# Patient Record
Sex: FEMALE | Race: WHITE | NOT HISPANIC OR LATINO | Employment: OTHER | URBAN - METROPOLITAN AREA
[De-identification: names, ages, dates, MRNs, and addresses within clinical notes are randomized per-mention and may not be internally consistent; named-entity substitution may affect disease eponyms.]

---

## 2017-01-04 ENCOUNTER — PATIENT OUTREACH (OUTPATIENT)
Dept: ADMINISTRATIVE | Facility: CLINIC | Age: 57
End: 2017-01-04
Payer: MEDICARE

## 2017-01-04 ENCOUNTER — OFFICE VISIT (OUTPATIENT)
Dept: FAMILY MEDICINE | Facility: CLINIC | Age: 57
End: 2017-01-04
Payer: COMMERCIAL

## 2017-01-04 VITALS
HEART RATE: 88 BPM | TEMPERATURE: 98 F | SYSTOLIC BLOOD PRESSURE: 140 MMHG | WEIGHT: 224 LBS | DIASTOLIC BLOOD PRESSURE: 85 MMHG | HEIGHT: 62 IN | BODY MASS INDEX: 41.22 KG/M2

## 2017-01-04 DIAGNOSIS — E87.6 HYPOKALEMIA: ICD-10-CM

## 2017-01-04 DIAGNOSIS — B00.89 RECURRENT ORAL HERPES SIMPLEX INFECTION: ICD-10-CM

## 2017-01-04 DIAGNOSIS — M19.049 HAND ARTHRITIS: ICD-10-CM

## 2017-01-04 DIAGNOSIS — L03.213 PERIORBITAL CELLULITIS OF LEFT EYE: Primary | ICD-10-CM

## 2017-01-04 PROCEDURE — 99999 PR PBB SHADOW E&M-EST. PATIENT-LVL IV: CPT | Mod: PBBFAC,,, | Performed by: FAMILY MEDICINE

## 2017-01-04 RX ORDER — POTASSIUM CHLORIDE 750 MG/1
20 CAPSULE, EXTENDED RELEASE ORAL DAILY
Qty: 60 CAPSULE | Refills: 11 | Status: SHIPPED | OUTPATIENT
Start: 2017-01-04 | End: 2018-01-06 | Stop reason: SDUPTHER

## 2017-01-04 RX ORDER — SULINDAC 150 MG/1
TABLET ORAL
Qty: 60 TABLET | Refills: 0 | Status: SHIPPED | OUTPATIENT
Start: 2017-01-04 | End: 2017-08-10

## 2017-01-04 RX ORDER — LEVOFLOXACIN 500 MG/1
TABLET, FILM COATED ORAL
Refills: 0 | COMMUNITY
Start: 2017-01-02 | End: 2017-01-24 | Stop reason: SDUPTHER

## 2017-01-04 RX ORDER — ACETAMINOPHEN 325 MG/1
325 TABLET ORAL EVERY 6 HOURS PRN
COMMUNITY
End: 2017-04-05

## 2017-01-04 RX ORDER — TRAMADOL HYDROCHLORIDE 50 MG/1
50 TABLET ORAL EVERY 6 HOURS PRN
Qty: 30 TABLET | Refills: 0 | Status: SHIPPED | OUTPATIENT
Start: 2017-01-04 | End: 2017-01-14

## 2017-01-04 RX ORDER — VALACYCLOVIR HYDROCHLORIDE 1 G/1
1000 TABLET, FILM COATED ORAL 3 TIMES DAILY
Qty: 21 TABLET | Refills: 0 | Status: SHIPPED | OUTPATIENT
Start: 2017-01-04 | End: 2017-01-24

## 2017-01-04 RX ORDER — HYDROCODONE BITARTRATE AND ACETAMINOPHEN 10; 325 MG/1; MG/1
TABLET ORAL
Refills: 0 | COMMUNITY
Start: 2016-12-28 | End: 2017-01-04

## 2017-01-04 RX ORDER — OXYCODONE AND ACETAMINOPHEN 10; 325 MG/1; MG/1
TABLET ORAL
Refills: 0 | COMMUNITY
Start: 2016-12-29 | End: 2017-01-04

## 2017-01-04 NOTE — PROGRESS NOTES
Patient presents for follow-up after hospitalization with left periorbital and facial cellulitis.  Symptoms have improved.  She is still on antibiotics.  She does complain of some sores to the left side of the throat and mouth.  She apparently went to the ER and was diagnosed with aphthous ulcer and is using Magic mouthwash with some improvement.  She denies any fever chills.  Vision okay.  She was given some Percocet in the hospital, but this makes her itch.  She did have mild decreased potassium in the hospital.  She is on diuretic.      DATES OF SERVICE: 12/31/2016 - 01/02/2017     ADMITTING DIAGNOSES:      1. Left periorbital and left facial cellulitis, status post failed   outpatient antibiotics.  2. Leukocytosis.  3. Mild hypokalemia.  4. Hypothyroidism.  5. Sarcoidosis with impaired immune response.  6. Chronic obstructive pulmonary disease versus asthma.  7. Hyperlipidemia.      DISCHARGE DIAGNOSES:      1. Left periorbital and left facial cellulitis, status post failed   outpatient antibiotics.  2. Leukocytosis.  3. Mild hypokalemia.  4. Hypothyroidism.  5. Sarcoidosis with impaired immune response.  6. Chronic obstructive pulmonary disease versus asthma.  7. Hyperlipidemia.      CONSULTS REQUESTED: None.      PROCEDURES: None.      HOSPITAL COURSE: The patient is a 56-year-old female who presented to the   Emergency Department with complaints of worsening swelling and pain to the   left side of her face. She states that her symptoms began shortly after   having a root canal several days prior to admission. She states that after   the anesthesia wore off, the pain began to worsen. In the following morning,   she called her endodontist and he advised her to go to the Emergency   Department. Last Thursday evening, she presented to the Emergency Department   because of the facial swelling and erythema. She was sent home on Suprax and   pain medications. She reports compliance with her medications, but despite    taking them as prescribed, she continued to have worsening symptoms and   therefore she returned to the Emergency Department for evaluation. On arrival  to the ED, there was erythema and edema just under her left eye and orbital   region extending to her left cheek and under her neck, under her left chin to   the left of her chin. There was no fluctuance or noted abscess; however, it   was very edematous and indurated with no obvious abscess. She did complain of  mild tenderness to the left temporal region; however, there was no obvious   abscess or any drainage. She also denies any changes or loss of vision. In   the Emergency Department, a maxillofacial CT was done, which showed   periorbital cellulitis without evidence of intraorbital extension. Blood   cultures x 2 sets were collected. She had noted leukocytosis; however, she   was started on broad-spectrum antibiotic coverage to include vancomycin and   Levaquin, given her chronic immunosuppression. She was nontoxic appearing and  hemodynamically stable and admitted for further workup and treatment with   continuation of care for her cellulitis. In summary of her hospital course,   she remained on IV antibiotics. Throughout her hospital stay, there was rapid  improvement after 1 day of her cellulitis. She had good response from the   antibiotics. Again, as outlined above, she had a recent dental procedure and   root canal and her symptoms began shortly after this was done. On date of   discharge, the periorbital cellulitis and left facial cellulitis has resolved   completely. She still complains of some mild oral dental pain; however, I   have instructed her that there is not a dentist on staff here and that she   will need to follow up with her endodontist who performed her dental procedure  in 1 to 2 days for close followup. She will be prescribed Levaquin at time   of discharge given her multiple antibiotics allergies. For her leukocytosis   and mild  hypokalemia, this is all resolved. Her white blood cell count is   normal. Her hypokalemia had been repleted and recent potassium is normal.   For her hypothyroidism, she will continue with Synthroid at time of discharge.  For her sarcoidosis and impaired immune response, she is on Hizentra every 2  weeks. She has remained afebrile and will continue with her outpatient   injections as previously scheduled. For her COPD, asthma, this is stable and   controlled. No wheezing was noted throughout her hospital course. She will   continue with her home MDIs. For her hyperlipidemia, she will continue with   her statin and a cardiac diet. She is hemodynamically stable and will be   discharged to home today. Blood cultures are showing no growth to date. She   has remained afebrile throughout her hospital stay.      DISPOSITION: Home or self-care. Follow with Dr. Lewis in 1 week and   endodontist as soon as possible.      MEDICATIONS: Include:      1. Levaquin 500 mg daily for 5 days to complete a week of antibiotic   therapy.  2. Enteric-coated aspirin 81 mg daily.  3. Dulera 1 puff b.i.d.  4. Premarin 0.625 mg nightly.  5. Fish oil 1400 mg daily.  6. Hizentra injection every 14 days.  7. Synthroid or levothyroxine 75 mcg daily.  8. Singulair 10 mg nightly.  9. Percocet 10/325 one every 4 hours as needed for pain.  10. Klor-Con 20 mEq daily.  11. Pravastatin 40 nightly.  12. ProAir HFA 90 mcg 2 puffs inhaled daily.  13. Proventil nebs q. 6 p.r.n.  14. Triamterene 50 mg daily.      ACTIVITY: As tolerated.      DIET: Cardiac, low cholesterol, no added salt.      Discharge time and coordination of care exceed 30 minutes.        Holly Page NP  For a full list of reconciled medications on discharge, please refer to the   Discharge Medication List Summary for Patient Use, and/or the Discharge   Orders.   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    Herpes simplex without mention of complication      oral    Hyperlipidemia     Hypothyroidism     LBP radiating to left leg     Leukocytosis, unspecified     Migraine headache     Obesity, Class III, BMI 40-49.9 (morbid obesity)     Recurrent upper respiratory infection (URI)     S/P colonoscopy November 2010    Urticaria      Past Surgical History   Procedure Laterality Date    Tonsillectomy      Adenoidectomy      Hysterectomy       menorrhagia-Ovaries intact    Hand fracture surgery      Urethral dilatation      Hardware removal       left hand 5th     Colonoscopy  2010    Colonoscopy N/A 10/8/2015     Procedure: COLONOSCOPY;  Surgeon: Panda Bose MD;  Location: [facility];  Service: Endoscopy;  Laterality: N/A;     Social History     Social History    Marital status:      Spouse name: N/A    Number of children: N/A    Years of education: N/A     Occupational History    Not on file.     Social History Main Topics    Smoking status: Never Smoker    Smokeless tobacco: Never Used    Alcohol use 0.0 oz/week     0 Standard drinks or equivalent per week      Comment: very rarely    Drug use: No    Sexual activity: Yes     Partners: Male     Other Topics Concern    Not on file     Social History Narrative    . Disabled teacher.     Family History   Problem Relation Age of Onset    Melanoma Mother     Basal cell carcinoma Mother     Lung disease Mother      pulm htn    Lung cancer Father     Diabetes Father     Hypertension Father     Diabetes Paternal Aunt     Colon cancer Paternal Aunt     Diabetes Paternal Aunt     Ovarian cancer Paternal Grandmother      Cancer Maternal Grandfather     Melanoma Maternal Aunt     Melanoma Maternal Uncle     Breast cancer Paternal Aunt     Lymphoma Paternal Aunt     Ulcerative colitis Son     Psoriasis Son     Psoriasis Son      Review of patient's allergies indicates:   Allergen Reactions    Amoxicillin-pot clavulanate      Other reaction(s): Itching    Ephedrine      Other reaction(s): comatose    Hydrocodone Itching    Iodinated contrast media - iv dye     Iodine and iodide containing products Other (See Comments)    Pantoprazole      Other reaction(s): upset stomach/halitosis    Penicillins      Other reaction(s): does not work on pt symptoms    Percocet [oxycodone-acetaminophen] Itching    Barium iodide Rash     Current Outpatient Prescriptions on File Prior to Visit   Medication Sig Dispense Refill    albuterol (PROAIR HFA) 90 mcg/actuation inhaler Inhale 1 puff into the lungs 2 (two) times daily. 3 Inhaler 3    albuterol (PROVENTIL) 2.5 mg /3 mL (0.083 %) nebulizer solution Take 3 mLs (2.5 mg total) by nebulization every 4 (four) hours as needed for Wheezing or Shortness of Breath. Every 4-6 hours  each 3    albuterol sulfate 2.5 mg/0.5 mL Nebu Inhale 1 ampule into the lungs.      CALCIUM CARB/D3/MAGNESIUM/ZINC (CALCIUM CARB-D3-MAG CMB11-ZINC) [phone] mg-unit-mg-mg Tab Take 2 tablets by mouth every evening.      DULERA 200-5 mcg/actuation inhaler INHALE 2 PUFFS INTO THE LUNGS TWICE DAILY 13 g 0    estrogens, conjugated, (PREMARIN) 0.625 MG tablet Take 1 tablet (0.625 mg total) by mouth once daily. 30 tablet 11    immun glob G,IgG,-pro-IgA 0-50 (HIZENTRA) 2 gram/10 mL (20 %) Soln Inject 18 g into the skin every 14 (fourteen) days. 90 mL 12    levothyroxine (SYNTHROID) 75 MCG tablet Take 1 tablet (75 mcg total) by mouth once daily. 30 tablet 11    metformin (GLUCOPHAGE) 500 MG tablet Take 500 mg by mouth 2 (two) times daily with meals.   2    mometasone-formoterol (DULERA) 200-5  "mcg/actuation inhaler Inhale 2 puffs into the lungs 2 (two) times daily. Twice a day 3 Inhaler 3    montelukast (SINGULAIR) 10 mg tablet Take 1 tablet (10 mg total) by mouth every evening. 90 tablet 3    multivitamin (ONE DAILY MULTIVITAMIN) per tablet Take 1 tablet by mouth once daily.      omega 3-dha-epa-fish oil (FISH OIL) 900-1,400 mg CpDR Take 1 capsule by mouth once daily.      pravastatin (PRAVACHOL) 40 MG tablet Take 1 tablet (40 mg total) by mouth once daily. 30 tablet 11    sumatriptan (IMITREX) 100 MG tablet Take 1 po at onset of migraine. May repeat once in 2 hrs if needed. No more than 2 pills in 24 hours 9 tablet 11    triamterene-hydrochlorothiazide 75-50 mg (MAXZIDE) 75-50 mg per tablet Take 1 tablet by mouth once daily. 30 tablet 11    zolpidem (AMBIEN CR) 6.25 MG CR tablet Take 6.25 mg by mouth.      [DISCONTINUED] potassium chloride (MICRO-K) 10 MEQ CpSR Take 1 capsule (10 mEq total) by mouth once daily. 30 capsule 11    [DISCONTINUED] hydrOXYzine pamoate (VISTARIL) 25 MG Cap TK 1 C PO Q 6 H PRN  0    [DISCONTINUED] predniSONE (DELTASONE) 20 MG tablet Take one daily for 3 days and repeat as needed for asthma 21 tablet 1    [DISCONTINUED] sulindac (CLINORIL) 150 MG tablet TAKE 1 TABLET(150 MG) BY MOUTH TWICE DAILY 60 tablet 0    [DISCONTINUED] tiotropium bromide (SPIRIVA RESPIMAT) 2.5 mcg/actuation Mist Inhale 2 puffs into the lungs 2 (two) times daily. 12 g 3     No current facility-administered medications on file prior to visit.            OBJECTIVE:     Vitals:    01/04/17 0931   BP: (!) 140/85   Pulse: 88   Temp: 98.1 °F (36.7 °C)   Weight: 101.6 kg (223 lb 15.8 oz)   Height: 5' 2" (1.575 m)     Wt Readings from Last 3 Encounters:   01/04/17 101.6 kg (223 lb 15.8 oz)   10/13/16 103 kg (227 lb 1.2 oz)   10/06/16 102.6 kg (226 lb 3.1 oz)     APPEARANCE: Well nourished, well developed, in no acute distress.    HEAD: Normocephalic.  Atraumatic.  No sinus tenderness.  No significant " swelling periorbital area  EYES:   Right eye: Pupil reactive.  Conjunctiva clear.    Left eye: Pupil reactive.  Conjunctiva clear.    Both fundi:  Grossly normal to nondilated exam. EOMI.    EARS: TM's intact. Light reflex normal. No retraction or perforation.    NOSE:  clear.  MOUTH & THROAT:   She has several small superficial ulcerations just to the left side of the hard palate consistent with probable herpes simplex.  States has had this previously.  NECK: Supple. No bruits.  No JVD.  No cervical lymphadenopathy.  No thyromegaly.    CHEST: Breath sounds clear bilaterally.  Normal respiratory effort  CARDIOVASCULAR: Normal rate.  Regular rhythm.  No murmurs.  No rub.  No gallops.  ABDOMEN: Bowel sounds normal.  Soft.  No tenderness.  No organomegaly.  PERIPHERAL VASCULAR: No cyanosis.  No clubbing.  No edema.  NEUROLOGIC: No focal findings.  MENTAL STATUS: Alert.  Oriented x 3.    Yodit was seen today for transitional care.    Diagnoses and all orders for this visit:    Periorbital cellulitis of left eye    Recurrent oral herpes simplex infection    Hypokalemia  -     Basic metabolic panel; Future    Other orders  -     valacyclovir (VALTREX) 1000 MG tablet; Take 1 tablet (1,000 mg total) by mouth 3 (three) times daily.  -     tramadol (ULTRAM) 50 mg tablet; Take 1 tablet (50 mg total) by mouth every 6 (six) hours as needed for Pain.  -     potassium chloride (MICRO-K) 10 MEQ CpSR; Take 2 capsules (20 mEq total) by mouth once daily.      Recheck BMP one month.  Keep follow-up with her dentist.  Follow-up if symptoms not continuing to resolve    Transitional Care Note    Family and/or Caretaker present at visit?  Yes.  Diagnostic tests reviewed/disposition: I have reviewed all completed as well as pending diagnostic tests at the time of discharge.  Disease/illness education: yes  Home health/community services discussion/referrals: Patient does not have home health established from hospital visit.  They do not  need home health.  If needed, we will set up home health for the patient.   Establishment or re-establishment of referral orders for community resources: No other necessary community resources.   Discussion with other health care providers: No discussion with other health care providers necessary.

## 2017-01-04 NOTE — PROGRESS NOTES
Cassy Joshi RN attempted to contact patient. The following occurred:   C3 nurse attempted to contact Yodit Canseco for a TCC post hospital discharge follow up call. The patient is unable to conduct the call @ this time. The patient requested a callback.    The patient has a scheduled HOSFU appointment with Miky Lewis MD on 1/4/17 @ 0920hrs. Message sent to Physician staff.

## 2017-01-04 NOTE — MR AVS SNAPSHOT
[facility]  [address] [facility] [address]  Phone: [phone]  Fax: [phone]                  Yodit BROWNE Kelseamelvi   2017 9:20 AM   Office Visit    Description:  Female : 1960   Provider:  Miky Lewis MD   Department:  [facility]           Reason for Visit     Transitional Care           Diagnoses this Visit        Comments    Periorbital cellulitis of left eye    -  Primary     Recurrent oral herpes simplex infection         Hypokalemia                To Do List           Future Appointments        Provider Department Dept Phone    2017 10:20 AM Erick Gary MD [facility] - Pulmonary [phone]    2017 10:40 AM LABORATORY, [place] [facility] [phone]    2017 10:00 AM Shane Lu OD [facility] - Ophthalmology [phone]      Goals (5 Years of Data)     None       These Medications        Disp Refills Start End    valacyclovir (VALTREX) 1000 MG tablet 21 tablet 0 2017    Take 1 tablet (1,000 mg total) by mouth 3 (three) times daily. - Oral    Pharmacy: [facility] [address] [place] [address] [address] [facility] [place] [address] [address] #: [phone]       tramadol (ULTRAM) 50 mg tablet 30 tablet 0 2017    Take 1 tablet (50 mg total) by mouth every 6 (six) hours as needed for Pain. - Oral    Pharmacy: [facility] [address] [place] [address] [address] [facility] [place] [address] [address] #: [phone]       potassium chloride (MICRO-K) 10 MEQ CpSR 60 capsule 11 2017     Take 2 capsules (20 mEq total) by mouth once daily. - Oral    Pharmacy: [facility] [address] [place] [address] [facility] AT [place] [address] [address] #: [phone]         [facility]     [facility] Care [facility] -  Assistance  Registered nurses in the [facility] provide clinical advisement, health education, appointment booking, and other advisory  services.  Call for this free service at [phone].             Medications           Message regarding Medications     Verify the changes and/or additions to your medication regime listed below are the same as discussed with your clinician today.  If any of these changes or additions are incorrect, please notify your healthcare provider.        START taking these NEW medications        Refills    valacyclovir (VALTREX) 1000 MG tablet 0    Sig: Take 1 tablet (1,000 mg total) by mouth 3 (three) times daily.    Class: Normal    Route: Oral    tramadol (ULTRAM) 50 mg tablet 0    Sig: Take 1 tablet (50 mg total) by mouth every 6 (six) hours as needed for Pain.    Class: Normal    Route: Oral      CHANGE how you are taking these medications     Start Taking Instead of    potassium chloride (MICRO-K) 10 MEQ CpSR potassium chloride (MICRO-K) 10 MEQ CpSR    Dosage:  Take 2 capsules (20 mEq total) by mouth once daily. Dosage:  Take 1 capsule (10 mEq total) by mouth once daily.    Reason for Change:  Reorder       STOP taking these medications     hydrOXYzine pamoate (VISTARIL) 25 MG Cap TK 1 C PO Q 6 H PRN    tiotropium bromide (SPIRIVA RESPIMAT) 2.5 mcg/actuation Mist Inhale 2 puffs into the lungs 2 (two) times daily.    FLUVIRIN [id number] 45 mcg (15 mcg x 3)/0.5 mL Susp ADM 0.5ML IM UTD    hydrocodone-acetaminophen 10-325mg (NORCO)  mg Tab     oxycodone-acetaminophen (PERCOCET)  mg per tablet TK 1 T PO Q 4 H PRN P    predniSONE (DELTASONE) 20 MG tablet Take one daily for 3 days and repeat as needed for asthma           Verify that the below list of medications is an accurate representation of the medications you are currently taking.  If none reported, the list may be blank. If incorrect, please contact your healthcare provider. Carry this list with you in case of emergency.           Current Medications     acetaminophen (TYLENOL) 325 MG tablet Take 325 mg by mouth every 6 (six) hours as needed for Pain.     albuterol (PROAIR HFA) 90 mcg/actuation inhaler Inhale 1 puff into the lungs 2 (two) times daily.    albuterol (PROVENTIL) 2.5 mg /3 mL (0.083 %) nebulizer solution Take 3 mLs (2.5 mg total) by nebulization every 4 (four) hours as needed for Wheezing or Shortness of Breath. Every 4-6 hours PRN    albuterol sulfate 2.5 mg/0.5 mL Nebu Inhale 1 ampule into the lungs.    CALCIUM CARB/D3/MAGNESIUM/ZINC (CALCIUM CARB-D3-MAG CMB11-ZINC) [phone] mg-unit-mg-mg Tab Take 2 tablets by mouth every evening.    DULERA 200-5 mcg/actuation inhaler INHALE 2 PUFFS INTO THE LUNGS TWICE DAILY    estrogens, conjugated, (PREMARIN) 0.625 MG tablet Take 1 tablet (0.625 mg total) by mouth once daily.    immun glob G,IgG,-pro-IgA 0-50 (HIZENTRA) 2 gram/10 mL (20 %) Soln Inject 18 g into the skin every 14 (fourteen) days.    levoFLOXacin (LEVAQUIN) 500 MG tablet TK 1 T PO D    levothyroxine (SYNTHROID) 75 MCG tablet Take 1 tablet (75 mcg total) by mouth once daily.    metformin (GLUCOPHAGE) 500 MG tablet Take 500 mg by mouth 2 (two) times daily with meals.     mometasone-formoterol (DULERA) 200-5 mcg/actuation inhaler Inhale 2 puffs into the lungs 2 (two) times daily. Twice a day    montelukast (SINGULAIR) 10 mg tablet Take 1 tablet (10 mg total) by mouth every evening.    multivitamin (ONE DAILY MULTIVITAMIN) per tablet Take 1 tablet by mouth once daily.    omega 3-dha-epa-fish oil (FISH OIL) 900-1,400 mg CpDR Take 1 capsule by mouth once daily.    potassium chloride (MICRO-K) 10 MEQ CpSR Take 2 capsules (20 mEq total) by mouth once daily.    pravastatin (PRAVACHOL) 40 MG tablet Take 1 tablet (40 mg total) by mouth once daily.    sulindac (CLINORIL) 150 MG tablet TAKE 1 TABLET(150 MG) BY MOUTH TWICE DAILY    sumatriptan (IMITREX) 100 MG tablet Take 1 po at onset of migraine. May repeat once in 2 hrs if needed. No more than 2 pills in 24 hours    triamterene-hydrochlorothiazide 75-50 mg (MAXZIDE) 75-50 mg per tablet Take 1 tablet by  "mouth once daily.    zolpidem (AMBIEN CR) 6.25 MG CR tablet Take 6.25 mg by mouth.    tramadol (ULTRAM) 50 mg tablet Take 1 tablet (50 mg total) by mouth every 6 (six) hours as needed for Pain.    valacyclovir (VALTREX) 1000 MG tablet Take 1 tablet (1,000 mg total) by mouth 3 (three) times daily.           Clinical Reference Information           Vital Signs - Last Recorded  Most recent update: 1/4/2017  9:39 AM by Karly Arthur MA    BP Pulse Temp Ht Wt BMI    (!) 140/85 88 98.1 °F (36.7 °C) 5' 2" (1.575 m) 101.6 kg (223 lb 15.8 oz) 40.97 kg/m2      Blood Pressure          Most Recent Value    BP  (!)  140/85      Allergies as of 1/4/2017     Amoxicillin-pot Clavulanate    Ephedrine    Hydrocodone    Iodinated Contrast Media - Iv Dye    Iodine And Iodide Containing Products    Pantoprazole    Penicillins    Percocet [Oxycodone-acetaminophen]    Barium Iodide      Immunizations Administered on Date of Encounter - 1/4/2017     None      Orders Placed During Today's Visit     Future Labs/Procedures Expected by Expires    Basic metabolic panel  1/4/2017 9/13/2030      "

## 2017-01-16 RX ORDER — FLUCONAZOLE 150 MG/1
150 TABLET ORAL ONCE
Qty: 1 TABLET | Refills: 0 | Status: SHIPPED | OUTPATIENT
Start: 2017-01-16 | End: 2017-01-16

## 2017-01-16 NOTE — TELEPHONE ENCOUNTER
----- Message from Merle Lemus sent at 1/16/2017 10:15 AM CST -----  Contact: pt   Pt has a yeast infection and is requesting dr emmanuel in a prescription,,, [facility] in [facility], please call pt back

## 2017-01-24 ENCOUNTER — OFFICE VISIT (OUTPATIENT)
Dept: PULMONOLOGY | Facility: CLINIC | Age: 57
End: 2017-01-24
Payer: COMMERCIAL

## 2017-01-24 VITALS
DIASTOLIC BLOOD PRESSURE: 87 MMHG | OXYGEN SATURATION: 98 % | HEIGHT: 62 IN | WEIGHT: 219.81 LBS | SYSTOLIC BLOOD PRESSURE: 141 MMHG | BODY MASS INDEX: 40.45 KG/M2 | HEART RATE: 84 BPM

## 2017-01-24 DIAGNOSIS — D84.9 IMMUNE DEFICIENCY DISORDER: Primary | ICD-10-CM

## 2017-01-24 DIAGNOSIS — E66.01 OBESITY, CLASS III, BMI 40-49.9 (MORBID OBESITY): ICD-10-CM

## 2017-01-24 DIAGNOSIS — J45.20 MILD INTERMITTENT ASTHMA WITHOUT COMPLICATION: ICD-10-CM

## 2017-01-24 PROCEDURE — 1159F MED LIST DOCD IN RCRD: CPT | Mod: S$GLB,,, | Performed by: INTERNAL MEDICINE

## 2017-01-24 PROCEDURE — 99999 PR PBB SHADOW E&M-EST. PATIENT-LVL IV: CPT | Mod: PBBFAC,,, | Performed by: INTERNAL MEDICINE

## 2017-01-24 PROCEDURE — 99214 OFFICE O/P EST MOD 30 MIN: CPT | Mod: S$GLB,,, | Performed by: INTERNAL MEDICINE

## 2017-01-24 RX ORDER — ALBUTEROL SULFATE 0.83 MG/ML
2.5 SOLUTION RESPIRATORY (INHALATION) EVERY 4 HOURS PRN
Qty: 120 EACH | Refills: 3 | Status: SHIPPED | OUTPATIENT
Start: 2017-01-24 | End: 2018-11-06 | Stop reason: SDUPTHER

## 2017-01-24 RX ORDER — ALBUTEROL SULFATE 90 UG/1
1 AEROSOL, METERED RESPIRATORY (INHALATION) 2 TIMES DAILY
Qty: 3 INHALER | Refills: 3 | Status: SHIPPED | OUTPATIENT
Start: 2017-01-24 | End: 2017-10-05 | Stop reason: SDUPTHER

## 2017-01-24 RX ORDER — MONTELUKAST SODIUM 10 MG/1
10 TABLET ORAL NIGHTLY
Qty: 90 TABLET | Refills: 3 | Status: SHIPPED | OUTPATIENT
Start: 2017-01-24 | End: 2017-10-05 | Stop reason: SDUPTHER

## 2017-01-24 RX ORDER — PREDNISONE 20 MG/1
TABLET ORAL
Qty: 36 TABLET | Refills: 0 | Status: SHIPPED | OUTPATIENT
Start: 2017-01-24 | End: 2017-08-10

## 2017-01-24 RX ORDER — LEVOFLOXACIN 500 MG/1
TABLET, FILM COATED ORAL
Qty: 10 TABLET | Refills: 1 | Status: SHIPPED | OUTPATIENT
Start: 2017-01-24 | End: 2017-04-05

## 2017-01-24 NOTE — PATIENT INSTRUCTIONS
Would consider nucala if unstable on hirzentra???    Asthma seems well controlled.    Prednisone for flare - one a day for 3 ok?    levaquin for yellow mucous.    Bariatric surg may be reasonable.

## 2017-01-24 NOTE — PROGRESS NOTES
1/24/2017    Yodit Canseco  Office Note    Chief Complaint   Patient presents with    Follow-up     3 month     Asthma       Jan 24, on  hirzentra q o week since sept and asthma more stable, no hosp, no prednisone, no noct asthma/ no rescue therapy- control not this good for years.        Sept 27,  HPI:has had lung problems since birth, no nocturnal arousals, uses rescue 2/d, breathing controlled satisfactory except with irritants/infection - strong abx needed to clear.  Prednisone 2/yr, last hosp 18 months.  No ventilator.        Had exacerbation recent due uri from .  Has had shingles vaccine past.      The chief compliant  problem is stable   PFSH:  Past Medical History   Diagnosis Date    Allergy     Angio-edema     Arthralgia     Asthma without status asthmaticus     Bronchitis     Diabetes mellitus, type 2     Diverticulosis of large intestine without hemorrhage 10/8/2015    Environmental allergies     Gastroparesis     Gastroparesis diabeticorum 10/8/2015    Herpes simplex without mention of complication      oral    Hyperlipidemia     Hypothyroidism     LBP radiating to left leg     Leukocytosis, unspecified     Migraine headache     Obesity, Class III, BMI 40-49.9 (morbid obesity)     Periorbital cellulitis 12/31/2016    Recurrent upper respiratory infection (URI)     S/P colonoscopy November 2010    Urticaria          Past Surgical History   Procedure Laterality Date    Tonsillectomy      Adenoidectomy      Hysterectomy       menorrhagia-Ovaries intact    Hand fracture surgery      Urethral dilatation      Hardware removal       left hand 5th     Colonoscopy  2010    Colonoscopy N/A 10/8/2015     Procedure: COLONOSCOPY;  Surgeon: Panda Bose MD;  Location: [facility];  Service: Endoscopy;  Laterality: N/A;     Social History   Substance Use Topics    Smoking status: Never Smoker    Smokeless tobacco: Never Used    Alcohol use 0.0 oz/week     0 Standard  "drinks or equivalent per week      Comment: very rarely     Family History   Problem Relation Age of Onset    Melanoma Mother     Basal cell carcinoma Mother     Lung disease Mother      pulm htn    Lung cancer Father     Diabetes Father     Hypertension Father     Diabetes Paternal Aunt     Colon cancer Paternal Aunt     Diabetes Paternal Aunt     Ovarian cancer Paternal Grandmother     Cancer Maternal Grandfather     Melanoma Maternal Aunt     Melanoma Maternal Uncle     Breast cancer Paternal Aunt     Lymphoma Paternal Aunt     Ulcerative colitis Son     Psoriasis Son     Psoriasis Son      Review of patient's allergies indicates:   Allergen Reactions    Sudafed [pseudoephedrine hcl] Other (See Comments)     Does not want to wake up .    Amoxicillin-pot clavulanate      Other reaction(s): Itching    Ephedrine      Other reaction(s): comatose    Hydrocodone Itching    Iodinated contrast media - iv dye     Iodine and iodide containing products Other (See Comments)    Pantoprazole      Other reaction(s): upset stomach/halitosis    Penicillins      Other reaction(s): does not work on pt symptoms    Percocet [oxycodone-acetaminophen] Itching    Barium iodide Rash       Performance Status:The patient's activity level is functions out of house.  No irratent exposure. Sedatary.  To start threadmil    Review of Systems:  a review of eleven systems covering constitutional, Eye, HEENT, Psych, Respiratory, Cardiac, GI, , Musculoskeletal, Endocrine, Dermatologic was negative except for pertinent findings as listed ABOVE and below: all good except for sinus into lungs and profound irratent sensitive. on thryoid     Exam:Comprehensive exam done.   Visit Vitals    BP (!) 141/87 (BP Location: Right arm, Patient Position: Sitting, BP Method: Automatic)    Pulse 84    Ht 5' 2" (1.575 m)    Wt 99.7 kg (219 lb 12.8 oz)    SpO2 98%    BMI 40.2 kg/m2     Exam included Vitals as listed, and patient's " appearance and affect and alertness and mood, oral exam for yeast and hygiene and pharynx lesions and Mallapatti (M) score, neck with inspection for jvd and masses and thyroid abnormalities and lymph nodes (supraclavicular and infraclavicular nodes and axillary also examined and noted if abn), chest exam included symmetry and effort and fremitus and percussion and auscultation, cardiac exam included rhythm and gallops and murmur and rubs and jvd and edema, abdominal exam for mass and hepatosplenomegaly and tenderness and hernias and bowel sounds, Musculoskeletal exam with muscle tone and posture and mobility/gait and  strength, and skin for rashes and cyanosis and pallor and turgor, extremity for clubbing.  Findings were normal except for pertinent findings listed below:  M4, good bs and no edema.bmi 40    Radiographs reviewed: view by direct vision March 2015 cxr good,    Labs reviewed from last 6 months on EPIC result review   eso up     Results for SILVIA MARADIAGA (MRN [id number]) as of 1/24/2017 10:33   Ref. Range 6/14/2016 12:00 9/13/2016 10:24 9/30/2016 10:17   Diphtheria Toxoid Ab Latest Ref Range: >0.099 IU/mL >3.000  2.566   Tetanus Ab Latest Ref Range: >0.150 IU/mL 6.373  4.029   S.pneumoniae Type 1 Latest Units: mcg/mL 1.7  2.0   S.pneumoniae Type 3 Latest Units: mcg/mL 0.7  1.0   Strep pneumo Type 4 Latest Units: mcg/mL 0.6  0.5   S.pneumoniae Type 5 Latest Units: mcg/mL 2.0  2.4   S.pneumoniae Type 6B Latest Units: mcg/mL 10.9  7.7   S.pneumoniae Type 7F Latest Units: mcg/mL 2.0  1.8   S.pneumoniae Type 8 Latest Units: mcg/mL 1.6  1.6   S.pneumoniae Type 9N Latest Units: mcg/mL 0.3  0.7   S.pneumoniae Type 9V Abs Latest Units: mcg/mL 0.5  0.8   S.pneumoniae Type 12F Latest Units: mcg/mL <0.3  0.3   Strep pneumo Type 14 Latest Units: mcg/mL 1.9  3.1   S.pneumoniae Type 18C Latest Units: mcg/mL 5.5  4.0   S.pneumoniae Type 19F Latest Units: mcg/mL 0.4  1.5   S.pneumoniae Type 23F Latest Units: mcg/mL  4.1  2.8   Results for YODIT MARADIAGA (MRN [id number]) as of 1/24/2017 10:33   Ref. Range 2/12/2015 09:00 6/6/2016 14:30 6/14/2016 12:00 9/30/2016 10:17   IgG - Serum Latest Ref Range: 650 - 1600 mg/dL 748  700 962   IgM Latest Ref Range: 50 - 300 mg/dL 120  124    IgA Latest Ref Range: 40 - 350 mg/dL 188  170    IgE Latest Ref Range: 0 - 100 IU/mL   <35    IgG 1 Latest Ref Range: 490 - 1140 mg/dL   516 656   IgG 2 Latest Ref Range: 150 - 640 mg/dL   153 249   IgG 3 Latest Ref Range: 11 - 85 mg/dL   11 15   IgG 4 Latest Ref Range: 3 - 201 mg/dL   20 20   Results for YODIT MARADIAGA (MRN [id number]) as of 1/24/2017 10:33   Ref. Range 9/30/2016 10:17   WBC Latest Ref Range: 3.90 - 12.70 K/uL 19.58 (H)   RBC Latest Ref Range: 4.00 - 5.40 M/uL 5.12   Hemoglobin Latest Ref Range: 12.0 - 16.0 g/dL 15.5   Hematocrit Latest Ref Range: 37.0 - 48.5 % 44.8   MCV Latest Ref Range: 82 - 98 fL 88   MCH Latest Ref Range: 27.0 - 31.0 pg 30.3   MCHC Latest Ref Range: 32.0 - 36.0 % 34.6   RDW Latest Ref Range: 11.5 - 14.5 % 14.2   Platelets Latest Ref Range: 150 - 350 K/uL 229   MPV Latest Ref Range: 9.2 - 12.9 fL 11.7   Gran% Latest Ref Range: 38.0 - 73.0 % 59.0   Lymph% Latest Ref Range: 18.0 - 48.0 % 31.0   Lymph # Latest Ref Range: 1.0 - 4.8 K/uL CANCELED   Mono% Latest Ref Range: 4.0 - 15.0 % 4.0   Mono # Latest Ref Range: 0.3 - 1.0 K/uL CANCELED   Eosinophil% Latest Ref Range: 0.0 - 8.0 % 6.0   Eos # Latest Ref Range: 0.0 - 0.5 K/uL CANCELED   Basophil% Latest Ref Range: 0.0 - 1.9 % 0.0   Baso # Latest Ref Range: 0.00 - 0.20 K/uL CANCELED       PFT will be done and results to be reviewed     Plan:  Clinical impression is resonably certain and repeated evaluation prn +/- follow up will be needed as below.    Yodit was seen today for follow-up and asthma.    Diagnoses and all orders for this visit:    Immune deficiency disorder  -     levoFLOXacin (LEVAQUIN) 500 MG tablet; TK 1 T PO D    Mild intermittent asthma without  complication  -     montelukast (SINGULAIR) 10 mg tablet; Take 1 tablet (10 mg total) by mouth every evening.  -     mometasone-formoterol (DULERA) 200-5 mcg/actuation inhaler; Inhale 2 puffs into the lungs 2 (two) times daily. Twice a day  -     albuterol (PROAIR HFA) 90 mcg/actuation inhaler; Inhale 1 puff into the lungs 2 (two) times daily.  -     albuterol (PROVENTIL) 2.5 mg /3 mL (0.083 %) nebulizer solution; Take 3 mLs (2.5 mg total) by nebulization every 4 (four) hours as needed for Wheezing or Shortness of Breath. Every 4-6 hours PRN  -     predniSONE (DELTASONE) 20 MG tablet; 3 for 3 days then 2 for 3 days then one for 3 days and repeat for breathing problems    Obesity, Class III, BMI 40-49.9 (morbid obesity)      Return in about 6 months (around 7/24/2017), or if symptoms worsen or fail to improve.    Discussed with patient above for education the following:       Would consider nucala if unstable on hirzentra???    Asthma seems well controlled.    Prednisone for flare - one a day for 3 ok?    levaquin for yellow mucous.    Bariatric surg may be reasonable.

## 2017-01-24 NOTE — MR AVS SNAPSHOT
[facility]  [address]  [place] [address]  Phone: [phone]                  Yodit Canseco   2017 10:20 AM   Office Visit    Description:  Female : 1960   Provider:  Erick Gary MD   Department:  [facility]           Reason for Visit     Follow-up     Asthma           Diagnoses this Visit        Comments    Immune deficiency disorder    -  Primary     Mild intermittent asthma without complication         Obesity, Class III, BMI 40-49.9 (morbid obesity)                To Do List           Future Appointments        Provider Department Dept Phone    2017 10:40 AM LABORATORY, [place] [facility] [phone]    2017 10:00 AM [facility] [phone]      Goals (5 Years of Data)     None      Follow-Up and Disposition     Return in about 6 months (around 2017), or if symptoms worsen or fail to improve.    Follow-up and Disposition History       These Medications        Disp Refills Start End    montelukast (SINGULAIR) 10 mg tablet 90 tablet 3 2017     Take 1 tablet (10 mg total) by mouth every evening. - Oral    Pharmacy: [facility] [address] [place] [address] [facility] AT [place] [address] & [place] Ph #: [phone]       mometasone-formoterol (DULERA) 200-5 mcg/actuation inhaler 3 Inhaler 3 2017     Inhale 2 puffs into the lungs 2 (two) times daily. Twice a day - Inhalation    Pharmacy: [facility] [address] [place] [address] [facility] [address] AT [place] [address] & [place] Ph #: [phone]       albuterol (PROAIR HFA) 90 mcg/actuation inhaler 3 Inhaler 3 2017     Inhale 1 puff into the lungs 2 (two) times daily. - Inhalation    Pharmacy: [facility] [address] [place] [address], [place] - [address] AT [place] [address] & [place] Ph #: [phone]       albuterol (PROVENTIL) 2.5 mg /3 mL (0.083 %) nebulizer solution 120 each 3 2017     Take 3 mLs (2.5 mg  total) by nebulization every 4 (four) hours as needed for Wheezing or Shortness of Breath. Every 4-6 hours PRN - Nebulization    Pharmacy: [facility] [address] [place] [address] Ph #: [phone]       levoFLOXacin (LEVAQUIN) 500 MG tablet 10 tablet 1 1/24/2017     TK 1 T PO D    Pharmacy: [facility] [address] Ph #: [phone]       predniSONE (DELTASONE) 20 MG tablet 36 tablet 0 1/24/2017     3 for 3 days then 2 for 3 days then one for 3 days and repeat for breathing problems    Pharmacy: [facility] [address] Ph #: [phone]         [facility]     [facility] - 24/7 Assistance  Registered nurses in the [facility] provide clinical advisement, health education, appointment booking, and other advisory services.  Call for this free service at [phone].             Medications           Message regarding Medications     Verify the changes and/or additions to your medication regime listed below are the same as discussed with your clinician today.  If any of these changes or additions are incorrect, please notify your healthcare provider.        START taking these NEW medications        Refills    predniSONE (DELTASONE) 20 MG tablet 0    Sig: 3 for 3 days then 2 for 3 days then one for 3 days and repeat for breathing problems    Class: Normal      STOP taking these medications     albuterol sulfate 2.5 mg/0.5 mL Nebu Inhale 1 ampule into the lungs.    valacyclovir (VALTREX) 1000 MG tablet Take 1 tablet (1,000 mg total) by mouth 3 (three) times daily.           Verify that the below list of medications is an accurate representation of the medications you are currently taking.  If none reported, the list may be blank. If incorrect, please contact your healthcare provider. Carry this list with you in  case of emergency.           Current Medications     albuterol (PROAIR HFA) 90 mcg/actuation inhaler Inhale 1 puff into the lungs 2 (two) times daily.    albuterol (PROVENTIL) 2.5 mg /3 mL (0.083 %) nebulizer solution Take 3 mLs (2.5 mg total) by nebulization every 4 (four) hours as needed for Wheezing or Shortness of Breath. Every 4-6 hours PRN    CALCIUM CARB/D3/MAGNESIUM/ZINC (CALCIUM CARB-D3-MAG CMB11-ZINC) [phone] mg-unit-mg-mg Tab Take 2 tablets by mouth every evening.    estrogens, conjugated, (PREMARIN) 0.625 MG tablet Take 1 tablet (0.625 mg total) by mouth once daily.    immun glob G,IgG,-pro-IgA 0-50 (HIZENTRA) 2 gram/10 mL (20 %) Soln Inject 18 g into the skin every 14 (fourteen) days.    levothyroxine (SYNTHROID) 75 MCG tablet Take 1 tablet (75 mcg total) by mouth once daily.    metformin (GLUCOPHAGE) 500 MG tablet Take 500 mg by mouth 2 (two) times daily with meals.     mometasone-formoterol (DULERA) 200-5 mcg/actuation inhaler Inhale 2 puffs into the lungs 2 (two) times daily. Twice a day    montelukast (SINGULAIR) 10 mg tablet Take 1 tablet (10 mg total) by mouth every evening.    multivitamin (ONE DAILY MULTIVITAMIN) per tablet Take 1 tablet by mouth once daily.    omega 3-dha-epa-fish oil (FISH OIL) 900-1,400 mg CpDR Take 1 capsule by mouth once daily.    potassium chloride (MICRO-K) 10 MEQ CpSR Take 2 capsules (20 mEq total) by mouth once daily.    pravastatin (PRAVACHOL) 40 MG tablet Take 1 tablet (40 mg total) by mouth once daily.    sulindac (CLINORIL) 150 MG tablet TAKE 1 TABLET(150 MG) BY MOUTH TWICE DAILY    sumatriptan (IMITREX) 100 MG tablet Take 1 po at onset of migraine. May repeat once in 2 hrs if needed. No more than 2 pills in 24 hours    triamterene-hydrochlorothiazide 75-50 mg (MAXZIDE) 75-50 mg per tablet Take 1 tablet by mouth once daily.    acetaminophen (TYLENOL) 325 MG tablet Take 325 mg by mouth every 6 (six) hours as needed for Pain.    levoFLOXacin (LEVAQUIN) 500 MG  "tablet TK 1 T PO D    predniSONE (DELTASONE) 20 MG tablet 3 for 3 days then 2 for 3 days then one for 3 days and repeat for breathing problems    zolpidem (AMBIEN CR) 6.25 MG CR tablet Take 6.25 mg by mouth.           Clinical Reference Information           Vital Signs - Last Recorded  Most recent update: 1/24/2017 10:30 AM by Joshua Matthews MA    BP Pulse Ht Wt SpO2 BMI    (!) 141/87 (BP Location: Right arm, Patient Position: Sitting, BP Method: Automatic) 84 5' 2" (1.575 m) 99.7 kg (219 lb 12.8 oz) 98% 40.2 kg/m2      Blood Pressure          Most Recent Value    BP  (!)  141/87      Allergies as of 1/24/2017     Sudafed [Pseudoephedrine Hcl]    Amoxicillin-pot Clavulanate    Ephedrine    Hydrocodone    Iodinated Contrast Media - Iv Dye    Iodine And Iodide Containing Products    Pantoprazole    Penicillins    Percocet [Oxycodone-acetaminophen]    Barium Iodide      Immunizations Administered on Date of Encounter - 1/24/2017     None      Orders Placed During Today's Visit      Normal Orders This Visit    Ambulatory consult to General Surgery       Instructions    Would consider nucala if unstable on hirzentra???    Asthma seems well controlled.    Prednisone for flare - one a day for 3 ok?    levaquin for yellow mucous.    Bariatric surg may be reasonable.           "

## 2017-01-27 ENCOUNTER — OFFICE VISIT (OUTPATIENT)
Dept: BARIATRICS | Facility: CLINIC | Age: 57
End: 2017-01-27
Payer: COMMERCIAL

## 2017-01-27 VITALS
BODY MASS INDEX: 39.6 KG/M2 | TEMPERATURE: 98 F | HEIGHT: 62 IN | RESPIRATION RATE: 16 BRPM | DIASTOLIC BLOOD PRESSURE: 80 MMHG | SYSTOLIC BLOOD PRESSURE: 164 MMHG | HEART RATE: 88 BPM | WEIGHT: 215.19 LBS

## 2017-01-27 DIAGNOSIS — E66.01 MORBID OBESITY DUE TO EXCESS CALORIES: Primary | ICD-10-CM

## 2017-01-27 DIAGNOSIS — E78.5 HYPERLIPIDEMIA, UNSPECIFIED HYPERLIPIDEMIA TYPE: ICD-10-CM

## 2017-01-27 DIAGNOSIS — E11.9 TYPE 2 DIABETES MELLITUS WITHOUT COMPLICATION, WITHOUT LONG-TERM CURRENT USE OF INSULIN: ICD-10-CM

## 2017-01-27 DIAGNOSIS — E66.01 OBESITY, CLASS III, BMI 40-49.9 (MORBID OBESITY): ICD-10-CM

## 2017-01-27 PROCEDURE — 3044F HG A1C LEVEL LT 7.0%: CPT | Mod: S$GLB,,, | Performed by: SURGERY

## 2017-01-27 PROCEDURE — 99999 PR PBB SHADOW E&M-EST. PATIENT-LVL IV: CPT | Mod: PBBFAC,,, | Performed by: SURGERY

## 2017-01-27 PROCEDURE — 1159F MED LIST DOCD IN RCRD: CPT | Mod: S$GLB,,, | Performed by: SURGERY

## 2017-01-27 PROCEDURE — 99215 OFFICE O/P EST HI 40 MIN: CPT | Mod: S$GLB,,, | Performed by: SURGERY

## 2017-01-27 PROCEDURE — 3060F POS MICROALBUMINURIA REV: CPT | Mod: S$GLB,,, | Performed by: SURGERY

## 2017-01-27 NOTE — PROGRESS NOTES
Initial Consult    Chief Complaint   Patient presents with    Obesity       History of Present Illness:  Patient is a 56 y.o. female who is referred for evaluation of surgical treatment of morbid obesity. Her Body mass index is 40 kg/(m^2). She has known comorbidities of diabetes mellitus, dyslipidemia, osteoarthritis, peripheral edema and urinary incontinence. She has not attended the bariatric seminar and is most interested in gastric sleeve surgery.      Past attempts at weight loss include: Unsupervised: atkins, calorie counting, gym membership, home gym equipment, low carbohydrates, [facility] diet, sugar busters;  Supervised:  psychological counseling, weight watchers, dietitian;  Diet pills: dextrim, herbal rememdies;  Exercise attempts: walking or running, treadmill, swimming; psychological therapy    Weight history:   At current weight:  10 years  Obese for 10 years.  More than 35 pounds overweight for 30 years.  More than 100 pounds overweight for 10 years.  Started dieting at 11 years old.  Maximum weight reached: 250 pounds  Most weight lost was 55 pounds through portion control, low carb, more exercise for 5 years.  She describes Her eating habits as volume eater, sweet eater, snacker/grazer, emotional eater, binge eater    ZBIGNIEW screening: sleeps well through the night    Reflux screening: endoscopy 2015: Dr. Bose, see plan    Review of patient's allergies indicates:   Allergen Reactions    Sudafed [pseudoephedrine hcl] Other (See Comments)     Does not want to wake up .    Amoxicillin-pot clavulanate      Other reaction(s): Itching    Ephedrine      Other reaction(s): comatose    Hydrocodone Itching    Iodinated contrast media - iv dye     Iodine and iodide containing products Other (See Comments)    Pantoprazole      Other reaction(s): upset stomach/halitosis    Penicillins      Other reaction(s): does not work on pt symptoms    Percocet [oxycodone-acetaminophen] Itching    Barium iodide  Rash       Current Outpatient Prescriptions   Medication Sig Dispense Refill    acetaminophen (TYLENOL) 325 MG tablet Take 325 mg by mouth every 6 (six) hours as needed for Pain.      albuterol (PROAIR HFA) 90 mcg/actuation inhaler Inhale 1 puff into the lungs 2 (two) times daily. 3 Inhaler 3    albuterol (PROVENTIL) 2.5 mg /3 mL (0.083 %) nebulizer solution Take 3 mLs (2.5 mg total) by nebulization every 4 (four) hours as needed for Wheezing or Shortness of Breath. Every 4-6 hours  each 3    CALCIUM CARB/D3/MAGNESIUM/ZINC (CALCIUM CARB-D3-MAG CMB11-ZINC) [phone] mg-unit-mg-mg Tab Take 2 tablets by mouth every evening.      estrogens, conjugated, (PREMARIN) 0.625 MG tablet Take 1 tablet (0.625 mg total) by mouth once daily. 30 tablet 11    immun glob G,IgG,-pro-IgA 0-50 (HIZENTRA) 2 gram/10 mL (20 %) Soln Inject 18 g into the skin every 14 (fourteen) days. 90 mL 12    levoFLOXacin (LEVAQUIN) 500 MG tablet TK 1 T PO D 10 tablet 1    levothyroxine (SYNTHROID) 75 MCG tablet Take 1 tablet (75 mcg total) by mouth once daily. 30 tablet 11    metformin (GLUCOPHAGE) 500 MG tablet Take 500 mg by mouth 2 (two) times daily with meals.   2    mometasone-formoterol (DULERA) 200-5 mcg/actuation inhaler Inhale 2 puffs into the lungs 2 (two) times daily. Twice a day 3 Inhaler 3    montelukast (SINGULAIR) 10 mg tablet Take 1 tablet (10 mg total) by mouth every evening. 90 tablet 3    multivitamin (ONE DAILY MULTIVITAMIN) per tablet Take 1 tablet by mouth once daily.      omega 3-dha-epa-fish oil (FISH OIL) 900-1,400 mg CpDR Take 1 capsule by mouth once daily.      potassium chloride (MICRO-K) 10 MEQ CpSR Take 2 capsules (20 mEq total) by mouth once daily. 60 capsule 11    pravastatin (PRAVACHOL) 40 MG tablet Take 1 tablet (40 mg total) by mouth once daily. 30 tablet 11    predniSONE (DELTASONE) 20 MG tablet 3 for 3 days then 2 for 3 days then one for 3 days and repeat for breathing problems 36 tablet 0     sulindac (CLINORIL) 150 MG tablet TAKE 1 TABLET(150 MG) BY MOUTH TWICE DAILY 60 tablet 0    sumatriptan (IMITREX) 100 MG tablet Take 1 po at onset of migraine. May repeat once in 2 hrs if needed. No more than 2 pills in 24 hours 9 tablet 11    triamterene-hydrochlorothiazide 75-50 mg (MAXZIDE) 75-50 mg per tablet Take 1 tablet by mouth once daily. 30 tablet 11    zolpidem (AMBIEN CR) 6.25 MG CR tablet Take 6.25 mg by mouth.       No current facility-administered medications for this visit.        Past Medical History   Diagnosis Date    Allergy     Angio-edema     Arthralgia     Asthma without status asthmaticus     Bronchitis     Diabetes mellitus, type 2     Diverticulosis of large intestine without hemorrhage 10/8/2015    Environmental allergies     Gastroparesis     Gastroparesis diabeticorum 10/8/2015    Herpes simplex without mention of complication      oral    Hyperlipidemia     Hypothyroidism     LBP radiating to left leg     Leukocytosis, unspecified     Migraine headache     Obesity, Class III, BMI 40-49.9 (morbid obesity)     Periorbital cellulitis 12/31/2016    Recurrent upper respiratory infection (URI)     S/P colonoscopy November 2010    Urticaria      Past Surgical History   Procedure Laterality Date    Tonsillectomy      Adenoidectomy      Hysterectomy       menorrhagia-Ovaries intact    Hand fracture surgery      Urethral dilatation      Hardware removal       left hand 5th     Colonoscopy  2010    Colonoscopy N/A 10/8/2015     Procedure: COLONOSCOPY;  Surgeon: Panda Bose MD;  [facility];  Service: Endoscopy;  Laterality: N/A;     Family History   Problem Relation Age of Onset    Melanoma Mother     Basal cell carcinoma Mother     Lung disease Mother      pulm htn    Lung cancer Father     Diabetes Father     Hypertension Father     Diabetes Paternal Aunt     Colon cancer Paternal Aunt     Diabetes Paternal Aunt     Ovarian cancer  Paternal Grandmother     Cancer Maternal Grandfather     Melanoma Maternal Aunt     Melanoma Maternal Uncle     Breast cancer Paternal Aunt     Lymphoma Paternal Aunt     Ulcerative colitis Son     Psoriasis Son     Psoriasis Son      Social History   Substance Use Topics    Smoking status: Never Smoker    Smokeless tobacco: Never Used    Alcohol use 0.0 oz/week     0 Standard drinks or equivalent per week      Comment: very rarely        Chart review:    Jan 24, 2017: Dr. Gary pulmonologist: Asthma controlled, antibiotics for IDD, steroids for breathing problems  Jan 4, 2017:  Dr. Lewis: Treating periorbital cellulitis, oral herpes infection, hypokalemia    Lab review:    Labs reviewed from last year    Radiology review:    US 2015: fatty liver  Dexa 2016: normal    Review of Systems:  Review of Systems   Constitutional: Negative for activity change, appetite change, fever and unexpected weight change.   HENT: Negative for congestion, sinus pressure, sneezing, sore throat, tinnitus and voice change.    Eyes: Negative for redness and visual disturbance.   Respiratory: Negative for apnea, cough, choking, chest tightness, shortness of breath, wheezing and stridor.    Cardiovascular: Positive for leg swelling. Negative for chest pain and palpitations.   Gastrointestinal: Negative for abdominal distention, abdominal pain, anal bleeding, blood in stool, constipation, diarrhea, nausea, rectal pain and vomiting.   Endocrine: Negative for cold intolerance and heat intolerance.   Genitourinary: Negative for difficulty urinating and dysuria.   Musculoskeletal: Positive for arthralgias and back pain. Negative for gait problem, joint swelling, myalgias, neck pain and neck stiffness.   Skin: Negative for rash and wound.   Allergic/Immunologic: Positive for food allergies. Negative for environmental allergies.   Neurological: Positive for headaches. Negative for dizziness, facial asymmetry and light-headedness.  "  Hematological: Negative for adenopathy. Does not bruise/bleed easily.   Psychiatric/Behavioral: Negative for agitation and confusion.       Physical:     Vital Signs (Most Recent)  Temp: 98.3 °F (36.8 °C) (01/27/17 1321)  Pulse: 88 (01/27/17 1321)  Resp: 16 (01/27/17 1321)  BP: (!) 164/80 (01/27/17 1321)  5' 1.5" (1.562 m)  97.6 kg (215 lb 3.2 oz)     Body comp:  Fat Percent:  49.1 %  Fat Mass:  105.6 lb  FFM:  109.6 lb  TBW: 79 lb  TBW %:  36.7 %  BMR: 1561 kcal          Physical Exam:  Physical Exam   Constitutional: She is oriented to person, place, and time. She appears well-developed and well-nourished. No distress.   HENT:   Head: Normocephalic and atraumatic.   Mouth/Throat: No oropharyngeal exudate.   Eyes: Conjunctivae and EOM are normal. Pupils are equal, round, and reactive to light. No scleral icterus.   Neck: Normal range of motion. Neck supple. No JVD present. No tracheal deviation present. No thyromegaly present.   Cardiovascular: Normal rate, regular rhythm and normal heart sounds.  Exam reveals no gallop and no friction rub.    No murmur heard.  Pulmonary/Chest: Effort normal and breath sounds normal. No stridor. No respiratory distress. She has no wheezes. She has no rales. She exhibits no tenderness.   Abdominal: Soft. Bowel sounds are normal. She exhibits no distension and no mass. There is no tenderness. There is no rebound and no guarding.   Musculoskeletal: Normal range of motion. She exhibits no edema or tenderness.   Lymphadenopathy:     She has no cervical adenopathy.   Neurological: She is alert and oriented to person, place, and time. No cranial nerve deficit.   Skin: Skin is warm and dry. No rash noted. She is not diaphoretic. No erythema.   Psychiatric: She has a normal mood and affect. Her behavior is normal.   Nursing note and vitals reviewed.      ASSESSMENT/PLAN:        1. Morbid obesity due to excess calories  BMP    CBC w/ Auto Differential    Folate Serum    H. Pylori Antibody, " IGG    Hg A1c    Hepatic Panel    Iron & TIBC    Lipid Profile    Magnesium    Phosphorus    T3    T4    TSH    Free T4    Vitamin B12    Vitamin B1    Vitamin D 25 Hydroxy    Ambulatory consult to Psychology    Ambulatory consult to Cardiology    FL Upper GI Water Soluble Without KUB   2. Obesity, Class III, BMI 40-49.9 (morbid obesity)     3. Hyperlipidemia, unspecified hyperlipidemia type     4. Type 2 diabetes mellitus without complication, without long-term current use of insulin  Ambulatory consult to Nutrition Services       Plan:  Yodit Canseco has morbid obesity as their Body mass index is 40 kg/(m^2). She would benefit from weight loss surgery and has chosen gastric sleeve surgery as the preferred procedure. She understands that this is a tool and lifestyle change will be necessary to keep weight off. I went over possible complications of all surgeries with the patient and she is agreeable to surgery.    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 tissue  dietary consult  psych consult   Seminar  Labs  UGI  Off steroids for 6 weeks    I will obtain the following clearances prior to surgery: Pulmonology- Dr. Gary, Allergist/immunologist: Dr. Cedillo; Cardiology, PCP    Diet plan: high protein low carb- mainly meats and vegetables  Exercise plan: Cardiovascular exercise, get HR over 100 for 20 minutes 3 times per week.  Start multivitamin

## 2017-01-27 NOTE — MR AVS SNAPSHOT
[facility]  [address]  [address]  [place] [address]  Phone: [phone]  Fax: [phone]                  Yodit Canseco   2017 1:30 PM   Office Visit    Description:  Female : 1960   Provider:  Chai Reese MD   Department:  [facility] [facility]           Reason for Visit     Obesity           Diagnoses this Visit        Comments    Morbid obesity due to excess calories    -  Primary     Obesity, Class III, BMI 40-49.9 (morbid obesity)         Hyperlipidemia, unspecified hyperlipidemia type         Type 2 diabetes mellitus without complication, without long-term current use of insulin                To Do List           Future Appointments        Provider Department Dept Phone    2017 9:00 AM [facility] RAD RAD; [facility] XRFL[facility] [phone]    2017 11:30 AM [facility] [phone]    2017 10:40 AM LABORATORY, [place] [facility] [phone]    2017 9:30 AM LABORATORY, [place] [facility] [phone]    3/2/2017 1:00 PM [facility] [facility] [phone]      Goals (5 Years of Data)     None      Follow-Up and Disposition     Return in about 1 month (around 2017).    Follow-up and Disposition History      [facility]     [facility] -  Assistance  Registered nurses in the [facility] provide clinical advisement, health education, appointment booking, and other advisory services.  Call for this free service at [phone].             Medications           Message regarding Medications     Verify the changes and/or additions to your medication regime listed below are the same as discussed with your clinician today.  If any of these changes or additions are incorrect, please notify your healthcare provider.             Verify that the below list of medications is an  accurate representation of the medications you are currently taking.  If none reported, the list may be blank. If incorrect, please contact your healthcare provider. Carry this list with you in case of emergency.           Current Medications     acetaminophen (TYLENOL) 325 MG tablet Take 325 mg by mouth every 6 (six) hours as needed for Pain.    albuterol (PROAIR HFA) 90 mcg/actuation inhaler Inhale 1 puff into the lungs 2 (two) times daily.    albuterol (PROVENTIL) 2.5 mg /3 mL (0.083 %) nebulizer solution Take 3 mLs (2.5 mg total) by nebulization every 4 (four) hours as needed for Wheezing or Shortness of Breath. Every 4-6 hours PRN    CALCIUM CARB/D3/MAGNESIUM/ZINC (CALCIUM CARB-D3-MAG CMB11-ZINC) [phone] mg-unit-mg-mg Tab Take 2 tablets by mouth every evening.    estrogens, conjugated, (PREMARIN) 0.625 MG tablet Take 1 tablet (0.625 mg total) by mouth once daily.    immun glob G,IgG,-pro-IgA 0-50 (HIZENTRA) 2 gram/10 mL (20 %) Soln Inject 18 g into the skin every 14 (fourteen) days.    levoFLOXacin (LEVAQUIN) 500 MG tablet TK 1 T PO D    levothyroxine (SYNTHROID) 75 MCG tablet Take 1 tablet (75 mcg total) by mouth once daily.    metformin (GLUCOPHAGE) 500 MG tablet Take 500 mg by mouth 2 (two) times daily with meals.     mometasone-formoterol (DULERA) 200-5 mcg/actuation inhaler Inhale 2 puffs into the lungs 2 (two) times daily. Twice a day    montelukast (SINGULAIR) 10 mg tablet Take 1 tablet (10 mg total) by mouth every evening.    multivitamin (ONE DAILY MULTIVITAMIN) per tablet Take 1 tablet by mouth once daily.    omega 3-dha-epa-fish oil (FISH OIL) 900-1,400 mg CpDR Take 1 capsule by mouth once daily.    potassium chloride (MICRO-K) 10 MEQ CpSR Take 2 capsules (20 mEq total) by mouth once daily.    pravastatin (PRAVACHOL) 40 MG tablet Take 1 tablet (40 mg total) by mouth once daily.    predniSONE (DELTASONE) 20 MG tablet 3 for 3 days then 2 for 3 days then one for 3 days and repeat for breathing  "problems    sulindac (CLINORIL) 150 MG tablet TAKE 1 TABLET(150 MG) BY MOUTH TWICE DAILY    sumatriptan (IMITREX) 100 MG tablet Take 1 po at onset of migraine. May repeat once in 2 hrs if needed. No more than 2 pills in 24 hours    triamterene-hydrochlorothiazide 75-50 mg (MAXZIDE) 75-50 mg per tablet Take 1 tablet by mouth once daily.    zolpidem (AMBIEN CR) 6.25 MG CR tablet Take 6.25 mg by mouth.           Clinical Reference Information           Vital Signs - Last Recorded  Most recent update: 1/27/2017  1:24 PM by Danika Bro LPN    BP Pulse Temp Resp Ht Wt    (!) 164/80 88 98.3 °F (36.8 °C) (Oral) 16 5' 1.5" (1.562 m) 97.6 kg (215 lb 3.2 oz)    BMI                40 kg/m2          Blood Pressure          Most Recent Value    BP  (!)  164/80      Allergies as of 1/27/2017     Sudafed [Pseudoephedrine Hcl]    Amoxicillin-pot Clavulanate    Ephedrine    Hydrocodone    Iodinated Contrast Media - Iv Dye    Iodine And Iodide Containing Products    Pantoprazole    Penicillins    Percocet [Oxycodone-acetaminophen]    Barium Iodide      Immunizations Administered on Date of Encounter - 1/27/2017     None      Orders Placed During Today's Visit      Normal Orders This Visit    Ambulatory consult to Cardiology     Ambulatory consult to Nutrition Services     Ambulatory consult to Psychology     Future Labs/Procedures Expected by Expires    BMP  1/27/2017 3/28/2018    CBC w/ Auto Differential  1/27/2017 3/28/2018    FL Upper GI Water Soluble Without KUB  1/27/2017 1/27/2018    Folate Serum  1/27/2017 3/28/2018    Free T4  1/27/2017 3/28/2018    H. Pylori Antibody, IGG  1/27/2017 3/28/2018    Hepatic Panel  1/27/2017 3/28/2018    Hg A1c  1/27/2017 3/28/2018    Iron & TIBC  1/27/2017 3/28/2018    Lipid Profile  1/27/2017 3/28/2018    Magnesium  1/27/2017 3/28/2018    Phosphorus  1/27/2017 3/28/2018    T3  1/27/2017 3/28/2018    T4  1/27/2017 3/28/2018    TSH  1/27/2017 3/28/2018    Vitamin B12  1/27/2017 3/28/2018 "    Vitamin B1  1/27/2017 3/28/2018    Vitamin D 25 Hydroxy  1/27/2017 3/28/2018

## 2017-01-31 PROCEDURE — 99496 TRANSJ CARE MGMT HIGH F2F 7D: CPT | Mod: S$GLB,,, | Performed by: FAMILY MEDICINE

## 2017-03-28 ENCOUNTER — TELEPHONE (OUTPATIENT)
Dept: PULMONOLOGY | Facility: CLINIC | Age: 57
End: 2017-03-28

## 2017-03-28 RX ORDER — FLUCONAZOLE 200 MG/1
200 TABLET ORAL DAILY
COMMUNITY
End: 2017-04-05 | Stop reason: SDUPTHER

## 2017-03-31 ENCOUNTER — TELEPHONE (OUTPATIENT)
Dept: BARIATRICS | Facility: CLINIC | Age: 57
End: 2017-03-31

## 2017-04-05 ENCOUNTER — LAB VISIT (OUTPATIENT)
Dept: LAB | Facility: HOSPITAL | Age: 57
End: 2017-04-05
Attending: FAMILY MEDICINE
Payer: COMMERCIAL

## 2017-04-05 ENCOUNTER — OFFICE VISIT (OUTPATIENT)
Dept: FAMILY MEDICINE | Facility: CLINIC | Age: 57
End: 2017-04-05
Payer: COMMERCIAL

## 2017-04-05 VITALS
SYSTOLIC BLOOD PRESSURE: 120 MMHG | TEMPERATURE: 98 F | HEIGHT: 63 IN | BODY MASS INDEX: 37.19 KG/M2 | HEART RATE: 61 BPM | WEIGHT: 209.88 LBS | DIASTOLIC BLOOD PRESSURE: 78 MMHG

## 2017-04-05 DIAGNOSIS — E78.5 HYPERLIPIDEMIA, UNSPECIFIED HYPERLIPIDEMIA TYPE: ICD-10-CM

## 2017-04-05 DIAGNOSIS — R73.03 PREDIABETES: ICD-10-CM

## 2017-04-05 DIAGNOSIS — J45.20 MILD INTERMITTENT ASTHMA WITHOUT COMPLICATION: ICD-10-CM

## 2017-04-05 DIAGNOSIS — E03.9 HYPOTHYROIDISM, UNSPECIFIED TYPE: ICD-10-CM

## 2017-04-05 DIAGNOSIS — G43.909 MIGRAINE WITHOUT STATUS MIGRAINOSUS, NOT INTRACTABLE, UNSPECIFIED MIGRAINE TYPE: ICD-10-CM

## 2017-04-05 DIAGNOSIS — Z00.00 ROUTINE HISTORY AND PHYSICAL EXAMINATION OF ADULT: Primary | ICD-10-CM

## 2017-04-05 DIAGNOSIS — R20.0 NUMBNESS OF TOES: ICD-10-CM

## 2017-04-05 DIAGNOSIS — D84.9 IMMUNE DEFICIENCY DISORDER: ICD-10-CM

## 2017-04-05 LAB
CHOLEST/HDLC SERPL: 3.7 {RATIO}
HDL/CHOLESTEROL RATIO: 27 %
HDLC SERPL-MCNC: 200 MG/DL
HDLC SERPL-MCNC: 54 MG/DL
LDLC SERPL CALC-MCNC: 112.6 MG/DL
NONHDLC SERPL-MCNC: 146 MG/DL
TRIGL SERPL-MCNC: 167 MG/DL
TSH SERPL DL<=0.005 MIU/L-ACNC: 1.43 UIU/ML

## 2017-04-05 PROCEDURE — 36415 COLL VENOUS BLD VENIPUNCTURE: CPT | Mod: PO

## 2017-04-05 PROCEDURE — 99999 PR PBB SHADOW E&M-EST. PATIENT-LVL III: CPT | Mod: PBBFAC,,, | Performed by: FAMILY MEDICINE

## 2017-04-05 PROCEDURE — 83036 HEMOGLOBIN GLYCOSYLATED A1C: CPT

## 2017-04-05 PROCEDURE — 80061 LIPID PANEL: CPT

## 2017-04-05 PROCEDURE — 99396 PREV VISIT EST AGE 40-64: CPT | Mod: S$GLB,,, | Performed by: FAMILY MEDICINE

## 2017-04-05 PROCEDURE — 84443 ASSAY THYROID STIM HORMONE: CPT

## 2017-04-05 RX ORDER — LEVOTHYROXINE SODIUM 75 UG/1
75 TABLET ORAL DAILY
Qty: 30 TABLET | Refills: 11 | Status: SHIPPED | OUTPATIENT
Start: 2017-04-05 | End: 2018-04-30 | Stop reason: SDUPTHER

## 2017-04-05 RX ORDER — FLUCONAZOLE 100 MG/1
100 TABLET ORAL ONCE
Qty: 2 TABLET | Refills: 0 | Status: SHIPPED | OUTPATIENT
Start: 2017-04-05 | End: 2017-04-05

## 2017-04-05 RX ORDER — TRIAMTERENE AND HYDROCHLOROTHIAZIDE 75; 50 MG/1; MG/1
1 TABLET ORAL DAILY
Qty: 30 TABLET | Refills: 11 | Status: SHIPPED | OUTPATIENT
Start: 2017-04-05 | End: 2018-04-30 | Stop reason: SDUPTHER

## 2017-04-05 RX ORDER — METFORMIN HYDROCHLORIDE 500 MG/1
500 TABLET ORAL 2 TIMES DAILY WITH MEALS
Qty: 60 TABLET | Refills: 11 | Status: ON HOLD | OUTPATIENT
Start: 2017-04-05 | End: 2018-04-17 | Stop reason: HOSPADM

## 2017-04-05 RX ORDER — ASPIRIN 81 MG/1
81 TABLET ORAL DAILY
COMMUNITY
End: 2018-04-19

## 2017-04-05 RX ORDER — SUMATRIPTAN SUCCINATE 100 MG/1
TABLET ORAL
Qty: 9 TABLET | Refills: 2 | Status: SHIPPED | OUTPATIENT
Start: 2017-04-05 | End: 2018-05-22

## 2017-04-05 RX ORDER — PRAVASTATIN SODIUM 40 MG/1
40 TABLET ORAL DAILY
Qty: 30 TABLET | Refills: 11 | Status: SHIPPED | OUTPATIENT
Start: 2017-04-05 | End: 2017-07-06 | Stop reason: ALTCHOICE

## 2017-04-05 NOTE — MR AVS SNAPSHOT
[facility]  [address] [facility] [address]  Phone: [phone]  Fax: [phone]                  Yodit Canseco   2017 1:00 PM   Office Visit    Description:  Female : 1960   Provider:  Miky Lewis MD   Department:  [facility]           Reason for Visit     Annual Exam           Diagnoses this Visit        Comments    Routine history and physical examination of adult    -  Primary     Migraine without status migrainosus, not intractable, unspecified migraine type         Immune deficiency disorder         Hyperlipidemia, unspecified hyperlipidemia type         Hypothyroidism, unspecified type         Prediabetes         Numbness of toes                To Do List           Future Appointments        Provider Department Dept Phone    2017 2:20 PM LABORATORY, [place] [facility] [phone]    2017 2:20 PM Dio Cook DPM [facility] - Podiatry [phone]    2017 10:00 AM Shane Lu OD [facility] - Ophthalmology [phone]    2017 2:00 PM Boy Last MD [facility] - Cardiology [phone]      Goals (5 Years of Data)     None       These Medications        Disp Refills Start End    sumatriptan (IMITREX) 100 MG tablet 9 tablet 2 2017     Take 1 po at onset of migraine. May repeat once in 2 hrs if needed. No more than 2 pills in 24 hours    Pharmacy: [facility] [address] [place] [address] [place] - [address] [facility] AT [address] Ph #: [phone]       levothyroxine (SYNTHROID) 75 MCG tablet 30 tablet 11 2017     Take 1 tablet (75 mcg total) by mouth once daily. - Oral    Pharmacy: [facility] [id number] [place] [address] [place] - [address] [facility] AT [place] [address] & [place] Ph #: [phone]       pravastatin (PRAVACHOL) 40 MG tablet 30 tablet 11 2017     Take 1 tablet (40 mg total) by mouth once daily. - Oral    Pharmacy: [facility] [id number] [place] [address] [place] - [address]  [facility] AT [place] [address] & [place] Ph #: [phone]       triamterene-hydrochlorothiazide 75-50 mg (MAXZIDE) 75-50 mg per tablet 30 tablet 11 4/5/2017     Take 1 tablet by mouth once daily. - Oral    Pharmacy: [facility] [address] [place] [address] [address] AT [place] [address] & [place] Ph #: [phone]       metformin (GLUCOPHAGE) 500 MG tablet 60 tablet 11 4/5/2017     Take 1 tablet (500 mg total) by mouth 2 (two) times daily with meals. - Oral    Pharmacy: [facility] [address] AT [place] [address] & [place] Ph #: [phone]       fluconazole (DIFLUCAN) 100 MG tablet 2 tablet 0 4/5/2017 4/5/2017    Take 1 tablet (100 mg total) by mouth once. May repeat in 1 week if needed - Oral    Pharmacy: [facility] [place] [address] AT [place] [address] & [place] Ph #: [phone]         [facility] On Call     [facility] On Call Nurse Care Line - 24/7 Assistance  Unless otherwise directed by your provider, please contact [facility], our nurse care line that is available for 24/7 assistance.     Registered nurses in the [facility] provide: appointment scheduling, clinical advisement, health education, and other advisory services.  Call: [phone] (toll free)               Medications           Message regarding Medications     Verify the changes and/or additions to your medication regime listed below are the same as discussed with your clinician today.  If any of these changes or additions are incorrect, please notify your healthcare provider.        CHANGE how you are taking these medications     Start Taking Instead of    metformin (GLUCOPHAGE) 500 MG tablet metformin (GLUCOPHAGE) 500 MG tablet    Dosage:  Take 1 tablet (500 mg total) by mouth 2 (two) times daily with meals. Dosage:  Take 500 mg by mouth 2 (two) times daily with meals.     Reason for Change:  Reorder     fluconazole (DIFLUCAN) 100 MG tablet fluconazole (DIFLUCAN) 200 MG  Tab    Dosage:  Take 1 tablet (100 mg total) by mouth once. May repeat in 1 week if needed Dosage:  Take 200 mg by mouth once daily.    Reason for Change:  Reorder       STOP taking these medications     zolpidem (AMBIEN CR) 6.25 MG CR tablet Take 6.25 mg by mouth.    levoFLOXacin (LEVAQUIN) 500 MG tablet TK 1 T PO D    acetaminophen (TYLENOL) 325 MG tablet Take 325 mg by mouth every 6 (six) hours as needed for Pain.           Verify that the below list of medications is an accurate representation of the medications you are currently taking.  If none reported, the list may be blank. If incorrect, please contact your healthcare provider. Carry this list with you in case of emergency.           Current Medications     albuterol (PROAIR HFA) 90 mcg/actuation inhaler Inhale 1 puff into the lungs 2 (two) times daily.    albuterol (PROVENTIL) 2.5 mg /3 mL (0.083 %) nebulizer solution Take 3 mLs (2.5 mg total) by nebulization every 4 (four) hours as needed for Wheezing or Shortness of Breath. Every 4-6 hours PRN    aspirin (ECOTRIN) 81 MG EC tablet Take 81 mg by mouth once daily.    CALCIUM CARB/D3/MAGNESIUM/ZINC (CALCIUM CARB-D3-MAG CMB11-ZINC) [phone] mg-unit-mg-mg Tab Take 2 tablets by mouth every evening.    estrogens, conjugated, (PREMARIN) 0.625 MG tablet Take 1 tablet (0.625 mg total) by mouth once daily.    fluconazole (DIFLUCAN) 100 MG tablet Take 1 tablet (100 mg total) by mouth once. May repeat in 1 week if needed    levothyroxine (SYNTHROID) 75 MCG tablet Take 1 tablet (75 mcg total) by mouth once daily.    metformin (GLUCOPHAGE) 500 MG tablet Take 1 tablet (500 mg total) by mouth 2 (two) times daily with meals.    mometasone-formoterol (DULERA) 200-5 mcg/actuation inhaler Inhale 2 puffs into the lungs 2 (two) times daily. Twice a day    montelukast (SINGULAIR) 10 mg tablet Take 1 tablet (10 mg total) by mouth every evening.    multivitamin (ONE DAILY MULTIVITAMIN) per tablet Take 1 tablet by mouth once  "daily.    potassium chloride (MICRO-K) 10 MEQ CpSR Take 2 capsules (20 mEq total) by mouth once daily.    pravastatin (PRAVACHOL) 40 MG tablet Take 1 tablet (40 mg total) by mouth once daily.    predniSONE (DELTASONE) 20 MG tablet 3 for 3 days then 2 for 3 days then one for 3 days and repeat for breathing problems    sulindac (CLINORIL) 150 MG tablet TAKE 1 TABLET(150 MG) BY MOUTH TWICE DAILY    sumatriptan (IMITREX) 100 MG tablet Take 1 po at onset of migraine. May repeat once in 2 hrs if needed. No more than 2 pills in 24 hours    triamterene-hydrochlorothiazide 75-50 mg (MAXZIDE) 75-50 mg per tablet Take 1 tablet by mouth once daily.    immun glob G,IgG,-pro-IgA 0-50 (HIZENTRA) 2 gram/10 mL (20 %) Soln Inject 18 g into the skin every 14 (fourteen) days.    omega 3-dha-epa-fish oil (FISH OIL) 900-1,400 mg CpDR Take 1 capsule by mouth once daily.           Clinical Reference Information           Your Vitals Were     BP Pulse Temp Height Weight BMI    120/78 61 98.2 °F (36.8 °C) 5' 2.5" (1.588 m) 95.2 kg (209 lb 14.1 oz) 37.78 kg/m2      Blood Pressure          Most Recent Value    BP  120/78      Allergies as of 4/5/2017     Sudafed [Pseudoephedrine Hcl]    Amoxicillin-pot Clavulanate    Ephedrine    Hydrocodone    Iodinated Contrast Media - Iv Dye    Iodine And Iodide Containing Products    Pantoprazole    Penicillins    Percocet [Oxycodone-acetaminophen]    Barium Iodide      Immunizations Administered on Date of Encounter - 4/5/2017     None      Orders Placed During Today's Visit      Normal Orders This Visit    Ambulatory referral to Podiatry     Future Labs/Procedures Expected by Expires    Hemoglobin A1c  4/5/2017 (Approximate) 4/5/2018    Lipid panel  4/5/2017 (Approximate) 4/5/2018    TSH  4/5/2017 4/6/2018      Language Assistance Services     ATTENTION: Language assistance services are available, free of charge. Please call [phone].      ATENCIÓN: Si magui sahu a knight disposición " servicios gratuitos de asistencia lingüística. Louis ansari [phone].     ELLIOTT Ý: N?u b?n nói Ti?ng Vi?t, có các d?ch v? h? tr? ngôn ng? mi?n phí dành cho b?n. G?i s? [phone].         [facility] complies with applicable Federal civil rights laws and does not discriminate on the basis of race, color, national origin, age, disability, or sex.

## 2017-04-06 LAB
ESTIMATED AVG GLUCOSE: 97 MG/DL
HBA1C MFR BLD HPLC: 5 %

## 2017-04-06 NOTE — PROGRESS NOTES
Patient presents for physical exam.  Rare migraine needs refill Imitrex.  She sees allergist and pulmonary regarding immune deficiency and asthma.  Hyperlipidemia needs laboratory.  Hypothyroidism clinically euthyroid needs laboratory.  She is on metformin, but has not met criteria previously for diagnosis diabetes.  More likely prediabetes.  She does get some numbness at her right great toe which has been present for years unchanging.  Denies current back pain though she's had back pain in the past.  She wanted refill on medication for yeast infection which she has used occasionally    Past Medical History:  Past Medical History:   Diagnosis Date    Allergy     Angio-edema     Arthralgia     Asthma without status asthmaticus     Bronchitis     Diverticulosis of large intestine without hemorrhage 10/8/2015    Environmental allergies     Gastroparesis     Herpes simplex without mention of complication     oral    Hyperlipidemia     Hypothyroidism     LBP radiating to left leg     Leukocytosis, unspecified     Migraine headache     Obesity, Class III, BMI 40-49.9 (morbid obesity)     Periorbital cellulitis 12/31/2016    Prediabetes     Recurrent upper respiratory infection (URI)     S/P colonoscopy November 2010    Urticaria      Past Surgical History:   Procedure Laterality Date    ADENOIDECTOMY      COLONOSCOPY  2010    COLONOSCOPY N/A 10/8/2015    Procedure: COLONOSCOPY;  Surgeon: Panda Bose MD;  Location: [facility];  Service: Endoscopy;  Laterality: N/A;    Hand fracture surgery      HARDWARE REMOVAL      left hand 5th MC    HYSTERECTOMY      menorrhagia-Ovaries intact    TONSILLECTOMY      Urethral dilatation       Social History     Social History    Marital status:      Spouse name: N/A    Number of children: N/A    Years of education: N/A     Occupational History    Not on file.     Social History Main Topics    Smoking status: Never Smoker    Smokeless tobacco:  Never Used    Alcohol use 0.0 oz/week     0 Standard drinks or equivalent per week      Comment: very rarely    Drug use: No    Sexual activity: Yes     Partners: Male     Other Topics Concern    Not on file     Social History Narrative    . Disabled teacher.     Family History   Problem Relation Age of Onset    Melanoma Mother     Basal cell carcinoma Mother     Lung disease Mother      pulm htn    Lung cancer Father     Diabetes Father     Hypertension Father     Diabetes Paternal Aunt     Colon cancer Paternal Aunt     Diabetes Paternal Aunt     Ovarian cancer Paternal Grandmother     Cancer Maternal Grandfather     Melanoma Maternal Aunt     Melanoma Maternal Uncle     Breast cancer Paternal Aunt     Lymphoma Paternal Aunt     Ulcerative colitis Son     Psoriasis Son     Psoriasis Son      Review of patient's allergies indicates:   Allergen Reactions    Sudafed [pseudoephedrine hcl] Other (See Comments)     Does not want to wake up .    Amoxicillin-pot clavulanate      Other reaction(s): Itching    Ephedrine      Other reaction(s): comatose    Hydrocodone Itching    Iodinated contrast media - iv dye     Iodine and iodide containing products Other (See Comments)    Pantoprazole      Other reaction(s): upset stomach/halitosis    Penicillins      Other reaction(s): does not work on pt symptoms    Percocet [oxycodone-acetaminophen] Itching    Barium iodide Rash     Current Outpatient Prescriptions on File Prior to Visit   Medication Sig Dispense Refill    albuterol (PROAIR HFA) 90 mcg/actuation inhaler Inhale 1 puff into the lungs 2 (two) times daily. 3 Inhaler 3    albuterol (PROVENTIL) 2.5 mg /3 mL (0.083 %) nebulizer solution Take 3 mLs (2.5 mg total) by nebulization every 4 (four) hours as needed for Wheezing or Shortness of Breath. Every 4-6 hours  each 3    CALCIUM CARB/D3/MAGNESIUM/ZINC (CALCIUM CARB-D3-MAG CMB11-ZINC) [phone] mg-unit-mg-mg Tab Take 2  tablets by mouth every evening.      estrogens, conjugated, (PREMARIN) 0.625 MG tablet Take 1 tablet (0.625 mg total) by mouth once daily. 30 tablet 11    mometasone-formoterol (DULERA) 200-5 mcg/actuation inhaler Inhale 2 puffs into the lungs 2 (two) times daily. Twice a day 3 Inhaler 3    montelukast (SINGULAIR) 10 mg tablet Take 1 tablet (10 mg total) by mouth every evening. 90 tablet 3    multivitamin (ONE DAILY MULTIVITAMIN) per tablet Take 1 tablet by mouth once daily.      potassium chloride (MICRO-K) 10 MEQ CpSR Take 2 capsules (20 mEq total) by mouth once daily. 60 capsule 11    predniSONE (DELTASONE) 20 MG tablet 3 for 3 days then 2 for 3 days then one for 3 days and repeat for breathing problems 36 tablet 0    sulindac (CLINORIL) 150 MG tablet TAKE 1 TABLET(150 MG) BY MOUTH TWICE DAILY 60 tablet 0    [DISCONTINUED] fluconazole (DIFLUCAN) 200 MG Tab Take 200 mg by mouth once daily.      [DISCONTINUED] levothyroxine (SYNTHROID) 75 MCG tablet Take 1 tablet (75 mcg total) by mouth once daily. 30 tablet 11    [DISCONTINUED] metformin (GLUCOPHAGE) 500 MG tablet Take 500 mg by mouth 2 (two) times daily with meals.   2    [DISCONTINUED] pravastatin (PRAVACHOL) 40 MG tablet Take 1 tablet (40 mg total) by mouth once daily. 30 tablet 11    [DISCONTINUED] sumatriptan (IMITREX) 100 MG tablet Take 1 po at onset of migraine. May repeat once in 2 hrs if needed. No more than 2 pills in 24 hours 9 tablet 11    [DISCONTINUED] triamterene-hydrochlorothiazide 75-50 mg (MAXZIDE) 75-50 mg per tablet Take 1 tablet by mouth once daily. 30 tablet 11    omega 3-dha-epa-fish oil (FISH OIL) 900-1,400 mg CpDR Take 1 capsule by mouth once daily.      [DISCONTINUED] acetaminophen (TYLENOL) 325 MG tablet Take 325 mg by mouth every 6 (six) hours as needed for Pain.      [DISCONTINUED] immun glob G,IgG,-pro-IgA 0-50 (HIZENTRA) 2 gram/10 mL (20 %) Soln Inject 18 g into the skin every 14 (fourteen) days. 90 mL 12     "[DISCONTINUED] levoFLOXacin (LEVAQUIN) 500 MG tablet TK 1 T PO D 10 tablet 1    [DISCONTINUED] zolpidem (AMBIEN CR) 6.25 MG CR tablet Take 6.25 mg by mouth.       No current facility-administered medications on file prior to visit.            ROS:  GENERAL: No fever, chills,  or significant weight changes.  HEENT: No headache or hearing complaints.  No dysphagia  Eyes: No vision complaints  CHEST: Denies COWAN, cyanosis, wheezing, cough and sputum production.  CARDIOVASCULAR: Denies chest pain, PND, orthopnea or reduced exercise tolerance.  ABDOMEN: Appetite fine. Denies diarrhea, abdominal pain, hematemesis or blood in stool.  URINARY: No flank pain, dysuria or hematuria.  MUSCULOSKELETAL: No warmth swelling or tenderness of the joints  NEUROLOGIC: No focal weakness numbness or paresthesia  PSYCHIATRIC: Denies depression        OBJECTIVE:     Vitals:    04/05/17 1300   BP: 120/78   Pulse: 61   Temp: 98.2 °F (36.8 °C)   Weight: 95.2 kg (209 lb 14.1 oz)   Height: 5' 2.5" (1.588 m)     Wt Readings from Last 3 Encounters:   04/05/17 95.2 kg (209 lb 14.1 oz)   01/27/17 97.6 kg (215 lb 3.2 oz)   01/24/17 99.7 kg (219 lb 12.8 oz)     APPEARANCE: Well nourished, well developed, in no acute distress.    HEAD: Normocephalic.  Atraumatic.  No sinus tenderness.  EYES:   Right eye: Pupil reactive.  Conjunctiva clear.    Left eye: Pupil reactive.  Conjunctiva clear.    Both fundi:  Grossly normal to nondilated exam. EOMI.    EARS: TM's intact. Light reflex normal. No retraction or perforation.    NOSE:  clear.  MOUTH & THROAT:  No pharyngeal erythema or exudate. No lesions.  NECK: Supple. No bruits.  No JVD.  No cervical lymphadenopathy.  No thyromegaly.    CHEST: Breath sounds clear bilaterally.  Normal respiratory effort  CARDIOVASCULAR: Normal rate.  Regular rhythm.  No murmurs.  No rub.  No gallops.  ABDOMEN: Bowel sounds normal.  Soft.  No tenderness.  No organomegaly.  PERIPHERAL VASCULAR: No cyanosis.  No clubbing.  No " edema.  NEUROLOGIC: No focal findings.  MENTAL STATUS: Alert.  Oriented x 3.          Yodit was seen today for annual exam.    Diagnoses and all orders for this visit:    Routine history and physical examination of adult    Migraine without status migrainosus, not intractable, unspecified migraine type    Immune deficiency disorder    Hyperlipidemia, unspecified hyperlipidemia type  -     Lipid panel; Future    Hypothyroidism, unspecified type  -     TSH; Future    Prediabetes  -     Hemoglobin A1c; Future    Numbness of toes  -     Ambulatory referral to Podiatry    Mild intermittent asthma without complication    Other orders  -     sumatriptan (IMITREX) 100 MG tablet; Take 1 po at onset of migraine. May repeat once in 2 hrs if needed. No more than 2 pills in 24 hours  -     levothyroxine (SYNTHROID) 75 MCG tablet; Take 1 tablet (75 mcg total) by mouth once daily.  -     pravastatin (PRAVACHOL) 40 MG tablet; Take 1 tablet (40 mg total) by mouth once daily.  -     triamterene-hydrochlorothiazide 75-50 mg (MAXZIDE) 75-50 mg per tablet; Take 1 tablet by mouth once daily.  -     metformin (GLUCOPHAGE) 500 MG tablet; Take 1 tablet (500 mg total) by mouth 2 (two) times daily with meals.  -     fluconazole (DIFLUCAN) 100 MG tablet; Take 1 tablet (100 mg total) by mouth once. May repeat in 1 week if needed    Anticipatory guidance: Don't smoke.  Healthy diet and regular exercise recommended.

## 2017-04-26 RX ORDER — TRIAMTERENE AND HYDROCHLOROTHIAZIDE 75; 50 MG/1; MG/1
TABLET ORAL
Qty: 30 TABLET | Refills: 0 | OUTPATIENT
Start: 2017-04-26

## 2017-04-26 RX ORDER — LEVOTHYROXINE SODIUM 75 UG/1
TABLET ORAL
Qty: 30 TABLET | Refills: 0 | OUTPATIENT
Start: 2017-04-26

## 2017-05-01 RX ORDER — PRAVASTATIN SODIUM 40 MG/1
TABLET ORAL
Qty: 30 TABLET | Refills: 0 | OUTPATIENT
Start: 2017-05-01

## 2017-05-04 ENCOUNTER — TELEPHONE (OUTPATIENT)
Dept: OPHTHALMOLOGY | Facility: CLINIC | Age: 57
End: 2017-05-04

## 2017-05-04 ENCOUNTER — OFFICE VISIT (OUTPATIENT)
Dept: OPHTHALMOLOGY | Facility: CLINIC | Age: 57
End: 2017-05-04
Payer: COMMERCIAL

## 2017-05-04 DIAGNOSIS — H01.9 DERMATITIS OF EYELIDS OF BOTH EYES: Primary | ICD-10-CM

## 2017-05-04 DIAGNOSIS — R73.03 PREDIABETES: ICD-10-CM

## 2017-05-04 DIAGNOSIS — H52.7 REFRACTIVE ERROR: ICD-10-CM

## 2017-05-04 DIAGNOSIS — Z01.00 NO RETINOPATHY ON EXAM: ICD-10-CM

## 2017-05-04 PROCEDURE — 99999 PR PBB SHADOW E&M-EST. PATIENT-LVL I: CPT | Mod: PBBFAC,,, | Performed by: OPTOMETRIST

## 2017-05-04 PROCEDURE — 92014 COMPRE OPH EXAM EST PT 1/>: CPT | Mod: S$GLB,,, | Performed by: OPTOMETRIST

## 2017-05-04 PROCEDURE — 92015 DETERMINE REFRACTIVE STATE: CPT | Mod: S$GLB,,, | Performed by: OPTOMETRIST

## 2017-05-04 RX ORDER — DESONIDE 0.5 MG/G
CREAM TOPICAL 2 TIMES DAILY
Qty: 1 TUBE | Refills: 3 | Status: SHIPPED | OUTPATIENT
Start: 2017-05-04 | End: 2018-04-12 | Stop reason: ALTCHOICE

## 2017-05-04 NOTE — PROGRESS NOTES
HPI     NIDDM exam. Not sure if prescription need changing or not. Itchy eyes all   the times.  Last eye exam 05/03/2016 [facility]. Hx of contact dermatitis. New   patient to [facility].  A1c 5.0 last month       Last edited by Shane Lu, OD on 5/4/2017 10:39 AM.         Assessment /Plan     For exam results, see Encounter Report.    Dermatitis of eyelids of both eyes  -     desonide (DESOWEN) 0.05 % cream; Apply topically 2 (two) times daily. Apply to lids.  Dispense: 1 Tube; Refill: 3    Prediabetes    No retinopathy on exam    Refractive error      No Background Diabetic Retinopathy    Dispense Final Rx for glasses.  RTC 1 year

## 2017-05-04 NOTE — TELEPHONE ENCOUNTER
----- Message from Anupama Singh sent at 5/4/2017 11:46 AM CDT -----  Call jayesh shetty at  [phone]//// concerning  Her rx regarding  add power for glasses rx #2//pt is waiting now///juliocesar garnica

## 2017-05-04 NOTE — TELEPHONE ENCOUNTER
----- Message from Giovanna Herring sent at 5/4/2017  1:09 PM CDT -----  Contact: stephon diaz   Calling to verify eyeglass rx pt was seen today pls  Call [phone] fax [phone] thx pt is waiting

## 2017-05-23 ENCOUNTER — OFFICE VISIT (OUTPATIENT)
Dept: CARDIOLOGY | Facility: CLINIC | Age: 57
End: 2017-05-23
Payer: COMMERCIAL

## 2017-05-23 VITALS
HEART RATE: 85 BPM | SYSTOLIC BLOOD PRESSURE: 136 MMHG | OXYGEN SATURATION: 98 % | DIASTOLIC BLOOD PRESSURE: 76 MMHG | WEIGHT: 217.63 LBS | BODY MASS INDEX: 40.05 KG/M2 | HEIGHT: 62 IN

## 2017-05-23 DIAGNOSIS — G43.009 NONINTRACTABLE MIGRAINE, UNSPECIFIED MIGRAINE TYPE: ICD-10-CM

## 2017-05-23 DIAGNOSIS — I10 HTN (HYPERTENSION): ICD-10-CM

## 2017-05-23 DIAGNOSIS — E66.01 OBESITY, CLASS III, BMI 40-49.9 (MORBID OBESITY): ICD-10-CM

## 2017-05-23 DIAGNOSIS — E78.5 HYPERLIPIDEMIA, UNSPECIFIED HYPERLIPIDEMIA TYPE: Primary | ICD-10-CM

## 2017-05-23 PROCEDURE — 1160F RVW MEDS BY RX/DR IN RCRD: CPT | Mod: S$GLB,,, | Performed by: INTERNAL MEDICINE

## 2017-05-23 PROCEDURE — 99999 PR PBB SHADOW E&M-EST. PATIENT-LVL IV: CPT | Mod: PBBFAC,,, | Performed by: INTERNAL MEDICINE

## 2017-05-23 PROCEDURE — 99205 OFFICE O/P NEW HI 60 MIN: CPT | Mod: S$GLB,,, | Performed by: INTERNAL MEDICINE

## 2017-05-23 PROCEDURE — 93000 ELECTROCARDIOGRAM COMPLETE: CPT | Mod: S$GLB,,, | Performed by: INTERNAL MEDICINE

## 2017-05-23 NOTE — PATIENT INSTRUCTIONS
Assessment/Plan:  Yodit Canseco is a 56 y.o. female with a past medical history of HLD, obesity, family history of CAD, reactive airway disease/asthma, who presents for a preoperative risk stratification prior to bariatric surgery.      1. Preoperative risk stratification- Pt is sedentary and has unknown functional status.  Check echo and nuclear stress test to evaluate further.      2. HLD- Pt has 10 year ASCVD risk of 3.7%.  Lifetime risk of 39%.  Continue pravastatin 40 mg daily.      Follow up in 1 month

## 2017-05-23 NOTE — LETTER
May 23, 2017      Chai Reese MD  [address] [place] [address]           [facility] - Cardiology  [address]  [place] [address]  Phone: [phone]          Patient: Yodit Canseco   MR Number: [id number]   YOB: 1960   Date of Visit: 5/23/2017       Dear Dr. Chai Reese:    Thank you for referring Yodit Canseco to me for evaluation. Attached you will find relevant portions of my assessment and plan of care.    If you have questions, please do not hesitate to call me. I look forward to following Yodit Canseco along with you.    Sincerely,    Boy Last MD    Enclosure  CC:  No Recipients    If you would like to receive this communication electronically, please contact [email or web address] or [phone] to request more information on [employer] Link access.    For providers and/or their staff who would like to refer a patient to [facility], please contact us through our one-stop-shop provider referral line, [facility], at [phone].    If you feel you have received this communication in error or would no longer like to receive these types of communications, please e-mail [email or web address]

## 2017-05-23 NOTE — PROGRESS NOTES
[facility]    CC:   Chief Complaint   Patient presents with    Consult     Surgical Clearance       Patient ID: Yodit Canseco is a 56 y.o. female with a past medical history of HLD, obesity, family history of CAD, reactive airway disease/asthma, who presents for a preoperative risk stratification prior to bariatric surgery.  She was kindly referred by Dr. Reese, who is considering possible gastric sleeve surgery.  Pertinent history/events are as follows:     HPI:  Mrs. Canseco states she's doing well.  She has no chest pain, palpitations, PND, TIA symptoms, or syncope.  She has baseline SOB due to reactive airway disease, which is chronic and unchanged.  She has occasional LE edema, for which she takes triamterene.  Reports family history of MI in her father in his 50's.  Mother has pulmonary HTN.  She has never smoked.  Drinks alcohol twice a year.  She is mainly sedentary and does not exercise regularly.  EKG today shows normal sinus rhythm with no acute ST/T wave changes.  Blood pressure today is 136/76.  She formerly worked as an , and is now on disability due to her reactive airway disease.      Past Medical History:   Diagnosis Date    Allergy     Angio-edema     Arthralgia     Asthma without status asthmaticus     Bronchitis     Diverticulosis of large intestine without hemorrhage 10/8/2015    Environmental allergies     Gastroparesis     Hand arthritis     Herpes simplex without mention of complication     oral    Hyperlipidemia     Hypothyroidism     LBP radiating to left leg     Leukocytosis, unspecified     Migraine headache     Obesity, Class III, BMI 40-49.9 (morbid obesity)     Periorbital cellulitis 12/31/2016    Prediabetes     Recurrent upper respiratory infection (URI)     S/P colonoscopy November 2010    Urticaria      Past Surgical History:   Procedure Laterality Date    ADENOIDECTOMY      COLONOSCOPY  2010    COLONOSCOPY N/A  10/8/2015    Procedure: COLONOSCOPY;  Surgeon: Panda Bose MD;  Location: [facility];  Service: Endoscopy;  Laterality: N/A;    Hand fracture surgery      HARDWARE REMOVAL      left hand 5th MC    HYSTERECTOMY      menorrhagia-Ovaries intact    TONSILLECTOMY      Urethral dilatation       Social History     Social History    Marital status:      Spouse name: N/A    Number of children: N/A    Years of education: N/A     Occupational History    Not on file.     Social History Main Topics    Smoking status: Never Smoker    Smokeless tobacco: Never Used    Alcohol use 0.0 oz/week      Comment: very rarely    Drug use: No    Sexual activity: Yes     Partners: Male     Other Topics Concern    Not on file     Social History Narrative    . Disabled teacher.     Family History   Problem Relation Age of Onset    Melanoma Mother     Basal cell carcinoma Mother     Lung disease Mother      pulm htn    Cataracts Mother     Hypertension Mother     Lung cancer Father     Diabetes Father     Hypertension Father     Cancer Father     Diabetes Paternal Aunt     Colon cancer Paternal Aunt     Diabetes Paternal Aunt     Ovarian cancer Paternal Grandmother     Cancer Maternal Grandfather     Melanoma Maternal Aunt     Melanoma Maternal Uncle     Breast cancer Paternal Aunt     Lymphoma Paternal Aunt     Ulcerative colitis Son     Psoriasis Son     Psoriasis Son        Review of patient's allergies indicates:   Allergen Reactions    Sudafed [pseudoephedrine hcl] Other (See Comments)     Does not want to wake up .    Amoxicillin-pot clavulanate      Other reaction(s): Itching    Ephedrine      Other reaction(s): comatose    Hydrocodone Itching    Iodinated contrast media - oral and iv dye     Iodine and iodide containing products Other (See Comments)    Pantoprazole      Other reaction(s): upset stomach/halitosis    Penicillins      Other reaction(s): does not work on pt symptoms     Percocet [oxycodone-acetaminophen] Itching    Barium iodide Rash       Medication List with Changes/Refills   Current Medications    ALBUTEROL (PROAIR HFA) 90 MCG/ACTUATION INHALER    Inhale 1 puff into the lungs 2 (two) times daily.    ALBUTEROL (PROVENTIL) 2.5 MG /3 ML (0.083 %) NEBULIZER SOLUTION    Take 3 mLs (2.5 mg total) by nebulization every 4 (four) hours as needed for Wheezing or Shortness of Breath. Every 4-6 hours PRN    ASPIRIN (ECOTRIN) 81 MG EC TABLET    Take 81 mg by mouth once daily.    CALCIUM CARB/D3/MAGNESIUM/ZINC (CALCIUM CARB-D3-MAG CMB11-ZINC) [id number] MG-UNIT-MG-MG TAB    Take 2 tablets by mouth every evening.    DESONIDE (DESOWEN) 0.05 % CREAM    Apply topically 2 (two) times daily. Apply to lids.    ESTROGENS, CONJUGATED, (PREMARIN) 0.625 MG TABLET    Take 1 tablet (0.625 mg total) by mouth once daily.    IMMUN GLOB G,IGG,-PRO-IGA 0-50 (HIZENTRA) 2 GRAM/10 ML (20 %) SOLN    Inject 18 g into the skin every 14 (fourteen) days.    LEVOTHYROXINE (SYNTHROID) 75 MCG TABLET    Take 1 tablet (75 mcg total) by mouth once daily.    METFORMIN (GLUCOPHAGE) 500 MG TABLET    Take 1 tablet (500 mg total) by mouth 2 (two) times daily with meals.    MOMETASONE-FORMOTEROL (DULERA) 200-5 MCG/ACTUATION INHALER    Inhale 2 puffs into the lungs 2 (two) times daily. Twice a day    MONTELUKAST (SINGULAIR) 10 MG TABLET    Take 1 tablet (10 mg total) by mouth every evening.    MULTIVITAMIN (ONE DAILY MULTIVITAMIN) PER TABLET    Take 1 tablet by mouth once daily.    OMEGA 3-DHA-EPA-FISH OIL (FISH OIL) 900-1,400 MG CPDR    Take 1 capsule by mouth once daily.    POTASSIUM CHLORIDE (MICRO-K) 10 MEQ CPSR    Take 2 capsules (20 mEq total) by mouth once daily.    PRAVASTATIN (PRAVACHOL) 40 MG TABLET    Take 1 tablet (40 mg total) by mouth once daily.    PREDNISONE (DELTASONE) 20 MG TABLET    3 for 3 days then 2 for 3 days then one for 3 days and repeat for breathing problems    SULINDAC (CLINORIL) 150 MG TABLET    " TAKE 1 TABLET(150 MG) BY MOUTH TWICE DAILY    SUMATRIPTAN (IMITREX) 100 MG TABLET    Take 1 po at onset of migraine. May repeat once in 2 hrs if needed. No more than 2 pills in 24 hours    TRIAMTERENE-HYDROCHLOROTHIAZIDE 75-50 MG (MAXZIDE) 75-50 MG PER TABLET    Take 1 tablet by mouth once daily.       Review of Systems  Constitution: Denies chills, fever, and sweats.  HENT: Denies headaches or blurry vision.  Cardiovascular: Denies chest pain or irregular heart beat.  Respiratory: Denies cough or shortness of breath.  Gastrointestinal: Denies abdominal pain, nausea, or vomiting.  Musculoskeletal: Denies muscle cramps.  Neurological: Denies dizziness or focal weakness.  Psychiatric/Behavioral: Normal mental status.  Hematologic/Lymphatic: Denies bleeding problem or easy bruising/bleeding.  Skin: Denies rash or suspicious lesions    Physical Examination  /76 (BP Location: Left arm)   Pulse 85   Ht 5' 2" (1.575 m)   Wt 98.7 kg (217 lb 9.5 oz)   SpO2 98%   BMI 39.80 kg/m²     Constitutional: No acute distress, conversant  HEENT: Sclera anicteric, Pupils equal, round and reactive to light, extraocular motions intact, Oropharynx clear  Neck: No JVD, no carotid bruits  Cardiovascular: regular rate and rhythm, no murmur, rubs or gallops, normal S1/S2  Pulmonary: Clear to auscultation bilaterally  Abdominal: Abdomen soft, nontender, nondistended, positive bowel sounds  Extremities: No lower extremity edema,   Pulses:  Carotid pulses are 2+ on the right side, and 2+ on the left side.  Radial pulses are 2+ on the right side, and 2+ on the left side.   Femoral pulses are 2+ on the right side, and 2+ on the left side.  Popliteal pulses are 2+ on the right side, and 2+ on the left side.   Dorsalis pedis pulses are 2+ on the right side, and 2+ on the left side.   Posterior tibial pulses are 2+ on the right side, and 2+ on the left side.    Skin: No ecchymosis, erythema, or ulcers  Psych: Alert and oriented x 3, " appropriate affect  Neuro: CNII-XII intact, no focal deficits    Labs:  Most Recent Data  CBC:   Lab Results   Component Value Date    WBC 19.58 (H) 09/30/2016    HGB 15.5 09/30/2016    HCT 44.8 09/30/2016     09/30/2016    MCV 88 09/30/2016    RDW 14.2 09/30/2016     BMP:   Lab Results   Component Value Date     09/30/2016     09/30/2016    K 3.7 09/30/2016    K 3.7 09/30/2016    CL 98 09/30/2016    CL 98 09/30/2016    CO2 29 09/30/2016    CO2 29 09/30/2016    BUN 21 (H) 09/30/2016    BUN 21 (H) 09/30/2016    CREATININE 1.1 09/30/2016    CREATININE 1.1 09/30/2016    GLU 87 09/30/2016    GLU 87 09/30/2016    CALCIUM 9.9 09/30/2016    CALCIUM 9.9 09/30/2016     LFTS;   Lab Results   Component Value Date    PROT 6.8 09/30/2016    ALBUMIN 3.6 09/30/2016    BILITOT 0.6 09/30/2016    AST 34 09/30/2016    ALKPHOS 79 09/30/2016    ALT 32 09/30/2016     COAGS:   Lab Results   Component Value Date    INR 1.0 01/10/2005     FLP:   Lab Results   Component Value Date    CHOL 200 (H) 04/05/2017    HDL 54 04/05/2017    LDLCALC 112.6 04/05/2017    TRIG 167 (H) 04/05/2017    CHOLHDL 27.0 04/05/2017       Assessment/Plan:  Yodit Canseco is a 56 y.o. female with a past medical history of HLD, obesity, family history of CAD, reactive airway disease/asthma, who presents for a preoperative risk stratification prior to bariatric surgery.      1. Preoperative risk stratification- Pt is sedentary and has unknown functional status.  Check echo and nuclear stress test to evaluate further.      2. HLD- Pt has 10 year ASCVD risk of 3.7%.  Lifetime risk of 39%.  Continue pravastatin 40 mg daily.      Follow up in 1 month    Total duration of face to face visit time 45 minutes.  Total time spent counseling greater than fifty percent of total visit time.  Counseling included discussion regarding imaging findings, diagnosis, possibilities, treatment options, risks and benefits.  The patient had many questions regarding the  options and long-term effects.    Boy Last MD, PhD  Interventional Cardiology

## 2017-05-25 DIAGNOSIS — I10 HTN (HYPERTENSION): ICD-10-CM

## 2017-05-25 DIAGNOSIS — G43.009 NONINTRACTABLE MIGRAINE, UNSPECIFIED MIGRAINE TYPE: Primary | ICD-10-CM

## 2017-06-26 ENCOUNTER — HOSPITAL ENCOUNTER (OUTPATIENT)
Dept: CARDIOLOGY | Facility: HOSPITAL | Age: 57
Discharge: HOME | End: 2017-06-26
Attending: INTERNAL MEDICINE
Payer: COMMERCIAL

## 2017-06-26 ENCOUNTER — HOSPITAL ENCOUNTER (OUTPATIENT)
Dept: RADIOLOGY | Facility: HOSPITAL | Age: 57
Discharge: HOME | End: 2017-06-26
Attending: INTERNAL MEDICINE
Payer: COMMERCIAL

## 2017-06-26 DIAGNOSIS — I51.7 CARDIOMEGALY: ICD-10-CM

## 2017-06-26 DIAGNOSIS — E78.5 HYPERLIPIDEMIA, UNSPECIFIED HYPERLIPIDEMIA TYPE: ICD-10-CM

## 2017-06-26 DIAGNOSIS — E66.01 OBESITY, CLASS III, BMI 40-49.9 (MORBID OBESITY): ICD-10-CM

## 2017-06-26 LAB
DIASTOLIC DYSFUNCTION: NO
DIASTOLIC DYSFUNCTION: NO
ESTIMATED PA SYSTOLIC PRESSURE: 20.64
MITRAL VALVE REGURGITATION: NORMAL
RETIRED EF AND QEF - SEE NOTES: 69 (ref 55–65)
TRICUSPID VALVE REGURGITATION: NORMAL

## 2017-06-26 PROCEDURE — 78452 HT MUSCLE IMAGE SPECT MULT: CPT | Mod: TC

## 2017-06-26 PROCEDURE — 93306 TTE W/DOPPLER COMPLETE: CPT

## 2017-06-26 PROCEDURE — 93017 CV STRESS TEST TRACING ONLY: CPT

## 2017-06-26 PROCEDURE — 93018 CV STRESS TEST I&R ONLY: CPT | Mod: ,,, | Performed by: INTERNAL MEDICINE

## 2017-06-26 PROCEDURE — 93306 TTE W/DOPPLER COMPLETE: CPT | Mod: 26,,, | Performed by: INTERNAL MEDICINE

## 2017-06-26 PROCEDURE — 78452 HT MUSCLE IMAGE SPECT MULT: CPT | Mod: 26,,, | Performed by: INTERNAL MEDICINE

## 2017-06-26 PROCEDURE — 93016 CV STRESS TEST SUPVJ ONLY: CPT | Mod: ,,, | Performed by: INTERNAL MEDICINE

## 2017-06-29 ENCOUNTER — DOCUMENTATION ONLY (OUTPATIENT)
Dept: CARDIOLOGY | Facility: CLINIC | Age: 57
End: 2017-06-29

## 2017-06-29 NOTE — PROGRESS NOTES
Called Mrs. Canseco to discuss results of stress test and echo.  I have been unable to reach her by phone for the past 4 days.  Will try again tomorrow.

## 2017-07-06 ENCOUNTER — OFFICE VISIT (OUTPATIENT)
Dept: CARDIOLOGY | Facility: CLINIC | Age: 57
End: 2017-07-06
Payer: COMMERCIAL

## 2017-07-06 VITALS
OXYGEN SATURATION: 98 % | HEART RATE: 90 BPM | BODY MASS INDEX: 40.4 KG/M2 | DIASTOLIC BLOOD PRESSURE: 88 MMHG | HEIGHT: 62 IN | WEIGHT: 219.56 LBS | SYSTOLIC BLOOD PRESSURE: 137 MMHG | RESPIRATION RATE: 16 BRPM

## 2017-07-06 DIAGNOSIS — Z01.810 PREOP CARDIOVASCULAR EXAM: ICD-10-CM

## 2017-07-06 DIAGNOSIS — Z82.49 FAMILY HISTORY OF ASCVD (ARTERIOSCLEROTIC CARDIOVASCULAR DISEASE): ICD-10-CM

## 2017-07-06 DIAGNOSIS — E78.2 MIXED HYPERLIPIDEMIA: Primary | ICD-10-CM

## 2017-07-06 DIAGNOSIS — E66.01 OBESITY, CLASS III, BMI 40-49.9 (MORBID OBESITY): ICD-10-CM

## 2017-07-06 PROCEDURE — 99215 OFFICE O/P EST HI 40 MIN: CPT | Mod: S$GLB,,, | Performed by: INTERNAL MEDICINE

## 2017-07-06 PROCEDURE — 99999 PR PBB SHADOW E&M-EST. PATIENT-LVL IV: CPT | Mod: PBBFAC,,, | Performed by: INTERNAL MEDICINE

## 2017-07-06 RX ORDER — ATORVASTATIN CALCIUM 40 MG/1
40 TABLET, FILM COATED ORAL DAILY
Qty: 90 TABLET | Refills: 3 | Status: ON HOLD | OUTPATIENT
Start: 2017-07-06 | End: 2018-04-17 | Stop reason: HOSPADM

## 2017-07-06 NOTE — PATIENT INSTRUCTIONS
Assessment/Plan:  Yodit Canseco is a 56 y.o. female with a past medical history of HLD, obesity, family history of CAD, reactive airway disease/asthma, who presents for preoperative risk stratification prior to bariatric surgery.      1. Preoperative risk stratification prior to bariatric surgery- Pt has no chest pain, heart failure symptoms or arrhythmia.  Echo from 6/26/2017 shows no wall motion abnormalities; hyperdynamic left ventricular systolic function (EF 65-70%); normal left ventricular diastolic function; right ventricular enlargement with hyperdynamic systolic function.  Nuclear stress test from 6/26/2017 shows no evidence of myocardial ischemia or infarct.  She is at low risk for a perioperative major adverse cardiac event during this moderate risk procedure.   Revised cardiac risk index of 0.4%.  No further testing indicated.        2. HLD- Pt has 10 year ASCVD risk of 3.7%.  Lifetime risk of 39%.  Change pravastatin to atorvastatin 40 mg daily.        3. Morbid Obesity- Pt is morbidly obese.  Given her significantly elevated lifetime ASCVD risk of 39%, she will greatly benefit from bariatric surgery.  Will continue to monitor.      Follow up in 6 months with lipid panel prior

## 2017-07-06 NOTE — PROGRESS NOTES
[facility]    CC:   Chief Complaint   Patient presents with    Follow-up       Patient ID: Yodit Canseco is a 56 y.o. female with a past medical history of HLD, obesity, family history of CAD, reactive airway disease/asthma, who presents for a preoperative risk stratification prior to bariatric surgery.  She was kindly referred by Dr. Reese, who is considering possible gastric sleeve surgery.  Pertinent history/events are as follows:     -At our initial clinic visit on 5/23/2017, Mrs. Canseco stated she's doing well.  She has no chest pain, palpitations, PND, TIA symptoms, or syncope.  She has baseline SOB due to reactive airway disease, which is chronic and unchanged.  She has occasional LE edema, for which she takes triamterene.  Reports family history of MI in her father in his 50's.  Mother has pulmonary HTN.  She has never smoked.  Drinks alcohol twice a year.  She is mainly sedentary and does not exercise regularly.  EKG today shows normal sinus rhythm with no acute ST/T wave changes.  Blood pressure today is 136/76.  She formerly worked as an , and is now on disability due to her reactive airway disease.   Regarding preoperative risk stratification, pt is sedentary with unclear functional status.  Check nuclear stress test and echo to evaluate further.      HPI:  Today, Mrs. Canseco states she's been doing well since our initial clinic visit.  She has no chest pain, significant LE edema, palpitations, PND, TIA symptoms or syncope.  Echo from 6/26/2017 shows no wall motion abnormalities; hyperdynamic left ventricular systolic function (EF 65-70%); normal left ventricular diastolic function; right ventricular enlargement with hyperdynamic systolic function.  Nuclear stress test from 6/26/2017 shows no evidence of myocardial ischemia or infarct.      Past Medical History:   Diagnosis Date    Allergy     Angio-edema     Arthralgia     Asthma without status asthmaticus      Bronchitis     Diverticulosis of large intestine without hemorrhage 10/8/2015    Environmental allergies     Gastroparesis     Hand arthritis     Herpes simplex without mention of complication     oral    Hyperlipidemia     Hypothyroidism     LBP radiating to left leg     Leukocytosis, unspecified     Migraine headache     Obesity, Class III, BMI 40-49.9 (morbid obesity)     Periorbital cellulitis 12/31/2016    Prediabetes     Recurrent upper respiratory infection (URI)     S/P colonoscopy November 2010    Urticaria      Past Surgical History:   Procedure Laterality Date    ADENOIDECTOMY      COLONOSCOPY  2010    COLONOSCOPY N/A 10/8/2015    Procedure: COLONOSCOPY;  Surgeon: Panda Bose MD;  Location: [facility];  Service: Endoscopy;  Laterality: N/A;    Hand fracture surgery      HARDWARE REMOVAL      left hand 5th MC    HYSTERECTOMY      menorrhagia-Ovaries intact    TONSILLECTOMY      Urethral dilatation       Social History     Social History    Marital status:      Spouse name: N/A    Number of children: N/A    Years of education: N/A     Occupational History    Not on file.     Social History Main Topics    Smoking status: Never Smoker    Smokeless tobacco: Never Used    Alcohol use 0.0 oz/week      Comment: very rarely    Drug use: No    Sexual activity: Yes     Partners: Male     Other Topics Concern    Not on file     Social History Narrative    . Disabled teacher.     Family History   Problem Relation Age of Onset    Melanoma Mother     Basal cell carcinoma Mother     Lung disease Mother      pulm htn    Cataracts Mother     Hypertension Mother     Lung cancer Father     Diabetes Father     Hypertension Father     Cancer Father     Diabetes Paternal Aunt     Colon cancer Paternal Aunt     Diabetes Paternal Aunt     Ovarian cancer Paternal Grandmother     Cancer Maternal Grandfather     Melanoma Maternal Aunt     Melanoma Maternal Uncle      Breast cancer Paternal Aunt     Lymphoma Paternal Aunt     Ulcerative colitis Son     Psoriasis Son     Psoriasis Son        Review of patient's allergies indicates:   Allergen Reactions    Sudafed [pseudoephedrine hcl] Other (See Comments)     Does not want to wake up .    Amoxicillin-pot clavulanate      Other reaction(s): Itching    Ephedrine      Other reaction(s): comatose    Hydrocodone Itching    Iodinated contrast media - oral and iv dye     Iodine and iodide containing products Other (See Comments)    Pantoprazole      Other reaction(s): upset stomach/halitosis    Penicillins      Other reaction(s): does not work on pt symptoms    Percocet [oxycodone-acetaminophen] Itching    Barium iodide Rash       Medication List with Changes/Refills   Current Medications    ALBUTEROL (PROAIR HFA) 90 MCG/ACTUATION INHALER    Inhale 1 puff into the lungs 2 (two) times daily.    ALBUTEROL (PROVENTIL) 2.5 MG /3 ML (0.083 %) NEBULIZER SOLUTION    Take 3 mLs (2.5 mg total) by nebulization every 4 (four) hours as needed for Wheezing or Shortness of Breath. Every 4-6 hours PRN    ASPIRIN (ECOTRIN) 81 MG EC TABLET    Take 81 mg by mouth once daily.    CALCIUM CARB/D3/MAGNESIUM/ZINC (CALCIUM CARB-D3-MAG CMB11-ZINC) [id number] MG-UNIT-MG-MG TAB    Take 2 tablets by mouth every evening.    DESONIDE (DESOWEN) 0.05 % CREAM    Apply topically 2 (two) times daily. Apply to lids.    ESTROGENS, CONJUGATED, (PREMARIN) 0.625 MG TABLET    Take 1 tablet (0.625 mg total) by mouth once daily.    IMMUN GLOB G,IGG,-PRO-IGA 0-50 (HIZENTRA) 2 GRAM/10 ML (20 %) SOLN    Inject 18 g into the skin every 14 (fourteen) days.    LEVOTHYROXINE (SYNTHROID) 75 MCG TABLET    Take 1 tablet (75 mcg total) by mouth once daily.    METFORMIN (GLUCOPHAGE) 500 MG TABLET    Take 1 tablet (500 mg total) by mouth 2 (two) times daily with meals.    MOMETASONE-FORMOTEROL (DULERA) 200-5 MCG/ACTUATION INHALER    Inhale 2 puffs into the lungs 2 (two)  "times daily. Twice a day    MONTELUKAST (SINGULAIR) 10 MG TABLET    Take 1 tablet (10 mg total) by mouth every evening.    MULTIVITAMIN (ONE DAILY MULTIVITAMIN) PER TABLET    Take 1 tablet by mouth once daily.    OMEGA 3-DHA-EPA-FISH OIL (FISH OIL) 900-1,400 MG CPDR    Take 1 capsule by mouth once daily.    POTASSIUM CHLORIDE (MICRO-K) 10 MEQ CPSR    Take 2 capsules (20 mEq total) by mouth once daily.    PRAVASTATIN (PRAVACHOL) 40 MG TABLET    Take 1 tablet (40 mg total) by mouth once daily.    PREDNISONE (DELTASONE) 20 MG TABLET    3 for 3 days then 2 for 3 days then one for 3 days and repeat for breathing problems    SULINDAC (CLINORIL) 150 MG TABLET    TAKE 1 TABLET(150 MG) BY MOUTH TWICE DAILY    SUMATRIPTAN (IMITREX) 100 MG TABLET    Take 1 po at onset of migraine. May repeat once in 2 hrs if needed. No more than 2 pills in 24 hours    TRIAMTERENE-HYDROCHLOROTHIAZIDE 75-50 MG (MAXZIDE) 75-50 MG PER TABLET    Take 1 tablet by mouth once daily.       Review of Systems  Constitution: Denies chills, fever, and sweats.  HENT: Denies headaches or blurry vision.  Cardiovascular: Denies chest pain or irregular heart beat.  Respiratory: Denies cough or shortness of breath.  Gastrointestinal: Denies abdominal pain, nausea, or vomiting.  Musculoskeletal: Denies muscle cramps.  Neurological: Denies dizziness or focal weakness.  Psychiatric/Behavioral: Normal mental status.  Hematologic/Lymphatic: Denies bleeding problem or easy bruising/bleeding.  Skin: Denies rash or suspicious lesions    Physical Examination  /88 (BP Location: Right arm, Patient Position: Sitting)   Pulse 90   Resp 16   Ht 5' 2" (1.575 m)   Wt 99.6 kg (219 lb 9.3 oz)   SpO2 98%   BMI 40.16 kg/m²     Constitutional: No acute distress, conversant  HEENT: Sclera anicteric, Pupils equal, round and reactive to light, extraocular motions intact, Oropharynx clear  Neck: No JVD, no carotid bruits  Cardiovascular: regular rate and rhythm, no murmur, " rubs or gallops, normal S1/S2  Pulmonary: Clear to auscultation bilaterally  Abdominal: Abdomen soft, nontender, nondistended, positive bowel sounds  Extremities: No lower extremity edema,   Pulses:  Carotid pulses are 2+ on the right side, and 2+ on the left side.  Radial pulses are 2+ on the right side, and 2+ on the left side.   Femoral pulses are 2+ on the right side, and 2+ on the left side.  Popliteal pulses are 2+ on the right side, and 2+ on the left side.   Dorsalis pedis pulses are 2+ on the right side, and 2+ on the left side.   Posterior tibial pulses are 2+ on the right side, and 2+ on the left side.    Skin: No ecchymosis, erythema, or ulcers  Psych: Alert and oriented x 3, appropriate affect  Neuro: CNII-XII intact, no focal deficits    Labs:  Most Recent Data  CBC:   Lab Results   Component Value Date    WBC 19.58 (H) 09/30/2016    HGB 15.5 09/30/2016    HCT 44.8 09/30/2016     09/30/2016    MCV 88 09/30/2016    RDW 14.2 09/30/2016     BMP:   Lab Results   Component Value Date     09/30/2016     09/30/2016    K 3.7 09/30/2016    K 3.7 09/30/2016    CL 98 09/30/2016    CL 98 09/30/2016    CO2 29 09/30/2016    CO2 29 09/30/2016    BUN 21 (H) 09/30/2016    BUN 21 (H) 09/30/2016    CREATININE 1.1 09/30/2016    CREATININE 1.1 09/30/2016    GLU 87 09/30/2016    GLU 87 09/30/2016    CALCIUM 9.9 09/30/2016    CALCIUM 9.9 09/30/2016     LFTS;   Lab Results   Component Value Date    PROT 6.8 09/30/2016    ALBUMIN 3.6 09/30/2016    BILITOT 0.6 09/30/2016    AST 34 09/30/2016    ALKPHOS 79 09/30/2016    ALT 32 09/30/2016     COAGS:   Lab Results   Component Value Date    INR 1.0 01/10/2005     FLP:   Lab Results   Component Value Date    CHOL 200 (H) 04/05/2017    HDL 54 04/05/2017    LDLCALC 112.6 04/05/2017    TRIG 167 (H) 04/05/2017    CHOLHDL 27.0 04/05/2017       Echo 6/26/2017:  CONCLUSIONS     1 - Concentric hypertrophy.     2 - No wall motion abnormalities.     3 - Hyperdynamic left  ventricular systolic function (EF 65-70%).     4 - Normal left ventricular diastolic function.     5 - Right ventricular enlargement with hyperdynamic systolic function.     6 - The estimated PA systolic pressure is 21 mmHg.     Nuclear Stress Test 6/26/2017:  Nuclear Quantitative Functional Analysis:   LVEF: 68 % (normal is 55 - 69)  LVED Volume: 48 ml (normal is 60 - 98)  LVES Volume: 16 ml (normal is 20 - 42)    Impression: NORMAL MYOCARDIAL PERFUSION  1. The perfusion scan is free of evidence for myocardial ischemia or injury.   2. There is a mild to moderate intensity fixed defect in the anterobasilar wall of the left ventricle, secondary to breast attenuation.   3. Resting wall motion is physiologic.   4. Resting LV function is normal.  (normal is 55 - 69)  5. The ventricular volumes are normal at rest and stress.   6. The extracardiac distribution of radioactivity is normal.     Assessment/Plan:  Yodit Canseco is a 56 y.o. female with a past medical history of HLD, obesity, family history of CAD, reactive airway disease/asthma, who presents for preoperative risk stratification prior to bariatric surgery.      1. Preoperative risk stratification prior to bariatric surgery- Pt has no chest pain, heart failure symptoms or arrhythmia.  Echo from 6/26/2017 shows no wall motion abnormalities; hyperdynamic left ventricular systolic function (EF 65-70%); normal left ventricular diastolic function; right ventricular enlargement with hyperdynamic systolic function.  Nuclear stress test from 6/26/2017 shows no evidence of myocardial ischemia or infarct.  She is at low risk for a perioperative major adverse cardiac event during this moderate risk procedure.   Revised cardiac risk index of 0.4%.  No further testing indicated.        2. HLD- Pt has 10 year ASCVD risk of 3.7%.  Lifetime risk of 39%.  Change pravastatin to atorvastatin 40 mg daily.        3. Morbid Obesity- Pt is morbidly obese.  Given her significantly  elevated lifetime ASCVD risk of 39%, she will greatly benefit from bariatric surgery.  Will continue to monitor.      Follow up in 6 months with lipid panel prior    Total duration of face to face visit time 40 minutes.  Total time spent counseling greater than fifty percent of total visit time.  Counseling included discussion regarding imaging findings, diagnosis, possibilities, treatment options, risks and benefits.  The patient had many questions regarding the options and long-term effects.    Boy Last MD, PhD  Interventional Cardiology

## 2017-07-10 ENCOUNTER — OFFICE VISIT (OUTPATIENT)
Dept: PODIATRY | Facility: CLINIC | Age: 57
End: 2017-07-10
Payer: COMMERCIAL

## 2017-07-10 VITALS
BODY MASS INDEX: 40.4 KG/M2 | SYSTOLIC BLOOD PRESSURE: 128 MMHG | HEART RATE: 76 BPM | DIASTOLIC BLOOD PRESSURE: 76 MMHG | HEIGHT: 62 IN | WEIGHT: 219.56 LBS

## 2017-07-10 DIAGNOSIS — G57.90 NEURITIS OF FOOT, UNSPECIFIED LATERALITY: Primary | ICD-10-CM

## 2017-07-10 PROCEDURE — 99203 OFFICE O/P NEW LOW 30 MIN: CPT | Mod: S$GLB,,, | Performed by: PODIATRIST

## 2017-07-10 PROCEDURE — 99999 PR PBB SHADOW E&M-EST. PATIENT-LVL III: CPT | Mod: PBBFAC,,, | Performed by: PODIATRIST

## 2017-07-10 NOTE — PROGRESS NOTES
Subjective:     Patient ID: Yodit Canseco is a 56 y.o. female.    Chief Complaint: Numbness (bilateral toe numbness(prediabetic) PCP Dr. Lewis 4/5/2017)    Yodit is a 56 y.o. female who presents to the podiatry clinic  with complaint of  bilateral big toe numbness and sometimes pain. Onset of the symptoms was several years ago. Precipitating event: admits lower back injury several years ago. . Current symptoms include: numbness, tingling, and some pain from time to time.. Aggravating factors: inactivity and standing. Symptoms have been intermittent. Patient has had no prior foot problems. Evaluation to date: none. Treatment to date: none. Patients rates pain 0/10 on pain scale only a numbness feeling.       Patient Active Problem List   Diagnosis    History of colon polyps    Diverticulosis of large intestine without hemorrhage    H/O hand surgery    Left hand pain    Degenerative tear of triangular fibrocartilage complex (TFCC) of left wrist    Left wrist pain    Extensor tenosynovitis of wrist    Hyperlipidemia    Obesity, Class III, BMI 40-49.9 (morbid obesity)    Hypothyroidism    Refractive error    Hand arthritis    Mild intermittent asthma without complication    Immune deficiency disorder    Migraine headache    Prediabetes    Gastroparesis    Preop cardiovascular exam    Family history of ASCVD (arteriosclerotic cardiovascular disease)       Medication List with Changes/Refills   Current Medications    ALBUTEROL (PROAIR HFA) 90 MCG/ACTUATION INHALER    Inhale 1 puff into the lungs 2 (two) times daily.    ALBUTEROL (PROVENTIL) 2.5 MG /3 ML (0.083 %) NEBULIZER SOLUTION    Take 3 mLs (2.5 mg total) by nebulization every 4 (four) hours as needed for Wheezing or Shortness of Breath. Every 4-6 hours PRN    ASPIRIN (ECOTRIN) 81 MG EC TABLET    Take 81 mg by mouth once daily.    ATORVASTATIN (LIPITOR) 40 MG TABLET    Take 1 tablet (40 mg total) by mouth once daily.    CALCIUM  CARB/D3/MAGNESIUM/ZINC (CALCIUM CARB-D3-MAG CMB11-ZINC) [phone] MG-UNIT-MG-MG TAB    Take 2 tablets by mouth every evening.    DESONIDE (DESOWEN) 0.05 % CREAM    Apply topically 2 (two) times daily. Apply to lids.    ESTROGENS, CONJUGATED, (PREMARIN) 0.625 MG TABLET    Take 1 tablet (0.625 mg total) by mouth once daily.    IMMUN GLOB G,IGG,-PRO-IGA 0-50 (HIZENTRA) 2 GRAM/10 ML (20 %) SOLN    Inject 18 g into the skin every 14 (fourteen) days.    LEVOTHYROXINE (SYNTHROID) 75 MCG TABLET    Take 1 tablet (75 mcg total) by mouth once daily.    METFORMIN (GLUCOPHAGE) 500 MG TABLET    Take 1 tablet (500 mg total) by mouth 2 (two) times daily with meals.    MOMETASONE-FORMOTEROL (DULERA) 200-5 MCG/ACTUATION INHALER    Inhale 2 puffs into the lungs 2 (two) times daily. Twice a day    MONTELUKAST (SINGULAIR) 10 MG TABLET    Take 1 tablet (10 mg total) by mouth every evening.    MULTIVITAMIN (ONE DAILY MULTIVITAMIN) PER TABLET    Take 1 tablet by mouth once daily.    OMEGA 3-DHA-EPA-FISH OIL (FISH OIL) 900-1,400 MG CPDR    Take 1 capsule by mouth once daily.    POTASSIUM CHLORIDE (MICRO-K) 10 MEQ CPSR    Take 2 capsules (20 mEq total) by mouth once daily.    PREDNISONE (DELTASONE) 20 MG TABLET    3 for 3 days then 2 for 3 days then one for 3 days and repeat for breathing problems    SULINDAC (CLINORIL) 150 MG TABLET    TAKE 1 TABLET(150 MG) BY MOUTH TWICE DAILY    SUMATRIPTAN (IMITREX) 100 MG TABLET    Take 1 po at onset of migraine. May repeat once in 2 hrs if needed. No more than 2 pills in 24 hours    TRIAMTERENE-HYDROCHLOROTHIAZIDE 75-50 MG (MAXZIDE) 75-50 MG PER TABLET    Take 1 tablet by mouth once daily.       Review of patient's allergies indicates:   Allergen Reactions    Sudafed [pseudoephedrine hcl] Other (See Comments)     Does not want to wake up .    Amoxicillin-pot clavulanate      Other reaction(s): Itching    Ephedrine      Other reaction(s): comatose    Hydrocodone Itching    Iodinated contrast-  "oral and iv dye     Iodine and iodide containing products Other (See Comments)    Pantoprazole      Other reaction(s): upset stomach/halitosis    Penicillins      Other reaction(s): does not work on pt symptoms    Percocet [oxycodone-acetaminophen] Itching    Barium iodide Rash       Past Surgical History:   Procedure Laterality Date    ADENOIDECTOMY      COLONOSCOPY  2010    COLONOSCOPY N/A 10/8/2015    Procedure: COLONOSCOPY;  Surgeon: Panda Bose MD;  Location: [facility];  Service: Endoscopy;  Laterality: N/A;    Hand fracture surgery      HARDWARE REMOVAL      left hand 5th MC    HYSTERECTOMY      menorrhagia-Ovaries intact    TONSILLECTOMY      Urethral dilatation         Family History   Problem Relation Age of Onset    Melanoma Mother     Basal cell carcinoma Mother     Lung disease Mother      pulm htn    Cataracts Mother     Hypertension Mother     Lung cancer Father     Diabetes Father     Hypertension Father     Cancer Father     Diabetes Paternal Aunt     Colon cancer Paternal Aunt     Diabetes Paternal Aunt     Ovarian cancer Paternal Grandmother     Cancer Maternal Grandfather     Melanoma Maternal Aunt     Melanoma Maternal Uncle     Breast cancer Paternal Aunt     Lymphoma Paternal Aunt     Ulcerative colitis Son     Psoriasis Son     Psoriasis Son        Social History     Social History    Marital status:      Spouse name: N/A    Number of children: N/A    Years of education: N/A     Occupational History    Not on file.     Social History Main Topics    Smoking status: Never Smoker    Smokeless tobacco: Never Used    Alcohol use 0.0 oz/week      Comment: very rarely    Drug use: No    Sexual activity: Yes     Partners: Male     Other Topics Concern    Not on file     Social History Narrative    . Disabled teacher.       Vitals:    07/10/17 1533   BP: 128/76   Pulse: 76   Weight: 99.6 kg (219 lb 9.3 oz)   Height: 5' 2" (1.575 m) "       Hemoglobin A1C   Date Value Ref Range Status   04/05/2017 5.0 4.5 - 6.2 % Final     Comment:     According to ADA guidelines, hemoglobin A1C <7.0% represents  optimal control in non-pregnant diabetic patients.  Different  metrics may apply to specific populations.   Standards of Medical Care in Diabetes - 2016.  For the purpose of screening for the presence of diabetes:  <5.7%     Consistent with the absence of diabetes  5.7-6.4%  Consistent with increasing risk for diabetes   (prediabetes)  >or=6.5%  Consistent with diabetes  Currently no consensus exists for use of hemoglobin A1C  for diagnosis of diabetes for children.     09/30/2016 5.1 4.5 - 6.2 % Final     Comment:     According to ADA guidelines, hemoglobin A1C <7.0% represents  optimal control in non-pregnant diabetic patients.  Different  metrics may apply to specific populations.   Standards of Medical Care in Diabetes - 2016.  For the purpose of screening for the presence of diabetes:  <5.7%     Consistent with the absence of diabetes  5.7-6.4%  Consistent with increasing risk for diabetes   (prediabetes)  >or=6.5%  Consistent with diabetes  Currently no consensus exists for use of hemoglobin A1C  for diagnosis of diabetes for children.     04/05/2016 5.0 4.5 - 6.2 % Final       Review of Systems   Constitutional: Negative for chills and fever.   Respiratory: Negative for shortness of breath.    Cardiovascular: Negative for chest pain, palpitations, orthopnea, claudication and leg swelling.   Gastrointestinal: Negative for diarrhea, nausea and vomiting.   Musculoskeletal: Negative for joint pain.   Skin: Negative for rash.   Neurological: Positive for tingling and sensory change. Negative for dizziness, focal weakness and weakness.   Psychiatric/Behavioral: Negative.              Objective:      PHYSICAL EXAM: Apperance: Alert and orient in no distress,well developed, and with good attention to grooming and body habits  Patient presents ambulating  in flats  Lower Extremity Exam:  VASCULAR: Dorsalis pedis pulses 2/4 bilateral and Posterior Tibial pulses 2/4 bilateral. Capillary fill time <3 seconds bilateral. Mild edema observed bilateral. Varicosities absent bilateral. Skin temperature of the lower extremities is warm to warm, proximal to distal. Hair growth WNL bilateral.  DERMATOLOGICAL: No skin rashes, subcutaneous nodules, lesions, or ulcers observed bilateral.   NEUROLOGICAL: Light touch, sharp-dull, proprioception all present and equal bilaterally.  Vibratory sensation intact at bilateral hallux. Protective sensation intact at all 10 sites as tested with a [place]ennifer 5.07 monofilament.   MUSCULOSKELETAL: Muscle strength is 5/5 for foot inverters, everters, plantarflexors, and dorsiflexors. Muscle tone is normal. (--) pain on palpation only (+) numbness of bilateral hallux.        Assessment:       Encounter Diagnosis   Name Primary?    Neuritis of foot, unspecified laterality Yes         Plan:   Neuritis of foot, unspecified laterality  -     EMG - 2 Extremities; Future      I counseled the patient on her conditions, regarding findings of my examination, my impressions, and usual treatment plan.   Discussed with patient treatments for neuropathy consisting of topical or oral medication.  Ordered bilateral lower extremity EMG due to patients history of lower back injury.   Patient to return after EMG completed.             Dio Cook DPM  [facility]

## 2017-07-10 NOTE — LETTER
July 13, 2017      Miky Lewis MD  [address] [facility] [address]           [facility]  [address] [place] [address]  Phone: [phone]  Fax: [phone]          Patient: Yodit Canseco   MR Number: [id number]   YOB: 1960   Date of Visit: 7/10/2017       Dear Dr. Miky Lewis:    Thank you for referring Yodit Canseco to me for evaluation. Attached you will find relevant portions of my assessment and plan of care.    If you have questions, please do not hesitate to call me. I look forward to following Yodit Canseco along with you.    Sincerely,    Cammy Baldwin  CC:  No Recipients    If you would like to receive this communication electronically, please contact [email or web address] or [phone] to request more information on [employer] Link access.    For providers and/or their staff who would like to refer a patient to [facility], please contact us through our one-stop-shop provider referral line, [facility], at [phone].    If you feel you have received this communication in error or would no longer like to receive these types of communications, please e-mail [email or web address]

## 2017-07-18 ENCOUNTER — OFFICE VISIT (OUTPATIENT)
Dept: ALLERGY | Facility: CLINIC | Age: 57
End: 2017-07-18
Payer: COMMERCIAL

## 2017-07-18 VITALS
WEIGHT: 220 LBS | HEIGHT: 62 IN | SYSTOLIC BLOOD PRESSURE: 132 MMHG | OXYGEN SATURATION: 98 % | HEART RATE: 80 BPM | DIASTOLIC BLOOD PRESSURE: 72 MMHG | BODY MASS INDEX: 40.48 KG/M2

## 2017-07-18 DIAGNOSIS — J45.20 MILD INTERMITTENT ASTHMA WITHOUT COMPLICATION: ICD-10-CM

## 2017-07-18 DIAGNOSIS — D80.6 ANTI-POLYSACCHARIDE ANTIBODY DEFICIENCY: Primary | ICD-10-CM

## 2017-07-18 DIAGNOSIS — J31.0 OTHER CHRONIC RHINITIS: ICD-10-CM

## 2017-07-18 DIAGNOSIS — J32.9 RECURRENT SINUS INFECTIONS: ICD-10-CM

## 2017-07-18 PROCEDURE — 99999 PR PBB SHADOW E&M-EST. PATIENT-LVL III: CPT | Mod: PBBFAC,,, | Performed by: ALLERGY & IMMUNOLOGY

## 2017-07-18 PROCEDURE — 99214 OFFICE O/P EST MOD 30 MIN: CPT | Mod: S$GLB,,, | Performed by: ALLERGY & IMMUNOLOGY

## 2017-07-18 NOTE — PROGRESS NOTES
Subjective:       Patient ID: Yodit Canseco is a 56 y.o. female.    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  She was last seen 10/13/16.  She had labs drawn - CBC was normal, lymphocyte profile showed low NK cells. IgA normal at 170, IgM normal at 124, IgE ,35, IgG normal at 700 but has steadily been decreasing, IgG subclasses normal and humoral panel protected to H flu, diphtheria, tetanus and 8/14 strep serotypes but this is after pneumovax and Prevnar so inadequate protection.  She still had constant PND and congestion and gets frequent infections with sinus, ear, bronchitis and even pneumonia. She was on antibiotics every 2 months. She cannot use nose sprays all cause nose bleed even Astelin. All antihistamines cause sedation per pt. She is on Singulair daily. She started Hizentra 10 g weekly and then in October changed to 20 g every 2 weeks. She has done well with infusions. She has fatigue for day after and occ has diarrhea and cramping. She hash ad no bronchitis, sinusitis or pneumonia in almost a year. No steroids in a year. She had antibiotics for couple UTI and in January she had facial cellulitis from root canal and was in hospital so IV antibiotics then.     Recent labs IgG 962, IgG subclasses normal and CBC and CMP normal    Prior History taken 1/13/14: new patient evaluation of rash and chronic PND.  She has been seen about 4 years ago and had labs drawn with normal immunoglobulins, humoral panel only protected to 4/14 strep titers despite prior pneumovax and immunocaps negative to dust mites, [place], [place], cocoa, garlic, milk, wheat, rye, pecan, and peanut.  She was advised to get pneumovax and follow up but she did not.  She continues with chronic PND and congestion, no runny nose or sneeze and she gets inus infections 3-4  times per year requiring antibiotics.  She does clear with anbx but never gets rid of PND.  Her main question now is she gets a random rash on hands, feet and nose.  It seems to appear out of no where, is very itchy and red spots then goes away.  Lasts weeks to months.  She has seen derm and not sure of cause and she thinks may be food allergy although cannot identify a food.    She also has asthma which is well controlled on Dulera and Singulair.  Only uses albuterol with colds.      Rash  Associated symptoms include congestion and coughing. Pertinent negatives include no diarrhea, fatigue, fever, rhinorrhea, shortness of breath, sore throat or vomiting.       Environmental History: Pets in the home: none.  see history section for home environment  Review of Systems   Constitutional: Negative for fever, chills, appetite change and fatigue.   HENT: Positive for congestion and postnasal drip. Negative for ear pain, nosebleeds, sore throat, facial swelling, rhinorrhea, sneezing, trouble swallowing, voice change, sinus pressure and ear discharge.    Eyes: Negative for discharge, redness, itching and visual disturbance.   Respiratory: Positive for cough and wheezing. Negative for choking, chest tightness and shortness of breath.    Cardiovascular: Negative for chest pain, palpitations and leg swelling.   Gastrointestinal: Negative for nausea, vomiting, abdominal pain, diarrhea, constipation and abdominal distention.   Genitourinary: Negative for difficulty urinating.   Musculoskeletal: Negative for myalgias, joint swelling, arthralgias and gait problem.   Skin: Positive for rash. Negative for color change.   Neurological: Negative for dizziness, syncope, weakness, light-headedness and headaches.   Hematological: Negative for adenopathy. Does not bruise/bleed easily.   Psychiatric/Behavioral: Negative for behavioral problems, confusion, sleep disturbance and agitation. The patient is not nervous/anxious.          Objective:    Physical Exam   Nursing note and vitals reviewed.  Constitutional: She is oriented to person, place, and time. She appears well-developed and well-nourished. No distress.   HENT:   Head: Normocephalic and atraumatic.   Right Ear: Hearing, tympanic membrane, external ear and ear canal normal.   Left Ear: Hearing, tympanic membrane, external ear and ear canal normal.   Nose: No mucosal edema, rhinorrhea, sinus tenderness or septal deviation. No epistaxis. Right sinus exhibits no maxillary sinus tenderness and no frontal sinus tenderness. Left sinus exhibits no maxillary sinus tenderness and no frontal sinus tenderness.   Mouth/Throat: Uvula is midline, oropharynx is clear and moist and mucous membranes are normal. No uvula swelling.   Eyes: Conjunctivae normal are normal. Right eye exhibits no discharge. Left eye exhibits no discharge.   Neck: Normal range of motion. No thyromegaly present.   Cardiovascular: Normal rate, regular rhythm and normal heart sounds.    No murmur heard.  Pulmonary/Chest: Effort normal and breath sounds normal. No respiratory distress. She has no wheezes.   Abdominal: Soft. She exhibits no distension. There is no tenderness.   Musculoskeletal: Normal range of motion. She exhibits no edema and no tenderness.   Lymphadenopathy:     She has no cervical adenopathy.   Neurological: She is alert and oriented to person, place, and time.   Skin: Skin is warm and dry. No rash noted. No erythema.   Psychiatric: She has a normal mood and affect. Her behavior is normal. Judgment and thought content normal.       Laboratory:   none performed   Assessment:       1. Anti-polysaccharide antibody deficiency    2. Recurrent sinus infections    3. Other chronic rhinitis    4. Mild intermittent asthma without complication         Plan:       1. continue Hizentra 18 g every 2 weeks. Recheck labs in October. Pt stated Dr ebltre wants her to consider gastric sleeve to improve lung function, cardiac as  well as prevent other co morbidities. Advised her that with weight loss IgG dose could decrease so would be less medication jimmie as well   2. Continue Singulair daily

## 2017-07-20 ENCOUNTER — OFFICE VISIT (OUTPATIENT)
Dept: PHYSICAL MEDICINE AND REHAB | Facility: CLINIC | Age: 57
End: 2017-07-20
Payer: COMMERCIAL

## 2017-07-20 VITALS
DIASTOLIC BLOOD PRESSURE: 85 MMHG | WEIGHT: 220 LBS | SYSTOLIC BLOOD PRESSURE: 139 MMHG | RESPIRATION RATE: 12 BRPM | HEART RATE: 95 BPM | HEIGHT: 62 IN | BODY MASS INDEX: 40.48 KG/M2

## 2017-07-20 DIAGNOSIS — M54.16 LUMBAR RADICULOPATHY, CHRONIC: ICD-10-CM

## 2017-07-20 PROCEDURE — 99203 OFFICE O/P NEW LOW 30 MIN: CPT | Mod: 25,S$GLB,, | Performed by: PHYSICAL MEDICINE & REHABILITATION

## 2017-07-20 PROCEDURE — 95885 MUSC TST DONE W/NERV TST LIM: CPT | Mod: S$GLB,,, | Performed by: PHYSICAL MEDICINE & REHABILITATION

## 2017-07-20 PROCEDURE — 99999 PR PBB SHADOW E&M-EST. PATIENT-LVL III: CPT | Mod: PBBFAC,,, | Performed by: PHYSICAL MEDICINE & REHABILITATION

## 2017-07-20 PROCEDURE — 95909 NRV CNDJ TST 5-6 STUDIES: CPT | Mod: S$GLB,,, | Performed by: PHYSICAL MEDICINE & REHABILITATION

## 2017-07-20 NOTE — PROGRESS NOTES
[facility] [address] [place] [address]  Phone: [phone]          Full Name: andrew cheek YOB: 1960  Patient ID: [id number]      Visit Date: 7/20/2017 13:31  Age: 56 Years 9 Months Old  Examining Physician: Vanessa Carbajal M.D.  Referring Physician:   Reason for Referral: le numbness    Chief Complaint   Patient presents with    Numbness     toe/feet numbness     HPI: This is a 56 year old female who complains of chronic bilateral big toe numbness/tingling that can progress to bilateral foot numbness with prolonged.  Her left great toes is constantly numb.  She admits to low back pain as well-chronically after picking up heavy equipment about 4-5 years ago.  Resting, sitting, or change of position provide some relief.     Upon exam:  WDWN female NAD alert and oriented  No focal atrophy noted to lower ext muscles  Skin intact, no breakdown or dysvascular changes, nails intact  tinels bilaterally at ankles  4+/5 strength bilateral upper ext muscles, 4/5 EHL bilaterally  2+ DPpulses bilaterally  1+patellar bilaterally  Clonus negative bilaterally  Hip knee ankle ROM wnl  Sensation: intact to light touch bilateral lower exts except dec at big toe and plantar surface of feet    ROS  General- denies lethargy, weight change, fever, chills  Head/neck- denies swallowing difficulties  ENT- denies hearing changes  Cardiovascular-denies chest pain  Pulmonary- denies shortness of breath  GI- denies constipation or bowel incontinence  - denies bladder incontinence  Skin- denies wounds or rashes  Musculoskeletal- denies weakness, denies pain  Neurologic- denies numbness and tingling  Psychiatric- denies depressive or psychotic features, denies anxiety  Lymphatic-denies swelling  Endocrine- denies hypoglycemic symptoms/DM history    Entire procedure explained to patient prior to proceeding.  Verbal consent obtained      SNC      Nerve / Sites Rec. Site Onset Lat Peak Lat  Amp Segments Distance Velocity     ms ms µV  mm m/s   L Sural - Ankle (Calf)      Calf Ankle 3.1 3.9 5.8 Calf - Ankle 140 45   R Sural - Ankle (Calf)      Calf Ankle 2.9 3.7 5.3 Calf - Ankle 140 49       MNC      Nerve / Sites Muscle Latency Amplitude Duration Rel Amp Segments Distance Lat Diff Velocity     ms mV ms %  mm ms m/s   L Peroneal - EDB      Ankle EDB 6.2 0.5 5.9 100 Ankle - EDB 80        Fib head EDB 12.7 0.3 8.6 75.2 Fib head - Ankle 300 6.5 46      Pop fossa EDB 14.9 0.4 6.2 107 Pop fossa - Fib head 80 2.2 36         Pop fossa - Ankle  8.7    R Peroneal - EDB      Ankle EDB 4.5 1.5 7.8 100 Ankle - EDB 80        Fib head EDB 9.8 1.1 8.2 75.1 Fib head - Ankle 300 5.3 57      Pop fossa EDB 11.5 1.1 7.6 95.6 Pop fossa - Fib head 90 1.7 54         Pop fossa - Ankle  6.9    L Tibial - AH      Ankle AH 3.1 5.1 5.5 100 Ankle - AH 80        Pop fossa AH 12.2 2.7 5.8 53.1 Pop fossa - Ankle 370 9.1 41   R Tibial - AH      Ankle AH 3.6 8.1 4.5 100 Ankle - AH 80        Pop fossa AH 11.8 4.6 6.0 57.2 Pop fossa - Ankle 340 8.2 41       EMG            EMG Summary Table     Spontaneous MUAP Recruitment   Muscle IA Fib PSW Fasc Other Amp Dur Polys Pattern Effort   L. Tibialis anterior N None None None . Incr Sl Incr 1+ Reduced Max   L. Gastrocnemius (Medial head) N None None None . N n N Reduced Max   L. Peroneus longus N None None None . N n 1+ Reduced Max   L. Biceps femoris (short head) N None None None . N n 1+ Reduced Max   L. Rectus femoris N None None None . N n N N Max                            INTERPRETATION  -Bilateral sural sensory nerve  conduction study showed normal peak latency and amplitude  -Bilateral peroneal motor nerve conduction study showed prolonged latency on the left, dec amplitude on the left, and dec conduction velocity on the left  -Bilateral tibial motor nerve conduction study showed normal latency, amplitude, and conduction velocity      IMPRESSION  1. ABNORMAL study  2. There is  electrodiagnostic evidence of a Chronic radiculopathy of the L5 and S1 nerve roots    PLAN  1. Follow up with referring provider: Dr. Dio Cook  2. Handouts on lumbar radic provided. Consider PT to and gabapentin for symptom relief.   3. This study is good for one year. If symptoms worsen or do not improve, please re-consult.    Vanessa Carbajal M.D.  Physical Medicine and Rehab

## 2017-07-20 NOTE — LETTER
July 20, 2017      Dio Cook DPM  [address] [place] [address]           [facility]  [address] [place] [address]  Phone: [phone]  Fax: [phone]          Patient: Yodit Canseco   MR Number: [id number]   YOB: 1960   Date of Visit: 7/20/2017       Dear Dr. Dio Cook:    Thank you for referring Yodit Canseco to me for evaluation. Attached you will find relevant portions of my assessment and plan of care.    If you have questions, please do not hesitate to call me. I look forward to following Yodit Canseco along with you.    Sincerely,    Vanessa Carbajal MD    Enclosure  CC:  No Recipients    If you would like to receive this communication electronically, please contact [email or web address] or [phone] to request more information on [employer] Link access.    For providers and/or their staff who would like to refer a patient to [facility], please contact us through our one-stop-shop provider referral line, [facility], at [phone].    If you feel you have received this communication in error or would no longer like to receive these types of communications, please e-mail [email or web address]

## 2017-07-20 NOTE — PATIENT INSTRUCTIONS
Exercises to Strengthen Your Lower Back  Strong lower back and abdominal muscles work together to support your spine. The exercises below will help strengthen the lower back. It is important that you begin exercising slowly and increase levels gradually.  Always begin any exercise program with stretching. If you feel pain while doing any of these exercises, stop and talk to your doctor about a more specific exercise program that better suits your condition.   Low back stretch  The point of stretching is to make you more flexible and increase your range of motion. Stretch only as much as you are able. Stretch slowly. Do not push your stretch to the limit. If at any point you feel pain while stretching, this is your (temporary) limit.  · Lie on your back with your knees bent and both feet on the ground.  · Slowly raise your left knee to your chest as you flatten your lower back against the floor. Hold for 5 seconds.  · Relax and repeat the exercise with your right knee.  · Do 10 of these exercises for each leg.  · Repeat hugging both knees to your chest at the same time.  Building lower back strength  Start your exercise routine with 10 to 30 minutes a day, 1 to 3 times a day.  Initial exercises  Lying on your back:  1. Ankle pumps: Move your foot up and down, towards your head, and then away. Repeat 10 times with each foot.  2. Heel slides: Slowly bend your knee, drawing the heel of your foot towards you. Then slide your heel/foot from you, straightening your knee. Do not lift your foot off the floor (this is not a leg lift).  3. Abdominal contraction: Bend your knees and put your hands on your stomach. Tighten your stomach muscles. Hold for 5 seconds, then relax. Repeat 10 times.  4. Straight leg raise: Bend one leg at the knee and keep the other leg straight. Tighten your stomach muscles. Slowly lift your straight leg 6 to 12 inches off the floor and hold for up to 5 seconds. Repeat 10 times on each  side.  Standin. Wall squats: Stand with your back against the wall. Move your feet about 12 inches away from the wall. Tighten your stomach muscles, and slowly bend your knees until they are at about a 45 degree angle. Do not go down too far. Hold about 5 seconds. Then slowly return to your starting position. Repeat 10 times.  2. Heel raises: Stand facing the wall. Slowly raise the heels of your feet up and down, while keeping your toes on the floor. If you have trouble balancing, you can touch the wall with your hands. Repeat 10 times.  More advanced exercises  When you feel comfortable enough, try these exercises.  1. Kneeling lumbar extension: Begin on your hands and knees. At the same time, raise and straighten your right arm and left leg until they are parallel to the ground. Hold for 2 seconds and come back slowly to a starting position. Repeat with left arm and right leg, alternating 10 times.  2. Prone lumbar extension: Lie face down, arms extended overhead, palms on the floor. At the same time, raise your right arm and left leg as high as comfortably possible. Hold for 10 seconds and slowly return to start. Repeat with left arm and right leg, alternating 10 times. Gradually build up to 20 times. (Advanced: Repeat this exercise raising both arms and both legs a few inches off the floor at the same time. Hold for 5 seconds and release.)  3. Pelvic tilt: Lie on the floor on your back with your knees bent at 90 degrees. Your feet should be flat on the floor. Inhale, exhale, then slowly contract your abdominal muscles bringing your navel toward your spine. Let your pelvis rock back until your lower back is flat on the floor. Hold for 10 seconds while breathing smoothly.  4. Abdominal crunch: Perform a pelvic tilt (above) flattening your lower back against the floor. Holding the tension in your abdominal muscles, take another breath and raise your shoulder blades off the ground (this is not a full sit-up).  Keep your head in line with your body (dont bend your neck forward). Hold for 2 seconds, then slowly lower.  Date Last Reviewed: 6/1/2016  © 8282-3787 [employer]. [address] [place] [address]. All rights reserved. This information is not intended as a substitute for professional medical care. Always follow your healthcare professional's instructions.        Possible Causes of Low Back or Leg Pain    The symptoms in your back or leg may be due to pressure on a nerve. This pressure may be caused by a damaged disk or by abnormal bone growth. Either way, you may feel pain, burning, tingling, or numbness. If you have pressure on a nerve that connects to the sciatic nerve, pain may shoot down your leg.    Pressure from the disk  Constant wear and tear can weaken a disk over time and cause back pain. The disk can then be damaged by a sudden movement or injury. If its soft center begins to bulge, the disk may press on a nerve. Or the outside of the disk may tear, and the soft center may squeeze through and pinch a nerve.    Pressure from bone  As a disk wears out, the vertebrae right above and below the disk begin to touch. This can put pressure on a nerve. Often, abnormal bone (called bone spurs) grows where the vertebrae rub against each other. This can cause the foramen or the spinal canal to narrow (called stenosis) and press against a nerve.  Date Last Reviewed: 10/4/2015  © 7560-8141 [employer]. [address] [place] [address]. All rights reserved. This information is not intended as a substitute for professional medical care. Always follow your healthcare professional's instructions.        Causes of Lumbar (Low Back) Pain  Low back pain can be caused by problems with any part of the lumbar spine. A disk can herniate (push out) and press on a nerve. Vertebrae can rub against each other or slip out of place. This can irritate facet joints and nerves. It can also  lead to stenosis, a narrowing of the spinal canal or foramen.  Pressure from a disk  Constant wear and tear on a disk can cause it to weaken and push outward. Part of the disk may then press on nearby nerves. There are two common types of herniated disks:  Contained means the soft nucleus is protruding outward.   Extruded means the firm annulus has torn, letting the soft center squeeze through.     Pressure from bone  An unstable spine   With age, a disk may thin and wear out. Vertebrae above and below the disk may begin to touch. This can put pressure on nerves. It can also cause bone spurs (growths) to form where the bones rub together.    Stenosis results when bone spurs narrow the foramen or spinal canal. This also puts pressure on nerves. Slipping vertebrae can irritate nerves and joints. They can also worsen stenosis.    In some cases, vertebrae become unstable and slip forward. This is called spondylolisthesis.     Date Last Reviewed: 10/12/2015  © 0106-2738 [employer]. [address] [place] [address], [place] [address]. All rights reserved. This information is not intended as a substitute for professional medical care. Always follow your healthcare professional's instructions.        Relieving Back Pain  Back pain is a common problem. You can strain back muscles by lifting too much weight or just by moving the wrong way. Back strain can be uncomfortable, even painful. And it can take weeks or months to improve. To help yourself feel better and prevent future back strains, try these tips.  Important Note: Do not give aspirin to children or teens without first discussing it with your healthcare provider.      ? Ice    Ice reduces muscle pain and swelling. It helps most during the first 24 to 48 hours after an injury.  · Wrap an ice pack or a bag of frozen peas in a thin towel. (Never place ice directly on your skin.)  · Place the ice where your back hurts the most.  · Dont ice for more than 20 minutes  at a time.  · You can use ice several times a day.  ? Medicines  Over-the-counter pain relievers can include acetaminophen and anti-inflammatory medicines, which includes aspirin or ibuprofen. They can help ease discomfort. Some also reduce swelling.  · Tell your healthcare provider about any medicines you are already taking.  · Take medicines only as directed.  ? Heat  After the first 48 hours, heat can relax sore muscles and improve blood flow.  · Try a warm bath or shower. Or use a heating pad set on low. To prevent a burn, keep a cloth between you and the heating pad.  · Dont use a heating pad for more than 15 minutes at a time. Never sleep on a heating pad.  Date Last Reviewed: 9/1/2015 © 2000-2016 [employer]. [address] [place] [address], [place] [address]. All rights reserved. This information is not intended as a substitute for professional medical care. Always follow your healthcare professional's instructions.        Caring for Your Back Throughout the Day  Take care of your back throughout the day. You will likely have fewer back problems if you do. Try to warm up before you move. Shift positions often. Also do your best to form healthy habits.    Warm up for the day  Do a few slow, catlike stretches before starting your day. This simple warmup can soften your disks, stretch your back muscles, and help prevent injuries.  Shift positions often  At work and at home, change positions often. This helps keep your body from getting stiff. Stand up or lean back while you sit. If you can, get up and move every 1/2 hour.  Form healthy habits  Here are some suggestions:   · Keep a healthy weight. When you weigh too much, your back is under excess strain. But losing just a few extra pounds can help a lot.  · Try not to overeat. Learn about serving sizes. The size of a serving depends on the food and the food group. Many foods list serving sizes on the labels.  · Handle minor aches with cold and heat.  Apply cold the first 24 to 48 hours. Use heat after that. Always place a thin cloth between your skin and the source of cold or heat.  · Take medicines as directed. This helps keep pain under control. Always read labels, and call your healthcare provider or pharmacist if you have any questions.  Walk each day  A daily walk keeps your back and thigh muscles stretched and strong. This gives your back better support. Be sure to walk with your spines three curves aligned, by keeping your head, hips, and toes connected by a vertical line.   Date Last Reviewed: 10/18/2015  © 3911-6405 [employer]. [address] [place] [address]. All rights reserved. This information is not intended as a substitute for professional medical care. Always follow your healthcare professional's instructions.        Relieving Tension in Your Back  Being relaxed helps keep your mind healthy and your back ready to move. Take short breaks often. Walk around. Stretch. Switch tasks. Also give the following a try.  Make time to relax. Start by setting aside 5 minutes daily.   Deep breathing    Deep breathing is a simple way to reduce stress. You can do it almost any time you need to relax.  · Inhale slowly through your nose. Let your lungs and stomach expand.  · Hold your breath for 2 to 3 seconds.  · Exhale slowly through your mouth until your lungs feel empty. Repeat 3 to 4 times.  Relieve tension  Muscle tension can create tender spots called trigger points. The tips below may help relieve muscle tension.  · Press the trigger point if you can reach it. If not, lie on a soft tennis ball, or ask a friend to press the spot. Use steady pressure for 10 to 15 seconds. Breathe deeply. Repeat a few times.  · Massage trigger points with ice for 2 to 5 minutes. Press lightly at first. Slowly increase firmness.  Date Last Reviewed: 10/18/2015  © 5527-5222 [employer]. [address] [place] [address]. All  rights reserved. This information is not intended as a substitute for professional medical care. Always follow your healthcare professional's instructions.        Back Safety: Basics of Good Posture  Good posture helps protect you from injury. It also increases your comfort. Aim for good posture throughout the day.    Check your posture  The human body works best when it is properly aligned. To improve your standing posture, follow these steps:  · Take a moment to close your eyes and feel your body. Then breathe deeply and relax your shoulders, hips, and knees.  · Now, from the very top of your head, lift up just a bit. Think of a line linking your ears, shoulders, hips, and ankles. Adjust your body to follow the line. You may need to relax your hips and tuck your buttocks under a bit.  · Next, take a look at yourself in a mirror. Is one ear, shoulder, or hip higher than the other? They should be level.  Check how you sit  When you sit properly, pressure on your back is reduced. Try these steps:  · Sit so that the curve of your lower back fits easily against the chair. Keep your gaze level.  · Support your feet. They should be flat on the floor or on a footrest. Your knees should be level with your hips.  · Adjust the chair height as needed. Sit so your forearms are level with the work surface.  Proper posture helps  When your back is aligned, its more likely to stay safe throughout the day.  · Standing in place. Rest one foot on a stool or low box to ease pressure on your lower back. Switch feet often. If you can, adjust the height of your work surface so your neck and shoulders arent under strain.  · Driving. Sit close enough to the steering wheel to keep your knees slightly bent. For comfort, your knees should be level with your hips or just a bit lower. Sit as straight as you can. The curve of your lower back should be fully supported.  · Walking. Stand tall and walk with your head up. Let your arms swing  while you walk. This helps relax muscles. Wear shoes that fit and support your feet. If you will be standing or walking for a long time, dont wear high heels.  · Sitting and sleeping. Choose your furniture with care. Make sure its not causing or increasing your back pain. Chairs should allow for comfortable, correct sitting posture. Use pillows for added support if needed. Your bed should support your backs natural curves without being too hard or too soft.  Date Last Reviewed: 10/18/2015  © 2771-0438 [employer]. [address] [place] [address]. All rights reserved. This information is not intended as a substitute for professional medical care. Always follow your healthcare professional's instructions.        Understanding Lumbar Radiculopathy    Lumbar radiculopathy is irritation or inflammation of a nerve root in the low back. It causes symptoms that spread out from the back down one or both legs. To understand this condition, it helps to understand the parts of the spine:  · Vertebrae. These are bones that stack to form the spine. The lumbar spine contains the 5 bottom vertebrae.  · Disks. These are soft pads of tissue between the vertebrae. They act as shock absorbers for the spine.  · Spinal canal. This is a tunnel formed within the stacked vertebrae. In the lumbar spine, nerves run through this canal.  · Nerves. These branch off and leave the spinal canal, traveling out to parts of the body. As they leave the spinal canal, nerves pass through openings between the vertebrae. The nerve root is the part of the nerve that is closest to the spinal canal.  · Sciatic nerve. This is a large nerve formed from several nerve roots in the low back. This nerve extends down the back of the leg to the foot.  With lumbar radiculopathy, nerve roots in the low back become irritated. This leads to pain and symptoms. The sciatic nerve is commonly involved, so the condition is often called  sciatica.  What causes lumbar radiculopathy?  Aging, injury, poor posture, extra body weight, and other issues can lead to problems in the low back. These problems may then irritate nerve roots. They include:  · Damage to a disk in the lumbar spine. The damaged disk may then press on nearby nerve roots.  · Degeneration from wear and tear, and aging. This can lead to narrowing (stenosis) of the openings between the vertebrae. The narrowed openings press on nerve roots as they leave the spinal canal.  · Unstable spine. This is when a vertebra slips forward. It can then press on a nerve root.  Other, less common things can put pressure on nerves in the low back. These include diabetes, infection, or a tumor.  Symptoms of lumbar radiculopathy  These include:  · Pain in the low back  · Pain, numbness, tingling, or weakness that travels into the buttocks, hip, groin, or leg  · Muscle spasms  Treatment for lumbar radiculopathy  In most cases, your healthcare provider will first try treatments that help relieve symptoms. These may include:  · Prescription and over-the-counter pain medicines. These help relieve pain, swelling, and irritation.  · Limits on positions and activities that increase pain. But lying in bed or avoiding all movement is only recommended for a short period of time.  · Physical therapy, including exercises and stretches. This helps decrease pain and increase movement and function.  · Steroid shots into the lower back. This may help relieve symptoms for a time.  · Weight-loss program. If you are overweight, losing extra pounds may help relieve symptoms.  In some cases, you may need surgery to fix the underlying problem. This depends on the cause, the symptoms, and how long the pain has lasted.  Possible complications  Over time, an irritated and inflamed nerve may become damaged. This may lead to long-lasting (permanent) numbness or weakness in your legs and feet. If symptoms change suddenly or get  worse, be sure to let your healthcare provider know.  When to call your healthcare provider  Call your healthcare provider right away if you have any of these:  · New pain or pain that gets worse  · New or increasing weakness, tingling, or numbness in your leg or foot  · Problems controlling your bladder or bowel   Date Last Reviewed: 3/10/2016  © 9341-6245 [employer]. [address] [place] [address], [place] [address]. All rights reserved. This information is not intended as a substitute for professional medical care. Always follow your healthcare professional's instructions.

## 2017-08-01 ENCOUNTER — OFFICE VISIT (OUTPATIENT)
Dept: PODIATRY | Facility: CLINIC | Age: 57
End: 2017-08-01
Payer: COMMERCIAL

## 2017-08-01 VITALS
HEIGHT: 62 IN | SYSTOLIC BLOOD PRESSURE: 123 MMHG | DIASTOLIC BLOOD PRESSURE: 75 MMHG | BODY MASS INDEX: 40.48 KG/M2 | WEIGHT: 220 LBS | HEART RATE: 88 BPM

## 2017-08-01 DIAGNOSIS — G57.90 NEURITIS OF FOOT, UNSPECIFIED LATERALITY: Primary | ICD-10-CM

## 2017-08-01 PROCEDURE — 3078F DIAST BP <80 MM HG: CPT | Mod: S$GLB,,, | Performed by: PODIATRIST

## 2017-08-01 PROCEDURE — 3008F BODY MASS INDEX DOCD: CPT | Mod: S$GLB,,, | Performed by: PODIATRIST

## 2017-08-01 PROCEDURE — 3074F SYST BP LT 130 MM HG: CPT | Mod: S$GLB,,, | Performed by: PODIATRIST

## 2017-08-01 PROCEDURE — 99213 OFFICE O/P EST LOW 20 MIN: CPT | Mod: S$GLB,,, | Performed by: PODIATRIST

## 2017-08-01 PROCEDURE — 99999 PR PBB SHADOW E&M-EST. PATIENT-LVL III: CPT | Mod: PBBFAC,,, | Performed by: PODIATRIST

## 2017-08-01 NOTE — PROGRESS NOTES
Subjective:     Patient ID: Yodit Canseco is a 56 y.o. female.    Chief Complaint: Foot Problem (neuritis of the foot, patient denied current pain with her feet)    Yodit is a 56 y.o. female who presents to the podiatry clinic  with complaint of  bilateral big toe numbness and sometimes pain. Onset of the symptoms was several years ago. Precipitating event: admits lower back injury several years ago. . Current symptoms include: numbness, tingling, and some pain from time to time.. Aggravating factors: inactivity and standing. Symptoms have been intermittent. Patient has had no prior foot problems. Evaluation to date: none. Treatment to date: none. Patients rates pain 0/10 on pain scale only a numbness feeling.       Patient Active Problem List   Diagnosis    History of colon polyps    Diverticulosis of large intestine without hemorrhage    H/O hand surgery    Left hand pain    Degenerative tear of triangular fibrocartilage complex (TFCC) of left wrist    Left wrist pain    Extensor tenosynovitis of wrist    Hyperlipidemia    Obesity, Class III, BMI 40-49.9 (morbid obesity)    Hypothyroidism    Refractive error    Hand arthritis    Mild intermittent asthma without complication    Immune deficiency disorder    Migraine headache    Prediabetes    Gastroparesis    Preop cardiovascular exam    Family history of ASCVD (arteriosclerotic cardiovascular disease)       Medication List with Changes/Refills   New Medications    HYDROCORTISONE (ANUSOL-HC) 2.5 % RECTAL CREAM    Place rectally 2 (two) times daily.    OMEPRAZOLE (PRILOSEC) 40 MG CAPSULE    Take 1 capsule (40 mg total) by mouth once daily.   Current Medications    ALBUTEROL (PROAIR HFA) 90 MCG/ACTUATION INHALER    Inhale 1 puff into the lungs 2 (two) times daily.    ALBUTEROL (PROVENTIL) 2.5 MG /3 ML (0.083 %) NEBULIZER SOLUTION    Take 3 mLs (2.5 mg total) by nebulization every 4 (four) hours as needed for Wheezing or Shortness of Breath.  Every 4-6 hours PRN    ASPIRIN (ECOTRIN) 81 MG EC TABLET    Take 81 mg by mouth once daily.    ATORVASTATIN (LIPITOR) 40 MG TABLET    Take 1 tablet (40 mg total) by mouth once daily.    CALCIUM CARB/D3/MAGNESIUM/ZINC (CALCIUM CARB-D3-MAG CMB11-ZINC) [id number] MG-UNIT-MG-MG TAB    Take 2 tablets by mouth every evening.    DESONIDE (DESOWEN) 0.05 % CREAM    Apply topically 2 (two) times daily. Apply to lids.    ESTROGENS, CONJUGATED, (PREMARIN) 0.625 MG TABLET    Take 1 tablet (0.625 mg total) by mouth once daily.    IMMUN GLOB G,IGG,-PRO-IGA 0-50 (HIZENTRA) 2 GRAM/10 ML (20 %) SOLN    Inject 18 g into the skin every 14 (fourteen) days.    LACTOBAC NO.41/BIFIDOBACT NO.7 (PROBIOTIC-10 ORAL)    Take by mouth once daily.    LEVOTHYROXINE (SYNTHROID) 75 MCG TABLET    Take 1 tablet (75 mcg total) by mouth once daily.    METFORMIN (GLUCOPHAGE) 500 MG TABLET    Take 1 tablet (500 mg total) by mouth 2 (two) times daily with meals.    MOMETASONE-FORMOTEROL (DULERA) 200-5 MCG/ACTUATION INHALER    Inhale 2 puffs into the lungs 2 (two) times daily. Twice a day    MONTELUKAST (SINGULAIR) 10 MG TABLET    Take 1 tablet (10 mg total) by mouth every evening.    MULTIVITAMIN (ONE DAILY MULTIVITAMIN) PER TABLET    Take 1 tablet by mouth once daily.    OMEGA 3-DHA-EPA-FISH OIL (FISH OIL) 900-1,400 MG CPDR    Take 1 capsule by mouth once daily.    POTASSIUM CHLORIDE (MICRO-K) 10 MEQ CPSR    Take 2 capsules (20 mEq total) by mouth once daily.    SUMATRIPTAN (IMITREX) 100 MG TABLET    Take 1 po at onset of migraine. May repeat once in 2 hrs if needed. No more than 2 pills in 24 hours    TRIAMTERENE-HYDROCHLOROTHIAZIDE 75-50 MG (MAXZIDE) 75-50 MG PER TABLET    Take 1 tablet by mouth once daily.   Discontinued Medications    PREDNISONE (DELTASONE) 20 MG TABLET    3 for 3 days then 2 for 3 days then one for 3 days and repeat for breathing problems    SULINDAC (CLINORIL) 150 MG TABLET    TAKE 1 TABLET(150 MG) BY MOUTH TWICE DAILY        Review of patient's allergies indicates:   Allergen Reactions    Sudafed [pseudoephedrine hcl] Other (See Comments)     Does not want to wake up .    Amoxicillin-pot clavulanate      Other reaction(s): Itching    Ephedrine      Other reaction(s): comatose    Hydrocodone Itching    Iodinated contrast- oral and iv dye     Iodine and iodide containing products Other (See Comments)    Pantoprazole      Other reaction(s): upset stomach/halitosis    Penicillins      Other reaction(s): does not work on pt symptoms    Percocet [oxycodone-acetaminophen] Itching    Barium iodide Rash       Past Surgical History:   Procedure Laterality Date    ADENOIDECTOMY      COLONOSCOPY  2010    COLONOSCOPY N/A 10/8/2015    Procedure: COLONOSCOPY;  Surgeon: Panda Bose MD;  Location[facility];  Service: Endoscopy;  Laterality: N/A;    Hand fracture surgery      HARDWARE REMOVAL      left hand 5th MC    HYSTERECTOMY      menorrhagia-Ovaries intact    TONSILLECTOMY      Urethral dilatation         Family History   Problem Relation Age of Onset    Melanoma Mother     Basal cell carcinoma Mother     Lung disease Mother      pulm htn    Cataracts Mother     Hypertension Mother     Lung cancer Father     Diabetes Father     Hypertension Father     Cancer Father     Diabetes Paternal Aunt     Colon cancer Paternal Aunt     Diabetes Paternal Aunt     Ovarian cancer Paternal Grandmother     Cancer Maternal Grandfather     Melanoma Maternal Aunt     Melanoma Maternal Uncle     Breast cancer Paternal Aunt     Lymphoma Paternal Aunt     Ulcerative colitis Son     Psoriasis Son     Psoriasis Son        Social History     Social History    Marital status:      Spouse name: N/A    Number of children: N/A    Years of education: N/A     Occupational History    Not on file.     Social History Main Topics    Smoking status: Never Smoker    Smokeless tobacco: Never Used    Alcohol use 0.0 oz/week  "     Comment: very rarely    Drug use: No    Sexual activity: Yes     Partners: Male     Other Topics Concern    Not on file     Social History Narrative    . Disabled teacher.       Vitals:    08/01/17 1142   BP: 123/75   Pulse: 88   Weight: 99.8 kg (220 lb)   Height: 5' 2" (1.575 m)   PainSc: 0-No pain       Hemoglobin A1C   Date Value Ref Range Status   04/05/2017 5.0 4.5 - 6.2 % Final     Comment:     According to ADA guidelines, hemoglobin A1C <7.0% represents  optimal control in non-pregnant diabetic patients.  Different  metrics may apply to specific populations.   Standards of Medical Care in Diabetes - 2016.  For the purpose of screening for the presence of diabetes:  <5.7%     Consistent with the absence of diabetes  5.7-6.4%  Consistent with increasing risk for diabetes   (prediabetes)  >or=6.5%  Consistent with diabetes  Currently no consensus exists for use of hemoglobin A1C  for diagnosis of diabetes for children.     09/30/2016 5.1 4.5 - 6.2 % Final     Comment:     According to ADA guidelines, hemoglobin A1C <7.0% represents  optimal control in non-pregnant diabetic patients.  Different  metrics may apply to specific populations.   Standards of Medical Care in Diabetes - 2016.  For the purpose of screening for the presence of diabetes:  <5.7%     Consistent with the absence of diabetes  5.7-6.4%  Consistent with increasing risk for diabetes   (prediabetes)  >or=6.5%  Consistent with diabetes  Currently no consensus exists for use of hemoglobin A1C  for diagnosis of diabetes for children.     04/05/2016 5.0 4.5 - 6.2 % Final       Review of Systems   Constitutional: Negative for chills and fever.   Respiratory: Negative for shortness of breath.    Cardiovascular: Negative for chest pain, palpitations, orthopnea, claudication and leg swelling.   Gastrointestinal: Negative for diarrhea, nausea and vomiting.   Musculoskeletal: Negative for joint pain.   Skin: Negative for rash.   Neurological: " Positive for tingling and sensory change. Negative for dizziness, focal weakness and weakness.   Psychiatric/Behavioral: Negative.          Objective:      PHYSICAL EXAM: Apperance: Alert and orient in no distress,well developed, and with good attention to grooming and body habits  Patient presents ambulating in flats  Lower Extremity Exam:  VASCULAR: Dorsalis pedis pulses 2/4 bilateral and Posterior Tibial pulses 2/4 bilateral. Capillary fill time <3 seconds bilateral. Mild edema observed bilateral. Varicosities absent bilateral. Skin temperature of the lower extremities is warm to warm, proximal to distal. Hair growth WNL bilateral.  DERMATOLOGICAL: No skin rashes, subcutaneous nodules, lesions, or ulcers observed bilateral.   NEUROLOGICAL: Light touch, sharp-dull, proprioception all present and equal bilaterally.  Vibratory sensation intact at bilateral hallux. Protective sensation intact at all 10 sites as tested with a [employer]-Jennifer 5.07 monofilament.   MUSCULOSKELETAL: Muscle strength is 5/5 for foot inverters, everters, plantarflexors, and dorsiflexors. Muscle tone is normal. (--) pain on palpation only (+) numbness of bilateral hallux.    TEST RESULTS: EMG results reveal bilateral sural sensory nerve  conduction study showed normal peak latency and amplitude, bilateral peroneal motor nerve conduction study showed prolonged latency on the left, dec amplitude on the left, and dec conduction velocity on the left, bilateral tibial motor nerve conduction study showed normal latency, amplitude, and conduction velocity        Assessment:       Encounter Diagnosis   Name Primary?    Neuritis of foot, unspecified laterality Yes         Plan:   Neuritis of foot, unspecified laterality      I counseled the patient on her conditions, regarding findings of my examination, my impressions, and usual treatment plan.   Discussed with patient treatments for neuropathy consisting of topical or oral medication.  Reviewed  EMG results in exam room with patient.   Prescribed topical compound (green lable) consisting of flurbiprofen, cyclobenzaprine, gabapentin, lidocaine, and prilocaine. Patient instructed to use the cream up to 5 times daily. Discussed with patient that there may be out of pocket cost of cream pending insurance, and that they may refuse medication if cost is an issue. Patient states they understand.   Patient to return as needed.             Dio Cook DPM  [facility]

## 2017-08-07 ENCOUNTER — TELEPHONE (OUTPATIENT)
Dept: FAMILY MEDICINE | Facility: CLINIC | Age: 57
End: 2017-08-07

## 2017-08-07 NOTE — TELEPHONE ENCOUNTER
----- Message from Katharine Fry sent at 8/7/2017 10:33 AM CDT -----  Contact: pt  Pt has made an appt for 8/10 but would like to be seen before that date.  Pt can be reached at [phone]

## 2017-08-10 ENCOUNTER — OFFICE VISIT (OUTPATIENT)
Dept: FAMILY MEDICINE | Facility: CLINIC | Age: 57
End: 2017-08-10
Payer: COMMERCIAL

## 2017-08-10 VITALS
WEIGHT: 216.06 LBS | HEIGHT: 63 IN | BODY MASS INDEX: 38.28 KG/M2 | DIASTOLIC BLOOD PRESSURE: 74 MMHG | HEART RATE: 70 BPM | TEMPERATURE: 98 F | SYSTOLIC BLOOD PRESSURE: 116 MMHG

## 2017-08-10 DIAGNOSIS — K31.84 GASTROPARESIS: ICD-10-CM

## 2017-08-10 DIAGNOSIS — R19.8 ALTERNATING CONSTIPATION AND DIARRHEA: ICD-10-CM

## 2017-08-10 DIAGNOSIS — K64.4 EXTERNAL HEMORRHOID, BLEEDING: ICD-10-CM

## 2017-08-10 DIAGNOSIS — R10.11 ABDOMINAL PAIN, RUQ: Primary | ICD-10-CM

## 2017-08-10 LAB
BACTERIA #/AREA URNS HPF: ABNORMAL /HPF
BILIRUB UR QL STRIP: NEGATIVE
CLARITY UR: CLEAR
COLOR UR: YELLOW
GLUCOSE UR QL STRIP: NEGATIVE
HGB UR QL STRIP: ABNORMAL
KETONES UR QL STRIP: NEGATIVE
LEUKOCYTE ESTERASE UR QL STRIP: NEGATIVE
MICROSCOPIC COMMENT: ABNORMAL
NITRITE UR QL STRIP: NEGATIVE
PH UR STRIP: 6 [PH] (ref 5–8)
PROT UR QL STRIP: NEGATIVE
RBC #/AREA URNS HPF: 6 /HPF (ref 0–4)
SP GR UR STRIP: 1.01 (ref 1–1.03)
SQUAMOUS #/AREA URNS HPF: 15 /HPF
URN SPEC COLLECT METH UR: ABNORMAL
WBC #/AREA URNS HPF: 4 /HPF (ref 0–5)

## 2017-08-10 PROCEDURE — 99999 PR PBB SHADOW E&M-EST. PATIENT-LVL IV: CPT | Mod: PBBFAC,,, | Performed by: FAMILY MEDICINE

## 2017-08-10 PROCEDURE — 3078F DIAST BP <80 MM HG: CPT | Mod: S$GLB,,, | Performed by: FAMILY MEDICINE

## 2017-08-10 PROCEDURE — 81000 URINALYSIS NONAUTO W/SCOPE: CPT | Mod: PO

## 2017-08-10 PROCEDURE — 99214 OFFICE O/P EST MOD 30 MIN: CPT | Mod: S$GLB,,, | Performed by: FAMILY MEDICINE

## 2017-08-10 PROCEDURE — 3008F BODY MASS INDEX DOCD: CPT | Mod: S$GLB,,, | Performed by: FAMILY MEDICINE

## 2017-08-10 PROCEDURE — 3074F SYST BP LT 130 MM HG: CPT | Mod: S$GLB,,, | Performed by: FAMILY MEDICINE

## 2017-08-10 PROCEDURE — 81002 URINALYSIS NONAUTO W/O SCOPE: CPT | Mod: PO

## 2017-08-10 RX ORDER — HYDROCORTISONE 25 MG/G
CREAM TOPICAL 2 TIMES DAILY
Qty: 30 G | Refills: 1 | Status: SHIPPED | OUTPATIENT
Start: 2017-08-10 | End: 2017-08-20

## 2017-08-10 RX ORDER — OMEPRAZOLE 40 MG/1
40 CAPSULE, DELAYED RELEASE ORAL DAILY
Qty: 30 CAPSULE | Refills: 1 | Status: SHIPPED | OUTPATIENT
Start: 2017-08-10 | End: 2018-01-25

## 2017-08-10 NOTE — PROGRESS NOTES
Patient presents with a complaint of some mild discomfort right upper quadrant occasionally radiating towards the right back.  Occasional nausea.  No change with eating.  Symptoms relatively constant during the past week, though waxes and wanes.  No vomiting.  She's had some intermittent diarrhea and constipation, has used both laxative and over-the-counter loperamide.  She feels like she's had GI symptoms since she had treatment for cellulitis in January.  No fever or chills.  No significant weight change.  She was felt to have gastroparesis on previous GI evaluation.  She had ultrasound of the abdomen in September 2015 negative except fatty liver.  In October 2015 she had colonoscopy and EGD with findings of diverticulosis, hemorrhoids and mild gastritis.  She has noted occasional small amount of blood with wiping after bowel movement in the past couple days.  She relates this to the hemorrhoids.    Past Medical History:  Past Medical History:   Diagnosis Date    Allergy     Angio-edema     Arthralgia     Asthma without status asthmaticus     Bronchitis     Diverticulosis of large intestine without hemorrhage 10/8/2015    Environmental allergies     Gastroparesis     Hand arthritis     Herpes simplex without mention of complication     oral    Hyperlipidemia     Hypothyroidism     LBP radiating to left leg     Leukocytosis, unspecified     Migraine headache     Obesity, Class III, BMI 40-49.9 (morbid obesity)     Periorbital cellulitis 12/31/2016    Prediabetes     Recurrent upper respiratory infection (URI)     S/P colonoscopy November 2010    Urticaria      Past Surgical History:   Procedure Laterality Date    ADENOIDECTOMY      COLONOSCOPY  2010    COLONOSCOPY N/A 10/8/2015    Procedure: COLONOSCOPY;  Surgeon: Panda Bose MD;  Location: [facility];  Service: Endoscopy;  Laterality: N/A;    Hand fracture surgery      HARDWARE REMOVAL      left hand 5th MC    HYSTERECTOMY       menorrhagia-Ovaries intact    TONSILLECTOMY      Urethral dilatation       Social History     Social History    Marital status:      Spouse name: N/A    Number of children: N/A    Years of education: N/A     Occupational History    Not on file.     Social History Main Topics    Smoking status: Never Smoker    Smokeless tobacco: Never Used    Alcohol use 0.0 oz/week      Comment: very rarely    Drug use: No    Sexual activity: Yes     Partners: Male     Other Topics Concern    Not on file     Social History Narrative    . Disabled teacher.     Family History   Problem Relation Age of Onset    Melanoma Mother     Basal cell carcinoma Mother     Lung disease Mother      pulm htn    Cataracts Mother     Hypertension Mother     Lung cancer Father     Diabetes Father     Hypertension Father     Cancer Father     Diabetes Paternal Aunt     Colon cancer Paternal Aunt     Diabetes Paternal Aunt     Ovarian cancer Paternal Grandmother     Cancer Maternal Grandfather     Melanoma Maternal Aunt     Melanoma Maternal Uncle     Breast cancer Paternal Aunt     Lymphoma Paternal Aunt     Ulcerative colitis Son     Psoriasis Son     Psoriasis Son      Review of patient's allergies indicates:   Allergen Reactions    Sudafed [pseudoephedrine hcl] Other (See Comments)     Does not want to wake up .    Amoxicillin-pot clavulanate      Other reaction(s): Itching    Ephedrine      Other reaction(s): comatose    Hydrocodone Itching    Iodinated contrast- oral and iv dye     Iodine and iodide containing products Other (See Comments)    Pantoprazole      Other reaction(s): upset stomach/halitosis    Penicillins      Other reaction(s): does not work on pt symptoms    Percocet [oxycodone-acetaminophen] Itching    Barium iodide Rash     Current Outpatient Prescriptions on File Prior to Visit   Medication Sig Dispense Refill    albuterol (PROAIR HFA) 90 mcg/actuation inhaler Inhale 1 puff  into the lungs 2 (two) times daily. 3 Inhaler 3    albuterol (PROVENTIL) 2.5 mg /3 mL (0.083 %) nebulizer solution Take 3 mLs (2.5 mg total) by nebulization every 4 (four) hours as needed for Wheezing or Shortness of Breath. Every 4-6 hours  each 3    aspirin (ECOTRIN) 81 MG EC tablet Take 81 mg by mouth once daily.      atorvastatin (LIPITOR) 40 MG tablet Take 1 tablet (40 mg total) by mouth once daily. 90 tablet 3    CALCIUM CARB/D3/MAGNESIUM/ZINC (CALCIUM CARB-D3-MAG CMB11-ZINC) 293-261-330-5 mg-unit-mg-mg Tab Take 2 tablets by mouth every evening.      desonide (DESOWEN) 0.05 % cream Apply topically 2 (two) times daily. Apply to lids. 1 Tube 3    estrogens, conjugated, (PREMARIN) 0.625 MG tablet Take 1 tablet (0.625 mg total) by mouth once daily. 30 tablet 11    immun glob G,IgG,-pro-IgA 0-50 (HIZENTRA) 2 gram/10 mL (20 %) Soln Inject 18 g into the skin every 14 (fourteen) days. 90 mL 12    levothyroxine (SYNTHROID) 75 MCG tablet Take 1 tablet (75 mcg total) by mouth once daily. 30 tablet 11    metformin (GLUCOPHAGE) 500 MG tablet Take 1 tablet (500 mg total) by mouth 2 (two) times daily with meals. 60 tablet 11    mometasone-formoterol (DULERA) 200-5 mcg/actuation inhaler Inhale 2 puffs into the lungs 2 (two) times daily. Twice a day 3 Inhaler 3    montelukast (SINGULAIR) 10 mg tablet Take 1 tablet (10 mg total) by mouth every evening. 90 tablet 3    multivitamin (ONE DAILY MULTIVITAMIN) per tablet Take 1 tablet by mouth once daily.      omega 3-dha-epa-fish oil (FISH OIL) 900-1,400 mg CpDR Take 1 capsule by mouth once daily.      potassium chloride (MICRO-K) 10 MEQ CpSR Take 2 capsules (20 mEq total) by mouth once daily. 60 capsule 11    sumatriptan (IMITREX) 100 MG tablet Take 1 po at onset of migraine. May repeat once in 2 hrs if needed. No more than 2 pills in 24 hours 9 tablet 2    triamterene-hydrochlorothiazide 75-50 mg (MAXZIDE) 75-50 mg per tablet Take 1 tablet by mouth once  "daily. 30 tablet 11    [DISCONTINUED] sulindac (CLINORIL) 150 MG tablet TAKE 1 TABLET(150 MG) BY MOUTH TWICE DAILY 60 tablet 0    [DISCONTINUED] predniSONE (DELTASONE) 20 MG tablet 3 for 3 days then 2 for 3 days then one for 3 days and repeat for breathing problems 36 tablet 0     No current facility-administered medications on file prior to visit.            ROS:  GENERAL: No fever, chills,  or significant weight changes.   CARDIOVASCULAR: Denies chest pain, PND, orthopnea or reduced exercise tolerance.  ABDOMEN: Appetite fine.   URINARY: No flank pain, dysuria or hematuria.      OBJECTIVE:     Vitals:    08/10/17 0858   BP: 116/74   Pulse: 70   Temp: 98 °F (36.7 °C)   TempSrc: Oral   Weight: 98 kg (216 lb 0.8 oz)   Height: 5' 2.5" (1.588 m)     Wt Readings from Last 3 Encounters:   08/10/17 98 kg (216 lb 0.8 oz)   08/01/17 99.8 kg (220 lb)   07/20/17 99.8 kg (220 lb)     APPEARANCE: Well nourished, well developed, in no acute distress.    HEAD: Normocephalic.  Atraumatic.  No sinus tenderness.  EYES:   Right eye: Pupil reactive.  Conjunctiva clear.    Left eye: Pupil reactive.  Conjunctiva clear.    Both fundi:  Grossly normal to nondilated exam. EOMI.    EARS: TM's intact. Light reflex normal. No retraction or perforation.    NOSE:  clear.  MOUTH & THROAT:  No pharyngeal erythema or exudate. No lesions.  NECK: Supple. No bruits.  No JVD.  No cervical lymphadenopathy.  No thyromegaly.    CHEST: Breath sounds clear bilaterally.  Normal respiratory effort  CARDIOVASCULAR: Normal rate.  Regular rhythm.  No murmurs.  No rub.  No gallops.  ABDOMEN: Bowel sounds normal.  Soft.  Minimal discomfort deep palpation right upper quadrant.  No rebound or guarding..  No organomegaly.  PERIPHERAL VASCULAR: No cyanosis.  No clubbing.  No edema.  NEUROLOGIC: No focal findings.  MENTAL STATUS: Alert.  Oriented x 3.   rectal exam shows mildly inflamed external hemorrhoids without current bleeding      Yodit was seen today for " constipation and abdominal pain.    Diagnoses and all orders for this visit:    Abdominal pain, RUQ  -     CLOSTRIDIUM DIFFICILE; Future  -     US Abdomen Complete; Future  -     Urinalysis  -     Urinalysis Microscopic    Gastroparesis    External hemorrhoid, bleeding    Alternating constipation and diarrhea  -     CLOSTRIDIUM DIFFICILE; Future    Other orders  -     hydrocortisone (ANUSOL-HC) 2.5 % rectal cream; Place rectally 2 (two) times daily.  -     omeprazole (PRILOSEC) 40 MG capsule; Take 1 capsule (40 mg total) by mouth once daily.     consider GI evaluation if symptoms not resolving.  Advised would need to see general surgeon if continued hemorrhoidal bleeding.  Otherwise follow-up symptoms not resolving with above.

## 2017-08-14 ENCOUNTER — TELEPHONE (OUTPATIENT)
Dept: FAMILY MEDICINE | Facility: CLINIC | Age: 57
End: 2017-08-14

## 2017-08-14 DIAGNOSIS — R82.90 URINE ABNORMALITY: Primary | ICD-10-CM

## 2017-08-14 NOTE — TELEPHONE ENCOUNTER
MD NISHI Bailey. Staff             Urine sample looks like a contaminated specimen with a lot of skin cells.  Please have her recheck urinalysis in 2 weeks with clean catch. My nurse will contact you to arrange.   Thanks,   Dr. Lewis     Results released on [facility]

## 2017-08-16 ENCOUNTER — TELEPHONE (OUTPATIENT)
Dept: RADIOLOGY | Facility: HOSPITAL | Age: 57
End: 2017-08-16

## 2017-08-17 ENCOUNTER — HOSPITAL ENCOUNTER (OUTPATIENT)
Dept: RADIOLOGY | Facility: HOSPITAL | Age: 57
Discharge: HOME | End: 2017-08-17
Attending: FAMILY MEDICINE
Payer: COMMERCIAL

## 2017-08-17 DIAGNOSIS — R10.11 ABDOMINAL PAIN, RUQ: ICD-10-CM

## 2017-08-17 PROCEDURE — 76700 US EXAM ABDOM COMPLETE: CPT | Mod: TC,PO

## 2017-08-17 PROCEDURE — 76700 US EXAM ABDOM COMPLETE: CPT | Mod: 26,,, | Performed by: RADIOLOGY

## 2017-08-22 ENCOUNTER — TELEPHONE (OUTPATIENT)
Dept: ALLERGY | Facility: CLINIC | Age: 57
End: 2017-08-22

## 2017-08-22 NOTE — TELEPHONE ENCOUNTER
Spoke to Jm at [facility], she states pt is going home from hospital today (she was there for diverticulitis). Pt scheduled to infuse hizentra tomorrow. Told Jm to tell pt continue as scheduled.         ----- Message from Ami Porras sent at 8/22/2017 10:44 AM CDT -----  Contact: [facility]  [place] - Jm  States that the patient is due for her infusion for tomorrow but she is in hospital at [place].  The pharmacy delivers medication to the patient and the patient wanted her to call because she gives the infusions to herself.  The patient is asking if she should give herself the infusion tomorrow or not.   Please call Jm, with [facility], at [phone].  Thank you

## 2017-08-25 ENCOUNTER — TELEPHONE (OUTPATIENT)
Dept: ALLERGY | Facility: CLINIC | Age: 57
End: 2017-08-25

## 2017-08-25 DIAGNOSIS — Z79.890 POSTMENOPAUSAL HRT (HORMONE REPLACEMENT THERAPY): ICD-10-CM

## 2017-08-25 RX ORDER — ESTROGENS, CONJUGATED 0.62 MG/1
TABLET, FILM COATED ORAL
Qty: 30 TABLET | Refills: 0 | Status: SHIPPED | OUTPATIENT
Start: 2017-08-25 | End: 2017-09-26

## 2017-08-28 ENCOUNTER — PATIENT OUTREACH (OUTPATIENT)
Dept: ADMINISTRATIVE | Facility: CLINIC | Age: 57
End: 2017-08-28

## 2017-08-28 RX ORDER — CIPROFLOXACIN 500 MG/1
500 TABLET ORAL
COMMUNITY
End: 2018-01-25

## 2017-08-28 RX ORDER — HYDROCORTISONE ACETATE PRAMOXINE HCL 1; 1 G/100G; G/100G
CREAM TOPICAL 3 TIMES DAILY
COMMUNITY
End: 2018-01-29

## 2017-08-28 RX ORDER — METRONIDAZOLE 500 MG/1
500 TABLET ORAL EVERY 8 HOURS
COMMUNITY
End: 2017-10-05

## 2017-08-28 NOTE — PATIENT INSTRUCTIONS
Discharge Instructions for Diverticulitis  You have been diagnosed with diverticulitis. This is a condition in which small pouches form in your colon (large intestine) and become inflamed or infected. Follow the guidelines below for home care.  As you recover  Tips for recovery include:  · Eat a low-fiber diet. Your healthcare provider may advise a liquid diet. This gives your bowel a chance to rest so that it can recover.  · Foods to include: flake cereal, mashed potatoes, pancakes, waffles, pasta, white bread, rice, applesauce, bananas, eggs, fish, poultry, tofu, and well-cooked vegetables  · Take your medicines as directed. Do not stop taking the medicines, even if you feel better.  · Monitor your temperature and report any rising temperature to your healthcare provider.  · Take antibiotics exactly as directed. Do not miss any and keep taking them even if you feel better.   · Drink 6 to 8 glasses of water every day, unless directed otherwise.  · Use a heating pad or hot water bottle to reduce abdominal cramping or pain.  Preventing diverticulitis in the future  Tips for prevention include:  · Eat a high-fiber diet. Fiber adds bulk to the stool so that it passes through the large intestine more easily.  · Keep drinking 6 to 8 glasses of water every day, unless directed otherwise.  · Begin an exercise program. Ask your healthcare provider how to get started. You can benefit from simple activities such as walking or gardening.  · Treat diarrhea with a bland diet. Start with liquids only, then slowly add fiber over time.  · Watch for changes in your bowel movements (constipation to diarrhea).  · Avoid constipation with fiber and add a stool softener if needed.   · Get plenty of rest and sleep.  Follow-up care  Make a follow-up appointment as directed by our staff.  When to call your healthcare provider  Call your healthcare provider immediately if you have any of the following:  · Fever of 100.4°F (38.0°C) or  higher, or as directed by your healthcare provider  · Chills  · Severe cramps in the belly, most commonly the lower left side  · Tenderness in the belly, most commonly the lower left side  · Nausea and vomiting  · Bleeding from your rectum   Date Last Reviewed: 7/1/2016  © 0999-5674 [employer]. [address] [place] [address], [place] [address]. All rights reserved. This information is not intended as a substitute for professional medical care. Always follow your healthcare professional's instructions.

## 2017-08-30 ENCOUNTER — OFFICE VISIT (OUTPATIENT)
Dept: FAMILY MEDICINE | Facility: CLINIC | Age: 57
End: 2017-08-30
Payer: COMMERCIAL

## 2017-08-30 VITALS
HEIGHT: 62 IN | TEMPERATURE: 98 F | WEIGHT: 210.31 LBS | BODY MASS INDEX: 38.7 KG/M2 | SYSTOLIC BLOOD PRESSURE: 123 MMHG | HEART RATE: 112 BPM | DIASTOLIC BLOOD PRESSURE: 74 MMHG

## 2017-08-30 DIAGNOSIS — K57.30 DIVERTICULOSIS OF LARGE INTESTINE WITHOUT HEMORRHAGE: ICD-10-CM

## 2017-08-30 DIAGNOSIS — K81.1 CHRONIC CHOLECYSTITIS WITHOUT CALCULUS: Primary | ICD-10-CM

## 2017-08-30 PROBLEM — Z01.810 PREOP CARDIOVASCULAR EXAM: Status: RESOLVED | Noted: 2017-07-06 | Resolved: 2017-08-30

## 2017-08-30 PROCEDURE — 99496 TRANSJ CARE MGMT HIGH F2F 7D: CPT | Mod: S$GLB,,, | Performed by: FAMILY MEDICINE

## 2017-08-30 PROCEDURE — 99999 PR PBB SHADOW E&M-EST. PATIENT-LVL IV: CPT | Mod: PBBFAC,,, | Performed by: FAMILY MEDICINE

## 2017-08-30 NOTE — PROGRESS NOTES
Patient presents follow-up hospitalization for abdominal pain right upper quadrant as below.  Initially felt that she might have diverticulitis however more likely acalculous cholecystitis.  She's pending follow-up with general surgery regarding possible cholecystectomy.  Symptoms have improved with low-fat diet.  Completed antibiotics.  She denies any chest pains or shortness of breath.  No current abdominal pain, though she states occasionally feels minimal discomfort right upper quadrant.  No fever chills.  She did have a colonoscopy 2 years ago as below    Impression:           - Non-thrombosed external hemorrhoids found on                         digital rectal exam.                        - The examined portion of the ileum was normal.                        - Moderate diverticulosis in the sigmoid colon and                         in the descending colon. There was no evidence of                         diverticular bleeding.                        - The examination was otherwise normal on direct                         and retroflexion views.                        - No specimens collected.  Recommendation:       - The patient will be observed post-procedure,                         until all discharge criteria are met.                        - Discharge patient to home (ambulatory).                        - Patient has a contact number available for                         emergencies. The signs and symptoms of potential                         delayed complications were discussed with the                         patient. Return to normal activities tomorrow.                         Written discharge instructions were provided to the                         patient.                        - Resume previous diet today.                        - Continue present medications.                        - Repeat colonoscopy in 10 years for screening                         purposes.                        - Return  to referring physician as previously                         scheduled.  Panda Bose MD  10/8/2015 12:34:52 PM  This report has been verified and signed electronically.  Number of Addenda: 0  Note Initiated On: 10/8/2015 11:32 AM  CC Letter to:       Miky Lewis MD (CC)  Scope Withdrawal Time: 0 hours 6 minutes 57 seconds     Greg Al NP - 08/25/2017 12:32 PM CDT  Formatting of this note may be different from the original.  [facility] DISCHARGE SUMMARY    Patient ID:  Yodit Canseco  [id number]  56 y.o.  1960    Admit Date:   8/19/2017 9:36 PM    Discharge Date:   Discharge Today: 8/25/2017    Admitting Physician:   Naveen Collazo MD     Discharge Physician:   GREG AL NP    Consults:  Consultants   Provider Service Role Specialty   Feliciano, Holly, NP -- Nurse Practitioner Nurse Practitioner   Gamaliel Alvarado MD Gastroenterology Consulting Physician Gastroenterology   Greg Al NP -- Nurse Practitioner Nurse Practitioner   Mohse Yeager MD Surgery Consulting Physician General Surgery       Reason for Admission/Admission Diagnoses:   Present on Admission:   Diverticulitis of colon   COPD (chronic obstructive pulmonary disease)   Hypothyroidism   Leukocytosis   Hyperlipidemia   Gastroparesis   Obesity, Class III, BMI 40-49.9 (morbid obesity)   Prediabetes    Discharge Diagnoses:   Active Hospital Problems   Diagnosis Date Noted    Diverticulitis of colon 08/20/2017    Gastroparesis 08/20/2017    Obesity, Class III, BMI 40-49.9 (morbid obesity) 08/20/2017    Prediabetes 08/20/2017    Leukocytosis 04/10/2015   Chronic    Hyperlipidemia 04/10/2015   Chronic    COPD (chronic obstructive pulmonary disease)   Chronic    Hypothyroidism   Chronic     Resolved Hospital Problems   Diagnosis Date Noted Date Resolved     History of Present Illness:   Yodit Canseco is a 56 y.o. female that has a past medical history of Asthma; Bronchitis, chronic; COPD (chronic  obstructive pulmonary disease); High cholesterol; Immune disorder; Sarcoidosis; and Thyroid disease. History obtained from the patient, her , and EMR.     Ms. Canseco presents to [facility] ED via EMS with c/o R sided abdominal pain with associated nausea and vomiting which has worsened over the last 4 hours. She states she it began after becoming overheated in a store that did not have air conditioning. Patient was at home resting when she began having the pain located to the right upper quadrant and the right flank area that is nonradiating. No reports of recent fever. She had a normal bowel movement within the past 24 hours. Surgical history includes hysterectomy. Patient states this pain occurred approximately 2 weeks ago at which time she had an abdominal ultrasound revealing a hypoechoic nodule or enlarged lymph node at the anterior margin of the pancreas measuring 2.7 x 1.7 x 2.0 cm, hepatomegaly, small hiatal hernia.     She feels that she has been having GI issues since her admission in January for facial cellulitis and has been treating her intermittent diarrhea/constipation with laxatives/immodium. She also states she can never give blood because she has an Iron issues and would like that evaluated.      In the emergency department WBC 26, C-diff negative. There is findings of possible mild acute diverticulitis on CT scan. No CT findings to account for right upper quadrant abdominal pain. We will begin Cipro and Flagyl in the emergency room and admit for abdominal pain.    Hospital Course and Treatment:   Admission Information   Date & Time Provider Department Dept. Phone   8/19/2017 Naveen Collazo MD [facility] [phone]       On the day of discharge the patient is afebrile, NAD, Hemodynamically stable. Family at the bedside. She reports that she is satisfied with the workup, treatment, and plan for this hospital admission and for anticipated outpatient  follow-up. Instructed patient to complete the course of oral antibiotics and to ensure she follows up with her PCP and GI and once cleared to Make appointment with Dr. Yeager.     Sepsis secondary to Diverticulitis: Leukocytosis (WBC 25.9), tachycardia, and Temp of 99.1 on admit. Blood Cultures and stool cultures were obtained with NG.  - Leukocytosis improved WBC 11.2K today. Afebrile on discharge.  - (8/20/17) CT abdomen with possible mild acute diverticulitis of the distal descending/proximal sigmoid colon.  - Will change IV Cipro and Flagyl to PO Cipro and Flagyl.   - There was question if the patient was allergic to Cipro or if the arm swelling was related to an Infiltrated IV. The IV site was changed and IV Cipro was continued with no issues. There are no reports of any previous allergy to Cipro the swelling was likely related to the infiltrated IV.   - GI following: Recommends outpatient colonoscopy in 4-6 weeks and outpatient follow up in 2 weeks. Discussed with GI on the Day of discharge will discharge patient on oral antibiotics and she is to follow up with GI outpatient.  - HIDA scan with [facility]: Markedly diminished gallbladder function, possibly secondary to chronic cholecystitis.  - Discussed case with General Surgery who saw the patient today and GS recommends to discharge patient on oral antibiotics and once the patient has followed up with GI outpatient and is cleared she is to call Dr. Yeager and schedule and appointment for an outpatient Lap Elenita.    Hypothyroidism-  - continue synthroid on discharge as prescribed per Primary.   COPD (chronic obstructive pulmonary disease)-   - Currently on Room air with adequate o2 sat >90%. NAD, VSS/ Continue with home medications on discharge as prescribed by primary.  Hyperlipidemia-   - Continue Statin as prescribed by primary.     I discussed with the patient and the family disease process and treatment.     I have personally seen and examined the patient,  Yoditkatlyn Canseco, in a face to face encounter on the date of discharge.     She is cleared for discharge with instructions to follow up as directed.   Total time in the care and discharge planning of this patient was greater than 30 minutes.    Past Medical History:  Past Medical History:   Diagnosis Date    Allergy     Angio-edema     Arthralgia     Asthma without status asthmaticus     Bronchitis     Diverticulosis of large intestine without hemorrhage 10/8/2015    Environmental allergies     Gastroparesis     Hand arthritis     Herpes simplex without mention of complication     oral    Hyperlipidemia     Hypothyroidism     LBP radiating to left leg     Leukocytosis, unspecified     Migraine headache     Obesity, Class III, BMI 40-49.9 (morbid obesity)     Periorbital cellulitis 12/31/2016    Prediabetes     Recurrent upper respiratory infection (URI)     S/P colonoscopy November 2010    Urticaria      Past Surgical History:   Procedure Laterality Date    ADENOIDECTOMY      COLONOSCOPY  2010    COLONOSCOPY N/A 10/8/2015    Procedure: COLONOSCOPY;  Surgeon: Panda Bose MD;  Location: [facility];  Service: Endoscopy;  Laterality: N/A;    Hand fracture surgery      HARDWARE REMOVAL      left hand 5th MC    HYSTERECTOMY      menorrhagia-Ovaries intact    TONSILLECTOMY      Urethral dilatation       Social History     Social History    Marital status:      Spouse name: N/A    Number of children: N/A    Years of education: N/A     Occupational History    Not on file.     Social History Main Topics    Smoking status: Never Smoker    Smokeless tobacco: Never Used    Alcohol use 0.0 oz/week      Comment: very rarely    Drug use: No    Sexual activity: Yes     Partners: Male     Other Topics Concern    Not on file     Social History Narrative    . Disabled teacher.     Family History   Problem Relation Age of Onset    Melanoma Mother     Basal cell carcinoma  Mother     Lung disease Mother      pulm htn    Cataracts Mother     Hypertension Mother     Lung cancer Father     Diabetes Father     Hypertension Father     Cancer Father     Diabetes Paternal Aunt     Colon cancer Paternal Aunt     Diabetes Paternal Aunt     Ovarian cancer Paternal Grandmother     Cancer Maternal Grandfather     Melanoma Maternal Aunt     Melanoma Maternal Uncle     Breast cancer Paternal Aunt     Lymphoma Paternal Aunt     Ulcerative colitis Son     Psoriasis Son     Psoriasis Son      Review of patient's allergies indicates:   Allergen Reactions    Sudafed [pseudoephedrine hcl] Other (See Comments)     Does not want to wake up .    Amoxicillin-pot clavulanate      Other reaction(s): Itching    Ephedrine      Other reaction(s): comatose    Hydrocodone Itching    Iodinated contrast- oral and iv dye     Iodine and iodide containing products Other (See Comments)    Pantoprazole      Other reaction(s): upset stomach/halitosis    Penicillins      Other reaction(s): does not work on pt symptoms    Percocet [oxycodone-acetaminophen] Itching    Barium iodide Rash     Current Outpatient Prescriptions on File Prior to Visit   Medication Sig Dispense Refill    albuterol (PROAIR HFA) 90 mcg/actuation inhaler Inhale 1 puff into the lungs 2 (two) times daily. 3 Inhaler 3    albuterol (PROVENTIL) 2.5 mg /3 mL (0.083 %) nebulizer solution Take 3 mLs (2.5 mg total) by nebulization every 4 (four) hours as needed for Wheezing or Shortness of Breath. Every 4-6 hours  each 3    aspirin (ECOTRIN) 81 MG EC tablet Take 81 mg by mouth once daily.      atorvastatin (LIPITOR) 40 MG tablet Take 1 tablet (40 mg total) by mouth once daily. 90 tablet 3    CALCIUM CARB/D3/MAGNESIUM/ZINC (CALCIUM CARB-D3-MAG CMB11-ZINC) [phone] mg-unit-mg-mg Tab Take 2 tablets by mouth every evening.      ciprofloxacin HCl (CIPRO) 500 MG tablet Take 500 mg by mouth every 12 (twelve) hours.       desonide (DESOWEN) 0.05 % cream Apply topically 2 (two) times daily. Apply to lids. 1 Tube 3    immun glob G,IgG,-pro-IgA 0-50 (HIZENTRA) 2 gram/10 mL (20 %) Soln Inject 18 g into the skin every 14 (fourteen) days. 90 mL 12    LACTOBAC NO.41/BIFIDOBACT NO.7 (PROBIOTIC-10 ORAL) Take by mouth once daily.      levothyroxine (SYNTHROID) 75 MCG tablet Take 1 tablet (75 mcg total) by mouth once daily. 30 tablet 11    metformin (GLUCOPHAGE) 500 MG tablet Take 1 tablet (500 mg total) by mouth 2 (two) times daily with meals. 60 tablet 11    metronidazole (FLAGYL) 500 MG tablet Take 500 mg by mouth every 8 (eight) hours.      mometasone-formoterol (DULERA) 200-5 mcg/actuation inhaler Inhale 2 puffs into the lungs 2 (two) times daily. Twice a day 3 Inhaler 3    montelukast (SINGULAIR) 10 mg tablet Take 1 tablet (10 mg total) by mouth every evening. 90 tablet 3    multivitamin (ONE DAILY MULTIVITAMIN) per tablet Take 1 tablet by mouth once daily.      omega 3-dha-epa-fish oil (FISH OIL) 900-1,400 mg CpDR Take 1 capsule by mouth once daily.      omeprazole (PRILOSEC) 40 MG capsule Take 1 capsule (40 mg total) by mouth once daily. 30 capsule 1    potassium chloride (MICRO-K) 10 MEQ CpSR Take 2 capsules (20 mEq total) by mouth once daily. 60 capsule 11    pramoxine-hydrocortisone (PROCTOCREAM-HC) 1-1 % rectal cream Place rectally 3 (three) times daily.      PREMARIN 0.625 mg tablet TAKE 1 TABLET BY MOUTH DAILY 30 tablet 0    sumatriptan (IMITREX) 100 MG tablet Take 1 po at onset of migraine. May repeat once in 2 hrs if needed. No more than 2 pills in 24 hours 9 tablet 2    triamterene-hydrochlorothiazide 75-50 mg (MAXZIDE) 75-50 mg per tablet Take 1 tablet by mouth once daily. 30 tablet 11     No current facility-administered medications on file prior to visit.            ROS:  GENERAL: No fever, chills,  or significant weight changes.   CARDIOVASCULAR: Denies chest pain, PND, orthopnea or reduced exercise  "tolerance.  ABDOMEN: Appetite fine. Denies diarrhea,  hematemesis or blood in stool.  URINARY: No flank pain, dysuria or hematuria.      OBJECTIVE:     Vitals:    08/30/17 1034   BP: 123/74   Pulse: (!) 112   Temp: 98.4 °F (36.9 °C)   Weight: 95.4 kg (210 lb 5.1 oz)   Height: 5' 2" (1.575 m)     Wt Readings from Last 3 Encounters:   08/30/17 95.4 kg (210 lb 5.1 oz)   08/10/17 98 kg (216 lb 0.8 oz)   08/01/17 99.8 kg (220 lb)     APPEARANCE: Well nourished, well developed, in no acute distress.    HEAD: Normocephalic.  Atraumatic.  No sinus tenderness.  EYES:   Right eye: Pupil reactive.  Conjunctiva clear.    Left eye: Pupil reactive.  Conjunctiva clear.    Both fundi:  Grossly normal to nondilated exam. EOMI.    EARS: TM's intact. Light reflex normal. No retraction or perforation.    NOSE:  clear.  MOUTH & THROAT:  No pharyngeal erythema or exudate. No lesions.  NECK: Supple. No bruits.  No JVD.  No cervical lymphadenopathy.  No thyromegaly.    CHEST: Breath sounds clear bilaterally.  Normal respiratory effort  CARDIOVASCULAR: Normal rate.  Regular rhythm.  No murmurs.  No rub.  No gallops.  ABDOMEN: Bowel sounds normal.  Soft.  No tenderness.  No organomegaly.  PERIPHERAL VASCULAR: No cyanosis.  No clubbing.  No edema.  NEUROLOGIC: No focal findings.  MENTAL STATUS: Alert.  Oriented x 3.    Yodit was seen today for transitional care.    Diagnoses and all orders for this visit:    Chronic cholecystitis without calculus    Diverticulosis of large intestine without hemorrhage      Agree with plans for cholecystectomy.  We'll send a note to Dr. Yeager.  She can follow up with GI as well though I'm not sure that she needs another colonoscopy at this point    Transitional Care Note    Family and/or Caretaker present at visit?  No.  Diagnostic tests reviewed/disposition: No diagnosic tests pending after this hospitalization.  Disease/illness education: yes  Home health/community services discussion/referrals: Patient " does not have home health established from hospital visit.  They do not need home health.  If needed, we will set up home health for the patient.   Establishment or re-establishment of referral orders for community resources: No other necessary community resources.   Discussion with other health care providers: Note to Dr. Yeager.

## 2017-09-02 ENCOUNTER — NURSE TRIAGE (OUTPATIENT)
Dept: ADMINISTRATIVE | Facility: CLINIC | Age: 57
End: 2017-09-02

## 2017-09-02 RX ORDER — FLUCONAZOLE 150 MG/1
150 TABLET ORAL DAILY
Qty: 2 TABLET | Refills: 0 | Status: SHIPPED | OUTPATIENT
Start: 2017-09-02 | End: 2017-09-03

## 2017-09-03 NOTE — TELEPHONE ENCOUNTER
"    Reason for Disposition   [1] Request for URGENT new prescription or refill of "essential" medication (i.e., likelihood of harm to patient if not taken) AND [2] triager unable to fill per unit policy    Answer Assessment - Initial Assessment Questions  1. SYMPTOMS: "Do you have any symptoms?"      Yeast infections. Took cipro for diverticulitis and now she is having the itching and discharge typical of a yeast infection. She is requesting diflucan.   2. SEVERITY: If symptoms are present, ask "Are they mild, moderate or severe?"      Moderate to severe    Protocols used: ST MEDICATION QUESTION CALL-A-AH    Patient recently finished course of Cipro. She states she is having symptoms of yeast infection and is requesting Diflucan to be called in to the [facility] pharmacy, [phone]. Paged Dr. Lewis. Dr. Lewis ordered Diflucan 150 mg PO x 1 dose today and 1 dose in 7 days, #2, 0 refills. Left message on Jigsaw  Patient notified and asked that the medication be called in to 24 hour [facility] on [address] [facility] in [place], request fulfilled. Called in the Rx to Se the pharmacist.   "

## 2017-09-13 RX ORDER — MONTELUKAST SODIUM 10 MG/1
TABLET ORAL
Qty: 30 TABLET | Refills: 0 | OUTPATIENT
Start: 2017-09-13

## 2017-09-15 ENCOUNTER — OFFICE VISIT (OUTPATIENT)
Dept: FAMILY MEDICINE | Facility: CLINIC | Age: 57
End: 2017-09-15
Payer: COMMERCIAL

## 2017-09-15 VITALS
WEIGHT: 212.19 LBS | SYSTOLIC BLOOD PRESSURE: 124 MMHG | BODY MASS INDEX: 39.05 KG/M2 | HEART RATE: 100 BPM | HEIGHT: 62 IN | DIASTOLIC BLOOD PRESSURE: 80 MMHG | TEMPERATURE: 98 F

## 2017-09-15 DIAGNOSIS — J40 BRONCHITIS: Primary | ICD-10-CM

## 2017-09-15 DIAGNOSIS — K59.00 CONSTIPATION, UNSPECIFIED CONSTIPATION TYPE: ICD-10-CM

## 2017-09-15 DIAGNOSIS — B37.9 YEAST INFECTION: ICD-10-CM

## 2017-09-15 PROCEDURE — 99999 PR PBB SHADOW E&M-EST. PATIENT-LVL V: CPT | Mod: PBBFAC,,, | Performed by: NURSE PRACTITIONER

## 2017-09-15 PROCEDURE — 3079F DIAST BP 80-89 MM HG: CPT | Mod: S$GLB,,, | Performed by: NURSE PRACTITIONER

## 2017-09-15 PROCEDURE — 3074F SYST BP LT 130 MM HG: CPT | Mod: S$GLB,,, | Performed by: NURSE PRACTITIONER

## 2017-09-15 PROCEDURE — 99213 OFFICE O/P EST LOW 20 MIN: CPT | Mod: S$GLB,,, | Performed by: NURSE PRACTITIONER

## 2017-09-15 PROCEDURE — 3008F BODY MASS INDEX DOCD: CPT | Mod: S$GLB,,, | Performed by: NURSE PRACTITIONER

## 2017-09-15 RX ORDER — FLUCONAZOLE 150 MG/1
150 TABLET ORAL DAILY
Qty: 1 TABLET | Refills: 0 | OUTPATIENT
Start: 2017-09-15 | End: 2017-09-16

## 2017-09-15 RX ORDER — FLUCONAZOLE 150 MG/1
150 TABLET ORAL DAILY
Qty: 1 TABLET | Refills: 0 | Status: SHIPPED | OUTPATIENT
Start: 2017-09-15 | End: 2017-09-16

## 2017-09-15 NOTE — PROGRESS NOTES
Subjective:       Patient ID: Yodit Canseco is a 56 y.o. female.    Chief Complaint: Sore Throat; Nasal Congestion; Chest Congestion; and Cough    Cough   This is a new problem. The current episode started 1 to 4 weeks ago. The problem has been unchanged. The problem occurs every few minutes. The cough is productive of sputum. Associated symptoms include rhinorrhea, a sore throat and wheezing (States at night). Pertinent negatives include no chest pain, chills, ear congestion, ear pain, fever, headaches, heartburn, hemoptysis, myalgias, nasal congestion, postnasal drip, rash, shortness of breath, sweats or weight loss. Nothing aggravates the symptoms. She has tried a beta-agonist inhaler (States suprax only abx that works; has had in the past without reaction per pt) for the symptoms. The treatment provided mild relief. Her past medical history is significant for bronchitis. There is no history of asthma, bronchiectasis, COPD, emphysema, environmental allergies or pneumonia.   Constipation   This is a new problem. The current episode started in the past 7 days (Since gallbladder removal on Tuesday; taking pain medication). The problem is unchanged. Stool frequency: No BM in 3 d. The patient is not on a high fiber diet. She does not exercise regularly. There has been adequate water intake. Pertinent negatives include no abdominal pain, anorexia, back pain, bloating, diarrhea, difficulty urinating, fecal incontinence, fever, flatus, hematochezia, hemorrhoids, melena, nausea, rectal pain, vomiting or weight loss. She has tried stool softeners for the symptoms. The treatment provided mild relief. Her past medical history is significant for abdominal surgery. There is no history of endocrine disease, inflammatory bowel disease, irritable bowel syndrome, metabolic disease, neurologic disease, neuromuscular disease, psychiatric history or radiation treatment.   Pt requests diflucan; states yeast infections with  abx.  Past Medical History:   Diagnosis Date    Allergy     Angio-edema     Arthralgia     Asthma without status asthmaticus     Bronchitis     Diverticulosis of large intestine without hemorrhage 10/8/2015    Environmental allergies     Gastroparesis     Hand arthritis     Herpes simplex without mention of complication     oral    Hyperlipidemia     Hypothyroidism     LBP radiating to left leg     Leukocytosis, unspecified     Migraine headache     Obesity, Class III, BMI 40-49.9 (morbid obesity)     Periorbital cellulitis 12/31/2016    Prediabetes     Recurrent upper respiratory infection (URI)     S/P colonoscopy November 2010    Urticaria      Social History     Social History    Marital status:      Spouse name: N/A    Number of children: N/A    Years of education: N/A     Occupational History         Social History Main Topics    Smoking status: Never Smoker    Smokeless tobacco: Never Used    Alcohol use 0.0 oz/week      Comment: very rarely    Drug use: No    Sexual activity: Yes     Partners: Male     Social History Narrative    . Disabled teacher.     Past Surgical History:   Procedure Laterality Date    ADENOIDECTOMY      CHOLECYSTECTOMY      COLONOSCOPY  2010    COLONOSCOPY N/A 10/8/2015    Procedure: COLONOSCOPY;  Surgeon: Panda Bose MD;  Location: [facility];  Service: Endoscopy;  Laterality: N/A;    Hand fracture surgery      HARDWARE REMOVAL      left hand 5th MC    HYSTERECTOMY      menorrhagia-Ovaries intact    TONSILLECTOMY      Urethral dilatation         Review of Systems   Constitutional: Negative.  Negative for chills, fever and weight loss.   HENT: Positive for rhinorrhea and sore throat. Negative for ear pain and postnasal drip.    Eyes: Negative.    Respiratory: Positive for cough and wheezing (States at night). Negative for hemoptysis and shortness of breath.    Cardiovascular: Negative.  Negative for chest pain.   Gastrointestinal:  Positive for constipation. Negative for abdominal pain, anorexia, bloating, diarrhea, flatus, heartburn, hematochezia, hemorrhoids, melena, nausea, rectal pain and vomiting.   Endocrine: Negative.    Genitourinary: Negative.  Negative for difficulty urinating.   Musculoskeletal: Negative.  Negative for back pain and myalgias.   Skin: Negative.  Negative for rash.   Allergic/Immunologic: Negative.  Negative for environmental allergies.   Neurological: Negative.  Negative for headaches.   Psychiatric/Behavioral: Negative.        Objective:      Physical Exam   Constitutional: She is oriented to person, place, and time. She appears well-developed and well-nourished.   HENT:   Head: Normocephalic.   Right Ear: External ear normal.   Left Ear: External ear normal.   Nose: Nose normal.   Mouth/Throat: Oropharynx is clear and moist.   Eyes: Conjunctivae are normal. Pupils are equal, round, and reactive to light.   Neck: Normal range of motion. Neck supple.   Cardiovascular: Normal rate, regular rhythm and normal heart sounds.    Pulmonary/Chest: Effort normal and breath sounds normal.   Abdominal: Soft. Bowel sounds are normal.   Musculoskeletal: Normal range of motion.   Neurological: She is alert and oriented to person, place, and time.   Skin: Skin is warm and dry. Capillary refill takes 2 to 3 seconds.   Psychiatric: She has a normal mood and affect. Her behavior is normal. Judgment and thought content normal.   Nursing note and vitals reviewed.      Assessment:       1. Bronchitis    2. Constipation, unspecified constipation type    3. Yeast infection        Plan:           Yodit was seen today for sore throat, nasal congestion, chest congestion and cough.    Diagnoses and all orders for this visit:    Bronchitis  -     X-Ray Chest PA And Lateral; Future  -     cefixime (SUPRAX) 400 mg Tab; Take 1 tablet (400 mg total) by mouth once daily.  CT chest if CXR unremarkable  Report to ER immediately if symptoms  worsen  Inhalers as prescribed    Constipation, unspecified constipation type  -     X-Ray Abdomen Flat And Erect; Future  Report to ER immediately if symptoms worsen  Miralax & stool softener together or stool prep x 1 dose only as directed  Report to ER immediately if symptoms worsen or no BM by tomorrow    Yeast infection  -     fluconazole (DIFLUCAN) 150 MG Tab; Take 1 tablet (150 mg total) by mouth once daily.

## 2017-09-19 ENCOUNTER — TELEPHONE (OUTPATIENT)
Dept: FAMILY MEDICINE | Facility: CLINIC | Age: 57
End: 2017-09-19

## 2017-09-26 ENCOUNTER — HOSPITAL ENCOUNTER (OUTPATIENT)
Dept: RADIOLOGY | Facility: HOSPITAL | Age: 57
Discharge: HOME | End: 2017-09-26
Attending: OBSTETRICS & GYNECOLOGY
Payer: COMMERCIAL

## 2017-09-26 ENCOUNTER — OFFICE VISIT (OUTPATIENT)
Dept: OBSTETRICS AND GYNECOLOGY | Facility: CLINIC | Age: 57
End: 2017-09-26
Payer: COMMERCIAL

## 2017-09-26 VITALS
DIASTOLIC BLOOD PRESSURE: 82 MMHG | WEIGHT: 211 LBS | BODY MASS INDEX: 37.39 KG/M2 | SYSTOLIC BLOOD PRESSURE: 128 MMHG | WEIGHT: 210 LBS | BODY MASS INDEX: 37.21 KG/M2 | HEIGHT: 63 IN | HEIGHT: 63 IN

## 2017-09-26 DIAGNOSIS — Z12.31 VISIT FOR SCREENING MAMMOGRAM: ICD-10-CM

## 2017-09-26 DIAGNOSIS — Z01.419 ROUTINE GYNECOLOGICAL EXAMINATION: Primary | ICD-10-CM

## 2017-09-26 DIAGNOSIS — Z79.890 POSTMENOPAUSAL HRT (HORMONE REPLACEMENT THERAPY): ICD-10-CM

## 2017-09-26 PROCEDURE — 77063 BREAST TOMOSYNTHESIS BI: CPT | Mod: 26,,, | Performed by: RADIOLOGY

## 2017-09-26 PROCEDURE — 77067 SCR MAMMO BI INCL CAD: CPT | Mod: 26,,, | Performed by: RADIOLOGY

## 2017-09-26 PROCEDURE — 77067 SCR MAMMO BI INCL CAD: CPT | Mod: TC

## 2017-09-26 PROCEDURE — 99396 PREV VISIT EST AGE 40-64: CPT | Mod: S$GLB,,, | Performed by: OBSTETRICS & GYNECOLOGY

## 2017-09-26 PROCEDURE — 99999 PR PBB SHADOW E&M-EST. PATIENT-LVL III: CPT | Mod: PBBFAC,,, | Performed by: OBSTETRICS & GYNECOLOGY

## 2017-09-26 RX ORDER — ESTRADIOL 10 UG/1
1 INSERT VAGINAL
Qty: 8 TABLET | Refills: 11 | Status: SHIPPED | OUTPATIENT
Start: 2017-09-28 | End: 2018-01-25

## 2017-09-26 RX ORDER — FLUCONAZOLE 200 MG/1
100 TABLET ORAL DAILY
COMMUNITY
End: 2017-10-05 | Stop reason: SDUPTHER

## 2017-09-26 RX ORDER — ESTRADIOL 1 MG/1
1 TABLET ORAL DAILY
Qty: 30 TABLET | Refills: 11 | Status: SHIPPED | OUTPATIENT
Start: 2017-09-26 | End: 2018-01-25

## 2017-09-26 NOTE — PROGRESS NOTES
Chief Complaint   Patient presents with    Well Woman    Medication Management     HRT change med dure to cost     History of Present Illness: Yodit Canseco is a 57 y.o. female that presents today 9/26/2017 for well gyn visit.    Past Medical History:   Diagnosis Date    Allergy     Angio-edema     Arthralgia     Asthma without status asthmaticus     Bronchitis     Diverticulosis of large intestine without hemorrhage 10/8/2015    Environmental allergies     Gastroparesis     Hand arthritis     Herpes simplex without mention of complication     oral    Hyperlipidemia     Hypothyroidism     LBP radiating to left leg     Leukocytosis, unspecified     Migraine headache     Obesity, Class III, BMI 40-49.9 (morbid obesity)     Periorbital cellulitis 12/31/2016    Prediabetes     Recurrent upper respiratory infection (URI)     S/P colonoscopy November 2010    Urticaria        Past Surgical History:   Procedure Laterality Date    ADENOIDECTOMY      CHOLECYSTECTOMY      COLONOSCOPY  2010    COLONOSCOPY N/A 10/8/2015    Procedure: COLONOSCOPY;  Surgeon: Panda Bose MD;  Location: [facility];  Service: Endoscopy;  Laterality: N/A;    Hand fracture surgery      HARDWARE REMOVAL      left hand 5th MC    HYSTERECTOMY      menorrhagia-Ovaries intact    TONSILLECTOMY      Urethral dilatation         Current Outpatient Prescriptions   Medication Sig Dispense Refill    albuterol (PROAIR HFA) 90 mcg/actuation inhaler Inhale 1 puff into the lungs 2 (two) times daily. 3 Inhaler 3    albuterol (PROVENTIL) 2.5 mg /3 mL (0.083 %) nebulizer solution Take 3 mLs (2.5 mg total) by nebulization every 4 (four) hours as needed for Wheezing or Shortness of Breath. Every 4-6 hours  each 3    aspirin (ECOTRIN) 81 MG EC tablet Take 81 mg by mouth once daily.      atorvastatin (LIPITOR) 40 MG tablet Take 1 tablet (40 mg total) by mouth once daily. 90 tablet 3    CALCIUM CARB/D3/MAGNESIUM/ZINC (CALCIUM  CARB-D3-MAG CMB11-ZINC) [phone] mg-unit-mg-mg Tab Take 2 tablets by mouth every evening.      desonide (DESOWEN) 0.05 % cream Apply topically 2 (two) times daily. Apply to lids. 1 Tube 3    fluconazole (DIFLUCAN) 200 MG Tab Take 100 mg by mouth once daily.      immun glob G,IgG,-pro-IgA 0-50 (HIZENTRA) 2 gram/10 mL (20 %) Soln Inject 18 g into the skin every 14 (fourteen) days. 90 mL 12    LACTOBAC NO.41/BIFIDOBACT NO.7 (PROBIOTIC-10 ORAL) Take by mouth once daily.      levothyroxine (SYNTHROID) 75 MCG tablet Take 1 tablet (75 mcg total) by mouth once daily. 30 tablet 11    metformin (GLUCOPHAGE) 500 MG tablet Take 1 tablet (500 mg total) by mouth 2 (two) times daily with meals. 60 tablet 11    mometasone-formoterol (DULERA) 200-5 mcg/actuation inhaler Inhale 2 puffs into the lungs 2 (two) times daily. Twice a day 3 Inhaler 3    montelukast (SINGULAIR) 10 mg tablet Take 1 tablet (10 mg total) by mouth every evening. 90 tablet 3    multivitamin (ONE DAILY MULTIVITAMIN) per tablet Take 1 tablet by mouth once daily.      omega 3-dha-epa-fish oil (FISH OIL) 900-1,400 mg CpDR Take 1 capsule by mouth once daily.      potassium chloride (MICRO-K) 10 MEQ CpSR Take 2 capsules (20 mEq total) by mouth once daily. 60 capsule 11    sumatriptan (IMITREX) 100 MG tablet Take 1 po at onset of migraine. May repeat once in 2 hrs if needed. No more than 2 pills in 24 hours 9 tablet 2    triamterene-hydrochlorothiazide 75-50 mg (MAXZIDE) 75-50 mg per tablet Take 1 tablet by mouth once daily. 30 tablet 11    ciprofloxacin HCl (CIPRO) 500 MG tablet Take 500 mg by mouth every 12 (twelve) hours.      estradiol (ESTRACE) 1 MG tablet Take 1 tablet (1 mg total) by mouth once daily. 30 tablet 11    [START ON 9/28/2017] estradiol (VAGIFEM) 10 mcg Tab Place 1 tablet (10 mcg total) vaginally twice a week. 8 tablet 11    metronidazole (FLAGYL) 500 MG tablet Take 500 mg by mouth every 8 (eight) hours.      omeprazole  "(PRILOSEC) 40 MG capsule Take 1 capsule (40 mg total) by mouth once daily. 30 capsule 1    pramoxine-hydrocortisone (PROCTOCREAM-HC) 1-1 % rectal cream Place rectally 3 (three) times daily.       No current facility-administered medications for this visit.        Review of patient's allergies indicates:   Allergen Reactions    Bromelains Hives     " causes mouth to bleed"    Sudafed [pseudoephedrine hcl] Other (See Comments)     Does not want to wake up .    Amoxicillin-pot clavulanate      Other reaction(s): Itching    Ephedrine      Other reaction(s): comatose    Hydrocodone Itching    Iodinated contrast- oral and iv dye     Iodine and iodide containing products Other (See Comments)    Melon     Pantoprazole      Other reaction(s): upset stomach/halitosis    Penicillins      Other reaction(s): does not work on pt symptoms    Percocet [oxycodone-acetaminophen] Itching    Barium iodide Rash       Family History   Problem Relation Age of Onset    Melanoma Mother     Basal cell carcinoma Mother     Lung disease Mother      pulm htn    Cataracts Mother     Hypertension Mother     Lung cancer Father     Diabetes Father     Hypertension Father     Cancer Father     Diabetes Paternal Aunt     Colon cancer Paternal Aunt     Diabetes Paternal Aunt     Ovarian cancer Paternal Grandmother     Cancer Maternal Grandfather     Melanoma Maternal Aunt     Melanoma Maternal Uncle     Breast cancer Paternal Aunt     Lymphoma Paternal Aunt     Ulcerative colitis Son     Psoriasis Son     Psoriasis Son     Breast cancer Cousin        Social History     Social History    Marital status:      Spouse name: N/A    Number of children: N/A    Years of education: N/A     Social History Main Topics    Smoking status: Never Smoker    Smokeless tobacco: Never Used    Alcohol use 0.0 oz/week      Comment: very rarely    Drug use: No    Sexual activity: Yes     Partners: Male     Other Topics " "Concern    None     Social History Narrative    . Disabled teacher.       OB History    Para Term  AB Living   5 2 2   3     SAB TAB Ectopic Multiple Live Births   3              # Outcome Date GA Lbr Pradip/2nd Weight Sex Delivery Anes PTL Lv   5 SAB            4 SAB            3 SAB            2 Term            1 Term                   Review of Symptoms:  GENERAL: Denies weight gain or weight loss. Feeling well overall.   SKIN: Denies rash or lesions.   HEAD: Denies head injury or headache.   NODES: Denies enlarged lymph nodes.   CHEST: Denies chest pain or shortness of breath.   CARDIOVASCULAR: Denies palpitations or left sided chest pain.   ABDOMEN: No abdominal pain, constipation, diarrhea, nausea, vomiting or rectal bleeding.   URINARY: No frequency, dysuria, hematuria, or burning on urination.  HEMATOLOGIC: No easy bruisability or excessive bleeding.   MUSCULOSKELETAL: Denies joint pain or swelling.     /82   Ht 5' 2.5" (1.588 m)   Wt 95.7 kg (210 lb 15.7 oz)   Physical Exam:  APPEARANCE: Well nourished, well developed, in no acute distress.  SKIN: Normal skin turgor, no lesions.  NECK: Neck symmetric without masses   RESPIRATORY: Normal respiratory effort with no retractions or use of accessory muscles  CARDIOVASCULAR: Peripheral vascular system with no swelling no varicosities and palpation of pulses normal  LYMPHATIC: No enlargements of the lymph nodes noted in the neck, axillae, or groin  ABDOMEN: Soft. No tenderness or masses. No hepatosplenomegaly. No hernias.  BREASTS: Symmetrical, no skin changes or visible lesions. No palpable masses, nipple discharge or adenopathy bilaterally.    EXTREMITIES: No clubbing cyanosis or edema.    ASSESSMENT/PLAN:  Routine gynecological examination    Visit for screening mammogram  -     Cancel: Mammo Digital Screening Bilat With CAD; Future; Expected date: 2017    Postmenopausal HRT (hormone replacement therapy)  -     estradiol " (ESTRACE) 1 MG tablet; Take 1 tablet (1 mg total) by mouth once daily.  Dispense: 30 tablet; Refill: 11  -     estradiol (VAGIFEM) 10 mcg Tab; Place 1 tablet (10 mcg total) vaginally twice a week.  Dispense: 8 tablet; Refill: 11        Pelvic next year  Plans colon 11/7/17  mammo today  No pap needed    Patient was counseled today on Pap guidelines, recommendation for yearly exams, mammograms every other year after the age of 40 and annually after the age of 50, Colonoscopy after the age of 50, Dexa Bone Scan and calcium and vitamin D supplementation in menopause and to see her PCP for other health maintenance.   FOLLOW-UP:prn

## 2017-10-05 ENCOUNTER — OFFICE VISIT (OUTPATIENT)
Dept: PULMONOLOGY | Facility: CLINIC | Age: 57
End: 2017-10-05
Payer: COMMERCIAL

## 2017-10-05 VITALS
WEIGHT: 215.38 LBS | DIASTOLIC BLOOD PRESSURE: 72 MMHG | OXYGEN SATURATION: 98 % | BODY MASS INDEX: 38.16 KG/M2 | HEART RATE: 94 BPM | SYSTOLIC BLOOD PRESSURE: 166 MMHG | HEIGHT: 63 IN

## 2017-10-05 DIAGNOSIS — B37.9 YEAST INFECTION: Primary | ICD-10-CM

## 2017-10-05 DIAGNOSIS — J45.20 MILD INTERMITTENT ASTHMA WITHOUT COMPLICATION: ICD-10-CM

## 2017-10-05 PROCEDURE — 99213 OFFICE O/P EST LOW 20 MIN: CPT | Mod: S$GLB,,, | Performed by: INTERNAL MEDICINE

## 2017-10-05 PROCEDURE — 99999 PR PBB SHADOW E&M-EST. PATIENT-LVL IV: CPT | Mod: PBBFAC,,, | Performed by: INTERNAL MEDICINE

## 2017-10-05 RX ORDER — CEFIXIME 400 MG/1
1 CAPSULE ORAL DAILY
Qty: 10 CAPSULE | Refills: 2 | Status: SHIPPED | OUTPATIENT
Start: 2017-10-05 | End: 2018-04-19

## 2017-10-05 RX ORDER — CEFIXIME 400 MG/1
CAPSULE ORAL
Refills: 5 | COMMUNITY
Start: 2017-09-25 | End: 2017-10-05 | Stop reason: SDUPTHER

## 2017-10-05 RX ORDER — ALBUTEROL SULFATE 90 UG/1
1 AEROSOL, METERED RESPIRATORY (INHALATION) EVERY 4 HOURS PRN
Qty: 3 INHALER | Refills: 3 | Status: SHIPPED | OUTPATIENT
Start: 2017-10-05 | End: 2018-11-06 | Stop reason: SDUPTHER

## 2017-10-05 RX ORDER — LEVOFLOXACIN 500 MG/1
500 TABLET, FILM COATED ORAL DAILY
Qty: 7 TABLET | Refills: 1 | Status: SHIPPED | OUTPATIENT
Start: 2017-10-05 | End: 2018-01-25

## 2017-10-05 RX ORDER — FLUCONAZOLE 200 MG/1
200 TABLET ORAL DAILY
Qty: 7 TABLET | Refills: 1 | Status: SHIPPED | OUTPATIENT
Start: 2017-10-05 | End: 2018-04-19

## 2017-10-05 RX ORDER — MONTELUKAST SODIUM 10 MG/1
10 TABLET ORAL NIGHTLY
Qty: 90 TABLET | Refills: 3 | Status: SHIPPED | OUTPATIENT
Start: 2017-10-05 | End: 2018-05-22

## 2017-10-05 NOTE — PROGRESS NOTES
10/5/2017    Yodit Canseco  Office Note    Chief Complaint   Patient presents with    Follow-up     6 month    Asthma   oct 5, 2017 - had marce in April, skipped couple doses hirzentra, had mild bronchitis once, otherwise doing very well.  No prednisone.  No side effects and covered. Ppt flu sense for 2 days        Jan 24, on  hirzentra q o week since sept and asthma more stable, no hosp, no prednisone, no noct asthma/ no rescue therapy- control not this good for years.        Sept 27,  HPI:has had lung problems since birth, no nocturnal arousals, uses rescue 2/d, breathing controlled satisfactory except with irritants/infection - strong abx needed to clear.  Prednisone 2/yr, last hosp 18 months.  No ventilator.        Had exacerbation recent due uri from .  Has had shingles vaccine past.      The chief compliant  problem is stable   PFSH:  Past Medical History:   Diagnosis Date    Allergy     Angio-edema     Arthralgia     Asthma without status asthmaticus     Bronchitis     Diverticulosis of large intestine without hemorrhage 10/8/2015    Environmental allergies     Gastroparesis     Hand arthritis     Herpes simplex without mention of complication     oral    Hyperlipidemia     Hypothyroidism     LBP radiating to left leg     Leukocytosis, unspecified     Migraine headache     Obesity, Class III, BMI 40-49.9 (morbid obesity)     Periorbital cellulitis 12/31/2016    Prediabetes     Recurrent upper respiratory infection (URI)     S/P colonoscopy November 2010    Urticaria          Past Surgical History:   Procedure Laterality Date    ADENOIDECTOMY      CHOLECYSTECTOMY      COLONOSCOPY  2010    COLONOSCOPY N/A 10/8/2015    Procedure: COLONOSCOPY;  Surgeon: Panda Boes MD;  Location: [facility];  Service: Endoscopy;  Laterality: N/A;    Hand fracture surgery      HARDWARE REMOVAL      left hand 5th MC    HYSTERECTOMY      menorrhagia-Ovaries intact    TONSILLECTOMY       Urethral dilatation       Social History   Substance Use Topics    Smoking status: Never Smoker    Smokeless tobacco: Never Used    Alcohol use 0.0 oz/week      Comment: very rarely     Family History   Problem Relation Age of Onset    Melanoma Mother     Basal cell carcinoma Mother     Lung disease Mother      pulm htn    Cataracts Mother     Hypertension Mother     Lung cancer Father     Diabetes Father     Hypertension Father     Cancer Father     Diabetes Paternal Aunt     Colon cancer Paternal Aunt     Diabetes Paternal Aunt     Ovarian cancer Paternal Grandmother     Cancer Maternal Grandfather     Melanoma Maternal Aunt     Melanoma Maternal Uncle     Breast cancer Paternal Aunt     Lymphoma Paternal Aunt     Ulcerative colitis Son     Psoriasis Son     Psoriasis Son     Breast cancer Cousin      Review of patient's allergies indicates:   Allergen Reactions    Sudafed [pseudoephedrine hcl] Other (See Comments)     Does not want to wake up .    Amoxicillin-pot clavulanate      Other reaction(s): Itching    Ephedrine      Other reaction(s): comatose    Hydrocodone Itching    Iodinated contrast media - iv dye     Iodine and iodide containing products Other (See Comments)    Pantoprazole      Other reaction(s): upset stomach/halitosis    Penicillins      Other reaction(s): does not work on pt symptoms    Percocet [oxycodone-acetaminophen] Itching    Barium iodide Rash       Performance Status:The patient's activity level is functions out of house.  No irratent exposure. Sedatary.  To start threadmil    Review of Systems:  a review of eleven systems covering constitutional, Eye, HEENT, Psych, Respiratory, Cardiac, GI, , Musculoskeletal, Endocrine, Dermatologic was negative except for pertinent findings as listed ABOVE and below: all good except for sinus into lungs and profound irratent sensitive. on thryoid     Exam:Comprehensive exam done.   BP (!) 166/72 (BP Location: Right  "arm, Patient Position: Sitting)   Pulse 94   Ht 5' 2.5" (1.588 m)   Wt 97.7 kg (215 lb 6.2 oz)   SpO2 98%   BMI 38.77 kg/m²   Exam included Vitals as listed, and patient's appearance and affect and alertness and mood, oral exam for yeast and hygiene and pharynx lesions and Mallapatti (M) score, neck with inspection for jvd and masses and thyroid abnormalities and lymph nodes (supraclavicular and infraclavicular nodes and axillary also examined and noted if abn), chest exam included symmetry and effort and fremitus and percussion and auscultation, cardiac exam included rhythm and gallops and murmur and rubs and jvd and edema, abdominal exam for mass and hepatosplenomegaly and tenderness and hernias and bowel sounds, Musculoskeletal exam with muscle tone and posture and mobility/gait and  strength, and skin for rashes and cyanosis and pallor and turgor, extremity for clubbing.  Findings were normal except for pertinent findings listed below:  M4, good bs and no edema.bmi 38.77    Radiographs reviewed: view by direct vision March 2015 cxr good,    Labs reviewed from last 6 months on EPIC result review   eso up     Results for SILVIA MARADIAGA (MRN [id number]) as of 1/24/2017 10:33   Ref. Range 6/14/2016 12:00 9/13/2016 10:24 9/30/2016 10:17   Diphtheria Toxoid Ab Latest Ref Range: >0.099 IU/mL >3.000  2.566   Tetanus Ab Latest Ref Range: >0.150 IU/mL 6.373  4.029   S.pneumoniae Type 1 Latest Units: mcg/mL 1.7  2.0   S.pneumoniae Type 3 Latest Units: mcg/mL 0.7  1.0   Strep pneumo Type 4 Latest Units: mcg/mL 0.6  0.5   S.pneumoniae Type 5 Latest Units: mcg/mL 2.0  2.4   S.pneumoniae Type 6B Latest Units: mcg/mL 10.9  7.7   S.pneumoniae Type 7F Latest Units: mcg/mL 2.0  1.8   S.pneumoniae Type 8 Latest Units: mcg/mL 1.6  1.6   S.pneumoniae Type 9N Latest Units: mcg/mL 0.3  0.7   S.pneumoniae Type 9V Abs Latest Units: mcg/mL 0.5  0.8   S.pneumoniae Type 12F Latest Units: mcg/mL <0.3  0.3   Strep pneumo Type 14 " Latest Units: mcg/mL 1.9  3.1   S.pneumoniae Type 18C Latest Units: mcg/mL 5.5  4.0   S.pneumoniae Type 19F Latest Units: mcg/mL 0.4  1.5   S.pneumoniae Type 23F Latest Units: mcg/mL 4.1  2.8   Results for YODIT MARADIAGA (MRN [id number]) as of 1/24/2017 10:33   Ref. Range 2/12/2015 09:00 6/6/2016 14:30 6/14/2016 12:00 9/30/2016 10:17   IgG - Serum Latest Ref Range: 650 - 1600 mg/dL 748  700 962   IgM Latest Ref Range: 50 - 300 mg/dL 120  124    IgA Latest Ref Range: 40 - 350 mg/dL 188  170    IgE Latest Ref Range: 0 - 100 IU/mL   <35    IgG 1 Latest Ref Range: 490 - 1140 mg/dL   516 656   IgG 2 Latest Ref Range: 150 - 640 mg/dL   153 249   IgG 3 Latest Ref Range: 11 - 85 mg/dL   11 15   IgG 4 Latest Ref Range: 3 - 201 mg/dL   20 20   Results for YODIT MARADIAGA (MRN [id number]) as of 1/24/2017 10:33   Ref. Range 9/30/2016 10:17   WBC Latest Ref Range: 3.90 - 12.70 K/uL 19.58 (H)   RBC Latest Ref Range: 4.00 - 5.40 M/uL 5.12   Hemoglobin Latest Ref Range: 12.0 - 16.0 g/dL 15.5   Hematocrit Latest Ref Range: 37.0 - 48.5 % 44.8   MCV Latest Ref Range: 82 - 98 fL 88   MCH Latest Ref Range: 27.0 - 31.0 pg 30.3   MCHC Latest Ref Range: 32.0 - 36.0 % 34.6   RDW Latest Ref Range: 11.5 - 14.5 % 14.2   Platelets Latest Ref Range: 150 - 350 K/uL 229   MPV Latest Ref Range: 9.2 - 12.9 fL 11.7   Gran% Latest Ref Range: 38.0 - 73.0 % 59.0   Lymph% Latest Ref Range: 18.0 - 48.0 % 31.0   Lymph # Latest Ref Range: 1.0 - 4.8 K/uL CANCELED   Mono% Latest Ref Range: 4.0 - 15.0 % 4.0   Mono # Latest Ref Range: 0.3 - 1.0 K/uL CANCELED   Eosinophil% Latest Ref Range: 0.0 - 8.0 % 6.0   Eos # Latest Ref Range: 0.0 - 0.5 K/uL CANCELED   Basophil% Latest Ref Range: 0.0 - 1.9 % 0.0   Baso # Latest Ref Range: 0.00 - 0.20 K/uL CANCELED       PFT will be done and results to be reviewed     Plan:  Clinical impression is resonably certain and repeated evaluation prn +/- follow up will be needed as below.    Yodit was seen today for follow-up and  asthma.    Diagnoses and all orders for this visit:    Yeast infection  -     fluconazole (DIFLUCAN) 200 MG Tab; Take 1 tablet (200 mg total) by mouth once daily.    Mild intermittent asthma without complication  -     albuterol (PROAIR HFA) 90 mcg/actuation inhaler; Inhale 1 puff into the lungs every 4 (four) hours as needed for Wheezing or Shortness of Breath.  -     mometasone-formoterol (DULERA) 200-5 mcg/actuation inhaler; Inhale 2 puffs into the lungs 2 (two) times daily. Twice a day  -     montelukast (SINGULAIR) 10 mg tablet; Take 1 tablet (10 mg total) by mouth every evening.  -     SUPRAX 400 mg Cap; Take 1 capsule by mouth once daily.  -     levoFLOXacin (LEVAQUIN) 500 MG tablet; Take 1 tablet (500 mg total) by mouth once daily.        Return in about 1 year (around 10/5/2018), or if symptoms worsen or fail to improve.    Discussed with patient above for education the following:      Diflucan if yeast.    suprax / levaquin if infection breaks through.    If no problems for a yr or so, might taper inhaler?

## 2017-10-05 NOTE — PATIENT INSTRUCTIONS
Diflucan if yeast.    suprax / levaquin if infection breaks through.    If no problems for a yr or so, might taper inhaler?

## 2018-01-08 RX ORDER — POTASSIUM CHLORIDE 750 MG/1
CAPSULE, EXTENDED RELEASE ORAL
Qty: 60 CAPSULE | Refills: 3 | Status: SHIPPED | OUTPATIENT
Start: 2018-01-08 | End: 2018-05-22

## 2018-01-25 ENCOUNTER — OFFICE VISIT (OUTPATIENT)
Dept: FAMILY MEDICINE | Facility: CLINIC | Age: 58
End: 2018-01-25
Payer: COMMERCIAL

## 2018-01-25 VITALS
WEIGHT: 224.88 LBS | HEART RATE: 113 BPM | OXYGEN SATURATION: 96 % | HEIGHT: 62 IN | TEMPERATURE: 99 F | SYSTOLIC BLOOD PRESSURE: 137 MMHG | DIASTOLIC BLOOD PRESSURE: 80 MMHG | BODY MASS INDEX: 41.38 KG/M2

## 2018-01-25 DIAGNOSIS — R05.9 COUGH: ICD-10-CM

## 2018-01-25 DIAGNOSIS — J32.9 SINUSITIS, UNSPECIFIED CHRONICITY, UNSPECIFIED LOCATION: Primary | ICD-10-CM

## 2018-01-25 LAB
FLUAV AG SPEC QL IA: NEGATIVE
FLUBV AG SPEC QL IA: NEGATIVE
SPECIMEN SOURCE: NORMAL

## 2018-01-25 PROCEDURE — 87400 INFLUENZA A/B EACH AG IA: CPT | Mod: 59,PO

## 2018-01-25 PROCEDURE — 99999 PR PBB SHADOW E&M-EST. PATIENT-LVL V: CPT | Mod: PBBFAC,,, | Performed by: NURSE PRACTITIONER

## 2018-01-25 PROCEDURE — 99213 OFFICE O/P EST LOW 20 MIN: CPT | Mod: S$GLB,,, | Performed by: NURSE PRACTITIONER

## 2018-01-25 RX ORDER — CLINDAMYCIN HYDROCHLORIDE 300 MG/1
300 CAPSULE ORAL EVERY 8 HOURS
Qty: 21 CAPSULE | Refills: 0 | Status: SHIPPED | OUTPATIENT
Start: 2018-01-25 | End: 2018-02-01

## 2018-01-25 RX ORDER — TRIAMCINOLONE ACETONIDE 0.25 MG/G
CREAM TOPICAL 2 TIMES DAILY
Qty: 15 G | Refills: 0 | Status: SHIPPED | OUTPATIENT
Start: 2018-01-25 | End: 2018-05-22

## 2018-01-25 RX ORDER — BENZONATATE 200 MG/1
200 CAPSULE ORAL 3 TIMES DAILY PRN
Qty: 30 CAPSULE | Refills: 0 | Status: SHIPPED | OUTPATIENT
Start: 2018-01-25 | End: 2018-02-04

## 2018-01-25 NOTE — PATIENT INSTRUCTIONS
Report to ER immediately if symptoms worsen  Hydrate well   Rest  Zyrtec or allegra OTC as directed

## 2018-01-25 NOTE — PROGRESS NOTES
Subjective:       Patient ID: Yodit Canseco is a 57 y.o. female.    Chief Complaint: Cough; Nasal Congestion; Fever; Ear Fullness; and Sore Throat    Sinus Problem   This is a new problem. The current episode started in the past 7 days. The problem is unchanged. The maximum temperature recorded prior to her arrival was 100.4 - 100.9 F. The fever has been present for less than 1 day. Associated symptoms include congestion, coughing, ear pain, headaches, sinus pressure and a sore throat. Pertinent negatives include no chills, diaphoresis, hoarse voice, neck pain, shortness of breath, sneezing or swollen glands. Past treatments include nothing. The treatment provided no relief.     Past Medical History:   Diagnosis Date    Allergy     Angio-edema     Arthralgia     Asthma without status asthmaticus     Bronchitis     Diverticulosis of large intestine without hemorrhage 10/8/2015    Environmental allergies     Gastroparesis     Hand arthritis     Herpes simplex without mention of complication     oral    Hyperlipidemia     Hypothyroidism     LBP radiating to left leg     Leukocytosis, unspecified     Migraine headache     Obesity, Class III, BMI 40-49.9 (morbid obesity)     Periorbital cellulitis 12/31/2016    Prediabetes     Recurrent upper respiratory infection (URI)     S/P colonoscopy November 2010    Urticaria      Social History     Social History    Marital status:      Spouse name: N/A    Number of children: N/A    Years of education: N/A     Occupational History    Not on file.     Social History Main Topics    Smoking status: Never Smoker    Smokeless tobacco: Never Used    Alcohol use 0.0 oz/week      Comment: very rarely    Drug use: No    Sexual activity: Yes     Partners: Male     Social History Narrative    . Disabled teacher.     Past Surgical History:   Procedure Laterality Date    ADENOIDECTOMY      CHOLECYSTECTOMY      COLONOSCOPY  2010     COLONOSCOPY N/A 10/8/2015    Procedure: COLONOSCOPY;  Surgeon: Panda Bose MD;  Location: [facility];  Service: Endoscopy;  Laterality: N/A;    Hand fracture surgery      HARDWARE REMOVAL      left hand 5th MC    HYSTERECTOMY      menorrhagia-Ovaries intact    TONSILLECTOMY      Urethral dilatation         Review of Systems   Constitutional: Negative.  Negative for chills and diaphoresis.   HENT: Positive for congestion, ear pain, postnasal drip, rhinorrhea, sinus pain, sinus pressure and sore throat. Negative for hoarse voice and sneezing.    Eyes: Negative.    Respiratory: Positive for cough. Negative for shortness of breath.    Cardiovascular: Negative.    Gastrointestinal: Negative.    Endocrine: Negative.    Genitourinary: Negative.    Musculoskeletal: Negative.  Negative for neck pain.   Skin: Negative.    Allergic/Immunologic: Negative.    Neurological: Positive for headaches.   Psychiatric/Behavioral: Negative.        Objective:      Physical Exam   Constitutional: She is oriented to person, place, and time. She appears well-developed and well-nourished.   HENT:   Head: Normocephalic.   Right Ear: Hearing, tympanic membrane, external ear and ear canal normal.   Left Ear: Hearing, tympanic membrane, external ear and ear canal normal.   Nose: Mucosal edema and rhinorrhea present. Right sinus exhibits maxillary sinus tenderness. Right sinus exhibits no frontal sinus tenderness. Left sinus exhibits maxillary sinus tenderness. Left sinus exhibits no frontal sinus tenderness.   Mouth/Throat: Uvula is midline, oropharynx is clear and moist and mucous membranes are normal.   Eyes: Conjunctivae are normal. Pupils are equal, round, and reactive to light.   Neck: Normal range of motion. Neck supple.   Cardiovascular: Normal rate, regular rhythm and normal heart sounds.    Pulmonary/Chest: Effort normal and breath sounds normal.   Abdominal: Soft. Bowel sounds are normal.   Musculoskeletal: Normal range of  motion.   Neurological: She is alert and oriented to person, place, and time.   Skin: Skin is warm and dry. Capillary refill takes 2 to 3 seconds.   Psychiatric: She has a normal mood and affect. Her behavior is normal. Judgment and thought content normal.   Nursing note and vitals reviewed.      Assessment:       1. Sinusitis, unspecified chronicity, unspecified location    2. Cough        Plan:           Yodit was seen today for cough, nasal congestion, fever, ear fullness and sore throat.    Diagnoses and all orders for this visit:    Sinusitis, unspecified chronicity, unspecified location  Cough  -     Influenza antigen Nasal Swab  -     benzonatate (TESSALON) 200 MG capsule; Take 1 capsule (200 mg total) by mouth 3 (three) times daily as needed.  -     clindamycin (CLEOCIN) 300 MG capsule; Take 1 capsule (300 mg total) by mouth every 8 (eight) hours.  Report to ER immediately if symptoms worsen  Hydrate well   Rest  Zyrtec or allegra OTC as directed

## 2018-01-29 ENCOUNTER — OFFICE VISIT (OUTPATIENT)
Dept: PULMONOLOGY | Facility: CLINIC | Age: 58
End: 2018-01-29
Payer: COMMERCIAL

## 2018-01-29 VITALS
HEART RATE: 80 BPM | BODY MASS INDEX: 41.3 KG/M2 | DIASTOLIC BLOOD PRESSURE: 85 MMHG | SYSTOLIC BLOOD PRESSURE: 162 MMHG | WEIGHT: 224.44 LBS | HEIGHT: 62 IN | OXYGEN SATURATION: 94 %

## 2018-01-29 DIAGNOSIS — J82.83 EOSINOPHILIC ASTHMA: ICD-10-CM

## 2018-01-29 DIAGNOSIS — J45.21 MILD INTERMITTENT ASTHMA WITH ACUTE EXACERBATION: ICD-10-CM

## 2018-01-29 DIAGNOSIS — J11.1 FLU: Primary | ICD-10-CM

## 2018-01-29 PROCEDURE — 99999 PR PBB SHADOW E&M-EST. PATIENT-LVL V: CPT | Mod: PBBFAC,,, | Performed by: INTERNAL MEDICINE

## 2018-01-29 PROCEDURE — 99214 OFFICE O/P EST MOD 30 MIN: CPT | Mod: S$GLB,,, | Performed by: INTERNAL MEDICINE

## 2018-01-29 RX ORDER — LEVOFLOXACIN 500 MG/1
500 TABLET, FILM COATED ORAL DAILY
Qty: 10 TABLET | Refills: 3 | Status: SHIPPED | OUTPATIENT
Start: 2018-01-29 | End: 2018-02-27

## 2018-01-29 RX ORDER — PREDNISONE 20 MG/1
20 TABLET ORAL DAILY
Qty: 12 TABLET | Refills: 1 | Status: SHIPPED | OUTPATIENT
Start: 2018-01-29 | End: 2018-02-19

## 2018-01-29 RX ORDER — CODEINE PHOSPHATE AND GUAIFENESIN 10; 100 MG/5ML; MG/5ML
10 SOLUTION ORAL EVERY 6 HOURS PRN
Qty: 473 ML | Refills: 5 | Status: SHIPPED | OUTPATIENT
Start: 2018-01-29 | End: 2018-02-08

## 2018-01-29 RX ORDER — LEVOFLOXACIN 500 MG/1
500 TABLET, FILM COATED ORAL
COMMUNITY
End: 2018-01-29 | Stop reason: SDUPTHER

## 2018-01-29 RX ORDER — PREDNISONE 20 MG/1
20 TABLET ORAL DAILY
COMMUNITY
End: 2018-01-29 | Stop reason: SDUPTHER

## 2018-01-29 RX ORDER — OSELTAMIVIR PHOSPHATE 75 MG/1
75 CAPSULE ORAL 2 TIMES DAILY
Qty: 10 CAPSULE | Refills: 0 | Status: SHIPPED | OUTPATIENT
Start: 2018-01-29 | End: 2018-02-03

## 2018-01-29 NOTE — PATIENT INSTRUCTIONS
Use tamiflu if flu - fever, headache,cough, muscle ache, sore throat- call    Asthma has exacerbated with flu illness.    Pre diabetes.  Asthma.-  Bariatric?    Chest xray if fever returns for pneumonia.    Use dulera regular    Use cough med codeine as needed.    Start ig rx.    Special eosinophilic asthma therapy might keep lungs stable.

## 2018-01-29 NOTE — PROGRESS NOTES
1/29/2018    Yodit Canseco  Office Note    Chief Complaint   Patient presents with    Chest Congestion    Sputum Production     sometimes green    Shortness of Breath    Asthma    Sore Throat     Jan 29. 2018- 6 days ago had been exposed to sick , had nasal congestion , cough, ear stuffy, muscle aches /joint aches / fever.  Temp to 100.1.  Seen by np and flu screen negative.  Had asthma 3-4 flares last year. eos up past,     oct 5, 2017 - had marce in April, skipped couple doses hirzentra, had mild bronchitis once, otherwise doing very well.  No prednisone.  No side effects and covered. Ppt flu sense for 2 days        Jan 24, on  hirzentra q o week since sept and asthma more stable, no hosp, no prednisone, no noct asthma/ no rescue therapy- control not this good for years.        Sept 27,  HPI:has had lung problems since birth, no nocturnal arousals, uses rescue 2/d, breathing controlled satisfactory except with irritants/infection - strong abx needed to clear.  Prednisone 2/yr, last hosp 18 months.  No ventilator.        Had exacerbation recent due uri from .  Has had shingles vaccine past.      The chief compliant  problem is stable   PFSH:  Past Medical History:   Diagnosis Date    Allergy     Angio-edema     Arthralgia     Asthma without status asthmaticus     Bronchitis     Diverticulosis of large intestine without hemorrhage 10/8/2015    Environmental allergies     Gastroparesis     Hand arthritis     Herpes simplex without mention of complication     oral    Hyperlipidemia     Hypothyroidism     LBP radiating to left leg     Leukocytosis, unspecified     Migraine headache     Obesity, Class III, BMI 40-49.9 (morbid obesity)     Periorbital cellulitis 12/31/2016    Prediabetes     Recurrent upper respiratory infection (URI)     S/P colonoscopy November 2010    Urticaria          Past Surgical History:   Procedure Laterality Date    ADENOIDECTOMY       CHOLECYSTECTOMY      COLONOSCOPY  2010    COLONOSCOPY N/A 10/8/2015    Procedure: COLONOSCOPY;  Surgeon: Panda Bose MD;  Location: [facility];  Service: Endoscopy;  Laterality: N/A;    Hand fracture surgery      HARDWARE REMOVAL      left hand 5th MC    HYSTERECTOMY      menorrhagia-Ovaries intact    TONSILLECTOMY      Urethral dilatation       Social History   Substance Use Topics    Smoking status: Never Smoker    Smokeless tobacco: Never Used    Alcohol use 0.0 oz/week      Comment: very rarely     Family History   Problem Relation Age of Onset    Melanoma Mother     Basal cell carcinoma Mother     Lung disease Mother      pulm htn    Cataracts Mother     Hypertension Mother     Lung cancer Father     Diabetes Father     Hypertension Father     Cancer Father     Diabetes Paternal Aunt     Colon cancer Paternal Aunt     Diabetes Paternal Aunt     Ovarian cancer Paternal Grandmother     Cancer Maternal Grandfather     Melanoma Maternal Aunt     Melanoma Maternal Uncle     Breast cancer Paternal Aunt     Lymphoma Paternal Aunt     Ulcerative colitis Son     Psoriasis Son     Psoriasis Son     Breast cancer Cousin      Review of patient's allergies indicates:   Allergen Reactions    Sudafed [pseudoephedrine hcl] Other (See Comments)     Does not want to wake up .    Amoxicillin-pot clavulanate      Other reaction(s): Itching    Ephedrine      Other reaction(s): comatose    Hydrocodone Itching    Iodinated contrast media - iv dye     Iodine and iodide containing products Other (See Comments)    Pantoprazole      Other reaction(s): upset stomach/halitosis    Penicillins      Other reaction(s): does not work on pt symptoms    Percocet [oxycodone-acetaminophen] Itching    Barium iodide Rash       Performance Status:The patient's activity level is functions out of house.  No irratent exposure. Sedatary.  To start threadmil    Review of Systems:  a review of eleven systems  "covering constitutional, Eye, HEENT, Psych, Respiratory, Cardiac, GI, , Musculoskeletal, Endocrine, Dermatologic was negative except for pertinent findings as listed ABOVE and below: all good except for sinus into lungs and profound irratent sensitive. on thryoid     Exam:Comprehensive exam done.   BP (!) 162/85 (BP Location: Left arm, Patient Position: Sitting)   Pulse 80   Ht 5' 2" (1.575 m)   Wt 101.8 kg (224 lb 6.9 oz)   SpO2 (!) 94%   BMI 41.05 kg/m²   Exam included Vitals as listed, and patient's appearance and affect and alertness and mood, oral exam for yeast and hygiene and pharynx lesions and Mallapatti (M) score, neck with inspection for jvd and masses and thyroid abnormalities and lymph nodes (supraclavicular and infraclavicular nodes and axillary also examined and noted if abn), chest exam included symmetry and effort and fremitus and percussion and auscultation, cardiac exam included rhythm and gallops and murmur and rubs and jvd and edema, abdominal exam for mass and hepatosplenomegaly and tenderness and hernias and bowel sounds, Musculoskeletal exam with muscle tone and posture and mobility/gait and  strength, and skin for rashes and cyanosis and pallor and turgor, extremity for clubbing.  Findings were normal except for pertinent findings listed below:  M4, faint wheezes scattered and no edema.bmi  41    Radiographs reviewed: view by direct vision March 2015 cxr good,    Labs reviewed from last 6 months on EPIC result review   eso up     Results for SILVIA MARADIAGA (MRN [id number]) as of 1/24/2017 10:33   Ref. Range 6/14/2016 12:00 9/13/2016 10:24 9/30/2016 10:17   Diphtheria Toxoid Ab Latest Ref Range: >0.099 IU/mL >3.000  2.566   Tetanus Ab Latest Ref Range: >0.150 IU/mL 6.373  4.029   S.pneumoniae Type 1 Latest Units: mcg/mL 1.7  2.0   S.pneumoniae Type 3 Latest Units: mcg/mL 0.7  1.0   Strep pneumo Type 4 Latest Units: mcg/mL 0.6  0.5   S.pneumoniae Type 5 Latest Units: mcg/mL 2.0  " 2.4   S.pneumoniae Type 6B Latest Units: mcg/mL 10.9  7.7   S.pneumoniae Type 7F Latest Units: mcg/mL 2.0  1.8   S.pneumoniae Type 8 Latest Units: mcg/mL 1.6  1.6   S.pneumoniae Type 9N Latest Units: mcg/mL 0.3  0.7   S.pneumoniae Type 9V Abs Latest Units: mcg/mL 0.5  0.8   S.pneumoniae Type 12F Latest Units: mcg/mL <0.3  0.3   Strep pneumo Type 14 Latest Units: mcg/mL 1.9  3.1   S.pneumoniae Type 18C Latest Units: mcg/mL 5.5  4.0   S.pneumoniae Type 19F Latest Units: mcg/mL 0.4  1.5   S.pneumoniae Type 23F Latest Units: mcg/mL 4.1  2.8   Results for SILVIA MARADIAGA (MRN [id number]) as of 1/24/2017 10:33   Ref. Range 2/12/2015 09:00 6/6/2016 14:30 6/14/2016 12:00 9/30/2016 10:17   IgG - Serum Latest Ref Range: 650 - 1600 mg/dL 748  700 962   IgM Latest Ref Range: 50 - 300 mg/dL 120  124    IgA Latest Ref Range: 40 - 350 mg/dL 188  170    IgE Latest Ref Range: 0 - 100 IU/mL   <35    IgG 1 Latest Ref Range: 490 - 1140 mg/dL   516 656   IgG 2 Latest Ref Range: 150 - 640 mg/dL   153 249   IgG 3 Latest Ref Range: 11 - 85 mg/dL   11 15   IgG 4 Latest Ref Range: 3 - 201 mg/dL   20 20   Results for SILVIA MARADIAGA (MRN [id number]) as of 1/24/2017 10:33   Ref. Range 9/30/2016 10:17   WBC Latest Ref Range: 3.90 - 12.70 K/uL 19.58 (H)   RBC Latest Ref Range: 4.00 - 5.40 M/uL 5.12   Hemoglobin Latest Ref Range: 12.0 - 16.0 g/dL 15.5   Hematocrit Latest Ref Range: 37.0 - 48.5 % 44.8   MCV Latest Ref Range: 82 - 98 fL 88   MCH Latest Ref Range: 27.0 - 31.0 pg 30.3   MCHC Latest Ref Range: 32.0 - 36.0 % 34.6   RDW Latest Ref Range: 11.5 - 14.5 % 14.2   Platelets Latest Ref Range: 150 - 350 K/uL 229   MPV Latest Ref Range: 9.2 - 12.9 fL 11.7   Gran% Latest Ref Range: 38.0 - 73.0 % 59.0   Lymph% Latest Ref Range: 18.0 - 48.0 % 31.0   Lymph # Latest Ref Range: 1.0 - 4.8 K/uL CANCELED   Mono% Latest Ref Range: 4.0 - 15.0 % 4.0   Mono # Latest Ref Range: 0.3 - 1.0 K/uL CANCELED   Eosinophil% Latest Ref Range: 0.0 - 8.0 % 6.0   Eos #  Latest Ref Range: 0.0 - 0.5 K/uL CANCELED   Basophil% Latest Ref Range: 0.0 - 1.9 % 0.0   Baso # Latest Ref Range: 0.00 - 0.20 K/uL CANCELED       PFT will be done and results to be reviewed     Plan:  Clinical impression is resonably certain and repeated evaluation prn +/- follow up will be needed as below.    Yodit was seen today for chest congestion, sputum production, shortness of breath, asthma and sore throat.    Diagnoses and all orders for this visit:    Flu  -     oseltamivir (TAMIFLU) 75 MG capsule; Take 1 capsule (75 mg total) by mouth 2 (two) times daily.  -     X-Ray Chest PA And Lateral; Future  -     levoFLOXacin (LEVAQUIN) 500 MG tablet; Take 1 tablet (500 mg total) by mouth once daily.  -     predniSONE (DELTASONE) 20 MG tablet; Take 1 tablet (20 mg total) by mouth once daily. Take one daily for  3 days and repeat for asthma.  -     mometasone-formoterol (DULERA) 200-5 mcg/actuation inhaler; Inhale 2 puffs into the lungs 2 (two) times daily. Controller    Eosinophilic asthma  -     oseltamivir (TAMIFLU) 75 MG capsule; Take 1 capsule (75 mg total) by mouth 2 (two) times daily.  -     X-Ray Chest PA And Lateral; Future  -     levoFLOXacin (LEVAQUIN) 500 MG tablet; Take 1 tablet (500 mg total) by mouth once daily.  -     predniSONE (DELTASONE) 20 MG tablet; Take 1 tablet (20 mg total) by mouth once daily. Take one daily for  3 days and repeat for asthma.  -     mometasone-formoterol (DULERA) 200-5 mcg/actuation inhaler; Inhale 2 puffs into the lungs 2 (two) times daily. Controller  -     guaifenesin-codeine 100-10 mg/5 ml (TUSSI-ORGANIDIN NR)  mg/5 mL syrup; Take 10 mLs by mouth every 6 (six) hours as needed for Cough.    Mild intermittent asthma with acute exacerbation  -     oseltamivir (TAMIFLU) 75 MG capsule; Take 1 capsule (75 mg total) by mouth 2 (two) times daily.  -     X-Ray Chest PA And Lateral; Future  -     levoFLOXacin (LEVAQUIN) 500 MG tablet; Take 1 tablet (500 mg total) by mouth  once daily.  -     predniSONE (DELTASONE) 20 MG tablet; Take 1 tablet (20 mg total) by mouth once daily. Take one daily for  3 days and repeat for asthma.  -     mometasone-formoterol (DULERA) 200-5 mcg/actuation inhaler; Inhale 2 puffs into the lungs 2 (two) times daily. Controller  -     guaifenesin-codeine 100-10 mg/5 ml (TUSSI-ORGANIDIN NR)  mg/5 mL syrup; Take 10 mLs by mouth every 6 (six) hours as needed for Cough.        Follow-up in about 3 months (around 4/29/2018).    Discussed with patient above for education the following:      Use tamiflu if flu - fever, headache,cough, muscle ache, sore throat- call    Asthma has exacerbated with flu illness.    Pre diabetes.  Asthma.-  Bariatric?    Chest xray if fever returns for pneumonia.    Use dulera regular    Use cough med codeine as needed.    Start ig rx.    Special eosinophilic asthma therapy might keep lungs stable.

## 2018-02-19 ENCOUNTER — LAB VISIT (OUTPATIENT)
Dept: LAB | Facility: HOSPITAL | Age: 58
End: 2018-02-19
Attending: ALLERGY & IMMUNOLOGY
Payer: COMMERCIAL

## 2018-02-19 ENCOUNTER — OFFICE VISIT (OUTPATIENT)
Dept: FAMILY MEDICINE | Facility: CLINIC | Age: 58
End: 2018-02-19
Payer: COMMERCIAL

## 2018-02-19 VITALS
SYSTOLIC BLOOD PRESSURE: 118 MMHG | HEART RATE: 68 BPM | WEIGHT: 222.25 LBS | DIASTOLIC BLOOD PRESSURE: 68 MMHG | HEIGHT: 62 IN | BODY MASS INDEX: 40.9 KG/M2 | TEMPERATURE: 98 F

## 2018-02-19 DIAGNOSIS — R05.9 COUGH: Primary | ICD-10-CM

## 2018-02-19 DIAGNOSIS — D80.6 ANTI-POLYSACCHARIDE ANTIBODY DEFICIENCY: ICD-10-CM

## 2018-02-19 DIAGNOSIS — J32.9 RECURRENT SINUS INFECTIONS: ICD-10-CM

## 2018-02-19 DIAGNOSIS — D84.9 IMMUNE DEFICIENCY DISORDER: ICD-10-CM

## 2018-02-19 DIAGNOSIS — J82.83 EOSINOPHILIC ASTHMA: ICD-10-CM

## 2018-02-19 DIAGNOSIS — E66.01 MORBID OBESITY DUE TO EXCESS CALORIES: ICD-10-CM

## 2018-02-19 DIAGNOSIS — J31.0 OTHER CHRONIC RHINITIS: ICD-10-CM

## 2018-02-19 DIAGNOSIS — J45.21 MILD INTERMITTENT ASTHMA WITH ACUTE EXACERBATION: ICD-10-CM

## 2018-02-19 DIAGNOSIS — J45.20 MILD INTERMITTENT ASTHMA WITHOUT COMPLICATION: ICD-10-CM

## 2018-02-19 DIAGNOSIS — E66.01 OBESITY, CLASS III, BMI 40-49.9 (MORBID OBESITY): ICD-10-CM

## 2018-02-19 LAB
BASOPHILS # BLD AUTO: 0.05 K/UL
BASOPHILS NFR BLD: 0.4 %
DIFFERENTIAL METHOD: NORMAL
EOSINOPHIL # BLD AUTO: 0.5 K/UL
EOSINOPHIL NFR BLD: 4.8 %
ERYTHROCYTE [DISTWIDTH] IN BLOOD BY AUTOMATED COUNT: 13.5 %
HCT VFR BLD AUTO: 44.5 %
HGB BLD-MCNC: 15.3 G/DL
IMM GRANULOCYTES # BLD AUTO: 0.04 K/UL
IMM GRANULOCYTES NFR BLD AUTO: 0.4 %
LYMPHOCYTES # BLD AUTO: 3.3 K/UL
LYMPHOCYTES NFR BLD: 29.3 %
MCH RBC QN AUTO: 29.3 PG
MCHC RBC AUTO-ENTMCNC: 34.4 G/DL
MCV RBC AUTO: 85 FL
MONOCYTES # BLD AUTO: 0.8 K/UL
MONOCYTES NFR BLD: 6.8 %
NEUTROPHILS # BLD AUTO: 6.6 K/UL
NEUTROPHILS NFR BLD: 58.3 %
NRBC BLD-RTO: 0 /100 WBC
PLATELET # BLD AUTO: 276 K/UL
PMV BLD AUTO: 12 FL
RBC # BLD AUTO: 5.22 M/UL
WBC # BLD AUTO: 11.34 K/UL

## 2018-02-19 PROCEDURE — 84480 ASSAY TRIIODOTHYRONINE (T3): CPT

## 2018-02-19 PROCEDURE — 80053 COMPREHEN METABOLIC PANEL: CPT

## 2018-02-19 PROCEDURE — 82784 ASSAY IGA/IGD/IGG/IGM EACH: CPT

## 2018-02-19 PROCEDURE — 85025 COMPLETE CBC W/AUTO DIFF WBC: CPT

## 2018-02-19 PROCEDURE — 84436 ASSAY OF TOTAL THYROXINE: CPT

## 2018-02-19 PROCEDURE — 36415 COLL VENOUS BLD VENIPUNCTURE: CPT | Mod: PO

## 2018-02-19 PROCEDURE — 82787 IGG 1 2 3 OR 4 EACH: CPT

## 2018-02-19 PROCEDURE — 99999 PR PBB SHADOW E&M-EST. PATIENT-LVL II: CPT | Mod: PBBFAC,,, | Performed by: FAMILY MEDICINE

## 2018-02-19 PROCEDURE — 84443 ASSAY THYROID STIM HORMONE: CPT

## 2018-02-19 PROCEDURE — 82784 ASSAY IGA/IGD/IGG/IGM EACH: CPT | Mod: 59

## 2018-02-19 PROCEDURE — 99213 OFFICE O/P EST LOW 20 MIN: CPT | Mod: S$GLB,,, | Performed by: FAMILY MEDICINE

## 2018-02-19 PROCEDURE — 84439 ASSAY OF FREE THYROXINE: CPT

## 2018-02-19 PROCEDURE — 3008F BODY MASS INDEX DOCD: CPT | Mod: S$GLB,,, | Performed by: FAMILY MEDICINE

## 2018-02-19 RX ORDER — PREDNISONE 20 MG/1
20 TABLET ORAL DAILY
Refills: 1 | COMMUNITY
Start: 2018-02-19 | End: 2018-04-19

## 2018-02-20 ENCOUNTER — TELEPHONE (OUTPATIENT)
Dept: PULMONOLOGY | Facility: CLINIC | Age: 58
End: 2018-02-20

## 2018-02-20 LAB
ALBUMIN SERPL BCP-MCNC: 3.8 G/DL
ALP SERPL-CCNC: 93 U/L
ALT SERPL W/O P-5'-P-CCNC: 49 U/L
ANION GAP SERPL CALC-SCNC: 13 MMOL/L
AST SERPL-CCNC: 46 U/L
BILIRUB SERPL-MCNC: 0.6 MG/DL
BUN SERPL-MCNC: 16 MG/DL
CALCIUM SERPL-MCNC: 9.7 MG/DL
CHLORIDE SERPL-SCNC: 103 MMOL/L
CO2 SERPL-SCNC: 23 MMOL/L
CREAT SERPL-MCNC: 1.1 MG/DL
EST. GFR  (AFRICAN AMERICAN): >60 ML/MIN/1.73 M^2
EST. GFR  (NON AFRICAN AMERICAN): 55.5 ML/MIN/1.73 M^2
GLUCOSE SERPL-MCNC: 91 MG/DL
IGA SERPL-MCNC: 208 MG/DL
IGG SERPL-MCNC: 1292 MG/DL
IGM SERPL-MCNC: 132 MG/DL
POTASSIUM SERPL-SCNC: 4.1 MMOL/L
PROT SERPL-MCNC: 7.8 G/DL
SODIUM SERPL-SCNC: 139 MMOL/L
T3 SERPL-MCNC: 98 NG/DL
T4 FREE SERPL-MCNC: 1.07 NG/DL
T4 SERPL-MCNC: 8.4 UG/DL
TSH SERPL DL<=0.005 MIU/L-ACNC: 1.98 UIU/ML

## 2018-02-20 NOTE — PROGRESS NOTES
Patient had upper respiratory symptoms starting about 2 weeks ago.  She was having some initial fever or cough with exposure to flu.  Apparently he had a few doses of clindamycin, then treated for flu with Tamiflu.  Still had symptoms and ended up taking a course of Levaquin as well as a few days of prednisone.  She does have a history of asthma in immunodeficiency disorder.  She denies any fever chills.  Occasional wheeze but nothing currently.  Still has some cough and hoarseness mild sore throat.  No fever.      Past Medical History:  Past Medical History:   Diagnosis Date    Allergy     Angio-edema     Arthralgia     Asthma without status asthmaticus     Bronchitis     Diverticulosis of large intestine without hemorrhage 10/8/2015    Environmental allergies     Gastroparesis     Hand arthritis     Herpes simplex without mention of complication     oral    Hyperlipidemia     Hypothyroidism     LBP radiating to left leg     Leukocytosis, unspecified     Migraine headache     Obesity, Class III, BMI 40-49.9 (morbid obesity)     Periorbital cellulitis 12/31/2016    Prediabetes     Recurrent upper respiratory infection (URI)     S/P colonoscopy November 2010    Urticaria      Past Surgical History:   Procedure Laterality Date    ADENOIDECTOMY      CHOLECYSTECTOMY      COLONOSCOPY  2010    COLONOSCOPY N/A 10/8/2015    Procedure: COLONOSCOPY;  Surgeon: Panda Bose MD;  Location: [facility];  Service: Endoscopy;  Laterality: N/A;    Hand fracture surgery      HARDWARE REMOVAL      left hand 5th MC    HYSTERECTOMY      menorrhagia-Ovaries intact    TONSILLECTOMY      Urethral dilatation       Social History     Social History    Marital status:      Spouse name: N/A    Number of children: N/A    Years of education: N/A     Occupational History    Not on file.     Social History Main Topics    Smoking status: Never Smoker    Smokeless tobacco: Never Used    Alcohol use 0.0  "oz/week      Comment: very rarely    Drug use: No    Sexual activity: Yes     Partners: Male     Other Topics Concern    Not on file     Social History Narrative    . Disabled teacher.     Family History   Problem Relation Age of Onset    Melanoma Mother     Basal cell carcinoma Mother     Lung disease Mother      pulm htn    Cataracts Mother     Hypertension Mother     Lung cancer Father     Diabetes Father     Hypertension Father     Cancer Father     Diabetes Paternal Aunt     Colon cancer Paternal Aunt     Diabetes Paternal Aunt     Ovarian cancer Paternal Grandmother     Cancer Maternal Grandfather     Melanoma Maternal Aunt     Melanoma Maternal Uncle     Breast cancer Paternal Aunt     Lymphoma Paternal Aunt     Ulcerative colitis Son     Psoriasis Son     Psoriasis Son     Breast cancer Cousin      Review of patient's allergies indicates:   Allergen Reactions    Bromelains Hives     " causes mouth to bleed"    Sudafed [pseudoephedrine hcl] Other (See Comments)     Does not want to wake up .    Amoxicillin-pot clavulanate      Other reaction(s): Itching    Ephedrine      Other reaction(s): comatose    Hydrocodone Itching    Iodinated contrast- oral and iv dye     Iodine and iodide containing products Other (See Comments)    Melon     Pantoprazole      Other reaction(s): upset stomach/halitosis    Penicillins      Other reaction(s): does not work on pt symptoms    Percocet [oxycodone-acetaminophen] Itching    Barium iodide Rash     Current Outpatient Prescriptions on File Prior to Visit   Medication Sig Dispense Refill    albuterol (PROAIR HFA) 90 mcg/actuation inhaler Inhale 1 puff into the lungs every 4 (four) hours as needed for Wheezing or Shortness of Breath. 3 Inhaler 3    albuterol (PROVENTIL) 2.5 mg /3 mL (0.083 %) nebulizer solution Take 3 mLs (2.5 mg total) by nebulization every 4 (four) hours as needed for Wheezing or Shortness of Breath. Every 4-6 hours "  each 3    aspirin (ECOTRIN) 81 MG EC tablet Take 81 mg by mouth once daily.      atorvastatin (LIPITOR) 40 MG tablet Take 1 tablet (40 mg total) by mouth once daily. 90 tablet 3    CALCIUM CARB/D3/MAGNESIUM/ZINC (CALCIUM CARB-D3-MAG CMB11-ZINC) 945-142-321-5 mg-unit-mg-mg Tab Take 2 tablets by mouth every evening.      desonide (DESOWEN) 0.05 % cream Apply topically 2 (two) times daily. Apply to lids. 1 Tube 3    fluconazole (DIFLUCAN) 200 MG Tab Take 1 tablet (200 mg total) by mouth once daily. 7 tablet 1    immun glob G,IgG,-pro-IgA 0-50 (HIZENTRA) 2 gram/10 mL (20 %) Soln Inject 18 g into the skin every 14 (fourteen) days. 90 mL 12    LACTOBAC NO.41/BIFIDOBACT NO.7 (PROBIOTIC-10 ORAL) Take by mouth once daily.      levothyroxine (SYNTHROID) 75 MCG tablet Take 1 tablet (75 mcg total) by mouth once daily. 30 tablet 11    metformin (GLUCOPHAGE) 500 MG tablet Take 1 tablet (500 mg total) by mouth 2 (two) times daily with meals. 60 tablet 11    mometasone-formoterol (DULERA) 200-5 mcg/actuation inhaler Inhale 2 puffs into the lungs 2 (two) times daily. Controller 1 Inhaler 11    montelukast (SINGULAIR) 10 mg tablet Take 1 tablet (10 mg total) by mouth every evening. 90 tablet 3    multivitamin (ONE DAILY MULTIVITAMIN) per tablet Take 1 tablet by mouth once daily.      omega 3-dha-epa-fish oil (FISH OIL) 900-1,400 mg CpDR Take 1 capsule by mouth once daily.      potassium chloride (MICRO-K) 10 MEQ CpSR TAKE 2 CAPSULES BY MOUTH EVERY DAY 60 capsule 3    sumatriptan (IMITREX) 100 MG tablet Take 1 po at onset of migraine. May repeat once in 2 hrs if needed. No more than 2 pills in 24 hours 9 tablet 2    triamterene-hydrochlorothiazide 75-50 mg (MAXZIDE) 75-50 mg per tablet Take 1 tablet by mouth once daily. 30 tablet 11    levoFLOXacin (LEVAQUIN) 500 MG tablet Take 1 tablet (500 mg total) by mouth once daily. 10 tablet 3    SUPRAX 400 mg Cap Take 1 capsule by mouth once daily. 10 capsule 2     "triamcinolone acetonide 0.025% (KENALOG) 0.025 % cream Apply topically 2 (two) times daily. 15 g 0    [DISCONTINUED] FLUVIRIN 3518-6020 45 mcg (15 mcg x 3)/0.5 mL Susp ADM 0.5ML IM UTD  0    [DISCONTINUED] predniSONE (DELTASONE) 20 MG tablet Take 1 tablet (20 mg total) by mouth once daily. Take one daily for  3 days and repeat for asthma. 12 tablet 1     No current facility-administered medications on file prior to visit.            ROS:  GENERAL: No fever, chills,  or significant weight changes.   CARDIOVASCULAR: Denies chest pain, PND, orthopnea.  ABDOMEN: Appetite fine. Denies diarrhea, abdominal pain, hematemesis or blood in stool.  URINARY: No flank pain, dysuria or hematuria.      OBJECTIVE:     Vitals:    02/19/18 1454   BP: 118/68   Pulse: 68   Temp: 98.3 °F (36.8 °C)   Weight: 100.8 kg (222 lb 3.6 oz)   Height: 5' 2" (1.575 m)     Wt Readings from Last 3 Encounters:   02/19/18 100.8 kg (222 lb 3.6 oz)   01/29/18 101.8 kg (224 lb 6.9 oz)   01/25/18 102 kg (224 lb 13.9 oz)     APPEARANCE: Well nourished, well developed, in no acute distress.  Mildly hoarse  HEAD: Normocephalic.  Atraumatic.  No sinus tenderness.  EYES:   Right eye: Pupil reactive.  Conjunctiva clear.    Left eye: Pupil reactive.  Conjunctiva clear.    Both fundi:  Grossly normal to nondilated exam. EOMI.    EARS: TM's intact. Light reflex normal. No retraction or perforation.    NOSE:  clear.  MOUTH & THROAT:  No pharyngeal erythema or exudate. No lesions.  NECK: Supple. No bruits.  No JVD.  No cervical lymphadenopathy.  No thyromegaly.    CHEST: Breath sounds clear bilaterally.  Normal respiratory effort  CARDIOVASCULAR: Normal rate.  Regular rhythm.  No murmurs.  No rub.  No gallops.  ..  No edema.  NEUROLOGIC: No focal findings.  MENTAL STATUS: Alert.  Oriented x 3.    Diagnoses and all orders for this visit:    Cough    Immune deficiency disorder    Obesity, Class III, BMI 40-49.9 (morbid obesity)    Eosinophilic asthma    Mild " intermittent asthma with acute exacerbation      Seems to be improving.  Likely still recovering from flu which may have exacerbated her asthma.  We'll have her take another 5 days of prednisone 20 mg a day.  Continue other medications as currently.  Follow-up with her pulmonary doctor if symptoms not resolving.

## 2018-02-20 NOTE — TELEPHONE ENCOUNTER
Per Dr. Gary patient given appointment for 2/22/2018 at 8:00 am to fill out disability forms. For SS.    ----- Message from Becki Peters sent at 2/20/2018  8:48 AM CST -----  Contact: Patient  Yodit, patient [phone], calling to emilia in her paperwork for Social Security. Must have in the mail by 3/9/18. Please advise. Next available 4/4/18, thanks

## 2018-02-21 LAB
IGG1 SER-MCNC: 777 MG/DL
IGG2 SER-MCNC: 330 MG/DL
IGG3 SER-MCNC: 37 MG/DL
IGG4 SER-MCNC: 20 MG/DL

## 2018-02-22 ENCOUNTER — OFFICE VISIT (OUTPATIENT)
Dept: PULMONOLOGY | Facility: CLINIC | Age: 58
End: 2018-02-22
Payer: COMMERCIAL

## 2018-02-22 VITALS
SYSTOLIC BLOOD PRESSURE: 151 MMHG | HEART RATE: 70 BPM | DIASTOLIC BLOOD PRESSURE: 81 MMHG | WEIGHT: 221.81 LBS | HEIGHT: 62 IN | BODY MASS INDEX: 40.82 KG/M2 | OXYGEN SATURATION: 98 %

## 2018-02-22 DIAGNOSIS — J45.50 SEVERE PERSISTENT ASTHMA WITHOUT COMPLICATION: Primary | ICD-10-CM

## 2018-02-22 PROCEDURE — 99999 PR PBB SHADOW E&M-EST. PATIENT-LVL V: CPT | Mod: PBBFAC,,, | Performed by: INTERNAL MEDICINE

## 2018-02-22 PROCEDURE — 99214 OFFICE O/P EST MOD 30 MIN: CPT | Mod: S$GLB,,, | Performed by: INTERNAL MEDICINE

## 2018-02-22 PROCEDURE — 3008F BODY MASS INDEX DOCD: CPT | Mod: S$GLB,,, | Performed by: INTERNAL MEDICINE

## 2018-02-22 RX ORDER — ESTRADIOL 1 MG/1
1 TABLET ORAL DAILY
COMMUNITY
End: 2018-05-22

## 2018-02-22 RX ORDER — ACETAMINOPHEN AND CODEINE PHOSPHATE 300; 30 MG/1; MG/1
1 TABLET ORAL
Qty: 90 TABLET | Refills: 0 | Status: SHIPPED | OUTPATIENT
Start: 2018-02-22 | End: 2018-03-24

## 2018-02-22 NOTE — PROGRESS NOTES
2/22/2018    Yodit Canseco  Office Note    Chief Complaint   Patient presents with    Cough    Sputum Production     whitish thick slight yellow   feb 22,2108   Had to do prednisone again.  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 I have advised not to work given severe asthma,  hypersensitivity to fragrances, and immune deficiency.   Pt seems better since Hirzentra, but still unstable severe asthma      Jan 29. 2018- 6 days ago had been exposed to sick , had nasal congestion , cough, ear stuffy, muscle aches /joint aches / fever.  Temp to 100.1.  Seen by np and flu screen negative.  Had asthma 3-4 flares last year. eos up past,     oct 5, 2017 - had marce in April, skipped couple doses hirzentra, had mild bronchitis once, otherwise doing very well.  No prednisone.  No side effects and covered. Ppt flu sense for 2 days        Jan 24, on  hirzentra q o week since sept and asthma more stable, no hosp, no prednisone, no noct asthma/ no rescue therapy- control not this good for years.        Sept 27,  HPI:has had lung problems since birth, no nocturnal arousals, uses rescue 2/d, breathing controlled satisfactory except with irritants/infection - strong abx needed to clear.  Prednisone 2/yr, last hosp 18 months.  No ventilator.        Had exacerbation recent due uri from .  Has had shingles vaccine past.      The chief compliant  problem is stable   PFSH:  Past Medical History:   Diagnosis Date    Allergy     Angio-edema     Arthralgia     Asthma without status asthmaticus     Bronchitis     Diverticulosis of large intestine without hemorrhage 10/8/2015    Environmental allergies     Gastroparesis     Hand arthritis     Herpes simplex without mention of complication     oral    Hyperlipidemia     Hypothyroidism     LBP radiating to left leg     Leukocytosis, unspecified      Migraine headache     Obesity, Class III, BMI 40-49.9 (morbid obesity)     Periorbital cellulitis 12/31/2016    Prediabetes     Recurrent upper respiratory infection (URI)     S/P colonoscopy November 2010    Urticaria          Past Surgical History:   Procedure Laterality Date    ADENOIDECTOMY      CHOLECYSTECTOMY      COLONOSCOPY  2010    COLONOSCOPY N/A 10/8/2015    Procedure: COLONOSCOPY;  Surgeon: Panda Bose MD;  Location: [facility];  Service: Endoscopy;  Laterality: N/A;    Hand fracture surgery      HARDWARE REMOVAL      left hand 5th MC    HYSTERECTOMY      menorrhagia-Ovaries intact    TONSILLECTOMY      Urethral dilatation       Social History   Substance Use Topics    Smoking status: Never Smoker    Smokeless tobacco: Never Used    Alcohol use 0.0 oz/week      Comment: very rarely     Family History   Problem Relation Age of Onset    Melanoma Mother     Basal cell carcinoma Mother     Lung disease Mother      pulm htn    Cataracts Mother     Hypertension Mother     Lung cancer Father     Diabetes Father     Hypertension Father     Cancer Father     Diabetes Paternal Aunt     Colon cancer Paternal Aunt     Diabetes Paternal Aunt     Ovarian cancer Paternal Grandmother     Cancer Maternal Grandfather     Melanoma Maternal Aunt     Melanoma Maternal Uncle     Breast cancer Paternal Aunt     Lymphoma Paternal Aunt     Ulcerative colitis Son     Psoriasis Son     Psoriasis Son     Breast cancer Cousin      Review of patient's allergies indicates:   Allergen Reactions    Sudafed [pseudoephedrine hcl] Other (See Comments)     Does not want to wake up .    Amoxicillin-pot clavulanate      Other reaction(s): Itching    Ephedrine      Other reaction(s): comatose    Hydrocodone Itching    Iodinated contrast media - iv dye     Iodine and iodide containing products Other (See Comments)    Pantoprazole      Other reaction(s): upset stomach/halitosis    Penicillins      " Other reaction(s): does not work on pt symptoms    Percocet [oxycodone-acetaminophen] Itching    Barium iodide Rash       Performance Status:The patient's activity level is functions out of house.  No irratent exposure. Sedatary.  To start threadmil    Review of Systems:  a review of eleven systems covering constitutional, Eye, HEENT, Psych, Respiratory, Cardiac, GI, , Musculoskeletal, Endocrine, Dermatologic was negative except for pertinent findings as listed ABOVE and below: all good except for sinus into lungs and profound irratent sensitive. on thryoid     Exam:Comprehensive exam done.   BP (!) 151/81 (BP Location: Left arm, Patient Position: Sitting)   Pulse 70   Ht 5' 2" (1.575 m)   Wt 100.6 kg (221 lb 12.5 oz)   SpO2 98%   BMI 40.56 kg/m²   Exam included Vitals as listed, and patient's appearance and affect and alertness and mood, oral exam for yeast and hygiene and pharynx lesions and Mallapatti (M) score, neck with inspection for jvd and masses and thyroid abnormalities and lymph nodes (supraclavicular and infraclavicular nodes and axillary also examined and noted if abn), chest exam included symmetry and effort and fremitus and percussion and auscultation, cardiac exam included rhythm and gallops and murmur and rubs and jvd and edema, abdominal exam for mass and hepatosplenomegaly and tenderness and hernias and bowel sounds, Musculoskeletal exam with muscle tone and posture and mobility/gait and  strength, and skin for rashes and cyanosis and pallor and turgor, extremity for clubbing.  Findings were normal except for pertinent findings listed below:  M4, faint wheezes scattered and no edema.bmi  41    Radiographs reviewed: view by direct vision March 2015 cxr good,    Labs reviewed from last 6 months on EPIC result review   eso up     Results for SILVIA MARADIAGA (MRN [id number]) as of 1/24/2017 10:33   Ref. Range 6/14/2016 12:00 9/13/2016 10:24 9/30/2016 10:17   Diphtheria Toxoid Ab Latest " Ref Range: >0.099 IU/mL >3.000  2.566   Tetanus Ab Latest Ref Range: >0.150 IU/mL 6.373  4.029   S.pneumoniae Type 1 Latest Units: mcg/mL 1.7  2.0   S.pneumoniae Type 3 Latest Units: mcg/mL 0.7  1.0   Strep pneumo Type 4 Latest Units: mcg/mL 0.6  0.5   S.pneumoniae Type 5 Latest Units: mcg/mL 2.0  2.4   S.pneumoniae Type 6B Latest Units: mcg/mL 10.9  7.7   S.pneumoniae Type 7F Latest Units: mcg/mL 2.0  1.8   S.pneumoniae Type 8 Latest Units: mcg/mL 1.6  1.6   S.pneumoniae Type 9N Latest Units: mcg/mL 0.3  0.7   S.pneumoniae Type 9V Abs Latest Units: mcg/mL 0.5  0.8   S.pneumoniae Type 12F Latest Units: mcg/mL <0.3  0.3   Strep pneumo Type 14 Latest Units: mcg/mL 1.9  3.1   S.pneumoniae Type 18C Latest Units: mcg/mL 5.5  4.0   S.pneumoniae Type 19F Latest Units: mcg/mL 0.4  1.5   S.pneumoniae Type 23F Latest Units: mcg/mL 4.1  2.8   Results for SILVIA MARADIAGA (MRN [id number]) as of 1/24/2017 10:33   Ref. Range 2/12/2015 09:00 6/6/2016 14:30 6/14/2016 12:00 9/30/2016 10:17   IgG - Serum Latest Ref Range: 650 - 1600 mg/dL 748  700 962   IgM Latest Ref Range: 50 - 300 mg/dL 120  124    IgA Latest Ref Range: 40 - 350 mg/dL 188  170    IgE Latest Ref Range: 0 - 100 IU/mL   <35    IgG 1 Latest Ref Range: 490 - 1140 mg/dL   516 656   IgG 2 Latest Ref Range: 150 - 640 mg/dL   153 249   IgG 3 Latest Ref Range: 11 - 85 mg/dL   11 15   IgG 4 Latest Ref Range: 3 - 201 mg/dL   20 20   Results for SILVIA MARADIAGA (MRN [id number]) as of 1/24/2017 10:33   Ref. Range 9/30/2016 10:17   WBC Latest Ref Range: 3.90 - 12.70 K/uL 19.58 (H)   RBC Latest Ref Range: 4.00 - 5.40 M/uL 5.12   Hemoglobin Latest Ref Range: 12.0 - 16.0 g/dL 15.5   Hematocrit Latest Ref Range: 37.0 - 48.5 % 44.8   MCV Latest Ref Range: 82 - 98 fL 88   MCH Latest Ref Range: 27.0 - 31.0 pg 30.3   MCHC Latest Ref Range: 32.0 - 36.0 % 34.6   RDW Latest Ref Range: 11.5 - 14.5 % 14.2   Platelets Latest Ref Range: 150 - 350 K/uL 229   MPV Latest Ref Range: 9.2 - 12.9 fL  11.7   Gran% Latest Ref Range: 38.0 - 73.0 % 59.0   Lymph% Latest Ref Range: 18.0 - 48.0 % 31.0   Lymph # Latest Ref Range: 1.0 - 4.8 K/uL CANCELED   Mono% Latest Ref Range: 4.0 - 15.0 % 4.0   Mono # Latest Ref Range: 0.3 - 1.0 K/uL CANCELED   Eosinophil% Latest Ref Range: 0.0 - 8.0 % 6.0   Eos # Latest Ref Range: 0.0 - 0.5 K/uL CANCELED   Basophil% Latest Ref Range: 0.0 - 1.9 % 0.0   Baso # Latest Ref Range: 0.00 - 0.20 K/uL CANCELED       PFT will be done and results to be reviewed     Plan:  Clinical impression is resonably certain and repeated evaluation prn +/- follow up will be needed as below.    Yodit was seen today for cough and sputum production.    Diagnoses and all orders for this visit:    Severe persistent asthma without complication  -     acetaminophen-codeine 300-30mg (TYLENOL #3) 300-30 mg Tab; Take 1 tablet by mouth 4 (four) times a week.        Follow-up if symptoms worsen or fail to improve.    Discussed with patient above for education the following:      Weight loss mechanism SG -- SG should not be considered as only a restrictive option for the treatment of severe obesity. It is now a single-stage primary operation, rather than just a bridge procedure to a more technically challenging gastric bypass or biliopancreatic diversion in high-risk patients [24,31]. Moreover, the hormonal changes it causes indicate that its success is not only due to restricted food intake. Ghrelin levels decrease and GLP-1 and PYY levels increase, promoting less hunger [32], while insulin resistance improves and aids glycemic control [33].         Consider sleeve resection?    Use codiene for cough as violent.    Suggest therapy for eosinophilic asthma, asthma is severe based on prednisone use.    You should not return to work with your severe asthma- you have hypersensitivity to irritants and have immune deficiency.  Your asthma is same, immune weakness is better with current therapy.

## 2018-02-22 NOTE — PATIENT INSTRUCTIONS
Weight loss mechanism SG -- SG should not be considered as only a restrictive option for the treatment of severe obesity. It is now a single-stage primary operation, rather than just a bridge procedure to a more technically challenging gastric bypass or biliopancreatic diversion in high-risk patients [24,31]. Moreover, the hormonal changes it causes indicate that its success is not only due to restricted food intake. Ghrelin levels decrease and GLP-1 and PYY levels increase, promoting less hunger [32], while insulin resistance improves and aids glycemic control [33].         Consider sleeve resection?    Use codiene for cough as violent.    Suggest therapy for eosinophilic asthma, asthma is severe based on prednisone use.    You should not return to work with your severe asthma- you have hypersensitivity to irritants and have immune deficiency.  Your asthma is same, immune weakness is better with current therapy.

## 2018-02-27 ENCOUNTER — HOSPITAL ENCOUNTER (OUTPATIENT)
Dept: RADIOLOGY | Facility: HOSPITAL | Age: 58
Discharge: HOME | End: 2018-02-27
Attending: NURSE PRACTITIONER
Payer: COMMERCIAL

## 2018-02-27 ENCOUNTER — OFFICE VISIT (OUTPATIENT)
Dept: ALLERGY | Facility: CLINIC | Age: 58
End: 2018-02-27
Payer: COMMERCIAL

## 2018-02-27 ENCOUNTER — OFFICE VISIT (OUTPATIENT)
Dept: FAMILY MEDICINE | Facility: CLINIC | Age: 58
End: 2018-02-27
Payer: COMMERCIAL

## 2018-02-27 VITALS — HEART RATE: 100 BPM | OXYGEN SATURATION: 97 % | HEIGHT: 63 IN | WEIGHT: 220.88 LBS | BODY MASS INDEX: 39.14 KG/M2

## 2018-02-27 VITALS
WEIGHT: 220 LBS | OXYGEN SATURATION: 97 % | DIASTOLIC BLOOD PRESSURE: 80 MMHG | HEIGHT: 62 IN | SYSTOLIC BLOOD PRESSURE: 127 MMHG | BODY MASS INDEX: 40.48 KG/M2 | TEMPERATURE: 98 F | HEART RATE: 66 BPM

## 2018-02-27 DIAGNOSIS — R06.02 SOB (SHORTNESS OF BREATH): ICD-10-CM

## 2018-02-27 DIAGNOSIS — R07.1 PAINFUL BREATHING: Primary | ICD-10-CM

## 2018-02-27 DIAGNOSIS — J31.0 OTHER CHRONIC RHINITIS: ICD-10-CM

## 2018-02-27 DIAGNOSIS — R07.1 PAINFUL BREATHING: ICD-10-CM

## 2018-02-27 DIAGNOSIS — J45.50 SEVERE PERSISTENT ASTHMA WITHOUT COMPLICATION: ICD-10-CM

## 2018-02-27 DIAGNOSIS — J32.9 RECURRENT SINUS INFECTIONS: ICD-10-CM

## 2018-02-27 DIAGNOSIS — D80.6 ANTI-POLYSACCHARIDE ANTIBODY DEFICIENCY: Primary | ICD-10-CM

## 2018-02-27 PROCEDURE — 3008F BODY MASS INDEX DOCD: CPT | Mod: S$GLB,,, | Performed by: ALLERGY & IMMUNOLOGY

## 2018-02-27 PROCEDURE — 3074F SYST BP LT 130 MM HG: CPT | Mod: S$GLB,,, | Performed by: NURSE PRACTITIONER

## 2018-02-27 PROCEDURE — 99213 OFFICE O/P EST LOW 20 MIN: CPT | Mod: S$GLB,,, | Performed by: NURSE PRACTITIONER

## 2018-02-27 PROCEDURE — 3079F DIAST BP 80-89 MM HG: CPT | Mod: S$GLB,,, | Performed by: NURSE PRACTITIONER

## 2018-02-27 PROCEDURE — 71046 X-RAY EXAM CHEST 2 VIEWS: CPT | Mod: 26,,, | Performed by: RADIOLOGY

## 2018-02-27 PROCEDURE — 99999 PR PBB SHADOW E&M-EST. PATIENT-LVL III: CPT | Mod: PBBFAC,,, | Performed by: ALLERGY & IMMUNOLOGY

## 2018-02-27 PROCEDURE — 99999 PR PBB SHADOW E&M-EST. PATIENT-LVL V: CPT | Mod: PBBFAC,,, | Performed by: NURSE PRACTITIONER

## 2018-02-27 PROCEDURE — 99214 OFFICE O/P EST MOD 30 MIN: CPT | Mod: S$GLB,,, | Performed by: ALLERGY & IMMUNOLOGY

## 2018-02-27 PROCEDURE — 71046 X-RAY EXAM CHEST 2 VIEWS: CPT | Mod: TC,PO

## 2018-02-27 RX ORDER — TRAMADOL HYDROCHLORIDE 50 MG/1
50 TABLET ORAL EVERY 8 HOURS PRN
Qty: 15 TABLET | Refills: 0 | Status: SHIPPED | OUTPATIENT
Start: 2018-02-27 | End: 2018-03-04

## 2018-02-27 NOTE — PROGRESS NOTES
Subjective:       Patient ID: Yodit Canseco is a 57 y.o. female.    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  She was last seen 7/18/17.  She had labs drawn - CBC was normal, lymphocyte profile showed low NK cells. IgA normal at 170, IgM normal at 124, IgE ,35, IgG normal at 700 but had steadily been decreasing, IgG subclasses normal and humoral panel protected to H flu, diphtheria, tetanus and 8/14 strep serotypes but this is after pneumovax and Prevnar so inadequate protection.  She still had constant PND and congestion and got frequent infections with sinus, ear, bronchitis and even pneumonia. She was on antibiotics every 2 months. She cannot use nose sprays all cause nose bleed even Astelin. All antihistamines cause sedation per pt. She is on Singulair daily. She started Hizentra 10 g weekly and then in October 2016 changed to 20 g every 2 weeks. She has done well with infusions. She has itching after and takes benadryl after with resolution. Never tried taking before. She did have bronchitis in September 2017 and flu and sinuitis in Jan 2018.  She has had no pneumonia since on Hizentra. She has had asthma issues and on steroids so Dr Gary plans for Nucala. she is also working up for weight loss surgery.      Recent labs 2/2018: IgG 1292, IgG subclasses normal and CBC and CMP normal    Prior History taken 1/13/14: new patient evaluation of rash and chronic PND.  She has been seen about 4 years ago and had labs drawn with normal immunoglobulins, humoral panel only protected to 4/14 strep titers despite prior pneumovax and immunocaps negative to dust mites, [place], [place], cocoa, garlic, milk, wheat, rye, pecan, and peanut.  She was advised to get pneumovax and follow up but she did not.  She continues with chronic PND and congestion, no  runny nose or sneeze and she gets inus infections 3-4 times per year requiring antibiotics.  She does clear with anbx but never gets rid of PND.  Her main question now is she gets a random rash on hands, feet and nose.  It seems to appear out of no where, is very itchy and red spots then goes away.  Lasts weeks to months.  She has seen derm and not sure of cause and she thinks may be food allergy although cannot identify a food.    She also has asthma which is well controlled on Dulera and Singulair.  Only uses albuterol with colds.      Rash  Associated symptoms include congestion and coughing. Pertinent negatives include no diarrhea, fatigue, fever, rhinorrhea, shortness of breath, sore throat or vomiting.       Environmental History: Pets in the home: none.  see history section for home environment  Review of Systems   Constitutional: Negative for fever, chills, appetite change and fatigue.   HENT: Positive for congestion and postnasal drip. Negative for ear pain, nosebleeds, sore throat, facial swelling, rhinorrhea, sneezing, trouble swallowing, voice change, sinus pressure and ear discharge.    Eyes: Negative for discharge, redness, itching and visual disturbance.   Respiratory: Positive for cough and wheezing. Negative for choking, chest tightness and shortness of breath.    Cardiovascular: Negative for chest pain, palpitations and leg swelling.   Gastrointestinal: Negative for nausea, vomiting, abdominal pain, diarrhea, constipation and abdominal distention.   Genitourinary: Negative for difficulty urinating.   Musculoskeletal: Negative for myalgias, joint swelling, arthralgias and gait problem.   Skin: Positive for rash. Negative for color change.   Neurological: Negative for dizziness, syncope, weakness, light-headedness and headaches.   Hematological: Negative for adenopathy. Does not bruise/bleed easily.   Psychiatric/Behavioral: Negative for behavioral problems, confusion, sleep disturbance and  agitation. The patient is not nervous/anxious.         Objective:    Physical Exam   Nursing note and vitals reviewed.  Constitutional: She is oriented to person, place, and time. She appears well-developed and well-nourished. No distress.   HENT:   Head: Normocephalic and atraumatic.   Right Ear: Hearing, tympanic membrane, external ear and ear canal normal.   Left Ear: Hearing, tympanic membrane, external ear and ear canal normal.   Nose: No mucosal edema, rhinorrhea, sinus tenderness or septal deviation. No epistaxis. Right sinus exhibits no maxillary sinus tenderness and no frontal sinus tenderness. Left sinus exhibits no maxillary sinus tenderness and no frontal sinus tenderness.   Mouth/Throat: Uvula is midline, oropharynx is clear and moist and mucous membranes are normal. No uvula swelling.   Eyes: Conjunctivae normal are normal. Right eye exhibits no discharge. Left eye exhibits no discharge.   Neck: Normal range of motion. No thyromegaly present.   Cardiovascular: Normal rate, regular rhythm and normal heart sounds.    No murmur heard.  Pulmonary/Chest: Effort normal and breath sounds normal. No respiratory distress. She has no wheezes.   Abdominal: Soft. She exhibits no distension. There is no tenderness.   Musculoskeletal: Normal range of motion. She exhibits no edema and no tenderness.   Lymphadenopathy:     She has no cervical adenopathy.   Neurological: She is alert and oriented to person, place, and time.   Skin: Skin is warm and dry. No rash noted. No erythema.   Psychiatric: She has a normal mood and affect. Her behavior is normal. Judgment and thought content normal.       Laboratory:   none performed   Assessment:       1. Anti-polysaccharide antibody deficiency    2. Recurrent sinus infections    3. Other chronic rhinitis    4. Mild intermittent asthma without complication         Plan:       1. continue Hizentra 20 g every 2 weeks. Recheck labs in Feb 2019. Pt stated Dr beltre wants her to  consider gastric sleeve to improve lung function, cardiac as well as prevent other co morbidities. Advised her that with weight loss IgG dose could decrease so would be less medication jimmie as well   2. Continue Singulair daily

## 2018-02-27 NOTE — PROGRESS NOTES
Subjective:       Patient ID: Yodit Canseco is a 57 y.o. female.    Chief Complaint: Pleurisy (possible)    Shortness of Breath   This is a new problem. The current episode started in the past 7 days. The problem occurs daily. The problem has been unchanged. Associated symptoms include chest pain (with deep breaths). Pertinent negatives include no abdominal pain, claudication, coryza, ear pain, fever, headaches, hemoptysis, leg pain, leg swelling, neck pain, orthopnea, PND, rash, rhinorrhea, sore throat, sputum production, swollen glands, syncope, vomiting or wheezing. Nothing aggravates the symptoms. The patient has no known risk factors for DVT/PE. She has tried beta agonist inhalers and steroid inhalers (Pt states completed steroid 2 d ago and completed levaquin today. Has levaquin refills and states has steroids at home per pulmonologist. States informed pulmonologist of symptoms 2 d ago and told due to coughing. No coughing witnessed at visit today) for the symptoms. The treatment provided no relief. Her past medical history is significant for chronic lung disease and COPD. There is no history of allergies, aspirin allergies, asthma, bronchiolitis, CAD, DVT, a heart failure, PE, pneumonia or a recent surgery. (Pt states feels like pleurisy; has had multiple times in the past. )     Past Medical History:   Diagnosis Date    Allergy     Angio-edema     Arthralgia     Asthma without status asthmaticus     Bronchitis     Diverticulosis of large intestine without hemorrhage 10/8/2015    Environmental allergies     Gastroparesis     Hand arthritis     Herpes simplex without mention of complication     oral    Hyperlipidemia     Hypothyroidism     LBP radiating to left leg     Leukocytosis, unspecified     Migraine headache     Obesity, Class III, BMI 40-49.9 (morbid obesity)     Periorbital cellulitis 12/31/2016    Prediabetes     Recurrent upper respiratory infection (URI)     S/P  colonoscopy November 2010    Urticaria      Social History     Social History    Marital status:      Spouse name: N/A    Number of children: N/A    Years of education: N/A     Occupational History    Not on file.     Social History Main Topics    Smoking status: Never Smoker    Smokeless tobacco: Never Used    Alcohol use 0.0 oz/week      Comment: very rarely    Drug use: No    Sexual activity: Yes     Partners: Male     Social History Narrative    . Disabled teacher.     Past Surgical History:   Procedure Laterality Date    ADENOIDECTOMY      CHOLECYSTECTOMY      COLONOSCOPY  2010    COLONOSCOPY N/A 10/8/2015    Procedure: COLONOSCOPY;  Surgeon: Panda Bose MD;  Location: [facility];  Service: Endoscopy;  Laterality: N/A;    Hand fracture surgery      HARDWARE REMOVAL      left hand 5th MC    HYSTERECTOMY      menorrhagia-Ovaries intact    TONSILLECTOMY      Urethral dilatation         Review of Systems   Constitutional: Negative.  Negative for fever.   HENT: Negative.  Negative for ear pain, rhinorrhea and sore throat.    Eyes: Negative.    Respiratory: Positive for chest tightness (with deep breathing) and shortness of breath. Negative for hemoptysis, sputum production and wheezing.    Cardiovascular: Positive for chest pain (with deep breaths). Negative for orthopnea, claudication, leg swelling, syncope and PND.   Gastrointestinal: Negative.  Negative for abdominal pain and vomiting.   Endocrine: Negative.    Genitourinary: Negative.    Musculoskeletal: Negative.  Negative for neck pain.   Skin: Negative.  Negative for rash.   Allergic/Immunologic: Negative.    Neurological: Negative.  Negative for headaches.   Psychiatric/Behavioral: Negative.        Objective:      Physical Exam   Constitutional: She is oriented to person, place, and time. She appears well-developed and well-nourished.   HENT:   Head: Normocephalic.   Right Ear: External ear normal.   Left Ear: External ear  normal.   Mouth/Throat: Oropharynx is clear and moist.   Eyes: Conjunctivae are normal. Pupils are equal, round, and reactive to light.   Neck: Normal range of motion. Neck supple.   Cardiovascular: Normal rate, regular rhythm and normal heart sounds.    Pulmonary/Chest: Effort normal and breath sounds normal.   Abdominal: Soft. Bowel sounds are normal.   Musculoskeletal: Normal range of motion.   Neurological: She is alert and oriented to person, place, and time.   Skin: Skin is warm and dry. Capillary refill takes 2 to 3 seconds.   Psychiatric: She has a normal mood and affect. Her behavior is normal. Judgment and thought content normal.   Nursing note and vitals reviewed.      Assessment:       1. Painful breathing    2. SOB (shortness of breath)        Plan:           Yodit was seen today for pleurisy.    Diagnoses and all orders for this visit:    Painful breathing  SOB (shortness of breath)  -     X-Ray Chest PA And Lateral; Future  -     CBC auto differential; Future  -     D dimer, quantitative; Future  Ultram request sent to PCP to approve  Report to ER immediately if symptoms worsen

## 2018-02-27 NOTE — TELEPHONE ENCOUNTER
Pain to ribs with deep breathing; given tylenol 3 by pulmonologist, but states would like Ultram.

## 2018-03-02 ENCOUNTER — OFFICE VISIT (OUTPATIENT)
Dept: BARIATRICS | Facility: CLINIC | Age: 58
End: 2018-03-02
Payer: COMMERCIAL

## 2018-03-02 VITALS
BODY MASS INDEX: 39.86 KG/M2 | TEMPERATURE: 98 F | HEART RATE: 71 BPM | DIASTOLIC BLOOD PRESSURE: 80 MMHG | WEIGHT: 216.63 LBS | RESPIRATION RATE: 16 BRPM | HEIGHT: 62 IN | SYSTOLIC BLOOD PRESSURE: 138 MMHG

## 2018-03-02 DIAGNOSIS — E66.01 OBESITY, CLASS III, BMI 40-49.9 (MORBID OBESITY): ICD-10-CM

## 2018-03-02 DIAGNOSIS — E66.01 MORBID OBESITY: Primary | ICD-10-CM

## 2018-03-02 DIAGNOSIS — E78.2 MIXED HYPERLIPIDEMIA: ICD-10-CM

## 2018-03-02 PROCEDURE — 99999 PR PBB SHADOW E&M-EST. PATIENT-LVL III: CPT | Mod: PBBFAC,,, | Performed by: SURGERY

## 2018-03-02 PROCEDURE — 99213 OFFICE O/P EST LOW 20 MIN: CPT | Mod: S$GLB,,, | Performed by: SURGERY

## 2018-03-02 PROCEDURE — 3075F SYST BP GE 130 - 139MM HG: CPT | Mod: S$GLB,,, | Performed by: SURGERY

## 2018-03-02 PROCEDURE — 3079F DIAST BP 80-89 MM HG: CPT | Mod: S$GLB,,, | Performed by: SURGERY

## 2018-03-05 ENCOUNTER — TELEPHONE (OUTPATIENT)
Dept: PULMONOLOGY | Facility: CLINIC | Age: 58
End: 2018-03-05

## 2018-03-05 NOTE — TELEPHONE ENCOUNTER
Left a message for the patient to call us back.    ----- Message from Clau Wang sent at 3/2/2018  3:54 PM CST -----  Contact: patient   Patient calling to speak to the Nurse about a prescription. Please advise. Call to pod. No answer.   Call back    Thanks!

## 2018-03-08 DIAGNOSIS — J82.83 EOSINOPHILIC ASTHMA: ICD-10-CM

## 2018-03-08 DIAGNOSIS — J45.50 SEVERE PERSISTENT ASTHMA WITHOUT COMPLICATION: Primary | ICD-10-CM

## 2018-03-09 ENCOUNTER — TELEPHONE (OUTPATIENT)
Dept: PHARMACY | Facility: CLINIC | Age: 58
End: 2018-03-09

## 2018-03-15 ENCOUNTER — OFFICE VISIT (OUTPATIENT)
Dept: CARDIOLOGY | Facility: CLINIC | Age: 58
End: 2018-03-15
Payer: COMMERCIAL

## 2018-03-15 VITALS
BODY MASS INDEX: 40.7 KG/M2 | HEART RATE: 99 BPM | DIASTOLIC BLOOD PRESSURE: 78 MMHG | HEIGHT: 62 IN | WEIGHT: 221.19 LBS | SYSTOLIC BLOOD PRESSURE: 138 MMHG

## 2018-03-15 DIAGNOSIS — Z82.49 FAMILY HISTORY OF ASCVD (ARTERIOSCLEROTIC CARDIOVASCULAR DISEASE): ICD-10-CM

## 2018-03-15 DIAGNOSIS — E66.01 OBESITY, CLASS III, BMI 40-49.9 (MORBID OBESITY): ICD-10-CM

## 2018-03-15 DIAGNOSIS — E78.2 MIXED HYPERLIPIDEMIA: Primary | ICD-10-CM

## 2018-03-15 DIAGNOSIS — Z01.810 PREOP CARDIOVASCULAR EXAM: ICD-10-CM

## 2018-03-15 PROCEDURE — 3075F SYST BP GE 130 - 139MM HG: CPT | Mod: CPTII,S$GLB,, | Performed by: INTERNAL MEDICINE

## 2018-03-15 PROCEDURE — 99999 PR PBB SHADOW E&M-EST. PATIENT-LVL IV: CPT | Mod: PBBFAC,,, | Performed by: INTERNAL MEDICINE

## 2018-03-15 PROCEDURE — 3078F DIAST BP <80 MM HG: CPT | Mod: CPTII,S$GLB,, | Performed by: INTERNAL MEDICINE

## 2018-03-15 PROCEDURE — 99214 OFFICE O/P EST MOD 30 MIN: CPT | Mod: S$GLB,,, | Performed by: INTERNAL MEDICINE

## 2018-03-15 NOTE — PROGRESS NOTES
Subjective:    Patient ID:  Yodit Canseco is a 57 y.o. female who presents for evaluation of new pt (new pt)      HPI 58 yo WF with obesity , HLD and asthma planning bariatric surgery. Had cardiac work up last year that was normal. Denies chest pain, SOB, but gets some lower extremity edema. Denies palpitations, weak spells, and syncope    CONCLUSIONS     1 - Concentric hypertrophy.     2 - No wall motion abnormalities.     3 - Hyperdynamic left ventricular systolic function (EF 65-70%).     4 - Normal left ventricular diastolic function.     5 - Right ventricular enlargement with hyperdynamic systolic function.     6 - The estimated PA systolic pressure is 21 mmHg.             This document has been electronically    SIGNED BY: Mando Nava MD On: 06/26/2017 12:16    Impression: NORMAL MYOCARDIAL PERFUSION  1. The perfusion scan is free of evidence for myocardial ischemia or injury.   2. There is a mild to moderate intensity fixed defect in the anterobasilar wall of the left ventricle, secondary to breast attenuation.   3. Resting wall motion is physiologic.   4. Resting LV function is normal.  (normal is 55 - 69)  5. The ventricular volumes are normal at rest and stress.   6. The extracardiac distribution of radioactivity is normal.           This document has been electronically    SIGNED BY: Mando Nava MD On: 06/26/2017 18:41    Review of Systems   Constitution: Negative for decreased appetite, fever, weakness, malaise/fatigue, weight gain and weight loss.   HENT: Negative for hearing loss and nosebleeds.    Eyes: Negative for visual disturbance.   Cardiovascular: Negative for chest pain, claudication, cyanosis, dyspnea on exertion, irregular heartbeat, leg swelling, near-syncope, orthopnea, palpitations, paroxysmal nocturnal dyspnea and syncope.   Respiratory: Positive for wheezing. Negative for cough, hemoptysis, shortness of breath, sleep disturbances due to breathing and snoring.    Endocrine: Negative  "for cold intolerance, heat intolerance, polydipsia and polyuria.   Hematologic/Lymphatic: Negative for adenopathy and bleeding problem. Does not bruise/bleed easily.   Skin: Negative for color change, itching, poor wound healing, rash and suspicious lesions.   Musculoskeletal: Negative for arthritis, back pain, falls, joint pain, joint swelling, muscle cramps, muscle weakness and myalgias.   Gastrointestinal: Negative for bloating, abdominal pain, change in bowel habit, constipation, flatus, heartburn, hematemesis, hematochezia, hemorrhoids, jaundice, melena, nausea and vomiting.   Genitourinary: Negative for bladder incontinence, decreased libido, frequency, hematuria, hesitancy and urgency.   Neurological: Negative for brief paralysis, difficulty with concentration, excessive daytime sleepiness, dizziness, focal weakness, headaches, light-headedness, loss of balance, numbness and vertigo.   Psychiatric/Behavioral: Negative for altered mental status, depression and memory loss. The patient does not have insomnia and is not nervous/anxious.    Allergic/Immunologic: Negative for environmental allergies, hives and persistent infections.        Objective:    Physical Exam   Constitutional: She is oriented to person, place, and time. She appears well-developed and well-nourished.   /78   Pulse 99   Ht 5' 1.5" (1.562 m)   Wt 100.3 kg (221 lb 3.2 oz)   BMI 41.12 kg/m²      HENT:   Head: Normocephalic and atraumatic.   Right Ear: External ear normal.   Left Ear: External ear normal.   Nose: Nose normal.   Mouth/Throat: Oropharynx is clear and moist.   Eyes: Conjunctivae, EOM and lids are normal. Pupils are equal, round, and reactive to light. Right eye exhibits no discharge. Left eye exhibits no discharge. Right conjunctiva has no hemorrhage. No scleral icterus.   Neck: Normal range of motion. Neck supple. No JVD present. No tracheal deviation present. No thyromegaly present.   Cardiovascular: Normal rate, " regular rhythm, normal heart sounds and intact distal pulses.  Exam reveals no gallop and no friction rub.    No murmur heard.  Pulmonary/Chest: Effort normal and breath sounds normal. No respiratory distress. She has no wheezes. She has no rales. She exhibits no tenderness. Breasts are symmetrical.   Abdominal: Soft. Bowel sounds are normal. She exhibits no distension and no mass. There is no hepatosplenomegaly or hepatomegaly. There is no tenderness. There is no rebound and no guarding.   Musculoskeletal: Normal range of motion. She exhibits no edema or tenderness.   Lymphadenopathy:     She has no cervical adenopathy.   Neurological: She is alert and oriented to person, place, and time. She displays normal reflexes. No cranial nerve deficit. Coordination normal.   Skin: Skin is warm and dry. No rash noted. No erythema. No pallor.   Psychiatric: She has a normal mood and affect. Her behavior is normal. Judgment and thought content normal.   Nursing note and vitals reviewed.        Assessment:       1. Mixed hyperlipidemia    2. Obesity, Class III, BMI 40-49.9 (morbid obesity)    3. Family history of ASCVD (arteriosclerotic cardiovascular disease)    4. Preop cardiovascular exam         Plan:     No know cardiac disease    There are no absolute contraindications to surgery from cardiac standpoint and would use routine precautions. No further cardiac testing required prior to surgery.   The current medical regimen is effective;  continue present plan and medications.    Patient advised to modify risk factors such as weight, exercise, diet,  tobacco and alcohol exposure   She sees primary on regular basis    No orders of the defined types were placed in this encounter.    Follow-up if symptoms worsen or fail to improve.

## 2018-03-19 ENCOUNTER — CLINICAL SUPPORT (OUTPATIENT)
Dept: BARIATRICS | Facility: CLINIC | Age: 58
End: 2018-03-19
Payer: COMMERCIAL

## 2018-03-19 ENCOUNTER — OFFICE VISIT (OUTPATIENT)
Dept: PULMONOLOGY | Facility: CLINIC | Age: 58
End: 2018-03-19
Payer: COMMERCIAL

## 2018-03-19 VITALS — HEIGHT: 62 IN | WEIGHT: 221.56 LBS | BODY MASS INDEX: 40.77 KG/M2

## 2018-03-19 VITALS
HEART RATE: 99 BPM | HEIGHT: 62 IN | DIASTOLIC BLOOD PRESSURE: 83 MMHG | WEIGHT: 220.88 LBS | BODY MASS INDEX: 40.65 KG/M2 | SYSTOLIC BLOOD PRESSURE: 137 MMHG | OXYGEN SATURATION: 96 %

## 2018-03-19 DIAGNOSIS — E03.9 HYPOTHYROIDISM, UNSPECIFIED TYPE: ICD-10-CM

## 2018-03-19 DIAGNOSIS — Z71.3 DIETARY COUNSELING: ICD-10-CM

## 2018-03-19 DIAGNOSIS — E66.01 MORBID OBESITY: ICD-10-CM

## 2018-03-19 DIAGNOSIS — J45.50 SEVERE PERSISTENT ASTHMA WITHOUT COMPLICATION: ICD-10-CM

## 2018-03-19 DIAGNOSIS — K31.84 GASTROPARESIS: ICD-10-CM

## 2018-03-19 DIAGNOSIS — J82.83 EOSINOPHILIC ASTHMA: ICD-10-CM

## 2018-03-19 DIAGNOSIS — R73.03 PREDIABETES: ICD-10-CM

## 2018-03-19 DIAGNOSIS — D84.9 IMMUNE DEFICIENCY DISORDER: Primary | ICD-10-CM

## 2018-03-19 DIAGNOSIS — E78.2 MIXED HYPERLIPIDEMIA: ICD-10-CM

## 2018-03-19 DIAGNOSIS — K57.30 DIVERTICULOSIS OF LARGE INTESTINE WITHOUT HEMORRHAGE: Chronic | ICD-10-CM

## 2018-03-19 PROCEDURE — 99214 OFFICE O/P EST MOD 30 MIN: CPT | Mod: S$GLB,,, | Performed by: INTERNAL MEDICINE

## 2018-03-19 PROCEDURE — 3079F DIAST BP 80-89 MM HG: CPT | Mod: CPTII,S$GLB,, | Performed by: INTERNAL MEDICINE

## 2018-03-19 PROCEDURE — 99999 PR PBB SHADOW E&M-EST. PATIENT-LVL III: CPT | Mod: PBBFAC,,, | Performed by: DIETITIAN, REGISTERED

## 2018-03-19 PROCEDURE — 99999 PR PBB SHADOW E&M-EST. PATIENT-LVL IV: CPT | Mod: PBBFAC,,, | Performed by: INTERNAL MEDICINE

## 2018-03-19 PROCEDURE — 97802 MEDICAL NUTRITION INDIV IN: CPT | Mod: S$GLB,,, | Performed by: DIETITIAN, REGISTERED

## 2018-03-19 PROCEDURE — 3075F SYST BP GE 130 - 139MM HG: CPT | Mod: CPTII,S$GLB,, | Performed by: INTERNAL MEDICINE

## 2018-03-19 NOTE — LETTER
[facility]  [address]  [place] [address]  Phone: [phone]  Fax: [phone]   3/19/2018      Dr Reese:    Yodit Canseco,  1960      Pt has immune deficiency with replacement ongoing, with no significant sinus/lung infections in recent past.  Pt has eosinophilic asthma that has been suppressed on treatment with no exacerbations of recent.  She has had prior pft's that were adequate and should have updated spirometry soon.  Pt has element of airway hyper reactivity with heighten response to irritants.       Yodit is in stable and optimal shape for Bariatric surgery and cleared from pulmonary perspective.        Thank You,         Erick Gary MD  Pulmonary Disease

## 2018-03-19 NOTE — TELEPHONE ENCOUNTER
[facility] received prescription for Fasenra.    Upon calling the patients insurance company, we have been told that this medication is not covered under the patients pharmacy benefits. [facility] is unable to bill medical claims for medications.     · This medication may be available under the patient's medical benefit with a prior authorization. The medication and the administration of the medication will both have to be billed under the medical benefit.   · Please contact [facility] with any questions at [phone]    Thank you,  Melody

## 2018-03-19 NOTE — PROGRESS NOTES
3/19/2018    Yodit Canseco  Office Note    Chief Complaint   Patient presents with    surgical clearance    Asthma     March 19, 2018-since recovered from pleuritic pain - doing well.  No prednisone use recent.  Pt has had no wheezes.    feb 22,2108   Had to do prednisone again.  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 I have advised not to work given severe asthma,  hypersensitivity to fragrances, and immune deficiency.   Pt seems better since Hirzentra, but still unstable severe asthma      Jan 29. 2018- 6 days ago had been exposed to sick , had nasal congestion , cough, ear stuffy, muscle aches /joint aches / fever.  Temp to 100.1.  Seen by np and flu screen negative.  Had asthma 3-4 flares last year. eos up past,     oct 5, 2017 - had marce in April, skipped couple doses hirzentra, had mild bronchitis once, otherwise doing very well.  No prednisone.  No side effects and covered. Ppt flu sense for 2 days        Jan 24, on  hirzentra q o week since sept and asthma more stable, no hosp, no prednisone, no noct asthma/ no rescue therapy- control not this good for years.        Sept 27,  HPI:has had lung problems since birth, no nocturnal arousals, uses rescue 2/d, breathing controlled satisfactory except with irritants/infection - strong abx needed to clear.  Prednisone 2/yr, last hosp 18 months.  No ventilator.        Had exacerbation recent due uri from .  Has had shingles vaccine past.      The chief compliant  problem is stable   PFSH:  Past Medical History:   Diagnosis Date    Allergy     Angio-edema     Arthralgia     Asthma without status asthmaticus     Bronchitis     Diverticulosis of large intestine without hemorrhage 10/8/2015    Environmental allergies     Eosinophilic asthma 3/8/2018    Gastroparesis     Hand arthritis     Herpes simplex without mention of  complication     oral    Hyperlipidemia     Hypothyroidism     LBP radiating to left leg     Leukocytosis, unspecified     Migraine headache     Obesity, Class III, BMI 40-49.9 (morbid obesity)     Periorbital cellulitis 12/31/2016    Prediabetes     Recurrent upper respiratory infection (URI)     S/P colonoscopy November 2010    Urticaria          Past Surgical History:   Procedure Laterality Date    ADENOIDECTOMY      CHOLECYSTECTOMY      CHOLECYSTECTOMY      COLONOSCOPY  2010    COLONOSCOPY N/A 10/8/2015    Procedure: COLONOSCOPY;  Surgeon: Panda Bose MD;  Location[facility];  Service: Endoscopy;  Laterality: N/A;    Hand fracture surgery      HARDWARE REMOVAL      left hand 5th MC    HYSTERECTOMY      menorrhagia-Ovaries intact    TONSILLECTOMY      Urethral dilatation       Social History   Substance Use Topics    Smoking status: Never Smoker    Smokeless tobacco: Never Used    Alcohol use 0.0 oz/week      Comment: very rarely     Family History   Problem Relation Age of Onset    Melanoma Mother     Basal cell carcinoma Mother     Lung disease Mother      pulm htn    Cataracts Mother     Hypertension Mother     Lung cancer Father     Diabetes Father     Hypertension Father     Cancer Father     Diabetes Paternal Aunt     Colon cancer Paternal Aunt     Diabetes Paternal Aunt     Ovarian cancer Paternal Grandmother     Cancer Maternal Grandfather     Melanoma Maternal Aunt     Melanoma Maternal Uncle     Breast cancer Paternal Aunt     Lymphoma Paternal Aunt     Ulcerative colitis Son     Psoriasis Son     Psoriasis Son     Breast cancer Cousin     Allergic rhinitis Neg Hx     Allergies Neg Hx     Angioedema Neg Hx     Asthma Neg Hx     Atopy Neg Hx     Eczema Neg Hx     Immunodeficiency Neg Hx     Rhinitis Neg Hx     Urticaria Neg Hx      Review of patient's allergies indicates:   Allergen Reactions    Sudafed [pseudoephedrine hcl] Other (See Comments)  "    Does not want to wake up .    Amoxicillin-pot clavulanate      Other reaction(s): Itching    Ephedrine      Other reaction(s): comatose    Hydrocodone Itching    Iodinated contrast media - iv dye     Iodine and iodide containing products Other (See Comments)    Pantoprazole      Other reaction(s): upset stomach/halitosis    Penicillins      Other reaction(s): does not work on pt symptoms    Percocet [oxycodone-acetaminophen] Itching    Barium iodide Rash       Performance Status:The patient's activity level is functions out of house.  No irratent exposure. Sedatary.  To start threadmil    Review of Systems:  a review of eleven systems covering constitutional, Eye, HEENT, Psych, Respiratory, Cardiac, GI, , Musculoskeletal, Endocrine, Dermatologic was negative except for pertinent findings as listed ABOVE and below: all good except for sinus into lungs and profound irratent sensitive. on thryoid     Exam:Comprehensive exam done.   /83 (BP Location: Left arm, Patient Position: Sitting)   Pulse 99   Ht 5' 1.5" (1.562 m)   Wt 100.2 kg (220 lb 14.4 oz)   SpO2 96%   BMI 41.06 kg/m²   Exam included Vitals as listed, and patient's appearance and affect and alertness and mood, oral exam for yeast and hygiene and pharynx lesions and Mallapatti (M) score, neck with inspection for jvd and masses and thyroid abnormalities and lymph nodes (supraclavicular and infraclavicular nodes and axillary also examined and noted if abn), chest exam included symmetry and effort and fremitus and percussion and auscultation, cardiac exam included rhythm and gallops and murmur and rubs and jvd and edema, abdominal exam for mass and hepatosplenomegaly and tenderness and hernias and bowel sounds, Musculoskeletal exam with muscle tone and posture and mobility/gait and  strength, and skin for rashes and cyanosis and pallor and turgor, extremity for clubbing.  Findings were normal except for pertinent findings listed " below:  M4, faint wheezes scattered and no edema.bmi  41    Radiographs reviewed: view by direct vision March 2015 cxr good,    Labs reviewed from last 6 months on EPIC result review   eso up     Results for SILVIA MARADIAGA (MRN [id number]) as of 1/24/2017 10:33   Ref. Range 6/14/2016 12:00 9/13/2016 10:24 9/30/2016 10:17   Diphtheria Toxoid Ab Latest Ref Range: >0.099 IU/mL >3.000  2.566   Tetanus Ab Latest Ref Range: >0.150 IU/mL 6.373  4.029   S.pneumoniae Type 1 Latest Units: mcg/mL 1.7  2.0   S.pneumoniae Type 3 Latest Units: mcg/mL 0.7  1.0   Strep pneumo Type 4 Latest Units: mcg/mL 0.6  0.5   S.pneumoniae Type 5 Latest Units: mcg/mL 2.0  2.4   S.pneumoniae Type 6B Latest Units: mcg/mL 10.9  7.7   S.pneumoniae Type 7F Latest Units: mcg/mL 2.0  1.8   S.pneumoniae Type 8 Latest Units: mcg/mL 1.6  1.6   S.pneumoniae Type 9N Latest Units: mcg/mL 0.3  0.7   S.pneumoniae Type 9V Abs Latest Units: mcg/mL 0.5  0.8   S.pneumoniae Type 12F Latest Units: mcg/mL <0.3  0.3   Strep pneumo Type 14 Latest Units: mcg/mL 1.9  3.1   S.pneumoniae Type 18C Latest Units: mcg/mL 5.5  4.0   S.pneumoniae Type 19F Latest Units: mcg/mL 0.4  1.5   S.pneumoniae Type 23F Latest Units: mcg/mL 4.1  2.8   Results for SILVIA MARADIAGA (MRN [id number]) as of 1/24/2017 10:33   Ref. Range 2/12/2015 09:00 6/6/2016 14:30 6/14/2016 12:00 9/30/2016 10:17   IgG - Serum Latest Ref Range: 650 - 1600 mg/dL 748  700 962   IgM Latest Ref Range: 50 - 300 mg/dL 120  124    IgA Latest Ref Range: 40 - 350 mg/dL 188  170    IgE Latest Ref Range: 0 - 100 IU/mL   <35    IgG 1 Latest Ref Range: 490 - 1140 mg/dL   516 656   IgG 2 Latest Ref Range: 150 - 640 mg/dL   153 249   IgG 3 Latest Ref Range: 11 - 85 mg/dL   11 15   IgG 4 Latest Ref Range: 3 - 201 mg/dL   20 20   Results for SILVIA MARADIAGA (MRN [id number]) as of 1/24/2017 10:33   Ref. Range 9/30/2016 10:17   WBC Latest Ref Range: 3.90 - 12.70 K/uL 19.58 (H)   RBC Latest Ref Range: 4.00 - 5.40 M/uL 5.12    Hemoglobin Latest Ref Range: 12.0 - 16.0 g/dL 15.5   Hematocrit Latest Ref Range: 37.0 - 48.5 % 44.8   MCV Latest Ref Range: 82 - 98 fL 88   MCH Latest Ref Range: 27.0 - 31.0 pg 30.3   MCHC Latest Ref Range: 32.0 - 36.0 % 34.6   RDW Latest Ref Range: 11.5 - 14.5 % 14.2   Platelets Latest Ref Range: 150 - 350 K/uL 229   MPV Latest Ref Range: 9.2 - 12.9 fL 11.7   Gran% Latest Ref Range: 38.0 - 73.0 % 59.0   Lymph% Latest Ref Range: 18.0 - 48.0 % 31.0   Lymph # Latest Ref Range: 1.0 - 4.8 K/uL CANCELED   Mono% Latest Ref Range: 4.0 - 15.0 % 4.0   Mono # Latest Ref Range: 0.3 - 1.0 K/uL CANCELED   Eosinophil% Latest Ref Range: 0.0 - 8.0 % 6.0   Eos # Latest Ref Range: 0.0 - 0.5 K/uL CANCELED   Basophil% Latest Ref Range: 0.0 - 1.9 % 0.0   Baso # Latest Ref Range: 0.00 - 0.20 K/uL CANCELED       PFT will be done and results to be reviewed     Plan:  Clinical impression is resonably certain and repeated evaluation prn +/- follow up will be needed as below.    Yodit was seen today for surgical clearance and asthma.    Diagnoses and all orders for this visit:    Immune deficiency disorder    Severe persistent asthma without complication  -     Spirometry with/without bronchodilator; Future    Eosinophilic asthma  -     Spirometry with/without bronchodilator; Future    Morbid obesity        Follow-up in about 3 months (around 6/19/2018), or if symptoms worsen or fail to improve.    Discussed with patient above for education the following:        Eosinophilic asthma in backgound.  Need bariatric surgery.      Continue immune shots.    Continue - dulera and singulair.    Use prednisone if needed for exacerbations.  abx prn also.    Should have spirometry.

## 2018-03-19 NOTE — PATIENT INSTRUCTIONS
Eosinophilic asthma in backgound.  Need bariatric surgery.      Continue immune shots.    Continue - dulera and singulair.    Use prednisone if needed for exacerbations.  abx prn also.    Should have spirometry.

## 2018-03-20 DIAGNOSIS — J82.83 EOSINOPHILIC ASTHMA: Primary | ICD-10-CM

## 2018-03-21 ENCOUNTER — OFFICE VISIT (OUTPATIENT)
Dept: FAMILY MEDICINE | Facility: CLINIC | Age: 58
End: 2018-03-21
Payer: COMMERCIAL

## 2018-03-21 ENCOUNTER — TELEPHONE (OUTPATIENT)
Dept: BARIATRICS | Facility: CLINIC | Age: 58
End: 2018-03-21

## 2018-03-21 VITALS
BODY MASS INDEX: 38.98 KG/M2 | SYSTOLIC BLOOD PRESSURE: 131 MMHG | HEART RATE: 88 BPM | DIASTOLIC BLOOD PRESSURE: 77 MMHG | HEIGHT: 63 IN | TEMPERATURE: 98 F | WEIGHT: 220 LBS

## 2018-03-21 DIAGNOSIS — Z01.818 PRE-OPERATIVE EXAMINATION: ICD-10-CM

## 2018-03-21 DIAGNOSIS — H60.501 ACUTE OTITIS EXTERNA OF RIGHT EAR, UNSPECIFIED TYPE: Primary | ICD-10-CM

## 2018-03-21 DIAGNOSIS — E66.01 MORBID OBESITY: ICD-10-CM

## 2018-03-21 DIAGNOSIS — D84.9 IMMUNE DEFICIENCY DISORDER: ICD-10-CM

## 2018-03-21 DIAGNOSIS — R73.03 PREDIABETES: ICD-10-CM

## 2018-03-21 DIAGNOSIS — E03.9 HYPOTHYROIDISM, UNSPECIFIED TYPE: ICD-10-CM

## 2018-03-21 DIAGNOSIS — K31.84 GASTROPARESIS: ICD-10-CM

## 2018-03-21 DIAGNOSIS — J45.50 SEVERE PERSISTENT ASTHMA WITHOUT COMPLICATION: ICD-10-CM

## 2018-03-21 PROBLEM — Z01.810 PREOP CARDIOVASCULAR EXAM: Status: RESOLVED | Noted: 2018-03-15 | Resolved: 2018-03-21

## 2018-03-21 PROCEDURE — 3075F SYST BP GE 130 - 139MM HG: CPT | Mod: CPTII,S$GLB,, | Performed by: FAMILY MEDICINE

## 2018-03-21 PROCEDURE — 3078F DIAST BP <80 MM HG: CPT | Mod: CPTII,S$GLB,, | Performed by: FAMILY MEDICINE

## 2018-03-21 PROCEDURE — 99999 PR PBB SHADOW E&M-EST. PATIENT-LVL IV: CPT | Mod: PBBFAC,,, | Performed by: FAMILY MEDICINE

## 2018-03-21 PROCEDURE — 99214 OFFICE O/P EST MOD 30 MIN: CPT | Mod: S$GLB,,, | Performed by: FAMILY MEDICINE

## 2018-03-21 RX ORDER — NEOMYCIN SULFATE, POLYMYXIN B SULFATE AND HYDROCORTISONE 10; 3.5; 1 MG/ML; MG/ML; [USP'U]/ML
4 SUSPENSION/ DROPS AURICULAR (OTIC) 4 TIMES DAILY
Qty: 10 ML | Refills: 0 | Status: SHIPPED | OUTPATIENT
Start: 2018-03-21 | End: 2018-03-31

## 2018-03-21 NOTE — PROGRESS NOTES
Presents with a complaint of discomfort at the right ear with palpation during the past couple days.  No fever chills.  Denies fever or chills.  She gets some chronic postnasal drainage which has not changed.  She also wanted to get clearance regarding bariatric surgery.  She denies any chest pain or shortness of breath.  She has severe asthma, but has been controlled and followed by pulmonary.  They were the ones who recommended she look at bariatric surgery.  Morbid obesity for a long time.  She has already gotten clearance from cardiology.    Past Medical History:  Past Medical History:   Diagnosis Date    Allergy     Angio-edema     Arthralgia     Asthma without status asthmaticus     Bronchitis     Diverticulosis of large intestine without hemorrhage 10/8/2015    Environmental allergies     Eosinophilic asthma 3/8/2018    Gastroparesis     Hand arthritis     Herpes simplex without mention of complication     oral    Hyperlipidemia     Hypothyroidism     LBP radiating to left leg     Leukocytosis, unspecified     Migraine headache     Obesity, Class III, BMI 40-49.9 (morbid obesity)     Periorbital cellulitis 12/31/2016    Prediabetes     Recurrent upper respiratory infection (URI)     S/P colonoscopy November 2010    Urticaria      Past Surgical History:   Procedure Laterality Date    ADENOIDECTOMY      CHOLECYSTECTOMY      CHOLECYSTECTOMY      COLONOSCOPY  2010    COLONOSCOPY N/A 10/8/2015    Procedure: COLONOSCOPY;  Surgeon: Panda Bose MD;  Location: [facility];  Service: Endoscopy;  Laterality: N/A;    Hand fracture surgery      HARDWARE REMOVAL      left hand 5th MC    HYSTERECTOMY      menorrhagia-Ovaries intact    TONSILLECTOMY      Urethral dilatation       Social History     Social History    Marital status:      Spouse name: N/A    Number of children: N/A    Years of education: N/A     Occupational History    Not on file.     Social History Main Topics     "Smoking status: Never Smoker    Smokeless tobacco: Never Used    Alcohol use 0.0 oz/week      Comment: very rarely    Drug use: No    Sexual activity: Yes     Partners: Male     Other Topics Concern    Not on file     Social History Narrative    . Disabled teacher.     Family History   Problem Relation Age of Onset    Melanoma Mother     Basal cell carcinoma Mother     Lung disease Mother      pulm htn    Cataracts Mother     Hypertension Mother     Lung cancer Father     Diabetes Father     Hypertension Father     Cancer Father     Diabetes Paternal Aunt     Colon cancer Paternal Aunt     Diabetes Paternal Aunt     Ovarian cancer Paternal Grandmother     Cancer Maternal Grandfather     Melanoma Maternal Aunt     Melanoma Maternal Uncle     Breast cancer Paternal Aunt     Lymphoma Paternal Aunt     Ulcerative colitis Son     Psoriasis Son     Psoriasis Son     Breast cancer Cousin     Allergic rhinitis Neg Hx     Allergies Neg Hx     Angioedema Neg Hx     Asthma Neg Hx     Atopy Neg Hx     Eczema Neg Hx     Immunodeficiency Neg Hx     Rhinitis Neg Hx     Urticaria Neg Hx      Review of patient's allergies indicates:   Allergen Reactions    Bromelains Hives     " causes mouth to bleed"    Sudafed [pseudoephedrine hcl] Other (See Comments)     Does not want to wake up .    Amoxicillin-pot clavulanate      Other reaction(s): Itching    Ephedrine      Other reaction(s): comatose    Hydrocodone Itching    Iodinated contrast- oral and iv dye     Iodine and iodide containing products Other (See Comments)    Melon     Pantoprazole      Other reaction(s): upset stomach/halitosis    Penicillins      Other reaction(s): does not work on pt symptoms    Percocet [oxycodone-acetaminophen] Itching    Barium iodide Rash     Current Outpatient Prescriptions on File Prior to Visit   Medication Sig Dispense Refill    albuterol (PROAIR HFA) 90 mcg/actuation inhaler Inhale 1 puff " into the lungs every 4 (four) hours as needed for Wheezing or Shortness of Breath. 3 Inhaler 3    aspirin (ECOTRIN) 81 MG EC tablet Take 81 mg by mouth once daily.      atorvastatin (LIPITOR) 40 MG tablet Take 1 tablet (40 mg total) by mouth once daily. 90 tablet 3    CALCIUM CARB/D3/MAGNESIUM/ZINC (CALCIUM CARB-D3-MAG CMB11-ZINC) [id number] mg-unit-mg-mg Tab Take 2 tablets by mouth every evening.      desonide (DESOWEN) 0.05 % cream Apply topically 2 (two) times daily. Apply to lids. 1 Tube 3    estradiol (ESTRACE) 1 MG tablet Take 1 mg by mouth once daily.      immun glob G,IgG,-pro-IgA 0-50 (HIZENTRA) 2 gram/10 mL (20 %) Soln Inject 18 g into the skin every 14 (fourteen) days. 90 mL 12    levothyroxine (SYNTHROID) 75 MCG tablet Take 1 tablet (75 mcg total) by mouth once daily. 30 tablet 11    metformin (GLUCOPHAGE) 500 MG tablet Take 1 tablet (500 mg total) by mouth 2 (two) times daily with meals. 60 tablet 11    mometasone-formoterol (DULERA) 200-5 mcg/actuation inhaler Inhale 2 puffs into the lungs 2 (two) times daily. Controller 1 Inhaler 11    montelukast (SINGULAIR) 10 mg tablet Take 1 tablet (10 mg total) by mouth every evening. 90 tablet 3    multivitamin (ONE DAILY MULTIVITAMIN) per tablet Take 1 tablet by mouth once daily.      omega 3-dha-epa-fish oil (FISH OIL) 900-1,400 mg CpDR Take 1 capsule by mouth once daily.      potassium chloride (MICRO-K) 10 MEQ CpSR TAKE 2 CAPSULES BY MOUTH EVERY DAY 60 capsule 3    triamterene-hydrochlorothiazide 75-50 mg (MAXZIDE) 75-50 mg per tablet Take 1 tablet by mouth once daily. 30 tablet 11    acetaminophen-codeine 300-30mg (TYLENOL #3) 300-30 mg Tab Take 1 tablet by mouth 4 (four) times a week. 90 tablet 0    albuterol (PROVENTIL) 2.5 mg /3 mL (0.083 %) nebulizer solution Take 3 mLs (2.5 mg total) by nebulization every 4 (four) hours as needed for Wheezing or Shortness of Breath. Every 4-6 hours  each 3    benralizumab (FASENRA) 30 mg/mL  "Syrg Inject 30 mg into the skin every 28 days. Every 4 weeks for 3 doses followed by every 8 weeks thereafter 1 Syringe 11    fluconazole (DIFLUCAN) 200 MG Tab Take 1 tablet (200 mg total) by mouth once daily. 7 tablet 1    LACTOBAC NO.41/BIFIDOBACT NO.7 (PROBIOTIC-10 ORAL) Take by mouth once daily.      predniSONE (DELTASONE) 20 MG tablet Take 1 tablet (20 mg total) by mouth once daily. X 5 days  1    sumatriptan (IMITREX) 100 MG tablet Take 1 po at onset of migraine. May repeat once in 2 hrs if needed. No more than 2 pills in 24 hours 9 tablet 2    SUPRAX 400 mg Cap Take 1 capsule by mouth once daily. 10 capsule 2    triamcinolone acetonide 0.025% (KENALOG) 0.025 % cream Apply topically 2 (two) times daily. 15 g 0     No current facility-administered medications on file prior to visit.            ROS:  GENERAL: No fever, chills,  or significant weight changes.   CARDIOVASCULAR: Denies chest pain, PND, orthopnea or reduced exercise tolerance.  ABDOMEN: Appetite fine. Denies diarrhea, abdominal pain, hematemesis or blood in stool.  URINARY: No flank pain, dysuria or hematuria.      OBJECTIVE:     Vitals:    03/21/18 1519   BP: 131/77   Pulse: 88   Temp: 98.1 °F (36.7 °C)   Weight: 99.8 kg (220 lb 0.3 oz)   Height: 5' 2.5" (1.588 m)     Wt Readings from Last 3 Encounters:   03/21/18 99.8 kg (220 lb 0.3 oz)   03/19/18 100.5 kg (221 lb 9 oz)   03/19/18 100.2 kg (220 lb 14.4 oz)     APPEARANCE: Well nourished, well developed, in no acute distress.    HEAD: Normocephalic.  Atraumatic.  No sinus tenderness.  EYES:   Right eye: Pupil reactive.  Conjunctiva clear.    Left eye: Pupil reactive.  Conjunctiva clear.    Both fundi:  Grossly normal to nondilated exam. EOMI.    EARS: TM's intact. Light reflex normal. No retraction or perforation.  She does have some mild edema and tenderness at the right external auditory canal  NOSE:  clear.  MOUTH & THROAT:  No pharyngeal erythema or exudate. No lesions.  NECK: Supple. No " bruits.  No JVD.  No cervical lymphadenopathy.  No thyromegaly.    CHEST: Breath sounds clear bilaterally.  Normal respiratory effort  CARDIOVASCULAR: Normal rate.  Regular rhythm.  No murmurs.  No rub.  No gallops.  ABDOMEN: Bowel sounds normal.  Soft.  No tenderness.  No organomegaly.  PERIPHERAL VASCULAR: No cyanosis.  No clubbing.  No edema.  NEUROLOGIC: No focal findings.  MENTAL STATUS: Alert.  Oriented x 3.    Yodit was seen today for otalgia.    Diagnoses and all orders for this visit:    Acute otitis externa of right ear, unspecified type    Pre-operative examination    Severe persistent asthma without complication    Immune deficiency disorder    Morbid obesity    Hypothyroidism, unspecified type    Prediabetes    Gastroparesis    Other orders  -     neomycin-polymyxin-hydrocortisone (CORTISPORIN) 3.5-10,000-1 mg/mL-unit/mL-% otic suspension; Place 4 drops into the right ear 4 (four) times daily.      Follow-up symptoms not improving with above.  So long as she has clearance from her cardiologist and pulmonary doctor (which she appears to have) she is clearance as far as I'm concerned regarding bariatric surgery.

## 2018-04-03 ENCOUNTER — OFFICE VISIT (OUTPATIENT)
Dept: BARIATRICS | Facility: CLINIC | Age: 58
End: 2018-04-03
Payer: COMMERCIAL

## 2018-04-03 VITALS
WEIGHT: 217 LBS | TEMPERATURE: 98 F | HEART RATE: 83 BPM | DIASTOLIC BLOOD PRESSURE: 98 MMHG | SYSTOLIC BLOOD PRESSURE: 164 MMHG | BODY MASS INDEX: 39.93 KG/M2 | RESPIRATION RATE: 16 BRPM | HEIGHT: 62 IN

## 2018-04-03 DIAGNOSIS — E66.01 MORBID OBESITY: Primary | ICD-10-CM

## 2018-04-03 DIAGNOSIS — E66.01 MORBID OBESITY WITH BMI OF 40.0-44.9, ADULT: ICD-10-CM

## 2018-04-03 DIAGNOSIS — M25.50 ARTHRALGIA, UNSPECIFIED JOINT: ICD-10-CM

## 2018-04-03 DIAGNOSIS — E78.2 MIXED HYPERLIPIDEMIA: ICD-10-CM

## 2018-04-03 PROCEDURE — 99999 PR PBB SHADOW E&M-EST. PATIENT-LVL IV: CPT | Mod: PBBFAC,,, | Performed by: SURGERY

## 2018-04-03 PROCEDURE — 3080F DIAST BP >= 90 MM HG: CPT | Mod: CPTII,S$GLB,, | Performed by: SURGERY

## 2018-04-03 PROCEDURE — 99214 OFFICE O/P EST MOD 30 MIN: CPT | Mod: S$GLB,,, | Performed by: SURGERY

## 2018-04-03 PROCEDURE — 3077F SYST BP >= 140 MM HG: CPT | Mod: CPTII,S$GLB,, | Performed by: SURGERY

## 2018-04-03 RX ORDER — FAMOTIDINE 10 MG/ML
20 INJECTION INTRAVENOUS
Status: CANCELLED | OUTPATIENT
Start: 2018-04-03

## 2018-04-03 RX ORDER — LEVOFLOXACIN 500 MG/1
500 TABLET, FILM COATED ORAL
Status: ON HOLD | COMMUNITY
End: 2018-04-17 | Stop reason: HOSPADM

## 2018-04-03 RX ORDER — DEXTROMETHORPHAN HYDROBROMIDE, GUAIFENESIN 5; 100 MG/5ML; MG/5ML
650 LIQUID ORAL EVERY 8 HOURS
COMMUNITY
End: 2018-05-22

## 2018-04-03 RX ORDER — HEPARIN SODIUM 5000 [USP'U]/ML
5000 INJECTION, SOLUTION INTRAVENOUS; SUBCUTANEOUS
Status: CANCELLED | OUTPATIENT
Start: 2018-04-03

## 2018-04-03 NOTE — H&P
"Follow up    SUBJECTIVE:     Chief Complaint   Patient presents with    Obesity       History of Present Illness:    Patient is a 57 y.o. female who is referred for evaluation of surgical treatment of morbid obesity. Her Body mass index is 40.34 kg/m².  She has known comorbidities of diabetes mellitus, dyslipidemia, osteoarthritis, peripheral edema and urinary incontinence. She has not attended the bariatric seminar and is most interested in gastric sleeve surgery.     Review of patient's allergies indicates:   Allergen Reactions    Bromelains Hives     " causes mouth to bleed"    Sudafed [pseudoephedrine hcl] Other (See Comments)     Does not want to wake up .    Amoxicillin-pot clavulanate      Other reaction(s): Itching    Ephedrine      Other reaction(s): comatose    Hydrocodone Itching    Iodinated contrast- oral and iv dye     Iodine and iodide containing products Other (See Comments)    Melon     Pantoprazole      Other reaction(s): upset stomach/halitosis    Penicillins      Other reaction(s): does not work on pt symptoms    Percocet [oxycodone-acetaminophen] Itching    Barium iodide Rash       Current Outpatient Prescriptions   Medication Sig Dispense Refill    albuterol (PROAIR HFA) 90 mcg/actuation inhaler Inhale 1 puff into the lungs every 4 (four) hours as needed for Wheezing or Shortness of Breath. 3 Inhaler 3    aspirin (ECOTRIN) 81 MG EC tablet Take 81 mg by mouth once daily.      atorvastatin (LIPITOR) 40 MG tablet Take 1 tablet (40 mg total) by mouth once daily. 90 tablet 3    CALCIUM CARB/D3/MAGNESIUM/ZINC (CALCIUM CARB-D3-MAG CMB11-ZINC) [phone] mg-unit-mg-mg Tab Take 2 tablets by mouth every evening.      estradiol (ESTRACE) 1 MG tablet Take 1 mg by mouth once daily.      fluconazole (DIFLUCAN) 200 MG Tab Take 1 tablet (200 mg total) by mouth once daily. 7 tablet 1    immun glob G,IgG,-pro-IgA 0-50 (HIZENTRA) 2 gram/10 mL (20 %) Soln Inject 18 g into the skin every 14 " (fourteen) days. 90 mL 12    levothyroxine (SYNTHROID) 75 MCG tablet Take 1 tablet (75 mcg total) by mouth once daily. 30 tablet 11    metformin (GLUCOPHAGE) 500 MG tablet Take 1 tablet (500 mg total) by mouth 2 (two) times daily with meals. 60 tablet 11    mometasone-formoterol (DULERA) 200-5 mcg/actuation inhaler Inhale 2 puffs into the lungs 2 (two) times daily. Controller 1 Inhaler 11    montelukast (SINGULAIR) 10 mg tablet Take 1 tablet (10 mg total) by mouth every evening. 90 tablet 3    multivitamin (ONE DAILY MULTIVITAMIN) per tablet Take 1 tablet by mouth once daily.      omega 3-dha-epa-fish oil (FISH OIL) 900-1,400 mg CpDR Take 1 capsule by mouth once daily.      potassium chloride (MICRO-K) 10 MEQ CpSR TAKE 2 CAPSULES BY MOUTH EVERY DAY 60 capsule 3    predniSONE (DELTASONE) 20 MG tablet Take 1 tablet (20 mg total) by mouth once daily. X 5 days  1    sumatriptan (IMITREX) 100 MG tablet Take 1 po at onset of migraine. May repeat once in 2 hrs if needed. No more than 2 pills in 24 hours 9 tablet 2    triamcinolone acetonide 0.025% (KENALOG) 0.025 % cream Apply topically 2 (two) times daily. 15 g 0    triamterene-hydrochlorothiazide 75-50 mg (MAXZIDE) 75-50 mg per tablet Take 1 tablet by mouth once daily. 30 tablet 11    acetaminophen (TYLENOL) 650 MG TbSR Take 650 mg by mouth every 8 (eight) hours.      albuterol (PROVENTIL) 2.5 mg /3 mL (0.083 %) nebulizer solution Take 3 mLs (2.5 mg total) by nebulization every 4 (four) hours as needed for Wheezing or Shortness of Breath. Every 4-6 hours  each 3    benralizumab (FASENRA) 30 mg/mL Syrg Inject 30 mg into the skin every 28 days. Every 4 weeks for 3 doses followed by every 8 weeks thereafter 1 Syringe 11    desonide (DESOWEN) 0.05 % cream Apply topically 2 (two) times daily. Apply to lids. 1 Tube 3    levoFLOXacin (LEVAQUIN) 500 MG tablet Take 500 mg by mouth every 24 hours.      SUPRAX 400 mg Cap Take 1 capsule by mouth once daily.  10 capsule 2     No current facility-administered medications for this visit.        Past Medical History:   Diagnosis Date    Allergy     Angio-edema     Arthralgia     Asthma without status asthmaticus     Bronchitis     Diverticulosis of large intestine without hemorrhage 10/8/2015    Environmental allergies     Eosinophilic asthma 3/8/2018    Gastroparesis     Hand arthritis     Herpes simplex without mention of complication     oral    Hyperlipidemia     Hypothyroidism     LBP radiating to left leg     Leukocytosis, unspecified     Migraine headache     Obesity, Class III, BMI 40-49.9 (morbid obesity)     Periorbital cellulitis 12/31/2016    Prediabetes     Recurrent upper respiratory infection (URI)     S/P colonoscopy November 2010    Urticaria      Past Surgical History:   Procedure Laterality Date    ADENOIDECTOMY      CHOLECYSTECTOMY      CHOLECYSTECTOMY      COLONOSCOPY  2010    COLONOSCOPY N/A 10/8/2015    Procedure: COLONOSCOPY;  Surgeon: Panda Bose MD;  Location: [facility];  Service: Endoscopy;  Laterality: N/A;    Hand fracture surgery      HARDWARE REMOVAL      left hand 5th MC    HYSTERECTOMY      menorrhagia-Ovaries intact    TONSILLECTOMY      Urethral dilatation       Family History   Problem Relation Age of Onset    Melanoma Mother     Basal cell carcinoma Mother     Lung disease Mother      pulm htn    Cataracts Mother     Hypertension Mother     Lung cancer Father     Diabetes Father     Hypertension Father     Cancer Father     Diabetes Paternal Aunt     Colon cancer Paternal Aunt     Diabetes Paternal Aunt     Ovarian cancer Paternal Grandmother     Cancer Maternal Grandfather     Melanoma Maternal Aunt     Melanoma Maternal Uncle     Breast cancer Paternal Aunt     Lymphoma Paternal Aunt     Ulcerative colitis Son     Psoriasis Son     Psoriasis Son     Breast cancer Cousin     Allergic rhinitis Neg Hx     Allergies Neg Hx      "Angioedema Neg Hx     Asthma Neg Hx     Atopy Neg Hx     Eczema Neg Hx     Immunodeficiency Neg Hx     Rhinitis Neg Hx     Urticaria Neg Hx      Social History   Substance Use Topics    Smoking status: Never Smoker    Smokeless tobacco: Never Used    Alcohol use 0.0 oz/week      Comment: very rarely        Review of Systems:  Review of Systems   Constitutional: Negative for activity change, appetite change, fever and unexpected weight change.   HENT: Negative for congestion, sinus pressure, sneezing, sore throat, tinnitus and voice change.    Eyes: Negative for redness and visual disturbance.   Respiratory: Negative for apnea, cough, choking, chest tightness, shortness of breath, wheezing and stridor.    Cardiovascular: Positive for leg swelling. Negative for chest pain and palpitations.   Gastrointestinal: Negative for abdominal distention, abdominal pain, anal bleeding, blood in stool, constipation, diarrhea, nausea, rectal pain and vomiting.   Endocrine: Negative for cold intolerance and heat intolerance.   Genitourinary: Negative for difficulty urinating and dysuria.   Musculoskeletal: Positive for arthralgias and back pain. Negative for gait problem, joint swelling, myalgias, neck pain and neck stiffness.   Skin: Negative for rash and wound.   Allergic/Immunologic: Positive for food allergies. Negative for environmental allergies.   Neurological: Positive for headaches. Negative for dizziness, facial asymmetry and light-headedness.   Hematological: Negative for adenopathy. Does not bruise/bleed easily.   Psychiatric/Behavioral: Negative for agitation and confusion.       OBJECTIVE:     Vital Signs (Most Recent)  Temp: 97.8 °F (36.6 °C) (04/03/18 1127)  Pulse: 83 (04/03/18 1127)  Resp: 16 (04/03/18 1127)  BP: (!) 164/98 (04/03/18 1127)  5' 1.5" (1.562 m)  98.4 kg (217 lb)     Body comp:  Fat Percent:  48.7 %  Fat Mass:  103.6 lb  FFM:  109 lb  TBW: 78.4 lb  TBW %:  36.9 %  BMR: 1549 kcal        Physical " Exam:  Physical Exam   Constitutional: She is oriented to person, place, and time. She appears well-developed and well-nourished. No distress.   HENT:   Head: Normocephalic and atraumatic.   Mouth/Throat: No oropharyngeal exudate.   Eyes: Conjunctivae and EOM are normal. Pupils are equal, round, and reactive to light. No scleral icterus.   Neck: Normal range of motion. Neck supple. No JVD present. No tracheal deviation present. No thyromegaly present.   Cardiovascular: Normal rate, regular rhythm and normal heart sounds.  Exam reveals no gallop and no friction rub.    No murmur heard.  Pulmonary/Chest: Effort normal and breath sounds normal. No stridor. No respiratory distress. She has no wheezes. She has no rales. She exhibits no tenderness.   Abdominal: Soft. Bowel sounds are normal. She exhibits no distension and no mass. There is no tenderness. There is no rebound and no guarding.   Musculoskeletal: Normal range of motion. She exhibits no edema or tenderness.   Lymphadenopathy:     She has no cervical adenopathy.   Neurological: She is alert and oriented to person, place, and time. No cranial nerve deficit.   Skin: Skin is warm and dry. No rash noted. She is not diaphoretic. No erythema.   Psychiatric: She has a normal mood and affect. Her behavior is normal.   Nursing note and vitals reviewed.      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 tissue  dietary consult- done  psych consult - Verbally clear- will obtain results- [facility] Del [facility]o  Seminar  Labs-done  UGI- cannot do because of allergy   Off steroids for 6 weeks- stopped almost 6 weeks ago       I will obtain the following clearances prior to surgery:  Pulmonology- Dr. Gary- angel[place]  Cardiology- Dr. Barrientos- [place]  PCP- Dr. Lewis- angel[place]    1. Morbid obesity     2. Morbid obesity with BMI of 40.0-44.9, adult     3. Mixed hyperlipidemia     4. Arthralgia, unspecified joint          Plan:  Yodit Canseco has morbid obesity as their Body mass index is 40.34 kg/m². She would benefit from weight loss surgery and has chosen gastric sleeve surgery as the preferred procedure. She understands that this is a tool and lifestyle change will be necessary to keep weight off. I went over possible complications of the chosen surgery with the patient and she is agreeable to surgery.    She has been instructed to start the pre operative diet.    She surgical date is April 16      The patient has been taught the post operative diet and understands that she will need to stay on this diet to prevent complication.  They are aware of the post operative follow up and return to see me after surgery to treat/prevent/identify any complications.  she has been advised to attend support groups post operatively.

## 2018-04-03 NOTE — PROGRESS NOTES
"Follow up    SUBJECTIVE:     Chief Complaint   Patient presents with    Obesity       History of Present Illness:    Patient is a 57 y.o. female who is referred for evaluation of surgical treatment of morbid obesity. Her Body mass index is 40.34 kg/m².  She has known comorbidities of diabetes mellitus, dyslipidemia, osteoarthritis, peripheral edema and urinary incontinence. She has not attended the bariatric seminar and is most interested in gastric sleeve surgery.     Review of patient's allergies indicates:   Allergen Reactions    Bromelains Hives     " causes mouth to bleed"    Sudafed [pseudoephedrine hcl] Other (See Comments)     Does not want to wake up .    Amoxicillin-pot clavulanate      Other reaction(s): Itching    Ephedrine      Other reaction(s): comatose    Hydrocodone Itching    Iodinated contrast- oral and iv dye     Iodine and iodide containing products Other (See Comments)    Melon     Pantoprazole      Other reaction(s): upset stomach/halitosis    Penicillins      Other reaction(s): does not work on pt symptoms    Percocet [oxycodone-acetaminophen] Itching    Barium iodide Rash       Current Outpatient Prescriptions   Medication Sig Dispense Refill    albuterol (PROAIR HFA) 90 mcg/actuation inhaler Inhale 1 puff into the lungs every 4 (four) hours as needed for Wheezing or Shortness of Breath. 3 Inhaler 3    aspirin (ECOTRIN) 81 MG EC tablet Take 81 mg by mouth once daily.      atorvastatin (LIPITOR) 40 MG tablet Take 1 tablet (40 mg total) by mouth once daily. 90 tablet 3    CALCIUM CARB/D3/MAGNESIUM/ZINC (CALCIUM CARB-D3-MAG CMB11-ZINC) [phone] mg-unit-mg-mg Tab Take 2 tablets by mouth every evening.      estradiol (ESTRACE) 1 MG tablet Take 1 mg by mouth once daily.      fluconazole (DIFLUCAN) 200 MG Tab Take 1 tablet (200 mg total) by mouth once daily. 7 tablet 1    immun glob G,IgG,-pro-IgA 0-50 (HIZENTRA) 2 gram/10 mL (20 %) Soln Inject 18 g into the skin every 14 " (fourteen) days. 90 mL 12    levothyroxine (SYNTHROID) 75 MCG tablet Take 1 tablet (75 mcg total) by mouth once daily. 30 tablet 11    metformin (GLUCOPHAGE) 500 MG tablet Take 1 tablet (500 mg total) by mouth 2 (two) times daily with meals. 60 tablet 11    mometasone-formoterol (DULERA) 200-5 mcg/actuation inhaler Inhale 2 puffs into the lungs 2 (two) times daily. Controller 1 Inhaler 11    montelukast (SINGULAIR) 10 mg tablet Take 1 tablet (10 mg total) by mouth every evening. 90 tablet 3    multivitamin (ONE DAILY MULTIVITAMIN) per tablet Take 1 tablet by mouth once daily.      omega 3-dha-epa-fish oil (FISH OIL) 900-1,400 mg CpDR Take 1 capsule by mouth once daily.      potassium chloride (MICRO-K) 10 MEQ CpSR TAKE 2 CAPSULES BY MOUTH EVERY DAY 60 capsule 3    predniSONE (DELTASONE) 20 MG tablet Take 1 tablet (20 mg total) by mouth once daily. X 5 days  1    sumatriptan (IMITREX) 100 MG tablet Take 1 po at onset of migraine. May repeat once in 2 hrs if needed. No more than 2 pills in 24 hours 9 tablet 2    triamcinolone acetonide 0.025% (KENALOG) 0.025 % cream Apply topically 2 (two) times daily. 15 g 0    triamterene-hydrochlorothiazide 75-50 mg (MAXZIDE) 75-50 mg per tablet Take 1 tablet by mouth once daily. 30 tablet 11    acetaminophen (TYLENOL) 650 MG TbSR Take 650 mg by mouth every 8 (eight) hours.      albuterol (PROVENTIL) 2.5 mg /3 mL (0.083 %) nebulizer solution Take 3 mLs (2.5 mg total) by nebulization every 4 (four) hours as needed for Wheezing or Shortness of Breath. Every 4-6 hours  each 3    benralizumab (FASENRA) 30 mg/mL Syrg Inject 30 mg into the skin every 28 days. Every 4 weeks for 3 doses followed by every 8 weeks thereafter 1 Syringe 11    desonide (DESOWEN) 0.05 % cream Apply topically 2 (two) times daily. Apply to lids. 1 Tube 3    levoFLOXacin (LEVAQUIN) 500 MG tablet Take 500 mg by mouth every 24 hours.      SUPRAX 400 mg Cap Take 1 capsule by mouth once daily.  10 capsule 2     No current facility-administered medications for this visit.        Past Medical History:   Diagnosis Date    Allergy     Angio-edema     Arthralgia     Asthma without status asthmaticus     Bronchitis     Diverticulosis of large intestine without hemorrhage 10/8/2015    Environmental allergies     Eosinophilic asthma 3/8/2018    Gastroparesis     Hand arthritis     Herpes simplex without mention of complication     oral    Hyperlipidemia     Hypothyroidism     LBP radiating to left leg     Leukocytosis, unspecified     Migraine headache     Obesity, Class III, BMI 40-49.9 (morbid obesity)     Periorbital cellulitis 12/31/2016    Prediabetes     Recurrent upper respiratory infection (URI)     S/P colonoscopy November 2010    Urticaria      Past Surgical History:   Procedure Laterality Date    ADENOIDECTOMY      CHOLECYSTECTOMY      CHOLECYSTECTOMY      COLONOSCOPY  2010    COLONOSCOPY N/A 10/8/2015    Procedure: COLONOSCOPY;  Surgeon: Panda Bose MD;  Location: [facility];  Service: Endoscopy;  Laterality: N/A;    Hand fracture surgery      HARDWARE REMOVAL      left hand 5th MC    HYSTERECTOMY      menorrhagia-Ovaries intact    TONSILLECTOMY      Urethral dilatation       Family History   Problem Relation Age of Onset    Melanoma Mother     Basal cell carcinoma Mother     Lung disease Mother      pulm htn    Cataracts Mother     Hypertension Mother     Lung cancer Father     Diabetes Father     Hypertension Father     Cancer Father     Diabetes Paternal Aunt     Colon cancer Paternal Aunt     Diabetes Paternal Aunt     Ovarian cancer Paternal Grandmother     Cancer Maternal Grandfather     Melanoma Maternal Aunt     Melanoma Maternal Uncle     Breast cancer Paternal Aunt     Lymphoma Paternal Aunt     Ulcerative colitis Son     Psoriasis Son     Psoriasis Son     Breast cancer Cousin     Allergic rhinitis Neg Hx     Allergies Neg Hx      "Angioedema Neg Hx     Asthma Neg Hx     Atopy Neg Hx     Eczema Neg Hx     Immunodeficiency Neg Hx     Rhinitis Neg Hx     Urticaria Neg Hx      Social History   Substance Use Topics    Smoking status: Never Smoker    Smokeless tobacco: Never Used    Alcohol use 0.0 oz/week      Comment: very rarely        Review of Systems:  Review of Systems   Constitutional: Negative for activity change, appetite change, fever and unexpected weight change.   HENT: Negative for congestion, sinus pressure, sneezing, sore throat, tinnitus and voice change.    Eyes: Negative for redness and visual disturbance.   Respiratory: Negative for apnea, cough, choking, chest tightness, shortness of breath, wheezing and stridor.    Cardiovascular: Positive for leg swelling. Negative for chest pain and palpitations.   Gastrointestinal: Negative for abdominal distention, abdominal pain, anal bleeding, blood in stool, constipation, diarrhea, nausea, rectal pain and vomiting.   Endocrine: Negative for cold intolerance and heat intolerance.   Genitourinary: Negative for difficulty urinating and dysuria.   Musculoskeletal: Positive for arthralgias and back pain. Negative for gait problem, joint swelling, myalgias, neck pain and neck stiffness.   Skin: Negative for rash and wound.   Allergic/Immunologic: Positive for food allergies. Negative for environmental allergies.   Neurological: Positive for headaches. Negative for dizziness, facial asymmetry and light-headedness.   Hematological: Negative for adenopathy. Does not bruise/bleed easily.   Psychiatric/Behavioral: Negative for agitation and confusion.       OBJECTIVE:     Vital Signs (Most Recent)  Temp: 97.8 °F (36.6 °C) (04/03/18 1127)  Pulse: 83 (04/03/18 1127)  Resp: 16 (04/03/18 1127)  BP: (!) 164/98 (04/03/18 1127)  5' 1.5" (1.562 m)  98.4 kg (217 lb)     Body comp:  Fat Percent:  48.7 %  Fat Mass:  103.6 lb  FFM:  109 lb  TBW: 78.4 lb  TBW %:  36.9 %  BMR: 1549 kcal        Physical " Exam:  Physical Exam   Constitutional: She is oriented to person, place, and time. She appears well-developed and well-nourished. No distress.   HENT:   Head: Normocephalic and atraumatic.   Mouth/Throat: No oropharyngeal exudate.   Eyes: Conjunctivae and EOM are normal. Pupils are equal, round, and reactive to light. No scleral icterus.   Neck: Normal range of motion. Neck supple. No JVD present. No tracheal deviation present. No thyromegaly present.   Cardiovascular: Normal rate, regular rhythm and normal heart sounds.  Exam reveals no gallop and no friction rub.    No murmur heard.  Pulmonary/Chest: Effort normal and breath sounds normal. No stridor. No respiratory distress. She has no wheezes. She has no rales. She exhibits no tenderness.   Abdominal: Soft. Bowel sounds are normal. She exhibits no distension and no mass. There is no tenderness. There is no rebound and no guarding.   Musculoskeletal: Normal range of motion. She exhibits no edema or tenderness.   Lymphadenopathy:     She has no cervical adenopathy.   Neurological: She is alert and oriented to person, place, and time. No cranial nerve deficit.   Skin: Skin is warm and dry. No rash noted. She is not diaphoretic. No erythema.   Psychiatric: She has a normal mood and affect. Her behavior is normal.   Nursing note and vitals reviewed.      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 tissue  dietary consult- done  psych consult - Verbally clear- will obtain results- [facility] Del [facility]o  Seminar  Labs-done  UGI- cannot do because of allergy   Off steroids for 6 weeks- stopped almost 6 weeks ago       I will obtain the following clearances prior to surgery:  Pulmonology- Dr. Gary- angelo  Cardiology- Dr. Barrientos- Angel[place]  PCP- Dr. Lewis- angelo      1. Morbid obesity  Case Request Operating Room: GASTRECTOMY-SLEEVE-LAPAROSCOPIC   2. Morbid obesity with BMI of 40.0-44.9, adult  Case Request  Operating Room: GASTRECTOMY-SLEEVE-LAPAROSCOPIC   3. Mixed hyperlipidemia  Case Request Operating Room: GASTRECTOMY-SLEEVE-LAPAROSCOPIC   4. Arthralgia, unspecified joint  Case Request Operating Room: GASTRECTOMY-SLEEVE-LAPAROSCOPIC       Plan:  Yodit Canseco has morbid obesity as their Body mass index is 40.34 kg/m². She would benefit from weight loss surgery and has chosen gastric sleeve surgery as the preferred procedure. She understands that this is a tool and lifestyle change will be necessary to keep weight off. I went over possible complications of the chosen surgery with the patient and she is agreeable to surgery.    She has been instructed to start the pre operative diet.    She surgical date is April 16      Consult Dr. Cedillo while in hospital for infusion    The patient has been taught the post operative diet and understands that she will need to stay on this diet to prevent complication.  They are aware of the post operative follow up and return to see me after surgery to treat/prevent/identify any complications.  she has been advised to attend support groups post operatively.

## 2018-04-12 ENCOUNTER — TELEPHONE (OUTPATIENT)
Dept: BARIATRICS | Facility: CLINIC | Age: 58
End: 2018-04-12

## 2018-04-12 ENCOUNTER — DOCUMENTATION ONLY (OUTPATIENT)
Dept: BARIATRICS | Facility: CLINIC | Age: 58
End: 2018-04-12

## 2018-04-12 ENCOUNTER — HOSPITAL ENCOUNTER (OUTPATIENT)
Dept: PREADMISSION TESTING | Facility: HOSPITAL | Age: 58
Discharge: HOME | End: 2018-04-12
Attending: SURGERY
Payer: COMMERCIAL

## 2018-04-12 VITALS — WEIGHT: 207.63 LBS | HEIGHT: 62 IN | BODY MASS INDEX: 38.21 KG/M2

## 2018-04-12 DIAGNOSIS — E66.01 MORBID OBESITY: Primary | ICD-10-CM

## 2018-04-12 LAB
ALBUMIN SERPL BCP-MCNC: 4.4 G/DL
ALP SERPL-CCNC: 86 U/L
ALT SERPL W/O P-5'-P-CCNC: 43 U/L
ANION GAP SERPL CALC-SCNC: 12 MMOL/L
AST SERPL-CCNC: 40 U/L
BASOPHILS # BLD AUTO: 0.1 K/UL
BASOPHILS NFR BLD: 0.7 %
BILIRUB SERPL-MCNC: 0.7 MG/DL
BUN SERPL-MCNC: 21 MG/DL
CALCIUM SERPL-MCNC: 10.7 MG/DL
CHLORIDE SERPL-SCNC: 101 MMOL/L
CO2 SERPL-SCNC: 25 MMOL/L
CREAT SERPL-MCNC: 1 MG/DL
DIFFERENTIAL METHOD: ABNORMAL
EOSINOPHIL # BLD AUTO: 0.4 K/UL
EOSINOPHIL NFR BLD: 2.4 %
ERYTHROCYTE [DISTWIDTH] IN BLOOD BY AUTOMATED COUNT: 13.9 %
EST. GFR  (AFRICAN AMERICAN): >60 ML/MIN/1.73 M^2
EST. GFR  (NON AFRICAN AMERICAN): >60 ML/MIN/1.73 M^2
GLUCOSE SERPL-MCNC: 88 MG/DL
HCT VFR BLD AUTO: 47.6 %
HGB BLD-MCNC: 16.2 G/DL
LYMPHOCYTES # BLD AUTO: 3.3 K/UL
LYMPHOCYTES NFR BLD: 22.4 %
MAGNESIUM SERPL-MCNC: 2.2 MG/DL
MCH RBC QN AUTO: 29.1 PG
MCHC RBC AUTO-ENTMCNC: 34.1 G/DL
MCV RBC AUTO: 85 FL
MONOCYTES # BLD AUTO: 0.8 K/UL
MONOCYTES NFR BLD: 5.8 %
NEUTROPHILS # BLD AUTO: 10 K/UL
NEUTROPHILS NFR BLD: 68.7 %
PHOSPHATE SERPL-MCNC: 2.4 MG/DL
PLATELET # BLD AUTO: 276 K/UL
PMV BLD AUTO: 9.7 FL
POTASSIUM SERPL-SCNC: 4.2 MMOL/L
PROT SERPL-MCNC: 8.4 G/DL
RBC # BLD AUTO: 5.57 M/UL
SODIUM SERPL-SCNC: 138 MMOL/L
WBC # BLD AUTO: 14.6 K/UL

## 2018-04-12 PROCEDURE — 80053 COMPREHEN METABOLIC PANEL: CPT

## 2018-04-12 PROCEDURE — 85025 COMPLETE CBC W/AUTO DIFF WBC: CPT

## 2018-04-12 PROCEDURE — 36415 COLL VENOUS BLD VENIPUNCTURE: CPT

## 2018-04-12 PROCEDURE — 84100 ASSAY OF PHOSPHORUS: CPT

## 2018-04-12 PROCEDURE — 99900103 DSU ONLY-NO CHARGE-INITIAL HR (STAT)

## 2018-04-12 PROCEDURE — 83735 ASSAY OF MAGNESIUM: CPT

## 2018-04-12 NOTE — NURSING
Pt weigh in before surgery    tanita scale: 207.6lb wt           38.6bmi           48.8% fat           101.4lb fat mass           106.2lb ffm           76.4lb tbw    Pt successfully return demonstrated use of incentive spirometer.

## 2018-04-12 NOTE — TELEPHONE ENCOUNTER
I returned patients phone call regarding sugar questions.  Patient is asking if she can use coconut sugar.  Coconut sugar has 4 grams of carbs/ sugars per serving.  Advised patient to limit to 2 servings with meals/ snacks as > 10 grams of carbs/ sugars not recommended post bariatri surgery. Pt reports understanding.

## 2018-04-13 ENCOUNTER — ANESTHESIA EVENT (OUTPATIENT)
Dept: SURGERY | Facility: HOSPITAL | Age: 58
DRG: 620 | End: 2018-04-13
Payer: COMMERCIAL

## 2018-04-16 ENCOUNTER — ANESTHESIA (OUTPATIENT)
Dept: SURGERY | Facility: HOSPITAL | Age: 58
DRG: 620 | End: 2018-04-16
Payer: COMMERCIAL

## 2018-04-16 ENCOUNTER — SURGERY (OUTPATIENT)
Age: 58
End: 2018-04-16

## 2018-04-16 ENCOUNTER — HOSPITAL ENCOUNTER (INPATIENT)
Facility: HOSPITAL | Age: 58
LOS: 1 days | Discharge: HOME | DRG: 620 | End: 2018-04-17
Attending: SURGERY | Admitting: SURGERY
Payer: COMMERCIAL

## 2018-04-16 DIAGNOSIS — E03.9 HYPOTHYROIDISM, UNSPECIFIED TYPE: ICD-10-CM

## 2018-04-16 DIAGNOSIS — Z98.84 S/P LAPAROSCOPIC SLEEVE GASTRECTOMY: ICD-10-CM

## 2018-04-16 DIAGNOSIS — D84.9 IMMUNE DEFICIENCY DISORDER: ICD-10-CM

## 2018-04-16 DIAGNOSIS — E66.01 MORBID OBESITY: ICD-10-CM

## 2018-04-16 DIAGNOSIS — E78.2 MIXED HYPERLIPIDEMIA: Primary | ICD-10-CM

## 2018-04-16 LAB
BASOPHILS # BLD AUTO: 0 K/UL
BASOPHILS NFR BLD: 0.3 %
DIFFERENTIAL METHOD: ABNORMAL
EOSINOPHIL # BLD AUTO: 0.4 K/UL
EOSINOPHIL NFR BLD: 3 %
ERYTHROCYTE [DISTWIDTH] IN BLOOD BY AUTOMATED COUNT: 13.9 %
HCT VFR BLD AUTO: 44.1 %
HGB BLD-MCNC: 15 G/DL
LYMPHOCYTES # BLD AUTO: 2.8 K/UL
LYMPHOCYTES NFR BLD: 22 %
MCH RBC QN AUTO: 29.1 PG
MCHC RBC AUTO-ENTMCNC: 34 G/DL
MCV RBC AUTO: 86 FL
MONOCYTES # BLD AUTO: 0.9 K/UL
MONOCYTES NFR BLD: 7 %
NEUTROPHILS # BLD AUTO: 8.5 K/UL
NEUTROPHILS NFR BLD: 67.7 %
PLATELET # BLD AUTO: 248 K/UL
PMV BLD AUTO: 10.5 FL
POCT GLUCOSE: 117 MG/DL (ref 70–110)
POCT GLUCOSE: 162 MG/DL (ref 70–110)
RBC # BLD AUTO: 5.15 M/UL
WBC # BLD AUTO: 12.5 K/UL

## 2018-04-16 PROCEDURE — 27201423 OPTIME MED/SURG SUP & DEVICES STERILE SUPPLY: Performed by: SURGERY

## 2018-04-16 PROCEDURE — 25000003 PHARM REV CODE 250: Performed by: SURGERY

## 2018-04-16 PROCEDURE — 94799 UNLISTED PULMONARY SVC/PX: CPT

## 2018-04-16 PROCEDURE — 88307 TISSUE EXAM BY PATHOLOGIST: CPT | Mod: 26,,,

## 2018-04-16 PROCEDURE — 36000710: Performed by: SURGERY

## 2018-04-16 PROCEDURE — D9220A PRA ANESTHESIA: Mod: ANES,,, | Performed by: ANESTHESIOLOGY

## 2018-04-16 PROCEDURE — 0DB64Z3 EXCISION OF STOMACH, PERCUTANEOUS ENDOSCOPIC APPROACH, VERTICAL: ICD-10-PCS | Performed by: SURGERY

## 2018-04-16 PROCEDURE — S0028 INJECTION, FAMOTIDINE, 20 MG: HCPCS | Performed by: SURGERY

## 2018-04-16 PROCEDURE — 25000003 PHARM REV CODE 250: Performed by: NURSE ANESTHETIST, CERTIFIED REGISTERED

## 2018-04-16 PROCEDURE — 99900103 DSU ONLY-NO CHARGE-INITIAL HR (STAT): Performed by: SURGERY

## 2018-04-16 PROCEDURE — 63600175 PHARM REV CODE 636 W HCPCS: Performed by: NURSE ANESTHETIST, CERTIFIED REGISTERED

## 2018-04-16 PROCEDURE — 63600175 PHARM REV CODE 636 W HCPCS: Performed by: ANESTHESIOLOGY

## 2018-04-16 PROCEDURE — 25000003 PHARM REV CODE 250: Performed by: ANESTHESIOLOGY

## 2018-04-16 PROCEDURE — 71000033 HC RECOVERY, INTIAL HOUR: Performed by: SURGERY

## 2018-04-16 PROCEDURE — 88307 TISSUE EXAM BY PATHOLOGIST: CPT

## 2018-04-16 PROCEDURE — C9290 INJ, BUPIVACAINE LIPOSOME: HCPCS | Performed by: SURGERY

## 2018-04-16 PROCEDURE — 36000711: Performed by: SURGERY

## 2018-04-16 PROCEDURE — 43775 LAP SLEEVE GASTRECTOMY: CPT | Mod: ,,, | Performed by: SURGERY

## 2018-04-16 PROCEDURE — S0077 INJECTION, CLINDAMYCIN PHOSP: HCPCS | Performed by: SURGERY

## 2018-04-16 PROCEDURE — 43775 LAP SLEEVE GASTRECTOMY: CPT | Mod: 80,,, | Performed by: SURGERY

## 2018-04-16 PROCEDURE — 99222 1ST HOSP IP/OBS MODERATE 55: CPT | Mod: ,,, | Performed by: INTERNAL MEDICINE

## 2018-04-16 PROCEDURE — 99900104 DSU ONLY-NO CHARGE-EA ADD'L HR (STAT): Performed by: SURGERY

## 2018-04-16 PROCEDURE — 36415 COLL VENOUS BLD VENIPUNCTURE: CPT

## 2018-04-16 PROCEDURE — 63600175 PHARM REV CODE 636 W HCPCS: Performed by: SURGERY

## 2018-04-16 PROCEDURE — 37000008 HC ANESTHESIA 1ST 15 MINUTES: Performed by: SURGERY

## 2018-04-16 PROCEDURE — 71000039 HC RECOVERY, EACH ADD'L HOUR: Performed by: SURGERY

## 2018-04-16 PROCEDURE — 37000009 HC ANESTHESIA EA ADD 15 MINS: Performed by: SURGERY

## 2018-04-16 PROCEDURE — 20600001 HC STEP DOWN PRIVATE ROOM

## 2018-04-16 PROCEDURE — D9220A PRA ANESTHESIA: Mod: CRNA,,, | Performed by: NURSE ANESTHETIST, CERTIFIED REGISTERED

## 2018-04-16 PROCEDURE — 85025 COMPLETE CBC W/AUTO DIFF WBC: CPT

## 2018-04-16 RX ORDER — HYDROMORPHONE HYDROCHLORIDE 2 MG/ML
1 INJECTION, SOLUTION INTRAMUSCULAR; INTRAVENOUS; SUBCUTANEOUS EVERY 6 HOURS PRN
Status: DISCONTINUED | OUTPATIENT
Start: 2018-04-16 | End: 2018-04-17 | Stop reason: HOSPADM

## 2018-04-16 RX ORDER — ONDANSETRON 2 MG/ML
8 INJECTION INTRAMUSCULAR; INTRAVENOUS EVERY 6 HOURS PRN
Status: DISCONTINUED | OUTPATIENT
Start: 2018-04-16 | End: 2018-04-17 | Stop reason: HOSPADM

## 2018-04-16 RX ORDER — OXYCODONE HYDROCHLORIDE 10 MG/1
10 TABLET ORAL EVERY 4 HOURS PRN
Status: DISCONTINUED | OUTPATIENT
Start: 2018-04-16 | End: 2018-04-17 | Stop reason: HOSPADM

## 2018-04-16 RX ORDER — SODIUM CHLORIDE, SODIUM LACTATE, POTASSIUM CHLORIDE, CALCIUM CHLORIDE 600; 310; 30; 20 MG/100ML; MG/100ML; MG/100ML; MG/100ML
INJECTION, SOLUTION INTRAVENOUS CONTINUOUS
Status: DISCONTINUED | OUTPATIENT
Start: 2018-04-16 | End: 2018-04-16

## 2018-04-16 RX ORDER — HEPARIN SODIUM 5000 [USP'U]/ML
5000 INJECTION, SOLUTION INTRAVENOUS; SUBCUTANEOUS
Status: COMPLETED | OUTPATIENT
Start: 2018-04-16 | End: 2018-04-16

## 2018-04-16 RX ORDER — FAMOTIDINE 10 MG/ML
20 INJECTION INTRAVENOUS EVERY 12 HOURS
Status: DISCONTINUED | OUTPATIENT
Start: 2018-04-16 | End: 2018-04-17 | Stop reason: HOSPADM

## 2018-04-16 RX ORDER — DIAZEPAM 10 MG/2ML
2 INJECTION INTRAMUSCULAR EVERY 4 HOURS PRN
Status: DISCONTINUED | OUTPATIENT
Start: 2018-04-16 | End: 2018-04-16

## 2018-04-16 RX ORDER — DIPHENHYDRAMINE HYDROCHLORIDE 50 MG/ML
25 INJECTION INTRAMUSCULAR; INTRAVENOUS EVERY 6 HOURS PRN
Status: DISCONTINUED | OUTPATIENT
Start: 2018-04-16 | End: 2018-04-16 | Stop reason: HOSPADM

## 2018-04-16 RX ORDER — HYDROMORPHONE HYDROCHLORIDE 2 MG/ML
0.2 INJECTION, SOLUTION INTRAMUSCULAR; INTRAVENOUS; SUBCUTANEOUS EVERY 5 MIN PRN
Status: DISCONTINUED | OUTPATIENT
Start: 2018-04-16 | End: 2018-04-16 | Stop reason: HOSPADM

## 2018-04-16 RX ORDER — LIDOCAINE HCL/PF 100 MG/5ML
SYRINGE (ML) INTRAVENOUS
Status: DISCONTINUED | OUTPATIENT
Start: 2018-04-16 | End: 2018-04-16

## 2018-04-16 RX ORDER — MIDAZOLAM HYDROCHLORIDE 1 MG/ML
INJECTION, SOLUTION INTRAMUSCULAR; INTRAVENOUS
Status: DISCONTINUED | OUTPATIENT
Start: 2018-04-16 | End: 2018-04-16

## 2018-04-16 RX ORDER — FENTANYL CITRATE 50 UG/ML
25 INJECTION, SOLUTION INTRAMUSCULAR; INTRAVENOUS EVERY 5 MIN PRN
Status: DISCONTINUED | OUTPATIENT
Start: 2018-04-16 | End: 2018-04-16 | Stop reason: HOSPADM

## 2018-04-16 RX ORDER — SODIUM CHLORIDE 0.9 % (FLUSH) 0.9 %
3 SYRINGE (ML) INJECTION
Status: DISCONTINUED | OUTPATIENT
Start: 2018-04-16 | End: 2018-04-16 | Stop reason: HOSPADM

## 2018-04-16 RX ORDER — ACETAMINOPHEN 10 MG/ML
INJECTION, SOLUTION INTRAVENOUS
Status: DISCONTINUED | OUTPATIENT
Start: 2018-04-16 | End: 2018-04-16

## 2018-04-16 RX ORDER — INSULIN ASPART 100 [IU]/ML
1-10 INJECTION, SOLUTION INTRAVENOUS; SUBCUTANEOUS EVERY 6 HOURS PRN
Status: DISCONTINUED | OUTPATIENT
Start: 2018-04-16 | End: 2018-04-17 | Stop reason: HOSPADM

## 2018-04-16 RX ORDER — FENTANYL CITRATE 50 UG/ML
INJECTION, SOLUTION INTRAMUSCULAR; INTRAVENOUS
Status: DISCONTINUED | OUTPATIENT
Start: 2018-04-16 | End: 2018-04-16

## 2018-04-16 RX ORDER — LORAZEPAM 2 MG/ML
0.25 INJECTION INTRAMUSCULAR ONCE AS NEEDED
Status: DISCONTINUED | OUTPATIENT
Start: 2018-04-16 | End: 2018-04-16 | Stop reason: HOSPADM

## 2018-04-16 RX ORDER — ROCURONIUM BROMIDE 10 MG/ML
INJECTION, SOLUTION INTRAVENOUS
Status: DISCONTINUED | OUTPATIENT
Start: 2018-04-16 | End: 2018-04-16

## 2018-04-16 RX ORDER — ENOXAPARIN SODIUM 100 MG/ML
40 INJECTION SUBCUTANEOUS
Status: DISCONTINUED | OUTPATIENT
Start: 2018-04-17 | End: 2018-04-17 | Stop reason: HOSPADM

## 2018-04-16 RX ORDER — ONDANSETRON HYDROCHLORIDE 2 MG/ML
INJECTION, SOLUTION INTRAMUSCULAR; INTRAVENOUS
Status: DISCONTINUED | OUTPATIENT
Start: 2018-04-16 | End: 2018-04-16

## 2018-04-16 RX ORDER — OXYCODONE HYDROCHLORIDE 5 MG/1
5 TABLET ORAL ONCE AS NEEDED
Status: CANCELLED | OUTPATIENT
Start: 2018-04-16 | End: 2018-04-16

## 2018-04-16 RX ORDER — FAMOTIDINE 10 MG/ML
20 INJECTION INTRAVENOUS
Status: COMPLETED | OUTPATIENT
Start: 2018-04-16 | End: 2018-04-16

## 2018-04-16 RX ORDER — SODIUM CHLORIDE, SODIUM LACTATE, POTASSIUM CHLORIDE, CALCIUM CHLORIDE 600; 310; 30; 20 MG/100ML; MG/100ML; MG/100ML; MG/100ML
INJECTION, SOLUTION INTRAVENOUS CONTINUOUS
Status: DISCONTINUED | OUTPATIENT
Start: 2018-04-16 | End: 2018-04-17 | Stop reason: HOSPADM

## 2018-04-16 RX ORDER — LIDOCAINE HYDROCHLORIDE 10 MG/ML
1 INJECTION, SOLUTION EPIDURAL; INFILTRATION; INTRACAUDAL; PERINEURAL ONCE
Status: COMPLETED | OUTPATIENT
Start: 2018-04-16 | End: 2018-04-16

## 2018-04-16 RX ORDER — DEXAMETHASONE SODIUM PHOSPHATE 4 MG/ML
INJECTION, SOLUTION INTRA-ARTICULAR; INTRALESIONAL; INTRAMUSCULAR; INTRAVENOUS; SOFT TISSUE
Status: DISCONTINUED | OUTPATIENT
Start: 2018-04-16 | End: 2018-04-16

## 2018-04-16 RX ORDER — CLINDAMYCIN PHOSPHATE 900 MG/50ML
900 INJECTION, SOLUTION INTRAVENOUS
Status: COMPLETED | OUTPATIENT
Start: 2018-04-16 | End: 2018-04-17

## 2018-04-16 RX ORDER — DIAZEPAM 2 MG/1
2 TABLET ORAL EVERY 6 HOURS PRN
Status: DISCONTINUED | OUTPATIENT
Start: 2018-04-16 | End: 2018-04-17 | Stop reason: HOSPADM

## 2018-04-16 RX ORDER — SUCCINYLCHOLINE CHLORIDE 20 MG/ML
INJECTION INTRAMUSCULAR; INTRAVENOUS
Status: DISCONTINUED | OUTPATIENT
Start: 2018-04-16 | End: 2018-04-16

## 2018-04-16 RX ORDER — PHENYLEPHRINE HYDROCHLORIDE 10 MG/ML
INJECTION INTRAVENOUS
Status: DISCONTINUED | OUTPATIENT
Start: 2018-04-16 | End: 2018-04-16

## 2018-04-16 RX ORDER — NEOSTIGMINE METHYLSULFATE 1 MG/ML
INJECTION, SOLUTION INTRAVENOUS
Status: DISCONTINUED | OUTPATIENT
Start: 2018-04-16 | End: 2018-04-16

## 2018-04-16 RX ORDER — GLUCAGON 1 MG
1 KIT INJECTION
Status: DISCONTINUED | OUTPATIENT
Start: 2018-04-16 | End: 2018-04-17 | Stop reason: HOSPADM

## 2018-04-16 RX ORDER — CLINDAMYCIN PHOSPHATE 900 MG/50ML
900 INJECTION, SOLUTION INTRAVENOUS
Status: COMPLETED | OUTPATIENT
Start: 2018-04-16 | End: 2018-04-16

## 2018-04-16 RX ORDER — PROPOFOL 10 MG/ML
VIAL (ML) INTRAVENOUS
Status: DISCONTINUED | OUTPATIENT
Start: 2018-04-16 | End: 2018-04-16

## 2018-04-16 RX ORDER — DIPHENHYDRAMINE HYDROCHLORIDE 50 MG/ML
12.5 INJECTION INTRAMUSCULAR; INTRAVENOUS EVERY 4 HOURS PRN
Status: DISCONTINUED | OUTPATIENT
Start: 2018-04-16 | End: 2018-04-17 | Stop reason: HOSPADM

## 2018-04-16 RX ORDER — GLYCOPYRROLATE 0.2 MG/ML
INJECTION INTRAMUSCULAR; INTRAVENOUS
Status: DISCONTINUED | OUTPATIENT
Start: 2018-04-16 | End: 2018-04-16

## 2018-04-16 RX ORDER — ACETAMINOPHEN 10 MG/ML
1000 INJECTION, SOLUTION INTRAVENOUS EVERY 6 HOURS
Status: COMPLETED | OUTPATIENT
Start: 2018-04-16 | End: 2018-04-17

## 2018-04-16 RX ADMIN — SODIUM CHLORIDE, SODIUM LACTATE, POTASSIUM CHLORIDE, AND CALCIUM CHLORIDE: 600; 310; 30; 20 INJECTION, SOLUTION INTRAVENOUS at 01:04

## 2018-04-16 RX ADMIN — MIDAZOLAM 2 MG: 1 INJECTION INTRAMUSCULAR; INTRAVENOUS at 10:04

## 2018-04-16 RX ADMIN — ACETAMINOPHEN 1000 MG: 10 INJECTION, SOLUTION INTRAVENOUS at 11:04

## 2018-04-16 RX ADMIN — NEOSTIGMINE METHYLSULFATE 3 MG: 1 INJECTION INTRAVENOUS at 11:04

## 2018-04-16 RX ADMIN — BUPIVACAINE 20 ML: 13.3 INJECTION, SUSPENSION, LIPOSOMAL INFILTRATION at 11:04

## 2018-04-16 RX ADMIN — HYDROMORPHONE HYDROCHLORIDE 0.2 MG: 2 INJECTION INTRAMUSCULAR; INTRAVENOUS; SUBCUTANEOUS at 01:04

## 2018-04-16 RX ADMIN — HYDROMORPHONE HYDROCHLORIDE 0.2 MG: 2 INJECTION INTRAMUSCULAR; INTRAVENOUS; SUBCUTANEOUS at 12:04

## 2018-04-16 RX ADMIN — FAMOTIDINE 20 MG: 10 INJECTION, SOLUTION INTRAVENOUS at 10:04

## 2018-04-16 RX ADMIN — ROCURONIUM BROMIDE 30 MG: 10 INJECTION, SOLUTION INTRAVENOUS at 11:04

## 2018-04-16 RX ADMIN — HEPARIN SODIUM 5000 UNITS: 5000 INJECTION, SOLUTION INTRAVENOUS; SUBCUTANEOUS at 09:04

## 2018-04-16 RX ADMIN — HYDROMORPHONE HYDROCHLORIDE 1 MG: 2 INJECTION INTRAMUSCULAR; INTRAVENOUS; SUBCUTANEOUS at 09:04

## 2018-04-16 RX ADMIN — DIAZEPAM 2 MG: 2 TABLET ORAL at 06:04

## 2018-04-16 RX ADMIN — SODIUM CHLORIDE, SODIUM GLUCONATE, SODIUM ACETATE, POTASSIUM CHLORIDE, MAGNESIUM CHLORIDE, SODIUM PHOSPHATE, DIBASIC, AND POTASSIUM PHOSPHATE: .53; .5; .37; .037; .03; .012; .00082 INJECTION, SOLUTION INTRAVENOUS at 09:04

## 2018-04-16 RX ADMIN — ACETAMINOPHEN 1000 MG: 10 INJECTION, SOLUTION INTRAVENOUS at 04:04

## 2018-04-16 RX ADMIN — SUGAMMADEX 250 MG: 100 INJECTION, SOLUTION INTRAVENOUS at 12:04

## 2018-04-16 RX ADMIN — SODIUM CHLORIDE, SODIUM GLUCONATE, SODIUM ACETATE, POTASSIUM CHLORIDE, MAGNESIUM CHLORIDE, SODIUM PHOSPHATE, DIBASIC, AND POTASSIUM PHOSPHATE: .53; .5; .37; .037; .03; .012; .00082 INJECTION, SOLUTION INTRAVENOUS at 11:04

## 2018-04-16 RX ADMIN — LIDOCAINE HYDROCHLORIDE 50 MG: 20 INJECTION, SOLUTION INTRAVENOUS at 10:04

## 2018-04-16 RX ADMIN — CLINDAMYCIN IN 5 PERCENT DEXTROSE 900 MG: 18 INJECTION, SOLUTION INTRAVENOUS at 06:04

## 2018-04-16 RX ADMIN — GLYCOPYRROLATE 0.4 MG: 0.2 INJECTION, SOLUTION INTRAMUSCULAR; INTRAVENOUS at 11:04

## 2018-04-16 RX ADMIN — PROPOFOL 150 MG: 10 INJECTION, EMULSION INTRAVENOUS at 10:04

## 2018-04-16 RX ADMIN — SUCCINYLCHOLINE CHLORIDE 120 MG: 20 INJECTION, SOLUTION INTRAMUSCULAR; INTRAVENOUS at 10:04

## 2018-04-16 RX ADMIN — PROMETHAZINE HYDROCHLORIDE 6.25 MG: 25 INJECTION INTRAMUSCULAR; INTRAVENOUS at 12:04

## 2018-04-16 RX ADMIN — ROCURONIUM BROMIDE 10 MG: 10 INJECTION, SOLUTION INTRAVENOUS at 10:04

## 2018-04-16 RX ADMIN — LIDOCAINE HYDROCHLORIDE 10 MG: 10 INJECTION, SOLUTION EPIDURAL; INFILTRATION; INTRACAUDAL; PERINEURAL at 09:04

## 2018-04-16 RX ADMIN — INSULIN ASPART 2 UNITS: 100 INJECTION, SOLUTION INTRAVENOUS; SUBCUTANEOUS at 06:04

## 2018-04-16 RX ADMIN — PHENYLEPHRINE HYDROCHLORIDE 200 MCG: 10 INJECTION INTRAVENOUS at 11:04

## 2018-04-16 RX ADMIN — FAMOTIDINE 20 MG: 10 INJECTION INTRAVENOUS at 09:04

## 2018-04-16 RX ADMIN — HYDROMORPHONE HYDROCHLORIDE 1 MG: 2 INJECTION INTRAMUSCULAR; INTRAVENOUS; SUBCUTANEOUS at 03:04

## 2018-04-16 RX ADMIN — FENTANYL CITRATE 100 MCG: 50 INJECTION, SOLUTION INTRAMUSCULAR; INTRAVENOUS at 10:04

## 2018-04-16 RX ADMIN — OXYCODONE HYDROCHLORIDE 10 MG: 10 TABLET ORAL at 11:04

## 2018-04-16 RX ADMIN — PHENYLEPHRINE HYDROCHLORIDE 100 MCG: 10 INJECTION INTRAVENOUS at 11:04

## 2018-04-16 RX ADMIN — CLINDAMYCIN PHOSPHATE 900 MG: 18 INJECTION, SOLUTION INTRAVENOUS at 11:04

## 2018-04-16 RX ADMIN — ONDANSETRON HYDROCHLORIDE 8 MG: 2 INJECTION INTRAMUSCULAR; INTRAVENOUS at 11:04

## 2018-04-16 RX ADMIN — ONDANSETRON HYDROCHLORIDE 8 MG: 2 INJECTION INTRAMUSCULAR; INTRAVENOUS at 02:04

## 2018-04-16 RX ADMIN — ONDANSETRON 4 MG: 2 INJECTION, SOLUTION INTRAMUSCULAR; INTRAVENOUS at 10:04

## 2018-04-16 RX ADMIN — DEXAMETHASONE SODIUM PHOSPHATE 8 MG: 4 INJECTION, SOLUTION INTRAMUSCULAR; INTRAVENOUS at 11:04

## 2018-04-16 NOTE — ANESTHESIA PREPROCEDURE EVALUATION
04/16/2018  Yodit Canseco is a 57 y.o., female.    Anesthesia Evaluation      I have reviewed the Medications.     Review of Systems  Anesthesia Hx:  No problems with previous Anesthesia   Social:  Non-Smoker, No Alcohol Use    Cardiovascular:   hyperlipidemia    Pulmonary:   Asthma    Renal/:  Renal/ Normal     Hepatic/GI:  Hepatic/GI Normal gastroparesis   Neurological:  Neurology Normal    Endocrine:   Diabetes, type 2 Hypothyroidism        Physical Exam  General:  Morbid Obesity    Airway/Jaw/Neck:  Airway Findings: Mouth Opening: Normal General Airway Assessment: Adult  Mallampati: III  Jaw/Neck Findings:  Neck ROM: Extension Decreased, Mild       Chest/Lungs:  Chest/Lungs Findings: Clear to auscultation, Normal Respiratory Rate     Heart/Vascular:  Heart Findings: Rate: Normal  Rhythm: Regular Rhythm  Sounds: Normal  Heart murmur: negative Vascular Findings: Normal (No carotid bruits.)       Mental Status:  Mental Status Findings:  Cooperative, Alert and Oriented         Anesthesia Plan  Type of Anesthesia, risks & benefits discussed:  Anesthesia Type:  general  Patient's Preference:   Intra-op Monitoring Plan:   Intra-op Monitoring Plan Comments:   Post Op Pain Control Plan:   Post Op Pain Control Plan Comments:   Induction:   IV  Beta Blocker:  Patient is not currently on a Beta-Blocker (No further documentation required).       Informed Consent: Patient understands risks and agrees with Anesthesia plan.  Questions answered. Anesthesia consent signed with patient.  ASA Score: 3     Day of Surgery Review of History & Physical:            Ready For Surgery From Anesthesia Perspective.

## 2018-04-16 NOTE — TRANSFER OF CARE
"Anesthesia Transfer of Care Note    Patient: Yodit Canseco    Procedure(s) Performed: Procedure(s) (LRB):  GASTRECTOMY-SLEEVE-LAPAROSCOPIC WITH EGD (N/A)    Patient location: PACU    Anesthesia Type: general    Transport from OR: Transported from OR on 2-3 L/min O2 by NC with adequate spontaneous ventilation    Post pain: adequate analgesia    Post assessment: no apparent anesthetic complications and tolerated procedure well    Post vital signs: stable    Level of consciousness: awake, alert and oriented    Nausea/Vomiting: no nausea/vomiting    Complications: none    Transfer of care protocol was followed      Last vitals:   Visit Vitals  /68   Pulse 68   Temp 36.5 °C (97.7 °F) (Temporal)   Resp (!) 9   Ht 5' 1.5" (1.562 m)   Wt 93.9 kg (207 lb)   SpO2 100%   Breastfeeding? No   BMI 38.48 kg/m²     "

## 2018-04-16 NOTE — H&P
PCP: Miky Lewis MD    History & Physical    Chief Complaint: s/p laparoscopic gastric sleeve surgery    History of Present Illness:  Patient is a 57 y.o. female admitted to Hospitalist Service from Operation Room s/p laparoscopic gastric sleeve surgery performed by Dr. Reese. Patient reportedly has past medical history significant for morbid obesity, history of gastroparesis, DM-2, hyperlipidemia and hypothyroidism. Post-operatively, patient denied chest pain, shortness of breath, vomiting, headache, vision changes, focal neuro-deficits, cough or fever. Abdominal pain and nausea are stable.    Past Medical History:   Diagnosis Date    Allergy     Angio-edema     Arthralgia     Asthma without status asthmaticus     Bronchitis     Diverticulosis of large intestine without hemorrhage 10/8/2015    Environmental allergies     Eosinophilic asthma 3/8/2018    Gastroparesis     Hand arthritis     Herpes simplex without mention of complication     oral    Hyperlipidemia     Hypothyroidism     LBP radiating to left leg     Leukocytosis, unspecified     Migraine headache     Obesity, Class III, BMI 40-49.9 (morbid obesity)     Periorbital cellulitis 12/31/2016    Prediabetes     Recurrent upper respiratory infection (URI)     S/P colonoscopy November 2010    Urticaria      Past Surgical History:   Procedure Laterality Date    ADENOIDECTOMY      CHOLECYSTECTOMY      CHOLECYSTECTOMY      COLONOSCOPY  2010    COLONOSCOPY N/A 10/8/2015    Procedure: COLONOSCOPY;  Surgeon: Panda Bose MD;  Location: [facility];  Service: Endoscopy;  Laterality: N/A;    Hand fracture surgery      HARDWARE REMOVAL      left hand 5th MC    hymenectomy      HYSTERECTOMY      menorrhagia-Ovaries intact    TONSILLECTOMY      Urethral dilatation       Family History   Problem Relation Age of Onset    Melanoma Mother     Basal cell carcinoma Mother     Lung disease Mother      pulm htn    Cataracts Mother      "Hypertension Mother     Lung cancer Father     Diabetes Father     Hypertension Father     Cancer Father     Diabetes Paternal Aunt     Colon cancer Paternal Aunt     Diabetes Paternal Aunt     Ovarian cancer Paternal Grandmother     Cancer Maternal Grandfather     Melanoma Maternal Aunt     Melanoma Maternal Uncle     Breast cancer Paternal Aunt     Lymphoma Paternal Aunt     Ulcerative colitis Son     Psoriasis Son     Psoriasis Son     Breast cancer Cousin     Allergic rhinitis Neg Hx     Allergies Neg Hx     Angioedema Neg Hx     Asthma Neg Hx     Atopy Neg Hx     Eczema Neg Hx     Immunodeficiency Neg Hx     Rhinitis Neg Hx     Urticaria Neg Hx      Social History   Substance Use Topics    Smoking status: Never Smoker    Smokeless tobacco: Never Used    Alcohol use 0.0 oz/week      Comment: very rarely      Review of patient's allergies indicates:   Allergen Reactions    Bromelains Hives     " causes mouth to bleed"    Sudafed [pseudoephedrine hcl] Other (See Comments)     Does not want to wake up .    Amoxicillin-pot clavulanate Itching     Other reaction(s): Itching    Hydrocodone Itching    Iodinated contrast- oral and iv dye      childhood    Iodine and iodide containing products Other (See Comments)    Melon Hives    Pantoprazole Nausea Only     Other reaction(s): upset stomach/halitosis    Percocet [oxycodone-acetaminophen] Itching    Barium iodide Rash    Ephedrine Other (See Comments)     Other reaction(s): comatose    Penicillins      Other reaction(s): does not work on pt symptoms     PTA Medications   Medication Sig    acetaminophen (TYLENOL) 650 MG TbSR Take 650 mg by mouth every 8 (eight) hours.    albuterol (PROAIR HFA) 90 mcg/actuation inhaler Inhale 1 puff into the lungs every 4 (four) hours as needed for Wheezing or Shortness of Breath.    albuterol (PROVENTIL) 2.5 mg /3 mL (0.083 %) nebulizer solution Take 3 mLs (2.5 mg total) by nebulization every 4 " (four) hours as needed for Wheezing or Shortness of Breath. Every 4-6 hours PRN    aspirin (ECOTRIN) 81 MG EC tablet Take 81 mg by mouth once daily.    atorvastatin (LIPITOR) 40 MG tablet Take 1 tablet (40 mg total) by mouth once daily.    CALCIUM CARB/D3/MAGNESIUM/ZINC (CALCIUM CARB-D3-MAG CMB11-ZINC) 948-743-614-5 mg-unit-mg-mg Tab Take 2 tablets by mouth every evening.    estradiol (ESTRACE) 1 MG tablet Take 1 mg by mouth once daily.    fluconazole (DIFLUCAN) 200 MG Tab Take 1 tablet (200 mg total) by mouth once daily.    immun glob G,IgG,-pro-IgA 0-50 (HIZENTRA) 2 gram/10 mL (20 %) Soln Inject 18 g into the skin every 14 (fourteen) days.    levoFLOXacin (LEVAQUIN) 500 MG tablet Take 500 mg by mouth every 24 hours.    levothyroxine (SYNTHROID) 75 MCG tablet Take 1 tablet (75 mcg total) by mouth once daily.    metformin (GLUCOPHAGE) 500 MG tablet Take 1 tablet (500 mg total) by mouth 2 (two) times daily with meals.    mometasone-formoterol (DULERA) 200-5 mcg/actuation inhaler Inhale 2 puffs into the lungs 2 (two) times daily. Controller    montelukast (SINGULAIR) 10 mg tablet Take 1 tablet (10 mg total) by mouth every evening.    multivitamin (ONE DAILY MULTIVITAMIN) per tablet Take 1 tablet by mouth once daily.    omega 3-dha-epa-fish oil (FISH OIL) 900-1,400 mg CpDR Take 1 capsule by mouth once daily.    potassium chloride (MICRO-K) 10 MEQ CpSR TAKE 2 CAPSULES BY MOUTH EVERY DAY    triamterene-hydrochlorothiazide 75-50 mg (MAXZIDE) 75-50 mg per tablet Take 1 tablet by mouth once daily.    benralizumab (FASENRA) 30 mg/mL Syrg Inject 30 mg into the skin every 28 days. Every 4 weeks for 3 doses followed by every 8 weeks thereafter    predniSONE (DELTASONE) 20 MG tablet Take 1 tablet (20 mg total) by mouth once daily. X 5 days    sumatriptan (IMITREX) 100 MG tablet Take 1 po at onset of migraine. May repeat once in 2 hrs if needed. No more than 2 pills in 24 hours    SUPRAX 400 mg Cap Take 1  capsule by mouth once daily.    triamcinolone acetonide 0.025% (KENALOG) 0.025 % cream Apply topically 2 (two) times daily.     Review of Systems:  Constitutional: no fever or chills  Eyes: no visual changes  Ears, nose, mouth, throat, and face: no nasal congestion or sore throat  Respiratory: no cough or shorness of breath  Cardiovascular: no chest pain or palpitations  Gastrointestinal: see HPI.  Genitourinary: no hematuria or dysuria  Integument/breast: no rash or pruritis  Hematologic/lymphatic: no easy bruising or lymphadenopathy  Musculoskeletal: no arthralgias or myalgias  Neurological: no seizures or tremors.  Behavioral/Psych: no auditory or visual hallucinations  Endocrine: no heat or cold intolerance     OBJECTIVE:     Vital Signs (Most Recent)  Temp: 97.2 °F (36.2 °C) (04/16/18 1348)  Pulse: 61 (04/16/18 1348)  Resp: 12 (04/16/18 1348)  BP: 126/62 (04/16/18 1348)  SpO2: (!) 92 % (04/16/18 1348)    Physical Exam:  General appearance: well developed, appears stated age  Head: normocephalic, atraumatic  Eyes:  conjunctivae/corneas clear. PERRL.  Nose: Nares normal. Septum midline.  Throat: lips, mucosa, and tongue normal; teeth and gums normal, no throat erythema.  Neck: supple, symmetrical, trachea midline, no JVD and thyroid not enlarged, symmetric, no tenderness/mass/nodules  Lungs:  clear to auscultation bilaterally and normal respiratory effort  Chest wall: no tenderness  Heart: regular rate and rhythm, S1, S2 normal, no murmur, click, rub or gallop  Abdomen: soft, non-tender non-distented; bowel sounds normal; no masses,  no organomegaly. Abdominal dressings C/D/I.  Extremities: no cyanosis, clubbing or edema.   Pulses: 2+ and symmetric  Skin: Skin color, texture, turgor normal. No rashes or lesions.  Lymph nodes: Cervical, supraclavicular, and axillary nodes normal.  Neurologic: Normal strength and tone. No focal numbness or weakness. CNII-XII intact.      Laboratory:   CBC:   Recent Labs  Lab  04/16/18  0851   WBC 12.50   RBC 5.15   HGB 15.0   HCT 44.1      MCV 86   MCH 29.1   MCHC 34.0     CMP:   Recent Labs  Lab 04/12/18  1137   GLU 88   CALCIUM 10.7*   ALBUMIN 4.4   PROT 8.4      K 4.2   CO2 25      BUN 21*   CREATININE 1.0   ALKPHOS 86   ALT 43   AST 40   BILITOT 0.7       Hemoglobin A1C   Date Value Ref Range Status   04/05/2017 5.0 4.5 - 6.2 % Final     Comment:     According to ADA guidelines, hemoglobin A1C <7.0% represents  optimal control in non-pregnant diabetic patients.  Different  metrics may apply to specific populations.   Standards of Medical Care in Diabetes - 2016.  For the purpose of screening for the presence of diabetes:  <5.7%     Consistent with the absence of diabetes  5.7-6.4%  Consistent with increasing risk for diabetes   (prediabetes)  >or=6.5%  Consistent with diabetes  Currently no consensus exists for use of hemoglobin A1C  for diagnosis of diabetes for children.     09/30/2016 5.1 4.5 - 6.2 % Final     Comment:     According to ADA guidelines, hemoglobin A1C <7.0% represents  optimal control in non-pregnant diabetic patients.  Different  metrics may apply to specific populations.   Standards of Medical Care in Diabetes - 2016.  For the purpose of screening for the presence of diabetes:  <5.7%     Consistent with the absence of diabetes  5.7-6.4%  Consistent with increasing risk for diabetes   (prediabetes)  >or=6.5%  Consistent with diabetes  Currently no consensus exists for use of hemoglobin A1C  for diagnosis of diabetes for children.     04/05/2016 5.0 4.5 - 6.2 % Final       Diagnostic Results: None    Assessment/Plan:     Active Hospital Problems    Diagnosis  POA    *S/P laparoscopic sleeve gastrectomy [Z98.84]  Tele-monitoring.  Continue to follow Dr. Reese's recommendations.  Needs aggressive incentive spirometry.  Follow hemoglobin and hematocrit closely.  Pain control with IV narcotics and antiemetics as needed.  Observe fall precautions.     Not Applicable    Immune deficiency disorder [D84.9]  Under care by Immunologist and asthma is managed by Dr. Gary.    Yes    Morbid obesity [E66.01]  Body mass index is 38.48 kg/m². Morbid obesity complicates all aspects of disease management from diagnostic modalities to treatment. Weight loss encouraged and health benefits explained to patient.    Yes    Hyperlipidemia [E78.5]  Hold Statins.    Yes    Hypothyroidism [E03.9]  Chronic problem. Will continue chronic medications and monitor for any changes, adjusting as needed.    DM-2  Check blood glucose level q AC/HS.  Use Novolog Insulin Sliding Scale as needed.   Hold oral hypoglycemic. Continue liquid bariatric diabetic diet.    Yes        VTE Risk Mitigation         Ordered     enoxaparin injection 40 mg  Every 12 hours (non-standard times)     Route:  Subcutaneous        04/16/18 1210     IP VTE HIGH RISK PATIENT  Once      04/16/18 1210     Place STEPHEN hose  Until discontinued      04/16/18 1210     Place sequential compression device  Until discontinued      04/16/18 1210        Cristian Ashraf MD  Department of Hospital Medicine   [facility]

## 2018-04-16 NOTE — BRIEF OP NOTE
[facility]  Brief Operative Note    SUMMARY     Surgery Date: 4/16/2018     Surgeon(s) and Role:     * Chai Reese MD - Primary     * Freddy Mcleod MD - Assisting        Pre-op Diagnosis:  Mixed hyperlipidemia [E78.2]  Morbid obesity [E66.01]  Morbid obesity with BMI of 40.0-44.9, adult [E66.01, Z68.41]  Arthralgia, unspecified joint [M25.50]    Post-op Diagnosis:  Post-Op Diagnosis Codes:     * Mixed hyperlipidemia [E78.2]     * Morbid obesity [E66.01]     * Morbid obesity with BMI of 40.0-44.9, adult [E66.01, Z68.41]     * Arthralgia, unspecified joint [M25.50]    Procedure(s) (LRB):  GASTRECTOMY-SLEEVE-LAPAROSCOPIC WITH EGD (N/A)    Anesthesia: General    Description of Procedure: sleeve gastrectomy    Description of the findings of the procedure: normal anatomy    Estimated Blood Loss: 10 mL         Specimens:   Specimen (12h ago through future)    Start     Ordered    04/16/18 1146  Specimen to Pathology - Surgery  Once     Comments:  Pre-op Diagnosis: Mixed hyperlipidemia [E78.2]Morbid obesity [E66.01]Morbid obesity with BMI of 40.0-44.9, adult [E66.01, Z68.41]Arthralgia, unspecified joint [M25.50]Post-op Diagnosis: SAMEProcedure(s):GASTRECTOMY-SLEEVE-LAPAROSCOPIC WITH EGD Number of specimens: 1Name of specimens: 1. STOMACH SLEEVE      04/16/18 1146

## 2018-04-16 NOTE — NURSING
Pt received to room 222. Family at bedside. Pt responds to voice. Dressings to abd CDI. Pain rating 5/10. Will continue to monitor.

## 2018-04-16 NOTE — OP NOTE
Laparoscopic sleeve gastrectomy Procedure Note    Date of procedure:   04/16/2018    Indications: Morbid obesity unresponsive to medical treatment.    Pre-operative Diagnosis:   1. Morbid obesity      2. Morbid obesity with BMI of 40.0-44.9, adult      3. Mixed hyperlipidemia      4. Arthralgia, unspecified joint             Post-operative Diagnosis: Same    Surgeon: Chai Reese MD    Assistants: Freddy Mcleod MD, General Surgery    No qualified resident was available to assist in this case    Anesthesia: General endotracheal anesthesia    ASA Class: 3    Procedure Details   The patient was seen in the Holding Room. The risks, benefits, complications, treatment options, and expected outcomes were discussed with the patient. The possibilities of reaction to medication, pulmonary aspiration, perforation of viscus, bleeding, recurrent infection, the need for additional procedures, failure to diagnose a condition, and creating a complication requiring transfusion or operation were discussed with the patient. The patient concurred with the proposed plan, giving informed consent. The site of surgery properly noted/marked. The patient was taken to Operating Room 6 identified as Yodit Canseco and the procedure verified as Sleeve Gastrectomy. A Time Out was held and the above information confirmed.    Full general anesthesia was induced with orotracheal intubation. The patient was prepped and draped in a supine position. Appropriate antibiotics were given intravenously.    An EGD did not show any retained food in the stomach.    The 5 mm applied medical optiview was used to enter into the abdominal cavity under direct vision in the right upper quadrant. The abdomen was insufflated.    There were no untoward findings on diagnostic laparoscopy. Trocars were placed under direct vision in the following fashion: a 5 mm trocar in the lateral right (optiview location) and left upper quadrant; a 12 mm trocar in the left and  right upper quadrant; and a 12 mm trocar in the eloise umbilical area. The Tj liver retractor was then placed through a high epigastric incision site and positioned to hold the liver.    The procedure was started by identifying an area approx. 5 cm proximal to the pylorus. The Harmonic was then used to take down the short gastrics up to the left faisal which was identified and cleared.    The sleeve was started by using a purple load without reinforcement to reduce the size of the antrum. The sleeve was then shaped using purple loads without eloise strips up the the left faisal using the endoscope as sizing device.  The distal sleeve was stapled slightly further away from the scope than the proximal sleeve to avoid encroachment of the incisura. Bleeding was controlled with clips.     A provacative leak test, looking for air bubbles while the sleeve is insufflated and clamped, was preformed and did not reveal any leaks.  The EGD done during the leak test revealed an appropriately sized sleeve without any narrowings or kinking.     Hemostasis was achieved.    The specimen was removed out of the left upper quadrant port and the site was closed with a zero vicryl.     Exparel was injected at each port site.    The wounds were heavily irrigated. The skin was closed with 4-0 suture. Sterile dressings were applied.    Instrument, sponge, and needle counts were correct prior to wound closure and at the conclusion of the case.     Findings:  Normal anatomy    Estimated Blood Loss: 10.0 cc    Drains: none    Total IV Fluids: 1000 ml    Specimens: gastrectomy    Implants: none    Complications:  None; patient tolerated the procedure well.    Disposition: PACU - hemodynamically stable.    Condition: stable    Attending Attestation: I was present and scrubbed for the entire procedure.

## 2018-04-16 NOTE — H&P (VIEW-ONLY)
"Follow up    SUBJECTIVE:     Chief Complaint   Patient presents with    Obesity       History of Present Illness:    Patient is a 57 y.o. female who is referred for evaluation of surgical treatment of morbid obesity. Her Body mass index is 40.34 kg/m².  She has known comorbidities of diabetes mellitus, dyslipidemia, osteoarthritis, peripheral edema and urinary incontinence. She has not attended the bariatric seminar and is most interested in gastric sleeve surgery.     Review of patient's allergies indicates:   Allergen Reactions    Bromelains Hives     " causes mouth to bleed"    Sudafed [pseudoephedrine hcl] Other (See Comments)     Does not want to wake up .    Amoxicillin-pot clavulanate      Other reaction(s): Itching    Ephedrine      Other reaction(s): comatose    Hydrocodone Itching    Iodinated contrast- oral and iv dye     Iodine and iodide containing products Other (See Comments)    Melon     Pantoprazole      Other reaction(s): upset stomach/halitosis    Penicillins      Other reaction(s): does not work on pt symptoms    Percocet [oxycodone-acetaminophen] Itching    Barium iodide Rash       Current Outpatient Prescriptions   Medication Sig Dispense Refill    albuterol (PROAIR HFA) 90 mcg/actuation inhaler Inhale 1 puff into the lungs every 4 (four) hours as needed for Wheezing or Shortness of Breath. 3 Inhaler 3    aspirin (ECOTRIN) 81 MG EC tablet Take 81 mg by mouth once daily.      atorvastatin (LIPITOR) 40 MG tablet Take 1 tablet (40 mg total) by mouth once daily. 90 tablet 3    CALCIUM CARB/D3/MAGNESIUM/ZINC (CALCIUM CARB-D3-MAG CMB11-ZINC) 045-979-628-5 mg-unit-mg-mg Tab Take 2 tablets by mouth every evening.      estradiol (ESTRACE) 1 MG tablet Take 1 mg by mouth once daily.      fluconazole (DIFLUCAN) 200 MG Tab Take 1 tablet (200 mg total) by mouth once daily. 7 tablet 1    immun glob G,IgG,-pro-IgA 0-50 (HIZENTRA) 2 gram/10 mL (20 %) Soln Inject 18 g into the skin every 14 " (fourteen) days. 90 mL 12    levothyroxine (SYNTHROID) 75 MCG tablet Take 1 tablet (75 mcg total) by mouth once daily. 30 tablet 11    metformin (GLUCOPHAGE) 500 MG tablet Take 1 tablet (500 mg total) by mouth 2 (two) times daily with meals. 60 tablet 11    mometasone-formoterol (DULERA) 200-5 mcg/actuation inhaler Inhale 2 puffs into the lungs 2 (two) times daily. Controller 1 Inhaler 11    montelukast (SINGULAIR) 10 mg tablet Take 1 tablet (10 mg total) by mouth every evening. 90 tablet 3    multivitamin (ONE DAILY MULTIVITAMIN) per tablet Take 1 tablet by mouth once daily.      omega 3-dha-epa-fish oil (FISH OIL) 900-1,400 mg CpDR Take 1 capsule by mouth once daily.      potassium chloride (MICRO-K) 10 MEQ CpSR TAKE 2 CAPSULES BY MOUTH EVERY DAY 60 capsule 3    predniSONE (DELTASONE) 20 MG tablet Take 1 tablet (20 mg total) by mouth once daily. X 5 days  1    sumatriptan (IMITREX) 100 MG tablet Take 1 po at onset of migraine. May repeat once in 2 hrs if needed. No more than 2 pills in 24 hours 9 tablet 2    triamcinolone acetonide 0.025% (KENALOG) 0.025 % cream Apply topically 2 (two) times daily. 15 g 0    triamterene-hydrochlorothiazide 75-50 mg (MAXZIDE) 75-50 mg per tablet Take 1 tablet by mouth once daily. 30 tablet 11    acetaminophen (TYLENOL) 650 MG TbSR Take 650 mg by mouth every 8 (eight) hours.      albuterol (PROVENTIL) 2.5 mg /3 mL (0.083 %) nebulizer solution Take 3 mLs (2.5 mg total) by nebulization every 4 (four) hours as needed for Wheezing or Shortness of Breath. Every 4-6 hours  each 3    benralizumab (FASENRA) 30 mg/mL Syrg Inject 30 mg into the skin every 28 days. Every 4 weeks for 3 doses followed by every 8 weeks thereafter 1 Syringe 11    desonide (DESOWEN) 0.05 % cream Apply topically 2 (two) times daily. Apply to lids. 1 Tube 3    levoFLOXacin (LEVAQUIN) 500 MG tablet Take 500 mg by mouth every 24 hours.      SUPRAX 400 mg Cap Take 1 capsule by mouth once daily.  10 capsule 2     No current facility-administered medications for this visit.        Past Medical History:   Diagnosis Date    Allergy     Angio-edema     Arthralgia     Asthma without status asthmaticus     Bronchitis     Diverticulosis of large intestine without hemorrhage 10/8/2015    Environmental allergies     Eosinophilic asthma 3/8/2018    Gastroparesis     Hand arthritis     Herpes simplex without mention of complication     oral    Hyperlipidemia     Hypothyroidism     LBP radiating to left leg     Leukocytosis, unspecified     Migraine headache     Obesity, Class III, BMI 40-49.9 (morbid obesity)     Periorbital cellulitis 12/31/2016    Prediabetes     Recurrent upper respiratory infection (URI)     S/P colonoscopy November 2010    Urticaria      Past Surgical History:   Procedure Laterality Date    ADENOIDECTOMY      CHOLECYSTECTOMY      CHOLECYSTECTOMY      COLONOSCOPY  2010    COLONOSCOPY N/A 10/8/2015    Procedure: COLONOSCOPY;  Surgeon: Panda Bose MD;  Location[facility];  Service: Endoscopy;  Laterality: N/A;    Hand fracture surgery      HARDWARE REMOVAL      left hand 5th MC    HYSTERECTOMY      menorrhagia-Ovaries intact    TONSILLECTOMY      Urethral dilatation       Family History   Problem Relation Age of Onset    Melanoma Mother     Basal cell carcinoma Mother     Lung disease Mother      pulm htn    Cataracts Mother     Hypertension Mother     Lung cancer Father     Diabetes Father     Hypertension Father     Cancer Father     Diabetes Paternal Aunt     Colon cancer Paternal Aunt     Diabetes Paternal Aunt     Ovarian cancer Paternal Grandmother     Cancer Maternal Grandfather     Melanoma Maternal Aunt     Melanoma Maternal Uncle     Breast cancer Paternal Aunt     Lymphoma Paternal Aunt     Ulcerative colitis Son     Psoriasis Son     Psoriasis Son     Breast cancer Cousin     Allergic rhinitis Neg Hx     Allergies Neg Hx      "Angioedema Neg Hx     Asthma Neg Hx     Atopy Neg Hx     Eczema Neg Hx     Immunodeficiency Neg Hx     Rhinitis Neg Hx     Urticaria Neg Hx      Social History   Substance Use Topics    Smoking status: Never Smoker    Smokeless tobacco: Never Used    Alcohol use 0.0 oz/week      Comment: very rarely        Review of Systems:  Review of Systems   Constitutional: Negative for activity change, appetite change, fever and unexpected weight change.   HENT: Negative for congestion, sinus pressure, sneezing, sore throat, tinnitus and voice change.    Eyes: Negative for redness and visual disturbance.   Respiratory: Negative for apnea, cough, choking, chest tightness, shortness of breath, wheezing and stridor.    Cardiovascular: Positive for leg swelling. Negative for chest pain and palpitations.   Gastrointestinal: Negative for abdominal distention, abdominal pain, anal bleeding, blood in stool, constipation, diarrhea, nausea, rectal pain and vomiting.   Endocrine: Negative for cold intolerance and heat intolerance.   Genitourinary: Negative for difficulty urinating and dysuria.   Musculoskeletal: Positive for arthralgias and back pain. Negative for gait problem, joint swelling, myalgias, neck pain and neck stiffness.   Skin: Negative for rash and wound.   Allergic/Immunologic: Positive for food allergies. Negative for environmental allergies.   Neurological: Positive for headaches. Negative for dizziness, facial asymmetry and light-headedness.   Hematological: Negative for adenopathy. Does not bruise/bleed easily.   Psychiatric/Behavioral: Negative for agitation and confusion.       OBJECTIVE:     Vital Signs (Most Recent)  Temp: 97.8 °F (36.6 °C) (04/03/18 1127)  Pulse: 83 (04/03/18 1127)  Resp: 16 (04/03/18 1127)  BP: (!) 164/98 (04/03/18 1127)  5' 1.5" (1.562 m)  98.4 kg (217 lb)     Body comp:  Fat Percent:  48.7 %  Fat Mass:  103.6 lb  FFM:  109 lb  TBW: 78.4 lb  TBW %:  36.9 %  BMR: 1549 kcal        Physical " Exam:  Physical Exam   Constitutional: She is oriented to person, place, and time. She appears well-developed and well-nourished. No distress.   HENT:   Head: Normocephalic and atraumatic.   Mouth/Throat: No oropharyngeal exudate.   Eyes: Conjunctivae and EOM are normal. Pupils are equal, round, and reactive to light. No scleral icterus.   Neck: Normal range of motion. Neck supple. No JVD present. No tracheal deviation present. No thyromegaly present.   Cardiovascular: Normal rate, regular rhythm and normal heart sounds.  Exam reveals no gallop and no friction rub.    No murmur heard.  Pulmonary/Chest: Effort normal and breath sounds normal. No stridor. No respiratory distress. She has no wheezes. She has no rales. She exhibits no tenderness.   Abdominal: Soft. Bowel sounds are normal. She exhibits no distension and no mass. There is no tenderness. There is no rebound and no guarding.   Musculoskeletal: Normal range of motion. She exhibits no edema or tenderness.   Lymphadenopathy:     She has no cervical adenopathy.   Neurological: She is alert and oriented to person, place, and time. No cranial nerve deficit.   Skin: Skin is warm and dry. No rash noted. She is not diaphoretic. No erythema.   Psychiatric: She has a normal mood and affect. Her behavior is normal.   Nursing note and vitals reviewed.      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 tissue  dietary consult- done  psych consult - Verbally clear- will obtain results- [facility] Del [facility]  Seminar  Labs-done  UGI- cannot do because of allergy   Off steroids for 6 weeks- stopped almost 6 weeks ago       I will obtain the following clearances prior to surgery:  Pulmonology- Dr. Gary- angelo  Cardiology- Dr. Barrientos- Angelo  PCP- Dr. Lewis- angelo    1. Morbid obesity     2. Morbid obesity with BMI of 40.0-44.9, adult     3. Mixed hyperlipidemia     4. Arthralgia, unspecified joint          Plan:  Yodit Canseco has morbid obesity as their Body mass index is 40.34 kg/m². She would benefit from weight loss surgery and has chosen gastric sleeve surgery as the preferred procedure. She understands that this is a tool and lifestyle change will be necessary to keep weight off. I went over possible complications of the chosen surgery with the patient and she is agreeable to surgery.    She has been instructed to start the pre operative diet.    She surgical date is April 16      The patient has been taught the post operative diet and understands that she will need to stay on this diet to prevent complication.  They are aware of the post operative follow up and return to see me after surgery to treat/prevent/identify any complications.  she has been advised to attend support groups post operatively.

## 2018-04-16 NOTE — INTERVAL H&P NOTE
The patient has been examined and the H&P has been reviewed:    I concur with the findings and no changes have occurred since H&P was written.    Anesthesia/Surgery risks, benefits and alternative options discussed and understood by patient/family.          Active Hospital Problems    Diagnosis  POA    Morbid obesity [E66.01]  Yes      Resolved Hospital Problems    Diagnosis Date Resolved POA   No resolved problems to display.

## 2018-04-16 NOTE — PROGRESS NOTES
04/16/18 1519   Incentive Spirometer   $ Incentive Spirometer Charges done with encouragement;preop instruction   Predicted Level (mL) (Incentive Spirometer) 1950   Administration (Incentive Spirometer) preop instruction;done with encouragement   Number of Repetitions (Incentive Spirometer) 10   Level (mL) (Incentive Spirometer) 1000   Patient Tolerance good   Instructed Pt and family to use Q4.

## 2018-04-16 NOTE — PLAN OF CARE
Patient is stable at this time.  VSS. Anesthesiologist at patient's bedside and is ok for patient to transfer to the floor.  Dressings clean, dry and intact.  Pain is a 4/10.  No complaints of nausea or vomiting.  Patient tolerating po intake well.

## 2018-04-17 VITALS
TEMPERATURE: 100 F | SYSTOLIC BLOOD PRESSURE: 163 MMHG | DIASTOLIC BLOOD PRESSURE: 74 MMHG | HEART RATE: 54 BPM | OXYGEN SATURATION: 96 % | RESPIRATION RATE: 18 BRPM | HEIGHT: 62 IN | BODY MASS INDEX: 38.09 KG/M2 | WEIGHT: 207 LBS

## 2018-04-17 LAB
ANION GAP SERPL CALC-SCNC: 10 MMOL/L
BASOPHILS # BLD AUTO: 0.1 K/UL
BASOPHILS NFR BLD: 0.6 %
BUN SERPL-MCNC: 11 MG/DL
CALCIUM SERPL-MCNC: 8.5 MG/DL
CHLORIDE SERPL-SCNC: 102 MMOL/L
CO2 SERPL-SCNC: 26 MMOL/L
CREAT SERPL-MCNC: 0.9 MG/DL
DIFFERENTIAL METHOD: ABNORMAL
EOSINOPHIL # BLD AUTO: 0 K/UL
EOSINOPHIL NFR BLD: 0.1 %
ERYTHROCYTE [DISTWIDTH] IN BLOOD BY AUTOMATED COUNT: 13.9 %
EST. GFR  (AFRICAN AMERICAN): >60 ML/MIN/1.73 M^2
EST. GFR  (NON AFRICAN AMERICAN): >60 ML/MIN/1.73 M^2
GLUCOSE SERPL-MCNC: 114 MG/DL
HCT VFR BLD AUTO: 41.3 %
HGB BLD-MCNC: 14.1 G/DL
LYMPHOCYTES # BLD AUTO: 1.4 K/UL
LYMPHOCYTES NFR BLD: 10.6 %
MAGNESIUM SERPL-MCNC: 1.9 MG/DL
MCH RBC QN AUTO: 29.2 PG
MCHC RBC AUTO-ENTMCNC: 34.3 G/DL
MCV RBC AUTO: 85 FL
MONOCYTES # BLD AUTO: 0.9 K/UL
MONOCYTES NFR BLD: 7 %
NEUTROPHILS # BLD AUTO: 11.1 K/UL
NEUTROPHILS NFR BLD: 81.7 %
PHOSPHATE SERPL-MCNC: 3.1 MG/DL
PLATELET # BLD AUTO: 253 K/UL
PLATELET BLD QL SMEAR: ABNORMAL
PMV BLD AUTO: 11.2 FL
POTASSIUM SERPL-SCNC: 3.4 MMOL/L
RBC # BLD AUTO: 4.85 M/UL
SODIUM SERPL-SCNC: 138 MMOL/L
WBC # BLD AUTO: 13.6 K/UL

## 2018-04-17 PROCEDURE — 83735 ASSAY OF MAGNESIUM: CPT

## 2018-04-17 PROCEDURE — 63600175 PHARM REV CODE 636 W HCPCS: Performed by: SURGERY

## 2018-04-17 PROCEDURE — 85025 COMPLETE CBC W/AUTO DIFF WBC: CPT

## 2018-04-17 PROCEDURE — 84100 ASSAY OF PHOSPHORUS: CPT

## 2018-04-17 PROCEDURE — 25000003 PHARM REV CODE 250: Performed by: INTERNAL MEDICINE

## 2018-04-17 PROCEDURE — 97161 PT EVAL LOW COMPLEX 20 MIN: CPT

## 2018-04-17 PROCEDURE — 25000003 PHARM REV CODE 250: Performed by: SURGERY

## 2018-04-17 PROCEDURE — 99238 HOSP IP/OBS DSCHRG MGMT 30/<: CPT | Mod: ,,, | Performed by: INTERNAL MEDICINE

## 2018-04-17 PROCEDURE — S0077 INJECTION, CLINDAMYCIN PHOSP: HCPCS | Performed by: SURGERY

## 2018-04-17 PROCEDURE — 94799 UNLISTED PULMONARY SVC/PX: CPT

## 2018-04-17 PROCEDURE — 97802 MEDICAL NUTRITION INDIV IN: CPT

## 2018-04-17 PROCEDURE — 36415 COLL VENOUS BLD VENIPUNCTURE: CPT

## 2018-04-17 PROCEDURE — S0028 INJECTION, FAMOTIDINE, 20 MG: HCPCS | Performed by: SURGERY

## 2018-04-17 PROCEDURE — 80048 BASIC METABOLIC PNL TOTAL CA: CPT

## 2018-04-17 RX ORDER — OMEPRAZOLE 20 MG/1
20 CAPSULE, DELAYED RELEASE ORAL DAILY
Qty: 30 CAPSULE | Refills: 3 | Status: SHIPPED | OUTPATIENT
Start: 2018-04-17 | End: 2018-05-01

## 2018-04-17 RX ORDER — POTASSIUM CHLORIDE 20 MEQ/15ML
40 SOLUTION ORAL ONCE
Status: COMPLETED | OUTPATIENT
Start: 2018-04-17 | End: 2018-04-17

## 2018-04-17 RX ORDER — DIAZEPAM 2 MG/1
2 TABLET ORAL EVERY 6 HOURS PRN
Qty: 20 TABLET | Refills: 0 | Status: SHIPPED | OUTPATIENT
Start: 2018-04-17 | End: 2018-04-24 | Stop reason: SDUPTHER

## 2018-04-17 RX ORDER — ONDANSETRON 4 MG/1
4 TABLET, ORALLY DISINTEGRATING ORAL EVERY 6 HOURS PRN
Qty: 30 TABLET | Refills: 0 | Status: SHIPPED | OUTPATIENT
Start: 2018-04-17 | End: 2018-05-22

## 2018-04-17 RX ORDER — HYDROCODONE BITARTRATE AND ACETAMINOPHEN 7.5; 325 MG/1; MG/1
2 TABLET ORAL EVERY 6 HOURS PRN
Qty: 40 TABLET | Refills: 0 | Status: SHIPPED | OUTPATIENT
Start: 2018-04-17 | End: 2018-04-24

## 2018-04-17 RX ADMIN — ONDANSETRON HYDROCHLORIDE 8 MG: 2 INJECTION INTRAMUSCULAR; INTRAVENOUS at 09:04

## 2018-04-17 RX ADMIN — LEVOTHYROXINE SODIUM 75 MCG: 25 TABLET ORAL at 06:04

## 2018-04-17 RX ADMIN — POTASSIUM CHLORIDE 40 MEQ: 20 SOLUTION ORAL at 11:04

## 2018-04-17 RX ADMIN — CLINDAMYCIN IN 5 PERCENT DEXTROSE 900 MG: 18 INJECTION, SOLUTION INTRAVENOUS at 02:04

## 2018-04-17 RX ADMIN — FAMOTIDINE 20 MG: 10 INJECTION INTRAVENOUS at 09:04

## 2018-04-17 RX ADMIN — OXYCODONE HYDROCHLORIDE 10 MG: 10 TABLET ORAL at 02:04

## 2018-04-17 RX ADMIN — ENOXAPARIN SODIUM 40 MG: 100 INJECTION SUBCUTANEOUS at 02:04

## 2018-04-17 RX ADMIN — DIPHENHYDRAMINE HYDROCHLORIDE 12.5 MG: 50 INJECTION INTRAMUSCULAR; INTRAVENOUS at 09:04

## 2018-04-17 RX ADMIN — ACETAMINOPHEN 1000 MG: 10 INJECTION, SOLUTION INTRAVENOUS at 04:04

## 2018-04-17 RX ADMIN — ACETAMINOPHEN 1000 MG: 10 INJECTION, SOLUTION INTRAVENOUS at 11:04

## 2018-04-17 RX ADMIN — OXYCODONE HYDROCHLORIDE 10 MG: 10 TABLET ORAL at 09:04

## 2018-04-17 RX ADMIN — DIPHENHYDRAMINE HYDROCHLORIDE 12.5 MG: 50 INJECTION INTRAMUSCULAR; INTRAVENOUS at 12:04

## 2018-04-17 RX ADMIN — PROMETHAZINE HYDROCHLORIDE 12.5 MG: 25 INJECTION INTRAMUSCULAR; INTRAVENOUS at 11:04

## 2018-04-17 RX ADMIN — ENOXAPARIN SODIUM 40 MG: 100 INJECTION SUBCUTANEOUS at 12:04

## 2018-04-17 NOTE — PT/OT/SLP EVAL
"Physical Therapy Evaluation and Discharge Note    Patient Name:  Yodit Canseco   MRN:  [id number]    Recommendations:     Discharge Recommendations:  home   Discharge Equipment Recommendations: none   Barriers to discharge: None    Assessment:     Yodit Canseco is a 57 y.o. female admitted with a medical diagnosis of S/P laparoscopic sleeve gastrectomy. .  At this time, patient is functioning at their prior level of function and does not require further acute PT services.     Recent Surgery: Procedure(s) (LRB):  GASTRECTOMY-SLEEVE-LAPAROSCOPIC WITH EGD (N/A) 1 Day Post-Op    Plan:     During this hospitalization, patient does not require further acute PT services.  Please re-consult if situation changes.     Plan of Care Reviewed with: patient, spouse    Subjective     Communicated with RN prior to session.  Patient found supine in bed upon PT entry to room, agreeable to evaluation.      Chief Complaint: Nausea  Patient comments/goals: "I got nauseous last time I got up."  Pain/Comfort:  · Pain Rating 1: 3/10  · Location - Orientation 1: generalized  · Location 1: abdomen  · Pain Addressed 1: Reposition, Distraction  · Pain Rating Post-Intervention 1: 3/10    Patients cultural, spiritual, Hindu conflicts given the current situation: None    Living Environment:  Pt lives with her  in a [facility] with 0 TALIS[place]  Prior to admission, patients level of function was independent.  Patient has the following equipment: none. Upon discharge, patient will have assistance from her .    Objective:     Patient found with: peripheral IV     General Precautions: Standard, fall   Orthopedic Precautions:N/A   Braces: N/A     Exams:  · Postural Exam:  Patient presented with the following abnormalities:    · -       Rounded shoulders  · RUE ROM: WNL  · RUE Strength: WNL  · LUE ROM: WNL  · LUE Strength: WNL  · RLE ROM: WNL  · RLE Strength: WNL  · LLE ROM: WNL  · LLE Strength: WNL    Functional Mobility:  · Bed " Mobility:  · Supine to Sit: independence  · Transfers  · Sit to Stand:  independence with no AD  · Gait:  · Pt ambulated 300 feet with stand by assistance using IV pole for support    AM-PAC 6 CLICK MOBILITY  Total Score:22     Patient left seated EOB with all lines intact and call button in reach.    GOALS:    Physical Therapy Goals        Problem: Physical Therapy Goal    Goal Priority Disciplines Outcome Goal Variances Interventions   Physical Therapy Goal     PT/OT, PT Ongoing (interventions implemented as appropriate)                     History:     Past Medical History:   Diagnosis Date    Allergy     Angio-edema     Arthralgia     Asthma without status asthmaticus     Bronchitis     Diverticulosis of large intestine without hemorrhage 10/8/2015    Environmental allergies     Eosinophilic asthma 3/8/2018    Gastroparesis     Hand arthritis     Herpes simplex without mention of complication     oral    Hyperlipidemia     Hypothyroidism     LBP radiating to left leg     Leukocytosis, unspecified     Migraine headache     Obesity, Class III, BMI 40-49.9 (morbid obesity)     Periorbital cellulitis 12/31/2016    Prediabetes     Recurrent upper respiratory infection (URI)     S/P colonoscopy November 2010    Urticaria        Past Surgical History:   Procedure Laterality Date    ADENOIDECTOMY      CHOLECYSTECTOMY      CHOLECYSTECTOMY      COLONOSCOPY  2010    COLONOSCOPY N/A 10/8/2015    Procedure: COLONOSCOPY;  Surgeon: Panda Bose MD;  Location[facility];  Service: Endoscopy;  Laterality: N/A;    Hand fracture surgery      HARDWARE REMOVAL      left hand 5th MC    hymenectomy      HYSTERECTOMY      menorrhagia-Ovaries intact    TONSILLECTOMY      Urethral dilatation         Clinical Decision Making:     History  Co-morbidities and personal factors that may impact the plan of care Examination  Body Structures and Functions, activity limitations and participation restrictions  that may impact the plan of care Clinical Presentation   Decision Making/ Complexity Score   Co-morbidities:   [] Time since onset of injury / illness / exacerbation  [] Status of current condition  []Patient's cognitive status and safety concerns    [] Multiple Medical Problems (see med hx)  Personal Factors:   [] Patient's age  [] Prior Level of function   [] Patient's home situation (environment and family support)  [] Patient's level of motivation  [] Expected progression of patient      HISTORY:(criteria)    [] 20287 - no personal factors/history    [] 65925 - has 1-2 personal factor/comorbidity     [] 52679 - has >3 personal factor/comorbidity     Body Regions:  [] Objective examination findings  [] Head     []  Neck  [] Trunk   [] Upper Extremity  [] Lower Extremity    Body Systems:  [] For communication ability, affect, cognition, language, and learning style: the assessment of the ability to make needs known, consciousness, orientation (person, place, and time), expected emotional /behavioral responses, and learning preferences (eg, learning barriers, education  needs)  [] For the neuromuscular system: a general assessment of gross coordinated movement (eg, balance, gait, locomotion, transfers, and transitions) and motor function  (motor control and motor learning)  [] For the musculoskeletal system: the assessment of gross symmetry, gross range of motion, gross strength, height, and weight  [] For the integumentary system: the assessment of pliability(texture), presence of scar formation, skin color, and skin integrity  [] For cardiovascular/pulmonary system: the assessment of heart rate, respiratory rate, blood pressure, and edema     Activity limitations:    [] Patient's cognitive status and saf ety concerns          [] Status of current condition      [] Weight bearing restriction  [] Cardiopulmunary Restriction    Participation Restrictions:   [] Goals and goal agreement with the patient     [] Rehab  potential (prognosis) and probable outcome      Examination of Body System: (criteria)    [] 04418 - addressing 1-2 elements    [] 69727 - addressing a total of 3 or more elements     [] 92985 -  Addressing a total of 4 or more elements         Clinical Presentation: (criteria)  Choose one     On examination of body system using standardized tests and measures patient presents with (CHOOSE ONE) elements from any of the following: body structures and functions, activity limitations, and/or participation restrictions.  Leading to a clinical presentation that is considered (CHOOSE ONE)                              Clinical Decision Making  (Eval Complexity):  Choose One     Time Tracking:     PT Received On: 04/17/18  PT Start Time: 1010     PT Stop Time: 1028  PT Total Time (min): 18 min     Billable Minutes: Evaluation 18      Jessica LeJeune, PT, DPT

## 2018-04-17 NOTE — NURSING
Pt to be discharged home with  in stable condition. Pt complains of mild abd pain controlled with PRN pain medications. Denies nausea. Discharge instructions and medications reviewed with pt, understanding verbalized. All questions answered. IV DCed, catheter intact. Tele monitor returned.

## 2018-04-17 NOTE — DISCHARGE SUMMARY
Discharge Summary  Hospital Medicine    Admit Date: 4/16/2018    Date and Time: 4/17/201812:43 PM    Discharge Attending Physician: Cristian Ashraf MD    Primary Care Physician: Miky Lewis MD    Diagnoses:  Active Hospital Problems    Diagnosis  POA    *S/P laparoscopic sleeve gastrectomy [Z98.84]  Not Applicable    Immune deficiency disorder [D84.9]  Yes    Morbid obesity [E66.01]  Yes    Hyperlipidemia [E78.5]  Yes    Hypothyroidism [E03.9]  Yes      Resolved Hospital Problems    Diagnosis Date Resolved POA   No resolved problems to display.     Discharged Condition: Good    Hospital Course:   Patient is a 57 y.o. female admitted to Hospitalist Service from Operation Room s/p laparoscopic gastric sleeve surgery performed by Dr. Reese. Patient reportedly has past medical history significant for morbid obesity, history of gastroparesis, DM-2, hyperlipidemia and hypothyroidism. Post-operatively, patient denied chest pain, shortness of breath, vomiting, headache, vision changes, focal neuro-deficits, cough or fever. Abdominal pain and nausea are stable. Patient was admitted to Hospitalist medicine service. Patient was followed by Dr. Reese. Patient was admitted to hospital medicine. Patient was closely monitored post-operatively. Blood sugar and blood pressure closely followed. Patient was given supportive care. Patient worked with Physical Therapist. Nausea and pain controlled. Patient tolerated liquid diet. Symptoms resolved. Patient was discharged home in stable condition with following discharge plan of care.     Consults: Dr. Reese    Significant Diagnostic Studies:     Microbiology Results (last 7 days)     ** No results found for the last 168 hours. **        Special Treatments/Procedures: as above  Disposition: Home or Self Care    Medications:  Reconciled Home Medications: Current Discharge Medication List      START taking these medications    Details   diazePAM (VALIUM) 2 MG tablet Take 1  tablet (2 mg total) by mouth every 6 (six) hours as needed for Anxiety (muscle spasm).  Qty: 20 tablet, Refills: 0      hydrocodone-acetaminophen 7.5-325mg (NORCO) 7.5-325 mg per tablet Take 2 tablets by mouth every 6 (six) hours as needed for Pain.  Qty: 40 tablet, Refills: 0      omeprazole (PRILOSEC) 20 MG capsule Take 1 capsule (20 mg total) by mouth once daily.  Qty: 30 capsule, Refills: 3      ondansetron (ZOFRAN-ODT) 4 MG TbDL Take 1 tablet (4 mg total) by mouth every 6 (six) hours as needed.  Qty: 30 tablet, Refills: 0         CONTINUE these medications which have NOT CHANGED    Details   acetaminophen (TYLENOL) 650 MG TbSR Take 650 mg by mouth every 8 (eight) hours.      albuterol (PROAIR HFA) 90 mcg/actuation inhaler Inhale 1 puff into the lungs every 4 (four) hours as needed for Wheezing or Shortness of Breath.  Qty: 3 Inhaler, Refills: 3    Associated Diagnoses: Mild intermittent asthma without complication      albuterol (PROVENTIL) 2.5 mg /3 mL (0.083 %) nebulizer solution Take 3 mLs (2.5 mg total) by nebulization every 4 (four) hours as needed for Wheezing or Shortness of Breath. Every 4-6 hours PRN  Qty: 120 each, Refills: 3    Associated Diagnoses: Mild intermittent asthma without complication      aspirin (ECOTRIN) 81 MG EC tablet Take 81 mg by mouth once daily.      CALCIUM CARB/D3/MAGNESIUM/ZINC (CALCIUM CARB-D3-MAG CMB11-ZINC) [phone] mg-unit-mg-mg Tab Take 2 tablets by mouth every evening.      estradiol (ESTRACE) 1 MG tablet Take 1 mg by mouth once daily.      fluconazole (DIFLUCAN) 200 MG Tab Take 1 tablet (200 mg total) by mouth once daily.  Qty: 7 tablet, Refills: 1    Associated Diagnoses: Yeast infection      immun glob G,IgG,-pro-IgA 0-50 (HIZENTRA) 2 gram/10 mL (20 %) Soln Inject 18 g into the skin every 14 (fourteen) days.  Qty: 90 mL, Refills: 12      levothyroxine (SYNTHROID) 75 MCG tablet Take 1 tablet (75 mcg total) by mouth once daily.  Qty: 30 tablet, Refills: 11       mometasone-formoterol (DULERA) 200-5 mcg/actuation inhaler Inhale 2 puffs into the lungs 2 (two) times daily. Controller  Qty: 1 Inhaler, Refills: 11    Associated Diagnoses: Flu; Eosinophilic asthma; Mild intermittent asthma with acute exacerbation      montelukast (SINGULAIR) 10 mg tablet Take 1 tablet (10 mg total) by mouth every evening.  Qty: 90 tablet, Refills: 3    Associated Diagnoses: Mild intermittent asthma without complication      multivitamin (ONE DAILY MULTIVITAMIN) per tablet Take 1 tablet by mouth once daily.      omega 3-dha-epa-fish oil (FISH OIL) 900-1,400 mg CpDR Take 1 capsule by mouth once daily.      potassium chloride (MICRO-K) 10 MEQ CpSR TAKE 2 CAPSULES BY MOUTH EVERY DAY  Qty: 60 capsule, Refills: 3      triamterene-hydrochlorothiazide 75-50 mg (MAXZIDE) 75-50 mg per tablet Take 1 tablet by mouth once daily.  Qty: 30 tablet, Refills: 11      benralizumab (FASENRA) 30 mg/mL Syrg Inject 30 mg into the skin every 28 days. Every 4 weeks for 3 doses followed by every 8 weeks thereafter  Qty: 1 Syringe, Refills: 11    Associated Diagnoses: Severe persistent asthma without complication; Eosinophilic asthma      predniSONE (DELTASONE) 20 MG tablet Take 1 tablet (20 mg total) by mouth once daily. X 5 days  Refills: 1    Associated Diagnoses: Eosinophilic asthma; Mild intermittent asthma with acute exacerbation      sumatriptan (IMITREX) 100 MG tablet Take 1 po at onset of migraine. May repeat once in 2 hrs if needed. No more than 2 pills in 24 hours  Qty: 9 tablet, Refills: 2      SUPRAX 400 mg Cap Take 1 capsule by mouth once daily.  Qty: 10 capsule, Refills: 2    Associated Diagnoses: Mild intermittent asthma without complication      triamcinolone acetonide 0.025% (KENALOG) 0.025 % cream Apply topically 2 (two) times daily.  Qty: 15 g, Refills: 0         STOP taking these medications       atorvastatin (LIPITOR) 40 MG tablet Comments:   Reason for Stopping:         levoFLOXacin (LEVAQUIN) 500  MG tablet Comments:   Reason for Stopping:         metformin (GLUCOPHAGE) 500 MG tablet Comments:   Reason for Stopping:               Discharge Procedure Orders  Diet Adult Regular   Scheduling Instructions: Liquid Bariatric diabetic diet     Activity as tolerated   Order Comments: Observe fall precautions.     Call MD for:   Order Comments: For worsening symptoms, chest pain, shortness of breath, increased abdominal pain, high grade fever, stroke or stroke like symptoms, immediately go to the nearest Emergency Room or call 911 as soon as possible.       Follow-up Information     Chai Reese MD. Call in 2 weeks.    Specialties:  General Surgery, Bariatrics, Surgery  Why:  As needed, If symptoms worsen, For wound re-check  Contact information:  [address] [facility]   [facility] [id number] [phone]             Miky Lewis MD In 1 week.    Specialty:  Family Medicine  Contact information:  [address] [facility] [address] [phone]

## 2018-04-17 NOTE — PLAN OF CARE
04/16/18 2030   Patient Assessment/Suction   Level of Consciousness (AVPU) alert   Respiratory Effort Normal;Unlabored   Expansion/Accessory Muscles/Retractions no use of accessory muscles   PRE-TX-O2-ETCO2   O2 Device (Oxygen Therapy) room air   SpO2 95 %   Pulse 72   Incentive Spirometer   $ Incentive Spirometer Charges done with encouragement   Administration (Incentive Spirometer) done with encouragement   Number of Repetitions (Incentive Spirometer) 10   Level (mL) (Incentive Spirometer) 1250   Patient Tolerance good   Pt tolerates RA well. PT has good effort with IS.

## 2018-04-17 NOTE — PROGRESS NOTES
Progress Note  Gen Surg    Admit Date: 4/16/2018  Attending: Mary Ann  S/P: Procedure(s) (LRB):  GASTRECTOMY-SLEEVE-LAPAROSCOPIC WITH EGD (N/A)    Post-operative Day: 1 Day Post-Op    Hospital Day: 2    SUBJECTIVE:     Doing well     OBJECTIVE:     Vital Signs (Most Recent)  Temp: 99.5 °F (37.5 °C) (04/17/18 1115)  Pulse: (!) 54 (04/17/18 1115)  Resp: 18 (04/17/18 1115)  BP: (!) 163/74 (04/17/18 1115)  SpO2: 96 % (04/17/18 1115)    Vital Signs Range (Last 24H):  Temp:  [97.1 °F (36.2 °C)-99.5 °F (37.5 °C)]   Pulse:  [48-86]   Resp:  [9-41]   BP: (103-163)/(55-85)   SpO2:  [92 %-100 %]     I & O (Last 24H):  Intake/Output Summary (Last 24 hours) at 04/17/18 1115  Last data filed at 04/17/18 0300   Gross per 24 hour   Intake            538.5 ml   Output               15 ml   Net            523.5 ml       Scheduled medications:   acetaminophen  1,000 mg Intravenous Q6H    enoxparin  40 mg Subcutaneous Q12H    famotidine (PF)  20 mg Intravenous Q12H    levothyroxine  75 mcg Oral Before breakfast       Physical Exam:  General: no distress  Lungs:  clear to auscultation bilaterally and normal respiratory effort  Heart: regular rate and rhythm, S1, S2 normal, no murmur, rub or gallop  Abdomen: soft, non-tender non-distented; bowel sounds normal; no masses,  no organomegaly    Wound/Incision:  clean, dry, intact    Laboratory:  Labs within the past 24 hours have been reviewed.    ASSESSMENT/PLAN:     Ok to discharge    Chai Reese MD

## 2018-04-17 NOTE — PLAN OF CARE
Problem: Patient Care Overview  Goal: Plan of Care Review  Outcome: Ongoing (interventions implemented as appropriate)  Pt on room air with IS, pt achieves 1250 on IS, pt ready for self admin

## 2018-04-17 NOTE — CONSULTS
Food & Nutrition  Education    Diet Education: Bariatric nutrition s/p gastric sleeve  Time Spent: 15 min  Learners:patient      Nutrition Education provided with handouts: yes    Comments: Pt noted she understands progression of diet. Reviewed need for hydration, when to start vitamins and protein shakes.        All questions and concerns answered. Dietitian's contact information provided. yes      Follow-Up:yes

## 2018-04-17 NOTE — PLAN OF CARE
Problem: Patient Care Overview  Goal: Plan of Care Review  Outcome: Ongoing (interventions implemented as appropriate)   04/17/18 0555   Coping/Psychosocial   Plan Of Care Reviewed With patient;spouse;family     Goal: Individualization & Mutuality  Outcome: Ongoing (interventions implemented as appropriate)   04/16/18 0907 04/17/18 0555   Individualization   Patient Specific Preferences --  Use hand  outside of the room and no perfumes   Patient Specific Goals --  Pain and nausea control   Patient Specific Interventions --  Up OOB to walk   Mutuality/Individual Preferences   What Anxieties, Fears, Concerns, or Questions Do You Have About Your Care? nervous --    What Information Would Help Us Give You More Personalized Care? allergic to fragrance (perfumes, air fresheners, ) --        Problem: Surgery Nonspecified (Adult)  Intervention: Monitor/Manage Pain   04/17/18 0555   Safety Interventions   Medication Review/Management medications reviewed;high risk medications identified   Pain/Comfort/Sleep Interventions   Pain Management Interventions around-the-clock dosing utilized;care clustered;medication;pain management plan reviewed with patient/caregiver;prescribed exercises encouraged;quiet environment facilitated;relaxation promoted     Intervention: Prevent/Manage Post-surgical Infection   04/17/18 0555   Prevent/Manage Colorectal Surgical Infection   Fever Reduction/Comfort Measures lightweight bedding;lightweight clothing;medication administered   Safety Interventions   Infection Prevention barrier precautions utilized;environmental surveillance;equipment surfaces disinfected;gylcemic control management;hydration promoted;rest/sleep promoted;single patient room provided     Intervention: Monitor/Manage Postoperative Bleeding   04/17/18 0555   Safety Interventions   Bleeding Management dressing monitored     Intervention: Prevent/Manage Postoperative Nausea and Vomiting   04/17/18 0555    Monitor/Manage Hypovolemia   Nausea/Vomiting Interventions nausea triggers minimized;medication given;sips clear liquids given;stimuli minimized   Positioning   Head of Bed (HOB) HOB at 20-30 degrees     Intervention: Prevent/Manage Impaired Postoperative Elimination   04/17/18 0555   Manage Acute Burn Pain   Bowel Intervention adequate fluid intake promoted;ambulation promoted;privacy promoted   Genitourinary () Interventions   Urinary Elimination Promotion absorbent pad/diaper use encouraged;frequent voiding encouraged     Intervention: Prevent/Manage DVT/VTE Risk   04/17/18 0555   OTHER   VTE Required Core Measure Sequential compression device initiated/maintained;Pharmacological prophylaxis initiated/maintained   Minimize Embolism Risk   VTE Prevention/Management ambulation promoted;bleeding risk assessed;remove, assess skin and reapply sequential compression device;fluids promoted;intravenous hydration     Intervention: Optimize Psychosocial Response to the Surgical Experience   04/17/18 0555   Psychosocial Support   Trust Relationship/Rapport care explained;choices provided;emotional support provided;empathic listening provided;questions answered;questions encouraged;reassurance provided;thoughts/feelings acknowledged       Goal: Signs and Symptoms of Listed Potential Problems Will be Absent, Minimized or Managed (Surgery Nonspecified)  Signs and symptoms of listed potential problems will be absent, minimized or managed by discharge/transition of care (reference Surgery Nonspecified (Adult) [facility]).   Outcome: Ongoing (interventions implemented as appropriate)   04/17/18 0555   Surgery Nonspecified   Problems Assessed (Surgery) all   Problems Present (Surgery) pain;postoperative nausea and vomiting

## 2018-04-17 NOTE — PLAN OF CARE
Problem: Physical Therapy Goal  Goal: Physical Therapy Goal  Outcome: Ongoing (interventions implemented as appropriate)  PT evaluation complete. No further skilled PT needs.

## 2018-04-17 NOTE — PLAN OF CARE
Pt is discharging home with no needs. I added the pts follow up appt with Dr. Reese to the pts AVS- 5/1/18 10:00 am. Demetria Yun LMSW     04/17/18 1250   Discharge Assessment   Assessment Type Discharge Planning Assessment   Confirmed/corrected address and phone number on facesheet? Yes   Assessment information obtained from? Medical Record

## 2018-04-18 ENCOUNTER — TELEPHONE (OUTPATIENT)
Dept: PULMONOLOGY | Facility: CLINIC | Age: 58
End: 2018-04-18

## 2018-04-18 NOTE — PLAN OF CARE
04/18/18 0800   Final Note   Assessment Type Final Discharge Note   Discharge Disposition Home

## 2018-04-18 NOTE — TELEPHONE ENCOUNTER
Returned call. No answer, lvm    ----- Message from Kristin Villar sent at 4/18/2018 10:53 AM CDT -----  Contact: Cameron with [facility] Blaze with [facility] is calling medication, Fasenra. She has questions. Please call Cameron at [phone]. Thanks!

## 2018-04-19 ENCOUNTER — OFFICE VISIT (OUTPATIENT)
Dept: FAMILY MEDICINE | Facility: CLINIC | Age: 58
End: 2018-04-19
Payer: COMMERCIAL

## 2018-04-19 VITALS
HEART RATE: 89 BPM | SYSTOLIC BLOOD PRESSURE: 122 MMHG | DIASTOLIC BLOOD PRESSURE: 71 MMHG | HEIGHT: 61 IN | WEIGHT: 212.94 LBS | BODY MASS INDEX: 40.2 KG/M2

## 2018-04-19 DIAGNOSIS — E03.9 HYPOTHYROIDISM, UNSPECIFIED TYPE: ICD-10-CM

## 2018-04-19 DIAGNOSIS — E78.2 MIXED HYPERLIPIDEMIA: ICD-10-CM

## 2018-04-19 DIAGNOSIS — Z98.84 S/P LAPAROSCOPIC SLEEVE GASTRECTOMY: Primary | ICD-10-CM

## 2018-04-19 DIAGNOSIS — J45.50 SEVERE PERSISTENT ASTHMA WITHOUT COMPLICATION: ICD-10-CM

## 2018-04-19 DIAGNOSIS — D84.9 IMMUNE DEFICIENCY DISORDER: ICD-10-CM

## 2018-04-19 DIAGNOSIS — R73.03 PREDIABETES: ICD-10-CM

## 2018-04-19 DIAGNOSIS — E66.01 MORBID OBESITY: ICD-10-CM

## 2018-04-19 PROCEDURE — 99214 OFFICE O/P EST MOD 30 MIN: CPT | Mod: S$GLB,,, | Performed by: FAMILY MEDICINE

## 2018-04-19 PROCEDURE — 99999 PR PBB SHADOW E&M-EST. PATIENT-LVL IV: CPT | Mod: PBBFAC,,, | Performed by: FAMILY MEDICINE

## 2018-04-19 PROCEDURE — 3078F DIAST BP <80 MM HG: CPT | Mod: CPTII,S$GLB,, | Performed by: FAMILY MEDICINE

## 2018-04-19 PROCEDURE — 3074F SYST BP LT 130 MM HG: CPT | Mod: CPTII,S$GLB,, | Performed by: FAMILY MEDICINE

## 2018-04-19 NOTE — PROGRESS NOTES
Follow-up hospitalization as below for gastric sleeve.  Doing well.  No fever chills.  No chest pain or shortness of breath.  Hypertension controlled.  Metformin discontinued    Discharge Summary  Hospital Medicine     Admit Date: 4/16/2018     Date and Time: 4/17/201812:43 PM     Discharge Attending Physician: Cristian Ashraf MD     Primary Care Physician: Miky Lewis MD     Diagnoses:        Active Hospital Problems     Diagnosis   POA    *S/P laparoscopic sleeve gastrectomy [Z98.84]   Not Applicable    Immune deficiency disorder [D84.9]   Yes    Morbid obesity [E66.01]   Yes    Hyperlipidemia [E78.5]   Yes    Hypothyroidism [E03.9]   Yes       Resolved Hospital Problems     Diagnosis Date Resolved POA   No resolved problems to display.      Discharged Condition: Good     Hospital Course:   Patient is a 57 y.o. female admitted to Hospitalist Service from Operation Room s/p laparoscopic gastric sleeve surgery performed by Dr. Reese. Patient reportedly has past medical history significant for morbid obesity, history of gastroparesis, DM-2, hyperlipidemia and hypothyroidism. Post-operatively, patient denied chest pain, shortness of breath, vomiting, headache, vision changes, focal neuro-deficits, cough or fever. Abdominal pain and nausea are stable. Patient was admitted to Hospitalist medicine service. Patient was followed by Dr. Reese. Patient was admitted to hospital medicine. Patient was closely monitored post-operatively. Blood sugar and blood pressure closely followed. Patient was given supportive care. Patient worked with Physical Therapist. Nausea and pain controlled. Patient tolerated liquid diet. Symptoms resolved. Patient was discharged home in stable condition with following discharge plan of care.       Past Medical History:  Past Medical History:   Diagnosis Date    Allergy     Angio-edema     Arthralgia     Asthma without status asthmaticus     Bronchitis     Diverticulosis of large  intestine without hemorrhage 10/8/2015    Environmental allergies     Eosinophilic asthma 3/8/2018    Gastroparesis     Hand arthritis     Herpes simplex without mention of complication     oral    Hyperlipidemia     Hypothyroidism     LBP radiating to left leg     Leukocytosis, unspecified     Migraine headache     Obesity, Class III, BMI 40-49.9 (morbid obesity)     Periorbital cellulitis 12/31/2016    Prediabetes     Recurrent upper respiratory infection (URI)     S/P colonoscopy November 2010    Urticaria      Past Surgical History:   Procedure Laterality Date    ADENOIDECTOMY      CHOLECYSTECTOMY      CHOLECYSTECTOMY      COLONOSCOPY  2010    COLONOSCOPY N/A 10/8/2015    Procedure: COLONOSCOPY;  Surgeon: Panda Bose MD;  Location: [facility] ENDO;  Service: Endoscopy;  Laterality: N/A;    Hand fracture surgery      HARDWARE REMOVAL      left hand 5th MC    hymenectomy      HYSTERECTOMY      menorrhagia-Ovaries intact    SLEEVE GASTROPLASTY  04/16/2018    TONSILLECTOMY      Urethral dilatation       Social History     Social History    Marital status:      Spouse name: N/A    Number of children: N/A    Years of education: N/A     Occupational History    Not on file.     Social History Main Topics    Smoking status: Never Smoker    Smokeless tobacco: Never Used    Alcohol use 0.0 oz/week      Comment: very rarely    Drug use: No    Sexual activity: Yes     Partners: Male     Other Topics Concern    Not on file     Social History Narrative    . Disabled teacher.     Family History   Problem Relation Age of Onset    Melanoma Mother     Basal cell carcinoma Mother     Lung disease Mother      pulm htn    Cataracts Mother     Hypertension Mother     Lung cancer Father     Diabetes Father     Hypertension Father     Cancer Father     Diabetes Paternal Aunt     Colon cancer Paternal Aunt     Diabetes Paternal Aunt     Ovarian cancer Paternal Grandmother      "Cancer Maternal Grandfather     Melanoma Maternal Aunt     Melanoma Maternal Uncle     Breast cancer Paternal Aunt     Lymphoma Paternal Aunt     Ulcerative colitis Son     Psoriasis Son     Psoriasis Son     Breast cancer Cousin     Allergic rhinitis Neg Hx     Allergies Neg Hx     Angioedema Neg Hx     Asthma Neg Hx     Atopy Neg Hx     Eczema Neg Hx     Immunodeficiency Neg Hx     Rhinitis Neg Hx     Urticaria Neg Hx      Review of patient's allergies indicates:   Allergen Reactions    Bromelains Hives     " causes mouth to bleed"    Sudafed [pseudoephedrine hcl] Other (See Comments)     Does not want to wake up .    Amoxicillin-pot clavulanate Itching     Other reaction(s): Itching    Hydrocodone Itching    Iodinated contrast- oral and iv dye      childhood    Iodine and iodide containing products Other (See Comments)    Melon Hives    Pantoprazole Nausea Only     Other reaction(s): upset stomach/halitosis    Percocet [oxycodone-acetaminophen] Itching    Barium iodide Rash    Ephedrine Other (See Comments)     Other reaction(s): comatose    Penicillins      Other reaction(s): does not work on pt symptoms     Current Outpatient Prescriptions on File Prior to Visit   Medication Sig Dispense Refill    acetaminophen (TYLENOL) 650 MG TbSR Take 650 mg by mouth every 8 (eight) hours.      albuterol (PROAIR HFA) 90 mcg/actuation inhaler Inhale 1 puff into the lungs every 4 (four) hours as needed for Wheezing or Shortness of Breath. 3 Inhaler 3    albuterol (PROVENTIL) 2.5 mg /3 mL (0.083 %) nebulizer solution Take 3 mLs (2.5 mg total) by nebulization every 4 (four) hours as needed for Wheezing or Shortness of Breath. Every 4-6 hours  each 3    CALCIUM CARB/D3/MAGNESIUM/ZINC (CALCIUM CARB-D3-MAG CMB11-ZINC) [phone] mg-unit-mg-mg Tab Take 2 tablets by mouth every evening.      diazePAM (VALIUM) 2 MG tablet Take 1 tablet (2 mg total) by mouth every 6 (six) hours as needed for " Anxiety (muscle spasm). 20 tablet 0    estradiol (ESTRACE) 1 MG tablet Take 1 mg by mouth once daily.      hydrocodone-acetaminophen 7.5-325mg (NORCO) 7.5-325 mg per tablet Take 2 tablets by mouth every 6 (six) hours as needed for Pain. 40 tablet 0    immun glob G,IgG,-pro-IgA 0-50 (HIZENTRA) 2 gram/10 mL (20 %) Soln Inject 18 g into the skin every 14 (fourteen) days. 90 mL 12    levothyroxine (SYNTHROID) 75 MCG tablet Take 1 tablet (75 mcg total) by mouth once daily. 30 tablet 11    mometasone-formoterol (DULERA) 200-5 mcg/actuation inhaler Inhale 2 puffs into the lungs 2 (two) times daily. Controller 1 Inhaler 11    montelukast (SINGULAIR) 10 mg tablet Take 1 tablet (10 mg total) by mouth every evening. 90 tablet 3    multivitamin (ONE DAILY MULTIVITAMIN) per tablet Take 1 tablet by mouth once daily.      omeprazole (PRILOSEC) 20 MG capsule Take 1 capsule (20 mg total) by mouth once daily. 30 capsule 3    ondansetron (ZOFRAN-ODT) 4 MG TbDL Take 1 tablet (4 mg total) by mouth every 6 (six) hours as needed. 30 tablet 0    potassium chloride (MICRO-K) 10 MEQ CpSR TAKE 2 CAPSULES BY MOUTH EVERY DAY 60 capsule 3    triamterene-hydrochlorothiazide 75-50 mg (MAXZIDE) 75-50 mg per tablet Take 1 tablet by mouth once daily. 30 tablet 11    benralizumab (FASENRA) 30 mg/mL Syrg Inject 30 mg into the skin every 28 days. Every 4 weeks for 3 doses followed by every 8 weeks thereafter 1 Syringe 11    sumatriptan (IMITREX) 100 MG tablet Take 1 po at onset of migraine. May repeat once in 2 hrs if needed. No more than 2 pills in 24 hours 9 tablet 2    triamcinolone acetonide 0.025% (KENALOG) 0.025 % cream Apply topically 2 (two) times daily. 15 g 0    [DISCONTINUED] aspirin (ECOTRIN) 81 MG EC tablet Take 81 mg by mouth once daily.      [DISCONTINUED] fluconazole (DIFLUCAN) 200 MG Tab Take 1 tablet (200 mg total) by mouth once daily. 7 tablet 1    [DISCONTINUED] omega 3-dha-epa-fish oil (FISH OIL) 900-1,400 mg CpDR  "Take 1 capsule by mouth once daily.      [DISCONTINUED] predniSONE (DELTASONE) 20 MG tablet Take 1 tablet (20 mg total) by mouth once daily. X 5 days  1    [DISCONTINUED] SUPRAX 400 mg Cap Take 1 capsule by mouth once daily. 10 capsule 2     No current facility-administered medications on file prior to visit.            ROS:  GENERAL: No fever, chills,  or significant weight changes.   CARDIOVASCULAR: Denies chest pain, PND, orthopnea or reduced exercise tolerance.  ABDOMEN: . Denies diarrhea, , hematemesis or blood in stool.  URINARY: No flank pain, dysuria or hematuria.      OBJECTIVE:     Vitals:    04/19/18 1001   BP: 122/71   Pulse: 89   Weight: 96.6 kg (212 lb 15.4 oz)   Height: 5' 1" (1.549 m)     Wt Readings from Last 3 Encounters:   04/19/18 96.6 kg (212 lb 15.4 oz)   04/16/18 93.9 kg (207 lb)   04/12/18 94.2 kg (207 lb 9.6 oz)     APPEARANCE: Well nourished, well developed, in no acute distress.    HEAD: Normocephalic.  Atraumatic.  No sinus tenderness.  EYES:   Right eye: Pupil reactive.  Conjunctiva clear.    Left eye: Pupil reactive.  Conjunctiva clear.     EOMI.    NOSE:  clear.  MOUTH & THROAT:  No pharyngeal erythema or exudate. No lesions.  NECK: Supple. No bruits.  No JVD.  No cervical lymphadenopathy.  No thyromegaly.    CHEST: Breath sounds clear bilaterally.  Normal respiratory effort  CARDIOVASCULAR: Normal rate.  Regular rhythm.  No murmurs.  No rub.  No gallops.  ABDOMEN: Bowel sounds normal.  Soft.  Minimal no rebound or guarding tenderness.  No organomegaly.  PERIPHERAL VASCULAR: No cyanosis.  No clubbing.  No edema.  NEUROLOGIC: No focal findings.  MENTAL STATUS: Alert.  Oriented x 3.    Yodit was seen today for follow-up.    Diagnoses and all orders for this visit:    S/P laparoscopic sleeve gastrectomy    Morbid obesity    Hypothyroidism, unspecified type    Prediabetes    Severe persistent asthma without complication    Mixed hyperlipidemia    Immune deficiency disorder      She " appears to have tolerated surgery well.  Keep follow-up with her surgeon.  Continue current medications.  Follow-up 1 month.  May be able to decrease or discontinue Dyazide

## 2018-04-24 RX ORDER — TRAMADOL HYDROCHLORIDE 50 MG/1
50 TABLET ORAL EVERY 6 HOURS PRN
Qty: 40 TABLET | Refills: 0 | Status: SHIPPED | OUTPATIENT
Start: 2018-04-24 | End: 2018-05-04

## 2018-04-24 RX ORDER — DIAZEPAM 2 MG/1
2 TABLET ORAL EVERY 6 HOURS PRN
Qty: 20 TABLET | Refills: 0 | Status: SHIPPED | OUTPATIENT
Start: 2018-04-24 | End: 2018-05-22

## 2018-04-30 RX ORDER — LEVOTHYROXINE SODIUM 75 UG/1
TABLET ORAL
Qty: 30 TABLET | Refills: 11 | Status: SHIPPED | OUTPATIENT
Start: 2018-04-30 | End: 2019-05-19 | Stop reason: SDUPTHER

## 2018-04-30 RX ORDER — TRIAMTERENE AND HYDROCHLOROTHIAZIDE 75; 50 MG/1; MG/1
TABLET ORAL
Qty: 30 TABLET | Refills: 11 | Status: SHIPPED | OUTPATIENT
Start: 2018-04-30 | End: 2018-05-22

## 2018-05-01 ENCOUNTER — OFFICE VISIT (OUTPATIENT)
Dept: BARIATRICS | Facility: CLINIC | Age: 58
End: 2018-05-01
Payer: COMMERCIAL

## 2018-05-01 VITALS
DIASTOLIC BLOOD PRESSURE: 77 MMHG | TEMPERATURE: 98 F | RESPIRATION RATE: 16 BRPM | HEART RATE: 143 BPM | HEIGHT: 62 IN | WEIGHT: 190.81 LBS | SYSTOLIC BLOOD PRESSURE: 117 MMHG | BODY MASS INDEX: 35.11 KG/M2

## 2018-05-01 DIAGNOSIS — Z98.84 S/P LAPAROSCOPIC SLEEVE GASTRECTOMY: Primary | ICD-10-CM

## 2018-05-01 PROCEDURE — 99024 POSTOP FOLLOW-UP VISIT: CPT | Mod: S$GLB,,, | Performed by: SURGERY

## 2018-05-01 PROCEDURE — 99999 PR PBB SHADOW E&M-EST. PATIENT-LVL IV: CPT | Mod: PBBFAC,,, | Performed by: SURGERY

## 2018-05-06 ENCOUNTER — HOSPITAL ENCOUNTER (INPATIENT)
Facility: HOSPITAL | Age: 58
LOS: 8 days | Discharge: HOME HEALTH | DRG: 919 | End: 2018-05-15
Attending: EMERGENCY MEDICINE | Admitting: INTERNAL MEDICINE
Payer: COMMERCIAL

## 2018-05-06 DIAGNOSIS — R10.9 ABDOMINAL PAIN, UNSPECIFIED ABDOMINAL LOCATION: ICD-10-CM

## 2018-05-06 DIAGNOSIS — J45.50 SEVERE PERSISTENT ASTHMA WITHOUT COMPLICATION: ICD-10-CM

## 2018-05-06 DIAGNOSIS — E03.9 HYPOTHYROIDISM, UNSPECIFIED TYPE: ICD-10-CM

## 2018-05-06 DIAGNOSIS — Z98.84 S/P BARIATRIC SURGERY: ICD-10-CM

## 2018-05-06 DIAGNOSIS — K91.89 GASTRIC LEAK: ICD-10-CM

## 2018-05-06 DIAGNOSIS — Z98.84 S/P LAPAROSCOPIC SLEEVE GASTRECTOMY: ICD-10-CM

## 2018-05-06 DIAGNOSIS — K65.1 INTRAPERITONEAL ABSCESS: ICD-10-CM

## 2018-05-06 DIAGNOSIS — A41.9 SEPSIS: ICD-10-CM

## 2018-05-06 DIAGNOSIS — A41.9 SEPSIS, DUE TO UNSPECIFIED ORGANISM: Primary | ICD-10-CM

## 2018-05-06 PROCEDURE — 63600175 PHARM REV CODE 636 W HCPCS: Performed by: EMERGENCY MEDICINE

## 2018-05-06 PROCEDURE — 96361 HYDRATE IV INFUSION ADD-ON: CPT

## 2018-05-06 PROCEDURE — 96365 THER/PROPH/DIAG IV INF INIT: CPT

## 2018-05-06 PROCEDURE — 25000003 PHARM REV CODE 250: Performed by: EMERGENCY MEDICINE

## 2018-05-06 PROCEDURE — 99285 EMERGENCY DEPT VISIT HI MDM: CPT | Mod: 25

## 2018-05-06 PROCEDURE — 96375 TX/PRO/DX INJ NEW DRUG ADDON: CPT

## 2018-05-06 RX ORDER — DIPHENHYDRAMINE HYDROCHLORIDE 50 MG/ML
12.5 INJECTION INTRAMUSCULAR; INTRAVENOUS
Status: COMPLETED | OUTPATIENT
Start: 2018-05-06 | End: 2018-05-06

## 2018-05-06 RX ORDER — ONDANSETRON 4 MG/1
4 TABLET, ORALLY DISINTEGRATING ORAL
Status: COMPLETED | OUTPATIENT
Start: 2018-05-06 | End: 2018-05-06

## 2018-05-06 RX ADMIN — SODIUM CHLORIDE 500 ML: 0.9 INJECTION, SOLUTION INTRAVENOUS at 11:05

## 2018-05-06 RX ADMIN — ONDANSETRON 4 MG: 4 TABLET, ORALLY DISINTEGRATING ORAL at 11:05

## 2018-05-06 RX ADMIN — DIPHENHYDRAMINE HYDROCHLORIDE 12.5 MG: 50 INJECTION, SOLUTION INTRAMUSCULAR; INTRAVENOUS at 11:05

## 2018-05-07 ENCOUNTER — ANESTHESIA (OUTPATIENT)
Dept: SURGERY | Facility: HOSPITAL | Age: 58
DRG: 919 | End: 2018-05-07
Payer: COMMERCIAL

## 2018-05-07 ENCOUNTER — ANESTHESIA EVENT (OUTPATIENT)
Dept: SURGERY | Facility: HOSPITAL | Age: 58
DRG: 919 | End: 2018-05-07
Payer: COMMERCIAL

## 2018-05-07 ENCOUNTER — SURGERY (OUTPATIENT)
Age: 58
End: 2018-05-07

## 2018-05-07 PROBLEM — A41.9 SEVERE SEPSIS: Status: ACTIVE | Noted: 2018-05-07

## 2018-05-07 PROBLEM — K65.1 INTRAPERITONEAL ABSCESS: Status: ACTIVE | Noted: 2018-05-07

## 2018-05-07 PROBLEM — R65.20 SEVERE SEPSIS: Status: ACTIVE | Noted: 2018-05-07

## 2018-05-07 LAB
ABO + RH BLD: NORMAL
ALBUMIN SERPL BCP-MCNC: 2.3 G/DL
ALBUMIN SERPL BCP-MCNC: 2.9 G/DL
ALP SERPL-CCNC: 125 U/L
ALP SERPL-CCNC: 150 U/L
ALT SERPL W/O P-5'-P-CCNC: 10 U/L
ALT SERPL W/O P-5'-P-CCNC: 8 U/L
ANION GAP SERPL CALC-SCNC: 11 MMOL/L
ANION GAP SERPL CALC-SCNC: 15 MMOL/L
AST SERPL-CCNC: 15 U/L
AST SERPL-CCNC: 26 U/L
BACTERIA #/AREA URNS HPF: NORMAL /HPF
BASOPHILS NFR BLD: 0 %
BASOPHILS NFR BLD: 0 %
BILIRUB SERPL-MCNC: 0.4 MG/DL
BILIRUB SERPL-MCNC: 0.5 MG/DL
BILIRUB UR QL STRIP: NEGATIVE
BLD GP AB SCN CELLS X3 SERPL QL: NORMAL
BUN SERPL-MCNC: 8 MG/DL
BUN SERPL-MCNC: 9 MG/DL
CALCIUM SERPL-MCNC: 9 MG/DL
CALCIUM SERPL-MCNC: 9.2 MG/DL
CHLORIDE SERPL-SCNC: 96 MMOL/L
CHLORIDE SERPL-SCNC: 99 MMOL/L
CLARITY UR: CLEAR
CO2 SERPL-SCNC: 23 MMOL/L
CO2 SERPL-SCNC: 27 MMOL/L
COLOR UR: YELLOW
CREAT SERPL-MCNC: 0.8 MG/DL
CREAT SERPL-MCNC: 0.8 MG/DL
DIFFERENTIAL METHOD: ABNORMAL
DIFFERENTIAL METHOD: ABNORMAL
EOSINOPHIL NFR BLD: 0 %
EOSINOPHIL NFR BLD: 1 %
ERYTHROCYTE [DISTWIDTH] IN BLOOD BY AUTOMATED COUNT: 13.4 %
ERYTHROCYTE [DISTWIDTH] IN BLOOD BY AUTOMATED COUNT: 13.8 %
EST. GFR  (AFRICAN AMERICAN): >60 ML/MIN/1.73 M^2
EST. GFR  (AFRICAN AMERICAN): >60 ML/MIN/1.73 M^2
EST. GFR  (NON AFRICAN AMERICAN): >60 ML/MIN/1.73 M^2
EST. GFR  (NON AFRICAN AMERICAN): >60 ML/MIN/1.73 M^2
GLUCOSE SERPL-MCNC: 100 MG/DL
GLUCOSE SERPL-MCNC: 112 MG/DL
GLUCOSE UR QL STRIP: NEGATIVE
HCT VFR BLD AUTO: 38.6 %
HCT VFR BLD AUTO: 41.9 %
HGB BLD-MCNC: 12.9 G/DL
HGB BLD-MCNC: 14 G/DL
HGB UR QL STRIP: ABNORMAL
INR PPP: 1.3
KETONES UR QL STRIP: ABNORMAL
LACTATE SERPL-SCNC: 1.1 MMOL/L
LEUKOCYTE ESTERASE UR QL STRIP: NEGATIVE
LIPASE SERPL-CCNC: 16 U/L
LYMPHOCYTES NFR BLD: 6 %
LYMPHOCYTES NFR BLD: 8 %
MAGNESIUM SERPL-MCNC: 1.6 MG/DL
MCH RBC QN AUTO: 28.1 PG
MCH RBC QN AUTO: 28.5 PG
MCHC RBC AUTO-ENTMCNC: 33.3 G/DL
MCHC RBC AUTO-ENTMCNC: 33.5 G/DL
MCV RBC AUTO: 84 FL
MCV RBC AUTO: 86 FL
MICROSCOPIC COMMENT: NORMAL
MONOCYTES NFR BLD: 2 %
MONOCYTES NFR BLD: 9 %
NEUTROPHILS NFR BLD: 80 %
NEUTROPHILS NFR BLD: 88 %
NEUTS BAND NFR BLD MANUAL: 3 %
NEUTS BAND NFR BLD MANUAL: 3 %
NITRITE UR QL STRIP: NEGATIVE
PH UR STRIP: 7 [PH] (ref 5–8)
PHOSPHATE SERPL-MCNC: 2.9 MG/DL
PLATELET # BLD AUTO: 265 K/UL
PLATELET # BLD AUTO: 323 K/UL
PLATELET BLD QL SMEAR: ABNORMAL
PMV BLD AUTO: 10.1 FL
PMV BLD AUTO: 10.4 FL
POTASSIUM SERPL-SCNC: 2.9 MMOL/L
POTASSIUM SERPL-SCNC: 3.9 MMOL/L
PROT SERPL-MCNC: 6.9 G/DL
PROT SERPL-MCNC: 7.3 G/DL
PROT UR QL STRIP: ABNORMAL
PROTHROMBIN TIME: 13.3 SEC
RBC # BLD AUTO: 4.5 M/UL
RBC # BLD AUTO: 5 M/UL
RBC #/AREA URNS HPF: 3 /HPF (ref 0–4)
SODIUM SERPL-SCNC: 134 MMOL/L
SODIUM SERPL-SCNC: 137 MMOL/L
SP GR UR STRIP: <=1.005 (ref 1–1.03)
SQUAMOUS #/AREA URNS HPF: 0 /HPF
URN SPEC COLLECT METH UR: ABNORMAL
UROBILINOGEN UR STRIP-ACNC: NEGATIVE EU/DL
WBC # BLD AUTO: 23.6 K/UL
WBC # BLD AUTO: 24.6 K/UL
WBC #/AREA URNS HPF: 5 /HPF (ref 0–5)

## 2018-05-07 PROCEDURE — 25000003 PHARM REV CODE 250: Performed by: ANESTHESIOLOGY

## 2018-05-07 PROCEDURE — 99900103 DSU ONLY-NO CHARGE-INITIAL HR (STAT): Performed by: SURGERY

## 2018-05-07 PROCEDURE — 94761 N-INVAS EAR/PLS OXIMETRY MLT: CPT

## 2018-05-07 PROCEDURE — 36415 COLL VENOUS BLD VENIPUNCTURE: CPT

## 2018-05-07 PROCEDURE — S0028 INJECTION, FAMOTIDINE, 20 MG: HCPCS | Performed by: SURGERY

## 2018-05-07 PROCEDURE — D9220A PRA ANESTHESIA: Mod: CRNA,,, | Performed by: NURSE ANESTHETIST, CERTIFIED REGISTERED

## 2018-05-07 PROCEDURE — 84100 ASSAY OF PHOSPHORUS: CPT

## 2018-05-07 PROCEDURE — 63600175 PHARM REV CODE 636 W HCPCS: Performed by: EMERGENCY MEDICINE

## 2018-05-07 PROCEDURE — 25000003 PHARM REV CODE 250: Performed by: EMERGENCY MEDICINE

## 2018-05-07 PROCEDURE — 80053 COMPREHEN METABOLIC PANEL: CPT | Mod: 91

## 2018-05-07 PROCEDURE — 85610 PROTHROMBIN TIME: CPT

## 2018-05-07 PROCEDURE — 71000033 HC RECOVERY, INTIAL HOUR: Performed by: SURGERY

## 2018-05-07 PROCEDURE — D9220A PRA ANESTHESIA: Mod: ANES,,, | Performed by: ANESTHESIOLOGY

## 2018-05-07 PROCEDURE — 43266 EGD ENDOSCOPIC STENT PLACE: CPT | Mod: ,,, | Performed by: SURGERY

## 2018-05-07 PROCEDURE — 83690 ASSAY OF LIPASE: CPT

## 2018-05-07 PROCEDURE — 99900104 DSU ONLY-NO CHARGE-EA ADD'L HR (STAT): Performed by: SURGERY

## 2018-05-07 PROCEDURE — 63600175 PHARM REV CODE 636 W HCPCS: Performed by: SURGERY

## 2018-05-07 PROCEDURE — 25000003 PHARM REV CODE 250: Performed by: NURSE PRACTITIONER

## 2018-05-07 PROCEDURE — 0D778DZ DILATION OF STOMACH, PYLORUS WITH INTRALUMINAL DEVICE, VIA NATURAL OR ARTIFICIAL OPENING ENDOSCOPIC: ICD-10-PCS | Performed by: SURGERY

## 2018-05-07 PROCEDURE — 37000008 HC ANESTHESIA 1ST 15 MINUTES: Performed by: SURGERY

## 2018-05-07 PROCEDURE — 80053 COMPREHEN METABOLIC PANEL: CPT

## 2018-05-07 PROCEDURE — C1713 ANCHOR/SCREW BN/BN,TIS/BN: HCPCS | Performed by: SURGERY

## 2018-05-07 PROCEDURE — C1769 GUIDE WIRE: HCPCS | Performed by: SURGERY

## 2018-05-07 PROCEDURE — 99900035 HC TECH TIME PER 15 MIN (STAT)

## 2018-05-07 PROCEDURE — 94640 AIRWAY INHALATION TREATMENT: CPT

## 2018-05-07 PROCEDURE — 86901 BLOOD TYPING SEROLOGIC RH(D): CPT

## 2018-05-07 PROCEDURE — 85027 COMPLETE CBC AUTOMATED: CPT | Mod: 91

## 2018-05-07 PROCEDURE — 63600175 PHARM REV CODE 636 W HCPCS: Performed by: ANESTHESIOLOGY

## 2018-05-07 PROCEDURE — 25000003 PHARM REV CODE 250: Performed by: NURSE ANESTHETIST, CERTIFIED REGISTERED

## 2018-05-07 PROCEDURE — 83735 ASSAY OF MAGNESIUM: CPT

## 2018-05-07 PROCEDURE — S0030 INJECTION, METRONIDAZOLE: HCPCS | Performed by: NURSE PRACTITIONER

## 2018-05-07 PROCEDURE — 87086 URINE CULTURE/COLONY COUNT: CPT

## 2018-05-07 PROCEDURE — 25000242 PHARM REV CODE 250 ALT 637 W/ HCPCS: Performed by: NURSE PRACTITIONER

## 2018-05-07 PROCEDURE — 63600175 PHARM REV CODE 636 W HCPCS: Performed by: INTERNAL MEDICINE

## 2018-05-07 PROCEDURE — 87040 BLOOD CULTURE FOR BACTERIA: CPT | Mod: 59

## 2018-05-07 PROCEDURE — 36000706: Performed by: SURGERY

## 2018-05-07 PROCEDURE — 63600175 PHARM REV CODE 636 W HCPCS: Performed by: NURSE ANESTHETIST, CERTIFIED REGISTERED

## 2018-05-07 PROCEDURE — 81000 URINALYSIS NONAUTO W/SCOPE: CPT

## 2018-05-07 PROCEDURE — 99223 1ST HOSP IP/OBS HIGH 75: CPT | Mod: 25,,, | Performed by: SURGERY

## 2018-05-07 PROCEDURE — 71000039 HC RECOVERY, EACH ADD'L HOUR: Performed by: SURGERY

## 2018-05-07 PROCEDURE — 63600175 PHARM REV CODE 636 W HCPCS: Performed by: NURSE PRACTITIONER

## 2018-05-07 PROCEDURE — 36000707: Performed by: SURGERY

## 2018-05-07 PROCEDURE — 63600175 PHARM REV CODE 636 W HCPCS

## 2018-05-07 PROCEDURE — 37000009 HC ANESTHESIA EA ADD 15 MINS: Performed by: SURGERY

## 2018-05-07 PROCEDURE — 83605 ASSAY OF LACTIC ACID: CPT

## 2018-05-07 PROCEDURE — 12000002 HC ACUTE/MED SURGE SEMI-PRIVATE ROOM

## 2018-05-07 PROCEDURE — 25000003 PHARM REV CODE 250: Performed by: SURGERY

## 2018-05-07 PROCEDURE — 99223 1ST HOSP IP/OBS HIGH 75: CPT | Mod: ,,, | Performed by: INTERNAL MEDICINE

## 2018-05-07 PROCEDURE — 85007 BL SMEAR W/DIFF WBC COUNT: CPT

## 2018-05-07 RX ORDER — SODIUM CHLORIDE 0.9 % (FLUSH) 0.9 %
3 SYRINGE (ML) INJECTION
Status: DISCONTINUED | OUTPATIENT
Start: 2018-05-07 | End: 2018-05-07 | Stop reason: HOSPADM

## 2018-05-07 RX ORDER — MEPERIDINE HYDROCHLORIDE 25 MG/ML
12.5 INJECTION INTRAMUSCULAR; INTRAVENOUS; SUBCUTANEOUS ONCE AS NEEDED
Status: DISCONTINUED | OUTPATIENT
Start: 2018-05-07 | End: 2018-05-07 | Stop reason: HOSPADM

## 2018-05-07 RX ORDER — MIDAZOLAM HYDROCHLORIDE 1 MG/ML
INJECTION, SOLUTION INTRAMUSCULAR; INTRAVENOUS
Status: DISCONTINUED | OUTPATIENT
Start: 2018-05-07 | End: 2018-05-07

## 2018-05-07 RX ORDER — MEROPENEM AND SODIUM CHLORIDE 1 G/50ML
1 INJECTION, SOLUTION INTRAVENOUS
Status: DISCONTINUED | OUTPATIENT
Start: 2018-05-07 | End: 2018-05-15 | Stop reason: HOSPADM

## 2018-05-07 RX ORDER — DIAZEPAM 2 MG/1
2 TABLET ORAL EVERY 6 HOURS PRN
Status: DISCONTINUED | OUTPATIENT
Start: 2018-05-07 | End: 2018-05-08

## 2018-05-07 RX ORDER — IBUPROFEN 200 MG
16 TABLET ORAL
Status: DISCONTINUED | OUTPATIENT
Start: 2018-05-07 | End: 2018-05-15 | Stop reason: HOSPADM

## 2018-05-07 RX ORDER — PANTOPRAZOLE SODIUM 40 MG/10ML
40 INJECTION, POWDER, LYOPHILIZED, FOR SOLUTION INTRAVENOUS DAILY
Status: DISCONTINUED | OUTPATIENT
Start: 2018-05-08 | End: 2018-05-15 | Stop reason: HOSPADM

## 2018-05-07 RX ORDER — FLUTICASONE FUROATE AND VILANTEROL 200; 25 UG/1; UG/1
1 POWDER RESPIRATORY (INHALATION) DAILY
Status: DISCONTINUED | OUTPATIENT
Start: 2018-05-07 | End: 2018-05-15 | Stop reason: HOSPADM

## 2018-05-07 RX ORDER — SUCCINYLCHOLINE CHLORIDE 20 MG/ML
INJECTION INTRAMUSCULAR; INTRAVENOUS
Status: DISCONTINUED | OUTPATIENT
Start: 2018-05-07 | End: 2018-05-07

## 2018-05-07 RX ORDER — ONDANSETRON 2 MG/ML
4 INJECTION INTRAMUSCULAR; INTRAVENOUS DAILY PRN
Status: COMPLETED | OUTPATIENT
Start: 2018-05-07 | End: 2018-05-07

## 2018-05-07 RX ORDER — ACETAMINOPHEN 10 MG/ML
INJECTION, SOLUTION INTRAVENOUS
Status: COMPLETED
Start: 2018-05-07 | End: 2018-05-07

## 2018-05-07 RX ORDER — ENOXAPARIN SODIUM 100 MG/ML
40 INJECTION SUBCUTANEOUS EVERY 24 HOURS
Status: DISCONTINUED | OUTPATIENT
Start: 2018-05-08 | End: 2018-05-07

## 2018-05-07 RX ORDER — ESTRADIOL 1 MG/1
1 TABLET ORAL DAILY
Status: DISCONTINUED | OUTPATIENT
Start: 2018-05-07 | End: 2018-05-07

## 2018-05-07 RX ORDER — ACETAMINOPHEN 325 MG/1
650 TABLET ORAL EVERY 6 HOURS PRN
Status: DISCONTINUED | OUTPATIENT
Start: 2018-05-07 | End: 2018-05-07

## 2018-05-07 RX ORDER — FAMOTIDINE 10 MG/ML
20 INJECTION INTRAVENOUS 2 TIMES DAILY
Status: DISCONTINUED | OUTPATIENT
Start: 2018-05-07 | End: 2018-05-15 | Stop reason: HOSPADM

## 2018-05-07 RX ORDER — MORPHINE SULFATE 4 MG/ML
4 INJECTION, SOLUTION INTRAMUSCULAR; INTRAVENOUS EVERY 4 HOURS PRN
Status: DISCONTINUED | OUTPATIENT
Start: 2018-05-08 | End: 2018-05-09

## 2018-05-07 RX ORDER — ACETAMINOPHEN 10 MG/ML
1000 INJECTION, SOLUTION INTRAVENOUS EVERY 8 HOURS
Status: COMPLETED | OUTPATIENT
Start: 2018-05-07 | End: 2018-05-07

## 2018-05-07 RX ORDER — IBUPROFEN 200 MG
24 TABLET ORAL
Status: DISCONTINUED | OUTPATIENT
Start: 2018-05-07 | End: 2018-05-15 | Stop reason: HOSPADM

## 2018-05-07 RX ORDER — DIPHENHYDRAMINE HYDROCHLORIDE 50 MG/ML
25 INJECTION INTRAMUSCULAR; INTRAVENOUS EVERY 6 HOURS PRN
Status: DISCONTINUED | OUTPATIENT
Start: 2018-05-07 | End: 2018-05-07 | Stop reason: HOSPADM

## 2018-05-07 RX ORDER — ENOXAPARIN SODIUM 100 MG/ML
40 INJECTION SUBCUTANEOUS EVERY 24 HOURS
Status: DISCONTINUED | OUTPATIENT
Start: 2018-05-08 | End: 2018-05-15 | Stop reason: HOSPADM

## 2018-05-07 RX ORDER — POTASSIUM CHLORIDE 20 MEQ/15ML
40 SOLUTION ORAL ONCE
Status: DISCONTINUED | OUTPATIENT
Start: 2018-05-07 | End: 2018-05-07

## 2018-05-07 RX ORDER — RAMELTEON 8 MG/1
8 TABLET ORAL NIGHTLY PRN
Status: DISCONTINUED | OUTPATIENT
Start: 2018-05-07 | End: 2018-05-15 | Stop reason: HOSPADM

## 2018-05-07 RX ORDER — PROCHLORPERAZINE EDISYLATE 5 MG/ML
5 INJECTION INTRAMUSCULAR; INTRAVENOUS
Status: COMPLETED | OUTPATIENT
Start: 2018-05-07 | End: 2018-05-07

## 2018-05-07 RX ORDER — MONTELUKAST SODIUM 10 MG/1
10 TABLET ORAL NIGHTLY
Status: DISCONTINUED | OUTPATIENT
Start: 2018-05-07 | End: 2018-05-15 | Stop reason: HOSPADM

## 2018-05-07 RX ORDER — FENTANYL CITRATE 50 UG/ML
25 INJECTION, SOLUTION INTRAMUSCULAR; INTRAVENOUS EVERY 5 MIN PRN
Status: DISCONTINUED | OUTPATIENT
Start: 2018-05-07 | End: 2018-05-07 | Stop reason: HOSPADM

## 2018-05-07 RX ORDER — ONDANSETRON 2 MG/ML
8 INJECTION INTRAMUSCULAR; INTRAVENOUS EVERY 8 HOURS PRN
Status: DISCONTINUED | OUTPATIENT
Start: 2018-05-07 | End: 2018-05-15 | Stop reason: HOSPADM

## 2018-05-07 RX ORDER — AMOXICILLIN 250 MG
1 CAPSULE ORAL 2 TIMES DAILY
Status: DISCONTINUED | OUTPATIENT
Start: 2018-05-07 | End: 2018-05-15 | Stop reason: HOSPADM

## 2018-05-07 RX ORDER — METRONIDAZOLE 500 MG/100ML
500 INJECTION, SOLUTION INTRAVENOUS
Status: DISCONTINUED | OUTPATIENT
Start: 2018-05-07 | End: 2018-05-15 | Stop reason: HOSPADM

## 2018-05-07 RX ORDER — SODIUM CHLORIDE 9 MG/ML
INJECTION, SOLUTION INTRAVENOUS CONTINUOUS
Status: DISCONTINUED | OUTPATIENT
Start: 2018-05-07 | End: 2018-05-15 | Stop reason: HOSPADM

## 2018-05-07 RX ORDER — GLUCAGON 1 MG
1 KIT INJECTION
Status: DISCONTINUED | OUTPATIENT
Start: 2018-05-07 | End: 2018-05-07

## 2018-05-07 RX ORDER — METRONIDAZOLE 500 MG/100ML
500 INJECTION, SOLUTION INTRAVENOUS
Status: DISCONTINUED | OUTPATIENT
Start: 2018-05-07 | End: 2018-05-07

## 2018-05-07 RX ORDER — POTASSIUM CHLORIDE 7.45 MG/ML
10 INJECTION INTRAVENOUS
Status: DISPENSED | OUTPATIENT
Start: 2018-05-07 | End: 2018-05-07

## 2018-05-07 RX ORDER — FENTANYL CITRATE 50 UG/ML
INJECTION, SOLUTION INTRAMUSCULAR; INTRAVENOUS
Status: DISCONTINUED | OUTPATIENT
Start: 2018-05-07 | End: 2018-05-07

## 2018-05-07 RX ORDER — HYDROMORPHONE HYDROCHLORIDE 2 MG/ML
0.2 INJECTION, SOLUTION INTRAMUSCULAR; INTRAVENOUS; SUBCUTANEOUS EVERY 5 MIN PRN
Status: DISCONTINUED | OUTPATIENT
Start: 2018-05-07 | End: 2018-05-07 | Stop reason: HOSPADM

## 2018-05-07 RX ORDER — TRIAMTERENE/HYDROCHLOROTHIAZID 37.5-25 MG
2 TABLET ORAL DAILY
Status: DISCONTINUED | OUTPATIENT
Start: 2018-05-07 | End: 2018-05-15 | Stop reason: HOSPADM

## 2018-05-07 RX ORDER — SODIUM CHLORIDE 0.9 % (FLUSH) 0.9 %
5 SYRINGE (ML) INJECTION
Status: DISCONTINUED | OUTPATIENT
Start: 2018-05-07 | End: 2018-05-15 | Stop reason: HOSPADM

## 2018-05-07 RX ORDER — INSULIN ASPART 100 [IU]/ML
0-5 INJECTION, SOLUTION INTRAVENOUS; SUBCUTANEOUS EVERY 6 HOURS PRN
Status: DISCONTINUED | OUTPATIENT
Start: 2018-05-07 | End: 2018-05-07

## 2018-05-07 RX ORDER — SODIUM CHLORIDE 0.9 % (FLUSH) 0.9 %
3 SYRINGE (ML) INJECTION EVERY 8 HOURS
Status: DISCONTINUED | OUTPATIENT
Start: 2018-05-07 | End: 2018-05-07 | Stop reason: HOSPADM

## 2018-05-07 RX ORDER — IPRATROPIUM BROMIDE AND ALBUTEROL SULFATE 2.5; .5 MG/3ML; MG/3ML
3 SOLUTION RESPIRATORY (INHALATION) EVERY 4 HOURS PRN
Status: DISCONTINUED | OUTPATIENT
Start: 2018-05-07 | End: 2018-05-15 | Stop reason: HOSPADM

## 2018-05-07 RX ORDER — GLUCAGON 1 MG
1 KIT INJECTION
Status: DISCONTINUED | OUTPATIENT
Start: 2018-05-07 | End: 2018-05-15 | Stop reason: HOSPADM

## 2018-05-07 RX ORDER — ROCURONIUM BROMIDE 10 MG/ML
INJECTION, SOLUTION INTRAVENOUS
Status: DISCONTINUED | OUTPATIENT
Start: 2018-05-07 | End: 2018-05-07

## 2018-05-07 RX ORDER — FAMOTIDINE 20 MG/1
20 TABLET, FILM COATED ORAL 2 TIMES DAILY
Status: DISCONTINUED | OUTPATIENT
Start: 2018-05-07 | End: 2018-05-07

## 2018-05-07 RX ORDER — DEXAMETHASONE SODIUM PHOSPHATE 4 MG/ML
INJECTION, SOLUTION INTRA-ARTICULAR; INTRALESIONAL; INTRAMUSCULAR; INTRAVENOUS; SOFT TISSUE
Status: DISCONTINUED | OUTPATIENT
Start: 2018-05-07 | End: 2018-05-07

## 2018-05-07 RX ORDER — LIDOCAINE HCL/PF 100 MG/5ML
SYRINGE (ML) INTRAVENOUS
Status: DISCONTINUED | OUTPATIENT
Start: 2018-05-07 | End: 2018-05-07

## 2018-05-07 RX ADMIN — POTASSIUM CHLORIDE 10 MEQ: 2 INJECTION, SOLUTION, CONCENTRATE INTRAVENOUS at 11:05

## 2018-05-07 RX ADMIN — SODIUM CHLORIDE, SODIUM GLUCONATE, SODIUM ACETATE, POTASSIUM CHLORIDE, MAGNESIUM CHLORIDE, SODIUM PHOSPHATE, DIBASIC, AND POTASSIUM PHOSPHATE: .53; .5; .37; .037; .03; .012; .00082 INJECTION, SOLUTION INTRAVENOUS at 08:05

## 2018-05-07 RX ADMIN — DEXAMETHASONE SODIUM PHOSPHATE 4 MG: 4 INJECTION, SOLUTION INTRAMUSCULAR; INTRAVENOUS at 06:05

## 2018-05-07 RX ADMIN — ONDANSETRON 4 MG: 2 INJECTION INTRAMUSCULAR; INTRAVENOUS at 08:05

## 2018-05-07 RX ADMIN — FENTANYL CITRATE 100 MCG: 50 INJECTION, SOLUTION INTRAMUSCULAR; INTRAVENOUS at 06:05

## 2018-05-07 RX ADMIN — ACETAMINOPHEN 1000 MG: 10 INJECTION, SOLUTION INTRAVENOUS at 02:05

## 2018-05-07 RX ADMIN — MEROPENEM AND SODIUM CHLORIDE 1 G: 1 INJECTION, SOLUTION INTRAVENOUS at 02:05

## 2018-05-07 RX ADMIN — PROCHLORPERAZINE EDISYLATE 5 MG: 5 INJECTION INTRAMUSCULAR; INTRAVENOUS at 09:05

## 2018-05-07 RX ADMIN — POTASSIUM CHLORIDE 10 MEQ: 2 INJECTION, SOLUTION, CONCENTRATE INTRAVENOUS at 09:05

## 2018-05-07 RX ADMIN — SODIUM CHLORIDE: 0.9 INJECTION, SOLUTION INTRAVENOUS at 05:05

## 2018-05-07 RX ADMIN — ACETAMINOPHEN 1000 MG: 10 INJECTION, SOLUTION INTRAVENOUS at 08:05

## 2018-05-07 RX ADMIN — MIDAZOLAM 2 MG: 1 INJECTION INTRAMUSCULAR; INTRAVENOUS at 05:05

## 2018-05-07 RX ADMIN — SODIUM CHLORIDE, SODIUM GLUCONATE, SODIUM ACETATE, POTASSIUM CHLORIDE, MAGNESIUM CHLORIDE, SODIUM PHOSPHATE, DIBASIC, AND POTASSIUM PHOSPHATE: .53; .5; .37; .037; .03; .012; .00082 INJECTION, SOLUTION INTRAVENOUS at 04:05

## 2018-05-07 RX ADMIN — FLUTICASONE FUROATE AND VILANTEROL TRIFENATATE 1 PUFF: 200; 25 POWDER RESPIRATORY (INHALATION) at 07:05

## 2018-05-07 RX ADMIN — MONTELUKAST SODIUM 10 MG: 10 TABLET, FILM COATED ORAL at 10:05

## 2018-05-07 RX ADMIN — PROMETHAZINE HYDROCHLORIDE 6.25 MG: 25 INJECTION INTRAMUSCULAR; INTRAVENOUS at 08:05

## 2018-05-07 RX ADMIN — ROCURONIUM BROMIDE 5 MG: 10 INJECTION, SOLUTION INTRAVENOUS at 06:05

## 2018-05-07 RX ADMIN — STANDARDIZED SENNA CONCENTRATE AND DOCUSATE SODIUM 1 TABLET: 8.6; 5 TABLET, FILM COATED ORAL at 10:05

## 2018-05-07 RX ADMIN — METRONIDAZOLE 500 MG: 500 INJECTION, SOLUTION INTRAVENOUS at 04:05

## 2018-05-07 RX ADMIN — LIDOCAINE HYDROCHLORIDE 100 MG: 20 INJECTION, SOLUTION INTRAVENOUS at 06:05

## 2018-05-07 RX ADMIN — SUCCINYLCHOLINE CHLORIDE 120 MG: 20 INJECTION, SOLUTION INTRAMUSCULAR; INTRAVENOUS at 06:05

## 2018-05-07 RX ADMIN — MEROPENEM AND SODIUM CHLORIDE 1 G: 1 INJECTION, SOLUTION INTRAVENOUS at 10:05

## 2018-05-07 RX ADMIN — METRONIDAZOLE 500 MG: 500 INJECTION, SOLUTION INTRAVENOUS at 11:05

## 2018-05-07 RX ADMIN — FAMOTIDINE 20 MG: 10 INJECTION INTRAVENOUS at 10:05

## 2018-05-07 RX ADMIN — POTASSIUM CHLORIDE 10 MEQ: 2 INJECTION, SOLUTION, CONCENTRATE INTRAVENOUS at 03:05

## 2018-05-07 RX ADMIN — MEROPENEM AND SODIUM CHLORIDE 1 G: 1 INJECTION, SOLUTION INTRAVENOUS at 06:05

## 2018-05-07 RX ADMIN — VANCOMYCIN HYDROCHLORIDE 2000 MG: 1 INJECTION, POWDER, LYOPHILIZED, FOR SOLUTION INTRAVENOUS at 02:05

## 2018-05-07 RX ADMIN — ONDANSETRON HYDROCHLORIDE 4 MG: 2 INJECTION INTRAMUSCULAR; INTRAVENOUS at 06:05

## 2018-05-07 NOTE — SUBJECTIVE & OBJECTIVE
No current facility-administered medications on file prior to encounter.      Current Outpatient Prescriptions on File Prior to Encounter   Medication Sig    acetaminophen (TYLENOL) 650 MG TbSR Take 650 mg by mouth every 8 (eight) hours.    albuterol (PROAIR HFA) 90 mcg/actuation inhaler Inhale 1 puff into the lungs every 4 (four) hours as needed for Wheezing or Shortness of Breath.    CALCIUM CARB/D3/MAGNESIUM/ZINC (CALCIUM CARB-D3-MAG CMB11-ZINC) [phone] mg-unit-mg-mg Tab Take 2 tablets by mouth every evening.    diazePAM (VALIUM) 2 MG tablet Take 1 tablet (2 mg total) by mouth every 6 (six) hours as needed for Anxiety (muscle spasm).    estradiol (ESTRACE) 1 MG tablet Take 1 mg by mouth once daily.    immun glob G,IgG,-pro-IgA 0-50 (HIZENTRA) 2 gram/10 mL (20 %) Soln Inject 18 g into the skin every 14 (fourteen) days.    levothyroxine (SYNTHROID) 75 MCG tablet TAKE 1 TABLET(75 MCG) BY MOUTH EVERY DAY    mometasone-formoterol (DULERA) 200-5 mcg/actuation inhaler Inhale 2 puffs into the lungs 2 (two) times daily. Controller    montelukast (SINGULAIR) 10 mg tablet Take 1 tablet (10 mg total) by mouth every evening.    multivitamin (ONE DAILY MULTIVITAMIN) per tablet Take 1 tablet by mouth once daily.    ondansetron (ZOFRAN-ODT) 4 MG TbDL Take 1 tablet (4 mg total) by mouth every 6 (six) hours as needed.    potassium chloride (MICRO-K) 10 MEQ CpSR TAKE 2 CAPSULES BY MOUTH EVERY DAY    sumatriptan (IMITREX) 100 MG tablet Take 1 po at onset of migraine. May repeat once in 2 hrs if needed. No more than 2 pills in 24 hours    triamterene-hydrochlorothiazide 75-50 mg (MAXZIDE) 75-50 mg per tablet TAKE 1 TABLET BY MOUTH EVERY DAY    albuterol (PROVENTIL) 2.5 mg /3 mL (0.083 %) nebulizer solution Take 3 mLs (2.5 mg total) by nebulization every 4 (four) hours as needed for Wheezing or Shortness of Breath. Every 4-6 hours PRN    triamcinolone acetonide 0.025% (KENALOG) 0.025 % cream Apply topically 2  "(two) times daily.       Review of patient's allergies indicates:   Allergen Reactions    Bromelains Hives     " causes mouth to bleed"    Sudafed [pseudoephedrine hcl] Other (See Comments)     Does not want to wake up .    Amoxicillin-pot clavulanate Itching     Other reaction(s): Itching    Hydrocodone Itching    Iodinated contrast- oral and iv dye      childhood    Iodine and iodide containing products Other (See Comments)    Melon Hives    Pantoprazole Nausea Only     Other reaction(s): upset stomach/halitosis    Percocet [oxycodone-acetaminophen] Itching    Barium iodide Rash    Ephedrine Other (See Comments)     Other reaction(s): comatose    Penicillins      Other reaction(s): does not work on pt symptoms       Past Medical History:   Diagnosis Date    Allergy     Angio-edema     Arthralgia     Asthma without status asthmaticus     Bronchitis     Diverticulosis of large intestine without hemorrhage 10/8/2015    Environmental allergies     Eosinophilic asthma 3/8/2018    Gastroparesis     Hand arthritis     Herpes simplex without mention of complication     oral    Hyperlipidemia     Hypothyroidism     LBP radiating to left leg     Leukocytosis, unspecified     Migraine headache     Obesity, Class III, BMI 40-49.9 (morbid obesity)     Periorbital cellulitis 12/31/2016    Prediabetes     Recurrent upper respiratory infection (URI)     S/P colonoscopy November 2010    Urticaria      Past Surgical History:   Procedure Laterality Date    ADENOIDECTOMY      CHOLECYSTECTOMY      CHOLECYSTECTOMY      COLONOSCOPY  2010    COLONOSCOPY N/A 10/8/2015    Procedure: COLONOSCOPY;  Surgeon: Panda Bose MD;  Location: [facility];  Service: Endoscopy;  Laterality: N/A;    Hand fracture surgery      HARDWARE REMOVAL      left hand 5th MC    hymenectomy      HYSTERECTOMY      menorrhagia-Ovaries intact    SLEEVE GASTROPLASTY  04/16/2018    TONSILLECTOMY      Urethral dilatation   "     Family History     Problem Relation (Age of Onset)    Basal cell carcinoma Mother    Breast cancer Paternal Aunt, Cousin    Cancer Father, Maternal Grandfather    Cataracts Mother    Colon cancer Paternal Aunt    Diabetes Father, Paternal Aunt, Paternal Aunt    Hypertension Mother, Father    Lung cancer Father    Lung disease Mother    Lymphoma Paternal Aunt    Melanoma Mother, Maternal Aunt, Maternal Uncle    Ovarian cancer Paternal Grandmother    Psoriasis Son, Son    Ulcerative colitis Son        Social History Main Topics    Smoking status: Never Smoker    Smokeless tobacco: Never Used    Alcohol use 0.0 oz/week      Comment: very rarely    Drug use: No    Sexual activity: Yes     Partners: Male     Review of Systems   Constitutional: Positive for chills, fatigue and fever. Negative for appetite change.   HENT: Negative for congestion, hearing loss, postnasal drip, sore throat and trouble swallowing.    Eyes: Negative for photophobia and visual disturbance.   Respiratory: Negative for cough, shortness of breath and wheezing.    Cardiovascular: Negative for chest pain, palpitations and leg swelling.   Gastrointestinal: Positive for nausea. Negative for abdominal pain, diarrhea and vomiting.   Endocrine: Negative for polydipsia, polyphagia and polyuria.   Genitourinary: Negative for dysuria, frequency and urgency.   Musculoskeletal: Negative for gait problem, neck pain and neck stiffness.   Skin: Negative for rash and wound.   Neurological: Negative for dizziness, weakness, numbness and headaches.   Hematological: Does not bruise/bleed easily.   Psychiatric/Behavioral: Negative for confusion. The patient is not nervous/anxious.      Objective:     Vital Signs (Most Recent):  Temp: (!) 100.5 °F (38.1 °C) (05/07/18 0300)  Pulse: 94 (05/07/18 0300)  Resp: 18 (05/07/18 0300)  BP: 116/62 (05/07/18 0300)  SpO2: (!) 93 % (05/07/18 0300) Vital Signs (24h Range):  Temp:  [100 °F (37.8 °C)-103 °F (39.4 °C)] 100.5  °F (38.1 °C)  Pulse:  [] 94  Resp:  [16-18] 18  SpO2:  [93 %-96 %] 93 %  BP: (116-138)/(62-75) 116/62     Weight: 88.4 kg (194 lb 14.2 oz)  Body mass index is 35.65 kg/m².    Physical Exam   Constitutional: She is oriented to person, place, and time. She appears well-developed and well-nourished. No distress.   HENT:   Head: Normocephalic and atraumatic.   Mouth/Throat: Oropharynx is clear and moist.   Eyes: Conjunctivae and EOM are normal. Pupils are equal, round, and reactive to light. No scleral icterus.   Neck: Normal range of motion. Neck supple. No JVD present. No tracheal deviation present. No thyromegaly present.   Cardiovascular: Normal rate, regular rhythm, normal heart sounds and intact distal pulses.  Exam reveals no gallop and no friction rub.    No murmur heard.  Pulses:       Dorsalis pedis pulses are 2+ on the right side, and 2+ on the left side.   Pulmonary/Chest: Effort normal and breath sounds normal. No accessory muscle usage. No respiratory distress. She has no wheezes. She has no rhonchi. She has no rales.   Abdominal: Soft. Bowel sounds are normal. She exhibits no distension and no mass. There is tenderness (Left upper and lower quadrant). There is no rebound and no guarding.   Protuberant.   Musculoskeletal: Normal range of motion. She exhibits no edema, tenderness or deformity.   Neurological: She is alert and oriented to person, place, and time. She has normal strength. She exhibits normal muscle tone. Coordination normal.   Skin: Skin is warm, dry and intact. Capillary refill takes less than 2 seconds. No rash noted. She is not diaphoretic. No erythema.   Psychiatric: She has a normal mood and affect. Her speech is normal and behavior is normal. Judgment and thought content normal.   Vitals reviewed.      Significant Labs:  CBC:   Recent Labs  Lab 05/07/18  0504   WBC 24.60*   RBC 4.50   HGB 12.9   HCT 38.6      MCV 86   MCH 28.5   MCHC 33.3     BMP:   Recent Labs  Lab  05/07/18  0504         K 2.9*   CL 99   CO2 27   BUN 8   CREATININE 0.8   CALCIUM 9.2   MG 1.6       Significant Diagnostics:  CT: I have reviewed all pertinent results/findings within the past 24 hours and my personal findings are:  inflammation around sleeve

## 2018-05-07 NOTE — ED NOTES
Presents to the ER with c/o fever that started 3 days ago. Patient reports having a gastric sleeve done 3 weeks ago. Patient reports a max temp today of 101.9 and reports a subjective fever over the past few days. Associated complaints are nausea, constipation and hiccups. Patient states that she is tolerating soft foods. Patient has had a recent sick contact. Mucous membranes are pink and moist. Skin is warm, dry and intact. Lungs are clear bilaterally, respirations are regular and unlabored. Denies cough, congestion, rhinorrhea or SOB. BS active x4, no tenderness with palpation, abd is soft and not distended. Denies any appetite or activity change. S1S2, capillary refill is < 2 seconds. Denies dysuria, difficulty urinating, frequency, numbness, tingling or weakness. CANDACE RAMSAY

## 2018-05-07 NOTE — PLAN OF CARE
Pt to room 216A at 0300. Able to ambulate without assist. Alert and oriented x4. Temp 100.5 currently.  at bedside. Call light in reach. Will monitor.

## 2018-05-07 NOTE — HPI
Yodit Canseco is a 58 yo female with PMHx significant for morbid obesity, history of gastroparesis, DM-2 (resolved), hyperlipidemia and hypothyroidism.  She is admitted to the service of hospital medicine with severe sepsis.  She presented to the ED with a 5 day history of subjective fever with tmax 103F.  She states the fever is associated with chills, malaise and nausea.  The fevers are responsive to tylenol and seem to be worse at night. Patient is 3 weeks post gastric sleeve surgery. She reports that she is tolerating her PO intake and recently had relatives over who have been sick with symptoms of fever, chills, and diarrhea. Patient denies diarrhea, vomiting, abdominal pain, chest pain, shortness of breath, cough, urinary symptoms, or neck stiffness.

## 2018-05-07 NOTE — CONSULTS
Yodit Canseco [id number] is a 57 y.o. female who has been consulted for vancomycin dosing.    The patient has the following labs:     Date Creatinine (mg/dl)    BUN WBC Count   5/7/2018 Estimated Creatinine Clearance: 79.1 mL/min (based on SCr of 0.8 mg/dL). Lab Results   Component Value Date    BUN 9 05/07/2018     Lab Results   Component Value Date    WBC 23.60 (H) 05/07/2018        Current weight is 88.4 kg (194 lb 14.2 oz)      The patient received  2000 mg on 5/7 at 0230 in ED    The patient will be started on vancomycin at a dose of 1250 mg every 12 hours (15mg/kg/dose) beginning 5/7 at 2100.  The vancomycin trough has been ordered for 5/8 at 0830.      Patient will be followed by pharmacy for changes in renal function, toxicity, and efficacy.   Thank you for allowing us to participate in this patient's care.     Cassy Sepulveda

## 2018-05-07 NOTE — H&P
[facility] Medicine  History & Physical    Patient Name: Yodit Canseco  MRN: [id number]  Admission Date: 5/6/2018  Attending Physician: Cristian Ashraf MD   Primary Care Provider: Miky Lewis MD         Patient information was obtained from patient, past medical records and ER records.     Subjective:     Principal Problem:Sepsis    Chief Complaint:   Chief Complaint   Patient presents with    Fever     101.9.  S/P 3 weeks gastric sleeve and has Lt neck pain         HPI: Yodit Canseco is a 58 yo female with PMHx significant for morbid obesity, history of gastroparesis, DM-2 (resolved), hyperlipidemia and hypothyroidism.  She is admitted to the service of hospital medicine with severe sepsis.  She presented to the ED with a 5 day history of subjective fever with tmax 103F.  She states the fever is associated with chills, malaise and nausea.  The fevers are responsive to tylenol and seem to be worse at night. Patient is 3 weeks post gastric sleeve surgery. She reports that she is tolerating her PO intake and recently had relatives over who have been sick with symptoms of fever, chills, and diarrhea. Patient denies diarrhea, vomiting, abdominal pain, chest pain, shortness of breath, cough, urinary symptoms, or neck stiffness.     Past Medical History:   Diagnosis Date    Allergy     Angio-edema     Arthralgia     Asthma without status asthmaticus     Bronchitis     Diverticulosis of large intestine without hemorrhage 10/8/2015    Environmental allergies     Eosinophilic asthma 3/8/2018    Gastroparesis     Hand arthritis     Herpes simplex without mention of complication     oral    Hyperlipidemia     Hypothyroidism     LBP radiating to left leg     Leukocytosis, unspecified     Migraine headache     Obesity, Class III, BMI 40-49.9 (morbid obesity)     Periorbital cellulitis 12/31/2016    Prediabetes     Recurrent upper respiratory infection (URI)     S/P  "colonoscopy November 2010    Urticaria        Past Surgical History:   Procedure Laterality Date    ADENOIDECTOMY      CHOLECYSTECTOMY      CHOLECYSTECTOMY      COLONOSCOPY  2010    COLONOSCOPY N/A 10/8/2015    Procedure: COLONOSCOPY;  Surgeon: Panda Bose MD;  Location: [facility];  Service: Endoscopy;  Laterality: N/A;    Hand fracture surgery      HARDWARE REMOVAL      left hand 5th [facility]    hymenectomy      HYSTERECTOMY      menorrhagia-Ovaries intact    SLEEVE GASTROPLASTY  04/16/2018    TONSILLECTOMY      Urethral dilatation         Review of patient's allergies indicates:   Allergen Reactions    Bromelains Hives     " causes mouth to bleed"    Sudafed [pseudoephedrine hcl] Other (See Comments)     Does not want to wake up .    Amoxicillin-pot clavulanate Itching     Other reaction(s): Itching    Hydrocodone Itching    Iodinated contrast- oral and iv dye      childhood    Iodine and iodide containing products Other (See Comments)    Melon Hives    Pantoprazole Nausea Only     Other reaction(s): upset stomach/halitosis    Percocet [oxycodone-acetaminophen] Itching    Barium iodide Rash    Ephedrine Other (See Comments)     Other reaction(s): comatose    Penicillins      Other reaction(s): does not work on pt symptoms       No current facility-administered medications on file prior to encounter.      Current Outpatient Prescriptions on File Prior to Encounter   Medication Sig    acetaminophen (TYLENOL) 650 MG TbSR Take 650 mg by mouth every 8 (eight) hours.    albuterol (PROAIR HFA) 90 mcg/actuation inhaler Inhale 1 puff into the lungs every 4 (four) hours as needed for Wheezing or Shortness of Breath.    CALCIUM CARB/D3/MAGNESIUM/ZINC (CALCIUM CARB-D3-MAG CMB11-ZINC) 252-175-430-5 mg-unit-mg-mg Tab Take 2 tablets by mouth every evening.    diazePAM (VALIUM) 2 MG tablet Take 1 tablet (2 mg total) by mouth every 6 (six) hours as needed for Anxiety (muscle spasm).    estradiol " (ESTRACE) 1 MG tablet Take 1 mg by mouth once daily.    immun glob G,IgG,-pro-IgA 0-50 (HIZENTRA) 2 gram/10 mL (20 %) Soln Inject 18 g into the skin every 14 (fourteen) days.    levothyroxine (SYNTHROID) 75 MCG tablet TAKE 1 TABLET(75 MCG) BY MOUTH EVERY DAY    mometasone-formoterol (DULERA) 200-5 mcg/actuation inhaler Inhale 2 puffs into the lungs 2 (two) times daily. Controller    montelukast (SINGULAIR) 10 mg tablet Take 1 tablet (10 mg total) by mouth every evening.    multivitamin (ONE DAILY MULTIVITAMIN) per tablet Take 1 tablet by mouth once daily.    ondansetron (ZOFRAN-ODT) 4 MG TbDL Take 1 tablet (4 mg total) by mouth every 6 (six) hours as needed.    potassium chloride (MICRO-K) 10 MEQ CpSR TAKE 2 CAPSULES BY MOUTH EVERY DAY    sumatriptan (IMITREX) 100 MG tablet Take 1 po at onset of migraine. May repeat once in 2 hrs if needed. No more than 2 pills in 24 hours    triamterene-hydrochlorothiazide 75-50 mg (MAXZIDE) 75-50 mg per tablet TAKE 1 TABLET BY MOUTH EVERY DAY    albuterol (PROVENTIL) 2.5 mg /3 mL (0.083 %) nebulizer solution Take 3 mLs (2.5 mg total) by nebulization every 4 (four) hours as needed for Wheezing or Shortness of Breath. Every 4-6 hours PRN    triamcinolone acetonide 0.025% (KENALOG) 0.025 % cream Apply topically 2 (two) times daily.     Family History     Problem Relation (Age of Onset)    Basal cell carcinoma Mother    Breast cancer Paternal Aunt, Cousin    Cancer Father, Maternal Grandfather    Cataracts Mother    Colon cancer Paternal Aunt    Diabetes Father, Paternal Aunt, Paternal Aunt    Hypertension Mother, Father    Lung cancer Father    Lung disease Mother    Lymphoma Paternal Aunt    Melanoma Mother, Maternal Aunt, Maternal Uncle    Ovarian cancer Paternal Grandmother    Psoriasis Son, Son    Ulcerative colitis Son        Social History Main Topics    Smoking status: Never Smoker    Smokeless tobacco: Never Used    Alcohol use 0.0 oz/week      Comment: very  rarely    Drug use: No    Sexual activity: Yes     Partners: Male     Review of Systems   Constitutional: Positive for chills, fatigue and fever. Negative for appetite change.   HENT: Negative for congestion, hearing loss, postnasal drip, sore throat and trouble swallowing.    Eyes: Negative for photophobia and visual disturbance.   Respiratory: Negative for cough, shortness of breath and wheezing.    Cardiovascular: Negative for chest pain, palpitations and leg swelling.   Gastrointestinal: Positive for nausea. Negative for abdominal pain, diarrhea and vomiting.   Endocrine: Negative for polydipsia, polyphagia and polyuria.   Genitourinary: Negative for dysuria, frequency and urgency.   Musculoskeletal: Negative for gait problem, neck pain and neck stiffness.   Skin: Negative for rash and wound.   Neurological: Negative for dizziness, weakness, numbness and headaches.   Hematological: Does not bruise/bleed easily.   Psychiatric/Behavioral: Negative for confusion. The patient is not nervous/anxious.      Objective:     Vital Signs (Most Recent):  Temp: (!) 100.5 °F (38.1 °C) (05/07/18 0300)  Pulse: 94 (05/07/18 0300)  Resp: 18 (05/07/18 0300)  BP: 116/62 (05/07/18 0300)  SpO2: (!) 93 % (05/07/18 0300) Vital Signs (24h Range):  Temp:  [100 °F (37.8 °C)-103 °F (39.4 °C)] 100.5 °F (38.1 °C)  Pulse:  [] 94  Resp:  [16-18] 18  SpO2:  [93 %-96 %] 93 %  BP: (116-138)/(62-75) 116/62     Weight: 88.4 kg (194 lb 14.2 oz)  Body mass index is 35.65 kg/m².    Physical Exam   Constitutional: She is oriented to person, place, and time. She appears well-developed and well-nourished. No distress.   HENT:   Head: Normocephalic and atraumatic.   Mouth/Throat: Oropharynx is clear and moist.   Eyes: Conjunctivae and EOM are normal. Pupils are equal, round, and reactive to light. No scleral icterus.   Neck: Normal range of motion. Neck supple. No JVD present. No tracheal deviation present. No thyromegaly present.    Cardiovascular: Normal rate, regular rhythm, normal heart sounds and intact distal pulses.  Exam reveals no gallop and no friction rub.    No murmur heard.  Pulses:       Dorsalis pedis pulses are 2+ on the right side, and 2+ on the left side.   Pulmonary/Chest: Effort normal and breath sounds normal. No accessory muscle usage. No respiratory distress. She has no wheezes. She has no rhonchi. She has no rales.   Abdominal: Soft. Bowel sounds are normal. She exhibits no distension and no mass. There is tenderness (Left upper and lower quadrant). There is no rebound and no guarding.   Protuberant.   Musculoskeletal: Normal range of motion. She exhibits no edema, tenderness or deformity.   Neurological: She is alert and oriented to person, place, and time. She has normal strength. She exhibits normal muscle tone. Coordination normal.   Skin: Skin is warm, dry and intact. Capillary refill takes less than 2 seconds. No rash noted. She is not diaphoretic. No erythema.   Psychiatric: She has a normal mood and affect. Her speech is normal and behavior is normal. Judgment and thought content normal.   Vitals reviewed.        CRANIAL NERVES     CN III, IV, VI   Pupils are equal, round, and reactive to light.  Extraocular motions are normal.        Significant Labs:   CBC:   Recent Labs  Lab 05/07/18  0017   WBC 23.60*   HGB 14.0   HCT 41.9        CMP:   Recent Labs  Lab 05/07/18  0017   *   K 3.9   CL 96   CO2 23   *   BUN 9   CREATININE 0.8   CALCIUM 9.0   PROT 7.3   ALBUMIN 2.9*   BILITOT 0.5   ALKPHOS 150*   AST 26   ALT 10   ANIONGAP 15   EGFRNONAA >60     Lactic Acid:   Recent Labs  Lab 05/07/18  0216   LACTATE 1.1     Lipase:   Recent Labs  Lab 05/07/18  0017   LIPASE 16     TSH:   Recent Labs  Lab 02/19/18  1558   TSH 1.984     Urine Studies:   Recent Labs  Lab 05/07/18  0114   COLORU Yellow   APPEARANCEUA Clear   PHUR 7.0   SPECGRAV <=1.005*   PROTEINUA Trace*   GLUCUA Negative   KETONESU Trace*    BILIRUBINUA Negative   OCCULTUA 1+*   NITRITE Negative   UROBILINOGEN Negative   LEUKOCYTESUR Negative   RBCUA 3   WBCUA 5   BACTERIA Occasional   SQUAMEPITHEL 0       Significant Imaging:   CT abdomen: IMPRESSION:  1. Significant inflammatory changes are present adjacent to the proximal gastrectomy but there is no  evidence of oral contrast leakage or free air. This could be related to recent surgery but the degree of  inflammation is out of portion to what would be expected. No definite abscess on this unenhanced  exam. No significant hemorrhage.  2. Normal appendix    Assessment/Plan:     * Sepsis  Intraperitoneal abscess       Questionable source - CT abdomen results not definitive - intraperitoneal space with inflammation.  Questionable abscess.  Recent gastric sleeve surgery.  Meets SIRS criteria   Antibiotics given-   Antibiotics     Start     Stop Route Frequency Ordered    05/07/18 0430  metronidazole IVPB 500 mg      -- IV Every 8 hours (non-standard times) 05/07/18 0428    05/07/18 0430  meropenem-0.9% sodium chloride 1 g/50 mL IVPB      -- IV Every 8 hours (non-standard times) 05/07/18 0428          Latest lactate reviewed, they are-    Recent Labs  Lab 05/07/18  0216   LACTATE 1.1     Follow blood cultures and urine cultures  Continue IV Merrem, Vanc and Metronidazole and deescalate when appropriate.              S/P laparoscopic sleeve gastrectomy    Fever three weeks post op with CT scan notable for inflammation vs. abscess.  Dr. Reese consulted by Dr. Barrera in ED - will see patient in am.  Keep NPO  Initiate broad spectrum antibiotics  Follow General Surgery recommendations.          Severe persistent asthma without complication    Chronic problem. Controlled.  Continue home medications and monitor closely for exacerbations, adjusting medications as necessary.            Hypothyroidism    Chronic problem. TSH reviewed - euthyroid.  Continue home levothyroxine.            VTE Risk Mitigation          Ordered     IP VTE HIGH RISK PATIENT  Once      05/07/18 0428     Place STEPHEN hose  Until discontinued      05/07/18 0428     Place sequential compression device  Until discontinued      05/07/18 0428     Reason for No Pharmacological VTE Prophylaxis  Once      05/07/18 0428             Windy Hill NP  Department of Hospital Medicine   [facility]

## 2018-05-07 NOTE — ANESTHESIA PREPROCEDURE EVALUATION
05/07/2018  Yodit Canseco is a 57 y.o., female.    Anesthesia Evaluation    I have reviewed the Patient Summary Reports.    I have reviewed the Nursing Notes.   I have reviewed the Medications.     Review of Systems  Anesthesia Hx:  No problems with previous Anesthesia    Social:  Non-Smoker    Cardiovascular:   hyperlipidemia    Pulmonary:   Asthma mild Recent URI    Renal/:  Renal/ Normal     Musculoskeletal:   Arthritis     Neurological:   Headaches    Endocrine:   Hypothyroidism        Physical Exam  General:  Well nourished, Morbid Obesity    Airway/Jaw/Neck:  Airway Findings: Mouth Opening: Normal Tongue: Normal  General Airway Assessment: Adult  Oropharynx Findings:  Mallampati: II  Jaw/Neck Findings:  Neck ROM: Normal ROM     Eyes/Ears/Nose:  Eyes/Ears/Nose Findings:    Dental:  Dental Findings:   Chest/Lungs:  Chest/Lungs Findings: Normal Respiratory Rate     Heart/Vascular:  Heart Findings: Rate: Normal  Rhythm: Regular Rhythm        Mental Status:  Mental Status Findings:  Cooperative, Alert and Oriented         Anesthesia Plan  Type of Anesthesia, risks & benefits discussed:  Anesthesia Type:  general  Patient's Preference:   Intra-op Monitoring Plan:   Intra-op Monitoring Plan Comments:   Post Op Pain Control Plan: multimodal analgesia  Post Op Pain Control Plan Comments:   Induction:   IV  Beta Blocker:  Patient is not currently on a Beta-Blocker (No further documentation required).       Informed Consent: Patient understands risks and agrees with Anesthesia plan.  Questions answered. Anesthesia consent signed with patient.  ASA Score: 3     Day of Surgery Review of History & Physical:  There are no significant changes.   H&P completed by Anesthesiologist.   Anesthesia Plan Notes: Recently febrile, diarrhea and poor nutrition.  No vomiting.        Ready For Surgery From Anesthesia  Perspective.

## 2018-05-07 NOTE — ASSESSMENT & PLAN NOTE
Fever three weeks post op with CT scan notable for inflammation.  Dr. Reese consulted by Dr. Barrera in ED - will see patient in am.  Keep NPO  Initiate broad spectrum antibiotics  Follow General Surgery recommendations.

## 2018-05-07 NOTE — PLAN OF CARE
I completed the assessment with the pts spouse at bedside, Miky Lewis [phone] due to the pt resting. The pt lives at home with her spouse. She does not have HH and has a home cane, walker and nebulizer. She denies any outpt services but does do bi-weekly self infusions at home. Pharmacy of choice is [facility] in [place] [facility]. The pt was recently admitted on 4/16/18 for a gastric sleeve and returned on 5/6/18 due to fever. Verified PCP as Dr. Lewis and insurance as [employer]. The pts spouse stated that she also has Medicare. I updated SIMEON Oliver CM who notified admissions. I educated the pt on the blue discharge folder and wrote my name and number on the pts white board. Demetria Yun LMSW     05/07/18 1046   Discharge Assessment   Assessment Type Discharge Planning Assessment   Confirmed/corrected address and phone number on facesheet? Yes   Assessment information obtained from? Patient;Caregiver;Medical Record   Communicated expected length of stay with patient/caregiver no   Prior to hospitilization cognitive status: Alert/Oriented   Prior to hospitalization functional status: Independent   Current cognitive status: Alert/Oriented   Current Functional Status: Independent   Lives With spouse   Able to Return to Prior Arrangements yes   Is patient able to care for self after discharge? Yes   Who are your caregiver(s) and their phone number(s)? Jonas Ajith [phone]    Readmission Within The Last 30 Days other (see comments)  (Pt was admitted 4/16/18 for gastric sleeve surgery )   Patient currently being followed by outpatient case management? No   Patient currently receives any other outside agency services? No   Equipment Currently Used at Home cane, straight;walker, standard;nebulizer   Do you have any problems affording any of your prescribed medications? No   Is the patient taking medications as prescribed? yes   Does the patient have transportation home? Yes   Transportation Available  family or friend will provide;car   Does the patient receive services at the [facility]? No   Discharge Plan A Home   Discharge Plan B Home with family   Patient/Family In Agreement With Plan yes

## 2018-05-07 NOTE — CONSULTS
[facility]  General Surgery  Consult Note    Patient Name: Yodit Canseco  MRN: [id number]  Code Status: Full Code  Admission Date: 5/6/2018  Hospital Length of Stay: 0 days  Attending Physician: Cristian Ashraf MD  Primary Care Provider: Miky Lewis MD    Patient information was obtained from patient and ER records.     Inpatient consult to General Surgery  Consult performed by: VIJAY BAKER  Consult ordered by: OSCAR VELÁZQUEZ  Reason for consult: sleeve leak  Assessment/Recommendations: Npo antibiotics for now        Subjective:     Principal Problem: Sepsis    History of Present Illness: 56 y/o female who underwent sleeve gastrectomy on 4/16/18, presents now with fevers at home 101.9.  Complains of some abdominal spasm and chills.  Has been able to tolerate diet.  Denies n/v, has had some diarrhea, no constipation    No current facility-administered medications on file prior to encounter.      Current Outpatient Prescriptions on File Prior to Encounter   Medication Sig    acetaminophen (TYLENOL) 650 MG TbSR Take 650 mg by mouth every 8 (eight) hours.    albuterol (PROAIR HFA) 90 mcg/actuation inhaler Inhale 1 puff into the lungs every 4 (four) hours as needed for Wheezing or Shortness of Breath.    CALCIUM CARB/D3/MAGNESIUM/ZINC (CALCIUM CARB-D3-MAG CMB11-ZINC) [phone] mg-unit-mg-mg Tab Take 2 tablets by mouth every evening.    diazePAM (VALIUM) 2 MG tablet Take 1 tablet (2 mg total) by mouth every 6 (six) hours as needed for Anxiety (muscle spasm).    estradiol (ESTRACE) 1 MG tablet Take 1 mg by mouth once daily.    immun glob G,IgG,-pro-IgA 0-50 (HIZENTRA) 2 gram/10 mL (20 %) Soln Inject 18 g into the skin every 14 (fourteen) days.    levothyroxine (SYNTHROID) 75 MCG tablet TAKE 1 TABLET(75 MCG) BY MOUTH EVERY DAY    mometasone-formoterol (DULERA) 200-5 mcg/actuation inhaler Inhale 2 puffs into the lungs 2 (two) times daily. Controller    montelukast (SINGULAIR) 10  "mg tablet Take 1 tablet (10 mg total) by mouth every evening.    multivitamin (ONE DAILY MULTIVITAMIN) per tablet Take 1 tablet by mouth once daily.    ondansetron (ZOFRAN-ODT) 4 MG TbDL Take 1 tablet (4 mg total) by mouth every 6 (six) hours as needed.    potassium chloride (MICRO-K) 10 MEQ CpSR TAKE 2 CAPSULES BY MOUTH EVERY DAY    sumatriptan (IMITREX) 100 MG tablet Take 1 po at onset of migraine. May repeat once in 2 hrs if needed. No more than 2 pills in 24 hours    triamterene-hydrochlorothiazide 75-50 mg (MAXZIDE) 75-50 mg per tablet TAKE 1 TABLET BY MOUTH EVERY DAY    albuterol (PROVENTIL) 2.5 mg /3 mL (0.083 %) nebulizer solution Take 3 mLs (2.5 mg total) by nebulization every 4 (four) hours as needed for Wheezing or Shortness of Breath. Every 4-6 hours PRN    triamcinolone acetonide 0.025% (KENALOG) 0.025 % cream Apply topically 2 (two) times daily.       Review of patient's allergies indicates:   Allergen Reactions    Bromelains Hives     " causes mouth to bleed"    Sudafed [pseudoephedrine hcl] Other (See Comments)     Does not want to wake up .    Amoxicillin-pot clavulanate Itching     Other reaction(s): Itching    Hydrocodone Itching    Iodinated contrast- oral and iv dye      childhood    Iodine and iodide containing products Other (See Comments)    Melon Hives    Pantoprazole Nausea Only     Other reaction(s): upset stomach/halitosis    Percocet [oxycodone-acetaminophen] Itching    Barium iodide Rash    Ephedrine Other (See Comments)     Other reaction(s): comatose    Penicillins      Other reaction(s): does not work on pt symptoms       Past Medical History:   Diagnosis Date    Allergy     Angio-edema     Arthralgia     Asthma without status asthmaticus     Bronchitis     Diverticulosis of large intestine without hemorrhage 10/8/2015    Environmental allergies     Eosinophilic asthma 3/8/2018    Gastroparesis     Hand arthritis     Herpes simplex without mention of " complication     oral    Hyperlipidemia     Hypothyroidism     LBP radiating to left leg     Leukocytosis, unspecified     Migraine headache     Obesity, Class III, BMI 40-49.9 (morbid obesity)     Periorbital cellulitis 12/31/2016    Prediabetes     Recurrent upper respiratory infection (URI)     S/P colonoscopy November 2010    Urticaria      Past Surgical History:   Procedure Laterality Date    ADENOIDECTOMY      CHOLECYSTECTOMY      CHOLECYSTECTOMY      COLONOSCOPY  2010    COLONOSCOPY N/A 10/8/2015    Procedure: COLONOSCOPY;  Surgeon: Panda Bose MD;  Location: [facility];  Service: Endoscopy;  Laterality: N/A;    Hand fracture surgery      HARDWARE REMOVAL      left hand 5th MC    hymenectomy      HYSTERECTOMY      menorrhagia-Ovaries intact    SLEEVE GASTROPLASTY  04/16/2018    TONSILLECTOMY      Urethral dilatation       Family History     Problem Relation (Age of Onset)    Basal cell carcinoma Mother    Breast cancer Paternal Aunt, Cousin    Cancer Father, Maternal Grandfather    Cataracts Mother    Colon cancer Paternal Aunt    Diabetes Father, Paternal Aunt, Paternal Aunt    Hypertension Mother, Father    Lung cancer Father    Lung disease Mother    Lymphoma Paternal Aunt    Melanoma Mother, Maternal Aunt, Maternal Uncle    Ovarian cancer Paternal Grandmother    Psoriasis Son, Son    Ulcerative colitis Son        Social History Main Topics    Smoking status: Never Smoker    Smokeless tobacco: Never Used    Alcohol use 0.0 oz/week      Comment: very rarely    Drug use: No    Sexual activity: Yes     Partners: Male     Review of Systems   Constitutional: Positive for chills, fatigue and fever. Negative for appetite change.   HENT: Negative for congestion, hearing loss, postnasal drip, sore throat and trouble swallowing.    Eyes: Negative for photophobia and visual disturbance.   Respiratory: Negative for cough, shortness of breath and wheezing.    Cardiovascular: Negative for  chest pain, palpitations and leg swelling.   Gastrointestinal: Positive for nausea. Negative for abdominal pain, diarrhea and vomiting.   Endocrine: Negative for polydipsia, polyphagia and polyuria.   Genitourinary: Negative for dysuria, frequency and urgency.   Musculoskeletal: Negative for gait problem, neck pain and neck stiffness.   Skin: Negative for rash and wound.   Neurological: Negative for dizziness, weakness, numbness and headaches.   Hematological: Does not bruise/bleed easily.   Psychiatric/Behavioral: Negative for confusion. The patient is not nervous/anxious.      Objective:     Vital Signs (Most Recent):  Temp: (!) 100.5 °F (38.1 °C) (05/07/18 0300)  Pulse: 94 (05/07/18 0300)  Resp: 18 (05/07/18 0300)  BP: 116/62 (05/07/18 0300)  SpO2: (!) 93 % (05/07/18 0300) Vital Signs (24h Range):  Temp:  [100 °F (37.8 °C)-103 °F (39.4 °C)] 100.5 °F (38.1 °C)  Pulse:  [] 94  Resp:  [16-18] 18  SpO2:  [93 %-96 %] 93 %  BP: (116-138)/(62-75) 116/62     Weight: 88.4 kg (194 lb 14.2 oz)  Body mass index is 35.65 kg/m².    Physical Exam   Constitutional: She is oriented to person, place, and time. She appears well-developed and well-nourished. No distress.   HENT:   Head: Normocephalic and atraumatic.   Mouth/Throat: Oropharynx is clear and moist.   Eyes: Conjunctivae and EOM are normal. Pupils are equal, round, and reactive to light. No scleral icterus.   Neck: Normal range of motion. Neck supple. No JVD present. No tracheal deviation present. No thyromegaly present.   Cardiovascular: Normal rate, regular rhythm, normal heart sounds and intact distal pulses.  Exam reveals no gallop and no friction rub.    No murmur heard.  Pulses:       Dorsalis pedis pulses are 2+ on the right side, and 2+ on the left side.   Pulmonary/Chest: Effort normal and breath sounds normal. No accessory muscle usage. No respiratory distress. She has no wheezes. She has no rhonchi. She has no rales.   Abdominal: Soft. Bowel sounds are  normal. She exhibits no distension and no mass. There is tenderness (Left upper and lower quadrant). There is no rebound and no guarding.   Protuberant.   Musculoskeletal: Normal range of motion. She exhibits no edema, tenderness or deformity.   Neurological: She is alert and oriented to person, place, and time. She has normal strength. She exhibits normal muscle tone. Coordination normal.   Skin: Skin is warm, dry and intact. Capillary refill takes less than 2 seconds. No rash noted. She is not diaphoretic. No erythema.   Psychiatric: She has a normal mood and affect. Her speech is normal and behavior is normal. Judgment and thought content normal.   Vitals reviewed.      Significant Labs:  CBC:   Recent Labs  Lab 05/07/18  0504   WBC 24.60*   RBC 4.50   HGB 12.9   HCT 38.6      MCV 86   MCH 28.5   MCHC 33.3     BMP:   Recent Labs  Lab 05/07/18  0504         K 2.9*   CL 99   CO2 27   BUN 8   CREATININE 0.8   CALCIUM 9.2   MG 1.6       Significant Diagnostics:  CT: I have reviewed all pertinent results/findings within the past 24 hours and my personal findings are:  inflammation around sleeve    Assessment/Plan:     Intraperitoneal abscess    Suspect sleeve leak  Npo  Antibiotics  Stent, nj tube today          VTE Risk Mitigation         Ordered     IP VTE HIGH RISK PATIENT  Once      05/07/18 0428     Place STEPHEN hose  Until discontinued      05/07/18 0428     Place sequential compression device  Until discontinued      05/07/18 0428     Reason for No Pharmacological VTE Prophylaxis  Once      05/07/18 0428          Thank you for your consult. I will follow-up with patient. Please contact us if you have any additional questions.    Chai Reese MD  General Surgery  [facility]

## 2018-05-07 NOTE — HPI
56 y/o female who underwent sleeve gastrectomy on 4/16/18, presents now with fevers at home 101.9.  Complains of some abdominal spasm and chills.  Has been able to tolerate diet.  Denies n/v, has had some diarrhea, no constipation

## 2018-05-07 NOTE — ASSESSMENT & PLAN NOTE
Chronic problem. Controlled.  Continue home medications and monitor closely for exacerbations, adjusting medications as necessary.

## 2018-05-07 NOTE — ASSESSMENT & PLAN NOTE
Questionable source - CT abdomen results not definitive - intraperitoneal space with inflammation.  Questionable abscess.  Recent gastric sleeve surgery.  Meets SIRS criteria   Antibiotics given-   Antibiotics     Start     Stop Route Frequency Ordered    05/07/18 0430  metronidazole IVPB 500 mg      -- IV Every 8 hours (non-standard times) 05/07/18 0428    05/07/18 0430  meropenem-0.9% sodium chloride 1 g/50 mL IVPB      -- IV Every 8 hours (non-standard times) 05/07/18 0428          Latest lactate reviewed, they are-    Recent Labs  Lab 05/07/18  0216   LACTATE 1.1     Follow blood cultures and urine cultures  Continue IV Merrem, Vanc and Metronidazole and deescalate when appropriate.

## 2018-05-07 NOTE — SUBJECTIVE & OBJECTIVE
"Past Medical History:   Diagnosis Date    Allergy     Angio-edema     Arthralgia     Asthma without status asthmaticus     Bronchitis     Diverticulosis of large intestine without hemorrhage 10/8/2015    Environmental allergies     Eosinophilic asthma 3/8/2018    Gastroparesis     Hand arthritis     Herpes simplex without mention of complication     oral    Hyperlipidemia     Hypothyroidism     LBP radiating to left leg     Leukocytosis, unspecified     Migraine headache     Obesity, Class III, BMI 40-49.9 (morbid obesity)     Periorbital cellulitis 12/31/2016    Prediabetes     Recurrent upper respiratory infection (URI)     S/P colonoscopy November 2010    Urticaria        Past Surgical History:   Procedure Laterality Date    ADENOIDECTOMY      CHOLECYSTECTOMY      CHOLECYSTECTOMY      COLONOSCOPY  2010    COLONOSCOPY N/A 10/8/2015    Procedure: COLONOSCOPY;  Surgeon: Panda Bose MD;  Location: [facility];  Service: Endoscopy;  Laterality: N/A;    Hand fracture surgery      HARDWARE REMOVAL      left hand 5th MC    hymenectomy      HYSTERECTOMY      menorrhagia-Ovaries intact    SLEEVE GASTROPLASTY  04/16/2018    TONSILLECTOMY      Urethral dilatation         Review of patient's allergies indicates:   Allergen Reactions    Bromelains Hives     " causes mouth to bleed"    Sudafed [pseudoephedrine hcl] Other (See Comments)     Does not want to wake up .    Amoxicillin-pot clavulanate Itching     Other reaction(s): Itching    Hydrocodone Itching    Iodinated contrast- oral and iv dye      childhood    Iodine and iodide containing products Other (See Comments)    Melon Hives    Pantoprazole Nausea Only     Other reaction(s): upset stomach/halitosis    Percocet [oxycodone-acetaminophen] Itching    Barium iodide Rash    Ephedrine Other (See Comments)     Other reaction(s): comatose    Penicillins      Other reaction(s): does not work on pt symptoms       No current " facility-administered medications on file prior to encounter.      Current Outpatient Prescriptions on File Prior to Encounter   Medication Sig    acetaminophen (TYLENOL) 650 MG TbSR Take 650 mg by mouth every 8 (eight) hours.    albuterol (PROAIR HFA) 90 mcg/actuation inhaler Inhale 1 puff into the lungs every 4 (four) hours as needed for Wheezing or Shortness of Breath.    CALCIUM CARB/D3/MAGNESIUM/ZINC (CALCIUM CARB-D3-MAG CMB11-ZINC) [phone] mg-unit-mg-mg Tab Take 2 tablets by mouth every evening.    diazePAM (VALIUM) 2 MG tablet Take 1 tablet (2 mg total) by mouth every 6 (six) hours as needed for Anxiety (muscle spasm).    estradiol (ESTRACE) 1 MG tablet Take 1 mg by mouth once daily.    immun glob G,IgG,-pro-IgA 0-50 (HIZENTRA) 2 gram/10 mL (20 %) Soln Inject 18 g into the skin every 14 (fourteen) days.    levothyroxine (SYNTHROID) 75 MCG tablet TAKE 1 TABLET(75 MCG) BY MOUTH EVERY DAY    mometasone-formoterol (DULERA) 200-5 mcg/actuation inhaler Inhale 2 puffs into the lungs 2 (two) times daily. Controller    montelukast (SINGULAIR) 10 mg tablet Take 1 tablet (10 mg total) by mouth every evening.    multivitamin (ONE DAILY MULTIVITAMIN) per tablet Take 1 tablet by mouth once daily.    ondansetron (ZOFRAN-ODT) 4 MG TbDL Take 1 tablet (4 mg total) by mouth every 6 (six) hours as needed.    potassium chloride (MICRO-K) 10 MEQ CpSR TAKE 2 CAPSULES BY MOUTH EVERY DAY    sumatriptan (IMITREX) 100 MG tablet Take 1 po at onset of migraine. May repeat once in 2 hrs if needed. No more than 2 pills in 24 hours    triamterene-hydrochlorothiazide 75-50 mg (MAXZIDE) 75-50 mg per tablet TAKE 1 TABLET BY MOUTH EVERY DAY    albuterol (PROVENTIL) 2.5 mg /3 mL (0.083 %) nebulizer solution Take 3 mLs (2.5 mg total) by nebulization every 4 (four) hours as needed for Wheezing or Shortness of Breath. Every 4-6 hours PRN    triamcinolone acetonide 0.025% (KENALOG) 0.025 % cream Apply topically 2 (two) times  daily.     Family History     Problem Relation (Age of Onset)    Basal cell carcinoma Mother    Breast cancer Paternal Aunt, Cousin    Cancer Father, Maternal Grandfather    Cataracts Mother    Colon cancer Paternal Aunt    Diabetes Father, Paternal Aunt, Paternal Aunt    Hypertension Mother, Father    Lung cancer Father    Lung disease Mother    Lymphoma Paternal Aunt    Melanoma Mother, Maternal Aunt, Maternal Uncle    Ovarian cancer Paternal Grandmother    Psoriasis Son, Son    Ulcerative colitis Son        Social History Main Topics    Smoking status: Never Smoker    Smokeless tobacco: Never Used    Alcohol use 0.0 oz/week      Comment: very rarely    Drug use: No    Sexual activity: Yes     Partners: Male     Review of Systems   Constitutional: Positive for chills, fatigue and fever. Negative for appetite change.   HENT: Negative for congestion, hearing loss, postnasal drip, sore throat and trouble swallowing.    Eyes: Negative for photophobia and visual disturbance.   Respiratory: Negative for cough, shortness of breath and wheezing.    Cardiovascular: Negative for chest pain, palpitations and leg swelling.   Gastrointestinal: Positive for nausea. Negative for abdominal pain, diarrhea and vomiting.   Endocrine: Negative for polydipsia, polyphagia and polyuria.   Genitourinary: Negative for dysuria, frequency and urgency.   Musculoskeletal: Negative for gait problem, neck pain and neck stiffness.   Skin: Negative for rash and wound.   Neurological: Negative for dizziness, weakness, numbness and headaches.   Hematological: Does not bruise/bleed easily.   Psychiatric/Behavioral: Negative for confusion. The patient is not nervous/anxious.      Objective:     Vital Signs (Most Recent):  Temp: (!) 100.5 °F (38.1 °C) (05/07/18 0300)  Pulse: 94 (05/07/18 0300)  Resp: 18 (05/07/18 0300)  BP: 116/62 (05/07/18 0300)  SpO2: (!) 93 % (05/07/18 0300) Vital Signs (24h Range):  Temp:  [100 °F (37.8 °C)-103 °F (39.4 °C)]  100.5 °F (38.1 °C)  Pulse:  [] 94  Resp:  [16-18] 18  SpO2:  [93 %-96 %] 93 %  BP: (116-138)/(62-75) 116/62     Weight: 88.4 kg (194 lb 14.2 oz)  Body mass index is 35.65 kg/m².    Physical Exam   Constitutional: She is oriented to person, place, and time. She appears well-developed and well-nourished. No distress.   HENT:   Head: Normocephalic and atraumatic.   Mouth/Throat: Oropharynx is clear and moist.   Eyes: Conjunctivae and EOM are normal. Pupils are equal, round, and reactive to light. No scleral icterus.   Neck: Normal range of motion. Neck supple. No JVD present. No tracheal deviation present. No thyromegaly present.   Cardiovascular: Normal rate, regular rhythm, normal heart sounds and intact distal pulses.  Exam reveals no gallop and no friction rub.    No murmur heard.  Pulses:       Dorsalis pedis pulses are 2+ on the right side, and 2+ on the left side.   Pulmonary/Chest: Effort normal and breath sounds normal. No accessory muscle usage. No respiratory distress. She has no wheezes. She has no rhonchi. She has no rales.   Abdominal: Soft. Bowel sounds are normal. She exhibits no distension and no mass. There is tenderness (Left upper and lower quadrant). There is no rebound and no guarding.   Protuberant.   Musculoskeletal: Normal range of motion. She exhibits no edema, tenderness or deformity.   Neurological: She is alert and oriented to person, place, and time. She has normal strength. She exhibits normal muscle tone. Coordination normal.   Skin: Skin is warm, dry and intact. Capillary refill takes less than 2 seconds. No rash noted. She is not diaphoretic. No erythema.   Psychiatric: She has a normal mood and affect. Her speech is normal and behavior is normal. Judgment and thought content normal.   Vitals reviewed.        CRANIAL NERVES     CN III, IV, VI   Pupils are equal, round, and reactive to light.  Extraocular motions are normal.        Significant Labs:   CBC:   Recent Labs  Lab  05/07/18  0017   WBC 23.60*   HGB 14.0   HCT 41.9        CMP:   Recent Labs  Lab 05/07/18  0017   *   K 3.9   CL 96   CO2 23   *   BUN 9   CREATININE 0.8   CALCIUM 9.0   PROT 7.3   ALBUMIN 2.9*   BILITOT 0.5   ALKPHOS 150*   AST 26   ALT 10   ANIONGAP 15   EGFRNONAA >60     Lactic Acid:   Recent Labs  Lab 05/07/18  0216   LACTATE 1.1     Lipase:   Recent Labs  Lab 05/07/18  0017   LIPASE 16     TSH:   Recent Labs  Lab 02/19/18  1558   TSH 1.984     Urine Studies:   Recent Labs  Lab 05/07/18  0114   COLORU Yellow   APPEARANCEUA Clear   PHUR 7.0   SPECGRAV <=1.005*   PROTEINUA Trace*   GLUCUA Negative   KETONESU Trace*   BILIRUBINUA Negative   OCCULTUA 1+*   NITRITE Negative   UROBILINOGEN Negative   LEUKOCYTESUR Negative   RBCUA 3   WBCUA 5   BACTERIA Occasional   SQUAMEPITHEL 0       Significant Imaging:   CT abdomen: IMPRESSION:  1. Significant inflammatory changes are present adjacent to the proximal gastrectomy but there is no  evidence of oral contrast leakage or free air. This could be related to recent surgery but the degree of  inflammation is out of portion to what would be expected. No definite abscess on this unenhanced  exam. No significant hemorrhage.  2. Normal appendix

## 2018-05-07 NOTE — PLAN OF CARE
The pt was admitted 4/16/18 for gastric sleeve surgery and returned on 5/6/18 . At last admit the pt discharged home with no needs. Demetria Yun STEF     05/07/18 1040   Readmission Questionnaire   At the time of your discharge, did someone talk to you about what your health problems were? Yes   At the time of discharge, did someone talk to you about what to watch out for regarding worsening of your health problem? Yes   At the time of discharge, did someone talk to you about what to do if you experienced worsening of your health problem? Yes   At the time of discharge, did someone talk to you about which medication to take when you left the hospital and which ones to stop taking? Yes   At the time of discharge, did someone talk to you about when and where to follow up with a doctor after you left the hospital? Yes   How often do you need to have someone help you when you read instructions, pamphlets, or other written material from your doctor or pharmacy? Rarely   Do you have problems taking your medications as prescribed? No   Do you have any problems affording any of  your prescribed medications? No   Do you have problems obtaining/receiving your medications? No   Does the patient have transportation to healthcare appointments? Yes   Lives With spouse   Living Arrangements house   Does the patient have family/friends to help with healtcare needs after discharge? no   Who are your caregiver(s) and their phone number(s)? Jonas Canseco [phone]   Does your caregiver provide all the help you need? No   Are you currently feeling confused? No   Are you currently having problems thinking? No   Are you currently having memory problems? No   In the last 7 days, my sleep quality was: good

## 2018-05-07 NOTE — PROGRESS NOTES
ivf converted to isolyte.  Potassium 10 meq in 100 cc normal saline continues at 100 cc/hr via pump.

## 2018-05-07 NOTE — ED PROVIDER NOTES
"Encounter Date: 5/6/2018    SCRIBE #1 NOTE: IRossi, darrin scribing for, and in the presence of, .       History     Chief Complaint   Patient presents with    Fever     101.9.  S/P 3 weeks gastric sleeve and has Lt neck pain        05/06/2018 11:15 PM     Chief complaint: fever      Yodit Canseco is a 57 y.o. female who presents to the ED with fever onset this morning. Patient reports having chills and TMAX 101.9F today, however has had subjective fever over the past 3 days. She complains of nausea and notes frequent hiccupping. She denies significant relief with Tylenol. Patient is 3 weeks post gastric bypass. She reports that she is tolerating her PO intake and recently had relatives over who have been sick with symptoms of fever, chills, and diarrhea. Patient denies diarrhea, vomiting, abdominal pain, chest pain, shortness of breath, cough, urinary symptoms, or neck stiffness. She complains of some gas and constipation recently but is on a liquid diet and has a hx of gastroparesis.         The history is provided by the patient. No  was used.     Review of patient's allergies indicates:   Allergen Reactions    Bromelains Hives     " causes mouth to bleed"    Sudafed [pseudoephedrine hcl] Other (See Comments)     Does not want to wake up .    Amoxicillin-pot clavulanate Itching     Other reaction(s): Itching    Hydrocodone Itching    Iodinated contrast- oral and iv dye      childhood    Iodine and iodide containing products Other (See Comments)    Melon Hives    Pantoprazole Nausea Only     Other reaction(s): upset stomach/halitosis    Percocet [oxycodone-acetaminophen] Itching    Barium iodide Rash    Ephedrine Other (See Comments)     Other reaction(s): comatose    Penicillins      Other reaction(s): does not work on pt symptoms     Past Medical History:   Diagnosis Date    Allergy     Angio-edema     Arthralgia     Asthma without status asthmaticus  "    Bronchitis     Diverticulosis of large intestine without hemorrhage 10/8/2015    Environmental allergies     Eosinophilic asthma 3/8/2018    Gastroparesis     Hand arthritis     Herpes simplex without mention of complication     oral    Hyperlipidemia     Hypothyroidism     LBP radiating to left leg     Leukocytosis, unspecified     Migraine headache     Obesity, Class III, BMI 40-49.9 (morbid obesity)     Periorbital cellulitis 12/31/2016    Prediabetes     Recurrent upper respiratory infection (URI)     S/P colonoscopy November 2010    Urticaria      Past Surgical History:   Procedure Laterality Date    ADENOIDECTOMY      CHOLECYSTECTOMY      CHOLECYSTECTOMY      COLONOSCOPY  2010    COLONOSCOPY N/A 10/8/2015    Procedure: COLONOSCOPY;  Surgeon: Panda Bose MD;  Location: [facility] ENDO;  Service: Endoscopy;  Laterality: N/A;    Hand fracture surgery      HARDWARE REMOVAL      left hand 5th [facility]    hymenectomy      HYSTERECTOMY      menorrhagia-Ovaries intact    SLEEVE GASTROPLASTY  04/16/2018    TONSILLECTOMY      Urethral dilatation       Family History   Problem Relation Age of Onset    Melanoma Mother     Basal cell carcinoma Mother     Lung disease Mother         pulm htn    Cataracts Mother     Hypertension Mother     Lung cancer Father     Diabetes Father     Hypertension Father     Cancer Father     Diabetes Paternal Aunt     Colon cancer Paternal Aunt     Diabetes Paternal Aunt     Ovarian cancer Paternal Grandmother     Cancer Maternal Grandfather     Melanoma Maternal Aunt     Melanoma Maternal Uncle     Breast cancer Paternal Aunt     Lymphoma Paternal Aunt     Ulcerative colitis Son     Psoriasis Son     Psoriasis Son     Breast cancer Cousin     Allergic rhinitis Neg Hx     Allergies Neg Hx     Angioedema Neg Hx     Asthma Neg Hx     Atopy Neg Hx     Eczema Neg Hx     Immunodeficiency Neg Hx     Rhinitis Neg Hx     Urticaria Neg Hx       Social History   Substance Use Topics    Smoking status: Never Smoker    Smokeless tobacco: Never Used    Alcohol use 0.0 oz/week      Comment: very rarely     Review of Systems   Constitutional: Positive for chills and fever. Negative for appetite change.   HENT: Negative for congestion.    Eyes: Negative for visual disturbance.   Respiratory: Negative for cough and wheezing.    Cardiovascular: Negative for chest pain.   Gastrointestinal: Positive for constipation and nausea. Negative for abdominal pain, diarrhea and vomiting.   Genitourinary: Negative for difficulty urinating, dysuria and hematuria.   Musculoskeletal: Negative for joint swelling.   Skin: Negative for rash.   Neurological: Negative for syncope.   Hematological: Does not bruise/bleed easily.   Psychiatric/Behavioral: Negative for confusion.       Physical Exam     Initial Vitals [05/06/18 2304]   BP Pulse Resp Temp SpO2   133/71 (!) 121 16 100 °F (37.8 °C) 96 %      MAP       91.67         Physical Exam    Nursing note and vitals reviewed.  Constitutional: She appears well-nourished. She is not diaphoretic. No distress.   Obese   HENT:   Head: Normocephalic and atraumatic.   Eyes: Conjunctivae and EOM are normal. Pupils are equal, round, and reactive to light.   Neck: Normal range of motion. Neck supple. No thyroid mass present.   Cardiovascular: Normal rate, regular rhythm and normal heart sounds. Exam reveals no gallop and no friction rub.    No murmur heard.  Pulmonary/Chest: Breath sounds normal. No respiratory distress. She has no wheezes. She has no rhonchi. She has no rales.   Abdominal: Soft. Normal appearance and bowel sounds are normal. She exhibits no distension and no mass. There is tenderness. There is no rebound and no guarding.   Mild left upper quadrant abdominal pain without abdominal bloating or rigidity   Musculoskeletal: Normal range of motion.   Neurological: She is alert and oriented to person, place, and time. She has  normal strength. No cranial nerve deficit or sensory deficit.   Skin: Skin is warm and dry. No rash noted. No erythema. No pallor.   Psychiatric: She has a normal mood and affect. Her speech is normal and behavior is normal. Judgment and thought content normal. Cognition and memory are normal.         ED Course   Procedures  Labs Reviewed - No data to display          Medical Decision Making:   History:   Old Medical Records: I decided to obtain old medical records.  Clinical Tests:   Lab Tests: Ordered and Reviewed  Radiological Study: Ordered and Reviewed  ED Management:  This patient was interviewed and examined emergently.  She was sent in by her bariatric surgeon, Dr. Reese, who requested the patient receive lab analyses and a CT scan of the abdomen and pelvis with p.o. contrast.  Labs significant for evidence of leukocytosis and the patient did have progressing fever in the emergency department for which he was provided IV acetaminophen.  Lactate not elevated and the patient's blood pressures are stable.  CT scan does indicate some inflammation adjacent to the proximal gastric sleeve and these findings were relayed to the surgeon.  He asked that the patient be admitted, made NPO, and started on broad-spectrum antibiotics.  The patient has a penicillin allergy and was initiated on IV vancomycin and Flagyl.  She was provided bolus hydration and was transferred to [facility] in guarded condition.            Scribe Attestation:   Scribe #1: I performed the above scribed service and the documentation accurately describes the services I performed. I attest to the accuracy of the note.    Attending Attestation:         Attending Critical Care:   Critical Care Times:   Direct Patient Care (initial evaluation, reassessments, and time considering the case)................................................................15 minutes.   Additional History from reviewing old medical records or taking additional history  from the family, EMS, PCP, etc.......................5 minutes.   Ordering, Reviewing, and Interpreting Diagnostic Studies...............................................................................................................5 minutes.   Documentation..................................................................................................................................................................................5 minutes.   Consultation with other Physicians. .................................................................................................................................................5 minutes.   Consultation with the patient's family directly relating to the patient's condition, care, and DNR status (when patient unable)......5 minutes.   Other..................................................................................................................................................................................................5 minutes.   ==============================================================  · Total Critical Care Time - exclusive of procedural time: 45 minutes.  ==============================================================  Critical care was necessary to treat or prevent imminent or life-threatening deterioration of the following conditions: sepsis.   The following critical care procedures were done by me (see procedure notes): pulse oximetry.   Critical care was time spent personally by me on the following activities: obtaining history from patient or relative, examination of patient, ordering lab, x-rays, and/or EKG, review of old charts, ordering and performing treatments and interventions, development of treatment plan with patient or relative, evaluation of patient's response to treatment, discussions with primary provider and re-evaluation of patient's conition.   Critical Care Condition: potentially life-threatening       IDr. Ramos,  personally performed the services described in this documentation. All medical record entries made by the scribe were at my direction and in my presence.  I have reviewed the chart and agree that the record reflects my personal performance and is accurate and complete. Ramos Barrera MD.  01:06 AM 05/07/2018           Clinical Impression:   The primary encounter diagnosis was Sepsis, due to unspecified organism. Diagnoses of Abdominal pain, unspecified abdominal location and S/P bariatric surgery were also pertinent to this visit.      I, Dr. Ramos Barrera, personally performed the services described in this documentation. All medical record entries made by the scribe were at my direction and in my presence.  I have reviewed the chart and agree that the record reflects my personal performance and is accurate and complete. Ramos Barrera MD.  5:26 AM 05/07/2018                     Ramos Barrera MD  05/07/18 0526

## 2018-05-07 NOTE — ED NOTES
"Patient states that when she give a clean cath urine specimen, "It comes back dirty because I have a high urethra." I explained to patient the process of cleaning, she verbalized understanding and said that it does not matter, it still comes back dirty.   "

## 2018-05-08 ENCOUNTER — PATIENT OUTREACH (OUTPATIENT)
Dept: ADMINISTRATIVE | Facility: HOSPITAL | Age: 58
End: 2018-05-08

## 2018-05-08 PROBLEM — K91.89 GASTRIC LEAK: Status: ACTIVE | Noted: 2018-05-08

## 2018-05-08 LAB
ALBUMIN SERPL BCP-MCNC: 2.5 G/DL
ALP SERPL-CCNC: 123 U/L
ALT SERPL W/O P-5'-P-CCNC: 10 U/L
ANION GAP SERPL CALC-SCNC: 14 MMOL/L
AST SERPL-CCNC: 16 U/L
BASOPHILS NFR BLD: 0 %
BILIRUB SERPL-MCNC: 0.4 MG/DL
BUN SERPL-MCNC: 11 MG/DL
CALCIUM SERPL-MCNC: 9.1 MG/DL
CHLORIDE SERPL-SCNC: 105 MMOL/L
CO2 SERPL-SCNC: 23 MMOL/L
CREAT SERPL-MCNC: 0.8 MG/DL
DIFFERENTIAL METHOD: ABNORMAL
EOSINOPHIL NFR BLD: 0 %
ERYTHROCYTE [DISTWIDTH] IN BLOOD BY AUTOMATED COUNT: 14.3 %
EST. GFR  (AFRICAN AMERICAN): >60 ML/MIN/1.73 M^2
EST. GFR  (NON AFRICAN AMERICAN): >60 ML/MIN/1.73 M^2
GLUCOSE SERPL-MCNC: 124 MG/DL
HCT VFR BLD AUTO: 38.3 %
HGB BLD-MCNC: 12.9 G/DL
LYMPHOCYTES NFR BLD: 6 %
MAGNESIUM SERPL-MCNC: 1.8 MG/DL
MCH RBC QN AUTO: 28.4 PG
MCHC RBC AUTO-ENTMCNC: 33.5 G/DL
MCV RBC AUTO: 85 FL
MONOCYTES NFR BLD: 8 %
NEUTROPHILS NFR BLD: 84 %
NEUTS BAND NFR BLD MANUAL: 2 %
PHOSPHATE SERPL-MCNC: 2.8 MG/DL
PLATELET # BLD AUTO: 400 K/UL
PLATELET BLD QL SMEAR: ABNORMAL
PMV BLD AUTO: 10 FL
POTASSIUM SERPL-SCNC: 3.5 MMOL/L
PROT SERPL-MCNC: 7.4 G/DL
RBC # BLD AUTO: 4.53 M/UL
SODIUM SERPL-SCNC: 142 MMOL/L
VANCOMYCIN TROUGH SERPL-MCNC: 9.2 UG/ML
WBC # BLD AUTO: 27.2 K/UL

## 2018-05-08 PROCEDURE — 94761 N-INVAS EAR/PLS OXIMETRY MLT: CPT

## 2018-05-08 PROCEDURE — C9113 INJ PANTOPRAZOLE SODIUM, VIA: HCPCS | Performed by: SURGERY

## 2018-05-08 PROCEDURE — 99232 SBSQ HOSP IP/OBS MODERATE 35: CPT | Mod: ,,, | Performed by: INTERNAL MEDICINE

## 2018-05-08 PROCEDURE — S0030 INJECTION, METRONIDAZOLE: HCPCS | Performed by: NURSE PRACTITIONER

## 2018-05-08 PROCEDURE — 99900035 HC TECH TIME PER 15 MIN (STAT)

## 2018-05-08 PROCEDURE — 25000003 PHARM REV CODE 250: Performed by: INTERNAL MEDICINE

## 2018-05-08 PROCEDURE — 63600175 PHARM REV CODE 636 W HCPCS: Performed by: INTERNAL MEDICINE

## 2018-05-08 PROCEDURE — S0028 INJECTION, FAMOTIDINE, 20 MG: HCPCS | Performed by: SURGERY

## 2018-05-08 PROCEDURE — 80053 COMPREHEN METABOLIC PANEL: CPT

## 2018-05-08 PROCEDURE — 25000003 PHARM REV CODE 250: Performed by: SURGERY

## 2018-05-08 PROCEDURE — 25000003 PHARM REV CODE 250: Performed by: NURSE PRACTITIONER

## 2018-05-08 PROCEDURE — 63600175 PHARM REV CODE 636 W HCPCS: Performed by: SURGERY

## 2018-05-08 PROCEDURE — 85007 BL SMEAR W/DIFF WBC COUNT: CPT

## 2018-05-08 PROCEDURE — 83735 ASSAY OF MAGNESIUM: CPT

## 2018-05-08 PROCEDURE — 36415 COLL VENOUS BLD VENIPUNCTURE: CPT

## 2018-05-08 PROCEDURE — 63600175 PHARM REV CODE 636 W HCPCS: Performed by: NURSE PRACTITIONER

## 2018-05-08 PROCEDURE — 97802 MEDICAL NUTRITION INDIV IN: CPT

## 2018-05-08 PROCEDURE — 84100 ASSAY OF PHOSPHORUS: CPT

## 2018-05-08 PROCEDURE — 12000002 HC ACUTE/MED SURGE SEMI-PRIVATE ROOM

## 2018-05-08 PROCEDURE — 85027 COMPLETE CBC AUTOMATED: CPT

## 2018-05-08 PROCEDURE — 80202 ASSAY OF VANCOMYCIN: CPT

## 2018-05-08 RX ORDER — DIAZEPAM 10 MG/2ML
2 INJECTION INTRAMUSCULAR EVERY 4 HOURS PRN
Status: DISCONTINUED | OUTPATIENT
Start: 2018-05-08 | End: 2018-05-08 | Stop reason: RX

## 2018-05-08 RX ORDER — PROCHLORPERAZINE EDISYLATE 5 MG/ML
10 INJECTION INTRAMUSCULAR; INTRAVENOUS EVERY 6 HOURS PRN
Status: DISCONTINUED | OUTPATIENT
Start: 2018-05-08 | End: 2018-05-15 | Stop reason: HOSPADM

## 2018-05-08 RX ADMIN — MORPHINE SULFATE 4 MG: 4 INJECTION INTRAVENOUS at 05:05

## 2018-05-08 RX ADMIN — ONDANSETRON HYDROCHLORIDE 8 MG: 2 INJECTION INTRAMUSCULAR; INTRAVENOUS at 05:05

## 2018-05-08 RX ADMIN — LEVOTHYROXINE SODIUM 75 MCG: 25 TABLET ORAL at 06:05

## 2018-05-08 RX ADMIN — METRONIDAZOLE 500 MG: 500 INJECTION, SOLUTION INTRAVENOUS at 11:05

## 2018-05-08 RX ADMIN — ONDANSETRON HYDROCHLORIDE 8 MG: 2 INJECTION INTRAMUSCULAR; INTRAVENOUS at 04:05

## 2018-05-08 RX ADMIN — MEROPENEM AND SODIUM CHLORIDE 1 G: 1 INJECTION, SOLUTION INTRAVENOUS at 04:05

## 2018-05-08 RX ADMIN — MORPHINE SULFATE 4 MG: 4 INJECTION INTRAVENOUS at 09:05

## 2018-05-08 RX ADMIN — FAMOTIDINE 20 MG: 10 INJECTION INTRAVENOUS at 09:05

## 2018-05-08 RX ADMIN — METRONIDAZOLE 500 MG: 500 INJECTION, SOLUTION INTRAVENOUS at 05:05

## 2018-05-08 RX ADMIN — MORPHINE SULFATE 4 MG: 4 INJECTION INTRAVENOUS at 12:05

## 2018-05-08 RX ADMIN — METRONIDAZOLE 500 MG: 500 INJECTION, SOLUTION INTRAVENOUS at 02:05

## 2018-05-08 RX ADMIN — PANTOPRAZOLE SODIUM 40 MG: 40 INJECTION, POWDER, FOR SOLUTION INTRAVENOUS at 09:05

## 2018-05-08 RX ADMIN — PROMETHAZINE HYDROCHLORIDE 12.5 MG: 25 INJECTION INTRAMUSCULAR; INTRAVENOUS at 10:05

## 2018-05-08 RX ADMIN — PROMETHAZINE HYDROCHLORIDE 6.25 MG: 25 INJECTION INTRAMUSCULAR; INTRAVENOUS at 09:05

## 2018-05-08 RX ADMIN — SODIUM CHLORIDE 1250 MG: 9 INJECTION, SOLUTION INTRAVENOUS at 12:05

## 2018-05-08 RX ADMIN — ENOXAPARIN SODIUM 40 MG: 100 INJECTION SUBCUTANEOUS at 11:05

## 2018-05-08 RX ADMIN — MEROPENEM AND SODIUM CHLORIDE 1 G: 1 INJECTION, SOLUTION INTRAVENOUS at 10:05

## 2018-05-08 RX ADMIN — MORPHINE SULFATE 4 MG: 4 INJECTION INTRAVENOUS at 04:05

## 2018-05-08 NOTE — SUBJECTIVE & OBJECTIVE
"Interval History: nausea, regurgitation, vomiting, reflux    Medications:  Continuous Infusions:   sodium chloride 0.9% Stopped (05/07/18 1635)     Scheduled Meds:   enoxaparin  40 mg Subcutaneous Daily    famotidine (PF)  20 mg Intravenous BID    fluticasone-vilanterol  1 puff Inhalation Daily    levothyroxine  75 mcg Oral Before breakfast    meropenem (MERREM) IVPB  1 g Intravenous Q8H    metronidazole  500 mg Intravenous Q8H    montelukast  10 mg Oral QHS    pantoprazole  40 mg Intravenous Daily    senna-docusate 8.6-50 mg  1 tablet Oral BID    triamterene-hydrochlorothiazide 37.5-25 mg  2 tablet Oral Daily    vancomycin (VANCOCIN) IVPB  1,250 mg Intravenous Q12H     PRN Meds:albuterol-ipratropium 2.5mg-0.5mg/3mL, dextrose 50%, diazePAM, glucagon (human recombinant), glucose, glucose, morphine, ondansetron, prochlorperazine, promethazine (PHENERGAN) IVPB, ramelteon, sodium chloride 0.9%     Review of patient's allergies indicates:   Allergen Reactions    Bromelains Hives     " causes mouth to bleed"    Sudafed [pseudoephedrine hcl] Other (See Comments)     Does not want to wake up .    Amoxicillin-pot clavulanate Itching     Other reaction(s): Itching    Hydrocodone Itching    Iodinated contrast- oral and iv dye      childhood    Iodine and iodide containing products Other (See Comments)    Melon Hives    Pantoprazole Nausea Only     Other reaction(s): upset stomach/halitosis    Percocet [oxycodone-acetaminophen] Itching    Barium iodide Rash    Ephedrine Other (See Comments)     Other reaction(s): comatose    Penicillins      Other reaction(s): does not work on pt symptoms     Objective:     Vital Signs (Most Recent):  Temp: 98.9 °F (37.2 °C) (05/08/18 1133)  Pulse: 92 (05/08/18 1133)  Resp: 18 (05/08/18 1133)  BP: (!) 183/83 (05/08/18 1133)  SpO2: 95 % (05/08/18 1133) Vital Signs (24h Range):  Temp:  [98.3 °F (36.8 °C)-99.9 °F (37.7 °C)] 98.9 °F (37.2 °C)  Pulse:  [86-98] 92  Resp:  " [12-20] 18  SpO2:  [92 %-97 %] 95 %  BP: (122-213)/() 183/83     Weight: 88.4 kg (194 lb 14.2 oz)  Body mass index is 35.65 kg/m².    Intake/Output - Last 3 Shifts       05/06 0700 - 05/07 0659 05/07 0700 - 05/08 0659 05/08 0700 - 05/09 0659    P.O.  0     I.V. (mL/kg)  1029.2 (11.6)     IV Piggyback 700      Total Intake(mL/kg) 700 (7.9) 1029.2 (11.6)     Urine (mL/kg/hr) 200      Total Output 200        Net +500 +1029.2             Urine Occurrence 1 x 1 x     Stool Occurrence  1 x           Physical Exam   Constitutional: She appears well-developed and well-nourished. No distress.   HENT:   keyo tube in place   Cardiovascular: Normal rate.    Pulmonary/Chest: Effort normal.   Abdominal: Soft. She exhibits no distension. There is no tenderness.   Skin: She is not diaphoretic.       Significant Labs:  CBC:   Recent Labs  Lab 05/08/18  0504   WBC 27.20*   RBC 4.53   HGB 12.9   HCT 38.3   *   MCV 85   MCH 28.4   MCHC 33.5     BMP:   Recent Labs  Lab 05/08/18  0504   *      K 3.5      CO2 23   BUN 11   CREATININE 0.8   CALCIUM 9.1   MG 1.8       Significant Diagnostics:  CT: I have reviewed all pertinent results/findings within the past 24 hours and my personal findings are:  inflammation around sleeve

## 2018-05-08 NOTE — PLAN OF CARE
Problem: Nutrition, Enteral (Adult)  Intervention: Monitor/Manage Nutrition Support  Recommendations    Recommendations    1) Nutren 2.0:  Provide 1-250  mls carton 3  x daily. 6 am,  12 noon,  6 pm .  Use ProMod protein supplement, 1 oz, tid. Flush with 275 mls water (about 1 3/4 cup)  4 x daily  for hydration.  Provides 1510 calories, 93 gms protein, 162 gms CHO, 519 mls free water.   2) If pt is unable to tolerate enteral or PO feeds, consider PPN as a bridge. Clinimix E @ 60 mls/hr for the first 24 hrs, then 125 mls/hr with 20% lipids (250 mls). At goal provides 1340 calories, 83 gms protein, 3000 mls fluid, GIR 1.18.      Goal: Pt will receive nutrition within 48 hrs.   Nutrition Goal Status: new  Communication of RD Recs:  (POC, reviewed with RN)    2) If pt is unable to tolerate enteral or PO feeds, consider PPN as a bridge. Clinimix E @ 60 mls/hr for the first 24 hrs, then 125 mls/hr with 20% lipids (250 mls). At goal provides 1340 calories, 83 gms protein, 3000 mls fluid, GIR 1.18.      Goal: Pt will receive nutrition within 48 hrs.   Nutrition Goal Status: new  Communication of RD Recs:  (POC, reviewed with RN)

## 2018-05-08 NOTE — PLAN OF CARE
Pt awake and oriented, vs stable, pt vomited and had nausea throughout recovery and Dr. Reese notified of this x 2, MD stated that pt is going to have N/V and all we can do is try and control it with medications, IV antiemetics given, pt NPO, NJ tube to R nare intact, bilateral SCDs on, IV fluids infusing, pt had loose BMs x 2. Pt transported from PACU to room 214. Tele monitor [id number] placed back on pt. Pt's  at the bedside. Call light within reach. Report given to SIMEON Nogueira.

## 2018-05-08 NOTE — OP NOTE
EGD Procedure Note    Date of procedure:   05/07/2018    Indications: suspected sleeve leak    Pre-operative Diagnosis: suspected early sleeve leak    Post-operative Diagnosis: Same    Surgeon: Chai Reese MD    Assistants: none    Anesthesia: General endotracheal anesthesia    ASA Class: 2    Procedure Details   The patient was seen in the Holding Room. The risks, benefits, complications, treatment options, and expected outcomes were discussed with the patient. The possibilities of reaction to medication, pulmonary aspiration, perforation of viscus, bleeding, recurrent infection, the need for additional procedures, failure to diagnose a condition, and creating a complication requiring transfusion or operation were discussed with the patient. The patient concurred with the proposed plan, giving informed consent. The site of surgery properly noted/marked. The patient was taken to Operating Room 7 identified as Yodit Canseco and the procedure verified as egd, stent, nj tube. A Time Out was held and the above information confirmed.    Full general anesthesia was induced with orotracheal intubation. The patient was prepped and draped in a supine position. Appropriate antibiotics were given intravenously. Arms were out.    Endoscopy was performed showing a normal esophagus and duodenum.  Stomach had inflamed appearing mucosa s/p sleeve.  Proximal staple line appeared swollen and erythematous.  May have underlying leak but not obvious.  NO obvious leaks seen.  A stent was placed over a guide wire [employer] scientific 23 mm by 155 mm under fluoroscopy.  A keyo feeding tube was placed distal to this.      The stent was then clipped with one hemo clip on the esophageal mucosa.    Instrument, sponge, and needle counts were correct prior to wound closure and at the conclusion of the case.     Findings:  Inflamed gastric sleeve, with suspicious area in proximal staple line    Estimated Blood Loss: 10.0 cc    Drains: keyo  tube    Total IV Fluids: 1000 ml    Specimens: none    Implants: stent, keyo tube, hemoclip    Complications:  None; patient tolerated the procedure well.    Disposition: PACU - hemodynamically stable.    Condition: stable    Attending Attestation: I was present and scrubbed for the entire procedure.

## 2018-05-08 NOTE — TRANSFER OF CARE
"Anesthesia Transfer of Care Note    Patient: Yodit Canseco    Procedure(s) Performed: Procedure(s) (LRB):  ESOPHAGOGASTRODUODENOSCOPY (EGD)- stent, keyo tube, please have hemoclips (N/A)    Patient location: PACU    Anesthesia Type: general    Transport from OR: Transported from OR on 2-3 L/min O2 by NC with adequate spontaneous ventilation    Post pain: adequate analgesia    Post assessment: no apparent anesthetic complications and tolerated procedure well    Post vital signs: stable    Level of consciousness: alert, oriented and awake    Nausea/Vomiting: no nausea/vomiting    Complications: none    Transfer of care protocol was followed      Last vitals:   Visit Vitals  /66 (BP Location: Right arm, Patient Position: Lying)   Pulse 93   Temp 36.8 °C (98.3 °F) (Oral)   Resp 20   Ht 5' 2" (1.575 m)   Wt 88.4 kg (194 lb 14.2 oz)   SpO2 97%   Breastfeeding? No   BMI 35.65 kg/m²     "

## 2018-05-08 NOTE — PROGRESS NOTES
Iv infiltrated. Attempted x 2 to restart without success. Call placed to ER for assistance to restart iv without success. Will pass on in report.

## 2018-05-08 NOTE — BRIEF OP NOTE
[facility]  Brief Operative Note    SUMMARY     Surgery Date: 5/7/2018     Surgeon(s) and Role:     * Chai Reese MD - Primary    Assisting Surgeon: None    Pre-op Diagnosis:  Sepsis, due to unspecified organism [A41.9]    Post-op Diagnosis:  Post-Op Diagnosis Codes:     * Sepsis, due to unspecified organism [A41.9]    Procedure(s) (LRB):  ESOPHAGOGASTRODUODENOSCOPY (EGD)- stent, keyo tube, please have hemoclips (N/A)    Anesthesia: General    Description of Procedure: egd, stent, nj placement    Description of the findings of the procedure: inflammation along proximal staple line and entire sleeve    Estimated Blood Loss: 10.0 cc         Specimens:   Specimen (12h ago through future)    None

## 2018-05-08 NOTE — PLAN OF CARE
Problem: Patient Care Overview  Goal: Plan of Care Review  Outcome: Ongoing (interventions implemented as appropriate)  POC reviewed with pt, understanding verbalized. AAOX4. IV access obtained this AM. Up to bedside commode. Urinated X 3. NPO. NG tube intact and maintained, infusing 25cc/hr of water. NS at 75/hr. Pain meds and nausea meds given PRN, see MAR. Tele monitoring maintained. Safety Maintained. Will continue to monitor.

## 2018-05-08 NOTE — PROGRESS NOTES
"[facility] Surgery  Progress Note    Subjective:     History of Present Illness:  58 y/o female who underwent sleeve gastrectomy on 4/16/18, presents now with fevers at home 101.9.  Complains of some abdominal spasm and chills.  Has been able to tolerate diet.  Denies n/v, has had some diarrhea, no constipation    Post-Op Info:  Procedure(s) (LRB):  ESOPHAGOGASTRODUODENOSCOPY (EGD)- stent, keyo tube, please have hemoclips (N/A)   1 Day Post-Op     Interval History: nausea, regurgitation, vomiting, reflux    Medications:  Continuous Infusions:   sodium chloride 0.9% Stopped (05/07/18 1635)     Scheduled Meds:   enoxaparin  40 mg Subcutaneous Daily    famotidine (PF)  20 mg Intravenous BID    fluticasone-vilanterol  1 puff Inhalation Daily    levothyroxine  75 mcg Oral Before breakfast    meropenem (MERREM) IVPB  1 g Intravenous Q8H    metronidazole  500 mg Intravenous Q8H    montelukast  10 mg Oral QHS    pantoprazole  40 mg Intravenous Daily    senna-docusate 8.6-50 mg  1 tablet Oral BID    triamterene-hydrochlorothiazide 37.5-25 mg  2 tablet Oral Daily    vancomycin (VANCOCIN) IVPB  1,250 mg Intravenous Q12H     PRN Meds:albuterol-ipratropium 2.5mg-0.5mg/3mL, dextrose 50%, diazePAM, glucagon (human recombinant), glucose, glucose, morphine, ondansetron, prochlorperazine, promethazine (PHENERGAN) IVPB, ramelteon, sodium chloride 0.9%     Review of patient's allergies indicates:   Allergen Reactions    Bromelains Hives     " causes mouth to bleed"    Sudafed [pseudoephedrine hcl] Other (See Comments)     Does not want to wake up .    Amoxicillin-pot clavulanate Itching     Other reaction(s): Itching    Hydrocodone Itching    Iodinated contrast- oral and iv dye      childhood    Iodine and iodide containing products Other (See Comments)    Melon Hives    Pantoprazole Nausea Only     Other reaction(s): upset stomach/halitosis    Percocet [oxycodone-acetaminophen] Itching "    Barium iodide Rash    Ephedrine Other (See Comments)     Other reaction(s): comatose    Penicillins      Other reaction(s): does not work on pt symptoms     Objective:     Vital Signs (Most Recent):  Temp: 98.9 °F (37.2 °C) (05/08/18 1133)  Pulse: 92 (05/08/18 1133)  Resp: 18 (05/08/18 1133)  BP: (!) 183/83 (05/08/18 1133)  SpO2: 95 % (05/08/18 1133) Vital Signs (24h Range):  Temp:  [98.3 °F (36.8 °C)-99.9 °F (37.7 °C)] 98.9 °F (37.2 °C)  Pulse:  [86-98] 92  Resp:  [12-20] 18  SpO2:  [92 %-97 %] 95 %  BP: (122-213)/() 183/83     Weight: 88.4 kg (194 lb 14.2 oz)  Body mass index is 35.65 kg/m².    Intake/Output - Last 3 Shifts       05/06 0700 - 05/07 0659 05/07 0700 - 05/08 0659 05/08 0700 - 05/09 0659    P.O.  0     I.V. (mL/kg)  1029.2 (11.6)     IV Piggyback 700      Total Intake(mL/kg) 700 (7.9) 1029.2 (11.6)     Urine (mL/kg/hr) 200      Total Output 200        Net +500 +1029.2             Urine Occurrence 1 x 1 x     Stool Occurrence  1 x           Physical Exam   Constitutional: She appears well-developed and well-nourished. No distress.   HENT:   keyo tube in place   Cardiovascular: Normal rate.    Pulmonary/Chest: Effort normal.   Abdominal: Soft. She exhibits no distension. There is no tenderness.   Skin: She is not diaphoretic.       Significant Labs:  CBC:   Recent Labs  Lab 05/08/18  0504   WBC 27.20*   RBC 4.53   HGB 12.9   HCT 38.3   *   MCV 85   MCH 28.4   MCHC 33.5     BMP:   Recent Labs  Lab 05/08/18  0504   *      K 3.5      CO2 23   BUN 11   CREATININE 0.8   CALCIUM 9.1   MG 1.8       Significant Diagnostics:  CT: I have reviewed all pertinent results/findings within the past 24 hours and my personal findings are:  inflammation around sleeve    Assessment/Plan:     Gastric leak    Sp stent and nj tube  - dietitian to discuss tube feeds with patient as she has multiple allergies  -nausea control: zofran, phenergan, compazine- expect it will improve with  time          Intraperitoneal abscess    Suspect sleeve leak  Npo  Antibiotics  Stent, nj tube today            Chai Reese MD  General Surgery  [facility]

## 2018-05-08 NOTE — ASSESSMENT & PLAN NOTE
Sp stent and nj tube  - dietitian to discuss tube feeds with patient as she has multiple allergies  -nausea control: zofran, phenergan, compazine- expect it will improve with time

## 2018-05-08 NOTE — ASSESSMENT & PLAN NOTE
Contributing Nutrition Diagnosis  Inadequate energy intake    Related to (etiology):   Altered GI function    Signs and Symptoms (as evidenced by):   NPO with no alternate nutrition    Interventions/Recommendations (treatment strategy):  Initiate nutrition support     Nutrition Diagnosis Status:   New

## 2018-05-08 NOTE — PROGRESS NOTES
Progress Note  Hospital Medicine  Patient Name:Yodit Canseco  MRN:  [id number]  Patient Class: IP- Inpatient  Admit Date: 5/6/2018  Length of Stay: 1 days  Expected Discharge Date:   Attending Physician: Cristian Ashraf MD  Primary Care Provider:  Miky Lewis MD    SUBJECTIVE:     Principal Problem: Sepsis  Initial history of present illness: Yodit Canseco is a 58 yo female with PMHx significant for morbid obesity, history of gastroparesis, DM-2 (resolved), hyperlipidemia and hypothyroidism.  She is admitted to the service of hospital medicine with severe sepsis.  She presented to the ED with a 5 day history of subjective fever with tmax 103F.  She states the fever is associated with chills, malaise and nausea.  The fevers are responsive to tylenol and seem to be worse at night. Patient is 3 weeks post gastric sleeve surgery. She reports that she is tolerating her PO intake and recently had relatives over who have been sick with symptoms of fever, chills, and diarrhea. Patient denies diarrhea, vomiting, abdominal pain, chest pain, shortness of breath, cough, urinary symptoms, or neck stiffness.     PMH/PSH/SH/FH/Meds: reviewed.    Symptoms/Review of Systems: Tmax 99.9F. WBC is elevated. Asking for iV Valium for abdominal pain and intestinal spasms. No shortness of breath, cough, chest pain or headache, fever or abdominal pain. Patient underwent NJ tube placement and gastric stent placement yesterday by Dr. Reese.   Diet:  NPO   Activity level: Normal.    Pain:  As above     OBJECTIVE:   Vital Signs (Most Recent):      Temp: 99.9 °F (37.7 °C) (05/08/18 0345)  Pulse: 91 (05/08/18 0800)  Resp: 16 (05/08/18 0800)  BP: (!) 144/67 (05/08/18 0345)  SpO2: (!) 92 % (05/08/18 0800)       Vital Signs Range (Last 24H):  Temp:  [98.3 °F (36.8 °C)-99.9 °F (37.7 °C)]   Pulse:  [84-98]   Resp:  [12-20]   BP: (108-213)/()   SpO2:  [92 %-97 %]     Weight: 88.4 kg (194 lb 14.2 oz)  Body mass index is 35.65  kg/m².    Intake/Output Summary (Last 24 hours) at 05/08/18 1050  Last data filed at 05/07/18 2134   Gross per 24 hour   Intake          1029.17 ml   Output                0 ml   Net          1029.17 ml     Physical Examination:  Constitutional: She is oriented to person, place, and time. She appears well-developed and well-nourished. No distress.   HENT:   Head: Normocephalic and atraumatic.   Mouth/Throat: Oropharynx is clear and moist.   Eyes: Conjunctivae and EOM are normal. Pupils are equal, round, and reactive to light. No scleral icterus.   Neck: Normal range of motion. Neck supple. No JVD present. No tracheal deviation present. No thyromegaly present.   Cardiovascular: Normal rate, regular rhythm, normal heart sounds and intact distal pulses.  Exam reveals no gallop and no friction rub.    No murmur heard.  Pulses:       Dorsalis pedis pulses are 2+ on the right side, and 2+ on the left side.   Pulmonary/Chest: Effort normal and breath sounds normal. No accessory muscle usage. No respiratory distress. She has no wheezes. She has no rhonchi. She has no rales.   Abdominal: Soft. Bowel sounds are normal. She exhibits no distension and no mass. There is tenderness (Left upper and lower quadrant). There is no rebound and no guarding.   Protuberant.   Musculoskeletal: Normal range of motion. She exhibits no edema, tenderness or deformity.   Neurological: She is alert and oriented to person, place, and time. She has normal strength. She exhibits normal muscle tone. Coordination normal.   Skin: Skin is warm, dry and intact. Capillary refill takes less than 2 seconds. No rash noted. She is not diaphoretic. No erythema.   Psychiatric: She has a normal mood and affect. Her speech is normal and behavior is normal. Judgment and thought content normal.   Vitals reviewed.  CRANIAL NERVES      CN III, IV, VI   Pupils are equal, round, and reactive to light.  Extraocular motions are normal.      CBC:    Recent Labs  Lab  05/07/18  0017 05/07/18  0504 05/08/18  0504   WBC 23.60* 24.60* 27.20*   RBC 5.00 4.50 4.53   HGB 14.0 12.9 12.9   HCT 41.9 38.6 38.3    323 400*   MCV 84 86 85   MCH 28.1 28.5 28.4   MCHC 33.5 33.3 33.5   BMP    Recent Labs  Lab 05/07/18  0017 05/07/18  0504 05/08/18  0504   * 100 124*   * 137 142   K 3.9 2.9* 3.5   CL 96 99 105   CO2 23 27 23   BUN 9 8 11   CREATININE 0.8 0.8 0.8   CALCIUM 9.0 9.2 9.1   MG  --  1.6 1.8      Diagnostic Results:  Microbiology Results (last 7 days)     Procedure Component Value Units Date/Time    Urine Culture [[id number]] Collected:  05/07/18 0114    Order Status:  Sent Specimen:  Urine from Urine, Clean Catch Updated:  05/07/18 2054    Blood Culture #1 [[id number]] Collected:  05/07/18 0216    Order Status:  Completed Specimen:  Blood Updated:  05/07/18 1715     Blood Culture, Routine No Growth to date    Blood Culture #2 [[id number]] Collected:  05/07/18 0216    Order Status:  Completed Specimen:  Blood Updated:  05/07/18 1715     Blood Culture, Routine No Growth to date         CT abdomen:  Abnormal stranding in the fat adjacent to the sleeve gastrectomy which is nonspecific.  No extraluminal gas or leakage of oral contrast identified.  Differential considerations include inflammation and blood products.  No drainable fluid collection to suggest abscess.  Small left pleural effusion and left basilar atelectasis.  Hysterectomy and cholecystectomy.  Colonic diverticulosis.    KUB: Interval placement of an esophagogastric stent centered at the level of the GE junction.  Weighted enteric tube tip is at the level of the gastric antrum. Left basilar pulmonary consolidation with air bronchograms for which differential considerations include pneumonia and aspiration.    EGD:  Endoscopy was performed showing a normal esophagus and duodenum.  Stomach had inflamed appearing mucosa s/p sleeve.  Proximal staple line appeared swollen and erythematous.  May have underlying  leak but not obvious.  NO obvious leaks seen.  A stent was placed over a guide wire [employer] 23 mm by 155 mm under fluoroscopy.  A keyo feeding tube was placed distal to this.      Assessment/Plan:     * Sepsis  Intraperitoneal abscess with possible leak from anastomosis        Questionable source - CT abdomen results not definitive - intraperitoneal space with inflammation.  Questionable abscess.  Recent gastric sleeve surgery.  Meets SIRS criteria   Antibiotics given-              Antibiotics      Start     Stop Route Frequency Ordered     05/07/18 0430   metronidazole IVPB 500 mg      -- IV Every 8 hours (non-standard times) 05/07/18 0428 05/07/18 0430   meropenem-0.9% sodium chloride 1 g/50 mL IVPB      -- IV Every 8 hours (non-standard times) 05/07/18 0428             Latest lactate reviewed, they are-     Recent Labs  Lab 05/07/18 0216   LACTATE 1.1      Follow blood cultures and urine cultures  Continue IV Merrem, Vanc and Metronidazole and deescalate when appropriate.          S/P laparoscopic sleeve gastrectomy  Gastric stent placement     Keep NPO  Initiate broad spectrum antibiotics  Follow General Surgery recommendations. Use IV valium for gastric spasms prn.       Severe persistent asthma without complication     Chronic problem. Controlled.  Continue home medications and monitor closely for exacerbations, adjusting medications as necessary.       Hypothyroidism     Chronic problem. TSH reviewed - euthyroid.  Continue home levothyroxine.     Discussed with family members.    VTE Risk Mitigation         Ordered     enoxaparin injection 40 mg  Daily      05/07/18 2205     IP VTE HIGH RISK PATIENT  Once      05/07/18 0428     Place STEPHEN hose  Until discontinued      05/07/18 0428     Place sequential compression device  Until discontinued      05/07/18 0428     Reason for No Pharmacological VTE Prophylaxis  Once      05/07/18 0428        Cristian Ashraf MD  Department of Hospital Medicine    [facility]

## 2018-05-08 NOTE — CONSULTS
[facility]  Adult Nutrition  Consult Note    SUMMARY     Recommendations    1) Nutren 2.0:  Provide 1-250  mls carton 3  x daily. 6 am,  12 noon,  6 pm .  Use ProMod protein supplement, 1 oz, tid. Flush with 275 mls water (about 1 3/4 cup)  4 x daily  for hydration.  Provides 1510 calories, 93 gms protein, 162 gms CHO, 519 mls free water.   2) If pt is unable to tolerate enteral or PO feeds, consider PPN as a bridge. Clinimix E @ 60 mls/hr for the first 24 hrs, then 125 mls/hr with 20% lipids (250 mls). At goal provides 1340 calories, 83 gms protein, 3000 mls fluid, GIR 1.18.      Goal: Pt will receive nutrition within 48 hrs.   Nutrition Goal Status: new  Communication of RD Recs:  (POC, reviewed with RN)    Reason for Assessment    Reason for Assessment: consult (for intermittent TF rec for pt with multiple food allergies.)  1. Sepsis, due to unspecified organism    2. Abdominal pain, unspecified abdominal location    3. S/P bariatric surgery    4. Gastric leak      Past Medical History:   Diagnosis Date    Allergy     Angio-edema     Arthralgia     Asthma without status asthmaticus     Bronchitis     Diverticulosis of large intestine without hemorrhage 10/8/2015    Environmental allergies     Eosinophilic asthma 3/8/2018    Gastroparesis     Hand arthritis     Herpes simplex without mention of complication     oral    Hyperlipidemia     Hypothyroidism     LBP radiating to left leg     Leukocytosis, unspecified     Migraine headache     Obesity, Class III, BMI 40-49.9 (morbid obesity)     Periorbital cellulitis 12/31/2016    Prediabetes     Reactive airway disease     Reactive airway disease     Recurrent upper respiratory infection (URI)     S/P colonoscopy November 2010    Urticaria        Interdisciplinary Rounds: attended  General Information Comments: Admitted with fever. Pt is s/p gastric sleeve x 3 weeks.  Intentional wt loss noted.     Nutrition Risk  "Screen    Nutrition Risk Screen: no indicators present    Nutrition/Diet History    Do you have any cultural, spiritual, Hindu conflicts, given your current situation?: none  Food Allergies: egg, milk (pea, artificial sweeteners)    Anthropometrics    Temp: 98.9 °F (37.2 °C)  Height Method: Stated  Height: 5' 2"  Height (inches): 62 in  Weight Method: Stated  Weight: 88.4 kg (194 lb 14.2 oz)  Weight (lb): 194.89 lb  Ideal Body Weight (IBW), Female: 110 lb  % Ideal Body Weight, Female (lb): 177.17 lb  BMI (Calculated): 35.7  Usual Body Weight (UBW), k.6 kg  % Usual Body Weight: 88.06  % Weight Change From Usual Weight: -12.13 %       Lab/Procedures/Meds    Pertinent Labs Reviewed: reviewed  Lab Results   Component Value Date    ALBUMIN 2.5 (L) 2018     Lab Results   Component Value Date    CRP 7.6 2016     Lab Results   Component Value Date    WBC 27.20 (H) 2018     Pertinent Medications Reviewed: reviewed  Scheduled Meds:   enoxaparin  40 mg Subcutaneous Daily    famotidine (PF)  20 mg Intravenous BID    fluticasone-vilanterol  1 puff Inhalation Daily    levothyroxine  75 mcg Oral Before breakfast    meropenem (MERREM) IVPB  1 g Intravenous Q8H    metronidazole  500 mg Intravenous Q8H    montelukast  10 mg Oral QHS    pantoprazole  40 mg Intravenous Daily    senna-docusate 8.6-50 mg  1 tablet Oral BID    triamterene-hydrochlorothiazide 37.5-25 mg  2 tablet Oral Daily    vancomycin (VANCOCIN) IVPB  1,250 mg Intravenous Q12H     Continuous Infusions:   sodium chloride 0.9% 75 mL/hr at 18 1304       Physical Findings/Assessment    Overall Physical Appearance: obese, weak  Oral/Mouth Cavity: WDL  Skin:  (Steve score 20)    Estimated/Assessed Needs    Weight Used For Calorie Calculations: 88.4 kg (194 lb 14.2 oz)     Energy Need Method: [place] Jeor: 1417 x 1.25=1771     Weight Used For Protein Calculations: 88.4 kg (194 lb 14.2 oz)    gms/day     Fluid Need Method: " RDA Method (or per MD)     CHO Requirement: max 221 gms CHO      Nutrition Prescription Ordered    Current Diet Order: NPO    Evaluation of Received Nutrient/Fluid Intake    IV Fluid (mL): 75 (mls/hr)  Energy Calories Required: not meeting needs  Protein Required: not meeting needs  Fluid Required: meeting needs  % Intake of Estimated Energy Needs: 0 - 25 %  % Meal Intake: NPO    Nutrition Risk    Level of Risk/Frequency of Follow-up:  (2 x wkly)     Assessment and Plan    Gastric leak    Contributing Nutrition Diagnosis  Inadequate energy intake    Related to (etiology):   Altered GI function    Signs and Symptoms (as evidenced by):   NPO with no alternate nutrition    Interventions/Recommendations (treatment strategy):  Initiate nutrition support     Nutrition Diagnosis Status:   New               Monitor and Evaluation    Food and Nutrient Intake: energy intake  Food and Nutrient Adminstration: diet order  Anthropometric Measurements: weight  Biochemical Data, Medical Tests and Procedures: inflammatory profile  Nutrition-Focused Physical Findings: overall appearance, skin     Discharge Plan    Enteral feeds as above

## 2018-05-08 NOTE — PROGRESS NOTES
Received to room 214a. Pt lethargic but arouses to voice. NG tube in place to right nare.  IV fluids started. Pt complain of nausea. Medicine already given.  at bedside. Call light in reach. Telemetry monitor on. Will continue to monitor.

## 2018-05-08 NOTE — CONSULTS
Date: 5/8/2018   Yodit Canseco [id number] is a 57 y.o. female who has been consulted for vancomycin dosing.    The patient has the following labs:     Creatinine (mg/dl)    WBC Count   Serum creatinine: 0.8 mg/dL 05/08/18 0504  Estimated creatinine clearance: 79.1 mL/min Lab Results   Component Value Date    WBC 27.20 (H) 05/08/2018        Current weight is 88.4 kg (194 lb 14.2 oz)      Pt is receiving vancomycin 1250 mg every 12 hours.  Vancomycin trough from 5/8/2018  at 1135  was 9.2 mg/dL.  Target trough range is 15-20 mg/dL.   Trough was drawn early to ensure clearance.   The vancomycin trough has been ordered for 5/9/18 at 1130.     Patient will be followed by pharmacy for changes in renal function, toxicity, and efficacy.    Thank you for allowing us to participate in this patient's care.     Ajay Preciado, Pharmacist

## 2018-05-08 NOTE — PLAN OF CARE
Problem: Patient Care Overview  Goal: Plan of Care Review  Outcome: Ongoing (interventions implemented as appropriate)  Asleep most of the night. Nausea and small amt of vomiting noted. Pt states she gets nauseated if her head id up more than 30 deg.  JOANN Wheatley NP notified of pt complaint of epigastric pain. New order for morphine 4 mg ivp q4h prn noted and given. Some relief noted.  NG tube in place. Water infusing at 25 cc/hr per pump. Pt tolerating well. Plan of care reviewed with . Understanding voiced.  Concerned about how long she would be nauseated. Notified that patient may experience nausea for a little while until her stomach  Rest. Bowel movement noted x 2. Small amt soft stool noted. Pt urinated x 1. Unable to measure due to stool. Bed in low position and locked.  Side rails up x 2. Call bell in reach. telemetry

## 2018-05-08 NOTE — ANESTHESIA POSTPROCEDURE EVALUATION
"Anesthesia Post Evaluation    Patient: Yodit Canseco    Procedure(s) Performed: Procedure(s) (LRB):  ESOPHAGOGASTRODUODENOSCOPY (EGD)- stent, keyo tube, please have hemoclips (N/A)    Final Anesthesia Type: general  Patient location during evaluation: PACU  Patient participation: Yes- Able to Participate  Level of consciousness: awake and alert and oriented  Post-procedure vital signs: reviewed and stable  Pain management: adequate  Airway patency: patent  PONV status at discharge: No PONV  Anesthetic complications: no      Cardiovascular status: blood pressure returned to baseline and stable  Respiratory status: unassisted and spontaneous ventilation  Hydration status: euvolemic  Follow-up not needed.        Visit Vitals  BP (!) 179/87   Pulse 92   Temp 37 °C (98.6 °F) (Skin)   Resp 12   Ht 5' 2" (1.575 m)   Wt 88.4 kg (194 lb 14.2 oz)   SpO2 95%   Breastfeeding? No   BMI 35.65 kg/m²       Pain/Ana Maria Score: Pain Assessment Performed: Yes (5/7/2018  4:12 PM)  Presence of Pain: complains of pain/discomfort (5/7/2018  4:12 PM)  Pain Rating Prior to Med Admin: 5 (5/7/2018  4:12 PM)  Pain Rating Post Med Admin: 0 (5/7/2018  4:00 PM)      "

## 2018-05-09 ENCOUNTER — SURGERY (OUTPATIENT)
Age: 58
End: 2018-05-09

## 2018-05-09 ENCOUNTER — ANESTHESIA EVENT (OUTPATIENT)
Dept: SURGERY | Facility: HOSPITAL | Age: 58
DRG: 919 | End: 2018-05-09
Payer: COMMERCIAL

## 2018-05-09 ENCOUNTER — ANESTHESIA (OUTPATIENT)
Dept: SURGERY | Facility: HOSPITAL | Age: 58
DRG: 919 | End: 2018-05-09
Payer: COMMERCIAL

## 2018-05-09 LAB
ALBUMIN SERPL BCP-MCNC: 2.3 G/DL
ALP SERPL-CCNC: 117 U/L
ALT SERPL W/O P-5'-P-CCNC: 11 U/L
ANION GAP SERPL CALC-SCNC: 11 MMOL/L
AST SERPL-CCNC: 18 U/L
BACTERIA UR CULT: NO GROWTH
BASOPHILS NFR BLD: 1 %
BILIRUB SERPL-MCNC: 0.3 MG/DL
BUN SERPL-MCNC: 12 MG/DL
CALCIUM SERPL-MCNC: 9.1 MG/DL
CHLORIDE SERPL-SCNC: 110 MMOL/L
CO2 SERPL-SCNC: 23 MMOL/L
CREAT SERPL-MCNC: 0.7 MG/DL
DIFFERENTIAL METHOD: ABNORMAL
EOSINOPHIL NFR BLD: 1 %
ERYTHROCYTE [DISTWIDTH] IN BLOOD BY AUTOMATED COUNT: 14.7 %
EST. GFR  (AFRICAN AMERICAN): >60 ML/MIN/1.73 M^2
EST. GFR  (NON AFRICAN AMERICAN): >60 ML/MIN/1.73 M^2
GIANT PLATELETS BLD QL SMEAR: PRESENT
GLUCOSE SERPL-MCNC: 119 MG/DL
HCT VFR BLD AUTO: 38 %
HGB BLD-MCNC: 12.6 G/DL
LYMPHOCYTES NFR BLD: 11 %
MAGNESIUM SERPL-MCNC: 2 MG/DL
MCH RBC QN AUTO: 28.5 PG
MCHC RBC AUTO-ENTMCNC: 33.2 G/DL
MCV RBC AUTO: 86 FL
MONOCYTES NFR BLD: 5 %
NEUTROPHILS NFR BLD: 80 %
NEUTS BAND NFR BLD MANUAL: 2 %
PHOSPHATE SERPL-MCNC: 2.1 MG/DL
PLATELET # BLD AUTO: 335 K/UL
PLATELET BLD QL SMEAR: ABNORMAL
PMV BLD AUTO: 9.9 FL
POTASSIUM SERPL-SCNC: 3.9 MMOL/L
PROT SERPL-MCNC: 7 G/DL
RBC # BLD AUTO: 4.43 M/UL
SODIUM SERPL-SCNC: 144 MMOL/L
VANCOMYCIN TROUGH SERPL-MCNC: 8.9 UG/ML
WBC # BLD AUTO: 22.3 K/UL

## 2018-05-09 PROCEDURE — 94761 N-INVAS EAR/PLS OXIMETRY MLT: CPT

## 2018-05-09 PROCEDURE — 25000003 PHARM REV CODE 250: Performed by: NURSE PRACTITIONER

## 2018-05-09 PROCEDURE — 63600175 PHARM REV CODE 636 W HCPCS: Performed by: NURSE PRACTITIONER

## 2018-05-09 PROCEDURE — 63600175 PHARM REV CODE 636 W HCPCS: Performed by: INTERNAL MEDICINE

## 2018-05-09 PROCEDURE — S0028 INJECTION, FAMOTIDINE, 20 MG: HCPCS | Performed by: SURGERY

## 2018-05-09 PROCEDURE — 63600175 PHARM REV CODE 636 W HCPCS: Performed by: SURGERY

## 2018-05-09 PROCEDURE — 36415 COLL VENOUS BLD VENIPUNCTURE: CPT

## 2018-05-09 PROCEDURE — 99900035 HC TECH TIME PER 15 MIN (STAT)

## 2018-05-09 PROCEDURE — 83735 ASSAY OF MAGNESIUM: CPT

## 2018-05-09 PROCEDURE — 43241 EGD TUBE/CATH INSERTION: CPT | Mod: ,,, | Performed by: SURGERY

## 2018-05-09 PROCEDURE — 85027 COMPLETE CBC AUTOMATED: CPT

## 2018-05-09 PROCEDURE — S0030 INJECTION, METRONIDAZOLE: HCPCS | Performed by: NURSE PRACTITIONER

## 2018-05-09 PROCEDURE — 71000033 HC RECOVERY, INTIAL HOUR: Performed by: SURGERY

## 2018-05-09 PROCEDURE — 63600175 PHARM REV CODE 636 W HCPCS: Performed by: NURSE ANESTHETIST, CERTIFIED REGISTERED

## 2018-05-09 PROCEDURE — D9220A PRA ANESTHESIA: Mod: ANES,,, | Performed by: ANESTHESIOLOGY

## 2018-05-09 PROCEDURE — 25000003 PHARM REV CODE 250: Performed by: INTERNAL MEDICINE

## 2018-05-09 PROCEDURE — 25000003 PHARM REV CODE 250: Performed by: NURSE ANESTHETIST, CERTIFIED REGISTERED

## 2018-05-09 PROCEDURE — 80202 ASSAY OF VANCOMYCIN: CPT

## 2018-05-09 PROCEDURE — 36000706: Performed by: SURGERY

## 2018-05-09 PROCEDURE — 0DHA3UZ INSERTION OF FEEDING DEVICE INTO JEJUNUM, PERCUTANEOUS APPROACH: ICD-10-PCS | Performed by: SURGERY

## 2018-05-09 PROCEDURE — 12000002 HC ACUTE/MED SURGE SEMI-PRIVATE ROOM

## 2018-05-09 PROCEDURE — 0DP68DZ REMOVAL OF INTRALUMINAL DEVICE FROM STOMACH, VIA NATURAL OR ARTIFICIAL OPENING ENDOSCOPIC: ICD-10-PCS | Performed by: SURGERY

## 2018-05-09 PROCEDURE — 99232 SBSQ HOSP IP/OBS MODERATE 35: CPT | Mod: ,,, | Performed by: INTERNAL MEDICINE

## 2018-05-09 PROCEDURE — 84100 ASSAY OF PHOSPHORUS: CPT

## 2018-05-09 PROCEDURE — 80053 COMPREHEN METABOLIC PANEL: CPT

## 2018-05-09 PROCEDURE — D9220A PRA ANESTHESIA: Mod: CRNA,,, | Performed by: NURSE ANESTHETIST, CERTIFIED REGISTERED

## 2018-05-09 PROCEDURE — 76937 US GUIDE VASCULAR ACCESS: CPT

## 2018-05-09 PROCEDURE — 37000008 HC ANESTHESIA 1ST 15 MINUTES: Performed by: SURGERY

## 2018-05-09 PROCEDURE — C9113 INJ PANTOPRAZOLE SODIUM, VIA: HCPCS | Performed by: SURGERY

## 2018-05-09 PROCEDURE — 37000009 HC ANESTHESIA EA ADD 15 MINS: Performed by: SURGERY

## 2018-05-09 PROCEDURE — 85007 BL SMEAR W/DIFF WBC COUNT: CPT

## 2018-05-09 PROCEDURE — 36000707: Performed by: SURGERY

## 2018-05-09 PROCEDURE — 25000003 PHARM REV CODE 250: Performed by: SURGERY

## 2018-05-09 PROCEDURE — 97803 MED NUTRITION INDIV SUBSEQ: CPT

## 2018-05-09 RX ORDER — MIDAZOLAM HYDROCHLORIDE 1 MG/ML
INJECTION, SOLUTION INTRAMUSCULAR; INTRAVENOUS
Status: DISCONTINUED | OUTPATIENT
Start: 2018-05-09 | End: 2018-05-09

## 2018-05-09 RX ORDER — MORPHINE SULFATE 4 MG/ML
2 INJECTION, SOLUTION INTRAMUSCULAR; INTRAVENOUS EVERY 4 HOURS PRN
Status: DISCONTINUED | OUTPATIENT
Start: 2018-05-09 | End: 2018-05-09

## 2018-05-09 RX ORDER — SODIUM CHLORIDE 0.9 % (FLUSH) 0.9 %
3 SYRINGE (ML) INJECTION
Status: DISCONTINUED | OUTPATIENT
Start: 2018-05-09 | End: 2018-05-15 | Stop reason: HOSPADM

## 2018-05-09 RX ORDER — FENTANYL CITRATE 50 UG/ML
INJECTION, SOLUTION INTRAMUSCULAR; INTRAVENOUS
Status: DISCONTINUED | OUTPATIENT
Start: 2018-05-09 | End: 2018-05-09

## 2018-05-09 RX ORDER — SUCCINYLCHOLINE CHLORIDE 20 MG/ML
INJECTION INTRAMUSCULAR; INTRAVENOUS
Status: DISCONTINUED | OUTPATIENT
Start: 2018-05-09 | End: 2018-05-09

## 2018-05-09 RX ORDER — ONDANSETRON HYDROCHLORIDE 2 MG/ML
INJECTION, SOLUTION INTRAMUSCULAR; INTRAVENOUS
Status: DISCONTINUED | OUTPATIENT
Start: 2018-05-09 | End: 2018-05-09

## 2018-05-09 RX ORDER — DIAZEPAM 2 MG/1
2 TABLET ORAL EVERY 6 HOURS PRN
Status: DISCONTINUED | OUTPATIENT
Start: 2018-05-09 | End: 2018-05-15 | Stop reason: HOSPADM

## 2018-05-09 RX ORDER — FENTANYL CITRATE 50 UG/ML
25 INJECTION, SOLUTION INTRAMUSCULAR; INTRAVENOUS EVERY 5 MIN PRN
Status: DISCONTINUED | OUTPATIENT
Start: 2018-05-09 | End: 2018-05-11 | Stop reason: HOSPADM

## 2018-05-09 RX ORDER — DEXAMETHASONE SODIUM PHOSPHATE 4 MG/ML
INJECTION, SOLUTION INTRA-ARTICULAR; INTRALESIONAL; INTRAMUSCULAR; INTRAVENOUS; SOFT TISSUE
Status: DISCONTINUED | OUTPATIENT
Start: 2018-05-09 | End: 2018-05-09

## 2018-05-09 RX ORDER — HYDROCODONE BITARTRATE AND ACETAMINOPHEN 7.5; 325 MG/15ML; MG/15ML
15 SOLUTION ORAL EVERY 4 HOURS PRN
Status: DISCONTINUED | OUTPATIENT
Start: 2018-05-09 | End: 2018-05-12

## 2018-05-09 RX ORDER — SUCRALFATE 1 G/10ML
1 SUSPENSION ORAL EVERY 6 HOURS
Status: DISCONTINUED | OUTPATIENT
Start: 2018-05-09 | End: 2018-05-12

## 2018-05-09 RX ORDER — LIDOCAINE HCL/PF 100 MG/5ML
SYRINGE (ML) INTRAVENOUS
Status: DISCONTINUED | OUTPATIENT
Start: 2018-05-09 | End: 2018-05-09

## 2018-05-09 RX ORDER — ACETAMINOPHEN 10 MG/ML
1000 INJECTION, SOLUTION INTRAVENOUS ONCE
Status: COMPLETED | OUTPATIENT
Start: 2018-05-10 | End: 2018-05-10

## 2018-05-09 RX ORDER — ROCURONIUM BROMIDE 10 MG/ML
INJECTION, SOLUTION INTRAVENOUS
Status: DISCONTINUED | OUTPATIENT
Start: 2018-05-09 | End: 2018-05-09

## 2018-05-09 RX ORDER — PROPOFOL 10 MG/ML
VIAL (ML) INTRAVENOUS
Status: DISCONTINUED | OUTPATIENT
Start: 2018-05-09 | End: 2018-05-09

## 2018-05-09 RX ADMIN — ONDANSETRON HYDROCHLORIDE 8 MG: 2 INJECTION INTRAMUSCULAR; INTRAVENOUS at 04:05

## 2018-05-09 RX ADMIN — SODIUM CHLORIDE 1250 MG: 9 INJECTION, SOLUTION INTRAVENOUS at 12:05

## 2018-05-09 RX ADMIN — FENTANYL CITRATE 50 MCG: 50 INJECTION, SOLUTION INTRAMUSCULAR; INTRAVENOUS at 09:05

## 2018-05-09 RX ADMIN — FAMOTIDINE 20 MG: 10 INJECTION INTRAVENOUS at 09:05

## 2018-05-09 RX ADMIN — FENTANYL CITRATE 25 MCG: 50 INJECTION, SOLUTION INTRAMUSCULAR; INTRAVENOUS at 10:05

## 2018-05-09 RX ADMIN — METRONIDAZOLE 500 MG: 500 INJECTION, SOLUTION INTRAVENOUS at 10:05

## 2018-05-09 RX ADMIN — ACETAMINOPHEN 1000 MG: 10 INJECTION, SOLUTION INTRAVENOUS at 11:05

## 2018-05-09 RX ADMIN — ROCURONIUM BROMIDE 5 MG: 10 INJECTION, SOLUTION INTRAVENOUS at 09:05

## 2018-05-09 RX ADMIN — MORPHINE SULFATE 4 MG: 4 INJECTION INTRAVENOUS at 04:05

## 2018-05-09 RX ADMIN — LIDOCAINE HYDROCHLORIDE 50 MG: 20 INJECTION, SOLUTION INTRAVENOUS at 09:05

## 2018-05-09 RX ADMIN — MIDAZOLAM 2 MG: 1 INJECTION INTRAMUSCULAR; INTRAVENOUS at 09:05

## 2018-05-09 RX ADMIN — PROPOFOL 200 MG: 10 INJECTION, EMULSION INTRAVENOUS at 09:05

## 2018-05-09 RX ADMIN — SODIUM CHLORIDE 1750 MG: 9 INJECTION, SOLUTION INTRAVENOUS at 03:05

## 2018-05-09 RX ADMIN — PANTOPRAZOLE SODIUM 40 MG: 40 INJECTION, POWDER, FOR SOLUTION INTRAVENOUS at 08:05

## 2018-05-09 RX ADMIN — METRONIDAZOLE 500 MG: 500 INJECTION, SOLUTION INTRAVENOUS at 02:05

## 2018-05-09 RX ADMIN — MONTELUKAST SODIUM 10 MG: 10 TABLET, FILM COATED ORAL at 11:05

## 2018-05-09 RX ADMIN — ENOXAPARIN SODIUM 40 MG: 100 INJECTION SUBCUTANEOUS at 05:05

## 2018-05-09 RX ADMIN — MORPHINE SULFATE 4 MG: 4 INJECTION INTRAVENOUS at 09:05

## 2018-05-09 RX ADMIN — PROMETHAZINE HYDROCHLORIDE 12.5 MG: 25 INJECTION INTRAMUSCULAR; INTRAVENOUS at 08:05

## 2018-05-09 RX ADMIN — METRONIDAZOLE 500 MG: 500 INJECTION, SOLUTION INTRAVENOUS at 09:05

## 2018-05-09 RX ADMIN — SUCRALFATE 1 G: 1 SUSPENSION ORAL at 11:05

## 2018-05-09 RX ADMIN — SODIUM CHLORIDE, SODIUM GLUCONATE, SODIUM ACETATE, POTASSIUM CHLORIDE, MAGNESIUM CHLORIDE, SODIUM PHOSPHATE, DIBASIC, AND POTASSIUM PHOSPHATE: .53; .5; .37; .037; .03; .012; .00082 INJECTION, SOLUTION INTRAVENOUS at 09:05

## 2018-05-09 RX ADMIN — SUCCINYLCHOLINE CHLORIDE 160 MG: 20 INJECTION, SOLUTION INTRAMUSCULAR; INTRAVENOUS at 09:05

## 2018-05-09 RX ADMIN — MEROPENEM AND SODIUM CHLORIDE 1 G: 1 INJECTION, SOLUTION INTRAVENOUS at 09:05

## 2018-05-09 RX ADMIN — MEROPENEM AND SODIUM CHLORIDE 1 G: 1 INJECTION, SOLUTION INTRAVENOUS at 03:05

## 2018-05-09 RX ADMIN — ONDANSETRON 4 MG: 2 INJECTION, SOLUTION INTRAMUSCULAR; INTRAVENOUS at 09:05

## 2018-05-09 RX ADMIN — METHOCARBAMOL 500 MG: 100 INJECTION INTRAMUSCULAR; INTRAVENOUS at 01:05

## 2018-05-09 RX ADMIN — ONDANSETRON HYDROCHLORIDE 8 MG: 2 INJECTION INTRAMUSCULAR; INTRAVENOUS at 02:05

## 2018-05-09 RX ADMIN — POTASSIUM PHOSPHATE, MONOBASIC AND POTASSIUM PHOSPHATE, DIBASIC 20 MMOL: 224; 236 INJECTION, SOLUTION INTRAVENOUS at 11:05

## 2018-05-09 RX ADMIN — DEXAMETHASONE SODIUM PHOSPHATE 4 MG: 4 INJECTION, SOLUTION INTRAMUSCULAR; INTRAVENOUS at 09:05

## 2018-05-09 RX ADMIN — METHOCARBAMOL 500 MG: 100 INJECTION INTRAMUSCULAR; INTRAVENOUS at 02:05

## 2018-05-09 RX ADMIN — FAMOTIDINE 20 MG: 10 INJECTION INTRAVENOUS at 11:05

## 2018-05-09 NOTE — ASSESSMENT & PLAN NOTE
Not doing well with stent  Will try to reduce narcotic usage and use robaxin which seems to work better for her pain according to her   Will try to switch to oral pain meds  Start tube feeds  Plan to continue stent and feeds for 2 weeks

## 2018-05-09 NOTE — PROGRESS NOTES
Progress Note  Hospital Medicine  Patient Name:Yodit Canseco  MRN:  [id number]  Patient Class: IP- Inpatient  Admit Date: 5/6/2018  Length of Stay: 2 days  Expected Discharge Date:   Attending Physician: Cristian Ashraf MD  Primary Care Provider:  Miky Lewis MD    SUBJECTIVE:     Principal Problem: Sepsis  Initial history of present illness: Yodit Canseco is a 58 yo female with PMHx significant for morbid obesity, history of gastroparesis, DM-2 (resolved), hyperlipidemia and hypothyroidism.  She is admitted to the service of hospital medicine with severe sepsis.  She presented to the ED with a 5 day history of subjective fever with tmax 103F.  She states the fever is associated with chills, malaise and nausea.  The fevers are responsive to tylenol and seem to be worse at night. Patient is 3 weeks post gastric sleeve surgery. She reports that she is tolerating her PO intake and recently had relatives over who have been sick with symptoms of fever, chills, and diarrhea. Patient denies diarrhea, vomiting, abdominal pain, chest pain, shortness of breath, cough, urinary symptoms, or neck stiffness.     PMH/PSH/SH/FH/Meds: reviewed.    Symptoms/Review of Systems: Tmax 99.5 F. WBC is elevated. Patient has not gotten out of bed.  at bedside.  No shortness of breath, cough, chest pain or headache, fever or abdominal pain. Patient underwent NJ tube placement and gastric stent placement yesterday by Dr. Reese.   Diet: Tube feeding  Activity level: Normal.    Pain:  As above     OBJECTIVE:   Vital Signs (Most Recent):      Temp: 98.2 °F (36.8 °C) (05/09/18 0809)  Pulse: 81 (05/09/18 0809)  Resp: 18 (05/09/18 0809)  BP: (!) 188/89 (05/09/18 0809)  SpO2: (!) 93 % (05/09/18 0809)       Vital Signs Range (Last 24H):  Temp:  [97.6 °F (36.4 °C)-99.5 °F (37.5 °C)]   Pulse:  [81-99]   Resp:  [16-18]   BP: (141-188)/(72-89)   SpO2:  [93 %-95 %]     Weight: 88.4 kg (194 lb 14.2 oz)  Body mass index is 35.65  kg/m².    Intake/Output Summary (Last 24 hours) at 05/09/18 0901  Last data filed at 05/09/18 0600   Gross per 24 hour   Intake             2320 ml   Output                0 ml   Net             2320 ml     Physical Examination:  Constitutional: She is oriented to person, place, and time. She appears well-developed and well-nourished. No distress.   HENT:   Head: Normocephalic and atraumatic.   Mouth/Throat: Oropharynx is clear and moist.   Eyes: Conjunctivae and EOM are normal. Pupils are equal, round, and reactive to light. No scleral icterus.   Neck: Normal range of motion. Neck supple. No JVD present. No tracheal deviation present. No thyromegaly present.   Cardiovascular: Normal rate, regular rhythm, normal heart sounds and intact distal pulses.  Exam reveals no gallop and no friction rub.    No murmur heard.  Pulses:       Dorsalis pedis pulses are 2+ on the right side, and 2+ on the left side.   Pulmonary/Chest: Effort normal and breath sounds normal. No accessory muscle usage. No respiratory distress. She has no wheezes. She has no rhonchi. She has no rales.   Abdominal: Soft. Bowel sounds are normal. She exhibits no distension and no mass. There is tenderness (Left upper and lower quadrant). There is no rebound and no guarding.   Musculoskeletal: Normal range of motion. She exhibits no edema, tenderness or deformity.   Neurological: She is alert and oriented to person, place, and time. She has normal strength. She exhibits normal muscle tone. Coordination normal.   Skin: Skin is warm, dry and intact. Capillary refill takes less than 2 seconds. No rash noted. She is not diaphoretic. No erythema.   Psychiatric: She has a normal mood and affect. Her speech is normal and behavior is normal. Judgment and thought content normal.   Vitals reviewed.  CRANIAL NERVES      CN III, IV, VI   Pupils are equal, round, and reactive to light.  Extraocular motions are normal.      CBC:    Recent Labs  Lab 05/07/18  0504  05/08/18  0504 05/09/18  0532   WBC 24.60* 27.20* 22.30*   RBC 4.50 4.53 4.43   HGB 12.9 12.9 12.6   HCT 38.6 38.3 38.0    400* 335   MCV 86 85 86   MCH 28.5 28.4 28.5   MCHC 33.3 33.5 33.2   BMP    Recent Labs  Lab 05/07/18  0504 05/08/18  0504 05/09/18  0532    124* 119*    142 144   K 2.9* 3.5 3.9   CL 99 105 110   CO2 27 23 23   BUN 8 11 12   CREATININE 0.8 0.8 0.7   CALCIUM 9.2 9.1 9.1   MG 1.6 1.8 2.0      Diagnostic Results:  Microbiology Results (last 7 days)     Procedure Component Value Units Date/Time    Urine Culture [[id number]] Collected:  05/07/18 0114    Order Status:  Completed Specimen:  Urine from Urine, Clean Catch Updated:  05/09/18 0052     Urine Culture, Routine No growth    Blood Culture #1 [[id number]] Collected:  05/07/18 0216    Order Status:  Completed Specimen:  Blood Updated:  05/08/18 1212     Blood Culture, Routine No Growth to date     Blood Culture, Routine No Growth to date    Blood Culture #2 [[id number]] Collected:  05/07/18 0216    Order Status:  Completed Specimen:  Blood Updated:  05/08/18 1212     Blood Culture, Routine No Growth to date     Blood Culture, Routine No Growth to date         CT abdomen:  Abnormal stranding in the fat adjacent to the sleeve gastrectomy which is nonspecific.  No extraluminal gas or leakage of oral contrast identified.  Differential considerations include inflammation and blood products.  No drainable fluid collection to suggest abscess.  Small left pleural effusion and left basilar atelectasis.  Hysterectomy and cholecystectomy.  Colonic diverticulosis.    KUB: Interval placement of an esophagogastric stent centered at the level of the GE junction.  Weighted enteric tube tip is at the level of the gastric antrum. Left basilar pulmonary consolidation with air bronchograms for which differential considerations include pneumonia and aspiration.    EGD:  Endoscopy was performed showing a normal esophagus and duodenum.  Stomach had  inflamed appearing mucosa s/p sleeve.  Proximal staple line appeared swollen and erythematous.  May have underlying leak but not obvious.  NO obvious leaks seen.  A stent was placed over a guide wire [employer] 23 mm by 155 mm under fluoroscopy.  A keyo feeding tube was placed distal to this.      Assessment/Plan:     * Sepsis  Intraperitoneal abscess with possible leak from anastomosis        Questionable source - CT abdomen results not definitive - intraperitoneal space with inflammation.  Questionable abscess.  Recent gastric sleeve surgery.  Meets SIRS criteria   Antibiotics given-              Antibiotics      Start     Stop Route Frequency Ordered     05/07/18 0430   metronidazole IVPB 500 mg      -- IV Every 8 hours (non-standard times) 05/07/18 0428 05/07/18 0430   meropenem-0.9% sodium chloride 1 g/50 mL IVPB      -- IV Every 8 hours (non-standard times) 05/07/18 0428       Latest lactate reviewed, they are-     Recent Labs  Lab 05/07/18 0216   LACTATE 1.1      Follow blood cultures and urine cultures  Continue IV Merrem, Vanc and Metronidazole and deescalate when appropriate.          S/P laparoscopic sleeve gastrectomy  Gastric stent placement     Keep NPO. Start tube feeding.  Initiate broad spectrum antibiotics  Follow General Surgery recommendations. Use IV valium for gastric spasms prn.      Hypophosphatemia  Replete potassium phosphate. Follow level.     Severe persistent asthma without complication     Chronic problem. Controlled.  Continue home medications and monitor closely for exacerbations, adjusting medications as necessary.       Hypothyroidism     Chronic problem. TSH reviewed - euthyroid.  Continue home levothyroxine.     Discussed with patient's . Encouraged patient to get out of bed. Start PT.     VTE Risk Mitigation         Ordered     enoxaparin injection 40 mg  Daily      05/07/18 2205     IP VTE HIGH RISK PATIENT  Once      05/07/18 0428     Place STEPHEN hose  Until  discontinued      05/07/18 0428     Place sequential compression device  Until discontinued      05/07/18 0428     Reason for No Pharmacological VTE Prophylaxis  Once      05/07/18 0428        Cristian Ashraf MD  Department of Hospital Medicine   [facility]

## 2018-05-09 NOTE — ASSESSMENT & PLAN NOTE
Contributing Nutrition Diagnosis  Inadequate energy intake    Related to (etiology):   Altered GI function    Signs and Symptoms (as evidenced by):   NPO with no alternate nutrition    Interventions/Recommendations (treatment strategy):  continue nutrition support progressing per pt tolerance to goal rate (5/14/18 at goal rate, no residuals)    Nutrition Diagnosis Status:   Improving

## 2018-05-09 NOTE — PLAN OF CARE
Problem: Nutrition, Enteral (Adult)  Intervention: Monitor/Manage Nutrition Support  Recommendations    1) Enteral:    Nutren 2.0 @ 10 mls/hr to advance by 10 mls/hr Q 6 hrs to goal of 50 mls/hr.(from 5 pm to 8 am daily (15 hrs)   Flush with 150 mls Q 4 hrs.    Add ProMod, protein supplement, 1 oz, tid.     At goal including ProMod provides 1510 calories, 93 gms protein, 162 gms CHO, 519 mls free water.     2) If pt is unable to tolerate enteral or PO feeds, consider PPN as a bridge. Clinimix E @ 60 mls/hr for the first 24 hrs, then 125 mls/hr with 20% lipids (250 mls). At goal provides 1340 calories, 83 gms protein, 3000 mls fluid, GIR 1.18.      Goal: Pt will receive nutrition within 48 hrs.   Nutrition Goal Status: new  Communication of VANESA Recs:  (POC, reviewed with RN)    Discharge Plan:  Nutren 2.0:  Provide 1-250  mls carton 3  x daily. 6 am,  12 noon,  6 pm .  Use ProMod protein supplement, 1 oz, tid. Flush with 275 mls water (about 1 3/4 cup)  4 x daily  for hydration.  Provides 1510 calories, 93 gms protein, 162 gms CHO, 519 mls free water.

## 2018-05-09 NOTE — SUBJECTIVE & OBJECTIVE
"Interval History: sleepy, still in bed    Medications:  Continuous Infusions:   sodium chloride 0.9% 75 mL/hr at 05/08/18 1304     Scheduled Meds:   enoxaparin  40 mg Subcutaneous Daily    famotidine (PF)  20 mg Intravenous BID    fluticasone-vilanterol  1 puff Inhalation Daily    levothyroxine  75 mcg Oral Before breakfast    meropenem (MERREM) IVPB  1 g Intravenous Q8H    metronidazole  500 mg Intravenous Q8H    montelukast  10 mg Oral QHS    pantoprazole  40 mg Intravenous Daily    potassium phosphate IVPB  20 mmol Intravenous Once    senna-docusate 8.6-50 mg  1 tablet Oral BID    triamterene-hydrochlorothiazide 37.5-25 mg  2 tablet Oral Daily    vancomycin (VANCOCIN) IVPB  1,250 mg Intravenous Q12H     PRN Meds:albuterol-ipratropium 2.5mg-0.5mg/3mL, dextrose 50%, diazePAM, glucagon (human recombinant), glucose, glucose, hydrocodone-apap 7.5-325 MG/15 ML, methocarbamol (ROBAXIN) IVPB, morphine, ondansetron, prochlorperazine, promethazine (PHENERGAN) IVPB, ramelteon, sodium chloride 0.9%     Review of patient's allergies indicates:   Allergen Reactions    Bromelains Hives     " causes mouth to bleed"    Sudafed [pseudoephedrine hcl] Other (See Comments)     Does not want to wake up .    Amoxicillin-pot clavulanate Itching     Other reaction(s): Itching    Hydrocodone Itching    Iodinated contrast- oral and iv dye      childhood    Iodine and iodide containing products Other (See Comments)    Melon Hives    Pantoprazole Nausea Only     Other reaction(s): upset stomach/halitosis    Percocet [oxycodone-acetaminophen] Itching    Barium iodide Rash    Ephedrine Other (See Comments)     Other reaction(s): comatose    Penicillins      Other reaction(s): does not work on pt symptoms     Objective:     Vital Signs (Most Recent):  Temp: 98.2 °F (36.8 °C) (05/09/18 0809)  Pulse: 81 (05/09/18 0809)  Resp: 18 (05/09/18 0809)  BP: (!) 188/89 (05/09/18 0809)  SpO2: (!) 93 % (05/09/18 0809) Vital Signs " (24h Range):  Temp:  [97.6 °F (36.4 °C)-99.5 °F (37.5 °C)] 98.2 °F (36.8 °C)  Pulse:  [81-99] 81  Resp:  [16-18] 18  SpO2:  [93 %-95 %] 93 %  BP: (141-188)/(72-89) 188/89     Weight: 88.4 kg (194 lb 14.2 oz)  Body mass index is 35.65 kg/m².    Intake/Output - Last 3 Shifts       05/07 0700 - 05/08 0659 05/08 0700 - 05/09 0659 05/09 0700 - 05/10 0659    P.O. 0 0     I.V. (mL/kg) 1029.2 (11.6) 2715.8 (30.7)     IV Piggyback 200 1050     Total Intake(mL/kg) 1229.2 (13.9) 3765.8 (42.6)     Net +1229.2 +3765.8             Urine Occurrence 1 x 4 x     Stool Occurrence 1 x 1 x           Physical Exam   Neck: No JVD present.   Cardiovascular: Normal rate and regular rhythm.    Pulmonary/Chest: Effort normal. No respiratory distress.   Abdominal: Soft. She exhibits no distension. There is no tenderness.       Significant Labs:  CBC:   Recent Labs  Lab 05/09/18  0532   WBC 22.30*   RBC 4.43   HGB 12.6   HCT 38.0      MCV 86   MCH 28.5   MCHC 33.2     BMP:   Recent Labs  Lab 05/09/18  0532   *      K 3.9      CO2 23   BUN 12   CREATININE 0.7   CALCIUM 9.1   MG 2.0       Significant Diagnostics:  none

## 2018-05-09 NOTE — NURSING
. Patient is c/o having muscle spasm and is requesting medication. Patient states that it has to be iv because she cant handle anything given through NGT. Eduardo Sinclair Np notified vis secure chat. New orders given and implemented.

## 2018-05-09 NOTE — CONSULTS
Date: 5/9/2018   Yodit Canseco [id number] is a 57 y.o. female who has been consulted for vancomycin dosing.    The patient has the following labs:     Creatinine (mg/dl)    WBC Count   Serum creatinine: 0.7 mg/dL 05/09/18 0532  Estimated creatinine clearance: 90.4 mL/min Lab Results   Component Value Date    WBC 22.30 (H) 05/09/2018        Current weight is 88.4 kg (194 lb 14.2 oz)      Pt is receiving vancomycin 1250 mg every 12 hours.  Vancomycin trough from 5/9/2018 at 1355 was 8.9 mg/dL.  Target goal is 15-20 mg/dL.   Trough was drawn 2 hours late and anticipate it is  Subtherapeutic. Pharmacy will increase current dose.   The patient will be changed to a vancomycin dose of 1750 mg every 12 hours. The vancomycin trough has been ordered for 5/11/18 at 0330.    Patient will be followed by pharmacy for changes in renal function, toxicity, and efficacy.    Thank you for allowing us to participate in this patient's care.    Ajay Preciado, Pharmacist

## 2018-05-09 NOTE — PLAN OF CARE
Problem: Patient Care Overview  Goal: Plan of Care Review  Outcome: Ongoing (interventions implemented as appropriate)  Patient alert and oriented resting in bed. NAD. Denies SOB. C/o pain and nausea, prn medications given.  VSS. BSC.  Plan of care reviewed with patient. Verbalizes understanding.Call light in reach. Pt free from fall or injury. Will monitor.

## 2018-05-09 NOTE — PROGRESS NOTES
"[facility] Surgery  Progress Note    Subjective:     History of Present Illness:  58 y/o female who underwent sleeve gastrectomy on 4/16/18, presents now with fevers at home 101.9.  Complains of some abdominal spasm and chills.  Has been able to tolerate diet.  Denies n/v, has had some diarrhea, no constipation    Post-Op Info:  Procedure(s) (LRB):  ESOPHAGOGASTRODUODENOSCOPY (EGD)- stent, keyo tube, please have hemoclips (N/A)   2 Days Post-Op     Interval History: sleepy, still in bed    Medications:  Continuous Infusions:   sodium chloride 0.9% 75 mL/hr at 05/08/18 1304     Scheduled Meds:   enoxaparin  40 mg Subcutaneous Daily    famotidine (PF)  20 mg Intravenous BID    fluticasone-vilanterol  1 puff Inhalation Daily    levothyroxine  75 mcg Oral Before breakfast    meropenem (MERREM) IVPB  1 g Intravenous Q8H    metronidazole  500 mg Intravenous Q8H    montelukast  10 mg Oral QHS    pantoprazole  40 mg Intravenous Daily    potassium phosphate IVPB  20 mmol Intravenous Once    senna-docusate 8.6-50 mg  1 tablet Oral BID    triamterene-hydrochlorothiazide 37.5-25 mg  2 tablet Oral Daily    vancomycin (VANCOCIN) IVPB  1,250 mg Intravenous Q12H     PRN Meds:albuterol-ipratropium 2.5mg-0.5mg/3mL, dextrose 50%, diazePAM, glucagon (human recombinant), glucose, glucose, hydrocodone-apap 7.5-325 MG/15 ML, methocarbamol (ROBAXIN) IVPB, morphine, ondansetron, prochlorperazine, promethazine (PHENERGAN) IVPB, ramelteon, sodium chloride 0.9%     Review of patient's allergies indicates:   Allergen Reactions    Bromelains Hives     " causes mouth to bleed"    Sudafed [pseudoephedrine hcl] Other (See Comments)     Does not want to wake up .    Amoxicillin-pot clavulanate Itching     Other reaction(s): Itching    Hydrocodone Itching    Iodinated contrast- oral and iv dye      childhood    Iodine and iodide containing products Other (See Comments)    Melon Hives    Pantoprazole " Nausea Only     Other reaction(s): upset stomach/halitosis    Percocet [oxycodone-acetaminophen] Itching    Barium iodide Rash    Ephedrine Other (See Comments)     Other reaction(s): comatose    Penicillins      Other reaction(s): does not work on pt symptoms     Objective:     Vital Signs (Most Recent):  Temp: 98.2 °F (36.8 °C) (05/09/18 0809)  Pulse: 81 (05/09/18 0809)  Resp: 18 (05/09/18 0809)  BP: (!) 188/89 (05/09/18 0809)  SpO2: (!) 93 % (05/09/18 0809) Vital Signs (24h Range):  Temp:  [97.6 °F (36.4 °C)-99.5 °F (37.5 °C)] 98.2 °F (36.8 °C)  Pulse:  [81-99] 81  Resp:  [16-18] 18  SpO2:  [93 %-95 %] 93 %  BP: (141-188)/(72-89) 188/89     Weight: 88.4 kg (194 lb 14.2 oz)  Body mass index is 35.65 kg/m².    Intake/Output - Last 3 Shifts       05/07 0700 - 05/08 0659 05/08 0700 - 05/09 0659 05/09 0700 - 05/10 0659    P.O. 0 0     I.V. (mL/kg) 1029.2 (11.6) 2715.8 (30.7)     IV Piggyback 200 1050     Total Intake(mL/kg) 1229.2 (13.9) 3765.8 (42.6)     Net +1229.2 +3765.8             Urine Occurrence 1 x 4 x     Stool Occurrence 1 x 1 x           Physical Exam   Neck: No JVD present.   Cardiovascular: Normal rate and regular rhythm.    Pulmonary/Chest: Effort normal. No respiratory distress.   Abdominal: Soft. She exhibits no distension. There is no tenderness.       Significant Labs:  CBC:   Recent Labs  Lab 05/09/18  0532   WBC 22.30*   RBC 4.43   HGB 12.6   HCT 38.0      MCV 86   MCH 28.5   MCHC 33.2     BMP:   Recent Labs  Lab 05/09/18  0532   *      K 3.9      CO2 23   BUN 12   CREATININE 0.7   CALCIUM 9.1   MG 2.0       Significant Diagnostics:  none    Assessment/Plan:     Gastric leak    Not doing well with stent  Will try to reduce narcotic usage and use robaxin which seems to work better for her pain according to her   Will try to switch to oral pain meds  Start tube feeds  Plan to continue stent and feeds for 2 weeks          Intraperitoneal abscess    Suspect sleeve  leak  Npo  Antibiotics  Stent, nj tube today            Chai Reese MD  General Surgery  [facility]

## 2018-05-09 NOTE — PLAN OF CARE
Problem: Patient Care Overview  Goal: Plan of Care Review  02 saturation 92% on room air.  PRN tx not required at this time.  Breo not given at this time due to nausea

## 2018-05-09 NOTE — CONSULTS
[facility]  Adult Nutrition  Consult Note    SUMMARY     Recommendations    1) Enteral:    Nutren 2.0 @ 10 mls/hr to advance by 10 mls/hr Q 6 hrs to goal of 50 mls/hr.(from 5 pm to 8 am daily (15 hrs)   Flush with 150 mls Q 4 hrs.    Add ProMod, protein supplement, 1 oz, tid.     At goal including ProMod provides 1510 calories, 93 gms protein, 162 gms CHO, 519 mls free water.    2) If pt is unable to tolerate enteral or PO feeds, consider PPN as a bridge. Clinimix E @ 60 mls/hr for the first 24 hrs, then 125 mls/hr with 20% lipids (250 mls). At goal provides 1340 calories, 83 gms protein, 3000 mls fluid, GIR 1.18.      Goal: Pt will receive nutrition within 48 hrs.   Nutrition Goal Status: new  Communication of RD Recs:  (POC, reviewed with RN)    Discharge Plan:  Nutren 2.0:  Provide 1-250  mls carton 3  x daily. 6 am,  12 noon,  6 pm .  Use ProMod protein supplement, 1 oz, tid. Flush with 275 mls water (about 1 3/4 cup)  4 x daily  for hydration.  Provides 1510 calories, 93 gms protein, 162 gms CHO, 519 mls free water.    Reason for Assessment    Reason for Assessment: RD follow up   1. Sepsis, due to unspecified organism    2. Abdominal pain, unspecified abdominal location    3. S/P bariatric surgery    4. Gastric leak    5. Sepsis      Past Medical History:   Diagnosis Date    Allergy     Angio-edema     Arthralgia     Asthma without status asthmaticus     Bronchitis     Diverticulosis of large intestine without hemorrhage 10/8/2015    Environmental allergies     Eosinophilic asthma 3/8/2018    Gastroparesis     Hand arthritis     Herpes simplex without mention of complication     oral    Hyperlipidemia     Hypothyroidism     LBP radiating to left leg     Leukocytosis, unspecified     Migraine headache     Obesity, Class III, BMI 40-49.9 (morbid obesity)     Periorbital cellulitis 12/31/2016    Prediabetes     Reactive airway disease     Reactive airway disease      "Recurrent upper respiratory infection (URI)     S/P colonoscopy 2010    Urticaria        Interdisciplinary Rounds: attended  General Information Comments: Admitted with fever. Pt is s/p gastric sleeve x 3 weeks.  Intentional wt loss noted.   18 Spoke with Dr. Reese re continuous feeds. Per MD direction, continuous feeds during admit and bolus feeds at discharge.  Temporary enteral feeds until gut heals. .  Yesterday, pt reported intolerance to whey and lactose, but did not know about casein. Today pt and her spouse report concerns about using Nutren 2.0 2/2 milk intolerance.  Assured pt that Nutren 2.0 does not have whey or lactose, but does contain casein.  Spouse states he does not want to try the Nutren 2.0, and reports pt can tolerate almond milk.  Dr. Reese spoke with family and they agreed to try Nutren 2.0.  The order for Nutren 2.0 has been activated at this time.  Monitoring for tolerance.    Found TF that is milk, egg, pea and artificial sweetener free.  Special ordered Vivonex RTF and it is being shipped overnight for pt use if needed.       utrition Risk Screen    Nutrition Risk Screen: no indicators present    Nutrition/Diet History    Do you have any cultural, spiritual, Mu-ism conflicts, given your current situation?: none  Food Allergies: egg, milk (pea, artificial sweeteners)    Anthropometrics    Temp: 98.2 °F (36.8 °C)  Height Method: Stated  Height: 5' 2"  Height (inches): 62 in  Weight Method: Stated  Weight: 88.4 kg (194 lb 14.2 oz)  Weight (lb): 194.89 lb  Ideal Body Weight (IBW), Female: 110 lb  % Ideal Body Weight, Female (lb): 177.17 lb  BMI (Calculated): 35.7  Usual Body Weight (UBW), k.6 kg  % Usual Body Weight: 88.06  % Weight Change From Usual Weight: -12.13 %       Lab/Procedures/Meds    Pertinent Labs Reviewed: reviewed  Lab Results   Component Value Date    ALBUMIN 2.3 (L) 2018     Lab Results   Component Value Date    CRP 7.6 2016     Lab " Results   Component Value Date    WBC 22.30 (H) 05/09/2018     Pertinent Medications Reviewed: reviewed  Scheduled Meds:   enoxaparin  40 mg Subcutaneous Daily    famotidine (PF)  20 mg Intravenous BID    fluticasone-vilanterol  1 puff Inhalation Daily    levothyroxine  75 mcg Oral Before breakfast    meropenem (MERREM) IVPB  1 g Intravenous Q8H    metronidazole  500 mg Intravenous Q8H    montelukast  10 mg Oral QHS    pantoprazole  40 mg Intravenous Daily    potassium phosphate IVPB  20 mmol Intravenous Once    senna-docusate 8.6-50 mg  1 tablet Oral BID    triamterene-hydrochlorothiazide 37.5-25 mg  2 tablet Oral Daily    vancomycin (VANCOCIN) IVPB  1,250 mg Intravenous Q12H     Continuous Infusions:   sodium chloride 0.9% 75 mL/hr at 05/08/18 1304       Physical Findings/Assessment    Overall Physical Appearance: obese, weak  Oral/Mouth Cavity: WDL  Skin:  (Steve score 20)    Estimated/Assessed Needs    Weight Used For Calorie Calculations: 88.4 kg (194 lb 14.2 oz)     Energy Need Method: White-St Jeor: 1417 x 1.25=1771     Weight Used For Protein Calculations: 88.4 kg (194 lb 14.2 oz)    gms/day     Fluid Need Method: RDA Method (or per MD)     CHO Requirement: max 221 gms CHO      Nutrition Prescription Ordered    Current Diet Order: NPO    Evaluation of Received Nutrient/Fluid Intake    IV Fluid (mL): 75 (mls/hr)  Energy Calories Required: not meeting needs  Protein Required: not meeting needs  Fluid Required: meeting needs  % Intake of Estimated Energy Needs: 0 - 25 %  % Meal Intake: NPO    Nutrition Risk    Level of Risk/Frequency of Follow-up:  (2 x wkly)     Assessment and Plan    Gastric leak    Contributing Nutrition Diagnosis  Inadequate energy intake    Related to (etiology):   Altered GI function    Signs and Symptoms (as evidenced by):   NPO with no alternate nutrition    Interventions/Recommendations (treatment strategy):  Initiate nutrition support     Nutrition Diagnosis  Status:   Continues                 Monitor and Evaluation    Food and Nutrient Intake: energy intake  Food and Nutrient Adminstration: diet order  Anthropometric Measurements: weight  Biochemical Data, Medical Tests and Procedures: inflammatory profile  Nutrition-Focused Physical Findings: overall appearance, skin     Discharge Plan    Enteral feeds as above    RD Follow-up?: Yes

## 2018-05-09 NOTE — PLAN OF CARE
Problem: Patient Care Overview  Goal: Plan of Care Review  Outcome: Ongoing (interventions implemented as appropriate)  Fall and safety precautions maintained.  Patient alert and oriented, resting in bed.  Denies SOB. VSS, Pt in NAD C/O muscle spasms in the abdomen.  SR on tele.  Plan of care reviewed with patient. Verbalizes understanding.  Call light in reach, bed in low position and wheels locked. Will continue to monitor.  Administered ABX, Robaxin PRN as ordered.  Pt refused PO meds administered through tube due to NV.  Tube feeding started at 1607 @ 10 ml/hr, after 2 ml administered pt had NV.  Pt adamently refused any further feedings.

## 2018-05-10 LAB
ALBUMIN SERPL BCP-MCNC: 2.2 G/DL
ALP SERPL-CCNC: 100 U/L
ALT SERPL W/O P-5'-P-CCNC: 16 U/L
ANION GAP SERPL CALC-SCNC: 6 MMOL/L
AST SERPL-CCNC: 26 U/L
BASOPHILS # BLD AUTO: 0.1 K/UL
BASOPHILS NFR BLD: 0.4 %
BILIRUB SERPL-MCNC: 0.3 MG/DL
BUN SERPL-MCNC: 13 MG/DL
CALCIUM SERPL-MCNC: 8.9 MG/DL
CHLORIDE SERPL-SCNC: 110 MMOL/L
CO2 SERPL-SCNC: 29 MMOL/L
CREAT SERPL-MCNC: 0.7 MG/DL
DIFFERENTIAL METHOD: ABNORMAL
EOSINOPHIL # BLD AUTO: 0 K/UL
EOSINOPHIL NFR BLD: 0 %
ERYTHROCYTE [DISTWIDTH] IN BLOOD BY AUTOMATED COUNT: 14.9 %
EST. GFR  (AFRICAN AMERICAN): >60 ML/MIN/1.73 M^2
EST. GFR  (NON AFRICAN AMERICAN): >60 ML/MIN/1.73 M^2
GLUCOSE SERPL-MCNC: 141 MG/DL
HCT VFR BLD AUTO: 36.4 %
HGB BLD-MCNC: 11.9 G/DL
LYMPHOCYTES # BLD AUTO: 1.4 K/UL
LYMPHOCYTES NFR BLD: 8.9 %
MAGNESIUM SERPL-MCNC: 2 MG/DL
MCH RBC QN AUTO: 28.1 PG
MCHC RBC AUTO-ENTMCNC: 32.7 G/DL
MCV RBC AUTO: 86 FL
MONOCYTES # BLD AUTO: 0.6 K/UL
MONOCYTES NFR BLD: 4 %
NEUTROPHILS # BLD AUTO: 13.6 K/UL
NEUTROPHILS NFR BLD: 86.7 %
PHOSPHATE SERPL-MCNC: 2.9 MG/DL
PLATELET # BLD AUTO: 351 K/UL
PMV BLD AUTO: 9.4 FL
POTASSIUM SERPL-SCNC: 3.8 MMOL/L
PROT SERPL-MCNC: 6.5 G/DL
RBC # BLD AUTO: 4.24 M/UL
SODIUM SERPL-SCNC: 145 MMOL/L
WBC # BLD AUTO: 15.7 K/UL

## 2018-05-10 PROCEDURE — 87070 CULTURE OTHR SPECIMN AEROBIC: CPT

## 2018-05-10 PROCEDURE — 36569 INSJ PICC 5 YR+ W/O IMAGING: CPT

## 2018-05-10 PROCEDURE — 87205 SMEAR GRAM STAIN: CPT

## 2018-05-10 PROCEDURE — C9113 INJ PANTOPRAZOLE SODIUM, VIA: HCPCS | Performed by: SURGERY

## 2018-05-10 PROCEDURE — 25000003 PHARM REV CODE 250: Performed by: NURSE PRACTITIONER

## 2018-05-10 PROCEDURE — 25000003 PHARM REV CODE 250: Performed by: SURGERY

## 2018-05-10 PROCEDURE — 63600175 PHARM REV CODE 636 W HCPCS: Performed by: NURSE PRACTITIONER

## 2018-05-10 PROCEDURE — 63600175 PHARM REV CODE 636 W HCPCS: Performed by: RADIOLOGY

## 2018-05-10 PROCEDURE — 02HV33Z INSERTION OF INFUSION DEVICE INTO SUPERIOR VENA CAVA, PERCUTANEOUS APPROACH: ICD-10-PCS | Performed by: SURGERY

## 2018-05-10 PROCEDURE — 25000003 PHARM REV CODE 250

## 2018-05-10 PROCEDURE — 36415 COLL VENOUS BLD VENIPUNCTURE: CPT

## 2018-05-10 PROCEDURE — 27000221 HC OXYGEN, UP TO 24 HOURS

## 2018-05-10 PROCEDURE — 85025 COMPLETE CBC W/AUTO DIFF WBC: CPT

## 2018-05-10 PROCEDURE — 94761 N-INVAS EAR/PLS OXIMETRY MLT: CPT

## 2018-05-10 PROCEDURE — 83735 ASSAY OF MAGNESIUM: CPT

## 2018-05-10 PROCEDURE — 76937 US GUIDE VASCULAR ACCESS: CPT

## 2018-05-10 PROCEDURE — 87106 FUNGI IDENTIFICATION YEAST: CPT

## 2018-05-10 PROCEDURE — 63600175 PHARM REV CODE 636 W HCPCS: Performed by: NURSE ANESTHETIST, CERTIFIED REGISTERED

## 2018-05-10 PROCEDURE — 63600175 PHARM REV CODE 636 W HCPCS

## 2018-05-10 PROCEDURE — S0028 INJECTION, FAMOTIDINE, 20 MG: HCPCS | Performed by: SURGERY

## 2018-05-10 PROCEDURE — 97116 GAIT TRAINING THERAPY: CPT

## 2018-05-10 PROCEDURE — 87077 CULTURE AEROBIC IDENTIFY: CPT

## 2018-05-10 PROCEDURE — 97161 PT EVAL LOW COMPLEX 20 MIN: CPT

## 2018-05-10 PROCEDURE — 99900035 HC TECH TIME PER 15 MIN (STAT)

## 2018-05-10 PROCEDURE — 63600175 PHARM REV CODE 636 W HCPCS: Performed by: SURGERY

## 2018-05-10 PROCEDURE — 87075 CULTR BACTERIA EXCEPT BLOOD: CPT

## 2018-05-10 PROCEDURE — A4216 STERILE WATER/SALINE, 10 ML: HCPCS | Performed by: SURGERY

## 2018-05-10 PROCEDURE — S0030 INJECTION, METRONIDAZOLE: HCPCS | Performed by: NURSE PRACTITIONER

## 2018-05-10 PROCEDURE — 25000003 PHARM REV CODE 250: Performed by: INTERNAL MEDICINE

## 2018-05-10 PROCEDURE — 84100 ASSAY OF PHOSPHORUS: CPT

## 2018-05-10 PROCEDURE — 0D963ZZ DRAINAGE OF STOMACH, PERCUTANEOUS APPROACH: ICD-10-PCS | Performed by: RADIOLOGY

## 2018-05-10 PROCEDURE — 97803 MED NUTRITION INDIV SUBSEQ: CPT

## 2018-05-10 PROCEDURE — 63600175 PHARM REV CODE 636 W HCPCS: Performed by: INTERNAL MEDICINE

## 2018-05-10 PROCEDURE — 12000002 HC ACUTE/MED SURGE SEMI-PRIVATE ROOM

## 2018-05-10 PROCEDURE — C1751 CATH, INF, PER/CENT/MIDLINE: HCPCS

## 2018-05-10 PROCEDURE — 99232 SBSQ HOSP IP/OBS MODERATE 35: CPT | Mod: ,,, | Performed by: INTERNAL MEDICINE

## 2018-05-10 PROCEDURE — 80053 COMPREHEN METABOLIC PANEL: CPT

## 2018-05-10 RX ORDER — ACETAMINOPHEN 10 MG/ML
1000 INJECTION, SOLUTION INTRAVENOUS EVERY 8 HOURS
Status: COMPLETED | OUTPATIENT
Start: 2018-05-10 | End: 2018-05-10

## 2018-05-10 RX ORDER — DIPHENHYDRAMINE HCL 25 MG
25 CAPSULE ORAL EVERY 6 HOURS PRN
Status: DISCONTINUED | OUTPATIENT
Start: 2018-05-10 | End: 2018-05-12

## 2018-05-10 RX ORDER — SODIUM CHLORIDE 0.9 % (FLUSH) 0.9 %
10 SYRINGE (ML) INJECTION
Status: DISCONTINUED | OUTPATIENT
Start: 2018-05-10 | End: 2018-05-15 | Stop reason: HOSPADM

## 2018-05-10 RX ORDER — FENTANYL CITRATE 50 UG/ML
25 INJECTION, SOLUTION INTRAMUSCULAR; INTRAVENOUS
Status: DISCONTINUED | OUTPATIENT
Start: 2018-05-10 | End: 2018-05-11 | Stop reason: HOSPADM

## 2018-05-10 RX ORDER — MIDAZOLAM HYDROCHLORIDE 1 MG/ML
1 INJECTION INTRAMUSCULAR; INTRAVENOUS
Status: DISCONTINUED | OUTPATIENT
Start: 2018-05-10 | End: 2018-05-15 | Stop reason: HOSPADM

## 2018-05-10 RX ORDER — KETOROLAC TROMETHAMINE 30 MG/ML
15 INJECTION, SOLUTION INTRAMUSCULAR; INTRAVENOUS ONCE
Status: COMPLETED | OUTPATIENT
Start: 2018-05-10 | End: 2018-05-10

## 2018-05-10 RX ORDER — LIDOCAINE HYDROCHLORIDE AND EPINEPHRINE 10; 10 MG/ML; UG/ML
INJECTION, SOLUTION INFILTRATION; PERINEURAL
Status: COMPLETED
Start: 2018-05-10 | End: 2018-05-10

## 2018-05-10 RX ORDER — HYDROMORPHONE HYDROCHLORIDE 2 MG/ML
0.5 INJECTION, SOLUTION INTRAMUSCULAR; INTRAVENOUS; SUBCUTANEOUS EVERY 4 HOURS PRN
Status: DISCONTINUED | OUTPATIENT
Start: 2018-05-10 | End: 2018-05-11 | Stop reason: RX

## 2018-05-10 RX ORDER — SODIUM CHLORIDE 0.9 % (FLUSH) 0.9 %
10 SYRINGE (ML) INJECTION EVERY 6 HOURS
Status: DISCONTINUED | OUTPATIENT
Start: 2018-05-10 | End: 2018-05-15 | Stop reason: HOSPADM

## 2018-05-10 RX ORDER — FENTANYL CITRATE 50 UG/ML
INJECTION, SOLUTION INTRAMUSCULAR; INTRAVENOUS
Status: COMPLETED
Start: 2018-05-10 | End: 2018-05-10

## 2018-05-10 RX ORDER — MIDAZOLAM HYDROCHLORIDE 1 MG/ML
INJECTION, SOLUTION INTRAMUSCULAR; INTRAVENOUS
Status: COMPLETED
Start: 2018-05-10 | End: 2018-05-10

## 2018-05-10 RX ADMIN — PANTOPRAZOLE SODIUM 40 MG: 40 INJECTION, POWDER, FOR SOLUTION INTRAVENOUS at 10:05

## 2018-05-10 RX ADMIN — FENTANYL CITRATE 25 MCG: 50 INJECTION INTRAMUSCULAR; INTRAVENOUS at 02:05

## 2018-05-10 RX ADMIN — SODIUM CHLORIDE 1750 MG: 9 INJECTION, SOLUTION INTRAVENOUS at 06:05

## 2018-05-10 RX ADMIN — ACETAMINOPHEN 1000 MG: 10 INJECTION, SOLUTION INTRAVENOUS at 06:05

## 2018-05-10 RX ADMIN — SUCRALFATE 1 G: 1 SUSPENSION ORAL at 06:05

## 2018-05-10 RX ADMIN — METRONIDAZOLE 500 MG: 500 INJECTION, SOLUTION INTRAVENOUS at 06:05

## 2018-05-10 RX ADMIN — LEVOTHYROXINE SODIUM 75 MCG: 25 TABLET ORAL at 06:05

## 2018-05-10 RX ADMIN — MEROPENEM AND SODIUM CHLORIDE 1 G: 1 INJECTION, SOLUTION INTRAVENOUS at 06:05

## 2018-05-10 RX ADMIN — SUCRALFATE 1 G: 1 SUSPENSION ORAL at 05:05

## 2018-05-10 RX ADMIN — FAMOTIDINE 20 MG: 10 INJECTION INTRAVENOUS at 08:05

## 2018-05-10 RX ADMIN — MIDAZOLAM HYDROCHLORIDE 1 MG: 1 INJECTION INTRAMUSCULAR; INTRAVENOUS at 02:05

## 2018-05-10 RX ADMIN — METRONIDAZOLE 500 MG: 500 INJECTION, SOLUTION INTRAVENOUS at 01:05

## 2018-05-10 RX ADMIN — SUCRALFATE 1 G: 1 SUSPENSION ORAL at 11:05

## 2018-05-10 RX ADMIN — LIDOCAINE HYDROCHLORIDE AND EPINEPHRINE: 10; 10 INJECTION, SOLUTION INFILTRATION; PERINEURAL at 02:05

## 2018-05-10 RX ADMIN — HYDROMORPHONE HYDROCHLORIDE 0.5 MG: 2 INJECTION INTRAMUSCULAR; INTRAVENOUS; SUBCUTANEOUS at 09:05

## 2018-05-10 RX ADMIN — DIPHENHYDRAMINE HYDROCHLORIDE 25 MG: 25 CAPSULE ORAL at 11:05

## 2018-05-10 RX ADMIN — ACETAMINOPHEN 1000 MG: 10 INJECTION, SOLUTION INTRAVENOUS at 01:05

## 2018-05-10 RX ADMIN — MONTELUKAST SODIUM 10 MG: 10 TABLET, FILM COATED ORAL at 08:05

## 2018-05-10 RX ADMIN — METRONIDAZOLE 500 MG: 500 INJECTION, SOLUTION INTRAVENOUS at 08:05

## 2018-05-10 RX ADMIN — SODIUM CHLORIDE 1750 MG: 9 INJECTION, SOLUTION INTRAVENOUS at 03:05

## 2018-05-10 RX ADMIN — FAMOTIDINE 20 MG: 10 INJECTION INTRAVENOUS at 10:05

## 2018-05-10 RX ADMIN — FENTANYL CITRATE 25 MCG: 50 INJECTION, SOLUTION INTRAMUSCULAR; INTRAVENOUS at 02:05

## 2018-05-10 RX ADMIN — MEROPENEM AND SODIUM CHLORIDE 1 G: 1 INJECTION, SOLUTION INTRAVENOUS at 05:05

## 2018-05-10 RX ADMIN — SODIUM CHLORIDE, PRESERVATIVE FREE 10 ML: 5 INJECTION INTRAVENOUS at 05:05

## 2018-05-10 RX ADMIN — ENOXAPARIN SODIUM 40 MG: 100 INJECTION SUBCUTANEOUS at 05:05

## 2018-05-10 RX ADMIN — TRIAMTERENE AND HYDROCHLOROTHIAZIDE 2 TABLET: 37.5; 25 TABLET ORAL at 10:05

## 2018-05-10 RX ADMIN — SODIUM CHLORIDE: 0.9 INJECTION, SOLUTION INTRAVENOUS at 05:05

## 2018-05-10 RX ADMIN — LOPERAMIDE HYDROCHLORIDE 2 MG: 1 SOLUTION ORAL at 08:05

## 2018-05-10 RX ADMIN — HYDROMORPHONE HYDROCHLORIDE 0.5 MG: 2 INJECTION INTRAMUSCULAR; INTRAVENOUS; SUBCUTANEOUS at 05:05

## 2018-05-10 RX ADMIN — MIDAZOLAM 1 MG: 1 INJECTION INTRAMUSCULAR; INTRAVENOUS at 02:05

## 2018-05-10 RX ADMIN — KETOROLAC TROMETHAMINE 15 MG: 30 INJECTION, SOLUTION INTRAMUSCULAR at 06:05

## 2018-05-10 NOTE — ANESTHESIA PREPROCEDURE EVALUATION
05/09/2018  Yodit Canseco is a 57 y.o., female.    Pre-op Assessment    I have reviewed the Patient Summary Reports.     I have reviewed the Nursing Notes.   I have reviewed the Medications.     Review of Systems  Anesthesia Hx:  No problems with previous Anesthesia  Denies Family Hx of Anesthesia complications.   Denies Personal Hx of Anesthesia complications.   Social:  Non-Smoker    Cardiovascular:   hyperlipidemia ECG has been reviewed.    Pulmonary:   Asthma mild Recent URI    Renal/:  Renal/ Normal     Hepatic/GI:   Sleeve gastrectomy    Musculoskeletal:   Arthritis     Neurological:   Headaches    Endocrine:   Hypothyroidism        Physical Exam  General:  Well nourished, Morbid Obesity, Malnutrition    Airway/Jaw/Neck:  Airway Findings: Mouth Opening: Normal Tongue: Normal  General Airway Assessment: Adult  Oropharynx Findings:  Mallampati: II  Jaw/Neck Findings:  Neck ROM: Normal ROM     Eyes/Ears/Nose:  Eyes/Ears/Nose Findings:    Dental:  Dental Findings:   Chest/Lungs:  Chest/Lungs Findings: Normal Respiratory Rate     Heart/Vascular:  Heart Findings: Rate: Normal  Rhythm: Regular Rhythm        Mental Status:  Mental Status Findings:  Cooperative, Alert and Oriented         Anesthesia Plan  Type of Anesthesia, risks & benefits discussed:  Anesthesia Type:  general  Patient's Preference:   Intra-op Monitoring Plan: standard ASA monitors  Intra-op Monitoring Plan Comments:   Post Op Pain Control Plan: multimodal analgesia  Post Op Pain Control Plan Comments:   Induction:   IV  Beta Blocker:  Patient is not currently on a Beta-Blocker (No further documentation required).       Informed Consent: Patient understands risks and agrees with Anesthesia plan.  Questions answered. Anesthesia consent signed with patient.  ASA Score: 3     Day of Surgery Review of History & Physical:    H&P update  referred to the surgeon.     Anesthesia Plan Notes: Recently febrile, diarrhea and poor nutrition.  No vomiting.        Ready For Surgery From Anesthesia Perspective.

## 2018-05-10 NOTE — PLAN OF CARE
Problem: Physical Therapy Goal  Goal: Physical Therapy Goal  Goals to be met by: 2018     Patient will increase functional independence with mobility by performin. Sit to stand transfer with Supervision  2. Gait  x 250x2 feet with Contact Guard Assistance using .   3. Lower extremity exercise program x20 reps per handout, with assistance as needed    Outcome: Ongoing (interventions implemented as appropriate)  PT eval and treat completed. Gait to hallways 250ft with NG tube suspended by nurse Lakesha, back to chair with son present

## 2018-05-10 NOTE — PROGRESS NOTES
"[facility] Surgery  Progress Note    Subjective:     History of Present Illness:  56 y/o female who underwent sleeve gastrectomy on 4/16/18, presents now with fevers at home 101.9.  Complains of some abdominal spasm and chills.  Has been able to tolerate diet.  Denies n/v, has had some diarrhea, no constipation    Post-Op Info:  Procedure(s):  REMOVAL-STENT-ESOPHAGEAL, UPPER ENDOSCOPIC STENT REMOVAL AND INSERTION NASOJEJUNAL TUBE   1 Day Post-Op     Interval History: stent out, feels much better    Medications:  Continuous Infusions:   sodium chloride 0.9% 75 mL/hr at 05/08/18 1304     Scheduled Meds:   acetaminophen  1,000 mg Intravenous Q8H    enoxaparin  40 mg Subcutaneous Daily    famotidine (PF)  20 mg Intravenous BID    fluticasone-vilanterol  1 puff Inhalation Daily    levothyroxine  75 mcg Oral Before breakfast    meropenem (MERREM) IVPB  1 g Intravenous Q8H    metronidazole  500 mg Intravenous Q8H    montelukast  10 mg Oral QHS    pantoprazole  40 mg Intravenous Daily    senna-docusate 8.6-50 mg  1 tablet Oral BID    sucralfate  1 g Oral Q6H    triamterene-hydrochlorothiazide 37.5-25 mg  2 tablet Oral Daily    vancomycin (VANCOCIN) IVPB  1,750 mg Intravenous Q12H     PRN Meds:albuterol-ipratropium 2.5mg-0.5mg/3mL, dextrose 50%, diazePAM, fentaNYL, glucagon (human recombinant), glucose, glucose, hydrocodone-apap 7.5-325 MG/15 ML, methocarbamol (ROBAXIN) IVPB, ondansetron, prochlorperazine, promethazine (PHENERGAN) IVPB, promethazine (PHENERGAN) IVPB, ramelteon, sodium chloride 0.9%, sodium chloride 0.9%     Review of patient's allergies indicates:   Allergen Reactions    Bromelains Hives     " causes mouth to bleed"    Sudafed [pseudoephedrine hcl] Other (See Comments)     Does not want to wake up .    Amoxicillin-pot clavulanate Itching     Other reaction(s): Itching    Hydrocodone Itching    Iodinated contrast- oral and iv dye      childhood    Iodine and " iodide containing products Other (See Comments)    Melon Hives    Pantoprazole Nausea Only     Other reaction(s): upset stomach/halitosis    Percocet [oxycodone-acetaminophen] Itching    Barium iodide Rash    Ephedrine Other (See Comments)     Other reaction(s): comatose    Penicillins      Other reaction(s): does not work on pt symptoms     Objective:     Vital Signs (Most Recent):  Temp: 96.4 °F (35.8 °C) (05/10/18 0728)  Pulse: (!) 58 (05/10/18 0810)  Resp: 16 (05/10/18 0810)  BP: (!) 142/80 (05/10/18 0728)  SpO2: 100 % (05/10/18 0810) Vital Signs (24h Range):  Temp:  [96.2 °F (35.7 °C)-98.8 °F (37.1 °C)] 96.4 °F (35.8 °C)  Pulse:  [56-90] 58  Resp:  [10-19] 16  SpO2:  [92 %-100 %] 100 %  BP: (135-183)/() 142/80     Weight: 88.4 kg (194 lb 14.2 oz)  Body mass index is 35.65 kg/m².    Intake/Output - Last 3 Shifts       05/08 0700 - 05/09 0659 05/09 0700 - 05/10 0659 05/10 0700 - 05/11 0659    P.O. 0 60     I.V. (mL/kg) 2715.8 (30.7) 1400 (15.8)     IV Piggyback 1050 1400     Total Intake(mL/kg) 3765.8 (42.6) 2860 (32.4)     Urine (mL/kg/hr)  850 (0.4)     Drains   0 (0)    Blood  1 (0)     Total Output   851 0    Net +3765.8 +2009 0           Urine Occurrence 4 x 2 x     Stool Occurrence 1 x 2 x           Physical Exam   Constitutional: She is oriented to person, place, and time. She appears well-developed and well-nourished.   HENT:   Head: Normocephalic and atraumatic.   Eyes: EOM are normal.   Neck: Normal range of motion. No JVD present.   Cardiovascular: Normal rate and regular rhythm.    Pulmonary/Chest: Effort normal. No respiratory distress.   Abdominal: Soft. She exhibits no distension and no mass. There is no tenderness. There is no guarding.   Musculoskeletal: Normal range of motion. She exhibits no edema or deformity.   Neurological: She is alert and oriented to person, place, and time.   Skin: Skin is warm and dry.       Significant Labs:  CBC:   Recent Labs  Lab 05/10/18  0456   WBC  15.70*   RBC 4.24   HGB 11.9*   HCT 36.4*   *   MCV 86   MCH 28.1   MCHC 32.7     BMP:   Recent Labs  Lab 05/10/18  0456   *      K 3.8      CO2 29   BUN 13   CREATININE 0.7   CALCIUM 8.9   MG 2.0       Significant Diagnostics:  CT: I have reviewed all pertinent results/findings within the past 24 hours and my personal findings are:  sleeve leak, abscess    Assessment/Plan:     Gastric leak    Stent out        Intraperitoneal abscess    drain        S/P laparoscopic sleeve gastrectomy    Leak, plan for drain  Npo  Feeds via nasojejunal tube  antibiotics              Chai Reese MD  General Surgery  [facility]

## 2018-05-10 NOTE — PROGRESS NOTES
Progress Note  Hospital Medicine  Patient Name:Yodit Canseco  MRN:  [id number]  Patient Class: IP- Inpatient  Admit Date: 5/6/2018  Length of Stay: 3 days  Expected Discharge Date:   Attending Physician: Cristian Ashraf MD  Primary Care Provider:  Miky Lewis MD    SUBJECTIVE:     Principal Problem: Sepsis  Initial history of present illness: Yodit Canseco is a 56 yo female with PMHx significant for morbid obesity, history of gastroparesis, DM-2 (resolved), hyperlipidemia and hypothyroidism.  She is admitted to the service of hospital medicine with severe sepsis.  She presented to the ED with a 5 day history of subjective fever with tmax 103F.  She states the fever is associated with chills, malaise and nausea.  The fevers are responsive to tylenol and seem to be worse at night. Patient is 3 weeks post gastric sleeve surgery. She reports that she is tolerating her PO intake and recently had relatives over who have been sick with symptoms of fever, chills, and diarrhea. Patient denies diarrhea, vomiting, abdominal pain, chest pain, shortness of breath, cough, urinary symptoms, or neck stiffness.     PMH/PSH/SH/FH/Meds: reviewed.    Symptoms/Review of Systems: Esophageal stent removed last night by Dr. Reese as patient was not tolerating it. NJ tube re-inserted.  No shortness of breath, cough, chest pain or headache, fever or abdominal pain.   Diet: NPO  Activity level: Normal.    Pain:  As above     OBJECTIVE:   Vital Signs (Most Recent):      Temp: 96.4 °F (35.8 °C) (05/10/18 0728)  Pulse: (!) 58 (05/10/18 0810)  Resp: 16 (05/10/18 0810)  BP: (!) 142/80 (05/10/18 0728)  SpO2: 100 % (05/10/18 0810)       Vital Signs Range (Last 24H):  Temp:  [96.2 °F (35.7 °C)-99 °F (37.2 °C)]   Pulse:  [56-90]   Resp:  [10-19]   BP: (135-183)/()   SpO2:  [92 %-100 %]     Weight: 88.4 kg (194 lb 14.2 oz)  Body mass index is 35.65 kg/m².    Intake/Output Summary (Last 24 hours) at 05/10/18 1828  Last data filed at  05/10/18 0500   Gross per 24 hour   Intake             2860 ml   Output              851 ml   Net             2009 ml     Physical Examination:  Constitutional: She is oriented to person, place, and time. She appears well-developed and well-nourished. No distress.   HENT:   Head: Normocephalic and atraumatic.   Mouth/Throat: Oropharynx is clear and moist.   Eyes: Conjunctivae and EOM are normal. Pupils are equal, round, and reactive to light. No scleral icterus.   Neck: Normal range of motion. Neck supple. No JVD present. No tracheal deviation present. No thyromegaly present.   Cardiovascular: Normal rate, regular rhythm, normal heart sounds and intact distal pulses.  Exam reveals no gallop and no friction rub.    No murmur heard.  Pulses:       Dorsalis pedis pulses are 2+ on the right side, and 2+ on the left side.   Pulmonary/Chest: Effort normal and breath sounds normal. No accessory muscle usage. No respiratory distress. She has no wheezes. She has no rhonchi. She has no rales.   Abdominal: Soft. Bowel sounds are normal. She exhibits no distension and no mass. There is tenderness (Left upper and lower quadrant). There is no rebound and no guarding.   Musculoskeletal: Normal range of motion. She exhibits no edema, tenderness or deformity.   Neurological: She is alert and oriented to person, place, and time. She has normal strength. She exhibits normal muscle tone. Coordination normal.   Skin: Skin is warm, dry and intact. Capillary refill takes less than 2 seconds. No rash noted. She is not diaphoretic. No erythema.   Psychiatric: She has a normal mood and affect. Her speech is normal and behavior is normal. Judgment and thought content normal.   Vitals reviewed.  CRANIAL NERVES      CN III, IV, VI   Pupils are equal, round, and reactive to light.  Extraocular motions are normal.      CBC:    Recent Labs  Lab 05/08/18  0504 05/09/18  0532 05/10/18  0456   WBC 27.20* 22.30* 15.70*   RBC 4.53 4.43 4.24   HGB 12.9  12.6 11.9*   HCT 38.3 38.0 36.4*   * 335 351*   MCV 85 86 86   MCH 28.4 28.5 28.1   MCHC 33.5 33.2 32.7   BMP    Recent Labs  Lab 05/08/18  0504 05/09/18  0532 05/10/18  0456   * 119* 141*    144 145   K 3.5 3.9 3.8    110 110   CO2 23 23 29   BUN 11 12 13   CREATININE 0.8 0.7 0.7   CALCIUM 9.1 9.1 8.9   MG 1.8 2.0 2.0      Diagnostic Results:  Microbiology Results (last 7 days)     Procedure Component Value Units Date/Time    Blood Culture #1 [[id number]] Collected:  05/07/18 0216    Order Status:  Completed Specimen:  Blood Updated:  05/09/18 1212     Blood Culture, Routine No Growth to date     Blood Culture, Routine No Growth to date     Blood Culture, Routine No Growth to date    Blood Culture #2 [[id number]] Collected:  05/07/18 0216    Order Status:  Completed Specimen:  Blood Updated:  05/09/18 1212     Blood Culture, Routine No Growth to date     Blood Culture, Routine No Growth to date     Blood Culture, Routine No Growth to date    Urine Culture [[id number]] Collected:  05/07/18 0114    Order Status:  Completed Specimen:  Urine from Urine, Clean Catch Updated:  05/09/18 0052     Urine Culture, Routine No growth         CT abdomen:  Abnormal stranding in the fat adjacent to the sleeve gastrectomy which is nonspecific.  No extraluminal gas or leakage of oral contrast identified.  Differential considerations include inflammation and blood products.  No drainable fluid collection to suggest abscess.  Small left pleural effusion and left basilar atelectasis.  Hysterectomy and cholecystectomy.  Colonic diverticulosis.    KUB: Interval placement of an esophagogastric stent centered at the level of the GE junction.  Weighted enteric tube tip is at the level of the gastric antrum. Left basilar pulmonary consolidation with air bronchograms for which differential considerations include pneumonia and aspiration.    EGD:  Endoscopy was performed showing a normal esophagus and duodenum.  Stomach  had inflamed appearing mucosa s/p sleeve.  Proximal staple line appeared swollen and erythematous.  May have underlying leak but not obvious.  NO obvious leaks seen.  A stent was placed over a guide wire [employer] 23 mm by 155 mm under fluoroscopy.  A keyo feeding tube was placed distal to this.      Assessment/Plan:     * Sepsis with Intraperitoneal abscess with possible leak from anastomosis  S/p Esophageal stent placement and then removal          Discussed with Dr. Reese. Follow repeat CT abdomen today, may require IR drainage. WBC is better.  Antibiotics given-              Antibiotics      Start     Stop Route Frequency Ordered     05/07/18 0430   metronidazole IVPB 500 mg      -- IV Every 8 hours (non-standard times) 05/07/18 0428 05/07/18 0430   meropenem-0.9% sodium chloride 1 g/50 mL IVPB      -- IV Every 8 hours (non-standard times) 05/07/18 0428       Follow blood cultures and urine cultures  Continue IV Merrem, Vanc and Metronidazole and deescalate when appropriate.          S/P laparoscopic sleeve gastrectomy  Gastric stent placement     Keep NPO. Tube feeding is held.  Continue broad spectrum antibiotics  Follow General Surgery recommendations. Use IV valium for gastric spasms prn.      Hypophosphatemia  Replete potassium phosphate. Follow level.     Severe persistent asthma without complication     Chronic problem. Controlled.  Continue home medications and monitor closely for exacerbations, adjusting medications as necessary.       Hypothyroidism     Chronic problem. TSH reviewed - euthyroid.  Continue home levothyroxine.     Discussed with patient's . Encouraged patient to get out of bed. Continue PT.     VTE Risk Mitigation         Ordered     enoxaparin injection 40 mg  Daily      05/07/18 2205     IP VTE HIGH RISK PATIENT  Once      05/07/18 0428     Place STEPHEN hose  Until discontinued      05/07/18 0428     Place sequential compression device  Until discontinued       05/07/18 0428     Reason for No Pharmacological VTE Prophylaxis  Once      05/07/18 0428        Cristian Ashraf MD  Department of Hospital Medicine   [facility]

## 2018-05-10 NOTE — PT/OT/SLP EVAL
Physical Therapy Evaluation    Patient Name:  Yodit Canseco   MRN:  [id number]    Recommendations:     Discharge Recommendations:  home   Discharge Equipment Recommendations: none   Barriers to discharge: None    Assessment:     Yodit Canseco is a 57 y.o. female admitted with a medical diagnosis of Sepsis.  She presents with the following impairments/functional limitations:  weakness, impaired endurance, impaired self care skills . Pt NPO with NG tube for nutrition. Pt ambulated well to hallways. Pt to benefit from continued therapies to restore independency and prevent deconditioning    Rehab Prognosis:  fair; patient would benefit from acute skilled PT services to address these deficits and reach maximum level of function.      Recent Surgery: Procedure(s):  REMOVAL-STENT-ESOPHAGEAL, UPPER ENDOSCOPIC STENT REMOVAL AND INSERTION NASOJEJUNAL TUBE 1 Day Post-Op    Plan:     During this hospitalization, patient to be seen 6 x/week to address the above listed problems via gait training, therapeutic activities, therapeutic exercises  · Plan of Care Expires:  05/25/18   Plan of Care Reviewed with: patient, son    Subjective     Communicated with nurse Crowder prior to session.  Patient found seated at the bedside chair upon PT entry to room, agreeable to evaluation.    Pt wanting to use bathroom prior to gait  Pt stated is able to ambulate around room. Pt denies nausea  nurse Crowder at bedside for meds thru NG  Pt with allergies and unable to tolerate scents and perfumes    Chief Complaint: none  Patient comments/goals: get stronger  Pain/Comfort:  · Pain Rating 1: 0/10    Patients cultural, spiritual, [employer] conflicts given the current situation:      Living Environment:  Home with spouse  Prior to admission, patients level of function was independent- uses walker for long distance ambulation.  Patient has the following equipment: walker, rolling.  DME owned (not currently used): .  Upon discharge, patient  will have assistance from family.    Objective:     Patient found with: NG tube, peripheral IV, SCD, telemetry     General Precautions: Standard,     Orthopedic Precautions:N/A   Braces: N/A     Exams:  · RLE ROM: WFL  · RLE Strength: WFL  · LLE ROM: WFL  · LLE Strength: WFL    Functional Mobility:  · Bed Mobility:     · Scooting: supervision  · Transfers:     · Sit to Stand:  supervision with no AD  · Toilet Transfer: supervision with  grab bars  using  Stand Pivot  · Gait: 250ft with NG clamped    AM-PAC 6 CLICK MOBILITY  Total Score:        Therapeutic Activities and Exercises:   bathroom use with supervision and set up for hygiene care  Gait to hallways with functional speed  Back to chair  Pt educated on ankle pumping exercises    Patient left up in chair with all lines intact, call button in reach, nurse Lakesha for NG hook up and IV notified and son present.    GOALS:    Physical Therapy Goals        Problem: Physical Therapy Goal    Goal Priority Disciplines Outcome Goal Variances Interventions   Physical Therapy Goal     PT/OT, PT Ongoing (interventions implemented as appropriate)     Description:  Goals to be met by: 2018     Patient will increase functional independence with mobility by performin. Sit to stand transfer with Supervision  2. Gait  x 250x2 feet with Contact Guard Assistance using .   3. Lower extremity exercise program x20 reps per handout, with assistance as needed                      History:     Past Medical History:   Diagnosis Date    Allergy     Angio-edema     Arthralgia     Asthma without status asthmaticus     Bronchitis     Diverticulosis of large intestine without hemorrhage 10/8/2015    Environmental allergies     Eosinophilic asthma 3/8/2018    Gastroparesis     Hand arthritis     Herpes simplex without mention of complication     oral    Hyperlipidemia     Hypothyroidism     LBP radiating to left leg     Leukocytosis, unspecified     Migraine  headache     Obesity, Class III, BMI 40-49.9 (morbid obesity)     Periorbital cellulitis 12/31/2016    Prediabetes     Reactive airway disease     Reactive airway disease     Recurrent upper respiratory infection (URI)     S/P colonoscopy November 2010    Urticaria        Past Surgical History:   Procedure Laterality Date    ADENOIDECTOMY      CHOLECYSTECTOMY      CHOLECYSTECTOMY      COLONOSCOPY  2010    COLONOSCOPY N/A 10/8/2015    Procedure: COLONOSCOPY;  Surgeon: Panda Bose MD;  Location: [facility];  Service: Endoscopy;  Laterality: N/A;    Hand fracture surgery      HARDWARE REMOVAL      left hand 5th MC    hymenectomy      HYSTERECTOMY      menorrhagia-Ovaries intact    SLEEVE GASTROPLASTY  04/16/2018    TONSILLECTOMY      Urethral dilatation         Clinical Decision Making:     History  Co-morbidities and personal factors that may impact the plan of care Examination  Body Structures and Functions, activity limitations and participation restrictions that may impact the plan of care Clinical Presentation   Decision Making/ Complexity Score   Co-morbidities:   [] Time since onset of injury / illness / exacerbation  [] Status of current condition  []Patient's cognitive status and safety concerns    [] Multiple Medical Problems (see med hx)  Personal Factors:   [] Patient's age  [] Prior Level of function   [] Patient's home situation (environment and family support)  [] Patient's level of motivation  [] Expected progression of patient      HISTORY:(criteria)    [] 80850 - no personal factors/history    [] 25036 - has 1-2 personal factor/comorbidity     [] 48778 - has >3 personal factor/comorbidity     Body Regions:  [] Objective examination findings  [] Head     []  Neck  [] Trunk   [] Upper Extremity  [] Lower Extremity    Body Systems:  [] For communication ability, affect, cognition, language, and learning style: the assessment of the ability to make needs known, consciousness,  orientation (person, place, and time), expected emotional /behavioral responses, and learning preferences (eg, learning barriers, education  needs)  [] For the neuromuscular system: a general assessment of gross coordinated movement (eg, balance, gait, locomotion, transfers, and transitions) and motor function  (motor control and motor learning)  [] For the musculoskeletal system: the assessment of gross symmetry, gross range of motion, gross strength, height, and weight  [] For the integumentary system: the assessment of pliability(texture), presence of scar formation, skin color, and skin integrity  [] For cardiovascular/pulmonary system: the assessment of heart rate, respiratory rate, blood pressure, and edema     Activity limitations:    [] Patient's cognitive status and saf ety concerns          [] Status of current condition      [] Weight bearing restriction  [] Cardiopulmunary Restriction    Participation Restrictions:   [] Goals and goal agreement with the patient     [] Rehab potential (prognosis) and probable outcome      Examination of Body System: (criteria)    [] 85365 - addressing 1-2 elements    [] 52865 - addressing a total of 3 or more elements     [] 25964 -  Addressing a total of 4 or more elements         Clinical Presentation: (criteria)  Choose one     On examination of body system using standardized tests and measures patient presents with (CHOOSE ONE) elements from any of the following: body structures and functions, activity limitations, and/or participation restrictions.  Leading to a clinical presentation that is considered (CHOOSE ONE)                              Clinical Decision Making  (Eval Complexity):  Choose One     Time Tracking:     PT Received On: 05/10/18  PT Start Time: 1042     PT Stop Time: 1115  PT Total Time (min): 33 min     Billable Minutes: Evaluation 15 and Gait Training 18      Christelle Meyers, PT  05/10/2018

## 2018-05-10 NOTE — NURSING
Notified of CT Abscess drainage order. Pt currently is receiving tube feeding- stopped feed and checked residual. Zero residual present. Plan to proceed at 2pm.

## 2018-05-10 NOTE — TRANSFER OF CARE
"Anesthesia Transfer of Care Note    Patient: Yodit Canseco    Procedure(s) Performed: Procedure(s):  REMOVAL-STENT-ESOPHAGEAL, UPPER ENDOSCOPIC STENT REMOVAL AND INSERTION NASOGASTRIC TUBE    Patient location: PACU    Anesthesia Type: general    Transport from OR: Transported from OR on 2-3 L/min O2 by NC with adequate spontaneous ventilation    Post pain: adequate analgesia    Post assessment: no apparent anesthetic complications and tolerated procedure well    Post vital signs: stable    Level of consciousness: awake and alert    Nausea/Vomiting: no nausea/vomiting    Complications: none    Transfer of care protocol was followed      Last vitals:   Visit Vitals  BP (!) 168/119 (BP Location: Right arm, Patient Position: Lying)   Pulse 88   Temp 36.8 °C (98.2 °F) (Oral)   Resp 18   Ht 5' 2" (1.575 m)   Wt 88.4 kg (194 lb 14.2 oz)   SpO2 (!) 93%   Breastfeeding? No   BMI 35.65 kg/m²     "

## 2018-05-10 NOTE — OP NOTE
Radiology Post-Procedure Note    Pre Op Diagnosis: gastric leak  Post Op Diagnosis: perigastric abscess    Procedure: CT guided percutaneous LUQ abscess drain     Procedure performed by: Priyank    Written Informed Consent Obtained: Yes  Specimen Removed: YES   Estimated Blood Loss: Minimal    Findings:   12 fr pigtail drain placed into perigastric abscess with drainage of ~40 ml of purulent fluid. Collection nearly resolved.  No immediate post-procedure complication.     Patient tolerated procedure well.    @SIG@

## 2018-05-10 NOTE — PLAN OF CARE
Problem: Patient Care Overview  Goal: Plan of Care Review  Outcome: Ongoing (interventions implemented as appropriate)  Prn tx not required at this time.  Patient refused Breo mdi at this time stated she had sore throat.  Hr 58 and 02 [id number]% on nc at 3lpm.  Decreased to 1lpm.

## 2018-05-10 NOTE — PLAN OF CARE
Problem: Nutrition, Enteral (Adult)  Intervention: Monitor/Manage Nutrition Support  Recommendations    1) Enteral:    Nutren 2.0 @ 10 mls/hr to advance by 10 mls/hr Q 6 hrs to goal of 50 mls/hr.(from 5 pm to 8 am daily (15 hrs)   Flush with 150 mls Q 4 hrs.    Add ProMod, protein supplement, 1 oz, tid.     At goal including ProMod provides 1510 calories, 93 gms protein, 162 gms CHO, 519 mls free water.    2) If pt is unable to tolerate enteral or PO feeds, consider PPN as a bridge. Clinimix E @ 60 mls/hr for the first 24 hrs, then 125 mls/hr with 20% lipids (250 mls). At goal provides 1340 calories, 83 gms protein, 3000 mls fluid, GIR 1.18.      Goal: 1) Pt will receive nutrition within 48 hrs. 2) Pt will receive at least 80% EEN within 48 hrs.   Nutrition Goal Status: 1) met 2) new  Communication of VANESA Recs:  (POC)

## 2018-05-10 NOTE — PLAN OF CARE
Pt arrived via bed from PCU, room 214 for an abdominal abscess drainage. Pt AAOx4.- procedure explained and consent obtained by Dr Yost. Time out performed. Pt received Fentanyl 75 mcg and Versed 3 mg IV. Vital signs monitored and remained stable for duration of procedure. 12 Mauritian drain placed by MD- approx 30 mls of purulent creamy serosanguinous material drained. Specimen submitted to lab per orders. Drain left in place and attached to accordian drainage bag. Securement device applied to pts left upper abdomen. Educated patient on drain care.  Report called to Lakesha on PCU.  Patient then had PICC line placed. Pt transported back to room via bed in stable condition.

## 2018-05-10 NOTE — PROGRESS NOTES
[facility]  Adult Nutrition  Consult Note    SUMMARY     Recommendations    1) Enteral:    Nutren 2.0 @ 10 mls/hr to advance by 10 mls/hr Q 6 hrs to goal of 50 mls/hr.(from 5 pm to 8 am daily (15 hrs)   Flush with 150 mls Q 4 hrs.    Add ProMod, protein supplement, 1 oz, tid.     At goal including ProMod provides 1510 calories, 93 gms protein, 162 gms CHO, 519 mls free water.    2) If pt is unable to tolerate enteral or PO feeds, consider PPN as a bridge. Clinimix E @ 60 mls/hr for the first 24 hrs, then 125 mls/hr with 20% lipids (250 mls). At goal provides 1340 calories, 83 gms protein, 3000 mls fluid, GIR 1.18.      Goal: 1) Pt will receive nutrition within 48 hrs. 2) Pt will receive at least 80% EEN within 48 hrs.   Nutrition Goal Status: 1) met 2) new  Communication of RD Recs:  (POC)    Discharge Plan:  Nutren 2.0:  Provide 1-250  mls carton 3  x daily. 6 am,  12 noon,  6 pm .  Use ProMod protein supplement, 1 oz, tid. Flush with 275 mls water (about 1 3/4 cup)  4 x daily  for hydration.  Provides 1510 calories, 93 gms protein, 162 gms CHO, 519 mls free water.    Reason for Assessment    Reason for Assessment: RD follow up   1. Sepsis, due to unspecified organism    2. Abdominal pain, unspecified abdominal location    3. S/P bariatric surgery    4. Gastric leak    5. Sepsis    6. S/P laparoscopic sleeve gastrectomy      Past Medical History:   Diagnosis Date    Allergy     Angio-edema     Arthralgia     Asthma without status asthmaticus     Bronchitis     Diverticulosis of large intestine without hemorrhage 10/8/2015    Environmental allergies     Eosinophilic asthma 3/8/2018    Gastroparesis     Hand arthritis     Herpes simplex without mention of complication     oral    Hyperlipidemia     Hypothyroidism     LBP radiating to left leg     Leukocytosis, unspecified     Migraine headache     Obesity, Class III, BMI 40-49.9 (morbid obesity)     Periorbital cellulitis  "2016    Prediabetes     Reactive airway disease     Reactive airway disease     Recurrent upper respiratory infection (URI)     S/P colonoscopy 2010    Urticaria        Interdisciplinary Rounds: attended  General Information Comments: Admitted with fever. Pt is s/p gastric sleeve x 3 weeks.  Intentional wt loss noted.   18 Spoke with Dr. Reese re continuous feeds. Per MD direction, continuous feeds during admit and bolus feeds at discharge.  Temporary enteral feeds until gut heals. .  Yesterday, pt reported intolerance to whey and lactose, but did not know about casein. Today pt and her spouse report concerns about using Nutren 2.0 2/2 milk intolerance.  Assured pt that Nutren 2.0 does not have whey or lactose, but does contain casein.  Spouse states he does not want to try the Nutren 2.0, and reports pt can tolerate almond milk.  Dr. Reese spoke with family and they agreed to try Nutren 2.0.  The order for Nutren 2.0 has been activated at this time.  Monitoring for tolerance.    Found TF that is milk, egg, pea and artificial sweetener free.  Special ordered Vivonex RTF and it is being shipped overnight for pt use if needed.     5/10/18: Vivonex RTF is in house if needed. Pt alert, sitting up and talking. TF, Nutren 2.0 infusing @ 10 mls/hr. tolerating    Nutrition Risk Screen    Nutrition Risk Screen: no indicators present    Nutrition/Diet History    Do you have any cultural, spiritual, Orthodoxy conflicts, given your current situation?: none  Food Allergies: egg, milk (pea, artificial sweeteners)    Anthropometrics    Temp: 96.4 °F (35.8 °C)  Height Method: Stated  Height: 5' 2"  Height (inches): 62 in  Weight Method: Stated  Weight: 88.4 kg (194 lb 14.2 oz)  Weight (lb): 194.89 lb  Ideal Body Weight (IBW), Female: 110 lb  % Ideal Body Weight, Female (lb): 177.17 lb  BMI (Calculated): 35.7  Usual Body Weight (UBW), k.6 kg  % Usual Body Weight: 88.06  % Weight Change From Usual " Weight: -12.13 %       Lab/Procedures/Meds    Pertinent Labs Reviewed: reviewed  Lab Results   Component Value Date    ALBUMIN 2.2 (L) 05/10/2018     Lab Results   Component Value Date    CRP 7.6 06/06/2016     Lab Results   Component Value Date    WBC 15.70 (H) 05/10/2018     Pertinent Medications Reviewed: reviewed  Scheduled Meds:   acetaminophen  1,000 mg Intravenous Q8H    enoxaparin  40 mg Subcutaneous Daily    famotidine (PF)  20 mg Intravenous BID    fluticasone-vilanterol  1 puff Inhalation Daily    levothyroxine  75 mcg Oral Before breakfast    meropenem (MERREM) IVPB  1 g Intravenous Q8H    metronidazole  500 mg Intravenous Q8H    montelukast  10 mg Oral QHS    pantoprazole  40 mg Intravenous Daily    senna-docusate 8.6-50 mg  1 tablet Oral BID    sucralfate  1 g Oral Q6H    triamterene-hydrochlorothiazide 37.5-25 mg  2 tablet Oral Daily    vancomycin (VANCOCIN) IVPB  1,750 mg Intravenous Q12H     Continuous Infusions:   sodium chloride 0.9% 75 mL/hr at 05/08/18 1304       Physical Findings/Assessment    Overall Physical Appearance: obese, weak  Oral/Mouth Cavity: WDL  Skin:  (Steve score 20)    Estimated/Assessed Needs    Weight Used For Calorie Calculations: 88.4 kg (194 lb 14.2 oz)     Energy Need Method: [facility]: 1417 x 1.25=1771     Weight Used For Protein Calculations: 88.4 kg (194 lb 14.2 oz)    gms/day     Fluid Need Method: RDA Method (or per MD)     CHO Requirement: max 221 gms CHO      Nutrition Prescription Ordered    Current Diet Order: NPO    Evaluation of Received Nutrient/Fluid Intake    Energy Calories Required: not meeting needs  Protein Required: not meeting needs  Fluid Required:per MD rec    Intake/Output Summary (Last 24 hours) at 05/10/18 1059  Last data filed at 05/10/18 0500   Gross per 24 hour   Intake             2860 ml   Output              851 ml   Net             2009 ml     % Intake of Estimated Energy Needs: 25-50 %  % Meal Intake:  NPO    Nutrition Risk    Level of Risk/Frequency of Follow-up:  (2 x wkly)     Assessment and Plan    Gastric leak    Contributing Nutrition Diagnosis  Inadequate energy intake    Related to (etiology):   Altered GI function    Signs and Symptoms (as evidenced by):   NPO with no alternate nutrition    Interventions/Recommendations (treatment strategy):  continue nutrition support progressing per pt tolerance to goal rate     Nutrition Diagnosis Status:   improving                 Monitor and Evaluation    Food and Nutrient Intake: energy intake  Food and Nutrient Adminstration: diet order  Anthropometric Measurements: weight  Biochemical Data, Medical Tests and Procedures: inflammatory profile  Nutrition-Focused Physical Findings: overall appearance, skin     Discharge Plan    Enteral feeds as above    RD Follow-up?: Yes

## 2018-05-10 NOTE — SUBJECTIVE & OBJECTIVE
"Interval History: stent out, feels much better    Medications:  Continuous Infusions:   sodium chloride 0.9% 75 mL/hr at 05/08/18 1304     Scheduled Meds:   acetaminophen  1,000 mg Intravenous Q8H    enoxaparin  40 mg Subcutaneous Daily    famotidine (PF)  20 mg Intravenous BID    fluticasone-vilanterol  1 puff Inhalation Daily    levothyroxine  75 mcg Oral Before breakfast    meropenem (MERREM) IVPB  1 g Intravenous Q8H    metronidazole  500 mg Intravenous Q8H    montelukast  10 mg Oral QHS    pantoprazole  40 mg Intravenous Daily    senna-docusate 8.6-50 mg  1 tablet Oral BID    sucralfate  1 g Oral Q6H    triamterene-hydrochlorothiazide 37.5-25 mg  2 tablet Oral Daily    vancomycin (VANCOCIN) IVPB  1,750 mg Intravenous Q12H     PRN Meds:albuterol-ipratropium 2.5mg-0.5mg/3mL, dextrose 50%, diazePAM, fentaNYL, glucagon (human recombinant), glucose, glucose, hydrocodone-apap 7.5-325 MG/15 ML, methocarbamol (ROBAXIN) IVPB, ondansetron, prochlorperazine, promethazine (PHENERGAN) IVPB, promethazine (PHENERGAN) IVPB, ramelteon, sodium chloride 0.9%, sodium chloride 0.9%     Review of patient's allergies indicates:   Allergen Reactions    Bromelains Hives     " causes mouth to bleed"    Sudafed [pseudoephedrine hcl] Other (See Comments)     Does not want to wake up .    Amoxicillin-pot clavulanate Itching     Other reaction(s): Itching    Hydrocodone Itching    Iodinated contrast- oral and iv dye      childhood    Iodine and iodide containing products Other (See Comments)    Melon Hives    Pantoprazole Nausea Only     Other reaction(s): upset stomach/halitosis    Percocet [oxycodone-acetaminophen] Itching    Barium iodide Rash    Ephedrine Other (See Comments)     Other reaction(s): comatose    Penicillins      Other reaction(s): does not work on pt symptoms     Objective:     Vital Signs (Most Recent):  Temp: 96.4 °F (35.8 °C) (05/10/18 0728)  Pulse: (!) 58 (05/10/18 0810)  Resp: 16 (05/10/18 " 0810)  BP: (!) 142/80 (05/10/18 0728)  SpO2: 100 % (05/10/18 0810) Vital Signs (24h Range):  Temp:  [96.2 °F (35.7 °C)-98.8 °F (37.1 °C)] 96.4 °F (35.8 °C)  Pulse:  [56-90] 58  Resp:  [10-19] 16  SpO2:  [92 %-100 %] 100 %  BP: (135-183)/() 142/80     Weight: 88.4 kg (194 lb 14.2 oz)  Body mass index is 35.65 kg/m².    Intake/Output - Last 3 Shifts       05/08 0700 - 05/09 0659 05/09 0700 - 05/10 0659 05/10 0700 - 05/11 0659    P.O. 0 60     I.V. (mL/kg) 2715.8 (30.7) 1400 (15.8)     IV Piggyback 1050 1400     Total Intake(mL/kg) 3765.8 (42.6) 2860 (32.4)     Urine (mL/kg/hr)  850 (0.4)     Drains   0 (0)    Blood  1 (0)     Total Output   851 0    Net +3765.8 +2009 0           Urine Occurrence 4 x 2 x     Stool Occurrence 1 x 2 x           Physical Exam   Constitutional: She is oriented to person, place, and time. She appears well-developed and well-nourished.   HENT:   Head: Normocephalic and atraumatic.   Eyes: EOM are normal.   Neck: Normal range of motion. No JVD present.   Cardiovascular: Normal rate and regular rhythm.    Pulmonary/Chest: Effort normal. No respiratory distress.   Abdominal: Soft. She exhibits no distension and no mass. There is no tenderness. There is no guarding.   Musculoskeletal: Normal range of motion. She exhibits no edema or deformity.   Neurological: She is alert and oriented to person, place, and time.   Skin: Skin is warm and dry.       Significant Labs:  CBC:   Recent Labs  Lab 05/10/18  0456   WBC 15.70*   RBC 4.24   HGB 11.9*   HCT 36.4*   *   MCV 86   MCH 28.1   MCHC 32.7     BMP:   Recent Labs  Lab 05/10/18  0456   *      K 3.8      CO2 29   BUN 13   CREATININE 0.7   CALCIUM 8.9   MG 2.0       Significant Diagnostics:  CT: I have reviewed all pertinent results/findings within the past 24 hours and my personal findings are:  sleeve leak, abscess

## 2018-05-10 NOTE — NURSING
Pt being transported via hospital bed to CT guided abscess drainage and PICC line placement x 2 assist. Pt has flagyl IV antibiotic and IV tylenol running via pu,p at this time and pump going with Pt. Pt AAOx4, Pt in stable condition, NAD noted, Son present and waiting in Pt room.

## 2018-05-10 NOTE — PLAN OF CARE
"Problem: Patient Care Overview  Goal: Plan of Care Review  Outcome: Ongoing (interventions implemented as appropriate)  POC reviewed with pt and spouse, understanding verbalized. Pt states "feeling much better". Pain controlled with single dose of IV tylenol. Denies nausea. Tube feeding 10 cc/hr. Tolerating well. SR on tele. Safety maintained. Will continue to monitor.       "

## 2018-05-10 NOTE — H&P (VIEW-ONLY)
[facility]  General Surgery  Consult Note    Patient Name: Yodit Canseco  MRN: [id number]  Code Status: Full Code  Admission Date: 5/6/2018  Hospital Length of Stay: 0 days  Attending Physician: Cristian Ashraf MD  Primary Care Provider: Miky Lewis MD    Patient information was obtained from patient and ER records.     Inpatient consult to General Surgery  Consult performed by: VIJAY BAKER  Consult ordered by: OSCAR VELÁZQUEZ  Reason for consult: sleeve leak  Assessment/Recommendations: Npo antibiotics for now        Subjective:     Principal Problem: Sepsis    History of Present Illness: 56 y/o female who underwent sleeve gastrectomy on 4/16/18, presents now with fevers at home 101.9.  Complains of some abdominal spasm and chills.  Has been able to tolerate diet.  Denies n/v, has had some diarrhea, no constipation    No current facility-administered medications on file prior to encounter.      Current Outpatient Prescriptions on File Prior to Encounter   Medication Sig    acetaminophen (TYLENOL) 650 MG TbSR Take 650 mg by mouth every 8 (eight) hours.    albuterol (PROAIR HFA) 90 mcg/actuation inhaler Inhale 1 puff into the lungs every 4 (four) hours as needed for Wheezing or Shortness of Breath.    CALCIUM CARB/D3/MAGNESIUM/ZINC (CALCIUM CARB-D3-MAG CMB11-ZINC) [phone] mg-unit-mg-mg Tab Take 2 tablets by mouth every evening.    diazePAM (VALIUM) 2 MG tablet Take 1 tablet (2 mg total) by mouth every 6 (six) hours as needed for Anxiety (muscle spasm).    estradiol (ESTRACE) 1 MG tablet Take 1 mg by mouth once daily.    immun glob G,IgG,-pro-IgA 0-50 (HIZENTRA) 2 gram/10 mL (20 %) Soln Inject 18 g into the skin every 14 (fourteen) days.    levothyroxine (SYNTHROID) 75 MCG tablet TAKE 1 TABLET(75 MCG) BY MOUTH EVERY DAY    mometasone-formoterol (DULERA) 200-5 mcg/actuation inhaler Inhale 2 puffs into the lungs 2 (two) times daily. Controller    montelukast (SINGULAIR) 10  "mg tablet Take 1 tablet (10 mg total) by mouth every evening.    multivitamin (ONE DAILY MULTIVITAMIN) per tablet Take 1 tablet by mouth once daily.    ondansetron (ZOFRAN-ODT) 4 MG TbDL Take 1 tablet (4 mg total) by mouth every 6 (six) hours as needed.    potassium chloride (MICRO-K) 10 MEQ CpSR TAKE 2 CAPSULES BY MOUTH EVERY DAY    sumatriptan (IMITREX) 100 MG tablet Take 1 po at onset of migraine. May repeat once in 2 hrs if needed. No more than 2 pills in 24 hours    triamterene-hydrochlorothiazide 75-50 mg (MAXZIDE) 75-50 mg per tablet TAKE 1 TABLET BY MOUTH EVERY DAY    albuterol (PROVENTIL) 2.5 mg /3 mL (0.083 %) nebulizer solution Take 3 mLs (2.5 mg total) by nebulization every 4 (four) hours as needed for Wheezing or Shortness of Breath. Every 4-6 hours PRN    triamcinolone acetonide 0.025% (KENALOG) 0.025 % cream Apply topically 2 (two) times daily.       Review of patient's allergies indicates:   Allergen Reactions    Bromelains Hives     " causes mouth to bleed"    Sudafed [pseudoephedrine hcl] Other (See Comments)     Does not want to wake up .    Amoxicillin-pot clavulanate Itching     Other reaction(s): Itching    Hydrocodone Itching    Iodinated contrast- oral and iv dye      childhood    Iodine and iodide containing products Other (See Comments)    Melon Hives    Pantoprazole Nausea Only     Other reaction(s): upset stomach/halitosis    Percocet [oxycodone-acetaminophen] Itching    Barium iodide Rash    Ephedrine Other (See Comments)     Other reaction(s): comatose    Penicillins      Other reaction(s): does not work on pt symptoms       Past Medical History:   Diagnosis Date    Allergy     Angio-edema     Arthralgia     Asthma without status asthmaticus     Bronchitis     Diverticulosis of large intestine without hemorrhage 10/8/2015    Environmental allergies     Eosinophilic asthma 3/8/2018    Gastroparesis     Hand arthritis     Herpes simplex without mention of " complication     oral    Hyperlipidemia     Hypothyroidism     LBP radiating to left leg     Leukocytosis, unspecified     Migraine headache     Obesity, Class III, BMI 40-49.9 (morbid obesity)     Periorbital cellulitis 12/31/2016    Prediabetes     Recurrent upper respiratory infection (URI)     S/P colonoscopy November 2010    Urticaria      Past Surgical History:   Procedure Laterality Date    ADENOIDECTOMY      CHOLECYSTECTOMY      CHOLECYSTECTOMY      COLONOSCOPY  2010    COLONOSCOPY N/A 10/8/2015    Procedure: COLONOSCOPY;  Surgeon: Panda Bose MD;  Location: [facility];  Service: Endoscopy;  Laterality: N/A;    Hand fracture surgery      HARDWARE REMOVAL      left hand 5th MC    hymenectomy      HYSTERECTOMY      menorrhagia-Ovaries intact    SLEEVE GASTROPLASTY  04/16/2018    TONSILLECTOMY      Urethral dilatation       Family History     Problem Relation (Age of Onset)    Basal cell carcinoma Mother    Breast cancer Paternal Aunt, Cousin    Cancer Father, Maternal Grandfather    Cataracts Mother    Colon cancer Paternal Aunt    Diabetes Father, Paternal Aunt, Paternal Aunt    Hypertension Mother, Father    Lung cancer Father    Lung disease Mother    Lymphoma Paternal Aunt    Melanoma Mother, Maternal Aunt, Maternal Uncle    Ovarian cancer Paternal Grandmother    Psoriasis Son, Son    Ulcerative colitis Son        Social History Main Topics    Smoking status: Never Smoker    Smokeless tobacco: Never Used    Alcohol use 0.0 oz/week      Comment: very rarely    Drug use: No    Sexual activity: Yes     Partners: Male     Review of Systems   Constitutional: Positive for chills, fatigue and fever. Negative for appetite change.   HENT: Negative for congestion, hearing loss, postnasal drip, sore throat and trouble swallowing.    Eyes: Negative for photophobia and visual disturbance.   Respiratory: Negative for cough, shortness of breath and wheezing.    Cardiovascular: Negative for  chest pain, palpitations and leg swelling.   Gastrointestinal: Positive for nausea. Negative for abdominal pain, diarrhea and vomiting.   Endocrine: Negative for polydipsia, polyphagia and polyuria.   Genitourinary: Negative for dysuria, frequency and urgency.   Musculoskeletal: Negative for gait problem, neck pain and neck stiffness.   Skin: Negative for rash and wound.   Neurological: Negative for dizziness, weakness, numbness and headaches.   Hematological: Does not bruise/bleed easily.   Psychiatric/Behavioral: Negative for confusion. The patient is not nervous/anxious.      Objective:     Vital Signs (Most Recent):  Temp: (!) 100.5 °F (38.1 °C) (05/07/18 0300)  Pulse: 94 (05/07/18 0300)  Resp: 18 (05/07/18 0300)  BP: 116/62 (05/07/18 0300)  SpO2: (!) 93 % (05/07/18 0300) Vital Signs (24h Range):  Temp:  [100 °F (37.8 °C)-103 °F (39.4 °C)] 100.5 °F (38.1 °C)  Pulse:  [] 94  Resp:  [16-18] 18  SpO2:  [93 %-96 %] 93 %  BP: (116-138)/(62-75) 116/62     Weight: 88.4 kg (194 lb 14.2 oz)  Body mass index is 35.65 kg/m².    Physical Exam   Constitutional: She is oriented to person, place, and time. She appears well-developed and well-nourished. No distress.   HENT:   Head: Normocephalic and atraumatic.   Mouth/Throat: Oropharynx is clear and moist.   Eyes: Conjunctivae and EOM are normal. Pupils are equal, round, and reactive to light. No scleral icterus.   Neck: Normal range of motion. Neck supple. No JVD present. No tracheal deviation present. No thyromegaly present.   Cardiovascular: Normal rate, regular rhythm, normal heart sounds and intact distal pulses.  Exam reveals no gallop and no friction rub.    No murmur heard.  Pulses:       Dorsalis pedis pulses are 2+ on the right side, and 2+ on the left side.   Pulmonary/Chest: Effort normal and breath sounds normal. No accessory muscle usage. No respiratory distress. She has no wheezes. She has no rhonchi. She has no rales.   Abdominal: Soft. Bowel sounds are  normal. She exhibits no distension and no mass. There is tenderness (Left upper and lower quadrant). There is no rebound and no guarding.   Protuberant.   Musculoskeletal: Normal range of motion. She exhibits no edema, tenderness or deformity.   Neurological: She is alert and oriented to person, place, and time. She has normal strength. She exhibits normal muscle tone. Coordination normal.   Skin: Skin is warm, dry and intact. Capillary refill takes less than 2 seconds. No rash noted. She is not diaphoretic. No erythema.   Psychiatric: She has a normal mood and affect. Her speech is normal and behavior is normal. Judgment and thought content normal.   Vitals reviewed.      Significant Labs:  CBC:   Recent Labs  Lab 05/07/18  0504   WBC 24.60*   RBC 4.50   HGB 12.9   HCT 38.6      MCV 86   MCH 28.5   MCHC 33.3     BMP:   Recent Labs  Lab 05/07/18  0504         K 2.9*   CL 99   CO2 27   BUN 8   CREATININE 0.8   CALCIUM 9.2   MG 1.6       Significant Diagnostics:  CT: I have reviewed all pertinent results/findings within the past 24 hours and my personal findings are:  inflammation around sleeve    Assessment/Plan:     Intraperitoneal abscess    Suspect sleeve leak  Npo  Antibiotics  Stent, nj tube today          VTE Risk Mitigation         Ordered     IP VTE HIGH RISK PATIENT  Once      05/07/18 0428     Place STEPHEN hose  Until discontinued      05/07/18 0428     Place sequential compression device  Until discontinued      05/07/18 0428     Reason for No Pharmacological VTE Prophylaxis  Once      05/07/18 0428          Thank you for your consult. I will follow-up with patient. Please contact us if you have any additional questions.    Chai Reese MD  General Surgery  [facility]

## 2018-05-10 NOTE — PLAN OF CARE
05/09/18 2026   PRE-TX-O2-ETCO2   O2 Device (Oxygen Therapy) room air   SpO2 (!) 93 %   Pulse Oximetry Type Intermittent   Pulse 88   Resp 18   Aerosol Therapy   $ Aerosol Therapy Charges PRN treatment not required   Respiratory Treatment Status PRN treatment not required

## 2018-05-10 NOTE — ANESTHESIA POSTPROCEDURE EVALUATION
"Anesthesia Post Evaluation    Patient: Yodit Canseco    Procedure(s) Performed: Procedure(s):  REMOVAL-STENT-ESOPHAGEAL, UPPER ENDOSCOPIC STENT REMOVAL AND INSERTION NASOJEJUNAL TUBE    Final Anesthesia Type: general  Patient location during evaluation: PACU  Patient participation: Yes- Able to Participate  Level of consciousness: awake and alert  Post-procedure vital signs: reviewed and stable  Pain management: adequate  Airway patency: patent  PONV status at discharge: No PONV  Anesthetic complications: no      Cardiovascular status: blood pressure returned to baseline  Respiratory status: unassisted  Hydration status: euvolemic  Follow-up not needed.        Visit Vitals  BP (!) 148/69   Pulse 90   Temp 37.1 °C (98.8 °F) (Skin)   Resp 10   Ht 5' 2" (1.575 m)   Wt 88.4 kg (194 lb 14.2 oz)   SpO2 (!) 94%   Breastfeeding? No   BMI 35.65 kg/m²       Pain/Ana Maria Score: Pain Assessment Performed: Yes (5/9/2018 10:15 PM)  Presence of Pain: non-verbal indicators absent (light sedation, vss, no indicators of pain noted ) (5/9/2018 10:15 PM)  Pain Rating Prior to Med Admin: 10 (5/9/2018  9:01 AM)  Pain Rating Post Med Admin: 5 (5/9/2018  6:00 AM)      "

## 2018-05-10 NOTE — INTERVAL H&P NOTE
The patient has been examined and the H&P has been reviewed:    I concur with the findings and no changes have occurred since H&P was written.    Anesthesia/Surgery risks, benefits and alternative options discussed and understood by patient/family.    Planned procedure:     CT guided percutaneous drain placement into perigastric collection.     ASA: 3  Mallampati: Class 2    Lungs: CTAB  Heart: RRR    No personal or family history of sedation complications.     Plan: Moderate sedation with IV fentanyl and versed.        Active Hospital Problems    Diagnosis  POA    *Sepsis [A41.9]  Yes    Gastric leak [K91.89]  Yes    Intraperitoneal abscess [K65.1]  Yes    S/P laparoscopic sleeve gastrectomy [Z98.84]  Not Applicable    Severe persistent asthma without complication [J45.50]  Yes    Hypothyroidism [E03.9]  Yes      Resolved Hospital Problems    Diagnosis Date Resolved POA   No resolved problems to display.

## 2018-05-10 NOTE — BRIEF OP NOTE
[facility]  Brief Operative Note    SUMMARY     Surgery Date: 5/9/2018     Surgeon(s) and Role:     * Chai Reese MD - Primary    Assisting Surgeon: None    Pre-op Diagnosis:  S/P laparoscopic sleeve gastrectomy [Z98.84]    Post-op Diagnosis:  Post-Op Diagnosis Codes:     * S/P laparoscopic sleeve gastrectomy [Z98.84]    Procedure(s):  REMOVAL-STENT-ESOPHAGEAL, UPPER ENDOSCOPIC STENT REMOVAL AND INSERTION NASOGASTRIC TUBE    Anesthesia: General    Description of Procedure: stent removal    Description of the findings of the procedure: sleeve inflamed but healing    Estimated Blood Loss: 1.0 cc         Specimens:   Specimen (12h ago through future)    None

## 2018-05-10 NOTE — PROCEDURES
"Yodit Canseco is a 57 y.o. female patient.    Temp: 96.4 °F (35.8 °C) (05/10/18 0728)  Pulse: (!) 54 (05/10/18 1520)  Resp: 17 (05/10/18 1520)  BP: (!) 154/70 (05/10/18 1520)  SpO2: 98 % (05/10/18 1520)  Weight: 88.4 kg (194 lb 14.2 oz) (05/08/18 1533)  Height: 5' 2" (157.5 cm) (05/08/18 1537)    PICC  Date/Time: 5/10/2018 4:11 PM  Performed by: NAOMI VERDE  Consent Done: Yes  Time out: Immediately prior to procedure a time out was called to verify the correct patient, procedure, equipment, support staff and site/side marked as required  Indications: med administration and vascular access  Anesthesia: local infiltration  Local anesthetic: lidocaine 1% without epinephrine  Anesthetic Total (mL): 5  Preparation: skin prepped with ChloraPrep  Skin prep agent dried: skin prep agent completely dried prior to procedure  Sterile barriers: all five maximum sterile barriers used - cap, mask, sterile gown, sterile gloves, and large sterile sheet  Hand hygiene: hand hygiene performed prior to central venous catheter insertion  Location details: right basilic  Catheter type: double lumen  Catheter size: 5 Fr  Catheter Length: 33cm    Ultrasound guidance: yes  Vessel Caliber: large and patent, compressibility normal  Needle advanced into vessel with real time Ultrasound guidance.  Guidewire confirmed in vessel.  Image recorded and saved.  Sterile sheath used.  Number of attempts: 1  Post-procedure: blood return through all ports, chlorhexidine patch and sterile dressing applied  Estimated blood loss (mL): 1    Assessment: placement verified by x-ray, tip termination and successful placement  Tip Termination Explanation: SVC  Complications: none          Naomi Verde  5/10/2018  "

## 2018-05-10 NOTE — PLAN OF CARE
Pt awake and oriented, vs stable, denies pain, denies nausea, pt stated that she feels a lot better, IV fluids infused, pt is NPO, new NJ tube to R nare, bilateral SCDs on. Ok per Dr. Fraga to transfer the pt back to the floor. Pt transported from PACU via bed back to room 214. Tele monitor [id number] placed back on pt. Pt's  at the bedside. Call light within reach. Report given to SIMEON Dumas.

## 2018-05-11 LAB
ALBUMIN SERPL BCP-MCNC: 2.2 G/DL
ALP SERPL-CCNC: 99 U/L
ALT SERPL W/O P-5'-P-CCNC: 24 U/L
ANION GAP SERPL CALC-SCNC: 7 MMOL/L
AST SERPL-CCNC: 35 U/L
BASOPHILS # BLD AUTO: 0.1 K/UL
BASOPHILS NFR BLD: 0.7 %
BILIRUB SERPL-MCNC: 0.3 MG/DL
BUN SERPL-MCNC: 15 MG/DL
CALCIUM SERPL-MCNC: 8.6 MG/DL
CHLORIDE SERPL-SCNC: 109 MMOL/L
CO2 SERPL-SCNC: 27 MMOL/L
CREAT SERPL-MCNC: 0.7 MG/DL
DIFFERENTIAL METHOD: ABNORMAL
EOSINOPHIL # BLD AUTO: 0.5 K/UL
EOSINOPHIL NFR BLD: 3.5 %
ERYTHROCYTE [DISTWIDTH] IN BLOOD BY AUTOMATED COUNT: 14.9 %
EST. GFR  (AFRICAN AMERICAN): >60 ML/MIN/1.73 M^2
EST. GFR  (NON AFRICAN AMERICAN): >60 ML/MIN/1.73 M^2
GLUCOSE SERPL-MCNC: 104 MG/DL
GRAM STN SPEC: NORMAL
GRAM STN SPEC: NORMAL
HCT VFR BLD AUTO: 34.9 %
HGB BLD-MCNC: 11.4 G/DL
LYMPHOCYTES # BLD AUTO: 3.2 K/UL
LYMPHOCYTES NFR BLD: 23.8 %
MAGNESIUM SERPL-MCNC: 1.9 MG/DL
MCH RBC QN AUTO: 28.3 PG
MCHC RBC AUTO-ENTMCNC: 32.7 G/DL
MCV RBC AUTO: 86 FL
MONOCYTES # BLD AUTO: 0.8 K/UL
MONOCYTES NFR BLD: 6.3 %
NEUTROPHILS # BLD AUTO: 8.8 K/UL
NEUTROPHILS NFR BLD: 65.7 %
PHOSPHATE SERPL-MCNC: 2.7 MG/DL
PLATELET # BLD AUTO: 308 K/UL
PMV BLD AUTO: 9.2 FL
POTASSIUM SERPL-SCNC: 3.4 MMOL/L
PROT SERPL-MCNC: 5.9 G/DL
RBC # BLD AUTO: 4.05 M/UL
SODIUM SERPL-SCNC: 143 MMOL/L
VANCOMYCIN TROUGH SERPL-MCNC: 16.1 UG/ML
WBC # BLD AUTO: 13.5 K/UL

## 2018-05-11 PROCEDURE — 25000003 PHARM REV CODE 250: Performed by: NURSE PRACTITIONER

## 2018-05-11 PROCEDURE — 25000003 PHARM REV CODE 250: Performed by: INTERNAL MEDICINE

## 2018-05-11 PROCEDURE — 63600175 PHARM REV CODE 636 W HCPCS: Performed by: INTERNAL MEDICINE

## 2018-05-11 PROCEDURE — S0028 INJECTION, FAMOTIDINE, 20 MG: HCPCS | Performed by: SURGERY

## 2018-05-11 PROCEDURE — 63600175 PHARM REV CODE 636 W HCPCS: Performed by: NURSE PRACTITIONER

## 2018-05-11 PROCEDURE — C9113 INJ PANTOPRAZOLE SODIUM, VIA: HCPCS | Performed by: SURGERY

## 2018-05-11 PROCEDURE — 97530 THERAPEUTIC ACTIVITIES: CPT

## 2018-05-11 PROCEDURE — 99900035 HC TECH TIME PER 15 MIN (STAT)

## 2018-05-11 PROCEDURE — 83735 ASSAY OF MAGNESIUM: CPT

## 2018-05-11 PROCEDURE — S0030 INJECTION, METRONIDAZOLE: HCPCS | Performed by: NURSE PRACTITIONER

## 2018-05-11 PROCEDURE — 85025 COMPLETE CBC W/AUTO DIFF WBC: CPT

## 2018-05-11 PROCEDURE — 99232 SBSQ HOSP IP/OBS MODERATE 35: CPT | Mod: ,,, | Performed by: INTERNAL MEDICINE

## 2018-05-11 PROCEDURE — 36415 COLL VENOUS BLD VENIPUNCTURE: CPT

## 2018-05-11 PROCEDURE — 84100 ASSAY OF PHOSPHORUS: CPT

## 2018-05-11 PROCEDURE — 25000003 PHARM REV CODE 250: Performed by: SURGERY

## 2018-05-11 PROCEDURE — 63600175 PHARM REV CODE 636 W HCPCS: Performed by: SURGERY

## 2018-05-11 PROCEDURE — A4216 STERILE WATER/SALINE, 10 ML: HCPCS | Performed by: SURGERY

## 2018-05-11 PROCEDURE — 94640 AIRWAY INHALATION TREATMENT: CPT

## 2018-05-11 PROCEDURE — 97116 GAIT TRAINING THERAPY: CPT

## 2018-05-11 PROCEDURE — A4216 STERILE WATER/SALINE, 10 ML: HCPCS | Performed by: ANESTHESIOLOGY

## 2018-05-11 PROCEDURE — 94761 N-INVAS EAR/PLS OXIMETRY MLT: CPT

## 2018-05-11 PROCEDURE — 12000002 HC ACUTE/MED SURGE SEMI-PRIVATE ROOM

## 2018-05-11 PROCEDURE — 25000003 PHARM REV CODE 250: Performed by: ANESTHESIOLOGY

## 2018-05-11 PROCEDURE — 80053 COMPREHEN METABOLIC PANEL: CPT

## 2018-05-11 PROCEDURE — 80202 ASSAY OF VANCOMYCIN: CPT

## 2018-05-11 RX ORDER — HYDROMORPHONE HYDROCHLORIDE 1 MG/ML
0.5 INJECTION, SOLUTION INTRAMUSCULAR; INTRAVENOUS; SUBCUTANEOUS EVERY 4 HOURS PRN
Status: DISCONTINUED | OUTPATIENT
Start: 2018-05-11 | End: 2018-05-12

## 2018-05-11 RX ADMIN — FAMOTIDINE 20 MG: 10 INJECTION INTRAVENOUS at 09:05

## 2018-05-11 RX ADMIN — FLUTICASONE FUROATE AND VILANTEROL TRIFENATATE 1 PUFF: 200; 25 POWDER RESPIRATORY (INHALATION) at 08:05

## 2018-05-11 RX ADMIN — SODIUM CHLORIDE, PRESERVATIVE FREE 10 ML: 5 INJECTION INTRAVENOUS at 12:05

## 2018-05-11 RX ADMIN — SODIUM CHLORIDE, PRESERVATIVE FREE 3 ML: 5 INJECTION INTRAVENOUS at 12:05

## 2018-05-11 RX ADMIN — ONDANSETRON HYDROCHLORIDE 8 MG: 2 INJECTION INTRAMUSCULAR; INTRAVENOUS at 08:05

## 2018-05-11 RX ADMIN — HYDROMORPHONE HYDROCHLORIDE 0.5 MG: 2 INJECTION INTRAMUSCULAR; INTRAVENOUS; SUBCUTANEOUS at 05:05

## 2018-05-11 RX ADMIN — SODIUM CHLORIDE, PRESERVATIVE FREE 10 ML: 5 INJECTION INTRAVENOUS at 05:05

## 2018-05-11 RX ADMIN — MEROPENEM AND SODIUM CHLORIDE 1 G: 1 INJECTION, SOLUTION INTRAVENOUS at 10:05

## 2018-05-11 RX ADMIN — SUCRALFATE 1 G: 1 SUSPENSION ORAL at 12:05

## 2018-05-11 RX ADMIN — DIPHENHYDRAMINE HYDROCHLORIDE 25 MG: 25 CAPSULE ORAL at 06:05

## 2018-05-11 RX ADMIN — SUCRALFATE 1 G: 1 SUSPENSION ORAL at 05:05

## 2018-05-11 RX ADMIN — METRONIDAZOLE 500 MG: 500 INJECTION, SOLUTION INTRAVENOUS at 01:05

## 2018-05-11 RX ADMIN — SODIUM CHLORIDE 1750 MG: 9 INJECTION, SOLUTION INTRAVENOUS at 06:05

## 2018-05-11 RX ADMIN — METRONIDAZOLE 500 MG: 500 INJECTION, SOLUTION INTRAVENOUS at 05:05

## 2018-05-11 RX ADMIN — HYDROMORPHONE HYDROCHLORIDE 0.5 MG: 2 INJECTION INTRAMUSCULAR; INTRAVENOUS; SUBCUTANEOUS at 12:05

## 2018-05-11 RX ADMIN — SODIUM CHLORIDE, PRESERVATIVE FREE 10 ML: 5 INJECTION INTRAVENOUS at 06:05

## 2018-05-11 RX ADMIN — MEROPENEM AND SODIUM CHLORIDE 1 G: 1 INJECTION, SOLUTION INTRAVENOUS at 12:05

## 2018-05-11 RX ADMIN — SODIUM CHLORIDE 1750 MG: 9 INJECTION, SOLUTION INTRAVENOUS at 08:05

## 2018-05-11 RX ADMIN — MEROPENEM AND SODIUM CHLORIDE 1 G: 1 INJECTION, SOLUTION INTRAVENOUS at 05:05

## 2018-05-11 RX ADMIN — PANTOPRAZOLE SODIUM 40 MG: 40 INJECTION, POWDER, FOR SOLUTION INTRAVENOUS at 09:05

## 2018-05-11 RX ADMIN — Medication 0.5 MG: at 08:05

## 2018-05-11 RX ADMIN — ENOXAPARIN SODIUM 40 MG: 100 INJECTION SUBCUTANEOUS at 05:05

## 2018-05-11 RX ADMIN — TRIAMTERENE AND HYDROCHLOROTHIAZIDE 2 TABLET: 37.5; 25 TABLET ORAL at 10:05

## 2018-05-11 RX ADMIN — FAMOTIDINE 20 MG: 10 INJECTION INTRAVENOUS at 08:05

## 2018-05-11 RX ADMIN — METRONIDAZOLE 500 MG: 500 INJECTION, SOLUTION INTRAVENOUS at 08:05

## 2018-05-11 RX ADMIN — LEVOTHYROXINE SODIUM 75 MCG: 25 TABLET ORAL at 05:05

## 2018-05-11 RX ADMIN — LOPERAMIDE HYDROCHLORIDE 2 MG: 1 SOLUTION ORAL at 01:05

## 2018-05-11 NOTE — PLAN OF CARE
Problem: Physical Therapy Goal  Goal: Physical Therapy Goal  Goals to be met by: 2018     Patient will increase functional independence with mobility by performin. Sit to stand transfer with Supervision  2. Gait  x 250x2 feet with Contact Guard Assistance using .   3. Lower extremity exercise program x20 reps per handout, with assistance as needed     Outcome: Ongoing (interventions implemented as appropriate)  Bed mobility, transfers, gait with CGA, no assistive device.

## 2018-05-11 NOTE — ASSESSMENT & PLAN NOTE
Tube feeds as per Gisselle kohli dietitian- planning to slow rate so she may tolerate better  Have a special tube feed in case nutren too problematic    Antibiotics- will need course for home- has picc line- potentially could have iv at home- Gram stain with [facility] cultures pending    Drain: flush daily with 10-20 cc normal saline    Continue admission for now until able to tolerate tube feeds

## 2018-05-11 NOTE — PROGRESS NOTES
[facility]  Adult Nutrition  Consult Note    SUMMARY     Recommendations    1) Enteral Continuous:   Hold TF x 6 hrs, then restart Nutren 2.0 @ 10 mls/hr to advance by 10 mls/hr Q 6 hrs to goal of 30 mls/hr.Flush with 150 mls Q 4 hrs.    Add ProMod, protein supplement, 1 oz, tid.   At goal including ProMod provides 1510 calories, 93 gms protein, 162 gms CHO, 519 mls free water.  VS  2) Enteral Bolus: Hold TF x 6 hrs. Start Nutren 2.0 @ 125 mls (1/2 carton) 6 for the first day. Progress to 250 mls (1 carton) 3 x daily per pt tolerance. Use ProMod protein supplement, 1 oz, tid. Flush with 275 mls water (about 1 3/4 cup) 4 x daily for hydration. Provides 1510 calories, 93 gms protein, 162 gms CHO, 519 mls free water.     Goal: 1) Pt will receive nutrition within 48 hrs. 2) Pt will receive at least 80% EEN within 48 hrs.   Nutrition Goal Status: 1) met 2) progressing toward goal   Communication of RD Recs:  (POC)    Discharge Plan:  Enteral feeds as above    Comment: If pt cannot tolerate Nutren 2.0, recommend Vivonex RTF 5 cartons daily. Flush with 175 mls water ( about 1 3/4 cup) 4 x daily.  Add ProMod, 1 oz, 4 x daily. At goal including ProMod, provides 1010 calories, 90 gms protein, 176 gms CHO, 848 mls free water.      Reason for Assessment    Reason for Assessment: RD follow up   1. Sepsis, due to unspecified organism    2. Abdominal pain, unspecified abdominal location    3. S/P bariatric surgery    4. Gastric leak    5. Sepsis    6. S/P laparoscopic sleeve gastrectomy    7. Intraperitoneal abscess      Past Medical History:   Diagnosis Date    Allergy     Angio-edema     Arthralgia     Asthma without status asthmaticus     Bronchitis     Diverticulosis of large intestine without hemorrhage 10/8/2015    Environmental allergies     Eosinophilic asthma 3/8/2018    Gastroparesis     Hand arthritis     Herpes simplex without mention of complication     oral    Hyperlipidemia      Hypothyroidism     LBP radiating to left leg     Leukocytosis, unspecified     Migraine headache     Obesity, Class III, BMI 40-49.9 (morbid obesity)     Periorbital cellulitis 12/31/2016    Prediabetes     Reactive airway disease     Reactive airway disease     Recurrent upper respiratory infection (URI)     S/P colonoscopy November 2010    Urticaria        Interdisciplinary Rounds: attended  General Information Comments: Admitted with fever. Pt is s/p gastric sleeve x 3 weeks.  Intentional wt loss noted.   5/9/18 Spoke with Dr. Reese re continuous feeds. Per MD direction, continuous feeds during admit and bolus feeds at discharge.  Temporary enteral feeds until gut heals. .  Yesterday, pt reported intolerance to whey and lactose, but did not know about casein. Today pt and her spouse report concerns about using Nutren 2.0 2/2 milk intolerance.  Assured pt that Nutren 2.0 does not have whey or lactose, but does contain casein.  Spouse states he does not want to try the Nutren 2.0, and reports pt can tolerate almond milk.  Dr. Reese spoke with family and they agreed to try Nutren 2.0.  The order for Nutren 2.0 has been activated at this time.  Monitoring for tolerance.    Found TF that is milk, egg, pea and artificial sweetener free.  Special ordered Vivonex RTF and it is being shipped overnight for pt use if needed.     5/10/18: Vivonex RTF is in house if needed. Pt alert, sitting up and talking. TF, Nutren 2.0 infusing @ 10 mls/hr. Tolerating  5/11/18: Pt not tolerating TF @ 40 mls/hr. TF has been progressing over 24 hrs/day.  Abdomen is bloated and pt having diarrhea. Conferred with nursing and MD.        Nutrition Risk Screen    Nutrition Risk Screen: no indicators present    Nutrition/Diet History    Do you have any cultural, spiritual, Jew conflicts, given your current situation?: none  Food Allergies: egg, milk (pea, artificial sweeteners)    Anthropometrics    Temp: 97.6 °F (36.4  "°C)  Height Method: Stated  Height: 5' 2"  Height (inches): 62 in  Weight Method: Stated  Weight: 88.4 kg (194 lb 14.2 oz)  Weight (lb): 194.89 lb  Ideal Body Weight (IBW), Female: 110 lb  % Ideal Body Weight, Female (lb): 177.17 lb  BMI (Calculated): 35.7  Usual Body Weight (UBW), k.6 kg  % Usual Body Weight: 88.06  % Weight Change From Usual Weight: -12.13 %       Lab/Procedures/Meds    Pertinent Labs Reviewed: reviewed  Lab Results   Component Value Date    ALBUMIN 2.2 (L) 2018     Lab Results   Component Value Date    CRP 7.6 2016     Lab Results   Component Value Date    WBC 13.50 (H) 2018     Pertinent Medications Reviewed: reviewed  Scheduled Meds:   enoxaparin  40 mg Subcutaneous Daily    famotidine (PF)  20 mg Intravenous BID    fluticasone-vilanterol  1 puff Inhalation Daily    levothyroxine  75 mcg Oral Before breakfast    loperamide  2 mg Oral Daily    meropenem (MERREM) IVPB  1 g Intravenous Q8H    metronidazole  500 mg Intravenous Q8H    montelukast  10 mg Oral QHS    pantoprazole  40 mg Intravenous Daily    senna-docusate 8.6-50 mg  1 tablet Oral BID    sodium chloride 0.9%  10 mL Intravenous Q6H    sucralfate  1 g Oral Q6H    triamterene-hydrochlorothiazide 37.5-25 mg  2 tablet Oral Daily    vancomycin (VANCOCIN) IVPB  1,750 mg Intravenous Q12H     Continuous Infusions:   sodium chloride 0.9% 75 mL/hr at 05/10/18 1721       Physical Findings/Assessment    Overall Physical Appearance: obese, weak  Oral/Mouth Cavity: WDL  Skin:  (Steve score 20)    Estimated/Assessed Needs    Weight Used For Calorie Calculations: 88.4 kg (194 lb 14.2 oz)     Energy Need Method: [place]- Jeor: 1417 x 1.25=1771     Weight Used For Protein Calculations: 88.4 kg (194 lb 14.2 oz)    gms/day     Fluid Need Method: RDA Method (or per MD)     CHO Requirement: max 221 gms CHO      Nutrition Prescription Ordered    Current Diet Order: NPO    Evaluation of Received Nutrient/Fluid " Intake    Energy Calories Required: not meeting needs  Protein Required: not meeting needs  Fluid Required:per MD rec    Intake/Output Summary (Last 24 hours) at 05/11/18 1527  Last data filed at 05/11/18 1300   Gross per 24 hour   Intake             2685 ml   Output               75 ml   Net             2610 ml     % Intake of Estimated Energy Needs: 25-50 %  % Meal Intake: NPO    Nutrition Risk    Level of Risk/Frequency of Follow-up:  (2 x wkly)     Assessment and Plan    Gastric leak    Contributing Nutrition Diagnosis  Inadequate energy intake    Related to (etiology):   Altered GI function    Signs and Symptoms (as evidenced by):   NPO with no alternate nutrition    Interventions/Recommendations (treatment strategy):  continue nutrition support progressing per pt tolerance to goal rate     Nutrition Diagnosis Status:   continues                 Monitor and Evaluation    Food and Nutrient Intake: energy intake  Food and Nutrient Adminstration: diet order  Anthropometric Measurements: weight  Biochemical Data, Medical Tests and Procedures: inflammatory profile  Nutrition-Focused Physical Findings: overall appearance, skin     Discharge Plan    Enteral feeds as above    RD Follow-up?: Yes

## 2018-05-11 NOTE — PROGRESS NOTES
"[facility]  Progress Note    Subjective:     History of Present Illness:  58 y/o female who underwent sleeve gastrectomy on 4/16/18, presents now with fevers at home 101.9.  Complains of some abdominal spasm and chills.  Has been able to tolerate diet.  Denies n/v, has had some diarrhea, no constipation    Post-Op Info:  Procedure(s):  REMOVAL-STENT-ESOPHAGEAL, UPPER ENDOSCOPIC STENT REMOVAL AND INSERTION NASOJEJUNAL TUBE   2 Days Post-Op     Interval History: feeling better, some bloating and diarrhea with tube feeds    Medications:  Continuous Infusions:   sodium chloride 0.9% 75 mL/hr at 05/10/18 1721     Scheduled Meds:   enoxaparin  40 mg Subcutaneous Daily    famotidine (PF)  20 mg Intravenous BID    fluticasone-vilanterol  1 puff Inhalation Daily    levothyroxine  75 mcg Oral Before breakfast    loperamide  2 mg Oral Daily    meropenem (MERREM) IVPB  1 g Intravenous Q8H    metronidazole  500 mg Intravenous Q8H    montelukast  10 mg Oral QHS    [START ON 5/12/2018] NON FORMULARY MEDICATION 18 g  18 g Subcutaneous Q14 Days    pantoprazole  40 mg Intravenous Daily    senna-docusate 8.6-50 mg  1 tablet Oral BID    sodium chloride 0.9%  10 mL Intravenous Q6H    sucralfate  1 g Oral Q6H    triamterene-hydrochlorothiazide 37.5-25 mg  2 tablet Oral Daily    vancomycin (VANCOCIN) IVPB  1,750 mg Intravenous Q12H     PRN Meds:albuterol-ipratropium 2.5mg-0.5mg/3mL, dextrose 50%, diazePAM, diphenhydrAMINE, glucagon (human recombinant), glucose, glucose, hydrocodone-apap 7.5-325 MG/15 ML, HYDROmorphone, methocarbamol (ROBAXIN) IVPB, midazolam, ondansetron, prochlorperazine, promethazine (PHENERGAN) IVPB, promethazine (PHENERGAN) IVPB, ramelteon, Flushing PICC Protocol **AND** sodium chloride 0.9% **AND** sodium chloride 0.9%, sodium chloride 0.9%, sodium chloride 0.9%     Review of patient's allergies indicates:   Allergen Reactions    Bromelains Hives     " causes " "mouth to bleed"    Sudafed [pseudoephedrine hcl] Other (See Comments)     Does not want to wake up .    Amoxicillin-pot clavulanate Itching     Other reaction(s): Itching    Hydrocodone Itching    Iodinated contrast- oral and iv dye      childhood    Iodine and iodide containing products Other (See Comments)    Melon Hives    Pantoprazole Nausea Only     Other reaction(s): upset stomach/halitosis    Percocet [oxycodone-acetaminophen] Itching    Barium iodide Rash    Ephedrine Other (See Comments)     Other reaction(s): comatose    Penicillins      Other reaction(s): does not work on pt symptoms     Objective:     Vital Signs (Most Recent):  Temp: 97.6 °F (36.4 °C) (05/11/18 1512)  Pulse: 71 (05/11/18 1512)  Resp: 18 (05/11/18 1512)  BP: (!) 146/70 (05/11/18 1512)  SpO2: (!) 94 % (05/11/18 1512) Vital Signs (24h Range):  Temp:  [96.1 °F (35.6 °C)-98.5 °F (36.9 °C)] 97.6 °F (36.4 °C)  Pulse:  [54-85] 71  Resp:  [14-18] 18  SpO2:  [93 %-97 %] 94 %  BP: (114-156)/(62-77) 146/70     Weight: 88.4 kg (194 lb 14.2 oz)  Body mass index is 35.65 kg/m².    Intake/Output - Last 3 Shifts       05/09 0700 - 05/10 0659 05/10 0700 - 05/11 0659 05/11 0700 - 05/12 0659    P.O. 60      I.V. (mL/kg) 1400 (15.8) 1575 (17.8)     NG/GT   260    IV Piggyback 1400 1050     Total Intake(mL/kg) 2860 (32.4) 2625 (29.7) 260 (2.9)    Urine (mL/kg/hr) 850 (0.4)      Drains  0 (0)     Other  75 (0)     Blood 1 (0)      Total Output 851 75      Net +2009 +2550 +260           Urine Occurrence 2 x 5 x     Stool Occurrence 2 x 1 x           Physical Exam   Abdominal: Soft. She exhibits no distension and no mass. There is no tenderness. There is no rebound and no guarding.   Drain ss, flushed by nursing       Significant Labs:  CBC:   Recent Labs  Lab 05/11/18  0556   WBC 13.50*   RBC 4.05   HGB 11.4*   HCT 34.9*      MCV 86   MCH 28.3   MCHC 32.7     BMP:   Recent Labs  Lab 05/11/18  0556         K 3.4*      CO2 27 "   BUN 15   CREATININE 0.7   CALCIUM 8.6*   MG 1.9       Significant Diagnostics:  I have reviewed all pertinent imaging results/findings within the past 24 hours.    Assessment/Plan:     Gastric leak    IV acetaminophen and IV dilaudid            Intraperitoneal abscess    From Leak  Drain in place        S/P laparoscopic sleeve gastrectomy    Tube feeds as per Gisselle kohli dietitian- planning to slow rate so she may tolerate better  Have a special tube feed in case nutren too problematic    Antibiotics- will need course for home- has picc line- potentially could have iv at home- Gram stain with [facility] cultures pending    Drain: flush daily with 10-20 cc normal saline    Continue admission for now until able to tolerate tube feeds              Chai Reese MD  General Surgery  [facility]

## 2018-05-11 NOTE — CONSULTS
Yodit Canseco [id number] is a 57 y.o. female who has been consulted for vancomycin dosing.    Vancomycin trough has been changed to 5/12 at 1800.      Patient will be followed by pharmacy for changes in renal function, toxicity, and efficacy.    Thank you for allowing us to participate in this patient's care.     Sherron Segal, PharmD

## 2018-05-11 NOTE — PLAN OF CARE
05/11/18 1646   Discharge Assessment   Assessment Type Discharge Planning Reassessment   BRIJESH spoke with patient and spouse earlier and patient reports that she does self administer her Hizentra at home but may need some assistance from nurse to do it here since her abdomen is so tender and she has to place 6 sq needles. Order placed for patient to use her medication from home. Patient has pump, medication and administration set in room. See order for details. BRIJESH also spoke with Dr. Reese, he wants patient to have home health to flush drain with 15cc NS daily. Plan is to dc patient home once tolerating tube feedings with HH  and IV abx- Meropenum 1 gm IV q 12 unless c/s requires another medication. Patient still NPO.

## 2018-05-11 NOTE — PLAN OF CARE
Per Dr. Reese, the pt will need a plan for home feeds and IV antibiotics at home . I updated Dr. Ashraf and requested that he consult the dietician for tube feed recommendations. At discharge the pt will need home health orders as well as home tube feedings and IV ABX. Demetria Yun, [facility] [place]      05/11/18 2390   Discharge Reassessment   Assessment Type Discharge Planning Reassessment

## 2018-05-11 NOTE — PLAN OF CARE
Problem: Nutrition, Enteral (Adult)  Intervention: Monitor/Manage Nutrition Support  Recommendations    1) Enteral Continuous:   Hold TF x 6 hrs, then restart Nutren 2.0 @ 10 mls/hr to advance by 10 mls/hr Q 6 hrs to goal of 30 mls/hr.Flush with 150 mls Q 4 hrs.    Add ProMod, protein supplement, 1 oz, tid.   At goal including ProMod provides 1510 calories, 93 gms protein, 162 gms CHO, 519 mls free water.  VS  2) Enteral Bolus: Hold TF x 6 hrs. Start Nutren 2.0 @ 125 mls (1/2 carton) 6 for the first day. Progress to 250 mls (1 carton) 3 x daily per pt tolerance. Use ProMod protein supplement, 1 oz, tid. Flush with 275 mls water (about 1 3/4 cup) 4 x daily for hydration. Provides 1510 calories, 93 gms protein, 162 gms CHO, 519 mls free water.     Goal: 1) Pt will receive nutrition within 48 hrs. 2) Pt will receive at least 80% EEN within 48 hrs.   Nutrition Goal Status: 1) met 2) progressing toward goal   Communication of RD Recs:  (POC)    Discharge Plan:  Enteral feeds as above

## 2018-05-11 NOTE — PLAN OF CARE
Problem: Patient Care Overview  Goal: Plan of Care Review  Outcome: Ongoing (interventions implemented as appropriate)  Pt on room air with a daily Breo and Q4 PRN duoneb treatments, no treatment need at this time.

## 2018-05-11 NOTE — PROGRESS NOTES
Progress Note  Hospital Medicine  Patient Name:Yodit Canseco  MRN:  [id number]  Patient Class: IP- Inpatient  Admit Date: 5/6/2018  Length of Stay: 4 days  Expected Discharge Date:   Attending Physician: Cristian Ashraf MD  Primary Care Provider:  Miky Lewis MD    SUBJECTIVE:     Principal Problem: Sepsis  Initial history of present illness: Yodit Canseco is a 58 yo female with PMHx significant for morbid obesity, history of gastroparesis, DM-2 (resolved), hyperlipidemia and hypothyroidism.  She is admitted to the service of hospital medicine with severe sepsis.  She presented to the ED with a 5 day history of subjective fever with tmax 103F.  She states the fever is associated with chills, malaise and nausea.  The fevers are responsive to tylenol and seem to be worse at night. Patient is 3 weeks post gastric sleeve surgery. She reports that she is tolerating her PO intake and recently had relatives over who have been sick with symptoms of fever, chills, and diarrhea. Patient denies diarrhea, vomiting, abdominal pain, chest pain, shortness of breath, cough, urinary symptoms, or neck stiffness.     PMH/PSH/SH/FH/Meds: reviewed.    Symptoms/Review of Systems: Esophageal stent removed last night by Dr. Reese as patient was not tolerating it. NJ tube re-inserted.  No shortness of breath, cough, chest pain or headache, fever or abdominal pain.   Diet: NPO  Activity level: Normal.    Pain:  As above     OBJECTIVE:   Vital Signs (Most Recent):      Temp: 98.5 °F (36.9 °C) (05/11/18 1216)  Pulse: 83 (05/11/18 1216)  Resp: 18 (05/11/18 1216)  BP: (!) 154/77 (05/11/18 1216)  SpO2: 97 % (05/11/18 1216)       Vital Signs Range (Last 24H):  Temp:  [96.1 °F (35.6 °C)-98.5 °F (36.9 °C)]   Pulse:  [50-85]   Resp:  [11-20]   BP: (114-162)/(56-77)   SpO2:  [93 %-100 %]     Weight: 88.4 kg (194 lb 14.2 oz)  Body mass index is 35.65 kg/m².    Intake/Output Summary (Last 24 hours) at 05/11/18 1315  Last data filed at 05/11/18  1235   Gross per 24 hour   Intake             2925 ml   Output               75 ml   Net             2850 ml     Physical Examination:  Constitutional: She is oriented to person, place, and time. She appears well-developed and well-nourished. No distress.   HENT:   Head: Normocephalic and atraumatic.   Mouth/Throat: Oropharynx is clear and moist.   Eyes: Conjunctivae and EOM are normal. Pupils are equal, round, and reactive to light. No scleral icterus.   Neck: Normal range of motion. Neck supple. No JVD present. No tracheal deviation present. No thyromegaly present.   Cardiovascular: Normal rate, regular rhythm, normal heart sounds and intact distal pulses.  Exam reveals no gallop and no friction rub.    No murmur heard.  Pulses:       Dorsalis pedis pulses are 2+ on the right side, and 2+ on the left side.   Pulmonary/Chest: Effort normal and breath sounds normal. No accessory muscle usage. No respiratory distress. She has no wheezes. She has no rhonchi. She has no rales.   Abdominal: Soft. Bowel sounds are normal. She exhibits no distension and no mass. There is tenderness (Left upper and lower quadrant). There is no rebound and no guarding.   Musculoskeletal: Normal range of motion. She exhibits no edema, tenderness or deformity.   Neurological: She is alert and oriented to person, place, and time. She has normal strength. She exhibits normal muscle tone. Coordination normal.   Skin: Skin is warm, dry and intact. Capillary refill takes less than 2 seconds. No rash noted. She is not diaphoretic. No erythema.   Psychiatric: She has a normal mood and affect. Her speech is normal and behavior is normal. Judgment and thought content normal.   Vitals reviewed.  CRANIAL NERVES      CN III, IV, VI   Pupils are equal, round, and reactive to light.  Extraocular motions are normal.      CBC:    Recent Labs  Lab 05/09/18  0532 05/10/18  0456 05/11/18  0556   WBC 22.30* 15.70* 13.50*   RBC 4.43 4.24 4.05   HGB 12.6 11.9*  11.4*   HCT 38.0 36.4* 34.9*    351* 308   MCV 86 86 86   MCH 28.5 28.1 28.3   MCHC 33.2 32.7 32.7   BMP    Recent Labs  Lab 05/09/18  0532 05/10/18  0456 05/11/18  0556   * 141* 104    145 143   K 3.9 3.8 3.4*    110 109   CO2 23 29 27   BUN 12 13 15   CREATININE 0.7 0.7 0.7   CALCIUM 9.1 8.9 8.6*   MG 2.0 2.0 1.9      Diagnostic Results:  Microbiology Results (last 7 days)     Procedure Component Value Units Date/Time    Blood Culture #1 [[id number]] Collected:  05/07/18 0216    Order Status:  Completed Specimen:  Blood Updated:  05/11/18 1212     Blood Culture, Routine No Growth to date     Blood Culture, Routine No Growth to date     Blood Culture, Routine No Growth to date     Blood Culture, Routine No Growth to date     Blood Culture, Routine No Growth to date    Blood Culture #2 [[id number]] Collected:  05/07/18 0216    Order Status:  Completed Specimen:  Blood Updated:  05/11/18 1212     Blood Culture, Routine No Growth to date     Blood Culture, Routine No Growth to date     Blood Culture, Routine No Growth to date     Blood Culture, Routine No Growth to date     Blood Culture, Routine No Growth to date    Gram stain [[id number]] Collected:  05/10/18 1512    Order Status:  Completed Specimen:  Abscess from Abdomen Updated:  05/11/18 0642     Gram Stain Result Many WBC's      Many Gram positive cocci    Aerobic culture [[id number]] Collected:  05/10/18 1512    Order Status:  Sent Specimen:  Abscess from Abdomen Updated:  05/10/18 2142    Culture, Anaerobic [[id number]] Collected:  05/10/18 1512    Order Status:  Sent Specimen:  Abscess from Abdomen Updated:  05/10/18 2142    Urine Culture [[id number]] Collected:  05/07/18 0114    Order Status:  Completed Specimen:  Urine from Urine, Clean Catch Updated:  05/09/18 0052     Urine Culture, Routine No growth         CT abdomen:  Abnormal stranding in the fat adjacent to the sleeve gastrectomy which is nonspecific.  No extraluminal gas or  leakage of oral contrast identified.  Differential considerations include inflammation and blood products.  No drainable fluid collection to suggest abscess.  Small left pleural effusion and left basilar atelectasis.  Hysterectomy and cholecystectomy.  Colonic diverticulosis.    KUB: Interval placement of an esophagogastric stent centered at the level of the GE junction.  Weighted enteric tube tip is at the level of the gastric antrum. Left basilar pulmonary consolidation with air bronchograms for which differential considerations include pneumonia and aspiration.    EGD:  Endoscopy was performed showing a normal esophagus and duodenum.  Stomach had inflamed appearing mucosa s/p sleeve.  Proximal staple line appeared swollen and erythematous.  May have underlying leak but not obvious.  NO obvious leaks seen.  A stent was placed over a guide wire [employer] 23 mm by 155 mm under fluoroscopy.  A keyo feeding tube was placed distal to this.      CT guided abdominal abscess drainage:  CT-guided placement of percutaneous 12 [place]ian pigtail catheter within the patient's left perigastric fluid collection.  Removal of approximately 40 mL of purulent fluid with a sample sent for additional analysis.  The catheter is attached to vacuum bag suction.  Near complete resolution of the collection is noted.  No immediate complication.    Assessment/Plan:     * Sepsis with Intraperitoneal abscess with possible leak from anastomosis s/p IR drainage  S/p Esophageal stent placement and then removal          Patient has PICC line and would need TPN.   Antibiotics given-              Antibiotics      Start     Stop Route Frequency Ordered     05/07/18 0430   metronidazole IVPB 500 mg      -- IV Every 8 hours (non-standard times) 05/07/18 0428     05/07/18 0430   meropenem-0.9% sodium chloride 1 g/50 mL IVPB      -- IV Every 8 hours (non-standard times) 05/07/18 0428       Follow blood cultures and urine cultures  Continue IV  Merrem, Vanc and Metronidazole and deescalate when appropriate.          S/P laparoscopic sleeve gastrectomy  Gastric stent placement     Keep NPO. Tube feeding is held.  Continue broad spectrum antibiotics  Follow General Surgery recommendations. Use IV valium for gastric spasms prn.      Hypophosphatemia  Replete potassium phosphate. Follow level.     Severe persistent asthma without complication     Chronic problem. Controlled.  Continue home medications and monitor closely for exacerbations, adjusting medications as necessary.       Hypothyroidism     Chronic problem. TSH reviewed - euthyroid.  Continue home levothyroxine.     Discussed with patient's . Encouraged patient to get out of bed. Continue PT.     VTE Risk Mitigation         Ordered     enoxaparin injection 40 mg  Daily      05/07/18 2205     IP VTE HIGH RISK PATIENT  Once      05/07/18 0428     Place STEPHEN hose  Until discontinued      05/07/18 0428     Place sequential compression device  Until discontinued      05/07/18 0428     Reason for No Pharmacological VTE Prophylaxis  Once      05/07/18 0428        Cristian Ashraf MD  Department of Hospital Medicine   [facility]

## 2018-05-11 NOTE — SUBJECTIVE & OBJECTIVE
"Interval History: feeling better, some bloating and diarrhea with tube feeds    Medications:  Continuous Infusions:   sodium chloride 0.9% 75 mL/hr at 05/10/18 1721     Scheduled Meds:   enoxaparin  40 mg Subcutaneous Daily    famotidine (PF)  20 mg Intravenous BID    fluticasone-vilanterol  1 puff Inhalation Daily    levothyroxine  75 mcg Oral Before breakfast    loperamide  2 mg Oral Daily    meropenem (MERREM) IVPB  1 g Intravenous Q8H    metronidazole  500 mg Intravenous Q8H    montelukast  10 mg Oral QHS    [START ON 5/12/2018] NON FORMULARY MEDICATION 18 g  18 g Subcutaneous Q14 Days    pantoprazole  40 mg Intravenous Daily    senna-docusate 8.6-50 mg  1 tablet Oral BID    sodium chloride 0.9%  10 mL Intravenous Q6H    sucralfate  1 g Oral Q6H    triamterene-hydrochlorothiazide 37.5-25 mg  2 tablet Oral Daily    vancomycin (VANCOCIN) IVPB  1,750 mg Intravenous Q12H     PRN Meds:albuterol-ipratropium 2.5mg-0.5mg/3mL, dextrose 50%, diazePAM, diphenhydrAMINE, glucagon (human recombinant), glucose, glucose, hydrocodone-apap 7.5-325 MG/15 ML, HYDROmorphone, methocarbamol (ROBAXIN) IVPB, midazolam, ondansetron, prochlorperazine, promethazine (PHENERGAN) IVPB, promethazine (PHENERGAN) IVPB, ramelteon, Flushing PICC Protocol **AND** sodium chloride 0.9% **AND** sodium chloride 0.9%, sodium chloride 0.9%, sodium chloride 0.9%     Review of patient's allergies indicates:   Allergen Reactions    Bromelains Hives     " causes mouth to bleed"    Sudafed [pseudoephedrine hcl] Other (See Comments)     Does not want to wake up .    Amoxicillin-pot clavulanate Itching     Other reaction(s): Itching    Hydrocodone Itching    Iodinated contrast- oral and iv dye      childhood    Iodine and iodide containing products Other (See Comments)    Melon Hives    Pantoprazole Nausea Only     Other reaction(s): upset stomach/halitosis    Percocet [oxycodone-acetaminophen] Itching    Barium iodide Rash    " Ephedrine Other (See Comments)     Other reaction(s): comatose    Penicillins      Other reaction(s): does not work on pt symptoms     Objective:     Vital Signs (Most Recent):  Temp: 97.6 °F (36.4 °C) (05/11/18 1512)  Pulse: 71 (05/11/18 1512)  Resp: 18 (05/11/18 1512)  BP: (!) 146/70 (05/11/18 1512)  SpO2: (!) 94 % (05/11/18 1512) Vital Signs (24h Range):  Temp:  [96.1 °F (35.6 °C)-98.5 °F (36.9 °C)] 97.6 °F (36.4 °C)  Pulse:  [54-85] 71  Resp:  [14-18] 18  SpO2:  [93 %-97 %] 94 %  BP: (114-156)/(62-77) 146/70     Weight: 88.4 kg (194 lb 14.2 oz)  Body mass index is 35.65 kg/m².    Intake/Output - Last 3 Shifts       05/09 0700 - 05/10 0659 05/10 0700 - 05/11 0659 05/11 0700 - 05/12 0659    P.O. 60      I.V. (mL/kg) 1400 (15.8) 1575 (17.8)     NG/GT   260    IV Piggyback 1400 1050     Total Intake(mL/kg) 2860 (32.4) 2625 (29.7) 260 (2.9)    Urine (mL/kg/hr) 850 (0.4)      Drains  0 (0)     Other  75 (0)     Blood 1 (0)      Total Output 851 75      Net +2009 +2550 +260           Urine Occurrence 2 x 5 x     Stool Occurrence 2 x 1 x           Physical Exam   Abdominal: Soft. She exhibits no distension and no mass. There is no tenderness. There is no rebound and no guarding.   Drain ss, flushed by nursing       Significant Labs:  CBC:   Recent Labs  Lab 05/11/18  0556   WBC 13.50*   RBC 4.05   HGB 11.4*   HCT 34.9*      MCV 86   MCH 28.3   MCHC 32.7     BMP:   Recent Labs  Lab 05/11/18  0556         K 3.4*      CO2 27   BUN 15   CREATININE 0.7   CALCIUM 8.6*   MG 1.9       Significant Diagnostics:  I have reviewed all pertinent imaging results/findings within the past 24 hours.

## 2018-05-11 NOTE — PROGRESS NOTES
Pt c/o bloating and abdominal discomfort.  Tube feeding temporarily stopped and to resume within the hour pending pain medication administration.      MD rounded on pt. Advised to resume tube feeding at 0000 5/12/18 at rate of 10mL./hr with the hope of increasing to 30mL/hr. Telemetry D/C'd. Monitoring pt for continued excessive outputs of diarrhea.

## 2018-05-11 NOTE — PT/OT/SLP PROGRESS
Physical Therapy Treatment    Patient Name:  Yodit Canseco   MRN:  [id number]    Recommendations:     Discharge Recommendations:  home   Discharge Equipment Recommendations: none   Barriers to discharge: None    Assessment:     Yodit Canseco is a 57 y.o. female admitted with a medical diagnosis of Sepsis.  She presents with the following impairments/functional limitations:  weakness, impaired endurance, impaired self care skills .    Rehab Prognosis: fair; patient would benefit from acute skilled PT services to address these deficits and reach maximum level of function.      Recent Surgery: Procedure(s):  REMOVAL-STENT-ESOPHAGEAL, UPPER ENDOSCOPIC STENT REMOVAL AND INSERTION NASOJEJUNAL TUBE 2 Days Post-Op    Plan:     During this hospitalization, patient to be seen 6 x/week to address the above listed problems via gait training, therapeutic activities, therapeutic exercises  · Plan of Care Expires:  05/25/18   Plan of Care Reviewed with: patient    Subjective     Communicated with nurse Oquendo prior to session.  Patient found supine in bed upon PT entry to room, agreeable to treatment.      Chief Complaint: none stated  Patient comments/goals: to return home feeling better.  Pain/Comfort:  · Pain Rating 1: 0/10    Patients cultural, spiritual, Methodist conflicts given the current situation:      Objective:     Patient found with: NG tube, oxygen, peripheral IV, SCD, telemetry     General Precautions: Standard,     Orthopedic Precautions:N/A   Braces: N/A     Functional Mobility:  · Bed Mobility:     · Rolling Left:  contact guard assistance  · Rolling Right: contact guard assistance  · Supine to Sit: minimum assistance  · Sit to Supine: minimum assistance  · Transfers:     · Sit to Stand:  contact guard assistance with no AD  · Gait: 300' x 2 with CGA pushing IV pole.      AM-PAC 6 CLICK MOBILITY          Therapeutic Activities and Exercises:   Patient very specific. Requests to exit bed left side only.  Does not like socks, can't tolerate fragrances due to asthma, assist patient on R side while walking. Returned to bed following with RT present.    Patient left supine with all lines intact, call button in reach, nurse Azra notified and RT present..    GOALS:    Physical Therapy Goals        Problem: Physical Therapy Goal    Goal Priority Disciplines Outcome Goal Variances Interventions   Physical Therapy Goal     PT/OT, PT Ongoing (interventions implemented as appropriate)     Description:  Goals to be met by: 2018     Patient will increase functional independence with mobility by performin. Sit to stand transfer with Supervision  2. Gait  x 250x2 feet with Contact Guard Assistance using .   3. Lower extremity exercise program x20 reps per handout, with assistance as needed                      Time Tracking:     PT Received On: 18  PT Start Time: 0815     PT Stop Time: 0835  PT Total Time (min): 20 min     Billable Minutes: Gait Training 12min and Therapeutic Activity 8min    Treatment Type: Treatment  PT/PTA: PTA     PTA Visit Number: 1     Elvi Pierce PTA  2018

## 2018-05-11 NOTE — NURSING
Pt back on floor from procedures at this time. Pt stable condition, NAD distress. Pt c/o of pain and reports that Dr. Reese said he would order her some dilaudid. This nurse informed Pt that would be looking out for the order and if did not see it then would call MD. Pt reports that she is ok for now and  is present at bedside. Hooked Pt back to feeding pump @ 20 mL/hr, and PICC in R upper Arm now present and able to use, NS running at 75 mL/hr. Hung IV antibiotic that did not get to be administered due to Pt going to procedure, sent message to pharmacy to reschedule all future doses. SCD's hooked back up and turned on at this time also.

## 2018-05-11 NOTE — CONSULTS
Yodit Canseco [id number] is a 57 y.o. female who has been consulted for vancomycin dosing.    The patient has the following labs:     Date Creatinine (mg/dl)    BUN WBC Count   5/11/2018 Estimated Creatinine Clearance: 90.4 mL/min (based on SCr of 0.7 mg/dL). Lab Results   Component Value Date    BUN 15 05/11/2018     Lab Results   Component Value Date    WBC 13.50 (H) 05/11/2018        Current weight is 88.4 kg (194 lb 14.2 oz)    Pt is receiving vancomycin 1750 mg every 12 hours.  Vancomycin trough from 5/11/18 at 0556 was 16.1 mg/dL.  Target trough range is 15 -20 mg/dL.   Trough was drawn on time and anticipate it is therapeutic. Pharmacy will continue with current dose. Vancomycin trough has been ordered prior to 3rd dose due 5/12/18 at 0600 to assess clearance.      Patient will be followed by pharmacy for changes in renal function, toxicity, and efficacy.  Thank you for allowing us to participate in this patient's care.     Mandeep Echols    05/11/2018

## 2018-05-12 LAB
ALBUMIN SERPL BCP-MCNC: 2.1 G/DL
ALP SERPL-CCNC: 79 U/L
ALT SERPL W/O P-5'-P-CCNC: 22 U/L
ANION GAP SERPL CALC-SCNC: 8 MMOL/L
AST SERPL-CCNC: 26 U/L
BACTERIA BLD CULT: NORMAL
BACTERIA BLD CULT: NORMAL
BASOPHILS # BLD AUTO: 0.1 K/UL
BASOPHILS NFR BLD: 1 %
BILIRUB SERPL-MCNC: 0.5 MG/DL
BUN SERPL-MCNC: 9 MG/DL
CALCIUM SERPL-MCNC: 8.1 MG/DL
CHLORIDE SERPL-SCNC: 105 MMOL/L
CO2 SERPL-SCNC: 28 MMOL/L
CREAT SERPL-MCNC: 0.6 MG/DL
DIFFERENTIAL METHOD: ABNORMAL
EOSINOPHIL # BLD AUTO: 0.7 K/UL
EOSINOPHIL NFR BLD: 4.7 %
ERYTHROCYTE [DISTWIDTH] IN BLOOD BY AUTOMATED COUNT: 14.7 %
EST. GFR  (AFRICAN AMERICAN): >60 ML/MIN/1.73 M^2
EST. GFR  (NON AFRICAN AMERICAN): >60 ML/MIN/1.73 M^2
GLUCOSE SERPL-MCNC: 78 MG/DL
HCT VFR BLD AUTO: 36 %
HGB BLD-MCNC: 11.8 G/DL
LYMPHOCYTES # BLD AUTO: 3.2 K/UL
LYMPHOCYTES NFR BLD: 22.1 %
MAGNESIUM SERPL-MCNC: 1.4 MG/DL
MCH RBC QN AUTO: 28.4 PG
MCHC RBC AUTO-ENTMCNC: 32.9 G/DL
MCV RBC AUTO: 86 FL
MONOCYTES # BLD AUTO: 0.7 K/UL
MONOCYTES NFR BLD: 4.7 %
NEUTROPHILS # BLD AUTO: 9.9 K/UL
NEUTROPHILS NFR BLD: 67.5 %
PHOSPHATE SERPL-MCNC: 3 MG/DL
PLATELET # BLD AUTO: 298 K/UL
PMV BLD AUTO: 9.3 FL
POTASSIUM SERPL-SCNC: 3.1 MMOL/L
PROT SERPL-MCNC: 5.4 G/DL
RBC # BLD AUTO: 4.17 M/UL
SODIUM SERPL-SCNC: 141 MMOL/L
VANCOMYCIN TROUGH SERPL-MCNC: 22.9 UG/ML
WBC # BLD AUTO: 14.7 K/UL

## 2018-05-12 PROCEDURE — 25000003 PHARM REV CODE 250: Performed by: NURSE PRACTITIONER

## 2018-05-12 PROCEDURE — 80053 COMPREHEN METABOLIC PANEL: CPT

## 2018-05-12 PROCEDURE — 99900035 HC TECH TIME PER 15 MIN (STAT)

## 2018-05-12 PROCEDURE — 25000003 PHARM REV CODE 250: Performed by: SURGERY

## 2018-05-12 PROCEDURE — S0030 INJECTION, METRONIDAZOLE: HCPCS | Performed by: NURSE PRACTITIONER

## 2018-05-12 PROCEDURE — 94761 N-INVAS EAR/PLS OXIMETRY MLT: CPT

## 2018-05-12 PROCEDURE — 83735 ASSAY OF MAGNESIUM: CPT

## 2018-05-12 PROCEDURE — 63600175 PHARM REV CODE 636 W HCPCS: Performed by: SURGERY

## 2018-05-12 PROCEDURE — 25000003 PHARM REV CODE 250: Performed by: INTERNAL MEDICINE

## 2018-05-12 PROCEDURE — C9113 INJ PANTOPRAZOLE SODIUM, VIA: HCPCS | Performed by: SURGERY

## 2018-05-12 PROCEDURE — 84100 ASSAY OF PHOSPHORUS: CPT

## 2018-05-12 PROCEDURE — 63600175 PHARM REV CODE 636 W HCPCS: Performed by: NURSE PRACTITIONER

## 2018-05-12 PROCEDURE — S0028 INJECTION, FAMOTIDINE, 20 MG: HCPCS | Performed by: SURGERY

## 2018-05-12 PROCEDURE — 85025 COMPLETE CBC W/AUTO DIFF WBC: CPT

## 2018-05-12 PROCEDURE — 36415 COLL VENOUS BLD VENIPUNCTURE: CPT

## 2018-05-12 PROCEDURE — A4216 STERILE WATER/SALINE, 10 ML: HCPCS | Performed by: SURGERY

## 2018-05-12 PROCEDURE — 12000002 HC ACUTE/MED SURGE SEMI-PRIVATE ROOM

## 2018-05-12 PROCEDURE — 80202 ASSAY OF VANCOMYCIN: CPT

## 2018-05-12 PROCEDURE — 63600175 PHARM REV CODE 636 W HCPCS: Performed by: INTERNAL MEDICINE

## 2018-05-12 RX ORDER — POTASSIUM CHLORIDE 20 MEQ/15ML
60 SOLUTION ORAL ONCE
Status: COMPLETED | OUTPATIENT
Start: 2018-05-12 | End: 2018-05-12

## 2018-05-12 RX ORDER — HYDROMORPHONE HYDROCHLORIDE 2 MG/ML
1 INJECTION, SOLUTION INTRAMUSCULAR; INTRAVENOUS; SUBCUTANEOUS EVERY 6 HOURS PRN
Status: DISCONTINUED | OUTPATIENT
Start: 2018-05-13 | End: 2018-05-13

## 2018-05-12 RX ORDER — ACETAMINOPHEN 10 MG/ML
1000 INJECTION, SOLUTION INTRAVENOUS EVERY 6 HOURS
Status: COMPLETED | OUTPATIENT
Start: 2018-05-12 | End: 2018-05-13

## 2018-05-12 RX ORDER — DIPHENHYDRAMINE HCL 12.5MG/5ML
12.5 ELIXIR ORAL EVERY 6 HOURS PRN
Status: DISCONTINUED | OUTPATIENT
Start: 2018-05-12 | End: 2018-05-15 | Stop reason: HOSPADM

## 2018-05-12 RX ADMIN — METHOCARBAMOL 500 MG: 100 INJECTION INTRAMUSCULAR; INTRAVENOUS at 11:05

## 2018-05-12 RX ADMIN — METRONIDAZOLE 500 MG: 500 INJECTION, SOLUTION INTRAVENOUS at 10:05

## 2018-05-12 RX ADMIN — ACETAMINOPHEN 1000 MG: 10 INJECTION, SOLUTION INTRAVENOUS at 10:05

## 2018-05-12 RX ADMIN — SUCRALFATE 1 G: 1 SUSPENSION ORAL at 05:05

## 2018-05-12 RX ADMIN — SODIUM CHLORIDE, PRESERVATIVE FREE 10 ML: 5 INJECTION INTRAVENOUS at 11:05

## 2018-05-12 RX ADMIN — SODIUM CHLORIDE, PRESERVATIVE FREE 10 ML: 5 INJECTION INTRAVENOUS at 05:05

## 2018-05-12 RX ADMIN — SODIUM CHLORIDE: 0.9 INJECTION, SOLUTION INTRAVENOUS at 04:05

## 2018-05-12 RX ADMIN — HYDROCODONE BITARTRATE AND ACETAMINOPHEN 15 ML: 7.5; 325 SOLUTION ORAL at 06:05

## 2018-05-12 RX ADMIN — METRONIDAZOLE 500 MG: 500 INJECTION, SOLUTION INTRAVENOUS at 02:05

## 2018-05-12 RX ADMIN — Medication 0.5 MG: at 04:05

## 2018-05-12 RX ADMIN — LOPERAMIDE HYDROCHLORIDE 2 MG: 1 SOLUTION ORAL at 08:05

## 2018-05-12 RX ADMIN — FAMOTIDINE 20 MG: 10 INJECTION INTRAVENOUS at 10:05

## 2018-05-12 RX ADMIN — PANTOPRAZOLE SODIUM 40 MG: 40 INJECTION, POWDER, FOR SOLUTION INTRAVENOUS at 08:05

## 2018-05-12 RX ADMIN — Medication 0.5 MG: at 12:05

## 2018-05-12 RX ADMIN — SUCRALFATE 1 G: 1 SUSPENSION ORAL at 12:05

## 2018-05-12 RX ADMIN — SODIUM CHLORIDE 1750 MG: 9 INJECTION, SOLUTION INTRAVENOUS at 09:05

## 2018-05-12 RX ADMIN — MEROPENEM AND SODIUM CHLORIDE 1 G: 1 INJECTION, SOLUTION INTRAVENOUS at 12:05

## 2018-05-12 RX ADMIN — METRONIDAZOLE 500 MG: 500 INJECTION, SOLUTION INTRAVENOUS at 05:05

## 2018-05-12 RX ADMIN — LEVOTHYROXINE SODIUM 75 MCG: 25 TABLET ORAL at 05:05

## 2018-05-12 RX ADMIN — POTASSIUM CHLORIDE 60 MEQ: 20 SOLUTION ORAL at 08:05

## 2018-05-12 RX ADMIN — DIPHENHYDRAMINE HYDROCHLORIDE 25 MG: 25 CAPSULE ORAL at 06:05

## 2018-05-12 RX ADMIN — ACETAMINOPHEN 1000 MG: 10 INJECTION, SOLUTION INTRAVENOUS at 04:05

## 2018-05-12 RX ADMIN — TRIAMTERENE AND HYDROCHLOROTHIAZIDE 2 TABLET: 37.5; 25 TABLET ORAL at 08:05

## 2018-05-12 RX ADMIN — ENOXAPARIN SODIUM 40 MG: 100 INJECTION SUBCUTANEOUS at 04:05

## 2018-05-12 RX ADMIN — DIAZEPAM 2 MG: 2 TABLET ORAL at 09:05

## 2018-05-12 RX ADMIN — DIPHENHYDRAMINE HYDROCHLORIDE 25 MG: 25 CAPSULE ORAL at 08:05

## 2018-05-12 RX ADMIN — MONTELUKAST SODIUM 10 MG: 10 TABLET, FILM COATED ORAL at 09:05

## 2018-05-12 RX ADMIN — ONDANSETRON HYDROCHLORIDE 8 MG: 2 INJECTION INTRAMUSCULAR; INTRAVENOUS at 10:05

## 2018-05-12 RX ADMIN — FAMOTIDINE 20 MG: 10 INJECTION INTRAVENOUS at 08:05

## 2018-05-12 RX ADMIN — Medication 0.5 MG: at 10:05

## 2018-05-12 RX ADMIN — MEROPENEM AND SODIUM CHLORIDE 1 G: 1 INJECTION, SOLUTION INTRAVENOUS at 05:05

## 2018-05-12 RX ADMIN — MEROPENEM AND SODIUM CHLORIDE 1 G: 1 INJECTION, SOLUTION INTRAVENOUS at 08:05

## 2018-05-12 RX ADMIN — SODIUM CHLORIDE, PRESERVATIVE FREE 10 ML: 5 INJECTION INTRAVENOUS at 12:05

## 2018-05-12 NOTE — PLAN OF CARE
Problem: Patient Care Overview  Goal: Plan of Care Review  Outcome: Ongoing (interventions implemented as appropriate)  Pt on room air with daily Breo and Q4 PRN duoneb, no treatment needed at this time, and pt refused Breo this am,

## 2018-05-12 NOTE — PROGRESS NOTES
Pt resting comfortably at present time. N otes some difficulty with pain control this AM.  Requesting Ofirmev.    Pt refused tube feeds overnight not allowing them to start until 6 am.  SInce pt has refused progression of TF    TF's currently at 10 cc's which pt seems to be tolerating.  Denies n/v.  Having loose BM.      Drain being flushed daily by nursing.  Output 25 cc's yesterday.  Bloody output today.     Wt Readings from Last 3 Encounters:   05/12/18 91.1 kg (200 lb 13.4 oz)   05/01/18 86.5 kg (190 lb 12.8 oz)   04/19/18 96.6 kg (212 lb 15.4 oz)     Temp Readings from Last 3 Encounters:   05/12/18 98.7 °F (37.1 °C) (Axillary)   05/01/18 97.9 °F (36.6 °C) (Oral)   04/17/18 99.5 °F (37.5 °C) (Oral)     BP Readings from Last 3 Encounters:   05/12/18 135/74   05/01/18 117/77   04/19/18 122/71     Pulse Readings from Last 3 Encounters:   05/12/18 79   05/01/18 (!) 143   04/19/18 89     AAOx3  CTA B  Sinus  Soft/nd/ min ttp BS present    Lab Results   Component Value Date    WBC 14.70 (H) 05/12/2018    HGB 11.8 (L) 05/12/2018    HCT 36.0 (L) 05/12/2018    MCV 86 05/12/2018     05/12/2018     BMP  Lab Results   Component Value Date     05/12/2018    K 3.1 (L) 05/12/2018     05/12/2018    CO2 28 05/12/2018    BUN 9 05/12/2018    CREATININE 0.6 05/12/2018    CALCIUM 8.1 (L) 05/12/2018    ANIONGAP 8 05/12/2018    ESTGFRAFRICA >60 05/12/2018    EGFRNONAA >60 05/12/2018     A/P: s/p gastric sleeve with abscess possible leak    Cont treatment as directed by Dr Mary Ann Villatoro d/w pt encouraging progression of TFs  Replace electrolyted  Cont ib abx

## 2018-05-12 NOTE — PROGRESS NOTES
[facility]  Adult Nutrition  Consult Note    SUMMARY     Recommendations  1) Recommend Vivonex RTF 4 cartons daily (250 mls). Flush with 175 mls water Q6 hrs daily.  Add x2 packets beneprotein mixed with 2 oz water TID. Provides 1150 calories, 86 gms protein, 176 gms CHO, 848 mls free water.    Goal: 1) Pt will receive nutrition within 48 hrs. 2) Pt will receive at least 80% EEN within 48 hrs.   Nutrition Goal Status: 1) met 2) progressing toward goal   Communication of RD Recs:  (POC)    Discharge Plan:  Enteral feeds as above      Reason for Assessment    Reason for Assessment: RD follow up   1. Sepsis, due to unspecified organism    2. Abdominal pain, unspecified abdominal location    3. S/P bariatric surgery    4. Gastric leak    5. Sepsis    6. S/P laparoscopic sleeve gastrectomy    7. Intraperitoneal abscess      Past Medical History:   Diagnosis Date    Allergy     Angio-edema     Arthralgia     Asthma without status asthmaticus     Bronchitis     Diverticulosis of large intestine without hemorrhage 10/8/2015    Environmental allergies     Eosinophilic asthma 3/8/2018    Gastroparesis     Hand arthritis     Herpes simplex without mention of complication     oral    Hyperlipidemia     Hypothyroidism     LBP radiating to left leg     Leukocytosis, unspecified     Migraine headache     Obesity, Class III, BMI 40-49.9 (morbid obesity)     Periorbital cellulitis 12/31/2016    Prediabetes     Reactive airway disease     Reactive airway disease     Recurrent upper respiratory infection (URI)     S/P colonoscopy November 2010    Urticaria        Interdisciplinary Rounds: attended  General Information Comments: Admitted with fever. Pt is s/p gastric sleeve x 3 weeks.  Intentional wt loss noted.   5/9/18 Spoke with Dr. Reese re continuous feeds. Per MD direction, continuous feeds during admit and bolus feeds at discharge.  Temporary enteral feeds until gut heals. .   "Yesterday, pt reported intolerance to whey and lactose, but did not know about casein. Today pt and her spouse report concerns about using Nutren 2.0 2/2 milk intolerance.  Assured pt that Nutren 2.0 does not have whey or lactose, but does contain casein.  Spouse states he does not want to try the Nutren 2.0, and reports pt can tolerate almond milk.  Dr. Reese spoke with family and they agreed to try Nutren 2.0.  The order for Nutren 2.0 has been activated at this time.  Monitoring for tolerance.    Found TF that is milk, egg, pea and artificial sweetener free.  Special ordered Vivonex RTF and it is being shipped overnight for pt use if needed.     5/10/18: Vivonex RTF is in house if needed. Pt alert, sitting up and talking. TF, Nutren 2.0 infusing @ 10 mls/hr. Tolerating  18: Pt not tolerating TF @ 40 mls/hr. TF has been progressing over 24 hrs/day.  Abdomen is bloated and pt having diarrhea. Conferred with nursing and MD.        Nutrition Risk Screen    Nutrition Risk Screen: no indicators present    Nutrition/Diet History    Do you have any cultural, spiritual, Yazidism conflicts, given your current situation?: none  Food Allergies: egg, milk (pea, artificial sweeteners)    Anthropometrics    Temp: 97.7 °F (36.5 °C)  Height Method: Stated  Height: 5' 2"  Height (inches): 62 in  Weight Method: Standard Scale  Weight: 91.1 kg (200 lb 13.4 oz)  Weight (lb): 200.84 lb  Ideal Body Weight (IBW), Female: 110 lb  % Ideal Body Weight, Female (lb): 177.17 lb  BMI (Calculated): 35.7  Usual Body Weight (UBW), k.6 kg  % Usual Body Weight: 88.06  % Weight Change From Usual Weight: -12.13 %       Lab/Procedures/Meds    Pertinent Labs Reviewed: reviewed  Lab Results   Component Value Date    ALBUMIN 2.1 (L) 2018     Lab Results   Component Value Date    CRP 7.6 2016     Lab Results   Component Value Date    WBC 14.70 (H) 2018     Pertinent Medications Reviewed: reviewed  Scheduled Meds:   " enoxaparin  40 mg Subcutaneous Daily    famotidine (PF)  20 mg Intravenous BID    fluticasone-vilanterol  1 puff Inhalation Daily    IMMUNE GLOBULIN (HIZANTRA) 18 g ( home med)  18 g Subcutaneous Q14 Days    levothyroxine  75 mcg Oral Before breakfast    loperamide  2 mg Oral Daily    meropenem (MERREM) IVPB  1 g Intravenous Q8H    metronidazole  500 mg Intravenous Q8H    montelukast  10 mg Oral QHS    pantoprazole  40 mg Intravenous Daily    senna-docusate 8.6-50 mg  1 tablet Oral BID    sodium chloride 0.9%  10 mL Intravenous Q6H    sucralfate  1 g Oral Q6H    triamterene-hydrochlorothiazide 37.5-25 mg  2 tablet Oral Daily    vancomycin (VANCOCIN) IVPB  1,750 mg Intravenous Q12H     Continuous Infusions:   sodium chloride 0.9% 75 mL/hr at 05/10/18 1721       Physical Findings/Assessment    Overall Physical Appearance: obese, weak  Oral/Mouth Cavity: WDL  Skin:  (Steve score 20)    Estimated/Assessed Needs    Weight Used For Calorie Calculations: 88.4 kg (194 lb 14.2 oz)     Energy Need Method: [place]oder[place]-St Jeor: 1417 x 1.25=1771     Weight Used For Protein Calculations: 88.4 kg (194 lb 14.2 oz)    gms/day     Fluid Need Method: RDA Method (or per MD)     CHO Requirement: max 221 gms CHO      Nutrition Prescription Ordered    Current Diet Order: NPO    Evaluation of Received Nutrient/Fluid Intake    Energy Calories Required: not meeting needs  Protein Required: not meeting needs  Fluid Required:per MD rec    Intake/Output Summary (Last 24 hours) at 05/12/18 1020  Last data filed at 05/12/18 0000   Gross per 24 hour   Intake          3126.25 ml   Output               25 ml   Net          3101.25 ml     % Intake of Estimated Energy Needs: 25-50 %  % Meal Intake: NPO    Nutrition Risk    Level of Risk/Frequency of Follow-up:  (2 x wkly)     Assessment and Plan    Gastric leak    Contributing Nutrition Diagnosis  Inadequate energy intake    Related to (etiology):   Altered GI function    Signs and  Symptoms (as evidenced by):   NPO with no alternate nutrition    Interventions/Recommendations (treatment strategy):  continue nutrition support progressing per pt tolerance to goal rate     Nutrition Diagnosis Status:   continues                 Monitor and Evaluation    Food and Nutrient Intake: energy intake  Food and Nutrient Adminstration: diet order  Anthropometric Measurements: weight  Biochemical Data, Medical Tests and Procedures: inflammatory profile  Nutrition-Focused Physical Findings: overall appearance, skin     Discharge Plan    Enteral feeds as above    RD Follow-up?: Yes

## 2018-05-13 LAB
ALBUMIN SERPL BCP-MCNC: 2.2 G/DL
ALP SERPL-CCNC: 102 U/L
ALT SERPL W/O P-5'-P-CCNC: 29 U/L
ANION GAP SERPL CALC-SCNC: 8 MMOL/L
AST SERPL-CCNC: 44 U/L
BACTERIA SPEC AEROBE CULT: NORMAL
BACTERIA SPEC AEROBE CULT: NORMAL
BASOPHILS # BLD AUTO: ABNORMAL K/UL
BASOPHILS NFR BLD: 0 %
BILIRUB SERPL-MCNC: 0.5 MG/DL
BUN SERPL-MCNC: 9 MG/DL
CALCIUM SERPL-MCNC: 8.6 MG/DL
CHLORIDE SERPL-SCNC: 105 MMOL/L
CO2 SERPL-SCNC: 27 MMOL/L
CREAT SERPL-MCNC: 0.7 MG/DL
DIFFERENTIAL METHOD: ABNORMAL
EOSINOPHIL # BLD AUTO: ABNORMAL K/UL
EOSINOPHIL NFR BLD: 4 %
ERYTHROCYTE [DISTWIDTH] IN BLOOD BY AUTOMATED COUNT: 14.4 %
EST. GFR  (AFRICAN AMERICAN): >60 ML/MIN/1.73 M^2
EST. GFR  (NON AFRICAN AMERICAN): >60 ML/MIN/1.73 M^2
GLUCOSE SERPL-MCNC: 96 MG/DL
HCT VFR BLD AUTO: 37.5 %
HGB BLD-MCNC: 12.4 G/DL
LYMPHOCYTES # BLD AUTO: ABNORMAL K/UL
LYMPHOCYTES NFR BLD: 18 %
MAGNESIUM SERPL-MCNC: 1.6 MG/DL
MCH RBC QN AUTO: 28.1 PG
MCHC RBC AUTO-ENTMCNC: 33 G/DL
MCV RBC AUTO: 85 FL
METAMYELOCYTES NFR BLD MANUAL: 3 %
MONOCYTES # BLD AUTO: ABNORMAL K/UL
MONOCYTES NFR BLD: 7 %
NEUTROPHILS NFR BLD: 63 %
NEUTS BAND NFR BLD MANUAL: 5 %
PHOSPHATE SERPL-MCNC: 3.3 MG/DL
PLATELET # BLD AUTO: 291 K/UL
PLATELET BLD QL SMEAR: ABNORMAL
PMV BLD AUTO: 9.6 FL
POLYCHROMASIA BLD QL SMEAR: ABNORMAL
POTASSIUM SERPL-SCNC: 3.4 MMOL/L
PROT SERPL-MCNC: 5.7 G/DL
RBC # BLD AUTO: 4.39 M/UL
SODIUM SERPL-SCNC: 140 MMOL/L
WBC # BLD AUTO: 14.8 K/UL

## 2018-05-13 PROCEDURE — 63600175 PHARM REV CODE 636 W HCPCS: Performed by: NURSE PRACTITIONER

## 2018-05-13 PROCEDURE — 63600175 PHARM REV CODE 636 W HCPCS: Performed by: INTERNAL MEDICINE

## 2018-05-13 PROCEDURE — 25000003 PHARM REV CODE 250: Performed by: SURGERY

## 2018-05-13 PROCEDURE — 80053 COMPREHEN METABOLIC PANEL: CPT

## 2018-05-13 PROCEDURE — A4216 STERILE WATER/SALINE, 10 ML: HCPCS | Performed by: SURGERY

## 2018-05-13 PROCEDURE — 36415 COLL VENOUS BLD VENIPUNCTURE: CPT

## 2018-05-13 PROCEDURE — 83735 ASSAY OF MAGNESIUM: CPT

## 2018-05-13 PROCEDURE — 25000003 PHARM REV CODE 250: Performed by: NURSE PRACTITIONER

## 2018-05-13 PROCEDURE — 94640 AIRWAY INHALATION TREATMENT: CPT

## 2018-05-13 PROCEDURE — 25000003 PHARM REV CODE 250: Performed by: INTERNAL MEDICINE

## 2018-05-13 PROCEDURE — 99900035 HC TECH TIME PER 15 MIN (STAT)

## 2018-05-13 PROCEDURE — 63600175 PHARM REV CODE 636 W HCPCS: Performed by: SURGERY

## 2018-05-13 PROCEDURE — S0030 INJECTION, METRONIDAZOLE: HCPCS | Performed by: NURSE PRACTITIONER

## 2018-05-13 PROCEDURE — 84100 ASSAY OF PHOSPHORUS: CPT

## 2018-05-13 PROCEDURE — 12000002 HC ACUTE/MED SURGE SEMI-PRIVATE ROOM

## 2018-05-13 PROCEDURE — 85027 COMPLETE CBC AUTOMATED: CPT

## 2018-05-13 PROCEDURE — 97116 GAIT TRAINING THERAPY: CPT

## 2018-05-13 PROCEDURE — S0028 INJECTION, FAMOTIDINE, 20 MG: HCPCS | Performed by: SURGERY

## 2018-05-13 PROCEDURE — 94761 N-INVAS EAR/PLS OXIMETRY MLT: CPT

## 2018-05-13 PROCEDURE — C9113 INJ PANTOPRAZOLE SODIUM, VIA: HCPCS | Performed by: SURGERY

## 2018-05-13 PROCEDURE — 85007 BL SMEAR W/DIFF WBC COUNT: CPT

## 2018-05-13 RX ORDER — ACETAMINOPHEN 10 MG/ML
1000 INJECTION, SOLUTION INTRAVENOUS EVERY 6 HOURS
Status: COMPLETED | OUTPATIENT
Start: 2018-05-14 | End: 2018-05-14

## 2018-05-13 RX ORDER — HYDROMORPHONE HYDROCHLORIDE 1 MG/ML
1 INJECTION, SOLUTION INTRAMUSCULAR; INTRAVENOUS; SUBCUTANEOUS EVERY 6 HOURS PRN
Status: DISCONTINUED | OUTPATIENT
Start: 2018-05-13 | End: 2018-05-14 | Stop reason: ALTCHOICE

## 2018-05-13 RX ORDER — RAMELTEON 8 MG/1
8 TABLET ORAL NIGHTLY PRN
Status: DISCONTINUED | OUTPATIENT
Start: 2018-05-13 | End: 2018-05-13

## 2018-05-13 RX ADMIN — FAMOTIDINE 20 MG: 10 INJECTION INTRAVENOUS at 08:05

## 2018-05-13 RX ADMIN — ACETAMINOPHEN 1000 MG: 10 INJECTION, SOLUTION INTRAVENOUS at 09:05

## 2018-05-13 RX ADMIN — MEROPENEM AND SODIUM CHLORIDE 1 G: 1 INJECTION, SOLUTION INTRAVENOUS at 09:05

## 2018-05-13 RX ADMIN — SODIUM CHLORIDE, PRESERVATIVE FREE 10 ML: 5 INJECTION INTRAVENOUS at 12:05

## 2018-05-13 RX ADMIN — ACETAMINOPHEN 1000 MG: 10 INJECTION, SOLUTION INTRAVENOUS at 03:05

## 2018-05-13 RX ADMIN — SODIUM CHLORIDE, PRESERVATIVE FREE 10 ML: 5 INJECTION INTRAVENOUS at 05:05

## 2018-05-13 RX ADMIN — DIAZEPAM 2 MG: 2 TABLET ORAL at 04:05

## 2018-05-13 RX ADMIN — ACETAMINOPHEN 1000 MG: 10 INJECTION, SOLUTION INTRAVENOUS at 11:05

## 2018-05-13 RX ADMIN — SODIUM CHLORIDE 1500 MG: 9 INJECTION, SOLUTION INTRAVENOUS at 03:05

## 2018-05-13 RX ADMIN — Medication 1 MG: at 05:05

## 2018-05-13 RX ADMIN — ENOXAPARIN SODIUM 40 MG: 100 INJECTION SUBCUTANEOUS at 05:05

## 2018-05-13 RX ADMIN — METRONIDAZOLE 500 MG: 500 INJECTION, SOLUTION INTRAVENOUS at 08:05

## 2018-05-13 RX ADMIN — MONTELUKAST SODIUM 10 MG: 10 TABLET, FILM COATED ORAL at 08:05

## 2018-05-13 RX ADMIN — FLUCONAZOLE 150 MG: 50 TABLET ORAL at 08:05

## 2018-05-13 RX ADMIN — PANTOPRAZOLE SODIUM 40 MG: 40 INJECTION, POWDER, FOR SOLUTION INTRAVENOUS at 09:05

## 2018-05-13 RX ADMIN — MEROPENEM AND SODIUM CHLORIDE 1 G: 1 INJECTION, SOLUTION INTRAVENOUS at 05:05

## 2018-05-13 RX ADMIN — FAMOTIDINE 20 MG: 10 INJECTION INTRAVENOUS at 09:05

## 2018-05-13 RX ADMIN — MEROPENEM AND SODIUM CHLORIDE 1 G: 1 INJECTION, SOLUTION INTRAVENOUS at 01:05

## 2018-05-13 RX ADMIN — Medication 1 MG: at 11:05

## 2018-05-13 RX ADMIN — METRONIDAZOLE 500 MG: 500 INJECTION, SOLUTION INTRAVENOUS at 12:05

## 2018-05-13 RX ADMIN — LOPERAMIDE HYDROCHLORIDE 2 MG: 1 SOLUTION ORAL at 09:05

## 2018-05-13 RX ADMIN — SODIUM CHLORIDE 1500 MG: 9 INJECTION, SOLUTION INTRAVENOUS at 02:05

## 2018-05-13 RX ADMIN — METRONIDAZOLE 500 MG: 500 INJECTION, SOLUTION INTRAVENOUS at 05:05

## 2018-05-13 RX ADMIN — LEVOTHYROXINE SODIUM 75 MCG: 25 TABLET ORAL at 05:05

## 2018-05-13 RX ADMIN — DIAZEPAM 2 MG: 2 TABLET ORAL at 08:05

## 2018-05-13 RX ADMIN — FLUTICASONE FUROATE AND VILANTEROL TRIFENATATE 1 PUFF: 200; 25 POWDER RESPIRATORY (INHALATION) at 07:05

## 2018-05-13 NOTE — PLAN OF CARE
Pt resting quietly. Able to make needs known. Complaint of pain x2 this shift, medicated appropriately. Spouse at bedside. Tube feeding increased from 10cc/hr at 20cc/hr at midnight and 30cc/hr at 0600. Tolerating well. Call light in reach. Will monitor.

## 2018-05-13 NOTE — PLAN OF CARE
Problem: Physical Therapy Goal  Goal: Physical Therapy Goal  Goals to be met by: 2018     Patient will increase functional independence with mobility by performin. Sit to stand transfer with Supervision  2. Gait  x 250x2 feet with Contact Guard Assistance using .   3. Lower extremity exercise program x20 reps per handout, with assistance as needed     Outcome: Ongoing (interventions implemented as appropriate)  Pt ambulated 600' while holding IV pole.  No LOB.

## 2018-05-13 NOTE — PROGRESS NOTES
Progress Note  Hospital Medicine  Patient Name:Yodit Canseco  MRN:  [id number]  Patient Class: IP- Inpatient  Admit Date: 5/6/2018  Length of Stay: 6 days  Expected Discharge Date:   Attending Physician: Cristian Ashraf MD  Primary Care Provider:  Miky Lewis MD    SUBJECTIVE:     Principal Problem: Sepsis  Initial history of present illness: Yodit Canseco is a 58 yo female with PMHx significant for morbid obesity, history of gastroparesis, DM-2 (resolved), hyperlipidemia and hypothyroidism.  She is admitted to the service of hospital medicine with severe sepsis.  She presented to the ED with a 5 day history of subjective fever with tmax 103F.  She states the fever is associated with chills, malaise and nausea. Patient is 3 weeks post gastric sleeve surgery. She reports that she is tolerating her PO intake and recently had relatives over who have been sick with symptoms of fever, chills, and diarrhea. Patient denies diarrhea, vomiting, abdominal pain, chest pain, shortness of breath, cough, urinary symptoms, or neck stiffness.     PMH/PSH/SH/FH/Meds: reviewed.    Symptoms/Review of Systems: No acute overnight events. Patient ambulating the halls with PT today.  Diet: Tube feeds  Activity level: Normal.    Pain:  As above     OBJECTIVE:   Vital Signs (Most Recent):      Temp: 98.2 °F (36.8 °C) (05/13/18 1202)  Pulse: 81 (05/13/18 1202)  Resp: 16 (05/13/18 1202)  BP: 122/64 (05/13/18 1202)  SpO2: 96 % (05/13/18 1202)       Vital Signs Range (Last 24H):  Temp:  [97.3 °F (36.3 °C)-98.2 °F (36.8 °C)]   Pulse:  [75-88]   Resp:  [16-18]   BP: (122-144)/(64-84)   SpO2:  [93 %-96 %]     Weight: 91.1 kg (200 lb 13.4 oz)  Body mass index is 36.73 kg/m².    Intake/Output Summary (Last 24 hours) at 05/13/18 1332  Last data filed at 05/13/18 0553   Gross per 24 hour   Intake             2265 ml   Output               30 ml   Net             2235 ml     Physical Examination:  Constitutional: She is oriented to person,  place, and time. She appears well-developed and well-nourished. No distress.   HENT:   Head: Normocephalic and atraumatic.   Mouth/Throat: Oropharynx is clear and moist.   Eyes: Conjunctivae and EOM are normal. Pupils are equal, round, and reactive to light. No scleral icterus.   Neck: Normal range of motion. Neck supple.   Cardiovascular: Normal rate, regular rhythm, normal heart sounds and intact distal pulses. No murmur heard.  Pulses:       Dorsalis pedis pulses are 2+ on the right side, and 2+ on the left side.   Pulmonary/Chest: Effort normal and breath sounds normal. No accessory muscle usage. No respiratory distress. She has no wheezes. She has no rhonchi. She has no rales.   Abdominal: Soft. Bowel sounds are normal. She exhibits no distension and no mass. There is tenderness (Left upper and lower quadrant). Abdominal drain in place.   Musculoskeletal: Normal range of motion. She exhibits no edema, tenderness or deformity. PICC in right arm.  Neurological: She is alert and oriented to person, place, and time. She has normal strength. Coordination normal.   Skin: Skin is warm, dry and intact. Capillary refill takes less than 2 seconds. No rash noted. She is not diaphoretic. No erythema.   Psychiatric: She has a normal mood and affect. Her speech is normal and behavior is normal.   Vitals reviewed.    CBC:    Recent Labs  Lab 05/11/18  0556 05/12/18 0459 05/13/18 0459   WBC 13.50* 14.70* 14.80*   RBC 4.05 4.17 4.39   HGB 11.4* 11.8* 12.4   HCT 34.9* 36.0* 37.5    298 291   MCV 86 86 85   MCH 28.3 28.4 28.1   MCHC 32.7 32.9 33.0   BMP    Recent Labs  Lab 05/11/18  0556 05/12/18 0458 05/13/18 0459    78 96    141 140   K 3.4* 3.1* 3.4*    105 105   CO2 27 28 27   BUN 15 9 9   CREATININE 0.7 0.6 0.7   CALCIUM 8.6* 8.1* 8.6*   MG 1.9 1.4* 1.6      Diagnostic Results:  Microbiology Results (last 7 days)     Procedure Component Value Units Date/Time    Aerobic culture [[id number]]  Collected:  05/10/18 1512    Order Status:  Completed Specimen:  Abscess from Abdomen Updated:  05/13/18 1312     Aerobic Bacterial Culture --     CANDIDA ALBICANS  Few       Aerobic Bacterial Culture --     STREPTOCOCCUS INTERMEDIUS  Few      Blood Culture #2 [[id number]] Collected:  05/07/18 0216    Order Status:  Completed Specimen:  Blood Updated:  05/12/18 1212     Blood Culture, Routine No growth after 5 days.    Blood Culture #1 [[id number]] Collected:  05/07/18 0216    Order Status:  Completed Specimen:  Blood Updated:  05/12/18 1212     Blood Culture, Routine No growth after 5 days.    Gram stain [[id number]] Collected:  05/10/18 1512    Order Status:  Completed Specimen:  Abscess from Abdomen Updated:  05/11/18 0642     Gram Stain Result Many WBC's      Many Gram positive cocci    Culture, Anaerobic [[id number]] Collected:  05/10/18 1512    Order Status:  Sent Specimen:  Abscess from Abdomen Updated:  05/10/18 2142    Urine Culture [[id number]] Collected:  05/07/18 0114    Order Status:  Completed Specimen:  Urine from Urine, Clean Catch Updated:  05/09/18 0052     Urine Culture, Routine No growth         CT abdomen:  Abnormal stranding in the fat adjacent to the sleeve gastrectomy which is nonspecific.  No extraluminal gas or leakage of oral contrast identified.  Differential considerations include inflammation and blood products.  No drainable fluid collection to suggest abscess.  Small left pleural effusion and left basilar atelectasis.  Hysterectomy and cholecystectomy.  Colonic diverticulosis.    KUB: Interval placement of an esophagogastric stent centered at the level of the GE junction.  Weighted enteric tube tip is at the level of the gastric antrum. Left basilar pulmonary consolidation with air bronchograms for which differential considerations include pneumonia and aspiration.    EGD:  Endoscopy was performed showing a normal esophagus and duodenum.  Stomach had inflamed appearing mucosa s/p sleeve.   Proximal staple line appeared swollen and erythematous.  May have underlying leak but not obvious.  NO obvious leaks seen.  A stent was placed over a guide wire [email or web address] 23 mm by 155 mm under fluoroscopy.  A keyo feeding tube was placed distal to this.      CT guided abdominal abscess drainage:  CT-guided placement of percutaneous 12 [place] pigtail catheter within the patient's left perigastric fluid collection.  Removal of approximately 40 mL of purulent fluid with a sample sent for additional analysis.  The catheter is attached to vacuum bag suction.  Near complete resolution of the collection is noted.  No immediate complication.    Assessment/Plan:     * Sepsis with Intraperitoneal abscess with possible leak from anastomosis s/p IR drainage  S/p Esophageal stent placement and then removal          Patient has been started on tube feeds via NG.  Antibiotics given-              Antibiotics      Start     Stop Route Frequency Ordered     05/07/18 0430   metronidazole IVPB 500 mg      -- IV Every 8 hours (non-standard times) 05/07/18 0428     05/07/18 0430   meropenem-0.9% sodium chloride 1 g/50 mL IVPB      -- IV Every 8 hours (non-standard times) 05/07/18 0428       Follow blood cultures and urine cultures, no updates from yesterday.  Continue IV Merrem, Vanc and Metronidazole and deescalate when appropriate.          S/P laparoscopic sleeve gastrectomy  Gastric stent placement     Tube feeding started. Currently @ 25cc/hr.      Hypophosphatemia  Replete potassium phosphate. Follow level.     Severe persistent asthma without complication     Chronic problem. Controlled.  Continue home medications and monitor closely for exacerbations, adjusting medications as necessary.       Hypothyroidism     Chronic problem. TSH reviewed - euthyroid.  Continue home levothyroxine.     Discussed with patient's . Continue PT.     VTE Risk Mitigation         Ordered     enoxaparin injection 40 mg  Daily       05/07/18 2205     IP VTE HIGH RISK PATIENT  Once      05/07/18 0428     Place STEPHEN hose  Until discontinued      05/07/18 0428     Place sequential compression device  Until discontinued      05/07/18 0428     Reason for No Pharmacological VTE Prophylaxis  Once      05/07/18 0428        Ramiro Felder MD  Department of Hospital Medicine   [facility]

## 2018-05-13 NOTE — PT/OT/SLP PROGRESS
"Physical Therapy Treatment    Patient Name:  Yodit Canseco   MRN:  [id number]    Recommendations:     Discharge Recommendations:      Discharge Equipment Recommendations:     Barriers to discharge: None    Assessment:     Yodit Canseco is a 57 y.o. female admitted with a medical diagnosis of Sepsis.  She presents with the following impairments/functional limitations:  weakness, impaired endurance, impaired self care skills, impaired functional mobilty .  Good tolerance for activity.  No LOB with ambulation.    Rehab Prognosis:  ; patient would benefit from acute skilled PT services to address these deficits and reach maximum level of function.      Recent Surgery: Procedure(s):  REMOVAL-STENT-ESOPHAGEAL, UPPER ENDOSCOPIC STENT REMOVAL AND INSERTION NASOJEJUNAL TUBE 4 Days Post-Op    Plan:     During this hospitalization, patient to be seen 6 x/week to address the above listed problems via gait training, therapeutic activities, therapeutic exercises  · Plan of Care Expires:  05/25/18   Plan of Care Reviewed with: patient    Subjective     Communicated with nurse Neri prior to session.  Patient found supine upon PT entry to room, agreeable to treatment.      Chief Complaint:   Patient comments/goals: "I hope to go home Monday."  Pain/Comfort:  · Pain Rating 1: 0/10    Patients cultural, spiritual, Roman Catholic conflicts given the current situation:      Objective:     Patient found with: PICC line, NG tube, telemetry     General Precautions: Standard,     Orthopedic Precautions:N/A   Braces:       Functional Mobility:  · Bed Mobility:     · Rolling Right: supervision  · Transfers:     · Sit to Stand:  stand by assistance with no AD  · Gait: 600' holding IV pole      AM-PAC 6 CLICK MOBILITY          Therapeutic Activities and Exercises:       Patient left about to brush teeth with  assisting with all lines intact, call button in reach and spouse present..    GOALS:    Physical Therapy Goals        " Problem: Physical Therapy Goal    Goal Priority Disciplines Outcome Goal Variances Interventions   Physical Therapy Goal     PT/OT, PT Ongoing (interventions implemented as appropriate)     Description:  Goals to be met by: 2018     Patient will increase functional independence with mobility by performin. Sit to stand transfer with Supervision  2. Gait  x 250x2 feet with Contact Guard Assistance using .   3. Lower extremity exercise program x20 reps per handout, with assistance as needed                      Time Tracking:     PT Received On: 18  PT Start Time: 1025     PT Stop Time: 1035  PT Total Time (min): 10 min     Billable Minutes: Gait Training 10    Treatment Type: Treatment  PT/PTA: PTA     PTA Visit Number: 2     Damaris No, PTA  2018

## 2018-05-13 NOTE — PROGRESS NOTES
Progress Note  Hospital Medicine  Patient Name:Yodit Canseco  MRN:  [id number]  Patient Class: IP- Inpatient  Admit Date: 5/6/2018  Length of Stay: 5 days  Expected Discharge Date:   Attending Physician: Cristian Ashraf MD  Primary Care Provider:  Miky Lewis MD    SUBJECTIVE:     Principal Problem: Sepsis  Initial history of present illness: Yodit Canseco is a 56 yo female with PMHx significant for morbid obesity, history of gastroparesis, DM-2 (resolved), hyperlipidemia and hypothyroidism.  She is admitted to the service of hospital medicine with severe sepsis.  She presented to the ED with a 5 day history of subjective fever with tmax 103F.  She states the fever is associated with chills, malaise and nausea. Patient is 3 weeks post gastric sleeve surgery. She reports that she is tolerating her PO intake and recently had relatives over who have been sick with symptoms of fever, chills, and diarrhea. Patient denies diarrhea, vomiting, abdominal pain, chest pain, shortness of breath, cough, urinary symptoms, or neck stiffness.     PMH/PSH/SH/FH/Meds: reviewed.    Symptoms/Review of Systems: Patient was given Diphenhydramine for pruritis related to Hydrocodone. Reports feeling sedated during the day as a result.  Diet: Tube feeds  Activity level: Normal.    Pain:  As above     OBJECTIVE:   Vital Signs (Most Recent):      Temp: 97.9 °F (36.6 °C) (05/12/18 2157)  Pulse: 80 (05/12/18 2157)  Resp: 18 (05/12/18 2157)  BP: 136/75 (05/12/18 2157)  SpO2: 96 % (05/12/18 2157)       Vital Signs Range (Last 24H):  Temp:  [97.7 °F (36.5 °C)-98.7 °F (37.1 °C)]   Pulse:  [76-82]   Resp:  [17-20]   BP: (131-156)/(66-84)   SpO2:  [92 %-96 %]     Weight: 91.1 kg (200 lb 13.4 oz)  Body mass index is 36.73 kg/m².    Intake/Output Summary (Last 24 hours) at 05/12/18 2310  Last data filed at 05/12/18 1845   Gross per 24 hour   Intake             2565 ml   Output               30 ml   Net             2535 ml     Physical  Examination:  Constitutional: She is oriented to person, place, and time. She appears well-developed and well-nourished. No distress.   HENT:   Head: Normocephalic and atraumatic.   Mouth/Throat: Oropharynx is clear and moist.   Eyes: Conjunctivae and EOM are normal. Pupils are equal, round, and reactive to light. No scleral icterus.   Neck: Normal range of motion. Neck supple.   Cardiovascular: Normal rate, regular rhythm, normal heart sounds and intact distal pulses. No murmur heard.  Pulses:       Dorsalis pedis pulses are 2+ on the right side, and 2+ on the left side.   Pulmonary/Chest: Effort normal and breath sounds normal. No accessory muscle usage. No respiratory distress. She has no wheezes. She has no rhonchi. She has no rales.   Abdominal: Soft. Bowel sounds are normal. She exhibits no distension and no mass. There is tenderness (Left upper and lower quadrant). There is no rebound and no guarding.   Musculoskeletal: Normal range of motion. She exhibits no edema, tenderness or deformity.   Neurological: She is alert and oriented to person, place, and time. She has normal strength. Coordination normal.   Skin: Skin is warm, dry and intact. Capillary refill takes less than 2 seconds. No rash noted. She is not diaphoretic. No erythema.   Psychiatric: She has a normal mood and affect. Her speech is normal and behavior is normal. Judgment and thought content normal.   Vitals reviewed.    CBC:    Recent Labs  Lab 05/10/18  0456 05/11/18  0556 05/12/18  0459   WBC 15.70* 13.50* 14.70*   RBC 4.24 4.05 4.17   HGB 11.9* 11.4* 11.8*   HCT 36.4* 34.9* 36.0*   * 308 298   MCV 86 86 86   MCH 28.1 28.3 28.4   MCHC 32.7 32.7 32.9   BMP    Recent Labs  Lab 05/10/18  0456 05/11/18  0556 05/12/18  0458   * 104 78    143 141   K 3.8 3.4* 3.1*    109 105   CO2 29 27 28   BUN 13 15 9   CREATININE 0.7 0.7 0.6   CALCIUM 8.9 8.6* 8.1*   MG 2.0 1.9 1.4*      Diagnostic Results:  Microbiology Results (last  7 days)     Procedure Component Value Units Date/Time    Blood Culture #2 [[id number]] Collected:  05/07/18 0216    Order Status:  Completed Specimen:  Blood Updated:  05/12/18 1212     Blood Culture, Routine No growth after 5 days.    Blood Culture #1 [[id number]] Collected:  05/07/18 0216    Order Status:  Completed Specimen:  Blood Updated:  05/12/18 1212     Blood Culture, Routine No growth after 5 days.    Aerobic culture [[id number]] Collected:  05/10/18 1512    Order Status:  Completed Specimen:  Abscess from Abdomen Updated:  05/12/18 0835     Aerobic Bacterial Culture Insufficient incubation, culture in progress    Gram stain [[id number]] Collected:  05/10/18 1512    Order Status:  Completed Specimen:  Abscess from Abdomen Updated:  05/11/18 0642     Gram Stain Result Many WBC's      Many Gram positive cocci    Culture, Anaerobic [[id number]] Collected:  05/10/18 1512    Order Status:  Sent Specimen:  Abscess from Abdomen Updated:  05/10/18 2142    Urine Culture [[id number]] Collected:  05/07/18 0114    Order Status:  Completed Specimen:  Urine from Urine, Clean Catch Updated:  05/09/18 0052     Urine Culture, Routine No growth         CT abdomen:  Abnormal stranding in the fat adjacent to the sleeve gastrectomy which is nonspecific.  No extraluminal gas or leakage of oral contrast identified.  Differential considerations include inflammation and blood products.  No drainable fluid collection to suggest abscess.  Small left pleural effusion and left basilar atelectasis.  Hysterectomy and cholecystectomy.  Colonic diverticulosis.    KUB: Interval placement of an esophagogastric stent centered at the level of the GE junction.  Weighted enteric tube tip is at the level of the gastric antrum. Left basilar pulmonary consolidation with air bronchograms for which differential considerations include pneumonia and aspiration.    EGD:  Endoscopy was performed showing a normal esophagus and duodenum.  Stomach had inflamed  appearing mucosa s/p sleeve.  Proximal staple line appeared swollen and erythematous.  May have underlying leak but not obvious.  NO obvious leaks seen.  A stent was placed over a guide wire [employer] 23 mm by 155 mm under fluoroscopy.  A keyo feeding tube was placed distal to this.      CT guided abdominal abscess drainage:  CT-guided placement of percutaneous 12 [place]ian pigtail catheter within the patient's left perigastric fluid collection.  Removal of approximately 40 mL of purulent fluid with a sample sent for additional analysis.  The catheter is attached to vacuum bag suction.  Near complete resolution of the collection is noted.  No immediate complication.    Assessment/Plan:     * Sepsis with Intraperitoneal abscess with possible leak from anastomosis s/p IR drainage  S/p Esophageal stent placement and then removal          Patient has been started on tube feeds via NG.  Antibiotics given-              Antibiotics      Start     Stop Route Frequency Ordered     05/07/18 0430   metronidazole IVPB 500 mg      -- IV Every 8 hours (non-standard times) 05/07/18 0428     05/07/18 0430   meropenem-0.9% sodium chloride 1 g/50 mL IVPB      -- IV Every 8 hours (non-standard times) 05/07/18 0428       Follow blood cultures and urine cultures  Continue IV Merrem, Vanc and Metronidazole and deescalate when appropriate.          S/P laparoscopic sleeve gastrectomy  Gastric stent placement     Tube feeding started.      Hypophosphatemia  Replete potassium phosphate. Follow level.     Severe persistent asthma without complication     Chronic problem. Controlled.  Continue home medications and monitor closely for exacerbations, adjusting medications as necessary.       Hypothyroidism     Chronic problem. TSH reviewed - euthyroid.  Continue home levothyroxine.     Discussed with patient's . Encouraged patient to get out of bed. Continue PT.     VTE Risk Mitigation         Ordered     enoxaparin injection 40 mg   Daily      05/07/18 2205     IP VTE HIGH RISK PATIENT  Once      05/07/18 0428     Place STEPHEN hose  Until discontinued      05/07/18 0428     Place sequential compression device  Until discontinued      05/07/18 0428     Reason for No Pharmacological VTE Prophylaxis  Once      05/07/18 0428        Ramiro Felder MD  Department of Hospital Medicine   [facility]

## 2018-05-13 NOTE — CONSULTS
Yodit Canseco [id number] is a 57 y.o. female who has been consulted for vancomycin dosing.    The patient has the following labs:     Date Creatinine (mg/dl)    BUN WBC Count   5/13/2018 Estimated Creatinine Clearance: 107.3 mL/min (based on SCr of 0.6 mg/dL). Lab Results   Component Value Date    BUN 9 05/12/2018     Lab Results   Component Value Date    WBC 14.70 (H) 05/12/2018        Current weight is 91.1 kg (200 lb 13.4 oz)    Pt is receiving vancomycin 1750 mg every 12 hours.  Vancomycin trough from 5/12 at 1852 was 22.9 mg/dL.  Target trough range is 15-20 mg/dL.   Trough was drawn ~ 1.5 hours early but anticipate it is supratherapeutic. Pharmacy will decrease current dose.   The patient will be changed to a vancomycin dose of 1500 mg every 12 hours. A vancomycin trough has been ordered for 5/14 at 1430.      Patient will be followed by pharmacy for changes in renal function, toxicity, and efficacy.  Thank you for allowing us to participate in this patient's care.     Pati Barba

## 2018-05-13 NOTE — PLAN OF CARE
05/13/18 0747   Patient Assessment/Suction   Level of Consciousness (AVPU) alert   Respiratory Effort Normal;Unlabored   Expansion/Accessory Muscles/Retractions no use of accessory muscles;no retractions;expansion symmetric   All Lung Fields Breath Sounds clear   Rhythm/Pattern, Respiratory depth regular;pattern regular;unlabored   Cough Frequency infrequent   Cough Type none   PRE-TX-O2-ETCO2   O2 Device (Oxygen Therapy) room air   SpO2 (!) 94 %   Pulse Oximetry Type Intermittent   $ Pulse Oximetry - Multiple Charge Pulse Oximetry - Multiple   Pulse 88   Resp 18   Aerosol Therapy   $ Aerosol Therapy Charges PRN treatment not required   Respiratory Treatment Status PRN treatment not required   Inhaler   $ Inhaler Charges MDI (Metered Dose Inahler) Treatment;Mouth rinsed post treatment   Respiratory Treatment Status given;mouth rinsed post treatment   SVN/Inhaler Treatment Route mouthpiece   Patient Tolerance good   Post-Treatment   Post-treatment Heart Rate (beats/min) 91   Post-treatment Resp Rate (breaths/min) 18   All Fields Breath Sounds unchanged       Aerosol treatments q4 PRN with Duoneb. Breo q day.

## 2018-05-14 ENCOUNTER — TELEPHONE (OUTPATIENT)
Dept: BARIATRICS | Facility: CLINIC | Age: 58
End: 2018-05-14

## 2018-05-14 LAB
ALBUMIN SERPL BCP-MCNC: 2.2 G/DL
ALP SERPL-CCNC: 99 U/L
ALT SERPL W/O P-5'-P-CCNC: 28 U/L
ANION GAP SERPL CALC-SCNC: 7 MMOL/L
ANISOCYTOSIS BLD QL SMEAR: SLIGHT
AST SERPL-CCNC: 27 U/L
BASOPHILS # BLD AUTO: ABNORMAL K/UL
BASOPHILS NFR BLD: 0 %
BILIRUB SERPL-MCNC: 0.3 MG/DL
BUN SERPL-MCNC: 9 MG/DL
CALCIUM SERPL-MCNC: 8.3 MG/DL
CHLORIDE SERPL-SCNC: 104 MMOL/L
CO2 SERPL-SCNC: 29 MMOL/L
CREAT SERPL-MCNC: 0.7 MG/DL
DIFFERENTIAL METHOD: ABNORMAL
EOSINOPHIL # BLD AUTO: ABNORMAL K/UL
EOSINOPHIL NFR BLD: 2 %
ERYTHROCYTE [DISTWIDTH] IN BLOOD BY AUTOMATED COUNT: 14.7 %
EST. GFR  (AFRICAN AMERICAN): >60 ML/MIN/1.73 M^2
EST. GFR  (NON AFRICAN AMERICAN): >60 ML/MIN/1.73 M^2
GLUCOSE SERPL-MCNC: 104 MG/DL
HCT VFR BLD AUTO: 35.5 %
HGB BLD-MCNC: 11.8 G/DL
LYMPHOCYTES # BLD AUTO: ABNORMAL K/UL
LYMPHOCYTES NFR BLD: 15 %
MAGNESIUM SERPL-MCNC: 1.5 MG/DL
MCH RBC QN AUTO: 28.5 PG
MCHC RBC AUTO-ENTMCNC: 33.2 G/DL
MCV RBC AUTO: 86 FL
METAMYELOCYTES NFR BLD MANUAL: 2 %
MONOCYTES # BLD AUTO: ABNORMAL K/UL
MONOCYTES NFR BLD: 9 %
NEUTROPHILS NFR BLD: 68 %
NEUTS BAND NFR BLD MANUAL: 4 %
PHOSPHATE SERPL-MCNC: 2.8 MG/DL
PLATELET # BLD AUTO: 247 K/UL
PLATELET BLD QL SMEAR: ABNORMAL
PMV BLD AUTO: 9.5 FL
POLYCHROMASIA BLD QL SMEAR: ABNORMAL
POTASSIUM SERPL-SCNC: 3 MMOL/L
PROT SERPL-MCNC: 5.5 G/DL
RBC # BLD AUTO: 4.14 M/UL
SODIUM SERPL-SCNC: 140 MMOL/L
VANCOMYCIN TROUGH SERPL-MCNC: 15.6 UG/ML
WBC # BLD AUTO: 16.4 K/UL

## 2018-05-14 PROCEDURE — C1751 CATH, INF, PER/CENT/MIDLINE: HCPCS

## 2018-05-14 PROCEDURE — 94761 N-INVAS EAR/PLS OXIMETRY MLT: CPT

## 2018-05-14 PROCEDURE — 63600175 PHARM REV CODE 636 W HCPCS: Performed by: SURGERY

## 2018-05-14 PROCEDURE — 36415 COLL VENOUS BLD VENIPUNCTURE: CPT

## 2018-05-14 PROCEDURE — 12000002 HC ACUTE/MED SURGE SEMI-PRIVATE ROOM

## 2018-05-14 PROCEDURE — 85027 COMPLETE CBC AUTOMATED: CPT

## 2018-05-14 PROCEDURE — 99232 SBSQ HOSP IP/OBS MODERATE 35: CPT | Mod: ,,, | Performed by: INTERNAL MEDICINE

## 2018-05-14 PROCEDURE — 97803 MED NUTRITION INDIV SUBSEQ: CPT

## 2018-05-14 PROCEDURE — 25000003 PHARM REV CODE 250: Performed by: INTERNAL MEDICINE

## 2018-05-14 PROCEDURE — 36569 INSJ PICC 5 YR+ W/O IMAGING: CPT

## 2018-05-14 PROCEDURE — A4216 STERILE WATER/SALINE, 10 ML: HCPCS | Performed by: SURGERY

## 2018-05-14 PROCEDURE — S0030 INJECTION, METRONIDAZOLE: HCPCS | Performed by: NURSE PRACTITIONER

## 2018-05-14 PROCEDURE — 97110 THERAPEUTIC EXERCISES: CPT

## 2018-05-14 PROCEDURE — 80202 ASSAY OF VANCOMYCIN: CPT

## 2018-05-14 PROCEDURE — 83735 ASSAY OF MAGNESIUM: CPT

## 2018-05-14 PROCEDURE — S0028 INJECTION, FAMOTIDINE, 20 MG: HCPCS | Performed by: SURGERY

## 2018-05-14 PROCEDURE — 25000003 PHARM REV CODE 250: Performed by: NURSE PRACTITIONER

## 2018-05-14 PROCEDURE — 97116 GAIT TRAINING THERAPY: CPT

## 2018-05-14 PROCEDURE — 85007 BL SMEAR W/DIFF WBC COUNT: CPT

## 2018-05-14 PROCEDURE — 63600175 PHARM REV CODE 636 W HCPCS: Performed by: INTERNAL MEDICINE

## 2018-05-14 PROCEDURE — 80053 COMPREHEN METABOLIC PANEL: CPT

## 2018-05-14 PROCEDURE — 76937 US GUIDE VASCULAR ACCESS: CPT

## 2018-05-14 PROCEDURE — C9113 INJ PANTOPRAZOLE SODIUM, VIA: HCPCS | Performed by: SURGERY

## 2018-05-14 PROCEDURE — 63600175 PHARM REV CODE 636 W HCPCS: Performed by: NURSE PRACTITIONER

## 2018-05-14 PROCEDURE — 25000003 PHARM REV CODE 250: Performed by: SURGERY

## 2018-05-14 PROCEDURE — 99900035 HC TECH TIME PER 15 MIN (STAT)

## 2018-05-14 PROCEDURE — 84100 ASSAY OF PHOSPHORUS: CPT

## 2018-05-14 PROCEDURE — 94640 AIRWAY INHALATION TREATMENT: CPT

## 2018-05-14 RX ORDER — TRAMADOL HYDROCHLORIDE 50 MG/1
100 TABLET ORAL EVERY 6 HOURS PRN
Status: DISCONTINUED | OUTPATIENT
Start: 2018-05-14 | End: 2018-05-15 | Stop reason: HOSPADM

## 2018-05-14 RX ORDER — POTASSIUM CHLORIDE 20 MEQ/15ML
60 SOLUTION ORAL ONCE
Status: COMPLETED | OUTPATIENT
Start: 2018-05-14 | End: 2018-05-14

## 2018-05-14 RX ORDER — MAGNESIUM SULFATE HEPTAHYDRATE 40 MG/ML
2 INJECTION, SOLUTION INTRAVENOUS ONCE
Status: COMPLETED | OUTPATIENT
Start: 2018-05-14 | End: 2018-05-14

## 2018-05-14 RX ORDER — MORPHINE SULFATE 4 MG/ML
4 INJECTION, SOLUTION INTRAMUSCULAR; INTRAVENOUS EVERY 4 HOURS PRN
Status: DISCONTINUED | OUTPATIENT
Start: 2018-05-14 | End: 2018-05-14

## 2018-05-14 RX ORDER — ACETAMINOPHEN 10 MG/ML
1000 INJECTION, SOLUTION INTRAVENOUS EVERY 6 HOURS
Status: COMPLETED | OUTPATIENT
Start: 2018-05-15 | End: 2018-05-15

## 2018-05-14 RX ORDER — FLUCONAZOLE 2 MG/ML
200 INJECTION, SOLUTION INTRAVENOUS
Status: DISCONTINUED | OUTPATIENT
Start: 2018-05-15 | End: 2018-05-15 | Stop reason: HOSPADM

## 2018-05-14 RX ORDER — POTASSIUM CHLORIDE 7.45 MG/ML
10 INJECTION INTRAVENOUS
Status: DISPENSED | OUTPATIENT
Start: 2018-05-14 | End: 2018-05-14

## 2018-05-14 RX ADMIN — MONTELUKAST SODIUM 10 MG: 10 TABLET, FILM COATED ORAL at 09:05

## 2018-05-14 RX ADMIN — ENOXAPARIN SODIUM 40 MG: 100 INJECTION SUBCUTANEOUS at 05:05

## 2018-05-14 RX ADMIN — TRIAMTERENE AND HYDROCHLOROTHIAZIDE 2 TABLET: 37.5; 25 TABLET ORAL at 09:05

## 2018-05-14 RX ADMIN — POTASSIUM CHLORIDE 10 MEQ: 7.46 INJECTION, SOLUTION INTRAVENOUS at 11:05

## 2018-05-14 RX ADMIN — SODIUM CHLORIDE: 0.9 INJECTION, SOLUTION INTRAVENOUS at 03:05

## 2018-05-14 RX ADMIN — SODIUM CHLORIDE 1500 MG: 9 INJECTION, SOLUTION INTRAVENOUS at 03:05

## 2018-05-14 RX ADMIN — LOPERAMIDE HYDROCHLORIDE 2 MG: 1 SOLUTION ORAL at 02:05

## 2018-05-14 RX ADMIN — METRONIDAZOLE 500 MG: 500 INJECTION, SOLUTION INTRAVENOUS at 01:05

## 2018-05-14 RX ADMIN — METRONIDAZOLE 500 MG: 500 INJECTION, SOLUTION INTRAVENOUS at 05:05

## 2018-05-14 RX ADMIN — SODIUM CHLORIDE, PRESERVATIVE FREE 10 ML: 5 INJECTION INTRAVENOUS at 12:05

## 2018-05-14 RX ADMIN — Medication 1 MG: at 06:05

## 2018-05-14 RX ADMIN — FLUCONAZOLE 150 MG: 50 TABLET ORAL at 09:05

## 2018-05-14 RX ADMIN — FAMOTIDINE 20 MG: 10 INJECTION INTRAVENOUS at 09:05

## 2018-05-14 RX ADMIN — POTASSIUM CHLORIDE 60 MEQ: 20 SOLUTION ORAL at 06:05

## 2018-05-14 RX ADMIN — ACETAMINOPHEN 1000 MG: 10 INJECTION, SOLUTION INTRAVENOUS at 12:05

## 2018-05-14 RX ADMIN — SODIUM CHLORIDE, PRESERVATIVE FREE 10 ML: 5 INJECTION INTRAVENOUS at 06:05

## 2018-05-14 RX ADMIN — POTASSIUM CHLORIDE 10 MEQ: 7.46 INJECTION, SOLUTION INTRAVENOUS at 02:05

## 2018-05-14 RX ADMIN — TRAMADOL HYDROCHLORIDE 100 MG: 50 TABLET, FILM COATED ORAL at 05:05

## 2018-05-14 RX ADMIN — FLUTICASONE FUROATE AND VILANTEROL TRIFENATATE 1 PUFF: 200; 25 POWDER RESPIRATORY (INHALATION) at 08:05

## 2018-05-14 RX ADMIN — LEVOTHYROXINE SODIUM 75 MCG: 25 TABLET ORAL at 06:05

## 2018-05-14 RX ADMIN — SODIUM CHLORIDE, PRESERVATIVE FREE 10 ML: 5 INJECTION INTRAVENOUS at 11:05

## 2018-05-14 RX ADMIN — ACETAMINOPHEN 1000 MG: 10 INJECTION, SOLUTION INTRAVENOUS at 06:05

## 2018-05-14 RX ADMIN — MEROPENEM AND SODIUM CHLORIDE 1 G: 1 INJECTION, SOLUTION INTRAVENOUS at 01:05

## 2018-05-14 RX ADMIN — PANTOPRAZOLE SODIUM 40 MG: 40 INJECTION, POWDER, FOR SOLUTION INTRAVENOUS at 09:05

## 2018-05-14 RX ADMIN — MAGNESIUM SULFATE HEPTAHYDRATE 2 G: 40 INJECTION, SOLUTION INTRAVENOUS at 10:05

## 2018-05-14 RX ADMIN — SODIUM CHLORIDE 1500 MG: 9 INJECTION, SOLUTION INTRAVENOUS at 02:05

## 2018-05-14 RX ADMIN — POTASSIUM CHLORIDE 10 MEQ: 7.46 INJECTION, SOLUTION INTRAVENOUS at 12:05

## 2018-05-14 RX ADMIN — MEROPENEM AND SODIUM CHLORIDE 1 G: 1 INJECTION, SOLUTION INTRAVENOUS at 03:05

## 2018-05-14 RX ADMIN — TRAMADOL HYDROCHLORIDE 100 MG: 50 TABLET, FILM COATED ORAL at 11:05

## 2018-05-14 RX ADMIN — POTASSIUM CHLORIDE 10 MEQ: 7.46 INJECTION, SOLUTION INTRAVENOUS at 01:05

## 2018-05-14 RX ADMIN — METRONIDAZOLE 500 MG: 500 INJECTION, SOLUTION INTRAVENOUS at 09:05

## 2018-05-14 RX ADMIN — MEROPENEM AND SODIUM CHLORIDE 1 G: 1 INJECTION, SOLUTION INTRAVENOUS at 08:05

## 2018-05-14 RX ADMIN — SODIUM CHLORIDE, PRESERVATIVE FREE 10 ML: 5 INJECTION INTRAVENOUS at 01:05

## 2018-05-14 NOTE — PLAN OF CARE
Problem: Patient Care Overview  Goal: Plan of Care Review  Outcome: Ongoing (interventions implemented as appropriate)  Pt alert and oriented. Vitals stable. Family at bedside. Ambulates the unit. Free from falls. continuous tube feed running 30 ml/hr. Bed locked in low position. Call light in reach. Will continue to monitor.

## 2018-05-14 NOTE — NURSING
Pt states that she is having symptoms of a yeast infection and needs the doctor to give her something. MD Alberts notified.

## 2018-05-14 NOTE — PLAN OF CARE
05/14/18 0802   Patient Assessment/Suction   Level of Consciousness (AVPU) alert   Respiratory Effort Normal;Unlabored   Expansion/Accessory Muscles/Retractions no use of accessory muscles;no retractions;expansion symmetric   All Lung Fields Breath Sounds clear   Rhythm/Pattern, Respiratory depth regular;pattern regular;unlabored   Cough Frequency infrequent   Cough Type none   PRE-TX-O2-ETCO2   O2 Device (Oxygen Therapy) room air   SpO2 99 %   Pulse Oximetry Type Intermittent   $ Pulse Oximetry - Multiple Charge Pulse Oximetry - Multiple   Pulse 73   Resp 16   Aerosol Therapy   $ Aerosol Therapy Charges PRN treatment not required   Respiratory Treatment Status PRN treatment not required   Inhaler   $ Inhaler Charges MDI (Metered Dose Inahler) Treatment;Mouth rinsed post treatment   Respiratory Treatment Status given;mouth rinsed post treatment   SVN/Inhaler Treatment Route mouthpiece   Patient Tolerance good   Post-Treatment   Post-treatment Heart Rate (beats/min) 75   Post-treatment Resp Rate (breaths/min) 16   All Fields Breath Sounds unchanged       Breo q day. Aerosol treatments PRN.

## 2018-05-14 NOTE — PLAN OF CARE
Problem: Nutrition, Enteral (Adult)  Intervention: Monitor/Manage Nutrition Support  Patient lying in bed,  at bedside, patient has as right nare ng tube, patient receiving Nutren 2.0 at 30 ml/hr, patient tolerating tube feed, patient denies pain/ discomfort/ complaints

## 2018-05-14 NOTE — NURSING
"Dr Ashraf notified patient complaining of " yeast infection, patient requested diflucan, new order given  "

## 2018-05-14 NOTE — PLAN OF CARE
Spoke with Gricelda in micro regarding the pt's final micro results; she states they do not do sensitivities on those organisms; Dr Ashraf notified.  Patient will need home IV abx, home TF and home health at the time of discharge; CM awaiting final recs before arrangements can be set up....SIMEON Nicholas CM      05/14/18 7648   Discharge Reassessment   Assessment Type Discharge Planning Reassessment

## 2018-05-14 NOTE — PROGRESS NOTES
Progress Note  Hospital Medicine  Patient Name:Yodit Canseco  MRN:  [id number]  Patient Class: IP- Inpatient  Admit Date: 5/6/2018  Length of Stay: 7 days  Expected Discharge Date:   Attending Physician: Cristian Ashraf MD  Primary Care Provider:  Miky Lewis MD    SUBJECTIVE:     Principal Problem: Sepsis  Initial history of present illness: Yodit Canseco is a 56 yo female with PMHx significant for morbid obesity, history of gastroparesis, DM-2 (resolved), hyperlipidemia and hypothyroidism.  She is admitted to the service of hospital medicine with severe sepsis.  She presented to the ED with a 5 day history of subjective fever with tmax 103F.  She states the fever is associated with chills, malaise and nausea.  The fevers are responsive to tylenol and seem to be worse at night. Patient is 3 weeks post gastric sleeve surgery. She reports that she is tolerating her PO intake and recently had relatives over who have been sick with symptoms of fever, chills, and diarrhea. Patient denies diarrhea, vomiting, abdominal pain, chest pain, shortness of breath, cough, urinary symptoms, or neck stiffness.     PMH/PSH/SH/FH/Meds: reviewed.    Symptoms/Review of Systems: No new complaints. Left abdominal drain is draining purulent discharge.  No shortness of breath, cough, chest pain or headache, fever or abdominal pain.   Diet: NPO. NJ feeding  Activity level: Normal.    Pain:  As above     OBJECTIVE:   Vital Signs (Most Recent):      Temp: 98.1 °F (36.7 °C) (05/14/18 0723)  Pulse: 73 (05/14/18 0802)  Resp: 16 (05/14/18 0802)  BP: (!) 151/74 (05/14/18 0723)  SpO2: 99 % (05/14/18 0802)       Vital Signs Range (Last 24H):  Temp:  [96.1 °F (35.6 °C)-99.6 °F (37.6 °C)]   Pulse:  [73-89]   Resp:  [16-18]   BP: (122-159)/(64-87)   SpO2:  [93 %-99 %]     Weight: 89.4 kg (197 lb 1.5 oz)  Body mass index is 36.05 kg/m².    Intake/Output Summary (Last 24 hours) at 05/14/18 0988  Last data filed at 05/14/18 0677   Gross per 24  hour   Intake          4168.75 ml   Output                0 ml   Net          4168.75 ml     Physical Examination:  Constitutional: She is oriented to person, place, and time. She appears well-developed and well-nourished. No distress.   HENT:   Head: Normocephalic and atraumatic.   Mouth/Throat: Oropharynx is clear and moist.   Eyes: Conjunctivae and EOM are normal. Pupils are equal, round, and reactive to light. No scleral icterus.   Neck: Normal range of motion. Neck supple. No JVD present. No tracheal deviation present. No thyromegaly present.   Cardiovascular: Normal rate, regular rhythm, normal heart sounds and intact distal pulses.  Exam reveals no gallop and no friction rub.    No murmur heard.  Pulses:       Dorsalis pedis pulses are 2+ on the right side, and 2+ on the left side.   Pulmonary/Chest: Effort normal and breath sounds normal. No accessory muscle usage. No respiratory distress. She has no wheezes. She has no rhonchi. She has no rales.   Abdominal: Soft. Bowel sounds are normal. She exhibits no distension and no mass. There is tenderness (Left upper and lower quadrant). There is no rebound and no guarding.   Musculoskeletal: Normal range of motion. She exhibits no edema, tenderness or deformity.   Neurological: She is alert and oriented to person, place, and time. She has normal strength. She exhibits normal muscle tone. Coordination normal.   Skin: Skin is warm, dry and intact. Capillary refill takes less than 2 seconds. No rash noted. She is not diaphoretic. No erythema.   Psychiatric: She has a normal mood and affect. Her speech is normal and behavior is normal. Judgment and thought content normal.   Vitals reviewed.  CRANIAL NERVES      CN III, IV, VI   Pupils are equal, round, and reactive to light.  Extraocular motions are normal.      CBC:    Recent Labs  Lab 05/12/18  0459 05/13/18  0459 05/14/18  0509   WBC 14.70* 14.80* 16.40*   RBC 4.17 4.39 4.14   HGB 11.8* 12.4 11.8*   HCT 36.0* 37.5  35.5*    291 247   MCV 86 85 86   MCH 28.4 28.1 28.5   MCHC 32.9 33.0 33.2   BMP    Recent Labs  Lab 05/12/18  0458 05/13/18  0459 05/14/18  0509   GLU 78 96 104    140 140   K 3.1* 3.4* 3.0*    105 104   CO2 28 27 29   BUN 9 9 9   CREATININE 0.6 0.7 0.7   CALCIUM 8.1* 8.6* 8.3*   MG 1.4* 1.6 1.5*      Diagnostic Results:  Microbiology Results (last 7 days)     Procedure Component Value Units Date/Time    Aerobic culture [[id number]] Collected:  05/10/18 1512    Order Status:  Completed Specimen:  Abscess from Abdomen Updated:  05/13/18 1312     Aerobic Bacterial Culture --     CANDIDA ALBICANS  Few       Aerobic Bacterial Culture --     STREPTOCOCCUS INTERMEDIUS  Few      Blood Culture #2 [[id number]] Collected:  05/07/18 0216    Order Status:  Completed Specimen:  Blood Updated:  05/12/18 1212     Blood Culture, Routine No growth after 5 days.    Blood Culture #1 [[id number]] Collected:  05/07/18 0216    Order Status:  Completed Specimen:  Blood Updated:  05/12/18 1212     Blood Culture, Routine No growth after 5 days.    Gram stain [[id number]] Collected:  05/10/18 1512    Order Status:  Completed Specimen:  Abscess from Abdomen Updated:  05/11/18 0642     Gram Stain Result Many WBC's      Many Gram positive cocci    Culture, Anaerobic [[id number]] Collected:  05/10/18 1512    Order Status:  Sent Specimen:  Abscess from Abdomen Updated:  05/10/18 2142    Urine Culture [[id number]] Collected:  05/07/18 0114    Order Status:  Completed Specimen:  Urine from Urine, Clean Catch Updated:  05/09/18 0052     Urine Culture, Routine No growth         CT abdomen:  Abnormal stranding in the fat adjacent to the sleeve gastrectomy which is nonspecific.  No extraluminal gas or leakage of oral contrast identified.  Differential considerations include inflammation and blood products.  No drainable fluid collection to suggest abscess.  Small left pleural effusion and left basilar atelectasis.  Hysterectomy and  cholecystectomy.  Colonic diverticulosis.    KUB: Interval placement of an esophagogastric stent centered at the level of the GE junction.  Weighted enteric tube tip is at the level of the gastric antrum. Left basilar pulmonary consolidation with air bronchograms for which differential considerations include pneumonia and aspiration.    EGD:  Endoscopy was performed showing a normal esophagus and duodenum.  Stomach had inflamed appearing mucosa s/p sleeve.  Proximal staple line appeared swollen and erythematous.  May have underlying leak but not obvious.  NO obvious leaks seen.  A stent was placed over a guide wire [employer] 23 mm by 155 mm under fluoroscopy.  A keyo feeding tube was placed distal to this.      CT guided abdominal abscess drainage:  CT-guided placement of percutaneous 12 Yoruba pigtail catheter within the patient's left perigastric fluid collection.  Removal of approximately 40 mL of purulent fluid with a sample sent for additional analysis.  The catheter is attached to vacuum bag suction.  Near complete resolution of the collection is noted.  No immediate complication.    Assessment/Plan:     * Sepsis with Intraperitoneal abscess with possible leak from anastomosis s/p IR drainage  S/p Esophageal stent placement and then removal          Antibiotics given-              Antibiotics      Start     Stop Route Frequency Ordered     05/07/18 0430   metronidazole IVPB 500 mg      -- IV Every 8 hours (non-standard times) 05/07/18 0428     05/07/18 0430   meropenem-0.9% sodium chloride 1 g/50 mL IVPB      -- IV Every 8 hours (non-standard times) 05/07/18 0428       Follow blood cultures and urine cultures  Continue IV Merrem, Vanc and Metronidazole and deescalate when appropriate.  Add IV Diflucan. Abdominal fluid growing candida sp.          S/P laparoscopic sleeve gastrectomy  Gastric stent placement     Keep NPO.  Continue broad spectrum antibiotics  Follow General Surgery recommendations.  Discussed with Dr. Reese. Use IV valium for gastric spasms prn.      Hypophosphatemia  Replete potassium phosphate. Follow level.    Hypomagnesemia  Replete Mag. Follow level.    Hypokalemia  Replete KCl. Follow BMP.     Severe persistent asthma without complication     Chronic problem. Controlled.  Continue home medications and monitor closely for exacerbations, adjusting medications as necessary.       Hypothyroidism     Chronic problem. TSH reviewed - euthyroid.  Continue home levothyroxine.     Discussed with patient's . Encouraged patient to get out of bed. Continue PT.     VTE Risk Mitigation         Ordered     enoxaparin injection 40 mg  Daily      05/07/18 2205     IP VTE HIGH RISK PATIENT  Once      05/07/18 0428     Place STEPHEN hose  Until discontinued      05/07/18 0428     Place sequential compression device  Until discontinued      05/07/18 0428     Reason for No Pharmacological VTE Prophylaxis  Once      05/07/18 0428        Cristian Ashraf MD  Department of Hospital Medicine   [facility]

## 2018-05-14 NOTE — PROGRESS NOTES
"[facility]  Progress Note    Subjective:     History of Present Illness:  56 y/o female who underwent sleeve gastrectomy on 4/16/18, presents now with fevers at home 101.9.  Complains of some abdominal spasm and chills.  Has been able to tolerate diet.  Denies n/v, has had some diarrhea, no constipation    Post-Op Info:  Procedure(s):  REMOVAL-STENT-ESOPHAGEAL, UPPER ENDOSCOPIC STENT REMOVAL AND INSERTION NASOJEJUNAL TUBE   5 Days Post-Op     Interval History: tolerating feeds, some pain control problems    Medications:  Continuous Infusions:   sodium chloride 0.9% 75 mL/hr at 05/14/18 0357     Scheduled Meds:   acetaminophen  1,000 mg Intravenous Q6H    enoxaparin  40 mg Subcutaneous Daily    famotidine (PF)  20 mg Intravenous BID    [START ON 5/15/2018] fluconazole (DIFLUCAN) IVPB  200 mg Intravenous Q24H    fluticasone-vilanterol  1 puff Inhalation Daily    levothyroxine  75 mcg Oral Before breakfast    loperamide  2 mg Oral Daily    meropenem (MERREM) IVPB  1 g Intravenous Q8H    metronidazole  500 mg Intravenous Q8H    montelukast  10 mg Oral QHS    pantoprazole  40 mg Intravenous Daily    potassium chloride in water  10 mEq Intravenous Q1H    senna-docusate 8.6-50 mg  1 tablet Oral BID    sodium chloride 0.9%  10 mL Intravenous Q6H    triamterene-hydrochlorothiazide 37.5-25 mg  2 tablet Oral Daily    vancomycin (VANCOCIN) IVPB  1,500 mg Intravenous Q12H     PRN Meds:albuterol-ipratropium 2.5mg-0.5mg/3mL, dextrose 50%, diazePAM, diphenhydrAMINE, glucagon (human recombinant), glucose, glucose, methocarbamol (ROBAXIN) IVPB, midazolam, ondansetron, prochlorperazine, promethazine (PHENERGAN) IVPB, promethazine (PHENERGAN) IVPB, ramelteon, Flushing PICC Protocol **AND** sodium chloride 0.9% **AND** sodium chloride 0.9%, sodium chloride 0.9%, sodium chloride 0.9%, traMADol     Review of patient's allergies indicates:   Allergen Reactions    Bromelains Hives     " " "causes mouth to bleed"    Sudafed [pseudoephedrine hcl] Other (See Comments)     Does not want to wake up .    Amoxicillin-pot clavulanate Itching     Other reaction(s): Itching    Hydrocodone Itching    Iodinated contrast- oral and iv dye      childhood    Iodine and iodide containing products Other (See Comments)    Melon Hives    Pantoprazole Nausea Only     Other reaction(s): upset stomach/halitosis    Percocet [oxycodone-acetaminophen] Itching    Barium iodide Rash    Ephedrine Other (See Comments)     Other reaction(s): comatose    Penicillins      Other reaction(s): does not work on pt symptoms     Objective:     Vital Signs (Most Recent):  Temp: 97.6 °F (36.4 °C) (05/14/18 1112)  Pulse: 81 (05/14/18 1112)  Resp: 18 (05/14/18 1112)  BP: (!) 140/66 (05/14/18 1112)  SpO2: 96 % (05/14/18 1112) Vital Signs (24h Range):  Temp:  [96.1 °F (35.6 °C)-99.6 °F (37.6 °C)] 97.6 °F (36.4 °C)  Pulse:  [73-89] 81  Resp:  [16-18] 18  SpO2:  [93 %-99 %] 96 %  BP: (126-159)/(66-87) 140/66     Weight: 89.4 kg (197 lb 1.5 oz)  Body mass index is 36.05 kg/m².    Intake/Output - Last 3 Shifts       05/12 0700 - 05/13 0659 05/13 0700 - 05/14 0659 05/14 0700 - 05/15 0659    P.O.  0     I.V. (mL/kg) 900 (9.9) 2708.8 (30.3)     Other 15      NG/ 360     IV Piggyback 1800 1200     Total Intake(mL/kg) 3165 (34.7) 4268.8 (47.7)     Other 30      Total Output 30        Net +3135 +4268.8             Urine Occurrence 3 x 5 x     Stool Occurrence 1 x 1 x           Physical Exam   Constitutional: She appears well-developed.   Abdominal: Soft. She exhibits no distension. There is no tenderness. There is no guarding.   Drain with dark ss fluid       Significant Labs:  CBC:   Recent Labs  Lab 05/14/18  0509   WBC 16.40*   RBC 4.14   HGB 11.8*   HCT 35.5*      MCV 86   MCH 28.5   MCHC 33.2     BMP:   Recent Labs  Lab 05/14/18  0509         K 3.0*      CO2 29   BUN 9   CREATININE 0.7   CALCIUM 8.3*   MG " 1.5*       Significant Diagnostics:  I have reviewed and interpreted all pertinent imaging results/findings within the past 24 hours.    Assessment/Plan:     Gastric leak    IV acetaminophen and IV dilaudid            Intraperitoneal abscess    From Leak  Drain in place        S/P laparoscopic sleeve gastrectomy    Tube feeds - plan discharge tomorrow    Antibiotics-as per medical team- diflucan    Drain: flush daily with 10-20 cc normal saline    Plan discharge tomorrow              Chai Reese MD  General Surgery  [facility]

## 2018-05-14 NOTE — PLAN OF CARE
Problem: Nutrition, Enteral (Adult)  Intervention: Monitor/Manage Nutrition Support  Recommendations    1) Enteral Continuous:   Continue Nutren 2.0 @ goal rate 30 mls/hr over 24 hrs. Flush with 150 mls Q 4 hrs.  Add ProMod, protein supplement, 1 oz, tid.   At goal including ProMod provides 1510 calories, 93 gms protein, 162 gms CHO, 519 mls free water.  VS  2) Enteral Bolus: Nutren 2.0,  250 mls (1 carton) 3 x daily per pt tolerance. Use ProMod protein supplement, 1 oz, tid. Flush with 275 mls water (about 1 3/4 cup) 4 x daily for hydration. Provides 1510 calories, 93 gms protein, 162 gms CHO, 519 mls free water.     Goal: 1) Pt will receive nutrition within 48 hrs. 2) Pt will receive at least 80% EEN within 48 hrs.   Nutrition Goal Status: 1) met 2) met/ongoing  Communication of RD Recs:  (POC)    Discharge Plan:  Enteral feeds as above

## 2018-05-14 NOTE — PROGRESS NOTES
[facility]  Adult Nutrition  Consult Note    SUMMARY     Recommendations    1) Enteral Continuous:   Continue Nutren 2.0 @ goal rate 30 mls/hr over 24 hrs. Flush with 150 mls Q 4 hrs.  Add ProMod, protein supplement, 1 oz, tid.   At goal including ProMod provides 1510 calories, 93 gms protein, 162 gms CHO, 519 mls free water.  VS  2) Enteral Bolus: Nutren 2.0,  250 mls (1 carton) 3 x daily per pt tolerance. Use ProMod protein supplement, 1 oz, tid. Flush with 275 mls water (about 1 3/4 cup) 4 x daily for hydration. Provides 1510 calories, 93 gms protein, 162 gms CHO, 519 mls free water.     Goal: 1) Pt will receive nutrition within 48 hrs. 2) Pt will receive at least 80% EEN within 48 hrs.   Nutrition Goal Status: 1) met 2) met/ongoing  Communication of RD Recs:  (POC)    Discharge Plan:  Enteral feeds as above    Comment: If pt cannot tolerate Nutren 2.0, recommend Vivonex RTF 4 cartons daily. Flush with 175 mls water ( about 1 3/4 cup) 4 x daily.  Add ProMod, 1 oz, 4 x daily. At goal including ProMod, provides 1010 calories, 90 gms protein, 176 gms CHO, 848 mls free water.      Reason for Assessment    Reason for Assessment: RD follow up   1. Sepsis, due to unspecified organism    2. Abdominal pain, unspecified abdominal location    3. S/P bariatric surgery    4. Gastric leak    5. Sepsis    6. S/P laparoscopic sleeve gastrectomy    7. Intraperitoneal abscess      Past Medical History:   Diagnosis Date    Allergy     Angio-edema     Arthralgia     Asthma without status asthmaticus     Bronchitis     Diverticulosis of large intestine without hemorrhage 10/8/2015    Environmental allergies     Eosinophilic asthma 3/8/2018    Gastroparesis     Hand arthritis     Herpes simplex without mention of complication     oral    Hyperlipidemia     Hypothyroidism     LBP radiating to left leg     Leukocytosis, unspecified     Migraine headache     Obesity, Class III, BMI 40-49.9 (morbid  obesity)     Periorbital cellulitis 12/31/2016    Prediabetes     Reactive airway disease     Reactive airway disease     Recurrent upper respiratory infection (URI)     S/P colonoscopy November 2010    Urticaria        Interdisciplinary Rounds: attended  General Information Comments: Admitted with fever. Pt is s/p gastric sleeve x 3 weeks.  Intentional wt loss noted.   5/9/18 Spoke with Dr. Reese re continuous feeds. Per MD direction, continuous feeds during admit and bolus feeds at discharge.  Temporary enteral feeds until gut heals. .  Yesterday, pt reported intolerance to whey and lactose, but did not know about casein. Today pt and her spouse report concerns about using Nutren 2.0 2/2 milk intolerance.  Assured pt that Nutren 2.0 does not have whey or lactose, but does contain casein.  Spouse states he does not want to try the Nutren 2.0, and reports pt can tolerate almond milk.  Dr. Reese spoke with family and they agreed to try Nutren 2.0.  The order for Nutren 2.0 has been activated at this time.  Monitoring for tolerance.    Found TF that is milk, egg, pea and artificial sweetener free.  Special ordered Vivonex RTF and it is being shipped overnight for pt use if needed.     5/10/18: Vivonex RTF is in house if needed. Pt alert, sitting up and talking. TF, Nutren 2.0 infusing @ 10 mls/hr. Tolerating  5/11/18: Pt not tolerating TF @ 40 mls/hr. TF has been progressing over 24 hrs/day.  Abdomen is bloated and pt having diarrhea. Conferred with nursing and MD.    5/14/18 Pt was sitting up in chair and aler. Pt is tolerating TF @ goal 30 mls/hr over 24 hrs. Continues to have loose stools.        Nutrition Risk Screen    Nutrition Risk Screen: no indicators present    Nutrition/Diet History    Do you have any cultural, spiritual, Church conflicts, given your current situation?: none  Food Allergies: egg, milk (pea, artificial sweeteners)    Anthropometrics    Temp: 97.6 °F (36.4 °C)  Height Method:  "Stated  Height: 5' 2"  Height (inches): 62 in  Weight Method: Standard Scale  Weight: 89.4 kg (197 lb 1.5 oz)  Weight (lb): 197.09 lb  Ideal Body Weight (IBW), Female: 110 lb  % Ideal Body Weight, Female (lb): 177.17 lb  BMI (Calculated): 35.7  Usual Body Weight (UBW), k.6 kg  % Usual Body Weight: 88.06  % Weight Change From Usual Weight: -12.13 %       Lab/Procedures/Meds    Pertinent Labs Reviewed: reviewed  Lab Results   Component Value Date    ALBUMIN 2.2 (L) 2018     Lab Results   Component Value Date    CRP 7.6 2016     Lab Results   Component Value Date    WBC 16.40 (H) 2018     Pertinent Medications Reviewed: reviewed  Scheduled Meds:   acetaminophen  1,000 mg Intravenous Q6H    enoxaparin  40 mg Subcutaneous Daily    famotidine (PF)  20 mg Intravenous BID    [START ON 5/15/2018] fluconazole (DIFLUCAN) IVPB  200 mg Intravenous Q24H    fluticasone-vilanterol  1 puff Inhalation Daily    levothyroxine  75 mcg Oral Before breakfast    loperamide  2 mg Oral Daily    meropenem (MERREM) IVPB  1 g Intravenous Q8H    metronidazole  500 mg Intravenous Q8H    montelukast  10 mg Oral QHS    pantoprazole  40 mg Intravenous Daily    potassium chloride in water  10 mEq Intravenous Q1H    senna-docusate 8.6-50 mg  1 tablet Oral BID    sodium chloride 0.9%  10 mL Intravenous Q6H    triamterene-hydrochlorothiazide 37.5-25 mg  2 tablet Oral Daily    vancomycin (VANCOCIN) IVPB  1,500 mg Intravenous Q12H     Continuous Infusions:   sodium chloride 0.9% 75 mL/hr at 18 0357       Physical Findings/Assessment    Overall Physical Appearance: obese, weak  Oral/Mouth Cavity: WDL  Skin:  (Steve score 20)    Estimated/Assessed Needs    Weight Used For Calorie Calculations: 88.4 kg (194 lb 14.2 oz)     Energy Need Method: [place]-St Jeor: 1417 x 1.25=1771     Weight Used For Protein Calculations: 88.4 kg (194 lb 14.2 oz)    gms/day     Fluid Need Method: RDA Method (or per MD)     CHO " Requirement: max 221 gms CHO      Nutrition Prescription Ordered    Current Diet Order: NPO    Evaluation of Received Nutrient/Fluid Intake    Energy Calories Required: not meeting needs  Protein Required: not meeting needs  Fluid Required:per MD rec    Intake/Output Summary (Last 24 hours) at 05/14/18 1411  Last data filed at 05/14/18 0652   Gross per 24 hour   Intake          4068.75 ml   Output                0 ml   Net          4068.75 ml     % Intake of Estimated Energy Needs: 25-50 %  % Meal Intake: NPO    Nutrition Risk    Level of Risk/Frequency of Follow-up:  (2 x wkly)     Assessment and Plan    Gastric leak    Contributing Nutrition Diagnosis  Inadequate energy intake    Related to (etiology):   Altered GI function    Signs and Symptoms (as evidenced by):   NPO with no alternate nutrition    Interventions/Recommendations (treatment strategy):  continue nutrition support progressing per pt tolerance to goal rate (5/14/18 at goal rate)    Nutrition Diagnosis Status:   Improving                Monitor and Evaluation    Food and Nutrient Intake: energy intake  Food and Nutrient Adminstration: diet order  Anthropometric Measurements: weight  Biochemical Data, Medical Tests and Procedures: inflammatory profile  Nutrition-Focused Physical Findings: overall appearance, skin     Discharge Plan    Enteral feeds as above    RD Follow-up?: Yes

## 2018-05-14 NOTE — ASSESSMENT & PLAN NOTE
Tube feeds - plan discharge tomorrow    Antibiotics-as per medical team- diflucan    Drain: flush daily with 10-20 cc normal saline    Plan discharge tomorrow

## 2018-05-14 NOTE — PLAN OF CARE
05/13/18 1906   Patient Assessment/Suction   Level of Consciousness (AVPU) alert   Respiratory Effort Normal;Unlabored   Expansion/Accessory Muscles/Retractions no use of accessory muscles   PRE-TX-O2-ETCO2   O2 Device (Oxygen Therapy) room air   SpO2 (Pt is ambulating in hallway with her . )   Aerosol Therapy   $ Aerosol Therapy Charges PRN treatment not required   Respiratory Treatment Status PRN treatment not required   Pt tolerates RA well. Pt is ambulating in garza with her .

## 2018-05-14 NOTE — TELEPHONE ENCOUNTER
----- Message from [email or web address] sent at 5/14/2018  8:58 AM CDT -----  Contact:   Jonas,  [phone] calling to speak with Danika, states that he dropped off [facility] paperwork for his job but has not heard anything back. Please advise. Thanks.

## 2018-05-14 NOTE — PROGRESS NOTES
Pt seen and examined.  Denies n/v.    Tolerating tube feeds advancement.  At the time of my visit she was at 25 cc's/hr and scheduled to be advanced to 30 in a couple of hours  Cont to have loose BM    Wt Readings from Last 3 Encounters:   05/12/18 91.1 kg (200 lb 13.4 oz)   05/01/18 86.5 kg (190 lb 12.8 oz)   04/19/18 96.6 kg (212 lb 15.4 oz)     Temp Readings from Last 3 Encounters:   05/13/18 96.1 °F (35.6 °C) (Oral)   05/01/18 97.9 °F (36.6 °C) (Oral)   04/17/18 99.5 °F (37.5 °C) (Oral)     BP Readings from Last 3 Encounters:   05/13/18 (!) 141/82   05/01/18 117/77   04/19/18 122/71     Pulse Readings from Last 3 Encounters:   05/13/18 89   05/01/18 (!) 143   04/19/18 89     AAox3  Sinus  Soft/nd/n  YANIRA in place being flushed by nuring.  Output clearer today      A?p: s/p gastric sleeve with suspected leak  Cont tf's and advance as tolerated  Pain control.  Iv abx

## 2018-05-14 NOTE — SUBJECTIVE & OBJECTIVE
"Interval History: tolerating feeds, some pain control problems    Medications:  Continuous Infusions:   sodium chloride 0.9% 75 mL/hr at 05/14/18 0357     Scheduled Meds:   acetaminophen  1,000 mg Intravenous Q6H    enoxaparin  40 mg Subcutaneous Daily    famotidine (PF)  20 mg Intravenous BID    [START ON 5/15/2018] fluconazole (DIFLUCAN) IVPB  200 mg Intravenous Q24H    fluticasone-vilanterol  1 puff Inhalation Daily    levothyroxine  75 mcg Oral Before breakfast    loperamide  2 mg Oral Daily    meropenem (MERREM) IVPB  1 g Intravenous Q8H    metronidazole  500 mg Intravenous Q8H    montelukast  10 mg Oral QHS    pantoprazole  40 mg Intravenous Daily    potassium chloride in water  10 mEq Intravenous Q1H    senna-docusate 8.6-50 mg  1 tablet Oral BID    sodium chloride 0.9%  10 mL Intravenous Q6H    triamterene-hydrochlorothiazide 37.5-25 mg  2 tablet Oral Daily    vancomycin (VANCOCIN) IVPB  1,500 mg Intravenous Q12H     PRN Meds:albuterol-ipratropium 2.5mg-0.5mg/3mL, dextrose 50%, diazePAM, diphenhydrAMINE, glucagon (human recombinant), glucose, glucose, methocarbamol (ROBAXIN) IVPB, midazolam, ondansetron, prochlorperazine, promethazine (PHENERGAN) IVPB, promethazine (PHENERGAN) IVPB, ramelteon, Flushing PICC Protocol **AND** sodium chloride 0.9% **AND** sodium chloride 0.9%, sodium chloride 0.9%, sodium chloride 0.9%, traMADol     Review of patient's allergies indicates:   Allergen Reactions    Bromelains Hives     " causes mouth to bleed"    Sudafed [pseudoephedrine hcl] Other (See Comments)     Does not want to wake up .    Amoxicillin-pot clavulanate Itching     Other reaction(s): Itching    Hydrocodone Itching    Iodinated contrast- oral and iv dye      childhood    Iodine and iodide containing products Other (See Comments)    Melon Hives    Pantoprazole Nausea Only     Other reaction(s): upset stomach/halitosis    Percocet [oxycodone-acetaminophen] Itching    Barium iodide Rash "    Ephedrine Other (See Comments)     Other reaction(s): comatose    Penicillins      Other reaction(s): does not work on pt symptoms     Objective:     Vital Signs (Most Recent):  Temp: 97.6 °F (36.4 °C) (05/14/18 1112)  Pulse: 81 (05/14/18 1112)  Resp: 18 (05/14/18 1112)  BP: (!) 140/66 (05/14/18 1112)  SpO2: 96 % (05/14/18 1112) Vital Signs (24h Range):  Temp:  [96.1 °F (35.6 °C)-99.6 °F (37.6 °C)] 97.6 °F (36.4 °C)  Pulse:  [73-89] 81  Resp:  [16-18] 18  SpO2:  [93 %-99 %] 96 %  BP: (126-159)/(66-87) 140/66     Weight: 89.4 kg (197 lb 1.5 oz)  Body mass index is 36.05 kg/m².    Intake/Output - Last 3 Shifts       05/12 0700 - 05/13 0659 05/13 0700 - 05/14 0659 05/14 0700 - 05/15 0659    P.O.  0     I.V. (mL/kg) 900 (9.9) 2708.8 (30.3)     Other 15      NG/ 360     IV Piggyback 1800 1200     Total Intake(mL/kg) 3165 (34.7) 4268.8 (47.7)     Other 30      Total Output 30        Net +3135 +4268.8             Urine Occurrence 3 x 5 x     Stool Occurrence 1 x 1 x           Physical Exam   Constitutional: She appears well-developed.   Abdominal: Soft. She exhibits no distension. There is no tenderness. There is no guarding.   Drain with dark ss fluid       Significant Labs:  CBC:   Recent Labs  Lab 05/14/18  0509   WBC 16.40*   RBC 4.14   HGB 11.8*   HCT 35.5*      MCV 86   MCH 28.5   MCHC 33.2     BMP:   Recent Labs  Lab 05/14/18  0509         K 3.0*      CO2 29   BUN 9   CREATININE 0.7   CALCIUM 8.3*   MG 1.5*       Significant Diagnostics:  I have reviewed and interpreted all pertinent imaging results/findings within the past 24 hours.

## 2018-05-14 NOTE — TELEPHONE ENCOUNTER
Returned call to , advised i will need discharge date on fmla, he didn't know rtw date, i advised to call back with that date,  acknowledged.

## 2018-05-14 NOTE — PLAN OF CARE
Problem: Patient Care Overview  Goal: Plan of Care Review  Outcome: Ongoing (interventions implemented as appropriate)  POC reviewed with pt and spouse, verbalized understanding. Pt sleeping throughout the night. Tolerated evening medications well, NAD noted. PRN pain medications given as ordered. Bed in low position, brakes locked. Call light in reach. Will continue to monitor.

## 2018-05-14 NOTE — CONSULTS
Date: 5/14/2018   Yodit Canseco [id number] is a 57 y.o. female who has been consulted for vancomycin dosing.    The patient has the following labs:     Creatinine (mg/dl)    WBC Count   Serum creatinine: 0.7 mg/dL 05/14/18 0509  Estimated creatinine clearance: 91 mL/min Lab Results   Component Value Date    WBC 16.40 (H) 05/14/2018        Current weight is 89.4 kg (197 lb 1.5 oz)      Pt is receiving vancomycin 1500 mg every 12 hours.  Vancomycin trough from 5/14/2018  at 1424  was 15.6 mg/dL.  Target trough range is 15-20 mg/dL.   Trough was drawn on time and anticipate it is  Therapeutic. Pharmacy will continue current dose.   The vancomycin trough has been ordered for 5/15 at 1430.     Patient will be followed by pharmacy for changes in renal function, toxicity, and efficacy.    Thank you for allowing us to participate in this patient's care.     Sherron Segal, LianaD

## 2018-05-15 VITALS
SYSTOLIC BLOOD PRESSURE: 125 MMHG | OXYGEN SATURATION: 97 % | HEIGHT: 62 IN | DIASTOLIC BLOOD PRESSURE: 62 MMHG | TEMPERATURE: 96 F | WEIGHT: 197.06 LBS | RESPIRATION RATE: 17 BRPM | HEART RATE: 69 BPM | BODY MASS INDEX: 36.26 KG/M2

## 2018-05-15 LAB
ALBUMIN SERPL BCP-MCNC: 2.4 G/DL
ALP SERPL-CCNC: 98 U/L
ALT SERPL W/O P-5'-P-CCNC: 30 U/L
ANION GAP SERPL CALC-SCNC: 8 MMOL/L
AST SERPL-CCNC: 33 U/L
BACTERIA SPEC ANAEROBE CULT: NORMAL
BASOPHILS # BLD AUTO: 0.1 K/UL
BASOPHILS NFR BLD: 0.6 %
BILIRUB SERPL-MCNC: 0.3 MG/DL
BUN SERPL-MCNC: 9 MG/DL
CALCIUM SERPL-MCNC: 8.4 MG/DL
CHLORIDE SERPL-SCNC: 106 MMOL/L
CO2 SERPL-SCNC: 25 MMOL/L
CREAT SERPL-MCNC: 0.7 MG/DL
DIFFERENTIAL METHOD: ABNORMAL
EOSINOPHIL # BLD AUTO: 1 K/UL
EOSINOPHIL NFR BLD: 5.5 %
ERYTHROCYTE [DISTWIDTH] IN BLOOD BY AUTOMATED COUNT: 14.7 %
EST. GFR  (AFRICAN AMERICAN): >60 ML/MIN/1.73 M^2
EST. GFR  (NON AFRICAN AMERICAN): >60 ML/MIN/1.73 M^2
GLUCOSE SERPL-MCNC: 88 MG/DL
HCT VFR BLD AUTO: 37.2 %
HGB BLD-MCNC: 12.5 G/DL
LYMPHOCYTES # BLD AUTO: 3.6 K/UL
LYMPHOCYTES NFR BLD: 20.5 %
MAGNESIUM SERPL-MCNC: 1.8 MG/DL
MCH RBC QN AUTO: 28.2 PG
MCHC RBC AUTO-ENTMCNC: 33.6 G/DL
MCV RBC AUTO: 84 FL
MONOCYTES # BLD AUTO: 1 K/UL
MONOCYTES NFR BLD: 6 %
NEUTROPHILS # BLD AUTO: 11.7 K/UL
NEUTROPHILS NFR BLD: 67.4 %
PHOSPHATE SERPL-MCNC: 2.5 MG/DL
PLATELET # BLD AUTO: 189 K/UL
PMV BLD AUTO: 9 FL
POTASSIUM SERPL-SCNC: 3.7 MMOL/L
PROT SERPL-MCNC: 5.8 G/DL
RBC # BLD AUTO: 4.43 M/UL
SODIUM SERPL-SCNC: 139 MMOL/L
WBC # BLD AUTO: 17.4 K/UL

## 2018-05-15 PROCEDURE — 25000003 PHARM REV CODE 250: Performed by: SURGERY

## 2018-05-15 PROCEDURE — 36415 COLL VENOUS BLD VENIPUNCTURE: CPT

## 2018-05-15 PROCEDURE — 94640 AIRWAY INHALATION TREATMENT: CPT

## 2018-05-15 PROCEDURE — 85025 COMPLETE CBC W/AUTO DIFF WBC: CPT

## 2018-05-15 PROCEDURE — 99239 HOSP IP/OBS DSCHRG MGMT >30: CPT | Mod: ,,, | Performed by: INTERNAL MEDICINE

## 2018-05-15 PROCEDURE — 25000003 PHARM REV CODE 250: Performed by: NURSE PRACTITIONER

## 2018-05-15 PROCEDURE — 63600175 PHARM REV CODE 636 W HCPCS: Performed by: NURSE PRACTITIONER

## 2018-05-15 PROCEDURE — 63600175 PHARM REV CODE 636 W HCPCS: Performed by: INTERNAL MEDICINE

## 2018-05-15 PROCEDURE — S0028 INJECTION, FAMOTIDINE, 20 MG: HCPCS | Performed by: SURGERY

## 2018-05-15 PROCEDURE — 94761 N-INVAS EAR/PLS OXIMETRY MLT: CPT

## 2018-05-15 PROCEDURE — 97803 MED NUTRITION INDIV SUBSEQ: CPT

## 2018-05-15 PROCEDURE — C9113 INJ PANTOPRAZOLE SODIUM, VIA: HCPCS | Performed by: SURGERY

## 2018-05-15 PROCEDURE — A4216 STERILE WATER/SALINE, 10 ML: HCPCS | Performed by: SURGERY

## 2018-05-15 PROCEDURE — 25000003 PHARM REV CODE 250: Performed by: INTERNAL MEDICINE

## 2018-05-15 PROCEDURE — 80053 COMPREHEN METABOLIC PANEL: CPT

## 2018-05-15 PROCEDURE — S0030 INJECTION, METRONIDAZOLE: HCPCS | Performed by: NURSE PRACTITIONER

## 2018-05-15 PROCEDURE — 63600175 PHARM REV CODE 636 W HCPCS: Performed by: HOSPITALIST

## 2018-05-15 PROCEDURE — 83735 ASSAY OF MAGNESIUM: CPT

## 2018-05-15 PROCEDURE — 63600175 PHARM REV CODE 636 W HCPCS: Performed by: SURGERY

## 2018-05-15 PROCEDURE — 99900035 HC TECH TIME PER 15 MIN (STAT)

## 2018-05-15 PROCEDURE — 94799 UNLISTED PULMONARY SVC/PX: CPT

## 2018-05-15 PROCEDURE — 84100 ASSAY OF PHOSPHORUS: CPT

## 2018-05-15 RX ORDER — MEROPENEM AND SODIUM CHLORIDE 1 G/50ML
1 INJECTION, SOLUTION INTRAVENOUS EVERY 8 HOURS
Qty: 2100 ML | Refills: 0
Start: 2018-05-15 | End: 2018-05-29

## 2018-05-15 RX ORDER — TRAMADOL HYDROCHLORIDE 50 MG/1
50 TABLET ORAL EVERY 6 HOURS PRN
Qty: 30 TABLET | Refills: 0 | Status: SHIPPED | OUTPATIENT
Start: 2018-05-15 | End: 2018-05-22

## 2018-05-15 RX ORDER — FLUCONAZOLE 40 MG/ML
200 POWDER, FOR SUSPENSION ORAL DAILY
Refills: 0
Start: 2018-05-15 | End: 2018-05-29 | Stop reason: SDUPTHER

## 2018-05-15 RX ADMIN — FLUTICASONE FUROATE AND VILANTEROL TRIFENATATE 1 PUFF: 200; 25 POWDER RESPIRATORY (INHALATION) at 08:05

## 2018-05-15 RX ADMIN — SODIUM CHLORIDE 1500 MG: 9 INJECTION, SOLUTION INTRAVENOUS at 02:05

## 2018-05-15 RX ADMIN — TRIAMTERENE AND HYDROCHLOROTHIAZIDE 2 TABLET: 37.5; 25 TABLET ORAL at 09:05

## 2018-05-15 RX ADMIN — LOPERAMIDE HYDROCHLORIDE 2 MG: 1 SOLUTION ORAL at 09:05

## 2018-05-15 RX ADMIN — METRONIDAZOLE 500 MG: 500 INJECTION, SOLUTION INTRAVENOUS at 01:05

## 2018-05-15 RX ADMIN — METRONIDAZOLE 500 MG: 500 INJECTION, SOLUTION INTRAVENOUS at 04:05

## 2018-05-15 RX ADMIN — FAMOTIDINE 20 MG: 10 INJECTION INTRAVENOUS at 09:05

## 2018-05-15 RX ADMIN — TRAMADOL HYDROCHLORIDE 100 MG: 50 TABLET, FILM COATED ORAL at 01:05

## 2018-05-15 RX ADMIN — PANTOPRAZOLE SODIUM 40 MG: 40 INJECTION, POWDER, FOR SOLUTION INTRAVENOUS at 09:05

## 2018-05-15 RX ADMIN — ACETAMINOPHEN 1000 MG: 10 INJECTION, SOLUTION INTRAVENOUS at 05:05

## 2018-05-15 RX ADMIN — ACETAMINOPHEN 1000 MG: 10 INJECTION, SOLUTION INTRAVENOUS at 12:05

## 2018-05-15 RX ADMIN — LEVOTHYROXINE SODIUM 75 MCG: 25 TABLET ORAL at 05:05

## 2018-05-15 RX ADMIN — SODIUM CHLORIDE 1500 MG: 9 INJECTION, SOLUTION INTRAVENOUS at 03:05

## 2018-05-15 RX ADMIN — MEROPENEM AND SODIUM CHLORIDE 1 G: 1 INJECTION, SOLUTION INTRAVENOUS at 03:05

## 2018-05-15 RX ADMIN — TRAMADOL HYDROCHLORIDE 100 MG: 50 TABLET, FILM COATED ORAL at 05:05

## 2018-05-15 RX ADMIN — ACETAMINOPHEN 1000 MG: 10 INJECTION, SOLUTION INTRAVENOUS at 11:05

## 2018-05-15 RX ADMIN — MEROPENEM AND SODIUM CHLORIDE 1 G: 1 INJECTION, SOLUTION INTRAVENOUS at 12:05

## 2018-05-15 RX ADMIN — SODIUM CHLORIDE, PRESERVATIVE FREE 10 ML: 5 INJECTION INTRAVENOUS at 05:05

## 2018-05-15 RX ADMIN — SODIUM PHOSPHATE, MONOBASIC, MONOHYDRATE 30 MMOL: 276; 142 INJECTION, SOLUTION INTRAVENOUS at 11:05

## 2018-05-15 NOTE — PROGRESS NOTES
"[facility]  Progress Note    Subjective:     History of Present Illness:  56 y/o female who underwent sleeve gastrectomy on 4/16/18, presents now with fevers at home 101.9.  Complains of some abdominal spasm and chills.  Has been able to tolerate diet.  Denies n/v, has had some diarrhea, no constipation    Post-Op Info:  Procedure(s):  REMOVAL-STENT-ESOPHAGEAL, UPPER ENDOSCOPIC STENT REMOVAL AND INSERTION NASOJEJUNAL TUBE   6 Days Post-Op     Interval History: Doing well, tired, didn't sleep well last nigh    Medications:  Continuous Infusions:   sodium chloride 0.9% 75 mL/hr at 05/14/18 0357     Scheduled Meds:   acetaminophen  1,000 mg Intravenous Q6H    enoxaparin  40 mg Subcutaneous Daily    famotidine (PF)  20 mg Intravenous BID    fluconazole (DIFLUCAN) IVPB  200 mg Intravenous Q24H    fluticasone-vilanterol  1 puff Inhalation Daily    levothyroxine  75 mcg Oral Before breakfast    loperamide  2 mg Oral Daily    meropenem (MERREM) IVPB  1 g Intravenous Q8H    metronidazole  500 mg Intravenous Q8H    montelukast  10 mg Oral QHS    pantoprazole  40 mg Intravenous Daily    senna-docusate 8.6-50 mg  1 tablet Oral BID    sodium chloride 0.9%  10 mL Intravenous Q6H    sodium phosphate IVPB  30 mmol Intravenous Once    triamterene-hydrochlorothiazide 37.5-25 mg  2 tablet Oral Daily    vancomycin (VANCOCIN) IVPB  1,500 mg Intravenous Q12H     PRN Meds:albuterol-ipratropium 2.5mg-0.5mg/3mL, dextrose 50%, diazePAM, diphenhydrAMINE, glucagon (human recombinant), glucose, glucose, methocarbamol (ROBAXIN) IVPB, midazolam, ondansetron, prochlorperazine, promethazine (PHENERGAN) IVPB, promethazine (PHENERGAN) IVPB, ramelteon, Flushing PICC Protocol **AND** sodium chloride 0.9% **AND** sodium chloride 0.9%, sodium chloride 0.9%, sodium chloride 0.9%, traMADol     Review of patient's allergies indicates:   Allergen Reactions    Bromelains Hives     " causes mouth to bleed" "    Sudafed [pseudoephedrine hcl] Other (See Comments)     Does not want to wake up .    Amoxicillin-pot clavulanate Itching     Other reaction(s): Itching    Hydrocodone Itching    Iodinated contrast- oral and iv dye      childhood    Iodine and iodide containing products Other (See Comments)    Melon Hives    Pantoprazole Nausea Only     Other reaction(s): upset stomach/halitosis    Percocet [oxycodone-acetaminophen] Itching    Barium iodide Rash    Ephedrine Other (See Comments)     Other reaction(s): comatose    Penicillins      Other reaction(s): does not work on pt symptoms     Objective:     Vital Signs (Most Recent):  Temp: 96.1 °F (35.6 °C) (05/15/18 0757)  Pulse: 69 (05/15/18 0817)  Resp: 17 (05/15/18 0817)  BP: 125/62 (05/15/18 0757)  SpO2: 97 % (05/15/18 0817) Vital Signs (24h Range):  Temp:  [96.1 °F (35.6 °C)-98.4 °F (36.9 °C)] 96.1 °F (35.6 °C)  Pulse:  [69-77] 69  Resp:  [16-18] 17  SpO2:  [94 %-98 %] 97 %  BP: (125-157)/(62-76) 125/62     Weight: 89.4 kg (197 lb 1.5 oz)  Body mass index is 36.05 kg/m².    Intake/Output - Last 3 Shifts       05/13 0700 - 05/14 0659 05/14 0700 - 05/15 0659 05/15 0700 - 05/16 0659    P.O. 0      I.V. (mL/kg) 2708.8 (30.3) 856.3 (9.6)     NG/      IV Piggyback 1200 1150     Total Intake(mL/kg) 4268.8 (47.7) 2006.3 (22.4)     Other  60     Total Output   60      Net +4268.8 +1946.3             Urine Occurrence 5 x 2 x     Stool Occurrence 1 x 0 x           Physical Exam   Abdominal: Soft. She exhibits no distension and no mass. There is no tenderness. There is no guarding.   Drain ss   Vitals reviewed.      Significant Labs:  CBC:   Recent Labs  Lab 05/15/18  0503   WBC 17.40*   RBC 4.43   HGB 12.5   HCT 37.2      MCV 84   MCH 28.2   MCHC 33.6     BMP:   Recent Labs  Lab 05/15/18  0525   GLU 88      K 3.7      CO2 25   BUN 9   CREATININE 0.7   CALCIUM 8.4*   MG 1.8       Significant Diagnostics:  I have reviewed all pertinent imaging  results/findings within the past 24 hours. CT with atelectasis, abscess nearly resolved with drain in place    Assessment/Plan:     Gastric leak    Acetaminophen and tramadol for pain control at home            Intraperitoneal abscess    From Leak  Drain in place- resolving- continue drain        S/P laparoscopic sleeve gastrectomy    Tube feeds - plan discharge today with tube feeds    Antibiotics-as per medical team for home us    Drain: flush daily with 10-20 cc normal saline    Incentive spirometer    Plan discharge tomorrow              Chai Reese MD  General Surgery  [facility]

## 2018-05-15 NOTE — SUBJECTIVE & OBJECTIVE
"Interval History: Doing well, tired, didn't sleep well last nigh    Medications:  Continuous Infusions:   sodium chloride 0.9% 75 mL/hr at 05/14/18 0357     Scheduled Meds:   acetaminophen  1,000 mg Intravenous Q6H    enoxaparin  40 mg Subcutaneous Daily    famotidine (PF)  20 mg Intravenous BID    fluconazole (DIFLUCAN) IVPB  200 mg Intravenous Q24H    fluticasone-vilanterol  1 puff Inhalation Daily    levothyroxine  75 mcg Oral Before breakfast    loperamide  2 mg Oral Daily    meropenem (MERREM) IVPB  1 g Intravenous Q8H    metronidazole  500 mg Intravenous Q8H    montelukast  10 mg Oral QHS    pantoprazole  40 mg Intravenous Daily    senna-docusate 8.6-50 mg  1 tablet Oral BID    sodium chloride 0.9%  10 mL Intravenous Q6H    sodium phosphate IVPB  30 mmol Intravenous Once    triamterene-hydrochlorothiazide 37.5-25 mg  2 tablet Oral Daily    vancomycin (VANCOCIN) IVPB  1,500 mg Intravenous Q12H     PRN Meds:albuterol-ipratropium 2.5mg-0.5mg/3mL, dextrose 50%, diazePAM, diphenhydrAMINE, glucagon (human recombinant), glucose, glucose, methocarbamol (ROBAXIN) IVPB, midazolam, ondansetron, prochlorperazine, promethazine (PHENERGAN) IVPB, promethazine (PHENERGAN) IVPB, ramelteon, Flushing PICC Protocol **AND** sodium chloride 0.9% **AND** sodium chloride 0.9%, sodium chloride 0.9%, sodium chloride 0.9%, traMADol     Review of patient's allergies indicates:   Allergen Reactions    Bromelains Hives     " causes mouth to bleed"    Sudafed [pseudoephedrine hcl] Other (See Comments)     Does not want to wake up .    Amoxicillin-pot clavulanate Itching     Other reaction(s): Itching    Hydrocodone Itching    Iodinated contrast- oral and iv dye      childhood    Iodine and iodide containing products Other (See Comments)    Melon Hives    Pantoprazole Nausea Only     Other reaction(s): upset stomach/halitosis    Percocet [oxycodone-acetaminophen] Itching    Barium iodide Rash    Ephedrine Other " (See Comments)     Other reaction(s): comatose    Penicillins      Other reaction(s): does not work on pt symptoms     Objective:     Vital Signs (Most Recent):  Temp: 96.1 °F (35.6 °C) (05/15/18 0757)  Pulse: 69 (05/15/18 0817)  Resp: 17 (05/15/18 0817)  BP: 125/62 (05/15/18 0757)  SpO2: 97 % (05/15/18 0817) Vital Signs (24h Range):  Temp:  [96.1 °F (35.6 °C)-98.4 °F (36.9 °C)] 96.1 °F (35.6 °C)  Pulse:  [69-77] 69  Resp:  [16-18] 17  SpO2:  [94 %-98 %] 97 %  BP: (125-157)/(62-76) 125/62     Weight: 89.4 kg (197 lb 1.5 oz)  Body mass index is 36.05 kg/m².    Intake/Output - Last 3 Shifts       05/13 0700 - 05/14 0659 05/14 0700 - 05/15 0659 05/15 0700 - 05/16 0659    P.O. 0      I.V. (mL/kg) 2708.8 (30.3) 856.3 (9.6)     NG/      IV Piggyback 1200 1150     Total Intake(mL/kg) 4268.8 (47.7) 2006.3 (22.4)     Other  60     Total Output   60      Net +4268.8 +1946.3             Urine Occurrence 5 x 2 x     Stool Occurrence 1 x 0 x           Physical Exam   Abdominal: Soft. She exhibits no distension and no mass. There is no tenderness. There is no guarding.   Drain ss   Vitals reviewed.      Significant Labs:  CBC:   Recent Labs  Lab 05/15/18  0503   WBC 17.40*   RBC 4.43   HGB 12.5   HCT 37.2      MCV 84   MCH 28.2   MCHC 33.6     BMP:   Recent Labs  Lab 05/15/18  0525   GLU 88      K 3.7      CO2 25   BUN 9   CREATININE 0.7   CALCIUM 8.4*   MG 1.8       Significant Diagnostics:  I have reviewed all pertinent imaging results/findings within the past 24 hours. CT with atelectasis, abscess nearly resolved with drain in place

## 2018-05-15 NOTE — PT/OT/SLP PROGRESS
Physical Therapy      Patient Name:  Yodit Canseco   MRN:  [id number]    Patient not seen today secondary to Patient unwilling to participate (2 attempts made. Patient reports having walked earlier and too tired. Still tired in p.m. Nurse Lidya present.). Will follow-up 5/16/18.    Elvi Pierce PTA

## 2018-05-15 NOTE — DISCHARGE SUMMARY
Discharge Summary  Hospital Medicine    Admit Date: 5/6/2018    Date and Time: 5/15/73235:57 PM    Discharge Attending Physician: Cristian Ashraf MD    Primary Care Physician: Miky Lewis MD    Diagnoses:  Active Hospital Problems    Diagnosis  POA    *Sepsis with Intraperitoneal abscess with possible leak from anastomosis s/p IR drainage  S/p Esophageal stent placement and then removal  Yes    Gastric leak [K91.89]  Yes    Intraperitoneal abscess [K65.1]  Yes    S/P bariatric surgery [Z98.84]  Hypophosphatemia  Hypomagnesemia  Hypokalemia  Not Applicable    Severe persistent asthma without complication [J45.50]  Yes    Hypothyroidism [E03.9]  Yes        Discharged Condition: Good    Hospital Course:   Yodit Canseco is a 56 yo female with PMHx significant for morbid obesity, history of gastroparesis, DM-2 (resolved), hyperlipidemia and hypothyroidism.  She is admitted to the service of hospital medicine with severe sepsis.  She presented to the ED with a 5 day history of subjective fever with tmax 103F.  She states the fever is associated with chills, malaise and nausea.  The fevers were responsive to tylenol and seem to be worse at night. Patient is 3 weeks post gastric sleeve surgery. She reports that she is tolerating her PO intake and recently had relatives over who have been sick with symptoms of fever, chills, and diarrhea. Patient denies diarrhea, vomiting, abdominal pain, chest pain, shortness of breath, cough, urinary symptoms, or neck stiffness. Patient was admitted to Hospitalist medicine service. Patient was evaluated by Dr. Reese. Patient treated with IV antibiotics. CT abdomen confirmed anastomotic leak requiring intra-abdominal drain placement as per IR. A naso-jejunal feeding placed. Patient is tolerating feeding via NJ tube. Home IV antibiotic is arranged. Microbiology results are being followed. Patient to continue close follow up with Dr. Reese. Home health is arranged. Patient  was discharged home in stable condition with following discharge plan of care. Total time with the patient was 30 minutes and greater than 50% was spent in counseling and coordination of care. The assessment and plan have been discussed at length. Physicians' notes reviewed. Labs and procedure reviewed.     Consults: Dr. Reese    Significant Diagnostic Studies:   CT abdomen:  Abnormal stranding in the fat adjacent to the sleeve gastrectomy which is nonspecific.  No extraluminal gas or leakage of oral contrast identified.  Differential considerations include inflammation and blood products.  No drainable fluid collection to suggest abscess.  Small left pleural effusion and left basilar atelectasis.  Hysterectomy and cholecystectomy.  Colonic diverticulosis.     KUB: Interval placement of an esophagogastric stent centered at the level of the GE junction.  Weighted enteric tube tip is at the level of the gastric antrum. Left basilar pulmonary consolidation with air bronchograms for which differential considerations include pneumonia and aspiration.     EGD:  Endoscopy was performed showing a normal esophagus and duodenum.  Stomach had inflamed appearing mucosa s/p sleeve.  Proximal staple line appeared swollen and erythematous.  May have underlying leak but not obvious.  NO obvious leaks seen.  A stent was placed over a guide wire HID [employer] 23 mm by 155 mm under fluoroscopy.  A keyo feeding tube was placed distal to this.       CT guided abdominal abscess drainage:  CT-guided placement of percutaneous 12 Yi pigtail catheter within the patient's left perigastric fluid collection.  Removal of approximately 40 mL of purulent fluid with a sample sent for additional analysis.  The catheter is attached to vacuum bag suction.  Near complete resolution of the collection is noted.  No immediate complication.    Microbiology Results (last 7 days)     Procedure Component Value Units Date/Time    Culture, Anaerobic  [[id number]] Collected:  05/10/18 1512    Order Status:  Completed Specimen:  Abscess from Abdomen Updated:  05/15/18 0913     Anaerobic Culture No anaerobes isolated    Aerobic culture [[id number]] Collected:  05/10/18 1512    Order Status:  Completed Specimen:  Abscess from Abdomen Updated:  05/13/18 1312     Aerobic Bacterial Culture --     CANDIDA ALBICANS  Few       Aerobic Bacterial Culture --     STREPTOCOCCUS INTERMEDIUS  Few      Blood Culture #2 [[id number]] Collected:  05/07/18 0216    Order Status:  Completed Specimen:  Blood Updated:  05/12/18 1212     Blood Culture, Routine No growth after 5 days.    Blood Culture #1 [[id number]] Collected:  05/07/18 0216    Order Status:  Completed Specimen:  Blood Updated:  05/12/18 1212     Blood Culture, Routine No growth after 5 days.    Gram stain [[id number]] Collected:  05/10/18 1512    Order Status:  Completed Specimen:  Abscess from Abdomen Updated:  05/11/18 0642     Gram Stain Result Many WBC's      Many Gram positive cocci    Urine Culture [[id number]] Collected:  05/07/18 0114    Order Status:  Completed Specimen:  Urine from Urine, Clean Catch Updated:  05/09/18 0052     Urine Culture, Routine No growth        Special Treatments/Procedures: as above  Disposition: Home or Self Care    Medications:  Reconciled Home Medications: Current Discharge Medication List      START taking these medications    Details   fluconazole 40 mg/ml (DIFLUCAN) 40 mg/mL suspension Take 5 mLs (200 mg total) by mouth once daily.  Refills: 0      meropenem-0.9% sodium chloride 1 gram/50 mL PgBk Inject 50 mLs (1 g total) into the vein every 8 (eight) hours.  Qty: 2100 mL, Refills: 0      sodium chloride 0.9% SolP 250 mL with vancomycin 1,000 mg SolR 1,500 mg Inject 1,500 mg into the vein every 12 (twelve) hours.      traMADol (ULTRAM) 50 mg tablet Take 1 tablet (50 mg total) by mouth every 6 (six) hours as needed for Pain.  Qty: 30 tablet, Refills: 0         CONTINUE these medications  which have NOT CHANGED    Details   acetaminophen (TYLENOL) 650 MG TbSR Take 650 mg by mouth every 8 (eight) hours.      albuterol (PROAIR HFA) 90 mcg/actuation inhaler Inhale 1 puff into the lungs every 4 (four) hours as needed for Wheezing or Shortness of Breath.  Qty: 3 Inhaler, Refills: 3    Associated Diagnoses: Mild intermittent asthma without complication      CALCIUM CARB/D3/MAGNESIUM/ZINC (CALCIUM CARB-D3-MAG CMB11-ZINC) [phone] mg-unit-mg-mg Tab Take 2 tablets by mouth every evening.      diazePAM (VALIUM) 2 MG tablet Take 1 tablet (2 mg total) by mouth every 6 (six) hours as needed for Anxiety (muscle spasm).  Qty: 20 tablet, Refills: 0      estradiol (ESTRACE) 1 MG tablet Take 1 mg by mouth once daily.      immun glob G,IgG,-pro-IgA 0-50 (HIZENTRA) 2 gram/10 mL (20 %) Soln Inject 18 g into the skin every 14 (fourteen) days.  Qty: 90 mL, Refills: 12      levothyroxine (SYNTHROID) 75 MCG tablet TAKE 1 TABLET(75 MCG) BY MOUTH EVERY DAY  Qty: 30 tablet, Refills: 11      mometasone-formoterol (DULERA) 200-5 mcg/actuation inhaler Inhale 2 puffs into the lungs 2 (two) times daily. Controller  Qty: 1 Inhaler, Refills: 11    Associated Diagnoses: Flu; Eosinophilic asthma; Mild intermittent asthma with acute exacerbation      montelukast (SINGULAIR) 10 mg tablet Take 1 tablet (10 mg total) by mouth every evening.  Qty: 90 tablet, Refills: 3    Associated Diagnoses: Mild intermittent asthma without complication      multivitamin (ONE DAILY MULTIVITAMIN) per tablet Take 1 tablet by mouth once daily.      ondansetron (ZOFRAN-ODT) 4 MG TbDL Take 1 tablet (4 mg total) by mouth every 6 (six) hours as needed.  Qty: 30 tablet, Refills: 0      potassium chloride (MICRO-K) 10 MEQ CpSR TAKE 2 CAPSULES BY MOUTH EVERY DAY  Qty: 60 capsule, Refills: 3      sumatriptan (IMITREX) 100 MG tablet Take 1 po at onset of migraine. May repeat once in 2 hrs if needed. No more than 2 pills in 24 hours  Qty: 9 tablet, Refills: 2     "  triamterene-hydrochlorothiazide 75-50 mg (MAXZIDE) 75-50 mg per tablet TAKE 1 TABLET BY MOUTH EVERY DAY  Qty: 30 tablet, Refills: 11      albuterol (PROVENTIL) 2.5 mg /3 mL (0.083 %) nebulizer solution Take 3 mLs (2.5 mg total) by nebulization every 4 (four) hours as needed for Wheezing or Shortness of Breath. Every 4-6 hours PRN  Qty: 120 each, Refills: 3    Associated Diagnoses: Mild intermittent asthma without complication      triamcinolone acetonide 0.025% (KENALOG) 0.025 % cream Apply topically 2 (two) times daily.  Qty: 15 g, Refills: 0             Discharge Procedure Orders  ENTERAL NUTRITION FOR HOME USE   Order Specific Question Answer Comments   Height: 5' 2" (1.575 m)    Weight: 89.4 kg (197 lb 1.5 oz)    Does the patient have permanent non-function or disease of the structures that normally permit food to reach or be absorbed from the small bowel? Yes    Does the patient require tube feedings to provide sufficient nutrients to maintain weight and strength commensurate with the patient's overall health status? Yes    Select Formula: Other (please comment) Nutren 2.0   Method of administration: Pump    Rate/frequency of administration and/or number of calories per 24 hr period: 30cc/hr with 150cc H2O q4 along with ProMed 1oz tid    List of separately billed items: Supply kits    List of separately billed items: Pump    List of separately billed items: Feeding tube    Length of need (1-99 months)? 3      Ambulatory referral to Home Health   Referral Priority: Routine Referral Type: Home Health   Referral Reason: Specialty Services Required    Requested Specialty: Home Health Services    Number of Visits Requested: 1      Activity as tolerated   Order Comments: Observe fall precautions.     Call MD for:   Order Comments: For worsening symptoms, chest pain, shortness of breath, increased abdominal pain, high grade fever, stroke or stroke like symptoms, immediately go to the nearest Emergency Room or call " 911 as soon as possible.     Tube Feedings/Formulas   Scheduling Instructions: With free water 150 q 4 hrs   Order Specific Question Answer Comments   Select Adult Formula: Nutren 2.0    Route: Other (see comments) Nasojejunostomy   Formula Rate (mL/hr): 30        Follow-up Information     Chai Reese MD On 5/23/2018.    Specialties:  General Surgery, Bariatrics, Surgery  Why:  @2:45pm   Contact information:  [address] [facility] [address]  [facility] [address] [phone]             Miky Lewis MD In 1 week.    Specialty:  Family Medicine  Contact information:  [address] [facility] [address] [phone]             Please follow up.    Contact information:  Continue aggressive incentive spirometry every hour as directed. increase ambulation.

## 2018-05-15 NOTE — PLAN OF CARE
05/14/18 2026   Patient Assessment/Suction   Level of Consciousness (AVPU) alert   Respiratory Effort Normal;Unlabored   Expansion/Accessory Muscles/Retractions no use of accessory muscles   PRE-TX-O2-ETCO2   O2 Device (Oxygen Therapy) room air   SpO2 96 %   Pulse 76   Aerosol Therapy   $ Aerosol Therapy Charges PRN treatment not required   Respiratory Treatment Status PRN treatment not required   Pt tolerates RA well. No PRN treatment required at this time.

## 2018-05-15 NOTE — PLAN OF CARE
Problem: Patient Care Overview  Goal: Plan of Care Review  Outcome: Ongoing (interventions implemented as appropriate)  Pt on room air with Q4 PRN duoneb treatments, and daily Breo, no PRN treatment needed at this time.

## 2018-05-15 NOTE — PLAN OF CARE
Problem: Nutrition, Enteral (Adult)  Intervention: Monitor/Manage Nutrition Support  Recommendations    1) Nutren 2.0:  125 mls (1/2 carton) 6 times daily.  6 am, 9 am, 12 pm, 3 pm, 6 pm, 9 pm.  ProMod: 1 oz, 3 times daily.  6 am, 12 pm, 6 pm.    Flush with 175 mls (3/4 cup) of water after each feeding.   Nutren 2.0,  Progress to 250 mls (1 carton) 3 x daily per tolerance. 6 am, 12 pm, 6 pm.   ProMod protein supplement, 1 oz, 6 am, 12 pm, 6 pm.   Flush with 275 mls water (about 1 3/4 cup) 4 x daily for hydration.      At goal provides 1510 calories, 93 gms protein, 162 gms CHO, 519 mls free water.    2) Enteral Bolus: Nutren 2.0,  250 mls (1 carton) 3 x daily per pt tolerance. Use ProMod protein supplement, 1 oz, tid. Flush with 275 mls water (about 1 3/4 cup) 4 x daily for hydration. Provides 1510 calories, 93 gms protein, 162 gms CHO, 519 mls free water.     3) Provided nutrition education to pt on enteral feeds above. Handout and contact information provided. All questions answered.     4) Enteral Continuous:   Continue Nutren 2.0 @ goal rate 30 mls/hr over 24 hrs. Flush with 150 mls Q 4 hrs.  Add ProMod, protein supplement, 1 oz, tid.   At goal including ProMod provides 1510 calories, 93 gms protein, 162 gms CHO, 519 mls free water.        Goal: 1) Pt will receive nutrition within 48 hrs. 2) Pt will receive at least 80% EEN within 48 hrs.   Nutrition Goal Status: 1) met 2) met/ongoing  Communication of RD Recs:  (POC, reviewed with RN)

## 2018-05-15 NOTE — PROGRESS NOTES
[facility]  Adult Nutrition  Consult Note    SUMMARY     Recommendations    1) Nutren 2.0:  125 mls (1/2 carton) 6 times daily.  6 am, 9 am, 12 pm, 3 pm, 6 pm, 9 pm.  ProMod: 1 oz, 3 times daily.  6 am, 12 pm, 6 pm.    Flush with 175 mls (3/4 cup) of water after each feeding.   Nutren 2.0,  Progress to 250 mls (1 carton) 3 x daily per tolerance. 6 am, 12 pm, 6 pm.   ProMod protein supplement, 1 oz, 6 am, 12 pm, 6 pm.   Flush with 275 mls water (about 1 3/4 cup) 4 x daily for hydration.      At goal provides 1510 calories, 93 gms protein, 162 gms CHO, 519 mls free water.    2) Enteral Bolus: Nutren 2.0,  250 mls (1 carton) 3 x daily per pt tolerance. Use ProMod protein supplement, 1 oz, tid. Flush with 275 mls water (about 1 3/4 cup) 4 x daily for hydration. Provides 1510 calories, 93 gms protein, 162 gms CHO, 519 mls free water.     3) Provided nutrition education to pt on enteral feeds above. Handout and contact information provided. All questions answered.     4) Enteral Continuous:   Continue Nutren 2.0 @ goal rate 30 mls/hr over 24 hrs. Flush with 150 mls Q 4 hrs.  Add ProMod, protein supplement, 1 oz, tid.   At goal including ProMod provides 1510 calories, 93 gms protein, 162 gms CHO, 519 mls free water.        Goal: 1) Pt will receive nutrition within 48 hrs. 2) Pt will receive at least 80% EEN within 48 hrs.   Nutrition Goal Status: 1) met 2) met/ongoing  Communication of RD Recs:  (POC, reviewed with RN)    Discharge Plan:  Enteral feeds as above    Comment: If pt cannot tolerate Nutren 2.0, recommend Vivonex RTF 4 cartons daily. Flush with 175 mls water ( about 1 3/4 cup) 4 x daily.  Add ProMod, 1 oz, 4 x daily. At goal including ProMod, provides 1010 calories, 90 gms protein, 176 gms CHO, 848 mls free water.      Reason for Assessment    Reason for Assessment: RD follow up   1. Sepsis, due to unspecified organism    2. Abdominal pain, unspecified abdominal location    3. S/P bariatric  surgery    4. Gastric leak    5. Sepsis    6. S/P laparoscopic sleeve gastrectomy    7. Intraperitoneal abscess    8. Hypothyroidism, unspecified type    9. Severe persistent asthma without complication      Past Medical History:   Diagnosis Date    Allergy     Angio-edema     Arthralgia     Asthma without status asthmaticus     Bronchitis     Diverticulosis of large intestine without hemorrhage 10/8/2015    Environmental allergies     Eosinophilic asthma 3/8/2018    Gastroparesis     Hand arthritis     Herpes simplex without mention of complication     oral    Hyperlipidemia     Hypothyroidism     LBP radiating to left leg     Leukocytosis, unspecified     Migraine headache     Obesity, Class III, BMI 40-49.9 (morbid obesity)     Periorbital cellulitis 12/31/2016    Prediabetes     Reactive airway disease     Reactive airway disease     Recurrent upper respiratory infection (URI)     S/P colonoscopy November 2010    Urticaria        Interdisciplinary Rounds: attended  General Information Comments: Admitted with fever. Pt is s/p gastric sleeve x 3 weeks.  Intentional wt loss noted.   5/9/18 Spoke with Dr. Reese re continuous feeds. Per MD direction, continuous feeds during admit and bolus feeds at discharge.  Temporary enteral feeds until gut heals. .  Yesterday, pt reported intolerance to whey and lactose, but did not know about casein. Today pt and her spouse report concerns about using Nutren 2.0 2/2 milk intolerance.  Assured pt that Nutren 2.0 does not have whey or lactose, but does contain casein.  Spouse states he does not want to try the Nutren 2.0, and reports pt can tolerate almond milk.  Dr. Reese spoke with family and they agreed to try Nutren 2.0.  The order for Nutren 2.0 has been activated at this time.  Monitoring for tolerance.    Found TF that is milk, egg, pea and artificial sweetener free.  Special ordered Vivonex RTF and it is being shipped overnight for pt use if  "needed.     5/10/18: Vivonex RTF is in house if needed. Pt alert, sitting up and talking. TF, Nutren 2.0 infusing @ 10 mls/hr. Tolerating  18: Pt not tolerating TF @ 40 mls/hr. TF has been progressing over 24 hrs/day.  Abdomen is bloated and pt having diarrhea. Conferred with nursing and MD.    18 Pt was sitting up in chair and aler. Pt is tolerating TF @ goal 30 mls/hr over 24 hrs. Continues to have loose stools.    5/15/18 Tolerating TF at goal rate. No residuals. Being discharged today. Education on enteral feeds provided.       Nutrition Risk Screen    Nutrition Risk Screen: no indicators present    Nutrition/Diet History    Do you have any cultural, spiritual, Episcopal conflicts, given your current situation?: none  Food Allergies: egg, milk (pea, artificial sweeteners)    Anthropometrics    Temp: 96.1 °F (35.6 °C)  Height Method: Stated  Height: 5' 2"  Height (inches): 62 in  Weight Method: Standard Scale  Weight: 89.4 kg (197 lb 1.5 oz)  Weight (lb): 197.09 lb  Ideal Body Weight (IBW), Female: 110 lb  % Ideal Body Weight, Female (lb): 177.17 lb  BMI (Calculated): 35.7  Usual Body Weight (UBW), k.6 kg  % Usual Body Weight: 88.06  % Weight Change From Usual Weight: -12.13 %       Lab/Procedures/Meds    Pertinent Labs Reviewed: reviewed  Lab Results   Component Value Date    ALBUMIN 2.4 (L) 05/15/2018     Lab Results   Component Value Date    CRP 7.6 2016     Lab Results   Component Value Date    WBC 17.40 (H) 05/15/2018     Pertinent Medications Reviewed: reviewed  Scheduled Meds:   acetaminophen  1,000 mg Intravenous Q6H    enoxaparin  40 mg Subcutaneous Daily    famotidine (PF)  20 mg Intravenous BID    fluconazole (DIFLUCAN) IVPB  200 mg Intravenous Q24H    fluticasone-vilanterol  1 puff Inhalation Daily    levothyroxine  75 mcg Oral Before breakfast    loperamide  2 mg Oral Daily    meropenem (MERREM) IVPB  1 g Intravenous Q8H    metronidazole  500 mg Intravenous Q8H    " montelukast  10 mg Oral QHS    pantoprazole  40 mg Intravenous Daily    senna-docusate 8.6-50 mg  1 tablet Oral BID    sodium chloride 0.9%  10 mL Intravenous Q6H    triamterene-hydrochlorothiazide 37.5-25 mg  2 tablet Oral Daily    vancomycin (VANCOCIN) IVPB  1,500 mg Intravenous Q12H     Continuous Infusions:   sodium chloride 0.9% 75 mL/hr at 05/14/18 0357       Physical Findings/Assessment    Overall Physical Appearance: obese, weak  Oral/Mouth Cavity: WDL  Skin:  (Steve score 20)    Estimated/Assessed Needs    Weight Used For Calorie Calculations: 88.4 kg (194 lb 14.2 oz)     Energy Need Method: Roberts-St Jeor: 1417 x 1.25=1771     Weight Used For Protein Calculations: 88.4 kg (194 lb 14.2 oz)    gms/day     Fluid Need Method: RDA Method (or per MD)     CHO Requirement: max 221 gms CHO      Nutrition Prescription Ordered    Current Diet Order: NPO    Evaluation of Received Nutrient/Fluid Intake    Energy Calories Required: not meeting needs  Protein Required: not meeting needs  Fluid Required:per MD rec    Intake/Output Summary (Last 24 hours) at 05/15/18 1640  Last data filed at 05/15/18 0600   Gross per 24 hour   Intake          1356.25 ml   Output               60 ml   Net          1296.25 ml     % Intake of Estimated Energy Needs: 25-50 %  % Meal Intake: NPO    Nutrition Risk    Level of Risk/Frequency of Follow-up:  (2 x wkly)     Assessment and Plan    Gastric leak    Contributing Nutrition Diagnosis  Inadequate energy intake    Related to (etiology):   Altered GI function    Signs and Symptoms (as evidenced by):   NPO with no alternate nutrition    Interventions/Recommendations (treatment strategy):  continue nutrition support progressing per pt tolerance to goal rate (5/14/18 at goal rate, no residuals)    Nutrition Diagnosis Status:   Improving                Monitor and Evaluation    Food and Nutrient Intake: energy intake  Food and Nutrient Adminstration: diet order  Anthropometric  Measurements: weight  Biochemical Data, Medical Tests and Procedures: inflammatory profile  Nutrition-Focused Physical Findings: overall appearance, skin     Discharge Plan    Enteral feeds as above    RD Follow-up?: Yes

## 2018-05-15 NOTE — ASSESSMENT & PLAN NOTE
Tube feeds - plan discharge today with tube feeds    Antibiotics-as per medical team for home us    Drain: flush daily with 10-20 cc normal saline    Incentive spirometer    Plan discharge tomorrow

## 2018-05-15 NOTE — PROGRESS NOTES
SSC sent patient information to [facility] through [facility] for authorization and acceptance.  SSC faxed patient information (facesheet, orders, h&p, op notes, Picc information, labs, radiology results) to [facility] [phone].  Fax confirmation received.ELIAS Cash    1:10pm SSC spoke to [facility] with [facility] [phone] regarding authorization.  Per Corrine, they received faxed and will return call once benefits and authorization received from patient's insurance.ELIAS Cash    The referral for the patient in [facility]PC[facility], room 214, bed 214 A admitted at 5/6/2018 11:06 PM has been updated by [email or web address].  Update: Accepted.     3:45pm SSC spoke to Corrine regarding authorization for home IV and Enteral feeding.  Per Corrine, they have authorization from [facility] for home IV antibiotics that are covered.  However, they are waiting on medicare for enteral feeds.  Corrine went further to say that they do not have enteral feedings in stock and will have to order that will not come in till tomorrow.  Corrine ask if [facility] could supply patient with feeds through tomorrow.  SSC informed JHONY Oliver.ELIAS Cash    3:55pm Per JOHNY Oliver [facility] can give patient enteral feeds for tomorrow.  SSC spoke Alley regarding feeds.  Corrine verbalized understanding and stated they will supply patient with pumps for enteral feedings.  Per Corrine she will contact patient regarding delivery and authorizations.ELIAS Cash

## 2018-05-15 NOTE — PHYSICIAN QUERY
PT Name: Yodit Canseco  MR #: [id number]    Physician Query Form - Cause and Effect Relationship Clarification      CDS/: Abdirahman Beltrán RN               Contact information:Your response to a query is needed on this chart.    This form is a permanent document in the medical record.     Query Date: May 15, 2018    By submitting this query, we are merely seeking further clarification of documentation. Please utilize your independent clinical judgment when addressing the question(s) below.    The Medical record contains the following:  Supporting Clinical Findings   Location in record     * Sepsis with Intraperitoneal abscess with possible leak from anastomosis s/p IR drainage                                                                                                                                                                                          5/14 [facility]        Culture-- abscess abdomen:  Many Gram positive cocci ; many WBC                                                                                                                                                                                     5/10  Micro         Provider, please clarify if there is any correlation between:    sepsis     And      gram positive cocci.           Are the conditions:     [  ] Due to or associated with each other     [  ] Unrelated to each other     [  ] Other (Please Specify): _________________________     [x  ] Clinically Undetermined

## 2018-05-15 NOTE — PLAN OF CARE
Problem: Patient Care Overview  Goal: Plan of Care Review  Outcome: Ongoing (interventions implemented as appropriate)  PRN pain medication given.  Fall precautions maintained.  Bed in low position, call light within reach. No complaints at this time.

## 2018-05-16 ENCOUNTER — PATIENT OUTREACH (OUTPATIENT)
Dept: ADMINISTRATIVE | Facility: CLINIC | Age: 58
End: 2018-05-16

## 2018-05-16 ENCOUNTER — TELEPHONE (OUTPATIENT)
Dept: BARIATRICS | Facility: CLINIC | Age: 58
End: 2018-05-16

## 2018-05-16 NOTE — TELEPHONE ENCOUNTER
----- Message from Christin Bernstein sent at 5/16/2018  1:30 PM CDT -----  Type: Needs Medical Advice    Who Called:  Jonas Canseco - spouse  Symptoms (please be specific):  n/a  How long has patient had these symptoms:  n/a  Pharmacy name and phone #:   [facility] [id number] [facility] [address] [facility] [address]  [address] [address]  Phone: [phone] Fax: [phone]  Best Call Back Number: [phone]  Additional Information: Mr. Canseco states patient was discharged form [facility] yesterday and hospitalist prescribed   luconazole 40 mg/ml (DIFLUCAN) 40 mg/mL suspension 5/15/2018 5/29/2018   Sig - Route: Take 5 mLs (200 mg total) by mouth once daily     He states pharmacy did not receive this prescription, contact to advise if Dr. Reese can fill.    Thank you

## 2018-05-16 NOTE — PLAN OF CARE
05/16/18 0720   Final Note   Assessment Type Final Discharge Note   Discharge Disposition Home-Health

## 2018-05-16 NOTE — TELEPHONE ENCOUNTER
----- Message from Hailey Chaparro sent at 5/16/2018  4:05 PM CDT -----  Type:  Pharmacy Calling to Clarify an RX    Name of Caller:  Melody  Pharmacy Name:  [facility] in [place]  Prescription Name:  fluconazole 40 mg/ml (DIFLUCAN) 40 mg/mL suspension  What do they need to clarify?:  It did not say how many days to dispense  Best Call Back Number:  [phone]  Additional Information:  Please call if needed.

## 2018-05-17 NOTE — PHYSICIAN QUERY
PT Name: Yodit Canseco  MR #: [id number]     Physician Query Form - Documentation Clarification      CDS/: Abdirahman Beltrán RN               Contact information:   [email or web address]      This form is a permanent document in the medical record.     Query Date: May 17, 2018    By submitting this query, we are merely seeking further clarification of documentation. Please utilize your independent clinical judgment when addressing the question(s) below.    The Medical record reflects the following:    Supporting Clinical Findings Location in Medical Record   Sepsis with Intraperitoneal abscess with possible leak from anastomosis s/p IR drainage ;  S/p Esophageal stent placement and then removal; Gastric leak ;  Intraperitoneal abscess; S/P bariatric surgery      CT-guided placement of percutaneous 12 Bulgarian pigtail catheter within the patient's left perigastric fluid collection.  Removal of approximately 40 mL of purulent fluid with a sample sent for additional analysis.  The catheter is attached to vacuum bag suction.  Near complete resolution of the collection is noted     5/15  DC summary          5/15  DC summary                                                                                Doctor, Please specify diagnosis or diagnoses associated with above clinical findings.    Provider Use Only      Did a complication of the gastric sleeve cause sepsis?  [ x ] yes     [  ] no    [  ] Clinically undetermined

## 2018-05-18 ENCOUNTER — TELEPHONE (OUTPATIENT)
Dept: BARIATRICS | Facility: CLINIC | Age: 58
End: 2018-05-18

## 2018-05-18 ENCOUNTER — HOSPITAL ENCOUNTER (OUTPATIENT)
Facility: HOSPITAL | Age: 58
Discharge: HOME HEALTH | End: 2018-05-19
Attending: SURGERY | Admitting: SURGERY
Payer: COMMERCIAL

## 2018-05-18 ENCOUNTER — DOCUMENTATION ONLY (OUTPATIENT)
Dept: SURGERY | Facility: HOSPITAL | Age: 58
End: 2018-05-18

## 2018-05-18 DIAGNOSIS — K91.89 GASTRIC LEAK: ICD-10-CM

## 2018-05-18 DIAGNOSIS — K65.1 INTRAPERITONEAL ABSCESS: ICD-10-CM

## 2018-05-18 LAB
ALBUMIN SERPL BCP-MCNC: 3.3 G/DL
ALP SERPL-CCNC: 126 U/L
ALT SERPL W/O P-5'-P-CCNC: 47 U/L
ANION GAP SERPL CALC-SCNC: 11 MMOL/L
AST SERPL-CCNC: 46 U/L
BASOPHILS # BLD AUTO: 0 K/UL
BASOPHILS NFR BLD: 0.2 %
BILIRUB SERPL-MCNC: 0.5 MG/DL
BUN SERPL-MCNC: 15 MG/DL
CALCIUM SERPL-MCNC: 10 MG/DL
CHLORIDE SERPL-SCNC: 103 MMOL/L
CO2 SERPL-SCNC: 25 MMOL/L
CREAT SERPL-MCNC: 0.7 MG/DL
DIFFERENTIAL METHOD: ABNORMAL
EOSINOPHIL # BLD AUTO: 0.6 K/UL
EOSINOPHIL NFR BLD: 3.8 %
ERYTHROCYTE [DISTWIDTH] IN BLOOD BY AUTOMATED COUNT: 14.8 %
EST. GFR  (AFRICAN AMERICAN): >60 ML/MIN/1.73 M^2
EST. GFR  (NON AFRICAN AMERICAN): >60 ML/MIN/1.73 M^2
GLUCOSE SERPL-MCNC: 99 MG/DL
HCT VFR BLD AUTO: 42.5 %
HGB BLD-MCNC: 14 G/DL
LYMPHOCYTES # BLD AUTO: 3.4 K/UL
LYMPHOCYTES NFR BLD: 21.2 %
MCH RBC QN AUTO: 27.8 PG
MCHC RBC AUTO-ENTMCNC: 32.9 G/DL
MCV RBC AUTO: 85 FL
MONOCYTES # BLD AUTO: 0.9 K/UL
MONOCYTES NFR BLD: 5.8 %
NEUTROPHILS # BLD AUTO: 11.2 K/UL
NEUTROPHILS NFR BLD: 69 %
PLATELET # BLD AUTO: 304 K/UL
PLATELET BLD QL SMEAR: ABNORMAL
PMV BLD AUTO: 10.4 FL
POTASSIUM SERPL-SCNC: 3.9 MMOL/L
PROT SERPL-MCNC: 7.9 G/DL
RBC # BLD AUTO: 5.02 M/UL
SODIUM SERPL-SCNC: 139 MMOL/L
WBC # BLD AUTO: 16.3 K/UL

## 2018-05-18 PROCEDURE — 36415 COLL VENOUS BLD VENIPUNCTURE: CPT

## 2018-05-18 PROCEDURE — 80053 COMPREHEN METABOLIC PANEL: CPT

## 2018-05-18 PROCEDURE — 63600175 PHARM REV CODE 636 W HCPCS: Performed by: SURGERY

## 2018-05-18 PROCEDURE — G0378 HOSPITAL OBSERVATION PER HR: HCPCS

## 2018-05-18 PROCEDURE — S0028 INJECTION, FAMOTIDINE, 20 MG: HCPCS | Performed by: SURGERY

## 2018-05-18 PROCEDURE — S0030 INJECTION, METRONIDAZOLE: HCPCS | Performed by: SURGERY

## 2018-05-18 PROCEDURE — 96375 TX/PRO/DX INJ NEW DRUG ADDON: CPT

## 2018-05-18 PROCEDURE — 96361 HYDRATE IV INFUSION ADD-ON: CPT

## 2018-05-18 PROCEDURE — 25000003 PHARM REV CODE 250: Performed by: SURGERY

## 2018-05-18 PROCEDURE — 99900035 HC TECH TIME PER 15 MIN (STAT)

## 2018-05-18 PROCEDURE — 96374 THER/PROPH/DIAG INJ IV PUSH: CPT

## 2018-05-18 PROCEDURE — 85025 COMPLETE CBC W/AUTO DIFF WBC: CPT

## 2018-05-18 PROCEDURE — G0379 DIRECT REFER HOSPITAL OBSERV: HCPCS

## 2018-05-18 RX ORDER — SODIUM CHLORIDE 0.9 % (FLUSH) 0.9 %
3 SYRINGE (ML) INJECTION
Status: DISCONTINUED | OUTPATIENT
Start: 2018-05-18 | End: 2018-05-19 | Stop reason: HOSPADM

## 2018-05-18 RX ORDER — SODIUM CHLORIDE 9 MG/ML
INJECTION, SOLUTION INTRAVENOUS CONTINUOUS
Status: DISCONTINUED | OUTPATIENT
Start: 2018-05-18 | End: 2018-05-19 | Stop reason: HOSPADM

## 2018-05-18 RX ORDER — FLUTICASONE FUROATE AND VILANTEROL 200; 25 UG/1; UG/1
1 POWDER RESPIRATORY (INHALATION) DAILY
Status: CANCELLED | OUTPATIENT
Start: 2018-05-19

## 2018-05-18 RX ORDER — IPRATROPIUM BROMIDE AND ALBUTEROL SULFATE 2.5; .5 MG/3ML; MG/3ML
3 SOLUTION RESPIRATORY (INHALATION) EVERY 4 HOURS PRN
Status: DISCONTINUED | OUTPATIENT
Start: 2018-05-18 | End: 2018-05-18

## 2018-05-18 RX ORDER — MONTELUKAST SODIUM 10 MG/1
10 TABLET ORAL NIGHTLY
Status: DISCONTINUED | OUTPATIENT
Start: 2018-05-18 | End: 2018-05-19 | Stop reason: HOSPADM

## 2018-05-18 RX ORDER — PANTOPRAZOLE SODIUM 40 MG/10ML
40 INJECTION, POWDER, LYOPHILIZED, FOR SOLUTION INTRAVENOUS DAILY
Status: DISCONTINUED | OUTPATIENT
Start: 2018-05-19 | End: 2018-05-19 | Stop reason: HOSPADM

## 2018-05-18 RX ORDER — FLUCONAZOLE 2 MG/ML
200 INJECTION, SOLUTION INTRAVENOUS
Status: DISCONTINUED | OUTPATIENT
Start: 2018-05-18 | End: 2018-05-19 | Stop reason: HOSPADM

## 2018-05-18 RX ORDER — MEROPENEM AND SODIUM CHLORIDE 1 G/50ML
1 INJECTION, SOLUTION INTRAVENOUS
Status: DISCONTINUED | OUTPATIENT
Start: 2018-05-19 | End: 2018-05-19 | Stop reason: HOSPADM

## 2018-05-18 RX ORDER — PROCHLORPERAZINE EDISYLATE 5 MG/ML
10 INJECTION INTRAMUSCULAR; INTRAVENOUS EVERY 6 HOURS PRN
Status: DISCONTINUED | OUTPATIENT
Start: 2018-05-18 | End: 2018-05-19 | Stop reason: HOSPADM

## 2018-05-18 RX ORDER — METRONIDAZOLE 500 MG/100ML
500 INJECTION, SOLUTION INTRAVENOUS
Status: DISCONTINUED | OUTPATIENT
Start: 2018-05-18 | End: 2018-05-18

## 2018-05-18 RX ORDER — TRAMADOL HYDROCHLORIDE 50 MG/1
100 TABLET ORAL EVERY 6 HOURS PRN
Status: DISCONTINUED | OUTPATIENT
Start: 2018-05-18 | End: 2018-05-19 | Stop reason: HOSPADM

## 2018-05-18 RX ORDER — DIPHENHYDRAMINE HCL 12.5MG/5ML
12.5 ELIXIR ORAL EVERY 6 HOURS PRN
Status: DISCONTINUED | OUTPATIENT
Start: 2018-05-18 | End: 2018-05-19 | Stop reason: HOSPADM

## 2018-05-18 RX ORDER — IPRATROPIUM BROMIDE AND ALBUTEROL SULFATE 2.5; .5 MG/3ML; MG/3ML
3 SOLUTION RESPIRATORY (INHALATION) EVERY 4 HOURS PRN
Status: DISCONTINUED | OUTPATIENT
Start: 2018-05-18 | End: 2018-05-19 | Stop reason: HOSPADM

## 2018-05-18 RX ORDER — FAMOTIDINE 10 MG/ML
20 INJECTION INTRAVENOUS 2 TIMES DAILY
Status: DISCONTINUED | OUTPATIENT
Start: 2018-05-18 | End: 2018-05-19 | Stop reason: HOSPADM

## 2018-05-18 RX ORDER — DEXTROSE MONOHYDRATE AND SODIUM CHLORIDE 5; .9 G/100ML; G/100ML
INJECTION, SOLUTION INTRAVENOUS CONTINUOUS
Status: DISCONTINUED | OUTPATIENT
Start: 2018-05-18 | End: 2018-05-19 | Stop reason: HOSPADM

## 2018-05-18 RX ORDER — ACETAMINOPHEN 10 MG/ML
1000 INJECTION, SOLUTION INTRAVENOUS EVERY 8 HOURS
Status: COMPLETED | OUTPATIENT
Start: 2018-05-18 | End: 2018-05-19

## 2018-05-18 RX ORDER — FLUTICASONE FUROATE AND VILANTEROL 200; 25 UG/1; UG/1
1 POWDER RESPIRATORY (INHALATION) DAILY
Status: DISCONTINUED | OUTPATIENT
Start: 2018-05-19 | End: 2018-05-18

## 2018-05-18 RX ORDER — MEROPENEM AND SODIUM CHLORIDE 1 G/50ML
1 INJECTION, SOLUTION INTRAVENOUS
Status: DISCONTINUED | OUTPATIENT
Start: 2018-05-18 | End: 2018-05-18

## 2018-05-18 RX ADMIN — DEXTROSE AND SODIUM CHLORIDE: 5; .9 INJECTION, SOLUTION INTRAVENOUS at 08:05

## 2018-05-18 RX ADMIN — FLUCONAZOLE 200 MG: 2 INJECTION INTRAVENOUS at 11:05

## 2018-05-18 RX ADMIN — FAMOTIDINE 20 MG: 10 INJECTION INTRAVENOUS at 09:05

## 2018-05-18 RX ADMIN — ACETAMINOPHEN 1000 MG: 10 INJECTION, SOLUTION INTRAVENOUS at 09:05

## 2018-05-18 RX ADMIN — SODIUM CHLORIDE: 0.9 INJECTION, SOLUTION INTRAVENOUS at 07:05

## 2018-05-18 RX ADMIN — METRONIDAZOLE 500 MG: 500 INJECTION, SOLUTION INTRAVENOUS at 11:05

## 2018-05-18 NOTE — PROGRESS NOTES
NJ tube was clogged today.  She came to clinic and I attempted to unclog with wire and was unable.  She has had very little output from the drain in the abscess but has been feeling bad with tube in her nose.  I will remove the nj tube in clinic and try liquids as I think her leak is sealed.  I have asked her to try food coloring in her liquids to make sure none comes out of the drain.  Should it leak then TPN will be an option.    My nurse will call to cancel her tube feeds.    We have given her samples of protein to try at home.  She will continue the promod as she likes this for now.

## 2018-05-19 ENCOUNTER — ANESTHESIA (OUTPATIENT)
Dept: ENDOSCOPY | Facility: HOSPITAL | Age: 58
End: 2018-05-19
Payer: COMMERCIAL

## 2018-05-19 ENCOUNTER — ANESTHESIA EVENT (OUTPATIENT)
Dept: ENDOSCOPY | Facility: HOSPITAL | Age: 58
End: 2018-05-19
Payer: COMMERCIAL

## 2018-05-19 VITALS
OXYGEN SATURATION: 94 % | BODY MASS INDEX: 32.5 KG/M2 | TEMPERATURE: 97 F | HEART RATE: 67 BPM | SYSTOLIC BLOOD PRESSURE: 145 MMHG | HEIGHT: 63 IN | WEIGHT: 183.44 LBS | DIASTOLIC BLOOD PRESSURE: 76 MMHG | RESPIRATION RATE: 18 BRPM

## 2018-05-19 LAB
ANION GAP SERPL CALC-SCNC: 8 MMOL/L
BASOPHILS # BLD AUTO: 0 K/UL
BASOPHILS NFR BLD: 0.3 %
BUN SERPL-MCNC: 12 MG/DL
CALCIUM SERPL-MCNC: 9.5 MG/DL
CHLORIDE SERPL-SCNC: 105 MMOL/L
CO2 SERPL-SCNC: 27 MMOL/L
CREAT SERPL-MCNC: 0.7 MG/DL
DIFFERENTIAL METHOD: ABNORMAL
EOSINOPHIL # BLD AUTO: 0.7 K/UL
EOSINOPHIL NFR BLD: 5.5 %
ERYTHROCYTE [DISTWIDTH] IN BLOOD BY AUTOMATED COUNT: 14.4 %
EST. GFR  (AFRICAN AMERICAN): >60 ML/MIN/1.73 M^2
EST. GFR  (NON AFRICAN AMERICAN): >60 ML/MIN/1.73 M^2
GLUCOSE SERPL-MCNC: 94 MG/DL
HCT VFR BLD AUTO: 38 %
HGB BLD-MCNC: 12.6 G/DL
LYMPHOCYTES # BLD AUTO: 2.8 K/UL
LYMPHOCYTES NFR BLD: 22.2 %
MCH RBC QN AUTO: 28.3 PG
MCHC RBC AUTO-ENTMCNC: 33.3 G/DL
MCV RBC AUTO: 85 FL
MONOCYTES # BLD AUTO: 1 K/UL
MONOCYTES NFR BLD: 7.7 %
NEUTROPHILS # BLD AUTO: 8 K/UL
NEUTROPHILS NFR BLD: 64.3 %
PLATELET # BLD AUTO: 239 K/UL
PMV BLD AUTO: 10.3 FL
POTASSIUM SERPL-SCNC: 3.5 MMOL/L
RBC # BLD AUTO: 4.48 M/UL
SODIUM SERPL-SCNC: 140 MMOL/L
WBC # BLD AUTO: 12.4 K/UL

## 2018-05-19 PROCEDURE — 63600175 PHARM REV CODE 636 W HCPCS: Performed by: SURGERY

## 2018-05-19 PROCEDURE — 63600175 PHARM REV CODE 636 W HCPCS: Performed by: NURSE ANESTHETIST, CERTIFIED REGISTERED

## 2018-05-19 PROCEDURE — D9220A PRA ANESTHESIA: Mod: CRNA,,, | Performed by: NURSE ANESTHETIST, CERTIFIED REGISTERED

## 2018-05-19 PROCEDURE — 96376 TX/PRO/DX INJ SAME DRUG ADON: CPT

## 2018-05-19 PROCEDURE — 96361 HYDRATE IV INFUSION ADD-ON: CPT

## 2018-05-19 PROCEDURE — 80048 BASIC METABOLIC PNL TOTAL CA: CPT

## 2018-05-19 PROCEDURE — 36415 COLL VENOUS BLD VENIPUNCTURE: CPT

## 2018-05-19 PROCEDURE — 96374 THER/PROPH/DIAG INJ IV PUSH: CPT

## 2018-05-19 PROCEDURE — C9113 INJ PANTOPRAZOLE SODIUM, VIA: HCPCS | Performed by: SURGERY

## 2018-05-19 PROCEDURE — D9220A PRA ANESTHESIA: Mod: ANES,,, | Performed by: ANESTHESIOLOGY

## 2018-05-19 PROCEDURE — 43752 NASAL/OROGASTRIC W/TUBE PLMT: CPT | Performed by: SURGERY

## 2018-05-19 PROCEDURE — 25000003 PHARM REV CODE 250: Performed by: SURGERY

## 2018-05-19 PROCEDURE — 43235 EGD DIAGNOSTIC BRUSH WASH: CPT | Performed by: SURGERY

## 2018-05-19 PROCEDURE — 43241 EGD TUBE/CATH INSERTION: CPT | Performed by: SURGERY

## 2018-05-19 PROCEDURE — S0030 INJECTION, METRONIDAZOLE: HCPCS | Performed by: SURGERY

## 2018-05-19 PROCEDURE — 96365 THER/PROPH/DIAG IV INF INIT: CPT

## 2018-05-19 PROCEDURE — G0378 HOSPITAL OBSERVATION PER HR: HCPCS

## 2018-05-19 PROCEDURE — 96375 TX/PRO/DX INJ NEW DRUG ADDON: CPT | Mod: 59

## 2018-05-19 PROCEDURE — 85025 COMPLETE CBC W/AUTO DIFF WBC: CPT

## 2018-05-19 PROCEDURE — S0028 INJECTION, FAMOTIDINE, 20 MG: HCPCS | Performed by: SURGERY

## 2018-05-19 PROCEDURE — 37000008 HC ANESTHESIA 1ST 15 MINUTES: Performed by: SURGERY

## 2018-05-19 PROCEDURE — 43241 EGD TUBE/CATH INSERTION: CPT | Mod: ,,, | Performed by: SURGERY

## 2018-05-19 RX ORDER — METRONIDAZOLE 500 MG/100ML
500 INJECTION, SOLUTION INTRAVENOUS
Status: DISCONTINUED | OUTPATIENT
Start: 2018-05-19 | End: 2018-05-19 | Stop reason: HOSPADM

## 2018-05-19 RX ORDER — LIDOCAINE HYDROCHLORIDE 20 MG/ML
INJECTION, SOLUTION EPIDURAL; INFILTRATION; INTRACAUDAL; PERINEURAL
Status: DISCONTINUED
Start: 2018-05-19 | End: 2018-05-19 | Stop reason: HOSPADM

## 2018-05-19 RX ORDER — PROPOFOL 10 MG/ML
INJECTION, EMULSION INTRAVENOUS
Status: DISCONTINUED
Start: 2018-05-19 | End: 2018-05-19 | Stop reason: HOSPADM

## 2018-05-19 RX ORDER — PROPOFOL 10 MG/ML
VIAL (ML) INTRAVENOUS
Status: DISCONTINUED | OUTPATIENT
Start: 2018-05-19 | End: 2018-05-19

## 2018-05-19 RX ORDER — LIDOCAINE HCL/PF 100 MG/5ML
SYRINGE (ML) INTRAVENOUS
Status: DISCONTINUED | OUTPATIENT
Start: 2018-05-19 | End: 2018-05-19

## 2018-05-19 RX ORDER — SODIUM CHLORIDE 9 MG/ML
INJECTION, SOLUTION INTRAVENOUS ONCE
Status: COMPLETED | OUTPATIENT
Start: 2018-05-19 | End: 2018-05-19

## 2018-05-19 RX ORDER — PROPOFOL 10 MG/ML
INJECTION, EMULSION INTRAVENOUS
Status: COMPLETED
Start: 2018-05-19 | End: 2018-05-19

## 2018-05-19 RX ADMIN — PROMETHAZINE HYDROCHLORIDE 12.5 MG: 25 INJECTION INTRAMUSCULAR; INTRAVENOUS at 02:05

## 2018-05-19 RX ADMIN — METRONIDAZOLE 500 MG: 500 INJECTION, SOLUTION INTRAVENOUS at 08:05

## 2018-05-19 RX ADMIN — ACETAMINOPHEN 1000 MG: 10 INJECTION, SOLUTION INTRAVENOUS at 05:05

## 2018-05-19 RX ADMIN — MEROPENEM AND SODIUM CHLORIDE 1 G: 1 INJECTION, SOLUTION INTRAVENOUS at 12:05

## 2018-05-19 RX ADMIN — PROPOFOL 20 MG: 10 INJECTION, EMULSION INTRAVENOUS at 07:05

## 2018-05-19 RX ADMIN — LOPERAMIDE HYDROCHLORIDE 2 MG: 1 SOLUTION ORAL at 03:05

## 2018-05-19 RX ADMIN — LIDOCAINE HYDROCHLORIDE 100 MG: 20 INJECTION, SOLUTION INTRAVENOUS at 07:05

## 2018-05-19 RX ADMIN — PROPOFOL 50 MG: 10 INJECTION, EMULSION INTRAVENOUS at 07:05

## 2018-05-19 RX ADMIN — PROMETHAZINE HYDROCHLORIDE 12.5 MG: 25 INJECTION INTRAMUSCULAR; INTRAVENOUS at 08:05

## 2018-05-19 RX ADMIN — SODIUM CHLORIDE: 0.9 INJECTION, SOLUTION INTRAVENOUS at 06:05

## 2018-05-19 RX ADMIN — PROPOFOL 100 MG: 10 INJECTION, EMULSION INTRAVENOUS at 07:05

## 2018-05-19 RX ADMIN — ACETAMINOPHEN 1000 MG: 10 INJECTION, SOLUTION INTRAVENOUS at 02:05

## 2018-05-19 RX ADMIN — MEROPENEM AND SODIUM CHLORIDE 1 G: 1 INJECTION, SOLUTION INTRAVENOUS at 09:05

## 2018-05-19 RX ADMIN — FAMOTIDINE 20 MG: 10 INJECTION INTRAVENOUS at 09:05

## 2018-05-19 RX ADMIN — PANTOPRAZOLE SODIUM 40 MG: 40 INJECTION, POWDER, FOR SOLUTION INTRAVENOUS at 09:05

## 2018-05-19 NOTE — ASSESSMENT & PLAN NOTE
NPO  NJ tube for feeding today, nutrition is improving albumin is 3.3  Antibiotics  Continue drain

## 2018-05-19 NOTE — PROGRESS NOTES
05/18/18 2016   Patient Assessment/Suction   Level of Consciousness (AVPU) alert   Respiratory Effort Normal;Unlabored   All Lung Fields Breath Sounds clear   PRE-TX-O2-ETCO2   O2 Device (Oxygen Therapy) room air   SpO2 97 %   Pulse Oximetry Type Intermittent   Aerosol Therapy   $ Aerosol Therapy Charges PRN treatment not required   Respiratory Treatment Status PRN treatment not required

## 2018-05-19 NOTE — NURSING
Pt medication and discharge instructions given and reviewed, understanding verbalized.  PICC left in place for home health.  VSS, Pt in NAD, denies pain and discomfort at this time.  Discharged home with family.  Left unit via wheelchair.

## 2018-05-19 NOTE — OR NURSING
Kangaroo 12 FR NJ successfully placed with scope visualization. Tube marked at edge of Nare and taped in place then flushed with 60 mls of water.

## 2018-05-19 NOTE — PLAN OF CARE
SW met w/ pt and spouse for d/c assessment.  Pt reports current w/ [facility], requests to resume w/ same provider.  Pt's spouse signed Pt Choice/Disclosure form.   Pt lives w/ spouse and has her mother and aunt staying with her temporarily to assist pt during recovery.

## 2018-05-19 NOTE — HPI
58 y/o cf with leak after sleeve gastrectomy.  Had NJ tube in place but clogged.  Must stay NPO, here to exchange tube.

## 2018-05-19 NOTE — ANESTHESIA POSTPROCEDURE EVALUATION
"Anesthesia Post Evaluation    Patient: Yodit Canseco    Procedure(s) Performed: Procedure(s) (LRB):  ESOPHAGOGASTRODUODENOSCOPY (EGD) (N/A)    Final Anesthesia Type: general  Patient location during evaluation: PACU  Patient participation: Yes- Able to Participate  Level of consciousness: awake and alert  Post-procedure vital signs: reviewed and stable  Pain management: adequate  Airway patency: patent  PONV status at discharge: No PONV  Anesthetic complications: no      Cardiovascular status: hemodynamically stable and blood pressure returned to baseline  Respiratory status: unassisted, spontaneous ventilation and room air  Hydration status: euvolemic  Follow-up not needed.        Visit Vitals  BP (!) 140/76   Pulse 62   Temp 36 °C (96.8 °F)   Resp 14   Ht 5' 2.5" (1.588 m)   Wt 83.2 kg (183 lb 6.8 oz)   SpO2 98%   Breastfeeding? No   BMI 33.01 kg/m²       Pain/Ana Maria Score: Pain Assessment Performed: Yes (5/19/2018  6:00 AM)  Presence of Pain: non-verbal indicators absent (5/19/2018  4:00 AM)  Pain Rating Prior to Med Admin: 5 (5/19/2018  5:26 AM)  Pain Rating Post Med Admin: 0 (5/19/2018  6:00 AM)      "

## 2018-05-19 NOTE — H&P
[facility] Surgery  History & Physical    Patient Name: Yodit Canseco  MRN: [id number]  Admission Date: 5/18/2018  Attending Physician: Chai Reese MD   Primary Care Provider: Miky Lewis MD    Patient information was obtained from patient.     Subjective:     Chief Complaint/Reason for Admission: Gastric leak    History of Present Illness: 56 y/o cf with leak after sleeve gastrectomy.  Had NJ tube in place but clogged.  Must stay NPO, here to exchange tube.    No current facility-administered medications on file prior to encounter.      Current Outpatient Prescriptions on File Prior to Encounter   Medication Sig    albuterol (PROAIR HFA) 90 mcg/actuation inhaler Inhale 1 puff into the lungs every 4 (four) hours as needed for Wheezing or Shortness of Breath.    albuterol (PROVENTIL) 2.5 mg /3 mL (0.083 %) nebulizer solution Take 3 mLs (2.5 mg total) by nebulization every 4 (four) hours as needed for Wheezing or Shortness of Breath. Every 4-6 hours PRN    CALCIUM CARB/D3/MAGNESIUM/ZINC (CALCIUM CARB-D3-MAG CMB11-ZINC) [id number] mg-unit-mg-mg Tab Take 2 tablets by mouth every evening.    diazePAM (VALIUM) 2 MG tablet Take 1 tablet (2 mg total) by mouth every 6 (six) hours as needed for Anxiety (muscle spasm).    estradiol (ESTRACE) 1 MG tablet Take 1 mg by mouth once daily.    immun glob G,IgG,-pro-IgA 0-50 (HIZENTRA) 2 gram/10 mL (20 %) Soln Inject 18 g into the skin every 14 (fourteen) days.    meropenem-0.9% sodium chloride 1 gram/50 mL PgBk Inject 50 mLs (1 g total) into the vein every 8 (eight) hours.    mometasone-formoterol (DULERA) 200-5 mcg/actuation inhaler Inhale 2 puffs into the lungs 2 (two) times daily. Controller    montelukast (SINGULAIR) 10 mg tablet Take 1 tablet (10 mg total) by mouth every evening.    multivitamin (ONE DAILY MULTIVITAMIN) per tablet Take 1 tablet by mouth once daily.    ondansetron (ZOFRAN-ODT) 4 MG TbDL Take 1 tablet (4 mg  "total) by mouth every 6 (six) hours as needed.    potassium chloride (MICRO-K) 10 MEQ CpSR TAKE 2 CAPSULES BY MOUTH EVERY DAY    sodium chloride 0.9% SolP 250 mL with vancomycin 1,000 mg SolR 1,500 mg Inject 1,500 mg into the vein every 12 (twelve) hours.    sumatriptan (IMITREX) 100 MG tablet Take 1 po at onset of migraine. May repeat once in 2 hrs if needed. No more than 2 pills in 24 hours    traMADol (ULTRAM) 50 mg tablet Take 1 tablet (50 mg total) by mouth every 6 (six) hours as needed for Pain.    triamterene-hydrochlorothiazide 75-50 mg (MAXZIDE) 75-50 mg per tablet TAKE 1 TABLET BY MOUTH EVERY DAY    acetaminophen (TYLENOL) 650 MG TbSR Take 650 mg by mouth every 8 (eight) hours.    fluconazole 40 mg/ml (DIFLUCAN) 40 mg/mL suspension Take 5 mLs (200 mg total) by mouth once daily.    levothyroxine (SYNTHROID) 75 MCG tablet TAKE 1 TABLET(75 MCG) BY MOUTH EVERY DAY    triamcinolone acetonide 0.025% (KENALOG) 0.025 % cream Apply topically 2 (two) times daily.       Review of patient's allergies indicates:   Allergen Reactions    Bromelains Hives     " causes mouth to bleed"    Sudafed [pseudoephedrine hcl] Other (See Comments)     Does not want to wake up .    Amoxicillin-pot clavulanate Itching     Other reaction(s): Itching    Hydrocodone Itching    Iodinated contrast- oral and iv dye      childhood    Iodine and iodide containing products Other (See Comments)    Melon Hives    Pantoprazole Nausea Only     Other reaction(s): upset stomach/halitosis    Percocet [oxycodone-acetaminophen] Itching    Barium iodide Rash    Ephedrine Other (See Comments)     Other reaction(s): comatose    Penicillins      Other reaction(s): does not work on pt symptoms       Past Medical History:   Diagnosis Date    Allergy     Angio-edema     Arthralgia     Asthma without status asthmaticus     Bronchitis     Diverticulosis of large intestine without hemorrhage 10/8/2015    Environmental allergies     " Eosinophilic asthma 3/8/2018    Gastroparesis     Hand arthritis     Herpes simplex without mention of complication     oral    Hyperlipidemia     Hypothyroidism     LBP radiating to left leg     Leukocytosis, unspecified     Migraine headache     Obesity, Class III, BMI 40-49.9 (morbid obesity)     Periorbital cellulitis 12/31/2016    Prediabetes     Reactive airway disease     Reactive airway disease     Recurrent upper respiratory infection (URI)     S/P colonoscopy November 2010    Urticaria      Past Surgical History:   Procedure Laterality Date    ADENOIDECTOMY      CHOLECYSTECTOMY      CHOLECYSTECTOMY      COLONOSCOPY  2010    COLONOSCOPY N/A 10/8/2015    Procedure: COLONOSCOPY;  Surgeon: Panda Bose MD;  Location: [facility];  Service: Endoscopy;  Laterality: N/A;    Hand fracture surgery      HARDWARE REMOVAL      left hand 5th MC    hymenectomy      HYSTERECTOMY      menorrhagia-Ovaries intact    SLEEVE GASTROPLASTY  04/16/2018    TONSILLECTOMY      Urethral dilatation       Family History     Problem Relation (Age of Onset)    Basal cell carcinoma Mother    Breast cancer Paternal Aunt, Cousin    Cancer Father, Maternal Grandfather    Cataracts Mother    Colon cancer Paternal Aunt    Diabetes Father, Paternal Aunt, Paternal Aunt    Hypertension Mother, Father    Lung cancer Father    Lung disease Mother    Lymphoma Paternal Aunt    Melanoma Mother, Maternal Aunt, Maternal Uncle    Ovarian cancer Paternal Grandmother    Psoriasis Son, Son    Ulcerative colitis Son        Social History Main Topics    Smoking status: Never Smoker    Smokeless tobacco: Never Used    Alcohol use 0.0 oz/week      Comment: very rarely    Drug use: No    Sexual activity: Yes     Partners: Male     Review of Systems   Gastrointestinal: Positive for abdominal pain and nausea.   All other systems reviewed and are negative.    Objective:     Vital Signs (Most Recent):  Temp: 97.8 °F (36.6 °C)  (05/19/18 0458)  Pulse: 78 (05/19/18 0458)  Resp: 18 (05/19/18 0458)  BP: (!) 151/72 (05/19/18 0458)  SpO2: 97 % (05/19/18 0458) Vital Signs (24h Range):  Temp:  [97.7 °F (36.5 °C)-97.8 °F (36.6 °C)] 97.8 °F (36.6 °C)  Pulse:  [77-78] 78  Resp:  [16-18] 18  SpO2:  [94 %-97 %] 97 %  BP: (136-151)/(68-72) 151/72     Weight: 83.2 kg (183 lb 6.8 oz)  Body mass index is 33.01 kg/m².    Physical Exam   Constitutional: She is oriented to person, place, and time.   Eyes: EOM are normal.   Neck: Normal range of motion.   Cardiovascular: Normal rate.    Pulmonary/Chest: Effort normal.   Abdominal: Soft. She exhibits no distension. There is no tenderness.   Musculoskeletal: Normal range of motion. She exhibits no edema.   Neurological: She is alert and oriented to person, place, and time.   Skin: Capillary refill takes less than 2 seconds. No erythema.   Vitals reviewed.      Significant Labs:  CBC:   Recent Labs  Lab 05/19/18  0523   WBC 12.40   RBC 4.48   HGB 12.6   HCT 38.0      MCV 85   MCH 28.3   MCHC 33.3     BMP:   Recent Labs  Lab 05/15/18  0525  05/19/18  0523   GLU 88  < > 94     < > 140   K 3.7  < > 3.5     < > 105   CO2 25  < > 27   BUN 9  < > 12   CREATININE 0.7  < > 0.7   CALCIUM 8.4*  < > 9.5   MG 1.8  --   --    < > = values in this interval not displayed.    Significant Diagnostics:  I have reviewed and interpreted all pertinent imaging results/findings within the past 24 hours.    Assessment/Plan:     Gastric leak    NPO  NJ tube for feeding today, nutrition is improving albumin is 3.3  Antibiotics  Continue drain          VTE Risk Mitigation         Ordered     IP VTE LOW RISK PATIENT  Once      05/18/18 1919          Chai Reese MD  General Surgery  [facility]

## 2018-05-19 NOTE — [employer] PATIENT INSTRUCTIONS
Discharge Summary/Instructions after an Endoscopic Procedure  Patient Name: Yodit Canseco  Patient MRN: [id number]  Patient YOB: 1960  Saturday, May 19, 2018  Chai Reese MD  RESTRICTIONS:  During your procedure today, you received medications for sedation.  These   medications may affect your judgment, balance and coordination.  Therefore,   for 24 hours, you have the following restrictions:   - DO NOT drive a car, operate machinery, make legal/financial decisions,   sign important papers or drink alcohol.    ACTIVITY:  The following day: return to full activity including work, except no heavy   lifting, straining or running for 3 days if polyps were removed.  DIET:  Eat and drink normally unless instructed otherwise.     TREATMENT FOR COMMON SIDE EFFECTS:  - Mild abdominal pain, nausea, belching, bloating or excessive gas:  rest,   eat lightly and use a heating pad.  - Sore Throat: treat with throat lozenges and/or gargle with warm salt   water.  - Because air was used during the procedure, expelling large amounts of air   from your rectum or belching is normal.  - If a bowel prep was taken, you may not have a bowel movement for 1-3 days.    This is normal.  SYMPTOMS TO WATCH FOR AND REPORT TO YOUR PHYSICIAN:  1. Abdominal pain or bloating, other than gas cramps.  2. Chest pain.  3. Back pain.  4. Signs of infection such as: chills or fever occurring within 24 hours   after the procedure.  5. Rectal bleeding, which would show as bright red, maroon, or black stools.   (A tablespoon of blood from the rectum is not serious, especially if   hemorrhoids are present.)  6. Vomiting.  7. Weakness or dizziness.  GO DIRECTLY TO THE NEAREST EMERGENCY ROOM IF YOU HAVE ANY OF THE FOLLOWING:      Difficulty breathing              Chills and/or fever over 101 F   Persistent vomiting and/or vomiting blood   Severe abdominal pain   Severe chest pain   Black, tarry stools   Bleeding- more than one  tablespoon   Any other symptom or condition that you feel may need urgent attention  Your doctor recommends these additional instructions:  If any biopsies were taken, your doctors clinic will contact you in 1 to 2   weeks with any results.  - Discharge patient to home (ambulatory).   - NPO for 1 month.   - Continue present medications.   - Return to my office in 1 week.  For questions, problems or results please call your physician - Chai Reese MD at Work:  [phone].  [facility], EMERGENCY ROOM PHONE NUMBER: [phone]  IF A COMPLICATION OR EMERGENCY SITUATION ARISES AND YOU ARE UNABLE TO REACH   YOUR PHYSICIAN - GO DIRECTLY TO THE EMERGENCY ROOM.  Chai Reese MD  5/19/2018 7:15:31 AM  This report has been verified and signed electronically.  [employer]

## 2018-05-19 NOTE — SUBJECTIVE & OBJECTIVE
No current facility-administered medications on file prior to encounter.      Current Outpatient Prescriptions on File Prior to Encounter   Medication Sig    albuterol (PROAIR HFA) 90 mcg/actuation inhaler Inhale 1 puff into the lungs every 4 (four) hours as needed for Wheezing or Shortness of Breath.    albuterol (PROVENTIL) 2.5 mg /3 mL (0.083 %) nebulizer solution Take 3 mLs (2.5 mg total) by nebulization every 4 (four) hours as needed for Wheezing or Shortness of Breath. Every 4-6 hours PRN    CALCIUM CARB/D3/MAGNESIUM/ZINC (CALCIUM CARB-D3-MAG CMB11-ZINC) [phone] mg-unit-mg-mg Tab Take 2 tablets by mouth every evening.    diazePAM (VALIUM) 2 MG tablet Take 1 tablet (2 mg total) by mouth every 6 (six) hours as needed for Anxiety (muscle spasm).    estradiol (ESTRACE) 1 MG tablet Take 1 mg by mouth once daily.    immun glob G,IgG,-pro-IgA 0-50 (HIZENTRA) 2 gram/10 mL (20 %) Soln Inject 18 g into the skin every 14 (fourteen) days.    meropenem-0.9% sodium chloride 1 gram/50 mL PgBk Inject 50 mLs (1 g total) into the vein every 8 (eight) hours.    mometasone-formoterol (DULERA) 200-5 mcg/actuation inhaler Inhale 2 puffs into the lungs 2 (two) times daily. Controller    montelukast (SINGULAIR) 10 mg tablet Take 1 tablet (10 mg total) by mouth every evening.    multivitamin (ONE DAILY MULTIVITAMIN) per tablet Take 1 tablet by mouth once daily.    ondansetron (ZOFRAN-ODT) 4 MG TbDL Take 1 tablet (4 mg total) by mouth every 6 (six) hours as needed.    potassium chloride (MICRO-K) 10 MEQ CpSR TAKE 2 CAPSULES BY MOUTH EVERY DAY    sodium chloride 0.9% SolP 250 mL with vancomycin 1,000 mg SolR 1,500 mg Inject 1,500 mg into the vein every 12 (twelve) hours.    sumatriptan (IMITREX) 100 MG tablet Take 1 po at onset of migraine. May repeat once in 2 hrs if needed. No more than 2 pills in 24 hours    traMADol (ULTRAM) 50 mg tablet Take 1 tablet (50 mg total) by mouth every 6 (six) hours as needed for  "Pain.    triamterene-hydrochlorothiazide 75-50 mg (MAXZIDE) 75-50 mg per tablet TAKE 1 TABLET BY MOUTH EVERY DAY    acetaminophen (TYLENOL) 650 MG TbSR Take 650 mg by mouth every 8 (eight) hours.    fluconazole 40 mg/ml (DIFLUCAN) 40 mg/mL suspension Take 5 mLs (200 mg total) by mouth once daily.    levothyroxine (SYNTHROID) 75 MCG tablet TAKE 1 TABLET(75 MCG) BY MOUTH EVERY DAY    triamcinolone acetonide 0.025% (KENALOG) 0.025 % cream Apply topically 2 (two) times daily.       Review of patient's allergies indicates:   Allergen Reactions    Bromelains Hives     " causes mouth to bleed"    Sudafed [pseudoephedrine hcl] Other (See Comments)     Does not want to wake up .    Amoxicillin-pot clavulanate Itching     Other reaction(s): Itching    Hydrocodone Itching    Iodinated contrast- oral and iv dye      childhood    Iodine and iodide containing products Other (See Comments)    Melon Hives    Pantoprazole Nausea Only     Other reaction(s): upset stomach/halitosis    Percocet [oxycodone-acetaminophen] Itching    Barium iodide Rash    Ephedrine Other (See Comments)     Other reaction(s): comatose    Penicillins      Other reaction(s): does not work on pt symptoms       Past Medical History:   Diagnosis Date    Allergy     Angio-edema     Arthralgia     Asthma without status asthmaticus     Bronchitis     Diverticulosis of large intestine without hemorrhage 10/8/2015    Environmental allergies     Eosinophilic asthma 3/8/2018    Gastroparesis     Hand arthritis     Herpes simplex without mention of complication     oral    Hyperlipidemia     Hypothyroidism     LBP radiating to left leg     Leukocytosis, unspecified     Migraine headache     Obesity, Class III, BMI 40-49.9 (morbid obesity)     Periorbital cellulitis 12/31/2016    Prediabetes     Reactive airway disease     Reactive airway disease     Recurrent upper respiratory infection (URI)     S/P colonoscopy November 2010    " Urticaria      Past Surgical History:   Procedure Laterality Date    ADENOIDECTOMY      CHOLECYSTECTOMY      CHOLECYSTECTOMY      COLONOSCOPY  2010    COLONOSCOPY N/A 10/8/2015    Procedure: COLONOSCOPY;  Surgeon: Panda Bose MD;  Location: [facility];  Service: Endoscopy;  Laterality: N/A;    Hand fracture surgery      HARDWARE REMOVAL      left hand 5th MC    hymenectomy      HYSTERECTOMY      menorrhagia-Ovaries intact    SLEEVE GASTROPLASTY  04/16/2018    TONSILLECTOMY      Urethral dilatation       Family History     Problem Relation (Age of Onset)    Basal cell carcinoma Mother    Breast cancer Paternal Aunt, Cousin    Cancer Father, Maternal Grandfather    Cataracts Mother    Colon cancer Paternal Aunt    Diabetes Father, Paternal Aunt, Paternal Aunt    Hypertension Mother, Father    Lung cancer Father    Lung disease Mother    Lymphoma Paternal Aunt    Melanoma Mother, Maternal Aunt, Maternal Uncle    Ovarian cancer Paternal Grandmother    Psoriasis Son, Son    Ulcerative colitis Son        Social History Main Topics    Smoking status: Never Smoker    Smokeless tobacco: Never Used    Alcohol use 0.0 oz/week      Comment: very rarely    Drug use: No    Sexual activity: Yes     Partners: Male     Review of Systems   Gastrointestinal: Positive for abdominal pain and nausea.   All other systems reviewed and are negative.    Objective:     Vital Signs (Most Recent):  Temp: 97.8 °F (36.6 °C) (05/19/18 0458)  Pulse: 78 (05/19/18 0458)  Resp: 18 (05/19/18 0458)  BP: (!) 151/72 (05/19/18 0458)  SpO2: 97 % (05/19/18 0458) Vital Signs (24h Range):  Temp:  [97.7 °F (36.5 °C)-97.8 °F (36.6 °C)] 97.8 °F (36.6 °C)  Pulse:  [77-78] 78  Resp:  [16-18] 18  SpO2:  [94 %-97 %] 97 %  BP: (136-151)/(68-72) 151/72     Weight: 83.2 kg (183 lb 6.8 oz)  Body mass index is 33.01 kg/m².    Physical Exam   Constitutional: She is oriented to person, place, and time.   Eyes: EOM are normal.   Neck: Normal range of  motion.   Cardiovascular: Normal rate.    Pulmonary/Chest: Effort normal.   Abdominal: Soft. She exhibits no distension. There is no tenderness.   Musculoskeletal: Normal range of motion. She exhibits no edema.   Neurological: She is alert and oriented to person, place, and time.   Skin: Capillary refill takes less than 2 seconds. No erythema.   Vitals reviewed.      Significant Labs:  CBC:   Recent Labs  Lab 05/19/18  0523   WBC 12.40   RBC 4.48   HGB 12.6   HCT 38.0      MCV 85   MCH 28.3   MCHC 33.3     BMP:   Recent Labs  Lab 05/15/18  0525  05/19/18  0523   GLU 88  < > 94     < > 140   K 3.7  < > 3.5     < > 105   CO2 25  < > 27   BUN 9  < > 12   CREATININE 0.7  < > 0.7   CALCIUM 8.4*  < > 9.5   MG 1.8  --   --    < > = values in this interval not displayed.    Significant Diagnostics:  I have reviewed and interpreted all pertinent imaging results/findings within the past 24 hours.

## 2018-05-19 NOTE — ANESTHESIA PREPROCEDURE EVALUATION
05/19/2018  Yodit Canseco is a 57 y.o., female.    Anesthesia Evaluation      I have reviewed the Medications.     Review of Systems  Anesthesia Hx:  No problems with previous Anesthesia   Social:  Non-Smoker, No Alcohol Use    Cardiovascular:   hyperlipidemia    Pulmonary:   Asthma    Renal/:  Renal/ Normal     Hepatic/GI:  Hepatic/GI Normal Sleeve gastrectomy, gastroparesis   Neurological:   Headaches    Endocrine:   Hypothyroidism        Physical Exam  General:  Morbid Obesity    Airway/Jaw/Neck:  Airway Findings: Mouth Opening: Normal Tongue: Normal  General Airway Assessment: Adult, Average  Mallampati: III  Jaw/Neck Findings:  Neck ROM: Normal ROM       Chest/Lungs:  Chest/Lungs Findings: Clear to auscultation, Normal Respiratory Rate     Heart/Vascular:  Heart Findings: Rate: Normal  Rhythm: Regular Rhythm  Sounds: Normal  Heart murmur: negative       Mental Status:  Mental Status Findings:  Cooperative, Alert and Oriented         Anesthesia Plan  Type of Anesthesia, risks & benefits discussed:  Anesthesia Type:  general  Patient's Preference:   Intra-op Monitoring Plan:   Intra-op Monitoring Plan Comments:   Post Op Pain Control Plan:   Post Op Pain Control Plan Comments:   Induction:   IV  Beta Blocker:  Patient is not currently on a Beta-Blocker (No further documentation required).       Informed Consent: Patient understands risks and agrees with Anesthesia plan.  Questions answered. Anesthesia consent signed with patient.  ASA Score: 3     Day of Surgery Review of History & Physical:        Anesthesia Plan Notes: Propofol general.        Ready For Surgery From Anesthesia Perspective.

## 2018-05-19 NOTE — TRANSFER OF CARE
"Anesthesia Transfer of Care Note    Patient: Yodit Canseco    Procedure(s) Performed: Procedure(s) (LRB):  ESOPHAGOGASTRODUODENOSCOPY (EGD) (N/A)    Patient location: PACU    Anesthesia Type: general    Transport from OR: Transported from OR on room air with adequate spontaneous ventilation    Post pain: adequate analgesia    Post assessment: no apparent anesthetic complications and tolerated procedure well    Post vital signs: stable    Level of consciousness: awake    Nausea/Vomiting: no nausea/vomiting    Complications: none    Transfer of care protocol was followed      Last vitals:   Visit Vitals  /77   Pulse 67   Temp 36 °C (96.8 °F)   Resp (!) 1   Ht 5' 2.5" (1.588 m)   Wt 83.2 kg (183 lb 6.8 oz)   SpO2 96%   Breastfeeding? No   BMI 33.01 kg/m²     "

## 2018-05-21 ENCOUNTER — PATIENT OUTREACH (OUTPATIENT)
Dept: ADMINISTRATIVE | Facility: HOSPITAL | Age: 58
End: 2018-05-21

## 2018-05-22 ENCOUNTER — OFFICE VISIT (OUTPATIENT)
Dept: FAMILY MEDICINE | Facility: CLINIC | Age: 58
End: 2018-05-22
Payer: COMMERCIAL

## 2018-05-22 VITALS
SYSTOLIC BLOOD PRESSURE: 118 MMHG | BODY MASS INDEX: 34.04 KG/M2 | HEIGHT: 62 IN | DIASTOLIC BLOOD PRESSURE: 80 MMHG | TEMPERATURE: 98 F | HEART RATE: 80 BPM | WEIGHT: 185 LBS

## 2018-05-22 DIAGNOSIS — Z98.84 S/P BARIATRIC SURGERY: ICD-10-CM

## 2018-05-22 DIAGNOSIS — K91.89 GASTRIC LEAK: ICD-10-CM

## 2018-05-22 DIAGNOSIS — E03.9 HYPOTHYROIDISM, UNSPECIFIED TYPE: ICD-10-CM

## 2018-05-22 DIAGNOSIS — D84.9 IMMUNE DEFICIENCY DISORDER: ICD-10-CM

## 2018-05-22 DIAGNOSIS — K65.1 INTRAPERITONEAL ABSCESS: Primary | ICD-10-CM

## 2018-05-22 PROBLEM — A41.9 SEPSIS: Status: RESOLVED | Noted: 2018-05-07 | Resolved: 2018-05-22

## 2018-05-22 PROCEDURE — 99495 TRANSJ CARE MGMT MOD F2F 14D: CPT | Mod: S$GLB,,, | Performed by: FAMILY MEDICINE

## 2018-05-22 PROCEDURE — 99999 PR PBB SHADOW E&M-EST. PATIENT-LVL III: CPT | Mod: PBBFAC,,, | Performed by: FAMILY MEDICINE

## 2018-05-22 RX ORDER — ACETAMINOPHEN 160 MG/5ML
LIQUID ORAL
COMMUNITY
End: 2018-07-09

## 2018-05-22 NOTE — PROGRESS NOTES
Patient presents follow-up possible sedation as per below discharge summary.  She is feeling better.  She still has NG tube in place.  Minimal abdominal discomfort.  She is still getting some drainage. She has been off of all oral medications including her thyroid medication and diuretic.  Blood pressure is well controlled.  She is not having any breathing issues or chest pain.      Discharge Summary  Hospital Medicine     Admit Date: 5/6/2018     Date and Time: 5/15/03220:57 PM     Discharge Attending Physician: Cristian Ashraf MD     Primary Care Physician: Miky Lewis MD     Diagnoses:        Active Hospital Problems     Diagnosis   POA    *Sepsis with Intraperitoneal abscess with possible leak from anastomosis s/p IR drainage  S/p Esophageal stent placement and then removal   Yes    Gastric leak [K91.89]   Yes    Intraperitoneal abscess [K65.1]   Yes    S/P bariatric surgery [Z98.84]  Hypophosphatemia  Hypomagnesemia  Hypokalemia   Not Applicable    Severe persistent asthma without complication [J45.50]   Yes    Hypothyroidism [E03.9]   Yes          Discharged Condition: Good     Hospital Course:   Yodit Canseco is a 56 yo female with PMHx significant for morbid obesity, history of gastroparesis, DM-2 (resolved), hyperlipidemia and hypothyroidism.  She is admitted to the service of hospital medicine with severe sepsis.  She presented to the ED with a 5 day history of subjective fever with tmax 103F.  She states the fever is associated with chills, malaise and nausea.  The fevers were responsive to tylenol and seem to be worse at night. Patient is 3 weeks post gastric sleeve surgery. She reports that she is tolerating her PO intake and recently had relatives over who have been sick with symptoms of fever, chills, and diarrhea. Patient denies diarrhea, vomiting, abdominal pain, chest pain, shortness of breath, cough, urinary symptoms, or neck stiffness. Patient was admitted to Hospitalist medicine  service. Patient was evaluated by Dr. Reese. Patient treated with IV antibiotics. CT abdomen confirmed anastomotic leak requiring intra-abdominal drain placement as per IR. A naso-jejunal feeding placed. Patient is tolerating feeding via NJ tube. Home IV antibiotic is arranged. Microbiology results are being followed. Patient to continue close follow up with Dr. Reese. Home health is arranged. Patient was discharged home in stable condition with following discharge plan of care. Total time with the patient was 30 minutes and greater than 50% was spent in counseling and coordination of care. The assessment and plan have been discussed at length. Physicians' notes reviewed. Labs and procedure reviewed.      Consults: Dr. Reese     Significant Diagnostic Studies:   CT abdomen:  Abnormal stranding in the fat adjacent to the sleeve gastrectomy which is nonspecific.  No extraluminal gas or leakage of oral contrast identified.  Differential considerations include inflammation and blood products.  No drainable fluid collection to suggest abscess.  Small left pleural effusion and left basilar atelectasis.  Hysterectomy and cholecystectomy.  Colonic diverticulosis.     KUB: Interval placement of an esophagogastric stent centered at the level of the GE junction.  Weighted enteric tube tip is at the level of the gastric antrum. Left basilar pulmonary consolidation with air bronchograms for which differential considerations include pneumonia and aspiration.     EGD:  Endoscopy was performed showing a normal esophagus and duodenum.  Stomach had inflamed appearing mucosa s/p sleeve.  Proximal staple line appeared swollen and erythematous.  May have underlying leak but not obvious.  NO obvious leaks seen.  A stent was placed over a guide wire XtremeData 23 mm by 155 mm under fluoroscopy.  A keyo feeding tube was placed distal to this.       CT guided abdominal abscess drainage:  CT-guided placement of percutaneous 12  Vietnamese pigtail catheter within the patient's left perigastric fluid collection.  Removal of approximately 40 mL of purulent fluid with a sample sent for additional analysis.  The catheter is attached to vacuum bag suction.  Near complete resolution of the collection is noted.  No immediate complication.    Past Medical History:  Past Medical History:   Diagnosis Date    Allergy     Angio-edema     Arthralgia     Asthma without status asthmaticus     Bronchitis     Diverticulosis of large intestine without hemorrhage 10/8/2015    Environmental allergies     Eosinophilic asthma 3/8/2018    Gastroparesis     Hand arthritis     Herpes simplex without mention of complication     oral    Hyperlipidemia     Hypothyroidism     Intraperitoneal abscess 5/7/2018    LBP radiating to left leg     Leukocytosis, unspecified     Migraine headache     Obesity, Class III, BMI 40-49.9 (morbid obesity)     Periorbital cellulitis 12/31/2016    Prediabetes     Reactive airway disease     Reactive airway disease     Recurrent upper respiratory infection (URI)     S/P colonoscopy November 2010    Sepsis 5/7/2018    Urticaria      Past Surgical History:   Procedure Laterality Date    ADENOIDECTOMY      CHOLECYSTECTOMY      CHOLECYSTECTOMY      COLONOSCOPY  2010    COLONOSCOPY N/A 10/8/2015    Procedure: COLONOSCOPY;  Surgeon: Panda Bose MD;  Location: [facility];  Service: Endoscopy;  Laterality: N/A;    Hand fracture surgery      HARDWARE REMOVAL      left hand 5th MC    hymenectomy      HYSTERECTOMY      menorrhagia-Ovaries intact    SLEEVE GASTROPLASTY  04/16/2018    TONSILLECTOMY      Urethral dilatation       Social History     Social History    Marital status:      Spouse name: N/A    Number of children: N/A    Years of education: N/A     Occupational History    Not on file.     Social History Main Topics    Smoking status: Never Smoker    Smokeless tobacco: Never Used    Alcohol  "use 0.0 oz/week      Comment: very rarely    Drug use: No    Sexual activity: Yes     Partners: Male     Other Topics Concern    Not on file     Social History Narrative    . Disabled teacher.     Family History   Problem Relation Age of Onset    Melanoma Mother     Basal cell carcinoma Mother     Lung disease Mother         pulm htn    Cataracts Mother     Hypertension Mother     Lung cancer Father     Diabetes Father     Hypertension Father     Cancer Father     Diabetes Paternal Aunt     Colon cancer Paternal Aunt     Diabetes Paternal Aunt     Ovarian cancer Paternal Grandmother     Cancer Maternal Grandfather     Melanoma Maternal Aunt     Melanoma Maternal Uncle     Breast cancer Paternal Aunt     Lymphoma Paternal Aunt     Ulcerative colitis Son     Psoriasis Son     Psoriasis Son     Breast cancer Cousin     Allergic rhinitis Neg Hx     Allergies Neg Hx     Angioedema Neg Hx     Asthma Neg Hx     Atopy Neg Hx     Eczema Neg Hx     Immunodeficiency Neg Hx     Rhinitis Neg Hx     Urticaria Neg Hx      Review of patient's allergies indicates:   Allergen Reactions    Bromelains Hives     " causes mouth to bleed"    Sudafed [pseudoephedrine hcl] Other (See Comments)     Does not want to wake up .    Amoxicillin-pot clavulanate Itching     Other reaction(s): Itching    Hydrocodone Itching    Iodinated contrast- oral and iv dye      childhood    Iodine and iodide containing products Other (See Comments)    Melon Hives    Pantoprazole Nausea Only     Other reaction(s): upset stomach/halitosis    Percocet [oxycodone-acetaminophen] Itching    Barium iodide Rash    Ephedrine Other (See Comments)     Other reaction(s): comatose    Penicillins      Other reaction(s): does not work on pt symptoms     Current Outpatient Prescriptions on File Prior to Visit   Medication Sig Dispense Refill    albuterol (PROAIR HFA) 90 mcg/actuation inhaler Inhale 1 puff into the lungs " every 4 (four) hours as needed for Wheezing or Shortness of Breath. 3 Inhaler 3    albuterol (PROVENTIL) 2.5 mg /3 mL (0.083 %) nebulizer solution Take 3 mLs (2.5 mg total) by nebulization every 4 (four) hours as needed for Wheezing or Shortness of Breath. Every 4-6 hours  each 3    fluconazole 40 mg/ml (DIFLUCAN) 40 mg/mL suspension Take 5 mLs (200 mg total) by mouth once daily.  0    immun glob G,IgG,-pro-IgA 0-50 (HIZENTRA) 2 gram/10 mL (20 %) Soln Inject 18 g into the skin every 14 (fourteen) days. 90 mL 12    meropenem-0.9% sodium chloride 1 gram/50 mL PgBk Inject 50 mLs (1 g total) into the vein every 8 (eight) hours. 2100 mL 0    sodium chloride 0.9% SolP 250 mL with vancomycin 1,000 mg SolR 1,500 mg Inject 1,500 mg into the vein every 12 (twelve) hours.      levothyroxine (SYNTHROID) 75 MCG tablet TAKE 1 TABLET(75 MCG) BY MOUTH EVERY DAY 30 tablet 11    mometasone-formoterol (DULERA) 200-5 mcg/actuation inhaler Inhale 2 puffs into the lungs 2 (two) times daily. Controller 1 Inhaler 11    [DISCONTINUED] acetaminophen (TYLENOL) 650 MG TbSR Take 650 mg by mouth every 8 (eight) hours.      [DISCONTINUED] CALCIUM CARB/D3/MAGNESIUM/ZINC (CALCIUM CARB-D3-MAG CMB11-ZINC) [phone] mg-unit-mg-mg Tab Take 2 tablets by mouth every evening.      [DISCONTINUED] diazePAM (VALIUM) 2 MG tablet Take 1 tablet (2 mg total) by mouth every 6 (six) hours as needed for Anxiety (muscle spasm). 20 tablet 0    [DISCONTINUED] estradiol (ESTRACE) 1 MG tablet Take 1 mg by mouth once daily.      [DISCONTINUED] montelukast (SINGULAIR) 10 mg tablet Take 1 tablet (10 mg total) by mouth every evening. 90 tablet 3    [DISCONTINUED] multivitamin (ONE DAILY MULTIVITAMIN) per tablet Take 1 tablet by mouth once daily.      [DISCONTINUED] ondansetron (ZOFRAN-ODT) 4 MG TbDL Take 1 tablet (4 mg total) by mouth every 6 (six) hours as needed. 30 tablet 0    [DISCONTINUED] potassium chloride (MICRO-K) 10 MEQ CpSR TAKE 2  "CAPSULES BY MOUTH EVERY DAY 60 capsule 3    [DISCONTINUED] sumatriptan (IMITREX) 100 MG tablet Take 1 po at onset of migraine. May repeat once in 2 hrs if needed. No more than 2 pills in 24 hours 9 tablet 2    [DISCONTINUED] traMADol (ULTRAM) 50 mg tablet Take 1 tablet (50 mg total) by mouth every 6 (six) hours as needed for Pain. 30 tablet 0    [DISCONTINUED] triamcinolone acetonide 0.025% (KENALOG) 0.025 % cream Apply topically 2 (two) times daily. 15 g 0    [DISCONTINUED] triamterene-hydrochlorothiazide 75-50 mg (MAXZIDE) 75-50 mg per tablet TAKE 1 TABLET BY MOUTH EVERY DAY 30 tablet 11     No current facility-administered medications on file prior to visit.            ROS:  GENERAL: No fever, chills.  Positive weight loss   CARDIOVASCULAR: Denies chest pain, PND, orthopnea.  ABDOMEN: . Denies  hematemesis or blood in stool.  URINARY: No flank pain, dysuria or hematuria.      OBJECTIVE:     Vitals:    05/22/18 0958   BP: 118/80   Pulse: 80   Temp: 98.1 °F (36.7 °C)   Weight: 83.9 kg (185 lb)   Height: 5' 2" (1.575 m)     Wt Readings from Last 3 Encounters:   05/22/18 83.9 kg (185 lb)   05/18/18 83.2 kg (183 lb 6.8 oz)   05/14/18 89.4 kg (197 lb 1.5 oz)     APPEARANCE: Well nourished, well developed, in no acute distress.    HEAD: Normocephalic.  Atraumatic.  No sinus tenderness.  EYES:   Right eye: Pupil reactive.  Conjunctiva clear.    Left eye: Pupil reactive.  Conjunctiva clear.    Both fundi:  Grossly normal to nondilated exam. EOMI.    EARS: TM's intact. Light reflex normal. No retraction or perforation.    NOSE:  clear.  MOUTH & THROAT:  No pharyngeal erythema or exudate. No lesions.  NECK: Supple. No bruits.  No JVD.  No cervical lymphadenopathy.  No thyromegaly.    CHEST: Breath sounds clear bilaterally.  Normal respiratory effort  CARDIOVASCULAR: Normal rate.  Regular rhythm.  No murmurs.  No rub.  No gallops.  ABDOMEN: Bowel sounds normal.  Soft.  Minimal tenderness.  No organomegaly. Drain in " place  PERIPHERAL VASCULAR: No cyanosis.  No clubbing.  No edema.  NEUROLOGIC: No focal findings.  MENTAL STATUS: Alert.  Oriented x 3.    Yodit was seen today for follow-up.    Diagnoses and all orders for this visit:    Intraperitoneal abscess    S/P bariatric surgery    Hypothyroidism, unspecified type    Immune deficiency disorder    Gastric leak      She will resume her thyroid medication with crushing and flushing through her NJ tube.  Keep follow-up with her surgeon.  Recheck TSH with lipid panel in 6 weeks and follow up as needed    Transitional Care Note    Family and/or Caretaker present at visit?  Yes.  Diagnostic tests reviewed/disposition: No diagnosic tests pending after this hospitalization.  Disease/illness education:  Yes  Home health/community services discussion/referrals: Patient has home health established at [facility].   Establishment or re-establishment of referral orders for community resources: No other necessary community resources.   Discussion with other health care providers: No discussion with other health care providers necessary.

## 2018-05-24 NOTE — DISCHARGE SUMMARY
[facility]  Discharge Note  Short Stay    Admit Date: 5/18/2018    Discharge Date and Time: 5/19/2018  4:07 PM     Attending Physician: Mary Ann     Discharge Provider: Chai Reese    Diagnoses:  Active Hospital Problems    Diagnosis  POA    *Intraperitoneal abscess [K65.1]  Yes    Gastric leak [K91.89]  Yes    S/P bariatric surgery [Z98.84]  Not Applicable    Morbid obesity [E66.01]  Yes    Hypothyroidism [E03.9]  Yes    Abdominal pain [R10.9]  Yes      Resolved Hospital Problems    Diagnosis Date Resolved POA    Sepsis [A41.9] 05/22/2018 Yes       Discharged Condition: good    Hospital Course: Patient admitted to replace NJ tube after it clogged and was removed.  She has a gastric leak and is required to be NPO while it heals.  Her only nutrition comes from her NJ tube.    Her home medications including antibiotics were restarted while in the hospital.  She did well with the Endoscopy NJ tube placement and went home after.    Final Diagnoses: Same as principal problem.    Disposition: Home or Self Care    Follow up/Patient Instructions:    Medications:  Reconciled Home Medications:      Medication List      CONTINUE taking these medications    * albuterol 2.5 mg /3 mL (0.083 %) nebulizer solution  Commonly known as:  PROVENTIL  Take 3 mLs (2.5 mg total) by nebulization every 4 (four) hours as needed for Wheezing or Shortness of Breath. Every 4-6 hours PRN     * albuterol 90 mcg/actuation inhaler  Commonly known as:  PROAIR HFA  Inhale 1 puff into the lungs every 4 (four) hours as needed for Wheezing or Shortness of Breath.     fluconazole 40 mg/ml 40 mg/mL suspension  Commonly known as:  DIFLUCAN  Take 5 mLs (200 mg total) by mouth once daily.     HIZENTRA 2 gram/10 mL (20 %) Soln  Generic drug:  immun glob G(IgG)-pro-IgA 0-50  Inject 18 g into the skin every 14 (fourteen) days.     levothyroxine 75 MCG tablet  Commonly known as:  SYNTHROID  TAKE 1 TABLET(75 MCG) BY MOUTH EVERY DAY      meropenem-0.9% sodium chloride 1 gram/50 mL Pgbk  Inject 50 mLs (1 g total) into the vein every 8 (eight) hours.     mometasone-formoterol 200-5 mcg/actuation inhaler  Commonly known as:  DULERA  Inhale 2 puffs into the lungs 2 (two) times daily. Controller        * This list has 2 medication(s) that are the same as other medications prescribed for you. Read the directions carefully, and ask your doctor or other care provider to review them with you.            STOP taking these medications    acetaminophen 650 MG Tbsr  Commonly known as:  TYLENOL     calcium carb-D3-mag cmb11-zinc [phone] mg-unit-mg-mg Tab     diazePAM 2 MG tablet  Commonly known as:  VALIUM     estradiol 1 MG tablet  Commonly known as:  ESTRACE     montelukast 10 mg tablet  Commonly known as:  SINGULAIR     ondansetron 4 MG Tbdl  Commonly known as:  ZOFRAN-ODT     ONE DAILY MULTIVITAMIN per tablet  Generic drug:  multivitamin     potassium chloride 10 MEQ Cpsr  Commonly known as:  MICRO-K     sumatriptan 100 MG tablet  Commonly known as:  IMITREX     traMADol 50 mg tablet  Commonly known as:  ULTRAM     triamcinolone acetonide 0.025% 0.025 % cream  Commonly known as:  KENALOG     triamterene-hydrochlorothiazide 75-50 mg 75-50 mg per tablet  Commonly known as:  MAXZIDE        ASK your doctor about these medications    sodium chloride 0.9% SolP 250 mL with vancomycin 1,000 mg SolR 1,500 mg  Inject 1,500 mg into the vein every 12 (twelve) hours.  Ask about: Should I take this medication?            Discharge Procedure Orders  Diet NPO     Activity as tolerated     Notify your health care provider if you experience any of the following:  temperature >100.4       Follow-up Information     Chai Reese MD. Call in 1 week.    Specialties:  General Surgery, Bariatrics, Surgery  Contact information:  [address] [facility]  [address]  [place] [id number] [phone]             [facility].    Specialty:  Home Health Services  Contact  information:  [address]  [address]  [place] [address]  [phone]                   Discharge Procedure Orders (must include Diet, Follow-up, Activity):    Discharge Procedure Orders (must include Diet, Follow-up, Activity)  Diet NPO     Activity as tolerated     Notify your health care provider if you experience any of the following:  temperature >100.4

## 2018-05-29 ENCOUNTER — OFFICE VISIT (OUTPATIENT)
Dept: BARIATRICS | Facility: CLINIC | Age: 58
End: 2018-05-29
Payer: COMMERCIAL

## 2018-05-29 VITALS
HEART RATE: 101 BPM | HEIGHT: 62 IN | DIASTOLIC BLOOD PRESSURE: 70 MMHG | BODY MASS INDEX: 32.97 KG/M2 | TEMPERATURE: 98 F | WEIGHT: 179.19 LBS | RESPIRATION RATE: 16 BRPM | SYSTOLIC BLOOD PRESSURE: 113 MMHG

## 2018-05-29 DIAGNOSIS — K65.1 INTRAPERITONEAL ABSCESS: ICD-10-CM

## 2018-05-29 DIAGNOSIS — Z98.84 S/P BARIATRIC SURGERY: ICD-10-CM

## 2018-05-29 DIAGNOSIS — E66.01 MORBID OBESITY: Primary | ICD-10-CM

## 2018-05-29 PROCEDURE — 99024 POSTOP FOLLOW-UP VISIT: CPT | Mod: S$GLB,,, | Performed by: SURGERY

## 2018-05-29 PROCEDURE — 99999 PR PBB SHADOW E&M-EST. PATIENT-LVL III: CPT | Mod: PBBFAC,,, | Performed by: SURGERY

## 2018-05-29 RX ORDER — FLUCONAZOLE 40 MG/ML
200 POWDER, FOR SUSPENSION ORAL DAILY
Qty: 70 ML | Refills: 0 | Status: SHIPPED | OUTPATIENT
Start: 2018-05-29 | End: 2018-06-13

## 2018-05-29 NOTE — PROGRESS NOTES
Post Op Note    Surgery: gastric sleeve surgery  Date: 4/16/18  Initial weight: 215    Leak identified 5/6/18- drain placed 5/10    Last weight: 190  Current weight: 179      Constipation: loose stools  Reflux: none  Vomiting: none    Diet:    Doing promod- 5 times per day  Vevinex- 2 cartons per day    Exercise:  none    MVI: none    Vitals:    05/29/18 0927   BP: 113/70   Pulse: 101   Resp: 16   Temp: 98.2 °F (36.8 °C)       Body comp:  Fat Percent:  43.8 %  Fat Mass:  78.4 lb  FFM:  100.8 lb  TBW: 71.2 lb  TBW %:  39.7 %  BMR: 1409 kcal    PE:  NAD  RRR  Soft/nt/nd  Incisions: well healed  NJ tube in place, drain in place    Labs: none    A/P: s/p sleeve with leak     Leak test performed in clinic with purple dye and water.  After 30 minutes no dye seen in drain.    We discussed removing NJ tube and drain or leaving one or both for another week.  We went over the risks and benefits of all scenarios.  She was anxious to have them out.  We removed NJ and drain without problem.      Picc line was also removed.    She should be on liquids for 2 weeks, continue diflucan for 2 weeks, rtc in 2 weeks.    She knows to monitor for fevers and will call me if it happens.      1. Morbid obesity     2. Obesity, Class II, BMI 35-39.9, with comorbidity     3. S/P bariatric surgery     4. Intraperitoneal abscess  CBC auto differential    Comprehensive metabolic panel       -All above: Evaluated, monitored, and treated with diet and exercise program.        : I met with the patient for 15 minutes and counseled her for over 50% of that time

## 2018-06-13 ENCOUNTER — OFFICE VISIT (OUTPATIENT)
Dept: BARIATRICS | Facility: CLINIC | Age: 58
End: 2018-06-13
Payer: COMMERCIAL

## 2018-06-13 VITALS
WEIGHT: 175 LBS | TEMPERATURE: 98 F | RESPIRATION RATE: 16 BRPM | HEIGHT: 62 IN | SYSTOLIC BLOOD PRESSURE: 127 MMHG | HEART RATE: 70 BPM | BODY MASS INDEX: 32.2 KG/M2 | DIASTOLIC BLOOD PRESSURE: 78 MMHG

## 2018-06-13 DIAGNOSIS — Z98.84 S/P BARIATRIC SURGERY: ICD-10-CM

## 2018-06-13 DIAGNOSIS — E66.01 MORBID OBESITY: Primary | ICD-10-CM

## 2018-06-13 PROCEDURE — 99999 PR PBB SHADOW E&M-EST. PATIENT-LVL III: CPT | Mod: PBBFAC,,, | Performed by: SURGERY

## 2018-06-13 PROCEDURE — 99024 POSTOP FOLLOW-UP VISIT: CPT | Mod: S$GLB,,, | Performed by: SURGERY

## 2018-06-13 NOTE — LETTER
June 13, 2018      [facility]  [address] [facility], [address]  [place] [address]  Phone: [phone]  Fax: [phone]       Patient: Yodit Canseco and her  Jonas Ruelas  YOB: 1960  Date of Visit: 06/13/2018    To Whom It May Concern:    hTomas Canseco  was at [facility], hospitalized, and recovering with the assistance of her  Jonas Canseco from May 7, 2018 through June 18, 2018.  His assistance was required in order for her to recover from surgery.  Please excuse him from these missed work days. He may return to work June 18, 2018.  If you have any questions or concerns, or if I can be of further assistance, please do not hesitate to contact me.    Sincerely,        Chai Reese MD

## 2018-06-13 NOTE — PROGRESS NOTES
Post Op Note    Surgery: gastric sleeve surgery  Date: 4/16/18  Initial weight: 215  Last weight: 179  Current weight: 175  Leak identified 5/6/18- drain placed 5/10    Constipation: diarrhea  Reflux: none  Vomiting: none    Diet:    Pro mod grape juice  Beef broth    Exercise:   none    Vitals:    06/13/18 1050   BP: 127/78   Pulse: 70   Resp: 16   Temp: 97.7 °F (36.5 °C)       Body comp:  Fat Percent:  42.2 %  Fat Mass:  73.8 lb  FFM:  101.2 lb  TBW: 71.2 lb  TBW %:  40.7 %  BMR: 1406 kcal    PE:  NAD  RRR  Soft/nt/nd  Incisions: well healed    Labs: none    A/P: s/p sleeve with leak     Counseling for patient:    Diet: soft diet for 2 weeks, we went over this  Exercise: start walking and doing some strength exercises  Vitamins: 2 mvi daily, calcium, b vitamin    Co morbidities:     1. Morbid obesity     2. S/P bariatric surgery         -All above: Evaluated, monitored, and treated with diet and exercise program.        : I met with the patient for 15 minutes and counseled her for over 50% of that time

## 2018-06-14 ENCOUNTER — PATIENT MESSAGE (OUTPATIENT)
Dept: ALLERGY | Facility: CLINIC | Age: 58
End: 2018-06-14

## 2018-07-01 RX ORDER — SULFAMETHOXAZOLE AND TRIMETHOPRIM 800; 160 MG/1; MG/1
1 TABLET ORAL 2 TIMES DAILY
Qty: 14 TABLET | Refills: 0 | Status: SHIPPED | OUTPATIENT
Start: 2018-07-01 | End: 2018-07-08

## 2018-07-02 ENCOUNTER — LAB VISIT (OUTPATIENT)
Dept: LAB | Facility: HOSPITAL | Age: 58
End: 2018-07-02
Attending: FAMILY MEDICINE
Payer: COMMERCIAL

## 2018-07-02 DIAGNOSIS — E03.9 HYPOTHYROIDISM: ICD-10-CM

## 2018-07-02 DIAGNOSIS — E13.9 SECONDARY DIABETES MELLITUS: ICD-10-CM

## 2018-07-02 DIAGNOSIS — E78.5 HYPERLIPIDEMIA: ICD-10-CM

## 2018-07-02 LAB
ANION GAP SERPL CALC-SCNC: 13 MMOL/L
BUN SERPL-MCNC: 11 MG/DL
CALCIUM SERPL-MCNC: 9.6 MG/DL
CHLORIDE SERPL-SCNC: 107 MMOL/L
CHOLEST SERPL-MCNC: 257 MG/DL
CHOLEST/HDLC SERPL: 5.5 {RATIO}
CO2 SERPL-SCNC: 23 MMOL/L
CREAT SERPL-MCNC: 1 MG/DL
EST. GFR  (AFRICAN AMERICAN): >60 ML/MIN/1.73 M^2
EST. GFR  (NON AFRICAN AMERICAN): >60 ML/MIN/1.73 M^2
ESTIMATED AVG GLUCOSE: 94 MG/DL
GLUCOSE SERPL-MCNC: 87 MG/DL
HBA1C MFR BLD HPLC: 4.9 %
HDLC SERPL-MCNC: 47 MG/DL
HDLC SERPL: 18.3 %
LDLC SERPL CALC-MCNC: 169.8 MG/DL
NONHDLC SERPL-MCNC: 210 MG/DL
POTASSIUM SERPL-SCNC: 3.8 MMOL/L
SODIUM SERPL-SCNC: 143 MMOL/L
TRIGL SERPL-MCNC: 201 MG/DL
TSH SERPL DL<=0.005 MIU/L-ACNC: 0.84 UIU/ML

## 2018-07-02 PROCEDURE — 80048 BASIC METABOLIC PNL TOTAL CA: CPT

## 2018-07-02 PROCEDURE — 80061 LIPID PANEL: CPT

## 2018-07-02 PROCEDURE — 83036 HEMOGLOBIN GLYCOSYLATED A1C: CPT

## 2018-07-02 PROCEDURE — 84443 ASSAY THYROID STIM HORMONE: CPT

## 2018-07-02 PROCEDURE — 36415 COLL VENOUS BLD VENIPUNCTURE: CPT | Mod: PO

## 2018-07-09 ENCOUNTER — OFFICE VISIT (OUTPATIENT)
Dept: PULMONOLOGY | Facility: CLINIC | Age: 58
End: 2018-07-09
Payer: COMMERCIAL

## 2018-07-09 VITALS
HEIGHT: 62 IN | SYSTOLIC BLOOD PRESSURE: 114 MMHG | WEIGHT: 176.13 LBS | OXYGEN SATURATION: 99 % | BODY MASS INDEX: 32.41 KG/M2 | DIASTOLIC BLOOD PRESSURE: 69 MMHG | HEART RATE: 66 BPM

## 2018-07-09 DIAGNOSIS — E78.2 MIXED HYPERLIPIDEMIA: ICD-10-CM

## 2018-07-09 DIAGNOSIS — J45.50 SEVERE PERSISTENT ASTHMA WITHOUT COMPLICATION: Primary | ICD-10-CM

## 2018-07-09 DIAGNOSIS — J82.83 EOSINOPHILIC ASTHMA: ICD-10-CM

## 2018-07-09 DIAGNOSIS — D84.9 IMMUNE DEFICIENCY DISORDER: ICD-10-CM

## 2018-07-09 PROCEDURE — 3008F BODY MASS INDEX DOCD: CPT | Mod: CPTII,S$GLB,, | Performed by: INTERNAL MEDICINE

## 2018-07-09 PROCEDURE — 3074F SYST BP LT 130 MM HG: CPT | Mod: CPTII,S$GLB,, | Performed by: INTERNAL MEDICINE

## 2018-07-09 PROCEDURE — 99999 PR PBB SHADOW E&M-EST. PATIENT-LVL III: CPT | Mod: PBBFAC,,, | Performed by: INTERNAL MEDICINE

## 2018-07-09 PROCEDURE — 99214 OFFICE O/P EST MOD 30 MIN: CPT | Mod: S$GLB,,, | Performed by: INTERNAL MEDICINE

## 2018-07-09 PROCEDURE — 3078F DIAST BP <80 MM HG: CPT | Mod: CPTII,S$GLB,, | Performed by: INTERNAL MEDICINE

## 2018-07-09 RX ORDER — ATORVASTATIN CALCIUM 20 MG/1
20 TABLET, FILM COATED ORAL DAILY
Qty: 90 TABLET | Refills: 0 | Status: SHIPPED | OUTPATIENT
Start: 2018-07-09 | End: 2018-10-15 | Stop reason: SDUPTHER

## 2018-07-09 NOTE — PROGRESS NOTES
7/9/2018    Yodit Canseco  Office Note    Chief Complaint   Patient presents with    Follow-up   July 9, 2018 - had gastric sleeve with leak complication- lost 45 lbs already.  Has appetite but fills quickly.  Still on hirzentra.  Asthma ok avoiding fragrances/ordors.  No prednisone.   No nocturnal arousals, uses rescue weekly avoiding any irritants.  Use prednisone 4-5 last year and twice this year.         March 19, 2018-since recovered from pleuritic pain - doing well.  No prednisone use recent.  Pt has had no wheezes.  feb 22,2108   Had to do prednisone again.  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 I have advised not to work given severe asthma,  hypersensitivity to fragrances, and immune deficiency.   Pt seems better since Hirzentra, but still unstable severe asthma  Jan 29. 2018- 6 days ago had been exposed to sick , had nasal congestion , cough, ear stuffy, muscle aches /joint aches / fever.  Temp to 100.1.  Seen by np and flu screen negative.  Had asthma 3-4 flares last year. eos up past,   oct 5, 2017 - had marce in April, skipped couple doses hirzentra, had mild bronchitis once, otherwise doing very well.  No prednisone.  No side effects and covered. Ppt flu sense for 2 days  Jan 24, on  hirzentra q o week since sept and asthma more stable, no hosp, no prednisone, no noct asthma/ no rescue therapy- control not this good for years.  Sept 27,  HPI:has had lung problems since birth, no nocturnal arousals, uses rescue 2/d, breathing controlled satisfactory except with irritants/infection - strong abx needed to clear.  Prednisone 2/yr, last hosp 18 months.  No ventilator.    Had exacerbation recent due uri from .  Has had shingles vaccine past.      The chief compliant  problem is stable   PFSH:  Past Medical History:   Diagnosis Date    Allergy     Angio-edema     Arthralgia      Asthma without status asthmaticus     Bronchitis     Diverticulosis of large intestine without hemorrhage 10/8/2015    Environmental allergies     Eosinophilic asthma 3/8/2018    Gastroparesis     Hand arthritis     Herpes simplex without mention of complication     oral    Hyperlipidemia     Hypothyroidism     Intraperitoneal abscess 5/7/2018    LBP radiating to left leg     Leukocytosis, unspecified     Migraine headache     Obesity, Class III, BMI 40-49.9 (morbid obesity)     Periorbital cellulitis 12/31/2016    Prediabetes     Reactive airway disease     Reactive airway disease     Recurrent upper respiratory infection (URI)     S/P colonoscopy November 2010    Sepsis 5/7/2018    Urticaria          Past Surgical History:   Procedure Laterality Date    ADENOIDECTOMY      CHOLECYSTECTOMY      CHOLECYSTECTOMY      COLONOSCOPY  2010    COLONOSCOPY N/A 10/8/2015    Procedure: COLONOSCOPY;  Surgeon: Panda Bose MD;  Location: [facility];  Service: Endoscopy;  Laterality: N/A;    Hand fracture surgery      HARDWARE REMOVAL      left hand 5th MC    hymenectomy      HYSTERECTOMY      menorrhagia-Ovaries intact    SLEEVE GASTROPLASTY  04/16/2018    TONSILLECTOMY      Urethral dilatation       Social History   Substance Use Topics    Smoking status: Never Smoker    Smokeless tobacco: Never Used    Alcohol use 0.0 oz/week      Comment: very rarely     Family History   Problem Relation Age of Onset    Melanoma Mother     Basal cell carcinoma Mother     Lung disease Mother         pulm htn    Cataracts Mother     Hypertension Mother     Lung cancer Father     Diabetes Father     Hypertension Father     Cancer Father     Diabetes Paternal Aunt     Colon cancer Paternal Aunt     Diabetes Paternal Aunt     Ovarian cancer Paternal Grandmother     Cancer Maternal Grandfather     Melanoma Maternal Aunt     Melanoma Maternal Uncle     Breast cancer Paternal Aunt      "Lymphoma Paternal Aunt     Ulcerative colitis Son     Psoriasis Son     Psoriasis Son     Breast cancer Cousin     Allergic rhinitis Neg Hx     Allergies Neg Hx     Angioedema Neg Hx     Asthma Neg Hx     Atopy Neg Hx     Eczema Neg Hx     Immunodeficiency Neg Hx     Rhinitis Neg Hx     Urticaria Neg Hx      Review of patient's allergies indicates:   Allergen Reactions    Sudafed [pseudoephedrine hcl] Other (See Comments)     Does not want to wake up .    Amoxicillin-pot clavulanate      Other reaction(s): Itching    Ephedrine      Other reaction(s): comatose    Hydrocodone Itching    Iodinated contrast media - iv dye     Iodine and iodide containing products Other (See Comments)    Pantoprazole      Other reaction(s): upset stomach/halitosis    Penicillins      Other reaction(s): does not work on pt symptoms    Percocet [oxycodone-acetaminophen] Itching    Barium iodide Rash       Performance Status:The patient's activity level is functions out of house.  No irratent exposure. Sedatary.  To start threadmil    Review of Systems:  a review of eleven systems covering constitutional, Eye, HEENT, Psych, Respiratory, Cardiac, GI, , Musculoskeletal, Endocrine, Dermatologic was negative except for pertinent findings as listed ABOVE and below: all good except for sinus into lungs and profound irratent sensitive. on thryoid     Exam:Comprehensive exam done.   /69 (BP Location: Left arm, Patient Position: Sitting)   Pulse 66   Ht 5' 1.5" (1.562 m)   Wt 79.9 kg (176 lb 2.4 oz)   SpO2 99%   BMI 32.74 kg/m²   Exam included Vitals as listed, and patient's appearance and affect and alertness and mood, oral exam for yeast and hygiene and pharynx lesions and Mallapatti (M) score, neck with inspection for jvd and masses and thyroid abnormalities and lymph nodes (supraclavicular and infraclavicular nodes and axillary also examined and noted if abn), chest exam included symmetry and effort and " fremitus and percussion and auscultation, cardiac exam included rhythm and gallops and murmur and rubs and jvd and edema, abdominal exam for mass and hepatosplenomegaly and tenderness and hernias and bowel sounds, Musculoskeletal exam with muscle tone and posture and mobility/gait and  strength, and skin for rashes and cyanosis and pallor and turgor, extremity for clubbing.  Findings were normal except for pertinent findings listed below:  M4, faint wheezes scattered and no edema.bmi  41    Radiographs reviewed: view by direct vision March 2015 cxr good,    Labs reviewed from last 6 months on EPIC result review   eso up     Results for SILVIA MARADIAGA (MRN [id number]) as of 1/24/2017 10:33   Ref. Range 6/14/2016 12:00 9/13/2016 10:24 9/30/2016 10:17   Diphtheria Toxoid Ab Latest Ref Range: >0.099 IU/mL >3.000  2.566   Tetanus Ab Latest Ref Range: >0.150 IU/mL 6.373  4.029   S.pneumoniae Type 1 Latest Units: mcg/mL 1.7  2.0   S.pneumoniae Type 3 Latest Units: mcg/mL 0.7  1.0   Strep pneumo Type 4 Latest Units: mcg/mL 0.6  0.5   S.pneumoniae Type 5 Latest Units: mcg/mL 2.0  2.4   S.pneumoniae Type 6B Latest Units: mcg/mL 10.9  7.7   S.pneumoniae Type 7F Latest Units: mcg/mL 2.0  1.8   S.pneumoniae Type 8 Latest Units: mcg/mL 1.6  1.6   S.pneumoniae Type 9N Latest Units: mcg/mL 0.3  0.7   S.pneumoniae Type 9V Abs Latest Units: mcg/mL 0.5  0.8   S.pneumoniae Type 12F Latest Units: mcg/mL <0.3  0.3   Strep pneumo Type 14 Latest Units: mcg/mL 1.9  3.1   S.pneumoniae Type 18C Latest Units: mcg/mL 5.5  4.0   S.pneumoniae Type 19F Latest Units: mcg/mL 0.4  1.5   S.pneumoniae Type 23F Latest Units: mcg/mL 4.1  2.8   Results for SILVIA MARADIAGA (MRN [id number]) as of 1/24/2017 10:33   Ref. Range 2/12/2015 09:00 6/6/2016 14:30 6/14/2016 12:00 9/30/2016 10:17   IgG - Serum Latest Ref Range: 650 - 1600 mg/dL 748  700 962   IgM Latest Ref Range: 50 - 300 mg/dL 120  124    IgA Latest Ref Range: 40 - 350 mg/dL 188  170    IgE  Latest Ref Range: 0 - 100 IU/mL   <35    IgG 1 Latest Ref Range: 490 - 1140 mg/dL   516 656   IgG 2 Latest Ref Range: 150 - 640 mg/dL   153 249   IgG 3 Latest Ref Range: 11 - 85 mg/dL   11 15   IgG 4 Latest Ref Range: 3 - 201 mg/dL   20 20   Results for YOIDT MARADIAGA (MRN [id number]) as of 1/24/2017 10:33   Ref. Range 9/30/2016 10:17   WBC Latest Ref Range: 3.90 - 12.70 K/uL 19.58 (H)   RBC Latest Ref Range: 4.00 - 5.40 M/uL 5.12   Hemoglobin Latest Ref Range: 12.0 - 16.0 g/dL 15.5   Hematocrit Latest Ref Range: 37.0 - 48.5 % 44.8   MCV Latest Ref Range: 82 - 98 fL 88   MCH Latest Ref Range: 27.0 - 31.0 pg 30.3   MCHC Latest Ref Range: 32.0 - 36.0 % 34.6   RDW Latest Ref Range: 11.5 - 14.5 % 14.2   Platelets Latest Ref Range: 150 - 350 K/uL 229   MPV Latest Ref Range: 9.2 - 12.9 fL 11.7   Gran% Latest Ref Range: 38.0 - 73.0 % 59.0   Lymph% Latest Ref Range: 18.0 - 48.0 % 31.0   Lymph # Latest Ref Range: 1.0 - 4.8 K/uL CANCELED   Mono% Latest Ref Range: 4.0 - 15.0 % 4.0   Mono # Latest Ref Range: 0.3 - 1.0 K/uL CANCELED   Eosinophil% Latest Ref Range: 0.0 - 8.0 % 6.0   Eos # Latest Ref Range: 0.0 - 0.5 K/uL CANCELED   Basophil% Latest Ref Range: 0.0 - 1.9 % 0.0   Baso # Latest Ref Range: 0.00 - 0.20 K/uL CANCELED       PFT will be done and results to be reviewed     Plan:  Clinical impression is resonably certain and repeated evaluation prn +/- follow up will be needed as below.    Yodit was seen today for follow-up.    Diagnoses and all orders for this visit:    Severe persistent asthma without complication  -     Discontinue: benralizumab (FASENRA) 30 mg/mL Syrg; Inject 30 mg into the skin every 28 days. Every 4 weeks for 3 doses followed by every 8 weeks thereafter  -     benralizumab (FASENRA) 30 mg/mL Syrg; Inject 30 mg into the skin every 28 days. Every 4 weeks for 3 doses followed by every 8 weeks thereafter    Eosinophilic asthma  -     Discontinue: benralizumab (FASENRA) 30 mg/mL Syrg; Inject 30 mg into  the skin every 28 days. Every 4 weeks for 3 doses followed by every 8 weeks thereafter  -     benralizumab (FASENRA) 30 mg/mL Syrg; Inject 30 mg into the skin every 28 days. Every 4 weeks for 3 doses followed by every 8 weeks thereafter    Immune deficiency disorder    Mixed hyperlipidemia  -     atorvastatin (LIPITOR) 20 MG tablet; Take 1 tablet (20 mg total) by mouth once daily.  -     LIPID PANEL; Future        Follow-up in about 4 months (around 11/9/2018).    Discussed with patient above for education the following:      May need lipitor - if ok Dr Reese start 1/2 dose lipitor - need f/u Dr Lweis getting cholesterol follow up in 2-3 months??    Still severe asthma- fasenra expected to stabilize    May need prednisone again.

## 2018-07-09 NOTE — PATIENT INSTRUCTIONS
May need lipitor - if ok Dr Reese start 1/2 dose lipitor - need f/u Dr Lewis getting cholesterol follow up in 2-3 months??    Still severe asthma- fasenra expected to stabilize    May need prednisone again.

## 2018-07-11 RX ORDER — ATORVASTATIN CALCIUM 40 MG/1
TABLET, FILM COATED ORAL
Qty: 90 TABLET | Refills: 0 | Status: SHIPPED | OUTPATIENT
Start: 2018-07-11 | End: 2018-07-16 | Stop reason: DRUGHIGH

## 2018-07-16 ENCOUNTER — TELEPHONE (OUTPATIENT)
Dept: FAMILY MEDICINE | Facility: CLINIC | Age: 58
End: 2018-07-16

## 2018-07-16 NOTE — TELEPHONE ENCOUNTER
----- Message from Miky Lewis MD sent at 7/16/2018  9:40 AM CDT -----  Lab tests okay except for elevated cholesterol.  See if she is still taking the Lipitor and update medication list with correct dose or remove if no longer taking.  She had weight loss surgery and may be able to get off the medication at some point.  Recommend repeat lipid panel in 6 months. My nurse will contact you to arrange.  Thanks,  Dr. Lewis  .

## 2018-07-17 ENCOUNTER — OFFICE VISIT (OUTPATIENT)
Dept: BARIATRICS | Facility: CLINIC | Age: 58
End: 2018-07-17
Payer: COMMERCIAL

## 2018-07-17 VITALS
HEART RATE: 111 BPM | DIASTOLIC BLOOD PRESSURE: 85 MMHG | RESPIRATION RATE: 16 BRPM | TEMPERATURE: 98 F | WEIGHT: 169.63 LBS | BODY MASS INDEX: 31.21 KG/M2 | SYSTOLIC BLOOD PRESSURE: 121 MMHG | HEIGHT: 62 IN

## 2018-07-17 DIAGNOSIS — Z98.84 S/P BARIATRIC SURGERY: ICD-10-CM

## 2018-07-17 DIAGNOSIS — K91.89 GASTRIC LEAK: Primary | ICD-10-CM

## 2018-07-17 DIAGNOSIS — E66.01 MORBID OBESITY: ICD-10-CM

## 2018-07-17 PROCEDURE — 3074F SYST BP LT 130 MM HG: CPT | Mod: CPTII,S$GLB,, | Performed by: SURGERY

## 2018-07-17 PROCEDURE — 99213 OFFICE O/P EST LOW 20 MIN: CPT | Mod: S$GLB,,, | Performed by: SURGERY

## 2018-07-17 PROCEDURE — 3008F BODY MASS INDEX DOCD: CPT | Mod: CPTII,S$GLB,, | Performed by: SURGERY

## 2018-07-17 PROCEDURE — 3079F DIAST BP 80-89 MM HG: CPT | Mod: CPTII,S$GLB,, | Performed by: SURGERY

## 2018-07-17 PROCEDURE — 99999 PR PBB SHADOW E&M-EST. PATIENT-LVL IV: CPT | Mod: PBBFAC,,, | Performed by: SURGERY

## 2018-07-17 NOTE — PROGRESS NOTES
Post Op Note    Surgery: gastric sleeve surgery  Date: 4/16/18  Initial weight: 215  Last weight: 175  Current weight: 169  Leak identified 5/6/18- drain placed 5/10    Right sided pain with eating       Diet:  Breakfast:  Eggs or sutton  Lunch: cheese or potted meat and celery  Dinner: zucchini, shrimp     Exercise:    Having some leg pains with exercising  Walking some 2 days per week  Doing arms and abs exercises    MVI: 2 mvi, b12, calcium    Vitals:    07/17/18 1145   BP: 121/85   Pulse: (!) 111   Resp: 16   Temp: 98.2 °F (36.8 °C)       Body comp:  Fat Percent:  41.9 %  Fat Mass:  71 lb  FFM:  98.6 lb  TBW: 69.2 lb  TBW %:  40.8 %  BMR: 1371 kcal    PE:  NAD  RRR  Soft/nt/nd  Incisions: well healed    Labs: reviewed - high cholesterol    A/P: s/p sleeve with leak- no healed- tachycardic today     Repeat CT scan for leak healing and right sided pain    Counseling for patient:    Diet: as tolerated now, continue low carb dieting  Exercise: as much as possible, increase intensity  Vitamins: continue regimen    Co morbidities:     1. Gastric leak  CT Abdomen Pelvis  Without Contrast    CT to check healing   2. Morbid obesity      improving   3. S/P bariatric surgery         -All above: Evaluated, monitored, and treated with diet and exercise program.        : I met with the patient for 15 minutes and counseled her for over 50% of that time

## 2018-07-20 ENCOUNTER — HOSPITAL ENCOUNTER (OUTPATIENT)
Dept: RADIOLOGY | Facility: HOSPITAL | Age: 58
Discharge: HOME | End: 2018-07-20
Attending: SURGERY
Payer: COMMERCIAL

## 2018-07-20 DIAGNOSIS — K91.89 GASTRIC LEAK: ICD-10-CM

## 2018-07-20 PROCEDURE — 74176 CT ABD & PELVIS W/O CONTRAST: CPT | Mod: TC

## 2018-07-20 PROCEDURE — 25500020 PHARM REV CODE 255: Performed by: SURGERY

## 2018-07-20 PROCEDURE — 74176 CT ABD & PELVIS W/O CONTRAST: CPT | Mod: 26,,, | Performed by: RADIOLOGY

## 2018-07-20 RX ADMIN — IOHEXOL 8 ML: 350 INJECTION, SOLUTION INTRAVENOUS at 12:07

## 2018-07-28 RX ORDER — SULFAMETHOXAZOLE AND TRIMETHOPRIM 800; 160 MG/1; MG/1
1 TABLET ORAL 2 TIMES DAILY
Qty: 14 TABLET | Refills: 0 | Status: SHIPPED | OUTPATIENT
Start: 2018-07-28 | End: 2018-08-04

## 2018-07-28 RX ORDER — SUMATRIPTAN SUCCINATE 100 MG/1
100 TABLET ORAL DAILY
Qty: 3 TABLET | Refills: 0 | Status: SHIPPED | OUTPATIENT
Start: 2018-07-28 | End: 2019-03-06 | Stop reason: SDUPTHER

## 2018-08-14 NOTE — PLAN OF CARE
Problem: Physical Therapy Goal  Goal: Physical Therapy Goal  Goals to be met by: 2018     Patient will increase functional independence with mobility by performin. Sit to stand transfer with Supervision  2. Gait  x 250x2 feet with Contact Guard Assistance using .   3. Lower extremity exercise program x20 reps per handout, with assistance as needed     Outcome: Ongoing (interventions implemented as appropriate)  Patient participated in gait training, therapeutic exercise and activities to improve functional mobility, improve ADL's and promote safe ambulation to decrease fall risk.          yes

## 2018-08-27 ENCOUNTER — OFFICE VISIT (OUTPATIENT)
Dept: FAMILY MEDICINE | Facility: CLINIC | Age: 58
End: 2018-08-27
Payer: COMMERCIAL

## 2018-08-27 ENCOUNTER — TELEPHONE (OUTPATIENT)
Dept: FAMILY MEDICINE | Facility: CLINIC | Age: 58
End: 2018-08-27

## 2018-08-27 VITALS
TEMPERATURE: 98 F | SYSTOLIC BLOOD PRESSURE: 120 MMHG | DIASTOLIC BLOOD PRESSURE: 71 MMHG | WEIGHT: 173 LBS | HEIGHT: 62 IN | BODY MASS INDEX: 31.83 KG/M2

## 2018-08-27 DIAGNOSIS — M54.42 ACUTE LEFT-SIDED LOW BACK PAIN WITH LEFT-SIDED SCIATICA: Primary | ICD-10-CM

## 2018-08-27 PROCEDURE — 99213 OFFICE O/P EST LOW 20 MIN: CPT | Mod: S$GLB,,, | Performed by: FAMILY MEDICINE

## 2018-08-27 PROCEDURE — 3078F DIAST BP <80 MM HG: CPT | Mod: CPTII,S$GLB,, | Performed by: FAMILY MEDICINE

## 2018-08-27 PROCEDURE — 3008F BODY MASS INDEX DOCD: CPT | Mod: CPTII,S$GLB,, | Performed by: FAMILY MEDICINE

## 2018-08-27 PROCEDURE — 99999 PR PBB SHADOW E&M-EST. PATIENT-LVL III: CPT | Mod: PBBFAC,,, | Performed by: FAMILY MEDICINE

## 2018-08-27 PROCEDURE — 3074F SYST BP LT 130 MM HG: CPT | Mod: CPTII,S$GLB,, | Performed by: FAMILY MEDICINE

## 2018-08-27 RX ORDER — CYCLOBENZAPRINE HCL 10 MG
10 TABLET ORAL 3 TIMES DAILY PRN
Qty: 30 TABLET | Refills: 0 | Status: SHIPPED | OUTPATIENT
Start: 2018-08-27 | End: 2018-09-06

## 2018-08-27 RX ORDER — TRAMADOL HYDROCHLORIDE 50 MG/1
50 TABLET ORAL EVERY 8 HOURS PRN
Qty: 21 TABLET | Refills: 0 | Status: SHIPPED | OUTPATIENT
Start: 2018-08-27 | End: 2018-09-06

## 2018-08-27 RX ORDER — ACETAMINOPHEN 325 MG/1
650 TABLET ORAL EVERY 6 HOURS PRN
Refills: 0 | COMMUNITY
Start: 2018-08-27 | End: 2018-11-15

## 2018-08-27 NOTE — TELEPHONE ENCOUNTER
----- Message from Tina Carrion sent at 8/27/2018  7:48 AM CDT -----  Contact: pt   Call pt regarding getting fitted in today to see the doctor for back pain.   .[phone] (home)

## 2018-08-27 NOTE — PROGRESS NOTES
"Patient complains of lower back pain. Located on the Left lower back_. Symptoms started 1 week_ . No injury. no bowel or bladder complaints. Symptoms do _ radiate L lateral lower leg. Current rx Tylenol.    Past Medical History:  Past Medical History:   Diagnosis Date    Allergy     Angio-edema     Arthralgia     Asthma without status asthmaticus     Bronchitis     Diverticulosis of large intestine without hemorrhage 10/8/2015    Environmental allergies     Eosinophilic asthma 3/8/2018    Gastroparesis     Hand arthritis     Herpes simplex without mention of complication     oral    Hyperlipidemia     Hypothyroidism     Intraperitoneal abscess 5/7/2018    LBP radiating to left leg     Leukocytosis, unspecified     Migraine headache     Obesity, Class III, BMI 40-49.9 (morbid obesity)     Periorbital cellulitis 12/31/2016    Prediabetes     Reactive airway disease     Reactive airway disease     Recurrent upper respiratory infection (URI)     S/P colonoscopy November 2010    Sepsis 5/7/2018    Urticaria      Past Surgical History:   Procedure Laterality Date    ADENOIDECTOMY      CHOLECYSTECTOMY      CHOLECYSTECTOMY      COLONOSCOPY  2010    Hand fracture surgery      HARDWARE REMOVAL      left hand 5th MC    hymenectomy      HYSTERECTOMY      menorrhagia-Ovaries intact    SLEEVE GASTROPLASTY  04/16/2018    TONSILLECTOMY      Urethral dilatation       Review of patient's allergies indicates:   Allergen Reactions    Bromelains Hives     " causes mouth to bleed"    Sudafed [pseudoephedrine hcl] Other (See Comments)     Does not want to wake up .    Amoxicillin-pot clavulanate Itching     Other reaction(s): Itching    Hydrocodone Itching    Iodinated contrast- oral and iv dye      childhood    Iodine and iodide containing products Other (See Comments)    Melon Hives    Pantoprazole Nausea Only     Other reaction(s): upset stomach/halitosis    Percocet " [oxycodone-acetaminophen] Itching    Barium iodide Rash    Ephedrine Other (See Comments)     Other reaction(s): comatose    Penicillins      Other reaction(s): does not work on pt symptoms     Current Outpatient Medications on File Prior to Visit   Medication Sig Dispense Refill    albuterol (PROAIR HFA) 90 mcg/actuation inhaler Inhale 1 puff into the lungs every 4 (four) hours as needed for Wheezing or Shortness of Breath. 3 Inhaler 3    albuterol (PROVENTIL) 2.5 mg /3 mL (0.083 %) nebulizer solution Take 3 mLs (2.5 mg total) by nebulization every 4 (four) hours as needed for Wheezing or Shortness of Breath. Every 4-6 hours  each 3    atorvastatin (LIPITOR) 20 MG tablet Take 1 tablet (20 mg total) by mouth once daily. 90 tablet 0    benralizumab (FASENRA) 30 mg/mL Syrg Inject 30 mg into the skin every 28 days. Every 4 weeks for 3 doses followed by every 8 weeks thereafter 1 mL 11    immun glob G,IgG,-pro-IgA 0-50 (HIZENTRA) 2 gram/10 mL (20 %) Soln Inject 18 g into the skin every 14 (fourteen) days. 90 mL 12    levothyroxine (SYNTHROID) 75 MCG tablet TAKE 1 TABLET(75 MCG) BY MOUTH EVERY DAY 30 tablet 11    mometasone-formoterol (DULERA) 200-5 mcg/actuation inhaler Inhale 2 puffs into the lungs 2 (two) times daily. Controller 1 Inhaler 11    sumatriptan (IMITREX) 100 MG tablet Take 1 tablet (100 mg total) by mouth once daily. 3 tablet 0     No current facility-administered medications on file prior to visit.      Social History     Socioeconomic History    Marital status:      Spouse name: Not on file    Number of children: Not on file    Years of education: Not on file    Highest education level: Not on file   Social Needs    Financial resource strain: Not on file    Food insecurity - worry: Not on file    Food insecurity - inability: Not on file    Transportation needs - medical: Not on file    Transportation needs - non-medical: Not on file   Occupational History    Not on file  "  Tobacco Use    Smoking status: Never Smoker    Smokeless tobacco: Never Used   Substance and Sexual Activity    Alcohol use: Yes     Alcohol/week: 0.0 oz     Comment: very rarely    Drug use: No    Sexual activity: Yes     Partners: Male   Other Topics Concern    Are you pregnant or think you may be? Not Asked    Breast-feeding Not Asked   Social History Narrative    . Disabled teacher.     Family History   Problem Relation Age of Onset    Melanoma Mother     Basal cell carcinoma Mother     Lung disease Mother         pulm htn    Cataracts Mother     Hypertension Mother     Lung cancer Father     Diabetes Father     Hypertension Father     Cancer Father     Diabetes Paternal Aunt     Colon cancer Paternal Aunt     Diabetes Paternal Aunt     Ovarian cancer Paternal Grandmother     Cancer Maternal Grandfather     Melanoma Maternal Aunt     Melanoma Maternal Uncle     Breast cancer Paternal Aunt     Lymphoma Paternal Aunt     Ulcerative colitis Son     Psoriasis Son     Psoriasis Son     Breast cancer Cousin     Allergic rhinitis Neg Hx     Allergies Neg Hx     Angioedema Neg Hx     Asthma Neg Hx     Atopy Neg Hx     Eczema Neg Hx     Immunodeficiency Neg Hx     Rhinitis Neg Hx     Urticaria Neg Hx        Vitals:    08/27/18 1155   BP: 120/71   Temp: 98 °F (36.7 °C)   TempSrc: Oral   Weight: 78.5 kg (173 lb)   Height: 5' 2" (1.575 m)           Physical Exam: See vital signs note   general: No acute distress   Chest clear to auscultation. Normal respiratory effort   Heart: Regular rate and rhythm .   Abdomen: Positive bowel sounds soft nontender no hepato- splenomegaly.   Back: Decreased range of motion. Tender to palpation over the left lower back _. No spinous tenderness. Negative straight leg raise. Normal gait. Deep tendon reflexes intact. Able to stand on heels and toes     Diagnoses and all orders for this visit:    Acute left-sided low back pain with left-sided " sciatica  -     Ambulatory Referral to Physical/Occupational Therapy    Other orders  -     traMADol (ULTRAM) 50 mg tablet; Take 1 tablet (50 mg total) by mouth every 8 (eight) hours as needed for Pain.  -     cyclobenzaprine (FLEXERIL) 10 MG tablet; Take 1 tablet (10 mg total) by mouth 3 (three) times daily as needed for Muscle spasms.  -     acetaminophen (TYLENOL) 325 MG tablet; Take 2 tablets (650 mg total) by mouth every 6 (six) hours as needed for Pain.        Follow-up if symptoms worsen or not improving with above.

## 2018-08-28 ENCOUNTER — TELEPHONE (OUTPATIENT)
Dept: FAMILY MEDICINE | Facility: CLINIC | Age: 58
End: 2018-08-28

## 2018-08-28 NOTE — TELEPHONE ENCOUNTER
Patient informed referral still pending, message sent to referral department, order faxed but patient instructed to make sure it is authorized before starting.

## 2018-08-28 NOTE — TELEPHONE ENCOUNTER
----- Message from Nurys Mckeon sent at 8/28/2018  1:15 PM CDT -----  Patient needs call back rg physical therapy order( has a appt tomorrow)..[phone]

## 2018-09-19 ENCOUNTER — OFFICE VISIT (OUTPATIENT)
Dept: FAMILY MEDICINE | Facility: CLINIC | Age: 58
End: 2018-09-19
Payer: COMMERCIAL

## 2018-09-19 VITALS
HEART RATE: 81 BPM | HEIGHT: 62 IN | DIASTOLIC BLOOD PRESSURE: 72 MMHG | SYSTOLIC BLOOD PRESSURE: 105 MMHG | WEIGHT: 172 LBS | TEMPERATURE: 98 F | BODY MASS INDEX: 31.65 KG/M2

## 2018-09-19 DIAGNOSIS — J06.9 UPPER RESPIRATORY TRACT INFECTION, UNSPECIFIED TYPE: Primary | ICD-10-CM

## 2018-09-19 PROCEDURE — 99213 OFFICE O/P EST LOW 20 MIN: CPT | Mod: S$GLB,,, | Performed by: FAMILY MEDICINE

## 2018-09-19 PROCEDURE — 3008F BODY MASS INDEX DOCD: CPT | Mod: CPTII,S$GLB,, | Performed by: FAMILY MEDICINE

## 2018-09-19 PROCEDURE — 99999 PR PBB SHADOW E&M-EST. PATIENT-LVL III: CPT | Mod: PBBFAC,,, | Performed by: FAMILY MEDICINE

## 2018-09-19 PROCEDURE — 3074F SYST BP LT 130 MM HG: CPT | Mod: CPTII,S$GLB,, | Performed by: FAMILY MEDICINE

## 2018-09-19 PROCEDURE — 3078F DIAST BP <80 MM HG: CPT | Mod: CPTII,S$GLB,, | Performed by: FAMILY MEDICINE

## 2018-09-19 RX ORDER — MULTIVIT,IRON,MINERALS/LUTEIN
2 TABLET ORAL
COMMUNITY
End: 2018-10-17

## 2018-09-19 RX ORDER — ESTRADIOL 1 MG/1
1 TABLET ORAL DAILY
Refills: 11 | COMMUNITY
Start: 2018-09-10 | End: 2018-09-28 | Stop reason: SDUPTHER

## 2018-09-19 RX ORDER — OXYMETAZOLINE HCL 0.05 %
2 SPRAY, NON-AEROSOL (ML) NASAL 2 TIMES DAILY
Qty: 15 ML | Refills: 0 | COMMUNITY
Start: 2018-09-19 | End: 2018-09-22

## 2018-09-19 RX ORDER — MONTELUKAST SODIUM 10 MG/1
TABLET ORAL
Refills: 3 | COMMUNITY
Start: 2018-08-13 | End: 2018-11-08 | Stop reason: SDUPTHER

## 2018-09-19 NOTE — PROGRESS NOTES
Patient complains of sore throat, congestion, postnasal drainage, slight cough, no fever no wheezing.  Symptoms started over the past few days.  Current treatment over-the-counter medication.    Past Medical History:  Past Medical History:   Diagnosis Date    Allergy     Angio-edema     Arthralgia     Asthma without status asthmaticus     Bronchitis     Diverticulosis of large intestine without hemorrhage 10/8/2015    Environmental allergies     Eosinophilic asthma 3/8/2018    Gastroparesis     Hand arthritis     Herpes simplex without mention of complication     oral    Hyperlipidemia     Hypothyroidism     Intraperitoneal abscess 5/7/2018    LBP radiating to left leg     Leukocytosis, unspecified     Migraine headache     Obesity, Class III, BMI 40-49.9 (morbid obesity)     Periorbital cellulitis 12/31/2016    Prediabetes     Reactive airway disease     Reactive airway disease     Recurrent upper respiratory infection (URI)     S/P colonoscopy November 2010    Sepsis 5/7/2018    Urticaria      Past Surgical History:   Procedure Laterality Date    ADENOIDECTOMY      CHOLECYSTECTOMY      CHOLECYSTECTOMY      COLONOSCOPY  2010    COLONOSCOPY N/A 10/8/2015    Procedure: COLONOSCOPY;  Surgeon: Panda Bsoe MD;  Location: [facility];  Service: Endoscopy;  Laterality: N/A;    COLONOSCOPY N/A 10/8/2015    [facility] by [facility] ENDO    ESOPHAGOGASTRODUODENOSCOPY (EGD) N/A 5/19/2018    [facility] by [facility] at [facility] ENDO    ESOPHAGOGASTRODUODENOSCOPY (EGD) N/A 10/8/2015    [facility] at [facility] ENDO    ESOPHAGOGASTRODUODENOSCOPY (EGD)- stent, keyo tube, please have hemoclips N/A 5/7/2018    [facility] at [facility] OR    GASTRECTOMY-SLEEVE-LAPAROSCOPIC WITH EGD N/A 4/16/2018    [facility] at [facility] OR    Hand fracture surgery      HARDWARE REMOVAL      left hand 5th     hymenectomy       "HYSTERECTOMY      menorrhagia-Ovaries intact    REMOVAL-STENT-ESOPHAGEAL, UPPER ENDOSCOPIC STENT REMOVAL AND INSERTION NASOJEJUNAL TUBE  5/9/2018    [facility]    SLEEVE GASTROPLASTY  04/16/2018    TONSILLECTOMY      Urethral dilatation       Review of patient's allergies indicates:   Allergen Reactions    Bromelains Hives     " causes mouth to bleed"    Sudafed [pseudoephedrine hcl] Other (See Comments)     Does not want to wake up .    Amoxicillin-pot clavulanate Itching     Other reaction(s): Itching    Hydrocodone Itching    Iodinated contrast- oral and iv dye      childhood    Iodine and iodide containing products Other (See Comments)    Melon Hives    Pantoprazole Nausea Only     Other reaction(s): upset stomach/halitosis    Percocet [oxycodone-acetaminophen] Itching    Barium iodide Rash    Ephedrine Other (See Comments)     Other reaction(s): comatose    Penicillins      Other reaction(s): does not work on pt symptoms     Current Outpatient Medications on File Prior to Visit   Medication Sig Dispense Refill    acetaminophen (TYLENOL) 325 MG tablet Take 2 tablets (650 mg total) by mouth every 6 (six) hours as needed for Pain.  0    albuterol (PROAIR HFA) 90 mcg/actuation inhaler Inhale 1 puff into the lungs every 4 (four) hours as needed for Wheezing or Shortness of Breath. 3 Inhaler 3    albuterol (PROVENTIL) 2.5 mg /3 mL (0.083 %) nebulizer solution Take 3 mLs (2.5 mg total) by nebulization every 4 (four) hours as needed for Wheezing or Shortness of Breath. Every 4-6 hours  each 3    atorvastatin (LIPITOR) 20 MG tablet Take 1 tablet (20 mg total) by mouth once daily. 90 tablet 0    calcium carb/D3/magnesium/zinc (CALCIUM CARB-D3-MAG CMB11-ZINC) [id number] mg-unit-mg-mg Tab Take 2 tablets by mouth.      estradiol (ESTRACE) 1 MG tablet Take 1 mg by mouth once daily.   11    immun glob G,IgG,-pro-IgA 0-50 (HIZENTRA) 2 gram/10 mL (20 %) Soln " Inject 18 g into the skin every 14 (fourteen) days. 90 mL 12    levothyroxine (SYNTHROID) 75 MCG tablet TAKE 1 TABLET(75 MCG) BY MOUTH EVERY DAY 30 tablet 11    mometasone-formoterol (DULERA) 200-5 mcg/actuation inhaler Inhale 2 puffs into the lungs 2 (two) times daily. Controller 1 Inhaler 11    sumatriptan (IMITREX) 100 MG tablet Take 1 tablet (100 mg total) by mouth once daily. 3 tablet 0    montelukast (SINGULAIR) 10 mg tablet   3    [DISCONTINUED] benralizumab (FASENRA) 30 mg/mL Syrg Inject 30 mg into the skin every 28 days. Every 4 weeks for 3 doses followed by every 8 weeks thereafter 1 mL 11     No current facility-administered medications on file prior to visit.      Social History     Socioeconomic History    Marital status:      Spouse name: Not on file    Number of children: Not on file    Years of education: Not on file    Highest education level: Not on file   Social Needs    Financial resource strain: Not on file    Food insecurity - worry: Not on file    Food insecurity - inability: Not on file    Transportation needs - medical: Not on file    Transportation needs - non-medical: Not on file   Occupational History    Not on file   Tobacco Use    Smoking status: Never Smoker    Smokeless tobacco: Never Used   Substance and Sexual Activity    Alcohol use: Yes     Alcohol/week: 0.0 oz     Comment: very rarely    Drug use: No    Sexual activity: Yes     Partners: Male   Other Topics Concern    Are you pregnant or think you may be? Not Asked    Breast-feeding Not Asked   Social History Narrative    . Disabled teacher.     Family History   Problem Relation Age of Onset    Melanoma Mother     Basal cell carcinoma Mother     Lung disease Mother         pulm htn    Cataracts Mother     Hypertension Mother     Lung cancer Father     Diabetes Father     Hypertension Father     Cancer Father     Diabetes Paternal Aunt     Colon cancer Paternal Aunt     Diabetes  "Paternal Aunt     Ovarian cancer Paternal Grandmother     Cancer Maternal Grandfather     Melanoma Maternal Aunt     Melanoma Maternal Uncle     Breast cancer Paternal Aunt     Lymphoma Paternal Aunt     Ulcerative colitis Son     Psoriasis Son     Psoriasis Son     Breast cancer Cousin     Allergic rhinitis Neg Hx     Allergies Neg Hx     Angioedema Neg Hx     Asthma Neg Hx     Atopy Neg Hx     Eczema Neg Hx     Immunodeficiency Neg Hx     Rhinitis Neg Hx     Urticaria Neg Hx              Physical Exam:  Vitals:    09/19/18 1528   BP: 105/72   Pulse: 81   Temp: 97.9 °F (36.6 °C)   Weight: 78 kg (172 lb)   Height: 5' 2" (1.575 m)       Gen.: No acute distress  HEENT: sinuses nontender. Ears: Tympanic membranes intact.  No erythema or effusion.  Nose mild congestion.  Throat mild erythema no exudate.  Mild postnasal drainage.  Neck supple no adenopathy  Chest: Clear to auscultation.  Normal respiratory effort.    Yodit was seen today for sore throat and sinus problem.    Diagnoses and all orders for this visit:    Upper respiratory tract infection, unspecified type    Other orders  -     oxymetazoline (AFRIN, OXYMETAZOLINE,) 0.05 % nasal spray; 2 sprays by Nasal route 2 (two) times daily. Do not use more than 3 days for 3 days          Follow-up as needed if symptoms not improving with Recommended treatment next few days  "

## 2018-09-28 ENCOUNTER — OFFICE VISIT (OUTPATIENT)
Dept: OBSTETRICS AND GYNECOLOGY | Facility: CLINIC | Age: 58
End: 2018-09-28
Payer: COMMERCIAL

## 2018-09-28 ENCOUNTER — HOSPITAL ENCOUNTER (OUTPATIENT)
Dept: RADIOLOGY | Facility: HOSPITAL | Age: 58
Discharge: HOME | End: 2018-09-28
Attending: OBSTETRICS & GYNECOLOGY
Payer: COMMERCIAL

## 2018-09-28 VITALS
BODY MASS INDEX: 30.74 KG/M2 | WEIGHT: 173.5 LBS | HEIGHT: 63 IN | DIASTOLIC BLOOD PRESSURE: 78 MMHG | SYSTOLIC BLOOD PRESSURE: 132 MMHG

## 2018-09-28 DIAGNOSIS — Z01.419 ENCOUNTER FOR GYNECOLOGICAL EXAMINATION WITHOUT ABNORMAL FINDING: Primary | ICD-10-CM

## 2018-09-28 DIAGNOSIS — Z79.890 POSTMENOPAUSAL HRT (HORMONE REPLACEMENT THERAPY): ICD-10-CM

## 2018-09-28 DIAGNOSIS — Z12.39 BREAST CANCER SCREENING: ICD-10-CM

## 2018-09-28 PROCEDURE — 77067 SCR MAMMO BI INCL CAD: CPT | Mod: 26,,, | Performed by: RADIOLOGY

## 2018-09-28 PROCEDURE — 77067 SCR MAMMO BI INCL CAD: CPT | Mod: TC,PN

## 2018-09-28 PROCEDURE — 99999 PR PBB SHADOW E&M-EST. PATIENT-LVL III: CPT | Mod: PBBFAC,,, | Performed by: OBSTETRICS & GYNECOLOGY

## 2018-09-28 PROCEDURE — 3075F SYST BP GE 130 - 139MM HG: CPT | Mod: CPTII,S$GLB,, | Performed by: OBSTETRICS & GYNECOLOGY

## 2018-09-28 PROCEDURE — 99396 PREV VISIT EST AGE 40-64: CPT | Mod: S$GLB,,, | Performed by: OBSTETRICS & GYNECOLOGY

## 2018-09-28 PROCEDURE — 3078F DIAST BP <80 MM HG: CPT | Mod: CPTII,S$GLB,, | Performed by: OBSTETRICS & GYNECOLOGY

## 2018-09-28 PROCEDURE — 77063 BREAST TOMOSYNTHESIS BI: CPT | Mod: 26,,, | Performed by: RADIOLOGY

## 2018-09-28 RX ORDER — ESTRADIOL 1 MG/1
1 TABLET ORAL DAILY
Qty: 30 TABLET | Refills: 11 | Status: SHIPPED | OUTPATIENT
Start: 2018-09-28 | End: 2019-10-16 | Stop reason: SDUPTHER

## 2018-09-28 NOTE — PROGRESS NOTES
Chief Complaint   Patient presents with    Well Woman    Mammogram     History of Present Illness: Yodit Canseco is a 58 y.o. female that presents today 9/28/2018 for well gyn visit.    Past Medical History:   Diagnosis Date    Allergy     Angio-edema     Arthralgia     Asthma without status asthmaticus     Bronchitis     Diverticulosis of large intestine without hemorrhage 10/8/2015    Environmental allergies     Eosinophilic asthma 3/8/2018    Gastroparesis     Hand arthritis     Herpes simplex without mention of complication     oral    Hyperlipidemia     Hypothyroidism     Intraperitoneal abscess 5/7/2018    LBP radiating to left leg     Leukocytosis, unspecified     Migraine headache     Obesity, Class III, BMI 40-49.9 (morbid obesity)     Periorbital cellulitis 12/31/2016    Prediabetes     Reactive airway disease     Reactive airway disease     Recurrent upper respiratory infection (URI)     S/P colonoscopy November 2010    Sepsis 5/7/2018    Urticaria        Past Surgical History:   Procedure Laterality Date    ADENOIDECTOMY      CHOLECYSTECTOMY      CHOLECYSTECTOMY      COLONOSCOPY  2015    repeat in 10    COLONOSCOPY N/A 10/8/2015    Procedure: COLONOSCOPY;  Surgeon: Panda Bose MD;  Location: [facility];  Service: Endoscopy;  Laterality: N/A;    COLONOSCOPY N/A 10/8/2015    [facility]    ESOPHAGOGASTRODUODENOSCOPY (EGD) N/A 5/19/2018    [facility] by [facility] ENDO    ESOPHAGOGASTRODUODENOSCOPY (EGD) N/A 10/8/2015    [facility] ENDO    ESOPHAGOGASTRODUODENOSCOPY (EGD)- stent, keyo tube, please have hemoclips N/A 5/7/2018    [facility] at [facility] OR    GASTRECTOMY-SLEEVE-LAPAROSCOPIC WITH EGD N/A 4/16/2018    [facility] by [facility] at [facility] OR    Hand fracture surgery      HARDWARE REMOVAL      left hand 5th [facility]    hymenectomy      HYSTERECTOMY       "menorrhagia-Ovaries intact    REMOVAL-STENT-ESOPHAGEAL, UPPER ENDOSCOPIC STENT REMOVAL AND INSERTION NASOJEJUNAL TUBE  5/9/2018    [facility]    SLEEVE GASTROPLASTY  04/16/2018    TONSILLECTOMY      Urethral dilatation         Current Outpatient Medications   Medication Sig Dispense Refill    acetaminophen (TYLENOL) 325 MG tablet Take 2 tablets (650 mg total) by mouth every 6 (six) hours as needed for Pain.  0    albuterol (PROAIR HFA) 90 mcg/actuation inhaler Inhale 1 puff into the lungs every 4 (four) hours as needed for Wheezing or Shortness of Breath. 3 Inhaler 3    albuterol (PROVENTIL) 2.5 mg /3 mL (0.083 %) nebulizer solution Take 3 mLs (2.5 mg total) by nebulization every 4 (four) hours as needed for Wheezing or Shortness of Breath. Every 4-6 hours  each 3    atorvastatin (LIPITOR) 20 MG tablet Take 1 tablet (20 mg total) by mouth once daily. 90 tablet 0    calcium carb/D3/magnesium/zinc (CALCIUM CARB-D3-MAG CMB11-ZINC) [phone] mg-unit-mg-mg Tab Take 2 tablets by mouth.      estradiol (ESTRACE) 1 MG tablet Take 1 tablet (1 mg total) by mouth once daily. 30 tablet 11    immun glob G,IgG,-pro-IgA 0-50 (HIZENTRA) 2 gram/10 mL (20 %) Soln Inject 18 g into the skin every 14 (fourteen) days. 90 mL 12    levothyroxine (SYNTHROID) 75 MCG tablet TAKE 1 TABLET(75 MCG) BY MOUTH EVERY DAY 30 tablet 11    mometasone-formoterol (DULERA) 200-5 mcg/actuation inhaler Inhale 2 puffs into the lungs 2 (two) times daily. Controller 1 Inhaler 11    montelukast (SINGULAIR) 10 mg tablet   3    sumatriptan (IMITREX) 100 MG tablet Take 1 tablet (100 mg total) by mouth once daily. 3 tablet 0     No current facility-administered medications for this visit.        Review of patient's allergies indicates:   Allergen Reactions    Bromelains Hives     " causes mouth to bleed"    Sudafed [pseudoephedrine hcl] Other (See Comments)     Does not want to wake up .    " Amoxicillin-pot clavulanate Itching     Other reaction(s): Itching    Hydrocodone Itching    Iodinated contrast- oral and iv dye      childhood    Iodine and iodide containing products Other (See Comments)    Melon Hives    Pantoprazole Nausea Only     Other reaction(s): upset stomach/halitosis    Percocet [oxycodone-acetaminophen] Itching    Barium iodide Rash    Ephedrine Other (See Comments)     Other reaction(s): comatose    Penicillins      Other reaction(s): does not work on pt symptoms       Family History   Problem Relation Age of Onset    Melanoma Mother     Basal cell carcinoma Mother     Lung disease Mother         pulm htn    Cataracts Mother     Hypertension Mother     Lung cancer Father     Diabetes Father     Hypertension Father     Cancer Father     Diabetes Paternal Aunt     Colon cancer Paternal Aunt     Diabetes Paternal Aunt     Ovarian cancer Paternal Grandmother     Cancer Maternal Grandfather     Melanoma Maternal Aunt     Melanoma Maternal Uncle     Breast cancer Paternal Aunt     Lymphoma Paternal Aunt     Ulcerative colitis Son     Psoriasis Son     Psoriasis Son     Breast cancer Cousin     Allergic rhinitis Neg Hx     Allergies Neg Hx     Angioedema Neg Hx     Asthma Neg Hx     Atopy Neg Hx     Eczema Neg Hx     Immunodeficiency Neg Hx     Rhinitis Neg Hx     Urticaria Neg Hx        Social History     Socioeconomic History    Marital status:      Spouse name: None    Number of children: None    Years of education: None    Highest education level: None   Social Needs    Financial resource strain: None    Food insecurity - worry: None    Food insecurity - inability: None    Transportation needs - medical: None    Transportation needs - non-medical: None   Occupational History    None   Tobacco Use    Smoking status: Never Smoker    Smokeless tobacco: Never Used   Substance and Sexual Activity    Alcohol use: Yes     Alcohol/week:  "0.0 oz     Comment: very rarely    Drug use: No    Sexual activity: Yes     Partners: Male   Other Topics Concern    Are you pregnant or think you may be? Not Asked    Breast-feeding Not Asked   Social History Narrative    . Disabled teacher.       OB History    Para Term  AB Living   5 2 2   3     SAB TAB Ectopic Multiple Live Births   3              # Outcome Date GA Lbr Pradip/2nd Weight Sex Delivery Anes PTL Lv   5 SAB            4 SAB            3 SAB            2 Term            1 Term                   Review of Symptoms:  GENERAL: Denies weight gain or weight loss. Feeling well overall.   SKIN: Denies rash or lesions.   HEAD: Denies head injury or headache.   NODES: Denies enlarged lymph nodes.   CHEST: Denies chest pain or shortness of breath.   CARDIOVASCULAR: Denies palpitations or left sided chest pain.   ABDOMEN: No abdominal pain, constipation, diarrhea, nausea, vomiting or rectal bleeding.   URINARY: No frequency, dysuria, hematuria, or burning on urination.  HEMATOLOGIC: No easy bruisability or excessive bleeding.   MUSCULOSKELETAL: Denies joint pain or swelling.     /78   Ht 5' 2.5" (1.588 m)   Wt 78.7 kg (173 lb 8 oz)   Physical Exam:  APPEARANCE: Well nourished, well developed, in no acute distress.  SKIN: Normal skin turgor, no lesions.  NECK: Neck symmetric without masses   RESPIRATORY: Normal respiratory effort with no retractions or use of accessory muscles  CARDIOVASCULAR: Peripheral vascular system with no swelling no varicosities and palpation of pulses normal  LYMPHATIC: No enlargements of the lymph nodes noted in the neck, axillae, or groin  ABDOMEN: Soft. No tenderness or masses. No hepatosplenomegaly. No hernias.  BREASTS: Symmetrical, no skin changes or visible lesions. No palpable masses, nipple discharge or adenopathy bilaterally.  PELVIC: Normal external female genitalia without lesions. Normal hair distribution. Adequate perineal body, normal urethral " meatus. Urethra with no masses.  Bladder nontender. Vagina moist and well rugated without lesions or discharge. No significant cystocele or rectocele.  Adnexa without masses or tenderness. Urethra and bladder normal.   EXTREMITIES: No clubbing cyanosis or edema.    ASSESSMENT/PLAN:  Encounter for gynecological examination without abnormal finding    Postmenopausal HRT (hormone replacement therapy)    Breast cancer screening  -     Cancel: Mammo Digital Screening Bilat With CAD; Future; Expected date: 09/28/2018    Other orders  -     estradiol (ESTRACE) 1 MG tablet; Take 1 tablet (1 mg total) by mouth once daily.  Dispense: 30 tablet; Refill: 11          Patient was counseled today on Pap guidelines, recommendation for yearly exams, mammograms every other year after the age of 40 and annually after the age of 50, Colonoscopy after the age of 50, Dexa Bone Scan and calcium and vitamin D supplementation in menopause and to see her PCP for other health maintenance.   FOLLOW-UP:prn

## 2018-10-02 ENCOUNTER — PES CALL (OUTPATIENT)
Dept: ADMINISTRATIVE | Facility: CLINIC | Age: 58
End: 2018-10-02

## 2018-10-03 ENCOUNTER — TELEPHONE (OUTPATIENT)
Dept: ALLERGY | Facility: CLINIC | Age: 58
End: 2018-10-03

## 2018-10-15 DIAGNOSIS — E78.2 MIXED HYPERLIPIDEMIA: ICD-10-CM

## 2018-10-15 RX ORDER — ATORVASTATIN CALCIUM 20 MG/1
20 TABLET, FILM COATED ORAL DAILY
Qty: 90 TABLET | Refills: 0 | Status: SHIPPED | OUTPATIENT
Start: 2018-10-15 | End: 2019-01-21 | Stop reason: SDUPTHER

## 2018-10-17 ENCOUNTER — OFFICE VISIT (OUTPATIENT)
Dept: BARIATRICS | Facility: CLINIC | Age: 58
End: 2018-10-17
Payer: COMMERCIAL

## 2018-10-17 ENCOUNTER — OFFICE VISIT (OUTPATIENT)
Dept: ALLERGY | Facility: CLINIC | Age: 58
End: 2018-10-17
Payer: COMMERCIAL

## 2018-10-17 VITALS
TEMPERATURE: 98 F | BODY MASS INDEX: 30.37 KG/M2 | HEART RATE: 62 BPM | SYSTOLIC BLOOD PRESSURE: 111 MMHG | RESPIRATION RATE: 16 BRPM | DIASTOLIC BLOOD PRESSURE: 70 MMHG | HEIGHT: 63 IN | WEIGHT: 171.38 LBS

## 2018-10-17 VITALS — BODY MASS INDEX: 32.33 KG/M2 | HEIGHT: 62 IN | HEART RATE: 66 BPM | OXYGEN SATURATION: 97 % | WEIGHT: 175.69 LBS

## 2018-10-17 DIAGNOSIS — Z98.84 S/P BARIATRIC SURGERY: ICD-10-CM

## 2018-10-17 DIAGNOSIS — D80.6 ANTI-POLYSACCHARIDE ANTIBODY DEFICIENCY: Primary | ICD-10-CM

## 2018-10-17 DIAGNOSIS — J32.9 RECURRENT SINUS INFECTIONS: ICD-10-CM

## 2018-10-17 DIAGNOSIS — J31.0 CHRONIC RHINITIS: ICD-10-CM

## 2018-10-17 DIAGNOSIS — E78.2 MIXED HYPERLIPIDEMIA: ICD-10-CM

## 2018-10-17 DIAGNOSIS — E66.01 MORBID OBESITY: Primary | ICD-10-CM

## 2018-10-17 DIAGNOSIS — J45.40 MODERATE PERSISTENT ASTHMA WITHOUT COMPLICATION: ICD-10-CM

## 2018-10-17 PROBLEM — K91.89 GASTRIC LEAK: Status: RESOLVED | Noted: 2018-05-08 | Resolved: 2018-10-17

## 2018-10-17 PROCEDURE — 99999 PR PBB SHADOW E&M-EST. PATIENT-LVL III: CPT | Mod: PBBFAC,,, | Performed by: ALLERGY & IMMUNOLOGY

## 2018-10-17 PROCEDURE — 3074F SYST BP LT 130 MM HG: CPT | Mod: CPTII,S$GLB,, | Performed by: SURGERY

## 2018-10-17 PROCEDURE — 99213 OFFICE O/P EST LOW 20 MIN: CPT | Mod: S$GLB,,, | Performed by: SURGERY

## 2018-10-17 PROCEDURE — 3078F DIAST BP <80 MM HG: CPT | Mod: CPTII,S$GLB,, | Performed by: SURGERY

## 2018-10-17 PROCEDURE — 3008F BODY MASS INDEX DOCD: CPT | Mod: CPTII,S$GLB,, | Performed by: SURGERY

## 2018-10-17 PROCEDURE — 99214 OFFICE O/P EST MOD 30 MIN: CPT | Mod: S$GLB,,, | Performed by: ALLERGY & IMMUNOLOGY

## 2018-10-17 PROCEDURE — 3008F BODY MASS INDEX DOCD: CPT | Mod: CPTII,S$GLB,, | Performed by: ALLERGY & IMMUNOLOGY

## 2018-10-17 PROCEDURE — 99999 PR PBB SHADOW E&M-EST. PATIENT-LVL III: CPT | Mod: PBBFAC,,, | Performed by: SURGERY

## 2018-10-17 RX ORDER — FERROUS SULFATE, DRIED 160(50) MG
1 TABLET, EXTENDED RELEASE ORAL 2 TIMES DAILY WITH MEALS
COMMUNITY
End: 2021-03-19

## 2018-10-17 NOTE — PROGRESS NOTES
Subjective:       Patient ID: Yodit Canseco is a 58 y.o. female.    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  She was last seen 2/27/18.  She had labs drawn - CBC was normal, lymphocyte profile showed low NK cells. IgA normal at 170, IgM normal at 124, IgE ,35, IgG normal at 700 but had steadily been decreasing, IgG subclasses normal and humoral panel protected to H flu, diphtheria, tetanus and 8/14 strep serotypes but this is after pneumovax and Prevnar so inadequate protection.  She still had constant PND and congestion and got frequent infections with sinus, ear, bronchitis and even pneumonia. She was on antibiotics every 2 months. She cannot use nose sprays all cause nose bleed even Astelin. All antihistamines cause sedation per pt. She is on Singulair daily. She started Hizentra 10 g weekly and then in October 2016 changed to 20 g every 2 weeks. She has done well with infusions. She has itching after and takes benadryl after with resolution. She did have bronchitis in September 2017 and flu and sinuitis in Jan 2018.  She has had no pneumonia since on Hizentra. She has had asthma issues and on steroids once in last 6 months. She had weight loss surgery and had leak and sepsis after so in [facility] hospit[facility] for 2 weeks. Doing well now and lost 50 pounds so wonders about her dose.      Recent labs 2/2018: IgG 1292, IgG subclasses normal and CBC and CMP normal    Prior History taken 1/13/14: new patient evaluation of rash and chronic PND.  She has been seen about 4 years ago and had labs drawn with normal immunoglobulins, humoral panel only protected to 4/14 strep titers despite prior pneumovax and immunocaps negative to dust mites, [place], [place], cocoa, garlic, milk, wheat, rye, pecan, and peanut.  She was advised to get pneumovax and follow up but she  did not.  She continues with chronic PND and congestion, no runny nose or sneeze and she gets inus infections 3-4 times per year requiring antibiotics.  She does clear with anbx but never gets rid of PND.  Her main question now is she gets a random rash on hands, feet and nose.  It seems to appear out of no where, is very itchy and red spots then goes away.  Lasts weeks to months.  She has seen derm and not sure of cause and she thinks may be food allergy although cannot identify a food.    She also has asthma which is well controlled on Dulera and Singulair.  Only uses albuterol with colds.      Rash  Associated symptoms include congestion and coughing. Pertinent negatives include no diarrhea, fatigue, fever, rhinorrhea, shortness of breath, sore throat or vomiting.       Environmental History: Pets in the home: none.  see history section for home environment  Review of Systems   Constitutional: Negative for fever, chills, appetite change and fatigue.   HENT: Positive for congestion and postnasal drip. Negative for ear pain, nosebleeds, sore throat, facial swelling, rhinorrhea, sneezing, trouble swallowing, voice change, sinus pressure and ear discharge.    Eyes: Negative for discharge, redness, itching and visual disturbance.   Respiratory: Positive for cough and wheezing. Negative for choking, chest tightness and shortness of breath.    Cardiovascular: Negative for chest pain, palpitations and leg swelling.   Gastrointestinal: Negative for nausea, vomiting, abdominal pain, diarrhea, constipation and abdominal distention.   Genitourinary: Negative for difficulty urinating.   Musculoskeletal: Negative for myalgias, joint swelling, arthralgias and gait problem.   Skin: Positive for rash. Negative for color change.   Neurological: Negative for dizziness, syncope, weakness, light-headedness and headaches.   Hematological: Negative for adenopathy. Does not bruise/bleed easily.   Psychiatric/Behavioral: Negative for  behavioral problems, confusion, sleep disturbance and agitation. The patient is not nervous/anxious.         Objective:    Physical Exam   Nursing note and vitals reviewed.  Constitutional: She is oriented to person, place, and time. She appears well-developed and well-nourished. No distress.   HENT:   Head: Normocephalic and atraumatic.   Right Ear: Hearing, tympanic membrane, external ear and ear canal normal.   Left Ear: Hearing, tympanic membrane, external ear and ear canal normal.   Nose: No mucosal edema, rhinorrhea, sinus tenderness or septal deviation. No epistaxis. Right sinus exhibits no maxillary sinus tenderness and no frontal sinus tenderness. Left sinus exhibits no maxillary sinus tenderness and no frontal sinus tenderness.   Mouth/Throat: Uvula is midline, oropharynx is clear and moist and mucous membranes are normal. No uvula swelling.   Eyes: Conjunctivae normal are normal. Right eye exhibits no discharge. Left eye exhibits no discharge.   Neck: Normal range of motion. No thyromegaly present.   Cardiovascular: Normal rate, regular rhythm and normal heart sounds.    No murmur heard.  Pulmonary/Chest: Effort normal and breath sounds normal. No respiratory distress. She has no wheezes.   Abdominal: Soft. She exhibits no distension. There is no tenderness.   Musculoskeletal: Normal range of motion. She exhibits no edema and no tenderness.   Lymphadenopathy:     She has no cervical adenopathy.   Neurological: She is alert and oriented to person, place, and time.   Skin: Skin is warm and dry. No rash noted. No erythema.   Psychiatric: She has a normal mood and affect. Her behavior is normal. Judgment and thought content normal.       Laboratory:   none performed   Assessment:       1. Anti-polysaccharide antibody deficiency    2. Recurrent sinus infections    3. Chronic rhinitis    4. Moderate persistent asthma without complication         Plan:       1. continue Hizentra 20g every 2 weeks. Recheck labs  in Feb 2019.   2. Continue Singulair daily  3. RTC annually or sooner if needed

## 2018-10-17 NOTE — PROGRESS NOTES
Post Op Note    Surgery: gastric sleeve surgery  Date: 4/16/18  Initial weight: 215  Last weight: 169  Current weight: 171    Constipation: none  Reflux: none  Vomiting: none  Craving something cold after hizentra    Diet:    Diet journal reviewed    Exercise:  Physical therapy 3 times per week; walking several times per week    MVI: 2 mvi daily, calcium, b12, biotin     Vitals:    10/17/18 1308   BP: 111/70   Pulse: 62   Resp: 16   Temp: 98.1 °F (36.7 °C)       Body comp:  Fat Percent:  40.7 %  Fat Mass:  69.8 lb  FFM:  101.8 lb  TBW: 71.4 lb  TBW %:  41.7 %  BMR: 1403 kcal     PE:  NAD  RRR  Soft/nt/nd  Incisions: well healed    Labs: pending    A/P: s/p sleeve with leak- now resolved     Counseling for patient:    Diet: Doing well with making the right choices and keeping calories under 1000.  She should continue this.  Exercise: needs to do cardiovascular exercises at least 20 minutes 3 times per week. We talked about how this might speed up her weight loss.  Vitamins: continue regimen    Co morbidities:     1. Morbid obesity  CBC w/ Auto Differential    CMP    B12    B1    Lipid Panel    Iron Studies    Vitamin D 25 Hydroxy   2. S/P bariatric surgery     3. Mixed hyperlipidemia         -All above: Evaluated, monitored, and treated with diet and exercise program.        : I met with the patient for 15 minutes and counseled her for over 50% of that time

## 2018-10-30 ENCOUNTER — PATIENT MESSAGE (OUTPATIENT)
Dept: ALLERGY | Facility: CLINIC | Age: 58
End: 2018-10-30

## 2018-11-06 ENCOUNTER — LAB VISIT (OUTPATIENT)
Dept: LAB | Facility: HOSPITAL | Age: 58
End: 2018-11-06
Attending: SURGERY
Payer: COMMERCIAL

## 2018-11-06 ENCOUNTER — OFFICE VISIT (OUTPATIENT)
Dept: PULMONOLOGY | Facility: CLINIC | Age: 58
End: 2018-11-06
Payer: COMMERCIAL

## 2018-11-06 VITALS
DIASTOLIC BLOOD PRESSURE: 64 MMHG | SYSTOLIC BLOOD PRESSURE: 97 MMHG | HEIGHT: 63 IN | BODY MASS INDEX: 30.62 KG/M2 | HEART RATE: 66 BPM | OXYGEN SATURATION: 97 % | WEIGHT: 172.81 LBS

## 2018-11-06 DIAGNOSIS — J45.21 MILD INTERMITTENT ASTHMA WITH ACUTE EXACERBATION: ICD-10-CM

## 2018-11-06 DIAGNOSIS — J45.20 MILD INTERMITTENT ASTHMA WITHOUT COMPLICATION: ICD-10-CM

## 2018-11-06 DIAGNOSIS — E66.01 MORBID OBESITY: ICD-10-CM

## 2018-11-06 DIAGNOSIS — J11.1 FLU: ICD-10-CM

## 2018-11-06 DIAGNOSIS — J82.83 EOSINOPHILIC ASTHMA: ICD-10-CM

## 2018-11-06 LAB
25(OH)D3+25(OH)D2 SERPL-MCNC: 51 NG/ML
ALBUMIN SERPL BCP-MCNC: 4.1 G/DL
ALP SERPL-CCNC: 82 U/L
ALT SERPL W/O P-5'-P-CCNC: 46 U/L
ANION GAP SERPL CALC-SCNC: 10 MMOL/L
AST SERPL-CCNC: 55 U/L
BASOPHILS # BLD AUTO: 0.06 K/UL
BASOPHILS NFR BLD: 0.7 %
BILIRUB SERPL-MCNC: 0.7 MG/DL
BUN SERPL-MCNC: 15 MG/DL
CALCIUM SERPL-MCNC: 9.9 MG/DL
CHLORIDE SERPL-SCNC: 104 MMOL/L
CHOLEST SERPL-MCNC: 169 MG/DL
CHOLEST/HDLC SERPL: 3.5 {RATIO}
CO2 SERPL-SCNC: 27 MMOL/L
CREAT SERPL-MCNC: 0.9 MG/DL
DIFFERENTIAL METHOD: ABNORMAL
EOSINOPHIL # BLD AUTO: 0.3 K/UL
EOSINOPHIL NFR BLD: 3.5 %
ERYTHROCYTE [DISTWIDTH] IN BLOOD BY AUTOMATED COUNT: 13.8 %
EST. GFR  (AFRICAN AMERICAN): >60 ML/MIN/1.73 M^2
EST. GFR  (NON AFRICAN AMERICAN): >60 ML/MIN/1.73 M^2
GLUCOSE SERPL-MCNC: 75 MG/DL
HCT VFR BLD AUTO: 46.5 %
HDLC SERPL-MCNC: 48 MG/DL
HDLC SERPL: 28.4 %
HGB BLD-MCNC: 16 G/DL
IMM GRANULOCYTES # BLD AUTO: 0.04 K/UL
IMM GRANULOCYTES NFR BLD AUTO: 0.4 %
IRON SERPL-MCNC: 101 UG/DL
LDLC SERPL CALC-MCNC: 87.6 MG/DL
LYMPHOCYTES # BLD AUTO: 2.9 K/UL
LYMPHOCYTES NFR BLD: 31.2 %
MCH RBC QN AUTO: 28.3 PG
MCHC RBC AUTO-ENTMCNC: 34.4 G/DL
MCV RBC AUTO: 82 FL
MONOCYTES # BLD AUTO: 0.5 K/UL
MONOCYTES NFR BLD: 5.8 %
NEUTROPHILS # BLD AUTO: 5.4 K/UL
NEUTROPHILS NFR BLD: 58.4 %
NONHDLC SERPL-MCNC: 121 MG/DL
NRBC BLD-RTO: 0 /100 WBC
PLATELET # BLD AUTO: 260 K/UL
PMV BLD AUTO: 11.7 FL
POTASSIUM SERPL-SCNC: 3.8 MMOL/L
PROT SERPL-MCNC: 8 G/DL
RBC # BLD AUTO: 5.66 M/UL
SATURATED IRON: 30 %
SODIUM SERPL-SCNC: 141 MMOL/L
TOTAL IRON BINDING CAPACITY: 332 UG/DL
TRANSFERRIN SERPL-MCNC: 224 MG/DL
TRIGL SERPL-MCNC: 167 MG/DL
VIT B12 SERPL-MCNC: 1089 PG/ML
WBC # BLD AUTO: 9.18 K/UL

## 2018-11-06 PROCEDURE — 80061 LIPID PANEL: CPT

## 2018-11-06 PROCEDURE — 3074F SYST BP LT 130 MM HG: CPT | Mod: CPTII,S$GLB,, | Performed by: INTERNAL MEDICINE

## 2018-11-06 PROCEDURE — 84425 ASSAY OF VITAMIN B-1: CPT

## 2018-11-06 PROCEDURE — 82306 VITAMIN D 25 HYDROXY: CPT

## 2018-11-06 PROCEDURE — 99213 OFFICE O/P EST LOW 20 MIN: CPT | Mod: 25,S$GLB,, | Performed by: INTERNAL MEDICINE

## 2018-11-06 PROCEDURE — 3078F DIAST BP <80 MM HG: CPT | Mod: CPTII,S$GLB,, | Performed by: INTERNAL MEDICINE

## 2018-11-06 PROCEDURE — 82607 VITAMIN B-12: CPT

## 2018-11-06 PROCEDURE — 83540 ASSAY OF IRON: CPT

## 2018-11-06 PROCEDURE — 3008F BODY MASS INDEX DOCD: CPT | Mod: CPTII,S$GLB,, | Performed by: INTERNAL MEDICINE

## 2018-11-06 PROCEDURE — 80053 COMPREHEN METABOLIC PANEL: CPT | Mod: 91

## 2018-11-06 PROCEDURE — 85025 COMPLETE CBC W/AUTO DIFF WBC: CPT

## 2018-11-06 PROCEDURE — 99999 PR PBB SHADOW E&M-EST. PATIENT-LVL III: CPT | Mod: PBBFAC,,, | Performed by: INTERNAL MEDICINE

## 2018-11-06 RX ORDER — ALBUTEROL SULFATE 90 UG/1
1 AEROSOL, METERED RESPIRATORY (INHALATION) EVERY 4 HOURS PRN
Qty: 3 INHALER | Refills: 3 | Status: SHIPPED | OUTPATIENT
Start: 2018-11-06 | End: 2019-04-24 | Stop reason: SDUPTHER

## 2018-11-06 RX ORDER — ALBUTEROL SULFATE 0.83 MG/ML
2.5 SOLUTION RESPIRATORY (INHALATION) EVERY 4 HOURS PRN
Qty: 120 EACH | Refills: 3 | Status: SHIPPED | OUTPATIENT
Start: 2018-11-06 | End: 2019-11-08 | Stop reason: SINTOL

## 2018-11-06 NOTE — PATIENT INSTRUCTIONS
Continue current medications  Dulera  Albuterol inhalation before exercise and when Short of Breath    Question about Shingles Vaccine, if insurance will cover not a bad idea.    If asthma exacerbates, Asthma action plan prednisone and antibiotic can order special shot.    Pulmonary function test before next appointment.

## 2018-11-06 NOTE — PROGRESS NOTES
11/6/2018    Yodit Canseco  Office Note    Chief Complaint   Patient presents with    Follow-up     4 month    Sputum Production     on occasion       HPI: 11/6/18- ER visit for dehydration gastroenteritis, no prednisone use in 4 months. Not currently on fasenra injection, insurance would not cover. Currently on [employer]ra IGG therapy.   Sinus drip present, cough occasional, non productive, no nocturnal arousals, Currently on Dulera daily, uses Albuterol rescue inhaler daily before exercise no SOB.  Sepsis [facility] August 2017.  Flu vaccine   July 9, 2018 - had gastric sleeve with leak complication- lost 45 lbs already.  Has appetite but fills quickly.  Still on hirzentra.  Asthma ok avoiding fragrances/ordors.  No prednisone.   No nocturnal arousals, uses rescue weekly avoiding any irritants.  Use prednisone 4-5 last year and twice this year.        March 19, 2018-since recovered from pleuritic pain - doing well.  No prednisone use recent.  Pt has had no wheezes.  feb 22,2108   Had to do prednisone again.  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 I have advised not to work given severe asthma,  hypersensitivity to fragrances, and immune deficiency.   Pt seems better since [employer], but still unstable severe asthma  Jan 29. 2018- 6 days ago had been exposed to sick , had nasal congestion , cough, ear stuffy, muscle aches /joint aches / fever.  Temp to 100.1.  Seen by np and flu screen negative.  Had asthma 3-4 flares last year. eos up past,   oct 5, 2017 - had marce in April, skipped couple doses hirzentra, had mild bronchitis once, otherwise doing very well.  No prednisone.  No side effects and covered. Ppt flu sense for 2 days  Jan 24, on  hirzentra q o week since sept and asthma more stable, no hosp, no prednisone, no noct asthma/ no rescue therapy- control not this good for  years.  Sept 27,  HPI:has had lung problems since birth, no nocturnal arousals, uses rescue 2/d, breathing controlled satisfactory except with irritants/infection - strong abx needed to clear.  Prednisone 2/yr, last hosp 18 months.  No ventilator.    Had exacerbation recent due uri from .  Has had shingles vaccine past.       The chief compliant  problem is varies with instablilty at time  PFSH:  Past Medical History:   Diagnosis Date    Allergy     Angio-edema     Arthralgia     Asthma without status asthmaticus     Bronchitis     Diverticulosis of large intestine without hemorrhage 10/8/2015    Environmental allergies     Eosinophilic asthma 3/8/2018    Gastroparesis     Hand arthritis     Herpes simplex without mention of complication     oral    Hyperlipidemia     Hypothyroidism     Intraperitoneal abscess 5/7/2018    LBP radiating to left leg     Leukocytosis, unspecified     Migraine headache     Obesity, Class III, BMI 40-49.9 (morbid obesity)     Periorbital cellulitis 12/31/2016    Prediabetes     Reactive airway disease     Reactive airway disease     Recurrent upper respiratory infection (URI)     S/P colonoscopy November 2010    Sepsis 5/7/2018    Urticaria          Past Surgical History:   Procedure Laterality Date    ADENOIDECTOMY      CHOLECYSTECTOMY      CHOLECYSTECTOMY      COLONOSCOPY  2015    repeat in 10    COLONOSCOPY N/A 10/8/2015    Procedure: COLONOSCOPY;  Surgeon: Panda Bose MD;  Location: [facility] ENDO;  Service: Endoscopy;  Laterality: N/A;    COLONOSCOPY N/A 10/8/2015    [facility] by [facility] at [facility] ENDO    ESOPHAGOGASTRODUODENOSCOPY (EGD) N/A 5/19/2018    [facility]    ESOPHAGOGASTRODUODENOSCOPY (EGD) N/A 10/8/2015    [facility] END[facility]    ESOPHAGOGASTRODUODENOSCOPY (EGD)- stent, keyo tube, please have hemoclips N/A 5/7/2018    [facility]  "   GASTRECTOMY-SLEEVE-LAPAROSCOPIC WITH EGD N/A 4/16/2018    [facility]    Hand fracture surgery      HARDWARE REMOVAL      left hand 5th MC    hymenectomy      HYSTERECTOMY      menorrhagia-Ovaries intact    REMOVAL-STENT-ESOPHAGEAL, UPPER ENDOSCOPIC STENT REMOVAL AND INSERTION NASOJEJUNAL TUBE  5/9/2018    [facility]    SLEEVE GASTROPLASTY  04/16/2018    TONSILLECTOMY      Urethral dilatation       Social History     Tobacco Use    Smoking status: Never Smoker    Smokeless tobacco: Never Used   Substance Use Topics    Alcohol use: Yes     Alcohol/week: 0.0 oz     Comment: very rarely    Drug use: No     Family History   Problem Relation Age of Onset    Melanoma Mother     Basal cell carcinoma Mother     Lung disease Mother         pulm htn    Cataracts Mother     Hypertension Mother     Lung cancer Father     Diabetes Father     Hypertension Father     Cancer Father     Diabetes Paternal Aunt     Colon cancer Paternal Aunt 40    Diabetes Paternal Aunt     Ovarian cancer Paternal Grandmother 40    Cancer Maternal Grandfather     Melanoma Maternal Aunt     Melanoma Maternal Uncle     Breast cancer Paternal Aunt     Lymphoma Paternal Aunt     Ulcerative colitis Son     Psoriasis Son     Psoriasis Son     Breast cancer Cousin 25    Allergic rhinitis Neg Hx     Allergies Neg Hx     Angioedema Neg Hx     Asthma Neg Hx     Atopy Neg Hx     Eczema Neg Hx     Immunodeficiency Neg Hx     Rhinitis Neg Hx     Urticaria Neg Hx      Review of patient's allergies indicates:   Allergen Reactions    Bromelains Hives     " causes mouth to bleed"    Sudafed [pseudoephedrine hcl] Other (See Comments)     Does not want to wake up .    Amoxicillin-pot clavulanate Itching     Other reaction(s): Itching    Hydrocodone Itching    Iodinated contrast- oral and iv dye      childhood    Iodine and iodide containing products " "Other (See Comments)    Melon Hives    Pantoprazole Nausea Only     Other reaction(s): upset stomach/halitosis    Percocet [oxycodone-acetaminophen] Itching    Barium iodide Rash    Ephedrine Other (See Comments)     Other reaction(s): comatose    Penicillins      Other reaction(s): does not work on pt symptoms     I have reviewed past medical, family, and social history. I have reviewed previous nurse notes.    Performance Status:The patient's activity level is functions out of house.      Review of Systems   Constitutional: Negative for activity change, appetite change, chills, diaphoresis, fatigue, fever and unexpected weight change.   HENT: Negative for dental problem, sneezing, sore throat, trouble swallowing and voice change.  Positive postnasal drip, rhinorrhea, sinus pressure, sinus pain,   Respiratory: Negative for apnea, cough, chest tightness, shortness of breath, wheezing and stridor. Positive for cough, SOB, chest tightness,    Cardiovascular: Negative for chest pain, palpitations and leg swelling.   Gastrointestinal: Negative for abdominal distention, abdominal pain, constipation and nausea.   Musculoskeletal: Negative for gait problem, myalgias and neck pain.   Skin: Negative for color change and pallor.   Allergic/Immunologic: Negative for environmental allergies and food allergies.   Neurological: Negative for dizziness, speech difficulty, weakness, light-headedness, numbness and headaches.   Hematological: Negative for adenopathy. Does not bruise/bleed easily.   Psychiatric/Behavioral: Negative for dysphoric mood and sleep disturbance. The patient is not nervous/anxious.           Exam:Comprehensive exam done. BP 97/64 (BP Location: Right arm, Patient Position: Sitting)   Pulse 66   Ht 5' 2.5" (1.588 m)   Wt 78.4 kg (172 lb 13.5 oz)   SpO2 97% Comment: on room air  BMI 31.11 kg/m²   Exam included Vitals as listed  Constitutional: He is oriented to person, place, and time. He appears " well-developed. No distress.   Nose: Nose normal.   Mouth/Throat: Uvula is midline, oropharynx is clear and moist and mucous membranes are normal. No dental caries. No oropharyngeal exudate, posterior oropharyngeal edema, posterior oropharyngeal erythema or tonsillar abscesses.  Mallapatti (M) score II  Eyes: Pupils are equal, round, and reactive to light.   Neck: No JVD present. No thyromegaly present.   Cardiovascular: Normal rate, regular rhythm and normal heart sounds. Exam reveals no gallop and no friction rub.   No murmur heard.  Pulmonary/Chest: Effort normal and breath sounds normal. No accessory muscle usage or stridor. No apnea and no tachypnea. No respiratory distress, decreased breath sounds, wheezes, rhonchi, rales, or tenderness.   Abdominal: Soft. He exhibits no mass. There is no tenderness. No hepatosplenomegaly, hernias and normoactive bowel sounds  Musculoskeletal: Normal range of motion. exhibits no edema.   Lymphadenopathy:     He has no cervical adenopathy.     He has no axillary adenopathy.   Neurological:  alert and oriented to person, place, and time. not disoriented.   Skin: Skin is warm and dry. Capillary refill takes less 2 sec. No cyanosis or erythema. No pallor. Nails show no clubbing.   Psychiatric: normal mood and affect. behavior is normal. Judgment and thought content normal.       Radiographs (ct chest and cxr) reviewed: results reviewed   EXAMINATION:  XR CHEST 1 VIEW   Impression     PICC placed in good position.  Feeding tube in place.  Drain identified in the left upper quadrant.  There is left lower lobe atelectasis and possible small left pleural effusion on this study.  No pneumothorax is seen.      Electronically signed by: Miguel Arteaga MD  Date: 05/10/2018  Time: 16:19   EXAMINATION:  CT ABDOMEN PELVIS WITHOUT CONTRAST   Impression     Status post gastric sleeve, with no evidence for gastric leak or abscess.    Resolution of left pleural effusion and left lower lobe  consolidation.    Electronically signed by: Kolby Marcial MD  Date: 07/20/2018  Time: 13:40       Labs reviewed       Lab Results   Component Value Date    WBC 12.40 05/19/2018    RBC 4.48 05/19/2018    HGB 12.6 05/19/2018    HCT 38.0 05/19/2018    MCV 85 05/19/2018    MCH 28.3 05/19/2018    MCHC 33.3 05/19/2018    RDW 14.4 05/19/2018     05/19/2018    MPV 10.3 05/19/2018    GRAN 8.0 (H) 05/19/2018    GRAN 64.3 05/19/2018    LYMPH 2.8 05/19/2018    LYMPH 22.2 05/19/2018    MONO 1.0 05/19/2018    MONO 7.7 05/19/2018    EOS 0.7 (H) 05/19/2018    BASO 0.00 05/19/2018    EOSINOPHIL 5.5 05/19/2018    BASOPHIL 0.3 05/19/2018     Results for YODIT MARADIAGA (MRN [id number]) as of 11/6/2018 13:17   Ref. Range 7/2/2018 11:48   Cholesterol Latest Ref Range: 120 - 199 mg/dL 257 (H)   HDL Latest Ref Range: 40 - 75 mg/dL 47   HDL/Chol Ratio Latest Ref Range: 20.0 - 50.0 % 18.3 (L)   LDL Cholesterol Latest Ref Range: 63.0 - 159.0 mg/dL 169.8 (H)   Non-HDL Cholesterol Latest Units: mg/dL 210   Total Cholesterol/HDL Ratio Latest Ref Range: 2.0 - 5.0  5.5 (H)   Triglycerides Latest Ref Range: 30 - 150 mg/dL 201 (H)     PFT results ordered will be reviewed    Plan:  Clinical impression is apparently straight forward and impression with management as below.    Yodit was seen today for follow-up and sputum production.    Diagnoses and all orders for this visit:    Mild intermittent asthma without complication  -     albuterol (PROAIR HFA) 90 mcg/actuation inhaler; Inhale 1 puff into the lungs every 4 (four) hours as needed for Wheezing or Shortness of Breath.  -     albuterol (PROVENTIL) 2.5 mg /3 mL (0.083 %) nebulizer solution; Take 3 mLs (2.5 mg total) by nebulization every 4 (four) hours as needed for Wheezing or Shortness of Breath. Every 4-6 hours PRN    Flu  -     mometasone-formoterol (DULERA) 200-5 mcg/actuation inhaler; Inhale 2 puffs into the lungs 2 (two) times daily. Controller    Eosinophilic asthma  -      mometasone-formoterol (DULERA) 200-5 mcg/actuation inhaler; Inhale 2 puffs into the lungs 2 (two) times daily. Controller    Mild intermittent asthma with acute exacerbation  -     mometasone-formoterol (DULERA) 200-5 mcg/actuation inhaler; Inhale 2 puffs into the lungs 2 (two) times daily. Controller        Follow-up in about 6 months (around 5/6/2019), or if symptoms worsen or fail to improve.    Discussed with patient above for education the following:      Patient Instructions   Continue current medications  Dulera  Albuterol inhalation before exercise and when Short of Breath    Question about Shingles Vaccine, if insurance will cover not a bad idea.    If asthma exacerbates, Asthma action plan prednisone and antibiotic can order special shot.    Pulmonary function test before next appointment.

## 2018-11-08 LAB — VIT B1 BLD-MCNC: 65 UG/L (ref 38–122)

## 2018-11-08 RX ORDER — MONTELUKAST SODIUM 10 MG/1
10 TABLET ORAL NIGHTLY
Qty: 90 TABLET | Refills: 3 | Status: SHIPPED | OUTPATIENT
Start: 2018-11-08 | End: 2019-11-08 | Stop reason: SDUPTHER

## 2018-11-12 ENCOUNTER — OFFICE VISIT (OUTPATIENT)
Dept: OBSTETRICS AND GYNECOLOGY | Facility: CLINIC | Age: 58
End: 2018-11-12
Payer: COMMERCIAL

## 2018-11-12 VITALS
WEIGHT: 171.94 LBS | DIASTOLIC BLOOD PRESSURE: 86 MMHG | BODY MASS INDEX: 30.95 KG/M2 | SYSTOLIC BLOOD PRESSURE: 122 MMHG

## 2018-11-12 DIAGNOSIS — L73.9 FOLLICULITIS: Primary | ICD-10-CM

## 2018-11-12 PROCEDURE — 3074F SYST BP LT 130 MM HG: CPT | Mod: CPTII,S$GLB,, | Performed by: OBSTETRICS & GYNECOLOGY

## 2018-11-12 PROCEDURE — 99213 OFFICE O/P EST LOW 20 MIN: CPT | Mod: S$GLB,,, | Performed by: OBSTETRICS & GYNECOLOGY

## 2018-11-12 PROCEDURE — 3008F BODY MASS INDEX DOCD: CPT | Mod: CPTII,S$GLB,, | Performed by: OBSTETRICS & GYNECOLOGY

## 2018-11-12 PROCEDURE — 3079F DIAST BP 80-89 MM HG: CPT | Mod: CPTII,S$GLB,, | Performed by: OBSTETRICS & GYNECOLOGY

## 2018-11-12 PROCEDURE — 99999 PR PBB SHADOW E&M-EST. PATIENT-LVL III: CPT | Mod: PBBFAC,,, | Performed by: OBSTETRICS & GYNECOLOGY

## 2018-11-12 RX ORDER — SULFAMETHOXAZOLE AND TRIMETHOPRIM 800; 160 MG/1; MG/1
1 TABLET ORAL 2 TIMES DAILY
Qty: 14 TABLET | Refills: 0 | Status: SHIPPED | OUTPATIENT
Start: 2018-11-12 | End: 2018-11-19

## 2018-11-12 NOTE — PROGRESS NOTES
Chief Complaint   Patient presents with    Cyst     Vaginal, x1 week       History of Present Illness: Yodit Canseco is a 58 y.o. female that presents today 11/12/2018 for   Chief Complaint   Patient presents with    Cyst     Vaginal, x1 week         Past Medical History:   Diagnosis Date    Allergy     Angio-edema     Arthralgia     Asthma without status asthmaticus     Bronchitis     Diverticulosis of large intestine without hemorrhage 10/8/2015    Environmental allergies     Eosinophilic asthma 3/8/2018    Gastroparesis     Hand arthritis     Herpes simplex without mention of complication     oral    Hyperlipidemia     Hypothyroidism     Intraperitoneal abscess 5/7/2018    LBP radiating to left leg     Leukocytosis, unspecified     Migraine headache     Obesity, Class III, BMI 40-49.9 (morbid obesity)     Periorbital cellulitis 12/31/2016    Prediabetes     Reactive airway disease     Reactive airway disease     Recurrent upper respiratory infection (URI)     S/P colonoscopy November 2010    Sepsis 5/7/2018    Urticaria        Past Surgical History:   Procedure Laterality Date    ADENOIDECTOMY      CHOLECYSTECTOMY      CHOLECYSTECTOMY      COLONOSCOPY  2015    repeat in 10    COLONOSCOPY N/A 10/8/2015    Procedure: COLONOSCOPY;  Surgeon: Panda Bose MD;  Location: [facility];  Service: Endoscopy;  Laterality: N/A;    COLONOSCOPY N/A 10/8/2015    [facility]formed by [facility] at [facility] ENDO    ESOPHAGOGASTRODUODENOSCOPY (EGD) N/A 5/19/2018    [facility] at [facility] ENDO    ESOPHAGOGASTRODUODENOSCOPY (EGD) N/A 10/8/2015    [facility] at [facility] ENDO    ESOPHAGOGASTRODUODENOSCOPY (EGD)- stent, keyo tube, please have hemoclips N/A 5/7/2018    [facility] at [facility] OR    GASTRECTOMY-SLEEVE-LAPAROSCOPIC WITH EGD N/A 4/16/2018    [facility] at [facility] OR    Hand fracture surgery      HARDWARE  REMOVAL      left hand 5th     hymenectomy      HYSTERECTOMY      menorrhagia-Ovaries intact    REMOVAL-STENT-ESOPHAGEAL, UPPER ENDOSCOPIC STENT REMOVAL AND INSERTION NASOJEJUNAL TUBE  5/9/2018    [facility]    SLEEVE GASTROPLASTY  04/16/2018    TONSILLECTOMY      Urethral dilatation         Current Outpatient Medications   Medication Sig Dispense Refill    acetaminophen (TYLENOL) 325 MG tablet Take 2 tablets (650 mg total) by mouth every 6 (six) hours as needed for Pain.  0    albuterol (PROAIR HFA) 90 mcg/actuation inhaler Inhale 1 puff into the lungs every 4 (four) hours as needed for Wheezing or Shortness of Breath. 3 Inhaler 3    albuterol (PROVENTIL) 2.5 mg /3 mL (0.083 %) nebulizer solution Take 3 mLs (2.5 mg total) by nebulization every 4 (four) hours as needed for Wheezing or Shortness of Breath. Every 4-6 hours  each 3    atorvastatin (LIPITOR) 20 MG tablet Take 1 tablet (20 mg total) by mouth once daily. 90 tablet 0    calcium-vitamin D3 (CALCIUM 500 + D) 500 mg(1,250mg) -200 unit per tablet Take 1 tablet by mouth 2 (two) times daily with meals.      estradiol (ESTRACE) 1 MG tablet Take 1 tablet (1 mg total) by mouth once daily. 30 tablet 11    immun glob G,IgG,-pro-IgA 0-50 (HIZENTRA) 2 gram/10 mL (20 %) Soln Inject 18 g into the skin every 14 (fourteen) days. 90 mL 12    levothyroxine (SYNTHROID) 75 MCG tablet TAKE 1 TABLET(75 MCG) BY MOUTH EVERY DAY 30 tablet 11    mometasone-formoterol (DULERA) 200-5 mcg/actuation inhaler Inhale 2 puffs into the lungs 2 (two) times daily. Controller 1 Inhaler 11    montelukast (SINGULAIR) 10 mg tablet Take 1 tablet (10 mg total) by mouth every evening. 90 tablet 3    sumatriptan (IMITREX) 100 MG tablet Take 1 tablet (100 mg total) by mouth once daily. 3 tablet 0    sulfamethoxazole-trimethoprim 800-160mg (BACTRIM DS) 800-160 mg Tab Take 1 tablet by mouth 2 (two) times daily. for 7 days 14 tablet 0     No  "current facility-administered medications for this visit.        Review of patient's allergies indicates:   Allergen Reactions    Bromelains Hives     " causes mouth to bleed"    Sudafed [pseudoephedrine hcl] Other (See Comments)     Does not want to wake up .    Amoxicillin-pot clavulanate Itching     Other reaction(s): Itching    Hydrocodone Itching    Iodinated contrast- oral and iv dye      childhood    Iodine and iodide containing products Other (See Comments)    Melon Hives    Pantoprazole Nausea Only     Other reaction(s): upset stomach/halitosis    Percocet [oxycodone-acetaminophen] Itching    Barium iodide Rash    Ephedrine Other (See Comments)     Other reaction(s): comatose    Penicillins      Other reaction(s): does not work on pt symptoms       Family History   Problem Relation Age of Onset    Melanoma Mother     Basal cell carcinoma Mother     Lung disease Mother         pulm htn    Cataracts Mother     Hypertension Mother     Lung cancer Father     Diabetes Father     Hypertension Father     Cancer Father     Diabetes Paternal Aunt     Colon cancer Paternal Aunt 40    Diabetes Paternal Aunt     Ovarian cancer Paternal Grandmother 40    Cancer Maternal Grandfather     Melanoma Maternal Aunt     Melanoma Maternal Uncle     Breast cancer Paternal Aunt     Lymphoma Paternal Aunt     Ulcerative colitis Son     Psoriasis Son     Psoriasis Son     Breast cancer Cousin 25    Allergic rhinitis Neg Hx     Allergies Neg Hx     Angioedema Neg Hx     Asthma Neg Hx     Atopy Neg Hx     Eczema Neg Hx     Immunodeficiency Neg Hx     Rhinitis Neg Hx     Urticaria Neg Hx        Social History     Tobacco Use    Smoking status: Never Smoker    Smokeless tobacco: Never Used   Substance Use Topics    Alcohol use: Yes     Alcohol/week: 0.0 oz     Comment: very rarely    Drug use: No       OB History    Para Term  AB Living   5 2 2   3     SAB TAB Ectopic Multiple " Live Births   3              # Outcome Date GA Lbr Pradip/2nd Weight Sex Delivery Anes PTL Lv   5 SAB            4 SAB            3 SAB            2 Term            1 Term                   Review of Symptoms:  GENERAL: Denies weight gain or weight loss. Feeling well overall.   SKIN: Denies rash or lesions.   HEAD: Denies head injury or headache.   NODES: Denies enlarged lymph nodes.   CHEST: Denies chest pain or shortness of breath.   CARDIOVASCULAR: Denies palpitations or left sided chest pain.   ABDOMEN: No abdominal pain, constipation, diarrhea, nausea, vomiting or rectal bleeding.   URINARY: No frequency, dysuria, hematuria, or burning on urination.  HEMATOLOGIC: No easy bruisability or excessive bleeding.   MUSCULOSKELETAL: Denies joint pain or swelling.     /86   Wt 78 kg (171 lb 15.3 oz)   Physical Exam:  APPEARANCE: Well nourished, well developed, in no acute distress.  SKIN: Normal skin turgor, no lesions.  NECK: Neck symmetric without masses   RESPIRATORY: Normal respiratory effort with no retractions or use of accessory muscles  CARDIOVASCULAR: Peripheral vascular system with no swelling no varicosities and palpation of pulses normal  LYMPHATIC: No enlargements of the lymph nodes noted in the neck, axillae, or groin  ABDOMEN: Soft. No tenderness or masses. No hepatosplenomegaly. No hernias.  PELVIC: Normal external female genitalia with 1 cm raised erythematous minimal fluctuance with some induration  EXTREMITIES: No clubbing cyanosis or edema.    ASSESSMENT/PLAN:  Folliculitis    Other orders  -     sulfamethoxazole-trimethoprim 800-160mg (BACTRIM DS) 800-160 mg Tab; Take 1 tablet by mouth 2 (two) times daily. for 7 days  Dispense: 14 tablet; Refill: 0        15 minutes spent today with this patient. Greater than half spent in counseling today.

## 2018-11-15 ENCOUNTER — OFFICE VISIT (OUTPATIENT)
Dept: FAMILY MEDICINE | Facility: CLINIC | Age: 58
End: 2018-11-15
Payer: COMMERCIAL

## 2018-11-15 VITALS
TEMPERATURE: 98 F | HEIGHT: 63 IN | SYSTOLIC BLOOD PRESSURE: 105 MMHG | BODY MASS INDEX: 30.54 KG/M2 | WEIGHT: 172.38 LBS | DIASTOLIC BLOOD PRESSURE: 69 MMHG | HEART RATE: 76 BPM

## 2018-11-15 DIAGNOSIS — Z98.890 H/O HAND SURGERY: ICD-10-CM

## 2018-11-15 DIAGNOSIS — M25.532 LEFT WRIST PAIN: ICD-10-CM

## 2018-11-15 DIAGNOSIS — M25.50 ARTHRALGIA, UNSPECIFIED JOINT: ICD-10-CM

## 2018-11-15 DIAGNOSIS — H52.7 REFRACTIVE ERROR: ICD-10-CM

## 2018-11-15 DIAGNOSIS — J82.83 EOSINOPHILIC ASTHMA: ICD-10-CM

## 2018-11-15 DIAGNOSIS — E66.01 MORBID OBESITY: ICD-10-CM

## 2018-11-15 DIAGNOSIS — J45.50 SEVERE PERSISTENT ASTHMA WITHOUT COMPLICATION: ICD-10-CM

## 2018-11-15 DIAGNOSIS — K31.84 GASTROPARESIS: ICD-10-CM

## 2018-11-15 DIAGNOSIS — M19.049 HAND ARTHRITIS: ICD-10-CM

## 2018-11-15 DIAGNOSIS — M24.132 DEGENERATIVE TEAR OF TRIANGULAR FIBROCARTILAGE COMPLEX (TFCC) OF LEFT WRIST: ICD-10-CM

## 2018-11-15 DIAGNOSIS — K65.1 INTRAPERITONEAL ABSCESS: ICD-10-CM

## 2018-11-15 DIAGNOSIS — G43.909 MIGRAINE WITHOUT STATUS MIGRAINOSUS, NOT INTRACTABLE, UNSPECIFIED MIGRAINE TYPE: ICD-10-CM

## 2018-11-15 DIAGNOSIS — Z86.010 HISTORY OF COLON POLYPS: ICD-10-CM

## 2018-11-15 DIAGNOSIS — E03.9 HYPOTHYROIDISM, UNSPECIFIED TYPE: Primary | ICD-10-CM

## 2018-11-15 DIAGNOSIS — K57.30 DIVERTICULOSIS OF LARGE INTESTINE WITHOUT HEMORRHAGE: Chronic | ICD-10-CM

## 2018-11-15 DIAGNOSIS — Z98.84 S/P BARIATRIC SURGERY: ICD-10-CM

## 2018-11-15 DIAGNOSIS — M79.642 LEFT HAND PAIN: ICD-10-CM

## 2018-11-15 DIAGNOSIS — Z82.49 FAMILY HISTORY OF ASCVD (ARTERIOSCLEROTIC CARDIOVASCULAR DISEASE): ICD-10-CM

## 2018-11-15 DIAGNOSIS — D84.9 IMMUNE DEFICIENCY DISORDER: ICD-10-CM

## 2018-11-15 DIAGNOSIS — R73.03 PREDIABETES: ICD-10-CM

## 2018-11-15 DIAGNOSIS — E78.2 MIXED HYPERLIPIDEMIA: ICD-10-CM

## 2018-11-15 DIAGNOSIS — R10.9 ABDOMINAL PAIN, UNSPECIFIED ABDOMINAL LOCATION: ICD-10-CM

## 2018-11-15 DIAGNOSIS — M65.832 EXTENSOR TENOSYNOVITIS OF LEFT WRIST: ICD-10-CM

## 2018-11-15 PROCEDURE — 99999 PR PBB SHADOW E&M-EST. PATIENT-LVL III: CPT | Mod: PBBFAC,,, | Performed by: NURSE PRACTITIONER

## 2018-11-15 PROCEDURE — 3078F DIAST BP <80 MM HG: CPT | Mod: CPTII,S$GLB,, | Performed by: NURSE PRACTITIONER

## 2018-11-15 PROCEDURE — 3074F SYST BP LT 130 MM HG: CPT | Mod: CPTII,S$GLB,, | Performed by: NURSE PRACTITIONER

## 2018-11-15 PROCEDURE — G0439 PPPS, SUBSEQ VISIT: HCPCS | Mod: S$GLB,,, | Performed by: NURSE PRACTITIONER

## 2018-11-15 NOTE — PROGRESS NOTES
"Yodit Canseco presented for a  Medicare AWV and comprehensive Health Risk Assessment today. The following components were reviewed and updated:    · Medical history  · Family History  · Social history  · Allergies and Current Medications  · Health Risk Assessment  · Health Maintenance  · Care Team     ** See Completed Assessments for Annual Wellness Visit within the encounter summary.**       The following assessments were completed:  · Living Situation  · CAGE  · Depression Screening  · Timed Get Up and Go  · Whisper Test  · Cognitive Function Screening  · Nutrition Screening  · ADL Screening  · PAQ Screening    Vitals:    11/15/18 1307   BP: 105/69   Pulse: 76   Temp: 98.1 °F (36.7 °C)   TempSrc: Oral   Weight: 78.2 kg (172 lb 6.4 oz)   Height: 5' 2.5" (1.588 m)     Body mass index is 31.03 kg/m².  Physical Exam   Constitutional: She is oriented to person, place, and time. She appears well-developed and well-nourished. No distress.   HENT:   Head: Normocephalic and atraumatic.   Eyes: EOM are normal. Pupils are equal, round, and reactive to light.   Neck: Normal range of motion. Neck supple.   Cardiovascular: Normal rate and regular rhythm.   Pulmonary/Chest: Effort normal and breath sounds normal.   Musculoskeletal: Normal range of motion.   Neurological: She is alert and oriented to person, place, and time.   Skin: Skin is warm and dry. No rash noted.   Psychiatric: She has a normal mood and affect. Judgment normal.   Nursing note and vitals reviewed.        Diagnoses and health risks identified today and associated recommendations/orders:    1. Hypothyroidism, unspecified type  Stable and controlled on Synthroid  Continue medication as prescribed  Continue current treatment plan as previously prescribed with your PCP      2. Prediabetes  Unstable-continue to monitor glucose, encourage healthy diet choices and weight loss    3. S/P bariatric surgery  Stable-continue healthy diet and weight loss    4. Morbid " obesity  Stable-continue healthy diet and weight loss    5. Immune deficiency disorder  Stable and controlled on immunoglobulin infusions  Continue medication as prescribed  Continue current treatment plan as previously prescribed with your PCP      6. Mixed hyperlipidemia  Stable and controlled on atorvastatin  Continue medication as prescribed  Continue current treatment plan as previously prescribed with your PCP      7. Diverticulosis of large intestine without hemorrhage  Stable-follow up for worsening symptoms    8. History of colon polyps  Stable-routine colonoscopy    9. Abdominal pain, unspecified abdominal location  Stable-follow up for worsening symptoms    10. Gastroparesis  Stable-follow up for worsening symptoms    11. Intraperitoneal abscess  Resolved, follow-up for new symptoms    12. Migraine without status migrainosus, not intractable, unspecified migraine type  Stable and controlled on Imitrex  Continue medication as prescribed  Continue current treatment plan as previously prescribed with your PCP      13. Eosinophilic asthma  Stable and controlled on albuterol, Dulera  Continue medication as prescribed  Continue current treatment plan as previously prescribed with your PCP      14. Severe persistent asthma without complication  Stable and controlled on albuterol, Dulera  Continue medication as prescribed  Continue current treatment plan as previously prescribed with your PCP      15. Refractive error  Stable-follow up with Ophthalmology    16. Family history of ASCVD (arteriosclerotic cardiovascular disease)  Stable-encourage healthy lifestyle choices because of risk factors    17. H/O hand surgery  Stable-follow up for worsening symptoms    18. Left hand pain  Stable-follow up for worsening symptoms    19. Degenerative tear of triangular fibrocartilage complex (TFCC) of left wrist  Stable-follow up for worsening symptoms    20. Left wrist pain  Stable-follow up for worsening symptoms    21.  Extensor tenosynovitis of left wrist  Stable-follow up for worsening symptoms    22. Hand arthritis  Stable-follow up for worsening symptoms    23. Arthralgia, unspecified joint  Stable-follow up for worsening symptoms      Provided Yodit with a 5-10 year written screening schedule and personal prevention plan. Recommendations were developed using the USPSTF age appropriate recommendations. Education, counseling, and referrals were provided as needed. After Visit Summary printed and given to patient which includes a list of additional screenings\tests needed.    No Follow-up on file.    Jori Jarrett NP

## 2018-11-27 ENCOUNTER — OFFICE VISIT (OUTPATIENT)
Dept: OPHTHALMOLOGY | Facility: CLINIC | Age: 58
End: 2018-11-27
Payer: COMMERCIAL

## 2018-11-27 DIAGNOSIS — H01.119 ALLERGIC DERMATITIS OF EYELID, UNSPECIFIED LATERALITY: Primary | ICD-10-CM

## 2018-11-27 PROCEDURE — 92012 INTRM OPH EXAM EST PATIENT: CPT | Mod: S$GLB,,, | Performed by: OPTOMETRIST

## 2018-11-27 PROCEDURE — 99999 PR PBB SHADOW E&M-EST. PATIENT-LVL I: CPT | Mod: PBBFAC,,, | Performed by: OPTOMETRIST

## 2018-11-27 RX ORDER — DESONIDE 0.5 MG/G
CREAM TOPICAL 2 TIMES DAILY
Qty: 1 TUBE | Refills: 3 | Status: SHIPPED | OUTPATIENT
Start: 2018-11-27 | End: 2020-06-03 | Stop reason: SDUPTHER

## 2018-11-27 NOTE — PROGRESS NOTES
HPI     PT c/o itching, swelling and redness around both eyes for 2 days. PT has   h/o dermatitis of eyelids and states it has been really bad for 2 days.   Hasnt flared up in 6 months.   Last exam 5/4/17 with TRF.  HPI    Pain Scale:  1  Onset:   2 days  OD, OS, OU:   OS>OD*  Discharge:   no  A.M. Matting:  no  Itch:   yes  Redness:   yes  Photophobia:   no  Foreign body sensation:   no  Deep pain:   no  Previous occurrence:   yes  Drops:   no      Last edited by Maribel Reynaga MA on 11/27/2018 11:21 AM. (History)            Assessment /Plan     For exam results, see Encounter Report.    Allergic dermatitis of eyelid, unspecified laterality    Other orders  -     desonide (DESOWEN) 0.05 % cream; Apply topically 2 (two) times daily.  Dispense: 1 Tube; Refill: 3      Pt has been allergy tested by immunologist with no known cause of allergy  She has no pets  I asked her to consider trying to eliminate certain products ie detergents/soaps/lotions to see if this would help  Although, this may well be something in the air that cannot be avoided    Recommended trying otc zaditor bid OU, cool compresses prn  Caution about SE of using desonide everyday    RTC for dilated eye exam with TRF, next available  PRN sooner with any worsening or new symptoms  Discussed above and all questions were answered.

## 2018-12-20 ENCOUNTER — TELEPHONE (OUTPATIENT)
Dept: FAMILY MEDICINE | Facility: CLINIC | Age: 58
End: 2018-12-20

## 2018-12-20 ENCOUNTER — PATIENT MESSAGE (OUTPATIENT)
Dept: PULMONOLOGY | Facility: CLINIC | Age: 58
End: 2018-12-20

## 2018-12-20 DIAGNOSIS — J11.1 FLU: ICD-10-CM

## 2018-12-20 DIAGNOSIS — J45.21 MILD INTERMITTENT ASTHMA WITH ACUTE EXACERBATION: ICD-10-CM

## 2018-12-20 DIAGNOSIS — J82.83 EOSINOPHILIC ASTHMA: ICD-10-CM

## 2018-12-20 RX ORDER — LEVOFLOXACIN 500 MG/1
500 TABLET, FILM COATED ORAL DAILY
Qty: 10 TABLET | Refills: 3 | Status: SHIPPED | OUTPATIENT
Start: 2018-12-20 | End: 2019-02-26

## 2018-12-20 NOTE — TELEPHONE ENCOUNTER
----- Message from Jeffrey Leonard sent at 12/20/2018  7:57 AM CST -----  Contact: [phone]   Pt would like to be worked in for a same day appt, pt complaints of Congestion and Ear Pain.  Please call back at [phone] . Thx-AH

## 2018-12-20 NOTE — TELEPHONE ENCOUNTER
Patient informed no available appointments in clinic today, offered urgent care or recommended walk in clinic, or schedule for tomorrow, she will check with one of her other doctors or go to walk in to get checked.

## 2019-01-08 ENCOUNTER — LAB VISIT (OUTPATIENT)
Dept: LAB | Facility: HOSPITAL | Age: 59
End: 2019-01-08
Attending: FAMILY MEDICINE
Payer: COMMERCIAL

## 2019-01-08 DIAGNOSIS — K65.1 INTRAPERITONEAL ABSCESS: ICD-10-CM

## 2019-01-08 DIAGNOSIS — E78.2 MIXED HYPERLIPIDEMIA: ICD-10-CM

## 2019-01-08 LAB
ALBUMIN SERPL BCP-MCNC: 4 G/DL
ALP SERPL-CCNC: 67 U/L
ALT SERPL W/O P-5'-P-CCNC: 23 U/L
ANION GAP SERPL CALC-SCNC: 7 MMOL/L
AST SERPL-CCNC: 29 U/L
BASOPHILS # BLD AUTO: 0.05 K/UL
BASOPHILS NFR BLD: 0.5 %
BILIRUB SERPL-MCNC: 0.8 MG/DL
BUN SERPL-MCNC: 16 MG/DL
CALCIUM SERPL-MCNC: 10 MG/DL
CHLORIDE SERPL-SCNC: 104 MMOL/L
CHOLEST SERPL-MCNC: 228 MG/DL
CHOLEST/HDLC SERPL: 3.6 {RATIO}
CO2 SERPL-SCNC: 30 MMOL/L
CREAT SERPL-MCNC: 1 MG/DL
DIFFERENTIAL METHOD: NORMAL
EOSINOPHIL # BLD AUTO: 0.2 K/UL
EOSINOPHIL NFR BLD: 2.1 %
ERYTHROCYTE [DISTWIDTH] IN BLOOD BY AUTOMATED COUNT: 13.7 %
EST. GFR  (AFRICAN AMERICAN): >60 ML/MIN/1.73 M^2
EST. GFR  (NON AFRICAN AMERICAN): >60 ML/MIN/1.73 M^2
GLUCOSE SERPL-MCNC: 88 MG/DL
HCT VFR BLD AUTO: 46.2 %
HDLC SERPL-MCNC: 64 MG/DL
HDLC SERPL: 28.1 %
HGB BLD-MCNC: 15.1 G/DL
IMM GRANULOCYTES # BLD AUTO: 0.02 K/UL
IMM GRANULOCYTES NFR BLD AUTO: 0.2 %
LDLC SERPL CALC-MCNC: 131.4 MG/DL
LYMPHOCYTES # BLD AUTO: 2.4 K/UL
LYMPHOCYTES NFR BLD: 24.9 %
MCH RBC QN AUTO: 28.9 PG
MCHC RBC AUTO-ENTMCNC: 32.7 G/DL
MCV RBC AUTO: 89 FL
MONOCYTES # BLD AUTO: 0.5 K/UL
MONOCYTES NFR BLD: 5.7 %
NEUTROPHILS # BLD AUTO: 6.3 K/UL
NEUTROPHILS NFR BLD: 66.6 %
NONHDLC SERPL-MCNC: 164 MG/DL
NRBC BLD-RTO: 0 /100 WBC
PLATELET # BLD AUTO: 229 K/UL
PMV BLD AUTO: 12.1 FL
POTASSIUM SERPL-SCNC: 4.5 MMOL/L
PROT SERPL-MCNC: 7.6 G/DL
RBC # BLD AUTO: 5.22 M/UL
SODIUM SERPL-SCNC: 141 MMOL/L
TRIGL SERPL-MCNC: 163 MG/DL
WBC # BLD AUTO: 9.52 K/UL

## 2019-01-08 PROCEDURE — 80061 LIPID PANEL: CPT

## 2019-01-08 PROCEDURE — 85025 COMPLETE CBC W/AUTO DIFF WBC: CPT

## 2019-01-08 PROCEDURE — 80053 COMPREHEN METABOLIC PANEL: CPT

## 2019-01-08 PROCEDURE — 36415 COLL VENOUS BLD VENIPUNCTURE: CPT | Mod: PO

## 2019-01-10 ENCOUNTER — TELEPHONE (OUTPATIENT)
Dept: PULMONOLOGY | Facility: CLINIC | Age: 59
End: 2019-01-10

## 2019-01-10 NOTE — TELEPHONE ENCOUNTER
Spoke to patient, relayed lab results per Dr. Gary. Patient wanted to know if she should continue taking cholesterol medication prescribed by Dr. Gary. Per nurse Gloria, should continue taking until Dr. Gary states otherwise.  Patient verbalized understanding. No further action is needed at this time.----- Message from Erick Gary MD sent at 1/9/2019 10:06 AM CST -----  Notify CBC cholesterol and CMP were all very good

## 2019-01-21 DIAGNOSIS — E78.2 MIXED HYPERLIPIDEMIA: ICD-10-CM

## 2019-01-21 RX ORDER — ATORVASTATIN CALCIUM 20 MG/1
20 TABLET, FILM COATED ORAL DAILY
Qty: 90 TABLET | Refills: 0 | Status: SHIPPED | OUTPATIENT
Start: 2019-01-21 | End: 2019-07-09

## 2019-01-24 ENCOUNTER — PATIENT MESSAGE (OUTPATIENT)
Dept: PULMONOLOGY | Facility: CLINIC | Age: 59
End: 2019-01-24

## 2019-01-30 ENCOUNTER — OFFICE VISIT (OUTPATIENT)
Dept: BARIATRICS | Facility: CLINIC | Age: 59
End: 2019-01-30
Payer: COMMERCIAL

## 2019-01-30 VITALS
WEIGHT: 170.38 LBS | HEART RATE: 95 BPM | HEIGHT: 63 IN | TEMPERATURE: 98 F | SYSTOLIC BLOOD PRESSURE: 128 MMHG | DIASTOLIC BLOOD PRESSURE: 74 MMHG | RESPIRATION RATE: 16 BRPM | BODY MASS INDEX: 30.19 KG/M2

## 2019-01-30 DIAGNOSIS — E66.01 MORBID OBESITY: Primary | ICD-10-CM

## 2019-01-30 PROCEDURE — 3008F PR BODY MASS INDEX (BMI) DOCUMENTED: ICD-10-PCS | Mod: CPTII,S$GLB,, | Performed by: SURGERY

## 2019-01-30 PROCEDURE — 99213 PR OFFICE/OUTPT VISIT, EST, LEVL III, 20-29 MIN: ICD-10-PCS | Mod: S$GLB,,, | Performed by: SURGERY

## 2019-01-30 PROCEDURE — 99999 PR PBB SHADOW E&M-EST. PATIENT-LVL IV: CPT | Mod: PBBFAC,,, | Performed by: SURGERY

## 2019-01-30 PROCEDURE — 3078F PR MOST RECENT DIASTOLIC BLOOD PRESSURE < 80 MM HG: ICD-10-PCS | Mod: CPTII,S$GLB,, | Performed by: SURGERY

## 2019-01-30 PROCEDURE — 3074F SYST BP LT 130 MM HG: CPT | Mod: CPTII,S$GLB,, | Performed by: SURGERY

## 2019-01-30 PROCEDURE — 3074F PR MOST RECENT SYSTOLIC BLOOD PRESSURE < 130 MM HG: ICD-10-PCS | Mod: CPTII,S$GLB,, | Performed by: SURGERY

## 2019-01-30 PROCEDURE — 3078F DIAST BP <80 MM HG: CPT | Mod: CPTII,S$GLB,, | Performed by: SURGERY

## 2019-01-30 PROCEDURE — 99999 PR PBB SHADOW E&M-EST. PATIENT-LVL IV: ICD-10-PCS | Mod: PBBFAC,,, | Performed by: SURGERY

## 2019-01-30 PROCEDURE — 3008F BODY MASS INDEX DOCD: CPT | Mod: CPTII,S$GLB,, | Performed by: SURGERY

## 2019-01-30 PROCEDURE — 99213 OFFICE O/P EST LOW 20 MIN: CPT | Mod: S$GLB,,, | Performed by: SURGERY

## 2019-01-30 NOTE — PROGRESS NOTES
Post Op Note    Surgery: gastric sleeve surgery  Date: 4/16/18  Initial weight: 215  Last weight: 171  Current weight: 170    Constipation: none  Reflux: none  Vomiting: had some nausea once     Diet:    Had some cheats through the holidays  Trying to get back to the meat and vegetable approach  Had some portion control problems    Exercise:  Using a pedometer  9,461 steps today- averaging   [facility], [facility] also have pools- 2 times per week    MVI: 2 mvi daily, b12, biotin     Vitals:    01/30/19 1324   BP: 128/74   Pulse: 95   Resp: 16   Temp: 97.8 °F (36.6 °C)       Body comp:  Fat Percent:  40.9 %  Fat Mass:  69.6 lb  FFM:  100.8 lb  TBW: 70.8 lb  TBW %:  41.5 %  BMR: 1393 kcal    PE:  NAD  RRR  Soft/nt/nd  Incisions: well healed    Labs: reviewed: high cholesterol    A/P: s/p sleeve with leak now resolved     Counseling for patient:    Diet: get back to eating low carb, portions 3 ounces or less   Exercise: We talked about AHA recommendations for cv exercise- 150 minutes moderate intensity exercise per week. She will work to this goal  Vitamins: continue regimen ok to take calcium at the same time as mvi  Co morbidities:     1. Morbid obesity  CBC w/ Auto Differential    CMP    B12    B1    Lipid Panel    Iron Studies    Vitamin D 25 Hydroxy       -All above: Evaluated, monitored, and treated with diet and exercise program.        : I met with the patient for 15 minutes and counseled her for over 50% of that time

## 2019-02-26 ENCOUNTER — HOSPITAL ENCOUNTER (OUTPATIENT)
Dept: RADIOLOGY | Facility: HOSPITAL | Age: 59
Discharge: HOME | End: 2019-02-26
Attending: NURSE PRACTITIONER
Payer: COMMERCIAL

## 2019-02-26 ENCOUNTER — OFFICE VISIT (OUTPATIENT)
Dept: FAMILY MEDICINE | Facility: CLINIC | Age: 59
End: 2019-02-26
Payer: COMMERCIAL

## 2019-02-26 VITALS
HEART RATE: 65 BPM | DIASTOLIC BLOOD PRESSURE: 70 MMHG | BODY MASS INDEX: 32.94 KG/M2 | SYSTOLIC BLOOD PRESSURE: 138 MMHG | WEIGHT: 179 LBS | HEIGHT: 62 IN

## 2019-02-26 DIAGNOSIS — M25.511 ACUTE PAIN OF RIGHT SHOULDER: ICD-10-CM

## 2019-02-26 DIAGNOSIS — M25.511 ACUTE PAIN OF RIGHT SHOULDER: Primary | ICD-10-CM

## 2019-02-26 PROCEDURE — 73030 X-RAY EXAM OF SHOULDER: CPT | Mod: 26,RT,, | Performed by: RADIOLOGY

## 2019-02-26 PROCEDURE — 3075F SYST BP GE 130 - 139MM HG: CPT | Mod: CPTII,S$GLB,, | Performed by: NURSE PRACTITIONER

## 2019-02-26 PROCEDURE — 73030 X-RAY EXAM OF SHOULDER: CPT | Mod: TC,PO,RT

## 2019-02-26 PROCEDURE — 3078F PR MOST RECENT DIASTOLIC BLOOD PRESSURE < 80 MM HG: ICD-10-PCS | Mod: CPTII,S$GLB,, | Performed by: NURSE PRACTITIONER

## 2019-02-26 PROCEDURE — 99999 PR PBB SHADOW E&M-EST. PATIENT-LVL IV: CPT | Mod: PBBFAC,,, | Performed by: NURSE PRACTITIONER

## 2019-02-26 PROCEDURE — 3008F BODY MASS INDEX DOCD: CPT | Mod: CPTII,S$GLB,, | Performed by: NURSE PRACTITIONER

## 2019-02-26 PROCEDURE — 73030 XR SHOULDER COMPLETE 2 OR MORE VIEWS RIGHT: ICD-10-PCS | Mod: 26,RT,, | Performed by: RADIOLOGY

## 2019-02-26 PROCEDURE — 3078F DIAST BP <80 MM HG: CPT | Mod: CPTII,S$GLB,, | Performed by: NURSE PRACTITIONER

## 2019-02-26 PROCEDURE — 99213 OFFICE O/P EST LOW 20 MIN: CPT | Mod: S$GLB,,, | Performed by: NURSE PRACTITIONER

## 2019-02-26 PROCEDURE — 99999 PR PBB SHADOW E&M-EST. PATIENT-LVL IV: ICD-10-PCS | Mod: PBBFAC,,, | Performed by: NURSE PRACTITIONER

## 2019-02-26 PROCEDURE — 99213 PR OFFICE/OUTPT VISIT, EST, LEVL III, 20-29 MIN: ICD-10-PCS | Mod: S$GLB,,, | Performed by: NURSE PRACTITIONER

## 2019-02-26 PROCEDURE — 3008F PR BODY MASS INDEX (BMI) DOCUMENTED: ICD-10-PCS | Mod: CPTII,S$GLB,, | Performed by: NURSE PRACTITIONER

## 2019-02-26 PROCEDURE — 3075F PR MOST RECENT SYSTOLIC BLOOD PRESS GE 130-139MM HG: ICD-10-PCS | Mod: CPTII,S$GLB,, | Performed by: NURSE PRACTITIONER

## 2019-02-26 RX ORDER — TRAMADOL HYDROCHLORIDE 50 MG/1
50 TABLET ORAL EVERY 8 HOURS PRN
Qty: 15 TABLET | Refills: 0 | Status: SHIPPED | OUTPATIENT
Start: 2019-02-26 | End: 2019-03-03

## 2019-02-26 NOTE — PATIENT INSTRUCTIONS
Ultram request to PCP to approve   Consider orthopedics  Report to ER immediately if symptoms worsen

## 2019-02-26 NOTE — PROGRESS NOTES
Subjective:       Patient ID: Yodit Canseco is a 58 y.o. female.    Chief Complaint: Shoulder Pain (right )    Shoulder Pain    The pain is present in the right shoulder. This is a new problem. The current episode started 1 to 4 weeks ago (x 2 w). There has been no history of extremity trauma. The problem occurs daily. The problem has been unchanged. The quality of the pain is described as aching. The pain is moderate. Associated symptoms include stiffness. Pertinent negatives include no fever, headaches, inability to bear weight, itching, joint locking, joint swelling, limited range of motion, numbness, tingling or visual symptoms. The symptoms are aggravated by activity. She has tried acetaminophen (Declines steroid, NSAID) for the symptoms. The treatment provided no relief. Family history includes arthritis. There is no history of diabetes, Injuries to Extremity or migraines.     Past Medical History:   Diagnosis Date    Allergy     Angio-edema     Arthralgia     Asthma without status asthmaticus     Bronchitis     Diverticulosis of large intestine without hemorrhage 10/8/2015    Environmental allergies     Eosinophilic asthma 3/8/2018    Gastroparesis     Hand arthritis     Herpes simplex without mention of complication     oral    Hyperlipidemia     Hypothyroidism     Immune deficiency disorder     Immune disorder     Intraperitoneal abscess 5/7/2018    LBP radiating to left leg     Leukocytosis, unspecified     Migraine headache     Obesity, Class III, BMI 40-49.9 (morbid obesity)     Periorbital cellulitis 12/31/2016    Prediabetes     Reactive airway disease     Reactive airway disease     Recurrent upper respiratory infection (URI)     S/P colonoscopy November 2010    Sepsis 5/7/2018    Urticaria      Social History     Socioeconomic History    Marital status:      Spouse name: Not on file    Number of children: Not on file    Years of education: Not on file     Highest education level: Not on file   Social Needs    Financial resource strain: Not on file    Food insecurity - worry: Not on file    Food insecurity - inability: Not on file    Transportation needs - medical: Not on file    Transportation needs - non-medical: Not on file   Occupational History    Not on file   Tobacco Use    Smoking status: Never Smoker    Smokeless tobacco: Never Used   Substance and Sexual Activity    Alcohol use: Yes     Alcohol/week: 0.0 oz     Comment: very rarely    Drug use: No    Sexual activity: Yes     Partners: Male   Other Topics Concern    Are you pregnant or think you may be? Not Asked    Breast-feeding Not Asked   Social History Narrative    . Disabled teacher.     Past Surgical History:   Procedure Laterality Date    ADENOIDECTOMY      CHOLECYSTECTOMY      CHOLECYSTECTOMY      COLONOSCOPY  2015    repeat in 10    COLONOSCOPY N/A 10/8/2015    [facility]    ESOPHAGOGASTRODUODENOSCOPY (EGD) N/A 5/19/2018    [facility]    ESOPHAGOGASTRODUODENOSCOPY (EGD) N/A 10/8/2015    [facility]    ESOPHAGOGASTRODUODENOSCOPY (EGD)- stent, keyo tube, please have hemoclips N/A 5/7/2018    [facility] OR    GASTRECTOMY-SLEEVE-LAPAROSCOPIC WITH EGD N/A 4/16/2018    [facility]    Hand fracture surgery      HARDWARE REMOVAL      left hand 5th     hymenectomy      HYSTERECTOMY      menorrhagia-Ovaries intact    REMOVAL-STENT-ESOPHAGEAL, UPPER ENDOSCOPIC STENT REMOVAL AND INSERTION NASOJEJUNAL TUBE  5/9/2018    [facility]    SLEEVE GASTROPLASTY  04/16/2018    TONSILLECTOMY      Urethral dilatation         Review of Systems   Constitutional: Negative.  Negative for fever.   HENT: Negative.    Eyes: Negative.    Respiratory: Negative.    Cardiovascular: Negative.     Gastrointestinal: Negative.    Endocrine: Negative.    Genitourinary: Negative.    Musculoskeletal: Positive for arthralgias (right shoulder pain) and stiffness.   Skin: Negative.  Negative for itching.   Allergic/Immunologic: Negative.    Neurological: Negative.  Negative for tingling, numbness and headaches.   Psychiatric/Behavioral: Negative.        Objective:      Physical Exam   Constitutional: She is oriented to person, place, and time. She appears well-developed and well-nourished.   HENT:   Head: Normocephalic.   Right Ear: External ear normal.   Left Ear: External ear normal.   Nose: Nose normal.   Mouth/Throat: Oropharynx is clear and moist.   Eyes: Conjunctivae are normal. Pupils are equal, round, and reactive to light.   Neck: Normal range of motion. Neck supple.   Cardiovascular: Normal rate, regular rhythm and normal heart sounds.   Pulmonary/Chest: Effort normal and breath sounds normal.   Abdominal: Soft. Bowel sounds are normal.   Musculoskeletal: Normal range of motion.        Right shoulder: She exhibits pain. She exhibits normal range of motion, no tenderness, no bony tenderness, no swelling, no effusion, no crepitus, no deformity, no laceration, no spasm, normal pulse and normal strength.   Neurological: She is alert and oriented to person, place, and time.   Skin: Skin is warm and dry. Capillary refill takes 2 to 3 seconds.   Psychiatric: She has a normal mood and affect. Her behavior is normal. Judgment and thought content normal.   Nursing note and vitals reviewed.      Assessment:       1. Acute pain of right shoulder        Plan:           Yodit was seen today for shoulder pain.    Diagnoses and all orders for this visit:    Acute pain of right shoulder  -     X-ray Shoulder 2 or More Views Right; Future  Ultra request to PCP to approve   Consider orthopedics  Report to ER immediately if symptoms worsen

## 2019-03-05 ENCOUNTER — PATIENT MESSAGE (OUTPATIENT)
Dept: FAMILY MEDICINE | Facility: CLINIC | Age: 59
End: 2019-03-05

## 2019-03-06 RX ORDER — SUMATRIPTAN SUCCINATE 100 MG/1
100 TABLET ORAL DAILY
Qty: 9 TABLET | Refills: 5 | Status: SHIPPED | OUTPATIENT
Start: 2019-03-06 | End: 2020-04-25

## 2019-03-26 ENCOUNTER — OFFICE VISIT (OUTPATIENT)
Dept: DERMATOLOGY | Facility: CLINIC | Age: 59
End: 2019-03-26
Payer: COMMERCIAL

## 2019-03-26 DIAGNOSIS — R22.9 SUBCUTANEOUS NODULE: ICD-10-CM

## 2019-03-26 DIAGNOSIS — D22.9 MULTIPLE BENIGN NEVI: ICD-10-CM

## 2019-03-26 DIAGNOSIS — Z12.83 SCREENING, MALIGNANT NEOPLASM, SKIN: ICD-10-CM

## 2019-03-26 DIAGNOSIS — D18.00 ANGIOMA: Primary | ICD-10-CM

## 2019-03-26 PROCEDURE — 99203 PR OFFICE/OUTPT VISIT, NEW, LEVL III, 30-44 MIN: ICD-10-PCS | Mod: 25,S$GLB,, | Performed by: STUDENT IN AN ORGANIZED HEALTH CARE EDUCATION/TRAINING PROGRAM

## 2019-03-26 PROCEDURE — 99203 OFFICE O/P NEW LOW 30 MIN: CPT | Mod: 25,S$GLB,, | Performed by: STUDENT IN AN ORGANIZED HEALTH CARE EDUCATION/TRAINING PROGRAM

## 2019-03-26 PROCEDURE — 99999 PR PBB SHADOW E&M-EST. PATIENT-LVL II: CPT | Mod: PBBFAC,,, | Performed by: STUDENT IN AN ORGANIZED HEALTH CARE EDUCATION/TRAINING PROGRAM

## 2019-03-26 PROCEDURE — 99999 PR PBB SHADOW E&M-EST. PATIENT-LVL II: ICD-10-PCS | Mod: PBBFAC,,, | Performed by: STUDENT IN AN ORGANIZED HEALTH CARE EDUCATION/TRAINING PROGRAM

## 2019-03-26 NOTE — PROGRESS NOTES
Subjective:       Patient ID:  Yodit Canseco is a 58 y.o. female who presents for   Chief Complaint   Patient presents with    Skin Check     tbse      History of Present Illness: The patient presents with chief complaint of skin check and evaluation of moles.   Location: back, arms  Duration: years  Signs/Symptoms: none   Prior treatments: none  Uncle and aunt with a history of melanoma, father and mother with a history of skin cancer. Pt denies any history of skin cancer       Review of Systems   Skin: Positive for activity-related sunscreen use. Negative for itching, rash, dry skin and daily sunscreen use.        Objective:    Physical Exam   Constitutional: She appears well-developed and well-nourished. No distress.   Neurological: She is alert and oriented to person, place, and time. She is not disoriented.   Psychiatric: She has a normal mood and affect.   Skin:   Areas Examined (abnormalities noted in diagram):   Scalp / Hair Palpated and Inspected  Head / Face Inspection Performed  Neck Inspection Performed  Chest / Axilla Inspection Performed  Abdomen Inspection Performed  Genitals / Buttocks / Groin Inspection Performed  Back Inspection Performed  RUE Inspected  LUE Inspection Performed  RLE Inspected  LLE Inspection Performed  Nails and Digits Inspection Performed                       Diagram Legend     Erythematous scaling macule/papule c/w actinic keratosis       Vascular papule c/w angioma      Pigmented verrucoid papule/plaque c/w seborrheic keratosis      Yellow umbilicated papule c/w sebaceous hyperplasia      Irregularly shaped tan macule c/w lentigo     1-2 mm smooth white papules consistent with Milia      Movable subcutaneous cyst with punctum c/w epidermal inclusion cyst      Subcutaneous movable cyst c/w pilar cyst      Firm pink to brown papule c/w dermatofibroma      Pedunculated fleshy papule(s) c/w skin tag(s)      Evenly pigmented macule c/w junctional nevus     Mildly variegated  pigmented, slightly irregular-bordered macule c/w mildly atypical nevus      Flesh colored to evenly pigmented papule c/w intradermal nevus       Pink pearly papule/plaque c/w basal cell carcinoma      Erythematous hyperkeratotic cursted plaque c/w SCC      Surgical scar with no sign of skin cancer recurrence      Open and closed comedones      Inflammatory papules and pustules      Verrucoid papule consistent consistent with wart     Erythematous eczematous patches and plaques     Dystrophic onycholytic nail with subungual debris c/w onychomycosis     Umbilicated papule    Erythematous-base heme-crusted tan verrucoid plaque consistent with inflamed seborrheic keratosis     Erythematous Silvery Scaling Plaque c/w Psoriasis     See annotation      Assessment / Plan:        Angioma  This is a benign vascular lesion. Reassurance given. No treatment required.     Multiple benign nevi  Screening, malignant neoplasm, skin  total body skin examination performed today including at least 12 points as noted in physical examination. No lesions suspicious for malignancy noted.  Reassurance provided.  Instructed patient to observe lesion(s) for changes and follow up in clinic if changes are noted. Discussed ABCDE's of moles and brochure provided.    Subcutaneous nodule  Reassurance given to patient. No treatment is necessary.   Discussed treatment options - excision vs observation. Cysts may recur with excision. Given location and irritation with rubbing against bra, will schedule for surgical removal.         Follow up in about 1 year (around 3/26/2020).

## 2019-03-26 NOTE — PATIENT INSTRUCTIONS

## 2019-04-24 ENCOUNTER — OFFICE VISIT (OUTPATIENT)
Dept: SURGERY | Facility: CLINIC | Age: 59
End: 2019-04-24
Payer: COMMERCIAL

## 2019-04-24 ENCOUNTER — OFFICE VISIT (OUTPATIENT)
Dept: PULMONOLOGY | Facility: CLINIC | Age: 59
End: 2019-04-24
Payer: COMMERCIAL

## 2019-04-24 VITALS
SYSTOLIC BLOOD PRESSURE: 119 MMHG | DIASTOLIC BLOOD PRESSURE: 72 MMHG | TEMPERATURE: 98 F | BODY MASS INDEX: 30.88 KG/M2 | RESPIRATION RATE: 16 BRPM | HEIGHT: 62 IN | WEIGHT: 167.81 LBS | HEART RATE: 85 BPM

## 2019-04-24 VITALS
WEIGHT: 171.94 LBS | BODY MASS INDEX: 31.64 KG/M2 | DIASTOLIC BLOOD PRESSURE: 69 MMHG | OXYGEN SATURATION: 97 % | SYSTOLIC BLOOD PRESSURE: 103 MMHG | HEART RATE: 83 BPM | HEIGHT: 62 IN

## 2019-04-24 DIAGNOSIS — J45.50 SEVERE PERSISTENT ASTHMA WITHOUT COMPLICATION: Primary | ICD-10-CM

## 2019-04-24 DIAGNOSIS — E78.2 MIXED HYPERLIPIDEMIA: ICD-10-CM

## 2019-04-24 DIAGNOSIS — D84.9 IMMUNE DEFICIENCY DISORDER: ICD-10-CM

## 2019-04-24 DIAGNOSIS — E66.01 MORBID OBESITY: Primary | ICD-10-CM

## 2019-04-24 DIAGNOSIS — E66.01 OBESITY, CLASS III, BMI 40-49.9 (MORBID OBESITY): ICD-10-CM

## 2019-04-24 DIAGNOSIS — Z98.84 S/P BARIATRIC SURGERY: ICD-10-CM

## 2019-04-24 PROCEDURE — 99999 PR PBB SHADOW E&M-EST. PATIENT-LVL III: ICD-10-PCS | Mod: PBBFAC,,, | Performed by: NURSE PRACTITIONER

## 2019-04-24 PROCEDURE — 99213 OFFICE O/P EST LOW 20 MIN: CPT | Mod: S$GLB,,, | Performed by: SURGERY

## 2019-04-24 PROCEDURE — 3078F DIAST BP <80 MM HG: CPT | Mod: CPTII,S$GLB,, | Performed by: SURGERY

## 2019-04-24 PROCEDURE — 99214 OFFICE O/P EST MOD 30 MIN: CPT | Mod: S$GLB,,, | Performed by: NURSE PRACTITIONER

## 2019-04-24 PROCEDURE — 99999 PR PBB SHADOW E&M-EST. PATIENT-LVL III: CPT | Mod: PBBFAC,,, | Performed by: NURSE PRACTITIONER

## 2019-04-24 PROCEDURE — 99213 PR OFFICE/OUTPT VISIT, EST, LEVL III, 20-29 MIN: ICD-10-PCS | Mod: S$GLB,,, | Performed by: SURGERY

## 2019-04-24 PROCEDURE — 3078F PR MOST RECENT DIASTOLIC BLOOD PRESSURE < 80 MM HG: ICD-10-PCS | Mod: CPTII,S$GLB,, | Performed by: SURGERY

## 2019-04-24 PROCEDURE — 3008F PR BODY MASS INDEX (BMI) DOCUMENTED: ICD-10-PCS | Mod: CPTII,S$GLB,, | Performed by: NURSE PRACTITIONER

## 2019-04-24 PROCEDURE — 3008F PR BODY MASS INDEX (BMI) DOCUMENTED: ICD-10-PCS | Mod: CPTII,S$GLB,, | Performed by: SURGERY

## 2019-04-24 PROCEDURE — 99214 PR OFFICE/OUTPT VISIT, EST, LEVL IV, 30-39 MIN: ICD-10-PCS | Mod: S$GLB,,, | Performed by: NURSE PRACTITIONER

## 2019-04-24 PROCEDURE — 99999 PR PBB SHADOW E&M-EST. PATIENT-LVL III: ICD-10-PCS | Mod: PBBFAC,,, | Performed by: SURGERY

## 2019-04-24 PROCEDURE — 3008F BODY MASS INDEX DOCD: CPT | Mod: CPTII,S$GLB,, | Performed by: NURSE PRACTITIONER

## 2019-04-24 PROCEDURE — 3074F SYST BP LT 130 MM HG: CPT | Mod: CPTII,S$GLB,, | Performed by: SURGERY

## 2019-04-24 PROCEDURE — 3074F SYST BP LT 130 MM HG: CPT | Mod: CPTII,S$GLB,, | Performed by: NURSE PRACTITIONER

## 2019-04-24 PROCEDURE — 3078F PR MOST RECENT DIASTOLIC BLOOD PRESSURE < 80 MM HG: ICD-10-PCS | Mod: CPTII,S$GLB,, | Performed by: NURSE PRACTITIONER

## 2019-04-24 PROCEDURE — 3078F DIAST BP <80 MM HG: CPT | Mod: CPTII,S$GLB,, | Performed by: NURSE PRACTITIONER

## 2019-04-24 PROCEDURE — 99999 PR PBB SHADOW E&M-EST. PATIENT-LVL III: CPT | Mod: PBBFAC,,, | Performed by: SURGERY

## 2019-04-24 PROCEDURE — 3074F PR MOST RECENT SYSTOLIC BLOOD PRESSURE < 130 MM HG: ICD-10-PCS | Mod: CPTII,S$GLB,, | Performed by: SURGERY

## 2019-04-24 PROCEDURE — 3074F PR MOST RECENT SYSTOLIC BLOOD PRESSURE < 130 MM HG: ICD-10-PCS | Mod: CPTII,S$GLB,, | Performed by: NURSE PRACTITIONER

## 2019-04-24 PROCEDURE — 3008F BODY MASS INDEX DOCD: CPT | Mod: CPTII,S$GLB,, | Performed by: SURGERY

## 2019-04-24 RX ORDER — LEVOFLOXACIN 500 MG/1
500 TABLET, FILM COATED ORAL DAILY
Qty: 10 TABLET | Refills: 3 | Status: SHIPPED | OUTPATIENT
Start: 2019-04-24 | End: 2019-05-04

## 2019-04-24 RX ORDER — PREDNISONE 20 MG/1
TABLET ORAL
Qty: 12 TABLET | Refills: 0 | Status: SHIPPED | OUTPATIENT
Start: 2019-04-24 | End: 2020-03-16

## 2019-04-24 RX ORDER — ALBUTEROL SULFATE 90 UG/1
1 AEROSOL, METERED RESPIRATORY (INHALATION) EVERY 4 HOURS PRN
Qty: 3 INHALER | Refills: 3 | Status: SHIPPED | OUTPATIENT
Start: 2019-04-24 | End: 2019-11-08 | Stop reason: SINTOL

## 2019-04-24 NOTE — PROGRESS NOTES
4/24/2019    Yodit Canseco  Office Note    Chief Complaint   Patient presents with    Follow-up     6 months, needs PFT rerodered, unable to find order when she went to get it done    Medication Management     discuss measles immunization    Medication Refill     proair, albuterol for nebulizer, and singulair       HPI:   04/24/2019- breathing is good except for high pollen days, had 3 exacerbation in 6 months. Albuterol rescue inhaler 3x weekly, 1-2 nocturnal arousals monthly, could not do PFT at [facility],     11/6/18- ER visit for dehydration gastroenteritis, no prednisone use in 4 months. Not currently on fasenra injection, insurance would not cover. Currently on Hirzentra IGG therapy.   Sinus drip present, cough occasional, non productive, no nocturnal arousals, Currently on Dulera daily, uses Albuterol rescue inhaler daily before exercise no SOB.  Sepsis [facility] August 2017.  Flu vaccine   July 9, 2018 - had gastric sleeve with leak complication- lost 45 lbs already.  Has appetite but fills quickly.  Still on hirzentra.  Asthma ok avoiding fragrances/ordors.  No prednisone.   No nocturnal arousals, uses rescue weekly avoiding any irritants.  Use prednisone 4-5 last year and twice this year.        March 19, 2018-since recovered from pleuritic pain - doing well.  No prednisone use recent.  Pt has had no wheezes.  feb 22,2108   Had to do prednisone again.  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 I have advised not to work given severe asthma,  hypersensitivity to fragrances, and immune deficiency.   Pt seems better since Hirzentra, but still unstable severe asthma  Jan 29. 2018- 6 days ago had been exposed to sick , had nasal congestion , cough, ear stuffy, muscle aches /joint aches / fever.  Temp to 100.1.  Seen by np and flu screen negative.  Had asthma  3-4 flares last year. eos up past,   oct 5, 2017 - had marce in April, skipped couple doses hirzentra, had mild bronchitis once, otherwise doing very well.  No prednisone.  No side effects and covered. Ppt flu sense for 2 days  Jan 24, on  hirzentra q o week since sept and asthma more stable, no hosp, no prednisone, no noct asthma/ no rescue therapy- control not this good for years.  Sept 27,  HPI:has had lung problems since birth, no nocturnal arousals, uses rescue 2/d, breathing controlled satisfactory except with irritants/infection - strong abx needed to clear.  Prednisone 2/yr, last hosp 18 months.  No ventilator.    Had exacerbation recent due uri from .  Has had shingles vaccine past.       The chief compliant  problem is varies with instablilty at time  PFSH:  Past Medical History:   Diagnosis Date    Allergy     Angio-edema     Arthralgia     Asthma without status asthmaticus     Bronchitis     Diverticulosis of large intestine without hemorrhage 10/8/2015    Environmental allergies     Eosinophilic asthma 3/8/2018    Gastroparesis     Hand arthritis     Herpes simplex without mention of complication     oral    Hyperlipidemia     Hypothyroidism     Immune deficiency disorder     Immune disorder     Intraperitoneal abscess 5/7/2018    LBP radiating to left leg     Leukocytosis, unspecified     Migraine headache     Obesity, Class III, BMI 40-49.9 (morbid obesity)     Periorbital cellulitis 12/31/2016    Prediabetes     Reactive airway disease     Reactive airway disease     Recurrent upper respiratory infection (URI)     S/P colonoscopy November 2010    Sepsis 5/7/2018    Urticaria          Past Surgical History:   Procedure Laterality Date    ADENOIDECTOMY      CHOLECYSTECTOMY      CHOLECYSTECTOMY      COLONOSCOPY  2015    repeat in 10    COLONOSCOPY N/A 10/8/2015    [facility]O    ESOPHAGOGASTRODUODENOSCOPY (EGD) N/A 5/19/2018     "[facility]    ESOPHAGOGASTRODUODENOSCOPY (EGD) N/A 10/8/2015    [facility]    ESOPHAGOGASTRODUODENOSCOPY (EGD)- stent, keyo tube, please have hemoclips N/A 5/7/2018    [facility] OR    GASTRECTOMY-SLEEVE-LAPAROSCOPIC WITH EGD N/A 4/16/2018    [facility] OR    Hand fracture surgery      HARDWARE REMOVAL      left hand 5th     hymenectomy      HYSTERECTOMY      menorrhagia-Ovaries intact    REMOVAL-STENT-ESOPHAGEAL, UPPER ENDOSCOPIC STENT REMOVAL AND INSERTION NASOJEJUNAL TUBE  5/9/2018    [facility]    SLEEVE GASTROPLASTY  04/16/2018    TONSILLECTOMY      Urethral dilatation       Social History     Tobacco Use    Smoking status: Never Smoker    Smokeless tobacco: Never Used   Substance Use Topics    Alcohol use: Yes     Alcohol/week: 0.0 oz     Comment: very rarely    Drug use: No     Family History   Problem Relation Age of Onset    Melanoma Mother     Basal cell carcinoma Mother     Lung disease Mother         pulm htn    Cataracts Mother     Hypertension Mother     Lung cancer Father     Diabetes Father     Hypertension Father     Cancer Father     Diabetes Paternal Aunt     Colon cancer Paternal Aunt 40    Diabetes Paternal Aunt     Ovarian cancer Paternal Grandmother 40    Cancer Maternal Grandfather     Melanoma Maternal Aunt     Melanoma Maternal Uncle     Breast cancer Paternal Aunt     Lymphoma Paternal Aunt     Ulcerative colitis Son     Psoriasis Son     Psoriasis Son     Breast cancer Cousin 25    Allergic rhinitis Neg Hx     Allergies Neg Hx     Angioedema Neg Hx     Asthma Neg Hx     Atopy Neg Hx     Eczema Neg Hx     Immunodeficiency Neg Hx     Rhinitis Neg Hx     Urticaria Neg Hx      Review of patient's allergies indicates:   Allergen Reactions    Bromelains Hives     " causes mouth to " "bleed"    Sudafed [pseudoephedrine hcl] Other (See Comments)     Does not want to wake up .    Amoxicillin-pot clavulanate Itching     Other reaction(s): Itching    Hydrocodone Itching    Iodinated contrast- oral and iv dye      childhood    Iodine and iodide containing products Other (See Comments)    Melon Hives    Pantoprazole Nausea Only     Other reaction(s): upset stomach/halitosis    Percocet [oxycodone-acetaminophen] Itching    Barium iodide Rash    Ephedrine Other (See Comments)     Other reaction(s): comatose    Penicillins      Other reaction(s): does not work on pt symptoms     I have reviewed past medical, family, and social history. I have reviewed previous nurse notes.    Performance Status:The patient's activity level is functions out of house.      Review of Systems   Constitutional: Negative for activity change, appetite change, chills, diaphoresis, fatigue, fever and unexpected weight change.   HENT: Negative for dental problem, sneezing, sore throat, trouble swallowing, postnasal drip, rhinorrhea, sinus pressure, sinus pain, and voice change.     Respiratory: Negative for apnea, cough, chest tightness, shortness of breath, cough, SOB, chest tightness,wheezing and stridor.   Cardiovascular: Negative for chest pain, palpitations and leg swelling.   Gastrointestinal: Negative for abdominal distention, abdominal pain, constipation and nausea.   Musculoskeletal: Negative for gait problem, myalgias and neck pain.   Skin: Negative for color change and pallor.   Allergic/Immunologic: Negative for environmental allergies and food allergies.   Neurological: Negative for dizziness, speech difficulty, weakness, light-headedness, numbness and headaches.   Hematological: Negative for adenopathy. Does not bruise/bleed easily.   Psychiatric/Behavioral: Negative for dysphoric mood and sleep disturbance. The patient is not nervous/anxious.           Exam:Comprehensive exam done. /69 (BP Location: " "Left arm, Patient Position: Sitting)   Pulse 83   Ht 5' 2" (1.575 m)   Wt 78 kg (171 lb 15.3 oz)   SpO2 97% Comment: on room air  BMI 31.45 kg/m²   Exam included Vitals as listed  Constitutional: He is oriented to person, place, and time. He appears well-developed. No distress.   Nose: Nose normal.   Mouth/Throat: Uvula is midline, oropharynx is clear and moist and mucous membranes are normal. No dental caries. No oropharyngeal exudate, posterior oropharyngeal edema, posterior oropharyngeal erythema or tonsillar abscesses.  Mallapatti (M) score II  Eyes: Pupils are equal, round, and reactive to light.   Neck: No JVD present. No thyromegaly present.   Cardiovascular: Normal rate, regular rhythm and normal heart sounds. Exam reveals no gallop and no friction rub.   No murmur heard.  Pulmonary/Chest: Effort normal and breath sounds normal. No accessory muscle usage or stridor. No apnea and no tachypnea. No respiratory distress, decreased breath sounds, wheezes, rhonchi, rales, or tenderness.   Abdominal: Soft. He exhibits no mass. There is no tenderness. No hepatosplenomegaly, hernias and normoactive bowel sounds  Musculoskeletal: Normal range of motion. exhibits no edema.   Lymphadenopathy:     He has no cervical adenopathy.     He has no axillary adenopathy.   Neurological:  alert and oriented to person, place, and time. not disoriented.   Skin: Skin is warm and dry. Capillary refill takes less 2 sec. No cyanosis or erythema. No pallor. Nails show no clubbing.   Psychiatric: normal mood and affect. behavior is normal. Judgment and thought content normal.       Radiographs (ct chest and cxr) reviewed: results reviewed   XR CHEST 1 VIEW 05/10/2018   There is left lower lobe atelectasis and possible small left pleural effusion on this study.  No pneumothorax is seen.      Labs reviewed       Lab Results   Component Value Date    WBC 9.52 01/08/2019    RBC 5.22 01/08/2019    HGB 15.1 01/08/2019    HCT 46.2 " 01/08/2019    MCV 89 01/08/2019    MCH 28.9 01/08/2019    MCHC 32.7 01/08/2019    RDW 13.7 01/08/2019     01/08/2019    MPV 12.1 01/08/2019    GRAN 6.3 01/08/2019    GRAN 66.6 01/08/2019    LYMPH 2.4 01/08/2019    LYMPH 24.9 01/08/2019    MONO 0.5 01/08/2019    MONO 5.7 01/08/2019    EOS 0.2 01/08/2019    BASO 0.05 01/08/2019    EOSINOPHIL 2.1 01/08/2019    BASOPHIL 0.5 01/08/2019       PFT results ordered will be reviewed    Plan:  Clinical impression is apparently straight forward and impression with management as below.    Yodit was seen today for follow-up, medication management and medication refill.    Diagnoses and all orders for this visit:    Severe persistent asthma without complication  -     albuterol (PROAIR HFA) 90 mcg/actuation inhaler; Inhale 1 puff into the lungs every 4 (four) hours as needed for Wheezing or Shortness of Breath.  -     Complete PFT with bronchodilator; Future  -     predniSONE (DELTASONE) 20 MG tablet; Take one pill a day for three days, repeat for shortness of breath  -     levoFLOXacin (LEVAQUIN) 500 MG tablet; Take 1 tablet (500 mg total) by mouth once daily. for 10 days    Immune deficiency disorder  -     levoFLOXacin (LEVAQUIN) 500 MG tablet; Take 1 tablet (500 mg total) by mouth once daily. for 10 days        Follow up in about 6 months (around 10/24/2019), or if symptoms worsen or fail to improve.    Discussed with patient above for education the following:      Patient Instructions   Have pulmonary function test at hospital before before next appointment, same day is fine    Continue  Current Asthma therapy    Switch from Singulair to Xyzol for a month and see if works better for allergies    Continue Hyzentra therapy for immune disorder  Ask Immunologist if MMR is appropriate. The information we reviewed stated NO to live virus immunizations. Would wait till infant grandson is over 1 year    You can get Shingrex, 2 dose shingles vaccine is not alive and safe to  get.

## 2019-04-24 NOTE — PROGRESS NOTES
Post Op Note    Surgery: gastric sleeve surgery  Date: 4/16/18  Initial weight: 215  Last weight: 170  Current weight: 167    Constipation: none  Reflux: none  Vomiting: none    Diet:     Had some cheats over holidays     Breakfast:  Eggs, omlettes, sausage sutton  Lunch: turkey jerky, beef jerky  Dinner: doing vegetable pastas with meatballs   Snack: pork rinds  Protein shake: none    Exercise:  Walking- 3.5 miles per day  Hurt shoulder- so less upper body exercising    MVI: 2 mvi daily, b12, biotin    Vitals:    04/24/19 1311   BP: 119/72   Pulse: 85   Resp: 16   Temp: 97.7 °F (36.5 °C)       Body comp:  Fat Percent:  41.2 %  Fat Mass:  69.2 lb  FFM:  98.6 lb  TBW: 69.2 lb  TBW %:  41.2 %  BMR: 1367 kcal    PE:  NAD  RRR  Soft/nt/nd  Incisions: none    Labs: none    A/P: s/p sleeve with leak doing well     Counseling for patient:    Diet: ok for healthy eating  Exercise: as much as possible  Vitamins: daily mvi    Co morbidities:     1. Morbid obesity     2. Obesity, Class III, BMI 40-49.9 (morbid obesity)     3. S/P bariatric surgery     4. Mixed hyperlipidemia         -All above: Evaluated, monitored, and treated with diet and exercise program.        : I met with the patient for 15 minutes and counseled her for over 50% of that time

## 2019-04-24 NOTE — PATIENT INSTRUCTIONS
Have pulmonary function test at hospital before before next appointment, same day is fine    Continue  Current Asthma therapy    Switch from Singulair to Xyzol for a month and see if works better for allergies    Continue Hyzentra therapy for immune disorder  Ask Immunologist if MMR is appropriate. The information we reviewed stated NO to live virus immunizations. Would wait till infant grandson is over 1 year    You can get Shingrex, 2 dose shingles vaccine is not alive and safe to get.

## 2019-05-20 RX ORDER — LEVOTHYROXINE SODIUM 75 UG/1
TABLET ORAL
Qty: 30 TABLET | Refills: 1 | Status: SHIPPED | OUTPATIENT
Start: 2019-05-20 | End: 2019-07-18 | Stop reason: SDUPTHER

## 2019-05-21 ENCOUNTER — PROCEDURE VISIT (OUTPATIENT)
Dept: DERMATOLOGY | Facility: CLINIC | Age: 59
End: 2019-05-21
Payer: COMMERCIAL

## 2019-05-21 DIAGNOSIS — L30.9 DERMATITIS: ICD-10-CM

## 2019-05-21 DIAGNOSIS — R22.9 SUBCUTANEOUS NODULE: Primary | ICD-10-CM

## 2019-05-21 PROCEDURE — 99499 UNLISTED E&M SERVICE: CPT | Mod: S$GLB,,, | Performed by: STUDENT IN AN ORGANIZED HEALTH CARE EDUCATION/TRAINING PROGRAM

## 2019-05-21 PROCEDURE — 11421 EXC H-F-NK-SP B9+MARG 0.6-1: CPT | Mod: S$GLB,,, | Performed by: STUDENT IN AN ORGANIZED HEALTH CARE EDUCATION/TRAINING PROGRAM

## 2019-05-21 PROCEDURE — 99499 NO LOS: ICD-10-PCS | Mod: S$GLB,,, | Performed by: STUDENT IN AN ORGANIZED HEALTH CARE EDUCATION/TRAINING PROGRAM

## 2019-05-21 PROCEDURE — 88304 TISSUE EXAM BY PATHOLOGIST: CPT | Performed by: PATHOLOGY

## 2019-05-21 PROCEDURE — 11421 PR EXC SKIN BENIG 0.6-1 CM REMAINDR BODY: ICD-10-PCS | Mod: S$GLB,,, | Performed by: STUDENT IN AN ORGANIZED HEALTH CARE EDUCATION/TRAINING PROGRAM

## 2019-05-21 PROCEDURE — 12041 PR LAYR CLOS WND REST BODY <2.5 CM: ICD-10-PCS | Mod: 51,S$GLB,, | Performed by: STUDENT IN AN ORGANIZED HEALTH CARE EDUCATION/TRAINING PROGRAM

## 2019-05-21 PROCEDURE — 88304 TISSUE SPECIMEN TO PATHOLOGY, DERMATOLOGY: ICD-10-PCS | Mod: 26,,, | Performed by: PATHOLOGY

## 2019-05-21 PROCEDURE — 88304 TISSUE EXAM BY PATHOLOGIST: CPT | Mod: 26,,, | Performed by: PATHOLOGY

## 2019-05-21 PROCEDURE — 12041 INTMD RPR N-HF/GENIT 2.5CM/<: CPT | Mod: 51,S$GLB,, | Performed by: STUDENT IN AN ORGANIZED HEALTH CARE EDUCATION/TRAINING PROGRAM

## 2019-05-21 RX ORDER — BETAMETHASONE VALERATE 1.2 MG/G
CREAM TOPICAL 2 TIMES DAILY
Qty: 45 G | Refills: 1 | Status: SHIPPED | OUTPATIENT
Start: 2019-05-21 | End: 2021-03-19

## 2019-05-21 NOTE — PATIENT INSTRUCTIONS

## 2019-05-21 NOTE — PROGRESS NOTES
PROCEDURE: Elliptical excision with intermediate layered repair    ANESTHETIC: 8 cc 1% Lidocaine with Epinephrine 1:100,000    SURGICAL PREP: Betadine and EtOH    SURGEON: Zechariah Sierra MD    ASSISTANTS:  Katharine Schafer LPN    PREOPERATIVE DIAGNOSIS: Subcutaneous nodule      POSTOPERATIVE DIAGNOSIS: Subcutaneous nodule    PATHOLOGIC DIAGNOSIS: Pending    LOCATION: right superior axilla, right inferior axilla    INITIAL LESION SIZE: 0.8 cm, 0.4cm    EXCISED DIAMETER: 0.8 cm, 0.4cm    PREPARATION:  The diagnosis, procedure, alternatives, benefits and risks, including but not limited to: drug reactions, pain, scar or cosmetic defect, local sensation disturbances, and/or recurrence of present condition were explained to the patient. The patient elected to proceed.    PROCEDURE:  The area of the right superior axilla and right inferior axilla was prepped, draped, and anesthetized in the usual sterile fashion. Lesional tissue was carefully marked prior to administration of the anesthesia. An elliptical excision was drawn around the lesion. A fusiform elliptical excision was done with a #15 blade carried down completely through the dermis into the subcutaneous tissues. Then, with a combination of blunt and sharp dissection, the lesion was removed.  The specimen was submitted for histologic evaluation. The operative site was widely undermined in the subcutaneous tissue plane. Then, electrocoagulation was used to obtain hemostasis. Blood loss was minimal. The wound was then approximated in a layered fashion with subcutaneous and intradermal 4-0 Vicryl sutures. The wound was then superficially closed with interrupted 4-0 Ethilon sutures.    The patient tolerated the procedure well.    The area was cleaned and dressed appropriately and the patient was given wound care instructions, as well as an appointment for follow-up evaluation.    LENGTH OF REPAIR: 0.8 cm, 0.4cm    There were no vitals filed for this visit.    Follow up  in about 2 weeks (around 6/4/2019) for suture removal.      Patient also with eczematous rash on the neck x several days. Will try course of betamethasone 2 times daily to affected area.

## 2019-05-28 ENCOUNTER — PATIENT MESSAGE (OUTPATIENT)
Dept: DERMATOLOGY | Facility: CLINIC | Age: 59
End: 2019-05-28

## 2019-05-28 ENCOUNTER — PATIENT MESSAGE (OUTPATIENT)
Dept: PULMONOLOGY | Facility: CLINIC | Age: 59
End: 2019-05-28

## 2019-06-04 ENCOUNTER — CLINICAL SUPPORT (OUTPATIENT)
Dept: DERMATOLOGY | Facility: CLINIC | Age: 59
End: 2019-06-04
Payer: COMMERCIAL

## 2019-06-04 DIAGNOSIS — Z48.02 VISIT FOR SUTURE REMOVAL: Primary | ICD-10-CM

## 2019-06-04 PROCEDURE — 99024 POSTOP FOLLOW-UP VISIT: CPT | Mod: S$GLB,,, | Performed by: STUDENT IN AN ORGANIZED HEALTH CARE EDUCATION/TRAINING PROGRAM

## 2019-06-04 PROCEDURE — 99024 PR POST-OP FOLLOW-UP VISIT: ICD-10-PCS | Mod: S$GLB,,, | Performed by: STUDENT IN AN ORGANIZED HEALTH CARE EDUCATION/TRAINING PROGRAM

## 2019-06-17 ENCOUNTER — PATIENT MESSAGE (OUTPATIENT)
Dept: FAMILY MEDICINE | Facility: CLINIC | Age: 59
End: 2019-06-17

## 2019-06-17 ENCOUNTER — TELEPHONE (OUTPATIENT)
Dept: FAMILY MEDICINE | Facility: CLINIC | Age: 59
End: 2019-06-17

## 2019-06-17 NOTE — TELEPHONE ENCOUNTER
----- Message from rOtega Ashby sent at 6/17/2019  8:48 AM CDT -----  Type: Needs Medical Advice    Who Called:  Patient  Best Call Back Number: [phone] (home)   Additional Information: Patient needs copy xrays. Please call to advise. Appointment is today 12:30p in [place]

## 2019-06-26 ENCOUNTER — OFFICE VISIT (OUTPATIENT)
Dept: FAMILY MEDICINE | Facility: CLINIC | Age: 59
End: 2019-06-26
Payer: COMMERCIAL

## 2019-06-26 VITALS
TEMPERATURE: 98 F | WEIGHT: 177.38 LBS | HEART RATE: 98 BPM | SYSTOLIC BLOOD PRESSURE: 117 MMHG | DIASTOLIC BLOOD PRESSURE: 65 MMHG | BODY MASS INDEX: 31.43 KG/M2 | HEIGHT: 63 IN

## 2019-06-26 DIAGNOSIS — R21 RASH: Primary | ICD-10-CM

## 2019-06-26 PROCEDURE — 3078F DIAST BP <80 MM HG: CPT | Mod: CPTII,S$GLB,, | Performed by: FAMILY MEDICINE

## 2019-06-26 PROCEDURE — 3008F BODY MASS INDEX DOCD: CPT | Mod: CPTII,S$GLB,, | Performed by: FAMILY MEDICINE

## 2019-06-26 PROCEDURE — 3074F SYST BP LT 130 MM HG: CPT | Mod: CPTII,S$GLB,, | Performed by: FAMILY MEDICINE

## 2019-06-26 PROCEDURE — 99213 PR OFFICE/OUTPT VISIT, EST, LEVL III, 20-29 MIN: ICD-10-PCS | Mod: S$GLB,,, | Performed by: FAMILY MEDICINE

## 2019-06-26 PROCEDURE — 99999 PR PBB SHADOW E&M-EST. PATIENT-LVL IV: CPT | Mod: PBBFAC,,, | Performed by: FAMILY MEDICINE

## 2019-06-26 PROCEDURE — 3078F PR MOST RECENT DIASTOLIC BLOOD PRESSURE < 80 MM HG: ICD-10-PCS | Mod: CPTII,S$GLB,, | Performed by: FAMILY MEDICINE

## 2019-06-26 PROCEDURE — 99999 PR PBB SHADOW E&M-EST. PATIENT-LVL IV: ICD-10-PCS | Mod: PBBFAC,,, | Performed by: FAMILY MEDICINE

## 2019-06-26 PROCEDURE — 3008F PR BODY MASS INDEX (BMI) DOCUMENTED: ICD-10-PCS | Mod: CPTII,S$GLB,, | Performed by: FAMILY MEDICINE

## 2019-06-26 PROCEDURE — 3074F PR MOST RECENT SYSTOLIC BLOOD PRESSURE < 130 MM HG: ICD-10-PCS | Mod: CPTII,S$GLB,, | Performed by: FAMILY MEDICINE

## 2019-06-26 PROCEDURE — 99213 OFFICE O/P EST LOW 20 MIN: CPT | Mod: S$GLB,,, | Performed by: FAMILY MEDICINE

## 2019-06-26 RX ORDER — VALACYCLOVIR HYDROCHLORIDE 1 G/1
1000 TABLET, FILM COATED ORAL 3 TIMES DAILY
Qty: 21 TABLET | Refills: 0 | Status: SHIPPED | OUTPATIENT
Start: 2019-06-26 | End: 2019-07-09

## 2019-06-26 NOTE — PATIENT INSTRUCTIONS

## 2019-06-26 NOTE — PROGRESS NOTES
Complains of a rash at the left neck with itching starting over the past day.  Not significantly painful.  No new contacts.  Current treatment beclomethasone cream      Yodit was seen today for rash.    Diagnoses and all orders for this visit:    Rash    Other orders  -     valACYclovir (VALTREX) 1000 MG tablet; Take 1 tablet (1,000 mg total) by mouth 3 (three) times daily.     Suspect early shingles.  Treatment as above.  Follow-up if worsening or not improving.  Handout given regarding shingles.  Do not use the beclomethasone on the neck rash            Past Medical History:  Past Medical History:   Diagnosis Date    Allergy     Angio-edema     Arthralgia     Asthma without status asthmaticus     Bronchitis     Diverticulosis of large intestine without hemorrhage 10/8/2015    Environmental allergies     Eosinophilic asthma 3/8/2018    Gastroparesis     Hand arthritis     Herpes simplex without mention of complication     oral    Hyperlipidemia     Hypothyroidism     Immune deficiency disorder     Immune disorder     Intraperitoneal abscess 5/7/2018    LBP radiating to left leg     Leukocytosis, unspecified     Migraine headache     Obesity, Class III, BMI 40-49.9 (morbid obesity)     Periorbital cellulitis 12/31/2016    Prediabetes     Reactive airway disease     Reactive airway disease     Recurrent upper respiratory infection (URI)     S/P colonoscopy November 2010    Sepsis 5/7/2018    Urticaria      Past Surgical History:   Procedure Laterality Date    ADENOIDECTOMY      CHOLECYSTECTOMY      CHOLECYSTECTOMY      COLONOSCOPY  2015    repeat in 10    COLONOSCOPY N/A 10/8/2015    [facility] at [facility] ENDO    ESOPHAGOGASTRODUODENOSCOPY (EGD) N/A 5/19/2018    [facility]    ESOPHAGOGASTRODUODENOSCOPY (EGD) N/A 10/8/2015    Performed by P[facility] at [facility] ENDO    ESOPHAGOGASTRODUODENOSCOPY (EGD)- stent, keyo tube,  "please have hemoclips N/A 5/7/2018    [facility]    GASTRECTOMY-SLEEVE-LAPAROSCOPIC WITH EGD N/A 4/16/2018    [facility] at [facility] OR    Hand fracture surgery      HARDWARE REMOVAL      left hand 5th     hymenectomy      HYSTERECTOMY      menorrhagia-Ovaries intact    REMOVAL-STENT-ESOPHAGEAL, UPPER ENDOSCOPIC STENT REMOVAL AND INSERTION NASOJEJUNAL TUBE  5/9/2018    [facility] at [facility] OR    SLEEVE GASTROPLASTY  04/16/2018    TONSILLECTOMY      Urethral dilatation       Review of patient's allergies indicates:   Allergen Reactions    Bromelains Hives     " causes mouth to bleed"    Sudafed [pseudoephedrine hcl] Other (See Comments)     Does not want to wake up .    Amoxicillin-pot clavulanate Itching     Other reaction(s): Itching    Hydrocodone Itching    Iodinated contrast- oral and iv dye      childhood    Iodine and iodide containing products Other (See Comments)    Melon Hives    Pantoprazole Nausea Only     Other reaction(s): upset stomach/halitosis    Percocet [oxycodone-acetaminophen] Itching    Barium iodide Rash    Ephedrine Other (See Comments)     Other reaction(s): comatose    Penicillins      Other reaction(s): does not work on pt symptoms     Current Outpatient Medications on File Prior to Visit   Medication Sig Dispense Refill    albuterol (PROAIR HFA) 90 mcg/actuation inhaler Inhale 1 puff into the lungs every 4 (four) hours as needed for Wheezing or Shortness of Breath. 3 Inhaler 3    albuterol (PROVENTIL) 2.5 mg /3 mL (0.083 %) nebulizer solution Take 3 mLs (2.5 mg total) by nebulization every 4 (four) hours as needed for Wheezing or Shortness of Breath. Every 4-6 hours  each 3    atorvastatin (LIPITOR) 20 MG tablet Take 1 tablet (20 mg total) by mouth once daily. 90 tablet 0    betamethasone valerate 0.1% (VALISONE) 0.1 % Crea Apply topically 2 (two) times daily. Do not apply to the " face. 45 g 1    calcium-vitamin D3 (CALCIUM 500 + D) 500 mg(1,250mg) -200 unit per tablet Take 1 tablet by mouth 2 (two) times daily with meals.      desonide (DESOWEN) 0.05 % cream Apply topically 2 (two) times daily. 1 Tube 3    estradiol (ESTRACE) 1 MG tablet Take 1 tablet (1 mg total) by mouth once daily. 30 tablet 11    immun glob G,IgG,-pro-IgA 0-50 (HIZENTRA) 2 gram/10 mL (20 %) Soln Inject 18 g into the skin every 14 (fourteen) days. 90 mL 12    levothyroxine (SYNTHROID) 75 MCG tablet TAKE 1 TABLET(75 MCG) BY MOUTH EVERY DAY 30 tablet 1    mometasone-formoterol (DULERA) 200-5 mcg/actuation inhaler Inhale 2 puffs into the lungs 2 (two) times daily. Controller 1 Inhaler 11    montelukast (SINGULAIR) 10 mg tablet Take 1 tablet (10 mg total) by mouth every evening. 90 tablet 3    sumatriptan (IMITREX) 100 MG tablet Take 1 tablet (100 mg total) by mouth once daily. (Patient taking differently: Take 100 mg by mouth every 2 (two) hours as needed. ) 9 tablet 5    predniSONE (DELTASONE) 20 MG tablet Take one pill a day for three days, repeat for shortness of breath 12 tablet 0     No current facility-administered medications on file prior to visit.      Social History     Socioeconomic History    Marital status:      Spouse name: Not on file    Number of children: Not on file    Years of education: Not on file    Highest education level: Not on file   Occupational History    Not on file   Social Needs    Financial resource strain: Not on file    Food insecurity:     Worry: Not on file     Inability: Not on file    Transportation needs:     Medical: Not on file     Non-medical: Not on file   Tobacco Use    Smoking status: Never Smoker    Smokeless tobacco: Never Used   Substance and Sexual Activity    Alcohol use: Yes     Alcohol/week: 0.0 oz     Comment: very rarely    Drug use: No    Sexual activity: Yes     Partners: Male   Lifestyle    Physical activity:     Days per week: Not on file  "    Minutes per session: Not on file    Stress: Not on file   Relationships    Social connections:     Talks on phone: Not on file     Gets together: Not on file     Attends Mu-ism service: Not on file     Active member of club or organization: Not on file     Attends meetings of clubs or organizations: Not on file     Relationship status: Not on file   Other Topics Concern    Are you pregnant or think you may be? Not Asked    Breast-feeding Not Asked   Social History Narrative    . Disabled teacher.     Family History   Problem Relation Age of Onset    Melanoma Mother     Basal cell carcinoma Mother     Lung disease Mother         pulm htn    Cataracts Mother     Hypertension Mother     Lung cancer Father     Diabetes Father     Hypertension Father     Cancer Father     Diabetes Paternal Aunt     Colon cancer Paternal Aunt 40    Diabetes Paternal Aunt     Ovarian cancer Paternal Grandmother 40    Cancer Maternal Grandfather     Melanoma Maternal Aunt     Melanoma Maternal Uncle     Breast cancer Paternal Aunt     Lymphoma Paternal Aunt     Ulcerative colitis Son     Psoriasis Son     Psoriasis Son     Breast cancer Cousin 25    Allergic rhinitis Neg Hx     Allergies Neg Hx     Angioedema Neg Hx     Asthma Neg Hx     Atopy Neg Hx     Eczema Neg Hx     Immunodeficiency Neg Hx     Rhinitis Neg Hx     Urticaria Neg Hx            ROS:  GENERAL: No fever, chills,  or significant weight changes.   CARDIOVASCULAR: Denies chest pain, PND, orthopnea or reduced exercise tolerance.  ABDOMEN: Appetite fine. Denies diarrhea, abdominal pain, hematemesis or blood in stool.  URINARY: No flank pain, dysuria or hematuria.    Vitals:    06/26/19 1542   BP: 117/65   Pulse: 98   Temp: 97.7 °F (36.5 °C)   Weight: 80.5 kg (177 lb 6.4 oz)   Height: 5' 2.5" (1.588 m)       Wt Readings from Last 3 Encounters:   06/26/19 80.5 kg (177 lb 6.4 oz)   04/24/19 78 kg (171 lb 15.3 oz)   04/24/19 76.1 kg " (167 lb 12.8 oz)       APPEARANCE: Well nourished, well developed, in no acute distress.    HEAD: Normocephalic.  Atraumatic.  EYES:   Right eye: Pupil reactive.  Conjunctiva clear.    Left eye: Pupil reactive.  Conjunctiva clear.    NECK: Supple. No bruits.  No JVD.  No cervical lymphadenopathy.  No thyromegaly.    CHEST: Breath sounds clear bilaterally.  Normal respiratory effort  CARDIOVASCULAR: Normal rate.  Regular rhythm.  No murmurs.  No rub.  No gallops.   No edema.  MENTAL STATUS: Alert.  Oriented x 3.  Skin shows some erythematous papules on the left side of the neck

## 2019-07-09 ENCOUNTER — OFFICE VISIT (OUTPATIENT)
Dept: FAMILY MEDICINE | Facility: CLINIC | Age: 59
End: 2019-07-09
Payer: COMMERCIAL

## 2019-07-09 VITALS
DIASTOLIC BLOOD PRESSURE: 76 MMHG | HEIGHT: 63 IN | WEIGHT: 178 LBS | SYSTOLIC BLOOD PRESSURE: 112 MMHG | BODY MASS INDEX: 31.54 KG/M2 | HEART RATE: 79 BPM

## 2019-07-09 DIAGNOSIS — E78.5 HYPERLIPIDEMIA, UNSPECIFIED HYPERLIPIDEMIA TYPE: Primary | ICD-10-CM

## 2019-07-09 PROBLEM — K65.1 INTRAPERITONEAL ABSCESS: Status: RESOLVED | Noted: 2018-05-07 | Resolved: 2019-07-09

## 2019-07-09 PROCEDURE — 3074F SYST BP LT 130 MM HG: CPT | Mod: CPTII,S$GLB,, | Performed by: FAMILY MEDICINE

## 2019-07-09 PROCEDURE — 3078F PR MOST RECENT DIASTOLIC BLOOD PRESSURE < 80 MM HG: ICD-10-PCS | Mod: CPTII,S$GLB,, | Performed by: FAMILY MEDICINE

## 2019-07-09 PROCEDURE — 3008F BODY MASS INDEX DOCD: CPT | Mod: CPTII,S$GLB,, | Performed by: FAMILY MEDICINE

## 2019-07-09 PROCEDURE — 99999 PR PBB SHADOW E&M-EST. PATIENT-LVL III: CPT | Mod: PBBFAC,,, | Performed by: FAMILY MEDICINE

## 2019-07-09 PROCEDURE — 99213 PR OFFICE/OUTPT VISIT, EST, LEVL III, 20-29 MIN: ICD-10-PCS | Mod: S$GLB,,, | Performed by: FAMILY MEDICINE

## 2019-07-09 PROCEDURE — 99999 PR PBB SHADOW E&M-EST. PATIENT-LVL III: ICD-10-PCS | Mod: PBBFAC,,, | Performed by: FAMILY MEDICINE

## 2019-07-09 PROCEDURE — 3078F DIAST BP <80 MM HG: CPT | Mod: CPTII,S$GLB,, | Performed by: FAMILY MEDICINE

## 2019-07-09 PROCEDURE — 3074F PR MOST RECENT SYSTOLIC BLOOD PRESSURE < 130 MM HG: ICD-10-PCS | Mod: CPTII,S$GLB,, | Performed by: FAMILY MEDICINE

## 2019-07-09 PROCEDURE — 3008F PR BODY MASS INDEX (BMI) DOCUMENTED: ICD-10-PCS | Mod: CPTII,S$GLB,, | Performed by: FAMILY MEDICINE

## 2019-07-09 PROCEDURE — 99213 OFFICE O/P EST LOW 20 MIN: CPT | Mod: S$GLB,,, | Performed by: FAMILY MEDICINE

## 2019-07-09 RX ORDER — ACETAMINOPHEN 325 MG/1
325 TABLET ORAL EVERY 6 HOURS PRN
Status: ON HOLD | COMMUNITY
End: 2022-09-04 | Stop reason: SDUPTHER

## 2019-07-09 NOTE — PROGRESS NOTES
Patient had a rash at the side of the neck last visit.  We thought she might be developing shingles and she was treated for such.  She did not develop any further rash and what she had has resolved.  Was concerned regarding visiting 9-month-old grandson.  She has been out of her Lipitor for about 2 weeks.  Has lost significant weight after bariatric surgery.  Interested in possible discontinuation depending on follow-up laboratory    Diagnoses and all orders for this visit:    Hyperlipidemia, unspecified hyperlipidemia type     Check lipid panel in 4 weeks.  Further recommendations regarding atorvastatin pending results.  No evidence of active shingles.              Past Medical History:  Past Medical History:   Diagnosis Date    Allergy     Angio-edema     Arthralgia     Asthma without status asthmaticus     Bronchitis     Diverticulosis of large intestine without hemorrhage 10/8/2015    Environmental allergies     Eosinophilic asthma 3/8/2018    Gastroparesis     Hand arthritis     Herpes simplex without mention of complication     oral    Hyperlipidemia     Hypothyroidism     Immune deficiency disorder     Immune disorder     Intraperitoneal abscess 5/7/2018    LBP radiating to left leg     Leukocytosis, unspecified     Migraine headache     Obesity, Class III, BMI 40-49.9 (morbid obesity)     Periorbital cellulitis 12/31/2016    Prediabetes     Reactive airway disease     Reactive airway disease     Recurrent upper respiratory infection (URI)     S/P colonoscopy November 2010    Sepsis 5/7/2018    Urticaria      Past Surgical History:   Procedure Laterality Date    ADENOIDECTOMY      CHOLECYSTECTOMY      CHOLECYSTECTOMY      COLONOSCOPY  2015    repeat in 10    COLONOSCOPY N/A 10/8/2015    [facility]    ESOPHAGOGASTRODUODENOSCOPY (EGD) N/A 5/19/2018    [facility]    ESOPHAGOGASTRODUODENOSCOPY (EGD) N/A  "10/8/2015    [facility] END[facility]    ESOPHAGOGASTRODUODENOSCOPY (EGD)- stent, keyo tube, please have hemoclips N/A 5/7/2018    [facility]    GASTRECTOMY-SLEEVE-LAPAROSCOPIC WITH EGD N/A 4/16/2018    [facility] at [facility] OR    Hand fracture surgery      HARDWARE REMOVAL      left hand 5th [facility]    hymenectomy      HYSTERECTOMY      menorrhagia-Ovaries intact    REMOVAL-STENT-ESOPHAGEAL, UPPER ENDOSCOPIC STENT REMOVAL AND INSERTION NASOJEJUNAL TUBE  5/9/2018    [facility] at [facility] OR    SLEEVE GASTROPLASTY  04/16/2018    TONSILLECTOMY      Urethral dilatation       Review of patient's allergies indicates:   Allergen Reactions    Bromelains Hives     " causes mouth to bleed"    Sudafed [pseudoephedrine hcl] Other (See Comments)     Does not want to wake up .    Amoxicillin-pot clavulanate Itching     Other reaction(s): Itching    Hydrocodone Itching    Iodinated contrast- oral and iv dye      childhood    Iodine and iodide containing products Other (See Comments)    Melon Hives    Pantoprazole Nausea Only     Other reaction(s): upset stomach/halitosis    Percocet [oxycodone-acetaminophen] Itching    Barium iodide Rash    Ephedrine Other (See Comments)     Other reaction(s): comatose    Penicillins      Other reaction(s): does not work on pt symptoms     Current Outpatient Medications on File Prior to Visit   Medication Sig Dispense Refill    acetaminophen (TYLENOL) 325 MG tablet Take 325 mg by mouth every 6 (six) hours as needed for Pain.      albuterol (PROAIR HFA) 90 mcg/actuation inhaler Inhale 1 puff into the lungs every 4 (four) hours as needed for Wheezing or Shortness of Breath. 3 Inhaler 3    albuterol (PROVENTIL) 2.5 mg /3 mL (0.083 %) nebulizer solution Take 3 mLs (2.5 mg total) by nebulization every 4 (four) hours as needed for Wheezing or Shortness of Breath. Every 4-6 hours  " each 3    betamethasone valerate 0.1% (VALISONE) 0.1 % Crea Apply topically 2 (two) times daily. Do not apply to the face. 45 g 1    calcium-vitamin D3 (CALCIUM 500 + D) 500 mg(1,250mg) -200 unit per tablet Take 1 tablet by mouth 2 (two) times daily with meals.      desonide (DESOWEN) 0.05 % cream Apply topically 2 (two) times daily. 1 Tube 3    estradiol (ESTRACE) 1 MG tablet Take 1 tablet (1 mg total) by mouth once daily. 30 tablet 11    immun glob G,IgG,-pro-IgA 0-50 (HIZENTRA) 2 gram/10 mL (20 %) Soln Inject 18 g into the skin every 14 (fourteen) days. 90 mL 12    levothyroxine (SYNTHROID) 75 MCG tablet TAKE 1 TABLET(75 MCG) BY MOUTH EVERY DAY 30 tablet 1    mometasone-formoterol (DULERA) 200-5 mcg/actuation inhaler Inhale 2 puffs into the lungs 2 (two) times daily. Controller 1 Inhaler 11    montelukast (SINGULAIR) 10 mg tablet Take 1 tablet (10 mg total) by mouth every evening. 90 tablet 3    predniSONE (DELTASONE) 20 MG tablet Take one pill a day for three days, repeat for shortness of breath 12 tablet 0    sumatriptan (IMITREX) 100 MG tablet Take 1 tablet (100 mg total) by mouth once daily. (Patient taking differently: Take 100 mg by mouth every 2 (two) hours as needed. ) 9 tablet 5    [DISCONTINUED] atorvastatin (LIPITOR) 20 MG tablet Take 1 tablet (20 mg total) by mouth once daily. 90 tablet 0    [DISCONTINUED] valACYclovir (VALTREX) 1000 MG tablet Take 1 tablet (1,000 mg total) by mouth 3 (three) times daily. 21 tablet 0     No current facility-administered medications on file prior to visit.      Social History     Socioeconomic History    Marital status:      Spouse name: Not on file    Number of children: Not on file    Years of education: Not on file    Highest education level: Not on file   Occupational History    Not on file   Social Needs    Financial resource strain: Not on file    Food insecurity:     Worry: Not on file     Inability: Not on file    Transportation  needs:     Medical: Not on file     Non-medical: Not on file   Tobacco Use    Smoking status: Never Smoker    Smokeless tobacco: Never Used   Substance and Sexual Activity    Alcohol use: Yes     Alcohol/week: 0.0 oz     Comment: very rarely    Drug use: No    Sexual activity: Yes     Partners: Male   Lifestyle    Physical activity:     Days per week: Not on file     Minutes per session: Not on file    Stress: Not on file   Relationships    Social connections:     Talks on phone: Not on file     Gets together: Not on file     Attends Sabianism service: Not on file     Active member of club or organization: Not on file     Attends meetings of clubs or organizations: Not on file     Relationship status: Not on file   Other Topics Concern    Are you pregnant or think you may be? Not Asked    Breast-feeding Not Asked   Social History Narrative    . Disabled teacher.     Family History   Problem Relation Age of Onset    Melanoma Mother     Basal cell carcinoma Mother     Lung disease Mother         pulm htn    Cataracts Mother     Hypertension Mother     Lung cancer Father     Diabetes Father     Hypertension Father     Cancer Father     Diabetes Paternal Aunt     Colon cancer Paternal Aunt 40    Diabetes Paternal Aunt     Ovarian cancer Paternal Grandmother 40    Cancer Maternal Grandfather     Melanoma Maternal Aunt     Melanoma Maternal Uncle     Breast cancer Paternal Aunt     Lymphoma Paternal Aunt     Ulcerative colitis Son     Psoriasis Son     Psoriasis Son     Breast cancer Cousin 25    Allergic rhinitis Neg Hx     Allergies Neg Hx     Angioedema Neg Hx     Asthma Neg Hx     Atopy Neg Hx     Eczema Neg Hx     Immunodeficiency Neg Hx     Rhinitis Neg Hx     Urticaria Neg Hx            ROS:  GENERAL: No fever, chills,  or significant weight changes.   CARDIOVASCULAR: Denies chest pain, PND, orthopnea or reduced exercise tolerance.  ABDOMEN: Appetite fine. Denies  "diarrhea, abdominal pain, hematemesis or blood in stool.  URINARY: No flank pain, dysuria or hematuria.    Vitals:    07/09/19 1317   BP: 112/76   Pulse: 79   Weight: 80.7 kg (178 lb)   Height: 5' 2.5" (1.588 m)       Wt Readings from Last 3 Encounters:   07/09/19 80.7 kg (178 lb)   06/26/19 80.5 kg (177 lb 6.4 oz)   04/24/19 78 kg (171 lb 15.3 oz)       APPEARANCE: Well nourished, well developed, in no acute distress.    HEAD: Normocephalic.  Atraumatic.  EYES:   Right eye: Pupil reactive.  Conjunctiva clear.    Left eye: Pupil reactive.  Conjunctiva clear.    NECK: Supple. No bruits.  No JVD.  No cervical lymphadenopathy.  No thyromegaly.  No rash noted  CHEST: Breath sounds clear bilaterally.  Normal respiratory effort  CARDIOVASCULAR: Normal rate.  Regular rhythm.  No murmurs.  No rub.  No gallops.   No edema.  MENTAL STATUS: Alert.  Oriented x 3.  "

## 2019-07-19 RX ORDER — LEVOTHYROXINE SODIUM 75 UG/1
TABLET ORAL
Qty: 90 TABLET | Refills: 3 | Status: SHIPPED | OUTPATIENT
Start: 2019-07-19 | End: 2020-07-23 | Stop reason: SDUPTHER

## 2019-07-25 ENCOUNTER — PATIENT MESSAGE (OUTPATIENT)
Dept: PULMONOLOGY | Facility: CLINIC | Age: 59
End: 2019-07-25

## 2019-08-06 ENCOUNTER — LAB VISIT (OUTPATIENT)
Dept: LAB | Facility: HOSPITAL | Age: 59
End: 2019-08-06
Attending: ALLERGY & IMMUNOLOGY
Payer: COMMERCIAL

## 2019-08-06 ENCOUNTER — OFFICE VISIT (OUTPATIENT)
Dept: ALLERGY | Facility: CLINIC | Age: 59
End: 2019-08-06
Payer: COMMERCIAL

## 2019-08-06 VITALS
SYSTOLIC BLOOD PRESSURE: 122 MMHG | BODY MASS INDEX: 32.54 KG/M2 | WEIGHT: 183.63 LBS | DIASTOLIC BLOOD PRESSURE: 74 MMHG | HEIGHT: 63 IN

## 2019-08-06 DIAGNOSIS — D80.6 ANTI-POLYSACCHARIDE ANTIBODY DEFICIENCY: ICD-10-CM

## 2019-08-06 DIAGNOSIS — J32.9 RECURRENT SINUS INFECTIONS: ICD-10-CM

## 2019-08-06 DIAGNOSIS — J45.50 SEVERE PERSISTENT ASTHMA WITHOUT COMPLICATION: Primary | ICD-10-CM

## 2019-08-06 DIAGNOSIS — J31.0 CHRONIC RHINITIS: ICD-10-CM

## 2019-08-06 LAB
BASOPHILS # BLD AUTO: 0.06 K/UL (ref 0–0.2)
BASOPHILS NFR BLD: 0.5 % (ref 0–1.9)
DIFFERENTIAL METHOD: NORMAL
EOSINOPHIL # BLD AUTO: 0.3 K/UL (ref 0–0.5)
EOSINOPHIL NFR BLD: 2.6 % (ref 0–8)
ERYTHROCYTE [DISTWIDTH] IN BLOOD BY AUTOMATED COUNT: 14.5 % (ref 11.5–14.5)
HCT VFR BLD AUTO: 43.9 % (ref 37–48.5)
HGB BLD-MCNC: 14.5 G/DL (ref 12–16)
IMM GRANULOCYTES # BLD AUTO: 0.04 K/UL (ref 0–0.04)
IMM GRANULOCYTES NFR BLD AUTO: 0.3 % (ref 0–0.5)
LYMPHOCYTES # BLD AUTO: 3 K/UL (ref 1–4.8)
LYMPHOCYTES NFR BLD: 25.7 % (ref 18–48)
MCH RBC QN AUTO: 29.8 PG (ref 27–31)
MCHC RBC AUTO-ENTMCNC: 33 G/DL (ref 32–36)
MCV RBC AUTO: 90 FL (ref 82–98)
MONOCYTES # BLD AUTO: 0.7 K/UL (ref 0.3–1)
MONOCYTES NFR BLD: 6 % (ref 4–15)
NEUTROPHILS # BLD AUTO: 7.5 K/UL (ref 1.8–7.7)
NEUTROPHILS NFR BLD: 64.9 % (ref 38–73)
NRBC BLD-RTO: 0 /100 WBC
PLATELET # BLD AUTO: 221 K/UL (ref 150–350)
PMV BLD AUTO: 12.5 FL (ref 9.2–12.9)
RBC # BLD AUTO: 4.86 M/UL (ref 4–5.4)
WBC # BLD AUTO: 11.58 K/UL (ref 3.9–12.7)

## 2019-08-06 PROCEDURE — 99999 PR PBB SHADOW E&M-EST. PATIENT-LVL III: CPT | Mod: PBBFAC,,, | Performed by: ALLERGY & IMMUNOLOGY

## 2019-08-06 PROCEDURE — 86762 RUBELLA ANTIBODY: CPT

## 2019-08-06 PROCEDURE — 3078F PR MOST RECENT DIASTOLIC BLOOD PRESSURE < 80 MM HG: ICD-10-PCS | Mod: CPTII,S$GLB,, | Performed by: ALLERGY & IMMUNOLOGY

## 2019-08-06 PROCEDURE — 3074F SYST BP LT 130 MM HG: CPT | Mod: CPTII,S$GLB,, | Performed by: ALLERGY & IMMUNOLOGY

## 2019-08-06 PROCEDURE — 99214 OFFICE O/P EST MOD 30 MIN: CPT | Mod: S$GLB,,, | Performed by: ALLERGY & IMMUNOLOGY

## 2019-08-06 PROCEDURE — 99214 PR OFFICE/OUTPT VISIT, EST, LEVL IV, 30-39 MIN: ICD-10-PCS | Mod: S$GLB,,, | Performed by: ALLERGY & IMMUNOLOGY

## 2019-08-06 PROCEDURE — 86765 RUBEOLA ANTIBODY: CPT

## 2019-08-06 PROCEDURE — 82784 ASSAY IGA/IGD/IGG/IGM EACH: CPT | Mod: 59

## 2019-08-06 PROCEDURE — 82784 ASSAY IGA/IGD/IGG/IGM EACH: CPT

## 2019-08-06 PROCEDURE — 3074F PR MOST RECENT SYSTOLIC BLOOD PRESSURE < 130 MM HG: ICD-10-PCS | Mod: CPTII,S$GLB,, | Performed by: ALLERGY & IMMUNOLOGY

## 2019-08-06 PROCEDURE — 85025 COMPLETE CBC W/AUTO DIFF WBC: CPT

## 2019-08-06 PROCEDURE — 3008F PR BODY MASS INDEX (BMI) DOCUMENTED: ICD-10-PCS | Mod: CPTII,S$GLB,, | Performed by: ALLERGY & IMMUNOLOGY

## 2019-08-06 PROCEDURE — 80053 COMPREHEN METABOLIC PANEL: CPT

## 2019-08-06 PROCEDURE — 99999 PR PBB SHADOW E&M-EST. PATIENT-LVL III: ICD-10-PCS | Mod: PBBFAC,,, | Performed by: ALLERGY & IMMUNOLOGY

## 2019-08-06 PROCEDURE — 36415 COLL VENOUS BLD VENIPUNCTURE: CPT | Mod: PO

## 2019-08-06 PROCEDURE — 86735 MUMPS ANTIBODY: CPT

## 2019-08-06 PROCEDURE — 3008F BODY MASS INDEX DOCD: CPT | Mod: CPTII,S$GLB,, | Performed by: ALLERGY & IMMUNOLOGY

## 2019-08-06 PROCEDURE — 3078F DIAST BP <80 MM HG: CPT | Mod: CPTII,S$GLB,, | Performed by: ALLERGY & IMMUNOLOGY

## 2019-08-06 RX ORDER — LIDOCAINE 50 MG/G
OINTMENT TOPICAL
Qty: 30 G | Refills: 3 | Status: SHIPPED | OUTPATIENT
Start: 2019-08-06 | End: 2020-06-03

## 2019-08-06 NOTE — PROGRESS NOTES
Subjective:       Patient ID: Yodit Canseco is a 58 y.o. female.    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  She was last seen 10/17/18.  She had labs drawn in past- CBC was normal, lymphocyte profile showed low NK cells. IgA normal at 170, IgM normal at 124, IgE ,35, IgG normal at 700 but had steadily been decreasing, IgG subclasses normal and humoral panel protected to H flu, diphtheria, tetanus and 8/14 strep serotypes but this is after pneumovax and Prevnar so inadequate protection.  She still had constant PND and congestion and got frequent infections with sinus, ear, bronchitis and even pneumonia. She was on antibiotics every 2 months. She cannot use nose sprays all cause nose bleed even Astelin. All antihistamines cause sedation per pt. She is on Singulair daily. She started Hizentra 10 g weekly and then in October 2016 changed to 20 g every 2 weeks. She has done well with infusions. She has itching after and takes benadryl after with resolution. She did have sinus infections about 4-5 times last year and takes prednisone and levaquin each cr randi her own presribed by Dr Gary. Clears well. She has had no pneumonia since on Hizentra.       Recent labs 11/2018: IgG 1306, IgG subclasses normal, IgA 160 and IgM 163 and CBC and CMP normal    Prior History taken 1/13/14: new patient evaluation of rash and chronic PND.  She has been seen about 4 years ago and had labs drawn with normal immunoglobulins, humoral panel only protected to 4/14 strep titers despite prior pneumovax and immunocaps negative to dust mites, [place], [place], cocoa, garlic, milk, wheat, rye, pecan, and peanut.  She was advised to get pneumovax and follow up but she did not.  She continues with chronic PND and congestion, no runny nose or sneeze and she gets inus infections 3-4 times per  year requiring antibiotics.  She does clear with anbx but never gets rid of PND.  Her main question now is she gets a random rash on hands, feet and nose.  It seems to appear out of no where, is very itchy and red spots then goes away.  Lasts weeks to months.  She has seen derm and not sure of cause and she thinks may be food allergy although cannot identify a food.    She also has asthma which is well controlled on Dulera and Singulair.  Only uses albuterol with colds.      Rash  Associated symptoms include congestion and coughing. Pertinent negatives include no diarrhea, fatigue, fever, rhinorrhea, shortness of breath, sore throat or vomiting.       Environmental History: Pets in the home: none.  see history section for home environment  Review of Systems   Constitutional: Negative for fever, chills, appetite change and fatigue.   HENT: Positive for congestion and postnasal drip. Negative for ear pain, nosebleeds, sore throat, facial swelling, rhinorrhea, sneezing, trouble swallowing, voice change, sinus pressure and ear discharge.    Eyes: Negative for discharge, redness, itching and visual disturbance.   Respiratory: Positive for cough and wheezing. Negative for choking, chest tightness and shortness of breath.    Cardiovascular: Negative for chest pain, palpitations and leg swelling.   Gastrointestinal: Negative for nausea, vomiting, abdominal pain, diarrhea, constipation and abdominal distention.   Genitourinary: Negative for difficulty urinating.   Musculoskeletal: Negative for myalgias, joint swelling, arthralgias and gait problem.   Skin: Positive for rash. Negative for color change.   Neurological: Negative for dizziness, syncope, weakness, light-headedness and headaches.   Hematological: Negative for adenopathy. Does not bruise/bleed easily.   Psychiatric/Behavioral: Negative for behavioral problems, confusion, sleep disturbance and agitation. The patient is not nervous/anxious.         Objective:     Physical Exam   Nursing note and vitals reviewed.  Constitutional: She is oriented to person, place, and time. She appears well-developed and well-nourished. No distress.   HENT:   Head: Normocephalic and atraumatic.   Right Ear: Hearing, tympanic membrane, external ear and ear canal normal.   Left Ear: Hearing, tympanic membrane, external ear and ear canal normal.   Nose: No mucosal edema, rhinorrhea, sinus tenderness or septal deviation. No epistaxis. Right sinus exhibits no maxillary sinus tenderness and no frontal sinus tenderness. Left sinus exhibits no maxillary sinus tenderness and no frontal sinus tenderness.   Mouth/Throat: Uvula is midline, oropharynx is clear and moist and mucous membranes are normal. No uvula swelling.   Eyes: Conjunctivae normal are normal. Right eye exhibits no discharge. Left eye exhibits no discharge.   Neck: Normal range of motion. No thyromegaly present.   Cardiovascular: Normal rate, regular rhythm and normal heart sounds.    No murmur heard.  Pulmonary/Chest: Effort normal and breath sounds normal. No respiratory distress. She has no wheezes.   Abdominal: Soft. She exhibits no distension. There is no tenderness.   Musculoskeletal: Normal range of motion. She exhibits no edema and no tenderness.   Lymphadenopathy:     She has no cervical adenopathy.   Neurological: She is alert and oriented to person, place, and time.   Skin: Skin is warm and dry. No rash noted. No erythema.   Psychiatric: She has a normal mood and affect. Her behavior is normal. Judgment and thought content normal.       Laboratory:   none performed   Assessment:       1. Severe persistent asthma without complication    2. Anti-polysaccharide antibody deficiency    3. Recurrent sinus infections    4. Chronic rhinitis         Plan:       1. continue Hizentra 20g every 2 weeks. Recheck labs today and phone review.   2. Continue Singulair daily  3. RTC annually or sooner if needed

## 2019-08-07 LAB
ALBUMIN SERPL BCP-MCNC: 3.8 G/DL (ref 3.5–5.2)
ALP SERPL-CCNC: 73 U/L (ref 55–135)
ALT SERPL W/O P-5'-P-CCNC: 16 U/L (ref 10–44)
ANION GAP SERPL CALC-SCNC: 17 MMOL/L (ref 8–16)
AST SERPL-CCNC: 26 U/L (ref 10–40)
BILIRUB SERPL-MCNC: 0.3 MG/DL (ref 0.1–1)
BUN SERPL-MCNC: 18 MG/DL (ref 6–20)
CALCIUM SERPL-MCNC: 9.5 MG/DL (ref 8.7–10.5)
CHLORIDE SERPL-SCNC: 103 MMOL/L (ref 95–110)
CO2 SERPL-SCNC: 19 MMOL/L (ref 23–29)
CREAT SERPL-MCNC: 1 MG/DL (ref 0.5–1.4)
EST. GFR  (AFRICAN AMERICAN): >60 ML/MIN/1.73 M^2
EST. GFR  (NON AFRICAN AMERICAN): >60 ML/MIN/1.73 M^2
GLUCOSE SERPL-MCNC: 80 MG/DL (ref 70–110)
IGA SERPL-MCNC: 169 MG/DL (ref 40–350)
IGG SERPL-MCNC: 1124 MG/DL (ref 650–1600)
IGM SERPL-MCNC: 77 MG/DL (ref 50–300)
POTASSIUM SERPL-SCNC: 4.3 MMOL/L (ref 3.5–5.1)
PROT SERPL-MCNC: 7.2 G/DL (ref 6–8.4)
RUBV IGG SER-ACNC: 56.7 IU/ML
RUBV IGG SER-IMP: REACTIVE
SODIUM SERPL-SCNC: 139 MMOL/L (ref 136–145)

## 2019-08-08 ENCOUNTER — PATIENT MESSAGE (OUTPATIENT)
Dept: ALLERGY | Facility: CLINIC | Age: 59
End: 2019-08-08

## 2019-08-08 LAB
MUMPS IGG INTERPRETATION: POSITIVE
MUMPS IGG SCREEN: 2.54 ISR (ref 0–0.9)
RUBEOLA IGG ANTIBODY: 2.37 ISR (ref 0–0.9)
RUBEOLA INTERPRETATION: POSITIVE

## 2019-08-09 RX ORDER — LIDOCAINE AND PRILOCAINE 25; 25 MG/G; MG/G
CREAM TOPICAL
Qty: 30 G | Refills: 2 | Status: SHIPPED | OUTPATIENT
Start: 2019-08-09 | End: 2021-03-19

## 2019-09-11 ENCOUNTER — PATIENT MESSAGE (OUTPATIENT)
Dept: PULMONOLOGY | Facility: CLINIC | Age: 59
End: 2019-09-11

## 2019-09-11 ENCOUNTER — PATIENT MESSAGE (OUTPATIENT)
Dept: ALLERGY | Facility: CLINIC | Age: 59
End: 2019-09-11

## 2019-09-11 ENCOUNTER — PATIENT MESSAGE (OUTPATIENT)
Dept: SURGERY | Facility: CLINIC | Age: 59
End: 2019-09-11

## 2019-09-20 ENCOUNTER — LAB VISIT (OUTPATIENT)
Dept: LAB | Facility: HOSPITAL | Age: 59
End: 2019-09-20
Attending: ORTHOPAEDIC SURGERY
Payer: COMMERCIAL

## 2019-09-20 ENCOUNTER — OFFICE VISIT (OUTPATIENT)
Dept: FAMILY MEDICINE | Facility: CLINIC | Age: 59
End: 2019-09-20
Payer: COMMERCIAL

## 2019-09-20 VITALS
DIASTOLIC BLOOD PRESSURE: 65 MMHG | TEMPERATURE: 98 F | WEIGHT: 177.38 LBS | HEIGHT: 63 IN | SYSTOLIC BLOOD PRESSURE: 102 MMHG | HEART RATE: 77 BPM | BODY MASS INDEX: 31.43 KG/M2

## 2019-09-20 DIAGNOSIS — M25.511 RIGHT SHOULDER PAIN: Primary | ICD-10-CM

## 2019-09-20 DIAGNOSIS — R73.03 PREDIABETES: ICD-10-CM

## 2019-09-20 DIAGNOSIS — J45.50 SEVERE PERSISTENT ASTHMA WITHOUT COMPLICATION: ICD-10-CM

## 2019-09-20 DIAGNOSIS — Z01.818 PREOPERATIVE CLEARANCE: ICD-10-CM

## 2019-09-20 DIAGNOSIS — J82.83 EOSINOPHILIC ASTHMA: ICD-10-CM

## 2019-09-20 DIAGNOSIS — Z01.818 PRE-OPERATIVE EXAMINATION: Primary | ICD-10-CM

## 2019-09-20 DIAGNOSIS — E03.9 HYPOTHYROIDISM, UNSPECIFIED TYPE: ICD-10-CM

## 2019-09-20 DIAGNOSIS — Z98.84 S/P BARIATRIC SURGERY: ICD-10-CM

## 2019-09-20 DIAGNOSIS — Z86.39 HISTORY OF MORBID OBESITY: ICD-10-CM

## 2019-09-20 DIAGNOSIS — G43.909 MIGRAINE WITHOUT STATUS MIGRAINOSUS, NOT INTRACTABLE, UNSPECIFIED MIGRAINE TYPE: ICD-10-CM

## 2019-09-20 DIAGNOSIS — E78.5 HYPERLIPIDEMIA, UNSPECIFIED HYPERLIPIDEMIA TYPE: ICD-10-CM

## 2019-09-20 DIAGNOSIS — M75.101 TEAR OF RIGHT ROTATOR CUFF, UNSPECIFIED TEAR EXTENT: ICD-10-CM

## 2019-09-20 DIAGNOSIS — D84.9 IMMUNE DEFICIENCY DISORDER: ICD-10-CM

## 2019-09-20 LAB
ANION GAP SERPL CALC-SCNC: 11 MMOL/L (ref 8–16)
BASOPHILS # BLD AUTO: 0.07 K/UL (ref 0–0.2)
BASOPHILS NFR BLD: 0.8 % (ref 0–1.9)
BUN SERPL-MCNC: 15 MG/DL (ref 6–20)
CALCIUM SERPL-MCNC: 9.9 MG/DL (ref 8.7–10.5)
CHLORIDE SERPL-SCNC: 104 MMOL/L (ref 95–110)
CO2 SERPL-SCNC: 25 MMOL/L (ref 23–29)
CREAT SERPL-MCNC: 0.8 MG/DL (ref 0.5–1.4)
DIFFERENTIAL METHOD: NORMAL
EOSINOPHIL # BLD AUTO: 0.3 K/UL (ref 0–0.5)
EOSINOPHIL NFR BLD: 2.8 % (ref 0–8)
ERYTHROCYTE [DISTWIDTH] IN BLOOD BY AUTOMATED COUNT: 13.2 % (ref 11.5–14.5)
EST. GFR  (AFRICAN AMERICAN): >60 ML/MIN/1.73 M^2
EST. GFR  (NON AFRICAN AMERICAN): >60 ML/MIN/1.73 M^2
GLUCOSE SERPL-MCNC: 75 MG/DL (ref 70–110)
HCT VFR BLD AUTO: 46.7 % (ref 37–48.5)
HGB BLD-MCNC: 15.7 G/DL (ref 12–16)
IMM GRANULOCYTES # BLD AUTO: 0.04 K/UL (ref 0–0.04)
IMM GRANULOCYTES NFR BLD AUTO: 0.4 % (ref 0–0.5)
LYMPHOCYTES # BLD AUTO: 2.7 K/UL (ref 1–4.8)
LYMPHOCYTES NFR BLD: 29.1 % (ref 18–48)
MCH RBC QN AUTO: 30.6 PG (ref 27–31)
MCHC RBC AUTO-ENTMCNC: 33.6 G/DL (ref 32–36)
MCV RBC AUTO: 91 FL (ref 82–98)
MONOCYTES # BLD AUTO: 0.6 K/UL (ref 0.3–1)
MONOCYTES NFR BLD: 5.9 % (ref 4–15)
NEUTROPHILS # BLD AUTO: 5.7 K/UL (ref 1.8–7.7)
NEUTROPHILS NFR BLD: 61 % (ref 38–73)
NRBC BLD-RTO: 0 /100 WBC
PLATELET # BLD AUTO: 281 K/UL (ref 150–350)
PMV BLD AUTO: 11.5 FL (ref 9.2–12.9)
POTASSIUM SERPL-SCNC: 4.3 MMOL/L (ref 3.5–5.1)
RBC # BLD AUTO: 5.13 M/UL (ref 4–5.4)
SODIUM SERPL-SCNC: 140 MMOL/L (ref 136–145)
WBC # BLD AUTO: 9.3 K/UL (ref 3.9–12.7)

## 2019-09-20 PROCEDURE — 3008F BODY MASS INDEX DOCD: CPT | Mod: CPTII,S$GLB,, | Performed by: FAMILY MEDICINE

## 2019-09-20 PROCEDURE — 99214 PR OFFICE/OUTPT VISIT, EST, LEVL IV, 30-39 MIN: ICD-10-PCS | Mod: S$GLB,,, | Performed by: FAMILY MEDICINE

## 2019-09-20 PROCEDURE — 3074F SYST BP LT 130 MM HG: CPT | Mod: CPTII,S$GLB,, | Performed by: FAMILY MEDICINE

## 2019-09-20 PROCEDURE — 3074F PR MOST RECENT SYSTOLIC BLOOD PRESSURE < 130 MM HG: ICD-10-PCS | Mod: CPTII,S$GLB,, | Performed by: FAMILY MEDICINE

## 2019-09-20 PROCEDURE — 93010 EKG 12-LEAD: ICD-10-PCS | Mod: S$GLB,,, | Performed by: INTERNAL MEDICINE

## 2019-09-20 PROCEDURE — 93005 ELECTROCARDIOGRAM TRACING: CPT | Mod: S$GLB,,, | Performed by: FAMILY MEDICINE

## 2019-09-20 PROCEDURE — 36415 COLL VENOUS BLD VENIPUNCTURE: CPT | Mod: PO

## 2019-09-20 PROCEDURE — 93005 EKG 12-LEAD: ICD-10-PCS | Mod: S$GLB,,, | Performed by: FAMILY MEDICINE

## 2019-09-20 PROCEDURE — 3078F DIAST BP <80 MM HG: CPT | Mod: CPTII,S$GLB,, | Performed by: FAMILY MEDICINE

## 2019-09-20 PROCEDURE — 93010 ELECTROCARDIOGRAM REPORT: CPT | Mod: S$GLB,,, | Performed by: INTERNAL MEDICINE

## 2019-09-20 PROCEDURE — 99999 PR PBB SHADOW E&M-EST. PATIENT-LVL IV: CPT | Mod: PBBFAC,,, | Performed by: FAMILY MEDICINE

## 2019-09-20 PROCEDURE — 85025 COMPLETE CBC W/AUTO DIFF WBC: CPT

## 2019-09-20 PROCEDURE — 3008F PR BODY MASS INDEX (BMI) DOCUMENTED: ICD-10-PCS | Mod: CPTII,S$GLB,, | Performed by: FAMILY MEDICINE

## 2019-09-20 PROCEDURE — 99999 PR PBB SHADOW E&M-EST. PATIENT-LVL IV: ICD-10-PCS | Mod: PBBFAC,,, | Performed by: FAMILY MEDICINE

## 2019-09-20 PROCEDURE — 80048 BASIC METABOLIC PNL TOTAL CA: CPT

## 2019-09-20 PROCEDURE — 99214 OFFICE O/P EST MOD 30 MIN: CPT | Mod: S$GLB,,, | Performed by: FAMILY MEDICINE

## 2019-09-20 PROCEDURE — 3078F PR MOST RECENT DIASTOLIC BLOOD PRESSURE < 80 MM HG: ICD-10-PCS | Mod: CPTII,S$GLB,, | Performed by: FAMILY MEDICINE

## 2019-09-21 NOTE — PROGRESS NOTES
Preop clearance for rotator cuff surgery to be performed by Dr. Matthew Benson October 9th.  No chest pain or shortness of breath.  Significant asthma, currently well controlled.  Prediabetes trying watch diet.  Hypothyroidism controlled.  Prior migraines controlled.  Hyperlipidemia stable.      Yodit was seen today for pre-op exam.    Diagnoses and all orders for this visit:    Pre-operative examination  -     IN OFFICE EKG 12-LEAD (to Muse)    Tear of right rotator cuff, unspecified tear extent    Severe persistent asthma without complication    Eosinophilic asthma    Prediabetes    Hypothyroidism, unspecified type    S/P bariatric surgery    History of morbid obesity    Migraine without status migrainosus, not intractable, unspecified migraine type    Hyperlipidemia, unspecified hyperlipidemia type    Immune deficiency disorder    Preoperative clearance  -     IN OFFICE EKG 12-LEAD (to Muse)      She has other laboratory pending from her surgeon.  Patient clear for planned surgery.  Note to Dr. Benson.          Past Medical History:  Past Medical History:   Diagnosis Date    Allergy     Angio-edema     Arthralgia     Asthma without status asthmaticus     Bronchitis     Diverticulosis of large intestine without hemorrhage 10/8/2015    Environmental allergies     Eosinophilic asthma 3/8/2018    Gastroparesis     Hand arthritis     Herpes simplex without mention of complication     oral    Hyperlipidemia     Hypothyroidism     Immune deficiency disorder     Immune disorder     Intraperitoneal abscess 5/7/2018    LBP radiating to left leg     Leukocytosis, unspecified     Migraine headache     Obesity, Class III, BMI 40-49.9 (morbid obesity)     Periorbital cellulitis 12/31/2016    Prediabetes     Reactive airway disease     Reactive airway disease     Recurrent upper respiratory infection (URI)     S/P colonoscopy November 2010    Sepsis 5/7/2018    Urticaria      Past Surgical  "History:   Procedure Laterality Date    ADENOIDECTOMY      CHOLECYSTECTOMY      CHOLECYSTECTOMY      COLONOSCOPY  2015    repeat in 10    COLONOSCOPY N/A 10/8/2015    [facility] END[facility]    ESOPHAGOGASTRODUODENOSCOPY (EGD) N/A 5/19/2018    [facility]formed by [facility] at [facility] ENDO    ESOPHAGOGASTRODUODENOSCOPY (EGD) N/A 10/8/2015    Performed by [facility] at [facility] ENDO    ESOPHAGOGASTRODUODENOSCOPY (EGD)- stent, keyo tube, please have hemoclips N/A 5/7/2018    Performed by [facility] at [facility] OR    GASTRECTOMY-SLEEVE-LAPAROSCOPIC WITH EGD N/A 4/16/2018    Performed by [facility] at [facility] OR    Hand fracture surgery      HARDWARE REMOVAL      left hand 5th     hymenectomy      HYSTERECTOMY      menorrhagia-Ovaries intact    REMOVAL-STENT-ESOPHAGEAL, UPPER ENDOSCOPIC STENT REMOVAL AND INSERTION NASOJEJUNAL TUBE  5/9/2018    Per[facility] by [facility] at [facility] OR    SLEEVE GASTROPLASTY  04/16/2018    TONSILLECTOMY      Urethral dilatation       Review of patient's allergies indicates:   Allergen Reactions    Bromelains Hives     " causes mouth to bleed"    Sudafed [pseudoephedrine hcl] Other (See Comments)     Does not want to wake up .    Amoxicillin-pot clavulanate Itching     Other reaction(s): Itching    Hydrocodone Itching    Iodinated contrast media      childhood    Iodine and iodide containing products Other (See Comments)    Melon Hives    Pantoprazole Nausea Only     Other reaction(s): upset stomach/halitosis    Percocet [oxycodone-acetaminophen] Itching    Barium iodide Rash    Ephedrine Other (See Comments)     Other reaction(s): comatose    Penicillins      Other reaction(s): does not work on pt symptoms     Current Outpatient Medications on File Prior to Visit   Medication Sig Dispense Refill    acetaminophen (TYLENOL) 325 MG tablet Take 325 mg by mouth every 6 (six) hours as needed for Pain.      albuterol " (PROAIR HFA) 90 mcg/actuation inhaler Inhale 1 puff into the lungs every 4 (four) hours as needed for Wheezing or Shortness of Breath. 3 Inhaler 3    albuterol (PROVENTIL) 2.5 mg /3 mL (0.083 %) nebulizer solution Take 3 mLs (2.5 mg total) by nebulization every 4 (four) hours as needed for Wheezing or Shortness of Breath. Every 4-6 hours  each 3    betamethasone valerate 0.1% (VALISONE) 0.1 % Crea Apply topically 2 (two) times daily. Do not apply to the face. 45 g 1    BIOTIN ORAL Take by mouth once daily.      calcium-vitamin D3 (CALCIUM 500 + D) 500 mg(1,250mg) -200 unit per tablet Take 1 tablet by mouth 2 (two) times daily with meals.      desonide (DESOWEN) 0.05 % cream Apply topically 2 (two) times daily. 1 Tube 3    estradiol (ESTRACE) 1 MG tablet Take 1 tablet (1 mg total) by mouth once daily. 30 tablet 11    immun glob G,IgG,-pro-IgA 0-50 (HIZENTRA) 2 gram/10 mL (20 %) Soln Inject 18 g into the skin every 14 (fourteen) days. 90 mL 12    levothyroxine (SYNTHROID) 75 MCG tablet TAKE 1 TABLET(75 MCG) BY MOUTH EVERY DAY 90 tablet 3    lidocaine (XYLOCAINE) 5 % Oint ointment Apply topically as needed. 30 g 3    lidocaine-prilocaine (EMLA) cream Apply topically 30 minutes prior to starting infusion to numb skin 30 g 2    mometasone-formoterol (DULERA) 200-5 mcg/actuation inhaler Inhale 2 puffs into the lungs 2 (two) times daily. Controller 1 Inhaler 11    montelukast (SINGULAIR) 10 mg tablet Take 1 tablet (10 mg total) by mouth every evening. 90 tablet 3    MULTIVITAMIN ORAL Take by mouth once daily.      predniSONE (DELTASONE) 20 MG tablet Take one pill a day for three days, repeat for shortness of breath 12 tablet 0    sumatriptan (IMITREX) 100 MG tablet Take 1 tablet (100 mg total) by mouth once daily. (Patient taking differently: Take 100 mg by mouth every 2 (two) hours as needed. ) 9 tablet 5     No current facility-administered medications on file prior to visit.      Social History      Socioeconomic History    Marital status:      Spouse name: Not on file    Number of children: Not on file    Years of education: Not on file    Highest education level: Not on file   Occupational History    Not on file   Social Needs    Financial resource strain: Not on file    Food insecurity:     Worry: Not on file     Inability: Not on file    Transportation needs:     Medical: Not on file     Non-medical: Not on file   Tobacco Use    Smoking status: Never Smoker    Smokeless tobacco: Never Used   Substance and Sexual Activity    Alcohol use: Yes     Alcohol/week: 0.0 oz     Comment: very rarely    Drug use: No    Sexual activity: Yes     Partners: Male   Lifestyle    Physical activity:     Days per week: Not on file     Minutes per session: Not on file    Stress: Not on file   Relationships    Social connections:     Talks on phone: Not on file     Gets together: Not on file     Attends [employer] service: Not on file     Active member of club or organization: Not on file     Attends meetings of clubs or organizations: Not on file     Relationship status: Not on file   Other Topics Concern    Are you pregnant or think you may be? Not Asked    Breast-feeding Not Asked   Social History Narrative    . Disabled teacher.     Family History   Problem Relation Age of Onset    Melanoma Mother     Basal cell carcinoma Mother     Lung disease Mother         pulm htn    Cataracts Mother     Hypertension Mother     Lung cancer Father     Diabetes Father     Hypertension Father     Cancer Father     Diabetes Paternal Aunt     Colon cancer Paternal Aunt 40    Diabetes Paternal Aunt     Ovarian cancer Paternal Grandmother 40    Cancer Maternal Grandfather     Melanoma Maternal Aunt     Melanoma Maternal Uncle     Breast cancer Paternal Aunt     Lymphoma Paternal Aunt     Ulcerative colitis Son     Psoriasis Son     Psoriasis Son     Breast cancer Cousin 25    Allergic  "rhinitis Neg Hx     Allergies Neg Hx     Angioedema Neg Hx     Asthma Neg Hx     Atopy Neg Hx     Eczema Neg Hx     Immunodeficiency Neg Hx     Rhinitis Neg Hx     Urticaria Neg Hx            ROS:  GENERAL: No fever, chills,  or significant weight changes.   CARDIOVASCULAR: Denies chest pain, PND, orthopnea.  ABDOMEN: Appetite fine. Denies diarrhea, abdominal pain, hematemesis or blood in stool.  URINARY: No flank pain, dysuria or hematuria.    Vitals:    09/20/19 1107   BP: 102/65   Pulse: 77   Temp: 98.3 °F (36.8 °C)   Weight: 80.5 kg (177 lb 6.4 oz)   Height: 5' 2.5" (1.588 m)     Wt Readings from Last 3 Encounters:   09/20/19 80.5 kg (177 lb 6.4 oz)   08/06/19 83.3 kg (183 lb 10.3 oz)   07/09/19 80.7 kg (178 lb)       OBJECTIVE:   APPEARANCE: Well nourished, well developed, in no acute distress.    HEAD: Normocephalic.  Atraumatic.  No sinus tenderness.  EYES:   Right eye: Pupil reactive.  Conjunctiva clear.    Left eye: Pupil reactive.  Conjunctiva clear.  EOMI.    EARS: TM's intact. Light reflex normal. No retraction or perforation.    NOSE:  clear.  MOUTH & THROAT:  No pharyngeal erythema or exudate. No lesions.  NECK: Supple. No bruits.  No JVD.  No cervical lymphadenopathy.  No thyromegaly.    CHEST: Breath sounds clear bilaterally.  Normal respiratory effort  CARDIOVASCULAR: Normal rate.  Regular rhythm.  No murmurs.  No rub.  No gallops.  ABDOMEN: Bowel sounds normal.  Soft.  No tenderness.  No organomegaly.  PERIPHERAL VASCULAR: No cyanosis.  No clubbing.  No edema.  NEUROLOGIC: No focal findings.  MENTAL STATUS: Alert.  Oriented x 3.        "

## 2019-09-23 ENCOUNTER — TELEPHONE (OUTPATIENT)
Dept: FAMILY MEDICINE | Facility: CLINIC | Age: 59
End: 2019-09-23

## 2019-09-30 ENCOUNTER — OFFICE VISIT (OUTPATIENT)
Dept: FAMILY MEDICINE | Facility: CLINIC | Age: 59
End: 2019-09-30
Payer: COMMERCIAL

## 2019-09-30 VITALS
BODY MASS INDEX: 31.57 KG/M2 | DIASTOLIC BLOOD PRESSURE: 78 MMHG | TEMPERATURE: 98 F | HEART RATE: 71 BPM | SYSTOLIC BLOOD PRESSURE: 112 MMHG | WEIGHT: 178.19 LBS | HEIGHT: 63 IN

## 2019-09-30 DIAGNOSIS — K52.9 GASTROENTERITIS: Primary | ICD-10-CM

## 2019-09-30 PROCEDURE — 99213 OFFICE O/P EST LOW 20 MIN: CPT | Mod: S$GLB,,, | Performed by: FAMILY MEDICINE

## 2019-09-30 PROCEDURE — 99999 PR PBB SHADOW E&M-EST. PATIENT-LVL III: CPT | Mod: PBBFAC,,, | Performed by: FAMILY MEDICINE

## 2019-09-30 PROCEDURE — 3074F SYST BP LT 130 MM HG: CPT | Mod: CPTII,S$GLB,, | Performed by: FAMILY MEDICINE

## 2019-09-30 PROCEDURE — 3078F PR MOST RECENT DIASTOLIC BLOOD PRESSURE < 80 MM HG: ICD-10-PCS | Mod: CPTII,S$GLB,, | Performed by: FAMILY MEDICINE

## 2019-09-30 PROCEDURE — 3008F BODY MASS INDEX DOCD: CPT | Mod: CPTII,S$GLB,, | Performed by: FAMILY MEDICINE

## 2019-09-30 PROCEDURE — 3074F PR MOST RECENT SYSTOLIC BLOOD PRESSURE < 130 MM HG: ICD-10-PCS | Mod: CPTII,S$GLB,, | Performed by: FAMILY MEDICINE

## 2019-09-30 PROCEDURE — 3008F PR BODY MASS INDEX (BMI) DOCUMENTED: ICD-10-PCS | Mod: CPTII,S$GLB,, | Performed by: FAMILY MEDICINE

## 2019-09-30 PROCEDURE — 99213 PR OFFICE/OUTPT VISIT, EST, LEVL III, 20-29 MIN: ICD-10-PCS | Mod: S$GLB,,, | Performed by: FAMILY MEDICINE

## 2019-09-30 PROCEDURE — 99999 PR PBB SHADOW E&M-EST. PATIENT-LVL III: ICD-10-PCS | Mod: PBBFAC,,, | Performed by: FAMILY MEDICINE

## 2019-09-30 PROCEDURE — 3078F DIAST BP <80 MM HG: CPT | Mod: CPTII,S$GLB,, | Performed by: FAMILY MEDICINE

## 2019-09-30 NOTE — PROGRESS NOTES
"Patient complains of nausea vomiting and diarrhea.  Mild abdominal cramps.  99 fever.  Symptoms started over the past couple days.  Has been able to take fluids.  No rectal bleeding.  Positive sick contacts with  and grandson with similar symptoms.  Improving today.    Past Medical History:  Past Medical History:   Diagnosis Date    Angio-edema     Arthralgia     Asthma without status asthmaticus     Bronchitis     Diverticulosis of large intestine without hemorrhage 10/8/2015    Environmental allergies     Eosinophilic asthma 3/8/2018    Gastroparesis     Hand arthritis     Herpes simplex without mention of complication     oral    Hyperlipidemia     Hypothyroidism     Immune deficiency disorder     Intraperitoneal abscess 5/7/2018    LBP radiating to left leg     Leukocytosis, unspecified     Migraine headache     Obesity, Class III, BMI 40-49.9 (morbid obesity)     Periorbital cellulitis 12/31/2016    Prediabetes     Recurrent upper respiratory infection (URI)     S/P colonoscopy November 2010    Sepsis 5/7/2018    Urticaria      Past Surgical History:   Procedure Laterality Date    ADENOIDECTOMY      CHOLECYSTECTOMY      CHOLECYSTECTOMY      COLONOSCOPY  2015    repeat in 10    COLONOSCOPY N/A 10/8/2015    Procedure: COLONOSCOPY;  Surgeon: Panda Bose MD;  Location: [facility];  Service: Endoscopy;  Laterality: N/A;    Hand fracture surgery      HARDWARE REMOVAL      left hand 5th [facility]    hymenectomy      HYSTERECTOMY      menorrhagia-Ovaries intact    SLEEVE GASTROPLASTY  04/16/2018    TONSILLECTOMY      Urethral dilatation       Review of patient's allergies indicates:   Allergen Reactions    Bromelains Hives     " causes mouth to bleed"    Sudafed [pseudoephedrine hcl] Other (See Comments)     Does not want to wake up .    Amoxicillin-pot clavulanate Itching     Other reaction(s): Itching    Hydrocodone Itching    Iodinated contrast media      childhood    " Iodine and iodide containing products Other (See Comments)    Melon Hives    Pantoprazole Nausea Only     Other reaction(s): upset stomach/halitosis    Percocet [oxycodone-acetaminophen] Itching    Barium iodide Rash    Ephedrine Other (See Comments)     Other reaction(s): comatose    Penicillins      Other reaction(s): does not work on pt symptoms     Current Outpatient Medications on File Prior to Visit   Medication Sig Dispense Refill    acetaminophen (TYLENOL) 325 MG tablet Take 325 mg by mouth every 6 (six) hours as needed for Pain.      albuterol (PROAIR HFA) 90 mcg/actuation inhaler Inhale 1 puff into the lungs every 4 (four) hours as needed for Wheezing or Shortness of Breath. 3 Inhaler 3    albuterol (PROVENTIL) 2.5 mg /3 mL (0.083 %) nebulizer solution Take 3 mLs (2.5 mg total) by nebulization every 4 (four) hours as needed for Wheezing or Shortness of Breath. Every 4-6 hours  each 3    betamethasone valerate 0.1% (VALISONE) 0.1 % Crea Apply topically 2 (two) times daily. Do not apply to the face. 45 g 1    BIOTIN ORAL Take by mouth once daily.      calcium-vitamin D3 (CALCIUM 500 + D) 500 mg(1,250mg) -200 unit per tablet Take 1 tablet by mouth 2 (two) times daily with meals.      desonide (DESOWEN) 0.05 % cream Apply topically 2 (two) times daily. 1 Tube 3    estradiol (ESTRACE) 1 MG tablet Take 1 tablet (1 mg total) by mouth once daily. 30 tablet 11    immun glob G,IgG,-pro-IgA 0-50 (HIZENTRA) 2 gram/10 mL (20 %) Soln Inject 18 g into the skin every 14 (fourteen) days. 90 mL 12    levothyroxine (SYNTHROID) 75 MCG tablet TAKE 1 TABLET(75 MCG) BY MOUTH EVERY DAY 90 tablet 3    lidocaine (XYLOCAINE) 5 % Oint ointment Apply topically as needed. 30 g 3    lidocaine-prilocaine (EMLA) cream Apply topically 30 minutes prior to starting infusion to numb skin 30 g 2    mometasone-formoterol (DULERA) 200-5 mcg/actuation inhaler Inhale 2 puffs into the lungs 2 (two) times daily. Controller  1 Inhaler 11    montelukast (SINGULAIR) 10 mg tablet Take 1 tablet (10 mg total) by mouth every evening. 90 tablet 3    MULTIVITAMIN ORAL Take by mouth once daily.      predniSONE (DELTASONE) 20 MG tablet Take one pill a day for three days, repeat for shortness of breath 12 tablet 0    sumatriptan (IMITREX) 100 MG tablet Take 1 tablet (100 mg total) by mouth once daily. (Patient taking differently: Take 100 mg by mouth every 2 (two) hours as needed. ) 9 tablet 5     No current facility-administered medications on file prior to visit.      Social History     Socioeconomic History    Marital status:      Spouse name: Not on file    Number of children: Not on file    Years of education: Not on file    Highest education level: Not on file   Occupational History    Not on file   Social Needs    Financial resource strain: Not on file    Food insecurity:     Worry: Not on file     Inability: Not on file    Transportation needs:     Medical: Not on file     Non-medical: Not on file   Tobacco Use    Smoking status: Never Smoker    Smokeless tobacco: Never Used   Substance and Sexual Activity    Alcohol use: Yes     Alcohol/week: 0.0 standard drinks     Comment: very rarely    Drug use: No    Sexual activity: Yes     Partners: Male   Lifestyle    Physical activity:     Days per week: Not on file     Minutes per session: Not on file    Stress: Not on file   Relationships    Social connections:     Talks on phone: Not on file     Gets together: Not on file     Attends [employer] service: Not on file     Active member of club or organization: Not on file     Attends meetings of clubs or organizations: Not on file     Relationship status: Not on file   Other Topics Concern    Are you pregnant or think you may be? Not Asked    Breast-feeding Not Asked   Social History Narrative    . Disabled teacher.     Family History   Problem Relation Age of Onset    Melanoma Mother     Basal cell carcinoma  Mother     Lung disease Mother         pulm htn    Cataracts Mother     Hypertension Mother     Lung cancer Father     Diabetes Father     Hypertension Father     Cancer Father     Diabetes Paternal Aunt     Colon cancer Paternal Aunt 40    Diabetes Paternal Aunt     Ovarian cancer Paternal Grandmother 40    Cancer Maternal Grandfather     Melanoma Maternal Aunt     Melanoma Maternal Uncle     Breast cancer Paternal Aunt     Lymphoma Paternal Aunt     Ulcerative colitis Son     Psoriasis Son     Psoriasis Son     Breast cancer Cousin 25    Allergic rhinitis Neg Hx     Allergies Neg Hx     Angioedema Neg Hx     Asthma Neg Hx     Atopy Neg Hx     Eczema Neg Hx     Immunodeficiency Neg Hx     Rhinitis Neg Hx     Urticaria Neg Hx              Vitals:    09/30/19 1459   BP: 112/78   Pulse: 71   Temp: 98.2 °F (36.8 °C)         APPEARANCE: Well nourished, well developed, in no acute distress.    HEAD: Normocephalic.  Atraumatic.  No sinus tenderness.  EYES:   Right eye: Pupil reactive.  Conjunctiva clear.    Left eye: Pupil reactive.  Conjunctiva clear.    EARS: TM's intact. Light reflex normal. No retraction or perforation.    NOSE:  clear.  MOUTH & THROAT:  No pharyngeal erythema or exudate. No lesions.  Mucous membranes moist.  NECK: Supple.   No lymphadenopathy.    CHEST: Breath sounds clear bilaterally.  Normal respiratory effort  CARDIOVASCULAR: Normal rate.  Regular rhythm.  No murmurs.  No rub.  No gallops.  ABDOMEN: Bowel sounds normal.  Soft.  No tenderness.  No organomegaly.  MENTAL STATUS: Alert.  Oriented x 3.    Yodit was seen today for emesis and diarrhea.    Diagnoses and all orders for this visit:    Gastroenteritis          Plan:   Increase fluids.  Follow-up if symptoms worsen or not resolving with recommended treatment

## 2019-10-10 ENCOUNTER — PATIENT MESSAGE (OUTPATIENT)
Dept: OBSTETRICS AND GYNECOLOGY | Facility: CLINIC | Age: 59
End: 2019-10-10

## 2019-10-11 RX ORDER — FLUCONAZOLE 150 MG/1
150 TABLET ORAL ONCE
Qty: 1 TABLET | Refills: 0 | Status: SHIPPED | OUTPATIENT
Start: 2019-10-11 | End: 2019-10-11

## 2019-10-14 ENCOUNTER — TELEPHONE (OUTPATIENT)
Dept: FAMILY MEDICINE | Facility: CLINIC | Age: 59
End: 2019-10-14

## 2019-10-14 NOTE — TELEPHONE ENCOUNTER
Patient informed appt can be scheduled for am, she declined as she spoke to her surgeon and they told her what to do.

## 2019-10-14 NOTE — TELEPHONE ENCOUNTER
----- Message from Bessy Hernadez sent at 10/14/2019 11:54 AM CDT -----  Contact: self  Type:  Sooner Apoointment Request    Caller is requesting a sooner appointment.  Caller declined first available appointment listed below.  Caller will not accept being placed on the waitlist and is requesting a message be sent to doctor.  Name of Caller:Yodit  When is the first available appointment? 10/15/19  Symptoms:rash on right arm  Would the patient rather a call back or a response via [facility]? call  Best Call Back Number:  [phone]//[phone]  Additional Information:

## 2019-10-15 ENCOUNTER — OFFICE VISIT (OUTPATIENT)
Dept: DERMATOLOGY | Facility: CLINIC | Age: 59
End: 2019-10-15
Payer: COMMERCIAL

## 2019-10-15 DIAGNOSIS — L30.9 DERMATITIS: Primary | ICD-10-CM

## 2019-10-15 PROCEDURE — 99213 OFFICE O/P EST LOW 20 MIN: CPT | Mod: S$GLB,,, | Performed by: STUDENT IN AN ORGANIZED HEALTH CARE EDUCATION/TRAINING PROGRAM

## 2019-10-15 PROCEDURE — 99999 PR PBB SHADOW E&M-EST. PATIENT-LVL II: CPT | Mod: PBBFAC,,, | Performed by: STUDENT IN AN ORGANIZED HEALTH CARE EDUCATION/TRAINING PROGRAM

## 2019-10-15 PROCEDURE — 99213 PR OFFICE/OUTPT VISIT, EST, LEVL III, 20-29 MIN: ICD-10-PCS | Mod: S$GLB,,, | Performed by: STUDENT IN AN ORGANIZED HEALTH CARE EDUCATION/TRAINING PROGRAM

## 2019-10-15 PROCEDURE — 99999 PR PBB SHADOW E&M-EST. PATIENT-LVL II: ICD-10-PCS | Mod: PBBFAC,,, | Performed by: STUDENT IN AN ORGANIZED HEALTH CARE EDUCATION/TRAINING PROGRAM

## 2019-10-15 RX ORDER — FLUCONAZOLE 150 MG/1
TABLET ORAL
Refills: 0 | COMMUNITY
Start: 2019-10-11 | End: 2019-10-31

## 2019-10-15 RX ORDER — CLOBETASOL PROPIONATE 0.5 MG/G
CREAM TOPICAL 2 TIMES DAILY
Qty: 60 G | Refills: 1 | Status: SHIPPED | OUTPATIENT
Start: 2019-10-15 | End: 2020-10-15 | Stop reason: SDUPTHER

## 2019-10-15 RX ORDER — LEVOCETIRIZINE DIHYDROCHLORIDE 5 MG/1
5 TABLET, FILM COATED ORAL NIGHTLY
Qty: 30 TABLET | Refills: 1 | Status: SHIPPED | OUTPATIENT
Start: 2019-10-15 | End: 2020-03-16

## 2019-10-15 NOTE — PROGRESS NOTES
Subjective:       Patient ID:  Yodit Canseco is a 59 y.o. female who presents for   Chief Complaint   Patient presents with    Rash     on right arm, left thigh, and chest area. Itches really bad and burns. Duration time is since friday.     History of Present Illness: The patient presents with chief complaint of rash. Patient recently underwent surgery on the arm, and has been wearing a brace on the affected arm and noticed the rash, also noticed a rash on the leg that she rests her brace upon.   Location: right arm  Duration: 1 week  Signs/Symptoms: redness, itching, slightly weeping  Prior treatments: none          Review of Systems   Skin: Positive for rash.        Objective:    Physical Exam   Constitutional: She appears well-developed and well-nourished. No distress.   Neurological: She is alert and oriented to person, place, and time. She is not disoriented.   Psychiatric: She has a normal mood and affect.   Skin:   Areas Examined (abnormalities noted in diagram):   Head / Face Inspection Performed  Neck Inspection Performed  RUE Inspected  LUE Inspection Performed              Diagram Legend     Erythematous scaling macule/papule c/w actinic keratosis       Vascular papule c/w angioma      Pigmented verrucoid papule/plaque c/w seborrheic keratosis      Yellow umbilicated papule c/w sebaceous hyperplasia      Irregularly shaped tan macule c/w lentigo     1-2 mm smooth white papules consistent with Milia      Movable subcutaneous cyst with punctum c/w epidermal inclusion cyst      Subcutaneous movable cyst c/w pilar cyst      Firm pink to brown papule c/w dermatofibroma      Pedunculated fleshy papule(s) c/w skin tag(s)      Evenly pigmented macule c/w junctional nevus     Mildly variegated pigmented, slightly irregular-bordered macule c/w mildly atypical nevus      Flesh colored to evenly pigmented papule c/w intradermal nevus       Pink pearly papule/plaque c/w basal cell carcinoma      Erythematous  hyperkeratotic cursted plaque c/w SCC      Surgical scar with no sign of skin cancer recurrence      Open and closed comedones      Inflammatory papules and pustules      Verrucoid papule consistent consistent with wart     Erythematous eczematous patches and plaques     Dystrophic onycholytic nail with subungual debris c/w onychomycosis     Umbilicated papule    Erythematous-base heme-crusted tan verrucoid plaque consistent with inflamed seborrheic keratosis     Erythematous Silvery Scaling Plaque c/w Psoriasis     See annotation      Assessment / Plan:        Dermatitis - likely a contact allergic reaction   -     clobetasol (TEMOVATE) 0.05 % cream; Apply topically 2 (two) times daily.  Dispense: 60 g; Refill: 1  -     levocetirizine (XYZAL) 5 MG tablet; Take 1 tablet (5 mg total) by mouth every evening.  Dispense: 30 tablet; Refill: 1             Follow up in about 6 weeks (around 11/26/2019).

## 2019-10-16 RX ORDER — ESTRADIOL 1 MG/1
1 TABLET ORAL DAILY
Qty: 30 TABLET | Refills: 11 | Status: SHIPPED | OUTPATIENT
Start: 2019-10-16 | End: 2019-11-06 | Stop reason: SDUPTHER

## 2019-10-31 ENCOUNTER — OFFICE VISIT (OUTPATIENT)
Dept: FAMILY MEDICINE | Facility: CLINIC | Age: 59
End: 2019-10-31
Payer: COMMERCIAL

## 2019-10-31 VITALS
HEART RATE: 69 BPM | WEIGHT: 184 LBS | HEIGHT: 62 IN | TEMPERATURE: 98 F | SYSTOLIC BLOOD PRESSURE: 115 MMHG | DIASTOLIC BLOOD PRESSURE: 72 MMHG | BODY MASS INDEX: 33.86 KG/M2

## 2019-10-31 DIAGNOSIS — R05.9 COUGH: ICD-10-CM

## 2019-10-31 DIAGNOSIS — J02.9 SORE THROAT: ICD-10-CM

## 2019-10-31 DIAGNOSIS — B37.9 YEAST INFECTION: ICD-10-CM

## 2019-10-31 DIAGNOSIS — J06.9 UPPER RESPIRATORY TRACT INFECTION, UNSPECIFIED TYPE: Primary | ICD-10-CM

## 2019-10-31 LAB — DEPRECATED S PYO AG THROAT QL EIA: NEGATIVE

## 2019-10-31 PROCEDURE — 3078F DIAST BP <80 MM HG: CPT | Mod: CPTII,S$GLB,, | Performed by: NURSE PRACTITIONER

## 2019-10-31 PROCEDURE — 3078F PR MOST RECENT DIASTOLIC BLOOD PRESSURE < 80 MM HG: ICD-10-PCS | Mod: CPTII,S$GLB,, | Performed by: NURSE PRACTITIONER

## 2019-10-31 PROCEDURE — 3074F SYST BP LT 130 MM HG: CPT | Mod: CPTII,S$GLB,, | Performed by: NURSE PRACTITIONER

## 2019-10-31 PROCEDURE — 87081 CULTURE SCREEN ONLY: CPT

## 2019-10-31 PROCEDURE — 99213 PR OFFICE/OUTPT VISIT, EST, LEVL III, 20-29 MIN: ICD-10-PCS | Mod: S$GLB,,, | Performed by: NURSE PRACTITIONER

## 2019-10-31 PROCEDURE — 3008F BODY MASS INDEX DOCD: CPT | Mod: CPTII,S$GLB,, | Performed by: NURSE PRACTITIONER

## 2019-10-31 PROCEDURE — 87880 STREP A ASSAY W/OPTIC: CPT | Mod: PO

## 2019-10-31 PROCEDURE — 99213 OFFICE O/P EST LOW 20 MIN: CPT | Mod: S$GLB,,, | Performed by: NURSE PRACTITIONER

## 2019-10-31 PROCEDURE — 99999 PR PBB SHADOW E&M-EST. PATIENT-LVL V: CPT | Mod: PBBFAC,,, | Performed by: NURSE PRACTITIONER

## 2019-10-31 PROCEDURE — 99999 PR PBB SHADOW E&M-EST. PATIENT-LVL V: ICD-10-PCS | Mod: PBBFAC,,, | Performed by: NURSE PRACTITIONER

## 2019-10-31 PROCEDURE — 3074F PR MOST RECENT SYSTOLIC BLOOD PRESSURE < 130 MM HG: ICD-10-PCS | Mod: CPTII,S$GLB,, | Performed by: NURSE PRACTITIONER

## 2019-10-31 PROCEDURE — 3008F PR BODY MASS INDEX (BMI) DOCUMENTED: ICD-10-PCS | Mod: CPTII,S$GLB,, | Performed by: NURSE PRACTITIONER

## 2019-10-31 RX ORDER — PROMETHAZINE HYDROCHLORIDE AND DEXTROMETHORPHAN HYDROBROMIDE 6.25; 15 MG/5ML; MG/5ML
5 SYRUP ORAL 2 TIMES DAILY PRN
Qty: 118 ML | Refills: 0 | Status: SHIPPED | OUTPATIENT
Start: 2019-10-31 | End: 2019-11-10

## 2019-10-31 RX ORDER — LEVOFLOXACIN 500 MG/1
500 TABLET, FILM COATED ORAL
COMMUNITY
End: 2019-11-27 | Stop reason: ALTCHOICE

## 2019-10-31 RX ORDER — MELOXICAM 15 MG/1
TABLET ORAL
Refills: 0 | COMMUNITY
Start: 2019-10-14 | End: 2020-06-03

## 2019-10-31 RX ORDER — FLUCONAZOLE 150 MG/1
150 TABLET ORAL DAILY
Qty: 1 TABLET | Refills: 0 | Status: SHIPPED | OUTPATIENT
Start: 2019-10-31 | End: 2019-11-01

## 2019-10-31 NOTE — PATIENT INSTRUCTIONS
Report to ER immediately if symptoms worsen  Abx (x 7 d), prednisone (x 3 d) as prescribed    Hydrate well  Cough medication causes drowsiness  Warm salt water gargles PRN

## 2019-10-31 NOTE — PROGRESS NOTES
Subjective:       Patient ID: Yodit Canseco is a 59 y.o. female.    Chief Complaint: Sore Throat; Cough; Chest Congestion; Nasal Congestion; and Fever    URI    This is a new problem. The current episode started in the past 7 days. The problem has been unchanged. There has been no fever. Associated symptoms include congestion, coughing, rhinorrhea, a sore throat and wheezing. Pertinent negatives include no abdominal pain, chest pain, diarrhea, dysuria, ear pain, headaches, joint pain, joint swelling, nausea, neck pain, plugged ear sensation, rash, sinus pain, sneezing, swollen glands or vomiting. She has tried nothing (States has levaquin, prednisone prescribed by pulmonology to use as needed) for the symptoms. The treatment provided no relief.   Pt requests diflucan for yeast infection; states aware of potential reaction with levaquin; states informed by MD to take diflucan 48 after completing abx.   Past Medical History:   Diagnosis Date    Angio-edema     Arthralgia     Asthma without status asthmaticus     Bronchitis     Diverticulosis of large intestine without hemorrhage 10/8/2015    Environmental allergies     Eosinophilic asthma 3/8/2018    Gastroparesis     Hand arthritis     Herpes simplex without mention of complication     oral    Hyperlipidemia     Hypothyroidism     Immune deficiency disorder     Intraperitoneal abscess 5/7/2018    LBP radiating to left leg     Leukocytosis, unspecified     Migraine headache     Obesity, Class III, BMI 40-49.9 (morbid obesity)     Periorbital cellulitis 12/31/2016    Prediabetes     Recurrent upper respiratory infection (URI)     S/P colonoscopy November 2010    Sepsis 5/7/2018    Urticaria      Social History     Socioeconomic History    Marital status:      Spouse name: Not on file    Number of children: Not on file    Years of education: Not on file    Highest education level: Not on file   Occupational History    Not on file    Social Needs    Financial resource strain: Not on file    Food insecurity:     Worry: Not on file     Inability: Not on file    Transportation needs:     Medical: Not on file     Non-medical: Not on file   Tobacco Use    Smoking status: Never Smoker    Smokeless tobacco: Never Used   Substance and Sexual Activity    Alcohol use: Yes     Alcohol/week: 0.0 standard drinks     Comment: very rarely    Drug use: No    Sexual activity: Yes     Partners: Male   Lifestyle    Physical activity:     Days per week: Not on file     Minutes per session: Not on file    Stress: Not on file   Relationships    Social connections:     Talks on phone: Not on file     Gets together: Not on file     Attends Orthodoxy service: Not on file     Active member of club or organization: Not on file     Attends meetings of clubs or organizations: Not on file     Relationship status: Not on file   Other Topics Concern    Are you pregnant or think you may be? Not Asked    Breast-feeding Not Asked   Social History Narrative    . Disabled teacher.     Past Surgical History:   Procedure Laterality Date    ADENOIDECTOMY      CHOLECYSTECTOMY      CHOLECYSTECTOMY      COLONOSCOPY  2015    repeat in 10    COLONOSCOPY N/A 10/8/2015    Procedure: COLONOSCOPY;  Surgeon: Panda Bose MD;  Location: [facility];  Service: Endoscopy;  Laterality: N/A;    Hand fracture surgery      HARDWARE REMOVAL      left hand 5th MC    hymenectomy      HYSTERECTOMY      menorrhagia-Ovaries intact    ROTATOR CUFF REPAIR Right     SLEEVE GASTROPLASTY  04/16/2018    TONSILLECTOMY      Urethral dilatation         Review of Systems   Constitutional: Negative.    HENT: Positive for congestion, rhinorrhea and sore throat. Negative for ear pain, sinus pain and sneezing.    Eyes: Negative.    Respiratory: Positive for cough and wheezing.    Cardiovascular: Negative.  Negative for chest pain.   Gastrointestinal: Negative.  Negative for abdominal  pain, diarrhea, nausea and vomiting.   Endocrine: Negative.    Genitourinary: Negative.  Negative for dysuria.   Musculoskeletal: Negative.  Negative for joint pain and neck pain.   Skin: Negative.  Negative for rash.   Allergic/Immunologic: Negative.    Neurological: Negative.  Negative for headaches.   Psychiatric/Behavioral: Negative.        Objective:      Physical Exam   Constitutional: She is oriented to person, place, and time. She appears well-developed and well-nourished.   HENT:   Head: Normocephalic.   Right Ear: Hearing, tympanic membrane, external ear and ear canal normal.   Left Ear: Hearing, tympanic membrane, external ear and ear canal normal.   Nose: Mucosal edema and rhinorrhea present. Right sinus exhibits no maxillary sinus tenderness and no frontal sinus tenderness. Left sinus exhibits no maxillary sinus tenderness and no frontal sinus tenderness.   Mouth/Throat: Uvula is midline, oropharynx is clear and moist and mucous membranes are normal.   Eyes: Pupils are equal, round, and reactive to light. Conjunctivae are normal.   Neck: Normal range of motion. Neck supple.   Cardiovascular: Normal rate, regular rhythm and normal heart sounds.   Pulmonary/Chest: Effort normal and breath sounds normal.   Abdominal: Soft. Bowel sounds are normal.   Musculoskeletal: Normal range of motion.   Neurological: She is alert and oriented to person, place, and time.   Skin: Skin is warm and dry. Capillary refill takes 2 to 3 seconds.   Psychiatric: She has a normal mood and affect. Her behavior is normal. Judgment and thought content normal.   Nursing note and vitals reviewed.      Assessment:       1. Upper respiratory tract infection, unspecified type    2. Sore throat    3. Cough    4. Yeast infection        Plan:           Yodit was seen today for sore throat, cough, chest congestion, nasal congestion and fever.    Diagnoses and all orders for this visit:    Upper respiratory tract infection, unspecified  type  Sore throat  Cough  -     Throat Screen, Rapid  -     (Magic mouthwash) 1:1:1 Benadryl 12.5mg/5ml liq, aluminum & magnesium hydroxide-simehticone (Maalox), lidocaine viscous 2%; Swish and spit 5 mLs every 8 (eight) hours as needed. Gargle and spit. DO NOT SWALLOW  -     promethazine-dextromethorphan (PROMETHAZINE-DM) 6.25-15 mg/5 mL Syrp; Take 5 mLs by mouth 2 (two) times daily as needed.  -     Strep A culture, throat  Abx (x 7 d), prednisone (x 3 d) as prescribed    Hydrate well  Warm salt water gargles PRN  Report to ER immediately if symptoms worsen    Yeast infection  -     fluconazole (DIFLUCAN) 150 MG Tab; Take 1 tablet (150 mg total) by mouth once daily. for 1 day

## 2019-11-02 LAB — BACTERIA THROAT CULT: NORMAL

## 2019-11-04 ENCOUNTER — PATIENT MESSAGE (OUTPATIENT)
Dept: FAMILY MEDICINE | Facility: CLINIC | Age: 59
End: 2019-11-04

## 2019-11-06 ENCOUNTER — OFFICE VISIT (OUTPATIENT)
Dept: OBSTETRICS AND GYNECOLOGY | Facility: CLINIC | Age: 59
End: 2019-11-06
Payer: COMMERCIAL

## 2019-11-06 VITALS
SYSTOLIC BLOOD PRESSURE: 120 MMHG | BODY MASS INDEX: 33.68 KG/M2 | DIASTOLIC BLOOD PRESSURE: 82 MMHG | HEIGHT: 62 IN | WEIGHT: 183 LBS

## 2019-11-06 DIAGNOSIS — Z79.890 POSTMENOPAUSAL HRT (HORMONE REPLACEMENT THERAPY): ICD-10-CM

## 2019-11-06 DIAGNOSIS — Z01.419 ENCOUNTER FOR GYNECOLOGICAL EXAMINATION WITHOUT ABNORMAL FINDING: Primary | ICD-10-CM

## 2019-11-06 DIAGNOSIS — Z12.39 BREAST CANCER SCREENING: ICD-10-CM

## 2019-11-06 PROCEDURE — 3074F PR MOST RECENT SYSTOLIC BLOOD PRESSURE < 130 MM HG: ICD-10-PCS | Mod: CPTII,S$GLB,, | Performed by: OBSTETRICS & GYNECOLOGY

## 2019-11-06 PROCEDURE — 3079F PR MOST RECENT DIASTOLIC BLOOD PRESSURE 80-89 MM HG: ICD-10-PCS | Mod: CPTII,S$GLB,, | Performed by: OBSTETRICS & GYNECOLOGY

## 2019-11-06 PROCEDURE — 3074F SYST BP LT 130 MM HG: CPT | Mod: CPTII,S$GLB,, | Performed by: OBSTETRICS & GYNECOLOGY

## 2019-11-06 PROCEDURE — 3079F DIAST BP 80-89 MM HG: CPT | Mod: CPTII,S$GLB,, | Performed by: OBSTETRICS & GYNECOLOGY

## 2019-11-06 PROCEDURE — 99396 PR PREVENTIVE VISIT,EST,40-64: ICD-10-PCS | Mod: S$GLB,,, | Performed by: OBSTETRICS & GYNECOLOGY

## 2019-11-06 PROCEDURE — 99396 PREV VISIT EST AGE 40-64: CPT | Mod: S$GLB,,, | Performed by: OBSTETRICS & GYNECOLOGY

## 2019-11-06 PROCEDURE — 99999 PR PBB SHADOW E&M-EST. PATIENT-LVL III: ICD-10-PCS | Mod: PBBFAC,,, | Performed by: OBSTETRICS & GYNECOLOGY

## 2019-11-06 PROCEDURE — 99999 PR PBB SHADOW E&M-EST. PATIENT-LVL III: CPT | Mod: PBBFAC,,, | Performed by: OBSTETRICS & GYNECOLOGY

## 2019-11-06 RX ORDER — ESTRADIOL 1 MG/1
1 TABLET ORAL DAILY
Qty: 30 TABLET | Refills: 11 | Status: SHIPPED | OUTPATIENT
Start: 2019-11-06 | End: 2020-09-21

## 2019-11-06 NOTE — PROGRESS NOTES
Chief Complaint   Patient presents with    Well Woman    Mammogram due but needs to delay due to rotator cuff surgery     History of Present Illness: Yodit Canseco is a 59 y.o. female that presents today 11/6/2019 for well gyn visit.    Past Medical History:   Diagnosis Date    Angio-edema     Arthralgia     Asthma without status asthmaticus     Bronchitis     Diverticulosis of large intestine without hemorrhage 10/8/2015    Environmental allergies     Eosinophilic asthma 3/8/2018    Gastroparesis     Hand arthritis     Herpes simplex without mention of complication     oral    Hyperlipidemia     Hypothyroidism     Immune deficiency disorder     Intraperitoneal abscess 5/7/2018    LBP radiating to left leg     Leukocytosis, unspecified     Migraine headache     Obesity, Class III, BMI 40-49.9 (morbid obesity)     Periorbital cellulitis 12/31/2016    Prediabetes     Recurrent upper respiratory infection (URI)     S/P colonoscopy November 2010    Sepsis 5/7/2018    Urticaria        Past Surgical History:   Procedure Laterality Date    ADENOIDECTOMY      CHOLECYSTECTOMY      CHOLECYSTECTOMY      COLONOSCOPY  2015    repeat in 10    COLONOSCOPY N/A 10/8/2015    Procedure: COLONOSCOPY;  Surgeon: Panda Bose MD;  Location: [facility];  Service: Endoscopy;  Laterality: N/A;    Hand fracture surgery      HARDWARE REMOVAL      left hand 5th MC    hymenectomy      HYSTERECTOMY      menorrhagia-Ovaries intact    ROTATOR CUFF REPAIR Right     SLEEVE GASTROPLASTY  04/16/2018    TONSILLECTOMY      Urethral dilatation         Current Outpatient Medications   Medication Sig Dispense Refill    acetaminophen (TYLENOL) 325 MG tablet Take 325 mg by mouth every 6 (six) hours as needed for Pain.      albuterol (PROAIR HFA) 90 mcg/actuation inhaler Inhale 1 puff into the lungs every 4 (four) hours as needed for Wheezing or Shortness of Breath. 3 Inhaler 3    albuterol (PROVENTIL) 2.5 mg /3  mL (0.083 %) nebulizer solution Take 3 mLs (2.5 mg total) by nebulization every 4 (four) hours as needed for Wheezing or Shortness of Breath. Every 4-6 hours  each 3    betamethasone valerate 0.1% (VALISONE) 0.1 % Crea Apply topically 2 (two) times daily. Do not apply to the face. 45 g 1    BIOTIN ORAL Take by mouth once daily.      calcium-vitamin D3 (CALCIUM 500 + D) 500 mg(1,250mg) -200 unit per tablet Take 1 tablet by mouth 2 (two) times daily with meals.      clobetasol (TEMOVATE) 0.05 % cream Apply topically 2 (two) times daily. 60 g 1    desonide (DESOWEN) 0.05 % cream Apply topically 2 (two) times daily. 1 Tube 3    estradiol (ESTRACE) 1 MG tablet Take 1 tablet (1 mg total) by mouth once daily. 30 tablet 11    immun glob G,IgG,-pro-IgA 0-50 (HIZENTRA) 2 gram/10 mL (20 %) Soln Inject 18 g into the skin every 14 (fourteen) days. 90 mL 12    levoFLOXacin (LEVAQUIN) 500 MG tablet Take 500 mg by mouth as needed (10 days).      levothyroxine (SYNTHROID) 75 MCG tablet TAKE 1 TABLET(75 MCG) BY MOUTH EVERY DAY 90 tablet 3    lidocaine (XYLOCAINE) 5 % Oint ointment Apply topically as needed. 30 g 3    lidocaine-prilocaine (EMLA) cream Apply topically 30 minutes prior to starting infusion to numb skin 30 g 2    meloxicam (MOBIC) 15 MG tablet TK 1 T PO QD PC PRN  0    mometasone-formoterol (DULERA) 200-5 mcg/actuation inhaler Inhale 2 puffs into the lungs 2 (two) times daily. Controller 1 Inhaler 11    montelukast (SINGULAIR) 10 mg tablet Take 1 tablet (10 mg total) by mouth every evening. 90 tablet 3    MULTIVITAMIN ORAL Take by mouth once daily.      (Magic mouthwash) 1:1:1 Benadryl 12.5mg/5ml liq, aluminum & magnesium hydroxide-simehticone (Maalox), lidocaine viscous 2% Swish and spit 5 mLs every 8 (eight) hours as needed. Gargle and spit. DO NOT SWALLOW (Patient not taking: Reported on 11/6/2019) 360 mL 0    levocetirizine (XYZAL) 5 MG tablet Take 1 tablet (5 mg total) by mouth every evening.  "(Patient not taking: Reported on 11/6/2019) 30 tablet 1    predniSONE (DELTASONE) 20 MG tablet Take one pill a day for three days, repeat for shortness of breath (Patient not taking: Reported on 11/6/2019) 12 tablet 0    promethazine-dextromethorphan (PROMETHAZINE-DM) 6.25-15 mg/5 mL Syrp Take 5 mLs by mouth 2 (two) times daily as needed. (Patient not taking: Reported on 11/6/2019) 118 mL 0    sumatriptan (IMITREX) 100 MG tablet Take 1 tablet (100 mg total) by mouth once daily. (Patient not taking: Reported on 11/6/2019) 9 tablet 5     No current facility-administered medications for this visit.        Review of patient's allergies indicates:   Allergen Reactions    Bromelains Hives     " causes mouth to bleed"    Sudafed [pseudoephedrine hcl] Other (See Comments)     Does not want to wake up .    Amoxicillin-pot clavulanate Itching     Other reaction(s): Itching    Hydrocodone Itching    Iodinated contrast media      childhood    Iodine and iodide containing products Other (See Comments)    Melon Hives    Pantoprazole Nausea Only     Other reaction(s): upset stomach/halitosis    Percocet [oxycodone-acetaminophen] Itching    Barium iodide Rash    Ephedrine Other (See Comments)     Other reaction(s): comatose    Penicillins      Other reaction(s): does not work on pt symptoms       Family History   Problem Relation Age of Onset    Melanoma Mother     Basal cell carcinoma Mother     Lung disease Mother         pulm htn    Cataracts Mother     Hypertension Mother     Lung cancer Father     Diabetes Father     Hypertension Father     Cancer Father     Diabetes Paternal Aunt     Colon cancer Paternal Aunt 40    Diabetes Paternal Aunt     Ovarian cancer Paternal Grandmother 40    Cancer Maternal Grandfather     Melanoma Maternal Aunt     Melanoma Maternal Uncle     Breast cancer Paternal Aunt     Lymphoma Paternal Aunt     Ulcerative colitis Son     Psoriasis Son     Psoriasis Son     " Breast cancer Cousin 25    Allergic rhinitis Neg Hx     Allergies Neg Hx     Angioedema Neg Hx     Asthma Neg Hx     Atopy Neg Hx     Eczema Neg Hx     Immunodeficiency Neg Hx     Rhinitis Neg Hx     Urticaria Neg Hx        Social History     Socioeconomic History    Marital status:      Spouse name: Not on file    Number of children: Not on file    Years of education: Not on file    Highest education level: Not on file   Occupational History    Not on file   Social Needs    Financial resource strain: Not on file    Food insecurity:     Worry: Not on file     Inability: Not on file    Transportation needs:     Medical: Not on file     Non-medical: Not on file   Tobacco Use    Smoking status: Never Smoker    Smokeless tobacco: Never Used   Substance and Sexual Activity    Alcohol use: Yes     Alcohol/week: 0.0 standard drinks     Comment: very rarely    Drug use: No    Sexual activity: Yes     Partners: Male   Lifestyle    Physical activity:     Days per week: Not on file     Minutes per session: Not on file    Stress: Not on file   Relationships    Social connections:     Talks on phone: Not on file     Gets together: Not on file     Attends Restorationist service: Not on file     Active member of club or organization: Not on file     Attends meetings of clubs or organizations: Not on file     Relationship status: Not on file   Other Topics Concern    Are you pregnant or think you may be? Not Asked    Breast-feeding Not Asked   Social History Narrative    . Disabled teacher.       OB History    Para Term  AB Living   5 2 2   3     SAB TAB Ectopic Multiple Live Births   3              # Outcome Date GA Lbr Pradip/2nd Weight Sex Delivery Anes PTL Lv   5 SAB            4 SAB            3 SAB            2 Term            1 Term                Review of Symptoms:  GENERAL: Denies weight gain or weight loss. Feeling well overall.   SKIN: Denies rash or lesions.   HEAD: Denies  "head injury or headache.   NODES: Denies enlarged lymph nodes.   CHEST: Denies chest pain or shortness of breath.   CARDIOVASCULAR: Denies palpitations or left sided chest pain.   ABDOMEN: No abdominal pain, constipation, diarrhea, nausea, vomiting or rectal bleeding.   URINARY: No frequency, dysuria, hematuria, or burning on urination.  HEMATOLOGIC: No easy bruisability or excessive bleeding.   MUSCULOSKELETAL: Denies joint pain or swelling.     /82   Ht 5' 2" (1.575 m)   Wt 83 kg (182 lb 15.7 oz)   Physical Exam:  APPEARANCE: Well nourished, well developed, in no acute distress.  SKIN: Normal skin turgor, no lesions.  NECK: Neck symmetric without masses   RESPIRATORY: Normal respiratory effort with no retractions or use of accessory muscles  CARDIOVASCULAR: Peripheral vascular system with no swelling no varicosities and palpation of pulses normal  LYMPHATIC: No enlargements of the lymph nodes noted in the neck, axillae, or groin  ABDOMEN: Soft. No tenderness or masses. No hepatosplenomegaly. No hernias.  BREASTS: Symmetrical, no skin changes or visible lesions. No palpable masses, nipple discharge or adenopathy bilaterally.  PELVIC: Normal external female genitalia without lesions. Normal hair distribution. Adequate perineal body, normal urethral meatus. Urethra with no masses.  Bladder nontender. Vagina moist and well rugated without lesions or discharge. No significant cystocele or rectocele.  Adnexa without masses or tenderness. Urethra and bladder normal.   EXTREMITIES: No clubbing cyanosis or edema.    ASSESSMENT/PLAN:  Encounter for gynecological examination without abnormal finding    Postmenopausal HRT (hormone replacement therapy)  -     estradiol (ESTRACE) 1 MG tablet; Take 1 tablet (1 mg total) by mouth once daily.  Dispense: 30 tablet; Refill: 11    Breast cancer screening  -     Mammo Digital Screening Bilat w/ Eric; Future; Expected date: 11/06/2019          Patient was counseled today on Pap " guidelines. We discussed the discontinuing the pap smear after hysterectomy except in certain high risk cases.  We discussed the need for pelvic exams.   We discussed STD screening if at high risk for an STD.  We discussed breast cancer screening with mammograms every other year after the age of 40 and annually after the age of 50.    We discussed colon cancer screening.   Osteoporosis screening with the Dexa Bone Scan discussed when indicated.   She will see her PCP for other health maintenance.       FOLLOW-UP:prn

## 2019-11-07 ENCOUNTER — TELEPHONE (OUTPATIENT)
Dept: PULMONOLOGY | Facility: CLINIC | Age: 59
End: 2019-11-07

## 2019-11-07 NOTE — TELEPHONE ENCOUNTER
Pt scheduled for sooner appt     ----- Message from Kolby King sent at 11/7/2019 12:59 PM CST -----  Contact: Ptnt  [phone]  Type: Needs a sooner appmnt.    Who Called:  Ptnt  [phone]    First appmnt available 12-23-19    Symptoms (please be specific):  Respiratory infection.    How long has patient had these symptoms:  Past few weeks.    Pharmacy name and phone #:      [id number] - [facility] [address]  AT [facility] [address]  [address] [facility] [address]  Phone: [phone] Fax: [phone]    Best Call Back Number: Ptnt  [phone]    Additional Information:   Needs to see the Dr her current treatment protocol not working as well as she would like it to. Has had recent surgery on 10-09-19.  Has upper respiratory infection.  Please call to schedule needs to see the Dr on 11-08-19 if at all possible.

## 2019-11-08 ENCOUNTER — OFFICE VISIT (OUTPATIENT)
Dept: PULMONOLOGY | Facility: CLINIC | Age: 59
End: 2019-11-08
Payer: COMMERCIAL

## 2019-11-08 VITALS
WEIGHT: 183.88 LBS | HEIGHT: 62 IN | DIASTOLIC BLOOD PRESSURE: 75 MMHG | SYSTOLIC BLOOD PRESSURE: 116 MMHG | HEART RATE: 75 BPM | BODY MASS INDEX: 33.84 KG/M2 | OXYGEN SATURATION: 98 %

## 2019-11-08 DIAGNOSIS — R05.9 COUGH: ICD-10-CM

## 2019-11-08 DIAGNOSIS — J45.51 SEVERE PERSISTENT ASTHMA WITH ACUTE EXACERBATION: Primary | ICD-10-CM

## 2019-11-08 PROCEDURE — 99999 PR PBB SHADOW E&M-EST. PATIENT-LVL III: CPT | Mod: PBBFAC,,, | Performed by: NURSE PRACTITIONER

## 2019-11-08 PROCEDURE — 3074F PR MOST RECENT SYSTOLIC BLOOD PRESSURE < 130 MM HG: ICD-10-PCS | Mod: CPTII,S$GLB,, | Performed by: NURSE PRACTITIONER

## 2019-11-08 PROCEDURE — 99999 PR PBB SHADOW E&M-EST. PATIENT-LVL III: ICD-10-PCS | Mod: PBBFAC,,, | Performed by: NURSE PRACTITIONER

## 2019-11-08 PROCEDURE — 3078F PR MOST RECENT DIASTOLIC BLOOD PRESSURE < 80 MM HG: ICD-10-PCS | Mod: CPTII,S$GLB,, | Performed by: NURSE PRACTITIONER

## 2019-11-08 PROCEDURE — 3008F PR BODY MASS INDEX (BMI) DOCUMENTED: ICD-10-PCS | Mod: CPTII,S$GLB,, | Performed by: NURSE PRACTITIONER

## 2019-11-08 PROCEDURE — 3008F BODY MASS INDEX DOCD: CPT | Mod: CPTII,S$GLB,, | Performed by: NURSE PRACTITIONER

## 2019-11-08 PROCEDURE — 3074F SYST BP LT 130 MM HG: CPT | Mod: CPTII,S$GLB,, | Performed by: NURSE PRACTITIONER

## 2019-11-08 PROCEDURE — 99214 OFFICE O/P EST MOD 30 MIN: CPT | Mod: S$GLB,,, | Performed by: NURSE PRACTITIONER

## 2019-11-08 PROCEDURE — 99214 PR OFFICE/OUTPT VISIT, EST, LEVL IV, 30-39 MIN: ICD-10-PCS | Mod: S$GLB,,, | Performed by: NURSE PRACTITIONER

## 2019-11-08 PROCEDURE — 3078F DIAST BP <80 MM HG: CPT | Mod: CPTII,S$GLB,, | Performed by: NURSE PRACTITIONER

## 2019-11-08 RX ORDER — LEVALBUTEROL INHALATION SOLUTION 1.25 MG/3ML
1 SOLUTION RESPIRATORY (INHALATION) EVERY 4 HOURS PRN
Qty: 1 BOX | Refills: 11 | Status: SHIPPED | OUTPATIENT
Start: 2019-11-08 | End: 2020-10-14 | Stop reason: SDUPTHER

## 2019-11-08 RX ORDER — MONTELUKAST SODIUM 10 MG/1
10 TABLET ORAL NIGHTLY
Qty: 90 TABLET | Refills: 3 | Status: SHIPPED | OUTPATIENT
Start: 2019-11-08 | End: 2020-11-30 | Stop reason: SDUPTHER

## 2019-11-08 RX ORDER — BENZONATATE 200 MG/1
200 CAPSULE ORAL 3 TIMES DAILY PRN
Qty: 30 CAPSULE | Refills: 1 | Status: SHIPPED | OUTPATIENT
Start: 2019-11-08 | End: 2019-11-18

## 2019-11-08 RX ORDER — LEVALBUTEROL TARTRATE 45 UG/1
1-2 AEROSOL, METERED ORAL EVERY 4 HOURS PRN
Qty: 1 INHALER | Refills: 11 | Status: SHIPPED | OUTPATIENT
Start: 2019-11-08 | End: 2020-02-10 | Stop reason: SDUPTHER

## 2019-11-08 RX ORDER — BUDESONIDE 0.5 MG/2ML
0.5 INHALANT ORAL 2 TIMES DAILY
Qty: 120 ML | Refills: 5 | Status: SHIPPED | OUTPATIENT
Start: 2019-11-08 | End: 2021-06-11

## 2019-11-08 RX ORDER — FLUTICASONE FUROATE AND VILANTEROL 200; 25 UG/1; UG/1
1 POWDER RESPIRATORY (INHALATION) DAILY
Qty: 1 EACH | Refills: 11 | Status: SHIPPED | OUTPATIENT
Start: 2019-11-08 | End: 2021-04-06 | Stop reason: SDUPTHER

## 2019-11-08 NOTE — PATIENT INSTRUCTIONS
Stopping Dulera switching to     Breo 200 1 puff once a day every day, rinse mouth after using due to risk for thrush if mouth or tongue has white sores contact clinic    Use nebulizer every 4 hours with Xopenex due to side effects from Albuterol    Use Nebulizer with Pulmicort which is inhaled steroid two times a day until breathing improves    Will reschedule PFT and next appointment for 3 months.

## 2019-11-08 NOTE — PROGRESS NOTES
11/8/2019    Yodit Canseco  Office Note    Chief Complaint   Patient presents with    Sputum Production    Cough    Asthma    Fatigue    Chest Pain     tightness    Ear Fullness    Hoarse    Nasal Congestion     in the AM       HPI:   11/8/2019- Had recent Rotator cuff surgery with nerve block Oct 9, Has SOB worse after surgery for 3 days, Took prednisone 20 mg for 3 days and Levaquin for 9 days. Seen by PCP dx URI.   Coughing, chest tightness, congestion, wheezing, Hoarse voice, fatigue, Shortness of breath worse with exertion, onset following surgery to shoulder. Cough- not able to clear secretions from chest.  Complaint of hand shaking, heart rate increases, and becomes rapid with Albuterol nebulizer onset years. Tries not to use due to side effects.     04/24/2019- breathing is good except for high pollen days, had 3 exacerbation in 6 months. Albuterol rescue inhaler 3x weekly, 1-2 nocturnal arousals monthly, could not do PFT at [facility],     11/6/18- ER visit for dehydration gastroenteritis, no prednisone use in 4 months. Not currently on fasenra injection, insurance would not cover. Currently on Hirzentra IGG therapy.   Sinus drip present, cough occasional, non productive, no nocturnal arousals, Currently on Dulera daily, uses Albuterol rescue inhaler daily before exercise no SOB.  Sepsis [facility] August 2017.  Flu vaccine   July 9, 2018 - had gastric sleeve with leak complication- lost 45 lbs already.  Has appetite but fills quickly.  Still on hirzentra.  Asthma ok avoiding fragrances/ordors.  No prednisone.   No nocturnal arousals, uses rescue weekly avoiding any irritants.  Use prednisone 4-5 last year and twice this year.        March 19, 2018-since recovered from pleuritic pain - doing well.  No prednisone use recent.  Pt has had no wheezes.  feb 22,2108   Had to do prednisone again.  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 I have advised not to work given severe asthma,  hypersensitivity to fragrances, and immune deficiency.   Pt seems better since Hirzentra, but still unstable severe asthma  Jan 29. 2018- 6 days ago had been exposed to sick , had nasal congestion , cough, ear stuffy, muscle aches /joint aches / fever.  Temp to 100.1.  Seen by np and flu screen negative.  Had asthma 3-4 flares last year. eos up past,   oct 5, 2017 - had marce in April, skipped couple doses hirzentra, had mild bronchitis once, otherwise doing very well.  No prednisone.  No side effects and covered. Ppt flu sense for 2 days  Jan 24, on  hirzentra q o week since sept and asthma more stable, no hosp, no prednisone, no noct asthma/ no rescue therapy- control not this good for years.  Sept 27, 2016  HPI:has had lung problems since birth, no nocturnal arousals, uses rescue 2/d, breathing controlled satisfactory except with irritants/infection - strong abx needed to clear.  Prednisone 2/yr, last hosp 18 months.  No ventilator.    Had exacerbation recent due uri from .  Has had shingles vaccine past.       The chief compliant  problem is varies with instablilty at time  PFSH:  Past Medical History:   Diagnosis Date    Angio-edema     Arthralgia     Asthma without status asthmaticus     Bronchitis     Diverticulosis of large intestine without hemorrhage 10/8/2015    Environmental allergies     Eosinophilic asthma 3/8/2018    Gastroparesis     Hand arthritis     Herpes simplex without mention of complication     oral    Hyperlipidemia     Hypothyroidism     Immune deficiency disorder     Intraperitoneal abscess 5/7/2018    LBP radiating to left leg     Leukocytosis, unspecified     Migraine headache     Obesity, Class III, BMI 40-49.9 (morbid obesity)     Periorbital cellulitis 12/31/2016    Prediabetes     Recurrent upper respiratory infection (URI)   "   S/P colonoscopy November 2010    Sepsis 5/7/2018    Urticaria          Past Surgical History:   Procedure Laterality Date    ADENOIDECTOMY      CHOLECYSTECTOMY      CHOLECYSTECTOMY      COLONOSCOPY  2015    repeat in 10    COLONOSCOPY N/A 10/8/2015    Procedure: COLONOSCOPY;  Surgeon: Panda Bose MD;  Location: [facility];  Service: Endoscopy;  Laterality: N/A;    Hand fracture surgery      HARDWARE REMOVAL      left hand 5th MC    hymenectomy      HYSTERECTOMY      menorrhagia-Ovaries intact    ROTATOR CUFF REPAIR Right     SLEEVE GASTROPLASTY  04/16/2018    TONSILLECTOMY      Urethral dilatation       Social History     Tobacco Use    Smoking status: Never Smoker    Smokeless tobacco: Never Used   Substance Use Topics    Alcohol use: Yes     Alcohol/week: 0.0 standard drinks     Comment: very rarely    Drug use: No     Family History   Problem Relation Age of Onset    Melanoma Mother     Basal cell carcinoma Mother     Lung disease Mother         pulm htn    Cataracts Mother     Hypertension Mother     Lung cancer Father     Diabetes Father     Hypertension Father     Cancer Father     Diabetes Paternal Aunt     Colon cancer Paternal Aunt 40    Diabetes Paternal Aunt     Ovarian cancer Paternal Grandmother 40    Cancer Maternal Grandfather     Melanoma Maternal Aunt     Melanoma Maternal Uncle     Breast cancer Paternal Aunt     Lymphoma Paternal Aunt     Ulcerative colitis Son     Psoriasis Son     Psoriasis Son     Breast cancer Cousin 25    Allergic rhinitis Neg Hx     Allergies Neg Hx     Angioedema Neg Hx     Asthma Neg Hx     Atopy Neg Hx     Eczema Neg Hx     Immunodeficiency Neg Hx     Rhinitis Neg Hx     Urticaria Neg Hx      Review of patient's allergies indicates:   Allergen Reactions    Bromelains Hives     " causes mouth to bleed"    Sudafed [pseudoephedrine hcl] Other (See Comments)     Does not want to wake up .    Amoxicillin-pot " "clavulanate Itching     Other reaction(s): Itching    Hydrocodone Itching    Iodinated contrast- oral and iv dye      childhood    Iodine and iodide containing products Other (See Comments)    Melon Hives    Pantoprazole Nausea Only     Other reaction(s): upset stomach/halitosis    Percocet [oxycodone-acetaminophen] Itching    Barium iodide Rash    Ephedrine Other (See Comments)     Other reaction(s): comatose    Penicillins      Other reaction(s): does not work on pt symptoms     I have reviewed past medical, family, and social history. I have reviewed previous nurse notes.    Performance Status:The patient's activity level is functions out of house.      Review of Systems   Constitutional: Negative for activity change, appetite change, chills, diaphoresis, fatigue, fever and unexpected weight change.   HENT: Negative for dental problem, sneezing, sore throat, trouble swallowing, postnasal drip, rhinorrhea, sinus pressure, sinus pain, and voice change.     Respiratory:  Positive for cough, chest tightness, shortness of breath, wheezing   Cardiovascular: Negative for chest pain, palpitations and leg swelling.   Musculoskeletal: Negative for gait problem, myalgias and neck pain.   Skin: Negative for color change and pallor.   Allergic/Immunologic: Negative for environmental allergies and food allergies.   Neurological: Negative for dizziness, speech difficulty, weakness, light-headedness, numbness and headaches.   Hematological: Negative for adenopathy. Does not bruise/bleed easily.   Psychiatric/Behavioral: Negative for dysphoric mood and sleep disturbance. The patient is not nervous/anxious.           Exam:Comprehensive exam done. /75 (BP Location: Left arm, Patient Position: Sitting)   Pulse 75   Ht 5' 2" (1.575 m)   Wt 83.4 kg (183 lb 13.8 oz)   SpO2 98% Comment: on room air at rest  BMI 33.63 kg/m²   Exam included Vitals as listed  Constitutional: oriented to person, place, and time.  appears " well-developed. No distress.   Nose: Nose normal.   Mouth/Throat: Uvula is midline, oropharynx is clear and moist and mucous membranes are normal. No dental caries. No oropharyngeal exudate, posterior oropharyngeal edema, posterior oropharyngeal erythema or tonsillar abscesses.  Mallapatti (M) score II  Eyes: Pupils are equal, round, and reactive to light.   Neck: No JVD present. No thyromegaly present.   Cardiovascular: Normal rate, regular rhythm and normal heart sounds. Exam reveals no gallop and no friction rub.   No murmur heard.  Pulmonary/Chest: Effort normal and breath sounds abnormal: bilateral faint wheeze.   No accessory muscle usage or stridor. No apnea and no tachypnea. No respiratory distress,   Musculoskeletal:range of motion on right limited following surgery. exhibits no edema.   Lymphadenopathy:    no cervical adenopathy.   Neurological:  alert and oriented to person, place, and time. not disoriented.   Skin: Skin is warm and dry. Capillary refill takes less 2 sec. No cyanosis or erythema. No pallor. Nails show no clubbing.   Psychiatric: normal mood and affect. behavior is normal. Judgment and thought content normal.     Radiographs (ct chest and cxr) reviewed: results reviewed   XR CHEST 1 VIEW 05/10/2018   There is left lower lobe atelectasis and possible small left pleural effusion on this study.  No pneumothorax is seen.      Labs reviewed       Lab Results   Component Value Date    WBC 9.30 09/20/2019    RBC 5.13 09/20/2019    HGB 15.7 09/20/2019    HCT 46.7 09/20/2019    MCV 91 09/20/2019    MCH 30.6 09/20/2019    MCHC 33.6 09/20/2019    RDW 13.2 09/20/2019     09/20/2019    MPV 11.5 09/20/2019    GRAN 5.7 09/20/2019    GRAN 61.0 09/20/2019    LYMPH 2.7 09/20/2019    LYMPH 29.1 09/20/2019    MONO 0.6 09/20/2019    MONO 5.9 09/20/2019    EOS 0.3 09/20/2019    BASO 0.07 09/20/2019    EOSINOPHIL 2.8 09/20/2019    BASOPHIL 0.8 09/20/2019       PFT results ordered will be  reviewed    Plan:  Clinical impression is apparently straight forward and impression with management as below.    Yodit was seen today for sputum production, cough, asthma, fatigue, chest pain, ear fullness, hoarse and nasal congestion.    Diagnoses and all orders for this visit:    Severe persistent asthma with acute exacerbation  -     budesonide (PULMICORT) 0.5 mg/2 mL nebulizer solution; Take 2 mLs (0.5 mg total) by nebulization 2 (two) times daily. Controller  -     levalbuterol (XOPENEX) 1.25 mg/3 mL nebulizer solution; Take 3 mLs (1.25 mg total) by nebulization every 4 (four) hours as needed for Wheezing or Shortness of Breath. Rescue  -     levalbuterol (XOPENEX HFA) 45 mcg/actuation inhaler; Inhale 1-2 puffs into the lungs every 4 (four) hours as needed for Wheezing or Shortness of Breath. Rescue  -     benzonatate (TESSALON) 200 MG capsule; Take 1 capsule (200 mg total) by mouth 3 (three) times daily as needed for Cough.  -     montelukast (SINGULAIR) 10 mg tablet; Take 1 tablet (10 mg total) by mouth every evening.  -     fluticasone furoate-vilanterol (BREO ELLIPTA) 200-25 mcg/dose DsDv diskus inhaler; Inhale 1 puff into the lungs once daily. Controller    Cough  -     benzonatate (TESSALON) 200 MG capsule; Take 1 capsule (200 mg total) by mouth 3 (three) times daily as needed for Cough.        Follow up in about 3 months (around 2/8/2020), or if symptoms worsen or fail to improve.    Discussed with patient above for education the following:      Patient Instructions   Stopping Dulera switching to     Breo 200 1 puff once a day every day, rinse mouth after using due to risk for thrush if mouth or tongue has white sores contact clinic    Use nebulizer every 4 hours with Xopenex due to side effects from Albuterol    Use Nebulizer with Pulmicort which is inhaled steroid two times a day until breathing improves    Will reschedule PFT and next appointment for 3 months.

## 2019-11-11 ENCOUNTER — TELEPHONE (OUTPATIENT)
Dept: PULMONOLOGY | Facility: CLINIC | Age: 59
End: 2019-11-11

## 2019-11-11 NOTE — TELEPHONE ENCOUNTER
Spoke with pharmacy to obtain rx coverage info:  ID: [id number]  BIN: [id number]  PCN: [id number]  GRP: [id number]

## 2019-11-14 ENCOUNTER — TELEPHONE (OUTPATIENT)
Dept: PULMONOLOGY | Facility: CLINIC | Age: 59
End: 2019-11-14

## 2019-11-14 NOTE — TELEPHONE ENCOUNTER
Left a message for patient to return my call, need to let her know that 2nd appeal process has to be initiated by her as the patient.  Willing to do a conference call with patient and insurance in order to get medication approved.  Waiting on a call back. No further action is needed at this time.

## 2019-11-26 ENCOUNTER — OFFICE VISIT (OUTPATIENT)
Dept: DERMATOLOGY | Facility: CLINIC | Age: 59
End: 2019-11-26
Payer: COMMERCIAL

## 2019-11-26 DIAGNOSIS — L30.9 DERMATITIS: Primary | ICD-10-CM

## 2019-11-26 PROCEDURE — 99212 PR OFFICE/OUTPT VISIT, EST, LEVL II, 10-19 MIN: ICD-10-PCS | Mod: S$GLB,,, | Performed by: STUDENT IN AN ORGANIZED HEALTH CARE EDUCATION/TRAINING PROGRAM

## 2019-11-26 PROCEDURE — 99999 PR PBB SHADOW E&M-EST. PATIENT-LVL II: ICD-10-PCS | Mod: PBBFAC,,, | Performed by: STUDENT IN AN ORGANIZED HEALTH CARE EDUCATION/TRAINING PROGRAM

## 2019-11-26 PROCEDURE — 99212 OFFICE O/P EST SF 10 MIN: CPT | Mod: S$GLB,,, | Performed by: STUDENT IN AN ORGANIZED HEALTH CARE EDUCATION/TRAINING PROGRAM

## 2019-11-26 PROCEDURE — 99999 PR PBB SHADOW E&M-EST. PATIENT-LVL II: CPT | Mod: PBBFAC,,, | Performed by: STUDENT IN AN ORGANIZED HEALTH CARE EDUCATION/TRAINING PROGRAM

## 2019-11-26 NOTE — PROGRESS NOTES
Subjective:       Patient ID:  Yodit Canseco is a 59 y.o. female who presents for   Chief Complaint   Patient presents with    Rash     History of Present Illness: The patient presents for follow up of a rash. She was last seen on 10/15/19 where she was having a rash on the right arm due to being in a sling. Started on barrier precautions and clobetasol. Reports resolution of the rash. No further symptoms or other concerning rash or skin lesions.           Review of Systems   Skin: Negative for itching, rash and dry skin.        Objective:    Physical Exam   Constitutional: She appears well-developed and well-nourished. No distress.   Neurological: She is alert and oriented to person, place, and time. She is not disoriented.   Psychiatric: She has a normal mood and affect.   Skin:   Areas Examined (abnormalities noted in diagram):   Head / Face Inspection Performed  Neck Inspection Performed              Diagram Legend     Erythematous scaling macule/papule c/w actinic keratosis       Vascular papule c/w angioma      Pigmented verrucoid papule/plaque c/w seborrheic keratosis      Yellow umbilicated papule c/w sebaceous hyperplasia      Irregularly shaped tan macule c/w lentigo     1-2 mm smooth white papules consistent with Milia      Movable subcutaneous cyst with punctum c/w epidermal inclusion cyst      Subcutaneous movable cyst c/w pilar cyst      Firm pink to brown papule c/w dermatofibroma      Pedunculated fleshy papule(s) c/w skin tag(s)      Evenly pigmented macule c/w junctional nevus     Mildly variegated pigmented, slightly irregular-bordered macule c/w mildly atypical nevus      Flesh colored to evenly pigmented papule c/w intradermal nevus       Pink pearly papule/plaque c/w basal cell carcinoma      Erythematous hyperkeratotic cursted plaque c/w SCC      Surgical scar with no sign of skin cancer recurrence      Open and closed comedones      Inflammatory papules and pustules      Verrucoid  papule consistent consistent with wart     Erythematous eczematous patches and plaques     Dystrophic onycholytic nail with subungual debris c/w onychomycosis     Umbilicated papule    Erythematous-base heme-crusted tan verrucoid plaque consistent with inflamed seborrheic keratosis     Erythematous Silvery Scaling Plaque c/w Psoriasis     See annotation      Assessment / Plan:        Dermatitis - resolved.          Follow up if symptoms worsen or fail to improve.

## 2019-11-27 ENCOUNTER — OFFICE VISIT (OUTPATIENT)
Dept: FAMILY MEDICINE | Facility: CLINIC | Age: 59
End: 2019-11-27
Payer: COMMERCIAL

## 2019-11-27 VITALS
DIASTOLIC BLOOD PRESSURE: 72 MMHG | HEART RATE: 60 BPM | SYSTOLIC BLOOD PRESSURE: 131 MMHG | WEIGHT: 187 LBS | BODY MASS INDEX: 34.41 KG/M2 | HEIGHT: 62 IN | TEMPERATURE: 98 F

## 2019-11-27 DIAGNOSIS — H92.02 OTALGIA, LEFT: ICD-10-CM

## 2019-11-27 DIAGNOSIS — J02.9 PHARYNGITIS, UNSPECIFIED ETIOLOGY: ICD-10-CM

## 2019-11-27 DIAGNOSIS — H93.8X2 EAR FULLNESS, LEFT: Primary | ICD-10-CM

## 2019-11-27 LAB — DEPRECATED S PYO AG THROAT QL EIA: NEGATIVE

## 2019-11-27 PROCEDURE — 3008F PR BODY MASS INDEX (BMI) DOCUMENTED: ICD-10-PCS | Mod: CPTII,S$GLB,, | Performed by: NURSE PRACTITIONER

## 2019-11-27 PROCEDURE — 99999 PR PBB SHADOW E&M-EST. PATIENT-LVL V: CPT | Mod: PBBFAC,,, | Performed by: NURSE PRACTITIONER

## 2019-11-27 PROCEDURE — 3078F PR MOST RECENT DIASTOLIC BLOOD PRESSURE < 80 MM HG: ICD-10-PCS | Mod: CPTII,S$GLB,, | Performed by: NURSE PRACTITIONER

## 2019-11-27 PROCEDURE — 3078F DIAST BP <80 MM HG: CPT | Mod: CPTII,S$GLB,, | Performed by: NURSE PRACTITIONER

## 2019-11-27 PROCEDURE — 3075F SYST BP GE 130 - 139MM HG: CPT | Mod: CPTII,S$GLB,, | Performed by: NURSE PRACTITIONER

## 2019-11-27 PROCEDURE — 3008F BODY MASS INDEX DOCD: CPT | Mod: CPTII,S$GLB,, | Performed by: NURSE PRACTITIONER

## 2019-11-27 PROCEDURE — 87081 CULTURE SCREEN ONLY: CPT

## 2019-11-27 PROCEDURE — 99999 PR PBB SHADOW E&M-EST. PATIENT-LVL V: ICD-10-PCS | Mod: PBBFAC,,, | Performed by: NURSE PRACTITIONER

## 2019-11-27 PROCEDURE — 87880 STREP A ASSAY W/OPTIC: CPT | Mod: PO

## 2019-11-27 PROCEDURE — 99213 PR OFFICE/OUTPT VISIT, EST, LEVL III, 20-29 MIN: ICD-10-PCS | Mod: S$GLB,,, | Performed by: NURSE PRACTITIONER

## 2019-11-27 PROCEDURE — 3075F PR MOST RECENT SYSTOLIC BLOOD PRESS GE 130-139MM HG: ICD-10-PCS | Mod: CPTII,S$GLB,, | Performed by: NURSE PRACTITIONER

## 2019-11-27 PROCEDURE — 99213 OFFICE O/P EST LOW 20 MIN: CPT | Mod: S$GLB,,, | Performed by: NURSE PRACTITIONER

## 2019-11-28 NOTE — PROGRESS NOTES
Subjective:       Patient ID: Yodit Canseco is a 59 y.o. female.    Chief Complaint: Otalgia    Otalgia    There is pain in the left ear. This is a recurrent problem. The current episode started more than 1 month ago. The problem occurs every few minutes. The problem has been unchanged. There has been no fever. The pain is mild. Associated symptoms include a sore throat. Pertinent negatives include no abdominal pain, coughing, diarrhea, ear discharge, headaches, hearing loss, neck pain, rash, rhinorrhea or vomiting. She has tried antibiotics (antihistamine,steroid) for the symptoms. The treatment provided no relief. There is no history of a chronic ear infection, hearing loss or a tympanostomy tube.     Past Medical History:   Diagnosis Date    Angio-edema     Arthralgia     Asthma without status asthmaticus     Bronchitis     Diverticulosis of large intestine without hemorrhage 10/8/2015    Environmental allergies     Eosinophilic asthma 3/8/2018    Gastroparesis     Hand arthritis     Herpes simplex without mention of complication     oral    Hyperlipidemia     Hypothyroidism     Immune deficiency disorder     Intraperitoneal abscess 5/7/2018    LBP radiating to left leg     Leukocytosis, unspecified     Migraine headache     Obesity, Class III, BMI 40-49.9 (morbid obesity)     Periorbital cellulitis 12/31/2016    Prediabetes     Recurrent upper respiratory infection (URI)     S/P colonoscopy November 2010    Sepsis 5/7/2018    Urticaria      Social History     Socioeconomic History    Marital status:      Spouse name: Not on file    Number of children: Not on file    Years of education: Not on file    Highest education level: Not on file   Occupational History    Not on file   Social Needs    Financial resource strain: Not on file    Food insecurity:     Worry: Not on file     Inability: Not on file    Transportation needs:     Medical: Not on file     Non-medical: Not  on file   Tobacco Use    Smoking status: Never Smoker    Smokeless tobacco: Never Used   Substance and Sexual Activity    Alcohol use: Yes     Alcohol/week: 0.0 standard drinks     Comment: very rarely    Drug use: No    Sexual activity: Yes     Partners: Male   Lifestyle    Physical activity:     Days per week: Not on file     Minutes per session: Not on file    Stress: Not on file   Relationships    Social connections:     Talks on phone: Not on file     Gets together: Not on file     Attends [employer] service: Not on file     Active member of club or organization: Not on file     Attends meetings of clubs or organizations: Not on file     Relationship status: Not on file   Other Topics Concern    Are you pregnant or think you may be? Not Asked    Breast-feeding Not Asked   Social History Narrative    . Disabled teacher.     Past Surgical History:   Procedure Laterality Date    ADENOIDECTOMY      CHOLECYSTECTOMY      CHOLECYSTECTOMY      COLONOSCOPY  2015    repeat in 10    COLONOSCOPY N/A 10/8/2015    Procedure: COLONOSCOPY;  Surgeon: Panda Bose MD;  Location: [facility];  Service: Endoscopy;  Laterality: N/A;    Hand fracture surgery      HARDWARE REMOVAL      left hand 5th MC    hymenectomy      HYSTERECTOMY      menorrhagia-Ovaries intact    ROTATOR CUFF REPAIR Right     SLEEVE GASTROPLASTY  04/16/2018    TONSILLECTOMY      Urethral dilatation         Review of Systems   Constitutional: Negative.    HENT: Positive for ear pain and sore throat. Negative for ear discharge, hearing loss and rhinorrhea.    Eyes: Negative.    Respiratory: Negative.  Negative for cough.    Cardiovascular: Negative.    Gastrointestinal: Negative.  Negative for abdominal pain, diarrhea and vomiting.   Endocrine: Negative.    Genitourinary: Negative.    Musculoskeletal: Negative.  Negative for neck pain.   Skin: Negative.  Negative for rash.   Allergic/Immunologic: Positive for environmental allergies.    Neurological: Negative.  Negative for headaches.   Psychiatric/Behavioral: Negative.        Objective:      Physical Exam   Constitutional: She is oriented to person, place, and time. She appears well-developed and well-nourished.   HENT:   Head: Normocephalic.   Right Ear: Hearing, tympanic membrane, external ear and ear canal normal.   Left Ear: Hearing, tympanic membrane, external ear and ear canal normal.   Nose: Nose normal.   Mouth/Throat: Uvula is midline, oropharynx is clear and moist and mucous membranes are normal.   Eyes: Pupils are equal, round, and reactive to light. Conjunctivae are normal.   Neck: Normal range of motion. Neck supple.   Cardiovascular: Normal rate, regular rhythm and normal heart sounds.   Pulmonary/Chest: Effort normal and breath sounds normal.   Abdominal: Soft. Bowel sounds are normal.   Musculoskeletal: Normal range of motion.   Neurological: She is alert and oriented to person, place, and time.   Skin: Skin is warm and dry. Capillary refill takes 2 to 3 seconds.   Psychiatric: She has a normal mood and affect. Her behavior is normal. Judgment and thought content normal.   Nursing note and vitals reviewed.      Assessment:       1. Ear fullness, left    2. Otalgia, left    3. Pharyngitis, unspecified etiology        Plan:           Yodit was seen today for otalgia.    Diagnoses and all orders for this visit:  Otalgia, left  Ear fullness, left  Comments:  recurrent  Orders:  -     Ambulatory referral to ENT    Pharyngitis, unspecified etiology  -     Throat Screen, Rapid  -     (Magic mouthwash) 1:1:1 Benadryl 12.5mg/5ml liq, aluminum & magnesium hydroxide-simehticone (Maalox), lidocaine viscous 2%; Swish and spit 5 mLs every 8 (eight) hours as needed. Gargle and spit. DO NOT SWALLOW  -     Strep A culture, throat    Report to ER immediately if symptoms worsen

## 2019-11-29 ENCOUNTER — OFFICE VISIT (OUTPATIENT)
Dept: OTOLARYNGOLOGY | Facility: CLINIC | Age: 59
End: 2019-11-29
Payer: COMMERCIAL

## 2019-11-29 VITALS — HEIGHT: 62 IN | WEIGHT: 191.13 LBS | BODY MASS INDEX: 35.17 KG/M2

## 2019-11-29 DIAGNOSIS — J30.89 NON-SEASONAL ALLERGIC RHINITIS, UNSPECIFIED TRIGGER: Primary | ICD-10-CM

## 2019-11-29 DIAGNOSIS — H65.92 FLUID LEVEL BEHIND TYMPANIC MEMBRANE OF LEFT EAR: ICD-10-CM

## 2019-11-29 LAB — BACTERIA THROAT CULT: NORMAL

## 2019-11-29 PROCEDURE — 3008F BODY MASS INDEX DOCD: CPT | Mod: CPTII,S$GLB,, | Performed by: PHYSICIAN ASSISTANT

## 2019-11-29 PROCEDURE — 3008F PR BODY MASS INDEX (BMI) DOCUMENTED: ICD-10-PCS | Mod: CPTII,S$GLB,, | Performed by: PHYSICIAN ASSISTANT

## 2019-11-29 PROCEDURE — 99999 PR PBB SHADOW E&M-EST. PATIENT-LVL III: CPT | Mod: PBBFAC,,, | Performed by: PHYSICIAN ASSISTANT

## 2019-11-29 PROCEDURE — 99203 PR OFFICE/OUTPT VISIT, NEW, LEVL III, 30-44 MIN: ICD-10-PCS | Mod: S$GLB,,, | Performed by: PHYSICIAN ASSISTANT

## 2019-11-29 PROCEDURE — 99999 PR PBB SHADOW E&M-EST. PATIENT-LVL III: ICD-10-PCS | Mod: PBBFAC,,, | Performed by: PHYSICIAN ASSISTANT

## 2019-11-29 PROCEDURE — 99203 OFFICE O/P NEW LOW 30 MIN: CPT | Mod: S$GLB,,, | Performed by: PHYSICIAN ASSISTANT

## 2019-11-29 NOTE — PROGRESS NOTES
Referring Provider:    [place]  [address] [facility] [place] [address]  Subjective:   Patient: Yodit Canseco [id number], :1960   Visit date:2019 8:34 AM    Chief Complaint:  Ear Fullness (Bilateral left worse than Right )    HPI:  Yodit is a 59 y.o. female who I was asked to see in consultation for evaluation of the following issue(s):    Patient presented to clinic on 19 for an evaluation of L ear pressure. She has a hx of significant allergies and asthma, currently takes Singulair. Unable to use Flonase due to causing epistaxis. Unable to tolerate steroids due to severe insomnia and recent arm surgery (October) for her rotator cuff. Pain us dull, pressure, left side only. Clear PND. No cough or fevers. No relieving factors.     Review of Systems:  -     Allergic/Immunologic: has No Known Allergies..  -     Constitutional: Current temp:       Her meds, allergies, medical, surgical, social & family histories were reviewed & updated:  -     She has a current medication list which includes the following prescription(s): diphenhydramine hcl, acetaminophen, betamethasone valerate 0.1%, biotin, budesonide, calcium-vitamin d3, clobetasol, desonide, estradiol, fluticasone furoate-vilanterol, immun glob g(igg)-pro-iga 0-50, levalbuterol, levalbuterol, levocetirizine, levothyroxine, lidocaine, lidocaine-prilocaine, meloxicam, montelukast, multivitamin, prednisone, sumatriptan, and mometasone-formoterol.  -     She  has a past medical history of Angio-edema, Arthralgia, Asthma without status asthmaticus, Bronchitis, Diverticulosis of large intestine without hemorrhage (10/8/2015), Environmental allergies, Eosinophilic asthma (3/8/2018), Gastroparesis, Hand arthritis, Herpes simplex without mention of complication, Hyperlipidemia, Hypothyroidism, Immune deficiency disorder, Intraperitoneal abscess (2018), LBP radiating to left leg, Leukocytosis, unspecified, Migraine headache,  "Obesity, Class III, BMI 40-49.9 (morbid obesity), Periorbital cellulitis (12/31/2016), Prediabetes, Recurrent upper respiratory infection (URI), S/P colonoscopy (November 2010), Sepsis (5/7/2018), and Urticaria.   -     She does not have any pertinent problems on file.   -     She  has a past surgical history that includes Tonsillectomy; Adenoidectomy; Hysterectomy; Hand fracture surgery; Urethral dilatation; Hardware Removal; Colonoscopy (2015); Colonoscopy (N/A, 10/8/2015); Cholecystectomy; Cholecystectomy; hymenectomy; Sleeve Gastroplasty (04/16/2018); and Rotator cuff repair (Right).  -     She  reports that she has never smoked. She has never used smokeless tobacco. She reports that she drinks alcohol. She reports that she does not use drugs.  -     Her family history includes Basal cell carcinoma in her mother; Breast cancer in her paternal aunt; Breast cancer (age of onset: 25) in her cousin; Cancer in her father and maternal grandfather; Cataracts in her mother; Colon cancer (age of onset: 40) in her paternal aunt; Diabetes in her father, paternal aunt, and paternal aunt; Hypertension in her father and mother; Lung cancer in her father; Lung disease in her mother; Lymphoma in her paternal aunt; Melanoma in her maternal aunt, maternal uncle, and mother; Ovarian cancer (age of onset: 40) in her paternal grandmother; Psoriasis in her son and son; Ulcerative colitis in her son.  -     She is allergic to bromelains; sudafed [pseudoephedrine hcl]; amoxicillin-pot clavulanate; hydrocodone; iodinated contrast media; iodine and iodide containing products; melon; pantoprazole; percocet [oxycodone-acetaminophen]; barium iodide; ephedrine; and penicillins.    Objective:     Physical Exam:  Vitals:  Ht 5' 2" (1.575 m)   Wt 86.7 kg (191 lb 2.2 oz)   BMI 34.96 kg/m²   General appearance:  Well developed, well nourished    Eyes:  Extraocular motions intact, PERRL    Communication:  no hoarseness, no dysphonia    Ears:  L " TM with moderate bulge, clear, intact. R TM WNL.   Nose:  No masses/lesions of external nose, nasal mucosa, septum, and turbinates were within normal limits.  Mouth:  No mass/lesion of lips, teeth, gums, hard/soft palate, tongue, tonsils, or oropharynx.    Cardiovascular:  No pedal edema; Radial Pulses +2     Neck & Lymphatics:  No cervical lymphadenopathy, no neck mass/crepitus/ asymmetry, trachea is midline, no thyroid enlargement/tenderness/mass.    Psych: Oriented x3,  Alert with normal mood and affect.     Respiration/Chest:  Symmetric expansion during respiration, normal respiratory effort.    Skin:  Warm and intact. No ulcerations of face, scalp, neck.    Type B Tymp AS  Type A Tymp AD    Assessment & Plan:   Yodit was seen today for ear fullness.    Diagnoses and all orders for this visit:    Non-seasonal allergic rhinitis, unspecified trigger    Fluid level behind tympanic membrane of left ear    Advised using Flonase Sensimist, give this 3-4 weeks. If unresolved, recheck with audio prior.   Continue Singulair (take in AM), add antihistamine at night (Allegra, Zyrtec, or Xyzal)    We discussed her medical conditions, treatments and plan.  Yodit should return to clinic if any issues arise (symptoms worsen or persist), otherwise we will see her back in the clinic only as needed.    Thank you for allowing me to participate in the care of Yodit.      Bessy Sears PA-C  [facility]   [facility]  [address]  [place] [address]  P: [phone]  F: [phone]

## 2019-12-18 ENCOUNTER — PATIENT MESSAGE (OUTPATIENT)
Dept: PULMONOLOGY | Facility: CLINIC | Age: 59
End: 2019-12-18

## 2019-12-20 ENCOUNTER — TELEPHONE (OUTPATIENT)
Dept: PULMONOLOGY | Facility: CLINIC | Age: 59
End: 2019-12-20

## 2019-12-20 NOTE — TELEPHONE ENCOUNTER
L/M to return call.----- Message from Teresa Manuel sent at 12/20/2019  7:57 AM CST -----  Contact: Patient  Type:  Same Day Appointment Request     Caller is requesting a same day appointment.  Caller declined first available appointment listed below.      Name of Caller:  Yodit  When is the first available appointment?  1/28/19  Symptoms:  Coughing, congestion  Best Call Back Number: [phone]

## 2020-01-09 ENCOUNTER — OFFICE VISIT (OUTPATIENT)
Dept: PULMONOLOGY | Facility: CLINIC | Age: 60
End: 2020-01-09
Payer: COMMERCIAL

## 2020-01-09 ENCOUNTER — PATIENT MESSAGE (OUTPATIENT)
Dept: PULMONOLOGY | Facility: CLINIC | Age: 60
End: 2020-01-09

## 2020-01-09 VITALS
SYSTOLIC BLOOD PRESSURE: 144 MMHG | DIASTOLIC BLOOD PRESSURE: 82 MMHG | HEART RATE: 87 BPM | BODY MASS INDEX: 34.2 KG/M2 | OXYGEN SATURATION: 99 % | WEIGHT: 185.88 LBS | HEIGHT: 62 IN

## 2020-01-09 DIAGNOSIS — J47.9 BRONCHIECTASIS WITHOUT COMPLICATION: ICD-10-CM

## 2020-01-09 DIAGNOSIS — R05.3 COUGH, PERSISTENT: Primary | ICD-10-CM

## 2020-01-09 DIAGNOSIS — J82.83 EOSINOPHILIC ASTHMA: ICD-10-CM

## 2020-01-09 PROCEDURE — 99999 PR PBB SHADOW E&M-EST. PATIENT-LVL III: ICD-10-PCS | Mod: PBBFAC,,, | Performed by: NURSE PRACTITIONER

## 2020-01-09 PROCEDURE — 99214 PR OFFICE/OUTPT VISIT, EST, LEVL IV, 30-39 MIN: ICD-10-PCS | Mod: S$GLB,,, | Performed by: NURSE PRACTITIONER

## 2020-01-09 PROCEDURE — 3079F PR MOST RECENT DIASTOLIC BLOOD PRESSURE 80-89 MM HG: ICD-10-PCS | Mod: CPTII,S$GLB,, | Performed by: NURSE PRACTITIONER

## 2020-01-09 PROCEDURE — 99999 PR PBB SHADOW E&M-EST. PATIENT-LVL III: CPT | Mod: PBBFAC,,, | Performed by: NURSE PRACTITIONER

## 2020-01-09 PROCEDURE — 3008F PR BODY MASS INDEX (BMI) DOCUMENTED: ICD-10-PCS | Mod: CPTII,S$GLB,, | Performed by: NURSE PRACTITIONER

## 2020-01-09 PROCEDURE — 3008F BODY MASS INDEX DOCD: CPT | Mod: CPTII,S$GLB,, | Performed by: NURSE PRACTITIONER

## 2020-01-09 PROCEDURE — 3077F PR MOST RECENT SYSTOLIC BLOOD PRESSURE >= 140 MM HG: ICD-10-PCS | Mod: CPTII,S$GLB,, | Performed by: NURSE PRACTITIONER

## 2020-01-09 PROCEDURE — 99214 OFFICE O/P EST MOD 30 MIN: CPT | Mod: S$GLB,,, | Performed by: NURSE PRACTITIONER

## 2020-01-09 PROCEDURE — 3079F DIAST BP 80-89 MM HG: CPT | Mod: CPTII,S$GLB,, | Performed by: NURSE PRACTITIONER

## 2020-01-09 PROCEDURE — 3077F SYST BP >= 140 MM HG: CPT | Mod: CPTII,S$GLB,, | Performed by: NURSE PRACTITIONER

## 2020-01-09 NOTE — PATIENT INSTRUCTIONS
[employer] sells an Aerobika device, this device shakes the air inside of you to break mucous off the bronchial wall. Use with nebulizer and with out daily.     Continue current Asthma medication regiment    Use nebulizer at least once a day three days a week.    CT to evaluate lungs for any abnormalities

## 2020-01-09 NOTE — PROGRESS NOTES
1/9/2020    Yodit Canseco  Office Note    Chief Complaint   Patient presents with    Cough       HPI:   1/9/2020- two exacerbations in 2 months, states cough is unchanged, daily, states bark like cough, severe and affects daily life, Productive occasionally clear/cream color thick in consistency, uses nebulizer 2-3 x daily.     11/8/2019- Had recent Rotator cuff surgery with nerve block Oct 9, Has SOB worse after surgery for 3 days, Took prednisone 20 mg for 3 days and Levaquin for 9 days. Seen by PCP dx URI.   Coughing, chest tightness, congestion, wheezing, Hoarse voice, fatigue, Shortness of breath worse with exertion, onset following surgery to shoulder. Cough- not able to clear secretions from chest.  Complaint of hand shaking, heart rate increases, and becomes rapid with Albuterol nebulizer onset years. Tries not to use due to side effects.     04/24/2019- breathing is good except for high pollen days, had 3 exacerbation in 6 months. Albuterol rescue inhaler 3x weekly, 1-2 nocturnal arousals monthly, could not do PFT at [facility],     11/6/18- ER visit for dehydration gastroenteritis, no prednisone use in 4 months. Not currently on fasenra injection, insurance would not cover. Currently on Hirzentra IGG therapy.   Sinus drip present, cough occasional, non productive, no nocturnal arousals, Currently on Dulera daily, uses Albuterol rescue inhaler daily before exercise no SOB.  Sepsis [facility] August 2017.  Flu vaccine   July 9, 2018 - had gastric sleeve with leak complication- lost 45 lbs already.  Has appetite but fills quickly.  Still on hirzentra.  Asthma ok avoiding fragrances/ordors.  No prednisone.   No nocturnal arousals, uses rescue weekly avoiding any irritants.  Use prednisone 4-5 last year and twice this year.        March 19, 2018-since recovered from pleuritic pain - doing well.  No prednisone use recent.  Pt has had no wheezes.  feb 22,2108   Had to do prednisone  again.  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 I have advised not to work given severe asthma,  hypersensitivity to fragrances, and immune deficiency.   Pt seems better since Hirzentra, but still unstable severe asthma  Jan 29. 2018- 6 days ago had been exposed to sick , had nasal congestion , cough, ear stuffy, muscle aches /joint aches / fever.  Temp to 100.1.  Seen by np and flu screen negative.  Had asthma 3-4 flares last year. eos up past,   oct 5, 2017 - had marce in April, skipped couple doses hirzentra, had mild bronchitis once, otherwise doing very well.  No prednisone.  No side effects and covered. Ppt flu sense for 2 days  Jan 24, on  hirzentra q o week since sept and asthma more stable, no hosp, no prednisone, no noct asthma/ no rescue therapy- control not this good for years.  Sept 27, 2016  HPI:has had lung problems since birth, no nocturnal arousals, uses rescue 2/d, breathing controlled satisfactory except with irritants/infection - strong abx needed to clear.  Prednisone 2/yr, last hosp 18 months.  No ventilator.    Had exacerbation recent due uri from .  Has had shingles vaccine past.       The chief compliant  problem is varies with instablilty at time  PFSH:  Past Medical History:   Diagnosis Date    Angio-edema     Arthralgia     Asthma without status asthmaticus     Bronchitis     Diverticulosis of large intestine without hemorrhage 10/8/2015    Environmental allergies     Eosinophilic asthma 3/8/2018    Gastroparesis     Hand arthritis     Herpes simplex without mention of complication     oral    Hyperlipidemia     Hypothyroidism     Immune deficiency disorder     Intraperitoneal abscess 5/7/2018    LBP radiating to left leg     Leukocytosis, unspecified     Migraine headache     Obesity, Class III, BMI 40-49.9 (morbid obesity)     Periorbital  "cellulitis 12/31/2016    Prediabetes     Recurrent upper respiratory infection (URI)     S/P colonoscopy November 2010    Sepsis 5/7/2018    Urticaria          Past Surgical History:   Procedure Laterality Date    ADENOIDECTOMY      CHOLECYSTECTOMY      CHOLECYSTECTOMY      COLONOSCOPY  2015    repeat in 10    COLONOSCOPY N/A 10/8/2015    Procedure: COLONOSCOPY;  Surgeon: Panda Bose MD;  Location: [facility];  Service: Endoscopy;  Laterality: N/A;    Hand fracture surgery      HARDWARE REMOVAL      left hand [facility]    hymenectomy      HYSTERECTOMY      menorrhagia-Ovaries intact    ROTATOR CUFF REPAIR Right     SLEEVE GASTROPLASTY  04/16/2018    TONSILLECTOMY      Urethral dilatation       Social History     Tobacco Use    Smoking status: Never Smoker    Smokeless tobacco: Never Used   Substance Use Topics    Alcohol use: Yes     Alcohol/week: 0.0 standard drinks     Comment: very rarely    Drug use: No     Family History   Problem Relation Age of Onset    Melanoma Mother     Basal cell carcinoma Mother     Lung disease Mother         pulm htn    Cataracts Mother     Hypertension Mother     Lung cancer Father     Diabetes Father     Hypertension Father     Cancer Father     Diabetes Paternal Aunt     Colon cancer Paternal Aunt 40    Diabetes Paternal Aunt     Ovarian cancer Paternal Grandmother 40    Cancer Maternal Grandfather     Melanoma Maternal Aunt     Melanoma Maternal Uncle     Breast cancer Paternal Aunt     Lymphoma Paternal Aunt     Ulcerative colitis Son     Psoriasis Son     Psoriasis Son     Breast cancer Cousin 25    Allergic rhinitis Neg Hx     Allergies Neg Hx     Angioedema Neg Hx     Asthma Neg Hx     Atopy Neg Hx     Eczema Neg Hx     Immunodeficiency Neg Hx     Rhinitis Neg Hx     Urticaria Neg Hx      Review of patient's allergies indicates:   Allergen Reactions    Bromelains Hives     " causes mouth to bleed"    Sudafed [pseudoephedrine " "hcl] Other (See Comments)     Does not want to wake up .    Amoxicillin-pot clavulanate Itching     Other reaction(s): Itching    Hydrocodone Itching    Iodinated contrast- oral and iv dye      childhood    Iodine and iodide containing products Other (See Comments)    Melon Hives    Pantoprazole Nausea Only     Other reaction(s): upset stomach/halitosis    Percocet [oxycodone-acetaminophen] Itching    Barium iodide Rash    Ephedrine Other (See Comments)     Other reaction(s): comatose    Penicillins      Other reaction(s): does not work on pt symptoms     I have reviewed past medical, family, and social history. I have reviewed previous nurse notes.    Performance Status:The patient's activity level is functions out of house.      Review of Systems   Constitutional: Negative for activity change, appetite change, chills, diaphoresis, fatigue, fever and unexpected weight change.   HENT: Negative for dental problem, sneezing, sore throat, trouble swallowing, postnasal drip, rhinorrhea, sinus pressure, sinus pain, and voice change.     Respiratory:  Positive for cough, chest tightness, shortness of breath, wheezing   Cardiovascular: Negative for chest pain, palpitations and leg swelling.   Musculoskeletal: Negative for gait problem, myalgias and neck pain.   Skin: Negative for color change and pallor.   Allergic/Immunologic: Negative for environmental allergies and food allergies.   Neurological: Negative for dizziness, speech difficulty, weakness, light-headedness, numbness and headaches.   Hematological: Negative for adenopathy. Does not bruise/bleed easily.   Psychiatric/Behavioral: Negative for dysphoric mood and sleep disturbance. The patient is not nervous/anxious.           Exam:Comprehensive exam done. BP (!) 144/82 (BP Location: Left arm, Patient Position: Sitting)   Pulse 87   Ht 5' 2" (1.575 m)   Wt 84.3 kg (185 lb 13.6 oz)   SpO2 99% Comment: on room air  BMI 33.99 kg/m²   Exam included Vitals " as listed  Constitutional: oriented to person, place, and time.  appears well-developed. No distress.   Nose: Nose normal.   Mouth/Throat: Uvula is midline, oropharynx is clear and moist and mucous membranes are normal. No dental caries. No oropharyngeal exudate, posterior oropharyngeal edema, posterior oropharyngeal erythema or tonsillar abscesses.  Mallapatti (M) score II  Eyes: Pupils are equal, round, and reactive to light.   Neck: No JVD present. No thyromegaly present.   Cardiovascular: Normal rate, regular rhythm and normal heart sounds. Exam reveals no gallop and no friction rub.   No murmur heard.  Pulmonary/Chest: Effort normal and breath sounds abnormal: bilateral faint wheeze.   No accessory muscle usage or stridor. No apnea and no tachypnea. No respiratory distress,   Musculoskeletal:range of motion on right limited following surgery. exhibits no edema.   Lymphadenopathy:    no cervical adenopathy.   Neurological:  alert and oriented to person, place, and time. not disoriented.   Skin: Skin is warm and dry. Capillary refill takes less 2 sec. No cyanosis or erythema. No pallor. Nails show no clubbing.   Psychiatric: normal mood and affect. behavior is normal. Judgment and thought content normal.     Radiographs (ct chest and cxr) reviewed: results reviewed   XR CHEST 1 VIEW 05/10/2018   There is left lower lobe atelectasis and possible small left pleural effusion on this study.  No pneumothorax is seen.      Labs reviewed       Lab Results   Component Value Date    WBC 9.30 09/20/2019    RBC 5.13 09/20/2019    HGB 15.7 09/20/2019    HCT 46.7 09/20/2019    MCV 91 09/20/2019    MCH 30.6 09/20/2019    MCHC 33.6 09/20/2019    RDW 13.2 09/20/2019     09/20/2019    MPV 11.5 09/20/2019    GRAN 5.7 09/20/2019    GRAN 61.0 09/20/2019    LYMPH 2.7 09/20/2019    LYMPH 29.1 09/20/2019    MONO 0.6 09/20/2019    MONO 5.9 09/20/2019    EOS 0.3 09/20/2019    BASO 0.07 09/20/2019    EOSINOPHIL 2.8 09/20/2019     BASOPHIL 0.8 09/20/2019       PFT results ordered will be reviewed    I spent over 25 mins of time with the patient. Reviewed results of the recently ordered labs, tests and studies; made directives with regards to the results. Over half of this time was spent couseling and coordinating care.     I have explained all of the above in detail and the patient understands all of the current recommendation(s). I have answered all of their questions to the best of my ability and to their complete satisfaction.   The patient is to continue with the current management plan.      Plan:  Clinical impression is apparently straight forward and impression with management as below.    Yodit was seen today for cough.    Diagnoses and all orders for this visit:    Cough, persistent  -     CT Chest Without Contrast; Future    Bronchiectasis without complication  -     mucus clearing device (ACAPELLA, FLUTTER); by Misc.(Non-Drug; Combo Route) route 2 (two) times daily.    Eosinophilic asthma  -     NEBULIZER FOR HOME USE        Follow up in about 3 months (around 4/9/2020), or if symptoms worsen or fail to improve.    Discussed with patient above for education the following:      Patient Instructions   [employer] sells an Aerobika device, this device shakes the air inside of you to break mucous off the bronchial wall. Use with nebulizer and with out daily.     Continue current Asthma medication regiment    Use nebulizer at least once a day three days a week.    CT to evaluate lungs for any abnormalities

## 2020-01-20 RX ORDER — DESONIDE 0.5 MG/G
CREAM TOPICAL
Qty: 15 G | OUTPATIENT
Start: 2020-01-20

## 2020-01-23 ENCOUNTER — OFFICE VISIT (OUTPATIENT)
Dept: FAMILY MEDICINE | Facility: CLINIC | Age: 60
End: 2020-01-23
Payer: COMMERCIAL

## 2020-01-23 VITALS
WEIGHT: 186 LBS | BODY MASS INDEX: 34.23 KG/M2 | HEART RATE: 64 BPM | DIASTOLIC BLOOD PRESSURE: 77 MMHG | TEMPERATURE: 98 F | SYSTOLIC BLOOD PRESSURE: 126 MMHG | HEIGHT: 62 IN

## 2020-01-23 DIAGNOSIS — J82.83 EOSINOPHILIC ASTHMA: ICD-10-CM

## 2020-01-23 DIAGNOSIS — E03.9 HYPOTHYROIDISM, UNSPECIFIED TYPE: ICD-10-CM

## 2020-01-23 DIAGNOSIS — M24.132 DEGENERATIVE TEAR OF TRIANGULAR FIBROCARTILAGE COMPLEX (TFCC) OF LEFT WRIST: ICD-10-CM

## 2020-01-23 DIAGNOSIS — R73.03 PREDIABETES: ICD-10-CM

## 2020-01-23 DIAGNOSIS — G43.909 MIGRAINE WITHOUT STATUS MIGRAINOSUS, NOT INTRACTABLE, UNSPECIFIED MIGRAINE TYPE: Primary | ICD-10-CM

## 2020-01-23 DIAGNOSIS — Z86.010 HISTORY OF COLON POLYPS: ICD-10-CM

## 2020-01-23 DIAGNOSIS — E78.5 HYPERLIPIDEMIA, UNSPECIFIED HYPERLIPIDEMIA TYPE: ICD-10-CM

## 2020-01-23 DIAGNOSIS — D84.9 IMMUNE DEFICIENCY DISORDER: ICD-10-CM

## 2020-01-23 DIAGNOSIS — Z82.49 FAMILY HISTORY OF ASCVD (ARTERIOSCLEROTIC CARDIOVASCULAR DISEASE): ICD-10-CM

## 2020-01-23 DIAGNOSIS — M19.049 HAND ARTHRITIS: ICD-10-CM

## 2020-01-23 DIAGNOSIS — M65.832 EXTENSOR TENOSYNOVITIS OF LEFT WRIST: ICD-10-CM

## 2020-01-23 DIAGNOSIS — J45.51 SEVERE PERSISTENT ASTHMA WITH ACUTE EXACERBATION: ICD-10-CM

## 2020-01-23 DIAGNOSIS — Z98.84 S/P BARIATRIC SURGERY: ICD-10-CM

## 2020-01-23 DIAGNOSIS — K57.30 DIVERTICULOSIS OF LARGE INTESTINE WITHOUT HEMORRHAGE: Chronic | ICD-10-CM

## 2020-01-23 DIAGNOSIS — K31.84 GASTROPARESIS: ICD-10-CM

## 2020-01-23 DIAGNOSIS — H52.7 REFRACTIVE ERROR: ICD-10-CM

## 2020-01-23 PROCEDURE — G0439 PPPS, SUBSEQ VISIT: HCPCS | Mod: S$GLB,,, | Performed by: NURSE PRACTITIONER

## 2020-01-23 PROCEDURE — G0439 PR MEDICARE ANNUAL WELLNESS SUBSEQUENT VISIT: ICD-10-PCS | Mod: S$GLB,,, | Performed by: NURSE PRACTITIONER

## 2020-01-23 PROCEDURE — 3074F PR MOST RECENT SYSTOLIC BLOOD PRESSURE < 130 MM HG: ICD-10-PCS | Mod: CPTII,S$GLB,, | Performed by: NURSE PRACTITIONER

## 2020-01-23 PROCEDURE — 3078F DIAST BP <80 MM HG: CPT | Mod: CPTII,S$GLB,, | Performed by: NURSE PRACTITIONER

## 2020-01-23 PROCEDURE — 99999 PR PBB SHADOW E&M-EST. PATIENT-LVL III: ICD-10-PCS | Mod: PBBFAC,,, | Performed by: NURSE PRACTITIONER

## 2020-01-23 PROCEDURE — 3074F SYST BP LT 130 MM HG: CPT | Mod: CPTII,S$GLB,, | Performed by: NURSE PRACTITIONER

## 2020-01-23 PROCEDURE — 99999 PR PBB SHADOW E&M-EST. PATIENT-LVL III: CPT | Mod: PBBFAC,,, | Performed by: NURSE PRACTITIONER

## 2020-01-23 PROCEDURE — 3078F PR MOST RECENT DIASTOLIC BLOOD PRESSURE < 80 MM HG: ICD-10-PCS | Mod: CPTII,S$GLB,, | Performed by: NURSE PRACTITIONER

## 2020-01-23 NOTE — PROGRESS NOTES
"Yodit Canseco presented for a  Medicare AWV and comprehensive Health Risk Assessment today. The following components were reviewed and updated:    · Medical history  · Family History  · Social history  · Allergies and Current Medications  · Health Risk Assessment  · Health Maintenance  · Care Team     ** See Completed Assessments for Annual Wellness Visit within the encounter summary.**       The following assessments were completed:  · Living Situation  · CAGE  · Depression Screening  · Timed Get Up and Go  · Whisper Test  · Cognitive Function Screening  · Nutrition Screening  · ADL Screening  · PAQ Screening    Vitals:    01/23/20 1319   BP: 126/77   Pulse: 64   Temp: 98.4 °F (36.9 °C)   TempSrc: Oral   Weight: 84.4 kg (186 lb)   Height: 5' 2" (1.575 m)     Body mass index is 34.02 kg/m².  Physical Exam   Constitutional: She is oriented to person, place, and time. She appears well-developed and well-nourished. No distress.   HENT:   Head: Normocephalic and atraumatic.   Eyes: Pupils are equal, round, and reactive to light. EOM are normal.   Neck: Normal range of motion. Neck supple.   Cardiovascular: Normal rate and regular rhythm.   Pulmonary/Chest: Effort normal and breath sounds normal.   Musculoskeletal: Normal range of motion.   Neurological: She is alert and oriented to person, place, and time.   Skin: Skin is warm and dry. No rash noted.   Psychiatric: She has a normal mood and affect. Judgment normal.   Nursing note and vitals reviewed.        Diagnoses and health risks identified today and associated recommendations/orders:    1. Migraine without status migrainosus, not intractable, unspecified migraine type  Stable and controlled on imitrex  Continue medication as prescribed  Continue current treatment plan as previously prescribed with your PCP      2. Refractive error  Stable - routine follow up with opthalmology    3. Eosinophilic asthma  Stable and controlled on pulmicort, xopenex, " dulera  Continue medication as prescribed  Continue current treatment plan as previously prescribed with your PCP      4. Severe persistent asthma with acute exacerbation  Stable and controlled on  pulmicort, xopenex, dulera  Continue medication as prescribed  Continue current treatment plan as previously prescribed with your PCP      5. Hyperlipidemia, unspecified hyperlipidemia type  Stable and controlled on no medications  Continue medication as prescribed  Continue current treatment plan as previously prescribed with your PCP      6. Immune deficiency disorder  Stable and controlled on hizentra  Continue medication as prescribed  Continue current treatment plan as previously prescribed with your PCP      7. Hypothyroidism, unspecified type  Stable and controlled on synthroid  Continue medication as prescribed  Continue current treatment plan as previously prescribed with your PCP      8. Prediabetes  Stable - encourage healthy diet choices, increased physical activity, and weight loss    9. S/P bariatric surgery  Stable - routine follow up with bariatrics     10. Diverticulosis of large intestine without hemorrhage  Stable - follow up for worsening symptoms    11. Gastroparesis  Stable - follow up for worsening symptoms    12. History of colon polyps  Stable - routine colonoscopy    13. Hand arthritis  Stable and controlled on mobic  Continue medication as prescribed  Continue current treatment plan as previously prescribed with your PCP      14. Extensor tenosynovitis of left wrist  Stable - follow up for worsening symptoms    15. 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 Family history of ASCVD (arteriosclerotic cardiovascular disease)  Stable - routine follow up and labs      Provided Yodit with a 5-10 year written screening schedule and personal prevention plan. Recommendations were developed using the USPSTF age appropriate  recommendations. Education, counseling, and referrals were provided as needed. After Visit Summary printed and given to patient which includes a list of additional screenings\tests needed.    No follow-ups on file.    Jori Jarrett NP

## 2020-01-28 ENCOUNTER — TELEPHONE (OUTPATIENT)
Dept: FAMILY MEDICINE | Facility: CLINIC | Age: 60
End: 2020-01-28

## 2020-01-28 NOTE — TELEPHONE ENCOUNTER
----- Message from Bessy Hernadez sent at 1/28/2020  9:55 AM CST -----  Contact: Lester  Requesting a call back regarding pt and shingles shot. She would like to know if pt is ok to take the second dose being that she is immunio-compromised. Please call Melody bourgeois at [phone]

## 2020-02-03 ENCOUNTER — PATIENT MESSAGE (OUTPATIENT)
Dept: ALLERGY | Facility: CLINIC | Age: 60
End: 2020-02-03

## 2020-02-06 ENCOUNTER — PATIENT OUTREACH (OUTPATIENT)
Dept: ADMINISTRATIVE | Facility: OTHER | Age: 60
End: 2020-02-06

## 2020-02-10 ENCOUNTER — HOSPITAL ENCOUNTER (OUTPATIENT)
Dept: PULMONOLOGY | Facility: HOSPITAL | Age: 60
Discharge: HOME | End: 2020-02-10
Attending: NURSE PRACTITIONER
Payer: COMMERCIAL

## 2020-02-10 ENCOUNTER — OFFICE VISIT (OUTPATIENT)
Dept: PULMONOLOGY | Facility: CLINIC | Age: 60
End: 2020-02-10
Payer: COMMERCIAL

## 2020-02-10 ENCOUNTER — HOSPITAL ENCOUNTER (OUTPATIENT)
Dept: RADIOLOGY | Facility: HOSPITAL | Age: 60
Discharge: HOME | End: 2020-02-10
Attending: NURSE PRACTITIONER
Payer: COMMERCIAL

## 2020-02-10 VITALS
HEART RATE: 92 BPM | OXYGEN SATURATION: 96 % | RESPIRATION RATE: 16 BRPM | WEIGHT: 189.38 LBS | SYSTOLIC BLOOD PRESSURE: 135 MMHG | BODY MASS INDEX: 34.85 KG/M2 | HEIGHT: 62 IN | DIASTOLIC BLOOD PRESSURE: 81 MMHG

## 2020-02-10 DIAGNOSIS — J45.50 SEVERE PERSISTENT ASTHMA WITHOUT COMPLICATION: ICD-10-CM

## 2020-02-10 DIAGNOSIS — J45.50 SEVERE PERSISTENT ASTHMA WITHOUT COMPLICATION: Primary | ICD-10-CM

## 2020-02-10 DIAGNOSIS — J45.51 SEVERE PERSISTENT ASTHMA WITH ACUTE EXACERBATION: ICD-10-CM

## 2020-02-10 DIAGNOSIS — R05.3 COUGH, PERSISTENT: ICD-10-CM

## 2020-02-10 LAB
BRPFT: ABNORMAL
DLCO ADJ PRE: 18.95 ML/(MIN*MMHG) (ref 15.65–27.12)
DLCO SINGLE BREATH LLN: 15.65
DLCO SINGLE BREATH PRE REF: 88.6 %
DLCO SINGLE BREATH REF: 21.39
DLCOC SBVA LLN: 3.07
DLCOC SBVA PRE REF: 108.5 %
DLCOC SBVA REF: 4.68
DLCOC SINGLE BREATH LLN: 15.65
DLCOC SINGLE BREATH PRE REF: 88.6 %
DLCOC SINGLE BREATH REF: 21.39
DLCOVA LLN: 3.07
DLCOVA PRE REF: 108.5 %
DLCOVA PRE: 5.08 ML/(MIN*MMHG*L) (ref 3.07–6.29)
DLCOVA REF: 4.68
DLVAADJ PRE: 5.08 ML/(MIN*MMHG*L) (ref 3.07–6.29)
ERVN2 LLN: 0.78
ERVN2 PRE REF: 52.1 %
ERVN2 PRE: 0.4 L (ref 0.78–0.78)
ERVN2 REF: 0.78
FEF 25 75 CHG: 38.8 %
FEF 25 75 LLN: 1.1
FEF 25 75 POST REF: 131.3 %
FEF 25 75 PRE REF: 94.6 %
FEF 25 75 REF: 2.15
FET100 CHG: -8.6 %
FEV1 CHG: 7.7 %
FEV1 FVC CHG: 4.6 %
FEV1 FVC LLN: 67
FEV1 FVC POST REF: 103.8 %
FEV1 FVC PRE REF: 99.2 %
FEV1 FVC REF: 80
FEV1 LLN: 1.74
FEV1 POST REF: 91.8 %
FEV1 PRE REF: 85.2 %
FEV1 REF: 2.31
FRCN2 LLN: 1.75
FRCN2 PRE REF: 64 %
FRCN2 REF: 2.58
FVC CHG: 3 %
FVC LLN: 2.21
FVC POST REF: 88 %
FVC PRE REF: 85.5 %
FVC REF: 2.92
IVC PRE: 2.45 L (ref 2.21–3.63)
IVC SINGLE BREATH LLN: 2.21
IVC SINGLE BREATH PRE REF: 83.9 %
IVC SINGLE BREATH REF: 2.92
MVV LLN: 71
MVV PRE REF: 114.2 %
MVV REF: 84
PEF CHG: -3.6 %
PEF LLN: 4.37
PEF POST REF: 103.3 %
PEF PRE REF: 107.1 %
PEF REF: 5.96
POST FEF 25 75: 2.82 L/S (ref 1.1–3.2)
POST FET 100: 7.3 SEC
POST FEV1 FVC: 82.63 % (ref 67.49–91.7)
POST FEV1: 2.12 L (ref 1.74–2.88)
POST FVC: 2.57 L (ref 2.21–3.63)
POST PEF: 6.16 L/S (ref 4.37–7.56)
PRE DLCO: 18.95 ML/(MIN*MMHG) (ref 15.65–27.12)
PRE FEF 25 75: 2.03 L/S (ref 1.1–3.2)
PRE FET 100: 7.98 SEC
PRE FEV1 FVC: 78.98 % (ref 67.49–91.7)
PRE FEV1: 1.97 L (ref 1.74–2.88)
PRE FRC N2: 1.65 L
PRE FVC: 2.49 L (ref 2.21–3.63)
PRE MVV: 96 L/MIN (ref 71.45–96.67)
PRE PEF: 6.39 L/S (ref 4.37–7.56)
RVN2 LLN: 1.23
RVN2 PRE REF: 69.3 %
RVN2 PRE: 1.25 L (ref 1.23–2.38)
RVN2 REF: 1.8
RVN2TLCN2 LLN: 29.77
RVN2TLCN2 PRE REF: 79.9 %
RVN2TLCN2 PRE: 31.44 % (ref 29.77–48.95)
RVN2TLCN2 REF: 39.36
TLCN2 LLN: 3.59
TLCN2 PRE REF: 86.8 %
TLCN2 PRE: 3.97 L (ref 3.58–5.56)
TLCN2 REF: 4.57
VA PRE: 3.73 L (ref 4.42–4.42)
VA SINGLE BREATH LLN: 4.42
VA SINGLE BREATH PRE REF: 84.5 %
VA SINGLE BREATH REF: 4.42
VCMAXN2 LLN: 2.21
VCMAXN2 PRE REF: 93.3 %
VCMAXN2 PRE: 2.72 L (ref 2.21–3.63)
VCMAXN2 REF: 2.92

## 2020-02-10 PROCEDURE — 94727 GAS DIL/WSHOT DETER LNG VOL: CPT | Mod: 26,,, | Performed by: INTERNAL MEDICINE

## 2020-02-10 PROCEDURE — 94729 PR C02/MEMBANE DIFFUSE CAPACITY: ICD-10-PCS | Mod: 26,,, | Performed by: INTERNAL MEDICINE

## 2020-02-10 PROCEDURE — 94729 DIFFUSING CAPACITY: CPT | Mod: 26,,, | Performed by: INTERNAL MEDICINE

## 2020-02-10 PROCEDURE — 94729 DIFFUSING CAPACITY: CPT

## 2020-02-10 PROCEDURE — 99213 PR OFFICE/OUTPT VISIT, EST, LEVL III, 20-29 MIN: ICD-10-PCS | Mod: 25,S$GLB,, | Performed by: NURSE PRACTITIONER

## 2020-02-10 PROCEDURE — 71250 CT CHEST WITHOUT CONTRAST: ICD-10-PCS | Mod: 26,,, | Performed by: RADIOLOGY

## 2020-02-10 PROCEDURE — 71250 CT THORAX DX C-: CPT | Mod: TC

## 2020-02-10 PROCEDURE — 94060 EVALUATION OF WHEEZING: CPT | Mod: 26,,, | Performed by: INTERNAL MEDICINE

## 2020-02-10 PROCEDURE — 3008F PR BODY MASS INDEX (BMI) DOCUMENTED: ICD-10-PCS | Mod: CPTII,S$GLB,, | Performed by: NURSE PRACTITIONER

## 2020-02-10 PROCEDURE — 71250 CT THORAX DX C-: CPT | Mod: 26,,, | Performed by: RADIOLOGY

## 2020-02-10 PROCEDURE — 99213 OFFICE O/P EST LOW 20 MIN: CPT | Mod: 25,S$GLB,, | Performed by: NURSE PRACTITIONER

## 2020-02-10 PROCEDURE — 3008F BODY MASS INDEX DOCD: CPT | Mod: CPTII,S$GLB,, | Performed by: NURSE PRACTITIONER

## 2020-02-10 PROCEDURE — 99999 PR PBB SHADOW E&M-EST. PATIENT-LVL V: CPT | Mod: PBBFAC,,, | Performed by: NURSE PRACTITIONER

## 2020-02-10 PROCEDURE — 94060 PR EVAL OF BRONCHOSPASM: ICD-10-PCS | Mod: 26,,, | Performed by: INTERNAL MEDICINE

## 2020-02-10 PROCEDURE — 3075F SYST BP GE 130 - 139MM HG: CPT | Mod: CPTII,S$GLB,, | Performed by: NURSE PRACTITIONER

## 2020-02-10 PROCEDURE — 3075F PR MOST RECENT SYSTOLIC BLOOD PRESS GE 130-139MM HG: ICD-10-PCS | Mod: CPTII,S$GLB,, | Performed by: NURSE PRACTITIONER

## 2020-02-10 PROCEDURE — 99999 PR PBB SHADOW E&M-EST. PATIENT-LVL V: ICD-10-PCS | Mod: PBBFAC,,, | Performed by: NURSE PRACTITIONER

## 2020-02-10 PROCEDURE — 3079F PR MOST RECENT DIASTOLIC BLOOD PRESSURE 80-89 MM HG: ICD-10-PCS | Mod: CPTII,S$GLB,, | Performed by: NURSE PRACTITIONER

## 2020-02-10 PROCEDURE — 94727 GAS DIL/WSHOT DETER LNG VOL: CPT

## 2020-02-10 PROCEDURE — 94060 EVALUATION OF WHEEZING: CPT

## 2020-02-10 PROCEDURE — 3079F DIAST BP 80-89 MM HG: CPT | Mod: CPTII,S$GLB,, | Performed by: NURSE PRACTITIONER

## 2020-02-10 PROCEDURE — 94727 PR PULM FUNCTION TEST BY GAS: ICD-10-PCS | Mod: 26,,, | Performed by: INTERNAL MEDICINE

## 2020-02-10 RX ORDER — LEVALBUTEROL TARTRATE 45 UG/1
1-2 AEROSOL, METERED ORAL EVERY 4 HOURS PRN
Qty: 1 INHALER | Refills: 11 | Status: SHIPPED | OUTPATIENT
Start: 2020-02-10 | End: 2020-03-16 | Stop reason: CLARIF

## 2020-02-10 NOTE — PROGRESS NOTES
2/11/2020    Yodit Canseco  Office Note    Chief Complaint   Patient presents with    Follow-up    Asthma       HPI:   2/10/2020- had shingles vaccine, states felt an immune response.   Cough- improved, daily, not severe, non productive occasionally productive thick dark yellow color. Using nebulizer only as needed. Has noticed fever asthma exacerbations since starting Hyzentra, went from 1 months to 2x yearly.   Not able to use albuterol rescue inhaler or nebulized due to tachycardia, palpitations, and hand tremors. No complaint with xopenex medications.       1/9/2020- two exacerbations in 2 months, states cough is unchanged, daily, states bark like cough, severe and affects daily life, Productive occasionally clear/cream color thick in consistency, uses nebulizer 2-3 x daily.     11/8/2019- Had recent Rotator cuff surgery with nerve block Oct 9, Has SOB worse after surgery for 3 days, Took prednisone 20 mg for 3 days and Levaquin for 9 days. Seen by PCP dx URI.   Coughing, chest tightness, congestion, wheezing, Hoarse voice, fatigue, Shortness of breath worse with exertion, onset following surgery to shoulder. Cough- not able to clear secretions from chest.  Complaint of hand shaking, heart rate increases, and becomes rapid with Albuterol nebulizer onset years. Tries not to use due to side effects.     04/24/2019- breathing is good except for high pollen days, had 3 exacerbation in 6 months. Albuterol rescue inhaler 3x weekly, 1-2 nocturnal arousals monthly, could not do PFT at [facility],     11/6/18- ER visit for dehydration gastroenteritis, no prednisone use in 4 months. Not currently on fasenra injection, insurance would not cover. Currently on Hirzentra IGG therapy.   Sinus drip present, cough occasional, non productive, no nocturnal arousals, Currently on Dulera daily, uses Albuterol rescue inhaler daily before exercise no SOB.  Sepsis [facility] August 2017.  Flu vaccine    July 9, 2018 - had gastric sleeve with leak complication- lost 45 lbs already.  Has appetite but fills quickly.  Still on hirzentra.  Asthma ok avoiding fragrances/ordors.  No prednisone.   No nocturnal arousals, uses rescue weekly avoiding any irritants.  Use prednisone 4-5 last year and twice this year.        March 19, 2018-since recovered from pleuritic pain - doing well.  No prednisone use recent.  Pt has had no wheezes.  feb 22,2108   Had to do prednisone again.  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 I have advised not to work given severe asthma,  hypersensitivity to fragrances, and immune deficiency.   Pt seems better since Hirzentra, but still unstable severe asthma  Jan 29. 2018- 6 days ago had been exposed to sick , had nasal congestion , cough, ear stuffy, muscle aches /joint aches / fever.  Temp to 100.1.  Seen by np and flu screen negative.  Had asthma 3-4 flares last year. eos up past,   oct 5, 2017 - had marce in April, skipped couple doses hirzentra, had mild bronchitis once, otherwise doing very well.  No prednisone.  No side effects and covered. Ppt flu sense for 2 days  Jan 24, on  hirzentra q o week since sept and asthma more stable, no hosp, no prednisone, no noct asthma/ no rescue therapy- control not this good for years.  Sept 27, 2016  HPI:has had lung problems since birth, no nocturnal arousals, uses rescue 2/d, breathing controlled satisfactory except with irritants/infection - strong abx needed to clear.  Prednisone 2/yr, last hosp 18 months.  No ventilator.    Had exacerbation recent due uri from .  Has had shingles vaccine past.       The chief compliant  problem is varies with instablilty at time  PFSH:  Past Medical History:   Diagnosis Date    Angio-edema     Arthralgia     Asthma without status asthmaticus     Bronchitis     Diverticulosis of large  intestine without hemorrhage 10/8/2015    Environmental allergies     Eosinophilic asthma 3/8/2018    Gastroparesis     Hand arthritis     Herpes simplex without mention of complication     oral    Hyperlipidemia     Hypothyroidism     Immune deficiency disorder     Intraperitoneal abscess 5/7/2018    LBP radiating to left leg     Leukocytosis, unspecified     Migraine headache     Obesity, Class III, BMI 40-49.9 (morbid obesity)     Periorbital cellulitis 12/31/2016    Prediabetes     Recurrent upper respiratory infection (URI)     S/P colonoscopy November 2010    Sepsis 5/7/2018    Urticaria          Past Surgical History:   Procedure Laterality Date    ADENOIDECTOMY      CHOLECYSTECTOMY      CHOLECYSTECTOMY      COLONOSCOPY  2015    repeat in 10    COLONOSCOPY N/A 10/8/2015    Procedure: COLONOSCOPY;  Surgeon: Panda Bose MD;  Location: [facility];  Service: Endoscopy;  Laterality: N/A;    Hand fracture surgery      HARDWARE REMOVAL      left hand 5th MC    hymenectomy      HYSTERECTOMY      menorrhagia-Ovaries intact    ROTATOR CUFF REPAIR Right     SLEEVE GASTROPLASTY  04/16/2018    TONSILLECTOMY      Urethral dilatation       Social History     Tobacco Use    Smoking status: Never Smoker    Smokeless tobacco: Never Used   Substance Use Topics    Alcohol use: Yes     Alcohol/week: 0.0 standard drinks     Comment: very rarely    Drug use: No     Family History   Problem Relation Age of Onset    Melanoma Mother     Basal cell carcinoma Mother     Lung disease Mother         pulm htn    Cataracts Mother     Hypertension Mother     Lung cancer Father     Diabetes Father     Hypertension Father     Cancer Father     Diabetes Paternal Aunt     Colon cancer Paternal Aunt 40    Diabetes Paternal Aunt     Ovarian cancer Paternal Grandmother 40    Cancer Maternal Grandfather     Melanoma Maternal Aunt     Melanoma Maternal Uncle     Breast cancer Paternal Aunt      "Lymphoma Paternal Aunt     Ulcerative colitis Son     Psoriasis Son     Psoriasis Son     Breast cancer Cousin 25    Allergic rhinitis Neg Hx     Allergies Neg Hx     Angioedema Neg Hx     Asthma Neg Hx     Atopy Neg Hx     Eczema Neg Hx     Immunodeficiency Neg Hx     Rhinitis Neg Hx     Urticaria Neg Hx      Review of patient's allergies indicates:   Allergen Reactions    Bromelains Hives     " causes mouth to bleed"    Sudafed [pseudoephedrine hcl] Other (See Comments)     Does not want to wake up .    Amoxicillin-pot clavulanate Itching     Other reaction(s): Itching    Hydrocodone Itching    Iodinated contrast- oral and iv dye      childhood    Iodine and iodide containing products Other (See Comments)    Melon Hives    Pantoprazole Nausea Only     Other reaction(s): upset stomach/halitosis    Percocet [oxycodone-acetaminophen] Itching    Barium iodide Rash    Ephedrine Other (See Comments)     Other reaction(s): comatose    Penicillins      Other reaction(s): does not work on pt symptoms     I have reviewed past medical, family, and social history. I have reviewed previous nurse notes.    Performance Status:The patient's activity level is functions out of house.      Review of Systems   Constitutional: Negative for activity change, appetite change, chills, diaphoresis, fatigue, fever and unexpected weight change.   HENT: Negative for dental problem, sneezing, sore throat, trouble swallowing, postnasal drip, rhinorrhea, sinus pressure, sinus pain, and voice change.     Respiratory: Negative for chest tightness, shortness of breath, wheezing.  Positive for cough,   Cardiovascular: Negative for chest pain, palpitations and leg swelling.   Musculoskeletal: Negative for gait problem, myalgias and neck pain.   Skin: Negative for color change and pallor.   Allergic/Immunologic: Negative for environmental allergies and food allergies.   Neurological: Negative for dizziness, speech difficulty, " "weakness, light-headedness, numbness and headaches.   Hematological: Negative for adenopathy. Does not bruise/bleed easily.   Psychiatric/Behavioral: Negative for dysphoric mood and sleep disturbance. The patient is not nervous/anxious.           Exam:Comprehensive exam done. /81   Pulse 92   Resp 16   Ht 5' 2" (1.575 m)   Wt 85.9 kg (189 lb 6 oz)   SpO2 96% Comment: room air  BMI 34.64 kg/m²   Exam included Vitals as listed  Constitutional: oriented to person, place, and time.  appears well-developed. No distress.   Nose: Nose normal.   Mouth/Throat: Uvula is midline, oropharynx is clear and moist and mucous membranes are normal. No dental caries. No oropharyngeal exudate, posterior oropharyngeal edema, posterior oropharyngeal erythema or tonsillar abscesses.  Mallapatti (M) score II  Eyes: Pupils are equal, round, and reactive to light.   Neck: No JVD present. No thyromegaly present.   Cardiovascular: Normal rate, regular rhythm and normal heart sounds. Exam reveals no gallop and no friction rub.   No murmur heard.  Pulmonary/Chest: Effort normal and breath sounds normal.  No accessory muscle usage or stridor. No apnea and no tachypnea. No respiratory distress,   Musculoskeletal:range of motion on right limited following surgery. exhibits no edema.   Lymphadenopathy:    no cervical adenopathy.   Neurological:  alert and oriented to person, place, and time. not disoriented.   Skin: Skin is warm and dry. Capillary refill takes less 2 sec. No cyanosis or erythema. No pallor. Nails show no clubbing.   Psychiatric: normal mood and affect. behavior is normal. Judgment and thought content normal.     Radiographs (ct chest and cxr) reviewed: results reviewed by direct vision  CT Chest Without Contrast 2/10/2020  Normal exam, 3.3 mm nodule seen in right upper lobe.    Lungs are clear.  Minimal retained mucus secretions in the trachea.  No pleural effusion or pneumothorax.  Normal sized heart.  Thyroid is small " and heterogeneous with a subcentimeter nodule or cyst in the right lobe.     XR CHEST 1 VIEW 05/10/2018   There is left lower lobe atelectasis and possible small left pleural effusion on this study.  No pneumothorax is seen.      Labs reviewed       Lab Results   Component Value Date    WBC 9.30 09/20/2019    RBC 5.13 09/20/2019    HGB 15.7 09/20/2019    HCT 46.7 09/20/2019    MCV 91 09/20/2019    MCH 30.6 09/20/2019    MCHC 33.6 09/20/2019    RDW 13.2 09/20/2019     09/20/2019    MPV 11.5 09/20/2019    GRAN 5.7 09/20/2019    GRAN 61.0 09/20/2019    LYMPH 2.7 09/20/2019    LYMPH 29.1 09/20/2019    MONO 0.6 09/20/2019    MONO 5.9 09/20/2019    EOS 0.3 09/20/2019    BASO 0.07 09/20/2019    EOSINOPHIL 2.8 09/20/2019    BASOPHIL 0.8 09/20/2019       PFT reviewed  2/10/2020 no obstruction seen, 7% bronchodilator response with 12% needed for clinical improvement; TLC 86% with DLC0 at 88%, normal exam with mild asthma  Spirometry bronchodilator, lung volume by gas dilution, diffusion capacity measured February 10, 2020.  The FEV1 to FVC ratio was 79%, there is no airflow obstruction measured by spirometry technique.  The FEV1 measured 85% predicted at 1.97 L. The 8%   improvement in FEV1 failed to reach statistical significance ( 12% is needed for statistical significance ).  Lung volumes by gas dilution were normal.  Diffusion was normal.      Spirometry, lung volumes, diffusion are all normal.  The bronchodilator response was not statistically significant.  Clinical correlation recommended.     Plan:  Clinical impression is apparently straight forward and impression with management as below.    Yodit was seen today for follow-up and asthma.    Diagnoses and all orders for this visit:    Severe persistent asthma without complication    Severe persistent asthma with acute exacerbation  -     levalbuterol (XOPENEX HFA) 45 mcg/actuation inhaler; Inhale 1-2 puffs into the lungs every 4 (four) hours as needed for  Wheezing or Shortness of Breath. Rescue        Follow up in about 6 months (around 8/10/2020), or if symptoms worsen or fail to improve.    Discussed with patient above for education the following:      Patient Instructions   Continue current Asthma medication regiment    Review of CT shows no areas of concern. The small nodule is not significant    Pulmonary function test shows lungs to still be strong.     Continue to use Xopenex Nebulizer with aerobika attachement once a day three days a week every week.    When your sick you can use nebulizer or Xopenex rescue inhaler four times a day.

## 2020-02-10 NOTE — PATIENT INSTRUCTIONS
Continue current Asthma medication regiment    Review of CT shows no areas of concern. The small nodule is not significant    Pulmonary function test shows lungs to still be strong.     Continue to use Xopenex Nebulizer with aerobika attachement once a day three days a week every week.    When your sick you can use nebulizer or Xopenex rescue inhaler four times a day.

## 2020-02-11 ENCOUNTER — TELEPHONE (OUTPATIENT)
Dept: PULMONOLOGY | Facility: CLINIC | Age: 60
End: 2020-02-11

## 2020-02-11 NOTE — TELEPHONE ENCOUNTER
----- Message from Ema Lezama NP sent at 2/10/2020  4:50 PM CST -----  CT of chest shows no major areas of concern. Continue current treatment plan. Will review images and results at next appointment.

## 2020-02-11 NOTE — TELEPHONE ENCOUNTER
Patient notified of results and verbalized understanding   ----- Message from Ema Lezama NP sent at 2/10/2020  4:38 PM CST -----  Pulmonary function test is normal with slight evidence of Asthma. Continue current treatment plan. Will review images and results at next appointment.

## 2020-02-17 ENCOUNTER — TELEPHONE (OUTPATIENT)
Dept: PULMONOLOGY | Facility: CLINIC | Age: 60
End: 2020-02-17

## 2020-03-02 ENCOUNTER — OFFICE VISIT (OUTPATIENT)
Dept: FAMILY MEDICINE | Facility: CLINIC | Age: 60
End: 2020-03-02
Payer: COMMERCIAL

## 2020-03-02 VITALS
TEMPERATURE: 99 F | WEIGHT: 187 LBS | BODY MASS INDEX: 34.41 KG/M2 | HEART RATE: 99 BPM | SYSTOLIC BLOOD PRESSURE: 119 MMHG | HEIGHT: 62 IN | DIASTOLIC BLOOD PRESSURE: 76 MMHG

## 2020-03-02 DIAGNOSIS — J11.1 INFLUENZA-LIKE ILLNESS: Primary | ICD-10-CM

## 2020-03-02 DIAGNOSIS — J45.50 SEVERE PERSISTENT ASTHMA IN ADULT WITHOUT COMPLICATION: Primary | ICD-10-CM

## 2020-03-02 LAB — GROUP A STREP, MOLECULAR: NEGATIVE

## 2020-03-02 PROCEDURE — 3074F PR MOST RECENT SYSTOLIC BLOOD PRESSURE < 130 MM HG: ICD-10-PCS | Mod: CPTII,S$GLB,, | Performed by: FAMILY MEDICINE

## 2020-03-02 PROCEDURE — 87651 STREP A DNA AMP PROBE: CPT | Mod: PO

## 2020-03-02 PROCEDURE — 99999 PR PBB SHADOW E&M-EST. PATIENT-LVL IV: ICD-10-PCS | Mod: PBBFAC,,, | Performed by: FAMILY MEDICINE

## 2020-03-02 PROCEDURE — 99213 OFFICE O/P EST LOW 20 MIN: CPT | Mod: S$GLB,,, | Performed by: FAMILY MEDICINE

## 2020-03-02 PROCEDURE — 3008F PR BODY MASS INDEX (BMI) DOCUMENTED: ICD-10-PCS | Mod: CPTII,S$GLB,, | Performed by: FAMILY MEDICINE

## 2020-03-02 PROCEDURE — 3008F BODY MASS INDEX DOCD: CPT | Mod: CPTII,S$GLB,, | Performed by: FAMILY MEDICINE

## 2020-03-02 PROCEDURE — 99999 PR PBB SHADOW E&M-EST. PATIENT-LVL IV: CPT | Mod: PBBFAC,,, | Performed by: FAMILY MEDICINE

## 2020-03-02 PROCEDURE — 3078F PR MOST RECENT DIASTOLIC BLOOD PRESSURE < 80 MM HG: ICD-10-PCS | Mod: CPTII,S$GLB,, | Performed by: FAMILY MEDICINE

## 2020-03-02 PROCEDURE — 3078F DIAST BP <80 MM HG: CPT | Mod: CPTII,S$GLB,, | Performed by: FAMILY MEDICINE

## 2020-03-02 PROCEDURE — 99213 PR OFFICE/OUTPT VISIT, EST, LEVL III, 20-29 MIN: ICD-10-PCS | Mod: S$GLB,,, | Performed by: FAMILY MEDICINE

## 2020-03-02 PROCEDURE — 3074F SYST BP LT 130 MM HG: CPT | Mod: CPTII,S$GLB,, | Performed by: FAMILY MEDICINE

## 2020-03-02 RX ORDER — ALBUTEROL SULFATE 90 UG/1
2 AEROSOL, METERED RESPIRATORY (INHALATION) EVERY 6 HOURS PRN
Qty: 18 G | Refills: 0 | Status: SHIPPED | OUTPATIENT
Start: 2020-03-02 | End: 2021-04-06 | Stop reason: SDUPTHER

## 2020-03-02 RX ORDER — OSELTAMIVIR PHOSPHATE 75 MG/1
75 CAPSULE ORAL 2 TIMES DAILY
Qty: 10 CAPSULE | Refills: 0 | Status: SHIPPED | OUTPATIENT
Start: 2020-03-02 | End: 2020-03-07

## 2020-03-02 NOTE — PROGRESS NOTES
Two days ago developed 102 fever,  sore throat, otalgia..  She has not had any wheezing or significant cough.  She does have asthma.  Symptoms do seem to be improving compared to initial symptoms.  She has had sick contacts with similar symptoms including 17-month-old grandchild.    Yodit was seen today for sore throat, nasal congestion and fever.    Diagnoses and all orders for this visit:    Influenza-like illness  -     Group A Strep, Molecular    Other orders  -     oseltamivir (TAMIFLU) 75 MG capsule; Take 1 capsule (75 mg total) by mouth 2 (two) times daily. for 5 days     This strep test negative.  Likely flu.  Continue other medications as currently.  Follow-up if symptoms worsen or persist instead of improving.  Follow-up if symptoms not resolving with recommended treatment in the next couple days or if symptoms worsen.           Past Medical History:  Past Medical History:   Diagnosis Date    Angio-edema     Arthralgia     Asthma without status asthmaticus     Bronchitis     Diverticulosis of large intestine without hemorrhage 10/8/2015    Environmental allergies     Eosinophilic asthma 3/8/2018    Gastroparesis     Hand arthritis     Herpes simplex without mention of complication     oral    Hyperlipidemia     Hypothyroidism     Immune deficiency disorder     Intraperitoneal abscess 5/7/2018    LBP radiating to left leg     Leukocytosis, unspecified     Migraine headache     Obesity, Class III, BMI 40-49.9 (morbid obesity)     Periorbital cellulitis 12/31/2016    Prediabetes     Recurrent upper respiratory infection (URI)     S/P colonoscopy November 2010    Sepsis 5/7/2018    Urticaria      Past Surgical History:   Procedure Laterality Date    ADENOIDECTOMY      CHOLECYSTECTOMY      CHOLECYSTECTOMY      COLONOSCOPY  2015    repeat in 10    COLONOSCOPY N/A 10/8/2015    Procedure: COLONOSCOPY;  Surgeon: Panda Bose MD;  Location[facility];  Service: Endoscopy;   "Laterality: N/A;    Hand fracture surgery      HARDWARE REMOVAL      left hand 5th MC    hymenectomy      HYSTERECTOMY      menorrhagia-Ovaries intact    ROTATOR CUFF REPAIR Right     SLEEVE GASTROPLASTY  04/16/2018    TONSILLECTOMY      Urethral dilatation       Review of patient's allergies indicates:   Allergen Reactions    Bromelains Hives     " causes mouth to bleed"    Sudafed [pseudoephedrine hcl] Other (See Comments)     Does not want to wake up .    Amoxicillin-pot clavulanate Itching     Other reaction(s): Itching    Hydrocodone Itching    Iodinated contrast media      childhood    Iodine and iodide containing products Other (See Comments)    Melon Hives    Pantoprazole Nausea Only     Other reaction(s): upset stomach/halitosis    Percocet [oxycodone-acetaminophen] Itching    Barium iodide Rash    Ephedrine Other (See Comments)     Other reaction(s): comatose    Penicillins      Other reaction(s): does not work on pt symptoms     Current Outpatient Medications on File Prior to Visit   Medication Sig Dispense Refill    (Magic mouthwash) 1:1:1 Benadryl 12.5mg/5ml liq, aluminum & magnesium hydroxide-simehticone (Maalox), lidocaine viscous 2% Swish and spit 5 mLs every 8 (eight) hours as needed. Gargle and spit. DO NOT SWALLOW 360 mL 0    acetaminophen (TYLENOL) 325 MG tablet Take 325 mg by mouth every 6 (six) hours as needed for Pain.      betamethasone valerate 0.1% (VALISONE) 0.1 % Crea Apply topically 2 (two) times daily. Do not apply to the face. 45 g 1    BIOTIN ORAL Take by mouth once daily.      budesonide (PULMICORT) 0.5 mg/2 mL nebulizer solution Take 2 mLs (0.5 mg total) by nebulization 2 (two) times daily. Controller 120 mL 5    calcium-vitamin D3 (CALCIUM 500 + D) 500 mg(1,250mg) -200 unit per tablet Take 1 tablet by mouth 2 (two) times daily with meals.      clobetasol (TEMOVATE) 0.05 % cream Apply topically 2 (two) times daily. 60 g 1    desonide (DESOWEN) 0.05 % " cream Apply topically 2 (two) times daily. 1 Tube 3    estradiol (ESTRACE) 1 MG tablet Take 1 tablet (1 mg total) by mouth once daily. 30 tablet 11    fluticasone furoate-vilanterol (BREO ELLIPTA) 200-25 mcg/dose DsDv diskus inhaler Inhale 1 puff into the lungs once daily. Controller 1 each 11    immun glob G,IgG,-pro-IgA 0-50 (HIZENTRA) 2 gram/10 mL (20 %) Soln Inject 18 g into the skin every 14 (fourteen) days. 90 mL 12    levalbuterol (XOPENEX) 1.25 mg/3 mL nebulizer solution Take 3 mLs (1.25 mg total) by nebulization every 4 (four) hours as needed for Wheezing or Shortness of Breath. Rescue 1 Box 11    levothyroxine (SYNTHROID) 75 MCG tablet TAKE 1 TABLET(75 MCG) BY MOUTH EVERY DAY 90 tablet 3    lidocaine (XYLOCAINE) 5 % Oint ointment Apply topically as needed. 30 g 3    lidocaine-prilocaine (EMLA) cream Apply topically 30 minutes prior to starting infusion to numb skin 30 g 2    meloxicam (MOBIC) 15 MG tablet TK 1 T PO QD PC PRN  0    montelukast (SINGULAIR) 10 mg tablet Take 1 tablet (10 mg total) by mouth every evening. 90 tablet 3    mucus clearing device (ACAPELLA, FLUTTER) by Misc.(Non-Drug; Combo Route) route 2 (two) times daily. 1 Device 0    MULTIVITAMIN ORAL Take by mouth once daily.      predniSONE (DELTASONE) 20 MG tablet Take one pill a day for three days, repeat for shortness of breath 12 tablet 0    sumatriptan (IMITREX) 100 MG tablet Take 1 tablet (100 mg total) by mouth once daily. 9 tablet 5    levalbuterol (XOPENEX HFA) 45 mcg/actuation inhaler Inhale 1-2 puffs into the lungs every 4 (four) hours as needed for Wheezing or Shortness of Breath. Rescue (Patient not taking: Reported on 3/2/2020) 1 Inhaler 11    levocetirizine (XYZAL) 5 MG tablet Take 1 tablet (5 mg total) by mouth every evening. (Patient not taking: Reported on 3/2/2020) 30 tablet 1    mometasone-formoterol (DULERA) 200-5 mcg/actuation inhaler Inhale 2 puffs into the lungs 2 (two) times daily. Controller (Patient  not taking: Reported on 1/23/2020) 1 Inhaler 11     No current facility-administered medications on file prior to visit.      Social History     Socioeconomic History    Marital status:      Spouse name: Not on file    Number of children: Not on file    Years of education: Not on file    Highest education level: Not on file   Occupational History    Not on file   Social Needs    Financial resource strain: Not on file    Food insecurity:     Worry: Not on file     Inability: Not on file    Transportation needs:     Medical: Not on file     Non-medical: Not on file   Tobacco Use    Smoking status: Never Smoker    Smokeless tobacco: Never Used   Substance and Sexual Activity    Alcohol use: Yes     Alcohol/week: 0.0 standard drinks     Comment: very rarely    Drug use: No    Sexual activity: Yes     Partners: Male   Lifestyle    Physical activity:     Days per week: Not on file     Minutes per session: Not on file    Stress: Not on file   Relationships    Social connections:     Talks on phone: Not on file     Gets together: Not on file     Attends Shinto service: Not on file     Active member of club or organization: Not on file     Attends meetings of clubs or organizations: Not on file     Relationship status: Not on file   Other Topics Concern    Are you pregnant or think you may be? Not Asked    Breast-feeding Not Asked   Social History Narrative    . Disabled teacher.     Family History   Problem Relation Age of Onset    Melanoma Mother     Basal cell carcinoma Mother     Lung disease Mother         pulm htn    Cataracts Mother     Hypertension Mother     Lung cancer Father     Diabetes Father     Hypertension Father     Cancer Father     Diabetes Paternal Aunt     Colon cancer Paternal Aunt 40    Diabetes Paternal Aunt     Ovarian cancer Paternal Grandmother 40    Cancer Maternal Grandfather     Melanoma Maternal Aunt     Melanoma Maternal Uncle     Breast  "cancer Paternal Aunt     Lymphoma Paternal Aunt     Ulcerative colitis Son     Psoriasis Son     Psoriasis Son     Breast cancer Cousin 25    Allergic rhinitis Neg Hx     Allergies Neg Hx     Angioedema Neg Hx     Asthma Neg Hx     Atopy Neg Hx     Eczema Neg Hx     Immunodeficiency Neg Hx     Rhinitis Neg Hx     Urticaria Neg Hx            Vitals:    03/02/20 1404   BP: 119/76   Pulse: 99   Temp: 99.1 °F (37.3 °C)   Weight: 84.8 kg (187 lb)   Height: 5' 2" (1.575 m)       Wt Readings from Last 3 Encounters:   03/02/20 84.8 kg (187 lb)   02/10/20 85.9 kg (189 lb 6 oz)   01/23/20 84.4 kg (186 lb)       General: No acute distress  HEENT: Head is atraumatic normocephalic, sinuses nontender. eyes pupils equal and reactive. Nose mild congestion. Throat with pharyngeal erythema, no exudate.   Neck supple. No cervical adenopathy. No thyromegaly   Chest clear to auscultation. Normal respiratory effort         "

## 2020-03-03 ENCOUNTER — PATIENT MESSAGE (OUTPATIENT)
Dept: PULMONOLOGY | Facility: CLINIC | Age: 60
End: 2020-03-03

## 2020-03-04 ENCOUNTER — TELEPHONE (OUTPATIENT)
Dept: PULMONOLOGY | Facility: CLINIC | Age: 60
End: 2020-03-04

## 2020-03-04 ENCOUNTER — PATIENT OUTREACH (OUTPATIENT)
Dept: ADMINISTRATIVE | Facility: OTHER | Age: 60
End: 2020-03-04

## 2020-03-04 ENCOUNTER — PATIENT MESSAGE (OUTPATIENT)
Dept: PULMONOLOGY | Facility: CLINIC | Age: 60
End: 2020-03-04

## 2020-03-05 ENCOUNTER — PATIENT MESSAGE (OUTPATIENT)
Dept: PULMONOLOGY | Facility: CLINIC | Age: 60
End: 2020-03-05

## 2020-03-09 ENCOUNTER — PATIENT MESSAGE (OUTPATIENT)
Dept: FAMILY MEDICINE | Facility: CLINIC | Age: 60
End: 2020-03-09

## 2020-03-09 ENCOUNTER — PATIENT MESSAGE (OUTPATIENT)
Dept: PULMONOLOGY | Facility: CLINIC | Age: 60
End: 2020-03-09

## 2020-03-16 ENCOUNTER — OFFICE VISIT (OUTPATIENT)
Dept: FAMILY MEDICINE | Facility: CLINIC | Age: 60
End: 2020-03-16
Payer: COMMERCIAL

## 2020-03-16 VITALS
DIASTOLIC BLOOD PRESSURE: 75 MMHG | HEART RATE: 78 BPM | HEIGHT: 62 IN | BODY MASS INDEX: 33.46 KG/M2 | SYSTOLIC BLOOD PRESSURE: 121 MMHG | TEMPERATURE: 99 F | WEIGHT: 181.81 LBS

## 2020-03-16 DIAGNOSIS — J18.9 PNEUMONIA OF RIGHT UPPER LOBE DUE TO INFECTIOUS ORGANISM: Primary | ICD-10-CM

## 2020-03-16 DIAGNOSIS — J45.50 SEVERE PERSISTENT ASTHMA WITHOUT COMPLICATION: ICD-10-CM

## 2020-03-16 DIAGNOSIS — D84.9 IMMUNE DEFICIENCY DISORDER: ICD-10-CM

## 2020-03-16 PROCEDURE — 3078F DIAST BP <80 MM HG: CPT | Mod: CPTII,S$GLB,, | Performed by: FAMILY MEDICINE

## 2020-03-16 PROCEDURE — 3008F PR BODY MASS INDEX (BMI) DOCUMENTED: ICD-10-PCS | Mod: CPTII,S$GLB,, | Performed by: FAMILY MEDICINE

## 2020-03-16 PROCEDURE — 99999 PR PBB SHADOW E&M-EST. PATIENT-LVL IV: ICD-10-PCS | Mod: PBBFAC,,, | Performed by: FAMILY MEDICINE

## 2020-03-16 PROCEDURE — 3074F PR MOST RECENT SYSTOLIC BLOOD PRESSURE < 130 MM HG: ICD-10-PCS | Mod: CPTII,S$GLB,, | Performed by: FAMILY MEDICINE

## 2020-03-16 PROCEDURE — 3074F SYST BP LT 130 MM HG: CPT | Mod: CPTII,S$GLB,, | Performed by: FAMILY MEDICINE

## 2020-03-16 PROCEDURE — 3078F PR MOST RECENT DIASTOLIC BLOOD PRESSURE < 80 MM HG: ICD-10-PCS | Mod: CPTII,S$GLB,, | Performed by: FAMILY MEDICINE

## 2020-03-16 PROCEDURE — 3008F BODY MASS INDEX DOCD: CPT | Mod: CPTII,S$GLB,, | Performed by: FAMILY MEDICINE

## 2020-03-16 PROCEDURE — 99214 PR OFFICE/OUTPT VISIT, EST, LEVL IV, 30-39 MIN: ICD-10-PCS | Mod: S$GLB,,, | Performed by: FAMILY MEDICINE

## 2020-03-16 PROCEDURE — 99214 OFFICE O/P EST MOD 30 MIN: CPT | Mod: S$GLB,,, | Performed by: FAMILY MEDICINE

## 2020-03-16 PROCEDURE — 99999 PR PBB SHADOW E&M-EST. PATIENT-LVL IV: CPT | Mod: PBBFAC,,, | Performed by: FAMILY MEDICINE

## 2020-03-16 RX ORDER — FLUCONAZOLE 150 MG/1
150 TABLET ORAL
Qty: 1 TABLET | Refills: 0 | Status: SHIPPED | OUTPATIENT
Start: 2020-03-16 | End: 2020-03-20 | Stop reason: SDUPTHER

## 2020-03-16 RX ORDER — LEVOFLOXACIN 500 MG/1
TABLET, FILM COATED ORAL
COMMUNITY
Start: 2020-03-07 | End: 2020-03-16 | Stop reason: ALTCHOICE

## 2020-03-16 NOTE — PROGRESS NOTES
Patient presents for follow-up after hospitalization as per below discharge summary.  We saw her March 2nd in the clinic with flu-like symptoms.  She had recent exposure to son and granddaughter with similar symptoms.  She was prescribed Tamiflu.  Initially symptoms seem to improve somewhat, then worsened with further fever.  She went to the emergency room and was diagnosed with pneumonia.  She was treated with Levaquin and doxycycline which she completed.  She still has some cough, but feeling better overall.  She has not had any further fever.  No chest pain shortness of breath or wheezing.  She is compliant with her inhalers.  She does have a follow-up appointment scheduled with her pulmonary doctor next week.  She does feel like she has developed a vaginal yeast infection from the antibiotics.  She also states that she gets mouth sores when she gets ill and wanted to get refill on magic mouthwash.    Faith Hallman NP - 03/08/2020 11:50 AM CDT  Formatting of this note might be different from the original.  [facility] DISCHARGE SUMMARY    Patient ID:  Yodit Canseco  [id number]  59 y.o.  1960    Admit Date:   3/4/2020 2:08 PM    Discharge Date:   Discharge Today: 3/8/2020    Admitting Physician:   Cresencio Alejandre MD     Discharge Physician:   FAITH HALLMAN NP    Consults:  Consultants   Provider Service Role Specialty   Roman-Nirav Berry MD -- Consulting Physician Neurology   Omer Macias MD Infectious Disease Consulting Physician Infectious Diseases   Faith Hallman NP -- Nurse Practitioner Nurse Practitioner Family       Reason for Admission/Admission Diagnoses:   Present on Admission:   Pneumonia due to infectious organism   Other headache syndrome   Hypothyroidism   Hyponatremia   COPD [facility]chronic obstructive pulmonary disease) ([facility])   Leukocytosis   Sepsis ([facility])    Discharge Diagnoses:   Active Hospital Problems   Diagnosis Date Noted    Pneumonia due to infectious  organism 03/04/2020    Tinea corporis 03/06/2020    Other headache syndrome 03/06/2020    Hyponatremia 03/06/2020    Sepsis (HCC) 03/06/2020    Leukocytosis 04/10/2015   Chronic    Hypothyroidism   Chronic    COPD (chronic obstructive pulmonary disease) ([facility])   Chronic     Resolved Hospital Problems     History of Present Illness:     Yodit Canseco is a 59 y.o. female who presents with complaints of fever.     She carries a past medical history of asthma, chronic bronchitis, chronic sinusitis, COPD, hyperlipidemia, a new disorder, migraines, multiple food allergies, penicillin allergy, reactive airway disease, so doses, sympathomimetic adverse reaction, and thyroid disease.     She reports this past Friday, February 28, 2020 she began with a sore throat. The following day she developed a temperature of 102. That Saturday she was visiting her mother in a hospital in [place]. Mother had been diagnosed with atrial fibrillation but did not have any respiratory symptoms. Mrs. Canseco reports yesterday beginning with cough with greenish colored sputum. Reports evaluated by PCP on Monday, March 2, 2020. Negative strep test. Reports likely has the flu and was prescribed Tamiflu. Yesterday she was feeling a bit better but this morning and developed fever once again along with chills. Complaints of fatigue. Reports diagnosed with bronchitis in January 2020 M. Follows with Dr. Gary (pulmonology) with [facility] who she reports treats her with Levaquin and prednisone. She has found that most other antibiotics are ineffective. Reports she has not been eating much. Gastric sleeve 2 years ago. BM yesterday but tends to be constipated. Has reactive airway problems when exposed to cleaning products and perfumes. When taking prednisone it may keep her awake for several days. October 2019 had a right shoulder rotator cuff repair.      inquired about coronavirus. No foreign travel. She travel to [place] this past  Saturday to visit her mother in the hospital. Mother diagnosed with A. fib but no respiratory symptoms. Her mother was discharged the same day. Her son and grandson had a virus and she helped take care of her grandson.  works in a school.     Hospital Course and Treatment:   Admission Information   Date & Time  3/4/2020 Provider  Cresencio Alejandre MD Department  [facility] Medicine Dept. Phone  [phone]       Right upper lobe pneumonia./Sepsis/leukocytosis. Chest x-ray revealed mild right upper lobe pneumonia. On admission fever of 102.8 heart rate greater than 120. She was prescribed Levaquin 500 mg IV daily. Lactobacillus twice a day. Mucinex and incentive spirometer. Infectious disease was consulted who added Doxycycline 100 mg twice a day IV. Blood cultures ×2 were negative.  Sputum Gram stain revealed a few Gram-positive rods. His CBC on admission revealed a WBC of 17.8. The following day white count increased to 20.4. On day of discharge white count 13.6. RSV/influenza/respiratory panel were negative.    She carries a history of chronic bronchitis/COPD/reactive airway disease. Reports perfumes and cleaning products produce reactive airway reaction she was continued on Singulair and Dulera.     Mrs. Canseco complained of headache. Headache not relieved with Tylenol. Given one dose of Imitrex PO on admission but headache persisted. Neurology consulted and dx with status migrainous. Imitrex IM with almost complete relief of headache. She was prescribed Fioricet prn.     Recommendations.  Repeat CXR in 4-6 weeks.     I discussed with the patient disease process and treatment.     I have personally seen and examined the patient, Yodit Canseco, in a face to face encounter on the date of discharge.     She is cleared for discharge with instructions to follow up as directed.   Total time in the care and discharge planning of this patient was greater than 30 minutes.        Yodit  was seen today for follow-up.    Diagnoses and all orders for this visit:    Pneumonia of right upper lobe due to infectious organism    Immune deficiency disorder    Severe persistent asthma without complication    Other orders  -     (Magic mouthwash) 1:1:1 Benadryl 12.5mg/5ml liq, aluminum & magnesium hydroxide-simehticone (Maalox), lidocaine viscous 2%; Swish and spit 5 mLs every 8 (eight) hours as needed. Gargle and spit. DO NOT SWALLOW  -     fluconazole (DIFLUCAN) 150 MG Tab; Take 1 tablet (150 mg total) by mouth every 7 days. for 2 doses     Keep appointment scheduled with Pulmonary.  I advised her that I would recommend follow-up imaging most likely with chest x-ray in about a month.  Since she already has a appointment with Pulmonary will defer to them as to whether to do this through our system or through [facility] or whether they want any other imaging.  Follow-up as needed if new symptoms develop or if symptoms worsen.  Continue inhalers.              Past Medical History:  Past Medical History:   Diagnosis Date    Angio-edema     Arthralgia     Asthma without status asthmaticus     Bronchitis     Diverticulosis of large intestine without hemorrhage 10/8/2015    Environmental allergies     Eosinophilic asthma 3/8/2018    Gastroparesis     Hand arthritis     Herpes simplex without mention of complication     oral    Hyperlipidemia     Hypothyroidism     Immune deficiency disorder     Intraperitoneal abscess 5/7/2018    LBP radiating to left leg     Leukocytosis, unspecified     Migraine headache     Obesity, Class III, BMI 40-49.9 (morbid obesity)     Periorbital cellulitis 12/31/2016    Prediabetes     Recurrent upper respiratory infection (URI)     S/P colonoscopy November 2010    Sepsis 5/7/2018    Urticaria      Past Surgical History:   Procedure Laterality Date    ADENOIDECTOMY      CHOLECYSTECTOMY      CHOLECYSTECTOMY      COLONOSCOPY  2015    repeat in 10     "COLONOSCOPY N/A 10/8/2015    Procedure: COLONOSCOPY;  Surgeon: Panda Bose MD;  Location: [facility];  Service: Endoscopy;  Laterality: N/A;    Hand fracture surgery      HARDWARE REMOVAL      left hand 5th MC    hymenectomy      HYSTERECTOMY      menorrhagia-Ovaries intact    ROTATOR CUFF REPAIR Right     SLEEVE GASTROPLASTY  04/16/2018    TONSILLECTOMY      Urethral dilatation       Review of patient's allergies indicates:   Allergen Reactions    Bromelains Hives     " causes mouth to bleed"    Sudafed [pseudoephedrine hcl] Other (See Comments)     Does not want to wake up .    Amoxicillin-pot clavulanate Itching     Other reaction(s): Itching    Hydrocodone Itching    Iodinated contrast media      childhood    Iodine and iodide containing products Other (See Comments)    Melon Hives    Pantoprazole Nausea Only     Other reaction(s): upset stomach/halitosis    Percocet [oxycodone-acetaminophen] Itching    Barium iodide Rash    Ephedrine Other (See Comments)     Other reaction(s): comatose    Penicillins      Other reaction(s): does not work on pt symptoms     Current Outpatient Medications on File Prior to Visit   Medication Sig Dispense Refill    acetaminophen (TYLENOL) 325 MG tablet Take 325 mg by mouth every 6 (six) hours as needed for Pain.      albuterol (PROAIR HFA) 90 mcg/actuation inhaler Inhale 2 puffs into the lungs every 6 (six) hours as needed for Wheezing. Rescue 18 g 0    betamethasone valerate 0.1% (VALISONE) 0.1 % Crea Apply topically 2 (two) times daily. Do not apply to the face. 45 g 1    BIOTIN ORAL Take by mouth once daily.      budesonide (PULMICORT) 0.5 mg/2 mL nebulizer solution Take 2 mLs (0.5 mg total) by nebulization 2 (two) times daily. Controller 120 mL 5    calcium-vitamin D3 (CALCIUM 500 + D) 500 mg(1,250mg) -200 unit per tablet Take 1 tablet by mouth 2 (two) times daily with meals.      clobetasol (TEMOVATE) 0.05 % cream Apply topically 2 (two) times " daily. 60 g 1    desonide (DESOWEN) 0.05 % cream Apply topically 2 (two) times daily. 1 Tube 3    estradiol (ESTRACE) 1 MG tablet Take 1 tablet (1 mg total) by mouth once daily. 30 tablet 11    fluticasone furoate-vilanterol (BREO ELLIPTA) 200-25 mcg/dose DsDv diskus inhaler Inhale 1 puff into the lungs once daily. Controller 1 each 11    immun glob G,IgG,-pro-IgA 0-50 (HIZENTRA) 2 gram/10 mL (20 %) Soln Inject 18 g into the skin every 14 (fourteen) days. 90 mL 12    levalbuterol (XOPENEX) 1.25 mg/3 mL nebulizer solution Take 3 mLs (1.25 mg total) by nebulization every 4 (four) hours as needed for Wheezing or Shortness of Breath. Rescue 1 Box 11    levothyroxine (SYNTHROID) 75 MCG tablet TAKE 1 TABLET(75 MCG) BY MOUTH EVERY DAY 90 tablet 3    lidocaine (XYLOCAINE) 5 % Oint ointment Apply topically as needed. 30 g 3    lidocaine-prilocaine (EMLA) cream Apply topically 30 minutes prior to starting infusion to numb skin 30 g 2    meloxicam (MOBIC) 15 MG tablet TK 1 T PO QD PC PRN  0    montelukast (SINGULAIR) 10 mg tablet Take 1 tablet (10 mg total) by mouth every evening. 90 tablet 3    mucus clearing device (ACAPELLA, FLUTTER) by Misc.(Non-Drug; Combo Route) route 2 (two) times daily. 1 Device 0    MULTIVITAMIN ORAL Take by mouth once daily.      sumatriptan (IMITREX) 100 MG tablet Take 1 tablet (100 mg total) by mouth once daily. 9 tablet 5    [DISCONTINUED] (Magic mouthwash) 1:1:1 Benadryl 12.5mg/5ml liq, aluminum & magnesium hydroxide-simehticone (Maalox), lidocaine viscous 2% Swish and spit 5 mLs every 8 (eight) hours as needed. Gargle and spit. DO NOT SWALLOW 360 mL 0    [DISCONTINUED] predniSONE (DELTASONE) 20 MG tablet Take one pill a day for three days, repeat for shortness of breath 12 tablet 0    mometasone-formoterol (DULERA) 200-5 mcg/actuation inhaler Inhale 2 puffs into the lungs 2 (two) times daily. Controller (Patient not taking: Reported on 1/23/2020) 1 Inhaler 11    [DISCONTINUED]  levalbuterol (XOPENEX HFA) 45 mcg/actuation inhaler Inhale 1-2 puffs into the lungs every 4 (four) hours as needed for Wheezing or Shortness of Breath. Rescue (Patient not taking: Reported on 3/16/2020) 1 Inhaler 11    [DISCONTINUED] levocetirizine (XYZAL) 5 MG tablet Take 1 tablet (5 mg total) by mouth every evening. (Patient not taking: Reported on 3/2/2020) 30 tablet 1    [DISCONTINUED] levoFLOXacin (LEVAQUIN) 500 MG tablet        Current Facility-Administered Medications on File Prior to Visit   Medication Dose Route Frequency Provider Last Rate Last Dose    [DISCONTINUED] acetaminophen tablet  650 mg Oral  Generic External Data Provider        [DISCONTINUED] aluminum & magnesium hydroxide-simethicone 400-400-40 mg/5 mL suspension  15 mL Oral  Generic External Data Provider        [DISCONTINUED] aspirin EC tablet  81 mg Oral  Generic External Data Provider        [DISCONTINUED] estrogens (conjugated) tablet  0.625 mg Oral  Generic External Data Provider        [DISCONTINUED] lactobacillus acidophilus & bulgar 100 million cell packet  1 packet Oral  Generic External Data Provider        [DISCONTINUED] lactulose 20 gram/30 mL solution Soln  20 g Oral  Generic External Data Provider        [DISCONTINUED] levothyroxine tablet  75 mcg Oral  Generic External Data Provider        [DISCONTINUED] mometasone-formoterol 200-5 mcg/actuation HFAA  2 puff Inhalation  Generic External Data Provider        [DISCONTINUED] montelukast tablet  10 mg Oral  Generic External Data Provider        [DISCONTINUED] multivitamin with minerals tablet  1 tablet Oral  Generic External Data Provider        [DISCONTINUED] promethazine tablet  25 mg Oral  Generic External Data Provider         Social History     Socioeconomic History    Marital status:      Spouse name: Not on file    Number of children: Not on file    Years of education: Not on file    Highest education level: Not on file   Occupational History    Not  on file   Social Needs    Financial resource strain: Not on file    Food insecurity:     Worry: Not on file     Inability: Not on file    Transportation needs:     Medical: Not on file     Non-medical: Not on file   Tobacco Use    Smoking status: Never Smoker    Smokeless tobacco: Never Used   Substance and Sexual Activity    Alcohol use: Yes     Alcohol/week: 0.0 standard drinks     Comment: very rarely    Drug use: No    Sexual activity: Yes     Partners: Male   Lifestyle    Physical activity:     Days per week: Not on file     Minutes per session: Not on file    Stress: Not on file   Relationships    Social connections:     Talks on phone: Not on file     Gets together: Not on file     Attends Christianity service: Not on file     Active member of club or organization: Not on file     Attends meetings of clubs or organizations: Not on file     Relationship status: Not on file   Other Topics Concern    Are you pregnant or think you may be? Not Asked    Breast-feeding Not Asked   Social History Narrative    . Disabled teacher.     Family History   Problem Relation Age of Onset    Melanoma Mother     Basal cell carcinoma Mother     Lung disease Mother         pulm htn    Cataracts Mother     Hypertension Mother     Lung cancer Father     Diabetes Father     Hypertension Father     Cancer Father     Diabetes Paternal Aunt     Colon cancer Paternal Aunt 40    Diabetes Paternal Aunt     Ovarian cancer Paternal Grandmother 40    Cancer Maternal Grandfather     Melanoma Maternal Aunt     Melanoma Maternal Uncle     Breast cancer Paternal Aunt     Lymphoma Paternal Aunt     Ulcerative colitis Son     Psoriasis Son     Psoriasis Son     Breast cancer Cousin 25    Allergic rhinitis Neg Hx     Allergies Neg Hx     Angioedema Neg Hx     Asthma Neg Hx     Atopy Neg Hx     Eczema Neg Hx     Immunodeficiency Neg Hx     Rhinitis Neg Hx     Urticaria Neg Hx            ROS:  GENERAL:  "No fever, chills,  or significant weight changes.   CARDIOVASCULAR: Denies chest pain.  ABDOMEN: . Denies diarrhea, abdominal pain, hematemesis or blood in stool.  URINARY: No flank pain, dysuria or hematuria.    Vitals:    03/16/20 0928   BP: 121/75   Pulse: 78   Temp: 98.5 °F (36.9 °C)   Weight: 82.5 kg (181 lb 12.8 oz)   Height: 5' 2" (1.575 m)     Wt Readings from Last 3 Encounters:   03/16/20 82.5 kg (181 lb 12.8 oz)   03/02/20 84.8 kg (187 lb)   02/10/20 85.9 kg (189 lb 6 oz)       OBJECTIVE:   APPEARANCE: Well nourished, well developed, in no acute distress.    HEAD: Normocephalic.  Atraumatic.  No sinus tenderness.  EYES:   Right eye: Pupil reactive.  Conjunctiva clear.    Left eye: Pupil reactive.  Conjunctiva clear.  EOMI.    EARS: TM's intact. Light reflex normal. No retraction or perforation.    NOSE:  clear.  MOUTH & THROAT:  No pharyngeal erythema or exudate. No lesions.  NECK: Supple. No bruits.  No JVD.  No cervical lymphadenopathy.  No thyromegaly.    CHEST: Breath sounds clear bilaterally.  Normal respiratory effort  CARDIOVASCULAR: Normal rate.  Regular rhythm.  No murmurs.  No rub.  No gallops.  NEUROLOGIC: No focal findings.  MENTAL STATUS: Alert.  Oriented x 3.        "

## 2020-03-20 ENCOUNTER — PATIENT MESSAGE (OUTPATIENT)
Dept: FAMILY MEDICINE | Facility: CLINIC | Age: 60
End: 2020-03-20

## 2020-03-20 RX ORDER — FLUCONAZOLE 150 MG/1
150 TABLET ORAL
Qty: 2 TABLET | Refills: 0 | Status: SHIPPED | OUTPATIENT
Start: 2020-03-20 | End: 2020-03-28

## 2020-03-24 ENCOUNTER — PATIENT MESSAGE (OUTPATIENT)
Dept: PULMONOLOGY | Facility: CLINIC | Age: 60
End: 2020-03-24

## 2020-04-08 ENCOUNTER — PATIENT MESSAGE (OUTPATIENT)
Dept: ALLERGY | Facility: CLINIC | Age: 60
End: 2020-04-08

## 2020-04-13 ENCOUNTER — OFFICE VISIT (OUTPATIENT)
Dept: FAMILY MEDICINE | Facility: CLINIC | Age: 60
End: 2020-04-13
Payer: COMMERCIAL

## 2020-04-13 ENCOUNTER — PATIENT OUTREACH (OUTPATIENT)
Dept: ADMINISTRATIVE | Facility: OTHER | Age: 60
End: 2020-04-13

## 2020-04-13 ENCOUNTER — PATIENT MESSAGE (OUTPATIENT)
Dept: FAMILY MEDICINE | Facility: CLINIC | Age: 60
End: 2020-04-13

## 2020-04-13 DIAGNOSIS — J32.9 SINUSITIS, UNSPECIFIED CHRONICITY, UNSPECIFIED LOCATION: Primary | ICD-10-CM

## 2020-04-13 PROCEDURE — 99213 PR OFFICE/OUTPT VISIT, EST, LEVL III, 20-29 MIN: ICD-10-PCS | Mod: 95,,, | Performed by: NURSE PRACTITIONER

## 2020-04-13 PROCEDURE — 99213 OFFICE O/P EST LOW 20 MIN: CPT | Mod: 95,,, | Performed by: NURSE PRACTITIONER

## 2020-04-13 RX ORDER — PREDNISONE 10 MG/1
10 TABLET ORAL DAILY
Qty: 3 TABLET | Refills: 0 | Status: SHIPPED | OUTPATIENT
Start: 2020-04-13 | End: 2020-04-16

## 2020-04-13 RX ORDER — AZITHROMYCIN 250 MG/1
TABLET, FILM COATED ORAL
Qty: 6 TABLET | Refills: 0 | Status: SHIPPED | OUTPATIENT
Start: 2020-04-13 | End: 2020-04-18

## 2020-04-13 NOTE — PROGRESS NOTES
Subjective:       Patient ID: Yodit Canseco is a 59 y.o. female.    Chief Complaint: No chief complaint on file.  The patient location is: Pt's home  The chief complaint leading to consultation is: sinusitis  Visit type: Virtual visit with synchronous audio and video  Total time spent with patient: 10 min  Each patient to whom he or she provides medical services by telemedicine is:  (1) informed of the relationship between the physician and patient and the respective role of any other health care provider with respect to management of the patient; and (2) notified that he or she may decline to receive medical services by telemedicine and may withdraw from such care at any time.  Sinus Problem   This is a new problem. The current episode started in the past 7 days. The problem is unchanged. There has been no fever. The pain is mild. Associated symptoms include congestion, ear pain, headaches, sinus pressure and a sore throat. Pertinent negatives include no chills, coughing, diaphoresis, hoarse voice, neck pain, shortness of breath, sneezing or swollen glands. Past treatments include nothing. The treatment provided no relief.     Past Medical History:   Diagnosis Date    Angio-edema     Arthralgia     Asthma without status asthmaticus     Bronchitis     Diverticulosis of large intestine without hemorrhage 10/8/2015    Environmental allergies     Eosinophilic asthma 3/8/2018    Gastroparesis     Hand arthritis     Herpes simplex without mention of complication     oral    Hyperlipidemia     Hypothyroidism     Immune deficiency disorder     Intraperitoneal abscess 5/7/2018    LBP radiating to left leg     Leukocytosis, unspecified     Migraine headache     Obesity, Class III, BMI 40-49.9 (morbid obesity)     Periorbital cellulitis 12/31/2016    Prediabetes     Recurrent upper respiratory infection (URI)     S/P colonoscopy November 2010    Sepsis 5/7/2018    Urticaria      Social History      Socioeconomic History    Marital status:      Spouse name: Not on file    Number of children: Not on file    Years of education: Not on file    Highest education level: Not on file   Occupational History    Not on file   Social Needs    Financial resource strain: Not on file    Food insecurity:     Worry: Not on file     Inability: Not on file    Transportation needs:     Medical: Not on file     Non-medical: Not on file   Tobacco Use    Smoking status: Never Smoker    Smokeless tobacco: Never Used   Substance and Sexual Activity    Alcohol use: Yes     Alcohol/week: 0.0 standard drinks     Comment: very rarely    Drug use: No    Sexual activity: Yes     Partners: Male   Lifestyle    Physical activity:     Days per week: Not on file     Minutes per session: Not on file    Stress: Not on file   Relationships    Social connections:     Talks on phone: Not on file     Gets together: Not on file     Attends Holiness service: Not on file     Active member of club or organization: Not on file     Attends meetings of clubs or organizations: Not on file     Relationship status: Not on file   Other Topics Concern    Are you pregnant or think you may be? Not Asked    Breast-feeding Not Asked   Social History Narrative    . Disabled teacher.     Past Surgical History:   Procedure Laterality Date    ADENOIDECTOMY      CHOLECYSTECTOMY      CHOLECYSTECTOMY      COLONOSCOPY  2015    repeat in 10    COLONOSCOPY N/A 10/8/2015    Procedure: COLONOSCOPY;  Surgeon: Panda Bose MD;  [facility];  Service: Endoscopy;  Laterality: N/A;    Hand fracture surgery      HARDWARE REMOVAL      left hand 5th MC    hymenectomy      HYSTERECTOMY      menorrhagia-Ovaries intact    ROTATOR CUFF REPAIR Right     SLEEVE GASTROPLASTY  04/16/2018    TONSILLECTOMY      Urethral dilatation         Review of Systems   Constitutional: Negative.  Negative for chills and diaphoresis.   HENT: Positive  for congestion, ear pain, postnasal drip, rhinorrhea, sinus pressure, sinus pain and sore throat. Negative for hoarse voice and sneezing.    Eyes: Negative.    Respiratory: Negative.  Negative for cough and shortness of breath.    Cardiovascular: Negative.    Gastrointestinal: Negative.    Endocrine: Negative.    Genitourinary: Negative.    Musculoskeletal: Negative.  Negative for neck pain.   Skin: Negative.    Allergic/Immunologic: Negative.    Neurological: Positive for headaches.   Psychiatric/Behavioral: Negative.        Objective:      Physical Exam   Constitutional: She appears well-developed and well-nourished.   Alert and oriented, appropriate affect Behavior: normal Speech: appropriate quality, quantity and organization of sentences Thought content: normal and Affect: euthymic          Assessment:       1. Sinusitis, unspecified chronicity, unspecified location        Plan:           Diagnoses and all orders for this visit:    Sinusitis, unspecified chronicity, unspecified location  -     azithromycin (Z-WILBERT) 250 MG tablet; Take 2 tablets by mouth on day 1; Take 1 tablet by mouth on days 2-5  -     predniSONE (DELTASONE) 10 MG tablet; Take 1 tablet (10 mg total) by mouth once daily. for 3 days  Report to ER immediately if symptoms worsen

## 2020-04-14 ENCOUNTER — OFFICE VISIT (OUTPATIENT)
Dept: PULMONOLOGY | Facility: CLINIC | Age: 60
End: 2020-04-14
Payer: MEDICARE

## 2020-04-14 VITALS
TEMPERATURE: 98 F | SYSTOLIC BLOOD PRESSURE: 138 MMHG | HEART RATE: 63 BPM | DIASTOLIC BLOOD PRESSURE: 81 MMHG | OXYGEN SATURATION: 98 % | BODY MASS INDEX: 34.02 KG/M2 | HEIGHT: 62 IN | WEIGHT: 184.88 LBS

## 2020-04-14 DIAGNOSIS — J18.9 PNEUMONIA OF RIGHT LUNG DUE TO INFECTIOUS ORGANISM, UNSPECIFIED PART OF LUNG: Primary | ICD-10-CM

## 2020-04-14 PROCEDURE — 99999 PR PBB SHADOW E&M-EST. PATIENT-LVL V: CPT | Mod: PBBFAC,,, | Performed by: INTERNAL MEDICINE

## 2020-04-14 PROCEDURE — 99214 OFFICE O/P EST MOD 30 MIN: CPT | Mod: S$PBB,,, | Performed by: INTERNAL MEDICINE

## 2020-04-14 PROCEDURE — 99214 PR OFFICE/OUTPT VISIT, EST, LEVL IV, 30-39 MIN: ICD-10-PCS | Mod: S$PBB,,, | Performed by: INTERNAL MEDICINE

## 2020-04-14 PROCEDURE — 99215 OFFICE O/P EST HI 40 MIN: CPT | Mod: PBBFAC,PO | Performed by: INTERNAL MEDICINE

## 2020-04-14 PROCEDURE — 99999 PR PBB SHADOW E&M-EST. PATIENT-LVL V: ICD-10-PCS | Mod: PBBFAC,,, | Performed by: INTERNAL MEDICINE

## 2020-04-14 RX ORDER — LEVOFLOXACIN 500 MG/1
500 TABLET, FILM COATED ORAL DAILY
Qty: 14 TABLET | Refills: 2 | Status: SHIPPED | OUTPATIENT
Start: 2020-04-14 | End: 2020-08-07 | Stop reason: ALTCHOICE

## 2020-04-14 RX ORDER — PREDNISONE 20 MG/1
TABLET ORAL
Qty: 21 TABLET | Refills: 2 | Status: SHIPPED | OUTPATIENT
Start: 2020-04-14 | End: 2021-04-06 | Stop reason: SDUPTHER

## 2020-04-14 NOTE — PROGRESS NOTES
4/14/2020    Yodit Canseco  Office Note    Chief Complaint   Patient presents with    Hospital Follow Up       HPI:   4/14/2020- on hirzentra since sept 2016.  Saw NP [place] in [place] 4/13- virtual visit- azithro and prednisone 10/d x 3 for sinusitis.     Uses   500 bid, breo 200 daily, , xopenex nebs prn, singulair. Needs more prednisone, had pneumonia 3/4 to 8th.  Had ct chest feb.  Felt had covid symptoms.  No ox needed for pneumonia- no steroids nor bd. Took doxy - vanc in hosp.     2/10/2020- had shingles vaccine, states felt an immune response.   Cough- improved, daily, not severe, non productive occasionally productive thick dark yellow color. Using nebulizer only as needed. Has noticed fever asthma exacerbations since starting Hyzentra, went from 1 months to 2x yearly.   Not able to use albuterol rescue inhaler or nebulized due to tachycardia, palpitations, and hand tremors. No complaint with xopenex medications.   Patient Instructions   Continue current Asthma medication regiment  Review of CT shows no areas of concern. The small nodule is not significant  Pulmonary function test shows lungs to still be strong.   Continue to use Xopenex Nebulizer with aerobika attachement once a day three days a week every week.  When your sick you can use nebulizer or Xopenex rescue inhaler four times a day.     1/9/2020- two exacerbations in 2 months, states cough is unchanged, daily, states bark like cough, severe and affects daily life, Productive occasionally clear/cream color thick in consistency, uses nebulizer 2-3 x daily.   Patient Instructions   [employer] sells an Aerobika device, this device shakes the air inside of you to break mucous off the bronchial wall. Use with nebulizer and with out daily.    Continue current Asthma medication regiment     Use nebulizer at least once a day three days a week.     CT to evaluate lungs for any abnormalities       11/8/2019- Had recent Rotator cuff surgery with  nerve block Oct 9, Has SOB worse after surgery for 3 days, Took prednisone 20 mg for 3 days and Levaquin for 9 days. Seen by PCP dx URI.   Coughing, chest tightness, congestion, wheezing, Hoarse voice, fatigue, Shortness of breath worse with exertion, onset following surgery to shoulder. Cough- not able to clear secretions from chest.  Complaint of hand shaking, heart rate increases, and becomes rapid with Albuterol nebulizer onset years. Tries not to use due to side effects.     04/24/2019- breathing is good except for high pollen days, had 3 exacerbation in 6 months. Albuterol rescue inhaler 3x weekly, 1-2 nocturnal arousals monthly, could not do PFT at [facility],     11/6/18- ER visit for dehydration gastroenteritis, no prednisone use in 4 months. Not currently on fasenra injection, insurance would not cover. Currently on Hirzentra IGG therapy.   Sinus drip present, cough occasional, non productive, no nocturnal arousals, Currently on Dulera daily, uses Albuterol rescue inhaler daily before exercise no SOB.  Sepsis [facility] August 2017.  Flu vaccine   July 9, 2018 - had gastric sleeve with leak complication- lost 45 lbs already.  Has appetite but fills quickly.  Still on hirzentra.  Asthma ok avoiding fragrances/ordors.  No prednisone.   No nocturnal arousals, uses rescue weekly avoiding any irritants.  Use prednisone 4-5 last year and twice this year.        March 19, 2018-since recovered from pleuritic pain - doing well.  No prednisone use recent.  Pt has had no wheezes.  feb 22,2108   Had to do prednisone again.  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 I have advised not to work given severe asthma,  hypersensitivity to fragrances, and immune deficiency.   Pt seems better since [employer], but still unstable severe asthma  Jan 29. 2018- 6 days ago had been exposed to sick  , had nasal congestion , cough, ear stuffy, muscle aches /joint aches / fever.  Temp to 100.1.  Seen by np and flu screen negative.  Had asthma 3-4 flares last year. eos up past,   oct 5, 2017 - had marce in April, skipped couple doses hirzentra, had mild bronchitis once, otherwise doing very well.  No prednisone.  No side effects and covered. Ppt flu sense for 2 days  Jan 24,2017 on  hirzentra q o week since sept and asthma more stable, no hosp, no prednisone, no noct asthma/ no rescue therapy- control not this good for years.  Sept 27, 2016  HPI:has had lung problems since birth, no nocturnal arousals, uses rescue 2/d, breathing controlled satisfactory except with irritants/infection - strong abx needed to clear.  Prednisone 2/yr, last hosp 18 months.  No ventilator.    Had exacerbation recent due uri from .  Has had shingles vaccine past.       The chief compliant  problem is varies with instablilty at time  PFSH:  Past Medical History:   Diagnosis Date    Angio-edema     Arthralgia     Asthma without status asthmaticus     Bronchitis     Diverticulosis of large intestine without hemorrhage 10/8/2015    Environmental allergies     Eosinophilic asthma 3/8/2018    Gastroparesis     Hand arthritis     Herpes simplex without mention of complication     oral    Hyperlipidemia     Hypothyroidism     Immune deficiency disorder     Intraperitoneal abscess 5/7/2018    LBP radiating to left leg     Leukocytosis, unspecified     Migraine headache     Obesity, Class III, BMI 40-49.9 (morbid obesity)     Periorbital cellulitis 12/31/2016    Prediabetes     Recurrent upper respiratory infection (URI)     S/P colonoscopy November 2010    Sepsis 5/7/2018    Urticaria          Past Surgical History:   Procedure Laterality Date    ADENOIDECTOMY      CHOLECYSTECTOMY      CHOLECYSTECTOMY      COLONOSCOPY  2015    repeat in 10    COLONOSCOPY N/A 10/8/2015    Procedure: COLONOSCOPY;  Surgeon:  "Panda Bose MD;  Location: [facility] ENDO;  Service: Endoscopy;  Laterality: N/A;    Hand fracture surgery      HARDWARE REMOVAL      left hand 5th [facility]    hymenectomy      HYSTERECTOMY      menorrhagia-Ovaries intact    ROTATOR CUFF REPAIR Right     SLEEVE GASTROPLASTY  04/16/2018    TONSILLECTOMY      Urethral dilatation       Social History     Tobacco Use    Smoking status: Never Smoker    Smokeless tobacco: Never Used   Substance Use Topics    Alcohol use: Yes     Alcohol/week: 0.0 standard drinks     Comment: very rarely    Drug use: No     Family History   Problem Relation Age of Onset    Melanoma Mother     Basal cell carcinoma Mother     Lung disease Mother         pulm htn    Cataracts Mother     Hypertension Mother     Lung cancer Father     Diabetes Father     Hypertension Father     Cancer Father     Diabetes Paternal Aunt     Colon cancer Paternal Aunt 40    Diabetes Paternal Aunt     Ovarian cancer Paternal Grandmother 40    Cancer Maternal Grandfather     Melanoma Maternal Aunt     Melanoma Maternal Uncle     Breast cancer Paternal Aunt     Lymphoma Paternal Aunt     Ulcerative colitis Son     Psoriasis Son     Psoriasis Son     Breast cancer Cousin 25    Allergic rhinitis Neg Hx     Allergies Neg Hx     Angioedema Neg Hx     Asthma Neg Hx     Atopy Neg Hx     Eczema Neg Hx     Immunodeficiency Neg Hx     Rhinitis Neg Hx     Urticaria Neg Hx      Review of patient's allergies indicates:   Allergen Reactions    Bromelains Hives     " causes mouth to bleed"    Sudafed [pseudoephedrine hcl] Other (See Comments)     Does not want to wake up .    Amoxicillin-pot clavulanate Itching     Other reaction(s): Itching    Hydrocodone Itching    Iodinated contrast- oral and iv dye      childhood    Iodine and iodide containing products Other (See Comments)    Melon Hives    Pantoprazole Nausea Only     Other reaction(s): upset stomach/halitosis    Percocet " "[oxycodone-acetaminophen] Itching    Barium iodide Rash    Ephedrine Other (See Comments)     Other reaction(s): comatose    Penicillins      Other reaction(s): does not work on pt symptoms     I have reviewed past medical, family, and social history. I have reviewed previous nurse notes.    Performance Status:The patient's activity level is functions out of house.      Review of Systems   Constitutional: Negative for activity change, appetite change, chills, diaphoresis, fatigue, fever and unexpected weight change.   HENT: Negative for dental problem, sneezing, sore throat, trouble swallowing, postnasal drip, rhinorrhea, sinus pressure, sinus pain, and voice change.     Respiratory: Negative for chest tightness, shortness of breath, wheezing.  Positive for cough,   Cardiovascular: Negative for chest pain, palpitations and leg swelling.   Musculoskeletal: Negative for gait problem, myalgias and neck pain.   Skin: Negative for color change and pallor.   Allergic/Immunologic: Negative for environmental allergies and food allergies.   Neurological: Negative for dizziness, speech difficulty, weakness, light-headedness, numbness and headaches.   Hematological: Negative for adenopathy. Does not bruise/bleed easily.   Psychiatric/Behavioral: Negative for dysphoric mood and sleep disturbance. The patient is not nervous/anxious.             Exam:Comprehensive exam done. BP (!) 170/79 (BP Location: Right arm, Patient Position: Sitting)   Pulse (!) 56   Ht 5' 10" (1.778 m)   Wt 119.2 kg (262 lb 10.9 oz)   SpO2 95% Comment: on room air  BMI 37.69 kg/m²   Exam included Vitals as listed, and patient's appearance and affect and alertness and mood, oral exam for yeast and hygiene and pharynx lesions and Mallapatti (M) score, neck with inspection for jvd and masses and thyroid abnormalities and lymph nodes (supraclavicular and infraclavicular nodes and axillary also examined and noted if abn), chest exam included symmetry " and effort and fremitus and percussion and auscultation, cardiac exam included rhythm and gallops and murmur and rubs and jvd and edema, abdominal exam for mass and hepatosplenomegaly and tenderness and hernias and bowel sounds, Musculoskeletal exam with muscle tone and posture and mobility/gait and  strength, and skin for rashes and cyanosis and pallor and turgor, extremity for clubbing.  Findings were normal except for pertinent findings listed below:  M3, chest is symmetric, no distress, normal percussion, normal fremitus and good normal breath sounds        Radiographs (ct chest and cxr) reviewed: results reviewed by direct vision  CT Chest Without Contrast 2/10/2020  Normal exam, 3.3 mm nodule seen in right upper lobe.    Lungs are clear.  Minimal retained mucus secretions in the trachea.  No pleural effusion or pneumothorax.  Normal sized heart.  Thyroid is small and heterogeneous with a subcentimeter nodule or cyst in the right lobe.     XR CHEST 1 VIEW 05/10/2018   There is left lower lobe atelectasis and possible small left pleural effusion on this study.  No pneumothorax is seen.      Labs reviewed       Lab Results   Component Value Date    WBC 9.30 09/20/2019    RBC 5.13 09/20/2019    HGB 15.7 09/20/2019    HCT 46.7 09/20/2019    MCV 91 09/20/2019    MCH 30.6 09/20/2019    MCHC 33.6 09/20/2019    RDW 13.2 09/20/2019     09/20/2019    MPV 11.5 09/20/2019    GRAN 5.7 09/20/2019    GRAN 61.0 09/20/2019    LYMPH 2.7 09/20/2019    LYMPH 29.1 09/20/2019    MONO 0.6 09/20/2019    MONO 5.9 09/20/2019    EOS 0.3 09/20/2019    BASO 0.07 09/20/2019    EOSINOPHIL 2.8 09/20/2019    BASOPHIL 0.8 09/20/2019       PFT reviewed  2/10/2020 no obstruction seen, 7% bronchodilator response with 12% needed for clinical improvement; TLC 86% with DLC0 at 88%, normal exam with mild asthma  Spirometry bronchodilator, lung volume by gas dilution, diffusion capacity measured February 10, 2020.  The FEV1 to FVC ratio was  79%, there is no airflow obstruction measured by spirometry technique.  The FEV1 measured 85% predicted at 1.97 L. The 8%   improvement in FEV1 failed to reach statistical significance ( 12% is needed for statistical significance ).  Lung volumes by gas dilution were normal.  Diffusion was normal.      Spirometry, lung volumes, diffusion are all normal.  The bronchodilator response was not statistically significant.  Clinical correlation recommended.     Plan:  Clinical impression is apparently straight forward and impression with management as below.    Yodit was seen today for hospital follow up.    Diagnoses and all orders for this visit:    Pneumonia of right lung due to infectious organism, unspecified part of lung  -     X-Ray Chest PA And Lateral; Future    Other orders  -     predniSONE (DELTASONE) 20 MG tablet; One daily for 3 days and repeat for flare of lung symptoms as intructed  -     levoFLOXacin (LEVAQUIN) 500 MG tablet; Take 1 tablet (500 mg total) by mouth once daily.        Follow up in about 6 months (around 10/14/2020), or if symptoms worsen or fail to improve.    Discussed with patient above for education the following:      Patient Instructions   Pneumonia - follow up chest xray good- not urgent.    Prednisone action plan for asthma/wheeze/cough    May use levaquin for sinus/lung infections

## 2020-04-14 NOTE — PATIENT INSTRUCTIONS
Pneumonia - follow up chest xray good- not urgent.    Prednisone action plan for asthma/wheeze/cough    May use levaquin for sinus/lung infections

## 2020-04-25 RX ORDER — SUMATRIPTAN SUCCINATE 100 MG/1
100 TABLET ORAL ONCE AS NEEDED
Qty: 9 TABLET | Refills: 5 | Status: SHIPPED | OUTPATIENT
Start: 2020-04-25 | End: 2024-01-12 | Stop reason: SDUPTHER

## 2020-05-27 ENCOUNTER — TELEPHONE (OUTPATIENT)
Dept: DERMATOLOGY | Facility: CLINIC | Age: 60
End: 2020-05-27

## 2020-05-27 NOTE — TELEPHONE ENCOUNTER
----- Message from Asha Palacios sent at 5/27/2020  8:06 AM CDT -----  Contact: pt  Patient would like to schedule and appt. She stated that she has a rash on the back of her neck and her head. Please call patient back at [phone] (home)

## 2020-06-02 ENCOUNTER — PATIENT OUTREACH (OUTPATIENT)
Dept: ADMINISTRATIVE | Facility: OTHER | Age: 60
End: 2020-06-02

## 2020-06-03 ENCOUNTER — OFFICE VISIT (OUTPATIENT)
Dept: DERMATOLOGY | Facility: CLINIC | Age: 60
End: 2020-06-03
Payer: COMMERCIAL

## 2020-06-03 DIAGNOSIS — L30.9 DERMATITIS: Primary | ICD-10-CM

## 2020-06-03 PROCEDURE — 99213 PR OFFICE/OUTPT VISIT, EST, LEVL III, 20-29 MIN: ICD-10-PCS | Mod: S$GLB,,, | Performed by: STUDENT IN AN ORGANIZED HEALTH CARE EDUCATION/TRAINING PROGRAM

## 2020-06-03 PROCEDURE — 99999 PR PBB SHADOW E&M-EST. PATIENT-LVL II: ICD-10-PCS | Mod: PBBFAC,,, | Performed by: STUDENT IN AN ORGANIZED HEALTH CARE EDUCATION/TRAINING PROGRAM

## 2020-06-03 PROCEDURE — 99213 OFFICE O/P EST LOW 20 MIN: CPT | Mod: S$GLB,,, | Performed by: STUDENT IN AN ORGANIZED HEALTH CARE EDUCATION/TRAINING PROGRAM

## 2020-06-03 PROCEDURE — 99999 PR PBB SHADOW E&M-EST. PATIENT-LVL II: CPT | Mod: PBBFAC,,, | Performed by: STUDENT IN AN ORGANIZED HEALTH CARE EDUCATION/TRAINING PROGRAM

## 2020-06-03 RX ORDER — DESONIDE 0.5 MG/G
CREAM TOPICAL 2 TIMES DAILY
Qty: 1 TUBE | Refills: 3 | Status: SHIPPED | OUTPATIENT
Start: 2020-06-03 | End: 2022-05-11 | Stop reason: SDUPTHER

## 2020-06-03 RX ORDER — PIMECROLIMUS 10 MG/G
CREAM TOPICAL 2 TIMES DAILY
Qty: 100 G | Refills: 1 | Status: SHIPPED | OUTPATIENT
Start: 2020-06-03 | End: 2020-07-15

## 2020-06-03 RX ORDER — SUMATRIPTAN SUCCINATE 100 MG/1
TABLET ORAL
COMMUNITY
Start: 2020-04-25 | End: 2022-10-04 | Stop reason: CLARIF

## 2020-06-03 RX ORDER — CLOBETASOL PROPIONATE 0.46 MG/ML
SOLUTION TOPICAL 2 TIMES DAILY
Qty: 50 ML | Refills: 2 | Status: SHIPPED | OUTPATIENT
Start: 2020-06-03 | End: 2022-03-22 | Stop reason: SDUPTHER

## 2020-06-03 RX ORDER — MELOXICAM 7.5 MG/1
TABLET ORAL
COMMUNITY
Start: 2020-05-18 | End: 2021-04-29

## 2020-06-03 NOTE — PROGRESS NOTES
Subjective:       Patient ID:  Yodit Canseco is a 59 y.o. female who presents for   Chief Complaint   Patient presents with    Itching     History of Present Illness: The patient presents with chief complaint of rash.  Location: back of the neck and scalp  Duration: 1 week  Signs/Symptoms: redness, itching, bumps  Prior treatments: clobetasol which has been helping on the neck, but not much for the scalp.         Review of Systems   Constitutional: Negative for fever and chills.        Objective:    Physical Exam   Constitutional: She appears well-developed and well-nourished. No distress.   Neurological: She is alert and oriented to person, place, and time. She is not disoriented.   Psychiatric: She has a normal mood and affect.   Skin:   Areas Examined (abnormalities noted in diagram):   Scalp / Hair Palpated and Inspected  Head / Face Inspection Performed  Neck Inspection Performed  RUE Inspected  LUE Inspection Performed                   Diagram Legend     Erythematous scaling macule/papule c/w actinic keratosis       Vascular papule c/w angioma      Pigmented verrucoid papule/plaque c/w seborrheic keratosis      Yellow umbilicated papule c/w sebaceous hyperplasia      Irregularly shaped tan macule c/w lentigo     1-2 mm smooth white papules consistent with Milia      Movable subcutaneous cyst with punctum c/w epidermal inclusion cyst      Subcutaneous movable cyst c/w pilar cyst      Firm pink to brown papule c/w dermatofibroma      Pedunculated fleshy papule(s) c/w skin tag(s)      Evenly pigmented macule c/w junctional nevus     Mildly variegated pigmented, slightly irregular-bordered macule c/w mildly atypical nevus      Flesh colored to evenly pigmented papule c/w intradermal nevus       Pink pearly papule/plaque c/w basal cell carcinoma      Erythematous hyperkeratotic cursted plaque c/w SCC      Surgical scar with no sign of skin cancer recurrence      Open and closed comedones      Inflammatory  papules and pustules      Verrucoid papule consistent consistent with wart     Erythematous eczematous patches and plaques     Dystrophic onycholytic nail with subungual debris c/w onychomycosis     Umbilicated papule    Erythematous-base heme-crusted tan verrucoid plaque consistent with inflamed seborrheic keratosis     Erythematous Silvery Scaling Plaque c/w Psoriasis     See annotation      Assessment / Plan:        Dermatitis  -     clobetasoL (TEMOVATE) 0.05 % external solution; Apply topically 2 (two) times daily.  Dispense: 50 mL; Refill: 2    Eyelid dermatitis -   -     pimecrolimus (ELIDEL) 1 % cream; Apply topically 2 (two) times daily. Apply to around the eyes.  Dispense: 100 g; Refill: 1  -     desonide (DESOWEN) 0.05 % cream; Apply topically 2 (two) times daily.  Dispense: 1 Tube; Refill: 3             Follow up in about 6 weeks (around 7/15/2020).

## 2020-07-15 ENCOUNTER — PATIENT OUTREACH (OUTPATIENT)
Dept: ADMINISTRATIVE | Facility: OTHER | Age: 60
End: 2020-07-15

## 2020-07-15 ENCOUNTER — OFFICE VISIT (OUTPATIENT)
Dept: DERMATOLOGY | Facility: CLINIC | Age: 60
End: 2020-07-15
Payer: COMMERCIAL

## 2020-07-15 DIAGNOSIS — L30.9 PERIOCULAR DERMATITIS: Primary | ICD-10-CM

## 2020-07-15 PROCEDURE — 99999 PR PBB SHADOW E&M-EST. PATIENT-LVL IV: ICD-10-PCS | Mod: PBBFAC,,, | Performed by: STUDENT IN AN ORGANIZED HEALTH CARE EDUCATION/TRAINING PROGRAM

## 2020-07-15 PROCEDURE — 99213 PR OFFICE/OUTPT VISIT, EST, LEVL III, 20-29 MIN: ICD-10-PCS | Mod: S$GLB,,, | Performed by: STUDENT IN AN ORGANIZED HEALTH CARE EDUCATION/TRAINING PROGRAM

## 2020-07-15 PROCEDURE — 99999 PR PBB SHADOW E&M-EST. PATIENT-LVL IV: CPT | Mod: PBBFAC,,, | Performed by: STUDENT IN AN ORGANIZED HEALTH CARE EDUCATION/TRAINING PROGRAM

## 2020-07-15 PROCEDURE — 99213 OFFICE O/P EST LOW 20 MIN: CPT | Mod: S$GLB,,, | Performed by: STUDENT IN AN ORGANIZED HEALTH CARE EDUCATION/TRAINING PROGRAM

## 2020-07-15 RX ORDER — METRONIDAZOLE 7.5 MG/G
CREAM TOPICAL 2 TIMES DAILY
Qty: 45 G | Refills: 1 | Status: SHIPPED | OUTPATIENT
Start: 2020-07-15 | End: 2021-03-19

## 2020-07-15 RX ORDER — VALACYCLOVIR HYDROCHLORIDE 500 MG/1
TABLET, FILM COATED ORAL
COMMUNITY
Start: 2020-06-18 | End: 2020-09-30 | Stop reason: SDUPTHER

## 2020-07-15 RX ORDER — MUPIROCIN CALCIUM 20 MG/G
CREAM TOPICAL
Qty: 30 G | Refills: 0 | Status: SHIPPED | OUTPATIENT
Start: 2020-07-15 | End: 2021-04-29

## 2020-07-15 NOTE — PROGRESS NOTES
Subjective:       Patient ID:  Yodit Canseco is a 59 y.o. female who presents for   Chief Complaint   Patient presents with    Nail Problem     L great toe     History of Present Illness: The patient presents for follow up of a rash and eyelid dermatitis. Last seen on 6/3/20. Was given clobetasol for a rash on the arm, which has resolved. Was given Elidel and desonide for a rash around the eyes, however, the elidel burned upon application and she discontinued use. Not currently using anything for around the eyes. In addition, she hit her right great toe and has been having redness and discomfort around it. Not using anything for her symptoms.         Review of Systems   Constitutional: Negative for fever and chills.        Objective:    Physical Exam   Constitutional: She appears well-developed and well-nourished. No distress.   Neurological: She is alert and oriented to person, place, and time. She is not disoriented.   Psychiatric: She has a normal mood and affect.   Skin:   Areas Examined (abnormalities noted in diagram):   Scalp / Hair Palpated and Inspected  Head / Face Inspection Performed  Neck Inspection Performed                  Diagram Legend     Erythematous scaling macule/papule c/w actinic keratosis       Vascular papule c/w angioma      Pigmented verrucoid papule/plaque c/w seborrheic keratosis      Yellow umbilicated papule c/w sebaceous hyperplasia      Irregularly shaped tan macule c/w lentigo     1-2 mm smooth white papules consistent with Milia      Movable subcutaneous cyst with punctum c/w epidermal inclusion cyst      Subcutaneous movable cyst c/w pilar cyst      Firm pink to brown papule c/w dermatofibroma      Pedunculated fleshy papule(s) c/w skin tag(s)      Evenly pigmented macule c/w junctional nevus     Mildly variegated pigmented, slightly irregular-bordered macule c/w mildly atypical nevus      Flesh colored to evenly pigmented papule c/w intradermal nevus       Pink pearly  papule/plaque c/w basal cell carcinoma      Erythematous hyperkeratotic cursted plaque c/w SCC      Surgical scar with no sign of skin cancer recurrence      Open and closed comedones      Inflammatory papules and pustules      Verrucoid papule consistent consistent with wart     Erythematous eczematous patches and plaques     Dystrophic onycholytic nail with subungual debris c/w onychomycosis     Umbilicated papule    Erythematous-base heme-crusted tan verrucoid plaque consistent with inflamed seborrheic keratosis     Erythematous Silvery Scaling Plaque c/w Psoriasis     See annotation      Assessment / Plan:        Periocular dermatitis  -     metronidazole 0.75% (METROCREAM) 0.75 % Crea; Apply topically 2 (two) times daily. Apply to the face.  Dispense: 45 g; Refill: 1      Toe injury - bactroban to the area 2 times daily.          Follow up in about 3 months (around 10/15/2020).

## 2020-07-23 RX ORDER — LEVOTHYROXINE SODIUM 75 UG/1
TABLET ORAL
Qty: 90 TABLET | Refills: 0 | Status: SHIPPED | OUTPATIENT
Start: 2020-07-23 | End: 2020-11-03 | Stop reason: SDUPTHER

## 2020-08-07 ENCOUNTER — OFFICE VISIT (OUTPATIENT)
Dept: FAMILY MEDICINE | Facility: CLINIC | Age: 60
End: 2020-08-07
Payer: COMMERCIAL

## 2020-08-07 DIAGNOSIS — N89.8 VAGINAL DISCHARGE: Primary | ICD-10-CM

## 2020-08-07 PROCEDURE — 99213 PR OFFICE/OUTPT VISIT, EST, LEVL III, 20-29 MIN: ICD-10-PCS | Mod: 95,,, | Performed by: FAMILY MEDICINE

## 2020-08-07 PROCEDURE — 99213 OFFICE O/P EST LOW 20 MIN: CPT | Mod: 95,,, | Performed by: FAMILY MEDICINE

## 2020-08-07 RX ORDER — FLUCONAZOLE 150 MG/1
150 TABLET ORAL
Qty: 2 TABLET | Refills: 0 | Status: SHIPPED | OUTPATIENT
Start: 2020-08-07 | End: 2020-08-15

## 2020-08-07 NOTE — PROGRESS NOTES
Primary Care Telemedicine Note    The patient location is:  [place]  The chief complaint leading to consultation is: per below note  Total time spent with patient:  5 min      Visit type: Virtual visit with synchronous audio and video-Doximity  Each patient to whom he or she provides medical services by telemedicine is:  (1) informed of the relationship between the physician and patient and the respective role of any other health care provider with respect to management of the patient; and (2) notified that he or she may decline to receive medical services by telemedicine and may withdraw from such care at any time.    Complains of vaginal itching irritation whitish discharge over the past few days.  Recent antibiotics Levaquin completed due to respiratory illness through her pulmonologist.  She denies any dysuria hematuria.  Similar to previous yeast infections.    Diagnoses and all orders for this visit:    Vaginal discharge    Other orders  -     fluconazole (DIFLUCAN) 150 MG Tab; Take 1 tablet (150 mg total) by mouth every 7 days. for 2 doses       Follow-up if symptoms not resolved with above.         Past Medical History:  Past Medical History:   Diagnosis Date    Angio-edema     Arthralgia     Asthma without status asthmaticus     Bronchitis     Diverticulosis of large intestine without hemorrhage 10/8/2015    Environmental allergies     Eosinophilic asthma 3/8/2018    Gastroparesis     Hand arthritis     Herpes simplex without mention of complication     oral    Hyperlipidemia     Hypothyroidism     Immune deficiency disorder     Intraperitoneal abscess 5/7/2018    LBP radiating to left leg     Leukocytosis, unspecified     Migraine headache     Obesity, Class III, BMI 40-49.9 (morbid obesity)     Periorbital cellulitis 12/31/2016    Prediabetes     Recurrent upper respiratory infection (URI)     S/P colonoscopy November 2010    Sepsis 5/7/2018    Urticaria      Past Surgical  History:   Procedure Laterality Date    ADENOIDECTOMY      CHOLECYSTECTOMY      CHOLECYSTECTOMY      COLONOSCOPY  2015    repeat in 10    COLONOSCOPY N/A 10/8/2015    Procedure: COLONOSCOPY;  Surgeon: Panda Bose MD;  Location: [facility];  Service: Endoscopy;  Laterality: N/A;    Hand fracture surgery      HARDWARE REMOVAL      left hand 5th MC    hymenectomy      HYSTERECTOMY      menorrhagia-Ovaries intact    ROTATOR CUFF REPAIR Right     SLEEVE GASTROPLASTY  04/16/2018    TONSILLECTOMY      Urethral dilatation       Social History     Socioeconomic History    Marital status:      Spouse name: Not on file    Number of children: Not on file    Years of education: Not on file    Highest education level: Not on file   Occupational History    Not on file   Social Needs    Financial resource strain: Not on file    Food insecurity     Worry: Not on file     Inability: Not on file    Transportation needs     Medical: Not on file     Non-medical: Not on file   Tobacco Use    Smoking status: Never Smoker    Smokeless tobacco: Never Used   Substance and Sexual Activity    Alcohol use: Yes     Alcohol/week: 0.0 standard drinks     Comment: very rarely    Drug use: No    Sexual activity: Yes     Partners: Male   Lifestyle    Physical activity     Days per week: Not on file     Minutes per session: Not on file    Stress: Not on file   Relationships    Social connections     Talks on phone: Not on file     Gets together: Not on file     Attends [employer] service: Not on file     Active member of club or organization: Not on file     Attends meetings of clubs or organizations: Not on file     Relationship status: Not on file   Other Topics Concern    Are you pregnant or think you may be? Not Asked    Breast-feeding Not Asked   Social History Narrative    . Disabled teacher.     Family History   Problem Relation Age of Onset    Melanoma Mother     Basal cell carcinoma Mother      "Lung disease Mother         pulm htn    Cataracts Mother     Hypertension Mother     Lung cancer Father     Diabetes Father     Hypertension Father     Cancer Father     Diabetes Paternal Aunt     Colon cancer Paternal Aunt 40    Diabetes Paternal Aunt     Ovarian cancer Paternal Grandmother 40    Cancer Maternal Grandfather     Melanoma Maternal Aunt     Melanoma Maternal Uncle     Breast cancer Paternal Aunt     Lymphoma Paternal Aunt     Ulcerative colitis Son     Psoriasis Son     Psoriasis Son     Breast cancer Cousin 25    Allergic rhinitis Neg Hx     Allergies Neg Hx     Angioedema Neg Hx     Asthma Neg Hx     Atopy Neg Hx     Eczema Neg Hx     Immunodeficiency Neg Hx     Rhinitis Neg Hx     Urticaria Neg Hx      Review of patient's allergies indicates:   Allergen Reactions    Bromelains Hives     " causes mouth to bleed"    Pimecrolimus Swelling    Sudafed [pseudoephedrine hcl] Other (See Comments)     Does not want to wake up .    Amoxicillin-pot clavulanate Itching     Other reaction(s): Itching    Hydrocodone Itching    Iodinated contrast media      childhood    Iodine and iodide containing products Other (See Comments)    Melon Hives    Pantoprazole Nausea Only     Other reaction(s): upset stomach/halitosis    Percocet [oxycodone-acetaminophen] Itching    Corn containing products     Wheat containing prod     Barium iodide Rash    Ephedrine Other (See Comments)     Other reaction(s): comatose    Penicillins      Other reaction(s): does not work on pt symptoms     Current Outpatient Medications on File Prior to Visit   Medication Sig Dispense Refill    (Magic mouthwash) 1:1:1 Benadryl 12.5mg/5ml liq, aluminum & magnesium hydroxide-simehticone (Maalox), lidocaine viscous 2% Swish and spit 5 mLs every 8 (eight) hours as needed. Gargle and spit. DO NOT SWALLOW 360 mL 0    acetaminophen (TYLENOL) 325 MG tablet Take 325 mg by mouth every 6 (six) hours as needed for " Pain.      albuterol (PROAIR HFA) 90 mcg/actuation inhaler Inhale 2 puffs into the lungs every 6 (six) hours as needed for Wheezing. Rescue 18 g 0    betamethasone valerate 0.1% (VALISONE) 0.1 % Crea Apply topically 2 (two) times daily. Do not apply to the face. 45 g 1    BIOTIN ORAL Take by mouth once daily.      budesonide (PULMICORT) 0.5 mg/2 mL nebulizer solution Take 2 mLs (0.5 mg total) by nebulization 2 (two) times daily. Controller 120 mL 5    calcium-vitamin D3 (CALCIUM 500 + D) 500 mg(1,250mg) -200 unit per tablet Take 1 tablet by mouth 2 (two) times daily with meals.      clobetasol (TEMOVATE) 0.05 % cream Apply topically 2 (two) times daily. 60 g 1    clobetasoL (TEMOVATE) 0.05 % external solution Apply topically 2 (two) times daily. 50 mL 2    desonide (DESOWEN) 0.05 % cream Apply topically 2 (two) times daily. 1 Tube 3    estradiol (ESTRACE) 1 MG tablet Take 1 tablet (1 mg total) by mouth once daily. 30 tablet 11    fluticasone furoate-vilanterol (BREO ELLIPTA) 200-25 mcg/dose DsDv diskus inhaler Inhale 1 puff into the lungs once daily. Controller 1 each 11    immun glob G,IgG,-pro-IgA 0-50 (HIZENTRA) 2 gram/10 mL (20 %) Soln Inject 18 g into the skin every 14 (fourteen) days. 90 mL 12    levalbuterol (XOPENEX) 1.25 mg/3 mL nebulizer solution Take 3 mLs (1.25 mg total) by nebulization every 4 (four) hours as needed for Wheezing or Shortness of Breath. Rescue 1 Box 11    levothyroxine (SYNTHROID) 75 MCG tablet TAKE 1 TABLET(75 MCG) BY MOUTH EVERY DAY 90 tablet 0    lidocaine-prilocaine (EMLA) cream Apply topically 30 minutes prior to starting infusion to numb skin 30 g 2    meloxicam (MOBIC) 7.5 MG tablet TK 1 T PO QD PC      metronidazole 0.75% (METROCREAM) 0.75 % Crea Apply topically 2 (two) times daily. Apply to the face. 45 g 1    montelukast (SINGULAIR) 10 mg tablet Take 1 tablet (10 mg total) by mouth every evening. 90 tablet 3    mucus clearing device (ACAPELLA, FLUTTER) by  Misc.(Non-Drug; Combo Route) route 2 (two) times daily. 1 Device 0    MULTIVITAMIN ORAL Take by mouth once daily.      mupirocin calcium 2% (BACTROBAN) 2 % cream AAA bid 30 g 0    predniSONE (DELTASONE) 20 MG tablet One daily for 3 days and repeat for flare of lung symptoms as intructed 21 tablet 2    sumatriptan (IMITREX) 100 MG tablet       valACYclovir (VALTREX) 500 MG tablet       [DISCONTINUED] levoFLOXacin (LEVAQUIN) 500 MG tablet Take 1 tablet (500 mg total) by mouth once daily. 14 tablet 2     No current facility-administered medications on file prior to visit.          ROS:  GENERAL: No fever, chills.   CARDIOVASCULAR: Denies chest pain.  ABDOMEN: Appetite fine. Denies diarrhea, abdominal pain, hematemesis or blood in stool.  URINARY: No flank pain, dysuria or hematuria.    There were no vitals filed for this visit.    Wt Readings from Last 3 Encounters:   04/14/20 83.8 kg (184 lb 13.7 oz)   03/16/20 82.5 kg (181 lb 12.8 oz)   03/02/20 84.8 kg (187 lb)       APPEARANCE: Well nourished, well developed, in no acute distress.    MENTAL STATUS: Alert.  Oriented x 3.  Head atraumatic normocephalic no obvious sinus swelling  Eyes extraocular movements intact.  No obvious conjunctivitis  Neck supple  Chest no respiratory distress noted.  No accessory muscle use.

## 2020-08-25 ENCOUNTER — PATIENT OUTREACH (OUTPATIENT)
Dept: ADMINISTRATIVE | Facility: HOSPITAL | Age: 60
End: 2020-08-25

## 2020-08-25 NOTE — PROGRESS NOTES
Pt States she would like to schedule with Sanjuana AGUILAR LPN Care Coordinator  Care Coordination Department  [facility]  [phone]

## 2020-09-21 NOTE — ASSESSMENT & PLAN NOTE
Suspect sleeve leak  Npo  Antibiotics  Stent, nj tube today   Detail Level: Detailed Size Of Lesion: 4x4mm

## 2020-09-29 ENCOUNTER — PATIENT MESSAGE (OUTPATIENT)
Dept: FAMILY MEDICINE | Facility: CLINIC | Age: 60
End: 2020-09-29

## 2020-09-30 RX ORDER — VALACYCLOVIR HYDROCHLORIDE 1 G/1
2000 TABLET, FILM COATED ORAL EVERY 12 HOURS
Qty: 20 TABLET | Refills: 0 | Status: SHIPPED | OUTPATIENT
Start: 2020-09-30 | End: 2021-02-23 | Stop reason: SDUPTHER

## 2020-10-09 ENCOUNTER — OFFICE VISIT (OUTPATIENT)
Dept: FAMILY MEDICINE | Facility: CLINIC | Age: 60
End: 2020-10-09
Payer: MEDICARE

## 2020-10-09 ENCOUNTER — TELEPHONE (OUTPATIENT)
Dept: FAMILY MEDICINE | Facility: CLINIC | Age: 60
End: 2020-10-09

## 2020-10-09 ENCOUNTER — PATIENT MESSAGE (OUTPATIENT)
Dept: FAMILY MEDICINE | Facility: CLINIC | Age: 60
End: 2020-10-09

## 2020-10-09 DIAGNOSIS — K13.70 UNSPECIFIED LESIONS OF ORAL MUCOSA: Primary | ICD-10-CM

## 2020-10-09 PROCEDURE — 99213 OFFICE O/P EST LOW 20 MIN: CPT | Mod: 95,,, | Performed by: FAMILY MEDICINE

## 2020-10-09 PROCEDURE — 99213 PR OFFICE/OUTPT VISIT, EST, LEVL III, 20-29 MIN: ICD-10-PCS | Mod: 95,,, | Performed by: FAMILY MEDICINE

## 2020-10-09 RX ORDER — TRIAMCINOLONE ACETONIDE 1 MG/G
PASTE DENTAL
Qty: 10 G | Refills: 1 | Status: SHIPPED | OUTPATIENT
Start: 2020-10-09 | End: 2022-03-14

## 2020-10-09 RX ORDER — TRIAMCINOLONE ACETONIDE 1 MG/G
PASTE DENTAL 3 TIMES DAILY
Qty: 10 G | Refills: 1 | Status: SHIPPED | OUTPATIENT
Start: 2020-10-09 | End: 2020-10-09

## 2020-10-09 NOTE — PROGRESS NOTES
Primary Care Telemedicine Note    The patient location is:  [place]  The chief complaint leading to consultation is: per below note  Total time spent with patient: 10 min      Visit type: Virtual visit with synchronous audio and video  Each patient to whom he or she provides medical services by telemedicine is:  (1) informed of the relationship between the physician and patient and the respective role of any other health care provider with respect to management of the patient; and (2) notified that he or she may decline to receive medical services by telemedicine and may withdraw from such care at any time.    Patient developed some sores in the mouth couple of weeks ago and requested prescription for Valtrex which she had used from her dentist previously.  This was provided in symptoms did improve however she states she had a couple of spots under the tongue which recurred and seem to be worsening again.  She has discomfort at the area.  Causes otalgia as well.  She denies any fever chills.  She denies any white plaques.  She has used magic mouthwash as well without resolution.    Diagnoses and all orders for this visit:    Unspecified lesions of oral mucosa    Other orders  -     triamcinolone acetonide 0.1% (KENALOG) 0.1 % paste; Place onto teeth 3 (three) times daily.     she may take the Valtrex again for 1 day.  She may continue the magic mouthwash.  She states she has an appointment with dermatologist next week and will let them look added if it is not resolved with above.  Follow-up as needed prior to this if worsening.  Make sure to rinse out her mouth after using her inhalers.  I asked her to try to send photograph a so that we can get a better look at these lesions as we were not able to see them well on the video.           Past Medical History:  Past Medical History:   Diagnosis Date    Angio-edema     Arthralgia     Asthma without status asthmaticus     Bronchitis     Diverticulosis of large  intestine without hemorrhage 10/8/2015    Environmental allergies     Eosinophilic asthma 3/8/2018    Gastroparesis     Hand arthritis     Herpes simplex without mention of complication     oral    Hyperlipidemia     Hypothyroidism     Immune deficiency disorder     Intraperitoneal abscess 5/7/2018    LBP radiating to left leg     Leukocytosis, unspecified     Migraine headache     Obesity, Class III, BMI 40-49.9 (morbid obesity)     Periorbital cellulitis 12/31/2016    Prediabetes     Recurrent upper respiratory infection (URI)     S/P colonoscopy November 2010    Sepsis 5/7/2018    Urticaria      Past Surgical History:   Procedure Laterality Date    ADENOIDECTOMY      CHOLECYSTECTOMY      CHOLECYSTECTOMY      COLONOSCOPY  2015    repeat in 10    COLONOSCOPY N/A 10/8/2015    Procedure: COLONOSCOPY;  Surgeon: Panda Bose MD;  Location: [facility];  Service: Endoscopy;  Laterality: N/A;    Hand fracture surgery      HARDWARE REMOVAL      left hand 5th MC    hymenectomy      HYSTERECTOMY      menorrhagia-Ovaries intact    ROTATOR CUFF REPAIR Right     SLEEVE GASTROPLASTY  04/16/2018    TONSILLECTOMY      Urethral dilatation       Social History     Socioeconomic History    Marital status:      Spouse name: Not on file    Number of children: Not on file    Years of education: Not on file    Highest education level: Not on file   Occupational History    Not on file   Social Needs    Financial resource strain: Not on file    Food insecurity     Worry: Not on file     Inability: Not on file    Transportation needs     Medical: Not on file     Non-medical: Not on file   Tobacco Use    Smoking status: Never Smoker    Smokeless tobacco: Never Used   Substance and Sexual Activity    Alcohol use: Yes     Alcohol/week: 0.0 standard drinks     Comment: very rarely    Drug use: No    Sexual activity: Yes     Partners: Male   Lifestyle    Physical activity     Days per week:  "Not on file     Minutes per session: Not on file    Stress: Not on file   Relationships    Social connections     Talks on phone: Not on file     Gets together: Not on file     Attends Nond[employer] service: Not on file     Active member of club or organization: Not on file     Attends meetings of clubs or organizations: Not on file     Relationship status: Not on file   Other Topics Concern    Are you pregnant or think you may be? Not Asked    Breast-feeding Not Asked   Social History Narrative    . Disabled teacher.     Family History   Problem Relation Age of Onset    Melanoma Mother     Basal cell carcinoma Mother     Lung disease Mother         pulm htn    Cataracts Mother     Hypertension Mother     Lung cancer Father     Diabetes Father     Hypertension Father     Cancer Father     Diabetes Paternal Aunt     Colon cancer Paternal Aunt 40    Diabetes Paternal Aunt     Ovarian cancer Paternal Grandmother 40    Cancer Maternal Grandfather     Melanoma Maternal Aunt     Melanoma Maternal Uncle     Breast cancer Paternal Aunt     Lymphoma Paternal Aunt     Ulcerative colitis Son     Psoriasis Son     Psoriasis Son     Breast cancer Cousin 25    Allergic rhinitis Neg Hx     Allergies Neg Hx     Angioedema Neg Hx     Asthma Neg Hx     Atopy Neg Hx     Eczema Neg Hx     Immunodeficiency Neg Hx     Rhinitis Neg Hx     Urticaria Neg Hx      Review of patient's allergies indicates:   Allergen Reactions    Bromelains Hives     " causes mouth to bleed"    Pimecrolimus Swelling    Sudafed [pseudoephedrine hcl] Other (See Comments)     Does not want to wake up .    Amoxicillin-pot clavulanate Itching     Other reaction(s): Itching    Hydrocodone Itching    Iodinated contrast media      childhood    Iodine and iodide containing products Other (See Comments)    Melon Hives    Pantoprazole Nausea Only     Other reaction(s): upset stomach/halitosis    Percocet " [oxycodone-acetaminophen] Itching    Corn containing products     Wheat containing prod     Barium iodide Rash    Ephedrine Other (See Comments)     Other reaction(s): comatose    Penicillins      Other reaction(s): does not work on pt symptoms     Current Outpatient Medications on File Prior to Visit   Medication Sig Dispense Refill    (Magic mouthwash) 1:1:1 Benadryl 12.5mg/5ml liq, aluminum & magnesium hydroxide-simehticone (Maalox), lidocaine viscous 2% Swish and spit 5 mLs every 8 (eight) hours as needed. Gargle and spit. DO NOT SWALLOW 360 mL 0    acetaminophen (TYLENOL) 325 MG tablet Take 325 mg by mouth every 6 (six) hours as needed for Pain.      albuterol (PROAIR HFA) 90 mcg/actuation inhaler Inhale 2 puffs into the lungs every 6 (six) hours as needed for Wheezing. Rescue 18 g 0    betamethasone valerate 0.1% (VALISONE) 0.1 % Crea Apply topically 2 (two) times daily. Do not apply to the face. 45 g 1    BIOTIN ORAL Take by mouth once daily.      budesonide (PULMICORT) 0.5 mg/2 mL nebulizer solution Take 2 mLs (0.5 mg total) by nebulization 2 (two) times daily. Controller 120 mL 5    calcium-vitamin D3 (CALCIUM 500 + D) 500 mg(1,250mg) -200 unit per tablet Take 1 tablet by mouth 2 (two) times daily with meals.      clobetasol (TEMOVATE) 0.05 % cream Apply topically 2 (two) times daily. 60 g 1    clobetasoL (TEMOVATE) 0.05 % external solution Apply topically 2 (two) times daily. 50 mL 2    desonide (DESOWEN) 0.05 % cream Apply topically 2 (two) times daily. 1 Tube 3    estradioL (ESTRACE) 1 MG tablet TAKE 1 TABLET(1 MG) BY MOUTH EVERY DAY 90 tablet 3    fluticasone furoate-vilanterol (BREO ELLIPTA) 200-25 mcg/dose DsDv diskus inhaler Inhale 1 puff into the lungs once daily. Controller 1 each 11    immun glob G,IgG,-pro-IgA 0-50 (HIZENTRA) 2 gram/10 mL (20 %) Soln Inject 18 g into the skin every 14 (fourteen) days. 90 mL 12    levalbuterol (XOPENEX) 1.25 mg/3 mL nebulizer solution Take 3 mLs  (1.25 mg total) by nebulization every 4 (four) hours as needed for Wheezing or Shortness of Breath. Rescue 1 Box 11    levothyroxine (SYNTHROID) 75 MCG tablet TAKE 1 TABLET(75 MCG) BY MOUTH EVERY DAY 90 tablet 0    lidocaine-prilocaine (EMLA) cream Apply topically 30 minutes prior to starting infusion to numb skin 30 g 2    meloxicam (MOBIC) 7.5 MG tablet TK 1 T PO QD PC      metronidazole 0.75% (METROCREAM) 0.75 % Crea Apply topically 2 (two) times daily. Apply to the face. 45 g 1    montelukast (SINGULAIR) 10 mg tablet Take 1 tablet (10 mg total) by mouth every evening. 90 tablet 3    mucus clearing device (ACAPELLA, FLUTTER) by Misc.(Non-Drug; Combo Route) route 2 (two) times daily. 1 Device 0    MULTIVITAMIN ORAL Take by mouth once daily.      mupirocin calcium 2% (BACTROBAN) 2 % cream AAA bid 30 g 0    predniSONE (DELTASONE) 20 MG tablet One daily for 3 days and repeat for flare of lung symptoms as intructed 21 tablet 2    sumatriptan (IMITREX) 100 MG tablet       valACYclovir (VALTREX) 1000 MG tablet Take 2 tablets (2,000 mg total) by mouth every 12 (twelve) hours. For one day for fever blisters.  Do not take for more than 1 day per episode 20 tablet 0     No current facility-administered medications on file prior to visit.      Answers for HPI/ROS submitted by the patient on 10/9/2020   activity change: No  unexpected weight change: No  neck pain: No  hearing loss: No  rhinorrhea: No  trouble swallowing: Yes  eye discharge: No  visual disturbance: No  chest tightness: No  wheezing: No  chest pain: No  palpitations: No  blood in stool: No  constipation: No  vomiting: No  diarrhea: No  polydipsia: No  polyuria: No  difficulty urinating: No  hematuria: No  menstrual problem: No  dysuria: No  joint swelling: No  arthralgias: No  headaches: No  weakness: No  confusion: No  dysphoric mood: No    There were no vitals filed for this visit.    Wt Readings from Last 3 Encounters:   04/14/20 83.8 kg (184 lb  13.7 oz)   03/16/20 82.5 kg (181 lb 12.8 oz)   03/02/20 84.8 kg (187 lb)       APPEARANCE: Well nourished, well developed, in no acute distress.    MENTAL STATUS: Alert.  Oriented x 3.  Head atraumatic normocephalic no obvious sinus swelling  Unable to visualize oral lesions on video.  Eyes extraocular movements intact.  No obvious conjunctivitis  Neck supple  Chest no respiratory distress noted.  No accessory muscle use.

## 2020-10-12 ENCOUNTER — PATIENT OUTREACH (OUTPATIENT)
Dept: ADMINISTRATIVE | Facility: OTHER | Age: 60
End: 2020-10-12

## 2020-10-12 NOTE — PROGRESS NOTES
Chart was reviewed for overdue [facility]active [facility] Encounters ([facility])  topics  Updates were requested from care everywhere  Health Maintenance has been updated  [place] immunization registry triggered

## 2020-10-14 ENCOUNTER — OFFICE VISIT (OUTPATIENT)
Dept: PULMONOLOGY | Facility: CLINIC | Age: 60
End: 2020-10-14
Payer: MEDICARE

## 2020-10-14 ENCOUNTER — HOSPITAL ENCOUNTER (OUTPATIENT)
Dept: RADIOLOGY | Facility: HOSPITAL | Age: 60
Discharge: HOME | End: 2020-10-14
Attending: INTERNAL MEDICINE
Payer: MEDICARE

## 2020-10-14 VITALS
OXYGEN SATURATION: 99 % | SYSTOLIC BLOOD PRESSURE: 128 MMHG | HEART RATE: 64 BPM | BODY MASS INDEX: 34.37 KG/M2 | DIASTOLIC BLOOD PRESSURE: 80 MMHG | HEIGHT: 62 IN | WEIGHT: 186.75 LBS

## 2020-10-14 DIAGNOSIS — J45.50 SEVERE PERSISTENT ASTHMA WITHOUT COMPLICATION: ICD-10-CM

## 2020-10-14 DIAGNOSIS — D84.9 IMMUNOLOGIC DEFICIENCY SYNDROME: ICD-10-CM

## 2020-10-14 DIAGNOSIS — J82.83 EOSINOPHILIC ASTHMA: Primary | ICD-10-CM

## 2020-10-14 DIAGNOSIS — J18.9 PNEUMONIA OF RIGHT LUNG DUE TO INFECTIOUS ORGANISM, UNSPECIFIED PART OF LUNG: ICD-10-CM

## 2020-10-14 PROCEDURE — 99213 OFFICE O/P EST LOW 20 MIN: CPT | Mod: S$PBB,,, | Performed by: INTERNAL MEDICINE

## 2020-10-14 PROCEDURE — 71046 X-RAY EXAM CHEST 2 VIEWS: CPT | Mod: TC,FY

## 2020-10-14 PROCEDURE — 99215 OFFICE O/P EST HI 40 MIN: CPT | Mod: PBBFAC,25,PO | Performed by: INTERNAL MEDICINE

## 2020-10-14 PROCEDURE — 99999 PR PBB SHADOW E&M-EST. PATIENT-LVL V: ICD-10-PCS | Mod: PBBFAC,,, | Performed by: INTERNAL MEDICINE

## 2020-10-14 PROCEDURE — 71046 XR CHEST PA AND LATERAL: ICD-10-PCS | Mod: 26,,, | Performed by: RADIOLOGY

## 2020-10-14 PROCEDURE — 99999 PR PBB SHADOW E&M-EST. PATIENT-LVL V: CPT | Mod: PBBFAC,,, | Performed by: INTERNAL MEDICINE

## 2020-10-14 PROCEDURE — 99213 PR OFFICE/OUTPT VISIT, EST, LEVL III, 20-29 MIN: ICD-10-PCS | Mod: S$PBB,,, | Performed by: INTERNAL MEDICINE

## 2020-10-14 PROCEDURE — 71046 X-RAY EXAM CHEST 2 VIEWS: CPT | Mod: 26,,, | Performed by: RADIOLOGY

## 2020-10-14 RX ORDER — LEVALBUTEROL INHALATION SOLUTION 1.25 MG/3ML
1 SOLUTION RESPIRATORY (INHALATION) EVERY 4 HOURS PRN
Qty: 1 BOX | Refills: 11 | Status: SHIPPED | OUTPATIENT
Start: 2020-10-14 | End: 2023-07-25

## 2020-10-14 RX ORDER — BORIC ACID
POWDER (GRAM) MISCELLANEOUS
COMMUNITY
Start: 2020-08-29 | End: 2021-04-15

## 2020-10-14 NOTE — PROGRESS NOTES
10/14/2020    Yodit Canseco  Office Note    Chief Complaint   Patient presents with    Follow-up     6 month    Asthma       HPI:   10/14/2020- all well.  No abx/prednisone, cxr 10/14/2020 nad- rul pneumonia [place] cleared.  Avoids irritants.    4/14/2020- on hirzentra since sept 2016.  Saw NP [place] in [place] 4/13- virtual visit- azithro and prednisone 10/d x 3 for sinusitis.     Uses   500 bid, breo 200 daily, , xopenex nebs prn, singulair. Needs more prednisone, had pneumonia 3/4 to 8th.  Had ct chest feb.  Felt had covid symptoms.  No ox needed for pneumonia- no steroids nor bd. Took doxy - vanc in hosp.   Patient Instructions   Pneumonia - follow up chest xray good- not urgent.    Prednisone action plan for asthma/wheeze/cough    May use levaquin for sinus/lung infections  2/10/2020- had shingles vaccine, states felt an immune response.   Cough- improved, daily, not severe, non productive occasionally productive thick dark yellow color. Using nebulizer only as needed. Has noticed fever asthma exacerbations since starting Hyzentra, went from 1 months to 2x yearly.   Not able to use albuterol rescue inhaler or nebulized due to tachycardia, palpitations, and hand tremors. No complaint with xopenex medications.   Patient Instructions   Continue current Asthma medication regiment  Review of CT shows no areas of concern. The small nodule is not significant  Pulmonary function test shows lungs to still be strong.   Continue to use Xopenex Nebulizer with aerobika attachement once a day three days a week every week.  When your sick you can use nebulizer or Xopenex rescue inhaler four times a day.     1/9/2020- two exacerbations in 2 months, states cough is unchanged, daily, states bark like cough, severe and affects daily life, Productive occasionally clear/cream color thick in consistency, uses nebulizer 2-3 x daily.   Patient Instructions   [employer] sells an Aerobika device, this device shakes the  air inside of you to break mucous off the bronchial wall. Use with nebulizer and with out daily.    Continue current Asthma medication regiment     Use nebulizer at least once a day three days a week.     CT to evaluate lungs for any abnormalities       11/8/2019- Had recent Rotator cuff surgery with nerve block Oct 9, Has SOB worse after surgery for 3 days, Took prednisone 20 mg for 3 days and Levaquin for 9 days. Seen by PCP dx URI.   Coughing, chest tightness, congestion, wheezing, Hoarse voice, fatigue, Shortness of breath worse with exertion, onset following surgery to shoulder. Cough- not able to clear secretions from chest.  Complaint of hand shaking, heart rate increases, and becomes rapid with Albuterol nebulizer onset years. Tries not to use due to side effects.     04/24/2019- breathing is good except for high pollen days, had 3 exacerbation in 6 months. Albuterol rescue inhaler 3x weekly, 1-2 nocturnal arousals monthly, could not do PFT at [place] [facility],     11/6/18- ER visit for dehydration gastroenteritis, no prednisone use in 4 months. Not currently on fasenra injection, insurance would not cover. Currently on Hirzentra IGG therapy.   Sinus drip present, cough occasional, non productive, no nocturnal arousals, Currently on Dulera daily, uses Albuterol rescue inhaler daily before exercise no SOB.  Sepsis [facility] August 2017.  Flu vaccine   July 9, 2018 - had gastric sleeve with leak complication- lost 45 lbs already.  Has appetite but fills quickly.  Still on hirzentra.  Asthma ok avoiding fragrances/ordors.  No prednisone.   No nocturnal arousals, uses rescue weekly avoiding any irritants.  Use prednisone 4-5 last year and twice this year.        March 19, 2018-since recovered from pleuritic pain - doing well.  No prednisone use recent.  Pt has had no wheezes.  feb 22,2108   Had to do prednisone again.  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 I have advised not to work given severe asthma,  hypersensitivity to fragrances, and immune deficiency.   Pt seems better since Hirzentra, but still unstable severe asthma  Jan 29. 2018- 6 days ago had been exposed to sick , had nasal congestion , cough, ear stuffy, muscle aches /joint aches / fever.  Temp to 100.1.  Seen by np and flu screen negative.  Had asthma 3-4 flares last year. eos up past,   oct 5, 2017 - had marce in April, skipped couple doses hirzentra, had mild bronchitis once, otherwise doing very well.  No prednisone.  No side effects and covered. Ppt flu sense for 2 days  Jan 24,2017 on  hirzentra q o week since sept and asthma more stable, no hosp, no prednisone, no noct asthma/ no rescue therapy- control not this good for years.  Sept 27, 2016  HPI:has had lung problems since birth, no nocturnal arousals, uses rescue 2/d, breathing controlled satisfactory except with irritants/infection - strong abx needed to clear.  Prednisone 2/yr, last hosp 18 months.  No ventilator.    Had exacerbation recent due uri from .  Has had shingles vaccine past.       The chief compliant  problem is varies with instablilty at time  PFSH:  Past Medical History:   Diagnosis Date    Angio-edema     Arthralgia     Asthma without status asthmaticus     Bronchitis     Diverticulosis of large intestine without hemorrhage 10/8/2015    Environmental allergies     Eosinophilic asthma 3/8/2018    Gastroparesis     Hand arthritis     Herpes simplex without mention of complication     oral    Hyperlipidemia     Hypothyroidism     Immune deficiency disorder     Intraperitoneal abscess 5/7/2018    LBP radiating to left leg     Leukocytosis, unspecified     Migraine headache     Obesity, Class III, BMI 40-49.9 (morbid obesity)     Periorbital cellulitis 12/31/2016    Prediabetes     Recurrent upper respiratory infection  "(URI)     S/P colonoscopy November 2010    Sepsis 5/7/2018    Urticaria          Past Surgical History:   Procedure Laterality Date    ADENOIDECTOMY      CHOLECYSTECTOMY      CHOLECYSTECTOMY      COLONOSCOPY  2015    repeat in 10    COLONOSCOPY N/A 10/8/2015    Procedure: COLONOSCOPY;  Surgeon: Panda Bose MD;  [facility];  Service: Endoscopy;  Laterality: N/A;    Hand fracture surgery      HARDWARE REMOVAL      left hand 5th [facility]    hymenectomy      HYSTERECTOMY      menorrhagia-Ovaries intact    ROTATOR CUFF REPAIR Right     SLEEVE GASTROPLASTY  04/16/2018    TONSILLECTOMY      Urethral dilatation       Social History     Tobacco Use    Smoking status: Never Smoker    Smokeless tobacco: Never Used   Substance Use Topics    Alcohol use: Yes     Alcohol/week: 0.0 standard drinks     Comment: very rarely    Drug use: No     Family History   Problem Relation Age of Onset    Melanoma Mother     Basal cell carcinoma Mother     Lung disease Mother         pulm htn    Cataracts Mother     Hypertension Mother     Lung cancer Father     Diabetes Father     Hypertension Father     Cancer Father     Diabetes Paternal Aunt     Colon cancer Paternal Aunt 40    Diabetes Paternal Aunt     Ovarian cancer Paternal Grandmother 40    Cancer Maternal Grandfather     Melanoma Maternal Aunt     Melanoma Maternal Uncle     Breast cancer Paternal Aunt     Lymphoma Paternal Aunt     Ulcerative colitis Son     Psoriasis Son     Psoriasis Son     Breast cancer Cousin 25    Allergic rhinitis Neg Hx     Allergies Neg Hx     Angioedema Neg Hx     Asthma Neg Hx     Atopy Neg Hx     Eczema Neg Hx     Immunodeficiency Neg Hx     Rhinitis Neg Hx     Urticaria Neg Hx      Review of patient's allergies indicates:   Allergen Reactions    Bromelains Hives     " causes mouth to bleed"    Sudafed [pseudoephedrine hcl] Other (See Comments)     Does not want to wake up .    Amoxicillin-pot " "clavulanate Itching     Other reaction(s): Itching    Hydrocodone Itching    Iodinated contrast- oral and iv dye      childhood    Iodine and iodide containing products Other (See Comments)    Melon Hives    Pantoprazole Nausea Only     Other reaction(s): upset stomach/halitosis    Percocet [oxycodone-acetaminophen] Itching    Barium iodide Rash    Ephedrine Other (See Comments)     Other reaction(s): comatose    Penicillins      Other reaction(s): does not work on pt symptoms     I have reviewed past medical, family, and social history. I have reviewed previous nurse notes.    Performance Status:The patient's activity level is functions out of house.      Review of Systems   Constitutional: Negative for activity change, appetite change, chills, diaphoresis, fatigue, fever and unexpected weight change.   HENT: Negative for dental problem, sneezing, sore throat, trouble swallowing, postnasal drip, rhinorrhea, sinus pressure, sinus pain, and voice change.     Respiratory: Negative for chest tightness, shortness of breath, wheezing.  Positive for cough,   Cardiovascular: Negative for chest pain, palpitations and leg swelling.   Musculoskeletal: Negative for gait problem, myalgias and neck pain.   Skin: Negative for color change and pallor.   Allergic/Immunologic: Negative for environmental allergies and food allergies.   Neurological: Negative for dizziness, speech difficulty, weakness, light-headedness, numbness and headaches.   Hematological: Negative for adenopathy. Does not bruise/bleed easily.   Psychiatric/Behavioral: Negative for dysphoric mood and sleep disturbance. The patient is not nervous/anxious.             Exam:Comprehensive exam done. BP (!) 170/79 (BP Location: Right arm, Patient Position: Sitting)   Pulse (!) 56   Ht 5' 10" (1.778 m)   Wt 119.2 kg (262 lb 10.9 oz)   SpO2 95% Comment: on room air  BMI 37.69 kg/m²   Exam included Vitals as listed, and patient's appearance and affect and " alertness and mood, oral exam for yeast and hygiene and pharynx lesions and Mallapatti (M) score, neck with inspection for jvd and masses and thyroid abnormalities and lymph nodes (supraclavicular and infraclavicular nodes and axillary also examined and noted if abn), chest exam included symmetry and effort and fremitus and percussion and auscultation, cardiac exam included rhythm and gallops and murmur and rubs and jvd and edema, abdominal exam for mass and hepatosplenomegaly and tenderness and hernias and bowel sounds, Musculoskeletal exam with muscle tone and posture and mobility/gait and  strength, and skin for rashes and cyanosis and pallor and turgor, extremity for clubbing.  Findings were normal except for pertinent findings listed below:  M3, chest is symmetric, no distress, normal percussion, normal fremitus and good normal breath sounds        Radiographs (ct chest and cxr) reviewed: results reviewed by direct vision  CT Chest Without Contrast 2/10/2020  Normal exam, 3.3 mm nodule seen in right upper lobe.    Lungs are clear.  Minimal retained mucus secretions in the trachea.  No pleural effusion or pneumothorax.  Normal sized heart.  Thyroid is small and heterogeneous with a subcentimeter nodule or cyst in the right lobe.     XR CHEST 1 VIEW 05/10/2018   There is left lower lobe atelectasis and possible small left pleural effusion on this study.  No pneumothorax is seen.      Labs reviewed       Lab Results   Component Value Date    WBC 9.30 09/20/2019    RBC 5.13 09/20/2019    HGB 15.7 09/20/2019    HCT 46.7 09/20/2019    MCV 91 09/20/2019    MCH 30.6 09/20/2019    MCHC 33.6 09/20/2019    RDW 13.2 09/20/2019     09/20/2019    MPV 11.5 09/20/2019    GRAN 5.7 09/20/2019    GRAN 61.0 09/20/2019    LYMPH 2.7 09/20/2019    LYMPH 29.1 09/20/2019    MONO 0.6 09/20/2019    MONO 5.9 09/20/2019    EOS 0.3 09/20/2019    BASO 0.07 09/20/2019    EOSINOPHIL 2.8 09/20/2019    BASOPHIL 0.8 09/20/2019        PFT reviewed  2/10/2020 no obstruction seen, 7% bronchodilator response with 12% needed for clinical improvement; TLC 86% with DLC0 at 88%, normal exam with mild asthma  Spirometry bronchodilator, lung volume by gas dilution, diffusion capacity measured February 10, 2020.  The FEV1 to FVC ratio was 79%, there is no airflow obstruction measured by spirometry technique.  The FEV1 measured 85% predicted at 1.97 L. The 8%   improvement in FEV1 failed to reach statistical significance ( 12% is needed for statistical significance ).  Lung volumes by gas dilution were normal.  Diffusion was normal.      Spirometry, lung volumes, diffusion are all normal.  The bronchodilator response was not statistically significant.  Clinical correlation recommended.     Plan:  Clinical impression is apparently straight forward and impression with management as below.    Yodit was seen today for follow-up and asthma.    Diagnoses and all orders for this visit:    Eosinophilic asthma    Severe persistent asthma without complication  -     levalbuterol (XOPENEX) 1.25 mg/3 mL nebulizer solution; Take 3 mLs (1.25 mg total) by nebulization every 4 (four) hours as needed for Wheezing or Shortness of Breath. Rescue    Immunologic deficiency syndrome        Follow up in about 6 months (around 4/14/2021), or if symptoms worsen or fail to improve.    Discussed with patient above for education the following:      Patient Instructions   You likely would be best to get high dose flu vaccine.   Your immune system may have problem responding to flu vaccine.

## 2020-10-14 NOTE — PATIENT INSTRUCTIONS
You likely would be best to get high dose flu vaccine.   Your immune system may have problem responding to flu vaccine.

## 2020-10-15 ENCOUNTER — OFFICE VISIT (OUTPATIENT)
Dept: DERMATOLOGY | Facility: CLINIC | Age: 60
End: 2020-10-15
Payer: MEDICARE

## 2020-10-15 DIAGNOSIS — L30.9 DERMATITIS: ICD-10-CM

## 2020-10-15 DIAGNOSIS — K12.0 APHTHOUS ULCER: Primary | ICD-10-CM

## 2020-10-15 PROCEDURE — 99213 OFFICE O/P EST LOW 20 MIN: CPT | Mod: S$PBB,,, | Performed by: STUDENT IN AN ORGANIZED HEALTH CARE EDUCATION/TRAINING PROGRAM

## 2020-10-15 PROCEDURE — 99214 OFFICE O/P EST MOD 30 MIN: CPT | Mod: PBBFAC | Performed by: STUDENT IN AN ORGANIZED HEALTH CARE EDUCATION/TRAINING PROGRAM

## 2020-10-15 PROCEDURE — 99999 PR PBB SHADOW E&M-EST. PATIENT-LVL IV: CPT | Mod: PBBFAC,,, | Performed by: STUDENT IN AN ORGANIZED HEALTH CARE EDUCATION/TRAINING PROGRAM

## 2020-10-15 PROCEDURE — 99999 PR PBB SHADOW E&M-EST. PATIENT-LVL IV: ICD-10-PCS | Mod: PBBFAC,,, | Performed by: STUDENT IN AN ORGANIZED HEALTH CARE EDUCATION/TRAINING PROGRAM

## 2020-10-15 PROCEDURE — 99213 PR OFFICE/OUTPT VISIT, EST, LEVL III, 20-29 MIN: ICD-10-PCS | Mod: S$PBB,,, | Performed by: STUDENT IN AN ORGANIZED HEALTH CARE EDUCATION/TRAINING PROGRAM

## 2020-10-15 RX ORDER — CLOBETASOL PROPIONATE 0.5 MG/G
CREAM TOPICAL 2 TIMES DAILY
Qty: 60 G | Refills: 1 | Status: SHIPPED | OUTPATIENT
Start: 2020-10-15 | End: 2022-05-03

## 2020-10-15 NOTE — PROGRESS NOTES
Subjective:       Patient ID:  Yodit Canseco is a 60 y.o. female who presents for   Chief Complaint   Patient presents with    Nail Problem    Rash     History of Present Illness: The patient presents for follow up of a rash around the face. Last seen on 7/15/20 where she was started on metronidazole cream and continued on clobetasol as needed for elsewhere. Reports resolution of symptoms. Patient changed out detergents and soaps and has noticed significant improvement in symptoms.         Review of Systems   Constitutional: Negative for fever and chills.   Skin: Positive for rash (Complains of getting ulcers in the mouth, using TCS paste. no lesions recently.).        Objective:    Physical Exam   Constitutional: She appears well-developed and well-nourished. No distress.   Neurological: She is alert and oriented to person, place, and time. She is not disoriented.   Psychiatric: She has a normal mood and affect.   Skin:   Areas Examined (abnormalities noted in diagram):   Head / Face Inspection Performed  Neck Inspection Performed  Chest / Axilla Inspection Performed  RUE Inspected  LUE Inspection Performed              Diagram Legend     Erythematous scaling macule/papule c/w actinic keratosis       Vascular papule c/w angioma      Pigmented verrucoid papule/plaque c/w seborrheic keratosis      Yellow umbilicated papule c/w sebaceous hyperplasia      Irregularly shaped tan macule c/w lentigo     1-2 mm smooth white papules consistent with Milia      Movable subcutaneous cyst with punctum c/w epidermal inclusion cyst      Subcutaneous movable cyst c/w pilar cyst      Firm pink to brown papule c/w dermatofibroma      Pedunculated fleshy papule(s) c/w skin tag(s)      Evenly pigmented macule c/w junctional nevus     Mildly variegated pigmented, slightly irregular-bordered macule c/w mildly atypical nevus      Flesh colored to evenly pigmented papule c/w intradermal nevus       Pink pearly papule/plaque c/w  basal cell carcinoma      Erythematous hyperkeratotic cursted plaque c/w SCC      Surgical scar with no sign of skin cancer recurrence      Open and closed comedones      Inflammatory papules and pustules      Verrucoid papule consistent consistent with wart     Erythematous eczematous patches and plaques     Dystrophic onycholytic nail with subungual debris c/w onychomycosis     Umbilicated papule    Erythematous-base heme-crusted tan verrucoid plaque consistent with inflamed seborrheic keratosis     Erythematous Silvery Scaling Plaque c/w Psoriasis     See annotation      Assessment / Plan:        Aphthous ulcer  Continue TAC dental paste to the area as needed. No lesions present on exam today. Monitor and notify clinic for recurrence.     Dermatitis  -     clobetasoL (TEMOVATE) 0.05 % cream; Apply topically 2 (two) times daily.  Dispense: 60 g; Refill: 1             Follow up in about 3 months (around 1/15/2021).

## 2020-11-03 RX ORDER — LEVOTHYROXINE SODIUM 75 UG/1
TABLET ORAL
Qty: 90 TABLET | Refills: 0 | Status: SHIPPED | OUTPATIENT
Start: 2020-11-03 | End: 2021-02-03 | Stop reason: SDUPTHER

## 2020-11-04 ENCOUNTER — OFFICE VISIT (OUTPATIENT)
Dept: OBSTETRICS AND GYNECOLOGY | Facility: CLINIC | Age: 60
End: 2020-11-04
Payer: MEDICARE

## 2020-11-04 ENCOUNTER — HOSPITAL ENCOUNTER (OUTPATIENT)
Dept: RADIOLOGY | Facility: HOSPITAL | Age: 60
Discharge: HOME | End: 2020-11-04
Attending: OBSTETRICS & GYNECOLOGY
Payer: MEDICARE

## 2020-11-04 VITALS
SYSTOLIC BLOOD PRESSURE: 110 MMHG | WEIGHT: 188.5 LBS | HEIGHT: 62 IN | DIASTOLIC BLOOD PRESSURE: 70 MMHG | BODY MASS INDEX: 34.69 KG/M2

## 2020-11-04 DIAGNOSIS — Z79.890 POSTMENOPAUSAL HRT (HORMONE REPLACEMENT THERAPY): ICD-10-CM

## 2020-11-04 DIAGNOSIS — B37.9 CANDIDIASIS: ICD-10-CM

## 2020-11-04 DIAGNOSIS — Z12.31 VISIT FOR SCREENING MAMMOGRAM: ICD-10-CM

## 2020-11-04 DIAGNOSIS — Z01.419 ROUTINE GYNECOLOGICAL EXAMINATION: Primary | ICD-10-CM

## 2020-11-04 PROCEDURE — 77067 SCR MAMMO BI INCL CAD: CPT | Mod: TC,PN

## 2020-11-04 PROCEDURE — 77063 MAMMO DIGITAL SCREENING BILAT WITH TOMO: ICD-10-PCS | Mod: 26,,, | Performed by: RADIOLOGY

## 2020-11-04 PROCEDURE — G0101 CA SCREEN;PELVIC/BREAST EXAM: HCPCS | Mod: S$PBB,,, | Performed by: OBSTETRICS & GYNECOLOGY

## 2020-11-04 PROCEDURE — 99215 OFFICE O/P EST HI 40 MIN: CPT | Mod: PBBFAC,PN | Performed by: OBSTETRICS & GYNECOLOGY

## 2020-11-04 PROCEDURE — 77067 MAMMO DIGITAL SCREENING BILAT WITH TOMO: ICD-10-PCS | Mod: 26,,, | Performed by: RADIOLOGY

## 2020-11-04 PROCEDURE — 77067 SCR MAMMO BI INCL CAD: CPT | Mod: 26,,, | Performed by: RADIOLOGY

## 2020-11-04 PROCEDURE — 99999 PR PBB SHADOW E&M-EST. PATIENT-LVL V: CPT | Mod: PBBFAC,,, | Performed by: OBSTETRICS & GYNECOLOGY

## 2020-11-04 PROCEDURE — 77063 BREAST TOMOSYNTHESIS BI: CPT | Mod: 26,,, | Performed by: RADIOLOGY

## 2020-11-04 PROCEDURE — 99999 PR PBB SHADOW E&M-EST. PATIENT-LVL V: ICD-10-PCS | Mod: PBBFAC,,, | Performed by: OBSTETRICS & GYNECOLOGY

## 2020-11-04 PROCEDURE — G0101 PR CA SCREEN;PELVIC/BREAST EXAM: ICD-10-PCS | Mod: S$PBB,,, | Performed by: OBSTETRICS & GYNECOLOGY

## 2020-11-04 RX ORDER — ESTRADIOL 1 MG/1
TABLET ORAL
Qty: 90 TABLET | Refills: 3 | Status: SHIPPED | OUTPATIENT
Start: 2020-11-04 | End: 2022-01-04 | Stop reason: SDUPTHER

## 2020-11-04 RX ORDER — NYSTATIN 100000 U/G
CREAM TOPICAL 2 TIMES DAILY
Qty: 30 G | Refills: 1 | Status: SHIPPED | OUTPATIENT
Start: 2020-11-04 | End: 2022-03-14

## 2020-11-04 NOTE — PROGRESS NOTES
Chief Complaint   Patient presents with    Well Woman    Mammogram     History of Present Illness: Yodit Canseco is a 60 y.o. female that presents today 11/4/2020 for well gyn visit.    Past Medical History:   Diagnosis Date    Angio-edema     Arthralgia     Asthma without status asthmaticus     Bronchitis     Diverticulosis of large intestine without hemorrhage 10/8/2015    Environmental allergies     Eosinophilic asthma 3/8/2018    Gastroparesis     Hand arthritis     Herpes simplex without mention of complication     oral    Hyperlipidemia     Hypothyroidism     Immune deficiency disorder     Intraperitoneal abscess 5/7/2018    LBP radiating to left leg     Leukocytosis, unspecified     Migraine headache     Obesity, Class III, BMI 40-49.9 (morbid obesity)     Periorbital cellulitis 12/31/2016    Prediabetes     Recurrent upper respiratory infection (URI)     S/P colonoscopy November 2010    Sepsis 5/7/2018    Urticaria        Past Surgical History:   Procedure Laterality Date    ADENOIDECTOMY      CHOLECYSTECTOMY      CHOLECYSTECTOMY      COLONOSCOPY  2015    repeat in 10    COLONOSCOPY N/A 10/8/2015    Procedure: COLONOSCOPY;  Surgeon: Panda Bose MD;  Location: [facility];  Service: Endoscopy;  Laterality: N/A;    Hand fracture surgery      HARDWARE REMOVAL      left hand 5th [facility]    hymenectomy      HYSTERECTOMY      menorrhagia-Ovaries intact    ROTATOR CUFF REPAIR Right     SLEEVE GASTROPLASTY  04/16/2018    TONSILLECTOMY      Urethral dilatation         Current Outpatient Medications   Medication Sig Dispense Refill    (Magic mouthwash) 1:1:1 Benadryl 12.5mg/5ml liq, aluminum & magnesium hydroxide-simehticone (Maalox), lidocaine viscous 2% Swish and spit 5 mLs every 8 (eight) hours as needed. Gargle and spit. DO NOT SWALLOW 360 mL 0    acetaminophen (TYLENOL) 325 MG tablet Take 325 mg by mouth every 6 (six) hours as needed for Pain.      albuterol (PROAIR HFA)  90 mcg/actuation inhaler Inhale 2 puffs into the lungs every 6 (six) hours as needed for Wheezing. Rescue 18 g 0    betamethasone valerate 0.1% (VALISONE) 0.1 % Crea Apply topically 2 (two) times daily. Do not apply to the face. 45 g 1    BIOTIN ORAL Take by mouth once daily.      budesonide (PULMICORT) 0.5 mg/2 mL nebulizer solution Take 2 mLs (0.5 mg total) by nebulization 2 (two) times daily. Controller 120 mL 5    clobetasoL (TEMOVATE) 0.05 % cream Apply topically 2 (two) times daily. 60 g 1    clobetasoL (TEMOVATE) 0.05 % external solution Apply topically 2 (two) times daily. 50 mL 2    desonide (DESOWEN) 0.05 % cream Apply topically 2 (two) times daily. 1 Tube 3    estradioL (ESTRACE) 1 MG tablet TAKE 1 TABLET(1 MG) BY MOUTH EVERY DAY 90 tablet 3    fluticasone furoate-vilanterol (BREO ELLIPTA) 200-25 mcg/dose DsDv diskus inhaler Inhale 1 puff into the lungs once daily. Controller 1 each 11    immun glob G,IgG,-pro-IgA 0-50 (HIZENTRA) 2 gram/10 mL (20 %) Soln Inject 18 g into the skin every 14 (fourteen) days. 90 mL 12    levalbuterol (XOPENEX) 1.25 mg/3 mL nebulizer solution Take 3 mLs (1.25 mg total) by nebulization every 4 (four) hours as needed for Wheezing or Shortness of Breath. Rescue 1 Box 11    levothyroxine (SYNTHROID) 75 MCG tablet TAKE 1 TABLET(75 MCG) BY MOUTH EVERY DAY 90 tablet 0    lidocaine-prilocaine (EMLA) cream Apply topically 30 minutes prior to starting infusion to numb skin 30 g 2    meloxicam (MOBIC) 7.5 MG tablet TK 1 T PO QD PC      metronidazole 0.75% (METROCREAM) 0.75 % Crea Apply topically 2 (two) times daily. Apply to the face. 45 g 1    montelukast (SINGULAIR) 10 mg tablet Take 1 tablet (10 mg total) by mouth every evening. 90 tablet 3    mucus clearing device (ACAPELLA, FLUTTER) by Misc.(Non-Drug; Combo Route) route 2 (two) times daily. 1 Device 0    MULTIVITAMIN ORAL Take by mouth once daily.      mupirocin calcium 2% (BACTROBAN) 2 % cream AAA bid 30 g 0     "predniSONE (DELTASONE) 20 MG tablet One daily for 3 days and repeat for flare of lung symptoms as intructed 21 tablet 2    sumatriptan (IMITREX) 100 MG tablet       triamcinolone acetonide 0.1% (KENALOG) 0.1 % paste Apply to mouth sore 3 times a day as needed. 10 g 1    valACYclovir (VALTREX) 1000 MG tablet Take 2 tablets (2,000 mg total) by mouth every 12 (twelve) hours. For one day for fever blisters.  Do not take for more than 1 day per episode 20 tablet 0    boric acid, bulk, Powd FPD      calcium-vitamin D3 (CALCIUM 500 + D) 500 mg(1,250mg) -200 unit per tablet Take 1 tablet by mouth 2 (two) times daily with meals.      nystatin (MYCOSTATIN) cream Apply topically 2 (two) times daily. 30 g 1     No current facility-administered medications for this visit.        Review of patient's allergies indicates:   Allergen Reactions    Bromelains Hives     " causes mouth to bleed"    Pimecrolimus Swelling    Sudafed [pseudoephedrine hcl] Other (See Comments)     Does not want to wake up .    Amoxicillin-pot clavulanate Itching     Other reaction(s): Itching    Hydrocodone Itching    Iodinated contrast media      childhood    Iodine and iodide containing products Other (See Comments)    Melon Hives    Pantoprazole Nausea Only     Other reaction(s): upset stomach/halitosis    Percocet [oxycodone-acetaminophen] Itching    Corn containing products     Wheat containing prod     Barium iodide Rash    Ephedrine Other (See Comments)     Other reaction(s): comatose    Penicillins      Other reaction(s): does not work on pt symptoms       Family History   Problem Relation Age of Onset    Melanoma Mother     Basal cell carcinoma Mother     Lung disease Mother         pulm htn    Cataracts Mother     Hypertension Mother     Lung cancer Father     Diabetes Father     Hypertension Father     Cancer Father     Diabetes Paternal Aunt     Colon cancer Paternal Aunt 40    Diabetes Paternal Aunt     Ovarian " cancer Paternal Grandmother 40    Cancer Maternal Grandfather     Melanoma Maternal Aunt     Melanoma Maternal Uncle     Breast cancer Paternal Aunt 40    Lymphoma Paternal Aunt     Ulcerative colitis Son     Psoriasis Son     Psoriasis Son     Breast cancer Cousin 25    Allergic rhinitis Neg Hx     Allergies Neg Hx     Angioedema Neg Hx     Asthma Neg Hx     Atopy Neg Hx     Eczema Neg Hx     Immunodeficiency Neg Hx     Rhinitis Neg Hx     Urticaria Neg Hx        Social History     Socioeconomic History    Marital status:      Spouse name: Not on file    Number of children: Not on file    Years of education: Not on file    Highest education level: Not on file   Occupational History    Not on file   Social Needs    Financial resource strain: Not on file    Food insecurity     Worry: Not on file     Inability: Not on file    Transportation needs     Medical: Not on file     Non-medical: Not on file   Tobacco Use    Smoking status: Never Smoker    Smokeless tobacco: Never Used   Substance and Sexual Activity    Alcohol use: Yes     Alcohol/week: 0.0 standard drinks     Comment: very rarely    Drug use: No    Sexual activity: Yes     Partners: Male   Lifestyle    Physical activity     Days per week: Not on file     Minutes per session: Not on file    Stress: Not on file   Relationships    Social connections     Talks on phone: Not on file     Gets together: Not on file     Attends Jewish service: Not on file     Active member of club or organization: Not on file     Attends meetings of clubs or organizations: Not on file     Relationship status: Not on file   Other Topics Concern    Are you pregnant or think you may be? Not Asked    Breast-feeding Not Asked   Social History Narrative    . Disabled teacher.       OB History    Para Term  AB Living   5 2 2   3     SAB TAB Ectopic Multiple Live Births   3              # Outcome Date GA Lbr Pradip/2nd Weight  "Sex Delivery Anes PTL Lv   5 SAB            4 SAB            3 SAB            2 Term            1 Term                Review of Symptoms:  GENERAL: Denies weight gain or weight loss. Feeling well overall.   SKIN: Denies rash or lesions.   HEAD: Denies head injury or headache.   NODES: Denies enlarged lymph nodes.   CHEST: Denies chest pain or shortness of breath.   CARDIOVASCULAR: Denies palpitations or left sided chest pain.   ABDOMEN: No abdominal pain, constipation, diarrhea, nausea, vomiting or rectal bleeding.   URINARY: No frequency, dysuria, hematuria, or burning on urination.  HEMATOLOGIC: No easy bruisability or excessive bleeding.   MUSCULOSKELETAL: Denies joint pain or swelling.     /70   Ht 5' 2" (1.575 m)   Wt 85.5 kg (188 lb 7.9 oz)   Physical Exam:  APPEARANCE: Well nourished, well developed, in no acute distress.  SKIN: Normal skin turgor, no lesions.  NECK: Neck symmetric without masses   RESPIRATORY: Normal respiratory effort with no retractions or use of accessory muscles  CARDIOVASCULAR: Peripheral vascular system with no swelling no varicosities and palpation of pulses normal  LYMPHATIC: No enlargements of the lymph nodes noted in the neck, axillae, or groin  ABDOMEN: Soft. No tenderness or masses. No hepatosplenomegaly. No hernias.  BREASTS: Symmetrical, no skin changes or visible lesions. No palpable masses, nipple discharge or adenopathy bilaterally.  PELVIC: Normal external female genitalia without lesions. Normal hair distribution. Adequate perineal body, normal urethral meatus. Urethra with no masses.  Bladder nontender. Vagina moist and well rugated without lesions or discharge. No significant cystocele or rectocele.  Adnexa without masses or tenderness. Urethra and bladder normal.   EXTREMITIES: No clubbing cyanosis or edema.    ASSESSMENT/PLAN:  Routine gynecological examination    Visit for screening mammogram  -     Mammo Digital Screening Bilat w/ Eric; Future; Expected date: " 11/04/2020    Postmenopausal HRT (hormone replacement therapy)  -     estradioL (ESTRACE) 1 MG tablet; TAKE 1 TABLET(1 MG) BY MOUTH EVERY DAY  Dispense: 90 tablet; Refill: 3    Candidiasis  -     nystatin (MYCOSTATIN) cream; Apply topically 2 (two) times daily.  Dispense: 30 g; Refill: 1          Patient was counseled today on Pap guidelines. We discussed the discontinuing the pap smear after hysterectomy except in certain high risk cases.  We discussed the need for pelvic exams.   We discussed STD screening if at high risk for an STD.  We discussed breast cancer screening with mammograms every other year after the age of 40 and annually after the age of 50.    We discussed colon cancer screening.   Osteoporosis screening with the Dexa Bone Scan discussed when indicated.   She will see her PCP for other health maintenance.       FOLLOW-UP:prn

## 2020-11-10 NOTE — PLAN OF CARE
05/07/18 0756   Patient Assessment/Suction   Level of Consciousness (AVPU) alert   Respiratory Effort Normal;Unlabored   Expansion/Accessory Muscles/Retractions no use of accessory muscles;no retractions;expansion symmetric   All Lung Fields Breath Sounds clear   Rhythm/Pattern, Respiratory depth regular;pattern regular;unlabored   Cough Frequency infrequent   Cough Type none   PRE-TX-O2-ETCO2   O2 Device (Oxygen Therapy) room air   SpO2 95 %   Pulse Oximetry Type Intermittent   $ Pulse Oximetry - Multiple Charge Pulse Oximetry - Multiple   Pulse 87   Resp 18   Aerosol Therapy   $ Aerosol Therapy Charges PRN treatment not required   Respiratory Treatment Status PRN treatment not required   Inhaler   $ Inhaler Charges MDI (Metered Dose Inahler) Treatment;Mouth rinsed post treatment   Respiratory Treatment Status given;mouth rinsed post treatment   SVN/Inhaler Treatment Route mouthpiece   Patient Tolerance good   Post-Treatment   Post-treatment Heart Rate (beats/min) 88   Post-treatment Resp Rate (breaths/min) 18   All Fields Breath Sounds unchanged       Breo q day. Duoneb treatments PRN. Patient tolerated well.    Dorsal Nasal Flap Text: The defect edges were debeveled with a #15 scalpel blade.  Given the location of the defect and the proximity to free margins a dorsal nasal flap was deemed most appropriate.  Using a sterile surgical marker, an appropriate dorsal nasal flap was drawn around the defect.    The area thus outlined was incised deep to adipose tissue with a #15 scalpel blade.  The skin margins were undermined to an appropriate distance in all directions utilizing iris scissors.

## 2020-11-24 ENCOUNTER — TELEPHONE (OUTPATIENT)
Dept: ALLERGY | Facility: CLINIC | Age: 60
End: 2020-11-24

## 2020-11-24 NOTE — TELEPHONE ENCOUNTER
Spoke to [facility], advised pt needs appt. Sent msg to pt as well.       ----- Message from Marquita Aj sent at 11/24/2020 12:37 PM CST -----  Contact: Yola from [facility]  Patient: Yodit Canseco    Calling: Yola from [facility], [phone]  Yola requesting more clinical regarding Rx immun glob G,IgG,-pro-IgA 0-50 (HIZENTRA) 2 gram/10 mL (20 %) Soln    Please fax info to [phone]

## 2020-11-30 DIAGNOSIS — J45.51 SEVERE PERSISTENT ASTHMA WITH ACUTE EXACERBATION: ICD-10-CM

## 2020-11-30 RX ORDER — MONTELUKAST SODIUM 10 MG/1
10 TABLET ORAL NIGHTLY
Qty: 90 TABLET | Refills: 3 | Status: SHIPPED | OUTPATIENT
Start: 2020-11-30 | End: 2022-03-22 | Stop reason: SDUPTHER

## 2020-12-01 ENCOUNTER — OFFICE VISIT (OUTPATIENT)
Dept: ALLERGY | Facility: CLINIC | Age: 60
End: 2020-12-01
Payer: MEDICARE

## 2020-12-01 ENCOUNTER — LAB VISIT (OUTPATIENT)
Dept: LAB | Facility: HOSPITAL | Age: 60
End: 2020-12-01
Attending: ALLERGY & IMMUNOLOGY
Payer: COMMERCIAL

## 2020-12-01 VITALS — WEIGHT: 189.63 LBS | BODY MASS INDEX: 34.89 KG/M2 | TEMPERATURE: 97 F | HEIGHT: 62 IN

## 2020-12-01 DIAGNOSIS — K12.1 MOUTH ULCERS: ICD-10-CM

## 2020-12-01 DIAGNOSIS — J45.50 SEVERE PERSISTENT ASTHMA WITHOUT COMPLICATION: Primary | ICD-10-CM

## 2020-12-01 DIAGNOSIS — J32.9 RECURRENT SINUS INFECTIONS: ICD-10-CM

## 2020-12-01 DIAGNOSIS — D80.6 ANTI-POLYSACCHARIDE ANTIBODY DEFICIENCY: ICD-10-CM

## 2020-12-01 DIAGNOSIS — J82.83 EOSINOPHILIC ASTHMA: ICD-10-CM

## 2020-12-01 DIAGNOSIS — J31.0 CHRONIC RHINITIS: ICD-10-CM

## 2020-12-01 LAB
ALBUMIN SERPL BCP-MCNC: 3.8 G/DL (ref 3.5–5.2)
ALP SERPL-CCNC: 83 U/L (ref 55–135)
ALT SERPL W/O P-5'-P-CCNC: 11 U/L (ref 10–44)
ANION GAP SERPL CALC-SCNC: 8 MMOL/L (ref 8–16)
AST SERPL-CCNC: 19 U/L (ref 10–40)
BASOPHILS # BLD AUTO: 0.08 K/UL (ref 0–0.2)
BASOPHILS NFR BLD: 0.7 % (ref 0–1.9)
BILIRUB SERPL-MCNC: 0.3 MG/DL (ref 0.1–1)
BUN SERPL-MCNC: 14 MG/DL (ref 8–23)
CALCIUM SERPL-MCNC: 9.5 MG/DL (ref 8.7–10.5)
CHLORIDE SERPL-SCNC: 103 MMOL/L (ref 95–110)
CO2 SERPL-SCNC: 30 MMOL/L (ref 23–29)
CREAT SERPL-MCNC: 1.1 MG/DL (ref 0.5–1.4)
DIFFERENTIAL METHOD: ABNORMAL
EOSINOPHIL # BLD AUTO: 0.4 K/UL (ref 0–0.5)
EOSINOPHIL NFR BLD: 3.1 % (ref 0–8)
ERYTHROCYTE [DISTWIDTH] IN BLOOD BY AUTOMATED COUNT: 14 % (ref 11.5–14.5)
EST. GFR  (AFRICAN AMERICAN): >60 ML/MIN/1.73 M^2
EST. GFR  (NON AFRICAN AMERICAN): 54.3 ML/MIN/1.73 M^2
GLUCOSE SERPL-MCNC: 85 MG/DL (ref 70–110)
HCT VFR BLD AUTO: 45.6 % (ref 37–48.5)
HGB BLD-MCNC: 14.7 G/DL (ref 12–16)
IGA SERPL-MCNC: 242 MG/DL (ref 40–350)
IGG SERPL-MCNC: 1360 MG/DL (ref 650–1600)
IGM SERPL-MCNC: 76 MG/DL (ref 50–300)
IMM GRANULOCYTES # BLD AUTO: 0.05 K/UL (ref 0–0.04)
IMM GRANULOCYTES NFR BLD AUTO: 0.4 % (ref 0–0.5)
LYMPHOCYTES # BLD AUTO: 2.1 K/UL (ref 1–4.8)
LYMPHOCYTES NFR BLD: 17.7 % (ref 18–48)
MCH RBC QN AUTO: 28.7 PG (ref 27–31)
MCHC RBC AUTO-ENTMCNC: 32.2 G/DL (ref 32–36)
MCV RBC AUTO: 89 FL (ref 82–98)
MONOCYTES # BLD AUTO: 0.8 K/UL (ref 0.3–1)
MONOCYTES NFR BLD: 6.3 % (ref 4–15)
NEUTROPHILS # BLD AUTO: 8.7 K/UL (ref 1.8–7.7)
NEUTROPHILS NFR BLD: 71.8 % (ref 38–73)
NRBC BLD-RTO: 0 /100 WBC
PLATELET # BLD AUTO: 164 K/UL (ref 150–350)
PMV BLD AUTO: 12.7 FL (ref 9.2–12.9)
POTASSIUM SERPL-SCNC: 4 MMOL/L (ref 3.5–5.1)
PROT SERPL-MCNC: 7.7 G/DL (ref 6–8.4)
RBC # BLD AUTO: 5.13 M/UL (ref 4–5.4)
SODIUM SERPL-SCNC: 141 MMOL/L (ref 136–145)
WBC # BLD AUTO: 12.04 K/UL (ref 3.9–12.7)

## 2020-12-01 PROCEDURE — 99999 PR PBB SHADOW E&M-EST. PATIENT-LVL III: CPT | Mod: PBBFAC,,, | Performed by: ALLERGY & IMMUNOLOGY

## 2020-12-01 PROCEDURE — 82784 ASSAY IGA/IGD/IGG/IGM EACH: CPT | Mod: 59

## 2020-12-01 PROCEDURE — 85025 COMPLETE CBC W/AUTO DIFF WBC: CPT

## 2020-12-01 PROCEDURE — 82787 IGG 1 2 3 OR 4 EACH: CPT

## 2020-12-01 PROCEDURE — 80053 COMPREHEN METABOLIC PANEL: CPT

## 2020-12-01 PROCEDURE — 99213 OFFICE O/P EST LOW 20 MIN: CPT | Mod: PBBFAC,PO | Performed by: ALLERGY & IMMUNOLOGY

## 2020-12-01 PROCEDURE — 82784 ASSAY IGA/IGD/IGG/IGM EACH: CPT

## 2020-12-01 PROCEDURE — 99999 PR PBB SHADOW E&M-EST. PATIENT-LVL III: ICD-10-PCS | Mod: PBBFAC,,, | Performed by: ALLERGY & IMMUNOLOGY

## 2020-12-01 PROCEDURE — 36415 COLL VENOUS BLD VENIPUNCTURE: CPT | Mod: PO

## 2020-12-01 PROCEDURE — 99214 PR OFFICE/OUTPT VISIT, EST, LEVL IV, 30-39 MIN: ICD-10-PCS | Mod: S$PBB,,, | Performed by: ALLERGY & IMMUNOLOGY

## 2020-12-01 PROCEDURE — 99214 OFFICE O/P EST MOD 30 MIN: CPT | Mod: S$PBB,,, | Performed by: ALLERGY & IMMUNOLOGY

## 2020-12-01 NOTE — PROGRESS NOTES
Subjective:       Patient ID: Yodit Canseco is a 60 y.o. female.    Chief Complaint:  No chief complaint on file.      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  She was last seen 8/6/2019.  She had labs drawn in past- CBC was normal, lymphocyte profile showed low NK cells. IgA normal at 170, IgM normal at 124, IgE ,35, IgG normal at 700 but had steadily been decreasing, IgG subclasses normal and humoral panel protected to H flu, diphtheria, tetanus and 8/14 strep serotypes but this is after pneumovax and Prevnar so inadequate protection.  She still had constant PND and congestion and got frequent infections with sinus, ear, bronchitis and even pneumonia. She was on antibiotics every 1-2 months. She cannot use nose sprays all cause nose bleed even Astelin. All antihistamines cause sedation per pt. She is on Singulair daily. She started Hizentra 10 g weekly and then in October 2016 changed to 20 g every 2 weeks. She has done well with infusions. She has itching after and takes benadryl after with resolution. She did have sinus infections about 3-4 times last year and takes prednisone and Levaquin each cr on her own prescribed by Dr Gary. Clears well. She did also have pneumonia in March 2020, first since on Hizentra. She c/o constant mouth ulcers. Otherwise feels really good on infusions.        Labs 8/2019: IgG 1124, IgA 169 and IgM 77 and CBC and CMP normal    Prior History taken 1/13/14: new patient evaluation of rash and chronic PND.  She has been seen about 4 years ago and had labs drawn with normal immunoglobulins, humoral panel only protected to 4/14 strep titers despite prior pneumovax and immunocaps negative to dust mites, [place], [place], cocoa, garlic, milk, wheat, rye, pecan, and peanut.  She was advised to get pneumovax and follow up but she did not.  She continues with chronic PND and  congestion, no runny nose or sneeze and she gets inus infections 3-4 times per year requiring antibiotics.  She does clear with anbx but never gets rid of PND.  Her main question now is she gets a random rash on hands, feet and nose.  It seems to appear out of no where, is very itchy and red spots then goes away.  Lasts weeks to months.  She has seen derm and not sure of cause and she thinks may be food allergy although cannot identify a food.    She also has asthma which is well controlled on Dulera and Singulair.  Only uses albuterol with colds.      Rash  Associated symptoms include congestion and coughing. Pertinent negatives include no diarrhea, fatigue, fever, rhinorrhea, shortness of breath, sore throat or vomiting.       Environmental History: Pets in the home: none.  see history section for home environment  Review of Systems   Constitutional: Negative for fever, chills, appetite change and fatigue.   HENT: Positive for congestion and postnasal drip. Negative for ear pain, nosebleeds, sore throat, facial swelling, rhinorrhea, sneezing, trouble swallowing, voice change, sinus pressure and ear discharge.    Eyes: Negative for discharge, redness, itching and visual disturbance.   Respiratory: Positive for cough and wheezing. Negative for choking, chest tightness and shortness of breath.    Cardiovascular: Negative for chest pain, palpitations and leg swelling.   Gastrointestinal: Negative for nausea, vomiting, abdominal pain, diarrhea, constipation and abdominal distention.   Genitourinary: Negative for difficulty urinating.   Musculoskeletal: Negative for myalgias, joint swelling, arthralgias and gait problem.   Skin: Positive for rash. Negative for color change.   Neurological: Negative for dizziness, syncope, weakness, light-headedness and headaches.   Hematological: Negative for adenopathy. Does not bruise/bleed easily.   Psychiatric/Behavioral: Negative for behavioral problems, confusion, sleep  disturbance and agitation. The patient is not nervous/anxious.         Objective:    Physical Exam   Nursing note and vitals reviewed.  Constitutional: She is oriented to person, place, and time. She appears well-developed and well-nourished. No distress.   HENT:   Head: Normocephalic and atraumatic.   Right Ear: Hearing, tympanic membrane, external ear and ear canal normal.   Left Ear: Hearing, tympanic membrane, external ear and ear canal normal.   Nose: No mucosal edema, rhinorrhea, sinus tenderness or septal deviation. No epistaxis. Right sinus exhibits no maxillary sinus tenderness and no frontal sinus tenderness. Left sinus exhibits no maxillary sinus tenderness and no frontal sinus tenderness.   Mouth/Throat: Uvula is midline, oropharynx is clear and moist and mucous membranes are normal. No uvula swelling.   Eyes: Conjunctivae normal are normal. Right eye exhibits no discharge. Left eye exhibits no discharge.   Neck: Normal range of motion. No thyromegaly present.   Cardiovascular: Normal rate, regular rhythm and normal heart sounds.    No murmur heard.  Pulmonary/Chest: Effort normal and breath sounds normal. No respiratory distress. She has no wheezes.   Abdominal: Soft. She exhibits no distension. There is no tenderness.   Musculoskeletal: Normal range of motion. She exhibits no edema and no tenderness.   Lymphadenopathy:     She has no cervical adenopathy.   Neurological: She is alert and oriented to person, place, and time.   Skin: Skin is warm and dry. No rash noted. No erythema.   Psychiatric: She has a normal mood and affect. Her behavior is normal. Judgment and thought content normal.       Laboratory:   none performed   Assessment:       1. Severe persistent asthma without complication    2. Eosinophilic asthma    3. Anti-polysaccharide antibody deficiency    4. Recurrent sinus infections    5. Chronic rhinitis         Plan:       1. continue Hizentra 20g every 2 weeks. Recheck labs today and phone  review.   2. Continue Singulair daily  3. RTC annually or sooner if needed  4. Refer to rheum for mouth ulcers givens strong family history of autoimmune disease

## 2020-12-04 ENCOUNTER — PATIENT MESSAGE (OUTPATIENT)
Dept: ALLERGY | Facility: CLINIC | Age: 60
End: 2020-12-04

## 2020-12-04 LAB
IGG1 SER-MCNC: 924 MG/DL (ref 382–929)
IGG2 SER-MCNC: 294 MG/DL (ref 242–700)
IGG3 SER-MCNC: 36 MG/DL (ref 22–176)
IGG4 SER-MCNC: 21 MG/DL (ref 4–86)

## 2020-12-09 ENCOUNTER — TELEPHONE (OUTPATIENT)
Dept: ALLERGY | Facility: CLINIC | Age: 60
End: 2020-12-09

## 2020-12-09 NOTE — TELEPHONE ENCOUNTER
Faxed notes, labs, etc to [facility]      ----- Message from [employer]ani Green sent at 12/9/2020 11:05 AM CST -----  Contact: Kristin  Type: Needs Medical Advice  Who Called:  Kristin with [facility]  Symptoms (please be specific):    How long has patient had these symptoms:    Pharmacy name and phone #:    Best Call Back Number: [phone]  Additional Information: stated that she faxed overa few times request for order/clinical notes for medication hizentra. Fax back to

## 2020-12-14 ENCOUNTER — TELEPHONE (OUTPATIENT)
Dept: ALLERGY | Facility: CLINIC | Age: 60
End: 2020-12-14

## 2020-12-14 NOTE — TELEPHONE ENCOUNTER
Spoke to kristin gave correct fax number advise we would fax back immediately.       ----- Message from Zack Carrillo sent at 12/14/2020  2:02 PM CST -----  Type: Needs Medical Advice  Who Called:  Kristin/ [employer]    Best Call Back Number: [phone]  Additional Information: Caller states that she would like a callback regarding needing clinical notes and signed orders that had been faxed on 11/24/20 and 12/07/20

## 2020-12-15 ENCOUNTER — PATIENT MESSAGE (OUTPATIENT)
Dept: PULMONOLOGY | Facility: CLINIC | Age: 60
End: 2020-12-15

## 2020-12-18 ENCOUNTER — PATIENT MESSAGE (OUTPATIENT)
Dept: PULMONOLOGY | Facility: CLINIC | Age: 60
End: 2020-12-18

## 2020-12-29 ENCOUNTER — PATIENT MESSAGE (OUTPATIENT)
Dept: FAMILY MEDICINE | Facility: CLINIC | Age: 60
End: 2020-12-29

## 2020-12-29 RX ORDER — FLUCONAZOLE 150 MG/1
150 TABLET ORAL ONCE
Qty: 1 TABLET | Refills: 0 | Status: SHIPPED | OUTPATIENT
Start: 2020-12-29 | End: 2020-12-29

## 2021-01-03 ENCOUNTER — PATIENT MESSAGE (OUTPATIENT)
Dept: OBSTETRICS AND GYNECOLOGY | Facility: CLINIC | Age: 61
End: 2021-01-03

## 2021-01-04 ENCOUNTER — PATIENT MESSAGE (OUTPATIENT)
Dept: OBSTETRICS AND GYNECOLOGY | Facility: CLINIC | Age: 61
End: 2021-01-04

## 2021-01-04 ENCOUNTER — PATIENT MESSAGE (OUTPATIENT)
Dept: FAMILY MEDICINE | Facility: CLINIC | Age: 61
End: 2021-01-04

## 2021-01-05 ENCOUNTER — OFFICE VISIT (OUTPATIENT)
Dept: OBSTETRICS AND GYNECOLOGY | Facility: CLINIC | Age: 61
End: 2021-01-05
Payer: MEDICARE

## 2021-01-05 VITALS
SYSTOLIC BLOOD PRESSURE: 128 MMHG | HEIGHT: 63 IN | WEIGHT: 193.31 LBS | RESPIRATION RATE: 18 BRPM | BODY MASS INDEX: 34.25 KG/M2 | DIASTOLIC BLOOD PRESSURE: 80 MMHG

## 2021-01-05 DIAGNOSIS — D84.9 IMMUNE DEFICIENCY DISORDER: ICD-10-CM

## 2021-01-05 DIAGNOSIS — N76.4 LABIAL ABSCESS: Primary | ICD-10-CM

## 2021-01-05 DIAGNOSIS — L73.2 HIDRADENITIS: ICD-10-CM

## 2021-01-05 DIAGNOSIS — R73.03 PREDIABETES: ICD-10-CM

## 2021-01-05 DIAGNOSIS — Z92.241 HISTORY OF RECENT STEROID USE: ICD-10-CM

## 2021-01-05 DIAGNOSIS — J40 BRONCHITIS: ICD-10-CM

## 2021-01-05 PROCEDURE — 99213 OFFICE O/P EST LOW 20 MIN: CPT | Mod: PBBFAC,PN | Performed by: OBSTETRICS & GYNECOLOGY

## 2021-01-05 PROCEDURE — 99214 OFFICE O/P EST MOD 30 MIN: CPT | Mod: S$PBB,25,, | Performed by: OBSTETRICS & GYNECOLOGY

## 2021-01-05 PROCEDURE — 87070 CULTURE OTHR SPECIMN AEROBIC: CPT

## 2021-01-05 PROCEDURE — 99214 PR OFFICE/OUTPT VISIT, EST, LEVL IV, 30-39 MIN: ICD-10-PCS | Mod: S$PBB,25,, | Performed by: OBSTETRICS & GYNECOLOGY

## 2021-01-05 PROCEDURE — 56405 PR I&D OF VULVA/PERINEUM ABSCESS: ICD-10-PCS | Mod: S$PBB,,, | Performed by: OBSTETRICS & GYNECOLOGY

## 2021-01-05 PROCEDURE — 99999 PR PBB SHADOW E&M-EST. PATIENT-LVL III: ICD-10-PCS | Mod: PBBFAC,,, | Performed by: OBSTETRICS & GYNECOLOGY

## 2021-01-05 PROCEDURE — 56405 I&D VULVA/PERINEAL ABSCESS: CPT | Mod: PBBFAC,PN | Performed by: OBSTETRICS & GYNECOLOGY

## 2021-01-05 PROCEDURE — 56405 I&D VULVA/PERINEAL ABSCESS: CPT | Mod: S$PBB,,, | Performed by: OBSTETRICS & GYNECOLOGY

## 2021-01-05 PROCEDURE — 99999 PR PBB SHADOW E&M-EST. PATIENT-LVL III: CPT | Mod: PBBFAC,,, | Performed by: OBSTETRICS & GYNECOLOGY

## 2021-01-05 RX ORDER — MUPIROCIN 20 MG/G
OINTMENT TOPICAL 3 TIMES DAILY
Qty: 30 G | Refills: 1 | Status: SHIPPED | OUTPATIENT
Start: 2021-01-05 | End: 2021-01-12

## 2021-01-08 LAB — BACTERIA SPEC AEROBE CULT: NORMAL

## 2021-01-26 ENCOUNTER — PATIENT MESSAGE (OUTPATIENT)
Dept: OBSTETRICS AND GYNECOLOGY | Facility: CLINIC | Age: 61
End: 2021-01-26

## 2021-01-27 ENCOUNTER — OFFICE VISIT (OUTPATIENT)
Dept: OBSTETRICS AND GYNECOLOGY | Facility: CLINIC | Age: 61
End: 2021-01-27
Payer: MEDICARE

## 2021-01-27 VITALS — BODY MASS INDEX: 33.6 KG/M2 | HEIGHT: 63 IN | WEIGHT: 189.63 LBS

## 2021-01-27 DIAGNOSIS — L73.2 HIDRADENITIS: ICD-10-CM

## 2021-01-27 DIAGNOSIS — D84.9 IMMUNE DEFICIENCY DISORDER: ICD-10-CM

## 2021-01-27 DIAGNOSIS — R73.03 PREDIABETES: ICD-10-CM

## 2021-01-27 DIAGNOSIS — N76.4 LABIAL ABSCESS: Primary | ICD-10-CM

## 2021-01-27 PROCEDURE — 99212 OFFICE O/P EST SF 10 MIN: CPT | Mod: PBBFAC,PN | Performed by: OBSTETRICS & GYNECOLOGY

## 2021-01-27 PROCEDURE — 99213 OFFICE O/P EST LOW 20 MIN: CPT | Mod: S$PBB,,, | Performed by: OBSTETRICS & GYNECOLOGY

## 2021-01-27 PROCEDURE — 99213 PR OFFICE/OUTPT VISIT, EST, LEVL III, 20-29 MIN: ICD-10-PCS | Mod: S$PBB,,, | Performed by: OBSTETRICS & GYNECOLOGY

## 2021-01-27 PROCEDURE — 99999 PR PBB SHADOW E&M-EST. PATIENT-LVL II: ICD-10-PCS | Mod: PBBFAC,,, | Performed by: OBSTETRICS & GYNECOLOGY

## 2021-01-27 PROCEDURE — 99999 PR PBB SHADOW E&M-EST. PATIENT-LVL II: CPT | Mod: PBBFAC,,, | Performed by: OBSTETRICS & GYNECOLOGY

## 2021-01-27 RX ORDER — SULFAMETHOXAZOLE AND TRIMETHOPRIM 800; 160 MG/1; MG/1
1 TABLET ORAL 2 TIMES DAILY
Qty: 20 TABLET | Refills: 0 | Status: SHIPPED | OUTPATIENT
Start: 2021-01-27 | End: 2021-02-06

## 2021-02-03 ENCOUNTER — PATIENT MESSAGE (OUTPATIENT)
Dept: FAMILY MEDICINE | Facility: CLINIC | Age: 61
End: 2021-02-03

## 2021-02-03 DIAGNOSIS — E03.9 HYPOTHYROIDISM, UNSPECIFIED TYPE: Primary | ICD-10-CM

## 2021-02-03 RX ORDER — LEVOTHYROXINE SODIUM 75 UG/1
TABLET ORAL
Qty: 90 TABLET | Refills: 0 | Status: SHIPPED | OUTPATIENT
Start: 2021-02-03 | End: 2021-05-11

## 2021-02-04 ENCOUNTER — LAB VISIT (OUTPATIENT)
Dept: LAB | Facility: HOSPITAL | Age: 61
End: 2021-02-04
Attending: FAMILY MEDICINE
Payer: MEDICARE

## 2021-02-04 DIAGNOSIS — E03.9 HYPOTHYROIDISM: ICD-10-CM

## 2021-02-04 LAB — TSH SERPL DL<=0.005 MIU/L-ACNC: 1.87 UIU/ML (ref 0.4–4)

## 2021-02-04 PROCEDURE — 84443 ASSAY THYROID STIM HORMONE: CPT

## 2021-02-04 PROCEDURE — 36415 COLL VENOUS BLD VENIPUNCTURE: CPT | Mod: PO

## 2021-02-11 ENCOUNTER — PATIENT MESSAGE (OUTPATIENT)
Dept: OBSTETRICS AND GYNECOLOGY | Facility: CLINIC | Age: 61
End: 2021-02-11

## 2021-02-14 RX ORDER — FLUCONAZOLE 150 MG/1
150 TABLET ORAL DAILY
Qty: 1 TABLET | Refills: 0 | Status: SHIPPED | OUTPATIENT
Start: 2021-02-14 | End: 2021-02-15

## 2021-02-23 ENCOUNTER — PATIENT MESSAGE (OUTPATIENT)
Dept: FAMILY MEDICINE | Facility: CLINIC | Age: 61
End: 2021-02-23

## 2021-02-23 ENCOUNTER — PATIENT MESSAGE (OUTPATIENT)
Dept: ALLERGY | Facility: CLINIC | Age: 61
End: 2021-02-23

## 2021-02-23 ENCOUNTER — PATIENT MESSAGE (OUTPATIENT)
Dept: PULMONOLOGY | Facility: CLINIC | Age: 61
End: 2021-02-23

## 2021-02-23 RX ORDER — VALACYCLOVIR HYDROCHLORIDE 1 G/1
2000 TABLET, FILM COATED ORAL EVERY 12 HOURS
Qty: 20 TABLET | Refills: 0 | Status: ON HOLD | OUTPATIENT
Start: 2021-02-23 | End: 2022-10-07 | Stop reason: HOSPADM

## 2021-03-09 ENCOUNTER — PES CALL (OUTPATIENT)
Dept: ADMINISTRATIVE | Facility: CLINIC | Age: 61
End: 2021-03-09

## 2021-03-11 ENCOUNTER — OFFICE VISIT (OUTPATIENT)
Dept: FAMILY MEDICINE | Facility: CLINIC | Age: 61
End: 2021-03-11
Payer: MEDICARE

## 2021-03-11 VITALS
BODY MASS INDEX: 34.34 KG/M2 | DIASTOLIC BLOOD PRESSURE: 70 MMHG | TEMPERATURE: 98 F | HEART RATE: 78 BPM | SYSTOLIC BLOOD PRESSURE: 102 MMHG | WEIGHT: 186.63 LBS | OXYGEN SATURATION: 97 % | HEIGHT: 62 IN

## 2021-03-11 DIAGNOSIS — D84.9 IMMUNOLOGIC DEFICIENCY SYNDROME: ICD-10-CM

## 2021-03-11 DIAGNOSIS — K31.84 GASTROPARESIS: ICD-10-CM

## 2021-03-11 DIAGNOSIS — J45.50 SEVERE PERSISTENT ASTHMA WITHOUT COMPLICATION: ICD-10-CM

## 2021-03-11 DIAGNOSIS — Z86.010 HISTORY OF COLON POLYPS: ICD-10-CM

## 2021-03-11 DIAGNOSIS — G43.909 MIGRAINE WITHOUT STATUS MIGRAINOSUS, NOT INTRACTABLE, UNSPECIFIED MIGRAINE TYPE: ICD-10-CM

## 2021-03-11 DIAGNOSIS — H52.7 REFRACTIVE ERROR: ICD-10-CM

## 2021-03-11 DIAGNOSIS — E03.9 HYPOTHYROIDISM, UNSPECIFIED TYPE: ICD-10-CM

## 2021-03-11 DIAGNOSIS — J82.83 EOSINOPHILIC ASTHMA: ICD-10-CM

## 2021-03-11 DIAGNOSIS — E78.5 HYPERLIPIDEMIA, UNSPECIFIED HYPERLIPIDEMIA TYPE: Primary | ICD-10-CM

## 2021-03-11 DIAGNOSIS — K57.30 DIVERTICULOSIS OF LARGE INTESTINE WITHOUT HEMORRHAGE: Chronic | ICD-10-CM

## 2021-03-11 DIAGNOSIS — Z98.84 S/P BARIATRIC SURGERY: ICD-10-CM

## 2021-03-11 DIAGNOSIS — J44.9 CHRONIC OBSTRUCTIVE PULMONARY DISEASE, UNSPECIFIED COPD TYPE: ICD-10-CM

## 2021-03-11 DIAGNOSIS — R73.03 PREDIABETES: ICD-10-CM

## 2021-03-11 PROCEDURE — G0439 PPPS, SUBSEQ VISIT: HCPCS | Mod: ,,, | Performed by: NURSE PRACTITIONER

## 2021-03-11 PROCEDURE — 99213 OFFICE O/P EST LOW 20 MIN: CPT | Mod: PBBFAC,PO | Performed by: NURSE PRACTITIONER

## 2021-03-11 PROCEDURE — G0439 PR MEDICARE ANNUAL WELLNESS SUBSEQUENT VISIT: ICD-10-PCS | Mod: ,,, | Performed by: NURSE PRACTITIONER

## 2021-03-11 PROCEDURE — 99999 PR PBB SHADOW E&M-EST. PATIENT-LVL III: ICD-10-PCS | Mod: PBBFAC,,, | Performed by: NURSE PRACTITIONER

## 2021-03-11 PROCEDURE — 99999 PR PBB SHADOW E&M-EST. PATIENT-LVL III: CPT | Mod: PBBFAC,,, | Performed by: NURSE PRACTITIONER

## 2021-03-19 ENCOUNTER — OFFICE VISIT (OUTPATIENT)
Dept: FAMILY MEDICINE | Facility: CLINIC | Age: 61
End: 2021-03-19
Payer: COMMERCIAL

## 2021-03-19 VITALS
HEIGHT: 62 IN | DIASTOLIC BLOOD PRESSURE: 75 MMHG | SYSTOLIC BLOOD PRESSURE: 134 MMHG | WEIGHT: 189.19 LBS | HEART RATE: 65 BPM | TEMPERATURE: 98 F | BODY MASS INDEX: 34.82 KG/M2

## 2021-03-19 DIAGNOSIS — J45.50 SEVERE PERSISTENT ASTHMA WITHOUT COMPLICATION: ICD-10-CM

## 2021-03-19 DIAGNOSIS — E78.5 HYPERLIPIDEMIA, UNSPECIFIED HYPERLIPIDEMIA TYPE: ICD-10-CM

## 2021-03-19 DIAGNOSIS — Z00.00 ROUTINE HISTORY AND PHYSICAL EXAMINATION OF ADULT: Primary | ICD-10-CM

## 2021-03-19 DIAGNOSIS — E66.9 CLASS 1 OBESITY WITH SERIOUS COMORBIDITY AND BODY MASS INDEX (BMI) OF 34.0 TO 34.9 IN ADULT, UNSPECIFIED OBESITY TYPE: ICD-10-CM

## 2021-03-19 DIAGNOSIS — R73.03 PREDIABETES: ICD-10-CM

## 2021-03-19 DIAGNOSIS — Z43.1 ENCOUNTER FOR ATTENTION TO GASTROSTOMY: ICD-10-CM

## 2021-03-19 DIAGNOSIS — Z98.84 S/P BARIATRIC SURGERY: ICD-10-CM

## 2021-03-19 DIAGNOSIS — Z11.4 SCREENING FOR HIV (HUMAN IMMUNODEFICIENCY VIRUS): ICD-10-CM

## 2021-03-19 PROCEDURE — 99999 PR PBB SHADOW E&M-EST. PATIENT-LVL IV: CPT | Mod: PBBFAC,,, | Performed by: FAMILY MEDICINE

## 2021-03-19 PROCEDURE — 99999 PR PBB SHADOW E&M-EST. PATIENT-LVL IV: ICD-10-PCS | Mod: PBBFAC,,, | Performed by: FAMILY MEDICINE

## 2021-03-19 PROCEDURE — 99396 PREV VISIT EST AGE 40-64: CPT | Mod: S$PBB,,, | Performed by: FAMILY MEDICINE

## 2021-03-19 PROCEDURE — 99214 OFFICE O/P EST MOD 30 MIN: CPT | Mod: PBBFAC,PO | Performed by: FAMILY MEDICINE

## 2021-03-19 PROCEDURE — 99396 PR PREVENTIVE VISIT,EST,40-64: ICD-10-PCS | Mod: S$PBB,,, | Performed by: FAMILY MEDICINE

## 2021-03-19 RX ORDER — ACETAMINOPHEN 500 MG
1 TABLET ORAL DAILY
COMMUNITY

## 2021-03-23 ENCOUNTER — PATIENT MESSAGE (OUTPATIENT)
Dept: RHEUMATOLOGY | Facility: CLINIC | Age: 61
End: 2021-03-23

## 2021-04-06 ENCOUNTER — OFFICE VISIT (OUTPATIENT)
Dept: PULMONOLOGY | Facility: CLINIC | Age: 61
End: 2021-04-06
Payer: MEDICARE

## 2021-04-06 VITALS
WEIGHT: 188.69 LBS | HEIGHT: 62 IN | BODY MASS INDEX: 34.72 KG/M2 | DIASTOLIC BLOOD PRESSURE: 68 MMHG | HEART RATE: 73 BPM | OXYGEN SATURATION: 98 % | SYSTOLIC BLOOD PRESSURE: 140 MMHG

## 2021-04-06 DIAGNOSIS — J45.50 SEVERE PERSISTENT ASTHMA IN ADULT WITHOUT COMPLICATION: ICD-10-CM

## 2021-04-06 DIAGNOSIS — D84.9 IMMUNE DEFICIENCY DISORDER: ICD-10-CM

## 2021-04-06 DIAGNOSIS — J45.51 SEVERE PERSISTENT ASTHMA WITH ACUTE EXACERBATION: ICD-10-CM

## 2021-04-06 DIAGNOSIS — D84.9 IMMUNOLOGIC DEFICIENCY SYNDROME: Primary | ICD-10-CM

## 2021-04-06 PROBLEM — J45.909 ASTHMA IN ADULT WITHOUT COMPLICATION: Status: ACTIVE | Noted: 2018-03-08

## 2021-04-06 PROCEDURE — 99213 PR OFFICE/OUTPT VISIT, EST, LEVL III, 20-29 MIN: ICD-10-PCS | Mod: S$PBB,,, | Performed by: INTERNAL MEDICINE

## 2021-04-06 PROCEDURE — 99213 OFFICE O/P EST LOW 20 MIN: CPT | Mod: S$PBB,,, | Performed by: INTERNAL MEDICINE

## 2021-04-06 PROCEDURE — 99999 PR PBB SHADOW E&M-EST. PATIENT-LVL IV: ICD-10-PCS | Mod: PBBFAC,,, | Performed by: INTERNAL MEDICINE

## 2021-04-06 PROCEDURE — 99999 PR PBB SHADOW E&M-EST. PATIENT-LVL IV: CPT | Mod: PBBFAC,,, | Performed by: INTERNAL MEDICINE

## 2021-04-06 PROCEDURE — 99214 OFFICE O/P EST MOD 30 MIN: CPT | Mod: PBBFAC,PO | Performed by: INTERNAL MEDICINE

## 2021-04-06 RX ORDER — FLUTICASONE FUROATE AND VILANTEROL TRIFENATATE 200; 25 UG/1; UG/1
1 POWDER RESPIRATORY (INHALATION) DAILY
Qty: 1 EACH | Refills: 11 | Status: SHIPPED | OUTPATIENT
Start: 2021-04-06 | End: 2022-05-04

## 2021-04-06 RX ORDER — LEVOFLOXACIN 500 MG/1
500 TABLET, FILM COATED ORAL DAILY
Qty: 14 TABLET | Refills: 2 | Status: SHIPPED | OUTPATIENT
Start: 2021-04-06 | End: 2021-08-24 | Stop reason: SDUPTHER

## 2021-04-06 RX ORDER — PREDNISONE 20 MG/1
TABLET ORAL
Qty: 21 TABLET | Refills: 2 | Status: SHIPPED | OUTPATIENT
Start: 2021-04-06 | End: 2021-08-19

## 2021-04-06 RX ORDER — ALBUTEROL SULFATE 90 UG/1
2 AEROSOL, METERED RESPIRATORY (INHALATION) EVERY 6 HOURS PRN
Qty: 18 G | Refills: 0 | Status: SHIPPED | OUTPATIENT
Start: 2021-04-06 | End: 2022-10-04 | Stop reason: CLARIF

## 2021-04-06 RX ORDER — LEVOFLOXACIN 500 MG/1
500 TABLET, FILM COATED ORAL DAILY
COMMUNITY
Start: 2021-03-17 | End: 2021-04-06 | Stop reason: SDUPTHER

## 2021-04-15 ENCOUNTER — OFFICE VISIT (OUTPATIENT)
Dept: OPHTHALMOLOGY | Facility: CLINIC | Age: 61
End: 2021-04-15
Payer: MEDICARE

## 2021-04-15 ENCOUNTER — HOSPITAL ENCOUNTER (OUTPATIENT)
Dept: RADIOLOGY | Facility: HOSPITAL | Age: 61
Discharge: HOME | End: 2021-04-15
Attending: INTERNAL MEDICINE
Payer: MEDICARE

## 2021-04-15 ENCOUNTER — OFFICE VISIT (OUTPATIENT)
Dept: RHEUMATOLOGY | Facility: CLINIC | Age: 61
End: 2021-04-15
Payer: MEDICARE

## 2021-04-15 VITALS
WEIGHT: 190.69 LBS | SYSTOLIC BLOOD PRESSURE: 136 MMHG | BODY MASS INDEX: 34.88 KG/M2 | HEART RATE: 68 BPM | DIASTOLIC BLOOD PRESSURE: 81 MMHG

## 2021-04-15 DIAGNOSIS — H25.13 NUCLEAR SCLEROSIS, BILATERAL: ICD-10-CM

## 2021-04-15 DIAGNOSIS — M19.90 ARTHRITIS: ICD-10-CM

## 2021-04-15 DIAGNOSIS — H43.812 PVD (POSTERIOR VITREOUS DETACHMENT), LEFT EYE: Primary | ICD-10-CM

## 2021-04-15 DIAGNOSIS — R21 RASH: ICD-10-CM

## 2021-04-15 DIAGNOSIS — D84.9 IMMUNE DEFICIENCY DISORDER: ICD-10-CM

## 2021-04-15 DIAGNOSIS — J45.50 SEVERE PERSISTENT ASTHMA WITHOUT COMPLICATION: ICD-10-CM

## 2021-04-15 DIAGNOSIS — H25.013 CORTICAL AGE-RELATED CATARACT OF BOTH EYES: ICD-10-CM

## 2021-04-15 DIAGNOSIS — K12.1 MOUTH ULCERS: Primary | ICD-10-CM

## 2021-04-15 PROCEDURE — 73130 X-RAY EXAM OF HAND: CPT | Mod: TC,50

## 2021-04-15 PROCEDURE — 73562 X-RAY EXAM OF KNEE 3: CPT | Mod: TC,50

## 2021-04-15 PROCEDURE — 92014 PR EYE EXAM, EST PATIENT,COMPREHESV: ICD-10-PCS | Mod: S$PBB,,, | Performed by: OPTOMETRIST

## 2021-04-15 PROCEDURE — 73130 XR HAND COMPLETE 3 VIEWS BILATERAL: ICD-10-PCS | Mod: 26,RT,, | Performed by: RADIOLOGY

## 2021-04-15 PROCEDURE — 99204 PR OFFICE/OUTPT VISIT, NEW, LEVL IV, 45-59 MIN: ICD-10-PCS | Mod: S$PBB,,, | Performed by: INTERNAL MEDICINE

## 2021-04-15 PROCEDURE — 73130 X-RAY EXAM OF HAND: CPT | Mod: 26,RT,, | Performed by: RADIOLOGY

## 2021-04-15 PROCEDURE — 99999 PR PBB SHADOW E&M-EST. PATIENT-LVL V: ICD-10-PCS | Mod: PBBFAC,,, | Performed by: INTERNAL MEDICINE

## 2021-04-15 PROCEDURE — 99999 PR PBB SHADOW E&M-EST. PATIENT-LVL I: CPT | Mod: PBBFAC,,, | Performed by: OPTOMETRIST

## 2021-04-15 PROCEDURE — 73130 X-RAY EXAM OF HAND: CPT | Mod: 26,LT,, | Performed by: RADIOLOGY

## 2021-04-15 PROCEDURE — 99211 OFF/OP EST MAY X REQ PHY/QHP: CPT | Mod: PBBFAC,25,27 | Performed by: OPTOMETRIST

## 2021-04-15 PROCEDURE — 99999 PR PBB SHADOW E&M-EST. PATIENT-LVL I: ICD-10-PCS | Mod: PBBFAC,,, | Performed by: OPTOMETRIST

## 2021-04-15 PROCEDURE — 99215 OFFICE O/P EST HI 40 MIN: CPT | Mod: PBBFAC,25 | Performed by: INTERNAL MEDICINE

## 2021-04-15 PROCEDURE — 99204 OFFICE O/P NEW MOD 45 MIN: CPT | Mod: S$PBB,,, | Performed by: INTERNAL MEDICINE

## 2021-04-15 PROCEDURE — 92014 COMPRE OPH EXAM EST PT 1/>: CPT | Mod: S$PBB,,, | Performed by: OPTOMETRIST

## 2021-04-15 PROCEDURE — 99999 PR PBB SHADOW E&M-EST. PATIENT-LVL V: CPT | Mod: PBBFAC,,, | Performed by: INTERNAL MEDICINE

## 2021-04-29 ENCOUNTER — LAB VISIT (OUTPATIENT)
Dept: LAB | Facility: HOSPITAL | Age: 61
End: 2021-04-29
Attending: INTERNAL MEDICINE
Payer: MEDICARE

## 2021-04-29 ENCOUNTER — OFFICE VISIT (OUTPATIENT)
Dept: RHEUMATOLOGY | Facility: CLINIC | Age: 61
End: 2021-04-29
Payer: MEDICARE

## 2021-04-29 VITALS
WEIGHT: 187 LBS | SYSTOLIC BLOOD PRESSURE: 113 MMHG | BODY MASS INDEX: 34.2 KG/M2 | DIASTOLIC BLOOD PRESSURE: 74 MMHG | HEART RATE: 69 BPM

## 2021-04-29 DIAGNOSIS — M35.9 UNDIFFERENTIATED CONNECTIVE TISSUE DISEASE: ICD-10-CM

## 2021-04-29 DIAGNOSIS — R21 RASH: ICD-10-CM

## 2021-04-29 DIAGNOSIS — L30.9 DERMATITIS: ICD-10-CM

## 2021-04-29 DIAGNOSIS — D84.9 IMMUNE DEFICIENCY DISORDER: ICD-10-CM

## 2021-04-29 DIAGNOSIS — R68.2 DRY MOUTH: ICD-10-CM

## 2021-04-29 DIAGNOSIS — K12.1 MOUTH ULCERS: ICD-10-CM

## 2021-04-29 DIAGNOSIS — R76.8 RHEUMATOID FACTOR POSITIVE: ICD-10-CM

## 2021-04-29 DIAGNOSIS — M35.9 UNDIFFERENTIATED CONNECTIVE TISSUE DISEASE: Primary | ICD-10-CM

## 2021-04-29 DIAGNOSIS — M19.90 ARTHRITIS: ICD-10-CM

## 2021-04-29 LAB
BILIRUB UR QL STRIP: NEGATIVE
CLARITY UR: CLEAR
COLOR UR: YELLOW
CREAT UR-MCNC: 133 MG/DL (ref 15–325)
GLUCOSE UR QL STRIP: NEGATIVE
HGB UR QL STRIP: NEGATIVE
KETONES UR QL STRIP: NEGATIVE
LEUKOCYTE ESTERASE UR QL STRIP: NEGATIVE
NITRITE UR QL STRIP: NEGATIVE
PH UR STRIP: 6 [PH] (ref 5–8)
PROT UR QL STRIP: NEGATIVE
PROT UR-MCNC: <7 MG/DL (ref 0–15)
PROT/CREAT UR: NORMAL MG/G{CREAT} (ref 0–0.2)
SP GR UR STRIP: 1.02 (ref 1–1.03)
URN SPEC COLLECT METH UR: NORMAL

## 2021-04-29 PROCEDURE — 84165 PATHOLOGIST INTERPRETATION SPE: ICD-10-PCS | Mod: 26,,, | Performed by: PATHOLOGY

## 2021-04-29 PROCEDURE — 86146 BETA-2 GLYCOPROTEIN ANTIBODY: CPT | Mod: 59 | Performed by: INTERNAL MEDICINE

## 2021-04-29 PROCEDURE — 99999 PR PBB SHADOW E&M-EST. PATIENT-LVL IV: ICD-10-PCS | Mod: PBBFAC,,, | Performed by: INTERNAL MEDICINE

## 2021-04-29 PROCEDURE — 99214 OFFICE O/P EST MOD 30 MIN: CPT | Mod: PBBFAC | Performed by: INTERNAL MEDICINE

## 2021-04-29 PROCEDURE — 81003 URINALYSIS AUTO W/O SCOPE: CPT | Performed by: INTERNAL MEDICINE

## 2021-04-29 PROCEDURE — 86147 CARDIOLIPIN ANTIBODY EA IG: CPT | Mod: 59 | Performed by: INTERNAL MEDICINE

## 2021-04-29 PROCEDURE — 84165 PROTEIN E-PHORESIS SERUM: CPT | Performed by: INTERNAL MEDICINE

## 2021-04-29 PROCEDURE — 85613 RUSSELL VIPER VENOM DILUTED: CPT | Performed by: INTERNAL MEDICINE

## 2021-04-29 PROCEDURE — 84156 ASSAY OF PROTEIN URINE: CPT | Performed by: INTERNAL MEDICINE

## 2021-04-29 PROCEDURE — 99214 OFFICE O/P EST MOD 30 MIN: CPT | Mod: S$PBB,,, | Performed by: INTERNAL MEDICINE

## 2021-04-29 PROCEDURE — 99214 PR OFFICE/OUTPT VISIT, EST, LEVL IV, 30-39 MIN: ICD-10-PCS | Mod: S$PBB,,, | Performed by: INTERNAL MEDICINE

## 2021-04-29 PROCEDURE — 86334 PATHOLOGIST INTERPRETATION IFE: ICD-10-PCS | Mod: 26,,, | Performed by: PATHOLOGY

## 2021-04-29 PROCEDURE — 86334 IMMUNOFIX E-PHORESIS SERUM: CPT | Mod: 26,,, | Performed by: PATHOLOGY

## 2021-04-29 PROCEDURE — 36415 COLL VENOUS BLD VENIPUNCTURE: CPT | Performed by: INTERNAL MEDICINE

## 2021-04-29 PROCEDURE — 86334 IMMUNOFIX E-PHORESIS SERUM: CPT | Performed by: INTERNAL MEDICINE

## 2021-04-29 PROCEDURE — 84165 PROTEIN E-PHORESIS SERUM: CPT | Mod: 26,,, | Performed by: PATHOLOGY

## 2021-04-29 PROCEDURE — 99999 PR PBB SHADOW E&M-EST. PATIENT-LVL IV: CPT | Mod: PBBFAC,,, | Performed by: INTERNAL MEDICINE

## 2021-04-29 RX ORDER — CEVIMELINE HYDROCHLORIDE 30 MG/1
30 CAPSULE ORAL 3 TIMES DAILY
Qty: 90 CAPSULE | Refills: 11 | Status: SHIPPED | OUTPATIENT
Start: 2021-04-29 | End: 2021-04-29

## 2021-04-29 RX ORDER — PREDNISONE 5 MG/1
5 TABLET ORAL DAILY
Qty: 30 TABLET | Refills: 1 | Status: SHIPPED | OUTPATIENT
Start: 2021-04-29 | End: 2021-07-15 | Stop reason: SDUPTHER

## 2021-04-29 RX ORDER — HYDROXYCHLOROQUINE SULFATE 200 MG/1
200 TABLET, FILM COATED ORAL 2 TIMES DAILY
Qty: 60 TABLET | Refills: 6 | Status: SHIPPED | OUTPATIENT
Start: 2021-04-29 | End: 2021-11-23 | Stop reason: SDUPTHER

## 2021-04-30 LAB
ALBUMIN SERPL ELPH-MCNC: 4.72 G/DL (ref 3.35–5.55)
ALPHA1 GLOB SERPL ELPH-MCNC: 0.34 G/DL (ref 0.17–0.41)
ALPHA2 GLOB SERPL ELPH-MCNC: 0.63 G/DL (ref 0.43–0.99)
B-GLOBULIN SERPL ELPH-MCNC: 0.86 G/DL (ref 0.5–1.1)
GAMMA GLOB SERPL ELPH-MCNC: 1.45 G/DL (ref 0.67–1.58)
INTERPRETATION SERPL IFE-IMP: NORMAL
PROT SERPL-MCNC: 8 G/DL (ref 6–8.4)

## 2021-05-02 NOTE — PROGRESS NOTES
Medically Supervised Weight Loss Documentation    Date of Visit: 03/02/2018    Patient: Yodit Canseco    Current Weight:  216   Current BMI: Body mass index is 40.26 kg/m².  Weight Change: +1 pounds    Last Weight: 215    Beginning Weight: 215      Vitals:   Vitals:    03/02/18 1345   BP: 138/80   Pulse: 71   Resp: 16   Temp: 97.8 °F (36.6 °C)       Comorbidities:   Past Medical History:   Diagnosis Date    Allergy     Angio-edema     Arthralgia     Asthma without status asthmaticus     Bronchitis     Diverticulosis of large intestine without hemorrhage 10/8/2015    Environmental allergies     Gastroparesis     Hand arthritis     Herpes simplex without mention of complication     oral    Hyperlipidemia     Hypothyroidism     LBP radiating to left leg     Leukocytosis, unspecified     Migraine headache     Obesity, Class III, BMI 40-49.9 (morbid obesity)     Periorbital cellulitis 12/31/2016    Prediabetes     Recurrent upper respiratory infection (URI)     S/P colonoscopy November 2010    Urticaria        Medications:  Current Outpatient Prescriptions on File Prior to Visit   Medication Sig Dispense Refill    albuterol (PROAIR HFA) 90 mcg/actuation inhaler Inhale 1 puff into the lungs every 4 (four) hours as needed for Wheezing or Shortness of Breath. 3 Inhaler 3    albuterol (PROVENTIL) 2.5 mg /3 mL (0.083 %) nebulizer solution Take 3 mLs (2.5 mg total) by nebulization every 4 (four) hours as needed for Wheezing or Shortness of Breath. Every 4-6 hours  each 3    aspirin (ECOTRIN) 81 MG EC tablet Take 81 mg by mouth once daily.      atorvastatin (LIPITOR) 40 MG tablet Take 1 tablet (40 mg total) by mouth once daily. 90 tablet 3    CALCIUM CARB/D3/MAGNESIUM/ZINC (CALCIUM CARB-D3-MAG CMB11-ZINC) [id number] mg-unit-mg-mg Tab Take 2 tablets by mouth every evening.      desonide (DESOWEN) 0.05 % cream Apply topically 2 (two) times daily. Apply to lids. 1 Tube 3    estradiol  (ESTRACE) 1 MG tablet Take 1 mg by mouth once daily.      immun glob G,IgG,-pro-IgA 0-50 (HIZENTRA) 2 gram/10 mL (20 %) Soln Inject 18 g into the skin every 14 (fourteen) days. 90 mL 12    levothyroxine (SYNTHROID) 75 MCG tablet Take 1 tablet (75 mcg total) by mouth once daily. 30 tablet 11    metformin (GLUCOPHAGE) 500 MG tablet Take 1 tablet (500 mg total) by mouth 2 (two) times daily with meals. 60 tablet 11    mometasone-formoterol (DULERA) 200-5 mcg/actuation inhaler Inhale 2 puffs into the lungs 2 (two) times daily. Controller 1 Inhaler 11    montelukast (SINGULAIR) 10 mg tablet Take 1 tablet (10 mg total) by mouth every evening. 90 tablet 3    multivitamin (ONE DAILY MULTIVITAMIN) per tablet Take 1 tablet by mouth once daily.      omega 3-dha-epa-fish oil (FISH OIL) 900-1,400 mg CpDR Take 1 capsule by mouth once daily.      potassium chloride (MICRO-K) 10 MEQ CpSR TAKE 2 CAPSULES BY MOUTH EVERY DAY 60 capsule 3    predniSONE (DELTASONE) 20 MG tablet Take 1 tablet (20 mg total) by mouth once daily. X 5 days  1    traMADol (ULTRAM) 50 mg tablet Take 1 tablet (50 mg total) by mouth every 8 (eight) hours as needed for Pain. 15 tablet 0    triamterene-hydrochlorothiazide 75-50 mg (MAXZIDE) 75-50 mg per tablet Take 1 tablet by mouth once daily. 30 tablet 11    acetaminophen-codeine 300-30mg (TYLENOL #3) 300-30 mg Tab Take 1 tablet by mouth 4 (four) times a week. 90 tablet 0    fluconazole (DIFLUCAN) 200 MG Tab Take 1 tablet (200 mg total) by mouth once daily. 7 tablet 1    LACTOBAC NO.41/BIFIDOBACT NO.7 (PROBIOTIC-10 ORAL) Take by mouth once daily.      sumatriptan (IMITREX) 100 MG tablet Take 1 po at onset of migraine. May repeat once in 2 hrs if needed. No more than 2 pills in 24 hours 9 tablet 2    SUPRAX 400 mg Cap Take 1 capsule by mouth once daily. 10 capsule 2    triamcinolone acetonide 0.025% (KENALOG) 0.025 % cream Apply topically 2 (two) times daily. 15 g 0     No current  facility-administered medications on file prior to visit.          Body comp:  Fat Percent:  48.3 %  Fat Mass:  104.6 lb  FFM:  112 lb  TBW: 80.6 lb  TBW %:  37.2 %  BMR: 1587 kcal      Diet Education Discussed:    Breakfast:  Whole wheat toast and coffee  Lunch:  [place], 45 calorie bread, lunch meat with cheese  Dinner:  Meat and vegetable, salad     Exercise/Activity Discussed:    Walking 1-2 times per week  Core work 2 times per week    Behavior or Diet Goals for this patient:    No more bread.  We talked about stopping carbohydrates and eating mainly meats and vegetables for meals.  Examples were given.  She needs to exercise on a routine 3 times per week for 20 minutes.    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 tissue  dietary consult  psych consult   Seminar  Labs- pending  UGI- cannot do because of allergy   Off steroids for 6 weeks     I will obtain the following clearances prior to surgery:  Pulmonology- Dr. Gary, Allergist/immunologist: Dr. Cedillo  Cardiology- Dr. Last   PCP  : I met with the patient for 15 minutes and counseled her for over 50% of that time   [id number] [place] [address]  Phone: [phone]  Fax:

## 2021-05-03 LAB
CARDIOLIPIN IGG SER IA-ACNC: <9.4 GPL (ref 0–14.99)
CARDIOLIPIN IGM SER IA-ACNC: 10.5 MPL (ref 0–12.49)
PATHOLOGIST INTERPRETATION IFE: NORMAL
PATHOLOGIST INTERPRETATION SPE: NORMAL

## 2021-05-04 ENCOUNTER — PATIENT MESSAGE (OUTPATIENT)
Dept: RHEUMATOLOGY | Facility: CLINIC | Age: 61
End: 2021-05-04

## 2021-05-04 LAB
B2 GLYCOPROT1 IGA SER QL: <9 SAU
B2 GLYCOPROT1 IGG SER QL: <9 SGU
B2 GLYCOPROT1 IGM SER QL: <9 SMU
LA PPP-IMP: NEGATIVE

## 2021-05-05 ENCOUNTER — PATIENT MESSAGE (OUTPATIENT)
Dept: DERMATOLOGY | Facility: CLINIC | Age: 61
End: 2021-05-05

## 2021-05-10 DIAGNOSIS — E03.9 HYPOTHYROIDISM, UNSPECIFIED TYPE: ICD-10-CM

## 2021-05-11 RX ORDER — LEVOTHYROXINE SODIUM 75 UG/1
TABLET ORAL
Qty: 90 TABLET | Refills: 2 | Status: SHIPPED | OUTPATIENT
Start: 2021-05-11 | End: 2021-11-29

## 2021-05-14 ENCOUNTER — OFFICE VISIT (OUTPATIENT)
Dept: DERMATOLOGY | Facility: CLINIC | Age: 61
End: 2021-05-14
Payer: MEDICARE

## 2021-05-14 DIAGNOSIS — L30.9 DERMATITIS: Primary | ICD-10-CM

## 2021-05-14 PROCEDURE — 99999 PR PBB SHADOW E&M-EST. PATIENT-LVL III: CPT | Mod: PBBFAC,,, | Performed by: STUDENT IN AN ORGANIZED HEALTH CARE EDUCATION/TRAINING PROGRAM

## 2021-05-14 PROCEDURE — 99213 OFFICE O/P EST LOW 20 MIN: CPT | Mod: PBBFAC | Performed by: STUDENT IN AN ORGANIZED HEALTH CARE EDUCATION/TRAINING PROGRAM

## 2021-05-14 PROCEDURE — 99213 PR OFFICE/OUTPT VISIT, EST, LEVL III, 20-29 MIN: ICD-10-PCS | Mod: S$PBB,,, | Performed by: STUDENT IN AN ORGANIZED HEALTH CARE EDUCATION/TRAINING PROGRAM

## 2021-05-14 PROCEDURE — 99999 PR PBB SHADOW E&M-EST. PATIENT-LVL III: ICD-10-PCS | Mod: PBBFAC,,, | Performed by: STUDENT IN AN ORGANIZED HEALTH CARE EDUCATION/TRAINING PROGRAM

## 2021-05-14 PROCEDURE — 99213 OFFICE O/P EST LOW 20 MIN: CPT | Mod: S$PBB,,, | Performed by: STUDENT IN AN ORGANIZED HEALTH CARE EDUCATION/TRAINING PROGRAM

## 2021-05-14 RX ORDER — DESONIDE 0.5 MG/G
CREAM TOPICAL 2 TIMES DAILY
Qty: 60 G | Refills: 1 | Status: SHIPPED | OUTPATIENT
Start: 2021-05-14 | End: 2022-03-14 | Stop reason: SDUPTHER

## 2021-05-14 RX ORDER — CLOBETASOL PROPIONATE 0.5 MG/G
CREAM TOPICAL 2 TIMES DAILY
Qty: 60 G | Refills: 1 | Status: SHIPPED | OUTPATIENT
Start: 2021-05-14 | End: 2022-03-14 | Stop reason: SDUPTHER

## 2021-05-17 ENCOUNTER — OFFICE VISIT (OUTPATIENT)
Dept: OPHTHALMOLOGY | Facility: CLINIC | Age: 61
End: 2021-05-17
Payer: MEDICARE

## 2021-05-17 DIAGNOSIS — H52.03 HYPEROPIA WITH ASTIGMATISM AND PRESBYOPIA, BILATERAL: ICD-10-CM

## 2021-05-17 DIAGNOSIS — H52.203 HYPEROPIA WITH ASTIGMATISM AND PRESBYOPIA, BILATERAL: ICD-10-CM

## 2021-05-17 DIAGNOSIS — H43.812 PVD (POSTERIOR VITREOUS DETACHMENT), LEFT EYE: Primary | ICD-10-CM

## 2021-05-17 DIAGNOSIS — H52.4 HYPEROPIA WITH ASTIGMATISM AND PRESBYOPIA, BILATERAL: ICD-10-CM

## 2021-05-17 PROCEDURE — 92014 PR EYE EXAM, EST PATIENT,COMPREHESV: ICD-10-PCS | Mod: S$PBB,,, | Performed by: OPTOMETRIST

## 2021-05-17 PROCEDURE — 99999 PR PBB SHADOW E&M-EST. PATIENT-LVL III: CPT | Mod: PBBFAC,,, | Performed by: OPTOMETRIST

## 2021-05-17 PROCEDURE — 92015 PR REFRACTION: ICD-10-PCS | Mod: ,,, | Performed by: OPTOMETRIST

## 2021-05-17 PROCEDURE — 99999 PR PBB SHADOW E&M-EST. PATIENT-LVL III: ICD-10-PCS | Mod: PBBFAC,,, | Performed by: OPTOMETRIST

## 2021-05-17 PROCEDURE — 99213 OFFICE O/P EST LOW 20 MIN: CPT | Mod: PBBFAC | Performed by: OPTOMETRIST

## 2021-05-17 PROCEDURE — 92015 DETERMINE REFRACTIVE STATE: CPT | Mod: ,,, | Performed by: OPTOMETRIST

## 2021-05-17 PROCEDURE — 92014 COMPRE OPH EXAM EST PT 1/>: CPT | Mod: S$PBB,,, | Performed by: OPTOMETRIST

## 2021-05-26 ENCOUNTER — PATIENT MESSAGE (OUTPATIENT)
Dept: RHEUMATOLOGY | Facility: CLINIC | Age: 61
End: 2021-05-26

## 2021-05-27 ENCOUNTER — TELEPHONE (OUTPATIENT)
Dept: RHEUMATOLOGY | Facility: CLINIC | Age: 61
End: 2021-05-27

## 2021-06-11 ENCOUNTER — OFFICE VISIT (OUTPATIENT)
Dept: FAMILY MEDICINE | Facility: CLINIC | Age: 61
End: 2021-06-11
Payer: MEDICARE

## 2021-06-11 VITALS
HEART RATE: 73 BPM | BODY MASS INDEX: 35.09 KG/M2 | DIASTOLIC BLOOD PRESSURE: 66 MMHG | SYSTOLIC BLOOD PRESSURE: 134 MMHG | HEIGHT: 62 IN | WEIGHT: 190.69 LBS | TEMPERATURE: 98 F

## 2021-06-11 DIAGNOSIS — M35.9 UNDIFFERENTIATED CONNECTIVE TISSUE DISEASE: ICD-10-CM

## 2021-06-11 DIAGNOSIS — D84.9 IMMUNOLOGIC DEFICIENCY SYNDROME: ICD-10-CM

## 2021-06-11 DIAGNOSIS — M25.511 ACUTE PAIN OF RIGHT SHOULDER: Primary | ICD-10-CM

## 2021-06-11 DIAGNOSIS — Z98.84 S/P BARIATRIC SURGERY: ICD-10-CM

## 2021-06-11 PROBLEM — J45.51 SEVERE PERSISTENT ASTHMA WITH ACUTE EXACERBATION: Status: RESOLVED | Noted: 2021-04-06 | Resolved: 2021-06-11

## 2021-06-11 PROCEDURE — 99213 PR OFFICE/OUTPT VISIT, EST, LEVL III, 20-29 MIN: ICD-10-PCS | Mod: S$PBB,,, | Performed by: FAMILY MEDICINE

## 2021-06-11 PROCEDURE — 99999 PR PBB SHADOW E&M-EST. PATIENT-LVL V: ICD-10-PCS | Mod: PBBFAC,,, | Performed by: FAMILY MEDICINE

## 2021-06-11 PROCEDURE — 99213 OFFICE O/P EST LOW 20 MIN: CPT | Mod: S$PBB,,, | Performed by: FAMILY MEDICINE

## 2021-06-11 PROCEDURE — 99999 PR PBB SHADOW E&M-EST. PATIENT-LVL V: CPT | Mod: PBBFAC,,, | Performed by: FAMILY MEDICINE

## 2021-06-11 PROCEDURE — 99215 OFFICE O/P EST HI 40 MIN: CPT | Mod: PBBFAC,PO | Performed by: FAMILY MEDICINE

## 2021-06-11 RX ORDER — TRAMADOL HYDROCHLORIDE 50 MG/1
50 TABLET ORAL EVERY 8 HOURS PRN
Qty: 21 TABLET | Refills: 0 | Status: SHIPPED | OUTPATIENT
Start: 2021-06-11 | End: 2021-06-18

## 2021-06-14 ENCOUNTER — LAB VISIT (OUTPATIENT)
Dept: LAB | Facility: HOSPITAL | Age: 61
End: 2021-06-14
Attending: FAMILY MEDICINE
Payer: MEDICARE

## 2021-06-14 DIAGNOSIS — Z00.00 ROUTINE HISTORY AND PHYSICAL EXAMINATION OF ADULT: ICD-10-CM

## 2021-06-14 DIAGNOSIS — Z98.84 S/P BARIATRIC SURGERY: ICD-10-CM

## 2021-06-14 DIAGNOSIS — R73.03 PREDIABETES: ICD-10-CM

## 2021-06-14 DIAGNOSIS — E78.5 HYPERLIPIDEMIA, UNSPECIFIED HYPERLIPIDEMIA TYPE: ICD-10-CM

## 2021-06-14 LAB
25(OH)D3+25(OH)D2 SERPL-MCNC: 47 NG/ML (ref 30–96)
ANION GAP SERPL CALC-SCNC: 10 MMOL/L (ref 8–16)
BUN SERPL-MCNC: 11 MG/DL (ref 8–23)
CALCIUM SERPL-MCNC: 9.5 MG/DL (ref 8.7–10.5)
CHLORIDE SERPL-SCNC: 103 MMOL/L (ref 95–110)
CHOLEST SERPL-MCNC: 238 MG/DL (ref 120–199)
CHOLEST/HDLC SERPL: 3.6 {RATIO} (ref 2–5)
CO2 SERPL-SCNC: 26 MMOL/L (ref 23–29)
CREAT SERPL-MCNC: 0.9 MG/DL (ref 0.5–1.4)
EST. GFR  (AFRICAN AMERICAN): >60 ML/MIN/1.73 M^2
EST. GFR  (NON AFRICAN AMERICAN): >60 ML/MIN/1.73 M^2
ESTIMATED AVG GLUCOSE: 94 MG/DL (ref 68–131)
FOLATE SERPL-MCNC: 16.9 NG/ML (ref 4–24)
GLUCOSE SERPL-MCNC: 74 MG/DL (ref 70–110)
HBA1C MFR BLD: 4.9 % (ref 4–5.6)
HDLC SERPL-MCNC: 66 MG/DL (ref 40–75)
HDLC SERPL: 27.7 % (ref 20–50)
LDLC SERPL CALC-MCNC: 136.2 MG/DL (ref 63–159)
NONHDLC SERPL-MCNC: 172 MG/DL
POTASSIUM SERPL-SCNC: 4 MMOL/L (ref 3.5–5.1)
SODIUM SERPL-SCNC: 139 MMOL/L (ref 136–145)
TRIGL SERPL-MCNC: 179 MG/DL (ref 30–150)
VIT B12 SERPL-MCNC: >2000 PG/ML (ref 210–950)

## 2021-06-14 PROCEDURE — 82607 VITAMIN B-12: CPT | Performed by: FAMILY MEDICINE

## 2021-06-14 PROCEDURE — 80061 LIPID PANEL: CPT | Performed by: FAMILY MEDICINE

## 2021-06-14 PROCEDURE — 82306 VITAMIN D 25 HYDROXY: CPT | Performed by: FAMILY MEDICINE

## 2021-06-14 PROCEDURE — 84630 ASSAY OF ZINC: CPT | Performed by: FAMILY MEDICINE

## 2021-06-14 PROCEDURE — 84425 ASSAY OF VITAMIN B-1: CPT | Performed by: FAMILY MEDICINE

## 2021-06-14 PROCEDURE — 82746 ASSAY OF FOLIC ACID SERUM: CPT | Performed by: FAMILY MEDICINE

## 2021-06-14 PROCEDURE — 80048 BASIC METABOLIC PNL TOTAL CA: CPT | Performed by: FAMILY MEDICINE

## 2021-06-14 PROCEDURE — 83036 HEMOGLOBIN GLYCOSYLATED A1C: CPT | Performed by: FAMILY MEDICINE

## 2021-06-17 LAB — ZINC SERPL-MCNC: 91 UG/DL (ref 60–130)

## 2021-06-18 LAB — VIT B1 BLD-MCNC: 101 UG/L (ref 38–122)

## 2021-07-15 DIAGNOSIS — M35.9 UNDIFFERENTIATED CONNECTIVE TISSUE DISEASE: ICD-10-CM

## 2021-07-15 RX ORDER — PREDNISONE 5 MG/1
5 TABLET ORAL DAILY
Qty: 30 TABLET | Refills: 1 | Status: SHIPPED | OUTPATIENT
Start: 2021-07-15 | End: 2021-08-19

## 2021-07-23 ENCOUNTER — TELEPHONE (OUTPATIENT)
Dept: FAMILY MEDICINE | Facility: CLINIC | Age: 61
End: 2021-07-23

## 2021-07-23 ENCOUNTER — PATIENT MESSAGE (OUTPATIENT)
Dept: FAMILY MEDICINE | Facility: CLINIC | Age: 61
End: 2021-07-23

## 2021-07-23 DIAGNOSIS — U07.1 COVID-19 VIRUS INFECTION: Primary | ICD-10-CM

## 2021-07-24 ENCOUNTER — NURSE TRIAGE (OUTPATIENT)
Dept: ADMINISTRATIVE | Facility: CLINIC | Age: 61
End: 2021-07-24

## 2021-07-26 ENCOUNTER — NURSE TRIAGE (OUTPATIENT)
Dept: ADMINISTRATIVE | Facility: CLINIC | Age: 61
End: 2021-07-26

## 2021-07-26 ENCOUNTER — OFFICE VISIT (OUTPATIENT)
Dept: FAMILY MEDICINE | Facility: CLINIC | Age: 61
End: 2021-07-26
Payer: MEDICARE

## 2021-07-26 ENCOUNTER — PATIENT MESSAGE (OUTPATIENT)
Dept: PULMONOLOGY | Facility: CLINIC | Age: 61
End: 2021-07-26

## 2021-07-26 ENCOUNTER — PATIENT MESSAGE (OUTPATIENT)
Dept: FAMILY MEDICINE | Facility: CLINIC | Age: 61
End: 2021-07-26

## 2021-07-26 ENCOUNTER — PATIENT MESSAGE (OUTPATIENT)
Dept: ALLERGY | Facility: CLINIC | Age: 61
End: 2021-07-26

## 2021-07-26 VITALS — OXYGEN SATURATION: 96 %

## 2021-07-26 DIAGNOSIS — J45.50 SEVERE PERSISTENT ASTHMA WITHOUT COMPLICATION: ICD-10-CM

## 2021-07-26 DIAGNOSIS — D84.9 IMMUNOLOGIC DEFICIENCY SYNDROME: ICD-10-CM

## 2021-07-26 DIAGNOSIS — U07.1 COVID-19 VIRUS INFECTION: Primary | ICD-10-CM

## 2021-07-26 PROCEDURE — 99213 PR OFFICE/OUTPT VISIT, EST, LEVL III, 20-29 MIN: ICD-10-PCS | Mod: CR,95,, | Performed by: FAMILY MEDICINE

## 2021-07-26 PROCEDURE — 99213 OFFICE O/P EST LOW 20 MIN: CPT | Mod: CR,95,, | Performed by: FAMILY MEDICINE

## 2021-07-26 RX ORDER — ONDANSETRON 4 MG/1
4 TABLET, ORALLY DISINTEGRATING ORAL EVERY 8 HOURS PRN
Qty: 20 TABLET | Refills: 0 | Status: SHIPPED | OUTPATIENT
Start: 2021-07-26 | End: 2022-07-29 | Stop reason: SDUPTHER

## 2021-07-26 RX ORDER — BUDESONIDE 0.25 MG/2ML
0.25 INHALANT ORAL DAILY
Qty: 60 VIAL | Refills: 11 | Status: SHIPPED | OUTPATIENT
Start: 2021-07-26 | End: 2022-10-04 | Stop reason: CLARIF

## 2021-07-29 ENCOUNTER — INFUSION (OUTPATIENT)
Dept: INFECTIOUS DISEASES | Facility: HOSPITAL | Age: 61
End: 2021-07-29
Attending: FAMILY MEDICINE
Payer: MEDICARE

## 2021-07-29 VITALS
SYSTOLIC BLOOD PRESSURE: 137 MMHG | OXYGEN SATURATION: 97 % | RESPIRATION RATE: 18 BRPM | HEART RATE: 69 BPM | TEMPERATURE: 98 F | DIASTOLIC BLOOD PRESSURE: 65 MMHG

## 2021-07-29 DIAGNOSIS — U07.1 COVID-19 VIRUS INFECTION: ICD-10-CM

## 2021-07-29 PROCEDURE — 63600175 PHARM REV CODE 636 W HCPCS: Performed by: INTERNAL MEDICINE

## 2021-07-29 PROCEDURE — 25000003 PHARM REV CODE 250: Performed by: INTERNAL MEDICINE

## 2021-07-29 PROCEDURE — M0243 CASIRIVI AND IMDEVI INFUSION: HCPCS | Performed by: INTERNAL MEDICINE

## 2021-07-29 RX ORDER — DIPHENHYDRAMINE HYDROCHLORIDE 50 MG/ML
25 INJECTION INTRAMUSCULAR; INTRAVENOUS ONCE AS NEEDED
Status: DISCONTINUED | OUTPATIENT
Start: 2021-07-29 | End: 2022-03-22

## 2021-07-29 RX ORDER — ACETAMINOPHEN 325 MG/1
650 TABLET ORAL ONCE AS NEEDED
Status: DISCONTINUED | OUTPATIENT
Start: 2021-07-29 | End: 2022-03-22

## 2021-07-29 RX ORDER — EPINEPHRINE 0.3 MG/.3ML
0.3 INJECTION SUBCUTANEOUS
Status: DISCONTINUED | OUTPATIENT
Start: 2021-07-29 | End: 2022-03-22

## 2021-07-29 RX ORDER — SODIUM CHLORIDE 0.9 % (FLUSH) 0.9 %
10 SYRINGE (ML) INJECTION
Status: DISCONTINUED | OUTPATIENT
Start: 2021-07-29 | End: 2022-03-22

## 2021-07-29 RX ORDER — ONDANSETRON 4 MG/1
4 TABLET, ORALLY DISINTEGRATING ORAL ONCE AS NEEDED
Status: DISCONTINUED | OUTPATIENT
Start: 2021-07-29 | End: 2022-03-22

## 2021-07-29 RX ORDER — ALBUTEROL SULFATE 90 UG/1
2 AEROSOL, METERED RESPIRATORY (INHALATION)
Status: DISCONTINUED | OUTPATIENT
Start: 2021-07-29 | End: 2022-03-22

## 2021-07-29 RX ADMIN — CASIRIVIMAB AND IMDEVIMAB 600 MG: 600; 600 INJECTION, SOLUTION, CONCENTRATE INTRAVENOUS at 12:07

## 2021-08-02 ENCOUNTER — PATIENT OUTREACH (OUTPATIENT)
Dept: ADMINISTRATIVE | Facility: OTHER | Age: 61
End: 2021-08-02

## 2021-08-03 ENCOUNTER — OFFICE VISIT (OUTPATIENT)
Dept: ALLERGY | Facility: CLINIC | Age: 61
End: 2021-08-03
Payer: MEDICARE

## 2021-08-03 VITALS — BODY MASS INDEX: 33.87 KG/M2 | WEIGHT: 184.06 LBS | HEIGHT: 62 IN

## 2021-08-03 DIAGNOSIS — J32.9 RECURRENT SINUS INFECTIONS: ICD-10-CM

## 2021-08-03 DIAGNOSIS — J82.83 EOSINOPHILIC ASTHMA: ICD-10-CM

## 2021-08-03 DIAGNOSIS — J45.50 SEVERE PERSISTENT ASTHMA WITHOUT COMPLICATION: Primary | ICD-10-CM

## 2021-08-03 DIAGNOSIS — D80.6 ANTI-POLYSACCHARIDE ANTIBODY DEFICIENCY: ICD-10-CM

## 2021-08-03 DIAGNOSIS — J31.0 CHRONIC RHINITIS: ICD-10-CM

## 2021-08-03 PROCEDURE — 99214 PR OFFICE/OUTPT VISIT, EST, LEVL IV, 30-39 MIN: ICD-10-PCS | Mod: S$PBB,,, | Performed by: ALLERGY & IMMUNOLOGY

## 2021-08-03 PROCEDURE — 99214 OFFICE O/P EST MOD 30 MIN: CPT | Mod: PBBFAC,PO | Performed by: ALLERGY & IMMUNOLOGY

## 2021-08-03 PROCEDURE — 99214 OFFICE O/P EST MOD 30 MIN: CPT | Mod: S$PBB,,, | Performed by: ALLERGY & IMMUNOLOGY

## 2021-08-03 PROCEDURE — 99999 PR PBB SHADOW E&M-EST. PATIENT-LVL IV: ICD-10-PCS | Mod: PBBFAC,,, | Performed by: ALLERGY & IMMUNOLOGY

## 2021-08-03 PROCEDURE — 99999 PR PBB SHADOW E&M-EST. PATIENT-LVL IV: CPT | Mod: PBBFAC,,, | Performed by: ALLERGY & IMMUNOLOGY

## 2021-08-10 ENCOUNTER — PATIENT MESSAGE (OUTPATIENT)
Dept: PULMONOLOGY | Facility: CLINIC | Age: 61
End: 2021-08-10

## 2021-08-16 ENCOUNTER — PATIENT MESSAGE (OUTPATIENT)
Dept: OBSTETRICS AND GYNECOLOGY | Facility: CLINIC | Age: 61
End: 2021-08-16

## 2021-08-16 RX ORDER — FLUCONAZOLE 150 MG/1
150 TABLET ORAL ONCE
Qty: 1 TABLET | Refills: 0 | Status: SHIPPED | OUTPATIENT
Start: 2021-08-16 | End: 2021-08-16

## 2021-08-17 ENCOUNTER — PATIENT MESSAGE (OUTPATIENT)
Dept: FAMILY MEDICINE | Facility: CLINIC | Age: 61
End: 2021-08-17

## 2021-08-19 ENCOUNTER — OFFICE VISIT (OUTPATIENT)
Dept: RHEUMATOLOGY | Facility: CLINIC | Age: 61
End: 2021-08-19
Payer: MEDICARE

## 2021-08-19 VITALS
DIASTOLIC BLOOD PRESSURE: 86 MMHG | BODY MASS INDEX: 33.43 KG/M2 | WEIGHT: 181.69 LBS | HEART RATE: 89 BPM | SYSTOLIC BLOOD PRESSURE: 123 MMHG | HEIGHT: 62 IN

## 2021-08-19 DIAGNOSIS — D84.9 IMMUNOLOGIC DEFICIENCY SYNDROME: ICD-10-CM

## 2021-08-19 DIAGNOSIS — K12.1 MOUTH ULCERS: ICD-10-CM

## 2021-08-19 DIAGNOSIS — J45.50 SEVERE PERSISTENT ASTHMA IN ADULT WITHOUT COMPLICATION: ICD-10-CM

## 2021-08-19 DIAGNOSIS — J45.51 SEVERE PERSISTENT ASTHMA WITH ACUTE EXACERBATION: ICD-10-CM

## 2021-08-19 DIAGNOSIS — M35.9 UNDIFFERENTIATED CONNECTIVE TISSUE DISEASE: Primary | ICD-10-CM

## 2021-08-19 PROCEDURE — 99999 PR PBB SHADOW E&M-EST. PATIENT-LVL IV: CPT | Mod: PBBFAC,,, | Performed by: INTERNAL MEDICINE

## 2021-08-19 PROCEDURE — 99215 PR OFFICE/OUTPT VISIT, EST, LEVL V, 40-54 MIN: ICD-10-PCS | Mod: S$PBB,,, | Performed by: INTERNAL MEDICINE

## 2021-08-19 PROCEDURE — 99215 OFFICE O/P EST HI 40 MIN: CPT | Mod: S$PBB,,, | Performed by: INTERNAL MEDICINE

## 2021-08-19 PROCEDURE — 99214 OFFICE O/P EST MOD 30 MIN: CPT | Mod: PBBFAC | Performed by: INTERNAL MEDICINE

## 2021-08-19 PROCEDURE — 99999 PR PBB SHADOW E&M-EST. PATIENT-LVL IV: ICD-10-PCS | Mod: PBBFAC,,, | Performed by: INTERNAL MEDICINE

## 2021-08-19 RX ORDER — PREDNISONE 20 MG/1
TABLET ORAL
Qty: 21 TABLET | Refills: 2 | Status: ON HOLD | OUTPATIENT
Start: 2021-08-19 | End: 2022-09-04 | Stop reason: HOSPADM

## 2021-08-19 RX ORDER — PREDNISONE 5 MG/1
5 TABLET ORAL DAILY
Qty: 90 TABLET | Refills: 2 | Status: ON HOLD | OUTPATIENT
Start: 2021-08-19 | End: 2022-09-04 | Stop reason: HOSPADM

## 2021-08-24 ENCOUNTER — OFFICE VISIT (OUTPATIENT)
Dept: PULMONOLOGY | Facility: CLINIC | Age: 61
End: 2021-08-24
Payer: MEDICARE

## 2021-08-24 VITALS
OXYGEN SATURATION: 98 % | WEIGHT: 184.19 LBS | SYSTOLIC BLOOD PRESSURE: 128 MMHG | HEIGHT: 62 IN | BODY MASS INDEX: 33.9 KG/M2 | DIASTOLIC BLOOD PRESSURE: 82 MMHG | HEART RATE: 65 BPM

## 2021-08-24 DIAGNOSIS — D84.9 IMMUNOLOGIC DEFICIENCY SYNDROME: ICD-10-CM

## 2021-08-24 DIAGNOSIS — J45.50 SEVERE PERSISTENT ASTHMA IN ADULT WITHOUT COMPLICATION: ICD-10-CM

## 2021-08-24 DIAGNOSIS — J45.51 SEVERE PERSISTENT ASTHMA WITH ACUTE EXACERBATION: ICD-10-CM

## 2021-08-24 PROCEDURE — 99213 PR OFFICE/OUTPT VISIT, EST, LEVL III, 20-29 MIN: ICD-10-PCS | Mod: S$PBB,,, | Performed by: INTERNAL MEDICINE

## 2021-08-24 PROCEDURE — 99999 PR PBB SHADOW E&M-EST. PATIENT-LVL V: CPT | Mod: PBBFAC,,, | Performed by: INTERNAL MEDICINE

## 2021-08-24 PROCEDURE — 99999 PR PBB SHADOW E&M-EST. PATIENT-LVL V: ICD-10-PCS | Mod: PBBFAC,,, | Performed by: INTERNAL MEDICINE

## 2021-08-24 PROCEDURE — 99215 OFFICE O/P EST HI 40 MIN: CPT | Mod: PBBFAC,PO | Performed by: INTERNAL MEDICINE

## 2021-08-24 PROCEDURE — 99213 OFFICE O/P EST LOW 20 MIN: CPT | Mod: S$PBB,,, | Performed by: INTERNAL MEDICINE

## 2021-08-24 RX ORDER — LEVOFLOXACIN 500 MG/1
500 TABLET, FILM COATED ORAL DAILY
Qty: 14 TABLET | Refills: 2 | Status: SHIPPED | OUTPATIENT
Start: 2021-08-24 | End: 2022-05-04 | Stop reason: SDUPTHER

## 2021-11-10 ENCOUNTER — PATIENT MESSAGE (OUTPATIENT)
Dept: PULMONOLOGY | Facility: CLINIC | Age: 61
End: 2021-11-10
Payer: MEDICARE

## 2021-11-23 DIAGNOSIS — M35.9 UNDIFFERENTIATED CONNECTIVE TISSUE DISEASE: ICD-10-CM

## 2021-11-24 RX ORDER — HYDROXYCHLOROQUINE SULFATE 200 MG/1
200 TABLET, FILM COATED ORAL 2 TIMES DAILY
Qty: 60 TABLET | Refills: 3 | Status: SHIPPED | OUTPATIENT
Start: 2021-11-24 | End: 2022-04-01

## 2021-11-29 ENCOUNTER — PATIENT MESSAGE (OUTPATIENT)
Dept: FAMILY MEDICINE | Facility: CLINIC | Age: 61
End: 2021-11-29
Payer: MEDICARE

## 2021-11-29 ENCOUNTER — PATIENT MESSAGE (OUTPATIENT)
Dept: OBSTETRICS AND GYNECOLOGY | Facility: CLINIC | Age: 61
End: 2021-11-29
Payer: MEDICARE

## 2021-11-29 DIAGNOSIS — E03.9 HYPOTHYROIDISM, UNSPECIFIED TYPE: ICD-10-CM

## 2021-11-29 RX ORDER — LEVOTHYROXINE SODIUM 75 UG/1
TABLET ORAL
Qty: 90 TABLET | Refills: 0 | Status: SHIPPED | OUTPATIENT
Start: 2021-11-29 | End: 2022-03-22 | Stop reason: SDUPTHER

## 2021-11-30 RX ORDER — FLUCONAZOLE 150 MG/1
150 TABLET ORAL ONCE
Qty: 1 TABLET | Refills: 0 | Status: SHIPPED | OUTPATIENT
Start: 2021-11-30 | End: 2021-11-30

## 2022-01-25 ENCOUNTER — PES CALL (OUTPATIENT)
Dept: ADMINISTRATIVE | Facility: CLINIC | Age: 62
End: 2022-01-25
Payer: MEDICARE

## 2022-02-04 DIAGNOSIS — Z12.31 VISIT FOR SCREENING MAMMOGRAM: Primary | ICD-10-CM

## 2022-02-09 ENCOUNTER — HOSPITAL ENCOUNTER (OUTPATIENT)
Dept: RADIOLOGY | Facility: HOSPITAL | Age: 62
Discharge: HOME | End: 2022-02-09
Attending: INTERNAL MEDICINE
Payer: MEDICARE

## 2022-02-09 ENCOUNTER — TELEPHONE (OUTPATIENT)
Dept: FAMILY MEDICINE | Facility: CLINIC | Age: 62
End: 2022-02-09
Payer: MEDICARE

## 2022-02-09 DIAGNOSIS — D84.9 IMMUNOLOGIC DEFICIENCY SYNDROME: ICD-10-CM

## 2022-02-09 DIAGNOSIS — J45.50 SEVERE PERSISTENT ASTHMA IN ADULT WITHOUT COMPLICATION: ICD-10-CM

## 2022-02-09 DIAGNOSIS — J45.51 SEVERE PERSISTENT ASTHMA WITH ACUTE EXACERBATION: ICD-10-CM

## 2022-02-09 PROCEDURE — 71046 X-RAY EXAM CHEST 2 VIEWS: CPT | Mod: 26,,, | Performed by: RADIOLOGY

## 2022-02-09 PROCEDURE — 71046 X-RAY EXAM CHEST 2 VIEWS: CPT | Mod: TC,PO

## 2022-02-09 PROCEDURE — 71046 XR CHEST PA AND LATERAL: ICD-10-PCS | Mod: 26,,, | Performed by: RADIOLOGY

## 2022-02-10 ENCOUNTER — HOSPITAL ENCOUNTER (OUTPATIENT)
Dept: RADIOLOGY | Facility: HOSPITAL | Age: 62
Discharge: HOME | End: 2022-02-10
Attending: OBSTETRICS & GYNECOLOGY
Payer: MEDICARE

## 2022-02-10 ENCOUNTER — PATIENT MESSAGE (OUTPATIENT)
Dept: PULMONOLOGY | Facility: CLINIC | Age: 62
End: 2022-02-10
Payer: MEDICARE

## 2022-02-10 ENCOUNTER — OFFICE VISIT (OUTPATIENT)
Dept: OBSTETRICS AND GYNECOLOGY | Facility: CLINIC | Age: 62
End: 2022-02-10
Payer: MEDICARE

## 2022-02-10 VITALS
BODY MASS INDEX: 35 KG/M2 | SYSTOLIC BLOOD PRESSURE: 118 MMHG | BODY MASS INDEX: 35.22 KG/M2 | HEIGHT: 62 IN | WEIGHT: 191.38 LBS | DIASTOLIC BLOOD PRESSURE: 76 MMHG | WEIGHT: 191.38 LBS

## 2022-02-10 DIAGNOSIS — Z79.890 POSTMENOPAUSAL HRT (HORMONE REPLACEMENT THERAPY): Primary | ICD-10-CM

## 2022-02-10 DIAGNOSIS — L73.2 HIDRADENITIS: ICD-10-CM

## 2022-02-10 DIAGNOSIS — Z12.31 VISIT FOR SCREENING MAMMOGRAM: ICD-10-CM

## 2022-02-10 PROCEDURE — 99214 OFFICE O/P EST MOD 30 MIN: CPT | Mod: PBBFAC,PN | Performed by: OBSTETRICS & GYNECOLOGY

## 2022-02-10 PROCEDURE — 77067 SCR MAMMO BI INCL CAD: CPT | Mod: 26,,, | Performed by: RADIOLOGY

## 2022-02-10 PROCEDURE — 99213 PR OFFICE/OUTPT VISIT, EST, LEVL III, 20-29 MIN: ICD-10-PCS | Mod: S$PBB,,, | Performed by: OBSTETRICS & GYNECOLOGY

## 2022-02-10 PROCEDURE — 99999 PR PBB SHADOW E&M-EST. PATIENT-LVL IV: CPT | Mod: PBBFAC,,, | Performed by: OBSTETRICS & GYNECOLOGY

## 2022-02-10 PROCEDURE — 99213 OFFICE O/P EST LOW 20 MIN: CPT | Mod: S$PBB,,, | Performed by: OBSTETRICS & GYNECOLOGY

## 2022-02-10 PROCEDURE — 77063 BREAST TOMOSYNTHESIS BI: CPT | Mod: TC,PN

## 2022-02-10 PROCEDURE — 77063 BREAST TOMOSYNTHESIS BI: CPT | Mod: 26,,, | Performed by: RADIOLOGY

## 2022-02-10 PROCEDURE — 99999 PR PBB SHADOW E&M-EST. PATIENT-LVL IV: ICD-10-PCS | Mod: PBBFAC,,, | Performed by: OBSTETRICS & GYNECOLOGY

## 2022-02-10 PROCEDURE — 77063 MAMMO DIGITAL SCREENING BILAT WITH TOMO: ICD-10-PCS | Mod: 26,,, | Performed by: RADIOLOGY

## 2022-02-10 PROCEDURE — 77067 MAMMO DIGITAL SCREENING BILAT WITH TOMO: ICD-10-PCS | Mod: 26,,, | Performed by: RADIOLOGY

## 2022-02-10 RX ORDER — ESTRADIOL 1 MG/1
TABLET ORAL
Qty: 90 TABLET | Refills: 3 | Status: SHIPPED | OUTPATIENT
Start: 2022-02-10 | End: 2023-01-24

## 2022-02-10 NOTE — PROGRESS NOTES
Chief Complaint   Patient presents with    hrt       History of Present Illness: Yodit Canseco is a 61 y.o. female that presents today 2/10/2022 for   Chief Complaint   Patient presents with    hrt     She would like to stay on her HRT daily oral med      Past Medical History:   Diagnosis Date    Angio-edema     Arthralgia     Asthma without status asthmaticus     Bronchitis     Diverticulosis of large intestine without hemorrhage 10/8/2015    Environmental allergies     Eosinophilic asthma 3/8/2018    Gastroparesis     Hand arthritis     Herpes simplex without mention of complication     oral    Hidradenitis     Hyperlipidemia     Hypothyroidism     Immune deficiency disorder     Intraperitoneal abscess 5/7/2018    LBP radiating to left leg     Leukocytosis, unspecified     Migraine headache     Obesity, Class III, BMI 40-49.9 (morbid obesity)     Periorbital cellulitis 12/31/2016    Prediabetes     Recurrent upper respiratory infection (URI)     S/P colonoscopy November 2010    Sepsis 5/7/2018    Urticaria        Past Surgical History:   Procedure Laterality Date    ADENOIDECTOMY      CHOLECYSTECTOMY      CHOLECYSTECTOMY      COLONOSCOPY  2015    repeat in 10    COLONOSCOPY N/A 10/8/2015    Procedure: COLONOSCOPY;  Surgeon: Panda Bose MD;  Location[facility];  Service: Endoscopy;  Laterality: N/A;    Hand fracture surgery      HARDWARE REMOVAL      left hand 5th MC    hymenectomy      HYSTERECTOMY      menorrhagia-Ovaries intact    ROTATOR CUFF REPAIR Right     SLEEVE GASTROPLASTY  04/16/2018    TONSILLECTOMY      Urethral dilatation         Current Outpatient Medications   Medication Sig Dispense Refill    (Magic mouthwash) 1:1:1 Benadryl 12.5mg/5ml liq, aluminum & magnesium hydroxide-simehticone (Maalox), LIDOcaine viscous 2% Swish and spit 5 mLs every 8 (eight) hours as needed. Gargle and spit. DO NOT SWALLOW 360 mL 0    acetaminophen (TYLENOL) 325 MG tablet  Take 325 mg by mouth every 6 (six) hours as needed for Pain.      albuterol (PROAIR HFA) 90 mcg/actuation inhaler Inhale 2 puffs into the lungs every 6 (six) hours as needed for Wheezing. Rescue 18 g 0    BIOTIN ORAL Take by mouth once daily.      budesonide (PULMICORT) 0.25 mg/2 mL nebulizer solution Take 2 mLs (0.25 mg total) by nebulization once daily. Controller 60 vial 11    cholecalciferol, vitamin D3, (VITAMIN D3) 50 mcg (2,000 unit) Cap Take 1 capsule by mouth once daily.      clobetasoL (TEMOVATE) 0.05 % cream Apply topically 2 (two) times daily. 60 g 1    clobetasoL (TEMOVATE) 0.05 % cream Apply topically 2 (two) times daily. 60 g 1    clobetasoL (TEMOVATE) 0.05 % external solution Apply topically 2 (two) times daily. 50 mL 2    desonide (DESOWEN) 0.05 % cream Apply topically 2 (two) times daily. 1 Tube 3    desonide (DESOWEN) 0.05 % cream Apply topically 2 (two) times daily. 60 g 1    fluticasone furoate-vilanteroL (BREO ELLIPTA) 200-25 mcg/dose DsDv diskus inhaler Inhale 1 puff into the lungs once daily. Controller 1 each 11    hydrOXYchloroQUINE (PLAQUENIL) 200 mg tablet Take 1 tablet (200 mg total) by mouth 2 (two) times daily. 60 tablet 3    immun glob G,IgG,-pro-IgA 0-50 (HIZENTRA) 2 gram/10 mL (20 %) Soln Inject 18 g into the skin every 14 (fourteen) days. 90 mL 12    levoFLOXacin (LEVAQUIN) 500 MG tablet Take 1 tablet (500 mg total) by mouth once daily. 14 tablet 2    levothyroxine (SYNTHROID) 75 MCG tablet TAKE 1 TABLET(75 MCG) BY MOUTH EVERY DAY 90 tablet 0    montelukast (SINGULAIR) 10 mg tablet Take 1 tablet (10 mg total) by mouth every evening. 90 tablet 3    mucus clearing device (ACAPELLA, FLUTTER) by Misc.(Non-Drug; Combo Route) route 2 (two) times daily. 1 Device 0    MULTIVITAMIN ORAL Take by mouth once daily.      nystatin (MYCOSTATIN) cream Apply topically 2 (two) times daily. 30 g 1    ondansetron (ZOFRAN-ODT) 4 MG TbDL Take 1 tablet (4 mg total) by mouth every 8  "(eight) hours as needed (nausea/vomiting). 20 tablet 0    predniSONE (DELTASONE) 20 MG tablet One daily for 3 days and repeat for flare of lung symptoms as intructed 21 tablet 2    predniSONE (DELTASONE) 5 MG tablet Take 1 tablet (5 mg total) by mouth once daily. 90 tablet 2    sumatriptan (IMITREX) 100 MG tablet       triamcinolone acetonide 0.1% (KENALOG) 0.1 % paste Apply to mouth sore 3 times a day as needed. 10 g 1    valACYclovir (VALTREX) 1000 MG tablet Take 2 tablets (2,000 mg total) by mouth every 12 (twelve) hours. For one day for fever blisters.  Do not take for more than 1 day per episode 20 tablet 0    estradioL (ESTRACE) 1 MG tablet TAKE 1 TABLET(1 MG) BY MOUTH EVERY DAY 90 tablet 3    levalbuterol (XOPENEX) 1.25 mg/3 mL nebulizer solution Take 3 mLs (1.25 mg total) by nebulization every 4 (four) hours as needed for Wheezing or Shortness of Breath. Rescue 1 [address]     Current Facility-Administered Medications   Medication Dose Route Frequency Provider Last Rate Last Admin    acetaminophen tablet 650 mg  650 mg Oral Once PRN Mando Bourgeois MD        albuterol inhaler 2 puff  2 puff Inhalation Q20 Min PRN Mando Bourgeois MD        diphenhydrAMINE injection 25 mg  25 mg Intravenous Once PRN Mando Bourgeois MD        EPINEPHrine (EPIPEN) 0.3 mg/0.3 mL pen injection 0.3 mg  0.3 mg Intramuscular PRN Mando Bourgeois MD        methylPREDNISolone sodium succinate injection 40 mg  40 mg Intravenous Once PRN Mando Bourgeois MD        ondansetron disintegrating tablet 4 mg  4 mg Oral Once PRN Mando Bourgeois MD        sodium chloride 0.9% 500 mL flush bag   Intravenous PRN Mando Bourgeois MD        sodium chloride 0.9% flush 10 mL  10 mL Intravenous PRN Mando Bourgeois MD           Review of patient's allergies indicates:   Allergen Reactions    Bromelains Hives     " causes mouth to bleed"    Pimecrolimus Swelling    Sudafed [pseudoephedrine hcl] Other (See Comments)     Does " not want to wake up .    Amoxicillin-pot clavulanate Itching     Other reaction(s): Itching    Hydrocodone Itching    Iodinated contrast media      childhood    Iodine and iodide containing products Other (See Comments)    Melon Hives    Pantoprazole Nausea Only     Other reaction(s): upset stomach/halitosis    Percocet [oxycodone-acetaminophen] Itching    Corn containing products     Wheat containing prod     Barium iodide Rash    Ephedrine Other (See Comments)     Other reaction(s): comatose    Penicillins      Other reaction(s): does not work on pt symptoms       Family History   Problem Relation Age of Onset    Melanoma Mother     Basal cell carcinoma Mother     Lung disease Mother         pulm htn    Cataracts Mother     Hypertension Mother     Lung cancer Father     Diabetes Father     Hypertension Father     Cancer Father     Diabetes Paternal Aunt     Colon cancer Paternal Aunt 40    Diabetes Paternal Aunt     Ovarian cancer Paternal Grandmother 40    Cancer Maternal Grandfather     Melanoma Maternal Aunt     Melanoma Maternal Uncle     Breast cancer Paternal Aunt 40    Lymphoma Paternal Aunt     Ulcerative colitis Son     Psoriasis Son     Psoriasis Son     Breast cancer Cousin 25    Allergic rhinitis Neg Hx     Allergies Neg Hx     Angioedema Neg Hx     Asthma Neg Hx     Atopy Neg Hx     Eczema Neg Hx     Immunodeficiency Neg Hx     Rhinitis Neg Hx     Urticaria Neg Hx        Social History     Tobacco Use    Smoking status: Never Smoker    Smokeless tobacco: Never Used   Substance Use Topics    Alcohol use: Yes     Alcohol/week: 0.0 standard drinks     Comment: very rarely    Drug use: No       OB History    Para Term  AB Living   5 2 2   3     SAB IAB Ectopic Multiple Live Births   3              # Outcome Date GA Lbr Pradip/2nd Weight Sex Delivery Anes PTL Lv   5 SAB            4 SAB            3 SAB            2 Term            1 Term                   /76   Wt 86.8 kg (191 lb 5.8 oz)   Physical Exam:  APPEARANCE: Well nourished, well developed, in no acute distress.  SKIN: Normal skin turgor, no lesions.  NECK: Neck symmetric without masses   RESPIRATORY: Normal respiratory effort with no retractions or use of accessory muscles  CARDIOVASCULAR: Peripheral vascular system with no swelling no varicosities and palpation of pulses normal  LYMPHATIC: No enlargements of the lymph nodes noted in the neck, axillae, or groin  ABDOMEN: Soft. No tenderness or masses. No hepatosplenomegaly. No hernias.  BREASTS: Symmetrical, no skin changes or visible lesions. No palpable masses, nipple discharge or adenopathy bilaterally.EXTREMITIES: No clubbing cyanosis or edema.    ASSESSMENT/PLAN:  Postmenopausal HRT (hormone replacement therapy)  -     estradioL (ESTRACE) 1 MG tablet; TAKE 1 TABLET(1 MG) BY MOUTH EVERY DAY  Dispense: 90 tablet; Refill: 3    Visit for screening mammogram    Hidradenitis      MMG today    Pelvic refused today, will repeat next year.      Risks of HRT discussed again. She desires to continue .       20 minutes spent today preparing reviewing previous external notes, reviewing previous results, performing medical examination, orders tests or medications, counseling and documenting.

## 2022-02-14 ENCOUNTER — TELEPHONE (OUTPATIENT)
Dept: RHEUMATOLOGY | Facility: CLINIC | Age: 62
End: 2022-02-14
Payer: MEDICARE

## 2022-02-14 ENCOUNTER — PATIENT OUTREACH (OUTPATIENT)
Dept: ADMINISTRATIVE | Facility: OTHER | Age: 62
End: 2022-02-14
Payer: MEDICARE

## 2022-02-15 ENCOUNTER — OFFICE VISIT (OUTPATIENT)
Dept: RHEUMATOLOGY | Facility: CLINIC | Age: 62
End: 2022-02-15
Payer: MEDICARE

## 2022-02-15 VITALS
WEIGHT: 190.06 LBS | SYSTOLIC BLOOD PRESSURE: 105 MMHG | HEART RATE: 80 BPM | DIASTOLIC BLOOD PRESSURE: 71 MMHG | HEIGHT: 62 IN | BODY MASS INDEX: 34.98 KG/M2

## 2022-02-15 DIAGNOSIS — K12.1 MOUTH ULCERS: ICD-10-CM

## 2022-02-15 DIAGNOSIS — M35.9 UNDIFFERENTIATED CONNECTIVE TISSUE DISEASE: Primary | ICD-10-CM

## 2022-02-15 DIAGNOSIS — K12.0 RECURRENT APHTHOUS STOMATITIS: ICD-10-CM

## 2022-02-15 DIAGNOSIS — D84.9 IMMUNOLOGIC DEFICIENCY SYNDROME: ICD-10-CM

## 2022-02-15 PROCEDURE — 99215 OFFICE O/P EST HI 40 MIN: CPT | Mod: PBBFAC | Performed by: INTERNAL MEDICINE

## 2022-02-15 PROCEDURE — 99999 PR PBB SHADOW E&M-EST. PATIENT-LVL V: ICD-10-PCS | Mod: PBBFAC,,, | Performed by: INTERNAL MEDICINE

## 2022-02-15 PROCEDURE — 99214 PR OFFICE/OUTPT VISIT, EST, LEVL IV, 30-39 MIN: ICD-10-PCS | Mod: S$PBB,,, | Performed by: INTERNAL MEDICINE

## 2022-02-15 PROCEDURE — 99214 OFFICE O/P EST MOD 30 MIN: CPT | Mod: S$PBB,,, | Performed by: INTERNAL MEDICINE

## 2022-02-15 PROCEDURE — 99999 PR PBB SHADOW E&M-EST. PATIENT-LVL V: CPT | Mod: PBBFAC,,, | Performed by: INTERNAL MEDICINE

## 2022-02-15 RX ORDER — COLCHICINE 0.6 MG/1
0.6 TABLET ORAL DAILY
Qty: 30 TABLET | Refills: 11 | Status: SHIPPED | OUTPATIENT
Start: 2022-02-15 | End: 2022-03-16 | Stop reason: SDUPTHER

## 2022-02-15 NOTE — PROGRESS NOTES
RHEUMATOLOGY CLINIC FOLLOW UP VISIT  Chief complaints, HPI, ROS, EXAM, Assessment & Plans:-  Yodit Parks a 61 y.o. pleasant female comes in for follow up visit.   She follows up in Rheumatology Clinic for undifferentiated connective tissue disease with positive FABY, arthralgias, myalgias and recurrent mouth ulcers.  She also has longstanding history of immunologic deficiency syndrome for which she takes Hizentra at home.  She is on hydroxychloroquine therapy and daily 5 mg prednisone.  Any time she quits the prednisone she developed severe mouth ulcers. SHE STILL HAVE ULCERS and intermittent joint pains .  She also takes high-dose steroid therapy for severe asthma excessive patient given by her pulmonologist.  No history of interstitial lung disease.  Rheumatological review of system negative otherwise.  No synovitis on examination today. Tenderness of left lateral epicondyle. No effusion.  1. Undifferentiated connective tissue disease    2. Mouth ulcers    3. Immunologic deficiency syndrome    4. Recurrent aphthous stomatitis      Problem List Items Addressed This Visit     Immunologic deficiency syndrome    Undifferentiated connective tissue disease - Primary    Mouth ulcers    Relevant Medications    (Magic mouthwash) 1:1:1 diphenhydramine(Benadryl) 12.5mg/5ml liq, aluminum & magnesium hydroxide-simethicone (Maalox), LIDOcaine viscous 2%    Recurrent aphthous stomatitis    Relevant Medications    colchicine (COLCRYS) 0.6 mg tablet           Undifferentiated connective tissue disease with positive FABY, negative MARYLIN without any active synovitis well controlled on Plaquenil.  Continue the same.   Recurrent mouth ulcers well controlled on 5 mg daily prednisone. Try colchicine . If no improvement consider otezla for Behcet's.    P.r.n. prednisone high-dose for asthma exacerbation.  # Follow up in about 6 months (around 8/15/2022).    Medication List  with Changes/Refills   New Medications    COLCHICINE (COLCRYS) 0.6 MG TABLET    Take 1 tablet (0.6 mg total) by mouth once daily.   Current Medications    ACETAMINOPHEN (TYLENOL) 325 MG TABLET    Take 325 mg by mouth every 6 (six) hours as needed for Pain.    ALBUTEROL (PROAIR HFA) 90 MCG/ACTUATION INHALER    Inhale 2 puffs into the lungs every 6 (six) hours as needed for Wheezing. Rescue    BIOTIN ORAL    Take by mouth once daily.    BUDESONIDE (PULMICORT) 0.25 MG/2 ML NEBULIZER SOLUTION    Take 2 mLs (0.25 mg total) by nebulization once daily. Controller    CHOLECALCIFEROL, VITAMIN D3, (VITAMIN D3) 50 MCG (2,000 UNIT) CAP    Take 1 capsule by mouth once daily.    CLOBETASOL (TEMOVATE) 0.05 % CREAM    Apply topically 2 (two) times daily.    CLOBETASOL (TEMOVATE) 0.05 % CREAM    Apply topically 2 (two) times daily.    CLOBETASOL (TEMOVATE) 0.05 % EXTERNAL SOLUTION    Apply topically 2 (two) times daily.    DESONIDE (DESOWEN) 0.05 % CREAM    Apply topically 2 (two) times daily.    DESONIDE (DESOWEN) 0.05 % CREAM    Apply topically 2 (two) times daily.    ESTRADIOL (ESTRACE) 1 MG TABLET    TAKE 1 TABLET(1 MG) BY MOUTH EVERY DAY    FLUTICASONE FUROATE-VILANTEROL (BREO ELLIPTA) 200-25 MCG/DOSE DSDV DISKUS INHALER    Inhale 1 puff into the lungs once daily. Controller    HYDROXYCHLOROQUINE (PLAQUENIL) 200 MG TABLET    Take 1 tablet (200 mg total) by mouth 2 (two) times daily.    IMMUN GLOB G,IGG,-PRO-IGA 0-50 (HIZENTRA) 2 GRAM/10 ML (20 %) SOLN    Inject 18 g into the skin every 14 (fourteen) days.    LEVALBUTEROL (XOPENEX) 1.25 MG/3 ML NEBULIZER SOLUTION    Take 3 mLs (1.25 mg total) by nebulization every 4 (four) hours as needed for Wheezing or Shortness of Breath. Rescue    LEVOFLOXACIN (LEVAQUIN) 500 MG TABLET    Take 1 tablet (500 mg total) by mouth once daily.    LEVOTHYROXINE (SYNTHROID) 75 MCG TABLET    TAKE 1 TABLET(75 MCG) BY MOUTH EVERY DAY    MONTELUKAST (SINGULAIR) 10 MG TABLET    Take 1 tablet (10 mg total)  by mouth every evening.    MUCUS CLEARING DEVICE (ACAPELLA, FLUTTER)    by Misc.(Non-Drug; Combo Route) route 2 (two) times daily.    MULTIVITAMIN ORAL    Take by mouth once daily.    NYSTATIN (MYCOSTATIN) CREAM    Apply topically 2 (two) times daily.    ONDANSETRON (ZOFRAN-ODT) 4 MG TBDL    Take 1 tablet (4 mg total) by mouth every 8 (eight) hours as needed (nausea/vomiting).    PREDNISONE (DELTASONE) 20 MG TABLET    One daily for 3 days and repeat for flare of lung symptoms as intructed    PREDNISONE (DELTASONE) 5 MG TABLET    Take 1 tablet (5 mg total) by mouth once daily.    SUMATRIPTAN (IMITREX) 100 MG TABLET        TRIAMCINOLONE ACETONIDE 0.1% (KENALOG) 0.1 % PASTE    Apply to mouth sore 3 times a day as needed.    VALACYCLOVIR (VALTREX) 1000 MG TABLET    Take 2 tablets (2,000 mg total) by mouth every 12 (twelve) hours. For one day for fever blisters.  Do not take for more than 1 day per episode   Changed and/or Refilled Medications    Modified Medication Previous Medication    (MAGIC MOUTHWASH) 1:1:1 DIPHENHYDRAMINE(BENADRYL) 12.5MG/5ML LIQ, ALUMINUM & MAGNESIUM HYDROXIDE-SIMETHICONE (MAALOX), LIDOCAINE VISCOUS 2% (Magic mouthwash) 1:1:1 Benadryl 12.5mg/5ml liq, aluminum & magnesium hydroxide-simehticone (Maalox), LIDOcaine viscous 2%       Swish and spit 5 mLs every 8 (eight) hours as needed (oral ulcers). Gargle and spit. DO NOT SWALLOW    Swish and spit 5 mLs every 8 (eight) hours as needed. Gargle and spit. DO NOT SWALLOW       Past Medical History:   Diagnosis Date    Angio-edema     Arthralgia     Asthma without status asthmaticus     Bronchitis     COPD (chronic obstructive pulmonary disease)     Diverticulosis of large intestine without hemorrhage 10/8/2015    Environmental allergies     Eosinophilic asthma 3/8/2018    Gastroparesis     Hand arthritis     Herpes simplex without mention of complication     oral    Hidradenitis     Hyperlipidemia     Hypothyroidism     Immune deficiency  disorder     Intraperitoneal abscess 5/7/2018    LBP radiating to left leg     Leukocytosis, unspecified     Migraine headache     Obesity, Class III, BMI 40-49.9 (morbid obesity)     Periorbital cellulitis 12/31/2016    Prediabetes     Recurrent upper respiratory infection (URI)     S/P colonoscopy November 2010    Sepsis 5/7/2018    Urticaria        Past Surgical History:   Procedure Laterality Date    ADENOIDECTOMY      CHOLECYSTECTOMY      CHOLECYSTECTOMY      COLONOSCOPY  2015    repeat in 10    COLONOSCOPY N/A 10/8/2015    Procedure: COLONOSCOPY;  Surgeon: Panda Bose MD;  Location: [facility];  Service: Endoscopy;  Laterality: N/A;    Hand fracture surgery      HARDWARE REMOVAL      left hand 5th MC    hymenectomy      HYSTERECTOMY      menorrhagia-Ovaries intact    ROTATOR CUFF REPAIR Right     SLEEVE GASTROPLASTY  04/16/2018    TONSILLECTOMY      Urethral dilatation          Social History     Tobacco Use    Smoking status: Never Smoker    Smokeless tobacco: Never Used   Substance Use Topics    Alcohol use: Yes     Alcohol/week: 0.0 standard drinks     Comment: very rarely    Drug use: No       Family History   Problem Relation Age of Onset    Melanoma Mother     Basal cell carcinoma Mother     Lung disease Mother         pulm htn    Cataracts Mother     Hypertension Mother     Lung cancer Father     Diabetes Father     Hypertension Father     Cancer Father     Diabetes Paternal Aunt     Colon cancer Paternal Aunt 40    Diabetes Paternal Aunt     Ovarian cancer Paternal Grandmother 40    Cancer Maternal Grandfather     Melanoma Maternal Aunt     Melanoma Maternal Uncle     Breast cancer Paternal Aunt 40    Lymphoma Paternal Aunt     Ulcerative colitis Son     Psoriasis Son     Psoriasis Son     Breast cancer Cousin 25    Allergic rhinitis Neg Hx     Allergies Neg Hx     Angioedema Neg Hx     Asthma Neg Hx     Atopy Neg Hx     Eczema Neg Hx      "Immunodeficiency Neg Hx     Rhinitis Neg Hx     Urticaria Neg Hx        Review of patient's allergies indicates:   Allergen Reactions    Bromelains Hives     " causes mouth to bleed"    Pimecrolimus Swelling    Sudafed [pseudoephedrine hcl] Other (See Comments)     Does not want to wake up .    Amoxicillin-pot clavulanate Itching     Other reaction(s): Itching    Hydrocodone Itching    Iodinated contrast media      childhood    Iodine and iodide containing products Other (See Comments)    Melon Hives    Pantoprazole Nausea Only     Other reaction(s): upset stomach/halitosis    Percocet [oxycodone-acetaminophen] Itching    Corn containing products     Wheat containing prod     Barium iodide Rash    Ephedrine Other (See Comments)     Other reaction(s): comatose    Penicillins      Other reaction(s): does not work on pt symptoms       Disclaimer: This note was prepared using voice recognition system and is likely to have sound alike errors and is not proof read.  Please call me with any questions.  "

## 2022-02-18 ENCOUNTER — PATIENT MESSAGE (OUTPATIENT)
Dept: PULMONOLOGY | Facility: CLINIC | Age: 62
End: 2022-02-18
Payer: MEDICARE

## 2022-03-09 ENCOUNTER — PATIENT MESSAGE (OUTPATIENT)
Dept: RHEUMATOLOGY | Facility: CLINIC | Age: 62
End: 2022-03-09
Payer: MEDICARE

## 2022-03-09 DIAGNOSIS — K13.79 RECURRENT ORAL ULCERS: Primary | ICD-10-CM

## 2022-03-09 NOTE — TELEPHONE ENCOUNTER
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  Thanks.

## 2022-03-11 ENCOUNTER — TELEPHONE (OUTPATIENT)
Dept: ADMINISTRATIVE | Facility: CLINIC | Age: 62
End: 2022-03-11
Payer: MEDICARE

## 2022-03-14 ENCOUNTER — OFFICE VISIT (OUTPATIENT)
Dept: FAMILY MEDICINE | Facility: CLINIC | Age: 62
End: 2022-03-14
Payer: MEDICARE

## 2022-03-14 VITALS
BODY MASS INDEX: 34.76 KG/M2 | RESPIRATION RATE: 18 BRPM | HEART RATE: 68 BPM | DIASTOLIC BLOOD PRESSURE: 67 MMHG | OXYGEN SATURATION: 98 % | TEMPERATURE: 98 F | SYSTOLIC BLOOD PRESSURE: 107 MMHG | HEIGHT: 62 IN

## 2022-03-14 DIAGNOSIS — J44.9 CHRONIC OBSTRUCTIVE PULMONARY DISEASE, UNSPECIFIED COPD TYPE: ICD-10-CM

## 2022-03-14 DIAGNOSIS — J45.902 REACTIVE AIRWAY DISEASE WITH STATUS ASTHMATICUS, UNSPECIFIED ASTHMA SEVERITY, UNSPECIFIED WHETHER PERSISTENT: ICD-10-CM

## 2022-03-14 DIAGNOSIS — Z00.00 ANNUAL PHYSICAL EXAM: Primary | ICD-10-CM

## 2022-03-14 DIAGNOSIS — R73.03 PREDIABETES: ICD-10-CM

## 2022-03-14 DIAGNOSIS — K12.0 RECURRENT APHTHOUS STOMATITIS: ICD-10-CM

## 2022-03-14 DIAGNOSIS — Z98.84 S/P BARIATRIC SURGERY: ICD-10-CM

## 2022-03-14 DIAGNOSIS — J82.83 EOSINOPHILIC ASTHMA: ICD-10-CM

## 2022-03-14 DIAGNOSIS — E03.9 HYPOTHYROIDISM, UNSPECIFIED TYPE: ICD-10-CM

## 2022-03-14 DIAGNOSIS — K31.84 GASTROPARESIS: ICD-10-CM

## 2022-03-14 DIAGNOSIS — M35.9 UNDIFFERENTIATED CONNECTIVE TISSUE DISEASE: ICD-10-CM

## 2022-03-14 DIAGNOSIS — J45.50 SEVERE PERSISTENT ASTHMA WITHOUT COMPLICATION: ICD-10-CM

## 2022-03-14 DIAGNOSIS — D84.9 IMMUNOLOGIC DEFICIENCY SYNDROME: ICD-10-CM

## 2022-03-14 DIAGNOSIS — G43.909 MIGRAINE WITHOUT STATUS MIGRAINOSUS, NOT INTRACTABLE, UNSPECIFIED MIGRAINE TYPE: ICD-10-CM

## 2022-03-14 DIAGNOSIS — K57.30 DIVERTICULOSIS OF LARGE INTESTINE WITHOUT HEMORRHAGE: Chronic | ICD-10-CM

## 2022-03-14 DIAGNOSIS — E78.5 HYPERLIPIDEMIA, UNSPECIFIED HYPERLIPIDEMIA TYPE: ICD-10-CM

## 2022-03-14 PROCEDURE — 99999 PR PBB SHADOW E&M-EST. PATIENT-LVL V: ICD-10-PCS | Mod: PBBFAC,,, | Performed by: NURSE PRACTITIONER

## 2022-03-14 PROCEDURE — G0439 PPPS, SUBSEQ VISIT: HCPCS | Mod: ,,, | Performed by: NURSE PRACTITIONER

## 2022-03-14 PROCEDURE — 99215 OFFICE O/P EST HI 40 MIN: CPT | Mod: PBBFAC,PO | Performed by: NURSE PRACTITIONER

## 2022-03-14 PROCEDURE — G0439 PR MEDICARE ANNUAL WELLNESS SUBSEQUENT VISIT: ICD-10-PCS | Mod: ,,, | Performed by: NURSE PRACTITIONER

## 2022-03-14 PROCEDURE — 99999 PR PBB SHADOW E&M-EST. PATIENT-LVL V: CPT | Mod: PBBFAC,,, | Performed by: NURSE PRACTITIONER

## 2022-03-14 RX ORDER — FLUCONAZOLE 150 MG/1
150 TABLET ORAL ONCE
COMMUNITY
Start: 2021-11-30 | End: 2022-05-04

## 2022-03-14 NOTE — PROGRESS NOTES
"  Yodit Canseco presented for a  Medicare AWV and comprehensive Health Risk Assessment today. The following components were reviewed and updated:    · Medical history  · Family History  · Social history  · Allergies and Current Medications  · Health Risk Assessment  · Health Maintenance  · Care Team         ** See Completed Assessments for Annual Wellness Visit within the encounter summary.**         The following assessments were completed:  · Living Situation  · CAGE  · Depression Screening  · Timed Get Up and Go  · Whisper Test  · Cognitive Function Screening  · Nutrition Screening  · ADL Screening  · PAQ Screening        Vitals:    03/14/22 1300   BP: 107/67   Pulse: 68   Resp: 18   Temp: 98.1 °F (36.7 °C)   SpO2: 98%   Height: 5' 2" (1.575 m)     Body mass index is 34.76 kg/m².  Physical Exam  Vitals and nursing note reviewed.   Constitutional:       General: She is not in acute distress.     Appearance: She is well-developed.   HENT:      Head: Normocephalic and atraumatic.   Eyes:      Pupils: Pupils are equal, round, and reactive to light.   Cardiovascular:      Rate and Rhythm: Normal rate and regular rhythm.   Pulmonary:      Effort: Pulmonary effort is normal.      Breath sounds: Normal breath sounds.   Musculoskeletal:         General: Normal range of motion.      Cervical back: Normal range of motion and neck supple.   Skin:     General: Skin is warm and dry.      Findings: No rash.   Neurological:      Mental Status: She is alert and oriented to person, place, and time.   Psychiatric:         Judgment: Judgment normal.               Diagnoses and health risks identified today and associated recommendations/orders:    1. Annual physical exam  Stable and controlled   Continue medication as prescribed  Continue current treatment plan as previously prescribed with your PCP    2. Chronic obstructive pulmonary disease, unspecified COPD type  Stable and controlled on albuterol, pulmicort, xopenex, " breo  Continue medication as prescribed  Continue current treatment plan as previously prescribed with your PCP    3. Severe persistent asthma without complication  Stable and controlled on albuterol, pulmicort, xopenex, singulair  Continue medication as prescribed  Continue current treatment plan as previously prescribed with your PCP    4. Eosinophilic asthma  Stable and controlled on albuterol, pulmicort, xopenex, singulair  Continue medication as prescribed  Continue current treatment plan as previously prescribed with your PCP    5. Recurrent aphthous stomatitis  Stable and controlled on magic mouthwash  Continue medication as prescribed  Continue current treatment plan as previously prescribed with your PCP    6. Migraine without status migrainosus, not intractable, unspecified migraine type  Stable and controlled on imitrex  Continue medication as prescribed  Continue current treatment plan as previously prescribed with your PCP    7. Hyperlipidemia, unspecified hyperlipidemia type  Stable and controlled on diet  Continue medication as prescribed  Continue current treatment plan as previously prescribed with your PCP    8. Undifferentiated connective tissue disease  Stable and controlled on plaquenil  Continue medication as prescribed  Continue current treatment plan as previously prescribed with your PCP    9. Immunologic deficiency syndrome  Stable and controlled on plaquenil  Continue medication as prescribed  Continue current treatment plan as previously prescribed with your PCP    10. Hypothyroidism, unspecified type  Stable and controlled on levothyroxine  Continue medication as prescribed  Continue current treatment plan as previously prescribed with your PCP    11. Prediabetes  Stable and controlled on diet  Continue medication as prescribed  Continue current treatment plan as previously prescribed with your PCP    12. S/P bariatric surgery  Stable and controlled   Continue medication as  prescribed  Continue current treatment plan as previously prescribed with your PCP    13. Diverticulosis of large intestine without hemorrhage  Stable and controlled on diet  Continue medication as prescribed  Continue current treatment plan as previously prescribed with your PCP    14. Gastroparesis  Stable and controlled   Continue medication as prescribed  Continue current treatment plan as previously prescribed with your PCP    15. Reactive airway disease with status asthmaticus, unspecified asthma severity, unspecified whether persistent  Stable and controlled   Continue medication as prescribed  Continue current treatment plan as previously prescribed with your PCP        I offered to discuss end of life issues, including information on how to make advance directives that the patient could use to name someone who would make medical decisions on their behalf if they became too ill to make themselves.    ___Patient declined - already done.    _x__Patient is interested, I provided paperwork and offered to discuss.      Provided Yodit with a 5-10 year written screening schedule and personal prevention plan. Recommendations were developed using the USPSTF age appropriate recommendations. Education, counseling, and referrals were provided as needed. After Visit Summary printed and given to patient which includes a list of additional screenings\tests needed.    No follow-ups on file.    Jori Jarrett NP

## 2022-03-16 ENCOUNTER — OFFICE VISIT (OUTPATIENT)
Dept: OTOLARYNGOLOGY | Facility: CLINIC | Age: 62
End: 2022-03-16
Payer: MEDICARE

## 2022-03-16 VITALS
DIASTOLIC BLOOD PRESSURE: 70 MMHG | TEMPERATURE: 99 F | BODY MASS INDEX: 34.68 KG/M2 | WEIGHT: 189.63 LBS | HEART RATE: 62 BPM | SYSTOLIC BLOOD PRESSURE: 136 MMHG

## 2022-03-16 DIAGNOSIS — K13.79 RECURRENT ORAL ULCERS: ICD-10-CM

## 2022-03-16 DIAGNOSIS — K12.0 RECURRENT APHTHOUS STOMATITIS: ICD-10-CM

## 2022-03-16 PROCEDURE — 99999 PR PBB SHADOW E&M-EST. PATIENT-LVL V: CPT | Mod: PBBFAC,,, | Performed by: OTOLARYNGOLOGY

## 2022-03-16 PROCEDURE — 99213 PR OFFICE/OUTPT VISIT, EST, LEVL III, 20-29 MIN: ICD-10-PCS | Mod: S$PBB,,, | Performed by: OTOLARYNGOLOGY

## 2022-03-16 PROCEDURE — 99215 OFFICE O/P EST HI 40 MIN: CPT | Mod: PBBFAC | Performed by: OTOLARYNGOLOGY

## 2022-03-16 PROCEDURE — 99999 PR PBB SHADOW E&M-EST. PATIENT-LVL V: ICD-10-PCS | Mod: PBBFAC,,, | Performed by: OTOLARYNGOLOGY

## 2022-03-16 PROCEDURE — 99213 OFFICE O/P EST LOW 20 MIN: CPT | Mod: S$PBB,,, | Performed by: OTOLARYNGOLOGY

## 2022-03-16 RX ORDER — COLCHICINE 0.6 MG/1
0.6 TABLET ORAL 2 TIMES DAILY
Qty: 60 TABLET | Refills: 3 | Status: SHIPPED | OUTPATIENT
Start: 2022-03-16 | End: 2022-07-18

## 2022-03-16 NOTE — PROGRESS NOTES
Referring Provider:    Jeff Denis Md  [id number] [facility]  [place]  [place] [address]  Subjective:   Patient: Yodit Canseco [id number], :1960   Visit date:3/16/2022 1:49 PM    Chief Complaint: see below  HPI:       Consult    Prior notes reviewed  Clinical documentation obtained by nursing staff reviewed.     Yodit Parks a 61 y.o. pleasant female comes in for initial consultation.   She follows up in Rheumatology Clinic for undifferentiated connective tissue disease with positive FABY, arthralgias, myalgias and recurrent mouth ulcers.  She also has longstanding history of immunologic deficiency syndrome for which she takes Hizentra at home.  She is on hydroxychloroquine therapy and daily 5 mg prednisone.  Any time she quits the prednisone she developed severe mouth ulcers. SHE STILL HAVE ULCERS and intermittent joint pains .  She also takes high-dose steroid therapy for severe asthma given by her pulmonologist.  No history of interstitial lung disease. She reports intermittent, severe dermatitis and uses clobetasol under the direction of her dermatologist.  She was started on colchicine fairly recently and reports that a few weeks ago, Dr. CONTEH advised her to increase her dose, so she is near the end of her rx.  She currently has no oral ulcerations and feels that she generally is experiencing less inflammation than normal.    Objective:     Physical Exam:  Vitals:  /70   Pulse 62   Temp 98.6 °F (37 °C) (Oral)   Wt 86 kg (189 lb 9.5 oz)   BMI 34.68 kg/m²   General appearance:  Well developed, well nourished    Ears:  Otoscopy of external auditory canals and tympanic membranes was normal, clinical speech reception thresholds grossly intact, no mass/lesion of auricle.    Nose:  No masses/lesions of external nose, nasal mucosa, septum, and turbinates were within normal limits.    Mouth:  No mass/lesion of lips, teeth, gums, hard/soft palate, tongue, tonsils, or oropharynx.    Neck &  Lymphatics:  No cervical lymphadenopathy, no neck mass/crepitus/ asymmetry, trachea is midline, no thyroid enlargement/tenderness/mass.              []  Data Reviewed:    Lab Results   Component Value Date    WBC 11.51 11/27/2021    HGB 15.6 11/27/2021    HCT 48.7 (H) 11/27/2021    MCV 89 11/27/2021    EOSINOPHIL 0.3 11/27/2021                 Assessment & Plan:   Recurrent oral ulcers  -     Ambulatory referral/consult to ENT    Recurrent aphthous stomatitis  -     colchicine (COLCRYS) 0.6 mg tablet; Take 1 tablet (0.6 mg total) by mouth 2 (two) times daily. Take twice a day  Dispense: 60 tablet; Refill: 3        No ulcers now.  Call when symptomatic and will need biopsy.  Likely would need specimen in formalin and Armando's solution    Thank you for allowing me to participate in the care of Yodit.       Eduardo Meyer MD, FACS  [facility]   [facility]  [address]  [place] [address]  P: [phone]  F: [phone]

## 2022-03-22 ENCOUNTER — OFFICE VISIT (OUTPATIENT)
Dept: FAMILY MEDICINE | Facility: CLINIC | Age: 62
End: 2022-03-22
Payer: MEDICARE

## 2022-03-22 VITALS
TEMPERATURE: 97 F | HEART RATE: 81 BPM | DIASTOLIC BLOOD PRESSURE: 69 MMHG | BODY MASS INDEX: 34.41 KG/M2 | SYSTOLIC BLOOD PRESSURE: 107 MMHG | HEIGHT: 62 IN | WEIGHT: 187 LBS

## 2022-03-22 DIAGNOSIS — Z13.220 ENCOUNTER FOR LIPID SCREENING FOR CARDIOVASCULAR DISEASE: ICD-10-CM

## 2022-03-22 DIAGNOSIS — J45.51 SEVERE PERSISTENT ASTHMA WITH ACUTE EXACERBATION: ICD-10-CM

## 2022-03-22 DIAGNOSIS — R73.03 PREDIABETES: ICD-10-CM

## 2022-03-22 DIAGNOSIS — Z13.6 ENCOUNTER FOR LIPID SCREENING FOR CARDIOVASCULAR DISEASE: ICD-10-CM

## 2022-03-22 DIAGNOSIS — J45.50 SEVERE PERSISTENT ASTHMA WITHOUT COMPLICATION: ICD-10-CM

## 2022-03-22 DIAGNOSIS — D84.9 IMMUNOLOGIC DEFICIENCY SYNDROME: ICD-10-CM

## 2022-03-22 DIAGNOSIS — E03.9 HYPOTHYROIDISM, UNSPECIFIED TYPE: Primary | ICD-10-CM

## 2022-03-22 DIAGNOSIS — Z98.84 BARIATRIC SURGERY STATUS: ICD-10-CM

## 2022-03-22 DIAGNOSIS — Z86.39 HISTORY OF MORBID OBESITY: ICD-10-CM

## 2022-03-22 DIAGNOSIS — M35.9 UNDIFFERENTIATED CONNECTIVE TISSUE DISEASE: ICD-10-CM

## 2022-03-22 DIAGNOSIS — Z90.3 S/P GASTRIC SLEEVE PROCEDURE: ICD-10-CM

## 2022-03-22 DIAGNOSIS — Z79.899 OTHER LONG TERM (CURRENT) DRUG THERAPY: ICD-10-CM

## 2022-03-22 PROBLEM — U07.1 COVID-19 VIRUS INFECTION: Status: RESOLVED | Noted: 2021-07-23 | Resolved: 2022-03-22

## 2022-03-22 PROBLEM — Z43.1 ENCOUNTER FOR ATTENTION TO GASTROSTOMY: Status: RESOLVED | Noted: 2021-03-19 | Resolved: 2022-03-22

## 2022-03-22 PROCEDURE — 99214 OFFICE O/P EST MOD 30 MIN: CPT | Mod: S$PBB,,, | Performed by: FAMILY MEDICINE

## 2022-03-22 PROCEDURE — 99999 PR PBB SHADOW E&M-EST. PATIENT-LVL IV: CPT | Mod: PBBFAC,,, | Performed by: FAMILY MEDICINE

## 2022-03-22 PROCEDURE — 99999 PR PBB SHADOW E&M-EST. PATIENT-LVL IV: ICD-10-PCS | Mod: PBBFAC,,, | Performed by: FAMILY MEDICINE

## 2022-03-22 PROCEDURE — 99214 OFFICE O/P EST MOD 30 MIN: CPT | Mod: PBBFAC,PO | Performed by: FAMILY MEDICINE

## 2022-03-22 PROCEDURE — 99214 PR OFFICE/OUTPT VISIT, EST, LEVL IV, 30-39 MIN: ICD-10-PCS | Mod: S$PBB,,, | Performed by: FAMILY MEDICINE

## 2022-03-22 RX ORDER — LEVOTHYROXINE SODIUM 75 UG/1
75 TABLET ORAL DAILY
Qty: 90 TABLET | Refills: 3 | Status: SHIPPED | OUTPATIENT
Start: 2022-03-22 | End: 2023-04-17

## 2022-03-22 RX ORDER — MONTELUKAST SODIUM 10 MG/1
10 TABLET ORAL NIGHTLY
Qty: 90 TABLET | Refills: 3 | Status: SHIPPED | OUTPATIENT
Start: 2022-03-22 | End: 2022-05-04 | Stop reason: SDUPTHER

## 2022-03-22 NOTE — PROGRESS NOTES
Patient presents follow-up.  Hypothyroidism clinically euthyroid.  Differentiated connective tissue disease followed by Rheumatology stable.  She sees Pulmonary regarding significant asthma.  She has prediabetes.  Previous bariatric surgery needs laboratory.    Yodit was seen today for annual exam.    Diagnoses and all orders for this visit:    Hypothyroidism, unspecified type  -     Vitamin B1; Future  -     levothyroxine (SYNTHROID) 75 MCG tablet; Take 1 tablet (75 mcg total) by mouth once daily.    Undifferentiated connective tissue disease  -     CBC Auto Differential; Future  -     Vitamin B1; Future    Immunologic deficiency syndrome  -     Vitamin B1; Future    Severe persistent asthma without complication  -     Vitamin B1; Future    History of morbid obesity  -     Vitamin B1; Future    Prediabetes  -     Vitamin B1; Future  -     Hemoglobin A1C; Future    S/P gastric sleeve procedure  -     CBC Auto Differential; Future  -     Lipid Panel; Future  -     Vitamin D; Future  -     Vitamin B12; Future  -     Vitamin B1; Future  -     Zinc; Future  -     Folate; Future  -     Hemoglobin A1C; Future    Encounter for lipid screening for cardiovascular disease  -     Lipid Panel; Future  -     Vitamin B1; Future    Bariatric surgery status   -     Vitamin D; Future  -     Vitamin B12; Future  -     Vitamin B1; Future  -     Folate; Future    Other long term (current) drug therapy   -     Vitamin B12; Future  -     Vitamin B1; Future  -     Folate; Future    Severe persistent asthma with acute exacerbation  -     montelukast (SINGULAIR) 10 mg tablet; Take 1 tablet (10 mg total) by mouth every evening.      Reviewed recent laboratory.  Recheck laboratory in June.  Continue current medications.  Continue follow-up with specialty care as she is a doing.            Past Medical History:  Past Medical History:   Diagnosis Date    Angio-edema     Arthralgia     Asthma without status asthmaticus     Bronchitis      "COPD (chronic obstructive pulmonary disease)     COVID-19 virus infection 7/23/2021    Diverticulosis of large intestine without hemorrhage 10/8/2015    Environmental allergies     Eosinophilic asthma 3/8/2018    Gastroparesis     Hand arthritis     Herpes simplex without mention of complication     oral    Hidradenitis     Hyperlipidemia     Hypothyroidism     Immune deficiency disorder     Intraperitoneal abscess 5/7/2018    LBP radiating to left leg     Leukocytosis, unspecified     Migraine headache     Obesity, Class III, BMI 40-49.9 (morbid obesity)     Periorbital cellulitis 12/31/2016    Prediabetes     Recurrent upper respiratory infection (URI)     S/P colonoscopy November 2010    Sepsis 5/7/2018    Urticaria      Past Surgical History:   Procedure Laterality Date    ADENOIDECTOMY      CHOLECYSTECTOMY      CHOLECYSTECTOMY      COLONOSCOPY  2015    repeat in 10    COLONOSCOPY N/A 10/8/2015    Procedure: COLONOSCOPY;  Surgeon: Panda Bose MD;  Location: [facility];  Service: Endoscopy;  Laterality: N/A;    Hand fracture surgery      HARDWARE REMOVAL      left hand 5th [facility]    hymenectomy      HYSTERECTOMY      menorrhagia-Ovaries intact    ROTATOR CUFF REPAIR Right     SLEEVE GASTROPLASTY  04/16/2018    TONSILLECTOMY      Urethral dilatation       Review of patient's allergies indicates:   Allergen Reactions    Bromelains Hives     " causes mouth to bleed"    Pimecrolimus Swelling    Sudafed [pseudoephedrine hcl] Other (See Comments)     Does not want to wake up .    Amoxicillin-pot clavulanate Itching     Other reaction(s): Itching    Hydrocodone Itching    Iodinated contrast media      childhood    Iodine and iodide containing products Other (See Comments)    Melon Hives    Pantoprazole Nausea Only     Other reaction(s): upset stomach/halitosis    Percocet [oxycodone-acetaminophen] Itching    Corn containing products     Wheat containing prod     Barium " iodide Rash    Ephedrine Other (See Comments)     Other reaction(s): comatose    Penicillins      Other reaction(s): does not work on pt symptoms     Current Outpatient Medications on File Prior to Visit   Medication Sig Dispense Refill    (Magic mouthwash) 1:1:1 diphenhydramine(Benadryl) 12.5mg/5ml liq, aluminum & magnesium hydroxide-simethicone (Maalox), LIDOcaine viscous 2% Swish and spit 5 mLs every 8 (eight) hours as needed (oral ulcers). Gargle and spit. DO NOT SWALLOW 360 mL 0    acetaminophen (TYLENOL) 325 MG tablet Take 325 mg by mouth every 6 (six) hours as needed for Pain.      albuterol (PROAIR HFA) 90 mcg/actuation inhaler Inhale 2 puffs into the lungs every 6 (six) hours as needed for Wheezing. Rescue 18 g 0    BIOTIN ORAL Take by mouth once daily.      budesonide (PULMICORT) 0.25 mg/2 mL nebulizer solution Take 2 mLs (0.25 mg total) by nebulization once daily. Controller 60 vial 11    cholecalciferol, vitamin D3, (VITAMIN D3) 50 mcg (2,000 unit) Cap Take 1 capsule by mouth once daily.      clobetasoL (TEMOVATE) 0.05 % cream Apply topically 2 (two) times daily. 60 g 1    colchicine (COLCRYS) 0.6 mg tablet Take 1 tablet (0.6 mg total) by mouth 2 (two) times daily. Take twice a day 60 tablet 3    desonide (DESOWEN) 0.05 % cream Apply topically 2 (two) times daily. 1 Tube 3    estradioL (ESTRACE) 1 MG tablet TAKE 1 TABLET(1 MG) BY MOUTH EVERY DAY 90 tablet 3    fluticasone furoate-vilanteroL (BREO ELLIPTA) 200-25 mcg/dose DsDv diskus inhaler Inhale 1 puff into the lungs once daily. Controller 1 each 11    hydrOXYchloroQUINE (PLAQUENIL) 200 mg tablet Take 1 tablet (200 mg total) by mouth 2 (two) times daily. 60 tablet 3    immun glob G,IgG,-pro-IgA 0-50 (HIZENTRA) 2 gram/10 mL (20 %) Soln Inject 18 g into the skin every 14 (fourteen) days. 90 mL 12    levoFLOXacin (LEVAQUIN) 500 MG tablet Take 1 tablet (500 mg total) by mouth once daily. 14 tablet 2    mucus clearing device (ACAPELLA,  FLUTTER) by Misc.(Non-Drug; Combo Route) route 2 (two) times daily. 1 Device 0    MULTIVITAMIN ORAL Take by mouth once daily.      ondansetron (ZOFRAN-ODT) 4 MG TbDL Take 1 tablet (4 mg total) by mouth every 8 (eight) hours as needed (nausea/vomiting). 20 tablet 0    predniSONE (DELTASONE) 20 MG tablet One daily for 3 days and repeat for flare of lung symptoms as intructed 21 tablet 2    predniSONE (DELTASONE) 5 MG tablet Take 1 tablet (5 mg total) by mouth once daily. 90 tablet 2    sumatriptan (IMITREX) 100 MG tablet       [DISCONTINUED] clobetasoL (TEMOVATE) 0.05 % external solution Apply topically 2 (two) times daily. 50 mL 2    [DISCONTINUED] levothyroxine (SYNTHROID) 75 MCG tablet TAKE 1 TABLET(75 MCG) BY MOUTH EVERY DAY 90 tablet 0    [DISCONTINUED] montelukast (SINGULAIR) 10 mg tablet Take 1 tablet (10 mg total) by mouth every evening. 90 tablet 3    fluconazole (DIFLUCAN) 150 MG Tab Take 150 mg by mouth once.      levalbuterol (XOPENEX) 1.25 mg/3 mL nebulizer solution Take 3 mLs (1.25 mg total) by nebulization every 4 (four) hours as needed for Wheezing or Shortness of Breath. Rescue 1 Box 11    valACYclovir (VALTREX) 1000 MG tablet Take 2 tablets (2,000 mg total) by mouth every 12 (twelve) hours. For one day for fever blisters.  Do not take for more than 1 day per episode 20 tablet 0     Current Facility-Administered Medications on File Prior to Visit   Medication Dose Route Frequency Provider Last Rate Last Admin    [DISCONTINUED] acetaminophen tablet 650 mg  650 mg Oral Once PRN Mando Bourgeois MD        [DISCONTINUED] albuterol inhaler 2 puff  2 puff Inhalation Q20 Min PRN Mando Bourgeois MD        [DISCONTINUED] diphenhydrAMINE injection 25 mg  25 mg Intravenous Once PRN Mando Bourgeois MD        [DISCONTINUED] EPINEPHrine (EPIPEN) 0.3 mg/0.3 mL pen injection 0.3 mg  0.3 mg Intramuscular PRN Mando Bourgeois MD        [DISCONTINUED] methylPREDNISolone sodium succinate injection  40 mg  40 mg Intravenous Once PRN Mando Bourgeois MD        [DISCONTINUED] ondansetron disintegrating tablet 4 mg  4 mg Oral Once PRN Mando Bourgeois MD        [DISCONTINUED] sodium chloride 0.9% 500 mL flush bag   Intravenous PRN Mando Bourgeois MD        [DISCONTINUED] sodium chloride 0.9% flush 10 mL  10 mL Intravenous PRN Mando Bourgeois MD         Social History     Socioeconomic History    Marital status:    Tobacco Use    Smoking status: Never Smoker    Smokeless tobacco: Never Used   Substance and Sexual Activity    Alcohol use: Yes     Alcohol/week: 0.0 standard drinks     Comment: very rarely    Drug use: No    Sexual activity: Yes     Partners: Male   Social History Narrative    . Disabled teacher.     Family History   Problem Relation Age of Onset    Melanoma Mother     Basal cell carcinoma Mother     Lung disease Mother         pulm htn    Cataracts Mother     Hypertension Mother     Lung cancer Father     Diabetes Father     Hypertension Father     Cancer Father     Diabetes Paternal Aunt     Colon cancer Paternal Aunt 40    Diabetes Paternal Aunt     Ovarian cancer Paternal Grandmother 40    Cancer Maternal Grandfather     Melanoma Maternal Aunt     Melanoma Maternal Uncle     Breast cancer Paternal Aunt 40    Lymphoma Paternal Aunt     Ulcerative colitis Son     Psoriasis Son     Psoriasis Son     Breast cancer Cousin 25    Allergic rhinitis Neg Hx     Allergies Neg Hx     Angioedema Neg Hx     Asthma Neg Hx     Atopy Neg Hx     Eczema Neg Hx     Immunodeficiency Neg Hx     Rhinitis Neg Hx     Urticaria Neg Hx            ROS:  GENERAL: No fever, chills,  or significant weight changes.   CARDIOVASCULAR: Denies chest pain, PND, orthopnea or reduced exercise tolerance.  ABDOMEN: Appetite fine. Denies diarrhea, abdominal pain, hematemesis or blood in stool.  URINARY: No flank pain, dysuria or hematuria.    Vitals:    03/22/22 1002   BP: 107/69  "  Pulse: 81   Temp: 97.2 °F (36.2 °C)   TempSrc: Temporal   Weight: 84.8 kg (187 lb)   Height: 5' 2" (1.575 m)     Wt Readings from Last 3 Encounters:   03/22/22 84.8 kg (187 lb)   03/16/22 86 kg (189 lb 9.5 oz)   02/15/22 86.2 kg (190 lb 0.6 oz)       OBJECTIVE:   APPEARANCE: Well nourished, well developed, in no acute distress.    HEAD: Normocephalic.  Atraumatic.  No sinus tenderness.  EYES:   Right eye: Pupil reactive.  Conjunctiva clear.    Left eye: Pupil reactive.  Conjunctiva clear.  EOMI.    EARS: TM's intact. Light reflex normal. No retraction or perforation.    NOSE:  clear.  MOUTH & THROAT:  No pharyngeal erythema or exudate. No lesions.  NECK: Supple. No bruits.  No JVD.  No cervical lymphadenopathy.  No thyromegaly.    CHEST: Breath sounds clear bilaterally.  Normal respiratory effort  CARDIOVASCULAR: Normal rate.  Regular rhythm.  No murmurs.  No rub.  No gallops.  ABDOMEN: Bowel sounds normal.  Soft.  No tenderness.  No organomegaly.  PERIPHERAL VASCULAR: No cyanosis.  No clubbing.  No edema.  NEUROLOGIC: No focal findings.  MENTAL STATUS: Alert.  Oriented x 3.        "

## 2022-03-30 ENCOUNTER — OFFICE VISIT (OUTPATIENT)
Dept: DERMATOLOGY | Facility: CLINIC | Age: 62
End: 2022-03-30
Payer: MEDICARE

## 2022-03-30 DIAGNOSIS — D18.01 CHERRY ANGIOMA: ICD-10-CM

## 2022-03-30 DIAGNOSIS — L30.9 PERIOCULAR DERMATITIS: Primary | ICD-10-CM

## 2022-03-30 DIAGNOSIS — Z12.83 SCREENING, MALIGNANT NEOPLASM, SKIN: ICD-10-CM

## 2022-03-30 PROCEDURE — 99213 OFFICE O/P EST LOW 20 MIN: CPT | Mod: PBBFAC | Performed by: STUDENT IN AN ORGANIZED HEALTH CARE EDUCATION/TRAINING PROGRAM

## 2022-03-30 PROCEDURE — 99999 PR PBB SHADOW E&M-EST. PATIENT-LVL III: CPT | Mod: PBBFAC,,, | Performed by: STUDENT IN AN ORGANIZED HEALTH CARE EDUCATION/TRAINING PROGRAM

## 2022-03-30 PROCEDURE — 99999 PR PBB SHADOW E&M-EST. PATIENT-LVL III: ICD-10-PCS | Mod: PBBFAC,,, | Performed by: STUDENT IN AN ORGANIZED HEALTH CARE EDUCATION/TRAINING PROGRAM

## 2022-03-30 PROCEDURE — 99214 PR OFFICE/OUTPT VISIT, EST, LEVL IV, 30-39 MIN: ICD-10-PCS | Mod: S$PBB,,, | Performed by: STUDENT IN AN ORGANIZED HEALTH CARE EDUCATION/TRAINING PROGRAM

## 2022-03-30 PROCEDURE — 99214 OFFICE O/P EST MOD 30 MIN: CPT | Mod: S$PBB,,, | Performed by: STUDENT IN AN ORGANIZED HEALTH CARE EDUCATION/TRAINING PROGRAM

## 2022-03-30 RX ORDER — FLUCONAZOLE 200 MG/1
200 TABLET ORAL ONCE
Qty: 3 TABLET | Refills: 0 | Status: SHIPPED | OUTPATIENT
Start: 2022-03-30 | End: 2022-03-30

## 2022-03-30 RX ORDER — DOXYCYCLINE 100 MG/1
100 CAPSULE ORAL DAILY
Qty: 90 CAPSULE | Refills: 0 | Status: SHIPPED | OUTPATIENT
Start: 2022-03-30 | End: 2022-08-12

## 2022-03-30 NOTE — PROGRESS NOTES
Subjective:       Patient ID:  Yodit Canseco is a 61 y.o. female who presents for   Chief Complaint   Patient presents with    Rash     History of Present Illness: The patient presents with chief complaint of a rash on the face, around the eyes. Last seen on 5/14/21 where she was having eyelid dermatitis, treated with desonide with good control over symptoms. However, recently she has been having red bumps and broken blood vessels around the sides of the eyes and medial cheeks. Desonide not helping with symptoms. Denies much itching or burning with redness.  She would also like to have a skin examination. Denies any rapidly growing, bleeding or non healing skin lesions.       Review of Systems   Constitutional: Negative for fever and chills.        Objective:    Physical Exam   Constitutional: She appears well-developed and well-nourished. No distress.   Neurological: She is alert and oriented to person, place, and time. She is not disoriented.   Psychiatric: She has a normal mood and affect.   Skin:   Areas Examined (abnormalities noted in diagram):   Head / Face Inspection Performed  Neck Inspection Performed  Chest / Axilla Inspection Performed  Abdomen Inspection Performed  Genitals / Buttocks / Groin Inspection Performed  Back Inspection Performed  RUE Inspected  LUE Inspection Performed  RLE Inspected  LLE Inspection Performed                   Diagram Legend     Erythematous scaling macule/papule c/w actinic keratosis       Vascular papule c/w angioma      Pigmented verrucoid papule/plaque c/w seborrheic keratosis      Yellow umbilicated papule c/w sebaceous hyperplasia      Irregularly shaped tan macule c/w lentigo     1-2 mm smooth white papules consistent with Milia      Movable subcutaneous cyst with punctum c/w epidermal inclusion cyst      Subcutaneous movable cyst c/w pilar cyst      Firm pink to brown papule c/w dermatofibroma      Pedunculated fleshy papule(s) c/w skin tag(s)      Evenly  pigmented macule c/w junctional nevus     Mildly variegated pigmented, slightly irregular-bordered macule c/w mildly atypical nevus      Flesh colored to evenly pigmented papule c/w intradermal nevus       Pink pearly papule/plaque c/w basal cell carcinoma      Erythematous hyperkeratotic cursted plaque c/w SCC      Surgical scar with no sign of skin cancer recurrence      Open and closed comedones      Inflammatory papules and pustules      Verrucoid papule consistent consistent with wart     Erythematous eczematous patches and plaques     Dystrophic onycholytic nail with subungual debris c/w onychomycosis     Umbilicated papule    Erythematous-base heme-crusted tan verrucoid plaque consistent with inflamed seborrheic keratosis     Erythematous Silvery Scaling Plaque c/w Psoriasis     See annotation      Assessment / Plan:        Periocular dermatitis  -     doxycycline (VIBRAMYCIN) 100 MG Cap; Take 1 capsule (100 mg total) by mouth once daily.  Dispense: 90 capsule; Refill: 0  -     fluconazole (DIFLUCAN) 200 MG Tab; Take 1 tablet (200 mg total) by mouth once. for 1 dose  Dispense: 3 tablet; Refill: 0    Cherry angioma  This is a benign vascular lesion. Reassurance given. No treatment required.     Screening, malignant neoplasm, skin  total body skin examination performed today including at least 12 points as noted in physical examination. No lesions suspicious for malignancy noted.  Reassurance provided.    Instructed patient to observe lesion(s) for changes and follow up in clinic if changes are noted. Patient to monitor skin at home for new or changing lesions and follow up in clinic if noted.           Follow up in about 6 weeks (around 5/11/2022).

## 2022-04-11 ENCOUNTER — PATIENT MESSAGE (OUTPATIENT)
Dept: RHEUMATOLOGY | Facility: CLINIC | Age: 62
End: 2022-04-11
Payer: MEDICARE

## 2022-04-12 ENCOUNTER — OFFICE VISIT (OUTPATIENT)
Dept: PODIATRY | Facility: CLINIC | Age: 62
End: 2022-04-12
Payer: MEDICARE

## 2022-04-12 DIAGNOSIS — L85.1 ACQUIRED PLANTAR POROKERATOSIS: ICD-10-CM

## 2022-04-12 DIAGNOSIS — L60.0 ONYCHOCRYPTOSIS: Primary | ICD-10-CM

## 2022-04-12 DIAGNOSIS — R23.4 FISSURE IN SKIN OF BOTH FEET: ICD-10-CM

## 2022-04-12 PROCEDURE — 99999 PR PBB SHADOW E&M-EST. PATIENT-LVL III: CPT | Mod: PBBFAC,,, | Performed by: PODIATRIST

## 2022-04-12 PROCEDURE — 99213 OFFICE O/P EST LOW 20 MIN: CPT | Mod: PBBFAC,PO | Performed by: PODIATRIST

## 2022-04-12 PROCEDURE — 99999 PR PBB SHADOW E&M-EST. PATIENT-LVL III: ICD-10-PCS | Mod: PBBFAC,,, | Performed by: PODIATRIST

## 2022-04-12 PROCEDURE — 99203 OFFICE O/P NEW LOW 30 MIN: CPT | Mod: S$PBB,,, | Performed by: PODIATRIST

## 2022-04-12 PROCEDURE — 99203 PR OFFICE/OUTPT VISIT, NEW, LEVL III, 30-44 MIN: ICD-10-PCS | Mod: S$PBB,,, | Performed by: PODIATRIST

## 2022-04-12 NOTE — PROGRESS NOTES
"  Subjective:       Patient ID: Yodit Canseco is a 61 y.o. female.    Chief Complaint: Callouses (Patient complains of callouses to plantar surfaces of feet. )    HPI: Yodit Canseco presents to the office today, with areas of concern to the plantar aspect of the right left foot.  States that it feels as if he/she is walking on rocks.  Denies any puncture wounds or injuries.  States this pain is been ongoing for some time without significant improvement.  Patient also has developed callusing to the plantar aspect of the right foot and left foot.  Callusing noted specifically on the plantar surface of the right foot at 2 locations.  No underlying signs of ulceration.  She is also concerned about thickening and dystrophic changes to the right hallux nail.  States that several years ago, a student did drop to chair on her foot which ultimately resulted in changes of the nail since this occurrence.  No signs of acute infection.    Review of patient's allergies indicates:   Allergen Reactions    Bromelains Hives     " causes mouth to bleed"    Pimecrolimus Swelling    Sudafed [pseudoephedrine hcl] Other (See Comments)     Does not want to wake up .    Amoxicillin-pot clavulanate Itching     Other reaction(s): Itching    Hydrocodone Itching    Iodinated contrast media      childhood    Iodine and iodide containing products Other (See Comments)    Melon Hives    Pantoprazole Nausea Only     Other reaction(s): upset stomach/halitosis    Percocet [oxycodone-acetaminophen] Itching    Corn containing products     Wheat containing prod     Barium iodide Rash    Ephedrine Other (See Comments)     Other reaction(s): comatose    Penicillins      Other reaction(s): does not work on pt symptoms       Past Medical History:   Diagnosis Date    Angio-edema     Arthralgia     Asthma without status asthmaticus     Bronchitis     COPD (chronic obstructive pulmonary disease)     COVID-19 virus infection " 7/23/2021    Diverticulosis of large intestine without hemorrhage 10/8/2015    Environmental allergies     Eosinophilic asthma 3/8/2018    Gastroparesis     Hand arthritis     Herpes simplex without mention of complication     oral    Hidradenitis     Hyperlipidemia     Hypothyroidism     Immune deficiency disorder     Intraperitoneal abscess 5/7/2018    LBP radiating to left leg     Leukocytosis, unspecified     Migraine headache     Obesity, Class III, BMI 40-49.9 (morbid obesity)     Periorbital cellulitis 12/31/2016    Prediabetes     Recurrent upper respiratory infection (URI)     S/P colonoscopy November 2010    Sepsis 5/7/2018    Urticaria        Family History   Problem Relation Age of Onset    Melanoma Mother     Basal cell carcinoma Mother     Lung disease Mother         pulm htn    Cataracts Mother     Hypertension Mother     Lung cancer Father     Diabetes Father     Hypertension Father     Cancer Father     Diabetes Paternal Aunt     Colon cancer Paternal Aunt 40    Diabetes Paternal Aunt     Ovarian cancer Paternal Grandmother 40    Cancer Maternal Grandfather     Melanoma Maternal Aunt     Melanoma Maternal Uncle     Breast cancer Paternal Aunt 40    Lymphoma Paternal Aunt     Ulcerative colitis Son     Psoriasis Son     Psoriasis Son     Breast cancer Cousin 25    Allergic rhinitis Neg Hx     Allergies Neg Hx     Angioedema Neg Hx     Asthma Neg Hx     Atopy Neg Hx     Eczema Neg Hx     Immunodeficiency Neg Hx     Rhinitis Neg Hx     Urticaria Neg Hx        Social History     Socioeconomic History    Marital status:    Tobacco Use    Smoking status: Never Smoker    Smokeless tobacco: Never Used   Substance and Sexual Activity    Alcohol use: Yes     Alcohol/week: 0.0 standard drinks     Comment: very rarely    Drug use: No    Sexual activity: Yes     Partners: Male   Social History Narrative    . Disabled teacher.       Past  Surgical History:   Procedure Laterality Date    ADENOIDECTOMY      CHOLECYSTECTOMY      CHOLECYSTECTOMY      COLONOSCOPY  2015    repeat in 10    COLONOSCOPY N/A 10/8/2015    Procedure: COLONOSCOPY;  Surgeon: Panda Bose MD;  Location[facility];  Service: Endoscopy;  Laterality: N/A;    Hand fracture surgery      HARDWARE REMOVAL      left hand 5th MC    hymenectomy      HYSTERECTOMY      menorrhagia-Ovaries intact    ROTATOR CUFF REPAIR Right     SLEEVE GASTROPLASTY  04/16/2018    TONSILLECTOMY      Urethral dilatation         Review of Systems       Objective:   There were no vitals taken for this visit.    Mammo Digital Screening Bilat w/ Eric  Narrative: Result:  Mammo Digital Screening Bilat w/ Eric    History:  Patient is 61 y.o. and is seen for a screening mammogram.    Films Compared:  Compared to: 11/04/2020 Mammo Digital Screening Bilat w/ Eric and   09/28/2018 Mammo Digital Screening Bilat with Tomosynthesis_CAD     Findings:   This procedure was performed using tomosynthesis.   Computer-aided detection was utilized in the interpretation of this   examination.    The breasts are heterogeneously dense, which may obscure small masses.   There is no evidence of suspicious masses, microcalcifications or   architectural distortion.  Impression:    No mammographic evidence of malignancy.    BI-RADS Category 1: Negative    Recommendation:  Routine screening mammogram in 1 year is recommended.    Your estimated lifetime risk of breast cancer (to age 85) based on   Tyrer-Cuzick risk assessment model is 6.68 %.  According to the American   Cancer Society, patients with a lifetime breast cancer risk of 20% or   higher might benefit from supplemental screening tests. ??        Physical Exam   LOWER EXTREMITY PHYSICAL EXAMINATION    Vascular:  The right dorsalis pedis pulse 2/4 and the right posterior tibial pulse 2/4.  The left dorsalis pedis pulse 2/4 and posterior tibial pulse on the left is 2/4.   Capillary refill is intact.  Pedal hair growth intact    Neurological:  Protective sensation is intact with [place] Jennifer monofilament. Proprioception is intact. Intact sensation to light touch.  Vibratory sensation is diminished to the 1st metatarsal phalangeal joint.     Musculoskeletal: Manual Muscle Testing is 5/5 with dorsiflexion, plantar flexion, abduction, and adduction.   There is normal range of motion in the forefoot, hindfoot, and Ankle joint.       Dermatological:  Skin is supple and moist.  There is callusing present to the plantar aspect of the right foot x2 and the left foot x1.  Porokeratotic lesion is noted to the right foot x2 as well.  No underlying signs of ulceration to these lesions.  There is no acute signs of infection.  No open wounds present.  There is fissures with hyperkeratotic areas present to the right foot and the left heel.  No signs of bleeding or complications.  The right hallux, 5th toe and the left hallux and 5th toenails slightly thickened, discolored      Assessment:     1. Onychocryptosis    2. Fissure in skin of both feet    3. Acquired plantar porokeratosis        Plan:     Onychocryptosis    Fissure in skin of both feet    Acquired plantar porokeratosis      Thorough discussion is had with the patient this afternoon, concerning the diagnosis, its etiology, and the treatment algorithm at present.    Recommended utilizing appropriate hydration techniques to the skin to encourage hydration in fluid retention.  I do encourage patient to soak her feet in her regular shower and bath.  Would also recommend her utilizing 40% urea cream with possible mixture of salicylic acid.  This could be purchased over-the-counter.  Apply this to the fissures of the skin at areas of increased thickening.  Also encouraged patient to apply a thin layer of Vaseline to cover to encourage the skin to be appropriate hydrated.    Porokeratotic skin formation, as outlined within the examination  portion of this note, is surgically debrided with sharp #10/#15 blade, to alleviate discomfort with weight bearing and ambulation, and to lessen the possibility of skin complications, e.g., ulceration due to pressure. No ulceration(s) is are noted with/post debridement. The lesion is completed healed and resolved. No evidence of infection.       Future Appointments   Date Time Provider Department Center   5/4/2022  1:40 PM Erick Gary MD [facility] PULM [facility]   5/11/2022  1:00 PM Zechariah Sierra MD [facility] DERM [facility]   5/23/2022  1:30 PM ROSSI, VISUAL-ONE [facility] OPHTHAL [facility]   5/23/2022  2:00 PM Mustapha Jacobson, JUSTUS [facility] OPHTHAL [facility]   6[facility]16[facility]2022  [facility] [place]   8/15/2022  3:15 PM Jeff Denis MD [facility]

## 2022-04-28 ENCOUNTER — OFFICE VISIT (OUTPATIENT)
Dept: OTOLARYNGOLOGY | Facility: CLINIC | Age: 62
End: 2022-04-28
Payer: MEDICARE

## 2022-04-28 ENCOUNTER — PATIENT MESSAGE (OUTPATIENT)
Dept: OTOLARYNGOLOGY | Facility: CLINIC | Age: 62
End: 2022-04-28

## 2022-04-28 VITALS
DIASTOLIC BLOOD PRESSURE: 74 MMHG | HEART RATE: 68 BPM | SYSTOLIC BLOOD PRESSURE: 116 MMHG | BODY MASS INDEX: 34.27 KG/M2 | TEMPERATURE: 97 F | WEIGHT: 187.38 LBS

## 2022-04-28 DIAGNOSIS — K13.79 RECURRENT ORAL ULCERS: Primary | ICD-10-CM

## 2022-04-28 PROCEDURE — 88342 IMHCHEM/IMCYTCHM 1ST ANTB: CPT | Mod: 26,,, | Performed by: PATHOLOGY

## 2022-04-28 PROCEDURE — 88342 CHG IMMUNOCYTOCHEMISTRY: ICD-10-PCS | Mod: 26,,, | Performed by: PATHOLOGY

## 2022-04-28 PROCEDURE — 88312 SPECIAL STAINS GROUP 1: CPT | Mod: 59 | Performed by: PATHOLOGY

## 2022-04-28 PROCEDURE — 40812 EXCISE/REPAIR MOUTH LESION: CPT | Mod: S$PBB,,, | Performed by: OTOLARYNGOLOGY

## 2022-04-28 PROCEDURE — 88305 TISSUE EXAM BY PATHOLOGIST: CPT | Performed by: PATHOLOGY

## 2022-04-28 PROCEDURE — 40812 EXCISE/REPAIR MOUTH LESION: CPT | Mod: PBBFAC | Performed by: OTOLARYNGOLOGY

## 2022-04-28 PROCEDURE — 88342 IMHCHEM/IMCYTCHM 1ST ANTB: CPT | Performed by: PATHOLOGY

## 2022-04-28 PROCEDURE — 99213 OFFICE O/P EST LOW 20 MIN: CPT | Mod: 25,S$PBB,, | Performed by: OTOLARYNGOLOGY

## 2022-04-28 PROCEDURE — 99214 OFFICE O/P EST MOD 30 MIN: CPT | Mod: PBBFAC,25 | Performed by: OTOLARYNGOLOGY

## 2022-04-28 PROCEDURE — 88305 TISSUE EXAM BY PATHOLOGIST: CPT | Mod: 26,,, | Performed by: PATHOLOGY

## 2022-04-28 PROCEDURE — 40812 PR EXCIS MOUTH MUCOSA/SUB,SIMPL REPAIR: ICD-10-PCS | Mod: S$PBB,,, | Performed by: OTOLARYNGOLOGY

## 2022-04-28 PROCEDURE — 88350 IMFLUOR EA ADDL 1ANTB STN PX: CPT | Performed by: PATHOLOGY

## 2022-04-28 PROCEDURE — 99999 PR PBB SHADOW E&M-EST. PATIENT-LVL IV: CPT | Mod: PBBFAC,,, | Performed by: OTOLARYNGOLOGY

## 2022-04-28 PROCEDURE — 99999 PR PBB SHADOW E&M-EST. PATIENT-LVL IV: ICD-10-PCS | Mod: PBBFAC,,, | Performed by: OTOLARYNGOLOGY

## 2022-04-28 PROCEDURE — 99213 PR OFFICE/OUTPT VISIT, EST, LEVL III, 20-29 MIN: ICD-10-PCS | Mod: 25,S$PBB,, | Performed by: OTOLARYNGOLOGY

## 2022-04-28 PROCEDURE — 88346 IMFLUOR 1ST 1ANTB STAIN PX: CPT | Performed by: PATHOLOGY

## 2022-04-28 PROCEDURE — 88312 PR  SPECIAL STAINS,GROUP I: ICD-10-PCS | Mod: 26,,, | Performed by: PATHOLOGY

## 2022-04-28 PROCEDURE — 88305 TISSUE EXAM BY PATHOLOGIST: ICD-10-PCS | Mod: 26,,, | Performed by: PATHOLOGY

## 2022-04-28 PROCEDURE — 88312 SPECIAL STAINS GROUP 1: CPT | Mod: 26,,, | Performed by: PATHOLOGY

## 2022-04-28 RX ORDER — TRAMADOL HYDROCHLORIDE 50 MG/1
50 TABLET ORAL EVERY 6 HOURS
Qty: 20 TABLET | Refills: 0 | Status: SHIPPED | OUTPATIENT
Start: 2022-04-28 | End: 2022-05-03

## 2022-04-28 RX ORDER — ACETAMINOPHEN AND CODEINE PHOSPHATE 300; 30 MG/1; MG/1
1 TABLET ORAL EVERY 4 HOURS PRN
Qty: 10 TABLET | Refills: 0 | Status: SHIPPED | OUTPATIENT
Start: 2022-04-28 | End: 2022-05-05

## 2022-04-28 NOTE — PROGRESS NOTES
Referring Provider:    No referring provider defined for this encounter.  Subjective:   Patient: Yodit Canseco [id number], :1960   Visit date:2022 1:49 PM    Chief Complaint: see below  HPI:       Follow-up (Patient is returning to clinic for follow-up regarding recurrent mouth ulcers. Patient was seen on 3/16 by Dr. Meyer but did not have any active ulcers at that time. Patient was told to follow up once she has active ulcers. Current ulcers formed 2 days ago and are on interior of upper left lip and middle of bottom lip. Patient states that can feel more forming throughout her mouth.   )    Prior notes reviewed  Clinical documentation obtained by nursing staff reviewed.     Yodit Parks a 61 y.o. pleasant female comes in for initial consultation.   She follows up in [facility] for undifferentiated connective tissue disease with positive FABY, arthralgias, myalgias and recurrent mouth ulcers.  She also has longstanding history of immunologic deficiency syndrome for which she takes Hizentra at home.  She is on hydroxychloroquine therapy and daily 5 mg prednisone.  Any time she quits the prednisone she developed severe mouth ulcers. She has intermittent joint pains and recurrent oral ulcers .  She also takes high-dose steroid therapy for severe asthma given by her pulmonologist.  No history of interstitial lung disease. She reports intermittent, severe dermatitis and uses clobetasol under the direction of her dermatologist.  She was started on colchicine in 2022.   She had no ulcers at her last visit but over the past several days, she has developed multiple ulcers that are severely painful.     Objective:     Physical Exam:  Vitals:  /74   Pulse 68   Temp 97.2 °F (36.2 °C) (Temporal)   Wt 85 kg (187 lb 6.3 oz)   BMI 34.27 kg/m²   General appearance:  Well developed, well nourished    Ears:  Otoscopy of external auditory canals and tympanic membranes was normal, clinical  speech reception thresholds grossly intact, no mass/lesion of auricle.    Nose:  No masses/lesions of external nose, nasal mucosa, septum, and turbinates were within normal limits.    Mouth:  On the mucosa of the lower lip to the right, there is an ulcerative area approximately 3mm.  There is a second area of ulceration and erythema on the upper wet lip on the left.   Neck & Lymphatics:  No cervical lymphadenopathy, no neck mass/crepitus/ asymmetry, trachea is midline, no thyroid enlargement/tenderness/mass.              []  Data Reviewed:    Lab Results   Component Value Date    WBC 11.51 2021    HGB 15.6 2021    HCT 48.7 (H) 2021    MCV 89 2021    EOSINOPHIL 0.3 2021                 Assessment & Plan:   Recurrent oral ulcers  -     Specimen to Pathology ENT    Will contact with pathology results    Thank you for allowing me to participate in the care of Yodit.       Eduardo Meyer MD, FACS  [facility]   [facility]  [address]  [place] [address]  P: [phone]  F: [phone]      Patient: Yodit Canseco [id number], :1960  Procedure date:2022  Patient's medications, allergies, past medical, surgical, social and family histories were reviewed and updated as appropriate.  Chief Complaint:  Follow-up (Patient is returning to clinic for follow-up regarding recurrent mouth ulcers. Patient was seen on 3/16 by Dr. Meyer but did not have any active ulcers at that time. Patient was told to follow up once she has active ulcers. Current ulcers formed 2 days ago and are on interior of upper left lip and middle of bottom lip. Patient states that can feel more forming throughout her mouth.   )    HPI:  Yodit is a 61 y.o. female with the history of present illness as discussed in the clinic note from today.    Procedure: Risks, benefits, and alternatives of the procedure were discussed with the patient, and the patient consented to the biopsy  of the bilateral oral cavity lesions.  The area was visualized with proper lighting and exposure.  Adequate anesthesia was obtained using 1% Lidocaine with 1:100,000 Epinephrine.  The lesion was completely excised using the scapel.  Hemostasis was achieved with none.  The wound did require suture closure.  This was performed with 4-0 Chromic.  The patient tolerated the procedure well with no complications.     Assessment & Plan:  - see today's clinic note

## 2022-05-02 ENCOUNTER — PATIENT MESSAGE (OUTPATIENT)
Dept: OTOLARYNGOLOGY | Facility: CLINIC | Age: 62
End: 2022-05-02
Payer: MEDICARE

## 2022-05-03 ENCOUNTER — PATIENT MESSAGE (OUTPATIENT)
Dept: FAMILY MEDICINE | Facility: CLINIC | Age: 62
End: 2022-05-03
Payer: MEDICARE

## 2022-05-03 DIAGNOSIS — K12.1 MOUTH ULCERS: ICD-10-CM

## 2022-05-04 ENCOUNTER — OFFICE VISIT (OUTPATIENT)
Dept: PULMONOLOGY | Facility: CLINIC | Age: 62
End: 2022-05-04
Payer: MEDICARE

## 2022-05-04 VITALS
DIASTOLIC BLOOD PRESSURE: 71 MMHG | HEART RATE: 74 BPM | BODY MASS INDEX: 34.48 KG/M2 | SYSTOLIC BLOOD PRESSURE: 137 MMHG | OXYGEN SATURATION: 97 % | WEIGHT: 187.38 LBS | HEIGHT: 62 IN

## 2022-05-04 DIAGNOSIS — J45.51 SEVERE PERSISTENT ASTHMA WITH ACUTE EXACERBATION: ICD-10-CM

## 2022-05-04 DIAGNOSIS — J45.50 SEVERE PERSISTENT ASTHMA IN ADULT WITHOUT COMPLICATION: ICD-10-CM

## 2022-05-04 DIAGNOSIS — D84.9 IMMUNOLOGIC DEFICIENCY SYNDROME: ICD-10-CM

## 2022-05-04 DIAGNOSIS — B37.9 YEAST INFECTION: ICD-10-CM

## 2022-05-04 DIAGNOSIS — J45.50 SEVERE PERSISTENT ASTHMA WITHOUT COMPLICATION: ICD-10-CM

## 2022-05-04 DIAGNOSIS — D84.9 IMMUNE DEFICIENCY DISORDER: Primary | ICD-10-CM

## 2022-05-04 PROCEDURE — 99214 OFFICE O/P EST MOD 30 MIN: CPT | Mod: PBBFAC,PO | Performed by: INTERNAL MEDICINE

## 2022-05-04 PROCEDURE — 99999 PR PBB SHADOW E&M-EST. PATIENT-LVL IV: CPT | Mod: PBBFAC,,, | Performed by: INTERNAL MEDICINE

## 2022-05-04 PROCEDURE — 99213 PR OFFICE/OUTPT VISIT, EST, LEVL III, 20-29 MIN: ICD-10-PCS | Mod: S$PBB,,, | Performed by: INTERNAL MEDICINE

## 2022-05-04 PROCEDURE — 99999 PR PBB SHADOW E&M-EST. PATIENT-LVL IV: ICD-10-PCS | Mod: PBBFAC,,, | Performed by: INTERNAL MEDICINE

## 2022-05-04 PROCEDURE — 99213 OFFICE O/P EST LOW 20 MIN: CPT | Mod: S$PBB,,, | Performed by: INTERNAL MEDICINE

## 2022-05-04 RX ORDER — MONTELUKAST SODIUM 10 MG/1
10 TABLET ORAL NIGHTLY
Qty: 90 TABLET | Refills: 3 | Status: SHIPPED | OUTPATIENT
Start: 2022-05-04 | End: 2023-05-08 | Stop reason: SDUPTHER

## 2022-05-04 RX ORDER — FLUCONAZOLE 200 MG/1
200 TABLET ORAL DAILY
Qty: 7 TABLET | Refills: 0 | Status: SHIPPED | OUTPATIENT
Start: 2022-05-04 | End: 2022-05-11

## 2022-05-04 RX ORDER — MONTELUKAST SODIUM 10 MG/1
10 TABLET ORAL NIGHTLY
Qty: 90 TABLET | Refills: 3 | Status: SHIPPED | OUTPATIENT
Start: 2022-05-04 | End: 2022-05-04 | Stop reason: SDUPTHER

## 2022-05-04 RX ORDER — LEVOFLOXACIN 500 MG/1
500 TABLET, FILM COATED ORAL DAILY
Qty: 14 TABLET | Refills: 2 | Status: ON HOLD | OUTPATIENT
Start: 2022-05-04 | End: 2022-09-04 | Stop reason: HOSPADM

## 2022-05-04 RX ORDER — BUDESONIDE, GLYCOPYRROLATE, AND FORMOTEROL FUMARATE 160; 9; 4.8 UG/1; UG/1; UG/1
2 AEROSOL, METERED RESPIRATORY (INHALATION) 2 TIMES DAILY
Qty: 10.7 G | Refills: 11 | Status: ON HOLD | OUTPATIENT
Start: 2022-05-04 | End: 2022-10-07 | Stop reason: HOSPADM

## 2022-05-04 NOTE — PROGRESS NOTES
2022    Yodit Canseco  Office Note    Chief Complaint   Patient presents with    Follow-up     6 month f/u        HPI:     2022 mom  sept complications from hip fx/covid.  Had few sinus infection-- rx Levaquin/prednisone.  Pt on plaquenil, dx ra and lupus.  Also on colchicine.      Uses breo but skipped.  Uses albuterol once a wk.      2021 - had covid in July, had rapid home test +, managed outpt.  Got infusion rx.  Has wheezes but feels over covid.  Had nausea, vomiting, weakness, headache, -- sick 8 days.  Mom got hip fx complicating recovery-- at rehab. Pt and mom were vaccinated.  Pt on abx for sinus infection  Dx lupus/rheumatoid - 5 mg prednisone daily  Patient Instructions   Recovering covid doing well.    Use levaquin as needed.    Ordered cxr if needed.     2021 had pneumonia 2020 - no problems. Needs disability paper updated.  We review today.    Patient Instructions   We reviewed your disability- need to complete paper work.  You had pneumonia in 2020.  Breo, prednisone, levaquin, albuterol renewed.    10/14/2020- all well.  No abx/prednisone, cxr 10/14/2020 nad- rul pneumonia [place] cleared.  Avoids irritants.  Patient Instructions   You likely would be best to get high dose flu vaccine.   Your immune system may have problem responding to flu vaccine.  2020- on hirzentra since 2016.  Saw NP [place] in [place] - virtual visit- trevor and prednisone 10/d x 3 for sinusitis.     Uses   500 bid, breo 200 daily, , xopenex nebs prn, singulair. Needs more prednisone, had pneumonia 3/4 to .  Had ct chest feb.  Felt had covid symptoms.  No ox needed for pneumonia- no steroids nor bd. Took doxy - vanc in hosp.   Patient Instructions   Pneumonia - follow up chest xray good- not urgent.    Prednisone action plan for asthma/wheeze/cough    May use levaquin for sinus/lung infections  2/10/2020- had shingles vaccine, states felt an immune response.    Cough- improved, daily, not severe, non productive occasionally productive thick dark yellow color. Using nebulizer only as needed. Has noticed fewer asthma exacerbations since starting Hyzentra, went from 1 months to 2x yearly.   Not able to use albuterol rescue inhaler or nebulized due to tachycardia, palpitations, and hand tremors. No complaint with xopenex medications.   Patient Instructions   Continue current Asthma medication regiment  Review of CT shows no areas of concern. The small nodule is not significant  Pulmonary function test shows lungs to still be strong.   Continue to use Xopenex Nebulizer with aerobika attachement once a day three days a week every week.  When your sick you can use nebulizer or Xopenex rescue inhaler four times a day.     1/9/2020- two exacerbations in 2 months, states cough is unchanged, daily, states bark like cough, severe and affects daily life, Productive occasionally clear/cream color thick in consistency, uses nebulizer 2-3 x daily.   Patient Instructions   [employer] sells an Aerobika device, this device shakes the air inside of you to break mucous off the bronchial wall. Use with nebulizer and with out daily.    Continue current Asthma medication regiment     Use nebulizer at least once a day three days a week.     CT to evaluate lungs for any abnormalities       11/8/2019- Had recent Rotator cuff surgery with nerve block Oct 9, Has SOB worse after surgery for 3 days, Took prednisone 20 mg for 3 days and Levaquin for 9 days. Seen by PCP dx URI.   Coughing, chest tightness, congestion, wheezing, Hoarse voice, fatigue, Shortness of breath worse with exertion, onset following surgery to shoulder. Cough- not able to clear secretions from chest.  Complaint of hand shaking, heart rate increases, and becomes rapid with Albuterol nebulizer onset years. Tries not to use due to side effects.     04/24/2019- breathing is good except for high pollen days, had 3 exacerbation in 6  months. Albuterol rescue inhaler 3x weekly, 1-2 nocturnal arousals monthly, could not do PFT at [facility],     11/6/18- ER visit for dehydration gastroenteritis, no prednisone use in 4 months. Not currently on fasenra injection, insurance would not cover. Currently on Hirzentra IGG therapy.   Sinus drip present, cough occasional, non productive, no nocturnal arousals, Currently on Dulera daily, uses Albuterol rescue inhaler daily before exercise no SOB.  Sepsis [facility] August 2017.  Flu vaccine   July 9, 2018 - had gastric sleeve with leak complication- lost 45 lbs already.  Has appetite but fills quickly.  Still on hirzentra.  Asthma ok avoiding fragrances/ordors.  No prednisone.   No nocturnal arousals, uses rescue weekly avoiding any irritants.  Use prednisone 4-5 last year and twice this year.        March 19, 2018-since recovered from pleuritic pain - doing well.  No prednisone use recent.  Pt has had no wheezes.  feb 22,2108   Had to do prednisone again.  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 I have advised not to work given severe asthma,  hypersensitivity to fragrances, and immune deficiency.   Pt seems better since [facility], but still unstable severe asthma  Jan 29. 2018- 6 days ago had been exposed to sick , had nasal congestion , cough, ear stuffy, muscle aches /joint aches / fever.  Temp to 100.1.  Seen by np and flu screen negative.  Had asthma 3-4 flares last year. eos up past,   oct 5, 2017 - had marce in April, skipped couple doses hirzentra, had mild bronchitis once, otherwise doing very well.  No prednisone.  No side effects and covered. Ppt flu sense for 2 days  Jan 24,2017 on  hirzentra q o week since sept and asthma more stable, no hosp, no prednisone, no noct asthma/ no rescue therapy- control not this good for years.  Sept 27, 2016  HPI:has  had lung problems since birth, no nocturnal arousals, uses rescue 2/d, breathing controlled satisfactory except with irritants/infection - strong abx needed to clear.  Prednisone 2/yr, last hosp 18 months.  No ventilator.    Had exacerbation recent due uri from .  Has had shingles vaccine past.       The chief compliant  problem is varies with instablilty at time  PFSH:  Past Medical History:   Diagnosis Date    Angio-edema     Arthralgia     Asthma without status asthmaticus     Bronchitis     COPD (chronic obstructive pulmonary disease)     COVID-19 virus infection 7/23/2021    Diverticulosis of large intestine without hemorrhage 10/8/2015    Environmental allergies     Eosinophilic asthma 3/8/2018    Gastroparesis     Hand arthritis     Herpes simplex without mention of complication     oral    Hidradenitis     Hyperlipidemia     Hypothyroidism     Immune deficiency disorder     Intraperitoneal abscess 5/7/2018    LBP radiating to left leg     Leukocytosis, unspecified     Migraine headache     Obesity, Class III, BMI 40-49.9 (morbid obesity)     Periorbital cellulitis 12/31/2016    Prediabetes     Recurrent upper respiratory infection (URI)     S/P colonoscopy November 2010    Sepsis 5/7/2018    Urticaria          Past Surgical History:   Procedure Laterality Date    ADENOIDECTOMY      CHOLECYSTECTOMY      CHOLECYSTECTOMY      COLONOSCOPY  2015    repeat in 10    COLONOSCOPY N/A 10/8/2015    Procedure: COLONOSCOPY;  Surgeon: Panda Bose MD;  Location[facility];  Service: Endoscopy;  Laterality: N/A;    Hand fracture surgery      HARDWARE REMOVAL      left hand 5th MC    hymenectomy      HYSTERECTOMY      menorrhagia-Ovaries intact    ROTATOR CUFF REPAIR Right     SLEEVE GASTROPLASTY  04/16/2018    TONSILLECTOMY      Urethral dilatation       Social History     Tobacco Use    Smoking status: Never Smoker    Smokeless tobacco: Never Used   Substance Use Topics  "   Alcohol use: Yes     Alcohol/week: 0.0 standard drinks     Comment: very rarely    Drug use: No     Family History   Problem Relation Age of Onset    Melanoma Mother     Basal cell carcinoma Mother     Lung disease Mother         pulm htn    Cataracts Mother     Hypertension Mother     Lung cancer Father     Diabetes Father     Hypertension Father     Cancer Father     Diabetes Paternal Aunt     Colon cancer Paternal Aunt 40    Diabetes Paternal Aunt     Ovarian cancer Paternal Grandmother 40    Cancer Maternal Grandfather     Melanoma Maternal Aunt     Melanoma Maternal Uncle     Breast cancer Paternal Aunt 40    Lymphoma Paternal Aunt     Ulcerative colitis Son     Psoriasis Son     Psoriasis Son     Breast cancer Cousin 25    Allergic rhinitis Neg Hx     Allergies Neg Hx     Angioedema Neg Hx     Asthma Neg Hx     Atopy Neg Hx     Eczema Neg Hx     Immunodeficiency Neg Hx     Rhinitis Neg Hx     Urticaria Neg Hx      Review of patient's allergies indicates:   Allergen Reactions    Bromelains Hives     " causes mouth to bleed"    Sudafed [pseudoephedrine hcl] Other (See Comments)     Does not want to wake up .    Amoxicillin-pot clavulanate Itching     Other reaction(s): Itching    Hydrocodone Itching    Iodinated contrast- oral and iv dye      childhood    Iodine and iodide containing products Other (See Comments)    Melon Hives    Pantoprazole Nausea Only     Other reaction(s): upset stomach/halitosis    Percocet [oxycodone-acetaminophen] Itching    Barium iodide Rash    Ephedrine Other (See Comments)     Other reaction(s): comatose    Penicillins      Other reaction(s): does not work on pt symptoms     I have reviewed past medical, family, and social history. I have reviewed previous nurse notes.    Performance Status:The patient's activity level is functions out of house.      Review of Systems   Constitutional: Negative for activity change, appetite change, " "chills, diaphoresis, fatigue, fever and unexpected weight change.   HENT: Negative for dental problem, sneezing, sore throat, trouble swallowing, postnasal drip, rhinorrhea, sinus pressure, sinus pain, and voice change.     Respiratory: Negative for chest tightness, shortness of breath, wheezing.  Positive for cough,   Cardiovascular: Negative for chest pain, palpitations and leg swelling.   Musculoskeletal: Negative for gait problem, myalgias and neck pain.   Skin: Negative for color change and pallor.   Allergic/Immunologic: Negative for environmental allergies and food allergies.   Neurological: Negative for dizziness, speech difficulty, weakness, light-headedness, numbness and headaches.   Hematological: Negative for adenopathy. Does not bruise/bleed easily.   Psychiatric/Behavioral: Negative for dysphoric mood and sleep disturbance. The patient is not nervous/anxious.             Exam:Comprehensive exam done. BP (!) 170/79 (BP Location: Right arm, Patient Position: Sitting)   Pulse (!) 56   Ht 5' 10" (1.778 m)   Wt 119.2 kg (262 lb 10.9 oz)   SpO2 95% Comment: on room air  BMI 37.69 kg/m²   Exam included Vitals as listed, and patient's appearance and affect and alertness and mood, oral exam for yeast and hygiene and pharynx lesions and Mallapatti (M) score, neck with inspection for jvd and masses and thyroid abnormalities and lymph nodes (supraclavicular and infraclavicular nodes and axillary also examined and noted if abn), chest exam included symmetry and effort and fremitus and percussion and auscultation, cardiac exam included rhythm and gallops and murmur and rubs and jvd and edema, abdominal exam for mass and hepatosplenomegaly and tenderness and hernias and bowel sounds, Musculoskeletal exam with muscle tone and posture and mobility/gait and  strength, and skin for rashes and cyanosis and pallor and turgor, extremity for clubbing.  Findings were normal except for pertinent findings listed " below:  M3, chest is symmetric, no distress, normal percussion, normal fremitus and good normal breath sounds        Radiographs (ct chest and cxr) reviewed: results reviewed by direct vision  CT Chest Without Contrast 2/10/2020  Normal exam, 3.3 mm nodule seen in right upper lobe.    Lungs are clear.  Minimal retained mucus secretions in the trachea.  No pleural effusion or pneumothorax.  Normal sized heart.  Thyroid is small and heterogeneous with a subcentimeter nodule or cyst in the right lobe.     XR CHEST 1 VIEW 05/10/2018   There is left lower lobe atelectasis and possible small left pleural effusion on this study.  No pneumothorax is seen.      Labs reviewed       Lab Results   Component Value Date    WBC 11.51 11/27/2021    RBC 5.49 (H) 11/27/2021    HGB 15.6 11/27/2021    HCT 48.7 (H) 11/27/2021    MCV 89 11/27/2021    MCH 28.4 11/27/2021    MCHC 32.0 11/27/2021    RDW 13.2 11/27/2021     11/27/2021    MPV 11.4 11/27/2021    GRAN 10.6 (H) 11/27/2021    GRAN 91.7 (H) 11/27/2021    LYMPH 0.4 (L) 11/27/2021    LYMPH 3.2 (L) 11/27/2021    MONO 0.5 11/27/2021    MONO 4.3 11/27/2021    EOS 0.0 11/27/2021    BASO 0.02 11/27/2021    EOSINOPHIL 0.3 11/27/2021    BASOPHIL 0.2 11/27/2021       PFT reviewed  2/10/2020 no obstruction seen, 7% bronchodilator response with 12% needed for clinical improvement; TLC 86% with DLC0 at 88%, normal exam with mild asthma  Spirometry bronchodilator, lung volume by gas dilution, diffusion capacity measured February 10, 2020.  The FEV1 to FVC ratio was 79%, there is no airflow obstruction measured by spirometry technique.  The FEV1 measured 85% predicted at 1.97 L. The 8%   improvement in FEV1 failed to reach statistical significance ( 12% is needed for statistical significance ).  Lung volumes by gas dilution were normal.  Diffusion was normal.      Spirometry, lung volumes, diffusion are all normal.  The bronchodilator response was not statistically significant.  Clinical  correlation recommended.     Plan:  Clinical impression is apparently straight forward and impression with management as below.    Yodit was seen today for follow-up.    Diagnoses and all orders for this visit:    Immune deficiency disorder    Severe persistent asthma without complication    Severe persistent asthma with acute exacerbation  -     Discontinue: montelukast (SINGULAIR) 10 mg tablet; Take 1 tablet (10 mg total) by mouth every evening.  -     montelukast (SINGULAIR) 10 mg tablet; Take 1 tablet (10 mg total) by mouth every evening.  -     budesonide-glycopyr-formoterol (BREZTRI AEROSPHERE) 160-9-4.8 mcg/actuation HFAA; Inhale 2 puffs into the lungs 2 (two) times a day.  -     levoFLOXacin (LEVAQUIN) 500 MG tablet; Take 1 tablet (500 mg total) by mouth once daily.    Severe persistent asthma in adult without complication  -     levoFLOXacin (LEVAQUIN) 500 MG tablet; Take 1 tablet (500 mg total) by mouth once daily.    Immunologic deficiency syndrome  -     levoFLOXacin (LEVAQUIN) 500 MG tablet; Take 1 tablet (500 mg total) by mouth once daily.    Yeast infection  -     fluconazole (DIFLUCAN) 200 MG Tab; Take 1 tablet (200 mg total) by mouth once daily. Do not take within 24 hrs of levaquin for 7 days        Follow up in about 6 months (around 11/4/2022), or if symptoms worsen or fail to improve.    Discussed with patient above for education the following:      Patient Instructions   May use breztri 2 twice daily -- could use more or less as needed.   If used regular -- breztri would act as controller.    May use levaquin and prednisone if sinus/lung exacerbates.    Lungs loook good wrt lung tissue-- no lung tissue disease seen from Rheumatoid disease.   Ct abd 11/2021 viewed.

## 2022-05-04 NOTE — PATIENT INSTRUCTIONS
May use breztri 2 twice daily -- could use more or less as needed.   If used regular -- breztri would act as controller.    May use levaquin and prednisone if sinus/lung exacerbates.    Lungs loook good wrt lung tissue-- no lung tissue disease seen from Rheumatoid disease.   Ct abd 11/2021 viewed.

## 2022-05-11 ENCOUNTER — OFFICE VISIT (OUTPATIENT)
Dept: DERMATOLOGY | Facility: CLINIC | Age: 62
End: 2022-05-11
Payer: MEDICARE

## 2022-05-11 ENCOUNTER — PATIENT MESSAGE (OUTPATIENT)
Dept: DERMATOLOGY | Facility: CLINIC | Age: 62
End: 2022-05-11

## 2022-05-11 DIAGNOSIS — L30.9 DERMATITIS: ICD-10-CM

## 2022-05-11 DIAGNOSIS — K12.0 RECURRENT APHTHOUS STOMATITIS: Primary | ICD-10-CM

## 2022-05-11 PROCEDURE — 99213 OFFICE O/P EST LOW 20 MIN: CPT | Mod: S$PBB,,, | Performed by: STUDENT IN AN ORGANIZED HEALTH CARE EDUCATION/TRAINING PROGRAM

## 2022-05-11 PROCEDURE — 99999 PR PBB SHADOW E&M-EST. PATIENT-LVL III: ICD-10-PCS | Mod: PBBFAC,,, | Performed by: STUDENT IN AN ORGANIZED HEALTH CARE EDUCATION/TRAINING PROGRAM

## 2022-05-11 PROCEDURE — 99999 PR PBB SHADOW E&M-EST. PATIENT-LVL III: CPT | Mod: PBBFAC,,, | Performed by: STUDENT IN AN ORGANIZED HEALTH CARE EDUCATION/TRAINING PROGRAM

## 2022-05-11 PROCEDURE — 99213 OFFICE O/P EST LOW 20 MIN: CPT | Mod: PBBFAC | Performed by: STUDENT IN AN ORGANIZED HEALTH CARE EDUCATION/TRAINING PROGRAM

## 2022-05-11 PROCEDURE — 99213 PR OFFICE/OUTPT VISIT, EST, LEVL III, 20-29 MIN: ICD-10-PCS | Mod: S$PBB,,, | Performed by: STUDENT IN AN ORGANIZED HEALTH CARE EDUCATION/TRAINING PROGRAM

## 2022-05-11 RX ORDER — DESONIDE 0.5 MG/G
CREAM TOPICAL 2 TIMES DAILY
Qty: 1 EACH | Refills: 3 | Status: ON HOLD | OUTPATIENT
Start: 2022-05-11 | End: 2022-10-04

## 2022-05-11 RX ORDER — TRETINOIN 0.5 MG/G
CREAM TOPICAL NIGHTLY
Qty: 45 G | Refills: 3 | Status: SHIPPED | OUTPATIENT
Start: 2022-05-11 | End: 2022-05-12 | Stop reason: SDUPTHER

## 2022-05-11 RX ORDER — CLOBETASOL PROPIONATE 0.5 MG/G
CREAM TOPICAL
Qty: 60 G | Refills: 0 | Status: ON HOLD | OUTPATIENT
Start: 2022-05-11 | End: 2022-10-04

## 2022-05-11 NOTE — PROGRESS NOTES
Subjective:       Patient ID:  Yodit Canseco is a 61 y.o. female who presents for   Chief Complaint   Patient presents with    Rash     Recheck  Desonide around eyes-needs refill    Scalp- needs prescription; discussed last time but pt reports it was not sent to pharmacy (clabetasol)    Spot     Spot on right forearm      History of Present Illness: The patient presents for follow up of a rash around the eyes, currently being treated as periocular dermatitis with doxycycline. Reports a little improvement in symptoms, though some red bumps around the eyes are still present. She also has been having a recurrence of ulcers in the mouth, recently underwent a biopsy with ENT with results pending. There is some concern for possible Bechet's. Previous spot on the arm has resolved after treated with cryotherapy. Needs refills.         Review of Systems     Objective:    Physical Exam       Diagram Legend     Erythematous scaling macule/papule c/w actinic keratosis       Vascular papule c/w angioma      Pigmented verrucoid papule/plaque c/w seborrheic keratosis      Yellow umbilicated papule c/w sebaceous hyperplasia      Irregularly shaped tan macule c/w lentigo     1-2 mm smooth white papules consistent with Milia      Movable subcutaneous cyst with punctum c/w epidermal inclusion cyst      Subcutaneous movable cyst c/w pilar cyst      Firm pink to brown papule c/w dermatofibroma      Pedunculated fleshy papule(s) c/w skin tag(s)      Evenly pigmented macule c/w junctional nevus     Mildly variegated pigmented, slightly irregular-bordered macule c/w mildly atypical nevus      Flesh colored to evenly pigmented papule c/w intradermal nevus       Pink pearly papule/plaque c/w basal cell carcinoma      Erythematous hyperkeratotic cursted plaque c/w SCC      Surgical scar with no sign of skin cancer recurrence      Open and closed comedones      Inflammatory papules and pustules      Verrucoid papule consistent  consistent with wart     Erythematous eczematous patches and plaques     Dystrophic onycholytic nail with subungual debris c/w onychomycosis     Umbilicated papule    Erythematous-base heme-crusted tan verrucoid plaque consistent with inflamed seborrheic keratosis     Erythematous Silvery Scaling Plaque c/w Psoriasis     See annotation      Assessment / Plan:       Periocular dermatitis   Recurrent aphthous stomatitis - pending results from ENT. Continue doxycycline for now; consider increase in strength pending biopsy results.      Dermatitis - refills given  -     clobetasoL (TEMOVATE) 0.05 % cream; APPLY TOPICALLY TO THE AFFECTED AREA TWICE DAILY  Dispense: 60 g; Refill: 0  -     desonide (DESOWEN) 0.05 % cream; Apply topically 2 (two) times daily.  Dispense: 1 each; Refill: 3    Other orders  -     tretinoin (RETIN-A) 0.05 % cream; Apply topically every evening.  Dispense: 45 g; Refill: 3             No follow-ups on file.

## 2022-05-12 ENCOUNTER — PATIENT MESSAGE (OUTPATIENT)
Dept: DERMATOLOGY | Facility: CLINIC | Age: 62
End: 2022-05-12
Payer: MEDICARE

## 2022-05-12 LAB
FINAL PATHOLOGIC DIAGNOSIS: NORMAL
GROSS: NORMAL
Lab: NORMAL
MICROSCOPIC EXAM: NORMAL

## 2022-05-12 RX ORDER — TRETINOIN 0.5 MG/G
CREAM TOPICAL NIGHTLY
Qty: 45 G | Refills: 3 | Status: SHIPPED | OUTPATIENT
Start: 2022-05-12 | End: 2022-10-17

## 2022-05-16 ENCOUNTER — PATIENT MESSAGE (OUTPATIENT)
Dept: OTOLARYNGOLOGY | Facility: CLINIC | Age: 62
End: 2022-05-16
Payer: MEDICARE

## 2022-05-20 ENCOUNTER — OFFICE VISIT (OUTPATIENT)
Dept: FAMILY MEDICINE | Facility: CLINIC | Age: 62
End: 2022-05-20
Payer: MEDICARE

## 2022-05-20 VITALS
HEART RATE: 75 BPM | TEMPERATURE: 97 F | DIASTOLIC BLOOD PRESSURE: 69 MMHG | SYSTOLIC BLOOD PRESSURE: 118 MMHG | BODY MASS INDEX: 34.78 KG/M2 | WEIGHT: 189 LBS | HEIGHT: 62 IN

## 2022-05-20 DIAGNOSIS — N89.8 VAGINAL LESION: Primary | ICD-10-CM

## 2022-05-20 DIAGNOSIS — Z76.0 MEDICATION REFILL: ICD-10-CM

## 2022-05-20 PROCEDURE — 87077 CULTURE AEROBIC IDENTIFY: CPT | Performed by: NURSE PRACTITIONER

## 2022-05-20 PROCEDURE — 99999 PR PBB SHADOW E&M-EST. PATIENT-LVL V: CPT | Mod: PBBFAC,,, | Performed by: NURSE PRACTITIONER

## 2022-05-20 PROCEDURE — 99999 PR PBB SHADOW E&M-EST. PATIENT-LVL V: ICD-10-PCS | Mod: PBBFAC,,, | Performed by: NURSE PRACTITIONER

## 2022-05-20 PROCEDURE — 99213 PR OFFICE/OUTPT VISIT, EST, LEVL III, 20-29 MIN: ICD-10-PCS | Mod: S$PBB,,, | Performed by: NURSE PRACTITIONER

## 2022-05-20 PROCEDURE — 99215 OFFICE O/P EST HI 40 MIN: CPT | Mod: PBBFAC,PO | Performed by: NURSE PRACTITIONER

## 2022-05-20 PROCEDURE — 87070 CULTURE OTHR SPECIMN AEROBIC: CPT | Performed by: NURSE PRACTITIONER

## 2022-05-20 PROCEDURE — 87186 SC STD MICRODIL/AGAR DIL: CPT | Performed by: NURSE PRACTITIONER

## 2022-05-20 PROCEDURE — 99213 OFFICE O/P EST LOW 20 MIN: CPT | Mod: S$PBB,,, | Performed by: NURSE PRACTITIONER

## 2022-05-20 RX ORDER — DOXYCYCLINE HYCLATE 100 MG
100 TABLET ORAL 2 TIMES DAILY
Qty: 14 TABLET | Refills: 0 | Status: SHIPPED | OUTPATIENT
Start: 2022-05-20 | End: 2022-05-24

## 2022-05-20 RX ORDER — MUPIROCIN 20 MG/G
OINTMENT TOPICAL 3 TIMES DAILY
Qty: 22 G | Refills: 0 | Status: SHIPPED | OUTPATIENT
Start: 2022-05-20 | End: 2022-05-30

## 2022-05-20 RX ORDER — CHLORHEXIDINE GLUCONATE 40 MG/ML
SOLUTION TOPICAL DAILY
Qty: 473 ML | Refills: 0 | Status: SHIPPED | OUTPATIENT
Start: 2022-05-20 | End: 2022-05-30

## 2022-05-20 NOTE — PATIENT INSTRUCTIONS
Consider ob/gyn if symptoms persist  Sitz baths OTC as directed  Report to ER immediately if symptoms worsen or persist

## 2022-05-20 NOTE — PROGRESS NOTES
Subjective:       Patient ID: Yodit Canseco is a 61 y.o. female.    Chief Complaint: Mouth Lesions and Cyst (Groin area/)    Cyst  This is a new (Pt states has drained, however still having some drainage) problem. The current episode started 1 to 4 weeks ago (Began 5/13/2022 per pt report). The problem occurs daily. The problem has been waxing and waning. Pertinent negatives include no abdominal pain, anorexia, arthralgias, change in bowel habit, chest pain, chills, congestion, coughing, diaphoresis, fatigue, fever, headaches, joint swelling, myalgias, nausea, neck pain, numbness, rash, sore throat, swollen glands, urinary symptoms, vertigo, visual change, vomiting or weakness. Nothing aggravates the symptoms. She has tried nothing for the symptoms. The treatment provided no relief.   Pt also requests magic mouthwash refill for chronic oral lesions; sees rheumatology.  Past Medical History:   Diagnosis Date    Angio-edema     Arthralgia     Asthma without status asthmaticus     Bronchitis     COPD (chronic obstructive pulmonary disease)     COVID-19 virus infection 7/23/2021    Diverticulosis of large intestine without hemorrhage 10/8/2015    Environmental allergies     Eosinophilic asthma 3/8/2018    Gastroparesis     Hand arthritis     Herpes simplex without mention of complication     oral    Hidradenitis     Hyperlipidemia     Hypothyroidism     Immune deficiency disorder     Intraperitoneal abscess 5/7/2018    LBP radiating to left leg     Leukocytosis, unspecified     Migraine headache     Obesity, Class III, BMI 40-49.9 (morbid obesity)     Periorbital cellulitis 12/31/2016    Prediabetes     Recurrent upper respiratory infection (URI)     S/P colonoscopy November 2010    Sepsis 5/7/2018    Urticaria      Social History     Socioeconomic History    Marital status:    Tobacco Use    Smoking status: Never Smoker    Smokeless tobacco: Never Used   Substance and Sexual  Activity    Alcohol use: Yes     Alcohol/week: 0.0 standard drinks     Comment: very rarely    Drug use: No    Sexual activity: Yes     Partners: Male   Social History Narrative    . Disabled teacher.     Past Surgical History:   Procedure Laterality Date    ADENOIDECTOMY      CHOLECYSTECTOMY      CHOLECYSTECTOMY      COLONOSCOPY  2015    repeat in 10    COLONOSCOPY N/A 10/8/2015    Procedure: COLONOSCOPY;  Surgeon: Panda Bose MD;  Location: [facility];  Service: Endoscopy;  Laterality: N/A;    Hand fracture surgery      HARDWARE REMOVAL      left hand 5th [facility]    hymenectomy      HYSTERECTOMY      menorrhagia-Ovaries intact    ROTATOR CUFF REPAIR Right     SLEEVE GASTROPLASTY  04/16/2018    TONSILLECTOMY      Urethral dilatation         Review of Systems   Constitutional: Negative.  Negative for chills, diaphoresis, fatigue and fever.   HENT: Negative.  Negative for nasal congestion and sore throat.    Eyes: Negative.    Respiratory: Negative.  Negative for cough.    Cardiovascular: Negative.  Negative for chest pain.   Gastrointestinal: Negative.  Negative for abdominal pain, anorexia, change in bowel habit, nausea, vomiting and change in bowel habit.   Endocrine: Negative.    Genitourinary: Negative.         Vaginal lesion   Musculoskeletal: Negative for arthralgias, joint swelling, myalgias and neck pain.   Integumentary:  Negative for rash. Negative.   Allergic/Immunologic: Negative.    Neurological: Negative.  Negative for vertigo, weakness, numbness and headaches.   Psychiatric/Behavioral: Negative.          Objective:      Physical Exam  Vitals and nursing note reviewed.   Constitutional:       Appearance: Normal appearance.   HENT:      Head: Normocephalic.      Right Ear: Tympanic membrane, ear canal and external ear normal.      Left Ear: Tympanic membrane, ear canal and external ear normal.      Nose: Nose normal.      Mouth/Throat:      Mouth: Mucous membranes are moist.       Pharynx: Oropharynx is clear.   Eyes:      Conjunctiva/sclera: Conjunctivae normal.      Pupils: Pupils are equal, round, and reactive to light.   Cardiovascular:      Rate and Rhythm: Normal rate and regular rhythm.      Pulses: Normal pulses.      Heart sounds: Normal heart sounds.   Pulmonary:      Effort: Pulmonary effort is normal.      Breath sounds: Normal breath sounds.   Abdominal:      General: Bowel sounds are normal.      Palpations: Abdomen is soft.   Genitourinary:      Musculoskeletal:         General: Normal range of motion.      Cervical back: Normal range of motion and neck supple.   Skin:     General: Skin is warm and dry.      Capillary Refill: Capillary refill takes 2 to 3 seconds.   Neurological:      Mental Status: She is alert and oriented to person, place, and time.   Psychiatric:         Mood and Affect: Mood normal.         Behavior: Behavior normal.         Thought Content: Thought content normal.         Judgment: Judgment normal.         Assessment:       Problem List Items Addressed This Visit    None     Visit Diagnoses     Vaginal lesion    -  Primary    Relevant Orders    Aerobic culture    Medication refill              Plan:           Yodit was seen today for mouth lesions and cyst.    Diagnoses and all orders for this visit:    Vaginal lesion  -     Aerobic culture  -     mupirocin (BACTROBAN) 2 % ointment; Apply topically 3 (three) times daily. for 10 days  -     doxycycline (VIBRA-TABS) 100 MG tablet; Take 1 tablet (100 mg total) by mouth 2 (two) times daily. for 7 days  -     chlorhexidine (HIBICLENS) 4 % external liquid; Apply topically once daily. Wash with daily. Do not get in eyes. for 10 days    Medication refill  -     (Magic mouthwash) 1:1:1 diphenhydramine(Benadryl) 12.5mg/5ml liq, aluminum & magnesium hydroxide-simethicone (Maalox), LIDOcaine viscous 2%; Swish and spit 5 mLs every 8 (eight) hours as needed (oral ulcers). Gargle and spit. DO NOT SWALLOW

## 2022-05-23 ENCOUNTER — LAB VISIT (OUTPATIENT)
Dept: LAB | Facility: HOSPITAL | Age: 62
End: 2022-05-23
Attending: INTERNAL MEDICINE
Payer: MEDICARE

## 2022-05-23 ENCOUNTER — OFFICE VISIT (OUTPATIENT)
Dept: RHEUMATOLOGY | Facility: CLINIC | Age: 62
End: 2022-05-23
Payer: MEDICARE

## 2022-05-23 ENCOUNTER — SPECIALTY PHARMACY (OUTPATIENT)
Dept: PHARMACY | Facility: CLINIC | Age: 62
End: 2022-05-23
Payer: MEDICARE

## 2022-05-23 VITALS
HEART RATE: 61 BPM | SYSTOLIC BLOOD PRESSURE: 149 MMHG | RESPIRATION RATE: 18 BRPM | BODY MASS INDEX: 34.89 KG/M2 | DIASTOLIC BLOOD PRESSURE: 89 MMHG | WEIGHT: 189.63 LBS | HEIGHT: 62 IN

## 2022-05-23 DIAGNOSIS — M35.9 UNDIFFERENTIATED CONNECTIVE TISSUE DISEASE: ICD-10-CM

## 2022-05-23 DIAGNOSIS — M35.2 BEHCET'S SYNDROME INVOLVING ORAL MUCOSA: Primary | ICD-10-CM

## 2022-05-23 DIAGNOSIS — M32.19 OTHER SYSTEMIC LUPUS ERYTHEMATOSUS WITH OTHER ORGAN INVOLVEMENT: Primary | ICD-10-CM

## 2022-05-23 DIAGNOSIS — M35.2 BEHCET'S SYNDROME INVOLVING ORAL MUCOSA: ICD-10-CM

## 2022-05-23 DIAGNOSIS — M32.19 OTHER SYSTEMIC LUPUS ERYTHEMATOSUS WITH OTHER ORGAN INVOLVEMENT: ICD-10-CM

## 2022-05-23 PROBLEM — M32.8 OTHER FORMS OF SYSTEMIC LUPUS ERYTHEMATOSUS: Status: ACTIVE | Noted: 2022-05-23

## 2022-05-23 LAB
ALBUMIN SERPL BCP-MCNC: 4.1 G/DL (ref 3.5–5.2)
ALP SERPL-CCNC: 53 U/L (ref 55–135)
ALT SERPL W/O P-5'-P-CCNC: 26 U/L (ref 10–44)
ANION GAP SERPL CALC-SCNC: 9 MMOL/L (ref 8–16)
AST SERPL-CCNC: 32 U/L (ref 10–40)
BASOPHILS # BLD AUTO: 0.09 K/UL (ref 0–0.2)
BASOPHILS NFR BLD: 0.7 % (ref 0–1.9)
BILIRUB SERPL-MCNC: 0.5 MG/DL (ref 0.1–1)
BUN SERPL-MCNC: 12 MG/DL (ref 8–23)
C3 SERPL-MCNC: 125 MG/DL (ref 50–180)
C4 SERPL-MCNC: 25 MG/DL (ref 11–44)
CALCIUM SERPL-MCNC: 10.1 MG/DL (ref 8.7–10.5)
CHLORIDE SERPL-SCNC: 104 MMOL/L (ref 95–110)
CO2 SERPL-SCNC: 28 MMOL/L (ref 23–29)
CREAT SERPL-MCNC: 0.9 MG/DL (ref 0.5–1.4)
DIFFERENTIAL METHOD: ABNORMAL
EOSINOPHIL # BLD AUTO: 0.1 K/UL (ref 0–0.5)
EOSINOPHIL NFR BLD: 0.5 % (ref 0–8)
ERYTHROCYTE [DISTWIDTH] IN BLOOD BY AUTOMATED COUNT: 13.5 % (ref 11.5–14.5)
ERYTHROCYTE [SEDIMENTATION RATE] IN BLOOD BY WESTERGREN METHOD: 5 MM/HR (ref 0–20)
EST. GFR  (AFRICAN AMERICAN): >60 ML/MIN/1.73 M^2
EST. GFR  (NON AFRICAN AMERICAN): >60 ML/MIN/1.73 M^2
GLUCOSE SERPL-MCNC: 84 MG/DL (ref 70–110)
HCT VFR BLD AUTO: 45.3 % (ref 37–48.5)
HGB BLD-MCNC: 15.1 G/DL (ref 12–16)
IMM GRANULOCYTES # BLD AUTO: 0.06 K/UL (ref 0–0.04)
IMM GRANULOCYTES NFR BLD AUTO: 0.5 % (ref 0–0.5)
LYMPHOCYTES # BLD AUTO: 1.9 K/UL (ref 1–4.8)
LYMPHOCYTES NFR BLD: 15.2 % (ref 18–48)
MCH RBC QN AUTO: 29.5 PG (ref 27–31)
MCHC RBC AUTO-ENTMCNC: 33.3 G/DL (ref 32–36)
MCV RBC AUTO: 89 FL (ref 82–98)
MONOCYTES # BLD AUTO: 0.6 K/UL (ref 0.3–1)
MONOCYTES NFR BLD: 4.7 % (ref 4–15)
NEUTROPHILS # BLD AUTO: 9.9 K/UL (ref 1.8–7.7)
NEUTROPHILS NFR BLD: 78.4 % (ref 38–73)
NRBC BLD-RTO: 0 /100 WBC
PLATELET # BLD AUTO: 219 K/UL (ref 150–450)
PMV BLD AUTO: 11.8 FL (ref 9.2–12.9)
POTASSIUM SERPL-SCNC: 4 MMOL/L (ref 3.5–5.1)
PROT SERPL-MCNC: 7.2 G/DL (ref 6–8.4)
RBC # BLD AUTO: 5.12 M/UL (ref 4–5.4)
SODIUM SERPL-SCNC: 141 MMOL/L (ref 136–145)
WBC # BLD AUTO: 12.61 K/UL (ref 3.9–12.7)

## 2022-05-23 PROCEDURE — 85025 COMPLETE CBC W/AUTO DIFF WBC: CPT | Performed by: INTERNAL MEDICINE

## 2022-05-23 PROCEDURE — 36415 COLL VENOUS BLD VENIPUNCTURE: CPT | Mod: PO | Performed by: INTERNAL MEDICINE

## 2022-05-23 PROCEDURE — 99215 PR OFFICE/OUTPT VISIT, EST, LEVL V, 40-54 MIN: ICD-10-PCS | Mod: S$PBB,,, | Performed by: INTERNAL MEDICINE

## 2022-05-23 PROCEDURE — 85651 RBC SED RATE NONAUTOMATED: CPT | Performed by: INTERNAL MEDICINE

## 2022-05-23 PROCEDURE — 86038 ANTINUCLEAR ANTIBODIES: CPT | Performed by: INTERNAL MEDICINE

## 2022-05-23 PROCEDURE — 80053 COMPREHEN METABOLIC PANEL: CPT | Performed by: INTERNAL MEDICINE

## 2022-05-23 PROCEDURE — 99999 PR PBB SHADOW E&M-EST. PATIENT-LVL III: ICD-10-PCS | Mod: PBBFAC,,, | Performed by: INTERNAL MEDICINE

## 2022-05-23 PROCEDURE — 99213 OFFICE O/P EST LOW 20 MIN: CPT | Mod: PBBFAC,PO | Performed by: INTERNAL MEDICINE

## 2022-05-23 PROCEDURE — 86160 COMPLEMENT ANTIGEN: CPT | Mod: 59 | Performed by: INTERNAL MEDICINE

## 2022-05-23 PROCEDURE — 86160 COMPLEMENT ANTIGEN: CPT | Performed by: INTERNAL MEDICINE

## 2022-05-23 PROCEDURE — 99999 PR PBB SHADOW E&M-EST. PATIENT-LVL III: CPT | Mod: PBBFAC,,, | Performed by: INTERNAL MEDICINE

## 2022-05-23 PROCEDURE — 86235 NUCLEAR ANTIGEN ANTIBODY: CPT | Mod: 59 | Performed by: INTERNAL MEDICINE

## 2022-05-23 PROCEDURE — 99215 OFFICE O/P EST HI 40 MIN: CPT | Mod: S$PBB,,, | Performed by: INTERNAL MEDICINE

## 2022-05-23 PROCEDURE — 86039 ANTINUCLEAR ANTIBODIES (ANA): CPT | Performed by: INTERNAL MEDICINE

## 2022-05-23 RX ORDER — APREMILAST 10-20-30MG
KIT ORAL
Qty: 55 TABLET | Refills: 0 | COMMUNITY
Start: 2022-05-23 | End: 2022-07-18 | Stop reason: ALTCHOICE

## 2022-05-23 RX ORDER — HYDROXYCHLOROQUINE SULFATE 200 MG/1
200 TABLET, FILM COATED ORAL 2 TIMES DAILY
Qty: 60 TABLET | Refills: 6 | Status: SHIPPED | OUTPATIENT
Start: 2022-05-23 | End: 2022-08-15

## 2022-05-23 RX ORDER — APREMILAST 30 MG/1
30 TABLET, FILM COATED ORAL 2 TIMES DAILY
Qty: 60 TABLET | Refills: 11 | COMMUNITY
Start: 2022-05-23 | End: 2022-11-18

## 2022-05-23 NOTE — TELEPHONE ENCOUNTER
PA for Otezla submitted via [facility] (Key: [id number]).     PA approved: CaseId:[id number];Status:Approved;Review Type:Prior Auth;Coverage Start Date:04/23/2022;Coverage End Date:11/19/2022.    [facility] Medicare plan     Threshold: $1500.00 (accumulated $295.96) Pt will have $80.00 copay until met   Max OOP: $3500 (accumulated $1365.55) once met copay will be $0  [facility] is in network       Forwarding to FA

## 2022-05-23 NOTE — PROGRESS NOTES
RHEUMATOLOGY CLINIC FOLLOW UP VISIT  Chief complaints, HPI, ROS, EXAM, Assessment & Plans:-  Yodit Parks a 61 y.o. pleasant female comes in for worsening disease.  High titer positive FABY with arthralgias, myalgias and recurrent mucosal ulcer.  Biopsy done recently showed features of vasculitis and elevated IgG and C3 staining suggestive of underlying connective tissue disease.   She also has longstanding history of immunologic deficiency syndrome for which she takes Hizentra at home.  She is on hydroxychloroquine therapy and daily 5 mg prednisone.  Any time she quits the prednisone she developed severe mouth ulcers. SHE STILL HAVE ULCERS and intermittent joint pains .  She also takes high-dose steroid therapy for severe asthma excessive patient given by her pulmonologist.  No history of interstitial lung disease.  Rheumatological review of system negative otherwise.   Active ulcer lower lip.  Early ulcer inside left buccal mucosa.  No synovitis on examination today. Tenderness of left lateral epicondyle. No effusion.  1. Other systemic lupus erythematosus with other organ involvement    2. Behcet's syndrome involving oral mucosa    3. Undifferentiated connective tissue disease      Problem List Items Addressed This Visit     Undifferentiated connective tissue disease    Relevant Medications    hydrOXYchloroQUINE (PLAQUENIL) 200 mg tablet    Other Relevant Orders    FABY Screen w/Reflex    C3 Complement    C4 Complement    CBC Auto Differential    Comprehensive Metabolic Panel    Sedimentation rate    Protein/Creatinine Ratio, Urine    Urinalysis    Other forms of systemic lupus erythematosus - Primary    Relevant Orders    FABY Screen w/Reflex    C3 Complement    C4 Complement    CBC Auto Differential    Comprehensive Metabolic Panel    Sedimentation rate    Protein/Creatinine Ratio, Urine    Urinalysis    Behcet's syndrome involving oral mucosa     Relevant Medications    apremilast (OTEZLA STARTER) 10 mg (4)-20 mg (4)-30 mg (47) DsPk    apremilast (OTEZLA) 30 mg Tab    Other Relevant Orders    FABY Screen w/Reflex    C3 Complement    C4 Complement    CBC Auto Differential    Comprehensive Metabolic Panel    Sedimentation rate    Protein/Creatinine Ratio, Urine    Urinalysis           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   Would try to avoid immunosuppressive therapy because of her longstanding history of immunodeficiency disorder.   Try Otezla for oral ulcers.   Continue Plaquenil .  Short course high-dose prednisone taper for active oral ulcers   Check lupus activity labs   Yearly Plaquenil retinal clearance by ophthalmologist  # Follow up in about 3 months (around 8/23/2022).      Disclaimer: This note was prepared using voice recognition system and is likely to have sound alike errors and is not proof read.  Please call me with any questions.

## 2022-05-23 NOTE — TELEPHONE ENCOUNTER
Aurelia, this is Cassy Davidson with [facility].  We are working on your prescription that your doctor has sent us. We will be working with your insurance to get this approved for you. We will be calling you along the way with updates on your medication.  If you have any questions, you can reach us at [phone].    Welcome call outcome: Patient/caregiver reached

## 2022-05-24 ENCOUNTER — OFFICE VISIT (OUTPATIENT)
Dept: DERMATOLOGY | Facility: CLINIC | Age: 62
End: 2022-05-24
Payer: MEDICARE

## 2022-05-24 DIAGNOSIS — M35.2 BEHCET'S DISEASE: ICD-10-CM

## 2022-05-24 DIAGNOSIS — L30.9 DERMATITIS: Primary | ICD-10-CM

## 2022-05-24 LAB
ANA PATTERN 1: NORMAL
ANA SER QL IF: POSITIVE
ANA TITR SER IF: 5120 {TITER}
BACTERIA SPEC AEROBE CULT: ABNORMAL

## 2022-05-24 PROCEDURE — 99213 PR OFFICE/OUTPT VISIT, EST, LEVL III, 20-29 MIN: ICD-10-PCS | Mod: S$PBB,,, | Performed by: STUDENT IN AN ORGANIZED HEALTH CARE EDUCATION/TRAINING PROGRAM

## 2022-05-24 PROCEDURE — 99214 OFFICE O/P EST MOD 30 MIN: CPT | Mod: PBBFAC | Performed by: STUDENT IN AN ORGANIZED HEALTH CARE EDUCATION/TRAINING PROGRAM

## 2022-05-24 PROCEDURE — 99213 OFFICE O/P EST LOW 20 MIN: CPT | Mod: S$PBB,,, | Performed by: STUDENT IN AN ORGANIZED HEALTH CARE EDUCATION/TRAINING PROGRAM

## 2022-05-24 PROCEDURE — 99999 PR PBB SHADOW E&M-EST. PATIENT-LVL IV: ICD-10-PCS | Mod: PBBFAC,,, | Performed by: STUDENT IN AN ORGANIZED HEALTH CARE EDUCATION/TRAINING PROGRAM

## 2022-05-24 PROCEDURE — 99999 PR PBB SHADOW E&M-EST. PATIENT-LVL IV: CPT | Mod: PBBFAC,,, | Performed by: STUDENT IN AN ORGANIZED HEALTH CARE EDUCATION/TRAINING PROGRAM

## 2022-05-24 RX ORDER — CLOBETASOL PROPIONATE 0.46 MG/ML
SOLUTION TOPICAL DAILY
Qty: 50 ML | Refills: 4 | Status: ON HOLD | OUTPATIENT
Start: 2022-05-24 | End: 2022-10-04

## 2022-05-24 RX ORDER — SULFAMETHOXAZOLE AND TRIMETHOPRIM 800; 160 MG/1; MG/1
1 TABLET ORAL 2 TIMES DAILY
Qty: 14 TABLET | Refills: 0 | Status: SHIPPED | OUTPATIENT
Start: 2022-05-24 | End: 2022-05-31

## 2022-05-24 RX ORDER — DOXYCYCLINE 100 MG/1
100 CAPSULE ORAL DAILY
Qty: 90 CAPSULE | Refills: 0 | Status: ON HOLD | OUTPATIENT
Start: 2022-05-24 | End: 2022-09-04 | Stop reason: HOSPADM

## 2022-05-24 NOTE — PROGRESS NOTES
Subjective:       Patient ID:  Yodit Canseco is a 61 y.o. female who presents for   Chief Complaint   Patient presents with    flare up     Lupus falre up      History of Present Illness: The patient presents for follow up of rash around the eyes, on doxycycline with improvement. Last seen on 5/11/22. Patient also has a history of Systemic lupus with a concern also for possible Bechet's syndrome as she has recurrent mouth ulcers, currently being managed by Rheumatology with plaquenil, prednisone, Hizentra, and now awaiting approval for Otezla to help with symptoms. From a cutaneous standpoint, appears to be doing well. Did have an abscess in the groin that PCP increased doxycycline to 2 times daily. Otherwise, no new lesions.         Review of Systems   Constitutional: Negative for fever and chills.        Objective:    Physical Exam   Constitutional: She appears well-developed and well-nourished. No distress.   Neurological: She is alert and oriented to person, place, and time. She is not disoriented.   Psychiatric: She has a normal mood and affect.   Skin:   Areas Examined (abnormalities noted in diagram):   Head / Face Inspection Performed  Neck Inspection Performed  Chest / Axilla Inspection Performed  RUE Inspected  LUE Inspection Performed         Diagram Legend     Erythematous scaling macule/papule c/w actinic keratosis       Vascular papule c/w angioma      Pigmented verrucoid papule/plaque c/w seborrheic keratosis      Yellow umbilicated papule c/w sebaceous hyperplasia      Irregularly shaped tan macule c/w lentigo     1-2 mm smooth white papules consistent with Milia      Movable subcutaneous cyst with punctum c/w epidermal inclusion cyst      Subcutaneous movable cyst c/w pilar cyst      Firm pink to brown papule c/w dermatofibroma      Pedunculated fleshy papule(s) c/w skin tag(s)      Evenly pigmented macule c/w junctional nevus     Mildly variegated pigmented, slightly irregular-bordered  macule c/w mildly atypical nevus      Flesh colored to evenly pigmented papule c/w intradermal nevus       Pink pearly papule/plaque c/w basal cell carcinoma      Erythematous hyperkeratotic cursted plaque c/w SCC      Surgical scar with no sign of skin cancer recurrence      Open and closed comedones      Inflammatory papules and pustules      Verrucoid papule consistent consistent with wart     Erythematous eczematous patches and plaques     Dystrophic onycholytic nail with subungual debris c/w onychomycosis     Umbilicated papule    Erythematous-base heme-crusted tan verrucoid plaque consistent with inflamed seborrheic keratosis     Erythematous Silvery Scaling Plaque c/w Psoriasis     See annotation      Assessment / Plan:      Bechet's Disease - clinically consistent with biopsy, clinical workup for Systemic lupus associated with Behcet's related vasculitis. Currently being managed by Rheumatology with Plaquenil, prednisone, and currently awaiting approval for Otezla. Okay to continue doxycycline for now and topical steroids. Will see how patient's symptoms are on Otezla and see if all other symptoms improve.     Dermatitis - improving, will continue current doxycycline and topical steroids.   -     doxycycline (VIBRAMYCIN) 100 MG Cap; Take 1 capsule (100 mg total) by mouth once daily.  Dispense: 90 capsule; Refill: 0  -     clobetasoL (TEMOVATE) 0.05 % external solution; Apply topically once daily.  Dispense: 50 mL; Refill: 4             Follow up in about 3 months (around 8/24/2022).

## 2022-05-26 LAB
ANTI SM ANTIBODY: 0.08 RATIO (ref 0–0.99)
ANTI SM/RNP ANTIBODY: 0.14 RATIO (ref 0–0.99)
ANTI-SM INTERPRETATION: NEGATIVE
ANTI-SM/RNP INTERPRETATION: NEGATIVE
ANTI-SSA ANTIBODY: 0.06 RATIO (ref 0–0.99)
ANTI-SSA INTERPRETATION: NEGATIVE
ANTI-SSB ANTIBODY: 0.08 RATIO (ref 0–0.99)
ANTI-SSB INTERPRETATION: NEGATIVE
DSDNA AB SER-ACNC: NORMAL [IU]/ML

## 2022-05-26 NOTE — TELEPHONE ENCOUNTER
Outgoing call to patient regarding Otezla prescription we received. Informed patient Otezla has been approved through insurance with a $80 copay. No grants are currently available at this time but am able to apply patient for  assistance. Patient provided consent, HH size, and annual income and requested to have pt portion sent via e-mail address on file.Sent a staff message to [employer] regarding Otezla assistance application and faxed application prescription to [phone] for provider review and signature. Will continue to follow up.

## 2022-06-02 DIAGNOSIS — T50.905A DRUG-INDUCED NAUSEA AND VOMITING: Primary | ICD-10-CM

## 2022-06-02 DIAGNOSIS — R11.2 DRUG-INDUCED NAUSEA AND VOMITING: Primary | ICD-10-CM

## 2022-06-02 RX ORDER — ONDANSETRON 4 MG/1
4 TABLET, FILM COATED ORAL EVERY 8 HOURS PRN
Qty: 90 TABLET | Refills: 0 | Status: SHIPPED | OUTPATIENT
Start: 2022-06-02 | End: 2022-07-18 | Stop reason: SDUPTHER

## 2022-06-03 NOTE — TELEPHONE ENCOUNTER
Returned call to pt. Pt informed she faxed back application and income documents to . Faxing provider portion to [employer] via fax @[phone]. Will continue to follow up until final determination is made.

## 2022-06-03 NOTE — TELEPHONE ENCOUNTER
Incoming call from patient requesting to speak with Alize. Alize was unavailable at the time, patient agreed to call back.

## 2022-06-10 ENCOUNTER — PATIENT MESSAGE (OUTPATIENT)
Dept: OPHTHALMOLOGY | Facility: CLINIC | Age: 62
End: 2022-06-10
Payer: MEDICARE

## 2022-06-10 NOTE — TELEPHONE ENCOUNTER
Patient is approved for [employer] Patient Assistance Program as of 6/9/22 through 12/31/22 and will receive Otezla  free of charge through the programs dispensing pharmacy, [facility]. Someone from the program's pharmacy will be reaching out to patient to coordinate their first shipment. Patient can also contact [employer] Patient Assistance Program at [phone] to check on the status of their prescription and schedule shipment for medication.        FOR DOCUMENTATION ONLY:  Financial Assistance for Otezla is approved from 6/9/22 to 12/31/22  Source: [employer] Patient Assistance Program  Case ID: [id number]  Phone: [phone]  Fax: [phone]   Pharmacy: [facility]

## 2022-06-20 ENCOUNTER — OFFICE VISIT (OUTPATIENT)
Dept: OPHTHALMOLOGY | Facility: CLINIC | Age: 62
End: 2022-06-20
Payer: MEDICARE

## 2022-06-20 DIAGNOSIS — H52.203 HYPEROPIA WITH ASTIGMATISM AND PRESBYOPIA, BILATERAL: ICD-10-CM

## 2022-06-20 DIAGNOSIS — H52.4 HYPEROPIA WITH ASTIGMATISM AND PRESBYOPIA, BILATERAL: ICD-10-CM

## 2022-06-20 DIAGNOSIS — H25.13 NUCLEAR SCLEROSIS, BILATERAL: ICD-10-CM

## 2022-06-20 DIAGNOSIS — M35.9 UNDIFFERENTIATED CONNECTIVE TISSUE DISEASE: ICD-10-CM

## 2022-06-20 DIAGNOSIS — Z79.899 LONG-TERM USE OF PLAQUENIL: Primary | ICD-10-CM

## 2022-06-20 DIAGNOSIS — H25.013 CORTICAL AGE-RELATED CATARACT OF BOTH EYES: ICD-10-CM

## 2022-06-20 DIAGNOSIS — H52.03 HYPEROPIA WITH ASTIGMATISM AND PRESBYOPIA, BILATERAL: ICD-10-CM

## 2022-06-20 PROCEDURE — 92083 HUMPHREY VISUAL FIELD - OU - BOTH EYES: ICD-10-PCS | Mod: 26,S$PBB,, | Performed by: OPTOMETRIST

## 2022-06-20 PROCEDURE — 92134 POSTERIOR SEGMENT OCT RETINA (OCULAR COHERENCE TOMOGRAPHY)-BOTH EYES: ICD-10-PCS | Mod: 26,S$PBB,, | Performed by: OPTOMETRIST

## 2022-06-20 PROCEDURE — 92014 COMPRE OPH EXAM EST PT 1/>: CPT | Mod: S$PBB,,, | Performed by: OPTOMETRIST

## 2022-06-20 PROCEDURE — 99999 PR PBB SHADOW E&M-EST. PATIENT-LVL I: ICD-10-PCS | Mod: PBBFAC,,, | Performed by: OPTOMETRIST

## 2022-06-20 PROCEDURE — 92014 PR EYE EXAM, EST PATIENT,COMPREHESV: ICD-10-PCS | Mod: S$PBB,,, | Performed by: OPTOMETRIST

## 2022-06-20 PROCEDURE — 92134 CPTRZ OPH DX IMG PST SGM RTA: CPT | Mod: PBBFAC | Performed by: OPTOMETRIST

## 2022-06-20 PROCEDURE — 99999 PR PBB SHADOW E&M-EST. PATIENT-LVL I: CPT | Mod: PBBFAC,,, | Performed by: OPTOMETRIST

## 2022-06-20 PROCEDURE — 99211 OFF/OP EST MAY X REQ PHY/QHP: CPT | Mod: PBBFAC | Performed by: OPTOMETRIST

## 2022-06-20 PROCEDURE — 92083 EXTENDED VISUAL FIELD XM: CPT | Mod: PBBFAC | Performed by: OPTOMETRIST

## 2022-06-20 NOTE — PROGRESS NOTES
HPI     Annual Exam     Comments: Vision changes since last eye exam?: yes, getting harder to   read  Any eye pain today: only with migraines   Other ocular symptoms: floaters and flashes  Interested in contact lens fitting today? no                      Last edited by Clementine Eduardo MA on 6/20/2022  2:55 PM. (History)            Assessment /Plan     For exam results, see Encounter Report.    Long-term use of Plaquenil  Undifferentiated connective tissue disease  -     Lyons Visual Field - OU - Extended - Both Eyes        - Posterior  Segment OCT Retina - Both Eyes    No maculopathy present today.   Stressed importance of follow up visits with pt.      Nuclear sclerosis, bilateral  Cortical age-related cataract of both eyes  Cataracts not significantly affecting activities of daily living and therefore surgery is not indicated at this time.   Will continue to monitor over the next 12 months. Pt to call or RTC with any significant change in vision prior to next visit.     Hyperopia with astigmatism and presbyopia, bilateral  Eyeglass Final Rx     Eyeglass Final Rx       Sphere Cylinder Axis Add    Right +0.50 +0.75 010 +1.50    Left +0.50 +1.00 005 +1.50    Type: driving    Expiration Date: 6/20/2023          Eyeglass Final Rx #2       Sphere Cylinder Axis Add    Right +3.50 +0.75 010     Left +3.50 +1.00 005     Type: reading    Expiration Date: 6/20/2023                  RTC 1 yr for dilated eye exam c Moct  or PRN if any problems.   Discussed above and answered questions.

## 2022-07-18 ENCOUNTER — OFFICE VISIT (OUTPATIENT)
Dept: FAMILY MEDICINE | Facility: CLINIC | Age: 62
End: 2022-07-18
Payer: MEDICARE

## 2022-07-18 ENCOUNTER — LAB VISIT (OUTPATIENT)
Dept: LAB | Facility: HOSPITAL | Age: 62
End: 2022-07-18
Attending: FAMILY MEDICINE
Payer: MEDICARE

## 2022-07-18 VITALS
WEIGHT: 186.19 LBS | DIASTOLIC BLOOD PRESSURE: 70 MMHG | TEMPERATURE: 98 F | SYSTOLIC BLOOD PRESSURE: 101 MMHG | HEIGHT: 62 IN | HEART RATE: 92 BPM | BODY MASS INDEX: 34.26 KG/M2

## 2022-07-18 DIAGNOSIS — M35.9 UNDIFFERENTIATED CONNECTIVE TISSUE DISEASE: ICD-10-CM

## 2022-07-18 DIAGNOSIS — Z86.39 HISTORY OF MORBID OBESITY: ICD-10-CM

## 2022-07-18 DIAGNOSIS — D84.9 IMMUNOLOGIC DEFICIENCY SYNDROME: ICD-10-CM

## 2022-07-18 DIAGNOSIS — Z90.3 S/P GASTRIC SLEEVE PROCEDURE: ICD-10-CM

## 2022-07-18 DIAGNOSIS — R73.03 PREDIABETES: ICD-10-CM

## 2022-07-18 DIAGNOSIS — Z79.899 OTHER LONG TERM (CURRENT) DRUG THERAPY: ICD-10-CM

## 2022-07-18 DIAGNOSIS — Z98.84 BARIATRIC SURGERY STATUS: ICD-10-CM

## 2022-07-18 DIAGNOSIS — Z13.6 ENCOUNTER FOR LIPID SCREENING FOR CARDIOVASCULAR DISEASE: ICD-10-CM

## 2022-07-18 DIAGNOSIS — J45.50 SEVERE PERSISTENT ASTHMA WITHOUT COMPLICATION: ICD-10-CM

## 2022-07-18 DIAGNOSIS — Z13.220 ENCOUNTER FOR LIPID SCREENING FOR CARDIOVASCULAR DISEASE: ICD-10-CM

## 2022-07-18 DIAGNOSIS — E03.9 HYPOTHYROIDISM, UNSPECIFIED TYPE: ICD-10-CM

## 2022-07-18 DIAGNOSIS — S76.319A HAMSTRING TEAR: Primary | ICD-10-CM

## 2022-07-18 LAB
25(OH)D3+25(OH)D2 SERPL-MCNC: 57 NG/ML (ref 30–96)
BASOPHILS # BLD AUTO: 0.08 K/UL (ref 0–0.2)
BASOPHILS NFR BLD: 0.7 % (ref 0–1.9)
CHOLEST SERPL-MCNC: 207 MG/DL (ref 120–199)
CHOLEST/HDLC SERPL: 2.7 {RATIO} (ref 2–5)
DIFFERENTIAL METHOD: ABNORMAL
EOSINOPHIL # BLD AUTO: 0.2 K/UL (ref 0–0.5)
EOSINOPHIL NFR BLD: 1.8 % (ref 0–8)
ERYTHROCYTE [DISTWIDTH] IN BLOOD BY AUTOMATED COUNT: 13 % (ref 11.5–14.5)
ESTIMATED AVG GLUCOSE: 88 MG/DL (ref 68–131)
FOLATE SERPL-MCNC: 17.9 NG/ML (ref 4–24)
HBA1C MFR BLD: 4.7 % (ref 4–5.6)
HCT VFR BLD AUTO: 47.5 % (ref 37–48.5)
HDLC SERPL-MCNC: 78 MG/DL (ref 40–75)
HDLC SERPL: 37.7 % (ref 20–50)
HGB BLD-MCNC: 15.3 G/DL (ref 12–16)
IMM GRANULOCYTES # BLD AUTO: 0.07 K/UL (ref 0–0.04)
IMM GRANULOCYTES NFR BLD AUTO: 0.6 % (ref 0–0.5)
LDLC SERPL CALC-MCNC: 93.6 MG/DL (ref 63–159)
LYMPHOCYTES # BLD AUTO: 3.1 K/UL (ref 1–4.8)
LYMPHOCYTES NFR BLD: 26.7 % (ref 18–48)
MCH RBC QN AUTO: 29.7 PG (ref 27–31)
MCHC RBC AUTO-ENTMCNC: 32.2 G/DL (ref 32–36)
MCV RBC AUTO: 92 FL (ref 82–98)
MONOCYTES # BLD AUTO: 0.9 K/UL (ref 0.3–1)
MONOCYTES NFR BLD: 7.5 % (ref 4–15)
NEUTROPHILS # BLD AUTO: 7.3 K/UL (ref 1.8–7.7)
NEUTROPHILS NFR BLD: 62.7 % (ref 38–73)
NONHDLC SERPL-MCNC: 129 MG/DL
NRBC BLD-RTO: 0 /100 WBC
PLATELET # BLD AUTO: 227 K/UL (ref 150–450)
PMV BLD AUTO: 12.2 FL (ref 9.2–12.9)
RBC # BLD AUTO: 5.16 M/UL (ref 4–5.4)
TRIGL SERPL-MCNC: 177 MG/DL (ref 30–150)
VIT B12 SERPL-MCNC: >2000 PG/ML (ref 210–950)
WBC # BLD AUTO: 11.63 K/UL (ref 3.9–12.7)

## 2022-07-18 PROCEDURE — 99999 PR PBB SHADOW E&M-EST. PATIENT-LVL III: CPT | Mod: PBBFAC,,, | Performed by: FAMILY MEDICINE

## 2022-07-18 PROCEDURE — 84630 ASSAY OF ZINC: CPT | Performed by: FAMILY MEDICINE

## 2022-07-18 PROCEDURE — 80061 LIPID PANEL: CPT | Performed by: FAMILY MEDICINE

## 2022-07-18 PROCEDURE — 84425 ASSAY OF VITAMIN B-1: CPT | Performed by: FAMILY MEDICINE

## 2022-07-18 PROCEDURE — 99213 OFFICE O/P EST LOW 20 MIN: CPT | Mod: S$PBB,,, | Performed by: FAMILY MEDICINE

## 2022-07-18 PROCEDURE — 82607 VITAMIN B-12: CPT | Performed by: FAMILY MEDICINE

## 2022-07-18 PROCEDURE — 99213 PR OFFICE/OUTPT VISIT, EST, LEVL III, 20-29 MIN: ICD-10-PCS | Mod: S$PBB,,, | Performed by: FAMILY MEDICINE

## 2022-07-18 PROCEDURE — 99213 OFFICE O/P EST LOW 20 MIN: CPT | Mod: PBBFAC,PO | Performed by: FAMILY MEDICINE

## 2022-07-18 PROCEDURE — 82746 ASSAY OF FOLIC ACID SERUM: CPT | Performed by: FAMILY MEDICINE

## 2022-07-18 PROCEDURE — 83036 HEMOGLOBIN GLYCOSYLATED A1C: CPT | Performed by: FAMILY MEDICINE

## 2022-07-18 PROCEDURE — 82306 VITAMIN D 25 HYDROXY: CPT | Performed by: FAMILY MEDICINE

## 2022-07-18 PROCEDURE — 85025 COMPLETE CBC W/AUTO DIFF WBC: CPT | Performed by: FAMILY MEDICINE

## 2022-07-18 PROCEDURE — 99999 PR PBB SHADOW E&M-EST. PATIENT-LVL III: ICD-10-PCS | Mod: PBBFAC,,, | Performed by: FAMILY MEDICINE

## 2022-07-18 NOTE — PROGRESS NOTES
Patient states that she strained her right hamstring when she was loading groceries approximately 3 weeks ago.  Felt the pop at the area.  Did not get any bruising.  She did go to an urgent care was treated with muscle relaxers and gabapentin.  She did have someone with Orthopedics located briefly when she was attending an appointment with her  who felt she may have a slight tear.  Symptoms seem to be improving until this past Friday when she was in a rush and stumble over something in her past and felt a strain again.  Again no bruising.  No current treatment.      Yodit was seen today for pulled hamstring.    Diagnoses and all orders for this visit:    Hamstring tear  -     Ambulatory referral/consult to Physical/Occupational Therapy; Future    Appears to have a mild muscle tear.  She may use Tylenol.  May use ice to the area.  Will refer to Physical therapy for further evaluation.  Follow up Orthopedics if symptoms not improving.              Past Medical History:  Past Medical History:   Diagnosis Date    Angio-edema     Arthralgia     Asthma without status asthmaticus     Bronchitis     COPD (chronic obstructive pulmonary disease)     COVID-19 virus infection 7/23/2021    Diverticulosis of large intestine without hemorrhage 10/8/2015    Environmental allergies     Eosinophilic asthma 3/8/2018    Gastroparesis     Hand arthritis     Herpes simplex without mention of complication     oral    Hidradenitis     Hyperlipidemia     Hypothyroidism     Immune deficiency disorder     Intraperitoneal abscess 5/7/2018    LBP radiating to left leg     Leukocytosis, unspecified     Migraine headache     Obesity, Class III, BMI 40-49.9 (morbid obesity)     Periorbital cellulitis 12/31/2016    Prediabetes     Recurrent upper respiratory infection (URI)     S/P colonoscopy November 2010    Sepsis 5/7/2018    Urticaria      Past Surgical History:   Procedure Laterality Date    ADENOIDECTOMY    "   CHOLECYSTECTOMY      CHOLECYSTECTOMY      COLONOSCOPY  2015    repeat in 10    COLONOSCOPY N/A 10/8/2015    Procedure: COLONOSCOPY;  Surgeon: Panda Bose MD;  Location[facility];  Service: Endoscopy;  Laterality: N/A;    Hand fracture surgery      HARDWARE REMOVAL      left hand 5th MC    hymenectomy      HYSTERECTOMY      menorrhagia-Ovaries intact    ROTATOR CUFF REPAIR Right     SLEEVE GASTROPLASTY  04/16/2018    TONSILLECTOMY      Urethral dilatation       Review of patient's allergies indicates:   Allergen Reactions    Bromelains Hives     " causes mouth to bleed"    Pimecrolimus Swelling    Sudafed [pseudoephedrine hcl] Other (See Comments)     Does not want to wake up .    Amoxicillin-pot clavulanate Itching     Other reaction(s): Itching    Hydrocodone Itching    Iodinated contrast media      childhood    Iodine and iodide containing products Other (See Comments)    Melon Hives    Pantoprazole Nausea Only     Other reaction(s): upset stomach/halitosis    Percocet [oxycodone-acetaminophen] Itching    Corn containing products     Wheat containing prod     Barium iodide Rash    Ephedrine Other (See Comments)     Other reaction(s): comatose    Penicillins      Other reaction(s): does not work on pt symptoms     Current Outpatient Medications on File Prior to Visit   Medication Sig Dispense Refill    (Magic mouthwash) 1:1:1 diphenhydramine(Benadryl) 12.5mg/5ml liq, aluminum & magnesium hydroxide-simethicone (Maalox), LIDOcaine viscous 2% Swish and spit 5 mLs every 8 (eight) hours as needed (oral ulcers). Gargle and spit. DO NOT SWALLOW 360 mL 1    acetaminophen (TYLENOL) 325 MG tablet Take 325 mg by mouth every 6 (six) hours as needed for Pain.      apremilast (OTEZLA) 30 mg Tab Take 1 tablet (30 mg total) by mouth 2 (two) times daily. 60 tablet 11    BIOTIN ORAL Take by mouth once daily.      budesonide (PULMICORT) 0.25 mg/2 mL nebulizer solution Take 2 mLs (0.25 mg total) " by nebulization once daily. Controller 60 vial 11    budesonide-glycopyr-formoterol (BREZTRI AEROSPHERE) 160-9-4.8 mcg/actuation HFAA Inhale 2 puffs into the lungs 2 (two) times a day. 10.7 g 11    cholecalciferol, vitamin D3, (VITAMIN D3) 50 mcg (2,000 unit) Cap Take 1 capsule by mouth once daily.      clobetasoL (TEMOVATE) 0.05 % cream APPLY TOPICALLY TO THE AFFECTED AREA TWICE DAILY 60 g 0    desonide (DESOWEN) 0.05 % cream Apply topically 2 (two) times daily. 1 each 3    doxycycline (VIBRAMYCIN) 100 MG Cap Take 1 capsule (100 mg total) by mouth once daily. 90 capsule 0    estradioL (ESTRACE) 1 MG tablet TAKE 1 TABLET(1 MG) BY MOUTH EVERY DAY 90 tablet 3    hydrOXYchloroQUINE (PLAQUENIL) 200 mg tablet Take 1 tablet (200 mg total) by mouth 2 (two) times daily. 60 tablet 6    immun glob G,IgG,-pro-IgA 0-50 (HIZENTRA) 2 gram/10 mL (20 %) Soln Inject 18 g into the skin every 14 (fourteen) days. 90 mL 12    levothyroxine (SYNTHROID) 75 MCG tablet Take 1 tablet (75 mcg total) by mouth once daily. 90 tablet 3    montelukast (SINGULAIR) 10 mg tablet Take 1 tablet (10 mg total) by mouth every evening. 90 tablet 3    mucus clearing device (ACAPELLA, FLUTTER) by Misc.(Non-Drug; Combo Route) route 2 (two) times daily. 1 Device 0    MULTIVITAMIN ORAL Take by mouth once daily.      ondansetron (ZOFRAN-ODT) 4 MG TbDL Take 1 tablet (4 mg total) by mouth every 8 (eight) hours as needed (nausea/vomiting). 20 tablet 0    predniSONE (DELTASONE) 20 MG tablet One daily for 3 days and repeat for flare of lung symptoms as intructed 21 tablet 2    predniSONE (DELTASONE) 5 MG tablet Take 1 tablet (5 mg total) by mouth once daily. 90 tablet 2    sumatriptan (IMITREX) 100 MG tablet       tretinoin (RETIN-A) 0.05 % cream Apply topically every evening. 45 g 3    [DISCONTINUED] apremilast (OTEZLA STARTER) 10 mg (4)-20 mg (4)-30 mg (47) DsPk As per directions on the titration pack 55 tablet 0    [DISCONTINUED] ondansetron  (ZOFRAN) 4 MG tablet Take 1 tablet (4 mg total) by mouth every 8 (eight) hours as needed for Nausea. 90 tablet 0    albuterol (PROAIR HFA) 90 mcg/actuation inhaler Inhale 2 puffs into the lungs every 6 (six) hours as needed for Wheezing. Rescue 18 g 0    clobetasoL (TEMOVATE) 0.05 % external solution Apply topically once daily. 50 mL 4    doxycycline (VIBRAMYCIN) 100 MG Cap Take 1 capsule (100 mg total) by mouth once daily. 90 capsule 0    levalbuterol (XOPENEX) 1.25 mg/3 mL nebulizer solution Take 3 mLs (1.25 mg total) by nebulization every 4 (four) hours as needed for Wheezing or Shortness of Breath. Rescue 1 Box 11    levoFLOXacin (LEVAQUIN) 500 MG tablet Take 1 tablet (500 mg total) by mouth once daily. (Patient not taking: Reported on 7/18/2022) 14 tablet 2    valACYclovir (VALTREX) 1000 MG tablet Take 2 tablets (2,000 mg total) by mouth every 12 (twelve) hours. For one day for fever blisters.  Do not take for more than 1 day per episode 20 tablet 0    [DISCONTINUED] colchicine (COLCRYS) 0.6 mg tablet Take 1 tablet (0.6 mg total) by mouth 2 (two) times daily. Take twice a day 60 tablet 3     No current facility-administered medications on file prior to visit.     Social History     Socioeconomic History    Marital status:    Tobacco Use    Smoking status: Never Smoker    Smokeless tobacco: Never Used   Substance and Sexual Activity    Alcohol use: Yes     Alcohol/week: 0.0 standard drinks     Comment: very rarely    Drug use: No    Sexual activity: Yes     Partners: Male   Social History Narrative    . Disabled teacher.     Family History   Problem Relation Age of Onset    Melanoma Mother     Basal cell carcinoma Mother     Lung disease Mother         pulm htn    Cataracts Mother     Hypertension Mother     Lung cancer Father     Diabetes Father     Hypertension Father     Cancer Father     Diabetes Paternal Aunt     Colon cancer Paternal Aunt 40    Diabetes Paternal Aunt   "   Ovarian cancer Paternal Grandmother 40    Cancer Maternal Grandfather     Melanoma Maternal Aunt     Melanoma Maternal Uncle     Breast cancer Paternal Aunt 40    Lymphoma Paternal Aunt     Ulcerative colitis Son     Psoriasis Son     Psoriasis Son     Breast cancer Cousin 25    Allergic rhinitis Neg Hx     Allergies Neg Hx     Angioedema Neg Hx     Asthma Neg Hx     Atopy Neg Hx     Eczema Neg Hx     Immunodeficiency Neg Hx     Rhinitis Neg Hx     Urticaria Neg Hx            ROS:  GENERAL: No fever, chills,  or significant weight changes.      Vitals:    07/18/22 0836   BP: 101/70   Pulse: 92   Temp: 97.8 °F (36.6 °C)   TempSrc: Temporal   Weight: 84.5 kg (186 lb 3.2 oz)   Height: 5' 2" (1.575 m)       Wt Readings from Last 3 Encounters:   07/18/22 84.5 kg (186 lb 3.2 oz)   05/23/22 86 kg (189 lb 9.5 oz)   05/20/22 85.7 kg (189 lb)       APPEARANCE: Well nourished, well developed, in no acute distress.    HEAD: Normocephalic.  Atraumatic.   NECK: Supple.  She has mild tenderness palpation at the right posterior mid thigh.  There is no bruising.  She has full range of motion though she does have some discomfort with extension of the hip.  Slight limp with gait.   No edema.  MENTAL STATUS: Alert.  Oriented x 3.  "

## 2022-07-21 LAB — ZINC SERPL-MCNC: 109 UG/DL (ref 60–130)

## 2022-07-22 LAB — VIT B1 BLD-MCNC: 134 UG/L (ref 38–122)

## 2022-07-29 ENCOUNTER — PATIENT MESSAGE (OUTPATIENT)
Dept: FAMILY MEDICINE | Facility: CLINIC | Age: 62
End: 2022-07-29

## 2022-07-29 ENCOUNTER — OFFICE VISIT (OUTPATIENT)
Dept: FAMILY MEDICINE | Facility: CLINIC | Age: 62
End: 2022-07-29
Payer: MEDICARE

## 2022-07-29 VITALS
DIASTOLIC BLOOD PRESSURE: 76 MMHG | BODY MASS INDEX: 34.69 KG/M2 | WEIGHT: 188.5 LBS | HEART RATE: 76 BPM | HEIGHT: 62 IN | SYSTOLIC BLOOD PRESSURE: 128 MMHG | TEMPERATURE: 98 F

## 2022-07-29 DIAGNOSIS — R09.81 SINUS CONGESTION: ICD-10-CM

## 2022-07-29 DIAGNOSIS — J06.9 UPPER RESPIRATORY TRACT INFECTION, UNSPECIFIED TYPE: Primary | ICD-10-CM

## 2022-07-29 LAB
CTP QC/QA: YES
CTP QC/QA: YES
POC MOLECULAR INFLUENZA A AGN: NEGATIVE
POC MOLECULAR INFLUENZA B AGN: NEGATIVE
SARS-COV-2 RDRP RESP QL NAA+PROBE: NEGATIVE

## 2022-07-29 PROCEDURE — 99999 PR PBB SHADOW E&M-EST. PATIENT-LVL III: CPT | Mod: PBBFAC,,, | Performed by: FAMILY MEDICINE

## 2022-07-29 PROCEDURE — 87502 INFLUENZA DNA AMP PROBE: CPT | Mod: PBBFAC,PO | Performed by: FAMILY MEDICINE

## 2022-07-29 PROCEDURE — 99213 OFFICE O/P EST LOW 20 MIN: CPT | Mod: S$PBB,,, | Performed by: FAMILY MEDICINE

## 2022-07-29 PROCEDURE — U0002 COVID-19 LAB TEST NON-CDC: HCPCS | Mod: PBBFAC,PO | Performed by: FAMILY MEDICINE

## 2022-07-29 PROCEDURE — 99213 PR OFFICE/OUTPT VISIT, EST, LEVL III, 20-29 MIN: ICD-10-PCS | Mod: S$PBB,,, | Performed by: FAMILY MEDICINE

## 2022-07-29 PROCEDURE — 99999 PR PBB SHADOW E&M-EST. PATIENT-LVL III: ICD-10-PCS | Mod: PBBFAC,,, | Performed by: FAMILY MEDICINE

## 2022-07-29 PROCEDURE — 99213 OFFICE O/P EST LOW 20 MIN: CPT | Mod: PBBFAC,PO | Performed by: FAMILY MEDICINE

## 2022-07-29 RX ORDER — ONDANSETRON 4 MG/1
4 TABLET, ORALLY DISINTEGRATING ORAL EVERY 8 HOURS PRN
Qty: 20 TABLET | Refills: 0 | Status: SHIPPED | OUTPATIENT
Start: 2022-07-29 | End: 2022-08-15

## 2022-07-29 NOTE — PROGRESS NOTES
Patient complains of sinus congestion, postnasal drainage past 6 days.  No wheezing, no cough, no fever.  No body aches or chills.  Exposed to grandchild Tuesday who later tested positive for flu however patient's symptoms started prior to this.  Current treatment over-the-counter medication.  She does have significant asthma, but not wheezing.  She has antibiotics and steroids which he uses on a protocol from her pulmonary doctor.  She is not currently started this.  She is however on doxycycline chronically for skin issue.  She has noted some nausea.  No emesis.  No diarrhea.      Yodit was seen today for facial pain and post nasal drip.    Diagnoses and all orders for this visit:    Upper respiratory tract infection, unspecified type  -     POCT Influenza A/B Molecular; Future  -     POCT Influenza A/B Molecular    Sinus congestion  -     POCT COVID-19 Rapid Screening; Future  -     POCT Influenza A/B Molecular; Future  -     POCT Influenza A/B Molecular  -     POCT COVID-19 Rapid Screening    Other orders  -     ondansetron (ZOFRAN-ODT) 4 MG TbDL; Take 1 tablet (4 mg total) by mouth every 8 (eight) hours as needed (nausea/vomiting).    COVID and flu testing are negative.    Follow-up as needed if symptoms not improving with Recommended treatment next few days      Past Medical History:  Past Medical History:   Diagnosis Date    Angio-edema     Arthralgia     Asthma without status asthmaticus     Bronchitis     COPD (chronic obstructive pulmonary disease)     COVID-19 virus infection 7/23/2021    Diverticulosis of large intestine without hemorrhage 10/8/2015    Environmental allergies     Eosinophilic asthma 3/8/2018    Gastroparesis     Hand arthritis     Herpes simplex without mention of complication     oral    Hidradenitis     Hyperlipidemia     Hypothyroidism     Immune deficiency disorder     Intraperitoneal abscess 5/7/2018    LBP radiating to left leg     Leukocytosis, unspecified      Migraine headache     Obesity, Class III, BMI 40-49.9 (morbid obesity)     Periorbital cellulitis 12/31/2016    Prediabetes     Recurrent upper respiratory infection (URI)     S/P colonoscopy November 2010    Sepsis 5/7/2018    Urticaria      Past Surgical History:   Procedure Laterality Date    ADENOIDECTOMY      CHOLECYSTECTOMY      CHOLECYSTECTOMY      COLONOSCOPY  2015    repeat in 10    COLONOSCOPY N/A 10/8/2015    Procedure: COLONOSCOPY;  Surgeon: Panda Bose MD;  Location: [facility];  Service: Endoscopy;  Laterality: N/A;    Hand fracture surgery      HARDWARE REMOVAL      left hand 5th MC    hymenectomy      HYSTERECTOMY      menorrhagia-Ovaries intact    ROTATOR CUFF REPAIR Right     SLEEVE GASTROPLASTY  04/16/2018    TONSILLECTOMY      Urethral dilatation       Social History     Socioeconomic History    Marital status:    Tobacco Use    Smoking status: Never Smoker    Smokeless tobacco: Never Used   Substance and Sexual Activity    Alcohol use: Yes     Alcohol/week: 0.0 standard drinks     Comment: very rarely    Drug use: No    Sexual activity: Yes     Partners: Male   Social History Narrative    . Disabled teacher.     Family History   Problem Relation Age of Onset    Melanoma Mother     Basal cell carcinoma Mother     Lung disease Mother         pulm htn    Cataracts Mother     Hypertension Mother     Lung cancer Father     Diabetes Father     Hypertension Father     Cancer Father     Diabetes Paternal Aunt     Colon cancer Paternal Aunt 40    Diabetes Paternal Aunt     Ovarian cancer Paternal Grandmother 40    Cancer Maternal Grandfather     Melanoma Maternal Aunt     Melanoma Maternal Uncle     Breast cancer Paternal Aunt 40    Lymphoma Paternal Aunt     Ulcerative colitis Son     Psoriasis Son     Psoriasis Son     Breast cancer Cousin 25    Allergic rhinitis Neg Hx     Allergies Neg Hx     Angioedema Neg Hx     Asthma Neg Hx   "   Atopy Neg Hx     Eczema Neg Hx     Immunodeficiency Neg Hx     Rhinitis Neg Hx     Urticaria Neg Hx      Review of patient's allergies indicates:   Allergen Reactions    Bromelains Hives     " causes mouth to bleed"    Pimecrolimus Swelling    Sudafed [pseudoephedrine hcl] Other (See Comments)     Does not want to wake up .    Amoxicillin-pot clavulanate Itching     Other reaction(s): Itching    Hydrocodone Itching    Iodinated contrast media      childhood    Iodine and iodide containing products Other (See Comments)    Melon Hives    Pantoprazole Nausea Only     Other reaction(s): upset stomach/halitosis    Percocet [oxycodone-acetaminophen] Itching    Corn containing products     Wheat containing prod     Barium iodide Rash    Ephedrine Other (See Comments)     Other reaction(s): comatose    Penicillins      Other reaction(s): does not work on pt symptoms     Current Outpatient Medications on File Prior to Visit   Medication Sig Dispense Refill    (Magic mouthwash) 1:1:1 diphenhydramine(Benadryl) 12.5mg/5ml liq, aluminum & magnesium hydroxide-simethicone (Maalox), LIDOcaine viscous 2% Swish and spit 5 mLs every 8 (eight) hours as needed (oral ulcers). Gargle and spit. DO NOT SWALLOW 360 mL 1    acetaminophen (TYLENOL) 325 MG tablet Take 325 mg by mouth every 6 (six) hours as needed for Pain.      apremilast (OTEZLA) 30 mg Tab Take 1 tablet (30 mg total) by mouth 2 (two) times daily. 60 tablet 11    BIOTIN ORAL Take by mouth once daily.      budesonide-glycopyr-formoterol (BREZTRI AEROSPHERE) 160-9-4.8 mcg/actuation HFAA Inhale 2 puffs into the lungs 2 (two) times a day. 10.7 g 11    cholecalciferol, vitamin D3, (VITAMIN D3) 50 mcg (2,000 unit) Cap Take 1 capsule by mouth once daily.      clobetasoL (TEMOVATE) 0.05 % cream APPLY TOPICALLY TO THE AFFECTED AREA TWICE DAILY 60 g 0    clobetasoL (TEMOVATE) 0.05 % external solution Apply topically once daily. 50 mL 4    desonide " (DESOWEN) 0.05 % cream Apply topically 2 (two) times daily. 1 each 3    doxycycline (VIBRAMYCIN) 100 MG Cap Take 1 capsule (100 mg total) by mouth once daily. 90 capsule 0    doxycycline (VIBRAMYCIN) 100 MG Cap Take 1 capsule (100 mg total) by mouth once daily. 90 capsule 0    estradioL (ESTRACE) 1 MG tablet TAKE 1 TABLET(1 MG) BY MOUTH EVERY DAY 90 tablet 3    hydrOXYchloroQUINE (PLAQUENIL) 200 mg tablet Take 1 tablet (200 mg total) by mouth 2 (two) times daily. 60 tablet 6    immun glob G,IgG,-pro-IgA 0-50 (HIZENTRA) 2 gram/10 mL (20 %) Soln Inject 18 g into the skin every 14 (fourteen) days. 90 mL 12    levoFLOXacin (LEVAQUIN) 500 MG tablet Take 1 tablet (500 mg total) by mouth once daily. 14 tablet 2    levothyroxine (SYNTHROID) 75 MCG tablet Take 1 tablet (75 mcg total) by mouth once daily. 90 tablet 3    montelukast (SINGULAIR) 10 mg tablet Take 1 tablet (10 mg total) by mouth every evening. 90 tablet 3    mucus clearing device (ACAPELLA, FLUTTER) by Misc.(Non-Drug; Combo Route) route 2 (two) times daily. 1 Device 0    MULTIVITAMIN ORAL Take by mouth once daily.      predniSONE (DELTASONE) 20 MG tablet One daily for 3 days and repeat for flare of lung symptoms as intructed 21 tablet 2    predniSONE (DELTASONE) 5 MG tablet Take 1 tablet (5 mg total) by mouth once daily. 90 tablet 2    sumatriptan (IMITREX) 100 MG tablet       tretinoin (RETIN-A) 0.05 % cream Apply topically every evening. 45 g 3    [DISCONTINUED] ondansetron (ZOFRAN-ODT) 4 MG TbDL Take 1 tablet (4 mg total) by mouth every 8 (eight) hours as needed (nausea/vomiting). 20 tablet 0    albuterol (PROAIR HFA) 90 mcg/actuation inhaler Inhale 2 puffs into the lungs every 6 (six) hours as needed for Wheezing. Rescue 18 g 0    budesonide (PULMICORT) 0.25 mg/2 mL nebulizer solution Take 2 mLs (0.25 mg total) by nebulization once daily. Controller 60 vial 11    levalbuterol (XOPENEX) 1.25 mg/3 mL nebulizer solution Take 3 mLs (1.25 mg  "total) by nebulization every 4 (four) hours as needed for Wheezing or Shortness of Breath. Rescue 1 Box 11    valACYclovir (VALTREX) 1000 MG tablet Take 2 tablets (2,000 mg total) by mouth every 12 (twelve) hours. For one day for fever blisters.  Do not take for more than 1 day per episode 20 tablet 0     No current facility-administered medications on file prior to visit.         Physical Exam:  Vitals:    07/29/22 1036   BP: 128/76   Pulse: 76   Temp: 97.5 °F (36.4 °C)   TempSrc: Temporal   Weight: 85.5 kg (188 lb 8 oz)   Height: 5' 2" (1.575 m)       Gen.: No acute distress  HEENT: sinuses nontender. Ears: Tympanic membranes intact.  No erythema or effusion.  Nose mild congestion.  Throat mild erythema no exudate.  Mild postnasal drainage.  Neck supple no adenopathy  Chest: Clear to auscultation.  Normal respiratory effort.          "

## 2022-08-02 ENCOUNTER — CLINICAL SUPPORT (OUTPATIENT)
Dept: REHABILITATION | Facility: HOSPITAL | Age: 62
End: 2022-08-02
Attending: FAMILY MEDICINE
Payer: MEDICARE

## 2022-08-02 DIAGNOSIS — R29.898 WEAKNESS OF RIGHT LOWER EXTREMITY: ICD-10-CM

## 2022-08-02 DIAGNOSIS — R68.89 DECREASED ACTIVITY TOLERANCE: ICD-10-CM

## 2022-08-02 DIAGNOSIS — R29.898 IMPAIRED FLEXIBILITY OF LOWER EXTREMITY: ICD-10-CM

## 2022-08-02 DIAGNOSIS — S76.319A HAMSTRING TEAR: ICD-10-CM

## 2022-08-02 PROCEDURE — 97110 THERAPEUTIC EXERCISES: CPT | Mod: PN

## 2022-08-02 PROCEDURE — 97161 PT EVAL LOW COMPLEX 20 MIN: CPT | Mod: PN

## 2022-08-02 NOTE — PLAN OF CARE
" [facility]   Physical Therapy Initial Evaluation     Date: 8/2/2022   Name: Yodit Canseco  Clinic Number: [id number]    Therapy Diagnosis:   Encounter Diagnoses   Name Primary?    Hamstring tear     Weakness of right lower extremity     Impaired flexibility of lower extremity     Decreased activity tolerance      Physician: Miky Lewis MD    Physician Orders: PT Eval and Treat  Medical Diagnosis from Referral: Hamstring tear [S76.319A]  Evaluation Date: 8/2/2022  Authorization Period Expiration: 7/18/2023  Plan of Care Expiration: 9/27/2022  Progress Note Due: 9/2/2022  Visit # / Visits authorized: 1/ 1   FOTO: 1/3    Precautions: Standard, asthma, immune deficiency disorder, COPD, allergic to strong fragrances    Time In: 0215  Time Out: 0300  Total Appointment Time (timed & untimed codes): 45 minutes    SUBJECTIVE     Date of onset: June 2022    History of current condition - Yodit reports: Right lower extremity, right side dominant. She has a history of sciatic issues on the right side. She was helping her aunt load groceries back in June, had to park farther distance in parking lot than usual. When they were walking back from store to car, she had to help her aunt suddenly due to fatigue. While she was holding onto her, she felt 2 pops in the back of her leg with immediate nerve pain and tightness. Mid July she "stumbled" on the leg while in the shower and re-aggravated symptoms.    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  Prior Level of Function: independent  Current Level of Function: not returned to full activities    Pain:  Current 2/10, worst 8/10, best 2/10   Location: right hamstring  Description: Aching, Dull, Burning, Throbbing, Grabbing, Tight, Tingling and Sharp  Aggravating Factors: increased walking, pressure while sitting, driving, sitting, " lifting  Easing Factors: rest    Patients goals: return to normal activities, walking, making bed     Medical History:   Past Medical History:   Diagnosis Date    Angio-edema     Arthralgia     Asthma without status asthmaticus     Bronchitis     COPD (chronic obstructive pulmonary disease)     COVID-19 virus infection 7/23/2021    Diverticulosis of large intestine without hemorrhage 10/8/2015    Environmental allergies     Eosinophilic asthma 3/8/2018    Gastroparesis     Hand arthritis     Herpes simplex without mention of complication     oral    Hidradenitis     Hyperlipidemia     Hypothyroidism     Immune deficiency disorder     Intraperitoneal abscess 5/7/2018    LBP radiating to left leg     Leukocytosis, unspecified     Migraine headache     Obesity, Class III, BMI 40-49.9 (morbid obesity)     Periorbital cellulitis 12/31/2016    Prediabetes     Recurrent upper respiratory infection (URI)     S/P colonoscopy November 2010    Sepsis 5/7/2018    Urticaria        Surgical History:   Yodit Canseco  has a past surgical history that includes Tonsillectomy; Adenoidectomy; Hysterectomy; Hand fracture surgery; Urethral dilatation; Hardware Removal; Colonoscopy (2015); Colonoscopy (N/A, 10/8/2015); Cholecystectomy; Cholecystectomy; hymenectomy; Sleeve Gastroplasty (04/16/2018); and Rotator cuff repair (Right).    Medications:   Yodit has a current medication list which includes the following prescription(s): diphenhydramine hcl, acetaminophen, albuterol, otezla, biotin, budesonide, breztri aerosphere, cholecalciferol (vitamin d3), clobetasol, clobetasol, desonide, doxycycline, doxycycline, estradiol, hydroxychloroquine, immun glob g(igg)-pro-iga 0-50, levalbuterol, levofloxacin, levothyroxine, montelukast, mucus clearing device, multivitamin, ondansetron, prednisone, prednisone, sumatriptan, tretinoin, and valacyclovir.    Allergies:   Review of patient's allergies indicates:  "  Allergen Reactions    Bromelains Hives     " causes mouth to bleed"    Pimecrolimus Swelling    Sudafed [pseudoephedrine hcl] Other (See Comments)     Does not want to wake up .    Amoxicillin-pot clavulanate Itching     Other reaction(s): Itching    Hydrocodone Itching    Iodinated contrast media      childhood    Iodine and iodide containing products Other (See Comments)    Melon Hives    Pantoprazole Nausea Only     Other reaction(s): upset stomach/halitosis    Percocet [oxycodone-acetaminophen] Itching    Corn containing products     Wheat containing prod     Barium iodide Rash    Ephedrine Other (See Comments)     Other reaction(s): comatose    Penicillins      Other reaction(s): does not work on pt symptoms        OBJECTIVE     CMS Impairment/Limitation/Restriction for FOTO LE Survey    Therapist reviewed FOTO scores for Yodit Canseco on 8/2/2022.   FOTO documents entered into EP[employer] - see Media section.    Limitation Score: 53%       Gait: WNL, slightly antalgic if symptoms are present or after prolonged activities    Balance: R LE = >10 sec, L LE = >10 sec, increased sway on RLE with worsening discomfort    Reflex/Sensation: Sensation WNL to light touch B LE's . Reflexes at Achilles and patella tendon WNL.    Edema: WNL    Knee AROM: WNL    Lower Extremity Strength  Right LE   Left LE     Hip flexion: 4+/5 Hip flexion: 5/5   Hip extension:  3+/5 Hip extension: 5/5   Hip abduction: 4/5 Hip abduction: 5/5   Hip adduction:  3/5 Hip adduction:  5/5   Knee Flexion 3+/5 Knee Flexion 5/5   Knee Extension 5/5 Knee Extension 5/5   Ankle dorsiflexion: 5/5 Ankle dorsiflexion: 5/5   Ankle plantarflexion: 5/5 Ankle plantarflexion: 5/5      Core strength: poor/fair    Joint Mobility: slight limitation in right hip mobility    Muscle Length:  Hamstring 90/90 Test RLE (25) lacking, LLE 0 lacking    Special Test:    Anterior Drawer - Posterior Drawer -  Lachman  - Post Lachman  -  Valgus stress  - Varus " stress  -  Tiesha  - Thessaly  -  Apley Compress - Apley Distract  -  Deuel   - Clarkes  -  Brush   - Wilsons  -  CLEMENTE  - Shane   -    Palpation: moderate to severe TTP to right hamstring, worse from middle to distal biceps femoris       TREATMENT     Total Treatment time (time-based codes) separate from Evaluation: 10 minutes      Yodit received the treatments listed below:      therapeutic exercises to develop strength, endurance, ROM, flexibility, posture and core stabilization for 10 minutes including:    HEP review and patient education  HEP includes: AROM Hamstring stretch 10x10 sec  Posterior pelvic tilt with abdominal hold 10x10 sec  Straight hamstring set 10 reps 5 sec       Possible next visit: Recumbent bike, LE flexibility, hamstring isometrics at 30/60/90, SLS, hip strengthening, core strengthening, IASTM, modalities PRN, progression to isotonic, gait training      PATIENT EDUCATION AND HOME EXERCISES     Education provided:   - HEP provided and reviewed  - Anatomy and Physiology pertaining to current condition  - Possible response to exercise  - Importance of PT compliance    Written Home Exercises Provided: yes. Exercises were reviewed and Yodit was able to demonstrate them prior to the end of the session.  Yodit demonstrated good  understanding of the education provided. See EMR under Patient Instructions for exercises provided during therapy sessions.      ASSESSMENT     Yodit is a 61 y.o. female referred to outpatient Physical Therapy with a medical diagnosis of Hamstring tear [S76.076A]. Patient presents with poor RLE flexibility with increased pain, weakness in right knee and hip, soft tissue dysfunction in right hamstring region, deficits in gait and balance, and reduced activity tolerance. Based on the symptoms presents and overall functional deficits, she would benefit from continued skilled PT at this time. She is currently limited in ADLs such as lifting objects, driving,  walking long distances, and light exercise/stretching.     Patient prognosis is Good.   Patient will benefit from skilled outpatient Physical Therapy to address the deficits stated above and in the chart below, provide patient /family education, and to maximize patientt's level of independence.     Plan of care discussed with patient: Yes  Patient's spiritual, cultural and educational needs considered and patient is agreeable to the plan of care and goals as stated below:     Anticipated Barriers for therapy: none    Medical Necessity is demonstrated by the following  History  Co-morbidities and personal factors that may impact the plan of care Co-morbidities:   asthma, immune deficiency disorder, COPD, allergic to strong fragrances    Personal Factors:   no deficits     moderate   Examination  Body Structures and Functions, activity limitations and participation restrictions that may impact the plan of care Body Regions:   lower extremities    Body Systems:    gross symmetry  ROM  strength  gross coordinated movement  balance  gait  transfers  transitions  motor control    Participation Restrictions:   ADLs, recreational    Activity limitations:   Learning and applying knowledge  no deficits    General Tasks and Commands  no deficits    Communication  no deficits    Mobility  lifting and carrying objects  walking  driving (bike, car, motorcycle)    Self care  looking after one's health    Domestic Life  shopping  cooking  doing house work (cleaning house, washing dishes, laundry)  assisting others    Interactions/Relationships  no deficits    Life Areas  basic economic transactions    Community and Social Life  community life  recreation and leisure         moderate   Clinical Presentation stable and uncomplicated low   Decision Making/ Complexity Score: low     Goals:  Short Term Goals: In 4 weeks:  1.Pt to be educated on HEP.  2.Patient to demo increased flexibility by 5-10 degrees to improve functional  mobility.  3.Patient to increase strength RLE by 1/2 grade to improve functional tasks.  4.Patient to have decreased pain to 4/10 to improve quality of movement.  5.Patient to increase LE balance to good without pain.  6.Patient to improve score on the FOTO by 10%.    Long Term Goals: In 8 weeks  1. Patient to perform daily activities including recreational walking, lifting, driving without limitation.  2. Patient to demonstrate full knee flexibility to WNL to improve functional mobility..  3. Patient to demonstrate increased LE strength to 5/5 to improve functional tasks..  4. Patient to have decreased pain to 0/10 with light activities to improve quality of movement.  5. Patient to report little to no TTP in right hamstring.      PLAN     Plan of care Certification: 8/2/2022 to 9/27/2022.    Outpatient Physical Therapy 2 times weekly for 8 weeks to include the following interventions: Electrical Stimulation IFC/TENS, Gait Training, Manual Therapy, Moist Heat/ Ice, Neuromuscular Re-ed, Patient Education, Therapeutic Activities, Therapeutic Exercise and Dry needling.     Alcon Shah, PT DPT      I CERTIFY THE NEED FOR THESE SERVICES FURNISHED UNDER THIS PLAN OF TREATMENT AND WHILE UNDER MY CARE   Physician's comments:     Physician's Signature: ___________________________________________________

## 2022-08-08 NOTE — PROGRESS NOTES
[facility]   Physical Therapy Treatment Note     Name: Yodit Canseco  Clinic Number: [id number]    Therapy Diagnosis:   Encounter Diagnoses   Name Primary?    Weakness of right lower extremity Yes    Impaired flexibility of lower extremity     Decreased activity tolerance      Physician: Miky Lewis MD    Visit Date: 8/9/2022    Physician Orders: PT Eval and Treat  Medical Diagnosis from Referral: Hamstring tear [S76.319A]  Evaluation Date: 8/2/2022  Authorization Period Expiration: 7/18/2023  Plan of Care Expiration: 9/27/2022  Progress Note Due: 9/2/2022  Visit # / Visits authorized: 1/20 auth (1/1 eval)  FOTO: 1/3     Precautions: Standard, asthma, immune deficiency disorder, COPD, allergic to strong fragrances    PTA Visit #: 0/5     Time In: 0837  Time Out: 0915  Total Billable Time: 38 minutes    SUBJECTIVE     Pt reports: no significant changes since her initial evaluation visit. Home exercises went well.     She was compliant with home exercise program.  Response to previous treatment: initial evaluation  Functional change: TBD    Pain: 2/10  Location: Right hamstring     OBJECTIVE     Objective Measures updated at progress report unless specified.     Treatment     Yodit received the treatments listed below:      therapeutic exercises to develop strength, endurance, ROM, flexibility, posture and core stabilization for 38 minutes including:     Recumbent bike 5 min lvl 3  Long sitting hamstring stretch 3 min  Hamstring set at 30 deg x20 reps  Hamstring set at 60 deg x20 reps  Straight leg raise 2x10 reps RLE  SL hip abduction 2x10 reps RLE  Prone hip extension 2x10 reps RLE, knee bent  Prone knee flexion 2x10 reps RLE 3#  Seated ball squeeze 2 min  Single leg stance on airex       Possible next visit: Recumbent bike, LE flexibility, hamstring isometrics at 30/60/90, SLS, hip strengthening, core strengthening, IASTM, modalities PRN, progression to isotonic, gait  training    Patient Education and Home Exercises     Home Exercises Provided and Patient Education Provided     Education provided:   - HEP provided and reviewed  - Anatomy and Physiology pertaining to current condition  - Possible response to exercise  - Importance of PT compliance    Written Home Exercises Provided: Patient instructed to cont prior HEP. Exercises were reviewed and Yodit was able to demonstrate them prior to the end of the session.  Yodit demonstrated good  understanding of the education provided. See EMR under Patient Instructions for exercises provided during therapy sessions    ASSESSMENT     Pt tolerates all prescribed therex with no significant increase in hamstring discomfort. She feels slightly fatigued in the RLE post treatment but this is a normal response. PT informed pt that she could expect some soreness. She would benefit from continuance.     Yodit Is progressing well towards her goals.   Pt prognosis is Good.     Pt will continue to benefit from skilled outpatient physical therapy to address the deficits listed in the problem list box on initial evaluation, provide pt/family education and to maximize pt's level of independence in the home and community environment.     Pt's spiritual, cultural and educational needs considered and pt agreeable to plan of care and goals.     Anticipated barriers to physical therapy: none    Goals:  Short Term Goals: In 4 weeks:  1.Pt to be educated on HEP. (met)  2.Patient to demo increased flexibility by 5-10 degrees to improve functional mobility. (progressing, not met)  3.Patient to increase strength RLE by 1/2 grade to improve functional tasks. (progressing, not met)  4.Patient to have decreased pain to 4/10 to improve quality of movement. (progressing, not met)  5.Patient to increase LE balance to good without pain. (progressing, not met)  6.Patient to improve score on the FOTO by 10%. (progressing, not met)     Long Term Goals: In 8  weeks  1. Patient to perform daily activities including recreational walking, lifting, driving without limitation. (progressing, not met)  2. Patient to demonstrate full knee flexibility to WNL to improve functional mobility. (progressing, not met)  3. Patient to demonstrate increased LE strength to 5/5 to improve functional tasks. (progressing, not met)  4. Patient to have decreased pain to 0/10 with light activities to improve quality of movement. (progressing, not met)  5. Patient to report little to no TTP in right hamstring. (progressing, not met)    PLAN     Continue with PT POC to address functional limitations.     Alcon Shah, PT DPT

## 2022-08-09 ENCOUNTER — CLINICAL SUPPORT (OUTPATIENT)
Dept: REHABILITATION | Facility: HOSPITAL | Age: 62
End: 2022-08-09
Payer: MEDICARE

## 2022-08-09 DIAGNOSIS — R29.898 IMPAIRED FLEXIBILITY OF LOWER EXTREMITY: ICD-10-CM

## 2022-08-09 DIAGNOSIS — R68.89 DECREASED ACTIVITY TOLERANCE: ICD-10-CM

## 2022-08-09 DIAGNOSIS — R29.898 WEAKNESS OF RIGHT LOWER EXTREMITY: Primary | ICD-10-CM

## 2022-08-09 PROCEDURE — 97110 THERAPEUTIC EXERCISES: CPT | Mod: PN

## 2022-08-12 ENCOUNTER — OFFICE VISIT (OUTPATIENT)
Dept: FAMILY MEDICINE | Facility: CLINIC | Age: 62
End: 2022-08-12
Payer: MEDICARE

## 2022-08-12 VITALS
BODY MASS INDEX: 33.11 KG/M2 | HEIGHT: 63 IN | TEMPERATURE: 98 F | HEART RATE: 113 BPM | WEIGHT: 186.88 LBS | DIASTOLIC BLOOD PRESSURE: 66 MMHG | SYSTOLIC BLOOD PRESSURE: 101 MMHG

## 2022-08-12 DIAGNOSIS — J02.9 PHARYNGITIS, UNSPECIFIED ETIOLOGY: ICD-10-CM

## 2022-08-12 DIAGNOSIS — K92.1 BLOODY STOOL: ICD-10-CM

## 2022-08-12 DIAGNOSIS — R21 RASH: ICD-10-CM

## 2022-08-12 DIAGNOSIS — R05.9 COUGH: ICD-10-CM

## 2022-08-12 DIAGNOSIS — J06.9 UPPER RESPIRATORY TRACT INFECTION, UNSPECIFIED TYPE: Primary | ICD-10-CM

## 2022-08-12 DIAGNOSIS — H10.9 CONJUNCTIVITIS OF LEFT EYE, UNSPECIFIED CONJUNCTIVITIS TYPE: ICD-10-CM

## 2022-08-12 LAB
CTP QC/QA: YES
SARS-COV-2 RDRP RESP QL NAA+PROBE: NEGATIVE

## 2022-08-12 PROCEDURE — 99999 PR PBB SHADOW E&M-EST. PATIENT-LVL V: ICD-10-PCS | Mod: PBBFAC,,, | Performed by: NURSE PRACTITIONER

## 2022-08-12 PROCEDURE — 99999 PR PBB SHADOW E&M-EST. PATIENT-LVL V: CPT | Mod: PBBFAC,,, | Performed by: NURSE PRACTITIONER

## 2022-08-12 PROCEDURE — 99214 OFFICE O/P EST MOD 30 MIN: CPT | Mod: S$PBB,,, | Performed by: NURSE PRACTITIONER

## 2022-08-12 PROCEDURE — 99215 OFFICE O/P EST HI 40 MIN: CPT | Mod: PBBFAC,PO | Performed by: NURSE PRACTITIONER

## 2022-08-12 PROCEDURE — U0002 COVID-19 LAB TEST NON-CDC: HCPCS | Mod: PBBFAC,PO | Performed by: NURSE PRACTITIONER

## 2022-08-12 PROCEDURE — 99214 PR OFFICE/OUTPT VISIT, EST, LEVL IV, 30-39 MIN: ICD-10-PCS | Mod: S$PBB,,, | Performed by: NURSE PRACTITIONER

## 2022-08-12 RX ORDER — TOBRAMYCIN 3 MG/ML
1 SOLUTION/ DROPS OPHTHALMIC EVERY 6 HOURS
Qty: 5 ML | Refills: 0 | Status: SHIPPED | OUTPATIENT
Start: 2022-08-12 | End: 2022-08-16 | Stop reason: ALTCHOICE

## 2022-08-12 RX ORDER — BENZONATATE 200 MG/1
200 CAPSULE ORAL 3 TIMES DAILY PRN
Qty: 30 CAPSULE | Refills: 0 | Status: SHIPPED | OUTPATIENT
Start: 2022-08-12 | End: 2022-08-22

## 2022-08-13 ENCOUNTER — TELEPHONE (OUTPATIENT)
Dept: FAMILY MEDICINE | Facility: CLINIC | Age: 62
End: 2022-08-13
Payer: MEDICARE

## 2022-08-13 NOTE — TELEPHONE ENCOUNTER
Please call pt and inquire if allergic to cephalosporins (eg. Keflex). If not, we can send this in for her. It appears that she has taken suprax in the past.

## 2022-08-13 NOTE — PROGRESS NOTES
"Subjective:       Patient ID: Yodit Canseco is a 61 y.o. female.    Chief Complaint: Sore Throat (Wednesday, cough), Conjunctivitis (Today, grandson has pink eye), and Follow-up (Bloody stool since Monday. )    Sore Throat   This is a new problem. The current episode started in the past 7 days. The problem has been unchanged. There has been no fever. The pain is mild. Associated symptoms include abdominal pain (mild; states, "no worse than it is all of the time.") and coughing. Pertinent negatives include no congestion, diarrhea, drooling, ear discharge, ear pain, headaches, hoarse voice, plugged ear sensation, neck pain, shortness of breath, stridor, swollen glands, trouble swallowing or vomiting. She has had no exposure to strep or mono. She has tried nothing for the symptoms. The treatment provided no relief.   Conjunctivitis  This is a new (States grandchild has "pink eye") problem. The current episode started today (Left eye). The problem occurs daily. The problem has been unchanged. Associated symptoms include abdominal pain (mild; states, "no worse than it is all of the time."), coughing and a sore throat. Pertinent negatives include no anorexia, arthralgias, change in bowel habit, chest pain, chills, congestion, diaphoresis, fatigue, fever, headaches, joint swelling, myalgias, nausea, neck pain, numbness, rash, swollen glands, urinary symptoms, vertigo, visual change, vomiting or weakness. Nothing aggravates the symptoms. Treatments tried: Currently taking doxycycline; states not taking levaquin. The treatment provided no relief.   Rectal Bleeding  This is a new problem. The current episode started in the past 7 days (Pt states began on Monday). The problem occurs daily. The problem has been waxing and waning. Associated symptoms include abdominal pain (mild; states, "no worse than it is all of the time."), coughing and a sore throat. Pertinent negatives include no anorexia, arthralgias, change in bowel " "habit, chest pain, chills, congestion, diaphoresis, fatigue, fever, headaches, joint swelling, myalgias, nausea, neck pain, numbness, rash, swollen glands, urinary symptoms, vertigo, visual change, vomiting or weakness. Associated symptoms comments: Pt states, "sometimes it's just a tinge of pink and sometimes it' bright red." Denies hemorrhoids. Nothing aggravates the symptoms. She has tried nothing (Declines ER; states, "I probably have UC/Crohn's like my son. I figured I could just go to GI.") for the symptoms. The treatment provided no relief.   Rash  This is a chronic problem. The current episode started more than 1 month ago. The problem is unchanged. The affected locations include the face. The rash is characterized by redness and itchiness. She was exposed to nothing. Associated symptoms include coughing and a sore throat. Pertinent negatives include no anorexia, congestion, diarrhea, eye pain, facial edema, fatigue, fever, joint pain, nail changes, rhinorrhea, shortness of breath or vomiting. Past treatments include nothing (Pt states sees dermatology). The treatment provided no relief. There is no history of allergies, asthma, eczema or varicella.     Past Medical History:   Diagnosis Date    Angio-edema     Arthralgia     Asthma without status asthmaticus     Bronchitis     COPD (chronic obstructive pulmonary disease)     COVID-19 virus infection 7/23/2021    Diverticulosis of large intestine without hemorrhage 10/8/2015    Environmental allergies     Eosinophilic asthma 3/8/2018    Gastroparesis     Hand arthritis     Herpes simplex without mention of complication     oral    Hidradenitis     Hyperlipidemia     Hypothyroidism     Immune deficiency disorder     Intraperitoneal abscess 5/7/2018    LBP radiating to left leg     Leukocytosis, unspecified     Migraine headache     Obesity, Class III, BMI 40-49.9 (morbid obesity)     Periorbital cellulitis 12/31/2016    Prediabetes     " "Recurrent upper respiratory infection (URI)     S/P colonoscopy November 2010    Sepsis 5/7/2018    Urticaria      Social History     Socioeconomic History    Marital status:    Tobacco Use    Smoking status: Never Smoker    Smokeless tobacco: Never Used   Substance and Sexual Activity    Alcohol use: Yes     Alcohol/week: 0.0 standard drinks     Comment: very rarely    Drug use: No    Sexual activity: Yes     Partners: Male   Social History Narrative    . Disabled teacher.     Past Surgical History:   Procedure Laterality Date    ADENOIDECTOMY      CHOLECYSTECTOMY      CHOLECYSTECTOMY      COLONOSCOPY  2015    repeat in 10    COLONOSCOPY N/A 10/8/2015    Procedure: COLONOSCOPY;  Surgeon: Panda Bose MD;  Location: [facility];  Service: Endoscopy;  Laterality: N/A;    Hand fracture surgery      HARDWARE REMOVAL      left hand 5th MC    hymenectomy      HYSTERECTOMY      menorrhagia-Ovaries intact    ROTATOR CUFF REPAIR Right     SLEEVE GASTROPLASTY  04/16/2018    TONSILLECTOMY      Urethral dilatation         Review of Systems   Constitutional: Negative.  Negative for chills, diaphoresis, fatigue and fever.   HENT: Positive for sore throat. Negative for nasal congestion, drooling, ear discharge, ear pain, hoarse voice, rhinorrhea and trouble swallowing.    Eyes: Negative.  Negative for pain.   Respiratory: Positive for cough. Negative for shortness of breath and stridor.    Cardiovascular: Negative.  Negative for chest pain.   Gastrointestinal: Positive for abdominal pain (mild; states, "no worse than it is all of the time."), blood in stool and hematochezia. Negative for anorexia, change in bowel habit, diarrhea, nausea, vomiting and change in bowel habit.   Endocrine: Negative.    Genitourinary: Negative.    Musculoskeletal: Negative.  Negative for arthralgias, joint pain, joint swelling, myalgias and neck pain.   Integumentary:  Negative for nail changes and rash. Negative. "   Allergic/Immunologic: Negative.    Neurological: Negative.  Negative for vertigo, weakness, numbness and headaches.   Psychiatric/Behavioral: Negative.          Objective:      Physical Exam  Vitals and nursing note reviewed.   Constitutional:       Appearance: Normal appearance. She is obese.   HENT:      Head: Normocephalic.      Right Ear: Hearing, tympanic membrane, ear canal and external ear normal.      Left Ear: Hearing, tympanic membrane, ear canal and external ear normal.      Nose: Nose normal.      Mouth/Throat:      Mouth: Mucous membranes are moist.      Pharynx: Oropharynx is clear.   Eyes:      Conjunctiva/sclera: Conjunctivae normal.      Pupils: Pupils are equal, round, and reactive to light.   Cardiovascular:      Rate and Rhythm: Normal rate and regular rhythm.      Pulses: Normal pulses.      Heart sounds: Normal heart sounds.   Pulmonary:      Effort: Pulmonary effort is normal.      Breath sounds: Normal breath sounds.   Abdominal:      General: Bowel sounds are normal.      Palpations: Abdomen is soft.   Musculoskeletal:         General: Normal range of motion.      Cervical back: Normal range of motion and neck supple.   Skin:     General: Skin is warm and dry.      Capillary Refill: Capillary refill takes 2 to 3 seconds.      Findings: Rash (left and right upper cheek) present.   Neurological:      Mental Status: She is alert and oriented to person, place, and time.   Psychiatric:         Mood and Affect: Mood normal.         Behavior: Behavior normal.         Thought Content: Thought content normal.         Judgment: Judgment normal.         Assessment:       Problem List Items Addressed This Visit    None     Visit Diagnoses     Upper respiratory tract infection, unspecified type    -  Primary    Pharyngitis, unspecified etiology        Cough        Relevant Orders    POCT COVID-19 Rapid Screening (Completed)    Conjunctivitis of left eye, unspecified conjunctivitis type        Bloody  stool        Relevant Orders    Ambulatory referral/consult to Gastroenterology    CBC Without Differential    Occult blood x 1, stool    Rash        Relevant Orders    Ambulatory referral/consult to Dermatology          Plan:           Yodit was seen today for sore throat, conjunctivitis and follow-up.    Diagnoses and all orders for this visit:    Upper respiratory tract infection, unspecified type  Pharyngitis, unspecified etiology  Cough  -     POCT COVID-19 Rapid Screening        -     benzonatate (TESSALON) 200 MG capsule; Take 1 capsule (200 mg total) by mouth 3 (three) times daily as needed.  Magic mouthwash as prescribed     Conjunctivitis of left eye, unspecified conjunctivitis type  -     tobramycin sulfate 0.3% (TOBREX) 0.3 % ophthalmic solution; Place 1 drop into the left eye every 6 (six) hours. for 7 days    Bloody stool  -     Ambulatory referral/consult to Gastroenterology; Future  -     CBC Without Differential; Future  -     Occult blood x 1, stool; Future    Rash  -     Ambulatory referral/consult to Dermatology; Future    Report to ER immediately if symptoms worsen or persisi

## 2022-08-15 ENCOUNTER — OFFICE VISIT (OUTPATIENT)
Dept: RHEUMATOLOGY | Facility: CLINIC | Age: 62
End: 2022-08-15
Payer: MEDICARE

## 2022-08-15 ENCOUNTER — OFFICE VISIT (OUTPATIENT)
Dept: SURGERY | Facility: CLINIC | Age: 62
End: 2022-08-15
Payer: MEDICARE

## 2022-08-15 VITALS
HEART RATE: 99 BPM | BODY MASS INDEX: 33.68 KG/M2 | HEIGHT: 63 IN | WEIGHT: 190.06 LBS | DIASTOLIC BLOOD PRESSURE: 75 MMHG | SYSTOLIC BLOOD PRESSURE: 112 MMHG

## 2022-08-15 VITALS
DIASTOLIC BLOOD PRESSURE: 68 MMHG | WEIGHT: 190 LBS | HEART RATE: 75 BPM | BODY MASS INDEX: 34.2 KG/M2 | SYSTOLIC BLOOD PRESSURE: 112 MMHG

## 2022-08-15 DIAGNOSIS — K92.1 HEMATOCHEZIA: ICD-10-CM

## 2022-08-15 DIAGNOSIS — K13.79 RECURRENT ORAL ULCERS: ICD-10-CM

## 2022-08-15 DIAGNOSIS — M32.19 OTHER SYSTEMIC LUPUS ERYTHEMATOSUS WITH OTHER ORGAN INVOLVEMENT: Primary | ICD-10-CM

## 2022-08-15 DIAGNOSIS — R11.2 DRUG-INDUCED NAUSEA AND VOMITING: ICD-10-CM

## 2022-08-15 DIAGNOSIS — M35.9 UNDIFFERENTIATED CONNECTIVE TISSUE DISEASE: ICD-10-CM

## 2022-08-15 DIAGNOSIS — K92.1 BLOODY STOOL: ICD-10-CM

## 2022-08-15 DIAGNOSIS — M35.2 BEHCET'S SYNDROME INVOLVING ORAL MUCOSA: Primary | ICD-10-CM

## 2022-08-15 DIAGNOSIS — T50.905A DRUG-INDUCED NAUSEA AND VOMITING: ICD-10-CM

## 2022-08-15 DIAGNOSIS — K92.1 HEMATOCHEZIA: Primary | ICD-10-CM

## 2022-08-15 PROCEDURE — 99999 PR PBB SHADOW E&M-EST. PATIENT-LVL IV: ICD-10-PCS | Mod: PBBFAC,,, | Performed by: INTERNAL MEDICINE

## 2022-08-15 PROCEDURE — 99215 OFFICE O/P EST HI 40 MIN: CPT | Mod: S$PBB,,, | Performed by: INTERNAL MEDICINE

## 2022-08-15 PROCEDURE — 99215 OFFICE O/P EST HI 40 MIN: CPT | Mod: PBBFAC | Performed by: COLON & RECTAL SURGERY

## 2022-08-15 PROCEDURE — 99999 PR PBB SHADOW E&M-EST. PATIENT-LVL IV: CPT | Mod: PBBFAC,,, | Performed by: INTERNAL MEDICINE

## 2022-08-15 PROCEDURE — 99999 PR PBB SHADOW E&M-EST. PATIENT-LVL V: ICD-10-PCS | Mod: PBBFAC,,, | Performed by: COLON & RECTAL SURGERY

## 2022-08-15 PROCEDURE — 99204 OFFICE O/P NEW MOD 45 MIN: CPT | Mod: S$PBB,,, | Performed by: COLON & RECTAL SURGERY

## 2022-08-15 PROCEDURE — 99999 PR PBB SHADOW E&M-EST. PATIENT-LVL V: CPT | Mod: PBBFAC,,, | Performed by: COLON & RECTAL SURGERY

## 2022-08-15 PROCEDURE — 99204 PR OFFICE/OUTPT VISIT, NEW, LEVL IV, 45-59 MIN: ICD-10-PCS | Mod: S$PBB,,, | Performed by: COLON & RECTAL SURGERY

## 2022-08-15 PROCEDURE — 99215 PR OFFICE/OUTPT VISIT, EST, LEVL V, 40-54 MIN: ICD-10-PCS | Mod: S$PBB,,, | Performed by: INTERNAL MEDICINE

## 2022-08-15 PROCEDURE — 99214 OFFICE O/P EST MOD 30 MIN: CPT | Mod: PBBFAC,27,PO | Performed by: INTERNAL MEDICINE

## 2022-08-15 RX ORDER — HYDROXYCHLOROQUINE SULFATE 200 MG/1
200 TABLET, FILM COATED ORAL 2 TIMES DAILY
Qty: 60 TABLET | Refills: 6 | Status: SHIPPED | OUTPATIENT
Start: 2022-08-15 | End: 2023-04-26 | Stop reason: SDUPTHER

## 2022-08-15 RX ORDER — ONDANSETRON 4 MG/1
4 TABLET, ORALLY DISINTEGRATING ORAL EVERY 8 HOURS PRN
Qty: 90 TABLET | Refills: 0 | Status: SHIPPED | OUTPATIENT
Start: 2022-08-15 | End: 2023-02-27

## 2022-08-15 RX ORDER — SODIUM, POTASSIUM,MAG SULFATES 17.5-3.13G
1 SOLUTION, RECONSTITUTED, ORAL ORAL DAILY
Qty: 1 KIT | Refills: 0 | Status: ON HOLD | OUTPATIENT
Start: 2022-08-15 | End: 2022-08-17 | Stop reason: ALTCHOICE

## 2022-08-15 NOTE — PROGRESS NOTES
"History & Physical    SUBJECTIVE:     History of Present Illness:  Patient is a 61 y.o. female presents for evaluation of bloody diarrhea for the last week. She has had rectal bleeding due to hemorrhoids in the past, but she believes that this is different as the consistency is like "mucus." She has associated loose stools and abdominal bloating but denies any abdominal pain. She has 1-2 bowel movements per day, has intermittently hard stools, intermittently strains, drinks about 64 ounces of water per day, sits for about 5 minutes, takes stool softeners daily and laxatives intermittently but no fiber, and uses both toilet paper and wet wipes. She has a son with UC and a grandfather and aunt with colon cancer. Her last colonoscopy was in 2015 with only diverticulosis and no polyps, but she has a documented history of polyps on colonoscopy in 2010. She denies fevers, chills, nausea, and vomiting.      Review of patient's allergies indicates:   Allergen Reactions    Bromelains Hives     " causes mouth to bleed"    Pimecrolimus Swelling    Sudafed [pseudoephedrine hcl] Other (See Comments)     Does not want to wake up .    Amoxicillin-pot clavulanate Itching     Other reaction(s): Itching    Hydrocodone Itching    Iodinated contrast media      childhood    Iodine and iodide containing products Other (See Comments)    Melon Hives    Pantoprazole Nausea Only     Other reaction(s): upset stomach/halitosis    Percocet [oxycodone-acetaminophen] Itching    Corn containing products     Wheat containing prod     Barium iodide Rash    Ephedrine Other (See Comments)     Other reaction(s): comatose    Penicillins      Other reaction(s): does not work on pt symptoms       Current Outpatient Medications   Medication Sig Dispense Refill    (Magic mouthwash) 1:1:1 diphenhydramine(Benadryl) 12.5mg/5ml liq, aluminum & magnesium hydroxide-simethicone (Maalox), LIDOcaine viscous 2% Swish and spit 5 mLs every 8 (eight) " hours as needed (oral ulcers). Gargle and spit. DO NOT SWALLOW 360 mL 1    acetaminophen (TYLENOL) 325 MG tablet Take 325 mg by mouth every 6 (six) hours as needed for Pain.      apremilast (OTEZLA) 30 mg Tab Take 1 tablet (30 mg total) by mouth 2 (two) times daily. 60 tablet 11    benzonatate (TESSALON) 200 MG capsule Take 1 capsule (200 mg total) by mouth 3 (three) times daily as needed. 30 capsule 0    BIOTIN ORAL Take by mouth once daily.      budesonide-glycopyr-formoterol (BREZTRI AEROSPHERE) 160-9-4.8 mcg/actuation HFAA Inhale 2 puffs into the lungs 2 (two) times a day. 10.7 g 11    cefdinir (OMNICEF) 300 MG capsule Take 1 capsule (300 mg total) by mouth every 12 (twelve) hours. for 7 days 14 capsule 0    cholecalciferol, vitamin D3, (VITAMIN D3) 50 mcg (2,000 unit) Cap Take 1 capsule by mouth once daily.      clobetasoL (TEMOVATE) 0.05 % cream APPLY TOPICALLY TO THE AFFECTED AREA TWICE DAILY 60 g 0    clobetasoL (TEMOVATE) 0.05 % external solution Apply topically once daily. 50 mL 4    desonide (DESOWEN) 0.05 % cream Apply topically 2 (two) times daily. 1 each 3    doxycycline (VIBRAMYCIN) 100 MG Cap Take 1 capsule (100 mg total) by mouth once daily. 90 capsule 0    estradioL (ESTRACE) 1 MG tablet TAKE 1 TABLET(1 MG) BY MOUTH EVERY DAY 90 tablet 3    hydrOXYchloroQUINE (PLAQUENIL) 200 mg tablet Take 1 tablet (200 mg total) by mouth 2 (two) times daily. 60 tablet 6    immun glob G,IgG,-pro-IgA 0-50 (HIZENTRA) 2 gram/10 mL (20 %) Soln Inject 18 g into the skin every 14 (fourteen) days. 90 mL 12    levoFLOXacin (LEVAQUIN) 500 MG tablet Take 1 tablet (500 mg total) by mouth once daily. 14 tablet 2    levothyroxine (SYNTHROID) 75 MCG tablet Take 1 tablet (75 mcg total) by mouth once daily. 90 tablet 3    montelukast (SINGULAIR) 10 mg tablet Take 1 tablet (10 mg total) by mouth every evening. 90 tablet 3    mucus clearing device (ACAPELLA, FLUTTER) by Misc.(Non-Drug; Combo Route) route 2 (two)  times daily. 1 Device 0    MULTIVITAMIN ORAL Take by mouth once daily.      ondansetron (ZOFRAN-ODT) 4 MG TbDL Take 1 tablet (4 mg total) by mouth every 8 (eight) hours as needed (nausea/vomiting). 20 tablet 0    pantoprazole (PROTONIX) 40 MG tablet Take 1 tablet (40 mg total) by mouth once daily. 30 tablet 0    predniSONE (DELTASONE) 20 MG tablet One daily for 3 days and repeat for flare of lung symptoms as intructed 21 tablet 2    predniSONE (DELTASONE) 5 MG tablet Take 1 tablet (5 mg total) by mouth once daily. 90 tablet 2    sulfamethoxazole-trimethoprim 800-160mg (BACTRIM DS) 800-160 mg Tab Take 1 tablet by mouth 2 (two) times daily. for 7 days 14 tablet 0    sumatriptan (IMITREX) 100 MG tablet       tobramycin sulfate 0.3% (TOBREX) 0.3 % ophthalmic solution Place 1 drop into the left eye every 6 (six) hours. for 7 days 5 mL 0    tretinoin (RETIN-A) 0.05 % cream Apply topically every evening. 45 g 3    albuterol (PROAIR HFA) 90 mcg/actuation inhaler Inhale 2 puffs into the lungs every 6 (six) hours as needed for Wheezing. Rescue 18 g 0    budesonide (PULMICORT) 0.25 mg/2 mL nebulizer solution Take 2 mLs (0.25 mg total) by nebulization once daily. Controller 60 vial 11    levalbuterol (XOPENEX) 1.25 mg/3 mL nebulizer solution Take 3 mLs (1.25 mg total) by nebulization every 4 (four) hours as needed for Wheezing or Shortness of Breath. Rescue 1 Box 11    valACYclovir (VALTREX) 1000 MG tablet Take 2 tablets (2,000 mg total) by mouth every 12 (twelve) hours. For one day for fever blisters.  Do not take for more than 1 day per episode 20 tablet 0     No current facility-administered medications for this visit.       Past Medical History:   Diagnosis Date    Angio-edema     Arthralgia     Asthma without status asthmaticus     Bronchitis     COPD (chronic obstructive pulmonary disease)     COVID-19 virus infection 7/23/2021    Diverticulosis of large intestine without hemorrhage 10/8/2015     Environmental allergies     Eosinophilic asthma 3/8/2018    Gastroparesis     Hand arthritis     Herpes simplex without mention of complication     oral    Hidradenitis     Hyperlipidemia     Hypothyroidism     Immune deficiency disorder     Intraperitoneal abscess 5/7/2018    LBP radiating to left leg     Leukocytosis, unspecified     Migraine headache     Obesity, Class III, BMI 40-49.9 (morbid obesity)     Periorbital cellulitis 12/31/2016    Prediabetes     Recurrent upper respiratory infection (URI)     S/P colonoscopy November 2010    Sepsis 5/7/2018    Urticaria      Past Surgical History:   Procedure Laterality Date    ADENOIDECTOMY      CHOLECYSTECTOMY      CHOLECYSTECTOMY      COLONOSCOPY  2015    repeat in 10    COLONOSCOPY N/A 10/8/2015    Procedure: COLONOSCOPY;  Surgeon: Panda Bose MD;  Location: [facility];  Service: Endoscopy;  Laterality: N/A;    Hand fracture surgery      HARDWARE REMOVAL      left hand 5th MC    hymenectomy      HYSTERECTOMY      menorrhagia-Ovaries intact    ROTATOR CUFF REPAIR Right     SLEEVE GASTROPLASTY  04/16/2018    TONSILLECTOMY      Urethral dilatation       Family History   Problem Relation Age of Onset    Melanoma Mother     Basal cell carcinoma Mother     Lung disease Mother         pulm htn    Cataracts Mother     Hypertension Mother     Lung cancer Father     Diabetes Father     Hypertension Father     Cancer Father     Diabetes Paternal Aunt     Colon cancer Paternal Aunt 40    Diabetes Paternal Aunt     Ovarian cancer Paternal Grandmother 40    Cancer Maternal Grandfather     Melanoma Maternal Aunt     Melanoma Maternal Uncle     Breast cancer Paternal Aunt 40    Lymphoma Paternal Aunt     Ulcerative colitis Son     Psoriasis Son     Psoriasis Son     Breast cancer Cousin 25    Allergic rhinitis Neg Hx     Allergies Neg Hx     Angioedema Neg Hx     Asthma Neg Hx     Atopy Neg Hx     Eczema Neg Hx      Immunodeficiency Neg Hx     Rhinitis Neg Hx     Urticaria Neg Hx      Social History     Tobacco Use    Smoking status: Never Smoker    Smokeless tobacco: Never Used   Substance Use Topics    Alcohol use: Yes     Alcohol/week: 0.0 standard drinks     Comment: very rarely    Drug use: No        Review of Systems:  Review of Systems   Constitutional: Negative for activity change, appetite change, chills, fatigue, fever and unexpected weight change.   HENT: Negative for congestion, ear pain, sore throat and trouble swallowing.    Eyes: Negative for pain, redness and itching.   Respiratory: Negative for cough, shortness of breath, wheezing and stridor.    Cardiovascular: Negative for chest pain, palpitations and leg swelling.   Gastrointestinal: Positive for abdominal distention, blood in stool and diarrhea. Negative for abdominal pain, constipation, nausea, rectal pain and vomiting.   Endocrine: Negative for cold intolerance, heat intolerance and polyuria.   Genitourinary: Negative for difficulty urinating, dysuria, flank pain, frequency, hematuria and urgency.   Musculoskeletal: Negative for gait problem, joint swelling and neck pain.   Skin: Negative for color change, pallor, rash and wound.   Allergic/Immunologic: Negative for environmental allergies and immunocompromised state.   Neurological: Negative for dizziness, speech difficulty, weakness and numbness.   Hematological: Does not bruise/bleed easily.   Psychiatric/Behavioral: Negative for agitation, confusion and hallucinations.       OBJECTIVE:     Vital Signs (Most Recent)  Pulse: 75 (08/15/22 0919)  BP: 112/68 (08/15/22 0919)     86.2 kg (190 lb)     Physical Exam:  Physical Exam  Vitals reviewed.   Constitutional:       General: She is not in acute distress.     Appearance: She is well-developed. She is not ill-appearing.   HENT:      Head: Normocephalic and atraumatic.      Right Ear: External ear normal.      Left Ear: External ear normal.   Eyes:       Extraocular Movements: Extraocular movements intact.      Conjunctiva/sclera: Conjunctivae normal.   Cardiovascular:      Rate and Rhythm: Normal rate.   Pulmonary:      Effort: Pulmonary effort is normal. No respiratory distress.   Abdominal:      General: There is no distension.      Palpations: Abdomen is soft.      Tenderness: There is no abdominal tenderness.      Comments: Well-healed previous lap port sites   Genitourinary:     Comments: Anorectal: Offered but deferred to colonoscopy.  Musculoskeletal:      Cervical back: Neck supple.   Skin:     General: Skin is warm and dry.   Neurological:      Mental Status: She is alert and oriented to person, place, and time.   Psychiatric:         Behavior: Behavior normal.         Laboratory  Lab Results   Component Value Date    WBC 9.80 08/15/2022    HGB 14.0 08/15/2022    HCT 40.9 08/15/2022    MCV 86 08/15/2022     08/15/2022     CMP  Sodium   Date Value Ref Range Status   08/15/2022 139 136 - 145 mmol/L Final     Potassium   Date Value Ref Range Status   08/15/2022 3.9 3.5 - 5.1 mmol/L Final     Chloride   Date Value Ref Range Status   08/15/2022 100 95 - 110 mmol/L Final     CO2   Date Value Ref Range Status   08/15/2022 30 22 - 31 mmol/L Final     Glucose   Date Value Ref Range Status   08/15/2022 94 70 - 110 mg/dL Final     Comment:     The ADA recommends the following guidelines for fasting glucose:    Normal:       less than 100 mg/dL    Prediabetes:  100 mg/dL to 125 mg/dL    Diabetes:     126 mg/dL or higher       BUN   Date Value Ref Range Status   08/15/2022 16 7 - 18 mg/dL Final     Creatinine   Date Value Ref Range Status   08/15/2022 0.92 0.50 - 1.40 mg/dL Final     Calcium   Date Value Ref Range Status   08/15/2022 9.0 8.4 - 10.2 mg/dL Final     Total Protein   Date Value Ref Range Status   08/15/2022 7.7 6.0 - 8.4 g/dL Final     Albumin   Date Value Ref Range Status   08/15/2022 4.1 3.5 - 5.2 g/dL Final     Total Bilirubin   Date Value  Ref Range Status   08/15/2022 0.3 0.2 - 1.3 mg/dL Final     Alkaline Phosphatase   Date Value Ref Range Status   08/15/2022 74 38 - 145 U/L Final     AST   Date Value Ref Range Status   08/15/2022 32 14 - 36 U/L Final     ALT   Date Value Ref Range Status   08/15/2022 23 0 - 35 U/L Final     Anion Gap   Date Value Ref Range Status   08/15/2022 9 8 - 16 mmol/L Final     eGFR if    Date Value Ref Range Status   05/23/2022 >60.0 >60 mL/min/1.73 m^2 Final     eGFR if non    Date Value Ref Range Status   05/23/2022 >60.0 >60 mL/min/1.73 m^2 Final     Comment:     Calculation used to obtain the estimated glomerular filtration  rate (eGFR) is the CKD-EPI equation.        Occult Blood Negative Positive Abnormal     Comment: hemoccult        Diagnostic Results:  Colonoscopy 2025 reviewed.    ASSESSMENT/PLAN:     62 y/o female with bloody diarrhea.     - Discussed with patient that with family history of colon cancer and personal history of polyps given current symptoms, evaluation via colonoscopy is advised.   - Colonoscopy Wednesday 08/17/22.  - Suprep called in.  - RTC PRN    Olaf Del Valle MD  Colon and Rectal Surgery  [facility]

## 2022-08-15 NOTE — H&P (VIEW-ONLY)
"History & Physical    SUBJECTIVE:     History of Present Illness:  Patient is a 61 y.o. female presents for evaluation of bloody diarrhea for the last week. She has had rectal bleeding due to hemorrhoids in the past, but she believes that this is different as the consistency is like "mucus." She has associated loose stools and abdominal bloating but denies any abdominal pain. She has 1-2 bowel movements per day, has intermittently hard stools, intermittently strains, drinks about 64 ounces of water per day, sits for about 5 minutes, takes stool softeners daily and laxatives intermittently but no fiber, and uses both toilet paper and wet wipes. She has a son with UC and a grandfather and aunt with colon cancer. Her last colonoscopy was in 2015 with only diverticulosis and no polyps, but she has a documented history of polyps on colonoscopy in 2010. She denies fevers, chills, nausea, and vomiting.      Review of patient's allergies indicates:   Allergen Reactions    Bromelains Hives     " causes mouth to bleed"    Pimecrolimus Swelling    Sudafed [pseudoephedrine hcl] Other (See Comments)     Does not want to wake up .    Amoxicillin-pot clavulanate Itching     Other reaction(s): Itching    Hydrocodone Itching    Iodinated contrast media      childhood    Iodine and iodide containing products Other (See Comments)    Melon Hives    Pantoprazole Nausea Only     Other reaction(s): upset stomach/halitosis    Percocet [oxycodone-acetaminophen] Itching    Corn containing products     Wheat containing prod     Barium iodide Rash    Ephedrine Other (See Comments)     Other reaction(s): comatose    Penicillins      Other reaction(s): does not work on pt symptoms       Current Outpatient Medications   Medication Sig Dispense Refill    (Magic mouthwash) 1:1:1 diphenhydramine(Benadryl) 12.5mg/5ml liq, aluminum & magnesium hydroxide-simethicone (Maalox), LIDOcaine viscous 2% Swish and spit 5 mLs every 8 (eight) " hours as needed (oral ulcers). Gargle and spit. DO NOT SWALLOW 360 mL 1    acetaminophen (TYLENOL) 325 MG tablet Take 325 mg by mouth every 6 (six) hours as needed for Pain.      apremilast (OTEZLA) 30 mg Tab Take 1 tablet (30 mg total) by mouth 2 (two) times daily. 60 tablet 11    benzonatate (TESSALON) 200 MG capsule Take 1 capsule (200 mg total) by mouth 3 (three) times daily as needed. 30 capsule 0    BIOTIN ORAL Take by mouth once daily.      budesonide-glycopyr-formoterol (BREZTRI AEROSPHERE) 160-9-4.8 mcg/actuation HFAA Inhale 2 puffs into the lungs 2 (two) times a day. 10.7 g 11    cefdinir (OMNICEF) 300 MG capsule Take 1 capsule (300 mg total) by mouth every 12 (twelve) hours. for 7 days 14 capsule 0    cholecalciferol, vitamin D3, (VITAMIN D3) 50 mcg (2,000 unit) Cap Take 1 capsule by mouth once daily.      clobetasoL (TEMOVATE) 0.05 % cream APPLY TOPICALLY TO THE AFFECTED AREA TWICE DAILY 60 g 0    clobetasoL (TEMOVATE) 0.05 % external solution Apply topically once daily. 50 mL 4    desonide (DESOWEN) 0.05 % cream Apply topically 2 (two) times daily. 1 each 3    doxycycline (VIBRAMYCIN) 100 MG Cap Take 1 capsule (100 mg total) by mouth once daily. 90 capsule 0    estradioL (ESTRACE) 1 MG tablet TAKE 1 TABLET(1 MG) BY MOUTH EVERY DAY 90 tablet 3    hydrOXYchloroQUINE (PLAQUENIL) 200 mg tablet Take 1 tablet (200 mg total) by mouth 2 (two) times daily. 60 tablet 6    immun glob G,IgG,-pro-IgA 0-50 (HIZENTRA) 2 gram/10 mL (20 %) Soln Inject 18 g into the skin every 14 (fourteen) days. 90 mL 12    levoFLOXacin (LEVAQUIN) 500 MG tablet Take 1 tablet (500 mg total) by mouth once daily. 14 tablet 2    levothyroxine (SYNTHROID) 75 MCG tablet Take 1 tablet (75 mcg total) by mouth once daily. 90 tablet 3    montelukast (SINGULAIR) 10 mg tablet Take 1 tablet (10 mg total) by mouth every evening. 90 tablet 3    mucus clearing device (ACAPELLA, FLUTTER) by Misc.(Non-Drug; Combo Route) route 2 (two)  times daily. 1 Device 0    MULTIVITAMIN ORAL Take by mouth once daily.      ondansetron (ZOFRAN-ODT) 4 MG TbDL Take 1 tablet (4 mg total) by mouth every 8 (eight) hours as needed (nausea/vomiting). 20 tablet 0    pantoprazole (PROTONIX) 40 MG tablet Take 1 tablet (40 mg total) by mouth once daily. 30 tablet 0    predniSONE (DELTASONE) 20 MG tablet One daily for 3 days and repeat for flare of lung symptoms as intructed 21 tablet 2    predniSONE (DELTASONE) 5 MG tablet Take 1 tablet (5 mg total) by mouth once daily. 90 tablet 2    sulfamethoxazole-trimethoprim 800-160mg (BACTRIM DS) 800-160 mg Tab Take 1 tablet by mouth 2 (two) times daily. for 7 days 14 tablet 0    sumatriptan (IMITREX) 100 MG tablet       tobramycin sulfate 0.3% (TOBREX) 0.3 % ophthalmic solution Place 1 drop into the left eye every 6 (six) hours. for 7 days 5 mL 0    tretinoin (RETIN-A) 0.05 % cream Apply topically every evening. 45 g 3    albuterol (PROAIR HFA) 90 mcg/actuation inhaler Inhale 2 puffs into the lungs every 6 (six) hours as needed for Wheezing. Rescue 18 g 0    budesonide (PULMICORT) 0.25 mg/2 mL nebulizer solution Take 2 mLs (0.25 mg total) by nebulization once daily. Controller 60 vial 11    levalbuterol (XOPENEX) 1.25 mg/3 mL nebulizer solution Take 3 mLs (1.25 mg total) by nebulization every 4 (four) hours as needed for Wheezing or Shortness of Breath. Rescue 1 Box 11    valACYclovir (VALTREX) 1000 MG tablet Take 2 tablets (2,000 mg total) by mouth every 12 (twelve) hours. For one day for fever blisters.  Do not take for more than 1 day per episode 20 tablet 0     No current facility-administered medications for this visit.       Past Medical History:   Diagnosis Date    Angio-edema     Arthralgia     Asthma without status asthmaticus     Bronchitis     COPD (chronic obstructive pulmonary disease)     COVID-19 virus infection 7/23/2021    Diverticulosis of large intestine without hemorrhage 10/8/2015     Environmental allergies     Eosinophilic asthma 3/8/2018    Gastroparesis     Hand arthritis     Herpes simplex without mention of complication     oral    Hidradenitis     Hyperlipidemia     Hypothyroidism     Immune deficiency disorder     Intraperitoneal abscess 5/7/2018    LBP radiating to left leg     Leukocytosis, unspecified     Migraine headache     Obesity, Class III, BMI 40-49.9 (morbid obesity)     Periorbital cellulitis 12/31/2016    Prediabetes     Recurrent upper respiratory infection (URI)     S/P colonoscopy November 2010    Sepsis 5/7/2018    Urticaria      Past Surgical History:   Procedure Laterality Date    ADENOIDECTOMY      CHOLECYSTECTOMY      CHOLECYSTECTOMY      COLONOSCOPY  2015    repeat in 10    COLONOSCOPY N/A 10/8/2015    Procedure: COLONOSCOPY;  Surgeon: Panda Bose MD;  Location: [facility];  Service: Endoscopy;  Laterality: N/A;    Hand fracture surgery      HARDWARE REMOVAL      left hand 5th MC    hymenectomy      HYSTERECTOMY      menorrhagia-Ovaries intact    ROTATOR CUFF REPAIR Right     SLEEVE GASTROPLASTY  04/16/2018    TONSILLECTOMY      Urethral dilatation       Family History   Problem Relation Age of Onset    Melanoma Mother     Basal cell carcinoma Mother     Lung disease Mother         pulm htn    Cataracts Mother     Hypertension Mother     Lung cancer Father     Diabetes Father     Hypertension Father     Cancer Father     Diabetes Paternal Aunt     Colon cancer Paternal Aunt 40    Diabetes Paternal Aunt     Ovarian cancer Paternal Grandmother 40    Cancer Maternal Grandfather     Melanoma Maternal Aunt     Melanoma Maternal Uncle     Breast cancer Paternal Aunt 40    Lymphoma Paternal Aunt     Ulcerative colitis Son     Psoriasis Son     Psoriasis Son     Breast cancer Cousin 25    Allergic rhinitis Neg Hx     Allergies Neg Hx     Angioedema Neg Hx     Asthma Neg Hx     Atopy Neg Hx     Eczema Neg Hx      Immunodeficiency Neg Hx     Rhinitis Neg Hx     Urticaria Neg Hx      Social History     Tobacco Use    Smoking status: Never Smoker    Smokeless tobacco: Never Used   Substance Use Topics    Alcohol use: Yes     Alcohol/week: 0.0 standard drinks     Comment: very rarely    Drug use: No        Review of Systems:  Review of Systems   Constitutional: Negative for activity change, appetite change, chills, fatigue, fever and unexpected weight change.   HENT: Negative for congestion, ear pain, sore throat and trouble swallowing.    Eyes: Negative for pain, redness and itching.   Respiratory: Negative for cough, shortness of breath, wheezing and stridor.    Cardiovascular: Negative for chest pain, palpitations and leg swelling.   Gastrointestinal: Positive for abdominal distention, blood in stool and diarrhea. Negative for abdominal pain, constipation, nausea, rectal pain and vomiting.   Endocrine: Negative for cold intolerance, heat intolerance and polyuria.   Genitourinary: Negative for difficulty urinating, dysuria, flank pain, frequency, hematuria and urgency.   Musculoskeletal: Negative for gait problem, joint swelling and neck pain.   Skin: Negative for color change, pallor, rash and wound.   Allergic/Immunologic: Negative for environmental allergies and immunocompromised state.   Neurological: Negative for dizziness, speech difficulty, weakness and numbness.   Hematological: Does not bruise/bleed easily.   Psychiatric/Behavioral: Negative for agitation, confusion and hallucinations.       OBJECTIVE:     Vital Signs (Most Recent)  Pulse: 75 (08/15/22 0919)  BP: 112/68 (08/15/22 0919)     86.2 kg (190 lb)     Physical Exam:  Physical Exam  Vitals reviewed.   Constitutional:       General: She is not in acute distress.     Appearance: She is well-developed. She is not ill-appearing.   HENT:      Head: Normocephalic and atraumatic.      Right Ear: External ear normal.      Left Ear: External ear normal.   Eyes:       Extraocular Movements: Extraocular movements intact.      Conjunctiva/sclera: Conjunctivae normal.   Cardiovascular:      Rate and Rhythm: Normal rate.   Pulmonary:      Effort: Pulmonary effort is normal. No respiratory distress.   Abdominal:      General: There is no distension.      Palpations: Abdomen is soft.      Tenderness: There is no abdominal tenderness.      Comments: Well-healed previous lap port sites   Genitourinary:     Comments: Anorectal: Offered but deferred to colonoscopy.  Musculoskeletal:      Cervical back: Neck supple.   Skin:     General: Skin is warm and dry.   Neurological:      Mental Status: She is alert and oriented to person, place, and time.   Psychiatric:         Behavior: Behavior normal.         Laboratory  Lab Results   Component Value Date    WBC 9.80 08/15/2022    HGB 14.0 08/15/2022    HCT 40.9 08/15/2022    MCV 86 08/15/2022     08/15/2022     CMP  Sodium   Date Value Ref Range Status   08/15/2022 139 136 - 145 mmol/L Final     Potassium   Date Value Ref Range Status   08/15/2022 3.9 3.5 - 5.1 mmol/L Final     Chloride   Date Value Ref Range Status   08/15/2022 100 95 - 110 mmol/L Final     CO2   Date Value Ref Range Status   08/15/2022 30 22 - 31 mmol/L Final     Glucose   Date Value Ref Range Status   08/15/2022 94 70 - 110 mg/dL Final     Comment:     The ADA recommends the following guidelines for fasting glucose:    Normal:       less than 100 mg/dL    Prediabetes:  100 mg/dL to 125 mg/dL    Diabetes:     126 mg/dL or higher       BUN   Date Value Ref Range Status   08/15/2022 16 7 - 18 mg/dL Final     Creatinine   Date Value Ref Range Status   08/15/2022 0.92 0.50 - 1.40 mg/dL Final     Calcium   Date Value Ref Range Status   08/15/2022 9.0 8.4 - 10.2 mg/dL Final     Total Protein   Date Value Ref Range Status   08/15/2022 7.7 6.0 - 8.4 g/dL Final     Albumin   Date Value Ref Range Status   08/15/2022 4.1 3.5 - 5.2 g/dL Final     Total Bilirubin   Date Value  Ref Range Status   08/15/2022 0.3 0.2 - 1.3 mg/dL Final     Alkaline Phosphatase   Date Value Ref Range Status   08/15/2022 74 38 - 145 U/L Final     AST   Date Value Ref Range Status   08/15/2022 32 14 - 36 U/L Final     ALT   Date Value Ref Range Status   08/15/2022 23 0 - 35 U/L Final     Anion Gap   Date Value Ref Range Status   08/15/2022 9 8 - 16 mmol/L Final     eGFR if    Date Value Ref Range Status   05/23/2022 >60.0 >60 mL/min/1.73 m^2 Final     eGFR if non    Date Value Ref Range Status   05/23/2022 >60.0 >60 mL/min/1.73 m^2 Final     Comment:     Calculation used to obtain the estimated glomerular filtration  rate (eGFR) is the CKD-EPI equation.        Occult Blood Negative Positive Abnormal     Comment: hemoccult        Diagnostic Results:  Colonoscopy 2025 reviewed.    ASSESSMENT/PLAN:     62 y/o female with bloody diarrhea.     - Discussed with patient that with family history of colon cancer and personal history of polyps given current symptoms, evaluation via colonoscopy is advised.   - Colonoscopy Wednesday 08/17/22.  - Suprep called in.  - RTC PRN    Olaf Del Valle MD  Colon and Rectal Surgery  [facility]

## 2022-08-15 NOTE — PROGRESS NOTES
RHEUMATOLOGY CLINIC FOLLOW UP VISIT  Chief complaints, HPI, ROS, EXAM, Assessment & Plans:-  Yodit Parks a 61 y.o. pleasant female comes in for follow up visit. Positive FABY with UCTD ON PLAQUENIL THERAPY RECENTLY DIAGNOSED WITH Behcet's syndrome because of treatment resistant recurrent aphthous stomatitis   with other mucosal ulcers.  Reports significant improvement both in the frequency and severity of ulcers since starting Otezla.  Still has nausea which improves with ondansetron.  Have been having worsening diarrhea and blood in stool for the past couple of weeks.  Schedule for colonoscopy due to strong family history of ulcerative colitis.  Rheumatological review of system negative otherwise.  Physical examination shows no synovitis.  One oral ulcer.  Left eye conjunctivitis present.  1. Behcet's syndrome involving oral mucosa    2. Undifferentiated connective tissue disease    3. Recurrent oral ulcers    4. Drug-induced nausea and vomiting      Problem List Items Addressed This Visit     Undifferentiated connective tissue disease    Relevant Medications    hydrOXYchloroQUINE (PLAQUENIL) 200 mg tablet    Recurrent oral ulcers    Behcet's syndrome involving oral mucosa - Primary    Drug-induced nausea and vomiting    Relevant Medications    ondansetron (ZOFRAN-ODT) 4 MG TbDL           Well controlled undifferentiated connective tissue disease.  Continue Plaquenil.   Well controlled Behcet's on Otezla.  Continue the same.   PRN zofran.    Advised to update me after the colonoscopy in terms of bloody diarrhea.  Even though diarrhea is common with Otezla, blood in stool is extremely uncommon.  Okay to hold Otezla until the colonoscopy procedure is completed.   Continue antibiotics prescribed for left eye conjunctivitis due to her immunocompromised status.   Continue Hizentra for immunodeficiency disorder.  # Follow up in about 6 months (around  2/15/2023).      Disclaimer: This note was prepared using voice recognition system and is likely to have sound alike errors and is not proof read.  Please call me with any questions.

## 2022-08-16 ENCOUNTER — OFFICE VISIT (OUTPATIENT)
Dept: OPHTHALMOLOGY | Facility: CLINIC | Age: 62
End: 2022-08-16
Payer: MEDICARE

## 2022-08-16 DIAGNOSIS — H10.32 ACUTE BACTERIAL CONJUNCTIVITIS OF LEFT EYE: Primary | ICD-10-CM

## 2022-08-16 PROCEDURE — 92012 INTRM OPH EXAM EST PATIENT: CPT | Mod: S$PBB,,, | Performed by: OPTOMETRIST

## 2022-08-16 PROCEDURE — 92012 PR EYE EXAM, EST PATIENT,INTERMED: ICD-10-PCS | Mod: S$PBB,,, | Performed by: OPTOMETRIST

## 2022-08-16 PROCEDURE — 99999 PR PBB SHADOW E&M-EST. PATIENT-LVL I: CPT | Mod: PBBFAC,,, | Performed by: OPTOMETRIST

## 2022-08-16 PROCEDURE — 99999 PR PBB SHADOW E&M-EST. PATIENT-LVL I: ICD-10-PCS | Mod: PBBFAC,,, | Performed by: OPTOMETRIST

## 2022-08-16 PROCEDURE — 99211 OFF/OP EST MAY X REQ PHY/QHP: CPT | Mod: PBBFAC | Performed by: OPTOMETRIST

## 2022-08-16 NOTE — PROGRESS NOTES
HPI     Pt co red os on Friday, mild, itched a lot.. saw PA who gave her gtts to   treat it but it has only inflamed more.   Pt co irritation OS 6/10  Photophobia  Watering  Itching  Pt is also on two antibiotics** per ER      Last edited by Abhishek Gay on 8/16/2022  1:14 PM. (History)            Assessment /Plan     For exam results, see Encounter Report.    Acute bacterial conjunctivitis of left eye  -     besifloxacin (BESIVANCE) 0.6 % DrpS; Place 1 drop into the left eye 3 (three) times daily. for 7 days  Dispense: 5 mL; Refill: 0  Start Besivance tid OS x 7 days as instructed  D/C tobrex  No iritis today    RTC PRN if symptoms worsen or not resolved x 1 week  Discussed above and all questions were answered.

## 2022-08-17 ENCOUNTER — ANESTHESIA (OUTPATIENT)
Dept: ENDOSCOPY | Facility: HOSPITAL | Age: 62
End: 2022-08-17
Payer: MEDICARE

## 2022-08-17 ENCOUNTER — PATIENT MESSAGE (OUTPATIENT)
Dept: OPHTHALMOLOGY | Facility: CLINIC | Age: 62
End: 2022-08-17
Payer: MEDICARE

## 2022-08-17 ENCOUNTER — ANESTHESIA EVENT (OUTPATIENT)
Dept: ENDOSCOPY | Facility: HOSPITAL | Age: 62
End: 2022-08-17
Payer: MEDICARE

## 2022-08-17 ENCOUNTER — HOSPITAL ENCOUNTER (OUTPATIENT)
Facility: HOSPITAL | Age: 62
Discharge: HOME | End: 2022-08-17
Attending: COLON & RECTAL SURGERY | Admitting: COLON & RECTAL SURGERY
Payer: MEDICARE

## 2022-08-17 ENCOUNTER — TELEPHONE (OUTPATIENT)
Dept: ENDOSCOPY | Facility: HOSPITAL | Age: 62
End: 2022-08-17
Payer: MEDICARE

## 2022-08-17 DIAGNOSIS — Z12.11 SCREENING FOR COLON CANCER: ICD-10-CM

## 2022-08-17 PROCEDURE — 63600175 PHARM REV CODE 636 W HCPCS: Performed by: STUDENT IN AN ORGANIZED HEALTH CARE EDUCATION/TRAINING PROGRAM

## 2022-08-17 PROCEDURE — 88305 TISSUE EXAM BY PATHOLOGIST: CPT | Mod: 26,,, | Performed by: PATHOLOGY

## 2022-08-17 PROCEDURE — 45380 PR COLONOSCOPY,BIOPSY: ICD-10-PCS | Mod: 59,,, | Performed by: COLON & RECTAL SURGERY

## 2022-08-17 PROCEDURE — 25000003 PHARM REV CODE 250: Performed by: STUDENT IN AN ORGANIZED HEALTH CARE EDUCATION/TRAINING PROGRAM

## 2022-08-17 PROCEDURE — 45385 COLONOSCOPY W/LESION REMOVAL: CPT | Mod: ,,, | Performed by: COLON & RECTAL SURGERY

## 2022-08-17 PROCEDURE — 88305 TISSUE EXAM BY PATHOLOGIST: ICD-10-PCS | Mod: 26,,, | Performed by: PATHOLOGY

## 2022-08-17 PROCEDURE — 37000008 HC ANESTHESIA 1ST 15 MINUTES: Performed by: COLON & RECTAL SURGERY

## 2022-08-17 PROCEDURE — 27201012 HC FORCEPS, HOT/COLD, DISP: Performed by: COLON & RECTAL SURGERY

## 2022-08-17 PROCEDURE — 37000009 HC ANESTHESIA EA ADD 15 MINS: Performed by: COLON & RECTAL SURGERY

## 2022-08-17 PROCEDURE — 45385 PR COLONOSCOPY,REMV LESN,SNARE: ICD-10-PCS | Mod: ,,, | Performed by: COLON & RECTAL SURGERY

## 2022-08-17 PROCEDURE — 45380 COLONOSCOPY AND BIOPSY: CPT | Performed by: COLON & RECTAL SURGERY

## 2022-08-17 PROCEDURE — 45380 COLONOSCOPY AND BIOPSY: CPT | Mod: 59,,, | Performed by: COLON & RECTAL SURGERY

## 2022-08-17 PROCEDURE — 27201089 HC SNARE, DISP (ANY): Performed by: COLON & RECTAL SURGERY

## 2022-08-17 PROCEDURE — 45385 COLONOSCOPY W/LESION REMOVAL: CPT | Performed by: COLON & RECTAL SURGERY

## 2022-08-17 PROCEDURE — 88305 TISSUE EXAM BY PATHOLOGIST: CPT | Performed by: PATHOLOGY

## 2022-08-17 RX ORDER — PROPOFOL 10 MG/ML
VIAL (ML) INTRAVENOUS
Status: DISCONTINUED | OUTPATIENT
Start: 2022-08-17 | End: 2022-08-17

## 2022-08-17 RX ORDER — LIDOCAINE HYDROCHLORIDE 10 MG/ML
INJECTION, SOLUTION EPIDURAL; INFILTRATION; INTRACAUDAL; PERINEURAL
Status: DISCONTINUED | OUTPATIENT
Start: 2022-08-17 | End: 2022-08-17

## 2022-08-17 RX ADMIN — PROPOFOL 50 MG: 10 INJECTION, EMULSION INTRAVENOUS at 08:08

## 2022-08-17 RX ADMIN — PROPOFOL 50 MG: 10 INJECTION, EMULSION INTRAVENOUS at 07:08

## 2022-08-17 RX ADMIN — LIDOCAINE HYDROCHLORIDE 50 MG: 10 INJECTION, SOLUTION EPIDURAL; INFILTRATION; INTRACAUDAL; PERINEURAL at 07:08

## 2022-08-17 NOTE — TRANSFER OF CARE
"Anesthesia Transfer of Care Note    Patient: Yodit Canseco    Procedure(s) Performed: Procedure(s) (LRB):  COLONOSCOPY (N/A)    Patient location: PACU    Anesthesia Type: MAC    Transport from OR: Transported from OR on room air with adequate spontaneous ventilation    Post pain: adequate analgesia    Post assessment: no apparent anesthetic complications    Post vital signs: stable    Level of consciousness: responds to stimulation and awake    Nausea/Vomiting: no nausea/vomiting    Complications: none    Transfer of care protocol was followed      Last vitals:   Visit Vitals  /80 (BP Location: Left arm, Patient Position: Lying)   Pulse 82   Temp 36.7 °C (98.1 °F) (Temporal)   Resp 16   Ht 5' 2.5" (1.588 m)   Wt 84.8 kg (187 lb)   SpO2 97%   Breastfeeding No   BMI 33.66 kg/m²     "

## 2022-08-17 NOTE — TELEPHONE ENCOUNTER
Pt called to report that since she got home from her colonoscopy today she has already had 2 blood BMs with 2-4 tbs of bright red blood. Pt denies any other symptoms like dizziness, lightheadedness, pain. Only has some abd bloating. Informed pt if bleeding continues or worsens or she begins to have other symptoms return to ED for evaluation. Updated Dr. Del Valle and he agrees, some bleeding to be expected. Pt verbalized understanding.

## 2022-08-17 NOTE — ANESTHESIA POSTPROCEDURE EVALUATION
Anesthesia Post Evaluation    Patient: Yodit Canseco    Procedure(s) Performed: Procedure(s) (LRB):  COLONOSCOPY (N/A)    Final Anesthesia Type: MAC      Patient location during evaluation: GI PACU  Patient participation: Yes- Able to Participate  Level of consciousness: awake and alert and oriented  Post-procedure vital signs: reviewed and stable  Pain management: adequate  Airway patency: patent  ZBIGNIEW mitigation strategies: Multimodal analgesia  PONV status at discharge: No PONV  Anesthetic complications: no      Cardiovascular status: blood pressure returned to baseline  Respiratory status: unassisted  Hydration status: euvolemic  Follow-up not needed.          Vitals Value Taken Time   /80 08/17/22 0706   Temp 36.7 °C (98.1 °F) 08/17/22 0706   Pulse 82 08/17/22 0706   Resp 16 08/17/22 0706   SpO2 97 % 08/17/22 0706         No case tracking events are documented in the log.      Pain/Ana Maria Score: No data recorded

## 2022-08-17 NOTE — INTERVAL H&P NOTE
The patient has been examined and the H&P has been reviewed:    I concur with the findings and no changes have occurred since H&P was written.    - Ok to proceed to endoscopy suite for colonoscopy  - Consent obtained. All risks, benefits and alternatives fully explained to patient, including but not limited to bleeding, infection, perforation, and missed polyps. All questions appropriately answered to patient's satisfaction. Consent signed and placed on chart.    Procedure risks, benefits and alternative options discussed and understood by patient/family.

## 2022-08-17 NOTE — BRIEF OP NOTE
[facility] (Hospital)  Brief Operative Note     SUMMARY     Surgery Date: 8/17/2022     Surgeon(s) and Role:     * Rukhsana Del Valle MD - Primary    Assisting Surgeon: None    Pre-op Diagnosis:  Bloody stool [K92.1]    Post-op Diagnosis:  Post-Op Diagnosis Codes:     * Bloody stool [K92.1]    Procedure(s) (LRB):  COLONOSCOPY (N/A)    Anesthesia: Choice    Description of the findings of the procedure: left-sided inflammation; two polyps; diverticulosis and hemorrhoids    Estimated Blood Loss: * No values recorded between 8/17/2022 12:00 AM and 8/17/2022  8:12 AM *         Specimens:   Specimen (24h ago, onward)             Start     Ordered    08/17/22 0802  Specimen to Pathology, Surgery Gastrointestinal tract  Once        Comments: Pre-op Diagnosis: Bloody stool [K92.1]Procedure(s):COLONOSCOPY 1. Cecal polypectomy 2. Terminal Ileum Bx r/o IBD3. Cecum colon Bx r/o IBD4. Ascending colon Bx r/o IBD5. Proximal Transverse Bx r/o IBD6. Transverse polypectomy 7. Distal Transverse Bx r/o IBD8. Descending colon Bx r/o IBD9. Sigmoid colon Bx r/o IBD10. Recto-Sigmoid Colon Bx r/o IBD11. Rectal Colon Bx r/o IBD     References:    Click here for ordering Quick Tip   Question Answer Comment   Procedure Type: Gastrointestinal tract    Which provider would you like to cc? RUKHSANA DEL VALLE    Release to patient Immediate        08/17/22 0814                Discharge Note    SUMMARY     Admit Date: 8/17/2022    Discharge Date and Time: 8/17/2022 8:21 AM    Hospital Course Patient was seen in the preoperative area by both myself and anesthesia. All consents were verified and all questions appropriately answered. All risks, benefits and alternatives explained to patient. Patient proceeded to endoscopy suite for colonoscopy and was discharged home postoperative once cleared by anesthesia.    Final Diagnosis: Post-Op Diagnosis Codes:     * Bloody stool [K92.1]    Disposition: Home or Self Care    Follow Up/Patient  Instructions: See Provation report    Medications:  Reconciled Home Medications:      Medication List      CONTINUE taking these medications    (MAGIC MOUTHWASH) 1:1:1 BENADRYL 12.5MG/5ML LIQ, ALUMINUM & MAGNESIUM  Swish and spit 5 mLs every 8 (eight) hours as needed (oral ulcers). Gargle and spit. DO NOT SWALLOW     acetaminophen 325 MG tablet  Commonly known as: TYLENOL  Take 325 mg by mouth every 6 (six) hours as needed for Pain.     albuterol 90 mcg/actuation inhaler  Commonly known as: PROAIR HFA  Inhale 2 puffs into the lungs every 6 (six) hours as needed for Wheezing. Rescue     benzonatate 200 MG capsule  Commonly known as: TESSALON  Take 1 capsule (200 mg total) by mouth 3 (three) times daily as needed.     BESIVANCE 0.6 % Drps  Generic drug: besifloxacin  Place 1 drop into the left eye 3 (three) times daily. for 7 days     BIOTIN ORAL  Take by mouth once daily.     BREZTRI AEROSPHERE 160-9-4.8 mcg/actuation Hfaa  Generic drug: budesonide-glycopyr-formoterol  Inhale 2 puffs into the lungs 2 (two) times a day.     budesonide 0.25 mg/2 mL nebulizer solution  Commonly known as: PULMICORT  Take 2 mLs (0.25 mg total) by nebulization once daily. Controller     cefdinir 300 MG capsule  Commonly known as: OMNICEF  Take 1 capsule (300 mg total) by mouth every 12 (twelve) hours. for 7 days     cholecalciferol (vitamin D3) 50 mcg (2,000 unit) Cap capsule  Commonly known as: VITAMIN D3  Take 1 capsule by mouth once daily.     * clobetasoL 0.05 % cream  Commonly known as: TEMOVATE  APPLY TOPICALLY TO THE AFFECTED AREA TWICE DAILY     * clobetasoL 0.05 % external solution  Commonly known as: TEMOVATE  Apply topically once daily.     desonide 0.05 % cream  Commonly known as: DESOWEN  Apply topically 2 (two) times daily.     doxycycline 100 MG Cap  Commonly known as: VIBRAMYCIN  Take 1 capsule (100 mg total) by mouth once daily.     estradioL 1 MG tablet  Commonly known as: ESTRACE  TAKE 1 TABLET(1 MG) BY MOUTH EVERY DAY      hydrOXYchloroQUINE 200 mg tablet  Commonly known as: PLAQUENIL  Take 1 tablet (200 mg total) by mouth 2 (two) times daily.     immun glob G(IgG)-pro-IgA 0-50 2 gram/10 mL (20 %) Soln  Commonly known as: HIZENTRA  Inject 18 g into the skin every 14 (fourteen) days.     levalbuterol 1.25 mg/3 mL nebulizer solution  Commonly known as: XOPENEX  Take 3 mLs (1.25 mg total) by nebulization every 4 (four) hours as needed for Wheezing or Shortness of Breath. Rescue     levoFLOXacin 500 MG tablet  Commonly known as: LEVAQUIN  Take 1 tablet (500 mg total) by mouth once daily.     levothyroxine 75 MCG tablet  Commonly known as: SYNTHROID  Take 1 tablet (75 mcg total) by mouth once daily.     montelukast 10 mg tablet  Commonly known as: SINGULAIR  Take 1 tablet (10 mg total) by mouth every evening.     mucus clearing device  Commonly known as: ACAPELLA, FLUTTER  by Misc.(Non-Drug; Combo Route) route 2 (two) times daily.     MULTIVITAMIN ORAL  Take by mouth once daily.     ondansetron 4 MG Tbdl  Commonly known as: ZOFRAN-ODT  Take 1 tablet (4 mg total) by mouth every 8 (eight) hours as needed (nausea/vomiting).     OTEZLA 30 mg Tab  Generic drug: apremilast  Take 1 tablet (30 mg total) by mouth 2 (two) times daily.     pantoprazole 40 MG tablet  Commonly known as: PROTONIX  Take 1 tablet (40 mg total) by mouth once daily.     * predniSONE 5 MG tablet  Commonly known as: DELTASONE  Take 1 tablet (5 mg total) by mouth once daily.     * predniSONE 20 MG tablet  Commonly known as: DELTASONE  One daily for 3 days and repeat for flare of lung symptoms as intructed     sulfamethoxazole-trimethoprim 800-160mg 800-160 mg Tab  Commonly known as: BACTRIM DS  Take 1 tablet by mouth 2 (two) times daily. for 7 days     sumatriptan 100 MG tablet  Commonly known as: IMITREX     tretinoin 0.05 % cream  Commonly known as: RETIN-A  Apply topically every evening.     valACYclovir 1000 MG tablet  Commonly known as: VALTREX  Take 2 tablets (2,000 mg  total) by mouth every 12 (twelve) hours. For one day for fever blisters.  Do not take for more than 1 day per episode         * This list has 4 medication(s) that are the same as other medications prescribed for you. Read the directions carefully, and ask your doctor or other care provider to review them with you.              Discharge Procedure Orders   Diet general     Call MD for:  temperature >100.4     Call MD for:  persistent nausea and vomiting     Call MD for:  severe uncontrolled pain     Call MD for:  difficulty breathing, headache or visual disturbances     Call MD for:  redness, tenderness, or signs of infection (pain, swelling, redness, odor or green/yellow discharge around incision site)     Call MD for:  hives     Call MD for:  persistent dizziness or light-headedness     Call MD for:  extreme fatigue     Activity as tolerated      Follow-up Information     Miky Lewis MD Follow up.    Specialty: Family Medicine  Why: As needed  Contact information:  [address] [place] [address] [phone]             Saundra Streeter MD Follow up in 2 week(s).    Specialty: Gastroenterology  Contact information:  [address]  [place] [address] [phone]

## 2022-08-17 NOTE — [employer] PATIENT INSTRUCTIONS
Discharge Summary/Instructions after an Endoscopic Procedure  Patient Name: Yodit Canseco  Patient MRN: [id number]  Patient YOB: 1960 Wednesday, August 17, 2022 Olaf Del Valle MD  Dear patient,  As a result of recent federal legislation (The Federal Cures Act), you may   receive lab or pathology results from your procedure in your [facility]   account before your physician is able to contact you. Your physician or   their representative will relay the results to you with their   recommendations at their soonest availability.  Thank you,  RESTRICTIONS:  During your procedure today, you received medications for sedation.  These   medications may affect your judgment, balance and coordination.  Therefore,   for 24 hours, you have the following restrictions:   - DO NOT drive a car, operate machinery, make legal/financial decisions,   sign important papers or drink alcohol.    ACTIVITY:  Today: no heavy lifting, straining or running due to procedural   sedation/anesthesia.  The following day: return to full activity including work.  DIET:  Eat and drink normally unless instructed otherwise.     TREATMENT FOR COMMON SIDE EFFECTS:  - Mild abdominal pain, nausea, belching, bloating or excessive gas:  rest,   eat lightly and use a heating pad.  - Sore Throat: treat with throat lozenges and/or gargle with warm salt   water.  - Because air was used during the procedure, expelling large amounts of air   from your rectum or belching is normal.  - If a bowel prep was taken, you may not have a bowel movement for 1-3 days.    This is normal.  SYMPTOMS TO WATCH FOR AND REPORT TO YOUR PHYSICIAN:  1. Abdominal pain or bloating, other than gas cramps.  2. Chest pain.  3. Back pain.  4. Signs of infection such as: chills or fever occurring within 24 hours   after the procedure.  5. Rectal bleeding, which would show as bright red, maroon, or black stools.   (A tablespoon of blood from the rectum is not serious, especially  if   hemorrhoids are present.)  6. Vomiting.  7. Weakness or dizziness.  GO DIRECTLY TO THE NEAREST EMERGENCY ROOM IF YOU HAVE ANY OF THE FOLLOWING:      Difficulty breathing              Chills and/or fever over 101 F   Persistent vomiting and/or vomiting blood   Severe abdominal pain   Severe chest pain   Black, tarry stools   Bleeding- more than one tablespoon   Any other symptom or condition that you feel may need urgent attention  Your doctor recommends these additional instructions:  If any biopsies were taken, your doctors clinic will contact you in 1 to 2   weeks with any results.  - Discharge patient to home.   - High fiber diet.   - Continue present medications.   - Await pathology results.   - Repeat colonoscopy in 6 months to evaluate the response to therapy.   - Return to GI office in 2 weeks.  For questions, problems or results please call your physician Olaf Del Valle MD at Work:  [phone]  If you have any questions about the above instructions, call the GI   department at [phone] or call the endoscopy unit at [phone]   from 7am until 3 pm.  [facility] [place], EMERGENCY ROOM PHONE NUMBER:   [phone]  IF A COMPLICATION OR EMERGENCY SITUATION ARISES AND YOU ARE UNABLE TO REACH   YOUR PHYSICIAN - GO DIRECTLY TO THE EMERGENCY ROOM.  I have read or have had read to me these discharge instructions for my   procedure and have received a written copy.  I understand these   instructions and will follow-up with my physician if I have any questions.     __________________________________       _____________________________________  Nurse Signature                                          Patient/Designated   Responsible Party Signature  MD Olaf Smith MD  8/17/2022 8:20:51 AM  This report has been verified and signed electronically.  Dear patient,  As a result of recent federal legislation (The Federal Cures Act), you may   receive lab or  pathology results from your procedure in your [facility]   account before your physician is able to contact you. Your physician or   their representative will relay the results to you with their   recommendations at their soonest availability.  Thank you,  [employer]

## 2022-08-17 NOTE — ANESTHESIA PREPROCEDURE EVALUATION
08/17/2022  Yodit Canseco is a 61 y.o., female.    Pre-op Assessment    I have reviewed the Patient Summary Reports.     I have reviewed the Nursing Notes. I have reviewed the NPO Status.   I have reviewed the Medications.     Review of Systems  Anesthesia Hx:  No problems with previous Anesthesia   Social:  Non-Smoker, No Alcohol Use    Cardiovascular:   hyperlipidemia    Pulmonary:   Asthma    Renal/:  Renal/ Normal     Hepatic/GI:  Hepatic/GI Normal Sleeve gastrectomy, gastroparesis   Neurological:   Headaches    Endocrine:   Hypothyroidism        Physical Exam  General:  Morbid Obesity      Airway/Jaw/Neck:  Airway Findings: Mouth Opening: Normal   Tongue: Normal   General Airway Assessment: Adult, Average Mallampati: III  Jaw/Neck Findings:  Neck ROM: Normal ROM        Chest/Lungs:  Chest/Lungs Findings: Clear to auscultation, Normal Respiratory Rate      Heart/Vascular:  Heart Findings: Rate: Normal  Rhythm: Regular Rhythm  Sounds: Normal  Heart murmur: negative       Mental Status:  Mental Status Findings:  Cooperative, Alert and Oriented         Anesthesia Plan  Type of Anesthesia, risks & benefits discussed:  Anesthesia Type:  MAC    Patient's Preference:   Plan Factors:          Intra-op Monitoring Plan: Standard ASA Monitors  Intra-op Monitoring Plan Comments:   Post Op Pain Control Plan: multimodal analgesia  Post Op Pain Control Plan Comments:     Induction:   IV  Beta Blocker:  Patient is not currently on a Beta-Blocker (No further documentation required).       Informed Consent: Informed consent signed with the Patient and all parties understand the risks and agree with anesthesia plan.  All questions answered.  Anesthesia consent signed with patient.  ASA Score: 3     Day of Surgery Review of History & Physical:        Anesthesia Plan Notes: Propofol general.        Ready For Surgery  From Anesthesia Perspective.           Physical Exam  General: Morbid Obesity    Airway:  Mallampati: III   Mouth Opening: Normal  Tongue: Normal  Neck ROM: Normal ROM    Chest/Lungs:  Clear to auscultation, Normal Respiratory Rate    Heart:  Rate: Normal  Rhythm: Regular Rhythm  Sounds: Normal          Anesthesia Plan  Type of Anesthesia, risks & benefits discussed:    Anesthesia Type: MAC  Intra-op Monitoring Plan: Standard ASA Monitors  Post Op Pain Control Plan: multimodal analgesia  Induction:  IV  Informed Consent: Informed consent signed with the Patient and all parties understand the risks and agree with anesthesia plan.  All questions answered.   ASA Score: 3  Anesthesia Plan Notes: Propofol general.    Ready For Surgery From Anesthesia Perspective.       .

## 2022-08-18 VITALS
HEIGHT: 63 IN | WEIGHT: 187 LBS | RESPIRATION RATE: 24 BRPM | SYSTOLIC BLOOD PRESSURE: 135 MMHG | HEART RATE: 77 BPM | BODY MASS INDEX: 33.13 KG/M2 | OXYGEN SATURATION: 100 % | DIASTOLIC BLOOD PRESSURE: 86 MMHG | TEMPERATURE: 98 F

## 2022-08-19 ENCOUNTER — PATIENT MESSAGE (OUTPATIENT)
Dept: RHEUMATOLOGY | Facility: CLINIC | Age: 62
End: 2022-08-19
Payer: MEDICARE

## 2022-08-19 ENCOUNTER — PATIENT MESSAGE (OUTPATIENT)
Dept: SURGERY | Facility: CLINIC | Age: 62
End: 2022-08-19
Payer: MEDICARE

## 2022-08-22 ENCOUNTER — PATIENT MESSAGE (OUTPATIENT)
Dept: RHEUMATOLOGY | Facility: CLINIC | Age: 62
End: 2022-08-22
Payer: MEDICARE

## 2022-08-22 LAB
COMMENT: NORMAL
FINAL PATHOLOGIC DIAGNOSIS: NORMAL
GROSS: NORMAL
Lab: NORMAL

## 2022-08-23 ENCOUNTER — PATIENT MESSAGE (OUTPATIENT)
Dept: FAMILY MEDICINE | Facility: CLINIC | Age: 62
End: 2022-08-23
Payer: MEDICARE

## 2022-08-25 ENCOUNTER — PATIENT MESSAGE (OUTPATIENT)
Dept: ALLERGY | Facility: CLINIC | Age: 62
End: 2022-08-25
Payer: MEDICARE

## 2022-08-29 ENCOUNTER — HOSPITAL ENCOUNTER (INPATIENT)
Facility: HOSPITAL | Age: 62
LOS: 4 days | Discharge: HOME | DRG: 872 | End: 2022-09-04
Attending: EMERGENCY MEDICINE | Admitting: INTERNAL MEDICINE
Payer: MEDICARE

## 2022-08-29 DIAGNOSIS — K85.90 ACUTE PANCREATITIS, UNSPECIFIED COMPLICATION STATUS, UNSPECIFIED PANCREATITIS TYPE: ICD-10-CM

## 2022-08-29 DIAGNOSIS — T50.905A DRUG-INDUCED NAUSEA AND VOMITING: ICD-10-CM

## 2022-08-29 DIAGNOSIS — R11.2 DRUG-INDUCED NAUSEA AND VOMITING: ICD-10-CM

## 2022-08-29 DIAGNOSIS — K52.9 COLITIS: Primary | ICD-10-CM

## 2022-08-29 PROBLEM — R65.10 SIRS (SYSTEMIC INFLAMMATORY RESPONSE SYNDROME): Status: ACTIVE | Noted: 2022-08-29

## 2022-08-29 PROBLEM — K51.911 ULCERATIVE COLITIS WITH RECTAL BLEEDING: Status: ACTIVE | Noted: 2022-08-29

## 2022-08-29 LAB
ABO + RH BLD: NORMAL
ALBUMIN SERPL BCP-MCNC: 3.4 G/DL (ref 3.5–5.2)
ALP SERPL-CCNC: 66 U/L (ref 55–135)
ALT SERPL W/O P-5'-P-CCNC: 12 U/L (ref 10–44)
ANION GAP SERPL CALC-SCNC: 13 MMOL/L (ref 8–16)
AST SERPL-CCNC: 21 U/L (ref 10–40)
BASOPHILS # BLD AUTO: 0.11 K/UL (ref 0–0.2)
BASOPHILS NFR BLD: 0.7 % (ref 0–1.9)
BILIRUB SERPL-MCNC: 0.4 MG/DL (ref 0.1–1)
BILIRUB UR QL STRIP: NEGATIVE
BLD GP AB SCN CELLS X3 SERPL QL: NORMAL
BUN SERPL-MCNC: 7 MG/DL (ref 8–23)
CALCIUM SERPL-MCNC: 9 MG/DL (ref 8.7–10.5)
CHLORIDE SERPL-SCNC: 104 MMOL/L (ref 95–110)
CLARITY UR: CLEAR
CO2 SERPL-SCNC: 23 MMOL/L (ref 23–29)
COLOR UR: COLORLESS
CREAT SERPL-MCNC: 0.8 MG/DL (ref 0.5–1.4)
DIFFERENTIAL METHOD: ABNORMAL
EOSINOPHIL # BLD AUTO: 0.6 K/UL (ref 0–0.5)
EOSINOPHIL NFR BLD: 3.5 % (ref 0–8)
ERYTHROCYTE [DISTWIDTH] IN BLOOD BY AUTOMATED COUNT: 13.6 % (ref 11.5–14.5)
EST. GFR  (NO RACE VARIABLE): >60 ML/MIN/1.73 M^2
GLUCOSE SERPL-MCNC: 81 MG/DL (ref 70–110)
GLUCOSE UR QL STRIP: NEGATIVE
HCT VFR BLD AUTO: 39.7 % (ref 37–48.5)
HCV AB SERPL QL IA: NEGATIVE
HEP C VIRUS HOLD SPECIMEN: NORMAL
HGB BLD-MCNC: 13.6 G/DL (ref 12–16)
HGB UR QL STRIP: NEGATIVE
HIV 1+2 AB+HIV1 P24 AG SERPL QL IA: NEGATIVE
IMM GRANULOCYTES # BLD AUTO: 0.24 K/UL (ref 0–0.04)
IMM GRANULOCYTES NFR BLD AUTO: 1.5 % (ref 0–0.5)
KETONES UR QL STRIP: NEGATIVE
LEUKOCYTE ESTERASE UR QL STRIP: ABNORMAL
LIPASE SERPL-CCNC: 257 U/L (ref 4–60)
LYMPHOCYTES # BLD AUTO: 2.7 K/UL (ref 1–4.8)
LYMPHOCYTES NFR BLD: 17.4 % (ref 18–48)
MCH RBC QN AUTO: 29.4 PG (ref 27–31)
MCHC RBC AUTO-ENTMCNC: 34.3 G/DL (ref 32–36)
MCV RBC AUTO: 86 FL (ref 82–98)
MICROSCOPIC COMMENT: NORMAL
MONOCYTES # BLD AUTO: 1.2 K/UL (ref 0.3–1)
MONOCYTES NFR BLD: 7.5 % (ref 4–15)
NEUTROPHILS # BLD AUTO: 10.7 K/UL (ref 1.8–7.7)
NEUTROPHILS NFR BLD: 69.4 % (ref 38–73)
NITRITE UR QL STRIP: NEGATIVE
NRBC BLD-RTO: 0 /100 WBC
PH UR STRIP: 7 [PH] (ref 5–8)
PLATELET # BLD AUTO: 210 K/UL (ref 150–450)
PMV BLD AUTO: 10.6 FL (ref 9.2–12.9)
POTASSIUM SERPL-SCNC: 3.8 MMOL/L (ref 3.5–5.1)
PROT SERPL-MCNC: 7 G/DL (ref 6–8.4)
PROT UR QL STRIP: NEGATIVE
RBC # BLD AUTO: 4.62 M/UL (ref 4–5.4)
SARS-COV-2 RDRP RESP QL NAA+PROBE: NEGATIVE
SODIUM SERPL-SCNC: 140 MMOL/L (ref 136–145)
SP GR UR STRIP: <1.005 (ref 1–1.03)
URN SPEC COLLECT METH UR: ABNORMAL
UROBILINOGEN UR STRIP-ACNC: NEGATIVE EU/DL
WBC # BLD AUTO: 15.5 K/UL (ref 3.9–12.7)
WBC #/AREA URNS HPF: 3 /HPF (ref 0–5)

## 2022-08-29 PROCEDURE — 87389 HIV-1 AG W/HIV-1&-2 AB AG IA: CPT | Performed by: EMERGENCY MEDICINE

## 2022-08-29 PROCEDURE — G0378 HOSPITAL OBSERVATION PER HR: HCPCS

## 2022-08-29 PROCEDURE — 83690 ASSAY OF LIPASE: CPT | Performed by: NURSE PRACTITIONER

## 2022-08-29 PROCEDURE — 96374 THER/PROPH/DIAG INJ IV PUSH: CPT

## 2022-08-29 PROCEDURE — 81000 URINALYSIS NONAUTO W/SCOPE: CPT | Performed by: NURSE PRACTITIONER

## 2022-08-29 PROCEDURE — 80053 COMPREHEN METABOLIC PANEL: CPT | Performed by: NURSE PRACTITIONER

## 2022-08-29 PROCEDURE — 86803 HEPATITIS C AB TEST: CPT | Performed by: EMERGENCY MEDICINE

## 2022-08-29 PROCEDURE — 25000003 PHARM REV CODE 250: Performed by: INTERNAL MEDICINE

## 2022-08-29 PROCEDURE — 25000003 PHARM REV CODE 250: Performed by: EMERGENCY MEDICINE

## 2022-08-29 PROCEDURE — 86850 RBC ANTIBODY SCREEN: CPT | Performed by: NURSE PRACTITIONER

## 2022-08-29 PROCEDURE — 96361 HYDRATE IV INFUSION ADD-ON: CPT | Mod: 59

## 2022-08-29 PROCEDURE — 85025 COMPLETE CBC W/AUTO DIFF WBC: CPT | Performed by: NURSE PRACTITIONER

## 2022-08-29 PROCEDURE — U0002 COVID-19 LAB TEST NON-CDC: HCPCS | Performed by: EMERGENCY MEDICINE

## 2022-08-29 PROCEDURE — 96375 TX/PRO/DX INJ NEW DRUG ADDON: CPT | Mod: 59

## 2022-08-29 PROCEDURE — S0030 INJECTION, METRONIDAZOLE: HCPCS | Performed by: EMERGENCY MEDICINE

## 2022-08-29 PROCEDURE — 99285 EMERGENCY DEPT VISIT HI MDM: CPT | Mod: 25

## 2022-08-29 PROCEDURE — 63600175 PHARM REV CODE 636 W HCPCS: Performed by: EMERGENCY MEDICINE

## 2022-08-29 RX ORDER — ACETAMINOPHEN 325 MG/1
650 TABLET ORAL EVERY 6 HOURS PRN
Status: DISCONTINUED | OUTPATIENT
Start: 2022-08-29 | End: 2022-09-04 | Stop reason: HOSPADM

## 2022-08-29 RX ORDER — SODIUM CHLORIDE 9 MG/ML
INJECTION, SOLUTION INTRAVENOUS CONTINUOUS
Status: ACTIVE | OUTPATIENT
Start: 2022-08-29 | End: 2022-08-30

## 2022-08-29 RX ORDER — MAG HYDROX/ALUMINUM HYD/SIMETH 200-200-20
30 SUSPENSION, ORAL (FINAL DOSE FORM) ORAL EVERY 6 HOURS PRN
Status: DISCONTINUED | OUTPATIENT
Start: 2022-08-29 | End: 2022-09-04 | Stop reason: HOSPADM

## 2022-08-29 RX ORDER — IPRATROPIUM BROMIDE AND ALBUTEROL SULFATE 2.5; .5 MG/3ML; MG/3ML
3 SOLUTION RESPIRATORY (INHALATION) EVERY 4 HOURS PRN
Status: DISCONTINUED | OUTPATIENT
Start: 2022-08-29 | End: 2022-09-04 | Stop reason: HOSPADM

## 2022-08-29 RX ORDER — DIPHENHYDRAMINE HYDROCHLORIDE 50 MG/ML
12.5 INJECTION INTRAMUSCULAR; INTRAVENOUS
Status: COMPLETED | OUTPATIENT
Start: 2022-08-29 | End: 2022-08-29

## 2022-08-29 RX ORDER — MORPHINE SULFATE 4 MG/ML
4 INJECTION, SOLUTION INTRAMUSCULAR; INTRAVENOUS EVERY 4 HOURS PRN
Status: DISCONTINUED | OUTPATIENT
Start: 2022-08-29 | End: 2022-09-02

## 2022-08-29 RX ORDER — MORPHINE SULFATE 4 MG/ML
2 INJECTION, SOLUTION INTRAMUSCULAR; INTRAVENOUS EVERY 4 HOURS PRN
Status: DISCONTINUED | OUTPATIENT
Start: 2022-08-29 | End: 2022-09-02

## 2022-08-29 RX ORDER — MORPHINE SULFATE 4 MG/ML
4 INJECTION, SOLUTION INTRAMUSCULAR; INTRAVENOUS
Status: COMPLETED | OUTPATIENT
Start: 2022-08-29 | End: 2022-08-29

## 2022-08-29 RX ORDER — CIPROFLOXACIN 2 MG/ML
400 INJECTION, SOLUTION INTRAVENOUS
Status: DISCONTINUED | OUTPATIENT
Start: 2022-08-29 | End: 2022-09-01

## 2022-08-29 RX ORDER — DIPHENHYDRAMINE HCL 25 MG
25 CAPSULE ORAL EVERY 6 HOURS PRN
Status: DISCONTINUED | OUTPATIENT
Start: 2022-08-29 | End: 2022-09-04 | Stop reason: HOSPADM

## 2022-08-29 RX ORDER — ONDANSETRON 2 MG/ML
4 INJECTION INTRAMUSCULAR; INTRAVENOUS EVERY 8 HOURS PRN
Status: DISCONTINUED | OUTPATIENT
Start: 2022-08-29 | End: 2022-09-04 | Stop reason: HOSPADM

## 2022-08-29 RX ORDER — LEVOTHYROXINE SODIUM 75 UG/1
75 TABLET ORAL
Status: DISCONTINUED | OUTPATIENT
Start: 2022-08-30 | End: 2022-09-04 | Stop reason: HOSPADM

## 2022-08-29 RX ORDER — METRONIDAZOLE 500 MG/100ML
500 INJECTION, SOLUTION INTRAVENOUS
Status: DISCONTINUED | OUTPATIENT
Start: 2022-08-29 | End: 2022-09-01

## 2022-08-29 RX ORDER — ONDANSETRON 2 MG/ML
4 INJECTION INTRAMUSCULAR; INTRAVENOUS
Status: COMPLETED | OUTPATIENT
Start: 2022-08-29 | End: 2022-08-29

## 2022-08-29 RX ADMIN — SODIUM CHLORIDE: 9 INJECTION, SOLUTION INTRAVENOUS at 10:08

## 2022-08-29 RX ADMIN — METRONIDAZOLE 500 MG: 5 INJECTION, SOLUTION INTRAVENOUS at 09:08

## 2022-08-29 RX ADMIN — CIPROFLOXACIN 400 MG: 2 INJECTION, SOLUTION INTRAVENOUS at 09:08

## 2022-08-29 RX ADMIN — ONDANSETRON 4 MG: 2 INJECTION INTRAMUSCULAR; INTRAVENOUS at 07:08

## 2022-08-29 RX ADMIN — DIPHENHYDRAMINE HYDROCHLORIDE 12.5 MG: 50 INJECTION, SOLUTION INTRAMUSCULAR; INTRAVENOUS at 07:08

## 2022-08-29 RX ADMIN — MORPHINE SULFATE 4 MG: 4 INJECTION INTRAVENOUS at 07:08

## 2022-08-29 RX ADMIN — SODIUM CHLORIDE 1000 ML: 0.9 INJECTION, SOLUTION INTRAVENOUS at 07:08

## 2022-08-29 NOTE — FIRST PROVIDER EVALUATION
Medical screening exam completed.  I have conducted a focused provider triage encounter, findings are as follows:    Brief history of present illness:  reports hx of uc.now having pain and bleeding     Vitals:    08/29/22 1521   BP: (!) 161/77   Pulse: 74   Resp: 18   Temp: 98 °F (36.7 °C)   TempSrc: Oral   SpO2: 99%   Weight: 84.1 kg (185 lb 4.8 oz)       Pertinent physical exam:  nad    Brief workup plan:  labs, meds,imaging as needed     Preliminary workup initiated; this workup will be continued and followed by the physician or advanced practice provider that is assigned to the patient when roomed.

## 2022-08-29 NOTE — ED PROVIDER NOTES
"SCRIBE #1 NOTE: I, Mirna Packer, am scribing for, and in the presence of, Deshaun Hart Jr., MD. I have scribed the entire note.       History     Chief Complaint   Patient presents with    GI Problem     Pt has hx of ulcerative colitis; pt has colonoscopy yesterday and symptoms have gotten worse since yesterday; 5 bowel movements today; Appt with GI scheduled for Wednesday; + bright red blood in stool with hx of hemorrhoids     Review of patient's allergies indicates:   Allergen Reactions    Bromelains Hives     " causes mouth to bleed"    Pimecrolimus Swelling    Sudafed [pseudoephedrine hcl] Other (See Comments)     Does not want to wake up .    Amoxicillin-pot clavulanate Itching     Other reaction(s): Itching    Hydrocodone Itching    Iodinated contrast media      childhood    Iodine and iodide containing products Other (See Comments)    Melon Hives    Percocet [oxycodone-acetaminophen] Itching    Corn containing products     Wheat containing prod     Barium iodide Rash    Ephedrine Other (See Comments)     Other reaction(s): comatose    Penicillins      Other reaction(s): does not work on pt symptoms         History of Present Illness     HPI    8/29/2022, 6:47 PM  History obtained from the patient      History of Present Illness: Yodit Canseco is a 61 y.o. female patient with a PMHx of angio-edema, athralgia, bronchitis, COPD, COVID-19, diverticulosis of large intestine without hemorrhage, gastroparesis, hyperlipidemia, and ulcerative colitis who presents to the Emergency Department for evaluation of GI problem which onset several hours PTA. Symptoms are constant and moderate in severity. Pt had a colonoscopy done about 2 weeks ago by Dr. Ojeda and states sxs have became worse. She states that she had scrambled eggs and 4 pieces of toast yesterday. Between 11:30 PM-3AM, pt had 4 episodes of diarrhea. Between 10:30 AM-12 today, she had 5 episodes of diarrhea. Associated sxs include LLQ abd pain, " weakness, and anal bleeding with bright red blood due to hemorrhoids. Patient denies any fever, chills, n/v, SOB, dizziness, and all other sxs at this time. No further complaints or concerns at this time.       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    Gastroparesis     Hand arthritis     Herpes simplex without mention of complication     oral    Hidradenitis     Hyperlipidemia     Hypothyroidism     Immune deficiency disorder     Intraperitoneal abscess 05/07/2018    LBP radiating to left leg     Leukocytosis, unspecified     Migraine headache     Obesity, Class III, BMI 40-49.9 (morbid obesity)     Periorbital cellulitis 12/31/2016    Prediabetes     RA (rheumatoid arthritis)     Recurrent upper respiratory infection (URI)     S/P colonoscopy 11/2010    Sepsis 05/07/2018    Systemic lupus erythematosus, organ or system involvement unspecified     Urticaria        Past Surgical History:  Past Surgical History:   Procedure Laterality Date    ADENOIDECTOMY      CHOLECYSTECTOMY      CHOLECYSTECTOMY      COLONOSCOPY  2015    repeat in 10    COLONOSCOPY N/A 10/8/2015    Procedure: COLONOSCOPY;  Surgeon: Panda Bose MD;  Location: [facility];  Service: Endoscopy;  Laterality: N/A;    COLONOSCOPY N/A 8/17/2022    Procedure: COLONOSCOPY;  Surgeon: Olaf Del Valle MD;  Location: [facility];  Service: General;  Laterality: N/A;    Hand fracture surgery      HARDWARE REMOVAL      left hand 5th [facility]    hymenectomy      HYSTERECTOMY      menorrhagia-Ovaries intact    ROTATOR CUFF REPAIR Right     SLEEVE GASTROPLASTY  04/16/2018     TONSILLECTOMY      Urethral dilatation           Family History:  Family History   Problem Relation Age of Onset    Melanoma Mother     Basal cell carcinoma Mother     Lung disease Mother         pulm htn    Cataracts Mother     Hypertension Mother     Lung cancer Father     Diabetes Father     Hypertension Father     Cancer Father     Diabetes Paternal Aunt     Colon cancer Paternal Aunt 40    Diabetes Paternal Aunt     Ovarian cancer Paternal Grandmother 40    Cancer Maternal Grandfather     Melanoma Maternal Aunt     Melanoma Maternal Uncle     Breast cancer Paternal Aunt 40    Lymphoma Paternal Aunt     Ulcerative colitis Son     Psoriasis Son     Psoriasis Son     Breast cancer Cousin 25    Allergic rhinitis Neg Hx     Allergies Neg Hx     Angioedema Neg Hx     Asthma Neg Hx     Atopy Neg Hx     Eczema Neg Hx     Immunodeficiency Neg Hx     Rhinitis Neg Hx     Urticaria Neg Hx        Social History:  Social History     Tobacco Use    Smoking status: Never    Smokeless tobacco: Never   Substance and Sexual Activity    Alcohol use: Yes     Alcohol/week: 0.0 standard drinks     Comment: very rarely    Drug use: No    Sexual activity: Yes     Partners: Male        Review of Systems     Review of Systems   Constitutional:  Negative for chills and fever.   HENT:  Negative for sore throat.    Respiratory:  Negative for shortness of breath.    Cardiovascular:  Negative for chest pain.   Gastrointestinal:  Positive for abdominal pain (LLQ), anal bleeding and diarrhea. Negative for nausea and vomiting.   Genitourinary:  Negative for dysuria.   Musculoskeletal:  Negative for back pain.   Skin:  Negative for rash.   Neurological:  Positive for weakness. Negative for dizziness.   Hematological:  Does not bruise/bleed easily.   All other systems reviewed and are negative.     Physical Exam     Initial Vitals [08/29/22 1521]   BP Pulse Resp Temp SpO2   (!) 161/77 74 18 98 °F (36.7 °C) 99 %      MAP       --          Physical  Exam  Nursing Notes and Vital Signs Reviewed.  Constitutional: Patient is in no acute distress. Well-developed and well-nourished.  Head: Atraumatic. Normocephalic.  Eyes:  EOM intact.  No scleral icterus.  ENT: Mucous membranes are moist.  Nares clear   Neck:  Full ROM. No JVD.  Cardiovascular: Regular rate. Regular rhythm No murmurs, rubs, or gallops. Distal pulses are 2+ and symmetric  Pulmonary/Chest: No respiratory distress. Clear to auscultation bilaterally. No wheezing or rales.  Equal chest wall rise bilaterally  Abdominal: Soft and non-distended.  Diffuse tenderness without guarding or rebound.  Genitourinary: No CVA tenderness.  No suprapubic tenderness  Musculoskeletal: Moves all extremities. No obvious deformities.  5 x 5 strength in all extremities   Skin: Warm and dry.  Neurological:  Alert, awake, and appropriate.  Normal speech.  No acute focal neurological deficits are appreciated.  Two through 12 intact bilaterally.  Psychiatric: Normal affect. Good eye contact. Appropriate in content.      ED Course   Procedures  ED Vital Signs:  Vitals:    08/29/22 1521 08/29/22 1917   BP: (!) 161/77    Pulse: 74    Resp: 18 18   Temp: 98 °F (36.7 °C)    TempSrc: Oral    SpO2: 99%    Weight: 84.1 kg (185 lb 4.8 oz)        Abnormal Lab Results:  Labs Reviewed   CBC W/ AUTO DIFFERENTIAL - Abnormal; Notable for the following components:       Result Value    WBC 15.50 (*)     Immature Granulocytes 1.5 (*)     Gran # (ANC) 10.7 (*)     Immature Grans (Abs) 0.24 (*)     Mono # 1.2 (*)     Eos # 0.6 (*)     Lymph % 17.4 (*)     All other components within normal limits    Narrative:     Release to patient->Immediate   COMPREHENSIVE METABOLIC PANEL - Abnormal; Notable for the following components:    BUN 7 (*)     Albumin 3.4 (*)     All other components within normal limits    Narrative:     Release to patient->Immediate   URINALYSIS, REFLEX TO URINE CULTURE - Abnormal; Notable for the following components:    Color,  UA Colorless (*)     Specific Gravity, UA <1.005 (*)     Leukocytes, UA 2+ (*)     All other components within normal limits    Narrative:     Specimen Source->Urine   LIPASE - Abnormal; Notable for the following components:    Lipase 257 (*)     All other components within normal limits    Narrative:     Release to patient->Immediate   HIV 1 / 2 ANTIBODY    Narrative:     Release to patient->Immediate   HEPATITIS C ANTIBODY    Narrative:     Release to patient->Immediate   HEP C VIRUS HOLD SPECIMEN    Narrative:     Release to patient->Immediate   URINALYSIS MICROSCOPIC    Narrative:     Specimen Source->Urine   SARS-COV-2 RNA AMPLIFICATION, QUAL   TYPE & SCREEN        All Lab Results:  Results for orders placed or performed during the hospital encounter of 08/29/22   CBC auto differential   Result Value Ref Range    WBC 15.50 (H) 3.90 - 12.70 K/uL    RBC 4.62 4.00 - 5.40 M/uL    Hemoglobin 13.6 12.0 - 16.0 g/dL    Hematocrit 39.7 37.0 - 48.5 %    MCV 86 82 - 98 fL    MCH 29.4 27.0 - 31.0 pg    MCHC 34.3 32.0 - 36.0 g/dL    RDW 13.6 11.5 - 14.5 %    Platelets 210 150 - 450 K/uL    MPV 10.6 9.2 - 12.9 fL    Immature Granulocytes 1.5 (H) 0.0 - 0.5 %    Gran # (ANC) 10.7 (H) 1.8 - 7.7 K/uL    Immature Grans (Abs) 0.24 (H) 0.00 - 0.04 K/uL    Lymph # 2.7 1.0 - 4.8 K/uL    Mono # 1.2 (H) 0.3 - 1.0 K/uL    Eos # 0.6 (H) 0.0 - 0.5 K/uL    Baso # 0.11 0.00 - 0.20 K/uL    nRBC 0 0 /100 WBC    Gran % 69.4 38.0 - 73.0 %    Lymph % 17.4 (L) 18.0 - 48.0 %    Mono % 7.5 4.0 - 15.0 %    Eosinophil % 3.5 0.0 - 8.0 %    Basophil % 0.7 0.0 - 1.9 %    Differential Method Automated    Comprehensive metabolic panel   Result Value Ref Range    Sodium 140 136 - 145 mmol/L    Potassium 3.8 3.5 - 5.1 mmol/L    Chloride 104 95 - 110 mmol/L    CO2 23 23 - 29 mmol/L    Glucose 81 70 - 110 mg/dL    BUN 7 (L) 8 - 23 mg/dL    Creatinine 0.8 0.5 - 1.4 mg/dL    Calcium 9.0 8.7 - 10.5 mg/dL    Total Protein 7.0 6.0 - 8.4 g/dL    Albumin 3.4 (L) 3.5  - 5.2 g/dL    Total Bilirubin 0.4 0.1 - 1.0 mg/dL    Alkaline Phosphatase 66 55 - 135 U/L    AST 21 10 - 40 U/L    ALT 12 10 - 44 U/L    Anion Gap 13 8 - 16 mmol/L    eGFR >60 >60 mL/min/1.73 m^2   Urinalysis, Reflex to Urine Culture Urine, Clean Catch    Specimen: Urine   Result Value Ref Range    Specimen UA Urine, Clean Catch     Color, UA Colorless (A) Yellow, Straw, Selena    Appearance, UA Clear Clear    pH, UA 7.0 5.0 - 8.0    Specific Gravity, UA <1.005 (A) 1.005 - 1.030    Protein, UA Negative Negative    Glucose, UA Negative Negative    Ketones, UA Negative Negative    Bilirubin (UA) Negative Negative    Occult Blood UA Negative Negative    Nitrite, UA Negative Negative    Urobilinogen, UA Negative <2.0 EU/dL    Leukocytes, UA 2+ (A) Negative   Lipase   Result Value Ref Range    Lipase 257 (H) 4 - 60 U/L   HIV 1/2 Ag/Ab (4th Gen)   Result Value Ref Range    HIV 1/2 Ag/Ab Negative Negative   Hepatitis C Antibody   Result Value Ref Range    Hepatitis C Ab Negative Negative   HCV Virus Hold Specimen   Result Value Ref Range    HEP C Virus Hold Specimen Hold for HCV sendout    Urinalysis Microscopic   Result Value Ref Range    WBC, UA 3 0 - 5 /hpf    Microscopic Comment SEE COMMENT    Type & Screen   Result Value Ref Range    Group & Rh O POS     Indirect Geovani NEG          Imaging Results:  Imaging Results              CT Abdomen Pelvis  Without Contrast (Final result)  Result time 08/29/22 19:49:30      Final result by Nuzhat Mata MD (08/29/22 19:49:30)                   Impression:      Increased vascularity around the colon with pericolonic lymph nodes. Correlate clinically for colitis.    Status post gastric bypass and cholecystectomy    Hepatomegaly with fatty infiltration liver    Correlate to surgical history and follow-up as clinically indicated.    All CT scans   are performed using dose optimization techniques including the following: automated exposure control; adjustment of the mA and/or kV;  use of iterative reconstruction technique.  Dose modulation was employed for ALARA by means of: Automated exposure control; adjustment of the mA and/or kV according to patient size (this includes techniques or standardized protocols for targeted exams where dose is matched to indication/reason for exam; i.e. extremities or head); and/or use of iterative reconstructive technique.      Electronically signed by: Adolfo Noland  Date:    08/29/2022  Time:    19:49               Narrative:    EXAMINATION:  CT ABDOMEN PELVIS WITHOUT CONTRAST    CLINICAL HISTORY:  Abdominal pain, post-op;    TECHNIQUE:  Low dose axial images, sagittal and coronal reformations were obtained from the lung bases to the pubic symphysis, Oral contrast was not administered.    COMPARISON:  None    FINDINGS:  Heart: Normal in size as far as seen.  No pericardial effusion as far seen.    Lung Bases: Well aerated, without consolidation or pleural fluid.    Liver: Hepatomegaly and fatty infiltration liver    Gallbladder: Cholecystectomy    Bile Ducts: No evidence of dilated ducts.    Pancreas: No mass or peripancreatic fat stranding.    Spleen: Unremarkable.    Adrenals: Unremarkable.    Kidneys/ Ureters: Unremarkable.    Bladder: No evidence of wall thickening.    Reproductive organs: Unremarkable.    GI Tract/Mesentery: Status post gastric bypass.  No evidence of bowel obstruction or inflammation. No secondary signs of appendicitis.  Increased vascularity around the colon with a small lymph nodes.  Correlate clinically for colitis.    Peritoneal Space: No ascites. No free air.    Retroperitoneum: No significant adenopathy.    Abdominal wall: Unremarkable.    Vasculature: No aneurysm.    Bones: No acute fracture.                                                The Emergency Provider reviewed the vital signs and test results, which are outlined above.     ED Discussion       8:43 PM  The patient is stable nontoxic.  I discussed the case with   with hospital medicine.  He accepts the patient.  Accepts the patient to observatino status Dr. Bourgeois.   Requested we start Cipro and Flagyl.       Medical Decision Making:   Clinical Tests:   Lab Tests: Ordered and Reviewed  Radiological Study: Ordered and Reviewed         ED Medication(s):  Medications   ciprofloxacin (CIPRO)400mg/200ml D5W IVPB 400 mg (has no administration in time range)   metronidazole IVPB 500 mg (has no administration in time range)   acetaminophen tablet 650 mg (has no administration in time range)   ondansetron injection 4 mg (has no administration in time range)   morphine injection 4 mg (has no administration in time range)   morphine injection 2 mg (has no administration in time range)   albuterol-ipratropium 2.5 mg-0.5 mg/3 mL nebulizer solution 3 mL (has no administration in time range)   aluminum-magnesium hydroxide-simethicone 200-200-20 mg/5 mL suspension 30 mL (has no administration in time range)   0.9%  NaCl infusion (has no administration in time range)   diphenhydrAMINE capsule 25 mg (has no administration in time range)   sodium chloride 0.9% bolus 1,000 mL (0 mLs Intravenous Stopped 8/29/22 2033)   morphine injection 4 mg (4 mg Intravenous Given 8/29/22 1917)   ondansetron injection 4 mg (4 mg Intravenous Given 8/29/22 1917)   diphenhydrAMINE injection 12.5 mg (12.5 mg Intravenous Given 8/29/22 1917)       New Prescriptions    No medications on file               Scribe Attestation:   Scribe #1: I performed the above scribed service and the documentation accurately describes the services I performed. I attest to the accuracy of the note.     Attending:   Physician Attestation Statement for Scribe #1: I, Deshaun Hart Jr., MD, personally performed the services described in this documentation, as scribed by Mirna Packer, in my presence, and it is both accurate and complete.           Clinical Impression       ICD-10-CM ICD-9-CM   1. Colitis  K52.9 558.9   2. Acute  pancreatitis, unspecified complication status, unspecified pancreatitis type  K85.90 577.0       Disposition:   Disposition: Placed in Observation  Condition: Brinda Hart Jr., MD  08/29/22 2042

## 2022-08-30 PROBLEM — K52.9 COLITIS: Status: ACTIVE | Noted: 2022-08-29

## 2022-08-30 LAB
ALBUMIN SERPL BCP-MCNC: 2.8 G/DL (ref 3.5–5.2)
ALP SERPL-CCNC: 53 U/L (ref 55–135)
ALT SERPL W/O P-5'-P-CCNC: 12 U/L (ref 10–44)
ANION GAP SERPL CALC-SCNC: 9 MMOL/L (ref 8–16)
AST SERPL-CCNC: 16 U/L (ref 10–40)
BASOPHILS # BLD AUTO: 0.08 K/UL (ref 0–0.2)
BASOPHILS NFR BLD: 0.5 % (ref 0–1.9)
BILIRUB SERPL-MCNC: 0.4 MG/DL (ref 0.1–1)
BUN SERPL-MCNC: 5 MG/DL (ref 8–23)
C DIFF GDH STL QL: NEGATIVE
C DIFF TOX A+B STL QL IA: NEGATIVE
CALCIUM SERPL-MCNC: 7.8 MG/DL (ref 8.7–10.5)
CHLORIDE SERPL-SCNC: 108 MMOL/L (ref 95–110)
CO2 SERPL-SCNC: 24 MMOL/L (ref 23–29)
CREAT SERPL-MCNC: 0.9 MG/DL (ref 0.5–1.4)
CRP SERPL-MCNC: 27.1 MG/L (ref 0–8.2)
DIFFERENTIAL METHOD: ABNORMAL
EOSINOPHIL # BLD AUTO: 0.5 K/UL (ref 0–0.5)
EOSINOPHIL NFR BLD: 3.5 % (ref 0–8)
ERYTHROCYTE [DISTWIDTH] IN BLOOD BY AUTOMATED COUNT: 13.8 % (ref 11.5–14.5)
ERYTHROCYTE [SEDIMENTATION RATE] IN BLOOD BY WESTERGREN METHOD: 12 MM/HR (ref 0–20)
EST. GFR  (NO RACE VARIABLE): >60 ML/MIN/1.73 M^2
GLUCOSE SERPL-MCNC: 84 MG/DL (ref 70–110)
HCT VFR BLD AUTO: 36.2 % (ref 37–48.5)
HGB BLD-MCNC: 11.7 G/DL (ref 12–16)
IMM GRANULOCYTES # BLD AUTO: 0.17 K/UL (ref 0–0.04)
IMM GRANULOCYTES NFR BLD AUTO: 1.1 % (ref 0–0.5)
LYMPHOCYTES # BLD AUTO: 2.3 K/UL (ref 1–4.8)
LYMPHOCYTES NFR BLD: 15.2 % (ref 18–48)
MAGNESIUM SERPL-MCNC: 1.6 MG/DL (ref 1.6–2.6)
MCH RBC QN AUTO: 28.8 PG (ref 27–31)
MCHC RBC AUTO-ENTMCNC: 32.3 G/DL (ref 32–36)
MCV RBC AUTO: 89 FL (ref 82–98)
MONOCYTES # BLD AUTO: 1.4 K/UL (ref 0.3–1)
MONOCYTES NFR BLD: 9.4 % (ref 4–15)
NEUTROPHILS # BLD AUTO: 10.5 K/UL (ref 1.8–7.7)
NEUTROPHILS NFR BLD: 70.3 % (ref 38–73)
NRBC BLD-RTO: 0 /100 WBC
PLATELET # BLD AUTO: 184 K/UL (ref 150–450)
PMV BLD AUTO: 10.7 FL (ref 9.2–12.9)
POTASSIUM SERPL-SCNC: 4.2 MMOL/L (ref 3.5–5.1)
PROT SERPL-MCNC: 5.2 G/DL (ref 6–8.4)
RBC # BLD AUTO: 4.06 M/UL (ref 4–5.4)
SODIUM SERPL-SCNC: 141 MMOL/L (ref 136–145)
WBC # BLD AUTO: 15 K/UL (ref 3.9–12.7)

## 2022-08-30 PROCEDURE — 36415 COLL VENOUS BLD VENIPUNCTURE: CPT | Performed by: PHYSICIAN ASSISTANT

## 2022-08-30 PROCEDURE — 87449 NOS EACH ORGANISM AG IA: CPT | Performed by: PHYSICIAN ASSISTANT

## 2022-08-30 PROCEDURE — 87177 OVA AND PARASITES SMEARS: CPT | Performed by: PHYSICIAN ASSISTANT

## 2022-08-30 PROCEDURE — 87209 SMEAR COMPLEX STAIN: CPT | Performed by: PHYSICIAN ASSISTANT

## 2022-08-30 PROCEDURE — 80053 COMPREHEN METABOLIC PANEL: CPT | Performed by: INTERNAL MEDICINE

## 2022-08-30 PROCEDURE — 99214 PR OFFICE/OUTPT VISIT, EST, LEVL IV, 30-39 MIN: ICD-10-PCS | Mod: ,,, | Performed by: PHYSICIAN ASSISTANT

## 2022-08-30 PROCEDURE — 86140 C-REACTIVE PROTEIN: CPT | Performed by: PHYSICIAN ASSISTANT

## 2022-08-30 PROCEDURE — 89055 LEUKOCYTE ASSESSMENT FECAL: CPT | Performed by: PHYSICIAN ASSISTANT

## 2022-08-30 PROCEDURE — 99499 NO LOS: ICD-10-PCS | Mod: ,,, | Performed by: INTERNAL MEDICINE

## 2022-08-30 PROCEDURE — 87427 SHIGA-LIKE TOXIN AG IA: CPT | Mod: 59 | Performed by: PHYSICIAN ASSISTANT

## 2022-08-30 PROCEDURE — G0378 HOSPITAL OBSERVATION PER HR: HCPCS

## 2022-08-30 PROCEDURE — 63600175 PHARM REV CODE 636 W HCPCS: Performed by: EMERGENCY MEDICINE

## 2022-08-30 PROCEDURE — 99214 OFFICE O/P EST MOD 30 MIN: CPT | Mod: ,,, | Performed by: PHYSICIAN ASSISTANT

## 2022-08-30 PROCEDURE — 99499 UNLISTED E&M SERVICE: CPT | Mod: ,,, | Performed by: INTERNAL MEDICINE

## 2022-08-30 PROCEDURE — S0030 INJECTION, METRONIDAZOLE: HCPCS | Performed by: EMERGENCY MEDICINE

## 2022-08-30 PROCEDURE — 85651 RBC SED RATE NONAUTOMATED: CPT | Performed by: PHYSICIAN ASSISTANT

## 2022-08-30 PROCEDURE — 83735 ASSAY OF MAGNESIUM: CPT | Performed by: INTERNAL MEDICINE

## 2022-08-30 PROCEDURE — 36415 COLL VENOUS BLD VENIPUNCTURE: CPT | Performed by: INTERNAL MEDICINE

## 2022-08-30 PROCEDURE — 85025 COMPLETE CBC W/AUTO DIFF WBC: CPT | Performed by: INTERNAL MEDICINE

## 2022-08-30 PROCEDURE — 63600175 PHARM REV CODE 636 W HCPCS: Performed by: INTERNAL MEDICINE

## 2022-08-30 PROCEDURE — 83993 ASSAY FOR CALPROTECTIN FECAL: CPT | Performed by: PHYSICIAN ASSISTANT

## 2022-08-30 PROCEDURE — 25000003 PHARM REV CODE 250: Performed by: EMERGENCY MEDICINE

## 2022-08-30 PROCEDURE — 25000003 PHARM REV CODE 250: Performed by: INTERNAL MEDICINE

## 2022-08-30 PROCEDURE — 87046 STOOL CULTR AEROBIC BACT EA: CPT | Performed by: PHYSICIAN ASSISTANT

## 2022-08-30 PROCEDURE — 87045 FECES CULTURE AEROBIC BACT: CPT | Performed by: PHYSICIAN ASSISTANT

## 2022-08-30 RX ADMIN — MORPHINE SULFATE 2 MG: 4 INJECTION INTRAVENOUS at 01:08

## 2022-08-30 RX ADMIN — METRONIDAZOLE 500 MG: 5 INJECTION, SOLUTION INTRAVENOUS at 09:08

## 2022-08-30 RX ADMIN — MORPHINE SULFATE 2 MG: 4 INJECTION INTRAVENOUS at 04:08

## 2022-08-30 RX ADMIN — METRONIDAZOLE 500 MG: 5 INJECTION, SOLUTION INTRAVENOUS at 01:08

## 2022-08-30 RX ADMIN — CIPROFLOXACIN 400 MG: 2 INJECTION, SOLUTION INTRAVENOUS at 09:08

## 2022-08-30 RX ADMIN — CIPROFLOXACIN 400 MG: 2 INJECTION, SOLUTION INTRAVENOUS at 11:08

## 2022-08-30 RX ADMIN — ONDANSETRON 4 MG: 2 INJECTION INTRAMUSCULAR; INTRAVENOUS at 11:08

## 2022-08-30 RX ADMIN — SODIUM CHLORIDE: 9 INJECTION, SOLUTION INTRAVENOUS at 07:08

## 2022-08-30 RX ADMIN — LEVOTHYROXINE SODIUM 75 MCG: 75 TABLET ORAL at 05:08

## 2022-08-30 RX ADMIN — SODIUM CHLORIDE: 9 INJECTION, SOLUTION INTRAVENOUS at 05:08

## 2022-08-30 RX ADMIN — METRONIDAZOLE 500 MG: 5 INJECTION, SOLUTION INTRAVENOUS at 04:08

## 2022-08-30 RX ADMIN — MORPHINE SULFATE 4 MG: 4 INJECTION INTRAVENOUS at 09:08

## 2022-08-30 NOTE — SUBJECTIVE & OBJECTIVE
Interval History: pt in bed upon exam and reports LMQ/LLQ abdominal pain. IV antibiotics continued. CRP elevated. NPO status. Analgesia and antiemetics. GI following.     Review of Systems   Constitutional:  Positive for appetite change and fatigue. Negative for chills and fever.   HENT: Negative.  Negative for congestion, rhinorrhea, sore throat and trouble swallowing.    Eyes: Negative.  Negative for visual disturbance.   Respiratory: Negative.  Negative for cough, shortness of breath and wheezing.    Cardiovascular: Negative.  Negative for chest pain and palpitations.   Gastrointestinal:  Positive for abdominal pain, blood in stool, diarrhea and nausea. Negative for vomiting.   Endocrine: Negative.    Genitourinary: Negative.  Negative for dysuria and flank pain.   Musculoskeletal: Negative.  Negative for back pain.   Skin: Negative.  Negative for rash.   Allergic/Immunologic: Negative.    Neurological: Negative.  Negative for speech difficulty, weakness, numbness and headaches.   Hematological: Negative.    Psychiatric/Behavioral: Negative.  Negative for hallucinations. The patient is not nervous/anxious.    All other systems reviewed and are negative.  Objective:     Vital Signs (Most Recent):  Temp: 98.7 °F (37.1 °C) (08/30/22 1134)  Pulse: 88 (08/30/22 1134)  Resp: 16 (08/30/22 1134)  BP: (!) 109/58 (08/30/22 1134)  SpO2: 97 % (08/30/22 1134)   Vital Signs (24h Range):  Temp:  [96.8 °F (36 °C)-99 °F (37.2 °C)] 98.7 °F (37.1 °C)  Pulse:  [79-90] 88  Resp:  [15-18] 16  SpO2:  [97 %-99 %] 97 %  BP: (104-136)/(51-63) 109/58     Weight: 84.4 kg (186 lb 1.1 oz)  Body mass index is 33.49 kg/m².    Intake/Output Summary (Last 24 hours) at 8/30/2022 1625  Last data filed at 8/30/2022 1451  Gross per 24 hour   Intake 3878.21 ml   Output --   Net 3878.21 ml      Physical Exam  Vitals and nursing note reviewed.   Constitutional:       General: She is awake. She is not in acute distress.     Appearance: She is obese. She  is not ill-appearing.   HENT:      Head: Normocephalic and atraumatic.      Mouth/Throat:      Mouth: Mucous membranes are moist.   Eyes:      General: No scleral icterus.     Conjunctiva/sclera: Conjunctivae normal.   Cardiovascular:      Rate and Rhythm: Normal rate and regular rhythm.      Heart sounds: No murmur heard.  Pulmonary:      Effort: Pulmonary effort is normal. No respiratory distress.      Breath sounds: Normal breath sounds. No wheezing.   Abdominal:      Palpations: Abdomen is soft.      Tenderness: There is abdominal tenderness (left LMQ/LLQ).   Musculoskeletal:         General: No swelling. Normal range of motion.      Cervical back: Normal range of motion and neck supple.   Skin:     General: Skin is warm.      Coloration: Skin is not jaundiced.   Neurological:      General: No focal deficit present.      Mental Status: She is alert and oriented to person, place, and time. Mental status is at baseline.   Psychiatric:         Attention and Perception: Attention normal.         Mood and Affect: Mood normal.         Speech: Speech normal.         Behavior: Behavior normal. Behavior is cooperative.       Significant Labs: All pertinent labs within the past 24 hours have been reviewed.  CBC:   Recent Labs   Lab 08/29/22  1725 08/30/22  0549   WBC 15.50* 15.00*   HGB 13.6 11.7*   HCT 39.7 36.2*    184     CMP:   Recent Labs   Lab 08/29/22  1725 08/30/22  0549    141   K 3.8 4.2    108   CO2 23 24   GLU 81 84   BUN 7* 5*   CREATININE 0.8 0.9   CALCIUM 9.0 7.8*   PROT 7.0 5.2*   ALBUMIN 3.4* 2.8*   BILITOT 0.4 0.4   ALKPHOS 66 53*   AST 21 16   ALT 12 12   ANIONGAP 13 9     Urine Studies:   Recent Labs   Lab 08/29/22  1921   COLORU Colorless*   APPEARANCEUA Clear   PHUR 7.0   SPECGRAV <1.005*   PROTEINUA Negative   GLUCUA Negative   KETONESU Negative   BILIRUBINUA Negative   OCCULTUA Negative   NITRITE Negative   UROBILINOGEN Negative   LEUKOCYTESUR 2+*   WBCUA 3       Significant  Imaging: I have reviewed all pertinent imaging results/findings within the past 24 hours.

## 2022-08-30 NOTE — ASSESSMENT & PLAN NOTE
-colonoscopy with biopsy 8/17, reveals evidence of ulcerative colitis.    -has not seen gastroenterology yet  -GI consult in a.m..  -continue IV fluids.

## 2022-08-30 NOTE — HOSPITAL COURSE
Pt admitted to Observation for suspected Ulcerative Colitis with rectal bleeding in the setting of Buhcet's. Imaging also supports increased vascularity around the colon with pericolonic lymph nodes. Correlate clinically for colitis. Status post gastric bypass and cholecystectomy Hepatomegaly with fatty infiltration liver. GI consulted. IV antibiotics continued and IV steroids not given at this time due possibility of infection. CRP elevated. H/H stable. UA results abnormal. C-diff results pending. NPO. Analgesia and antiemetics as needed. On 8/31/22, leukocytosis continued with mild improvement. IV antibiotics continued. Pt reports abdominal soreness and diarrhea with analgesia continued. Pt tolerating sips of water with ice without distress. GI following and outpatient follow up with IBD clinic recommended.     9/1: WBC remains elevated but likely more reactive. If Procal is WNL plan to transition to PO abx, advance diet as tolerated and d/c in the AM. Plan to follow up with IBD clinic after d/c.   9/02 Still with a lot of diarrhea and poor po intake. Continue IVF. Repeat labs in am.   9/03 Persistent diarrhea. Continue current treatment. Monitor.   9/04 Patient's overall condition remains stable and improved. Patient cleared for discharge by GI who recommended patient discharge home on Prednisone 40mg daily with IBD clinic to determine taper. Imodium if needed. Infection has been ruled out. Patient scheduled for IBD clinic already scheduled for 9/7/22.

## 2022-08-30 NOTE — ASSESSMENT & PLAN NOTE
-will monitor   -GI following   -will consider antiinflammatory as needed   -will hold on steroid use at this time

## 2022-08-30 NOTE — ASSESSMENT & PLAN NOTE
-colonoscopy with biopsy 8/17, reveals evidence of ulcerative colitis.    -has not seen gastroenterology outpatient   -GI consulted   -continue IV fluids.  -IV antibiotics  -CRP elevated   - IV steroids held due to possible infection   -analgesia and antiemetics as needed

## 2022-08-30 NOTE — HPI
Ms. Canseco is a 61-year-old  female with PMH significant for COPD/asthma, hypothyroidism, hyperlipidemia, has been having intermittent bloody bowel movements.  She underwent colonoscopy by Dr. Del Valle on 08/17, pathology came back positive for IBD/ulcerative colitis.  Patient presented to the ED today complaining of increased bloody bowel movements, diarrhea, associated with generalized weakness, fatigue lower abdominal discomfort.  Afebrile, hemodynamically stable in the ED. WBC elevated 15,500, 0% bands.  Lipase 257.  Hemoglobin 13.6, hematocrit 39.7.  Started on IV ciprofloxacin, metronidazole and placed in observation for IV fluids and GI evaluation in a.m..    Admitting diagnosis:  New diagnosis of ulcerative colitis.  Continued bloody diarrhea.  Leukocytosis.

## 2022-08-30 NOTE — CONSULTS
[facility]  Gastroenterology  Consult Note    Patient Name: Yodit Canseco  MRN: [id number]  Admission Date: 8/29/2022  Hospital Length of Stay: 0 days  Code Status: Prior   Attending Provider: Mando Bourgeois MD   Consulting Provider: Niraj Harden PA-C  Primary Care Physician: Miky Lewis MD  Principal Problem:Colitis    Inpatient consult to Gastroenterology  Consult performed by: Niraj Harden PA-C  Consult ordered by: Naveen Collazo MD  Reason for consult: Colitis        Subjective:     HPI:  The patient presented to the ER for bloody diarrhea. This started three weeks ago, and is associated with left sided abdominal pain, nausea and decreased appetite. She denies vomiting. She was seen by Dr. Del Valle and underwent colonoscopy 08/17/22. This showed left sided colitis. Biopsies showed moderate, chronic, active colitis with cryptitis and crypt abscess without granulomas. These findings may represent inflammatory bowel disease,   infection or medication effect. WBC count 15.50. Hgb 13.6 with drop to 11.7. Non-contrast CT scan showed increased vascularity around the colon with pericolonic lymph nodes. Correlate clinically for colitis. Status post gastric bypass and cholecystectomy. She was started on Cipro and Flagyl. She denies sick contacts. No recent antibiotic use. No prior treatment. Family history significant for a son with UC.       Past Medical History:   Diagnosis Date    Angio-edema     Arthralgia     Asthma without status asthmaticus     Bronchitis     COPD (chronic obstructive pulmonary disease)     COVID-19 virus infection 07/23/2021    Diverticulosis of large intestine without hemorrhage 10/08/2015    Environmental allergies     Eosinophilic asthma 03/08/2018    Gastroparesis     Hand arthritis     Herpes simplex without mention of complication     oral    Hidradenitis     Hyperlipidemia     Hypothyroidism     Immune deficiency disorder     Intraperitoneal abscess  "05/07/2018    LBP radiating to left leg     Leukocytosis, unspecified     Migraine headache     Obesity, Class III, BMI 40-49.9 (morbid obesity)     Periorbital cellulitis 12/31/2016    Prediabetes     RA (rheumatoid arthritis)     Recurrent upper respiratory infection (URI)     S/P colonoscopy 11/2010    Sepsis 05/07/2018    Systemic lupus erythematosus, organ or system involvement unspecified     Urticaria        Past Surgical History:   Procedure Laterality Date    ADENOIDECTOMY      CHOLECYSTECTOMY      CHOLECYSTECTOMY      COLONOSCOPY  2015    repeat in 10    COLONOSCOPY N/A 10/8/2015    Procedure: COLONOSCOPY;  Surgeon: Panda Bose MD;  Location: [facility] ENDO;  Service: Endoscopy;  Laterality: N/A;    COLONOSCOPY N/A 8/17/2022    Procedure: COLONOSCOPY;  Surgeon: Olaf Del Valle MD;  Location: [facility] ENDO;  Service: General;  Laterality: N/A;    Hand fracture surgery      HARDWARE REMOVAL      left hand 5th MC    hymenectomy      HYSTERECTOMY      menorrhagia-Ovaries intact    ROTATOR CUFF REPAIR Right     SLEEVE GASTROPLASTY  04/16/2018    TONSILLECTOMY      Urethral dilatation         Review of patient's allergies indicates:   Allergen Reactions    Bromelains Hives     " causes mouth to bleed"    Pimecrolimus Swelling    Sudafed [pseudoephedrine hcl] Other (See Comments)     Does not want to wake up .    Amoxicillin-pot clavulanate Itching     Other reaction(s): Itching    Hydrocodone Itching    Iodinated contrast media      childhood    Iodine and iodide containing products Other (See Comments)    Melon Hives    Percocet [oxycodone-acetaminophen] Itching    Corn containing products     Wheat containing prod     Barium iodide Rash    Ephedrine Other (See Comments)     Other reaction(s): comatose    Penicillins      Other reaction(s): does not work on pt symptoms     Family History       Problem Relation (Age of Onset)    Basal cell carcinoma Mother    Breast cancer " Paternal Aunt (40), Cousin (25)    Cancer Father, Maternal Grandfather    Cataracts Mother    Colon cancer Paternal Aunt (40)    Diabetes Father, Paternal Aunt, Paternal Aunt    Hypertension Mother, Father    Lung cancer Father    Lung disease Mother    Lymphoma Paternal Aunt    Melanoma Mother, Maternal Aunt, Maternal Uncle    Ovarian cancer Paternal Grandmother (40)    Psoriasis Son, Son    Ulcerative colitis Son          Tobacco Use    Smoking status: Never    Smokeless tobacco: Never   Substance and Sexual Activity    Alcohol use: Yes     Alcohol/week: 0.0 standard drinks     Comment: very rarely    Drug use: No    Sexual activity: Yes     Partners: Male     Review of Systems   Constitutional:  Positive for appetite change. Negative for fever.   HENT:  Negative for hearing loss.    Eyes:  Negative for visual disturbance.   Respiratory:  Negative for cough and shortness of breath.    Cardiovascular:  Negative for chest pain.   Gastrointestinal:         As per HPI.   Genitourinary:  Negative for dysuria, frequency and hematuria.   Musculoskeletal:  Positive for arthralgias. Negative for back pain.   Skin:  Negative for rash.   Neurological:  Negative for seizures, syncope, numbness and headaches.   Hematological:  Does not bruise/bleed easily.   Psychiatric/Behavioral:  The patient is not nervous/anxious.    Objective:     Vital Signs (Most Recent):  Temp: 98.5 °F (36.9 °C) (08/30/22 0812)  Pulse: 88 (08/30/22 0812)  Resp: 18 (08/30/22 0812)  BP: (!) 112/56 (08/30/22 0812)  SpO2: 97 % (08/30/22 0812)   Vital Signs (24h Range):  Temp:  [96.8 °F (36 °C)-99 °F (37.2 °C)] 98.5 °F (36.9 °C)  Pulse:  [74-90] 88  Resp:  [16-18] 18  SpO2:  [97 %-99 %] 97 %  BP: (104-161)/(51-77) 112/56     Weight: 84.4 kg (186 lb 1.1 oz) (08/30/22 0110)  Body mass index is 33.49 kg/m².      Intake/Output Summary (Last 24 hours) at 8/30/2022 0812  Last data filed at 8/29/2022 2202  Gross per 24 hour   Intake 1300 ml   Output --   Net  1300 ml       Lines/Drains/Airways       Peripheral Intravenous Line  Duration                  Peripheral IV - Single Lumen 08/29/22 1724 20 G Left Antecubital <1 day                    Physical Exam  Constitutional:       Appearance: Normal appearance. She is well-developed.   HENT:      Head: Normocephalic and atraumatic.   Eyes:      Extraocular Movements: Extraocular movements intact.   Cardiovascular:      Rate and Rhythm: Normal rate and regular rhythm.      Heart sounds: No murmur heard.  Pulmonary:      Effort: Pulmonary effort is normal. No respiratory distress.      Breath sounds: Normal breath sounds. No wheezing.   Abdominal:      General: Bowel sounds are normal. There is no distension.      Palpations: Abdomen is soft. There is no mass.      Tenderness: There is abdominal tenderness (left sided, moderate). There is no guarding or rebound.   Musculoskeletal:      Cervical back: Normal range of motion and neck supple.      Right lower leg: No edema.      Left lower leg: No edema.   Skin:     General: Skin is warm and dry.      Findings: No rash.   Neurological:      Mental Status: She is alert and oriented to person, place, and time.      Cranial Nerves: No cranial nerve deficit.   Psychiatric:         Behavior: Behavior normal.       Significant Labs:  CBC:   Recent Labs   Lab 08/29/22  1725 08/30/22  0549   WBC 15.50* 15.00*   HGB 13.6 11.7*   HCT 39.7 36.2*    184     CMP:   Recent Labs   Lab 08/30/22  0549   GLU 84   CALCIUM 7.8*   ALBUMIN 2.8*   PROT 5.2*      K 4.2   CO2 24      BUN 5*   CREATININE 0.9   ALKPHOS 53*   ALT 12   AST 16   BILITOT 0.4     Coagulation: No results for input(s): PT, INR, APTT in the last 48 hours.    Significant Imaging:  Imaging results within the past 24 hours have been reviewed.    Assessment/Plan:     * Colitis  Patient with left sided colitis on recent colonoscopy. Could represent IBD vs infection.   She has a history of Buhcet's which can cause  GI tract ulcers, and can be difficult to differentiate from inflammatory bowel disease. However, the pattern is usually ascending colon, cecum and TI, and her findings are mostly left sided.   Agree with Cipro and Flagyl. Hold off on steroid therapy.   Will check stool studies for infection. Will also check calprotectin.   Check CRP and ESR today.   Monitor Hgb and WBC count.  Pain med and antiemetics as needed.     Behcet's syndrome involving oral mucosa  Treatment per [facility] team.         Thank you for your consult. I will follow-up with patient. Please contact us if you have any additional questions.    Niraj Harden PA-C  Gastroenterology  [facility]

## 2022-08-30 NOTE — SUBJECTIVE & OBJECTIVE
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    Gastroparesis     Hand arthritis     Herpes simplex without mention of complication     oral    Hidradenitis     Hyperlipidemia     Hypothyroidism     Immune deficiency disorder     Intraperitoneal abscess 05/07/2018    LBP radiating to left leg     Leukocytosis, unspecified     Migraine headache     Obesity, Class III, BMI 40-49.9 (morbid obesity)     Periorbital cellulitis 12/31/2016    Prediabetes     RA (rheumatoid arthritis)     Recurrent upper respiratory infection (URI)     S/P colonoscopy 11/2010    Sepsis 05/07/2018    Systemic lupus erythematosus, organ or system involvement unspecified     Urticaria        Past Surgical History:   Procedure Laterality Date    ADENOIDECTOMY      CHOLECYSTECTOMY      CHOLECYSTECTOMY      COLONOSCOPY  2015    repeat in 10    COLONOSCOPY N/A 10/8/2015    Procedure: COLONOSCOPY;  Surgeon: Panda Bose MD;  Location: [facility];  Service: Endoscopy;  Laterality: N/A;    COLONOSCOPY N/A 8/17/2022    Procedure: COLONOSCOPY;  Surgeon: Olaf Del Valle MD;  Location: [facility];  Service: General;  Laterality: N/A;    Hand fracture surgery      HARDWARE REMOVAL      left hand 5th MC    hymenectomy      HYSTERECTOMY      menorrhagia-Ovaries intact    ROTATOR CUFF REPAIR Right     SLEEVE GASTROPLASTY  04/16/2018    TONSILLECTOMY      Urethral dilatation         Review of patient's allergies indicates:   Allergen Reactions    Bromelains Hives     " causes mouth to bleed"    Pimecrolimus Swelling    Sudafed [pseudoephedrine hcl] Other (See Comments)     Does not want to wake up .    Amoxicillin-pot clavulanate Itching     Other reaction(s): " Itching    Hydrocodone Itching    Iodinated contrast media      childhood    Iodine and iodide containing products Other (See Comments)    Melon Hives    Percocet [oxycodone-acetaminophen] Itching    Corn containing products     Wheat containing prod     Barium iodide Rash    Ephedrine Other (See Comments)     Other reaction(s): comatose    Penicillins      Other reaction(s): does not work on pt symptoms       No current facility-administered medications on file prior to encounter.     Current Outpatient Medications on File Prior to Encounter   Medication Sig    (Magic mouthwash) 1:1:1 diphenhydramine(Benadryl) 12.5mg/5ml liq, aluminum & magnesium hydroxide-simethicone (Maalox), LIDOcaine viscous 2% Swish and spit 5 mLs every 8 (eight) hours as needed (oral ulcers). Gargle and spit. DO NOT SWALLOW    acetaminophen (TYLENOL) 325 MG tablet Take 325 mg by mouth every 6 (six) hours as needed for Pain.    albuterol (PROAIR HFA) 90 mcg/actuation inhaler Inhale 2 puffs into the lungs every 6 (six) hours as needed for Wheezing. Rescue    apremilast (OTEZLA) 30 mg Tab Take 1 tablet (30 mg total) by mouth 2 (two) times daily.    BIOTIN ORAL Take by mouth once daily.    budesonide (PULMICORT) 0.25 mg/2 mL nebulizer solution Take 2 mLs (0.25 mg total) by nebulization once daily. Controller    budesonide-glycopyr-formoterol (BREZTRI AEROSPHERE) 160-9-4.8 mcg/actuation HFAA Inhale 2 puffs into the lungs 2 (two) times a day.    cholecalciferol, vitamin D3, (VITAMIN D3) 50 mcg (2,000 unit) Cap Take 1 capsule by mouth once daily.    clobetasoL (TEMOVATE) 0.05 % cream APPLY TOPICALLY TO THE AFFECTED AREA TWICE DAILY    clobetasoL (TEMOVATE) 0.05 % external solution Apply topically once daily.    desonide (DESOWEN) 0.05 % cream Apply topically 2 (two) times daily.    doxycycline (VIBRAMYCIN) 100 MG Cap Take 1 capsule (100 mg total) by mouth once daily.    estradioL (ESTRACE) 1 MG tablet TAKE 1 TABLET(1 MG) BY MOUTH EVERY DAY     hydrOXYchloroQUINE (PLAQUENIL) 200 mg tablet Take 1 tablet (200 mg total) by mouth 2 (two) times daily.    immun glob G,IgG,-pro-IgA 0-50 (HIZENTRA) 2 gram/10 mL (20 %) Soln Inject 18 g into the skin every 14 (fourteen) days.    levalbuterol (XOPENEX) 1.25 mg/3 mL nebulizer solution Take 3 mLs (1.25 mg total) by nebulization every 4 (four) hours as needed for Wheezing or Shortness of Breath. Rescue    levoFLOXacin (LEVAQUIN) 500 MG tablet Take 1 tablet (500 mg total) by mouth once daily.    levothyroxine (SYNTHROID) 75 MCG tablet Take 1 tablet (75 mcg total) by mouth once daily.    montelukast (SINGULAIR) 10 mg tablet Take 1 tablet (10 mg total) by mouth every evening.    mucus clearing device (ACAPELLA, FLUTTER) by Misc.(Non-Drug; Combo Route) route 2 (two) times daily.    MULTIVITAMIN ORAL Take by mouth once daily.    ondansetron (ZOFRAN-ODT) 4 MG TbDL Take 1 tablet (4 mg total) by mouth every 8 (eight) hours as needed (nausea/vomiting).    pantoprazole (PROTONIX) 40 MG tablet Take 1 tablet (40 mg total) by mouth once daily.    predniSONE (DELTASONE) 20 MG tablet One daily for 3 days and repeat for flare of lung symptoms as intructed    predniSONE (DELTASONE) 5 MG tablet Take 1 tablet (5 mg total) by mouth once daily.    sumatriptan (IMITREX) 100 MG tablet     tretinoin (RETIN-A) 0.05 % cream Apply topically every evening.    valACYclovir (VALTREX) 1000 MG tablet Take 2 tablets (2,000 mg total) by mouth every 12 (twelve) hours. For one day for fever blisters.  Do not take for more than 1 day per episode     Family History       Problem Relation (Age of Onset)    Basal cell carcinoma Mother    Breast cancer Paternal Aunt (40), Cousin (25)    Cancer Father, Maternal Grandfather    Cataracts Mother    Colon cancer Paternal Aunt (40)    Diabetes Father, Paternal Aunt, Paternal Aunt    Hypertension Mother, Father    Lung cancer Father    Lung disease Mother    Lymphoma Paternal Aunt    Melanoma Mother, Maternal Aunt,  Maternal Uncle    Ovarian cancer Paternal Grandmother (40)    Psoriasis Son, Son    Ulcerative colitis Son          Tobacco Use    Smoking status: Never    Smokeless tobacco: Never   Substance and Sexual Activity    Alcohol use: Yes     Alcohol/week: 0.0 standard drinks     Comment: very rarely    Drug use: No    Sexual activity: Yes     Partners: Male     Review of Systems   Constitutional:  Positive for appetite change and fatigue. Negative for chills and fever.   HENT: Negative.  Negative for congestion, rhinorrhea, sore throat and trouble swallowing.    Eyes: Negative.  Negative for visual disturbance.   Respiratory: Negative.  Negative for cough, shortness of breath and wheezing.    Cardiovascular: Negative.  Negative for chest pain and palpitations.   Gastrointestinal:  Positive for abdominal pain, blood in stool, diarrhea and nausea. Negative for vomiting.   Endocrine: Negative.    Genitourinary: Negative.  Negative for dysuria and flank pain.   Musculoskeletal: Negative.  Negative for back pain.   Skin: Negative.  Negative for rash.   Allergic/Immunologic: Negative.    Neurological: Negative.  Negative for speech difficulty, weakness, numbness and headaches.   Hematological: Negative.    Psychiatric/Behavioral: Negative.  Negative for hallucinations. The patient is not nervous/anxious.    All other systems reviewed and are negative.  Objective:     Vital Signs (Most Recent):  Temp: 98 °F (36.7 °C) (08/29/22 1521)  Pulse: 74 (08/29/22 1521)  Resp: 18 (08/29/22 1917)  BP: (!) 161/77 (08/29/22 1521)  SpO2: 99 % (08/29/22 1521)   Vital Signs (24h Range):  Temp:  [98 °F (36.7 °C)] 98 °F (36.7 °C)  Pulse:  [74] 74  Resp:  [18] 18  SpO2:  [99 %] 99 %  BP: (161)/(77) 161/77     Weight: 84.1 kg (185 lb 4.8 oz)  Body mass index is 33.35 kg/m².    Physical Exam  Vitals and nursing note reviewed.   Constitutional:       General: She is awake. She is not in acute distress.     Appearance: She is obese. She is not  ill-appearing.   HENT:      Head: Normocephalic and atraumatic.      Mouth/Throat:      Mouth: Mucous membranes are moist.   Eyes:      General: No scleral icterus.     Conjunctiva/sclera: Conjunctivae normal.   Cardiovascular:      Rate and Rhythm: Normal rate and regular rhythm.      Heart sounds: No murmur heard.  Pulmonary:      Effort: Pulmonary effort is normal. No respiratory distress.      Breath sounds: Normal breath sounds. No wheezing.   Abdominal:      Palpations: Abdomen is soft.      Tenderness: There is abdominal tenderness (lower).   Musculoskeletal:         General: No swelling. Normal range of motion.      Cervical back: Normal range of motion and neck supple.   Skin:     General: Skin is warm.      Coloration: Skin is not jaundiced.   Neurological:      General: No focal deficit present.      Mental Status: She is alert and oriented to person, place, and time. Mental status is at baseline.   Psychiatric:         Attention and Perception: Attention normal.         Mood and Affect: Mood normal.         Speech: Speech normal.         Behavior: Behavior normal. Behavior is cooperative.           Significant Labs: All pertinent labs within the past 24 hours have been reviewed.  BMP:   Recent Labs   Lab 08/29/22  1725   GLU 81      K 3.8      CO2 23   BUN 7*   CREATININE 0.8   CALCIUM 9.0     CBC:   Recent Labs   Lab 08/29/22  1725   WBC 15.50*   HGB 13.6   HCT 39.7        CMP:   Recent Labs   Lab 08/29/22  1725      K 3.8      CO2 23   GLU 81   BUN 7*   CREATININE 0.8   CALCIUM 9.0   PROT 7.0   ALBUMIN 3.4*   BILITOT 0.4   ALKPHOS 66   AST 21   ALT 12   ANIONGAP 13     Lipase:   Recent Labs   Lab 08/29/22  1725   LIPASE 257*       Significant Imaging: I have reviewed all pertinent imaging results/findings within the past 24 hours.  I have reviewed and interpreted all pertinent imaging results/findings within the past 24 hours.    Imaging Results              CT Abdomen  Pelvis  Without Contrast (Final result)  Result time 08/29/22 19:49:30      Final result by Nuzhat Mata MD (08/29/22 19:49:30)                   Impression:      Increased vascularity around the colon with pericolonic lymph nodes. Correlate clinically for colitis.    Status post gastric bypass and cholecystectomy    Hepatomegaly with fatty infiltration liver    Correlate to surgical history and follow-up as clinically indicated.    All CT scans   are performed using dose optimization techniques including the following: automated exposure control; adjustment of the mA and/or kV; use of iterative reconstruction technique.  Dose modulation was employed for ALARA by means of: Automated exposure control; adjustment of the mA and/or kV according to patient size (this includes techniques or standardized protocols for targeted exams where dose is matched to indication/reason for exam; i.e. extremities or head); and/or use of iterative reconstructive technique.      Electronically signed by: Adolfo Noland  Date:    08/29/2022  Time:    19:49               Narrative:    EXAMINATION:  CT ABDOMEN PELVIS WITHOUT CONTRAST    CLINICAL HISTORY:  Abdominal pain, post-op;    TECHNIQUE:  Low dose axial images, sagittal and coronal reformations were obtained from the lung bases to the pubic symphysis, Oral contrast was not administered.    COMPARISON:  None    FINDINGS:  Heart: Normal in size as far as seen.  No pericardial effusion as far seen.    Lung Bases: Well aerated, without consolidation or pleural fluid.    Liver: Hepatomegaly and fatty infiltration liver    Gallbladder: Cholecystectomy    Bile Ducts: No evidence of dilated ducts.    Pancreas: No mass or peripancreatic fat stranding.    Spleen: Unremarkable.    Adrenals: Unremarkable.    Kidneys/ Ureters: Unremarkable.    Bladder: No evidence of wall thickening.    Reproductive organs: Unremarkable.    GI Tract/Mesentery: Status post gastric bypass.  No evidence of bowel  obstruction or inflammation. No secondary signs of appendicitis.  Increased vascularity around the colon with a small lymph nodes.  Correlate clinically for colitis.    Peritoneal Space: No ascites. No free air.    Retroperitoneum: No significant adenopathy.    Abdominal wall: Unremarkable.    Vasculature: No aneurysm.    Bones: No acute fracture.                                      I have independently reviewed all pertinent labs within the past 24 hours.    I have independently reviewed, visualized and interpreted all pertinent imaging results within the past 24 hours and discussed the findings with the ED physician, Deshaun Hart Jr., MD

## 2022-08-30 NOTE — PLAN OF CARE
Care plan reviewed with patient. Pain managed by PRN med. Still with diarrhea--but due for stool sample. Able to walk tot he bathroom unassisted. Free from falls. No other complaints made.

## 2022-08-30 NOTE — H&P
[facility]  [facility]  History & Physical    Patient Name: Yodit Canseco  MRN: [id number]  Patient Class: OP- Observation  Admission Date: 8/29/2022  Attending Physician: Deshaun Hart Jr., MD   Primary Care Provider: Miky Lewis MD         Patient information was obtained from patient, past medical records and ER records.     Subjective:     Principal Problem:Ulcerative colitis with rectal bleeding    Chief Complaint:   Chief Complaint   Patient presents with    GI Problem     Pt has hx of ulcerative colitis; pt has colonoscopy yesterday and symptoms have gotten worse since yesterday; 5 bowel movements today; Appt with GI scheduled for Wednesday; + bright red blood in stool with hx of hemorrhoids        HPI: Ms. Canseco is a 61-year-old  female with PMH significant for COPD/asthma, hypothyroidism, hyperlipidemia, has been having intermittent bloody bowel movements.  She underwent colonoscopy by Dr. Del Valle on 08/17, pathology came back positive for IBD/ulcerative colitis.  Patient presented to the ED today complaining of increased bloody bowel movements, diarrhea, associated with generalized weakness, fatigue lower abdominal discomfort.  Afebrile, hemodynamically stable in the ED. WBC elevated 15,500, 0% bands.  Lipase 257.  Hemoglobin 13.6, hematocrit 39.7.  Started on IV ciprofloxacin, metronidazole and placed in observation for IV fluids and GI evaluation in a.m..    Admitting diagnosis:  New diagnosis of ulcerative colitis.  Continued bloody diarrhea.  Leukocytosis.      Past Medical History:   Diagnosis Date    Angio-edema     Arthralgia     Asthma without status asthmaticus     Bronchitis     COPD (chronic obstructive pulmonary disease)     COVID-19 virus infection 07/23/2021    Diverticulosis of large intestine without hemorrhage 10/08/2015    Environmental allergies     Eosinophilic asthma 03/08/2018    Gastroparesis     Hand arthritis     Herpes simplex  "without mention of complication     oral    Hidradenitis     Hyperlipidemia     Hypothyroidism     Immune deficiency disorder     Intraperitoneal abscess 05/07/2018    LBP radiating to left leg     Leukocytosis, unspecified     Migraine headache     Obesity, Class III, BMI 40-49.9 (morbid obesity)     Periorbital cellulitis 12/31/2016    Prediabetes     RA (rheumatoid arthritis)     Recurrent upper respiratory infection (URI)     S/P colonoscopy 11/2010    Sepsis 05/07/2018    Systemic lupus erythematosus, organ or system involvement unspecified     Urticaria        Past Surgical History:   Procedure Laterality Date    ADENOIDECTOMY      CHOLECYSTECTOMY      CHOLECYSTECTOMY      COLONOSCOPY  2015    repeat in 10    COLONOSCOPY N/A 10/8/2015    Procedure: COLONOSCOPY;  Surgeon: Panda Bose MD;  Location: [facility] ENDO;  Service: Endoscopy;  Laterality: N/A;    COLONOSCOPY N/A 8/17/2022    Procedure: COLONOSCOPY;  Surgeon: Olaf Del Valle MD;  Location: [facility] ENDO;  Service: General;  Laterality: N/A;    Hand fracture surgery      HARDWARE REMOVAL      left hand 5th MC    hymenectomy      HYSTERECTOMY      menorrhagia-Ovaries intact    ROTATOR CUFF REPAIR Right     SLEEVE GASTROPLASTY  04/16/2018    TONSILLECTOMY      Urethral dilatation         Review of patient's allergies indicates:   Allergen Reactions    Bromelains Hives     " causes mouth to bleed"    Pimecrolimus Swelling    Sudafed [pseudoephedrine hcl] Other (See Comments)     Does not want to wake up .    Amoxicillin-pot clavulanate Itching     Other reaction(s): Itching    Hydrocodone Itching    Iodinated contrast media      childhood    Iodine and iodide containing products Other (See Comments)    Melon Hives    Percocet [oxycodone-acetaminophen] Itching    Corn containing products     Wheat containing prod     Barium iodide Rash    Ephedrine Other (See Comments)     Other reaction(s): comatose    Penicillins  "     Other reaction(s): does not work on pt symptoms       No current facility-administered medications on file prior to encounter.     Current Outpatient Medications on File Prior to Encounter   Medication Sig    (Magic mouthwash) 1:1:1 diphenhydramine(Benadryl) 12.5mg/5ml liq, aluminum & magnesium hydroxide-simethicone (Maalox), LIDOcaine viscous 2% Swish and spit 5 mLs every 8 (eight) hours as needed (oral ulcers). Gargle and spit. DO NOT SWALLOW    acetaminophen (TYLENOL) 325 MG tablet Take 325 mg by mouth every 6 (six) hours as needed for Pain.    albuterol (PROAIR HFA) 90 mcg/actuation inhaler Inhale 2 puffs into the lungs every 6 (six) hours as needed for Wheezing. Rescue    apremilast (OTEZLA) 30 mg Tab Take 1 tablet (30 mg total) by mouth 2 (two) times daily.    BIOTIN ORAL Take by mouth once daily.    budesonide (PULMICORT) 0.25 mg/2 mL nebulizer solution Take 2 mLs (0.25 mg total) by nebulization once daily. Controller    budesonide-glycopyr-formoterol (BREZTRI AEROSPHERE) 160-9-4.8 mcg/actuation HFAA Inhale 2 puffs into the lungs 2 (two) times a day.    cholecalciferol, vitamin D3, (VITAMIN D3) 50 mcg (2,000 unit) Cap Take 1 capsule by mouth once daily.    clobetasoL (TEMOVATE) 0.05 % cream APPLY TOPICALLY TO THE AFFECTED AREA TWICE DAILY    clobetasoL (TEMOVATE) 0.05 % external solution Apply topically once daily.    desonide (DESOWEN) 0.05 % cream Apply topically 2 (two) times daily.    doxycycline (VIBRAMYCIN) 100 MG Cap Take 1 capsule (100 mg total) by mouth once daily.    estradioL (ESTRACE) 1 MG tablet TAKE 1 TABLET(1 MG) BY MOUTH EVERY DAY    hydrOXYchloroQUINE (PLAQUENIL) 200 mg tablet Take 1 tablet (200 mg total) by mouth 2 (two) times daily.    immun glob G,IgG,-pro-IgA 0-50 (HIZENTRA) 2 gram/10 mL (20 %) Soln Inject 18 g into the skin every 14 (fourteen) days.    levalbuterol (XOPENEX) 1.25 mg/3 mL nebulizer solution Take 3 mLs (1.25 mg total) by nebulization every 4 (four)  hours as needed for Wheezing or Shortness of Breath. Rescue    levoFLOXacin (LEVAQUIN) 500 MG tablet Take 1 tablet (500 mg total) by mouth once daily.    levothyroxine (SYNTHROID) 75 MCG tablet Take 1 tablet (75 mcg total) by mouth once daily.    montelukast (SINGULAIR) 10 mg tablet Take 1 tablet (10 mg total) by mouth every evening.    mucus clearing device (ACAPELLA, FLUTTER) by Misc.(Non-Drug; Combo Route) route 2 (two) times daily.    MULTIVITAMIN ORAL Take by mouth once daily.    ondansetron (ZOFRAN-ODT) 4 MG TbDL Take 1 tablet (4 mg total) by mouth every 8 (eight) hours as needed (nausea/vomiting).    pantoprazole (PROTONIX) 40 MG tablet Take 1 tablet (40 mg total) by mouth once daily.    predniSONE (DELTASONE) 20 MG tablet One daily for 3 days and repeat for flare of lung symptoms as intructed    predniSONE (DELTASONE) 5 MG tablet Take 1 tablet (5 mg total) by mouth once daily.    sumatriptan (IMITREX) 100 MG tablet     tretinoin (RETIN-A) 0.05 % cream Apply topically every evening.    valACYclovir (VALTREX) 1000 MG tablet Take 2 tablets (2,000 mg total) by mouth every 12 (twelve) hours. For one day for fever blisters.  Do not take for more than 1 day per episode     Family History       Problem Relation (Age of Onset)    Basal cell carcinoma Mother    Breast cancer Paternal Aunt (40), Cousin (25)    Cancer Father, Maternal Grandfather    Cataracts Mother    Colon cancer Paternal Aunt (40)    Diabetes Father, Paternal Aunt, Paternal Aunt    Hypertension Mother, Father    Lung cancer Father    Lung disease Mother    Lymphoma Paternal Aunt    Melanoma Mother, Maternal Aunt, Maternal Uncle    Ovarian cancer Paternal Grandmother (40)    Psoriasis Son, Son    Ulcerative colitis Son          Tobacco Use    Smoking status: Never    Smokeless tobacco: Never   Substance and Sexual Activity    Alcohol use: Yes     Alcohol/week: 0.0 standard drinks     Comment: very rarely    Drug use: No    Sexual  activity: Yes     Partners: Male     Review of Systems   Constitutional:  Positive for appetite change and fatigue. Negative for chills and fever.   HENT: Negative.  Negative for congestion, rhinorrhea, sore throat and trouble swallowing.    Eyes: Negative.  Negative for visual disturbance.   Respiratory: Negative.  Negative for cough, shortness of breath and wheezing.    Cardiovascular: Negative.  Negative for chest pain and palpitations.   Gastrointestinal:  Positive for abdominal pain, blood in stool, diarrhea and nausea. Negative for vomiting.   Endocrine: Negative.    Genitourinary: Negative.  Negative for dysuria and flank pain.   Musculoskeletal: Negative.  Negative for back pain.   Skin: Negative.  Negative for rash.   Allergic/Immunologic: Negative.    Neurological: Negative.  Negative for speech difficulty, weakness, numbness and headaches.   Hematological: Negative.    Psychiatric/Behavioral: Negative.  Negative for hallucinations. The patient is not nervous/anxious.    All other systems reviewed and are negative.  Objective:     Vital Signs (Most Recent):  Temp: 98 °F (36.7 °C) (08/29/22 1521)  Pulse: 74 (08/29/22 1521)  Resp: 18 (08/29/22 1917)  BP: (!) 161/77 (08/29/22 1521)  SpO2: 99 % (08/29/22 1521)   Vital Signs (24h Range):  Temp:  [98 °F (36.7 °C)] 98 °F (36.7 °C)  Pulse:  [74] 74  Resp:  [18] 18  SpO2:  [99 %] 99 %  BP: (161)/(77) 161/77     Weight: 84.1 kg (185 lb 4.8 oz)  Body mass index is 33.35 kg/m².    Physical Exam  Vitals and nursing note reviewed.   Constitutional:       General: She is awake. She is not in acute distress.     Appearance: She is obese. She is not ill-appearing.   HENT:      Head: Normocephalic and atraumatic.      Mouth/Throat:      Mouth: Mucous membranes are moist.   Eyes:      General: No scleral icterus.     Conjunctiva/sclera: Conjunctivae normal.   Cardiovascular:      Rate and Rhythm: Normal rate and regular rhythm.      Heart sounds: No murmur heard.  Pulmonary:       Effort: Pulmonary effort is normal. No respiratory distress.      Breath sounds: Normal breath sounds. No wheezing.   Abdominal:      Palpations: Abdomen is soft.      Tenderness: There is abdominal tenderness (lower).   Musculoskeletal:         General: No swelling. Normal range of motion.      Cervical back: Normal range of motion and neck supple.   Skin:     General: Skin is warm.      Coloration: Skin is not jaundiced.   Neurological:      General: No focal deficit present.      Mental Status: She is alert and oriented to person, place, and time. Mental status is at baseline.   Psychiatric:         Attention and Perception: Attention normal.         Mood and Affect: Mood normal.         Speech: Speech normal.         Behavior: Behavior normal. Behavior is cooperative.           Significant Labs: All pertinent labs within the past 24 hours have been reviewed.  BMP:   Recent Labs   Lab 08/29/22  1725   GLU 81      K 3.8      CO2 23   BUN 7*   CREATININE 0.8   CALCIUM 9.0     CBC:   Recent Labs   Lab 08/29/22  1725   WBC 15.50*   HGB 13.6   HCT 39.7        CMP:   Recent Labs   Lab 08/29/22  1725      K 3.8      CO2 23   GLU 81   BUN 7*   CREATININE 0.8   CALCIUM 9.0   PROT 7.0   ALBUMIN 3.4*   BILITOT 0.4   ALKPHOS 66   AST 21   ALT 12   ANIONGAP 13     Lipase:   Recent Labs   Lab 08/29/22  1725   LIPASE 257*       Significant Imaging: I have reviewed all pertinent imaging results/findings within the past 24 hours.  I have reviewed and interpreted all pertinent imaging results/findings within the past 24 hours.    Imaging Results              CT Abdomen Pelvis  Without Contrast (Final result)  Result time 08/29/22 19:49:30      Final result by Nuzhat Mata MD (08/29/22 19:49:30)                   Impression:      Increased vascularity around the colon with pericolonic lymph nodes. Correlate clinically for colitis.    Status post gastric bypass and  cholecystectomy    Hepatomegaly with fatty infiltration liver    Correlate to surgical history and follow-up as clinically indicated.    All CT scans   are performed using dose optimization techniques including the following: automated exposure control; adjustment of the mA and/or kV; use of iterative reconstruction technique.  Dose modulation was employed for ALARA by means of: Automated exposure control; adjustment of the mA and/or kV according to patient size (this includes techniques or standardized protocols for targeted exams where dose is matched to indication/reason for exam; i.e. extremities or head); and/or use of iterative reconstructive technique.      Electronically signed by: Adolfo Noland  Date:    08/29/2022  Time:    19:49               Narrative:    EXAMINATION:  CT ABDOMEN PELVIS WITHOUT CONTRAST    CLINICAL HISTORY:  Abdominal pain, post-op;    TECHNIQUE:  Low dose axial images, sagittal and coronal reformations were obtained from the lung bases to the pubic symphysis, Oral contrast was not administered.    COMPARISON:  None    FINDINGS:  Heart: Normal in size as far as seen.  No pericardial effusion as far seen.    Lung Bases: Well aerated, without consolidation or pleural fluid.    Liver: Hepatomegaly and fatty infiltration liver    Gallbladder: Cholecystectomy    Bile Ducts: No evidence of dilated ducts.    Pancreas: No mass or peripancreatic fat stranding.    Spleen: Unremarkable.    Adrenals: Unremarkable.    Kidneys/ Ureters: Unremarkable.    Bladder: No evidence of wall thickening.    Reproductive organs: Unremarkable.    GI Tract/Mesentery: Status post gastric bypass.  No evidence of bowel obstruction or inflammation. No secondary signs of appendicitis.  Increased vascularity around the colon with a small lymph nodes.  Correlate clinically for colitis.    Peritoneal Space: No ascites. No free air.    Retroperitoneum: No significant adenopathy.    Abdominal wall:  Unremarkable.    Vasculature: No aneurysm.    Bones: No acute fracture.                                      I have independently reviewed all pertinent labs within the past 24 hours.    I have independently reviewed, visualized and interpreted all pertinent imaging results within the past 24 hours and discussed the findings with the ED physician, Deshaun Hart Jr., MD         Assessment/Plan:     * Ulcerative colitis with rectal bleeding  -colonoscopy with biopsy 8/17, reveals evidence of ulcerative colitis.    -has not seen gastroenterology yet  -GI consult in a.m..  -continue IV fluids.      SIRS (systemic inflammatory response syndrome)  -afebrile, [place] at [address].    -new diagnosis of IBD/UC   -allergic to penicillin  -IV ciprofloxacin, metronidazole empirically.  -hemodynamically stable      Behcet's syndrome involving oral mucosa         Asthma  -not in exacerbation.    - bronchodilators as needed      Immune deficiency disorder         Hyperlipidemia  -continue statin.        VTE Risk Mitigation (From admission, onward)         Ordered     Place sequential compression device  Until discontinued         08/29/22 2045                 The patient is placed in OBSERVATION status.      Naveen Collazo MD  Department of Hospital Medicine   [facility] - Emergency Dept.

## 2022-08-30 NOTE — HPI
The patient presented to the ER for bloody diarrhea. This started three weeks ago, and is associated with left sided abdominal pain, nausea and decreased appetite. She denies vomiting. She was seen by Dr. Del Valle and underwent colonoscopy 08/17/22. This showed left sided colitis. Biopsies showed moderate, chronic, active colitis with cryptitis and crypt abscess without granulomas. These findings may represent inflammatory bowel disease,   infection or medication effect. WBC count 15.50. Hgb 13.6 with drop to 11.7. Non-contrast CT scan showed increased vascularity around the colon with pericolonic lymph nodes. Correlate clinically for colitis. Status post gastric bypass and cholecystectomy. She was started on Cipro and Flagyl. She denies sick contacts. No recent antibiotic use. No prior treatment. Family history significant for a son with UC.

## 2022-08-30 NOTE — ASSESSMENT & PLAN NOTE
-[place]brile, [place] at [address].    -new diagnosis of IBD/UC   -allergic to penicillin  -IV ciprofloxacin, metronidazole empirically.  -hemodynamically stable

## 2022-08-30 NOTE — ASSESSMENT & PLAN NOTE
-[place]brile, [place] [address].    -new diagnosis of IBD/UC   -allergic to penicillin  -IV hydration   -IV ciprofloxacin, metronidazole empirically.  -hemodynamically stable  -UA results abnormal with 2+ leukocytes

## 2022-08-30 NOTE — PLAN OF CARE
[facility] - Med Surg  Initial Discharge Assessment       Primary Care Provider: Miky Lewis MD    Admission Diagnosis: Colitis [K52.9]  Acute pancreatitis, unspecified complication status, unspecified pancreatitis type [K85.90]    Admission Date: 8/29/2022  Expected Discharge Date:     Discharge Barriers Identified: None    Payor: MEDICARE / Plan: MEDICARE PART A & B / Product Type: Government /     Extended Emergency Contact Information  Primary Emergency Contact: StephenJonas joe  Address: [address] [place] [address]  Mobile Phone: [phone]  Relation: Spouse  Preferred language: English   needed? No    Discharge Plan A: Home  Discharge Plan B: Home      [id number] [place] - [address] [facility] [address] [facility] [address]  [address] [facility] [address]  Phone: [phone] Fax: [phone]    [id number] [address] [address]  Phone: [phone] Fax: [phone]    [id number] - [place], [place] - [address] [address]  Phone: [phone] Fax: [phone]      Initial Assessment (most recent)       Adult Discharge Assessment - 08/30/22 1520          Discharge Assessment    Assessment Type Discharge Planning Assessment     Confirmed/corrected address, phone number and insurance Yes     Confirmed Demographics Correct on Facesheet     Source of Information patient     Does patient/caregiver understand observation status Yes     Communicated VARGAS with patient/caregiver Date not available/Unable to determine     Reason For Admission Colitis     Lives With spouse     Facility Arrived From: home     Do you expect to return to your current living situation? Yes     Do you have help at home or someone to help you manage your care at home? No     Prior to hospitilization cognitive status: Alert/Oriented     Current cognitive  status: Alert/Oriented     Walking or Climbing Stairs Difficulty none     Dressing/Bathing Difficulty none     Equipment Currently Used at Home none     Readmission within 30 days? No     Patient currently being followed by outpatient case management? No     Do you currently have service(s) that help you manage your care at home? No     Do you take prescription medications? Yes     Do you have prescription coverage? Yes     Coverage Medicare A&B     Do you have any problems affording any of your prescribed medications? No     Is the patient taking medications as prescribed? yes     Who is going to help you get home at discharge? spouse     How do you get to doctors appointments? car, drives self;family or friend will provide     Are you on dialysis? No     Do you take coumadin? No     Discharge Plan A Home     Discharge Plan B Home     DME Needed Upon Discharge  none     Discharge Plan discussed with: Patient     Discharge Barriers Identified None                      Initial assessment completed with patient. Patient reports independence with ADL's  prior to hospitalization. Patient uses no DME at home. Patient currently has no cm needs upon DC. CM will continue following to assist with other needs.   CM provided information on advanced directives, information on pharmacy bedside delivery, and discharge planning begins on admission with contact information for any needs/questions.

## 2022-08-30 NOTE — ASSESSMENT & PLAN NOTE
Patient with left sided colitis on recent colonoscopy. Could represent IBD vs infection.   She has a history of Buhcet's which can cause GI tract ulcers, and can be difficult to differentiate from inflammatory bowel disease. However, the pattern is usually ascending colon, cecum and TI, and her findings are mostly left sided.   Agree with Cipro and Flagyl. Hold off on steroid therapy.   Will check stool studies for infection. Will also check calprotectin.   Check CRP and ESR today.   Monitor Hgb and WBC count.  Pain med and antiemetics as needed.

## 2022-08-30 NOTE — PROGRESS NOTES
[facility]  Progress Note    Patient Name: Yodit Canseco  MRN: [id number]  Patient Class: OP- Observation   Admission Date: 8/29/2022  Length of Stay: 0 days  Attending Physician: Mando Bourgeois MD  Primary Care Provider: Miky Lewis MD        Subjective:     Principal Problem:Colitis        HPI:  Ms. Canseco is a 61-year-old  female with PMH significant for COPD/asthma, hypothyroidism, hyperlipidemia, has been having intermittent bloody bowel movements.  She underwent colonoscopy by Dr. Del Valle on 08/17, pathology came back positive for IBD/ulcerative colitis.  Patient presented to the ED today complaining of increased bloody bowel movements, diarrhea, associated with generalized weakness, fatigue lower abdominal discomfort.  Afebrile, hemodynamically stable in the ED. WBC elevated 15,500, 0% bands.  Lipase 257.  Hemoglobin 13.6, hematocrit 39.7.  Started on IV ciprofloxacin, metronidazole and placed in observation for IV fluids and GI evaluation in a.m..    Admitting diagnosis:  New diagnosis of ulcerative colitis.  Continued bloody diarrhea.  Leukocytosis.      Overview/Hospital Course:  Pt admitted to Observation for suspected Ulcerative Colitis with rectal bleeding in the setting of Buhcet's. Imaging also supports increased vascularity around the colon with pericolonic lymph nodes. Correlate clinically for colitis. Status post gastric bypass and cholecystectomy Hepatomegaly with fatty infiltration liver. GI consulted. IV antibiotics continued and IV steroids not given at this time due possibility of infection. CRP elevated. H/H stable. UA results abnormal. C-diff results pending. NPO. Analgesia and antiemetics as needed.       Interval History: pt in bed upon exam and reports LMQ/LLQ abdominal pain. IV antibiotics continued. CRP elevated. NPO status. Analgesia and antiemetics. GI following.     Review of Systems   Constitutional:  Positive for appetite change and  fatigue. Negative for chills and fever.   HENT: Negative.  Negative for congestion, rhinorrhea, sore throat and trouble swallowing.    Eyes: Negative.  Negative for visual disturbance.   Respiratory: Negative.  Negative for cough, shortness of breath and wheezing.    Cardiovascular: Negative.  Negative for chest pain and palpitations.   Gastrointestinal:  Positive for abdominal pain, blood in stool, diarrhea and nausea. Negative for vomiting.   Endocrine: Negative.    Genitourinary: Negative.  Negative for dysuria and flank pain.   Musculoskeletal: Negative.  Negative for back pain.   Skin: Negative.  Negative for rash.   Allergic/Immunologic: Negative.    Neurological: Negative.  Negative for speech difficulty, weakness, numbness and headaches.   Hematological: Negative.    Psychiatric/Behavioral: Negative.  Negative for hallucinations. The patient is not nervous/anxious.    All other systems reviewed and are negative.  Objective:     Vital Signs (Most Recent):  Temp: 98.7 °F (37.1 °C) (08/30/22 1134)  Pulse: 88 (08/30/22 1134)  Resp: 16 (08/30/22 1134)  BP: (!) 109/58 (08/30/22 1134)  SpO2: 97 % (08/30/22 1134)   Vital Signs (24h Range):  Temp:  [96.8 °F (36 °C)-99 °F (37.2 °C)] 98.7 °F (37.1 °C)  Pulse:  [79-90] 88  Resp:  [15-18] 16  SpO2:  [97 %-99 %] 97 %  BP: (104-136)/(51-63) 109/58     Weight: 84.4 kg (186 lb 1.1 oz)  Body mass index is 33.49 kg/m².    Intake/Output Summary (Last 24 hours) at 8/30/2022 1625  Last data filed at 8/30/2022 1451  Gross per 24 hour   Intake 3878.21 ml   Output --   Net 3878.21 ml      Physical Exam  Vitals and nursing note reviewed.   Constitutional:       General: She is awake. She is not in acute distress.     Appearance: She is obese. She is not ill-appearing.   HENT:      Head: Normocephalic and atraumatic.      Mouth/Throat:      Mouth: Mucous membranes are moist.   Eyes:      General: No scleral icterus.     Conjunctiva/sclera: Conjunctivae normal.   Cardiovascular:       Rate and Rhythm: Normal rate and regular rhythm.      Heart sounds: No murmur heard.  Pulmonary:      Effort: Pulmonary effort is normal. No respiratory distress.      Breath sounds: Normal breath sounds. No wheezing.   Abdominal:      Palpations: Abdomen is soft.      Tenderness: There is abdominal tenderness (left LMQ/LLQ).   Musculoskeletal:         General: No swelling. Normal range of motion.      Cervical back: Normal range of motion and neck supple.   Skin:     General: Skin is warm.      Coloration: Skin is not jaundiced.   Neurological:      General: No focal deficit present.      Mental Status: She is alert and oriented to person, place, and time. Mental status is at baseline.   Psychiatric:         Attention and Perception: Attention normal.         Mood and Affect: Mood normal.         Speech: Speech normal.         Behavior: Behavior normal. Behavior is cooperative.       Significant Labs: All pertinent labs within the past 24 hours have been reviewed.  CBC:   Recent Labs   Lab 08/29/22  1725 08/30/22  0549   WBC 15.50* 15.00*   HGB 13.6 11.7*   HCT 39.7 36.2*    184     CMP:   Recent Labs   Lab 08/29/22  1725 08/30/22  0549    141   K 3.8 4.2    108   CO2 23 24   GLU 81 84   BUN 7* 5*   CREATININE 0.8 0.9   CALCIUM 9.0 7.8*   PROT 7.0 5.2*   ALBUMIN 3.4* 2.8*   BILITOT 0.4 0.4   ALKPHOS 66 53*   AST 21 16   ALT 12 12   ANIONGAP 13 9     Urine Studies:   Recent Labs   Lab 08/29/22  1921   COLORU Colorless*   APPEARANCEUA Clear   PHUR 7.0   SPECGRAV <1.005*   PROTEINUA Negative   GLUCUA Negative   KETONESU Negative   BILIRUBINUA Negative   OCCULTUA Negative   NITRITE Negative   UROBILINOGEN Negative   LEUKOCYTESUR 2+*   WBCUA 3       Significant Imaging: I have reviewed all pertinent imaging results/findings within the past 24 hours.      Assessment/Plan:      * Colitis  -colonoscopy with biopsy 8/17, reveals evidence of ulcerative colitis.    -has not seen gastroenterology outpatient    -GI consulted   -continue IV fluids.  -IV antibiotics  -CRP elevated   - IV steroids held due to possible infection   -analgesia and antiemetics as needed       SIRS (systemic inflammatory response syndrome)  -afebrile, [place]BC elevated at [address].    -new diagnosis of IBD/UC   -allergic to penicillin  -IV hydration   -IV ciprofloxacin, metronidazole empirically.  -hemodynamically stable  -UA results abnormal with 2+ leukocytes       Behcet's syndrome involving oral mucosa  -will monitor   -GI following   -will consider antiinflammatory as needed   -will hold on steroid use at this time         Asthma  -not in exacerbation.    - bronchodilators as needed      Immune deficiency disorder   - pt followed outpatient by immunologist and currently taking IgG with last dose due 3 days ago     Hyperlipidemia  -continue statin.        VTE Risk Mitigation (From admission, onward)         Ordered     Place sequential compression device  Until discontinued         08/29/22 2045                Discharge Planning   VARGAS:      Code Status: Prior   Is the patient medically ready for discharge?:     Reason for patient still in hospital (select all that apply): Patient trending condition, Laboratory test, Treatment and Consult recommendations  Discharge Plan A: Home                  Urszlua Carbajal NP  Department of Hospital Medicine   [facility] - Med Surg

## 2022-08-30 NOTE — SUBJECTIVE & OBJECTIVE
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    Gastroparesis     Hand arthritis     Herpes simplex without mention of complication     oral    Hidradenitis     Hyperlipidemia     Hypothyroidism     Immune deficiency disorder     Intraperitoneal abscess 05/07/2018    LBP radiating to left leg     Leukocytosis, unspecified     Migraine headache     Obesity, Class III, BMI 40-49.9 (morbid obesity)     Periorbital cellulitis 12/31/2016    Prediabetes     RA (rheumatoid arthritis)     Recurrent upper respiratory infection (URI)     S/P colonoscopy 11/2010    Sepsis 05/07/2018    Systemic lupus erythematosus, organ or system involvement unspecified     Urticaria        Past Surgical History:   Procedure Laterality Date    ADENOIDECTOMY      CHOLECYSTECTOMY      CHOLECYSTECTOMY      COLONOSCOPY  2015    repeat in 10    COLONOSCOPY N/A 10/8/2015    Procedure: COLONOSCOPY;  Surgeon: Panda Bose MD;  Location: [facility];  Service: Endoscopy;  Laterality: N/A;    COLONOSCOPY N/A 8/17/2022    Procedure: COLONOSCOPY;  Surgeon: Olaf Del Valle MD;  Location: [facility];  Service: General;  Laterality: N/A;    Hand fracture surgery      HARDWARE REMOVAL      left hand 5th [facility]    hymenectomy      HYSTERECTOMY      menorrhagia-Ovaries intact    ROTATOR CUFF REPAIR Right     SLEEVE GASTROPLASTY  04/16/2018    TONSILLECTOMY      Urethral dilatation         Review of patient's allergies indicates:   Allergen Reactions    Bromelains Hives     " causes mouth to bleed"    Pimecrolimus Swelling    Sudafed [pseudoephedrine hcl] Other (See Comments)     Does not want to wake up .    Amoxicillin-pot clavulanate Itching     Other reaction(s): " Itching    Hydrocodone Itching    Iodinated contrast media      childhood    Iodine and iodide containing products Other (See Comments)    Melon Hives    Percocet [oxycodone-acetaminophen] Itching    Corn containing products     Wheat containing prod     Barium iodide Rash    Ephedrine Other (See Comments)     Other reaction(s): comatose    Penicillins      Other reaction(s): does not work on pt symptoms     Family History       Problem Relation (Age of Onset)    Basal cell carcinoma Mother    Breast cancer Paternal Aunt (40), Cousin (25)    Cancer Father, Maternal Grandfather    Cataracts Mother    Colon cancer Paternal Aunt (40)    Diabetes Father, Paternal Aunt, Paternal Aunt    Hypertension Mother, Father    Lung cancer Father    Lung disease Mother    Lymphoma Paternal Aunt    Melanoma Mother, Maternal Aunt, Maternal Uncle    Ovarian cancer Paternal Grandmother (40)    Psoriasis Son, Son    Ulcerative colitis Son          Tobacco Use    Smoking status: Never    Smokeless tobacco: Never   Substance and Sexual Activity    Alcohol use: Yes     Alcohol/week: 0.0 standard drinks     Comment: very rarely    Drug use: No    Sexual activity: Yes     Partners: Male     Review of Systems   Constitutional:  Positive for appetite change. Negative for fever.   HENT:  Negative for hearing loss.    Eyes:  Negative for visual disturbance.   Respiratory:  Negative for cough and shortness of breath.    Cardiovascular:  Negative for chest pain.   Gastrointestinal:         As per HPI.   Genitourinary:  Negative for dysuria, frequency and hematuria.   Musculoskeletal:  Positive for arthralgias. Negative for back pain.   Skin:  Negative for rash.   Neurological:  Negative for seizures, syncope, numbness and headaches.   Hematological:  Does not bruise/bleed easily.   Psychiatric/Behavioral:  The patient is not nervous/anxious.    Objective:     Vital Signs (Most Recent):  Temp: 98.5 °F (36.9 °C) (08/30/22 0812)  Pulse: 88 (08/30/22  0812)  Resp: 18 (08/30/22 0812)  BP: (!) 112/56 (08/30/22 0812)  SpO2: 97 % (08/30/22 0812)   Vital Signs (24h Range):  Temp:  [96.8 °F (36 °C)-99 °F (37.2 °C)] 98.5 °F (36.9 °C)  Pulse:  [74-90] 88  Resp:  [16-18] 18  SpO2:  [97 %-99 %] 97 %  BP: (104-161)/(51-77) 112/56     Weight: 84.4 kg (186 lb 1.1 oz) (08/30/22 0110)  Body mass index is 33.49 kg/m².      Intake/Output Summary (Last 24 hours) at 8/30/2022 0812  Last data filed at 8/29/2022 2202  Gross per 24 hour   Intake 1300 ml   Output --   Net 1300 ml       Lines/Drains/Airways       Peripheral Intravenous Line  Duration                  Peripheral IV - Single Lumen 08/29/22 1724 20 G Left Antecubital <1 day                    Physical Exam  Constitutional:       Appearance: Normal appearance. She is well-developed.   HENT:      Head: Normocephalic and atraumatic.   Eyes:      Extraocular Movements: Extraocular movements intact.   Cardiovascular:      Rate and Rhythm: Normal rate and regular rhythm.      Heart sounds: No murmur heard.  Pulmonary:      Effort: Pulmonary effort is normal. No respiratory distress.      Breath sounds: Normal breath sounds. No wheezing.   Abdominal:      General: Bowel sounds are normal. There is no distension.      Palpations: Abdomen is soft. There is no mass.      Tenderness: There is abdominal tenderness (left sided, moderate). There is no guarding or rebound.   Musculoskeletal:      Cervical back: Normal range of motion and neck supple.      Right lower leg: No edema.      Left lower leg: No edema.   Skin:     General: Skin is warm and dry.      Findings: No rash.   Neurological:      Mental Status: She is alert and oriented to person, place, and time.      Cranial Nerves: No cranial nerve deficit.   Psychiatric:         Behavior: Behavior normal.       Significant Labs:  CBC:   Recent Labs   Lab 08/29/22  1725 08/30/22  0549   WBC 15.50* 15.00*   HGB 13.6 11.7*   HCT 39.7 36.2*    184     CMP:   Recent Labs   Lab  08/30/22  0549   GLU 84   CALCIUM 7.8*   ALBUMIN 2.8*   PROT 5.2*      K 4.2   CO2 24      BUN 5*   CREATININE 0.9   ALKPHOS 53*   ALT 12   AST 16   BILITOT 0.4     Coagulation: No results for input(s): PT, INR, APTT in the last 48 hours.    Significant Imaging:  Imaging results within the past 24 hours have been reviewed.

## 2022-08-31 ENCOUNTER — PATIENT MESSAGE (OUTPATIENT)
Dept: GASTROENTEROLOGY | Facility: CLINIC | Age: 62
End: 2022-08-31
Payer: MEDICARE

## 2022-08-31 LAB
ALBUMIN SERPL BCP-MCNC: 2.6 G/DL (ref 3.5–5.2)
ALP SERPL-CCNC: 52 U/L (ref 55–135)
ALT SERPL W/O P-5'-P-CCNC: 7 U/L (ref 10–44)
ANION GAP SERPL CALC-SCNC: 12 MMOL/L (ref 8–16)
AST SERPL-CCNC: 16 U/L (ref 10–40)
BASOPHILS NFR BLD: 0 % (ref 0–1.9)
BILIRUB SERPL-MCNC: 0.4 MG/DL (ref 0.1–1)
BUN SERPL-MCNC: 5 MG/DL (ref 8–23)
CALCIUM SERPL-MCNC: 7.6 MG/DL (ref 8.7–10.5)
CHLORIDE SERPL-SCNC: 109 MMOL/L (ref 95–110)
CO2 SERPL-SCNC: 20 MMOL/L (ref 23–29)
CREAT SERPL-MCNC: 0.8 MG/DL (ref 0.5–1.4)
DIFFERENTIAL METHOD: ABNORMAL
EOSINOPHIL NFR BLD: 2 % (ref 0–8)
ERYTHROCYTE [DISTWIDTH] IN BLOOD BY AUTOMATED COUNT: 14 % (ref 11.5–14.5)
EST. GFR  (NO RACE VARIABLE): >60 ML/MIN/1.73 M^2
GIANT PLATELETS BLD QL SMEAR: PRESENT
GLUCOSE SERPL-MCNC: 61 MG/DL (ref 70–110)
HCT VFR BLD AUTO: 36.9 % (ref 37–48.5)
HGB BLD-MCNC: 12 G/DL (ref 12–16)
IMM GRANULOCYTES # BLD AUTO: ABNORMAL K/UL (ref 0–0.04)
IMM GRANULOCYTES NFR BLD AUTO: ABNORMAL % (ref 0–0.5)
LYMPHOCYTES NFR BLD: 16 % (ref 18–48)
MCH RBC QN AUTO: 29.1 PG (ref 27–31)
MCHC RBC AUTO-ENTMCNC: 32.5 G/DL (ref 32–36)
MCV RBC AUTO: 90 FL (ref 82–98)
MONOCYTES NFR BLD: 7 % (ref 4–15)
NEUTROPHILS NFR BLD: 49 % (ref 38–73)
NEUTS BAND NFR BLD MANUAL: 26 %
NRBC BLD-RTO: 0 /100 WBC
PLATELET # BLD AUTO: 206 K/UL (ref 150–450)
PMV BLD AUTO: 11 FL (ref 9.2–12.9)
POTASSIUM SERPL-SCNC: 3.5 MMOL/L (ref 3.5–5.1)
PROT SERPL-MCNC: 5 G/DL (ref 6–8.4)
RBC # BLD AUTO: 4.12 M/UL (ref 4–5.4)
SODIUM SERPL-SCNC: 141 MMOL/L (ref 136–145)
WBC # BLD AUTO: 14.09 K/UL (ref 3.9–12.7)
WBC #/AREA STL HPF: ABNORMAL /[HPF]

## 2022-08-31 PROCEDURE — 25000003 PHARM REV CODE 250: Performed by: NURSE PRACTITIONER

## 2022-08-31 PROCEDURE — 85007 BL SMEAR W/DIFF WBC COUNT: CPT | Performed by: NURSE PRACTITIONER

## 2022-08-31 PROCEDURE — 63600175 PHARM REV CODE 636 W HCPCS: Performed by: INTERNAL MEDICINE

## 2022-08-31 PROCEDURE — 25000003 PHARM REV CODE 250: Performed by: EMERGENCY MEDICINE

## 2022-08-31 PROCEDURE — 80053 COMPREHEN METABOLIC PANEL: CPT | Performed by: NURSE PRACTITIONER

## 2022-08-31 PROCEDURE — 11000001 HC ACUTE MED/SURG PRIVATE ROOM

## 2022-08-31 PROCEDURE — S0030 INJECTION, METRONIDAZOLE: HCPCS | Performed by: EMERGENCY MEDICINE

## 2022-08-31 PROCEDURE — 36415 COLL VENOUS BLD VENIPUNCTURE: CPT | Performed by: NURSE PRACTITIONER

## 2022-08-31 PROCEDURE — 25000003 PHARM REV CODE 250: Performed by: INTERNAL MEDICINE

## 2022-08-31 PROCEDURE — 99214 OFFICE O/P EST MOD 30 MIN: CPT | Mod: ,,, | Performed by: PHYSICIAN ASSISTANT

## 2022-08-31 PROCEDURE — 63600175 PHARM REV CODE 636 W HCPCS: Performed by: EMERGENCY MEDICINE

## 2022-08-31 PROCEDURE — 85027 COMPLETE CBC AUTOMATED: CPT | Performed by: NURSE PRACTITIONER

## 2022-08-31 PROCEDURE — 99214 PR OFFICE/OUTPT VISIT, EST, LEVL IV, 30-39 MIN: ICD-10-PCS | Mod: ,,, | Performed by: PHYSICIAN ASSISTANT

## 2022-08-31 RX ORDER — SODIUM CHLORIDE 9 MG/ML
INJECTION, SOLUTION INTRAVENOUS CONTINUOUS
Status: ACTIVE | OUTPATIENT
Start: 2022-08-31 | End: 2022-09-01

## 2022-08-31 RX ADMIN — CIPROFLOXACIN 400 MG: 2 INJECTION, SOLUTION INTRAVENOUS at 09:08

## 2022-08-31 RX ADMIN — ALUMINUM HYDROXIDE, MAGNESIUM HYDROXIDE, AND SIMETHICONE 30 ML: 200; 200; 20 SUSPENSION ORAL at 10:08

## 2022-08-31 RX ADMIN — ONDANSETRON 4 MG: 2 INJECTION INTRAMUSCULAR; INTRAVENOUS at 08:08

## 2022-08-31 RX ADMIN — METRONIDAZOLE 500 MG: 5 INJECTION, SOLUTION INTRAVENOUS at 01:08

## 2022-08-31 RX ADMIN — METRONIDAZOLE 500 MG: 5 INJECTION, SOLUTION INTRAVENOUS at 05:08

## 2022-08-31 RX ADMIN — MORPHINE SULFATE 2 MG: 4 INJECTION INTRAVENOUS at 01:08

## 2022-08-31 RX ADMIN — METRONIDAZOLE 500 MG: 5 INJECTION, SOLUTION INTRAVENOUS at 08:08

## 2022-08-31 RX ADMIN — LEVOTHYROXINE SODIUM 75 MCG: 75 TABLET ORAL at 05:08

## 2022-08-31 RX ADMIN — MORPHINE SULFATE 2 MG: 4 INJECTION INTRAVENOUS at 09:08

## 2022-08-31 RX ADMIN — MORPHINE SULFATE 2 MG: 4 INJECTION INTRAVENOUS at 05:08

## 2022-08-31 RX ADMIN — SODIUM CHLORIDE: 0.9 INJECTION, SOLUTION INTRAVENOUS at 06:08

## 2022-08-31 RX ADMIN — MORPHINE SULFATE 2 MG: 4 INJECTION INTRAVENOUS at 08:08

## 2022-08-31 RX ADMIN — ONDANSETRON 4 MG: 2 INJECTION INTRAMUSCULAR; INTRAVENOUS at 09:08

## 2022-08-31 NOTE — ASSESSMENT & PLAN NOTE
Patient with left sided colitis on recent colonoscopy. Could represent IBD vs infection.   She has a history of Buhcet's which can cause GI tract ulcers, and can be difficult to differentiate from inflammatory bowel disease. However, the pattern is usually ascending colon, cecum and TI, and her findings are mostly left sided.   Agree with Cipro and Flagyl. Hold off on steroid therapy.   Will check stool studies for infection. Will also check calprotectin.   Check CRP and ESR today.   Monitor Hgb and WBC count.  Pain med and antiemetics as needed.     8/31/2022:  -Patient with improvement in symptoms while on Cipro/Flagyl therapy.  -WBC count trending down.   -Hematochezia with some improvement.   -ESR normal, CRP 27.   -Will need close follow up in IBD clinic pathology shows likely IBD. Son with Ulcerative Colitis.   -Will send message to IBD clinic to get her scheduled.   -Continue to monitor labs for stool results.

## 2022-08-31 NOTE — PLAN OF CARE
Problem: Adult Inpatient Plan of Care  Goal: Plan of Care Review  Outcome: Ongoing, Progressing  Goal: Patient-Specific Goal (Individualized)  Outcome: Ongoing, Progressing  Goal: Absence of Hospital-Acquired Illness or Injury  Outcome: Ongoing, Progressing  Goal: Optimal Comfort and Wellbeing  Outcome: Ongoing, Progressing  Goal: Readiness for Transition of Care  Outcome: Ongoing, Progressing     Problem: Infection  Goal: Absence of Infection Signs and Symptoms  Outcome: Ongoing, Progressing     Problem: Pain Acute  Goal: Acceptable Pain Control and Functional Ability  Outcome: Ongoing, Progressing     Problem: Diarrhea  Goal: Fluid and Electrolyte Balance  Outcome: Ongoing, Progressing

## 2022-08-31 NOTE — ASSESSMENT & PLAN NOTE
-colonoscopy with biopsy 8/17, reveals evidence of ulcerative colitis.    -has not seen gastroenterology outpatient   -GI consulted- pt to follow-up IBD clinic   -continue IV fluids.  -IV antibiotics  -CRP elevated   - IV steroids held due to possible infection   -analgesia and antiemetics as needed   -NPO-pt taking sips of water with ice in room without distress

## 2022-08-31 NOTE — SUBJECTIVE & OBJECTIVE
Subjective:     Interval History: Patient on Cipro/Flagyl. Reports some improvement in her abdominal discomfort. She is still having diarrhea (3 episodes this AM) with blood, however hematochezia has improved. ESR normal, CRP 27. WBC count trending down. C diff negative, but additional stools pending.    Review of Systems   Constitutional:  Negative for activity change, chills, diaphoresis, fatigue and fever.   Respiratory:  Negative for shortness of breath.    Cardiovascular:  Negative for chest pain.   Gastrointestinal:  Positive for abdominal pain, blood in stool and diarrhea. Negative for abdominal distention, constipation, nausea and vomiting.   Neurological:  Negative for dizziness and weakness.   Psychiatric/Behavioral:  Negative for dysphoric mood. The patient is not nervous/anxious.    Objective:     Vital Signs (Most Recent):  Temp: 98.3 °F (36.8 °C) (08/31/22 1132)  Pulse: 88 (08/31/22 1132)  Resp: 16 (08/31/22 1132)  BP: (!) 119/59 (08/31/22 1132)  SpO2: 96 % (08/31/22 1132)   Vital Signs (24h Range):  Temp:  [96.9 °F (36.1 °C)-99.3 °F (37.4 °C)] 98.3 °F (36.8 °C)  Pulse:  [85-95] 88  Resp:  [15-18] 16  SpO2:  [94 %-98 %] 96 %  BP: (107-123)/(53-70) 119/59     Weight: 84.4 kg (186 lb 1.1 oz) (08/30/22 0110)  Body mass index is 33.49 kg/m².      Intake/Output Summary (Last 24 hours) at 8/31/2022 1335  Last data filed at 8/31/2022 1200  Gross per 24 hour   Intake 2102 ml   Output --   Net 2102 ml       Lines/Drains/Airways       Peripheral Intravenous Line  Duration                  Peripheral IV - Single Lumen 08/31/22 1156 22 G Anterior;Right Forearm <1 day                    Physical Exam  Constitutional:       General: She is not in acute distress.     Appearance: Normal appearance. She is not ill-appearing, toxic-appearing or diaphoretic.   HENT:      Head: Normocephalic and atraumatic.   Eyes:      General: No scleral icterus.     Extraocular Movements: Extraocular movements intact.    Cardiovascular:      Rate and Rhythm: Normal rate and regular rhythm.   Pulmonary:      Effort: Pulmonary effort is normal. No respiratory distress.   Abdominal:      General: Bowel sounds are normal. There is no distension.      Palpations: Abdomen is soft.      Tenderness: There is abdominal tenderness (right and left abdomen. Mild to moderate).   Musculoskeletal:         General: Normal range of motion.      Cervical back: Normal range of motion.   Skin:     General: Skin is warm and dry.      Coloration: Skin is not pale.   Neurological:      Mental Status: She is alert and oriented to person, place, and time.       Significant Labs:  CBC:   Recent Labs   Lab 08/29/22  1725 08/30/22  0549 08/31/22  0430   WBC 15.50* 15.00* 14.09*   HGB 13.6 11.7* 12.0   HCT 39.7 36.2* 36.9*    184 206     CMP:   Recent Labs   Lab 08/31/22  0430   GLU 61*   CALCIUM 7.6*   ALBUMIN 2.6*   PROT 5.0*      K 3.5   CO2 20*      BUN 5*   CREATININE 0.8   ALKPHOS 52*   ALT 7*   AST 16   BILITOT 0.4     Coagulation: No results for input(s): PT, INR, APTT in the last 48 hours.      Significant Imaging:  Imaging results within the past 24 hours have been reviewed.

## 2022-08-31 NOTE — SUBJECTIVE & OBJECTIVE
Interval History: pt in bed upon exam and reports abdominal soreness and diarrhea. Pt taking sips of water with ice in room without distress.  Analgesia as needed. GI following with outpatient follow up with IBD clinic recommended.     Review of Systems   Constitutional:  Positive for appetite change and fatigue. Negative for chills and fever.   HENT: Negative.  Negative for congestion, rhinorrhea, sore throat and trouble swallowing.    Eyes: Negative.  Negative for visual disturbance.   Respiratory: Negative.  Negative for cough, shortness of breath and wheezing.    Cardiovascular: Negative.  Negative for chest pain and palpitations.   Gastrointestinal:  Positive for abdominal pain, blood in stool, diarrhea and nausea. Negative for vomiting.   Endocrine: Negative.    Genitourinary: Negative.  Negative for dysuria and flank pain.   Musculoskeletal: Negative.  Negative for back pain.   Skin: Negative.  Negative for rash.   Allergic/Immunologic: Negative.    Neurological: Negative.  Negative for speech difficulty, weakness, numbness and headaches.   Hematological: Negative.    Psychiatric/Behavioral: Negative.  Negative for hallucinations. The patient is not nervous/anxious.    All other systems reviewed and are negative.  Objective:     Vital Signs (Most Recent):  Temp: 98.7 °F (37.1 °C) (08/31/22 1602)  Pulse: 91 (08/31/22 1602)  Resp: 16 (08/31/22 1602)  BP: (!) 106/57 (08/31/22 1602)  SpO2: 96 % (08/31/22 1602)   Vital Signs (24h Range):  Temp:  [96.9 °F (36.1 °C)-99.3 °F (37.4 °C)] 98.7 °F (37.1 °C)  Pulse:  [85-95] 91  Resp:  [14-18] 16  SpO2:  [96 %-98 %] 96 %  BP: (106-123)/(53-70) 106/57     Weight: 84.4 kg (186 lb 1.1 oz)  Body mass index is 33.49 kg/m².    Intake/Output Summary (Last 24 hours) at 8/31/2022 1812  Last data filed at 8/31/2022 1600  Gross per 24 hour   Intake 1208.77 ml   Output --   Net 1208.77 ml      Physical Exam  Vitals and nursing note reviewed.   Constitutional:       General: She is  awake. She is not in acute distress.     Appearance: She is obese. She is not ill-appearing.   HENT:      Head: Normocephalic and atraumatic.      Mouth/Throat:      Mouth: Mucous membranes are moist.   Eyes:      General: No scleral icterus.     Conjunctiva/sclera: Conjunctivae normal.   Cardiovascular:      Rate and Rhythm: Normal rate and regular rhythm.      Heart sounds: No murmur heard.  Pulmonary:      Effort: Pulmonary effort is normal. No respiratory distress.      Breath sounds: Normal breath sounds. No wheezing.   Abdominal:      Palpations: Abdomen is soft.      Tenderness: There is abdominal tenderness (left LMQ/LLQ).   Musculoskeletal:         General: No swelling. Normal range of motion.      Cervical back: Normal range of motion and neck supple.   Skin:     General: Skin is warm.      Coloration: Skin is not jaundiced.   Neurological:      General: No focal deficit present.      Mental Status: She is alert and oriented to person, place, and time. Mental status is at baseline.   Psychiatric:         Attention and Perception: Attention normal.         Mood and Affect: Mood normal.         Speech: Speech normal.         Behavior: Behavior normal. Behavior is cooperative.       Significant Labs: All pertinent labs within the past 24 hours have been reviewed.  CBC:   Recent Labs   Lab 08/30/22  0549 08/31/22  0430   WBC 15.00* 14.09*   HGB 11.7* 12.0   HCT 36.2* 36.9*    206     CMP:   Recent Labs   Lab 08/30/22  0549 08/31/22  0430    141   K 4.2 3.5    109   CO2 24 20*   GLU 84 61*   BUN 5* 5*   CREATININE 0.9 0.8   CALCIUM 7.8* 7.6*   PROT 5.2* 5.0*   ALBUMIN 2.8* 2.6*   BILITOT 0.4 0.4   ALKPHOS 53* 52*   AST 16 16   ALT 12 7*   ANIONGAP 9 12       Significant Imaging: I have reviewed all pertinent imaging results/findings within the past 24 hours.

## 2022-08-31 NOTE — PLAN OF CARE
Care plan reviewed with patient. Pain and nausea managed by PRN med. Still with diarrhea. Able to walk to the bathroom unassisted. Free from falls. No other complaints made

## 2022-08-31 NOTE — PROGRESS NOTES
[facility] [place] Medicine  Progress Note    Patient Name: Yodit Canseco  MRN: [id number]  Patient Class: IP- Inpatient   Admission Date: 8/29/2022  Length of Stay: 0 days  Attending Physician: Mando Bourgeois MD  Primary Care Provider: Miky Lewis MD        Subjective:     Principal Problem:Colitis        HPI:  Ms. Canseco is a 61-year-old  female with PMH significant for COPD/asthma, hypothyroidism, hyperlipidemia, has been having intermittent bloody bowel movements.  She underwent colonoscopy by Dr. Del Valle on 08/17, pathology came back positive for IBD/ulcerative colitis.  Patient presented to the ED today complaining of increased bloody bowel movements, diarrhea, associated with generalized weakness, fatigue lower abdominal discomfort.  Afebrile, hemodynamically stable in the ED. WBC elevated 15,500, 0% bands.  Lipase 257.  Hemoglobin 13.6, hematocrit 39.7.  Started on IV ciprofloxacin, metronidazole and placed in observation for IV fluids and GI evaluation in a.m..    Admitting diagnosis:  New diagnosis of ulcerative colitis.  Continued bloody diarrhea.  Leukocytosis.      Overview/Hospital Course:  Pt admitted to Observation for suspected Ulcerative Colitis with rectal bleeding in the setting of Buhcet's. Imaging also supports increased vascularity around the colon with pericolonic lymph nodes. Correlate clinically for colitis. Status post gastric bypass and cholecystectomy Hepatomegaly with fatty infiltration liver. GI consulted. IV antibiotics continued and IV steroids not given at this time due possibility of infection. CRP elevated. H/H stable. UA results abnormal. C-diff results pending. NPO. Analgesia and antiemetics as needed. On 8/31/22, leukocytosis continued with mild improvement. IV antibiotics continued. Pt reports abdominal soreness and diarrhea with analgesia continued. Pt tolerating sips of water with ice without distress. GI following and outpatient follow up with  IBD clinic recommended.       Interval History: pt in bed upon exam and reports abdominal soreness and diarrhea. Pt taking sips of water with ice in room without distress.  Analgesia as needed. GI following with outpatient follow up with IBD clinic recommended.     Review of Systems   Constitutional:  Positive for appetite change and fatigue. Negative for chills and fever.   HENT: Negative.  Negative for congestion, rhinorrhea, sore throat and trouble swallowing.    Eyes: Negative.  Negative for visual disturbance.   Respiratory: Negative.  Negative for cough, shortness of breath and wheezing.    Cardiovascular: Negative.  Negative for chest pain and palpitations.   Gastrointestinal:  Positive for abdominal pain, blood in stool, diarrhea and nausea. Negative for vomiting.   Endocrine: Negative.    Genitourinary: Negative.  Negative for dysuria and flank pain.   Musculoskeletal: Negative.  Negative for back pain.   Skin: Negative.  Negative for rash.   Allergic/Immunologic: Negative.    Neurological: Negative.  Negative for speech difficulty, weakness, numbness and headaches.   Hematological: Negative.    Psychiatric/Behavioral: Negative.  Negative for hallucinations. The patient is not nervous/anxious.    All other systems reviewed and are negative.  Objective:     Vital Signs (Most Recent):  Temp: 98.7 °F (37.1 °C) (08/31/22 1602)  Pulse: 91 (08/31/22 1602)  Resp: 16 (08/31/22 1602)  BP: (!) 106/57 (08/31/22 1602)  SpO2: 96 % (08/31/22 1602)   Vital Signs (24h Range):  Temp:  [96.9 °F (36.1 °C)-99.3 °F (37.4 °C)] 98.7 °F (37.1 °C)  Pulse:  [85-95] 91  Resp:  [14-18] 16  SpO2:  [96 %-98 %] 96 %  BP: (106-123)/(53-70) 106/57     Weight: 84.4 kg (186 lb 1.1 oz)  Body mass index is 33.49 kg/m².    Intake/Output Summary (Last 24 hours) at 8/31/2022 1812  Last data filed at 8/31/2022 1600  Gross per 24 hour   Intake 1208.77 ml   Output --   Net 1208.77 ml      Physical Exam  Vitals and nursing note reviewed.    Constitutional:       General: She is awake. She is not in acute distress.     Appearance: She is obese. She is not ill-appearing.   HENT:      Head: Normocephalic and atraumatic.      Mouth/Throat:      Mouth: Mucous membranes are moist.   Eyes:      General: No scleral icterus.     Conjunctiva/sclera: Conjunctivae normal.   Cardiovascular:      Rate and Rhythm: Normal rate and regular rhythm.      Heart sounds: No murmur heard.  Pulmonary:      Effort: Pulmonary effort is normal. No respiratory distress.      Breath sounds: Normal breath sounds. No wheezing.   Abdominal:      Palpations: Abdomen is soft.      Tenderness: There is abdominal tenderness (left LMQ/LLQ).   Musculoskeletal:         General: No swelling. Normal range of motion.      Cervical back: Normal range of motion and neck supple.   Skin:     General: Skin is warm.      Coloration: Skin is not jaundiced.   Neurological:      General: No focal deficit present.      Mental Status: She is alert and oriented to person, place, and time. Mental status is at baseline.   Psychiatric:         Attention and Perception: Attention normal.         Mood and Affect: Mood normal.         Speech: Speech normal.         Behavior: Behavior normal. Behavior is cooperative.       Significant Labs: All pertinent labs within the past 24 hours have been reviewed.  CBC:   Recent Labs   Lab 08/30/22  0549 08/31/22  0430   WBC 15.00* 14.09*   HGB 11.7* 12.0   HCT 36.2* 36.9*    206     CMP:   Recent Labs   Lab 08/30/22  0549 08/31/22  0430    141   K 4.2 3.5    109   CO2 24 20*   GLU 84 61*   BUN 5* 5*   CREATININE 0.9 0.8   CALCIUM 7.8* 7.6*   PROT 5.2* 5.0*   ALBUMIN 2.8* 2.6*   BILITOT 0.4 0.4   ALKPHOS 53* 52*   AST 16 16   ALT 12 7*   ANIONGAP 9 12       Significant Imaging: I have reviewed all pertinent imaging results/findings within the past 24 hours.      Assessment/Plan:      * Colitis  -colonoscopy with biopsy 8/17, reveals evidence of  ulcerative colitis.    -has not seen gastroenterology outpatient   -GI consulted- pt to follow-up IBD clinic   -continue IV fluids.  -IV antibiotics  -CRP elevated   - IV steroids held due to possible infection   -analgesia and antiemetics as needed   -NPO-pt taking sips of water with ice in room without distress       SIRS (systemic inflammatory response syndrome)  -afebrile, WBC elevated at [address]>14.09.    -new diagnosis of IBD/UC   -allergic to penicillin  -IV hydration   -IV ciprofloxacin, metronidazole empirically.  -hemodynamically stable  -UA results abnormal with 2+ leukocytes       Behcet's syndrome involving oral mucosa  -will monitor   -GI following   -will consider antiinflammatory as needed   -will hold on steroid use at this time         Asthma  -not in exacerbation.    - bronchodilators as needed      Immune deficiency disorder   - pt followed outpatient by immunologist and currently taking IgG with last dose due 4 days ago     Hypothyroidism  -synthroid continued       Hyperlipidemia  -continue statin.        VTE Risk Mitigation (From admission, onward)         Ordered     Place sequential compression device  Until discontinued         08/29/22 2045                Discharge Planning   VARGAS:      Code Status: Prior   Is the patient medically ready for discharge?:     Reason for patient still in hospital (select all that apply): Patient trending condition, Laboratory test, Treatment and Consult recommendations  Discharge Plan A: Home                  Urszula Carbajal NP  Department of Hospital Medicine   [facility]

## 2022-08-31 NOTE — ASSESSMENT & PLAN NOTE
-afebrile, WBC elevated at 37859>14.09.    -new diagnosis of IBD/UC   -allergic to penicillin  -IV hydration   -IV ciprofloxacin, metronidazole empirically.  -hemodynamically stable  -UA results abnormal with 2+ leukocytes

## 2022-08-31 NOTE — PROGRESS NOTES
[facility]  Gastroenterology  Progress Note    Patient Name: Yodit Canseco  MRN: [id number]  Admission Date: 8/29/2022  Hospital Length of Stay: 0 days  Code Status: Prior   Attending Provider: Mando Bourgeois MD  Consulting Provider: Destini Flores PA-C  Primary Care Physician: Miky Lewis MD  Principal Problem: Colitis      Subjective:     Interval History: Patient on Cipro/Flagyl. Reports some improvement in her abdominal discomfort. She is still having diarrhea (3 episodes this AM) with blood, however hematochezia has improved. ESR normal, CRP 27. WBC count trending down. C diff negative, but additional stools pending.    Review of Systems   Constitutional:  Negative for activity change, chills, diaphoresis, fatigue and fever.   Respiratory:  Negative for shortness of breath.    Cardiovascular:  Negative for chest pain.   Gastrointestinal:  Positive for abdominal pain, blood in stool and diarrhea. Negative for abdominal distention, constipation, nausea and vomiting.   Neurological:  Negative for dizziness and weakness.   Psychiatric/Behavioral:  Negative for dysphoric mood. The patient is not nervous/anxious.    Objective:     Vital Signs (Most Recent):  Temp: 98.3 °F (36.8 °C) (08/31/22 1132)  Pulse: 88 (08/31/22 1132)  Resp: 16 (08/31/22 1132)  BP: (!) 119/59 (08/31/22 1132)  SpO2: 96 % (08/31/22 1132)   Vital Signs (24h Range):  Temp:  [96.9 °F (36.1 °C)-99.3 °F (37.4 °C)] 98.3 °F (36.8 °C)  Pulse:  [85-95] 88  Resp:  [15-18] 16  SpO2:  [94 %-98 %] 96 %  BP: (107-123)/(53-70) 119/59     Weight: 84.4 kg (186 lb 1.1 oz) (08/30/22 0110)  Body mass index is 33.49 kg/m².      Intake/Output Summary (Last 24 hours) at 8/31/2022 1335  Last data filed at 8/31/2022 1200  Gross per 24 hour   Intake 2102 ml   Output --   Net 2102 ml       Lines/Drains/Airways       Peripheral Intravenous Line  Duration                  Peripheral IV - Single Lumen 08/31/22 1156 22 G Anterior;Right Forearm <1 day                     Physical Exam  Constitutional:       General: She is not in acute distress.     Appearance: Normal appearance. She is not ill-appearing, toxic-appearing or diaphoretic.   HENT:      Head: Normocephalic and atraumatic.   Eyes:      General: No scleral icterus.     Extraocular Movements: Extraocular movements intact.   Cardiovascular:      Rate and Rhythm: Normal rate and regular rhythm.   Pulmonary:      Effort: Pulmonary effort is normal. No respiratory distress.   Abdominal:      General: Bowel sounds are normal. There is no distension.      Palpations: Abdomen is soft.      Tenderness: There is abdominal tenderness (right and left abdomen. Mild to moderate).   Musculoskeletal:         General: Normal range of motion.      Cervical back: Normal range of motion.   Skin:     General: Skin is warm and dry.      Coloration: Skin is not pale.   Neurological:      Mental Status: She is alert and oriented to person, place, and time.       Significant Labs:  CBC:   Recent Labs   Lab 08/29/22  1725 08/30/22  0549 08/31/22  0430   WBC 15.50* 15.00* 14.09*   HGB 13.6 11.7* 12.0   HCT 39.7 36.2* 36.9*    184 206     CMP:   Recent Labs   Lab 08/31/22  0430   GLU 61*   CALCIUM 7.6*   ALBUMIN 2.6*   PROT 5.0*      K 3.5   CO2 20*      BUN 5*   CREATININE 0.8   ALKPHOS 52*   ALT 7*   AST 16   BILITOT 0.4     Coagulation: No results for input(s): PT, INR, APTT in the last 48 hours.      Significant Imaging:  Imaging results within the past 24 hours have been reviewed.    Assessment/Plan:     * Colitis  Patient with left sided colitis on recent colonoscopy. Could represent IBD vs infection.   She has a history of Buhcet's which can cause GI tract ulcers, and can be difficult to differentiate from inflammatory bowel disease. However, the pattern is usually ascending colon, cecum and TI, and her findings are mostly left sided.   Agree with Cipro and Flagyl. Hold off on steroid therapy.   Will check stool  studies for infection. Will also check calprotectin.   Check CRP and ESR today.   Monitor Hgb and WBC count.  Pain med and antiemetics as needed.     8/31/2022:  -Patient with improvement in symptoms while on Cipro/Flagyl therapy.  -WBC count trending down.   -Hematochezia with some improvement.   -ESR normal, CRP 27.   -Will need close follow up in IBD clinic pathology shows likely IBD. Son with Ulcerative Colitis.   -Will send message to IBD clinic to get her scheduled.   -Continue to monitor labs for stool results.    Behcet's syndrome involving oral mucosa  Treatment per HM team.         Thank you for your consult. I will follow-up with patient. Please contact us if you have any additional questions.    Destini Flores PA-C  Gastroenterology  [facility]

## 2022-09-01 LAB
ALBUMIN SERPL BCP-MCNC: 3 G/DL (ref 3.5–5.2)
ALP SERPL-CCNC: 61 U/L (ref 55–135)
ALT SERPL W/O P-5'-P-CCNC: 9 U/L (ref 10–44)
ANION GAP SERPL CALC-SCNC: 16 MMOL/L (ref 8–16)
AST SERPL-CCNC: 19 U/L (ref 10–40)
BASOPHILS # BLD AUTO: 0.14 K/UL (ref 0–0.2)
BASOPHILS NFR BLD: 0.8 % (ref 0–1.9)
BILIRUB SERPL-MCNC: 0.4 MG/DL (ref 0.1–1)
BUN SERPL-MCNC: 3 MG/DL (ref 8–23)
BURR CELLS BLD QL SMEAR: ABNORMAL
CALCIUM SERPL-MCNC: 8.3 MG/DL (ref 8.7–10.5)
CHLORIDE SERPL-SCNC: 108 MMOL/L (ref 95–110)
CO2 SERPL-SCNC: 15 MMOL/L (ref 23–29)
CREAT SERPL-MCNC: 0.8 MG/DL (ref 0.5–1.4)
DIFFERENTIAL METHOD: ABNORMAL
E COLI SXT1 STL QL IA: NEGATIVE
E COLI SXT2 STL QL IA: NEGATIVE
EOSINOPHIL # BLD AUTO: 0.6 K/UL (ref 0–0.5)
EOSINOPHIL NFR BLD: 3.7 % (ref 0–8)
ERYTHROCYTE [DISTWIDTH] IN BLOOD BY AUTOMATED COUNT: 14.6 % (ref 11.5–14.5)
EST. GFR  (NO RACE VARIABLE): >60 ML/MIN/1.73 M^2
GLUCOSE SERPL-MCNC: 63 MG/DL (ref 70–110)
HCT VFR BLD AUTO: 39.2 % (ref 37–48.5)
HGB BLD-MCNC: 13.5 G/DL (ref 12–16)
IMM GRANULOCYTES # BLD AUTO: 0.23 K/UL (ref 0–0.04)
IMM GRANULOCYTES NFR BLD AUTO: 1.3 % (ref 0–0.5)
LYMPHOCYTES # BLD AUTO: 2.2 K/UL (ref 1–4.8)
LYMPHOCYTES NFR BLD: 12.6 % (ref 18–48)
MCH RBC QN AUTO: 29.7 PG (ref 27–31)
MCHC RBC AUTO-ENTMCNC: 34.4 G/DL (ref 32–36)
MCV RBC AUTO: 86 FL (ref 82–98)
MONOCYTES # BLD AUTO: 2.1 K/UL (ref 0.3–1)
MONOCYTES NFR BLD: 12 % (ref 4–15)
NEUTROPHILS # BLD AUTO: 12.1 K/UL (ref 1.8–7.7)
NEUTROPHILS NFR BLD: 69.6 % (ref 38–73)
NRBC BLD-RTO: 0 /100 WBC
OVALOCYTES BLD QL SMEAR: ABNORMAL
PLATELET # BLD AUTO: 260 K/UL (ref 150–450)
PLATELET BLD QL SMEAR: ABNORMAL
PMV BLD AUTO: 10.5 FL (ref 9.2–12.9)
POTASSIUM SERPL-SCNC: 4.2 MMOL/L (ref 3.5–5.1)
PROCALCITONIN SERPL IA-MCNC: 0.05 NG/ML
PROT SERPL-MCNC: 5.8 G/DL (ref 6–8.4)
RBC # BLD AUTO: 4.55 M/UL (ref 4–5.4)
SODIUM SERPL-SCNC: 139 MMOL/L (ref 136–145)
WBC # BLD AUTO: 17.44 K/UL (ref 3.9–12.7)

## 2022-09-01 PROCEDURE — 85025 COMPLETE CBC W/AUTO DIFF WBC: CPT | Performed by: NURSE PRACTITIONER

## 2022-09-01 PROCEDURE — 25000003 PHARM REV CODE 250: Performed by: INTERNAL MEDICINE

## 2022-09-01 PROCEDURE — 25000003 PHARM REV CODE 250: Performed by: NURSE PRACTITIONER

## 2022-09-01 PROCEDURE — 80053 COMPREHEN METABOLIC PANEL: CPT | Performed by: NURSE PRACTITIONER

## 2022-09-01 PROCEDURE — 63600175 PHARM REV CODE 636 W HCPCS: Performed by: INTERNAL MEDICINE

## 2022-09-01 PROCEDURE — 25000003 PHARM REV CODE 250: Performed by: PHYSICIAN ASSISTANT

## 2022-09-01 PROCEDURE — 76937 US GUIDE VASCULAR ACCESS: CPT

## 2022-09-01 PROCEDURE — 63600175 PHARM REV CODE 636 W HCPCS: Performed by: EMERGENCY MEDICINE

## 2022-09-01 PROCEDURE — C1751 CATH, INF, PER/CENT/MIDLINE: HCPCS

## 2022-09-01 PROCEDURE — 11000001 HC ACUTE MED/SURG PRIVATE ROOM

## 2022-09-01 PROCEDURE — 84145 PROCALCITONIN (PCT): CPT | Performed by: PHYSICIAN ASSISTANT

## 2022-09-01 PROCEDURE — 99232 PR SUBSEQUENT HOSPITAL CARE,LEVL II: ICD-10-PCS | Mod: ,,, | Performed by: PHYSICIAN ASSISTANT

## 2022-09-01 PROCEDURE — 99232 SBSQ HOSP IP/OBS MODERATE 35: CPT | Mod: ,,, | Performed by: PHYSICIAN ASSISTANT

## 2022-09-01 PROCEDURE — 36410 VNPNXR 3YR/> PHY/QHP DX/THER: CPT

## 2022-09-01 RX ORDER — DICYCLOMINE HYDROCHLORIDE 20 MG/1
20 TABLET ORAL
Status: DISCONTINUED | OUTPATIENT
Start: 2022-09-01 | End: 2022-09-04 | Stop reason: HOSPADM

## 2022-09-01 RX ORDER — TRAMADOL HYDROCHLORIDE 50 MG/1
50 TABLET ORAL ONCE
Status: COMPLETED | OUTPATIENT
Start: 2022-09-01 | End: 2022-09-01

## 2022-09-01 RX ORDER — CIPROFLOXACIN 750 MG/1
750 TABLET, FILM COATED ORAL EVERY 12 HOURS
Status: DISCONTINUED | OUTPATIENT
Start: 2022-09-01 | End: 2022-09-02

## 2022-09-01 RX ORDER — CIPROFLOXACIN 500 MG/1
500 TABLET ORAL EVERY 12 HOURS
Status: DISCONTINUED | OUTPATIENT
Start: 2022-09-01 | End: 2022-09-01

## 2022-09-01 RX ORDER — METRONIDAZOLE 500 MG/1
500 TABLET ORAL EVERY 8 HOURS
Status: DISCONTINUED | OUTPATIENT
Start: 2022-09-01 | End: 2022-09-02

## 2022-09-01 RX ORDER — SODIUM BICARBONATE 650 MG/1
650 TABLET ORAL 2 TIMES DAILY
Status: COMPLETED | OUTPATIENT
Start: 2022-09-01 | End: 2022-09-01

## 2022-09-01 RX ADMIN — CIPROFLOXACIN 750 MG: 750 TABLET, FILM COATED ORAL at 09:09

## 2022-09-01 RX ADMIN — ALUMINUM HYDROXIDE, MAGNESIUM HYDROXIDE, AND SIMETHICONE 30 ML: 200; 200; 20 SUSPENSION ORAL at 07:09

## 2022-09-01 RX ADMIN — MORPHINE SULFATE 4 MG: 4 INJECTION INTRAVENOUS at 09:09

## 2022-09-01 RX ADMIN — DICYCLOMINE HYDROCHLORIDE 20 MG: 20 TABLET ORAL at 05:09

## 2022-09-01 RX ADMIN — DICYCLOMINE HYDROCHLORIDE 20 MG: 20 TABLET ORAL at 09:09

## 2022-09-01 RX ADMIN — SODIUM BICARBONATE 650 MG TABLET 650 MG: at 01:09

## 2022-09-01 RX ADMIN — METRONIDAZOLE 500 MG: 500 TABLET ORAL at 09:09

## 2022-09-01 RX ADMIN — ONDANSETRON 4 MG: 2 INJECTION INTRAMUSCULAR; INTRAVENOUS at 09:09

## 2022-09-01 RX ADMIN — LEVOTHYROXINE SODIUM 75 MCG: 75 TABLET ORAL at 06:09

## 2022-09-01 RX ADMIN — SODIUM BICARBONATE 650 MG TABLET 650 MG: at 09:09

## 2022-09-01 RX ADMIN — DIPHENHYDRAMINE HYDROCHLORIDE 25 MG: 25 CAPSULE ORAL at 01:09

## 2022-09-01 RX ADMIN — METRONIDAZOLE 500 MG: 500 TABLET ORAL at 01:09

## 2022-09-01 RX ADMIN — DICYCLOMINE HYDROCHLORIDE 20 MG: 20 TABLET ORAL at 11:09

## 2022-09-01 RX ADMIN — CIPROFLOXACIN 400 MG: 2 INJECTION, SOLUTION INTRAVENOUS at 09:09

## 2022-09-01 RX ADMIN — TRAMADOL HYDROCHLORIDE 50 MG: 50 TABLET ORAL at 07:09

## 2022-09-01 NOTE — ASSESSMENT & PLAN NOTE
-will monitor   -GI following   -will consider antiinflammatory as needed   -will hold on steroid use at this time     9/1:  Behcet's can sometimes mimic UC   ESR and CRP not gravely elevated  Defer to IBD clinic to determine

## 2022-09-01 NOTE — ASSESSMENT & PLAN NOTE
Patient with left sided colitis on recent colonoscopy. Could represent IBD vs infection.   She has a history of Buhcet's which can cause GI tract ulcers, and can be difficult to differentiate from inflammatory bowel disease. However, the pattern is usually ascending colon, cecum and TI, and her findings are mostly left sided.   WBC count increased. Continue antibiotic therapy. Hold off on steroid therapy for now but will get CXR to complete infection work up.    CRP and ESR unimpressive.   Continue to monitor labs for stool results.  Start Dicyclomine for abdominal cramping.   Advance diet.   Patient already has a follow up scheduled in IBD clinic.   Discussed plan with HM team. Possible d/c tomorrow.

## 2022-09-01 NOTE — NURSING
"Pt. IV infiltrated. Pt. refused new IV. Pt. states, "I had multiple IV attempts before the last IV and I am tried of being stuck." She also informed RN that the MD told her she would be able to get a midline if the IV failed. Pt. requests for a midline or PICC. NP notified. Attempt made to get midline but there is no one on night shift available to do procedure. Pt. still refuses IV. Pt. will wait until Day shift to consult MD about midline and PICC.  PO Tramadol ordered for pain.   "

## 2022-09-01 NOTE — PROGRESS NOTES
[facility] Medicine  Progress Note    Patient Name: Yodit Canseco  MRN: [id number]  Patient Class: IP- Inpatient   Admission Date: 8/29/2022  Length of Stay: 1 days  Attending Physician: Mando Bourgeois MD  Primary Care Provider: Miky Lewis MD        Subjective:     Principal Problem:Colitis        HPI:  Ms. Canseco is a 61-year-old  female with PMH significant for COPD/asthma, hypothyroidism, hyperlipidemia, has been having intermittent bloody bowel movements.  She underwent colonoscopy by Dr. Del Valle on 08/17, pathology came back positive for IBD/ulcerative colitis.  Patient presented to the ED today complaining of increased bloody bowel movements, diarrhea, associated with generalized weakness, fatigue lower abdominal discomfort.  Afebrile, hemodynamically stable in the ED. WBC elevated 15,500, 0% bands.  Lipase 257.  Hemoglobin 13.6, hematocrit 39.7.  Started on IV ciprofloxacin, metronidazole and placed in observation for IV fluids and GI evaluation in a.m..    Admitting diagnosis:  New diagnosis of ulcerative colitis.  Continued bloody diarrhea.  Leukocytosis.      Overview/Hospital Course:  Pt admitted to Observation for suspected Ulcerative Colitis with rectal bleeding in the setting of Buhcet's. Imaging also supports increased vascularity around the colon with pericolonic lymph nodes. Correlate clinically for colitis. Status post gastric bypass and cholecystectomy Hepatomegaly with fatty infiltration liver. GI consulted. IV antibiotics continued and IV steroids not given at this time due possibility of infection. CRP elevated. H/H stable. UA results abnormal. C-diff results pending. NPO. Analgesia and antiemetics as needed. On 8/31/22, leukocytosis continued with mild improvement. IV antibiotics continued. Pt reports abdominal soreness and diarrhea with analgesia continued. Pt tolerating sips of water with ice without distress. GI following and outpatient follow up with  IBD clinic recommended.     9/1: WBC remains elevated but likely more reactive. If Procal is WNL plan to transition to PO abx, advance diet as tolerated and d/c in the AM. Plan to follow up with IBD clinic after d/c.       Interval History: Continues to have 7-8 BMs a day with occassional blood. Hgb remains stable.     Review of Systems   Constitutional:  Positive for appetite change and fatigue. Negative for chills and fever.   HENT: Negative.  Negative for congestion, rhinorrhea, sore throat and trouble swallowing.    Eyes: Negative.  Negative for visual disturbance.   Respiratory: Negative.  Negative for cough, shortness of breath and wheezing.    Cardiovascular: Negative.  Negative for chest pain and palpitations.   Gastrointestinal:  Positive for abdominal pain, blood in stool, diarrhea and nausea. Negative for vomiting.   Endocrine: Negative.    Genitourinary: Negative.  Negative for dysuria and flank pain.   Musculoskeletal: Negative.  Negative for back pain.   Skin: Negative.  Negative for rash.   Allergic/Immunologic: Negative.    Neurological: Negative.  Negative for speech difficulty, weakness, numbness and headaches.   Hematological: Negative.    Psychiatric/Behavioral: Negative.  Negative for hallucinations. The patient is not nervous/anxious.    All other systems reviewed and are negative.  Objective:     Vital Signs (Most Recent):  Temp: 98 °F (36.7 °C) (09/01/22 1214)  Pulse: 97 (09/01/22 1214)  Resp: 20 (09/01/22 1214)  BP: (!) 124/52 (09/01/22 1214)  SpO2: 97 % (09/01/22 1214)   Vital Signs (24h Range):  Temp:  [98 °F (36.7 °C)-99.1 °F (37.3 °C)] 98 °F (36.7 °C)  Pulse:  [] 97  Resp:  [14-20] 20  SpO2:  [95 %-97 %] 97 %  BP: (106-124)/(52-61) 124/52     Weight: 84.4 kg (186 lb 1.1 oz)  Body mass index is 33.49 kg/m².    Intake/Output Summary (Last 24 hours) at 9/1/2022 1226  Last data filed at 9/1/2022 0800  Gross per 24 hour   Intake 579.14 ml   Output --   Net 579.14 ml      Physical  Exam  Vitals and nursing note reviewed.   Constitutional:       General: She is awake. She is not in acute distress.     Appearance: She is obese. She is not ill-appearing.   HENT:      Head: Normocephalic and atraumatic.      Mouth/Throat:      Mouth: Mucous membranes are moist.   Eyes:      General: No scleral icterus.     Conjunctiva/sclera: Conjunctivae normal.   Cardiovascular:      Rate and Rhythm: Normal rate and regular rhythm.      Heart sounds: No murmur heard.  Pulmonary:      Effort: Pulmonary effort is normal. No respiratory distress.      Breath sounds: Normal breath sounds. No wheezing.   Abdominal:      Palpations: Abdomen is soft.      Tenderness: There is abdominal tenderness (left LMQ/LLQ).   Musculoskeletal:         General: No swelling. Normal range of motion.      Cervical back: Normal range of motion and neck supple.   Skin:     General: Skin is warm.      Coloration: Skin is not jaundiced.   Neurological:      General: No focal deficit present.      Mental Status: She is alert and oriented to person, place, and time. Mental status is at baseline.   Psychiatric:         Attention and Perception: Attention normal.         Mood and Affect: Mood normal.         Speech: Speech normal.         Behavior: Behavior normal. Behavior is cooperative.       Significant Labs: All pertinent labs within the past 24 hours have been reviewed.    Significant Imaging: I have reviewed all pertinent imaging results/findings within the past 24 hours.      Assessment/Plan:      * Colitis  -colonoscopy with biopsy 8/17, reveals evidence of ulcerative colitis.    -has not seen gastroenterology outpatient   -GI consulted- pt to follow-up IBD clinic   -continue IV fluids.  -IV antibiotics  -CRP elevated   - IV steroids held due to possible infection   -analgesia and antiemetics as needed   -NPO-pt taking sips of water with ice in room without distress     9/1:   WBC remains elevated but likely more reactive. No left  shift noted.   Afebrile   If Procal is WNL plan to transition to PO abx   Advance diet as tolerated and d/c in the AM   Plan to follow up with IBD clinic after d/c.   GI following       SIRS (systemic inflammatory response syndrome)  -afebrile, WBC elevated at 34231>14.09.    -new diagnosis of IBD/UC   -allergic to penicillin  -IV hydration   -IV ciprofloxacin, metronidazole empirically.  -hemodynamically stable  -UA results abnormal with 2+ leukocytes       Behcet's syndrome involving oral mucosa  -will monitor   -GI following   -will consider antiinflammatory as needed   -will hold on steroid use at this time     9/1:  Behcet's can sometimes mimic UC   ESR and CRP not gravely elevated  Defer to IBD clinic to determine        Asthma  -not in exacerbation.    - bronchodilators as needed      Immune deficiency disorder   - pt followed outpatient by immunologist and currently taking IgG with last dose due 4 days ago     Hypothyroidism  -synthroid continued       Hyperlipidemia  -continue statin.        VTE Risk Mitigation (From admission, onward)         Ordered     Place sequential compression device  Until discontinued         08/29/22 2045                Discharge Planning   VARGAS:      Code Status: Prior   Is the patient medically ready for discharge?:     Reason for patient still in hospital (select all that apply): Treatment and Consult recommendations  Discharge Plan A: Home                  Danuta Al NP  Department of Hospital Medicine   [facility]

## 2022-09-01 NOTE — PLAN OF CARE
Patient remains free of injury. AAOx4. POC reviewed, patient verbalizes understanding. Pain controlled with IV pain medication. Full liquid diet tolerated. Call light and personal items within reach. Chart check complete. Will continue to monitor.    Problem: Adult Inpatient Plan of Care  Goal: Plan of Care Review  Outcome: Ongoing, Progressing  Goal: Patient-Specific Goal (Individualized)  Outcome: Ongoing, Progressing  Goal: Absence of Hospital-Acquired Illness or Injury  Outcome: Ongoing, Progressing  Goal: Optimal Comfort and Wellbeing  Outcome: Ongoing, Progressing  Goal: Readiness for Transition of Care  Outcome: Ongoing, Progressing

## 2022-09-01 NOTE — PLAN OF CARE
Problem: Adult Inpatient Plan of Care  Goal: Plan of Care Review  Outcome: Ongoing, Progressing  Goal: Patient-Specific Goal (Individualized)  Outcome: Ongoing, Progressing  Goal: Absence of Hospital-Acquired Illness or Injury  Outcome: Ongoing, Progressing  Goal: Optimal Comfort and Wellbeing  Outcome: Ongoing, Progressing  Goal: Readiness for Transition of Care  Outcome: Ongoing, Progressing     Problem: Infection  Goal: Absence of Infection Signs and Symptoms  Outcome: Ongoing, Progressing     Problem: Pain Acute  Goal: Acceptable Pain Control and Functional Ability  Outcome: Ongoing, Progressing     Problem: Diarrhea  Goal: Fluid and Electrolyte Balance  Outcome: Ongoing, Progressing     Problem: Nausea and Vomiting  Goal: Fluid and Electrolyte Balance  Outcome: Ongoing, Progressing

## 2022-09-01 NOTE — PROGRESS NOTES
Pharmacist Renal Dose Adjustment Note    Yodit Canseco is a 61 y.o. female being treated with the medication ciprofloxcin.     Patient Data:    Vital Signs (Most Recent):  Temp: 98 °F (36.7 °C) (09/01/22 1214)  Pulse: 97 (09/01/22 1214)  Resp: 20 (09/01/22 1214)  BP: (!) 124/52 (09/01/22 1214)  SpO2: 97 % (09/01/22 1214)   Vital Signs (72h Range):  Temp:  [96.8 °F (36 °C)-99.3 °F (37.4 °C)]   Pulse:  []   Resp:  [14-20]   BP: (104-161)/(51-77)   SpO2:  [94 %-99 %]      Recent Labs   Lab 08/30/22  0549 08/31/22  0430 09/01/22  0900   CREATININE 0.9 0.8 0.8     Serum creatinine: 0.8 mg/dL 09/01/22 0900  Estimated creatinine clearance: 74.2 mL/min    Ciprofloxacin 500 mg q12 h has been changed to 750 mg q12 h per [facility] renal dose adjustment protocol.     Pharmacist's Name: Hang Clemente, PharmRODRIGUE 9/1/2022 2:17 PM  Pharmacist's Extension: [phone]

## 2022-09-01 NOTE — ASSESSMENT & PLAN NOTE
-colonoscopy with biopsy 8/17, reveals evidence of ulcerative colitis.    -has not seen gastroenterology outpatient   -GI consulted- pt to follow-up IBD clinic   -continue IV fluids.  -IV antibiotics  -CRP elevated   - IV steroids held due to possible infection   -analgesia and antiemetics as needed   -NPO-pt taking sips of water with ice in room without distress     9/1:   WBC remains elevated but likely more reactive. No left shift noted.   Afebrile   If Procal is WNL plan to transition to PO abx   Advance diet as tolerated and d/c in the AM   Plan to follow up with IBD clinic after d/c.   GI following

## 2022-09-01 NOTE — SUBJECTIVE & OBJECTIVE
Subjective:     Interval History: Patient reports feeling better; however, reported 7 BMs yesterday. Bleeding resolved. Some nausea but no vomiting. WBC count remains elevated.     Review of Systems   Constitutional:         See Interval History for daily ROS.    Objective:     Vital Signs (Most Recent):  Temp: 98.3 °F (36.8 °C) (09/01/22 0736)  Pulse: 91 (09/01/22 0736)  Resp: 14 (09/01/22 0916)  BP: (!) 124/56 (09/01/22 0736)  SpO2: 95 % (09/01/22 0736)   Vital Signs (24h Range):  Temp:  [98.2 °F (36.8 °C)-99.1 °F (37.3 °C)] 98.3 °F (36.8 °C)  Pulse:  [] 91  Resp:  [14-20] 14  SpO2:  [95 %-96 %] 95 %  BP: (106-124)/(55-61) 124/56     Weight: 84.4 kg (186 lb 1.1 oz) (08/30/22 0110)  Body mass index is 33.49 kg/m².      Intake/Output Summary (Last 24 hours) at 9/1/2022 1111  Last data filed at 9/1/2022 0800  Gross per 24 hour   Intake 579.14 ml   Output --   Net 579.14 ml       Lines/Drains/Airways       Peripheral Intravenous Line  Duration                  Midline Catheter Insertion/Assessment  - Single Lumen 09/01/22 0850 Left cephalic vein (lateral side of arm) 18g x 8cm <1 day                    Physical Exam  Constitutional:       General: She is not in acute distress.     Appearance: She is well-developed. She is not diaphoretic.   HENT:      Head: Normocephalic and atraumatic.   Eyes:      Extraocular Movements: Extraocular movements intact.   Cardiovascular:      Rate and Rhythm: Normal rate and regular rhythm.   Pulmonary:      Effort: Pulmonary effort is normal. No respiratory distress.      Breath sounds: Normal breath sounds. No wheezing.   Abdominal:      General: Bowel sounds are normal. There is no distension.      Palpations: Abdomen is soft.      Tenderness: There is abdominal tenderness (left sided).   Neurological:      Mental Status: She is alert and oriented to person, place, and time.      Cranial Nerves: No cranial nerve deficit.   Psychiatric:         Behavior: Behavior normal.        Significant Labs:  CBC:   Recent Labs   Lab 08/31/22  0430 09/01/22  0900   WBC 14.09* 17.44*   HGB 12.0 13.5   HCT 36.9* 39.2    260     CMP:   Recent Labs   Lab 09/01/22  0900   GLU 63*   CALCIUM 8.3*   ALBUMIN 3.0*   PROT 5.8*      K 4.2   CO2 15*      BUN 3*   CREATININE 0.8   ALKPHOS 61   ALT 9*   AST 19   BILITOT 0.4     Coagulation: No results for input(s): PT, INR, APTT in the last 48 hours.      Significant Imaging:  Imaging results within the past 24 hours have been reviewed.

## 2022-09-01 NOTE — SUBJECTIVE & OBJECTIVE
Interval History: Continues to have 7-8 BMs a day with occassional blood. Hgb remains stable.     Review of Systems   Constitutional:  Positive for appetite change and fatigue. Negative for chills and fever.   HENT: Negative.  Negative for congestion, rhinorrhea, sore throat and trouble swallowing.    Eyes: Negative.  Negative for visual disturbance.   Respiratory: Negative.  Negative for cough, shortness of breath and wheezing.    Cardiovascular: Negative.  Negative for chest pain and palpitations.   Gastrointestinal:  Positive for abdominal pain, blood in stool, diarrhea and nausea. Negative for vomiting.   Endocrine: Negative.    Genitourinary: Negative.  Negative for dysuria and flank pain.   Musculoskeletal: Negative.  Negative for back pain.   Skin: Negative.  Negative for rash.   Allergic/Immunologic: Negative.    Neurological: Negative.  Negative for speech difficulty, weakness, numbness and headaches.   Hematological: Negative.    Psychiatric/Behavioral: Negative.  Negative for hallucinations. The patient is not nervous/anxious.    All other systems reviewed and are negative.  Objective:     Vital Signs (Most Recent):  Temp: 98 °F (36.7 °C) (09/01/22 1214)  Pulse: 97 (09/01/22 1214)  Resp: 20 (09/01/22 1214)  BP: (!) 124/52 (09/01/22 1214)  SpO2: 97 % (09/01/22 1214)   Vital Signs (24h Range):  Temp:  [98 °F (36.7 °C)-99.1 °F (37.3 °C)] 98 °F (36.7 °C)  Pulse:  [] 97  Resp:  [14-20] 20  SpO2:  [95 %-97 %] 97 %  BP: (106-124)/(52-61) 124/52     Weight: 84.4 kg (186 lb 1.1 oz)  Body mass index is 33.49 kg/m².    Intake/Output Summary (Last 24 hours) at 9/1/2022 1226  Last data filed at 9/1/2022 0800  Gross per 24 hour   Intake 579.14 ml   Output --   Net 579.14 ml      Physical Exam  Vitals and nursing note reviewed.   Constitutional:       General: She is awake. She is not in acute distress.     Appearance: She is obese. She is not ill-appearing.   HENT:      Head: Normocephalic and atraumatic.       Mouth/Throat:      Mouth: Mucous membranes are moist.   Eyes:      General: No scleral icterus.     Conjunctiva/sclera: Conjunctivae normal.   Cardiovascular:      Rate and Rhythm: Normal rate and regular rhythm.      Heart sounds: No murmur heard.  Pulmonary:      Effort: Pulmonary effort is normal. No respiratory distress.      Breath sounds: Normal breath sounds. No wheezing.   Abdominal:      Palpations: Abdomen is soft.      Tenderness: There is abdominal tenderness (left LMQ/LLQ).   Musculoskeletal:         General: No swelling. Normal range of motion.      Cervical back: Normal range of motion and neck supple.   Skin:     General: Skin is warm.      Coloration: Skin is not jaundiced.   Neurological:      General: No focal deficit present.      Mental Status: She is alert and oriented to person, place, and time. Mental status is at baseline.   Psychiatric:         Attention and Perception: Attention normal.         Mood and Affect: Mood normal.         Speech: Speech normal.         Behavior: Behavior normal. Behavior is cooperative.       Significant Labs: All pertinent labs within the past 24 hours have been reviewed.    Significant Imaging: I have reviewed all pertinent imaging results/findings within the past 24 hours.

## 2022-09-01 NOTE — PROGRESS NOTES
[facility]  Gastroenterology  Progress Note    Patient Name: Yodit Canseco  MRN: [id number]  Admission Date: 8/29/2022  Hospital Length of Stay: 1 days  Code Status: Prior   Attending Provider: Mando Bourgeois MD  Consulting Provider: Niraj Harden PA-C  Primary Care Physician: Miky Lewis MD  Principal Problem: Colitis      Subjective:     Interval History: Patient reports feeling better; however, reported 7 BMs yesterday. Bleeding resolved. Some nausea but no vomiting. WBC count remains elevated.     Review of Systems   Constitutional:         See Interval History for daily ROS.    Objective:     Vital Signs (Most Recent):  Temp: 98.3 °F (36.8 °C) (09/01/22 0736)  Pulse: 91 (09/01/22 0736)  Resp: 14 (09/01/22 0916)  BP: (!) 124/56 (09/01/22 0736)  SpO2: 95 % (09/01/22 0736)   Vital Signs (24h Range):  Temp:  [98.2 °F (36.8 °C)-99.1 °F (37.3 °C)] 98.3 °F (36.8 °C)  Pulse:  [] 91  Resp:  [14-20] 14  SpO2:  [95 %-96 %] 95 %  BP: (106-124)/(55-61) 124/56     Weight: 84.4 kg (186 lb 1.1 oz) (08/30/22 0110)  Body mass index is 33.49 kg/m².      Intake/Output Summary (Last 24 hours) at 9/1/2022 1111  Last data filed at 9/1/2022 0800  Gross per 24 hour   Intake 579.14 ml   Output --   Net 579.14 ml       Lines/Drains/Airways       Peripheral Intravenous Line  Duration                  Midline Catheter Insertion/Assessment  - Single Lumen 09/01/22 0850 Left cephalic vein (lateral side of arm) 18g x 8cm <1 day                    Physical Exam  Constitutional:       General: She is not in acute distress.     Appearance: She is well-developed. She is not diaphoretic.   HENT:      Head: Normocephalic and atraumatic.   Eyes:      Extraocular Movements: Extraocular movements intact.   Cardiovascular:      Rate and Rhythm: Normal rate and regular rhythm.   Pulmonary:      Effort: Pulmonary effort is normal. No respiratory distress.      Breath sounds: Normal breath sounds. No wheezing.   Abdominal:       General: Bowel sounds are normal. There is no distension.      Palpations: Abdomen is soft.      Tenderness: There is abdominal tenderness (left sided).   Neurological:      Mental Status: She is alert and oriented to person, place, and time.      Cranial Nerves: No cranial nerve deficit.   Psychiatric:         Behavior: Behavior normal.       Significant Labs:  CBC:   Recent Labs   Lab 08/31/22  0430 09/01/22  0900   WBC 14.09* 17.44*   HGB 12.0 13.5   HCT 36.9* 39.2    260     CMP:   Recent Labs   Lab 09/01/22  0900   GLU 63*   CALCIUM 8.3*   ALBUMIN 3.0*   PROT 5.8*      K 4.2   CO2 15*      BUN 3*   CREATININE 0.8   ALKPHOS 61   ALT 9*   AST 19   BILITOT 0.4     Coagulation: No results for input(s): PT, INR, APTT in the last 48 hours.      Significant Imaging:  Imaging results within the past 24 hours have been reviewed.    Assessment/Plan:     * Colitis  Patient with left sided colitis on recent colonoscopy. Could represent IBD vs infection.   She has a history of Buhcet's which can cause GI tract ulcers, and can be difficult to differentiate from inflammatory bowel disease. However, the pattern is usually ascending colon, cecum and TI, and her findings are mostly left sided.   WBC count increased. Continue antibiotic therapy. Hold off on steroid therapy for now but will get CXR to complete infection work up.    CRP and ESR unimpressive.   Continue to monitor labs for stool results.  Start Dicyclomine for abdominal cramping.   Advance diet.   Patient already has a follow up scheduled in IBD clinic.   Discussed plan with HM team. Possible d/c tomorrow.       Behcet's syndrome involving oral mucosa  Treatment per HM team.         Thank you for your consult. I will follow-up with patient. Please contact us if you have any additional questions.    Niraj Harden PA-C  Gastroenterology  [facility]

## 2022-09-02 PROBLEM — E88.09 HYPOALBUMINEMIA: Status: ACTIVE | Noted: 2022-09-02

## 2022-09-02 PROBLEM — E83.51 HYPOCALCEMIA: Status: ACTIVE | Noted: 2022-09-02

## 2022-09-02 PROBLEM — K76.0 FATTY LIVER: Status: ACTIVE | Noted: 2022-09-02

## 2022-09-02 PROBLEM — R16.0 HEPATOMEGALY: Status: ACTIVE | Noted: 2022-09-02

## 2022-09-02 PROBLEM — Z98.84 HISTORY OF GASTRIC BYPASS: Status: ACTIVE | Noted: 2022-09-02

## 2022-09-02 LAB
BASOPHILS NFR BLD: 0 % (ref 0–1.9)
CALPROTECTIN STL-MCNT: ABNORMAL MCG/G
DIFFERENTIAL METHOD: ABNORMAL
EOSINOPHIL NFR BLD: 0 % (ref 0–8)
ERYTHROCYTE [DISTWIDTH] IN BLOOD BY AUTOMATED COUNT: 14.6 % (ref 11.5–14.5)
HCT VFR BLD AUTO: 38.6 % (ref 37–48.5)
HGB BLD-MCNC: 13.3 G/DL (ref 12–16)
IMM GRANULOCYTES # BLD AUTO: ABNORMAL K/UL (ref 0–0.04)
IMM GRANULOCYTES NFR BLD AUTO: ABNORMAL % (ref 0–0.5)
LYMPHOCYTES NFR BLD: 7 % (ref 18–48)
MCH RBC QN AUTO: 29.9 PG (ref 27–31)
MCHC RBC AUTO-ENTMCNC: 34.5 G/DL (ref 32–36)
MCV RBC AUTO: 87 FL (ref 82–98)
MONOCYTES NFR BLD: 0 % (ref 4–15)
NEUTROPHILS NFR BLD: 54 % (ref 38–73)
NEUTS BAND NFR BLD MANUAL: 39 %
NRBC BLD-RTO: 0 /100 WBC
PLATELET # BLD AUTO: 229 K/UL (ref 150–450)
PMV BLD AUTO: 10.5 FL (ref 9.2–12.9)
RBC # BLD AUTO: 4.45 M/UL (ref 4–5.4)
WBC # BLD AUTO: 15.59 K/UL (ref 3.9–12.7)

## 2022-09-02 PROCEDURE — 99232 SBSQ HOSP IP/OBS MODERATE 35: CPT | Mod: ,,, | Performed by: PHYSICIAN ASSISTANT

## 2022-09-02 PROCEDURE — 63600175 PHARM REV CODE 636 W HCPCS: Performed by: INTERNAL MEDICINE

## 2022-09-02 PROCEDURE — 25000003 PHARM REV CODE 250: Performed by: INTERNAL MEDICINE

## 2022-09-02 PROCEDURE — 25000003 PHARM REV CODE 250: Performed by: NURSE PRACTITIONER

## 2022-09-02 PROCEDURE — 85007 BL SMEAR W/DIFF WBC COUNT: CPT | Performed by: INTERNAL MEDICINE

## 2022-09-02 PROCEDURE — 11000001 HC ACUTE MED/SURG PRIVATE ROOM

## 2022-09-02 PROCEDURE — 36415 COLL VENOUS BLD VENIPUNCTURE: CPT | Performed by: INTERNAL MEDICINE

## 2022-09-02 PROCEDURE — 99232 PR SUBSEQUENT HOSPITAL CARE,LEVL II: ICD-10-PCS | Mod: ,,, | Performed by: PHYSICIAN ASSISTANT

## 2022-09-02 PROCEDURE — 25000003 PHARM REV CODE 250: Performed by: PHYSICIAN ASSISTANT

## 2022-09-02 PROCEDURE — S0030 INJECTION, METRONIDAZOLE: HCPCS | Performed by: INTERNAL MEDICINE

## 2022-09-02 PROCEDURE — 85027 COMPLETE CBC AUTOMATED: CPT | Performed by: INTERNAL MEDICINE

## 2022-09-02 RX ORDER — ZOLPIDEM TARTRATE 5 MG/1
5 TABLET ORAL ONCE
Status: COMPLETED | OUTPATIENT
Start: 2022-09-02 | End: 2022-09-02

## 2022-09-02 RX ORDER — LOPERAMIDE HYDROCHLORIDE 2 MG/1
2 CAPSULE ORAL 2 TIMES DAILY PRN
Status: DISCONTINUED | OUTPATIENT
Start: 2022-09-02 | End: 2022-09-04 | Stop reason: HOSPADM

## 2022-09-02 RX ORDER — PRAMOXINE HYDROCHLORIDE 10 MG/G
AEROSOL, FOAM TOPICAL 4 TIMES DAILY
Status: DISCONTINUED | OUTPATIENT
Start: 2022-09-02 | End: 2022-09-02

## 2022-09-02 RX ORDER — CIPROFLOXACIN 2 MG/ML
400 INJECTION, SOLUTION INTRAVENOUS
Status: DISCONTINUED | OUTPATIENT
Start: 2022-09-02 | End: 2022-09-04 | Stop reason: HOSPADM

## 2022-09-02 RX ORDER — HYDROCORTISONE ACETATE PRAMOXINE HCL 2.5; 1 G/100G; G/100G
CREAM TOPICAL 3 TIMES DAILY
Status: DISCONTINUED | OUTPATIENT
Start: 2022-09-02 | End: 2022-09-04 | Stop reason: HOSPADM

## 2022-09-02 RX ORDER — SODIUM CHLORIDE 9 MG/ML
2000 INJECTION, SOLUTION INTRAVENOUS ONCE
Status: COMPLETED | OUTPATIENT
Start: 2022-09-02 | End: 2022-09-02

## 2022-09-02 RX ORDER — METRONIDAZOLE 500 MG/100ML
500 INJECTION, SOLUTION INTRAVENOUS
Status: DISCONTINUED | OUTPATIENT
Start: 2022-09-02 | End: 2022-09-04 | Stop reason: HOSPADM

## 2022-09-02 RX ORDER — SODIUM CHLORIDE 9 MG/ML
INJECTION, SOLUTION INTRAVENOUS CONTINUOUS
Status: DISCONTINUED | OUTPATIENT
Start: 2022-09-02 | End: 2022-09-02

## 2022-09-02 RX ADMIN — METHYLPREDNISOLONE SODIUM SUCCINATE 40 MG: 40 INJECTION, POWDER, FOR SOLUTION INTRAMUSCULAR; INTRAVENOUS at 10:09

## 2022-09-02 RX ADMIN — CIPROFLOXACIN 400 MG: 2 INJECTION, SOLUTION INTRAVENOUS at 08:09

## 2022-09-02 RX ADMIN — LOPERAMIDE HYDROCHLORIDE 2 MG: 2 CAPSULE ORAL at 01:09

## 2022-09-02 RX ADMIN — METRONIDAZOLE 500 MG: 500 TABLET ORAL at 01:09

## 2022-09-02 RX ADMIN — HYDROCORTISONE ACETATE PRAMOXINE HCL: 2.5; 1 CREAM TOPICAL at 03:09

## 2022-09-02 RX ADMIN — MORPHINE SULFATE 2 MG: 4 INJECTION INTRAVENOUS at 06:09

## 2022-09-02 RX ADMIN — DICYCLOMINE HYDROCHLORIDE 20 MG: 20 TABLET ORAL at 10:09

## 2022-09-02 RX ADMIN — METRONIDAZOLE 500 MG: 500 SOLUTION INTRAVENOUS at 09:09

## 2022-09-02 RX ADMIN — HYDROCORTISONE ACETATE PRAMOXINE HCL: 2.5; 1 CREAM TOPICAL at 09:09

## 2022-09-02 RX ADMIN — DICYCLOMINE HYDROCHLORIDE 20 MG: 20 TABLET ORAL at 09:09

## 2022-09-02 RX ADMIN — METRONIDAZOLE 500 MG: 500 TABLET ORAL at 06:09

## 2022-09-02 RX ADMIN — DICYCLOMINE HYDROCHLORIDE 20 MG: 20 TABLET ORAL at 06:09

## 2022-09-02 RX ADMIN — ZOLPIDEM TARTRATE 5 MG: 5 TABLET ORAL at 09:09

## 2022-09-02 RX ADMIN — SODIUM CHLORIDE 1000 ML: 0.9 INJECTION, SOLUTION INTRAVENOUS at 08:09

## 2022-09-02 RX ADMIN — LEVOTHYROXINE SODIUM 75 MCG: 75 TABLET ORAL at 06:09

## 2022-09-02 RX ADMIN — CIPROFLOXACIN 750 MG: 750 TABLET, FILM COATED ORAL at 10:09

## 2022-09-02 RX ADMIN — ONDANSETRON 4 MG: 2 INJECTION INTRAMUSCULAR; INTRAVENOUS at 10:09

## 2022-09-02 NOTE — PLAN OF CARE
Fall precautions maintained. Call bell and personal items within reach. VSS. Pain controlled with PRN meds. Full liquid diet tolerated. Chart check complete. Will continue to monitor.       Problem: Adult Inpatient Plan of Care  Goal: Plan of Care Review  Outcome: Ongoing, Progressing     Problem: Adult Inpatient Plan of Care  Goal: Absence of Hospital-Acquired Illness or Injury  Outcome: Ongoing, Progressing     Problem: Adult Inpatient Plan of Care  Goal: Optimal Comfort and Wellbeing  Outcome: Ongoing, Progressing     Problem: Adult Inpatient Plan of Care  Goal: Readiness for Transition of Care  Outcome: Ongoing, Progressing

## 2022-09-02 NOTE — PROGRESS NOTES
[facility]  Gastroenterology  Progress Note    Patient Name: Yodit Canseco  MRN: [id number]  Admission Date: 8/29/2022  Hospital Length of Stay: 2 days  Code Status: Prior   Attending Provider: Mando Bourgeois MD  Consulting Provider: Niraj Harden PA-C  Primary Care Physician: Miky Lewis MD  Principal Problem: Colitis      Subjective:     Interval History: Reports nausea right now after taking oral antibiotic. Reports 8 diarrhea stools this morning. No bleeding and pain improved. Infection work up negative.     Review of Systems   Constitutional:         See Interval History for daily ROS.    Objective:     Vital Signs (Most Recent):  Temp: 99.5 °F (37.5 °C) (09/02/22 0310)  Pulse: 89 (09/02/22 0310)  Resp: 18 (09/02/22 0607)  BP: (!) 109/57 (09/02/22 0310)  SpO2: (!) 94 % (09/02/22 0310)   Vital Signs (24h Range):  Temp:  [97.9 °F (36.6 °C)-99.8 °F (37.7 °C)] 99.5 °F (37.5 °C)  Pulse:  [84-97] 89  Resp:  [18-20] 18  SpO2:  [94 %-98 %] 94 %  BP: ()/(52-59) 109/57     Weight: 84.4 kg (186 lb 1.1 oz) (08/30/22 0110)  Body mass index is 33.49 kg/m².      Intake/Output Summary (Last 24 hours) at 9/2/2022 1147  Last data filed at 9/1/2022 1827  Gross per 24 hour   Intake 1456.35 ml   Output --   Net 1456.35 ml       Lines/Drains/Airways       Peripheral Intravenous Line  Duration                  Midline Catheter Insertion/Assessment  - Single Lumen 09/01/22 0850 Left cephalic vein (lateral side of arm) 18g x 8cm 1 day                    Physical Exam  Constitutional:       General: She is not in acute distress.     Appearance: She is well-developed. She is not diaphoretic.   HENT:      Head: Normocephalic and atraumatic.   Eyes:      Extraocular Movements: Extraocular movements intact.   Cardiovascular:      Rate and Rhythm: Normal rate and regular rhythm.   Pulmonary:      Effort: Pulmonary effort is normal. No respiratory distress.      Breath sounds: Normal breath sounds. No wheezing.    Abdominal:      General: Bowel sounds are normal. There is no distension.      Palpations: Abdomen is soft.      Tenderness: There is abdominal tenderness (less; left sided). There is no guarding or rebound.   Neurological:      Mental Status: She is alert and oriented to person, place, and time.      Cranial Nerves: No cranial nerve deficit.   Psychiatric:         Behavior: Behavior normal.       Significant Labs:  CBC:   Recent Labs   Lab 09/01/22  0900   WBC 17.44*   HGB 13.5   HCT 39.2        CMP:   Recent Labs   Lab 09/01/22  0900   GLU 63*   CALCIUM 8.3*   ALBUMIN 3.0*   PROT 5.8*      K 4.2   CO2 15*      BUN 3*   CREATININE 0.8   ALKPHOS 61   ALT 9*   AST 19   BILITOT 0.4     Coagulation: No results for input(s): PT, INR, APTT in the last 48 hours.      Significant Imaging:  Imaging results within the past 24 hours have been reviewed.    Assessment/Plan:     * Colitis  Patient with left sided colitis on recent colonoscopy. Could represent IBD vs infection.   She has a history of Buhcet's which can cause GI tract ulcers, and can be difficult to differentiate from inflammatory bowel disease. However, the pattern is usually ascending colon, cecum and TI, and her findings are mostly left sided.   - Infection work up unrevealing. Will start IV steroids today.   - Continue to monitor labs for stool results.  - Continue Dicyclomine for abdominal cramping.   - Advance diet as tolerated to low residue.   - Antiemetics as needed.   - Patient already has a follow up scheduled in IBD clinic.         Behcet's syndrome involving oral mucosa  Treatment per HM team.         Thank you for your consult. I will follow-up with patient. Please contact us if you have any additional questions.    Niraj Harden PA-C  Gastroenterology  [facility]

## 2022-09-02 NOTE — SUBJECTIVE & OBJECTIVE
Subjective:     Interval History: Reports nausea right now after taking oral antibiotic. Reports 8 diarrhea stools this morning. No bleeding and pain improved. Infection work up negative.     Review of Systems   Constitutional:         See Interval History for daily ROS.    Objective:     Vital Signs (Most Recent):  Temp: 99.5 °F (37.5 °C) (09/02/22 0310)  Pulse: 89 (09/02/22 0310)  Resp: 18 (09/02/22 0607)  BP: (!) 109/57 (09/02/22 0310)  SpO2: (!) 94 % (09/02/22 0310)   Vital Signs (24h Range):  Temp:  [97.9 °F (36.6 °C)-99.8 °F (37.7 °C)] 99.5 °F (37.5 °C)  Pulse:  [84-97] 89  Resp:  [18-20] 18  SpO2:  [94 %-98 %] 94 %  BP: ()/(52-59) 109/57     Weight: 84.4 kg (186 lb 1.1 oz) (08/30/22 0110)  Body mass index is 33.49 kg/m².      Intake/Output Summary (Last 24 hours) at 9/2/2022 1147  Last data filed at 9/1/2022 1827  Gross per 24 hour   Intake 1456.35 ml   Output --   Net 1456.35 ml       Lines/Drains/Airways       Peripheral Intravenous Line  Duration                  Midline Catheter Insertion/Assessment  - Single Lumen 09/01/22 0850 Left cephalic vein (lateral side of arm) 18g x 8cm 1 day                    Physical Exam  Constitutional:       General: She is not in acute distress.     Appearance: She is well-developed. She is not diaphoretic.   HENT:      Head: Normocephalic and atraumatic.   Eyes:      Extraocular Movements: Extraocular movements intact.   Cardiovascular:      Rate and Rhythm: Normal rate and regular rhythm.   Pulmonary:      Effort: Pulmonary effort is normal. No respiratory distress.      Breath sounds: Normal breath sounds. No wheezing.   Abdominal:      General: Bowel sounds are normal. There is no distension.      Palpations: Abdomen is soft.      Tenderness: There is abdominal tenderness (less; left sided). There is no guarding or rebound.   Neurological:      Mental Status: She is alert and oriented to person, place, and time.      Cranial Nerves: No cranial nerve deficit.    Psychiatric:         Behavior: Behavior normal.       Significant Labs:  CBC:   Recent Labs   Lab 09/01/22  0900   WBC 17.44*   HGB 13.5   HCT 39.2        CMP:   Recent Labs   Lab 09/01/22  0900   GLU 63*   CALCIUM 8.3*   ALBUMIN 3.0*   PROT 5.8*      K 4.2   CO2 15*      BUN 3*   CREATININE 0.8   ALKPHOS 61   ALT 9*   AST 19   BILITOT 0.4     Coagulation: No results for input(s): PT, INR, APTT in the last 48 hours.      Significant Imaging:  Imaging results within the past 24 hours have been reviewed.

## 2022-09-02 NOTE — ASSESSMENT & PLAN NOTE
8/29 afebrile, WBC elevated at 52097>14.09.    -new diagnosis of IBD/UC   -allergic to penicillin  -IV hydration   -IV ciprofloxacin, metronidazole empirically.  -hemodynamically stable  -UA results abnormal with 2+ leukocytes     9/02   This patient does have evidence of infective focus  My overall impression is sepsis. Vital signs were reviewed and noted in progress note.  Antibiotics given-   Antibiotics (From admission, onward)    Start     Stop Route Frequency Ordered    09/01/22 2100  ciprofloxacin HCl tablet 750 mg         -- Oral Every 12 hours 09/01/22 1416    09/01/22 1400  metroNIDAZOLE tablet 500 mg         -- Oral Every 8 hours 09/01/22 1240        Cultures were taken-   Microbiology Results (last 7 days)     Procedure Component Value Units Date/Time    Stool culture [[id number]] Collected: 08/30/22 1521    Order Status: Completed Specimen: Stool Updated: 09/01/22 1247     Stool Culture Nothing significant to date    E. coli 0157 antigen [[id number]] Collected: 08/30/22 1521    Order Status: Completed Specimen: Stool Updated: 09/01/22 0908     Shiga Toxin 1 E.coli Negative     Shiga Toxin 2 E.coli Negative    Clostridium difficile EIA [[id number]] Collected: 08/30/22 1521    Order Status: Completed Specimen: Stool Updated: 08/30/22 1630     C. diff Antigen Negative     C difficile Toxins A+B, EIA Negative     Comment: Testing not recommended for children <24 months old.            Source- Colitis    Source control Achieved by- Symptomatic relief

## 2022-09-02 NOTE — SUBJECTIVE & OBJECTIVE
Interval History:  Still with a lot of diarrhea and poor po intake. Continue IVF. Repeat labs in am.  at bedside and updated on patient's status and plan of care..      Review of Systems   Constitutional:  Positive for appetite change and fatigue. Negative for activity change, chills and fever.   HENT:  Negative for congestion, ear discharge, ear pain, facial swelling, rhinorrhea, sore throat and trouble swallowing.    Eyes:  Negative for pain, redness and visual disturbance.   Respiratory:  Negative for cough, shortness of breath and wheezing.    Cardiovascular:  Negative for chest pain and palpitations.   Gastrointestinal:  Positive for abdominal pain, diarrhea and nausea. Negative for blood in stool and vomiting.        LLQ abd pain- improved   Endocrine: Negative for polydipsia and polyphagia.   Genitourinary:  Negative for difficulty urinating, dysuria, flank pain and hematuria.   Musculoskeletal:  Negative for gait problem, neck pain and neck stiffness.   Skin:  Negative for color change and rash.   Allergic/Immunologic: Negative for food allergies.   Neurological:  Negative for seizures, facial asymmetry, speech difficulty and weakness.   Hematological:  Does not bruise/bleed easily.   Psychiatric/Behavioral:  Negative for agitation, behavioral problems, confusion, hallucinations and suicidal ideas. The patient is not nervous/anxious.    All other systems reviewed and are negative.  Objective:     Vital Signs (Most Recent):  Temp: 98.6 °F (37 °C) (09/02/22 1610)  Pulse: 80 (09/02/22 1610)  Resp: 20 (09/02/22 1610)  BP: 124/64 (09/02/22 1610)  SpO2: 96 % (09/02/22 1610) Vital Signs (24h Range):  Temp:  [98 °F (36.7 °C)-99.8 °F (37.7 °C)] 98.6 °F (37 °C)  Pulse:  [80-92] 80  Resp:  [18-20] 20  SpO2:  [94 %-96 %] 96 %  BP: ()/(53-64) 124/64     Weight: 84.4 kg (186 lb 1.1 oz)  Body mass index is 33.49 kg/m².    Intake/Output Summary (Last 24 hours) at 9/2/2022 7286  Last data filed at 9/2/2022  1400  Gross per 24 hour   Intake 1456.35 ml   Output --   Net 1456.35 ml      Physical Exam  Vitals and nursing note reviewed.   Constitutional:       General: She is awake. She is not in acute distress.     Appearance: She is obese. She is not ill-appearing or diaphoretic.   HENT:      Head: Normocephalic and atraumatic.      Mouth/Throat:      Mouth: Mucous membranes are dry.   Eyes:      General: No scleral icterus.        Right eye: No discharge.         Left eye: No discharge.      Extraocular Movements: Extraocular movements intact.      Conjunctiva/sclera: Conjunctivae normal.      Pupils: Pupils are equal, round, and reactive to light.   Cardiovascular:      Rate and Rhythm: Normal rate and regular rhythm.      Pulses: Normal pulses.      Heart sounds: Normal heart sounds. No murmur heard.  Pulmonary:      Effort: Pulmonary effort is normal. No respiratory distress.      Breath sounds: Normal breath sounds. No wheezing, rhonchi or rales.   Abdominal:      General: Bowel sounds are normal. There is no distension.      Palpations: Abdomen is soft.      Tenderness: There is abdominal tenderness (left LMQ/LLQ). There is no guarding.   Genitourinary:     Comments: Not examined  Musculoskeletal:         General: Normal range of motion.      Cervical back: Normal range of motion and neck supple. No rigidity or tenderness.      Right lower leg: No edema.      Left lower leg: No edema.   Skin:     General: Skin is warm and dry.      Capillary Refill: Capillary refill takes less than 2 seconds.      Comments: Decreased skin turgor   Neurological:      General: No focal deficit present.      Mental Status: She is alert and oriented to person, place, and time. Mental status is at baseline.      Cranial Nerves: No cranial nerve deficit.      Motor: No weakness.   Psychiatric:         Attention and Perception: Attention normal.         Mood and Affect: Mood normal.         Speech: Speech normal.         Behavior: Behavior  normal. Behavior is cooperative.         Thought Content: Thought content normal.         Judgment: Judgment normal.          Lab Results   Component Value Date    WBC 15.59 (H) 09/02/2022    HGB 13.3 09/02/2022    HCT 38.6 09/02/2022    MCV 87 09/02/2022     09/02/2022       BMP  Lab Results   Component Value Date     09/01/2022    K 4.2 09/01/2022     09/01/2022    CO2 15 (L) 09/01/2022    BUN 3 (L) 09/01/2022    CREATININE 0.8 09/01/2022    CALCIUM 8.3 (L) 09/01/2022    ANIONGAP 16 09/01/2022    ESTGFRAFRICA >60.0 05/23/2022    EGFRNONAA >60.0 05/23/2022          1. I was told the name of the doctor(s) who took care of my child while in the hospital.    2. I have been told about any important findings on my child's physical exam and my child’s plan of care.    3. The doctor clearly explained my child's diagnosis and other possible diagnoses that were considered.    4. My child's doctor explained all the tests that were done and their results (if available). I understand that some of the test results may not be ready before we go home and I was told how I can get these results. I understand that a summary of my child's hospitalization and important test results will be shared with my child's outpatient doctor.    5. My child's doctor talked to me about what I need to do when we go home.    6. I understand what signs and symptoms to watch for. I understand what symptoms I would need to call my doctor for and/or return to the hospital.    7. I have the phone number to call the hospital for results and/or questions after I leave the hospital.

## 2022-09-02 NOTE — ASSESSMENT & PLAN NOTE
-colonoscopy with biopsy 8/17, reveals evidence of ulcerative colitis.    -has not seen gastroenterology outpatient   -GI consulted- pt to follow-up IBD clinic   -continue IV fluids.  -IV antibiotics  -CRP elevated   - IV steroids held due to possible infection   -analgesia and antiemetics as needed   -NPO-pt taking sips of water with ice in room without distress     9/1:   WBC remains elevated but likely more reactive. No left shift noted.   Afebrile   If Procal is WNL plan to transition to PO abx   Advance diet as tolerated and d/c in the AM   Plan to follow up with IBD clinic after d/c.   GI following     9/02 Patient on Iv steroids  Full liquid diet

## 2022-09-02 NOTE — PROGRESS NOTES
[facility] Medicine  Progress Note    Patient Name: Yodit Canseco  MRN: [id number]  Patient Class: IP- Inpatient   Admission Date: 8/29/2022  Length of Stay: 2 days  Attending Physician: Mando Bourgeois MD  Primary Care Provider: Miky Lewis MD    Subjective:     Principal Problem:Colitis      HPI:  Ms. Canseco is a 61-year-old  female with PMH significant for COPD/asthma, hypothyroidism, hyperlipidemia, has been having intermittent bloody bowel movements.  She underwent colonoscopy by Dr. Del Valle on 08/17, pathology came back positive for IBD/ulcerative colitis.  Patient presented to the ED today complaining of increased bloody bowel movements, diarrhea, associated with generalized weakness, fatigue lower abdominal discomfort.  Afebrile, hemodynamically stable in the ED. WBC elevated 15,500, 0% bands.  Lipase 257.  Hemoglobin 13.6, hematocrit 39.7.  Started on IV ciprofloxacin, metronidazole and placed in observation for IV fluids and GI evaluation in a.m..    Admitting diagnosis:  New diagnosis of ulcerative colitis.  Continued bloody diarrhea.  Leukocytosis.      Overview/Hospital Course:  Pt admitted to Observation for suspected Ulcerative Colitis with rectal bleeding in the setting of Buhcet's. Imaging also supports increased vascularity around the colon with pericolonic lymph nodes. Correlate clinically for colitis. Status post gastric bypass and cholecystectomy Hepatomegaly with fatty infiltration liver. GI consulted. IV antibiotics continued and IV steroids not given at this time due possibility of infection. CRP elevated. H/H stable. UA results abnormal. C-diff results pending. NPO. Analgesia and antiemetics as needed. On 8/31/22, leukocytosis continued with mild improvement. IV antibiotics continued. Pt reports abdominal soreness and diarrhea with analgesia continued. Pt tolerating sips of water with ice without distress. GI following and outpatient follow up with IBD  clinic recommended.     9/1: WBC remains elevated but likely more reactive. If Procal is WNL plan to transition to PO abx, advance diet as tolerated and d/c in the AM. Plan to follow up with IBD clinic after d/c.   9/02 Still with a lot of diarrhea and poor po intake. Continue IVF. Repeat labs in am.       Interval History:  Still with a lot of diarrhea and poor po intake. Continue IVF. Repeat labs in am.  at bedside and updated on patient's status and plan of care..      Review of Systems   Constitutional:  Positive for appetite change and fatigue. Negative for activity change, chills and fever.   HENT:  Negative for congestion, ear discharge, ear pain, facial swelling, rhinorrhea, sore throat and trouble swallowing.    Eyes:  Negative for pain, redness and visual disturbance.   Respiratory:  Negative for cough, shortness of breath and wheezing.    Cardiovascular:  Negative for chest pain and palpitations.   Gastrointestinal:  Positive for abdominal pain, diarrhea and nausea. Negative for blood in stool and vomiting.        LLQ abd pain- improved   Endocrine: Negative for polydipsia and polyphagia.   Genitourinary:  Negative for difficulty urinating, dysuria, flank pain and hematuria.   Musculoskeletal:  Negative for gait problem, neck pain and neck stiffness.   Skin:  Negative for color change and rash.   Allergic/Immunologic: Negative for food allergies.   Neurological:  Negative for seizures, facial asymmetry, speech difficulty and weakness.   Hematological:  Does not bruise/bleed easily.   Psychiatric/Behavioral:  Negative for agitation, behavioral problems, confusion, hallucinations and suicidal ideas. The patient is not nervous/anxious.    All other systems reviewed and are negative.  Objective:     Vital Signs (Most Recent):  Temp: 98.6 °F (37 °C) (09/02/22 1610)  Pulse: 80 (09/02/22 1610)  Resp: 20 (09/02/22 1610)  BP: 124/64 (09/02/22 1610)  SpO2: 96 % (09/02/22 1610) Vital Signs (24h Range):  Temp:   [98 °F (36.7 °C)-99.8 °F (37.7 °C)] 98.6 °F (37 °C)  Pulse:  [80-92] 80  Resp:  [18-20] 20  SpO2:  [94 %-96 %] 96 %  BP: ()/(53-64) 124/64     Weight: 84.4 kg (186 lb 1.1 oz)  Body mass index is 33.49 kg/m².    Intake/Output Summary (Last 24 hours) at 9/2/2022 1643  Last data filed at 9/2/2022 1400  Gross per 24 hour   Intake 1456.35 ml   Output --   Net 1456.35 ml      Physical Exam  Vitals and nursing note reviewed.   Constitutional:       General: She is awake. She is not in acute distress.     Appearance: She is obese. She is not ill-appearing or diaphoretic.   HENT:      Head: Normocephalic and atraumatic.      Mouth/Throat:      Mouth: Mucous membranes are dry.   Eyes:      General: No scleral icterus.        Right eye: No discharge.         Left eye: No discharge.      Extraocular Movements: Extraocular movements intact.      Conjunctiva/sclera: Conjunctivae normal.      Pupils: Pupils are equal, round, and reactive to light.   Cardiovascular:      Rate and Rhythm: Normal rate and regular rhythm.      Pulses: Normal pulses.      Heart sounds: Normal heart sounds. No murmur heard.  Pulmonary:      Effort: Pulmonary effort is normal. No respiratory distress.      Breath sounds: Normal breath sounds. No wheezing, rhonchi or rales.   Abdominal:      General: Bowel sounds are normal. There is no distension.      Palpations: Abdomen is soft.      Tenderness: There is abdominal tenderness (left LMQ/LLQ). There is no guarding.   Genitourinary:     Comments: Not examined  Musculoskeletal:         General: Normal range of motion.      Cervical back: Normal range of motion and neck supple. No rigidity or tenderness.      Right lower leg: No edema.      Left lower leg: No edema.   Skin:     General: Skin is warm and dry.      Capillary Refill: Capillary refill takes less than 2 seconds.      Comments: Decreased skin turgor   Neurological:      General: No focal deficit present.      Mental Status: She is alert and  oriented to person, place, and time. Mental status is at baseline.      Cranial Nerves: No cranial nerve deficit.      Motor: No weakness.   Psychiatric:         Attention and Perception: Attention normal.         Mood and Affect: Mood normal.         Speech: Speech normal.         Behavior: Behavior normal. Behavior is cooperative.         Thought Content: Thought content normal.         Judgment: Judgment normal.          Lab Results   Component Value Date    WBC 15.59 (H) 09/02/2022    HGB 13.3 09/02/2022    HCT 38.6 09/02/2022    MCV 87 09/02/2022     09/02/2022       BMP  Lab Results   Component Value Date     09/01/2022    K 4.2 09/01/2022     09/01/2022    CO2 15 (L) 09/01/2022    BUN 3 (L) 09/01/2022    CREATININE 0.8 09/01/2022    CALCIUM 8.3 (L) 09/01/2022    ANIONGAP 16 09/01/2022    ESTGFRAFRICA >60.0 05/23/2022    EGFRNONAA >60.0 05/23/2022             Assessment/Plan:      * Colitis  -colonoscopy with biopsy 8/17, reveals evidence of ulcerative colitis.    -has not seen gastroenterology outpatient   -GI consulted- pt to follow-up IBD clinic   -continue IV fluids.  -IV antibiotics  -CRP elevated   - IV steroids held due to possible infection   -analgesia and antiemetics as needed   -NPO-pt taking sips of water with ice in room without distress     9/1:   WBC remains elevated but likely more reactive. No left shift noted.   Afebrile   If Procal is WNL plan to transition to PO abx   Advance diet as tolerated and d/c in the AM   Plan to follow up with IBD clinic after d/c.   GI following     9/02 Patient on Iv steroids  Full liquid diet      Hepatomegaly  9/02 Noted   Monitor      Fatty liver  9/02 Noted   Monitor      History of gastric bypass  9/02 Noted      Hypoalbuminemia  9/02 Monitor  Add po supplement      Hypocalcemia excluded  9/02 Corrected calcium is 9.1      Behcet's syndrome involving oral mucosa  -will monitor   -GI following   -will consider antiinflammatory as needed    -will hold on steroid use at this time     9/1:  Behcet's can sometimes mimic UC   ESR and CRP not gravely elevated  Defer to IBD clinic to determine        Sepsis secondary to colitis  8/29 afebrile, WBC elevated at [address]>14.09.    -new diagnosis of IBD/UC   -allergic to penicillin  -IV hydration   -IV ciprofloxacin, metronidazole empirically.  -hemodynamically stable  -UA results abnormal with 2+ leukocytes     9/02   This patient does have evidence of infective focus  My overall impression is sepsis. Vital signs were reviewed and noted in progress note.  Antibiotics given-   Antibiotics (From admission, onward)    Start     Stop Route Frequency Ordered    09/01/22 2100  ciprofloxacin HCl tablet 750 mg         -- Oral Every 12 hours 09/01/22 1416    09/01/22 1400  metroNIDAZOLE tablet 500 mg         -- Oral Every 8 hours 09/01/22 1240        Cultures were taken-   Microbiology Results (last 7 days)     Procedure Component Value Units Date/Time    Stool culture [[id number]] Collected: 08/30/22 1521    Order Status: Completed Specimen: Stool Updated: 09/01/22 1247     Stool Culture Nothing significant to date    E. coli 0157 antigen [[id number]] Collected: 08/30/22 1521    Order Status: Completed Specimen: Stool Updated: 09/01/22 0908     Shiga Toxin 1 E.coli Negative     Shiga Toxin 2 E.coli Negative    Clostridium difficile EIA [[id number]] Collected: 08/30/22 1521    Order Status: Completed Specimen: Stool Updated: 08/30/22 1630     C. diff Antigen Negative     C difficile Toxins A+B, EIA Negative     Comment: Testing not recommended for children <24 months old.            Source- Colitis    Source control Achieved by- Symptomatic relief        Asthma  8/29 -not in exacerbation.    - bronchodilators as needed      Immune deficiency disorder  8/29  - pt followed outpatient by immunologist and currently taking IgG with last dose due 4 days ago     Hypothyroidism  8/29 -synthroid continued       Hyperlipidemia  8/29  -continue statin.        VTE Risk Mitigation (From admission, onward)         Ordered     Place sequential compression device  Until discontinued         08/29/22 2045                Discharge Planning   VARGAS:      Code Status: Prior   Is the patient medically ready for discharge?:     Reason for patient still in hospital (select all that apply): Treatment  Discharge Plan A: Home      Time spent seeing patient( greater than 1/2 spent in direct contact) :  38 minutes    JOHN Forrest  Department of Hospital Medicine   [facility] - Med Surg

## 2022-09-02 NOTE — ASSESSMENT & PLAN NOTE
Patient with left sided colitis on recent colonoscopy. Could represent IBD vs infection.   She has a history of Buhcet's which can cause GI tract ulcers, and can be difficult to differentiate from inflammatory bowel disease. However, the pattern is usually ascending colon, cecum and TI, and her findings are mostly left sided.   - Infection work up unrevealing. Will start IV steroids today.   - Continue to monitor labs for stool results.  - Continue Dicyclomine for abdominal cramping.   - Advance diet as tolerated to low residue.   - Antiemetics as needed.   - Patient already has a follow up scheduled in IBD clinic.

## 2022-09-02 NOTE — PLAN OF CARE
AAOx4. Pt nausea intermittent. Pt spouse @ bedside. Pt remained free from fall and injury. No sign of any distress noted. Safety precautions alert. Pt asked to call for assistance if needed. IV access clean dry and intact saline locked. Chart checked reviewed. VSS. Will continue to monitor on going.

## 2022-09-02 NOTE — ASSESSMENT & PLAN NOTE
8/29  - pt followed outpatient by immunologist and currently taking IgG with last dose due 4 days ago

## 2022-09-03 PROBLEM — E86.0 DEHYDRATION: Status: ACTIVE | Noted: 2022-09-03

## 2022-09-03 PROBLEM — E83.42 HYPOMAGNESEMIA: Status: ACTIVE | Noted: 2022-09-03

## 2022-09-03 LAB
ANION GAP SERPL CALC-SCNC: 11 MMOL/L (ref 8–16)
BACTERIA STL CULT: NORMAL
BASOPHILS # BLD AUTO: 0.16 K/UL (ref 0–0.2)
BASOPHILS NFR BLD: 0.9 % (ref 0–1.9)
BUN SERPL-MCNC: 3 MG/DL (ref 8–23)
CALCIUM SERPL-MCNC: 7.7 MG/DL (ref 8.7–10.5)
CHLORIDE SERPL-SCNC: 108 MMOL/L (ref 95–110)
CO2 SERPL-SCNC: 20 MMOL/L (ref 23–29)
CREAT SERPL-MCNC: 0.7 MG/DL (ref 0.5–1.4)
DIFFERENTIAL METHOD: ABNORMAL
EOSINOPHIL # BLD AUTO: 0 K/UL (ref 0–0.5)
EOSINOPHIL NFR BLD: 0.1 % (ref 0–8)
ERYTHROCYTE [DISTWIDTH] IN BLOOD BY AUTOMATED COUNT: 14.2 % (ref 11.5–14.5)
EST. GFR  (NO RACE VARIABLE): >60 ML/MIN/1.73 M^2
GLUCOSE SERPL-MCNC: 113 MG/DL (ref 70–110)
HCT VFR BLD AUTO: 33.1 % (ref 37–48.5)
HGB BLD-MCNC: 11.6 G/DL (ref 12–16)
IMM GRANULOCYTES # BLD AUTO: 0.45 K/UL (ref 0–0.04)
IMM GRANULOCYTES NFR BLD AUTO: 2.6 % (ref 0–0.5)
LYMPHOCYTES # BLD AUTO: 1.3 K/UL (ref 1–4.8)
LYMPHOCYTES NFR BLD: 7.8 % (ref 18–48)
MAGNESIUM SERPL-MCNC: 1.7 MG/DL (ref 1.6–2.6)
MCH RBC QN AUTO: 29.6 PG (ref 27–31)
MCHC RBC AUTO-ENTMCNC: 35 G/DL (ref 32–36)
MCV RBC AUTO: 84 FL (ref 82–98)
MONOCYTES # BLD AUTO: 2.3 K/UL (ref 0.3–1)
MONOCYTES NFR BLD: 13.5 % (ref 4–15)
NEUTROPHILS # BLD AUTO: 13 K/UL (ref 1.8–7.7)
NEUTROPHILS NFR BLD: 75.1 % (ref 38–73)
NRBC BLD-RTO: 0 /100 WBC
O+P STL MICRO: NORMAL
PLATELET # BLD AUTO: 217 K/UL (ref 150–450)
PMV BLD AUTO: 10.7 FL (ref 9.2–12.9)
POTASSIUM SERPL-SCNC: 3.6 MMOL/L (ref 3.5–5.1)
RBC # BLD AUTO: 3.92 M/UL (ref 4–5.4)
SODIUM SERPL-SCNC: 139 MMOL/L (ref 136–145)
WBC # BLD AUTO: 17.27 K/UL (ref 3.9–12.7)

## 2022-09-03 PROCEDURE — 85025 COMPLETE CBC W/AUTO DIFF WBC: CPT | Performed by: NURSE PRACTITIONER

## 2022-09-03 PROCEDURE — 25000003 PHARM REV CODE 250: Performed by: NURSE PRACTITIONER

## 2022-09-03 PROCEDURE — 99499 UNLISTED E&M SERVICE: CPT | Mod: ,,, | Performed by: INTERNAL MEDICINE

## 2022-09-03 PROCEDURE — 80048 BASIC METABOLIC PNL TOTAL CA: CPT | Performed by: NURSE PRACTITIONER

## 2022-09-03 PROCEDURE — 25000003 PHARM REV CODE 250: Performed by: INTERNAL MEDICINE

## 2022-09-03 PROCEDURE — 63600175 PHARM REV CODE 636 W HCPCS: Performed by: INTERNAL MEDICINE

## 2022-09-03 PROCEDURE — 99499 NO LOS: ICD-10-PCS | Mod: ,,, | Performed by: INTERNAL MEDICINE

## 2022-09-03 PROCEDURE — S0030 INJECTION, METRONIDAZOLE: HCPCS | Performed by: INTERNAL MEDICINE

## 2022-09-03 PROCEDURE — 36415 COLL VENOUS BLD VENIPUNCTURE: CPT | Performed by: NURSE PRACTITIONER

## 2022-09-03 PROCEDURE — 83735 ASSAY OF MAGNESIUM: CPT | Performed by: NURSE PRACTITIONER

## 2022-09-03 PROCEDURE — 63600175 PHARM REV CODE 636 W HCPCS: Performed by: NURSE PRACTITIONER

## 2022-09-03 PROCEDURE — 11000001 HC ACUTE MED/SURG PRIVATE ROOM

## 2022-09-03 PROCEDURE — 25000003 PHARM REV CODE 250: Performed by: PHYSICIAN ASSISTANT

## 2022-09-03 RX ORDER — MAGNESIUM SULFATE 1 G/100ML
1 INJECTION INTRAVENOUS ONCE
Status: COMPLETED | OUTPATIENT
Start: 2022-09-03 | End: 2022-09-03

## 2022-09-03 RX ORDER — SODIUM CHLORIDE 9 MG/ML
INJECTION, SOLUTION INTRAVENOUS CONTINUOUS
Status: DISCONTINUED | OUTPATIENT
Start: 2022-09-03 | End: 2022-09-04

## 2022-09-03 RX ORDER — POTASSIUM CHLORIDE 20 MEQ/1
40 TABLET, EXTENDED RELEASE ORAL ONCE
Status: COMPLETED | OUTPATIENT
Start: 2022-09-03 | End: 2022-09-03

## 2022-09-03 RX ORDER — MAGNESIUM SULFATE HEPTAHYDRATE 40 MG/ML
2 INJECTION, SOLUTION INTRAVENOUS ONCE
Status: COMPLETED | OUTPATIENT
Start: 2022-09-03 | End: 2022-09-03

## 2022-09-03 RX ADMIN — METRONIDAZOLE 500 MG: 500 SOLUTION INTRAVENOUS at 01:09

## 2022-09-03 RX ADMIN — HYDROCORTISONE ACETATE PRAMOXINE HCL: 2.5; 1 CREAM TOPICAL at 09:09

## 2022-09-03 RX ADMIN — DICYCLOMINE HYDROCHLORIDE 20 MG: 20 TABLET ORAL at 09:09

## 2022-09-03 RX ADMIN — POTASSIUM CHLORIDE 40 MEQ: 1500 TABLET, EXTENDED RELEASE ORAL at 12:09

## 2022-09-03 RX ADMIN — DICYCLOMINE HYDROCHLORIDE 20 MG: 20 TABLET ORAL at 06:09

## 2022-09-03 RX ADMIN — MAGNESIUM SULFATE 1 G: 1 INJECTION INTRAVENOUS at 12:09

## 2022-09-03 RX ADMIN — MAGNESIUM SULFATE HEPTAHYDRATE 2 G: 40 INJECTION, SOLUTION INTRAVENOUS at 03:09

## 2022-09-03 RX ADMIN — SODIUM CHLORIDE: 0.9 INJECTION, SOLUTION INTRAVENOUS at 03:09

## 2022-09-03 RX ADMIN — CIPROFLOXACIN 400 MG: 2 INJECTION, SOLUTION INTRAVENOUS at 08:09

## 2022-09-03 RX ADMIN — LOPERAMIDE HYDROCHLORIDE 2 MG: 2 CAPSULE ORAL at 08:09

## 2022-09-03 RX ADMIN — METRONIDAZOLE 500 MG: 500 SOLUTION INTRAVENOUS at 09:09

## 2022-09-03 RX ADMIN — METRONIDAZOLE 500 MG: 500 SOLUTION INTRAVENOUS at 06:09

## 2022-09-03 RX ADMIN — LEVOTHYROXINE SODIUM 75 MCG: 75 TABLET ORAL at 06:09

## 2022-09-03 RX ADMIN — DICYCLOMINE HYDROCHLORIDE 20 MG: 20 TABLET ORAL at 12:09

## 2022-09-03 RX ADMIN — METHYLPREDNISOLONE SODIUM SUCCINATE 40 MG: 40 INJECTION, POWDER, FOR SOLUTION INTRAMUSCULAR; INTRAVENOUS at 09:09

## 2022-09-03 RX ADMIN — DIPHENHYDRAMINE HYDROCHLORIDE 25 MG: 25 CAPSULE ORAL at 09:09

## 2022-09-03 RX ADMIN — HYDROCORTISONE ACETATE PRAMOXINE HCL: 2.5; 1 CREAM TOPICAL at 08:09

## 2022-09-03 RX ADMIN — HYDROCORTISONE ACETATE PRAMOXINE HCL: 2.5; 1 CREAM TOPICAL at 03:09

## 2022-09-03 RX ADMIN — ALUMINUM HYDROXIDE, MAGNESIUM HYDROXIDE, AND SIMETHICONE 30 ML: 200; 200; 20 SUSPENSION ORAL at 10:09

## 2022-09-03 NOTE — ASSESSMENT & PLAN NOTE
8/29 afebrile, WBC elevated at [address]>14.09.    -new diagnosis of IBD/UC   -allergic to penicillin  -IV hydration   -IV ciprofloxacin, metronidazole empirically.  -hemodynamically stable  -UA results abnormal with 2+ leukocytes     9/02   This patient does have evidence of infective focus  My overall impression is sepsis. Vital signs were reviewed and noted in progress note.  Antibiotics given-   Antibiotics (From admission, onward)    Start     Stop Route Frequency Ordered    09/02/22 2100  metronidazole IVPB 500 mg         -- IV Every 8 hours (non-standard times) 09/02/22 1729 09/02/22 2000  ciprofloxacin (CIPRO)400mg/200ml D5W IVPB 400 mg         -- IV Every 12 hours (non-standard times) 09/02/22 1729        Cultures were taken-   Microbiology Results (last 7 days)     Procedure Component Value Units Date/Time    Stool culture [[id number]] Collected: 08/30/22 1521    Order Status: Completed Specimen: Stool Updated: 09/01/22 1247     Stool Culture Nothing significant to date    E. coli 0157 antigen [[id number]] Collected: 08/30/22 1521    Order Status: Completed Specimen: Stool Updated: 09/01/22 0908     Shiga Toxin 1 E.coli Negative     Shiga Toxin 2 E.coli Negative    Clostridium difficile EIA [[id number]] Collected: 08/30/22 1521    Order Status: Completed Specimen: Stool Updated: 08/30/22 1630     C. diff Antigen Negative     C difficile Toxins A+B, EIA Negative     Comment: Testing not recommended for children <24 months old.            Source- Colitis    Source control Achieved by- Symptomatic relief

## 2022-09-03 NOTE — SUBJECTIVE & OBJECTIVE
Interval History:  Persistent diarrhea. Continue current treatment. Monitor.     Review of Systems   Constitutional:  Positive for appetite change and fatigue. Negative for activity change, chills and fever.   HENT:  Negative for congestion, ear discharge, ear pain, facial swelling, rhinorrhea, sore throat and trouble swallowing.    Eyes:  Negative for pain, redness and visual disturbance.   Respiratory:  Negative for cough, shortness of breath and wheezing.    Cardiovascular:  Negative for chest pain and palpitations.   Gastrointestinal:  Positive for abdominal pain, diarrhea and nausea. Negative for blood in stool and vomiting.        LLQ abd pain    Endocrine: Negative for polydipsia and polyphagia.   Genitourinary:  Negative for difficulty urinating, dysuria, flank pain and hematuria.   Musculoskeletal:  Negative for gait problem, neck pain and neck stiffness.   Skin:  Negative for color change and rash.   Allergic/Immunologic: Negative for food allergies.   Neurological:  Negative for seizures, facial asymmetry, speech difficulty and weakness.   Hematological:  Does not bruise/bleed easily.   Psychiatric/Behavioral:  Negative for agitation, behavioral problems, confusion, hallucinations and suicidal ideas. The patient is not nervous/anxious.    All other systems reviewed and are negative.  Objective:     Vital Signs (Most Recent):  Temp: 98.2 °F (36.8 °C) (09/03/22 1147)  Pulse: 78 (09/03/22 1147)  Resp: 20 (09/03/22 1147)  BP: 96/60 (09/03/22 1147)  SpO2: 96 % (09/03/22 1147) Vital Signs (24h Range):  Temp:  [97.9 °F (36.6 °C)-98.6 °F (37 °C)] 98.2 °F (36.8 °C)  Pulse:  [72-80] 78  Resp:  [17-20] 20  SpO2:  [96 %-98 %] 96 %  BP: ()/(49-70) 96/60     Weight: 84.4 kg (186 lb 1.1 oz)  Body mass index is 33.49 kg/m².    Intake/Output Summary (Last 24 hours) at 9/3/2022 1420  Last data filed at 9/3/2022 1244  Gross per 24 hour   Intake 2552.95 ml   Output --   Net 2552.95 ml      Physical Exam  Vitals and  nursing note reviewed.   Constitutional:       General: She is awake. She is not in acute distress.     Appearance: She is obese. She is not ill-appearing or diaphoretic.   HENT:      Head: Normocephalic and atraumatic.      Mouth/Throat:      Mouth: Mucous membranes are dry.   Eyes:      General: No scleral icterus.        Right eye: No discharge.         Left eye: No discharge.      Extraocular Movements: Extraocular movements intact.      Conjunctiva/sclera: Conjunctivae normal.      Pupils: Pupils are equal, round, and reactive to light.   Cardiovascular:      Rate and Rhythm: Normal rate and regular rhythm.      Pulses: Normal pulses.      Heart sounds: Normal heart sounds. No murmur heard.  Pulmonary:      Effort: Pulmonary effort is normal. No respiratory distress.      Breath sounds: Normal breath sounds. No wheezing, rhonchi or rales.   Abdominal:      General: Bowel sounds are normal. There is no distension.      Palpations: Abdomen is soft.      Tenderness: There is abdominal tenderness (left LMQ/LLQ). There is no guarding.   Genitourinary:     Comments: Not examined  Musculoskeletal:         General: Normal range of motion.      Cervical back: Normal range of motion and neck supple. No rigidity or tenderness.      Right lower leg: No edema.      Left lower leg: No edema.   Skin:     General: Skin is warm and dry.      Capillary Refill: Capillary refill takes less than 2 seconds.      Comments: Decreased skin turgor   Neurological:      General: No focal deficit present.      Mental Status: She is alert and oriented to person, place, and time. Mental status is at baseline.      Cranial Nerves: No cranial nerve deficit.      Motor: No weakness.   Psychiatric:         Attention and Perception: Attention normal.         Mood and Affect: Mood normal.         Speech: Speech normal.         Behavior: Behavior normal. Behavior is cooperative.         Thought Content: Thought content normal.         Judgment:  Judgment normal.       Lab Results   Component Value Date    WBC 17.27 (H) 09/03/2022    HGB 11.6 (L) 09/03/2022    HCT 33.1 (L) 09/03/2022    MCV 84 09/03/2022     09/03/2022       BMP  Lab Results   Component Value Date     09/03/2022    K 3.6 09/03/2022     09/03/2022    CO2 20 (L) 09/03/2022    BUN 3 (L) 09/03/2022    CREATININE 0.7 09/03/2022    CALCIUM 7.7 (L) 09/03/2022    ANIONGAP 11 09/03/2022    ESTGFRAFRICA >60.0 05/23/2022    EGFRNONAA >60.0 05/23/2022

## 2022-09-03 NOTE — PROGRESS NOTES
[facility] Medicine  Progress Note    Patient Name: Yodit Canseco  MRN: [id number]  Patient Class: IP- Inpatient   Admission Date: 8/29/2022  Length of Stay: 3 days  Attending Physician: Mando Bourgeois MD  Primary Care Provider: Miky Lewis MD    Subjective:     Principal Problem:Colitis      HPI:  Ms. Canseco is a 61-year-old  female with PMH significant for COPD/asthma, hypothyroidism, hyperlipidemia, has been having intermittent bloody bowel movements.  She underwent colonoscopy by Dr. Del Valle on 08/17, pathology came back positive for IBD/ulcerative colitis.  Patient presented to the ED today complaining of increased bloody bowel movements, diarrhea, associated with generalized weakness, fatigue lower abdominal discomfort.  Afebrile, hemodynamically stable in the ED. WBC elevated 15,500, 0% bands.  Lipase 257.  Hemoglobin 13.6, hematocrit 39.7.  Started on IV ciprofloxacin, metronidazole and placed in observation for IV fluids and GI evaluation in a.m..    Admitting diagnosis:  New diagnosis of ulcerative colitis.  Continued bloody diarrhea.  Leukocytosis.      Overview/Hospital Course:  Pt admitted to Observation for suspected Ulcerative Colitis with rectal bleeding in the setting of Buhcet's. Imaging also supports increased vascularity around the colon with pericolonic lymph nodes. Correlate clinically for colitis. Status post gastric bypass and cholecystectomy Hepatomegaly with fatty infiltration liver. GI consulted. IV antibiotics continued and IV steroids not given at this time due possibility of infection. CRP elevated. H/H stable. UA results abnormal. C-diff results pending. NPO. Analgesia and antiemetics as needed. On 8/31/22, leukocytosis continued with mild improvement. IV antibiotics continued. Pt reports abdominal soreness and diarrhea with analgesia continued. Pt tolerating sips of water with ice without distress. GI following and outpatient follow up with IBD  clinic recommended.     9/1: WBC remains elevated but likely more reactive. If Procal is WNL plan to transition to PO abx, advance diet as tolerated and d/c in the AM. Plan to follow up with IBD clinic after d/c.   9/02 Still with a lot of diarrhea and poor po intake. Continue IVF. Repeat labs in am.   9/03 Persistent diarrhea. Continue current treatment. Monitor.       Interval History:  Persistent diarrhea. Continue current treatment. Monitor.     Review of Systems   Constitutional:  Positive for appetite change and fatigue. Negative for activity change, chills and fever.   HENT:  Negative for congestion, ear discharge, ear pain, facial swelling, rhinorrhea, sore throat and trouble swallowing.    Eyes:  Negative for pain, redness and visual disturbance.   Respiratory:  Negative for cough, shortness of breath and wheezing.    Cardiovascular:  Negative for chest pain and palpitations.   Gastrointestinal:  Positive for abdominal pain, diarrhea and nausea. Negative for blood in stool and vomiting.        LLQ abd pain    Endocrine: Negative for polydipsia and polyphagia.   Genitourinary:  Negative for difficulty urinating, dysuria, flank pain and hematuria.   Musculoskeletal:  Negative for gait problem, neck pain and neck stiffness.   Skin:  Negative for color change and rash.   Allergic/Immunologic: Negative for food allergies.   Neurological:  Negative for seizures, facial asymmetry, speech difficulty and weakness.   Hematological:  Does not bruise/bleed easily.   Psychiatric/Behavioral:  Negative for agitation, behavioral problems, confusion, hallucinations and suicidal ideas. The patient is not nervous/anxious.    All other systems reviewed and are negative.  Objective:     Vital Signs (Most Recent):  Temp: 98.2 °F (36.8 °C) (09/03/22 1147)  Pulse: 78 (09/03/22 1147)  Resp: 20 (09/03/22 1147)  BP: 96/60 (09/03/22 1147)  SpO2: 96 % (09/03/22 1147) Vital Signs (24h Range):  Temp:  [97.9 °F (36.6 °C)-98.6 °F (37 °C)]  98.2 °F (36.8 °C)  Pulse:  [72-80] 78  Resp:  [17-20] 20  SpO2:  [96 %-98 %] 96 %  BP: ()/(49-70) 96/60     Weight: 84.4 kg (186 lb 1.1 oz)  Body mass index is 33.49 kg/m².    Intake/Output Summary (Last 24 hours) at 9/3/2022 1420  Last data filed at 9/3/2022 1244  Gross per 24 hour   Intake 2552.95 ml   Output --   Net 2552.95 ml      Physical Exam  Vitals and nursing note reviewed.   Constitutional:       General: She is awake. She is not in acute distress.     Appearance: She is obese. She is not ill-appearing or diaphoretic.   HENT:      Head: Normocephalic and atraumatic.      Mouth/Throat:      Mouth: Mucous membranes are dry.   Eyes:      General: No scleral icterus.        Right eye: No discharge.         Left eye: No discharge.      Extraocular Movements: Extraocular movements intact.      Conjunctiva/sclera: Conjunctivae normal.      Pupils: Pupils are equal, round, and reactive to light.   Cardiovascular:      Rate and Rhythm: Normal rate and regular rhythm.      Pulses: Normal pulses.      Heart sounds: Normal heart sounds. No murmur heard.  Pulmonary:      Effort: Pulmonary effort is normal. No respiratory distress.      Breath sounds: Normal breath sounds. No wheezing, rhonchi or rales.   Abdominal:      General: Bowel sounds are normal. There is no distension.      Palpations: Abdomen is soft.      Tenderness: There is abdominal tenderness (left LMQ/LLQ). There is no guarding.   Genitourinary:     Comments: Not examined  Musculoskeletal:         General: Normal range of motion.      Cervical back: Normal range of motion and neck supple. No rigidity or tenderness.      Right lower leg: No edema.      Left lower leg: No edema.   Skin:     General: Skin is warm and dry.      Capillary Refill: Capillary refill takes less than 2 seconds.      Comments: Decreased skin turgor   Neurological:      General: No focal deficit present.      Mental Status: She is alert and oriented to person, place, and  time. Mental status is at baseline.      Cranial Nerves: No cranial nerve deficit.      Motor: No weakness.   Psychiatric:         Attention and Perception: Attention normal.         Mood and Affect: Mood normal.         Speech: Speech normal.         Behavior: Behavior normal. Behavior is cooperative.         Thought Content: Thought content normal.         Judgment: Judgment normal.       Lab Results   Component Value Date    WBC 17.27 (H) 09/03/2022    HGB 11.6 (L) 09/03/2022    HCT 33.1 (L) 09/03/2022    MCV 84 09/03/2022     09/03/2022       BMP  Lab Results   Component Value Date     09/03/2022    K 3.6 09/03/2022     09/03/2022    CO2 20 (L) 09/03/2022    BUN 3 (L) 09/03/2022    CREATININE 0.7 09/03/2022    CALCIUM 7.7 (L) 09/03/2022    ANIONGAP 11 09/03/2022    ESTGFRAFRICA >60.0 05/23/2022    EGFRNONAA >60.0 05/23/2022           Assessment/Plan:      * Colitis  -colonoscopy with biopsy 8/17, reveals evidence of ulcerative colitis.    -has not seen gastroenterology outpatient   -GI consulted- pt to follow-up IBD clinic   -continue IV fluids.  -IV antibiotics  -CRP elevated   - IV steroids held due to possible infection   -analgesia and antiemetics as needed   -NPO-pt taking sips of water with ice in room without distress     9/1:   WBC remains elevated but likely more reactive. No left shift noted.   Afebrile   If Procal is WNL plan to transition to PO abx   Advance diet as tolerated and d/c in the AM   Plan to follow up with IBD clinic after d/c.   GI following     9/02 Patient on Iv steroids  Full liquid diet    9/03 Still with persistent diarrhea. Increased WBCs today suspected secondary to Iv steroids. Monitor.     Hypomagnesemia  9/03 Add 3gm IV Mag sulfate  Avoid po mag at this time due to ongoing issues with diarrhea      Hepatomegaly  9/02 Noted   Monitor      Fatty liver  9/02 Noted   Monitor      History of gastric bypass  9/02 Noted      Hypoalbuminemia  9/02 Monitor  Add po  supplement      Hypocalcemia excluded  9/02 Corrected calcium is 9.1- Wnl      Behcet's syndrome involving oral mucosa  -will monitor   -GI following   -will consider antiinflammatory as needed   -will hold on steroid use at this time     9/1:  Behcet's can sometimes mimic UC   ESR and CRP not gravely elevated  Defer to IBD clinic to determine        Sepsis secondary to colitis  8/29 afebrile, WBC elevated at [address]>14.09.    -new diagnosis of IBD/UC   -allergic to penicillin  -IV hydration   -IV ciprofloxacin, metronidazole empirically.  -hemodynamically stable  -UA results abnormal with 2+ leukocytes     9/02   This patient does have evidence of infective focus  My overall impression is sepsis. Vital signs were reviewed and noted in progress note.  Antibiotics given-   Antibiotics (From admission, onward)    Start     Stop Route Frequency Ordered    09/02/22 2100  metronidazole IVPB 500 mg         -- IV Every 8 hours (non-standard times) 09/02/22 1729    09/02/22 2000  ciprofloxacin (CIPRO)400mg/200ml D5W IVPB 400 mg         -- IV Every 12 hours (non-standard times) 09/02/22 1729        Cultures were taken-   Microbiology Results (last 7 days)     Procedure Component Value Units Date/Time    Stool culture [[id number]] Collected: 08/30/22 1521    Order Status: Completed Specimen: Stool Updated: 09/01/22 1247     Stool Culture Nothing significant to date    E. coli 0157 antigen [[id number]] Collected: 08/30/22 1521    Order Status: Completed Specimen: Stool Updated: 09/01/22 0908     Shiga Toxin 1 E.coli Negative     Shiga Toxin 2 E.coli Negative    Clostridium difficile EIA [[id number]] Collected: 08/30/22 1521    Order Status: Completed Specimen: Stool Updated: 08/30/22 1630     C. diff Antigen Negative     C difficile Toxins A+B, EIA Negative     Comment: Testing not recommended for children <24 months old.            Source- Colitis    Source control Achieved by- Symptomatic relief        Asthma  8/29 -not in  exacerbation.    - bronchodilators as needed  9/03 Stable      Immune deficiency disorder  8/29  - pt followed outpatient by immunologist and currently taking IgG with last dose due 4 days ago     Hypothyroidism  8/29 -synthroid continued       Dehydration-  Continue IVF    Hyperlipidemia  8/29 -continue statin.        VTE Risk Mitigation (From admission, onward)         Ordered     Place STEPHEN hose  Until discontinued         09/02/22 1654     Place sequential compression device  Until discontinued         08/29/22 2045                Discharge Planning   VARGAS:      Code Status: Prior   Is the patient medically ready for discharge?:     Reason for patient still in hospital (select all that apply): Treatment  Discharge Plan A: Home        Time spent seeing patient( greater than 1/2 spent in direct contact) :  38 minutes    JOHN Forrest  Department of Hospital Medicine   [facility] - Med Surg

## 2022-09-03 NOTE — PROGRESS NOTES
Briefly passed by patient's room. She reports 4 stools today thus far. She is tolerating PO and ambulating. Anticipate discharge tomorrow. No new recommendations.

## 2022-09-03 NOTE — PLAN OF CARE
AAOx4. Pt spouse @ bedside. Pt remained free from fall and injury. No sign of any distress noted. Safety precautions alert. Pt asked to call for assistance if needed. IV access clean dry and intact infusing. Chart checked reviewed. VSS. Will continue to monitor on going.

## 2022-09-03 NOTE — PLAN OF CARE
Problem: Adult Inpatient Plan of Care  Goal: Plan of Care Review  Outcome: Ongoing, Progressing     Problem: Adult Inpatient Plan of Care  Goal: Absence of Hospital-Acquired Illness or Injury  Outcome: Ongoing, Progressing     Problem: Adult Inpatient Plan of Care  Goal: Patient-Specific Goal (Individualized)  Outcome: Ongoing, Progressing     Problem: Adult Inpatient Plan of Care  Goal: Optimal Comfort and Wellbeing  Outcome: Ongoing, Progressing     Problem: Pain Acute  Goal: Acceptable Pain Control and Functional Ability  Outcome: Ongoing, Progressing

## 2022-09-03 NOTE — ASSESSMENT & PLAN NOTE
-colonoscopy with biopsy 8/17, reveals evidence of ulcerative colitis.    -has not seen gastroenterology outpatient   -GI consulted- pt to follow-up IBD clinic   -continue IV fluids.  -IV antibiotics  -CRP elevated   - IV steroids held due to possible infection   -analgesia and antiemetics as needed   -NPO-pt taking sips of water with ice in room without distress     9/1:   WBC remains elevated but likely more reactive. No left shift noted.   Afebrile   If Procal is WNL plan to transition to PO abx   Advance diet as tolerated and d/c in the AM   Plan to follow up with IBD clinic after d/c.   GI following     9/02 Patient on Iv steroids  Full liquid diet    9/03 Still with persistent diarrhea. Increased WBCs today suspected secondary to Iv steroids. Monitor.

## 2022-09-04 VITALS
BODY MASS INDEX: 32.97 KG/M2 | DIASTOLIC BLOOD PRESSURE: 55 MMHG | HEART RATE: 67 BPM | OXYGEN SATURATION: 95 % | TEMPERATURE: 98 F | WEIGHT: 186.06 LBS | SYSTOLIC BLOOD PRESSURE: 100 MMHG | RESPIRATION RATE: 20 BRPM | HEIGHT: 63 IN

## 2022-09-04 PROBLEM — A41.9 SEPSIS: Status: RESOLVED | Noted: 2018-05-07 | Resolved: 2022-09-04

## 2022-09-04 PROBLEM — E83.51 HYPOCALCEMIA: Status: RESOLVED | Noted: 2022-09-02 | Resolved: 2022-09-04

## 2022-09-04 PROBLEM — K51.90 ULCERATIVE COLITIS: Status: ACTIVE | Noted: 2022-08-29

## 2022-09-04 PROBLEM — E86.0 DEHYDRATION: Status: RESOLVED | Noted: 2022-09-03 | Resolved: 2022-09-04

## 2022-09-04 LAB
ALBUMIN SERPL BCP-MCNC: 2.5 G/DL (ref 3.5–5.2)
ALP SERPL-CCNC: 48 U/L (ref 55–135)
ALT SERPL W/O P-5'-P-CCNC: 12 U/L (ref 10–44)
ANION GAP SERPL CALC-SCNC: 7 MMOL/L (ref 8–16)
AST SERPL-CCNC: 16 U/L (ref 10–40)
BASOPHILS # BLD AUTO: 0.03 K/UL (ref 0–0.2)
BASOPHILS NFR BLD: 0.2 % (ref 0–1.9)
BILIRUB SERPL-MCNC: 0.2 MG/DL (ref 0.1–1)
BUN SERPL-MCNC: 4 MG/DL (ref 8–23)
CALCIUM SERPL-MCNC: 7.7 MG/DL (ref 8.7–10.5)
CHLORIDE SERPL-SCNC: 112 MMOL/L (ref 95–110)
CO2 SERPL-SCNC: 25 MMOL/L (ref 23–29)
CREAT SERPL-MCNC: 0.8 MG/DL (ref 0.5–1.4)
CRP SERPL-MCNC: 34.6 MG/L (ref 0–8.2)
DIFFERENTIAL METHOD: ABNORMAL
EOSINOPHIL # BLD AUTO: 0.2 K/UL (ref 0–0.5)
EOSINOPHIL NFR BLD: 1.3 % (ref 0–8)
ERYTHROCYTE [DISTWIDTH] IN BLOOD BY AUTOMATED COUNT: 15.1 % (ref 11.5–14.5)
ERYTHROCYTE [SEDIMENTATION RATE] IN BLOOD BY WESTERGREN METHOD: 10 MM/HR (ref 0–20)
EST. GFR  (NO RACE VARIABLE): >60 ML/MIN/1.73 M^2
GLUCOSE SERPL-MCNC: 95 MG/DL (ref 70–110)
HCT VFR BLD AUTO: 33.6 % (ref 37–48.5)
HGB BLD-MCNC: 11.1 G/DL (ref 12–16)
IMM GRANULOCYTES # BLD AUTO: 0.45 K/UL (ref 0–0.04)
IMM GRANULOCYTES NFR BLD AUTO: 3.3 % (ref 0–0.5)
LYMPHOCYTES # BLD AUTO: 2.4 K/UL (ref 1–4.8)
LYMPHOCYTES NFR BLD: 18 % (ref 18–48)
MAGNESIUM SERPL-MCNC: 2 MG/DL (ref 1.6–2.6)
MCH RBC QN AUTO: 28.3 PG (ref 27–31)
MCHC RBC AUTO-ENTMCNC: 33 G/DL (ref 32–36)
MCV RBC AUTO: 86 FL (ref 82–98)
MONOCYTES # BLD AUTO: 2 K/UL (ref 0.3–1)
MONOCYTES NFR BLD: 14.6 % (ref 4–15)
NEUTROPHILS # BLD AUTO: 8.4 K/UL (ref 1.8–7.7)
NEUTROPHILS NFR BLD: 62.6 % (ref 38–73)
NRBC BLD-RTO: 0 /100 WBC
PLATELET # BLD AUTO: 215 K/UL (ref 150–450)
PMV BLD AUTO: 11 FL (ref 9.2–12.9)
POTASSIUM SERPL-SCNC: 3.8 MMOL/L (ref 3.5–5.1)
PROT SERPL-MCNC: 4.8 G/DL (ref 6–8.4)
RBC # BLD AUTO: 3.92 M/UL (ref 4–5.4)
SODIUM SERPL-SCNC: 144 MMOL/L (ref 136–145)
WBC # BLD AUTO: 13.5 K/UL (ref 3.9–12.7)

## 2022-09-04 PROCEDURE — 99232 PR SUBSEQUENT HOSPITAL CARE,LEVL II: ICD-10-PCS | Mod: ,,, | Performed by: INTERNAL MEDICINE

## 2022-09-04 PROCEDURE — 25000003 PHARM REV CODE 250: Performed by: PHYSICIAN ASSISTANT

## 2022-09-04 PROCEDURE — 25000003 PHARM REV CODE 250: Performed by: NURSE PRACTITIONER

## 2022-09-04 PROCEDURE — 85025 COMPLETE CBC W/AUTO DIFF WBC: CPT | Performed by: NURSE PRACTITIONER

## 2022-09-04 PROCEDURE — 25000003 PHARM REV CODE 250: Performed by: INTERNAL MEDICINE

## 2022-09-04 PROCEDURE — 83735 ASSAY OF MAGNESIUM: CPT | Performed by: NURSE PRACTITIONER

## 2022-09-04 PROCEDURE — 86140 C-REACTIVE PROTEIN: CPT | Performed by: NURSE PRACTITIONER

## 2022-09-04 PROCEDURE — 80053 COMPREHEN METABOLIC PANEL: CPT | Performed by: NURSE PRACTITIONER

## 2022-09-04 PROCEDURE — 85651 RBC SED RATE NONAUTOMATED: CPT | Performed by: NURSE PRACTITIONER

## 2022-09-04 PROCEDURE — 36415 COLL VENOUS BLD VENIPUNCTURE: CPT | Performed by: NURSE PRACTITIONER

## 2022-09-04 PROCEDURE — 63600175 PHARM REV CODE 636 W HCPCS: Performed by: INTERNAL MEDICINE

## 2022-09-04 PROCEDURE — 99232 SBSQ HOSP IP/OBS MODERATE 35: CPT | Mod: ,,, | Performed by: INTERNAL MEDICINE

## 2022-09-04 PROCEDURE — S0030 INJECTION, METRONIDAZOLE: HCPCS | Performed by: INTERNAL MEDICINE

## 2022-09-04 RX ORDER — LOPERAMIDE HYDROCHLORIDE 2 MG/1
2 CAPSULE ORAL 3 TIMES DAILY PRN
Qty: 45 CAPSULE | Refills: 0 | Status: SHIPPED | OUTPATIENT
Start: 2022-09-04 | End: 2022-09-04 | Stop reason: SDUPTHER

## 2022-09-04 RX ORDER — DICYCLOMINE HYDROCHLORIDE 20 MG/1
20 TABLET ORAL
Qty: 60 TABLET | Refills: 0 | Status: SHIPPED | OUTPATIENT
Start: 2022-09-04 | End: 2022-09-04 | Stop reason: SDUPTHER

## 2022-09-04 RX ORDER — PREDNISONE 20 MG/1
40 TABLET ORAL DAILY
Qty: 60 TABLET | Refills: 0 | Status: ON HOLD | OUTPATIENT
Start: 2022-09-04 | End: 2022-10-07 | Stop reason: HOSPADM

## 2022-09-04 RX ORDER — HYDROCODONE BITARTRATE AND ACETAMINOPHEN 5; 325 MG/1; MG/1
1 TABLET ORAL EVERY 6 HOURS PRN
Qty: 25 TABLET | Refills: 0 | Status: ON HOLD | OUTPATIENT
Start: 2022-09-04 | End: 2022-10-07 | Stop reason: HOSPADM

## 2022-09-04 RX ORDER — ACETAMINOPHEN 325 MG/1
325 TABLET ORAL EVERY 6 HOURS PRN
Refills: 0
Start: 2022-09-04

## 2022-09-04 RX ORDER — DICYCLOMINE HYDROCHLORIDE 20 MG/1
20 TABLET ORAL
Qty: 60 TABLET | Refills: 0 | Status: SHIPPED | OUTPATIENT
Start: 2022-09-04 | End: 2022-09-07 | Stop reason: SDUPTHER

## 2022-09-04 RX ORDER — HYDROCORTISONE ACETATE PRAMOXINE HCL 2.5; 1 G/100G; G/100G
CREAM TOPICAL 3 TIMES DAILY PRN
Qty: 30 G | Refills: 0 | Status: ON HOLD | OUTPATIENT
Start: 2022-09-04 | End: 2022-10-04

## 2022-09-04 RX ORDER — PREDNISONE 20 MG/1
40 TABLET ORAL DAILY
Qty: 60 TABLET | Refills: 0 | Status: SHIPPED | OUTPATIENT
Start: 2022-09-04 | End: 2022-09-04 | Stop reason: SDUPTHER

## 2022-09-04 RX ORDER — HYDROCORTISONE ACETATE PRAMOXINE HCL 2.5; 1 G/100G; G/100G
CREAM TOPICAL 3 TIMES DAILY PRN
Qty: 30 G | Refills: 0 | Status: SHIPPED | OUTPATIENT
Start: 2022-09-04 | End: 2022-09-04 | Stop reason: SDUPTHER

## 2022-09-04 RX ORDER — LOPERAMIDE HYDROCHLORIDE 2 MG/1
2 CAPSULE ORAL 3 TIMES DAILY PRN
Qty: 45 CAPSULE | Refills: 0 | Status: SHIPPED | OUTPATIENT
Start: 2022-09-04 | End: 2022-09-07 | Stop reason: SDUPTHER

## 2022-09-04 RX ADMIN — ALUMINUM HYDROXIDE, MAGNESIUM HYDROXIDE, AND SIMETHICONE 30 ML: 200; 200; 20 SUSPENSION ORAL at 11:09

## 2022-09-04 RX ADMIN — DICYCLOMINE HYDROCHLORIDE 20 MG: 20 TABLET ORAL at 10:09

## 2022-09-04 RX ADMIN — LEVOTHYROXINE SODIUM 75 MCG: 75 TABLET ORAL at 06:09

## 2022-09-04 RX ADMIN — SODIUM CHLORIDE: 0.9 INJECTION, SOLUTION INTRAVENOUS at 05:09

## 2022-09-04 RX ADMIN — ACETAMINOPHEN 650 MG: 325 TABLET ORAL at 08:09

## 2022-09-04 RX ADMIN — LOPERAMIDE HYDROCHLORIDE 2 MG: 2 CAPSULE ORAL at 10:09

## 2022-09-04 RX ADMIN — HYDROCORTISONE ACETATE PRAMOXINE HCL: 2.5; 1 CREAM TOPICAL at 08:09

## 2022-09-04 RX ADMIN — METHYLPREDNISOLONE SODIUM SUCCINATE 40 MG: 40 INJECTION, POWDER, FOR SOLUTION INTRAMUSCULAR; INTRAVENOUS at 08:09

## 2022-09-04 RX ADMIN — DICYCLOMINE HYDROCHLORIDE 20 MG: 20 TABLET ORAL at 06:09

## 2022-09-04 RX ADMIN — METRONIDAZOLE 500 MG: 500 SOLUTION INTRAVENOUS at 05:09

## 2022-09-04 RX ADMIN — CIPROFLOXACIN 400 MG: 2 INJECTION, SOLUTION INTRAVENOUS at 08:09

## 2022-09-04 NOTE — ASSESSMENT & PLAN NOTE
- left sided colitis on recent colonoscopy  - path consistent with left sided UC  - sydnie CRP and ESR discordance with endoscopic and path findings  - infectious work up negative  - IV steroids since 9/2/22  - IBD clinic appt scheduled 9/7/22

## 2022-09-04 NOTE — ASSESSMENT & PLAN NOTE
- followed by Dr. SANCHEZ, rheumatologist  - known oral ulcers  - treated with Plaquenil and Otezia

## 2022-09-04 NOTE — DISCHARGE SUMMARY
[facility] Medicine  Discharge Summary    Patient Name: Yodit Canseco  MRN: [id number]  Patient Class: IP- Inpatient  Admission Date: 8/29/2022  Hospital Length of Stay: 4 days  Discharge Date and Time:  09/04/2022 1:41 PM  Attending Physician: No att. providers found   Discharging Provider: JOHN Forrest  Primary Care Provider: Miky Lewis MD    HPI:   Ms. Canseco is a 61-year-old  female with PMH significant for COPD/asthma, hypothyroidism, hyperlipidemia, has been having intermittent bloody bowel movements.  She underwent colonoscopy by Dr. Del Valle on 08/17, pathology came back positive for IBD/ulcerative colitis.  Patient presented to the ED today complaining of increased bloody bowel movements, diarrhea, associated with generalized weakness, fatigue lower abdominal discomfort.  Afebrile, hemodynamically stable in the ED. WBC elevated 15,500, 0% bands.  Lipase 257.  Hemoglobin 13.6, hematocrit 39.7.  Started on IV ciprofloxacin, metronidazole and placed in observation for IV fluids and GI evaluation in a.m..    Admitting diagnosis:  New diagnosis of ulcerative colitis.  Continued bloody diarrhea.  Leukocytosis.      * No surgery found *      Hospital Course:   Pt admitted to Observation for suspected Ulcerative Colitis with rectal bleeding in the setting of Buhcet's. Imaging also supports increased vascularity around the colon with pericolonic lymph nodes. Correlate clinically for colitis. Status post gastric bypass and cholecystectomy Hepatomegaly with fatty infiltration liver. GI consulted. IV antibiotics continued and IV steroids not given at this time due possibility of infection. CRP elevated. H/H stable. UA results abnormal. C-diff results pending. NPO. Analgesia and antiemetics as needed. On 8/31/22, leukocytosis continued with mild improvement. IV antibiotics continued. Pt reports abdominal soreness and diarrhea with analgesia continued. Pt tolerating sips of water  with ice without distress. GI following and outpatient follow up with IBD clinic recommended.     9/1: WBC remains elevated but likely more reactive. If Procal is WNL plan to transition to PO abx, advance diet as tolerated and d/c in the AM. Plan to follow up with IBD clinic after d/c.   9/02 Still with a lot of diarrhea and poor po intake. Continue IVF. Repeat labs in am.   9/03 Persistent diarrhea. Continue current treatment. Monitor.   9/04 Patient's overall condition remains stable and improved. Patient cleared for discharge by GI who recommended patient discharge home on Prednisone 40mg daily with IBD clinic to determine taper. Imodium if needed. Infection has been ruled out. Patient scheduled for IBD clinic already scheduled for 9/7/22.       Goals of Care Treatment Preferences:  Code Status: Full Code    Living Will: Yes          Consults:   Consults (From admission, onward)        Status Ordering Provider     Inpatient consult to Midline team           Acknowledged SHAUNA ARRIAZA     Inpatient consult to Gastroenterology  Once        Provider:  Lani Narayan MD    Completed GARY AYERS          Final Active Diagnoses:    Diagnosis Date Noted POA    PRINCIPAL PROBLEM:  Ulcerative colitis [K51.90] 08/29/2022 Yes    Hypomagnesemia [E83.42] 09/03/2022 Yes    Hypoalbuminemia [E88.09] 09/02/2022 Yes    History of gastric bypass [Z98.84] 09/02/2022 Not Applicable    Fatty liver [K76.0] 09/02/2022 Yes    Hepatomegaly [R16.0] 09/02/2022 Yes    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    Why: IBD clinic appt scheduled 9/7/22                     Patient Instructions:      Diet full liquid   Order Comments: No milk only almond milk  No melon  No corn (moderate), no peas, no pineapple no cranberry, no artifical sweeteners,   Only gluten free  No lemon, No lime, No grapefruit, No boost, no okra     Notify your health care provider if you experience any of the following:  temperature >100.4     Notify your health care provider if you experience any of the following:  persistent nausea and vomiting or diarrhea     Notify your health care provider if you experience any of the following:  severe uncontrolled pain     Notify your health care provider if you experience any of the following:  persistent dizziness, light-headedness, or visual disturbances     Notify your health care provider if you experience any of the following:  increased confusion or weakness     Notify your health care provider if you experience any of the following:   Order Comments: Any decline in condition     Activity as tolerated       Significant Diagnostic Studies:   CMP   Recent Labs   Lab 09/03/22  0429 09/04/22  0541    144   K 3.6 3.8    112*   CO2 20* 25   * 95   BUN 3* 4*   CREATININE 0.7 0.8   CALCIUM 7.7* 7.7*   PROT  --  4.8*   ALBUMIN  --  2.5*   BILITOT  --  0.2   ALKPHOS  --  48*   AST  --  16   ALT  --  12   ANIONGAP 11 7*   , CBC   Recent Labs   Lab 09/02/22  1515 09/03/22  0429 09/04/22  0541   WBC 15.59* 17.27* 13.50*   HGB 13.3 11.6* 11.1*   HCT 38.6 33.1* 33.6*    217 215   A1C:   Recent Labs   Lab 07/18/22  0839   HGBA1C 4.7          Procedure Component Value Units Date/Time   X-Ray Chest PA And Lateral [[id number]] Resulted: 09/02/22 0906   Order Status: Completed Updated: 09/02/22 0908   Narrative:     EXAMINATION:   XR CHEST PA  AND LATERAL     CLINICAL HISTORY:   Undetermined CXR in patient with colitis. Rule out inthoracic disease, possible immunosuppression candidate;     COMPARISON:   09/01/2022     FINDINGS:   Cardiac silhouette is normal.  The lungs demonstrate no evidence of active disease.  No evidence of pleural effusion or pneumothorax.  Bones appear intact.    Impression:       No acute process seen.       Electronically signed by: Miguel Myles MD   Date: 09/02/2022   Time: 09:06   X-Ray Chest AP Portable [[id number]] Resulted: 09/01/22 1133   Order Status: Completed Updated: 09/01/22 1136   Narrative:     EXAMINATION:   XR CHEST AP PORTABLE     CLINICAL HISTORY:   elevated WBC count;     TECHNIQUE:   Single frontal view of the chest was performed.     COMPARISON:   Chest radiograph 02/09/2022     FINDINGS:   The left heart border is obscured, which could reflect prominent pericardial fat pad versus lingular infiltrate.Lungs are otherwise clear.  No pleural effusion or pneumothorax.     The cardiac silhouette is normal in size. The hilar and mediastinal contours are unremarkable.     There is degenerative change of the spine, glenohumeral and acromioclavicular joints.  No acute osseous abnormality.    Impression:       Left heart border is obscured, which could reflect prominent pericardial fat pad versus lingular infiltrate.  Correlate clinically.       Electronically signed by: Caty Marshall   Date: 09/01/2022   Time: 11:33   CT Abdomen Pelvis Without Contrast [[id number]] Resulted: 08/29/22 1949   Order Status: Completed Updated: 08/29/22 1951   Narrative:     EXAMINATION:   CT ABDOMEN PELVIS WITHOUT CONTRAST     CLINICAL HISTORY:   Abdominal pain, post-op;     TECHNIQUE:   Low dose axial images, sagittal and coronal reformations were obtained from the lung bases to the pubic symphysis, Oral contrast was not administered.     COMPARISON:   None     FINDINGS:   Heart: Normal in size as far as seen.  No pericardial effusion  as far seen.     Lung Bases: Well aerated, without consolidation or pleural fluid.     Liver: Hepatomegaly and fatty infiltration liver     Gallbladder: Cholecystectomy     Bile Ducts: No evidence of dilated ducts.     Pancreas: No mass or peripancreatic fat stranding.     Spleen: Unremarkable.     Adrenals: Unremarkable.     Kidneys/ Ureters: Unremarkable.     Bladder: No evidence of wall thickening.     Reproductive organs: Unremarkable.     GI Tract/Mesentery: Status post gastric bypass.  No evidence of bowel obstruction or inflammation. No secondary signs of appendicitis.  Increased vascularity around the colon with a small lymph nodes.  Correlate clinically for colitis.     Peritoneal Space: No ascites. No free air.     Retroperitoneum: No significant adenopathy.     Abdominal wall: Unremarkable.     Vasculature: No aneurysm.     Bones: No acute fracture.    Impression:       Increased vascularity around the colon with pericolonic lymph nodes. Correlate clinically for colitis.     Status post gastric bypass and cholecystectomy     Hepatomegaly with fatty infiltration liver     Correlate to surgical history and follow-up as clinically indicated.     All CT scans   are performed using dose optimization techniques including the following: automated exposure control; adjustment of the mA and/or kV; use of iterative reconstruction technique.  Dose modulation was employed for ALARA by means of: Automated exposure control; adjustment of the mA and/or kV according to patient size (this includes techniques or standardized protocols for targeted exams where dose is matched to indication/reason for exam; i.e. extremities or head); and/or use of iterative reconstructive technique.       Electronically signed by: Adolfo Noland   Date: 08/29/2022   Time: 19:49       Procedure Component Value Units Date/Time   Stool culture [[id number]] Collected: 08/30/22 1521   Order Status: Completed Specimen: Stool Updated: 09/03/22 1428     Stool Culture No Salmonella,Shigella,Vibrio,Campylobacter,Yersinia isolated.   Clostridium difficile EIA [[id number]] Collected: 08/30/22 1521   Order Status: Completed Specimen: Stool Updated: 08/30/22 1630    C. diff Antigen Negative    C difficile Toxins A+B, EIA Negative    Comment: Testing not recommended for children <24 months old.      E. coli 0157 antigen [[id number]] Collected: 08/30/22 1521   Order Status: Completed Specimen: Stool Updated: 09/01/22 0908    Shiga Toxin 1 E.coli Negative    Shiga Toxin 2 E.coli Negative         Medications:  Reconciled Home Medications:      Medication List      START taking these medications    dicyclomine 20 mg tablet  Commonly known as: BENTYL  Take 1 tablet (20 mg total) by mouth before meals and at bedtime as needed (abdominal pain/ cramping).     HYDROcodone-acetaminophen 5-325 mg per tablet  Commonly known as: NORCO  Take 1 tablet by mouth every 6 (six) hours as needed for Pain (moderate to severe pain).     hydrocortisone-pramoxine 2.5-1 % Crea  Commonly known as: ANALPRAM-HC  Place rectally 3 (three) times daily as needed (hemorrhoidal pain, discomfort).     loperamide 2 mg capsule  Commonly known as: IMODIUM  Take 1 capsule (2 mg total) by mouth 3 (three) times daily as needed for Diarrhea.        CHANGE how you take these medications    acetaminophen 325 MG tablet  Commonly known as: TYLENOL  Take 1 tablet (325 mg total) by mouth every 6 (six) hours as needed for Pain (Do not take with any other products containing  tylenol or acetaminophen).  What changed: reasons to take this     predniSONE 20 MG tablet  Commonly known as: DELTASONE  Take 2 tablets (40 mg total) by mouth once daily.  What changed:   · medication strength  · how much to take  · Another medication with the same name was removed. Continue taking this medication, and follow the directions you see here.        CONTINUE taking these medications    (MAGIC MOUTHWASH) 1:1:1 BENADRYL 12.5MG/5ML LIQ,  ALUMINUM & MAGNESIUM  Swish and spit 5 mLs every 8 (eight) hours as needed (oral ulcers). Gargle and spit. DO NOT SWALLOW     albuterol 90 mcg/actuation inhaler  Commonly known as: PROAIR HFA  Inhale 2 puffs into the lungs every 6 (six) hours as needed for Wheezing. Rescue     BIOTIN ORAL  Take by mouth once daily.     BREZTRI AEROSPHERE 160-9-4.8 mcg/actuation Hfaa  Generic drug: budesonide-glycopyr-formoterol  Inhale 2 puffs into the lungs 2 (two) times a day.     budesonide 0.25 mg/2 mL nebulizer solution  Commonly known as: PULMICORT  Take 2 mLs (0.25 mg total) by nebulization once daily. Controller     cholecalciferol (vitamin D3) 50 mcg (2,000 unit) Cap capsule  Commonly known as: VITAMIN D3  Take 1 capsule by mouth once daily.     * clobetasoL 0.05 % cream  Commonly known as: TEMOVATE  APPLY TOPICALLY TO THE AFFECTED AREA TWICE DAILY     * clobetasoL 0.05 % external solution  Commonly known as: TEMOVATE  Apply topically once daily.     desonide 0.05 % cream  Commonly known as: DESOWEN  Apply topically 2 (two) times daily.     estradioL 1 MG tablet  Commonly known as: ESTRACE  TAKE 1 TABLET(1 MG) BY MOUTH EVERY DAY     hydrOXYchloroQUINE 200 mg tablet  Commonly known as: PLAQUENIL  Take 1 tablet (200 mg total) by mouth 2 (two) times daily.     immun glob G(IgG)-pro-IgA 0-50 2 gram/10 mL (20 %) Soln  Commonly known as: HIZENTRA  Inject 18 g into the skin every 14 (fourteen) days.     levalbuterol 1.25 mg/3 mL nebulizer solution  Commonly known as: XOPENEX  Take 3 mLs (1.25 mg total) by nebulization every 4 (four) hours as needed for Wheezing or Shortness of Breath. Rescue     levothyroxine 75 MCG tablet  Commonly known as: SYNTHROID  Take 1 tablet (75 mcg total) by mouth once daily.     montelukast 10 mg tablet  Commonly known as: SINGULAIR  Take 1 tablet (10 mg total) by mouth every evening.     mucus clearing device  Commonly known as: ACAPELLA, FLUTTER  by Misc.(Non-Drug; Combo Route) route 2 (two) times  daily.     MULTIVITAMIN ORAL  Take by mouth once daily.     ondansetron 4 MG Tbdl  Commonly known as: ZOFRAN-ODT  Take 1 tablet (4 mg total) by mouth every 8 (eight) hours as needed (nausea/vomiting).     OTEZLA 30 mg Tab  Generic drug: apremilast  Take 1 tablet (30 mg total) by mouth 2 (two) times daily.     pantoprazole 40 MG tablet  Commonly known as: PROTONIX  Take 1 tablet (40 mg total) by mouth once daily.     sumatriptan 100 MG tablet  Commonly known as: IMITREX     tretinoin 0.05 % cream  Commonly known as: RETIN-A  Apply topically every evening.     valACYclovir 1000 MG tablet  Commonly known as: VALTREX  Take 2 tablets (2,000 mg total) by mouth every 12 (twelve) hours. For one day for fever blisters.  Do not take for more than 1 day per episode         * This list has 2 medication(s) that are the same as other medications prescribed for you. Read the directions carefully, and ask your doctor or other care provider to review them with you.            STOP taking these medications    doxycycline 100 MG Cap  Commonly known as: VIBRAMYCIN     levoFLOXacin 500 MG tablet  Commonly known as: LEVAQUIN            Indwelling Lines/Drains at time of discharge:   Lines/Drains/Airways     None                 Time spent on the discharge of patient: 68 minutes         JOHN Forrest  Department of Hospital Medicine  [facility]

## 2022-09-04 NOTE — NURSING
Midline removed and pressure dressing applied. Discharge instructions reviewed with patient including discharge medications and follow-up appointments.Patient verbalizes understanding and voices no concerns.  Discharged home via wheelchair to private vehicle in no acute distress with all personal belongings.

## 2022-09-04 NOTE — PLAN OF CARE
[facility] - Med Surg  Discharge Final Note    Primary Care Provider: Miky Lewis MD    Expected Discharge Date: 9/4/2022    Final Discharge Note (most recent)       Final Note - 09/04/22 1102          Final Note    Assessment Type Final Discharge Note     Anticipated Discharge Disposition Home or Self Care        Post-Acute Status    Discharge Delays None known at this time                   No needs per chart review. KM, MSW    Important Message from Medicare             Contact Info       IBD clinic        Next Steps: Follow up    Instructions: IBD clinic appt scheduled 9/7/22

## 2022-09-04 NOTE — SUBJECTIVE & OBJECTIVE
Subjective:     Interval History: sitting up in bed, ambulating.  at bedside. She has had two stools this morning.     Inquired about tramadol for pain. Advised this medication would be considered a NSAID and do not recommend it for her newly diagnosed IBD, it can exacerbate her symptoms.    Review of Systems  Objective:     Vital Signs (Most Recent):  Temp: 98 °F (36.7 °C) (09/04/22 0722)  Pulse: 70 (09/04/22 0722)  Resp: 20 (09/04/22 0722)  BP: 109/60 (09/04/22 0722)  SpO2: 97 % (09/04/22 0722) Vital Signs (24h Range):  Temp:  [98 °F (36.7 °C)-98.8 °F (37.1 °C)] 98 °F (36.7 °C)  Pulse:  [70-82] 70  Resp:  [17-20] 20  SpO2:  [96 %-98 %] 97 %  BP: ()/(58-75) 109/60     Weight: 84.4 kg (186 lb 1.1 oz) (08/30/22 0110)  Body mass index is 33.49 kg/m².      Intake/Output Summary (Last 24 hours) at 9/4/2022 0817  Last data filed at 9/3/2022 1835  Gross per 24 hour   Intake 1790 ml   Output --   Net 1790 ml       Lines/Drains/Airways       Peripheral Intravenous Line  Duration                  Midline Catheter Insertion/Assessment  - Single Lumen 09/01/22 0850 Left cephalic vein (lateral side of arm) 18g x 8cm 2 days                    Physical Exam  Constitutional:       Appearance: She is obese.   Skin:     Coloration: Skin is pale.   Neurological:      General: No focal deficit present.      Mental Status: She is alert and oriented to person, place, and time. Mental status is at baseline.   Psychiatric:         Mood and Affect: Mood normal.         Behavior: Behavior normal.         Thought Content: Thought content normal.         Judgment: Judgment normal.       Significant Labs:  CBC:   Recent Labs   Lab 09/02/22  1515 09/03/22  0429 09/04/22  0541   WBC 15.59* 17.27* 13.50*   HGB 13.3 11.6* 11.1*   HCT 38.6 33.1* 33.6*    217 215     CMP:   Recent Labs   Lab 09/04/22  0541   GLU 95   CALCIUM 7.7*   ALBUMIN 2.5*   PROT 4.8*      K 3.8   CO2 25   *   BUN 4*   CREATININE 0.8   ALKPHOS  48*   ALT 12   AST 16   BILITOT 0.2     Coagulation: No results for input(s): PT, INR, APTT in the last 48 hours.      Significant Imaging:  Imaging results within the past 24 hours have been reviewed.

## 2022-09-04 NOTE — PLAN OF CARE
Problem: Adult Inpatient Plan of Care  Goal: Plan of Care Review  Outcome: Ongoing, Progressing     Problem: Adult Inpatient Plan of Care  Goal: Absence of Hospital-Acquired Illness or Injury  Outcome: Ongoing, Progressing     Problem: Adult Inpatient Plan of Care  Goal: Optimal Comfort and Wellbeing  Outcome: Ongoing, Progressing     Problem: Infection  Goal: Absence of Infection Signs and Symptoms  Outcome: Ongoing, Progressing

## 2022-09-04 NOTE — PROGRESS NOTES
[facility]  Gastroenterology  Progress Note    Patient Name: Yodit Canseco  MRN: [id number]  Admission Date: 8/29/2022  Hospital Length of Stay: 4 days  Code Status: Prior   Attending Provider: Mando Bourgeois MD  Consulting Provider: Brianne Stovall MD  Primary Care Physician: Miky Lewis MD  Principal Problem: Ulcerative colitis without complications      Subjective:     Interval History: sitting up in bed, ambulating.  at bedside. She has had two stools this morning.     Inquired about tramadol for pain. Advised this medication would be considered a NSAID and do not recommend it for her newly diagnosed IBD, it can exacerbate her symptoms.    Review of Systems  Objective:     Vital Signs (Most Recent):  Temp: 98 °F (36.7 °C) (09/04/22 0722)  Pulse: 70 (09/04/22 0722)  Resp: 20 (09/04/22 0722)  BP: 109/60 (09/04/22 0722)  SpO2: 97 % (09/04/22 0722) Vital Signs (24h Range):  Temp:  [98 °F (36.7 °C)-98.8 °F (37.1 °C)] 98 °F (36.7 °C)  Pulse:  [70-82] 70  Resp:  [17-20] 20  SpO2:  [96 %-98 %] 97 %  BP: ()/(58-75) 109/60     Weight: 84.4 kg (186 lb 1.1 oz) (08/30/22 0110)  Body mass index is 33.49 kg/m².      Intake/Output Summary (Last 24 hours) at 9/4/2022 0817  Last data filed at 9/3/2022 1835  Gross per 24 hour   Intake 1790 ml   Output --   Net 1790 ml       Lines/Drains/Airways       Peripheral Intravenous Line  Duration                  Midline Catheter Insertion/Assessment  - Single Lumen 09/01/22 0850 Left cephalic vein (lateral side of arm) 18g x 8cm 2 days                    Physical Exam  Constitutional:       Appearance: She is obese.   Skin:     Coloration: Skin is pale.   Neurological:      General: No focal deficit present.      Mental Status: She is alert and oriented to person, place, and time. Mental status is at baseline.   Psychiatric:         Mood and Affect: Mood normal.         Behavior: Behavior normal.         Thought Content: Thought content normal.          Judgment: Judgment normal.       Significant Labs:  CBC:   Recent Labs   Lab 09/02/22  1515 09/03/22  0429 09/04/22  0541   WBC 15.59* 17.27* 13.50*   HGB 13.3 11.6* 11.1*   HCT 38.6 33.1* 33.6*    217 215     CMP:   Recent Labs   Lab 09/04/22  0541   GLU 95   CALCIUM 7.7*   ALBUMIN 2.5*   PROT 4.8*      K 3.8   CO2 25   *   BUN 4*   CREATININE 0.8   ALKPHOS 48*   ALT 12   AST 16   BILITOT 0.2     Coagulation: No results for input(s): PT, INR, APTT in the last 48 hours.      Significant Imaging:  Imaging results within the past 24 hours have been reviewed.    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 Goyo Ogden, allergist    Behcet's syndrome involving oral mucosa  - followed by Dr. SANCHEZ, rheumatologist  - known oral ulcers  - treated with Plaquenil and Otezia       History of gastric bypass  - per patient report      Recommendations:  1. Switch to Prednisone 40mg daily  2. IBD clinic to determine steroid taper  3. NO NSAIDS      Communicated with hospital medicine team. I will sign off. Please contact us if you have any additional questions.    Brianne Stovall MD  Gastroenterology  [facility]

## 2022-09-04 NOTE — ASSESSMENT & PLAN NOTE
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 Goyo Ogden, allergist

## 2022-09-06 ENCOUNTER — OFFICE VISIT (OUTPATIENT)
Dept: ALLERGY | Facility: CLINIC | Age: 62
End: 2022-09-06
Payer: MEDICARE

## 2022-09-06 ENCOUNTER — PATIENT OUTREACH (OUTPATIENT)
Dept: ADMINISTRATIVE | Facility: CLINIC | Age: 62
End: 2022-09-06
Payer: MEDICARE

## 2022-09-06 VITALS — WEIGHT: 190.06 LBS | HEIGHT: 63 IN | BODY MASS INDEX: 33.68 KG/M2

## 2022-09-06 DIAGNOSIS — J82.83 EOSINOPHILIC ASTHMA: ICD-10-CM

## 2022-09-06 DIAGNOSIS — D80.6 ANTI-POLYSACCHARIDE ANTIBODY DEFICIENCY: ICD-10-CM

## 2022-09-06 DIAGNOSIS — J31.0 CHRONIC RHINITIS: ICD-10-CM

## 2022-09-06 DIAGNOSIS — J45.50 SEVERE PERSISTENT ASTHMA WITHOUT COMPLICATION: Primary | ICD-10-CM

## 2022-09-06 DIAGNOSIS — J32.9 RECURRENT SINUS INFECTIONS: ICD-10-CM

## 2022-09-06 PROCEDURE — 99999 PR PBB SHADOW E&M-EST. PATIENT-LVL IV: ICD-10-PCS | Mod: PBBFAC,,, | Performed by: ALLERGY & IMMUNOLOGY

## 2022-09-06 PROCEDURE — 99999 PR PBB SHADOW E&M-EST. PATIENT-LVL IV: CPT | Mod: PBBFAC,,, | Performed by: ALLERGY & IMMUNOLOGY

## 2022-09-06 PROCEDURE — 99214 OFFICE O/P EST MOD 30 MIN: CPT | Mod: S$PBB,,, | Performed by: ALLERGY & IMMUNOLOGY

## 2022-09-06 PROCEDURE — 99214 OFFICE O/P EST MOD 30 MIN: CPT | Mod: PBBFAC,PO | Performed by: ALLERGY & IMMUNOLOGY

## 2022-09-06 PROCEDURE — 99214 PR OFFICE/OUTPT VISIT, EST, LEVL IV, 30-39 MIN: ICD-10-PCS | Mod: S$PBB,,, | Performed by: ALLERGY & IMMUNOLOGY

## 2022-09-06 RX ORDER — GABAPENTIN 100 MG/1
100 CAPSULE ORAL 3 TIMES DAILY PRN
Status: ON HOLD | COMMUNITY
Start: 2022-06-24 | End: 2022-10-04

## 2022-09-06 NOTE — PROGRESS NOTES
C3 nurse attempted to contact Yodit Canseco for a TCC post hospital discharge follow up call. The patient is unable to conduct the call @ this time. The patient requested a callback.    The patient has a scheduled [facility] appointment with Genna Quan on 09/07/2022 @ [id number]. Message sent to Physician staff.

## 2022-09-06 NOTE — PROGRESS NOTES
Subjective:       Patient ID: Yodit Canseco is a 61 y.o. female.    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  She was last seen 8/3/2021.  She had labs drawn in past- CBC was normal, lymphocyte profile showed low NK cells. IgA normal at 170, IgM normal at 124, IgE ,35, IgG normal at 700 but had steadily been decreasing, IgG subclasses normal and humoral panel protected to H flu, diphtheria, tetanus and 8/14 strep serotypes but this is after pneumovax and Prevnar so inadequate protection.  She still had constant PND and congestion and got frequent infections with sinus, ear, bronchitis and even pneumonia. She was on antibiotics every 1-2 months. She cannot use nose sprays all cause nose bleed even Astelin. All antihistamines cause sedation per pt. She is on Singulair daily. She started Hizentra 10 g weekly and then in October 2016 changed to 20 g every 2 weeks. She has done well with infusions. She has itching after and takes benadryl after with resolution. She has taken Levaquin and steroid 1-2 times in past year for sinus infections, no pneumonia, no hospital for infections. She did have Covid 7/2021  then recently had colonoscopy and discovered has UC. Ended up in hospital from pain. She is off all meds right now and last Hizentra was 8/13/2022.     Thinks she had Covid March 2020 when he was hospitalized for pneumonia. When has sinus infections she takes prednisone and Levaquin each cr on her own prescribed by Dr Gary. Clears well. Pneumonia in March 2020, first since on Hizentra.  Otherwise feels really good on infusions.          Labs 12/2020: IgG 1360, IgG subclasses all normal, IgA 242 and IgM 76 and CBC and CMP normal    Prior History taken 1/13/14: new patient evaluation of rash and chronic PND.  She has been seen about 4 years ago and had labs drawn with  normal immunoglobulins, humoral panel only protected to 4/14 strep titers despite prior pneumovax and immunocaps negative to dust mites, [place], [place], cocoa, garlic, milk, wheat, rye, pecan, and peanut.  She was advised to get pneumovax and follow up but she did not.  She continues with chronic PND and congestion, no runny nose or sneeze and she gets inus infections 3-4 times per year requiring antibiotics.  She does clear with anbx but never gets rid of PND.  Her main question now is she gets a random rash on hands, feet and nose.  It seems to appear out of no where, is very itchy and red spots then goes away.  Lasts weeks to months.  She has seen derm and not sure of cause and she thinks may be food allergy although cannot identify a food.    She also has asthma which is well controlled on Dulera and Singulair.  Only uses albuterol with colds.      Rash  Associated symptoms include congestion and coughing. Pertinent negatives include no diarrhea, fatigue, fever, rhinorrhea, shortness of breath, sore throat or vomiting.       Environmental History: Pets in the home: none.  see history section for home environment  Review of Systems   Constitutional: Negative for fever, chills, appetite change and fatigue.   HENT: Positive for congestion and postnasal drip. Negative for ear pain, nosebleeds, sore throat, facial swelling, rhinorrhea, sneezing, trouble swallowing, voice change, sinus pressure and ear discharge.    Eyes: Negative for discharge, redness, itching and visual disturbance.   Respiratory: Positive for cough and wheezing. Negative for choking, chest tightness and shortness of breath.    Cardiovascular: Negative for chest pain, palpitations and leg swelling.   Gastrointestinal: Negative for nausea, vomiting, abdominal pain, diarrhea, constipation and abdominal distention.   Genitourinary: Negative for difficulty urinating.   Musculoskeletal: Negative for myalgias, joint swelling, arthralgias and gait  problem.   Skin: Positive for rash. Negative for color change.   Neurological: Negative for dizziness, syncope, weakness, light-headedness and headaches.   Hematological: Negative for adenopathy. Does not bruise/bleed easily.   Psychiatric/Behavioral: Negative for behavioral problems, confusion, sleep disturbance and agitation. The patient is not nervous/anxious.         Objective:    Physical Exam   Nursing note and vitals reviewed.  Constitutional: She is oriented to person, place, and time. She appears well-developed and well-nourished. No distress.   HENT:   Head: Normocephalic and atraumatic.   Right Ear: Hearing, tympanic membrane, external ear and ear canal normal.   Left Ear: Hearing, tympanic membrane, external ear and ear canal normal.   Nose: No mucosal edema, rhinorrhea, sinus tenderness or septal deviation. No epistaxis. Right sinus exhibits no maxillary sinus tenderness and no frontal sinus tenderness. Left sinus exhibits no maxillary sinus tenderness and no frontal sinus tenderness.   Mouth/Throat: Uvula is midline, oropharynx is clear and moist and mucous membranes are normal. No uvula swelling.   Eyes: Conjunctivae normal are normal. Right eye exhibits no discharge. Left eye exhibits no discharge.   Neck: Normal range of motion. No thyromegaly present.   Cardiovascular: Normal rate, regular rhythm and normal heart sounds.    No murmur heard.  Pulmonary/Chest: Effort normal and breath sounds normal. No respiratory distress. She has no wheezes.   Abdominal: Soft. She exhibits no distension. There is no tenderness.   Musculoskeletal: Normal range of motion. She exhibits no edema and no tenderness.   Lymphadenopathy:     She has no cervical adenopathy.   Neurological: She is alert and oriented to person, place, and time.   Skin: Skin is warm and dry. No rash noted. No erythema.   Psychiatric: She has a normal mood and affect. Her behavior is normal. Judgment and thought content normal.       Laboratory:    none performed   Assessment:       1. Severe persistent asthma without complication    2. Eosinophilic asthma    3. Anti-polysaccharide antibody deficiency    4. Recurrent sinus infections    5. Chronic rhinitis         Plan:       1. continue Hizentra 20g every 2 weeks.    2. Once stable with UC resume Singulair daily and daily inhaler, however last was Breztri and was having dizziness so advised to discus with Dr Gary  3. albuterol and Pulmicort nebs as needed  4. RTC annually or sooner if needed

## 2022-09-06 NOTE — PHYSICIAN QUERY
PT Name: Yodit Canseco  MR #: [id number]     DOCUMENTATION CLARIFICATION     CDS: Brandy Capley, RN  Email: [email or web address]     This form is a permanent document in the medical record.     Query Date: September 6, 2022    By submitting this query, we are merely seeking further clarification of documentation.  Please utilize your independent clinical judgment when addressing the question(s) below.  The Medical Record contains the following:  Indicators Supporting Clinical Findings Location in Medical Record   X HR         RR          BP        Temp Vital Signs (24h Range):  Temp:  [98 °F (36.7 °C)] 98 °F (36.7 °C)  Pulse:  [74] 74  Resp:  [18] 18  SpO2:  [99 %] 99 %  BP: (161)/(77) 161/77    Vital Signs (24h Range):  Temp:  [96.8 °F (36 °C)-99 °F (37.2 °C)] 98.7 °F (37.1 °C)  Pulse:  [79-90] 88  Resp:  [15-18] 16  SpO2:  [97 %-99 %] 97 %  BP: (104-136)/(51-63) 109/58    Vital Signs (24h Range):  Temp:  [96.9 °F (36.1 °C)-99.3 °F (37.4 °C)] 98.7 °F (37.1 °C)  Pulse:  [85-95] 91  Resp:  [14-18] 16  SpO2:  [96 %-98 %] 96 %  BP: (106-123)/(53-70) 106/57    Vital Signs (24h Range):  Temp:  [98 °F (36.7 °C)-99.1 °F (37.3 °C)] 98 °F (36.7 °C)  Pulse:  [] 97  Resp:  [14-20] 20  SpO2:  [95 %-97 %] 97 %  BP: (106-124)/(52-61) 124/52    Vital Signs (24h Range):  Temp:  [98 °F (36.7 °C)-99.8 °F (37.7 °C)] 98.6 °F (37 °C)  Pulse:  [80-92] 80  Resp:  [18-20] 20  SpO2:  [94 %-96 %] 96 %  BP: ()/(53-64) 124/64   H&P 8/29              Hospital medicine progress notes 8/30              Hospital medicine progress notes 8/31              Hospital medicine progress notes 9/1                Hospital medicine progress notes 9/2   X Lactic Acid          Procalcitonin  09/01/22 11:23   Procalcitonin 0.05    Labs 9/1   X WBC           Bands          CRP  WBC remains elevated but likely more reactive. No left shift noted.      08/29/22 17:25 08/30/22 05:49 08/30/22 08:10 08/31/22 04:30 09/01/22 09:00 09/02/22 15:15   WBC 15.50  (H) 15.00 (H)  14.09 (H) 17.44 (H) 15.59 (H)   Bands    26.0  39.0   Sed Rate   12         08/30/22 08:10   CRP 27.1 (H)      Hospital medicine progress notes 9/1    Labs 8/29-9/2   X Culture(s) C. diff Antigen Negative Negative   C difficile Toxins A+B, EIA Negative Negative   Specimen Type: Stool  Specimen Source: Stool    No Salmonella,Shigella,Vibrio,Campylobacter,Yersinia isolated    Shiga Toxin 1 E.coli Negative   Shiga Toxin 2 E.coli Negative   Specimen Type: Stool  Specimen Source: Stool   Clostridium difficile EIA 8/30          Stool culture 8/30    E. coli 0157 antigen 8/30            AMS, Confusion, LOC, etc.      Organ Dysfunction/Failure     X Bacteremia or Sepsis / Septic SIRS (systemic inflammatory response syndrome)  -afebrile, WBC elevated at [address].    -new diagnosis of IBD/UC   -allergic to penicillin  -IV ciprofloxacin, metronidazole empirically.  -hemodynamically stable    Sepsis secondary to colitis  8/29 afebrile, WBC elevated at 10207>14.09.      Source- Colitis    Antibiotics (From admission, onward)     Start     Stop Route Frequency Ordered     09/01/22 2100   ciprofloxacin HCl tablet 750 mg         -- Oral Every 12 hours 09/01/22 1416     09/01/22 1400   metroNIDAZOLE tablet 500 mg         -- Oral Every 8 hours 09/01/22 1240      H&P 8/29              Hospital medicine progress notes 9/2   X Known or Suspected Source of Infection documented  Infection work up unrevealing. Will start IV steroids today.     Source- Colitis GI progress notes 9/2    Hospital medicine progress notes 9/2      (Failed) Outpatient Treatment      Medication     X Treatment -IV antibiotics  -CRP elevated   - IV steroids held due to possible infection    Hospital medicine progress notes 8/30   X Other Ulcerative colitis with rectal bleeding  -colonoscopy with biopsy 8/17, reveals evidence of ulcerative colitis.    -has not seen gastroenterology yet  -GI consult in a.m..  -continue IV fluids. H&P 8/29         Provider, please clarify infectious process:    [ x ] Sepsis due to unknown organism   [   ] SIRS without infectious etiology   [   ] SIRS with infection but without Sepsis   [   ] Other Infectious Disease (please specify): __________   [  ] Clinically Undetermined       Please document in your progress notes daily for the duration of treatment until resolved and include in your discharge summary.

## 2022-09-07 ENCOUNTER — OFFICE VISIT (OUTPATIENT)
Dept: GASTROENTEROLOGY | Facility: CLINIC | Age: 62
End: 2022-09-07
Payer: MEDICARE

## 2022-09-07 VITALS
WEIGHT: 188.94 LBS | DIASTOLIC BLOOD PRESSURE: 62 MMHG | SYSTOLIC BLOOD PRESSURE: 112 MMHG | HEIGHT: 63 IN | HEART RATE: 57 BPM | BODY MASS INDEX: 33.48 KG/M2

## 2022-09-07 DIAGNOSIS — K64.9 HEMORRHOIDS, UNSPECIFIED HEMORRHOID TYPE: ICD-10-CM

## 2022-09-07 DIAGNOSIS — Z79.52 LONG-TERM CORTICOSTEROID USE: ICD-10-CM

## 2022-09-07 DIAGNOSIS — K51.511 LEFT SIDED ULCERATIVE COLITIS WITH RECTAL BLEEDING: Primary | ICD-10-CM

## 2022-09-07 PROCEDURE — 99213 OFFICE O/P EST LOW 20 MIN: CPT | Mod: PBBFAC | Performed by: NURSE PRACTITIONER

## 2022-09-07 PROCEDURE — 99999 PR PBB SHADOW E&M-EST. PATIENT-LVL III: ICD-10-PCS | Mod: PBBFAC,,, | Performed by: NURSE PRACTITIONER

## 2022-09-07 PROCEDURE — 99214 OFFICE O/P EST MOD 30 MIN: CPT | Mod: S$PBB,,, | Performed by: NURSE PRACTITIONER

## 2022-09-07 PROCEDURE — 99999 PR PBB SHADOW E&M-EST. PATIENT-LVL III: CPT | Mod: PBBFAC,,, | Performed by: NURSE PRACTITIONER

## 2022-09-07 PROCEDURE — 99214 PR OFFICE/OUTPT VISIT, EST, LEVL IV, 30-39 MIN: ICD-10-PCS | Mod: S$PBB,,, | Performed by: NURSE PRACTITIONER

## 2022-09-07 RX ORDER — PREDNISONE 10 MG/1
TABLET ORAL
Qty: 42 TABLET | Refills: 0 | Status: SHIPPED | OUTPATIENT
Start: 2022-09-07 | End: 2022-09-28

## 2022-09-07 RX ORDER — HYDROCORTISONE 25 MG/G
CREAM TOPICAL 2 TIMES DAILY
Qty: 30 G | Refills: 0 | Status: SHIPPED | OUTPATIENT
Start: 2022-09-07 | End: 2022-09-17

## 2022-09-07 RX ORDER — MESALAMINE 1.2 G/1
1.2 TABLET, DELAYED RELEASE ORAL
Qty: 30 TABLET | Refills: 11 | Status: ON HOLD | OUTPATIENT
Start: 2022-09-07 | End: 2022-10-07 | Stop reason: HOSPADM

## 2022-09-07 RX ORDER — LOPERAMIDE HYDROCHLORIDE 2 MG/1
2 CAPSULE ORAL 3 TIMES DAILY PRN
Qty: 60 CAPSULE | Refills: 0 | Status: SHIPPED | OUTPATIENT
Start: 2022-09-07 | End: 2022-09-22

## 2022-09-07 RX ORDER — DICYCLOMINE HYDROCHLORIDE 20 MG/1
20 TABLET ORAL 3 TIMES DAILY PRN
Qty: 90 TABLET | Refills: 11 | Status: ON HOLD | OUTPATIENT
Start: 2022-09-07 | End: 2022-10-07 | Stop reason: HOSPADM

## 2022-09-07 NOTE — PATIENT INSTRUCTIONS
Continue Prednisone 40 mg daily x 7 additional days, then decrease to 30 mg daily x 7 days, then decrease to 20 mg x 7 days, then decrease to 10 mg daily x 7 days then restart long term Prednisone 5 mg daily.     Turn in claprotectin stool test in 8-12 weeks.

## 2022-09-07 NOTE — PROGRESS NOTES
Clinic Consult:  [facility] Consultation Note    Reason for Consult:  The primary encounter diagnosis was Left sided ulcerative colitis with rectal bleeding. Diagnoses of Long-term corticosteroid use and Hemorrhoids, unspecified hemorrhoid type were also pertinent to this visit.    PCP: Miky Lewis   No address on file    HPI:  This is a 61 y.o. female here for evaluation of UC.   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     IBD History  - Type: ulcerative colitis   - Disease Location: left sided  - Amount of colon involvement (for Crohn's Disease only): NA  - Phenotype: NA  - Diagnosed: 8/17/22- rectal bleeding, diarrhea, abdominal pain.   - Surgeries related to IBD: none   - Extra-intestinal Manifestations:  RA, Behcets, SLE  - Positive family history of UC in her son.   - GI surgery: sleeve surgery (2018); cholecystectomy (2017)     Current Medications  Prednisone 40 mg daily taper x 1 week.     Rheum medications:   Otezla  Plaquenil  Hizentra (immunoglobulin)     Past Medications  none    Drug and Antibody Levels   NA    Endoscopy Reports  8/17/22 colonoscopy: inflammation rectum to descending colon     Pertinent Imagine Reports:  NA    Interval History  She was diagnosed with UC from colonoscopy done on 8/17/22 for hematochezia. She was placed on prednisone taper and scheduled to see IBD team.     Bowel movement Hx:   Stool frequency:  6 per day   Stool consistency: loose to watery  Abdominal pain: yes-- LUQ pain   Hematochezia: yes    Preventative Medicine    Immunizations  - Influenza: 2020  - Pnemococcal:  PCV 20: none (PCV-13: 9/2/15; PSV-23 2/12/14, 6/30/10)  - Hepatitis A/B: ?  - Herpes Zoster: Zoster 9/2/15; Zoster Recombinant 1/28/20, 9/11/19  - COVID-19: 12/9/21, 3/19/21, 2/26/21    Cancer Prevention   - Date of last pap smear: hysterectomy   - Date of last skin cancer screening: sees dermatology regularly (family history of  melanoma in mother, maternal grandfather, maternal aunt, and maternal uncle)   - Date of last surveillance colonoscopy: 2022    Bone Health  - Vitamin D level: 57- on Vitamin D  - Date of last DEXA: 2016- normal     Therapy Related Testing  - Date of last TB testing: NA  - TPMT status: none     Miscellaneous  - Vitamin B 12 level (if ileal disease or resection):NA  - Smoking status: no  - NSAID use: no  - History of C. Diff: no  - Family planning: NA    Recent Labs  Lab Results   Component Value Date    WBC 13.50 (H) 09/04/2022    HGB 11.1 (L) 09/04/2022    HCT 33.6 (L) 09/04/2022     09/04/2022    ALT 12 09/04/2022    AST 16 09/04/2022    BUN 4 (L) 09/04/2022    CREATININE 0.8 09/04/2022     09/04/2022    K 3.8 09/04/2022    GLU 95 09/04/2022     Lab Results   Component Value Date    CRP 34.6 (H) 09/04/2022    SEDRATE 10 09/04/2022    CALPROTECTIN 1,750.8 (H) 08/30/2022     Lab Results   Component Value Date    HEPBSAG Negative 08/30/2004     Lab Results   Component Value Date    OZQDYHUU54NB 57 07/18/2022    CVZOPJRE94 >2000 (H) 07/18/2022    FOLATE 17.9 07/18/2022     No results found for: TBGOLDPLUS      Review of Systems   Constitutional:  Negative for fever, malaise/fatigue and weight loss.   HENT:  Negative for sore throat.    Respiratory:  Negative for cough and wheezing.    Cardiovascular:  Negative for chest pain and palpitations.   Gastrointestinal:  Positive for abdominal pain, blood in stool and diarrhea. Negative for constipation, heartburn, melena, nausea and vomiting.   Genitourinary:  Negative for dysuria and frequency.   Skin:  Negative for itching and rash.   Neurological:  Negative for dizziness, speech change, seizures, loss of consciousness and headaches.   Psychiatric/Behavioral:  Negative for depression and substance abuse. The patient is not nervous/anxious.      Medical History:  has a past medical history of Angio-edema, Arthralgia, Asthma without status asthmaticus,  Bronchitis, COPD (chronic obstructive pulmonary disease), COVID-19 virus infection (07/23/2021), Diverticulosis of large intestine without hemorrhage (10/08/2015), Environmental allergies, Eosinophilic asthma (03/08/2018), Gastroparesis, Hand arthritis, Herpes simplex without mention of complication, Hidradenitis, Hyperlipidemia, Hypothyroidism, Immune deficiency disorder, Intraperitoneal abscess (05/07/2018), LBP radiating to left leg, Leukocytosis, unspecified, Migraine headache, Obesity, Class III, BMI 40-49.9 (morbid obesity), Periorbital cellulitis (12/31/2016), Prediabetes, RA (rheumatoid arthritis), Recurrent upper respiratory infection (URI), S/P colonoscopy (11/2010), Sepsis (05/07/2018), Systemic lupus erythematosus, organ or system involvement unspecified, and Urticaria.    Surgical History:  has a past surgical history that includes Tonsillectomy; Adenoidectomy; Hysterectomy; Hand fracture surgery; Urethral dilatation; Hardware Removal; Colonoscopy (2015); Colonoscopy (N/A, 10/8/2015); Cholecystectomy; Cholecystectomy; hymenectomy; Sleeve Gastroplasty (04/16/2018); Rotator cuff repair (Right); and Colonoscopy (N/A, 8/17/2022).    Family History: family history includes Basal cell carcinoma in her mother; Breast cancer (age of onset: 25) in her cousin; Breast cancer (age of onset: 40) in her paternal aunt; Cancer in her father and maternal grandfather; Cataracts in her mother; Colon cancer (age of onset: 40) in her paternal aunt; Diabetes in her father, paternal aunt, and paternal aunt; Hypertension in her father and mother; Lung cancer in her father; Lung disease in her mother; Lymphoma in her paternal aunt; Melanoma in her maternal aunt, maternal uncle, and mother; Ovarian cancer (age of onset: 40) in her paternal grandmother; Psoriasis in her son and son; Ulcerative colitis in her son..     Social History:  reports that she has never smoked. She has never used smokeless tobacco. She reports current  alcohol use. She reports that she does not use drugs.    Allergies: Reviewed    Home Medications:   Current Outpatient Medications on File Prior to Visit   Medication Sig Dispense Refill    (Magic mouthwash) 1:1:1 diphenhydramine(Benadryl) 12.5mg/5ml liq, aluminum & magnesium hydroxide-simethicone (Maalox), LIDOcaine viscous 2% Swish and spit 5 mLs every 8 (eight) hours as needed (oral ulcers). Gargle and spit. DO NOT SWALLOW 360 mL 1    acetaminophen (TYLENOL) 325 MG tablet Take 1 tablet (325 mg total) by mouth every 6 (six) hours as needed for Pain (Do not take with any other products containing  tylenol or acetaminophen).  0    apremilast (OTEZLA) 30 mg Tab Take 1 tablet (30 mg total) by mouth 2 (two) times daily. 60 tablet 11    BIOTIN ORAL Take by mouth once daily.      budesonide-glycopyr-formoterol (BREZTRI AEROSPHERE) 160-9-4.8 mcg/actuation HFAA Inhale 2 puffs into the lungs 2 (two) times a day. 10.7 g 11    cholecalciferol, vitamin D3, (VITAMIN D3) 50 mcg (2,000 unit) Cap Take 1 capsule by mouth once daily.      clobetasoL (TEMOVATE) 0.05 % cream APPLY TOPICALLY TO THE AFFECTED AREA TWICE DAILY 60 g 0    clobetasoL (TEMOVATE) 0.05 % external solution Apply topically once daily. 50 mL 4    desonide (DESOWEN) 0.05 % cream Apply topically 2 (two) times daily. 1 each 3    estradioL (ESTRACE) 1 MG tablet TAKE 1 TABLET(1 MG) BY MOUTH EVERY DAY 90 tablet 3    gabapentin (NEURONTIN) 100 MG capsule Take 100 mg by mouth 3 (three) times daily as needed.      HYDROcodone-acetaminophen (NORCO) 5-325 mg per tablet Take 1 tablet by mouth every 6 (six) hours as needed for Pain (moderate to severe pain). 25 tablet 0    hydrocortisone-pramoxine (ANALPRAM-HC) 2.5-1 % Crea Place rectally 3 (three) times daily as needed (hemorrhoidal pain, discomfort). 30 g 0    hydrOXYchloroQUINE (PLAQUENIL) 200 mg tablet Take 1 tablet (200 mg total) by mouth 2 (two) times daily. 60 tablet 6    immun glob G,IgG,-pro-IgA 0-50 (HIZENTRA) 2  "gram/10 mL (20 %) Soln Inject 18 g into the skin every 14 (fourteen) days. 90 mL 12    levothyroxine (SYNTHROID) 75 MCG tablet Take 1 tablet (75 mcg total) by mouth once daily. 90 tablet 3    montelukast (SINGULAIR) 10 mg tablet Take 1 tablet (10 mg total) by mouth every evening. 90 tablet 3    mucus clearing device (ACAPELLA, FLUTTER) by Misc.(Non-Drug; Combo Route) route 2 (two) times daily. 1 Device 0    MULTIVITAMIN ORAL Take by mouth once daily.      ondansetron (ZOFRAN-ODT) 4 MG TbDL Take 1 tablet (4 mg total) by mouth every 8 (eight) hours as needed (nausea/vomiting). 90 tablet 0    pantoprazole (PROTONIX) 40 MG tablet Take 1 tablet (40 mg total) by mouth once daily. 30 tablet 0    predniSONE (DELTASONE) 20 MG tablet Take 2 tablets (40 mg total) by mouth once daily. 60 tablet 0    sumatriptan (IMITREX) 100 MG tablet       tretinoin (RETIN-A) 0.05 % cream Apply topically every evening. 45 g 3    albuterol (PROAIR HFA) 90 mcg/actuation inhaler Inhale 2 puffs into the lungs every 6 (six) hours as needed for Wheezing. Rescue 18 g 0    budesonide (PULMICORT) 0.25 mg/2 mL nebulizer solution Take 2 mLs (0.25 mg total) by nebulization once daily. Controller 60 vial 11    levalbuterol (XOPENEX) 1.25 mg/3 mL nebulizer solution Take 3 mLs (1.25 mg total) by nebulization every 4 (four) hours as needed for Wheezing or Shortness of Breath. Rescue 1 Box 11    valACYclovir (VALTREX) 1000 MG tablet Take 2 tablets (2,000 mg total) by mouth every 12 (twelve) hours. For one day for fever blisters.  Do not take for more than 1 day per episode 20 tablet 0     No current facility-administered medications on file prior to visit.       Physical Exam:  /62 (BP Location: Left arm, Patient Position: Sitting, BP Method: Large (Manual))   Pulse (!) 57   Ht 5' 2.5" (1.588 m)   Wt 85.7 kg (188 lb 15 oz)   BMI 34.01 kg/m²   Body mass index is 34.01 kg/m².  Physical Exam  Constitutional:       General: She is not in acute " distress.  HENT:      Head: Normocephalic and atraumatic.   Eyes:      General: No scleral icterus.     Conjunctiva/sclera: Conjunctivae normal.   Cardiovascular:      Rate and Rhythm: Normal rate and regular rhythm.      Heart sounds: No murmur heard.  Pulmonary:      Effort: Pulmonary effort is normal. No respiratory distress.      Breath sounds: Normal breath sounds. No wheezing.   Abdominal:      General: Abdomen is flat. Bowel sounds are normal.      Palpations: Abdomen is soft.      Tenderness: There is no abdominal tenderness.   Skin:     General: Skin is warm and dry.   Neurological:      General: No focal deficit present.      Mental Status: She is alert and oriented to person, place, and time.      Cranial Nerves: No cranial nerve deficit.   Psychiatric:         Mood and Affect: Mood normal.         Judgment: Judgment normal.       Labs: Pertinent labs reviewed.  CRC Screening:     Assessment:  1. Left sided ulcerative colitis with rectal bleeding    2. Long-term corticosteroid use    3. Hemorrhoids, unspecified hemorrhoid type      Patient with new diagnosis of left-sided ulcerative colitis.  She is currently on prednisone 40 mg daily.  She is also on Otezla, Plaquenil, and immunoglobulins. Caution with biologics.      Recommendations:   - trial of Lialda   - taper prednisone starting in 1 week to her base of 5 mg daily   - DEXA  - calprotectin stool in 8-12 weeks     Left sided ulcerative colitis with rectal bleeding  -     predniSONE (DELTASONE) 10 MG tablet; Take 3 tablets (30 mg total) by mouth once daily for 7 days, THEN 2 tablets (20 mg total) once daily for 7 days, THEN 1 tablet (10 mg total) once daily for 7 days.  Dispense: 42 tablet; Refill: 0  -     mesalamine (LIALDA) 1.2 gram TbEC; Take 1 tablet (1.2 g total) by mouth daily with breakfast.  Dispense: 30 tablet; Refill: 11  -     DXA Bone Density Spine And Hip; Future; Expected date: 09/07/2022  -     Calprotectin, Stool; Future; Expected  date: 09/07/2022    Long-term corticosteroid use  -     DXA Bone Density Spine And Hip; Future; Expected date: 09/07/2022    Hemorrhoids, unspecified hemorrhoid type  -     hydrocortisone (ANUSOL-HC) 2.5 % rectal cream; Place rectally 2 (two) times daily. for 10 days  Dispense: 30 g; Refill: 0    Other orders  -     dicyclomine (BENTYL) 20 mg tablet; Take 1 tablet (20 mg total) by mouth 3 (three) times daily as needed (abdominal pain/ cramping).  Dispense: 90 tablet; Refill: 11  -     loperamide (IMODIUM) 2 mg capsule; Take 1 capsule (2 mg total) by mouth 3 (three) times daily as needed for Diarrhea.  Dispense: 60 capsule; Refill: 0      Follow up in about 3 months (around 12/7/2022).    Thank you so much for allowing me to participate in the care of KENYATTA Mirza

## 2022-09-07 NOTE — PROGRESS NOTES
C3 nurse spoke with Yodit Canseco for a [facility] post hospital discharge follow up call. The patient has a scheduled HOSFU appointment with Genna Quan on 09/07/2022 @ 1430.

## 2022-09-15 ENCOUNTER — PATIENT MESSAGE (OUTPATIENT)
Dept: GASTROENTEROLOGY | Facility: CLINIC | Age: 62
End: 2022-09-15
Payer: MEDICARE

## 2022-09-16 ENCOUNTER — PATIENT MESSAGE (OUTPATIENT)
Dept: GASTROENTEROLOGY | Facility: CLINIC | Age: 62
End: 2022-09-16
Payer: MEDICARE

## 2022-09-20 ENCOUNTER — TELEPHONE (OUTPATIENT)
Dept: GASTROENTEROLOGY | Facility: CLINIC | Age: 62
End: 2022-09-20
Payer: MEDICARE

## 2022-09-20 NOTE — TELEPHONE ENCOUNTER
----- Message from Julio Nicolas MA sent at 9/20/2022 11:14 AM CDT -----  Regarding: FW: lab test cancellation    ----- Message -----  From: Judy Cifuentes  Sent: 9/20/2022  10:49 AM CDT  To: [facility] Staff  Subject: lab test cancellation                            There was an issue with the Calprotectin test ordered on this patient from 9/19/22.    Unfortunately, the reference lab received a specimen that leaked in transit for testing so the order has been cancelled and will need to be reordered and recollected if clinically indicated.    Please call the Sendout lab at [phone] ext. [phone] if there are any questions.  Anyone in the Sendout lab will be able to assist you.

## 2022-09-21 ENCOUNTER — PATIENT MESSAGE (OUTPATIENT)
Dept: GASTROENTEROLOGY | Facility: CLINIC | Age: 62
End: 2022-09-21
Payer: MEDICARE

## 2022-09-21 DIAGNOSIS — K51.511 LEFT SIDED ULCERATIVE COLITIS WITH RECTAL BLEEDING: Primary | ICD-10-CM

## 2022-09-22 ENCOUNTER — LAB VISIT (OUTPATIENT)
Dept: LAB | Facility: HOSPITAL | Age: 62
End: 2022-09-22
Attending: NURSE PRACTITIONER
Payer: MEDICARE

## 2022-09-22 DIAGNOSIS — K51.511 LEFT SIDED ULCERATIVE COLITIS WITH RECTAL BLEEDING: ICD-10-CM

## 2022-09-22 PROCEDURE — 83993 ASSAY FOR CALPROTECTIN FECAL: CPT | Performed by: NURSE PRACTITIONER

## 2022-09-26 ENCOUNTER — PATIENT MESSAGE (OUTPATIENT)
Dept: GASTROENTEROLOGY | Facility: CLINIC | Age: 62
End: 2022-09-26
Payer: MEDICARE

## 2022-09-26 DIAGNOSIS — K51.511 LEFT SIDED ULCERATIVE COLITIS WITH RECTAL BLEEDING: Primary | ICD-10-CM

## 2022-09-26 DIAGNOSIS — K52.9 CHRONIC DIARRHEA: ICD-10-CM

## 2022-09-27 ENCOUNTER — PATIENT MESSAGE (OUTPATIENT)
Dept: GASTROENTEROLOGY | Facility: CLINIC | Age: 62
End: 2022-09-27
Payer: MEDICARE

## 2022-09-27 RX ORDER — CHOLESTYRAMINE 4 G/4.8G
4 POWDER, FOR SUSPENSION ORAL 2 TIMES DAILY PRN
Qty: 60 PACKET | Refills: 3 | Status: ON HOLD | OUTPATIENT
Start: 2022-09-27 | End: 2022-10-07 | Stop reason: HOSPADM

## 2022-09-28 LAB — CALPROTECTIN STL-MCNT: ABNORMAL MCG/G

## 2022-09-29 ENCOUNTER — PATIENT MESSAGE (OUTPATIENT)
Dept: GASTROENTEROLOGY | Facility: CLINIC | Age: 62
End: 2022-09-29
Payer: MEDICARE

## 2022-09-29 RX ORDER — MONTELUKAST SODIUM 4 MG/1
1 TABLET, CHEWABLE ORAL 2 TIMES DAILY
Qty: 60 TABLET | Refills: 11 | Status: ON HOLD | OUTPATIENT
Start: 2022-09-29 | End: 2022-10-07 | Stop reason: HOSPADM

## 2022-09-30 ENCOUNTER — PATIENT MESSAGE (OUTPATIENT)
Dept: FAMILY MEDICINE | Facility: CLINIC | Age: 62
End: 2022-09-30
Payer: MEDICARE

## 2022-10-05 PROBLEM — D64.9 NORMOCYTIC ANEMIA: Status: ACTIVE | Noted: 2022-10-05

## 2022-10-10 ENCOUNTER — TELEPHONE (OUTPATIENT)
Dept: BARIATRICS | Facility: CLINIC | Age: 62
End: 2022-10-10
Payer: MEDICARE

## 2022-10-10 ENCOUNTER — PATIENT MESSAGE (OUTPATIENT)
Dept: GASTROENTEROLOGY | Facility: CLINIC | Age: 62
End: 2022-10-10
Payer: MEDICARE

## 2022-10-10 NOTE — TELEPHONE ENCOUNTER
----- Message from Jillian Ricardo sent at 10/10/2022 12:10 PM CDT -----  Regarding: RE-EST CARE  Contact: Patient  Type: Patient Call Back         Who called: Patient         What is the request in detail: calling to re-est care for a check; states Dr. Reese previously did her bariatric surgery; LOV 4/2019; please advise         Can the clinic reply by [facility]? sailaja         Would the patient rather a call back or a response via [facility]? Cerephexjeff         Best call back number: [phone]         Additional Information: NP; 1) E-MEDICARE/MEDICARE PART A & B; cannot come October 17th, 19th, 20th, 24th or 25th           Thank You

## 2022-10-17 ENCOUNTER — OFFICE VISIT (OUTPATIENT)
Dept: FAMILY MEDICINE | Facility: CLINIC | Age: 62
End: 2022-10-17
Payer: MEDICARE

## 2022-10-17 VITALS
SYSTOLIC BLOOD PRESSURE: 114 MMHG | OXYGEN SATURATION: 100 % | WEIGHT: 171.06 LBS | HEIGHT: 62 IN | TEMPERATURE: 98 F | BODY MASS INDEX: 31.48 KG/M2 | DIASTOLIC BLOOD PRESSURE: 68 MMHG | HEART RATE: 71 BPM

## 2022-10-17 DIAGNOSIS — M35.9 UNDIFFERENTIATED CONNECTIVE TISSUE DISEASE: ICD-10-CM

## 2022-10-17 DIAGNOSIS — K51.011 ULCERATIVE PANCOLITIS WITH RECTAL BLEEDING: ICD-10-CM

## 2022-10-17 DIAGNOSIS — N76.0 ACUTE VAGINITIS: ICD-10-CM

## 2022-10-17 DIAGNOSIS — D84.9 IMMUNE DEFICIENCY DISORDER: ICD-10-CM

## 2022-10-17 DIAGNOSIS — M35.2 BEHCET'S SYNDROME INVOLVING ORAL MUCOSA: Primary | ICD-10-CM

## 2022-10-17 PROCEDURE — 99999 PR PBB SHADOW E&M-EST. PATIENT-LVL IV: CPT | Mod: PBBFAC,,, | Performed by: FAMILY MEDICINE

## 2022-10-17 PROCEDURE — 99214 OFFICE O/P EST MOD 30 MIN: CPT | Mod: S$PBB,,, | Performed by: FAMILY MEDICINE

## 2022-10-17 PROCEDURE — 99214 PR OFFICE/OUTPT VISIT, EST, LEVL IV, 30-39 MIN: ICD-10-PCS | Mod: S$PBB,,, | Performed by: FAMILY MEDICINE

## 2022-10-17 PROCEDURE — 99999 PR PBB SHADOW E&M-EST. PATIENT-LVL IV: ICD-10-PCS | Mod: PBBFAC,,, | Performed by: FAMILY MEDICINE

## 2022-10-17 PROCEDURE — 99214 OFFICE O/P EST MOD 30 MIN: CPT | Mod: PBBFAC,PO | Performed by: FAMILY MEDICINE

## 2022-10-17 RX ORDER — FLUCONAZOLE 150 MG/1
150 TABLET ORAL WEEKLY
Qty: 2 TABLET | Refills: 0 | Status: SHIPPED | OUTPATIENT
Start: 2022-10-17 | End: 2022-11-02 | Stop reason: SDUPTHER

## 2022-10-17 RX ORDER — LOPERAMIDE HYDROCHLORIDE 2 MG/1
2 CAPSULE ORAL 3 TIMES DAILY PRN
Status: ON HOLD | COMMUNITY
End: 2024-01-01

## 2022-10-17 RX ORDER — PREDNISONE 20 MG/1
20 TABLET ORAL DAILY
COMMUNITY
End: 2023-12-27 | Stop reason: CLARIF

## 2022-10-18 NOTE — PROGRESS NOTES
Follow-up 2 recent hospitalizations with ulcerative colitis.  Most recent summary as below.  She still has some mild abdominal discomfort.  Bleeding has resolved.  No fever.  She still gets some diarrhea.  She has appointment pending for follow-up with GI, Rheumatology.  Other autoimmune disease to include undifferentiated connective tissue disease and Bechets.  Has also had issues with immuno deficiency.  Sees pulmonary.  Has follow-up appointment pending.  She does feel like she has developed a yeast infection.  She is on steroids and has been on antibiotics recently.    HPI per hospital discharge summary:  From admission:  62-year-old female with history of ulcerative colitis, rheumatoid arthritis, lupus, asthma presented to the emergency room for abdominal pain.  She reports of initially developing bright red blood per rectum in July.  She was seen by colorectal surgeon in [place] and underwent a colonoscopy which was consistent with ulcerative colitis. Since that time she has been on mesalamine as well as a prednisone taper.  She is currently on prednisone 10 mg daily.  She reports of intermittent bright red blood per rectum since her initial diagnosis as well as ongoing abdominal pain.  She attributes her development of ulcerative colitis secondary to receiving Otezla.  She is also unhappy with her GI care in [place].  Today she reports of worsening left lower quadrant abdominal pain as well as bright red blood per rectum prompting her to come to the emergency room.  She denies fevers but does report of chills.  In the emergency room she had a CT scan of her abdomen which was suggestive of pancolitis.  She received Rocephin, GI was consulted, and Hospital Medicine consulted for admission.     Overview/Hospital Course:  Patient admitted with flare of ulcerative colitis.  Antibiotics discontinued per GI.  IV Rocephin started 10/6 secondary to urinary tract infection.  Follow-up on results of colonoscopy  10/6.  Discharge on hold secondary to slight rectal bleeding per Gastroenterology.         Yodit was seen today for hospital follow up.    Diagnoses and all orders for this visit:    Behcet's syndrome involving oral mucosa    Ulcerative pancolitis with rectal bleeding    Immune deficiency disorder    Undifferentiated connective tissue disease    Acute vaginitis    Other orders  -     fluconazole (DIFLUCAN) 150 MG Tab; Take 1 tablet (150 mg total) by mouth once a week.    Keep scheduled follow-up specialty appointments.  She will follow-up with me after that.              Past Medical History:  Past Medical History:   Diagnosis Date    Angio-edema     Arthralgia     Asthma without status asthmaticus     Bronchitis     COPD (chronic obstructive pulmonary disease)     COVID-19 virus infection 07/23/2021    Diverticulosis of large intestine without hemorrhage 10/08/2015    Environmental allergies     Eosinophilic asthma 03/08/2018    Exacerbation of ulcerative colitis with rectal bleeding     Gastroparesis     Hand arthritis     Herpes simplex without mention of complication     oral    Hidradenitis     History of morbid obesity     Hyperlipidemia     Hypothyroidism     Immune deficiency disorder     Intraperitoneal abscess 05/07/2018    LBP radiating to left leg     Leukocytosis, unspecified     Migraine headache     Normocytic anemia 10/05/2022    Obesity, Class III, BMI 40-49.9 (morbid obesity)     Periorbital cellulitis 12/31/2016    Prediabetes     RA (rheumatoid arthritis)     Recurrent upper respiratory infection (URI)     S/P colonoscopy 11/2010    Sepsis 05/07/2018    Systemic lupus erythematosus, organ or system involvement unspecified     Tubular adenoma of colon 08/17/2022    Ulcerative pancolitis with rectal bleeding 10/17/2022    Urticaria      Past Surgical History:   Procedure Laterality Date    ADENOIDECTOMY      CHOLECYSTECTOMY      CHOLECYSTECTOMY      COLONOSCOPY  2015    repeat in 10     "COLONOSCOPY N/A 10/8/2015    Procedure: COLONOSCOPY;  Surgeon: Panda Bose MD;  Location: [facility] ENDO;  Service: Endoscopy;  Laterality: N/A;    COLONOSCOPY N/A 8/17/2022    Procedure: COLONOSCOPY;  Surgeon: Olaf Del Valle MD;  Location: [facility] ENDO;  Service: General;  Laterality: N/A;    COLONOSCOPY N/A 10/6/2022    Procedure: COLONOSCOPY;  Surgeon: Quinton Celeste MD;  Location: [facility] ENDO;  Service: Gastroenterology;  Laterality: N/A;    Hand fracture surgery      HARDWARE REMOVAL      left hand 5th [facility]    hymenectomy      HYSTERECTOMY      menorrhagia-Ovaries intact    ROTATOR CUFF REPAIR Right     SLEEVE GASTROPLASTY  04/16/2018    TONSILLECTOMY      Urethral dilatation       Review of patient's allergies indicates:   Allergen Reactions    Bromelains Hives     " causes mouth to bleed"    Pimecrolimus Swelling    Sudafed [pseudoephedrine hcl] Other (See Comments)     Does not want to wake up .    Amoxicillin-pot clavulanate Itching     Other reaction(s): Itching; tolerated ceftriaxone 10/2022    Hydrocodone Itching    Iodinated contrast media      childhood    Iodine and iodide containing products Other (See Comments)    Melon Hives    Percocet [oxycodone-acetaminophen] Itching    Corn containing products     Wheat containing prod     Barium iodide Rash    Ephedrine Other (See Comments)     Other reaction(s): comatose    Penicillins      Other reaction(s): does not work on pt symptoms     Current Outpatient Medications on File Prior to Visit   Medication Sig Dispense Refill    (Magic mouthwash) 1:1:1 diphenhydramine(Benadryl) 12.5mg/5ml liq, aluminum & magnesium hydroxide-simethicone (Maalox), LIDOcaine viscous 2% Swish and spit 5 mLs every 8 (eight) hours as needed (oral ulcers). Gargle and spit. DO NOT SWALLOW 360 mL 1    acetaminophen (TYLENOL) 325 MG tablet Take 1 tablet (325 mg total) by mouth every 6 (six) hours as needed for Pain (Do not take with any other products containing  tylenol or " acetaminophen).  0    cholecalciferol, vitamin D3, (VITAMIN D3) 50 mcg (2,000 unit) Cap Take 1 capsule by mouth once daily.      dicyclomine (BENTYL) 10 MG capsule Take 2 capsules (20 mg total) by mouth 3 (three) times daily as needed (abdominal cramps). 90 capsule 0    estradioL (ESTRACE) 1 MG tablet TAKE 1 TABLET(1 MG) BY MOUTH EVERY DAY (Patient taking differently: Take 1 mg by mouth once daily.) 90 tablet 3    HYDROmorphone (DILAUDID) 2 MG tablet Take 1 tablet (2 mg total) by mouth every 4 (four) hours as needed for Pain (severe pain). 30 tablet 0    hydrOXYchloroQUINE (PLAQUENIL) 200 mg tablet Take 1 tablet (200 mg total) by mouth 2 (two) times daily. 60 tablet 6    immun glob G,IgG,-pro-IgA 0-50 (HIZENTRA) 2 gram/10 mL (20 %) Soln Inject 18 g into the skin every 14 (fourteen) days. 90 mL 12    levothyroxine (SYNTHROID) 75 MCG tablet Take 1 tablet (75 mcg total) by mouth once daily. 90 tablet 3    loperamide (IMODIUM) 2 mg capsule Take 2 mg by mouth 3 (three) times daily as needed.      montelukast (SINGULAIR) 10 mg tablet Take 1 tablet (10 mg total) by mouth every evening. 90 tablet 3    MULTIVITAMIN ORAL Take 1 tablet by mouth once daily.      ondansetron (ZOFRAN-ODT) 4 MG TbDL Take 1 tablet (4 mg total) by mouth every 8 (eight) hours as needed (nausea/vomiting). 90 tablet 0    predniSONE (DELTASONE) 20 MG tablet Take 20 mg by mouth once daily.      traMADoL (ULTRAM) 50 mg tablet Take 1 tablet (50 mg total) by mouth every 6 (six) hours as needed for Pain (moderate pain). 60 tablet 0    apremilast (OTEZLA) 30 mg Tab Take 1 tablet (30 mg total) by mouth 2 (two) times daily. (Patient taking differently: Take 30 mg by mouth 2 (two) times daily.) 60 tablet 11    levalbuterol (XOPENEX) 1.25 mg/3 mL nebulizer solution Take 3 mLs (1.25 mg total) by nebulization every 4 (four) hours as needed for Wheezing or Shortness of Breath. Rescue 1 Box 11    pantoprazole (PROTONIX) 40 MG tablet Take 1 tablet (40 mg total) by  mouth once daily. 30 tablet 0    [DISCONTINUED] mucus clearing device (ACAPELLA, FLUTTER) by Misc.(Non-Drug; Combo Route) route 2 (two) times daily. (Patient not taking: Reported on 10/17/2022) 1 Device 0    [DISCONTINUED] tretinoin (RETIN-A) 0.05 % cream Apply topically every evening. (Patient not taking: Reported on 10/17/2022) 45 g 3     No current facility-administered medications on file prior to visit.     Social History     Socioeconomic History    Marital status:    Tobacco Use    Smoking status: Never    Smokeless tobacco: Never   Substance and Sexual Activity    Alcohol use: Yes     Alcohol/week: 0.0 standard drinks     Comment: very rarely    Drug use: No    Sexual activity: Yes     Partners: Male   Social History Narrative    . Disabled teacher.     Family History   Problem Relation Age of Onset    Melanoma Mother     Basal cell carcinoma Mother     Lung disease Mother         pulm htn    Cataracts Mother     Hypertension Mother     Lung cancer Father     Diabetes Father     Hypertension Father     Cancer Father     Diabetes Paternal Aunt     Colon cancer Paternal Aunt 40    Diabetes Paternal Aunt     Ovarian cancer Paternal Grandmother 40    Cancer Maternal Grandfather     Melanoma Maternal Aunt     Melanoma Maternal Uncle     Breast cancer Paternal Aunt 40    Lymphoma Paternal Aunt     Ulcerative colitis Son     Psoriasis Son     Psoriasis Son     Breast cancer Cousin 25    Allergic rhinitis Neg Hx     Allergies Neg Hx     Angioedema Neg Hx     Asthma Neg Hx     Atopy Neg Hx     Eczema Neg Hx     Immunodeficiency Neg Hx     Rhinitis Neg Hx     Urticaria Neg Hx            ROS:  GENERAL: No fever, chills,  or significant weight changes.   CARDIOVASCULAR: Denies chest pain, PND,  ABDOMEN: As above  URINARY: No flank pain, dysuria or hematuria.    Vitals:    10/17/22 1027   BP: 114/68   Pulse: 71   Temp: 97.7 °F (36.5 °C)   TempSrc: Temporal   SpO2: 100%   Weight: 77.6 kg (171 lb 1.2 oz)  "  Height: 5' 2" (1.575 m)     Wt Readings from Last 3 Encounters:   10/17/22 77.6 kg (171 lb 1.2 oz)   10/05/22 76.1 kg (167 lb 12.3 oz)   09/07/22 85.7 kg (188 lb 15 oz)       OBJECTIVE:   APPEARANCE: Well nourished, well developed, in no acute distress.    HEAD: Normocephalic.  Atraumatic.  No sinus tenderness.  EYES:   Right eye: Pupil reactive.  Conjunctiva clear.    Left eye: Pupil reactive.  Conjunctiva clear.  EOMI.    NECK: Supple. No bruits.  No JVD.  No cervical lymphadenopathy.  No thyromegaly.    CHEST: Breath sounds clear bilaterally.  Normal respiratory effort  CARDIOVASCULAR: Normal rate.  Regular rhythm.  No murmurs.  No rub.  No gallops.  ABDOMEN: Bowel sounds normal.  Soft.  Minimal palpation tenderness.  No organomegaly.  PERIPHERAL VASCULAR: No cyanosis.  No clubbing.  No edema.  NEUROLOGIC: No focal findings.  MENTAL STATUS: Alert.  Oriented x 3.          "

## 2022-10-19 ENCOUNTER — OFFICE VISIT (OUTPATIENT)
Dept: DERMATOLOGY | Facility: CLINIC | Age: 62
End: 2022-10-19
Payer: MEDICARE

## 2022-10-19 DIAGNOSIS — L65.9 HAIR LOSS: Primary | ICD-10-CM

## 2022-10-19 DIAGNOSIS — L73.8 SEBACEOUS HYPERPLASIA: ICD-10-CM

## 2022-10-19 PROCEDURE — 99999 PR PBB SHADOW E&M-EST. PATIENT-LVL III: CPT | Mod: PBBFAC,,, | Performed by: STUDENT IN AN ORGANIZED HEALTH CARE EDUCATION/TRAINING PROGRAM

## 2022-10-19 PROCEDURE — 99999 PR PBB SHADOW E&M-EST. PATIENT-LVL III: ICD-10-PCS | Mod: PBBFAC,,, | Performed by: STUDENT IN AN ORGANIZED HEALTH CARE EDUCATION/TRAINING PROGRAM

## 2022-10-19 PROCEDURE — 99214 PR OFFICE/OUTPT VISIT, EST, LEVL IV, 30-39 MIN: ICD-10-PCS | Mod: S$PBB,,, | Performed by: STUDENT IN AN ORGANIZED HEALTH CARE EDUCATION/TRAINING PROGRAM

## 2022-10-19 PROCEDURE — 99214 OFFICE O/P EST MOD 30 MIN: CPT | Mod: S$PBB,,, | Performed by: STUDENT IN AN ORGANIZED HEALTH CARE EDUCATION/TRAINING PROGRAM

## 2022-10-19 PROCEDURE — 99213 OFFICE O/P EST LOW 20 MIN: CPT | Mod: PBBFAC | Performed by: STUDENT IN AN ORGANIZED HEALTH CARE EDUCATION/TRAINING PROGRAM

## 2022-10-19 NOTE — PROGRESS NOTES
Subjective:       Patient ID:  Yodit Canseco is a 62 y.o. female who presents for   Chief Complaint   Patient presents with    Hair Loss     Pt reports having several autoimmune disorders (lupus, RA, etc). Pt reports hair is constantly shedding. Pt reports taking biotin, but reports that it is not helping.     Spot     Reports having several spots on face that itchy and scaly in texture. Family history of melanoma and other types- mom, uncle, grandfather.      History of Present Illness: The patient presents with chief complaint of progressive hair thinning and shedding. Ongoing for several months. Patient reports just having daily shedding of the hair from the scalp. Minimal itching, flaking or redness noted. Has tried biotin supplements with no improvement. Does have a history of several autoimmune disorders and is concerned about this causing hair loss.     Patient with new complaint of lesion(s) - skin lesion/bumps  Location: forehead  Duration: several weeks  Symptoms: small yellowish bumps  Relieving factors/Previous treatments: none        Hair Loss    Spot      Review of Systems   Constitutional:  Negative for fever and chills.   Skin:  Negative for itching, rash and dry skin.      Objective:    Physical Exam   Constitutional: She appears well-developed and well-nourished. No distress.   Neurological: She is alert and oriented to person, place, and time. She is not disoriented.   Psychiatric: She has a normal mood and affect.   Skin:   Areas Examined (abnormalities noted in diagram):   Scalp / Hair Palpated and Inspected  Head / Face Inspection Performed  Neck Inspection Performed  RUE Inspected  LUE Inspection Performed            Diagram Legend     Erythematous scaling macule/papule c/w actinic keratosis       Vascular papule c/w angioma      Pigmented verrucoid papule/plaque c/w seborrheic keratosis      Yellow umbilicated papule c/w sebaceous hyperplasia      Irregularly shaped tan macule c/w  lentigo     1-2 mm smooth white papules consistent with Milia      Movable subcutaneous cyst with punctum c/w epidermal inclusion cyst      Subcutaneous movable cyst c/w pilar cyst      Firm pink to brown papule c/w dermatofibroma      Pedunculated fleshy papule(s) c/w skin tag(s)      Evenly pigmented macule c/w junctional nevus     Mildly variegated pigmented, slightly irregular-bordered macule c/w mildly atypical nevus      Flesh colored to evenly pigmented papule c/w intradermal nevus       Pink pearly papule/plaque c/w basal cell carcinoma      Erythematous hyperkeratotic cursted plaque c/w SCC      Surgical scar with no sign of skin cancer recurrence      Open and closed comedones      Inflammatory papules and pustules      Verrucoid papule consistent consistent with wart     Erythematous eczematous patches and plaques     Dystrophic onycholytic nail with subungual debris c/w onychomycosis     Umbilicated papule    Erythematous-base heme-crusted tan verrucoid plaque consistent with inflamed seborrheic keratosis     Erythematous Silvery Scaling Plaque c/w Psoriasis     See annotation      Assessment / Plan:        Hair loss - likely multifactorial, but some component of androgenic alopecia. After discussion of treatment options, will try a course of low dose minoxidil. Discussed potential side effects (ie, hypotension, hypertrichosis)  -     minoxidiL (LONITEN) 2.5 MG tablet; Take 1/4 tablet (0.625mg) by mouth daily  Dispense: 30 tablet; Refill: 0    Sebaceous hyperplasia   This is a common condition representing benign enlargement of the sebaceous lobule. It typically occurs in adulthood. Reassurance given to patient.            Follow up in about 3 months (around 1/19/2023).

## 2022-10-20 ENCOUNTER — PATIENT MESSAGE (OUTPATIENT)
Dept: DERMATOLOGY | Facility: CLINIC | Age: 62
End: 2022-10-20
Payer: MEDICARE

## 2022-10-20 RX ORDER — MINOXIDIL 2.5 MG/1
TABLET ORAL
Qty: 30 TABLET | Refills: 0 | Status: SHIPPED | OUTPATIENT
Start: 2022-10-20 | End: 2023-01-03 | Stop reason: SDUPTHER

## 2022-10-24 ENCOUNTER — OFFICE VISIT (OUTPATIENT)
Dept: RHEUMATOLOGY | Facility: CLINIC | Age: 62
End: 2022-10-24
Payer: MEDICARE

## 2022-10-24 VITALS
HEART RATE: 72 BPM | BODY MASS INDEX: 31.48 KG/M2 | WEIGHT: 171.06 LBS | DIASTOLIC BLOOD PRESSURE: 82 MMHG | HEIGHT: 62 IN | SYSTOLIC BLOOD PRESSURE: 122 MMHG

## 2022-10-24 DIAGNOSIS — K51.011 ULCERATIVE PANCOLITIS WITH RECTAL BLEEDING: ICD-10-CM

## 2022-10-24 DIAGNOSIS — M35.2 BEHCET'S SYNDROME INVOLVING ORAL MUCOSA: Primary | ICD-10-CM

## 2022-10-24 DIAGNOSIS — M35.9 UNDIFFERENTIATED CONNECTIVE TISSUE DISEASE: ICD-10-CM

## 2022-10-24 DIAGNOSIS — D84.9 IMMUNOLOGIC DEFICIENCY SYNDROME: ICD-10-CM

## 2022-10-24 DIAGNOSIS — Z79.899 LONG-TERM USE OF PLAQUENIL: ICD-10-CM

## 2022-10-24 PROCEDURE — 99214 OFFICE O/P EST MOD 30 MIN: CPT | Mod: PBBFAC,PO | Performed by: INTERNAL MEDICINE

## 2022-10-24 PROCEDURE — 99999 PR PBB SHADOW E&M-EST. PATIENT-LVL IV: ICD-10-PCS | Mod: PBBFAC,,, | Performed by: INTERNAL MEDICINE

## 2022-10-24 PROCEDURE — 99999 PR PBB SHADOW E&M-EST. PATIENT-LVL IV: CPT | Mod: PBBFAC,,, | Performed by: INTERNAL MEDICINE

## 2022-10-24 PROCEDURE — 99215 OFFICE O/P EST HI 40 MIN: CPT | Mod: S$PBB,,, | Performed by: INTERNAL MEDICINE

## 2022-10-24 PROCEDURE — 99215 PR OFFICE/OUTPT VISIT, EST, LEVL V, 40-54 MIN: ICD-10-PCS | Mod: S$PBB,,, | Performed by: INTERNAL MEDICINE

## 2022-10-24 NOTE — PROGRESS NOTES
RHEUMATOLOGY CLINIC FOLLOW UP VISIT  Chief complaints, HPI, ROS, EXAM, Assessment & Plans:-  Yodit Parks a 62 y.o. pleasant female comes in for follow up visit.  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  Her disease was well controlled on Otezla and Plaquenil.  Recently diagnosed with severe ulcerative colitis with calprotectin level more than a 1000 when she had rectal bleeding associated with severe diarrhea.  On high-dose prednisone.  Plans to start Stelara as soon as possible.  Severe bloating present but no significant blood in stool or diarrhea since being on the prednisone.  Not on Otezla at this time since prednisone seems to control her oral mucositis.  Hoping to achieve improvement of oral ulcers with Stelara as well in addition to control of her ulcerative colitis.  Have not started Stelara yet.  Will be starting IV dose before switching to subcu-Crohn's/ulcerative colitis dosing.  Rheumatological review of system negative otherwise.  Examination shows no evidence of synovitis, dactylitis, enthesitis or effusion tenderness of bilateral ankle with mild edema.     1. Behcet's syndrome involving oral mucosa    2. Undifferentiated connective tissue disease    3. Immunologic deficiency syndrome    4. Long-term use of Plaquenil    5. Ulcerative pancolitis with rectal bleeding      Problem List Items Addressed This Visit       Immunologic deficiency syndrome    Undifferentiated connective tissue disease    Behcet's syndrome involving oral mucosa - Primary    Ulcerative pancolitis with rectal bleeding    Long-term use of Plaquenil       Well controlled Behcet's disease on prednisone and Plaquenil.  Otezla on hold because of severe ulcerative colitis and associated gastrointestinal symptoms.  Okay to stay off Otezla at this time  She may not need Otezla if Stelara controls  her mucositis as well.  Continue Plaquenil for inflammatory arthritis.  Compromised immune system secondary to autoimmune disease and use of immunosuppressive drugs. Monitor carefully for infections. Advised to get immediate medical care if any infection. Also advised strict adherence to age appropriate vaccinations and cancer screenings with PCP.   # Follow up in about 4 months (around 2/24/2023).      Disclaimer: This note was prepared using voice recognition system and is likely to have sound alike errors and is not proof read.  Please call me with any questions.

## 2022-10-26 ENCOUNTER — E-VISIT (OUTPATIENT)
Dept: FAMILY MEDICINE | Facility: CLINIC | Age: 62
End: 2022-10-26
Payer: MEDICARE

## 2022-10-26 ENCOUNTER — PATIENT MESSAGE (OUTPATIENT)
Dept: FAMILY MEDICINE | Facility: CLINIC | Age: 62
End: 2022-10-26

## 2022-10-26 ENCOUNTER — CLINICAL SUPPORT (OUTPATIENT)
Dept: FAMILY MEDICINE | Facility: CLINIC | Age: 62
End: 2022-10-26
Payer: MEDICARE

## 2022-10-26 DIAGNOSIS — J45.50 SEVERE PERSISTENT ASTHMA WITHOUT COMPLICATION: ICD-10-CM

## 2022-10-26 DIAGNOSIS — J06.9 UPPER RESPIRATORY TRACT INFECTION, UNSPECIFIED TYPE: ICD-10-CM

## 2022-10-26 DIAGNOSIS — D84.9 IMMUNE DEFICIENCY DISORDER: ICD-10-CM

## 2022-10-26 DIAGNOSIS — J06.9 UPPER RESPIRATORY TRACT INFECTION, UNSPECIFIED TYPE: Primary | ICD-10-CM

## 2022-10-26 PROCEDURE — 99421 PR E&M, ONLINE DIGIT, EST, < 7 DAYS, 5-10 MINS: ICD-10-PCS | Mod: ,,, | Performed by: FAMILY MEDICINE

## 2022-10-26 PROCEDURE — 99421 OL DIG E/M SVC 5-10 MIN: CPT | Mod: ,,, | Performed by: FAMILY MEDICINE

## 2022-10-26 PROCEDURE — 87502 INFLUENZA DNA AMP PROBE: CPT | Mod: PBBFAC,PO

## 2022-10-26 PROCEDURE — 87635 SARS-COV-2 COVID-19 AMP PRB: CPT | Mod: PBBFAC,PO

## 2022-10-26 NOTE — PROGRESS NOTES
Patient ID: Yodit Canseco is a 62 y.o. female.    Chief Complaint: Cough, Fatigue, Nasal Congestion, and Sore Throat    The patient initiated a request through [employer] on 10/26/2022 for evaluation and management with a chief complaint of Cough, Fatigue, Nasal Congestion, and Sore Throat     I evaluated the questionnaire submission on 10/26/22.    [facility] Peq Evisit Upper Respitatory/Cough Questionnaire    10/26/2022  9:37 AM CDT - Filed by Patient   Do you agree to participate in an E-Visit? Yes   If you have any of the following symptoms, please present to your local ER or call 911:  I acknowledge   What is the main issue that you would like for your doctor to address today? Cough   Are you able to take your vital signs? Yes   Systolic Blood Pressure:    Diastolic Blood Pressure:    Weight: 170   Height: 62   Pulse: 83   Temp: 98.1   Resp:    Pulse Ox: 98   What symptoms do you currently have?  Cough;  Fatigue;  Nasal Congestion;  Runny nose;  Sore throat   Have you had a fever? No   When did your symptoms first appear? 10/25/2022   In the last two weeks, have you been in close contact with someone who has COVID-19? No   In the last two weeks, have you worked or volunteered in a healthcare facility or as a ? Healthcare facilities include a hospital, medical or dental clinic, long-term care facility, or nursing home No   Do you live in a long-term care facility, nursing home, or homeless shelter? No   List what you have done or taken to help your symptoms. Benzonatate   How severe are your symptoms? Moderate   Have you taken an at home Covid test? Yes   What were the results? Negative   Have you been fully vaccinated for COVID? (2 Pfizer, 2 Moderna or 1 Martin [employer] vaccine injections) Yes   Have you received a booster? Yes   Do you have transportation to get tested for COVID if it is indicated and ordered for you at an [facility] location? Yes   Provide any information you feel is important to  your history not asked above Grandson has RSV.  Spent time with him Saturday.   Please attach any relevant images or files           Active Problem List with Overview Notes    Diagnosis Date Noted    Long-term use of Plaquenil 10/24/2022    Ulcerative pancolitis with rectal bleeding 10/17/2022    Normocytic anemia 10/05/2022    Hypomagnesemia 09/03/2022    Hypoalbuminemia 09/02/2022    History of gastric bypass 09/02/2022    Fatty liver 09/02/2022    Hepatomegaly 09/02/2022    Exacerbation of ulcerative colitis with rectal bleeding 08/29/2022     Left sided from descending to rectum per colonoscopy report and path      Drug-induced nausea and vomiting 08/15/2022    Weakness of right lower extremity 08/02/2022    Impaired flexibility of lower extremity 08/02/2022    Decreased activity tolerance 08/02/2022    Other forms of systemic lupus erythematosus 05/23/2022    Behcet's syndrome involving oral mucosa 05/23/2022    Recurrent aphthous stomatitis 02/15/2022    Recurrent oral ulcers 08/19/2021    Undifferentiated connective tissue disease 06/11/2021    Severe persistent asthma with acute exacerbation 04/06/2021    Immunologic deficiency syndrome 10/14/2020    S/P gastric sleeve procedure 04/16/2018    Arthralgia 04/03/2018    Asthma 03/08/2018    Family history of ASCVD (arteriosclerotic cardiovascular disease) 07/06/2017    Migraine headache     Prediabetes     Gastroparesis     Severe persistent asthma without complication 09/27/2016    Immune deficiency disorder 09/27/2016    Hand arthritis 06/30/2016    Refractive error 05/03/2016    Hyperlipidemia     History of morbid obesity     Hypothyroidism     Degenerative tear of triangular fibrocartilage complex (TFCC) of left wrist 11/06/2015    Left wrist pain 11/06/2015    Extensor tenosynovitis of wrist 11/06/2015    H/O hand surgery 10/21/2015    Left hand pain 10/21/2015    History of colon polyps 10/08/2015    Diverticulosis of large intestine without hemorrhage  10/08/2015      Recent Labs Obtained:  No visits with results within 7 Day(s) from this visit.   Latest known visit with results is:   No results displayed because visit has over 200 results.          Encounter Diagnoses   Name Primary?    Upper respiratory tract infection, unspecified type Yes    Severe persistent asthma without complication     Immune deficiency disorder         Orders Placed This Encounter   Procedures    POCT COVID-19 Rapid Screening     Standing Status:   Future     Standing Expiration Date:   2023     Order Specific Question:   Is the patient symptomatic?     Answer:   Yes    POCT Influenza A/B Molecular     Standing Status:   Future     Standing Expiration Date:   2022        Hello,   It sounds like you have some type of viral upper respiratory syndrome.  Could be RSV considering the exposure.  Would not be much to do for this as an outpatient in adult other than symptomatic treatment.  Please make sure that you are using your regular pulmonary medications/inhalers.  We could test you for influenza and COVID as we are also seeing both of these illnesses and there are treatments that we could look at if you tested positive.  I will have the nurse contact you about coming in today to get tested for flu and COVID.  Orders placed.  Thanks,   Dr. Lewis    E-Visit Time Trackin min

## 2022-10-31 ENCOUNTER — PATIENT MESSAGE (OUTPATIENT)
Dept: PULMONOLOGY | Facility: CLINIC | Age: 62
End: 2022-10-31
Payer: MEDICARE

## 2022-11-01 ENCOUNTER — LAB VISIT (OUTPATIENT)
Dept: LAB | Facility: HOSPITAL | Age: 62
End: 2022-11-01
Attending: STUDENT IN AN ORGANIZED HEALTH CARE EDUCATION/TRAINING PROGRAM
Payer: MEDICARE

## 2022-11-01 DIAGNOSIS — K51.00 PANCOLITIS: ICD-10-CM

## 2022-11-01 DIAGNOSIS — K51.90 ULCERATIVE COLITIS: Primary | ICD-10-CM

## 2022-11-01 LAB
ALBUMIN SERPL BCP-MCNC: 3.1 G/DL (ref 3.5–5.2)
ALP SERPL-CCNC: 64 U/L (ref 55–135)
ALT SERPL W/O P-5'-P-CCNC: 9 U/L (ref 10–44)
ANION GAP SERPL CALC-SCNC: 10 MMOL/L (ref 8–16)
AST SERPL-CCNC: 16 U/L (ref 10–40)
BASOPHILS # BLD AUTO: 0.04 K/UL (ref 0–0.2)
BASOPHILS NFR BLD: 0.4 % (ref 0–1.9)
BILIRUB SERPL-MCNC: 0.2 MG/DL (ref 0.1–1)
BUN SERPL-MCNC: 10 MG/DL (ref 8–23)
CALCIUM SERPL-MCNC: 9.2 MG/DL (ref 8.7–10.5)
CHLORIDE SERPL-SCNC: 104 MMOL/L (ref 95–110)
CO2 SERPL-SCNC: 26 MMOL/L (ref 23–29)
CREAT SERPL-MCNC: 0.7 MG/DL (ref 0.5–1.4)
CRP SERPL-MCNC: 13.7 MG/L (ref 0–8.2)
DIFFERENTIAL METHOD: ABNORMAL
EOSINOPHIL # BLD AUTO: 0.1 K/UL (ref 0–0.5)
EOSINOPHIL NFR BLD: 0.6 % (ref 0–8)
ERYTHROCYTE [DISTWIDTH] IN BLOOD BY AUTOMATED COUNT: 15.2 % (ref 11.5–14.5)
ERYTHROCYTE [SEDIMENTATION RATE] IN BLOOD BY WESTERGREN METHOD: 50 MM/HR (ref 0–20)
EST. GFR  (NO RACE VARIABLE): >60 ML/MIN/1.73 M^2
GLUCOSE SERPL-MCNC: 84 MG/DL (ref 70–110)
HCT VFR BLD AUTO: 37.2 % (ref 37–48.5)
HGB BLD-MCNC: 10.9 G/DL (ref 12–16)
IMM GRANULOCYTES # BLD AUTO: 0.15 K/UL (ref 0–0.04)
IMM GRANULOCYTES NFR BLD AUTO: 1.4 % (ref 0–0.5)
LYMPHOCYTES # BLD AUTO: 1.5 K/UL (ref 1–4.8)
LYMPHOCYTES NFR BLD: 14.2 % (ref 18–48)
MCH RBC QN AUTO: 29 PG (ref 27–31)
MCHC RBC AUTO-ENTMCNC: 29.3 G/DL (ref 32–36)
MCV RBC AUTO: 99 FL (ref 82–98)
MONOCYTES # BLD AUTO: 0.6 K/UL (ref 0.3–1)
MONOCYTES NFR BLD: 5.9 % (ref 4–15)
NEUTROPHILS # BLD AUTO: 8.3 K/UL (ref 1.8–7.7)
NEUTROPHILS NFR BLD: 77.5 % (ref 38–73)
NRBC BLD-RTO: 0 /100 WBC
PLATELET # BLD AUTO: 295 K/UL (ref 150–450)
PMV BLD AUTO: 10.8 FL (ref 9.2–12.9)
POTASSIUM SERPL-SCNC: 3.5 MMOL/L (ref 3.5–5.1)
PROT SERPL-MCNC: 6.5 G/DL (ref 6–8.4)
RBC # BLD AUTO: 3.76 M/UL (ref 4–5.4)
SODIUM SERPL-SCNC: 140 MMOL/L (ref 136–145)
WBC # BLD AUTO: 10.66 K/UL (ref 3.9–12.7)

## 2022-11-01 PROCEDURE — 80053 COMPREHEN METABOLIC PANEL: CPT | Performed by: STUDENT IN AN ORGANIZED HEALTH CARE EDUCATION/TRAINING PROGRAM

## 2022-11-01 PROCEDURE — 85651 RBC SED RATE NONAUTOMATED: CPT | Mod: PO | Performed by: STUDENT IN AN ORGANIZED HEALTH CARE EDUCATION/TRAINING PROGRAM

## 2022-11-01 PROCEDURE — 36415 COLL VENOUS BLD VENIPUNCTURE: CPT | Mod: PO | Performed by: STUDENT IN AN ORGANIZED HEALTH CARE EDUCATION/TRAINING PROGRAM

## 2022-11-01 PROCEDURE — 87338 HPYLORI STOOL AG IA: CPT | Performed by: STUDENT IN AN ORGANIZED HEALTH CARE EDUCATION/TRAINING PROGRAM

## 2022-11-01 PROCEDURE — 86140 C-REACTIVE PROTEIN: CPT | Performed by: STUDENT IN AN ORGANIZED HEALTH CARE EDUCATION/TRAINING PROGRAM

## 2022-11-01 PROCEDURE — 85025 COMPLETE CBC W/AUTO DIFF WBC: CPT | Performed by: STUDENT IN AN ORGANIZED HEALTH CARE EDUCATION/TRAINING PROGRAM

## 2022-11-02 ENCOUNTER — OFFICE VISIT (OUTPATIENT)
Dept: BARIATRICS | Facility: CLINIC | Age: 62
End: 2022-11-02
Payer: MEDICARE

## 2022-11-02 ENCOUNTER — OFFICE VISIT (OUTPATIENT)
Dept: PULMONOLOGY | Facility: CLINIC | Age: 62
End: 2022-11-02
Payer: MEDICARE

## 2022-11-02 VITALS
RESPIRATION RATE: 16 BRPM | HEIGHT: 62 IN | SYSTOLIC BLOOD PRESSURE: 144 MMHG | HEART RATE: 75 BPM | BODY MASS INDEX: 31.06 KG/M2 | TEMPERATURE: 98 F | WEIGHT: 168.81 LBS | DIASTOLIC BLOOD PRESSURE: 70 MMHG

## 2022-11-02 VITALS
DIASTOLIC BLOOD PRESSURE: 77 MMHG | OXYGEN SATURATION: 98 % | HEIGHT: 62 IN | TEMPERATURE: 98 F | BODY MASS INDEX: 31.85 KG/M2 | SYSTOLIC BLOOD PRESSURE: 142 MMHG | HEART RATE: 78 BPM | WEIGHT: 173.06 LBS

## 2022-11-02 DIAGNOSIS — R10.9 ABDOMINAL PAIN, UNSPECIFIED ABDOMINAL LOCATION: ICD-10-CM

## 2022-11-02 DIAGNOSIS — B33.8 RSV (RESPIRATORY SYNCYTIAL VIRUS INFECTION): ICD-10-CM

## 2022-11-02 DIAGNOSIS — K76.0 FATTY LIVER: ICD-10-CM

## 2022-11-02 DIAGNOSIS — E66.01 MORBID OBESITY: Primary | ICD-10-CM

## 2022-11-02 DIAGNOSIS — J45.51 SEVERE PERSISTENT ASTHMA WITH ACUTE EXACERBATION: Primary | ICD-10-CM

## 2022-11-02 DIAGNOSIS — R60.0 LOCALIZED EDEMA: ICD-10-CM

## 2022-11-02 DIAGNOSIS — K51.011 ULCERATIVE PANCOLITIS WITH RECTAL BLEEDING: ICD-10-CM

## 2022-11-02 DIAGNOSIS — B37.9 YEAST INFECTION: ICD-10-CM

## 2022-11-02 DIAGNOSIS — Z90.3 S/P GASTRIC SLEEVE PROCEDURE: ICD-10-CM

## 2022-11-02 PROCEDURE — 99213 OFFICE O/P EST LOW 20 MIN: CPT | Mod: PBBFAC,25,PO | Performed by: INTERNAL MEDICINE

## 2022-11-02 PROCEDURE — 99203 PR OFFICE/OUTPT VISIT, NEW, LEVL III, 30-44 MIN: ICD-10-PCS | Mod: S$PBB,,, | Performed by: SURGERY

## 2022-11-02 PROCEDURE — 99999 PR PBB SHADOW E&M-EST. PATIENT-LVL III: CPT | Mod: PBBFAC,,, | Performed by: SURGERY

## 2022-11-02 PROCEDURE — 99213 OFFICE O/P EST LOW 20 MIN: CPT | Mod: PBBFAC,27,PN | Performed by: SURGERY

## 2022-11-02 PROCEDURE — 99999 PR PBB SHADOW E&M-EST. PATIENT-LVL III: CPT | Mod: PBBFAC,,, | Performed by: INTERNAL MEDICINE

## 2022-11-02 PROCEDURE — 99999 PR PBB SHADOW E&M-EST. PATIENT-LVL III: ICD-10-PCS | Mod: PBBFAC,,, | Performed by: INTERNAL MEDICINE

## 2022-11-02 PROCEDURE — 99213 OFFICE O/P EST LOW 20 MIN: CPT | Mod: S$PBB,,, | Performed by: INTERNAL MEDICINE

## 2022-11-02 PROCEDURE — 99203 OFFICE O/P NEW LOW 30 MIN: CPT | Mod: S$PBB,,, | Performed by: SURGERY

## 2022-11-02 PROCEDURE — 99999 PR PBB SHADOW E&M-EST. PATIENT-LVL III: ICD-10-PCS | Mod: PBBFAC,,, | Performed by: SURGERY

## 2022-11-02 PROCEDURE — 99213 PR OFFICE/OUTPT VISIT, EST, LEVL III, 20-29 MIN: ICD-10-PCS | Mod: S$PBB,,, | Performed by: INTERNAL MEDICINE

## 2022-11-02 RX ORDER — BENZONATATE 200 MG/1
200 CAPSULE ORAL 3 TIMES DAILY PRN
Qty: 30 CAPSULE | Refills: 3 | Status: SHIPPED | OUTPATIENT
Start: 2022-11-02 | End: 2022-12-29 | Stop reason: SDUPTHER

## 2022-11-02 RX ORDER — TRIAMTERENE/HYDROCHLOROTHIAZID 37.5-25 MG
1 TABLET ORAL DAILY PRN
Qty: 30 TABLET | Refills: 1 | Status: SHIPPED | OUTPATIENT
Start: 2022-11-02 | End: 2023-05-08 | Stop reason: SDUPTHER

## 2022-11-02 RX ORDER — MONTELUKAST SODIUM 10 MG/1
10 TABLET ORAL NIGHTLY
Qty: 30 TABLET | Refills: 0 | Status: SHIPPED | OUTPATIENT
Start: 2022-11-02 | End: 2022-12-02

## 2022-11-02 RX ORDER — ALBUTEROL SULFATE 90 UG/1
2 AEROSOL, METERED RESPIRATORY (INHALATION) EVERY 6 HOURS PRN
Qty: 18 G | Refills: 0 | Status: SHIPPED | OUTPATIENT
Start: 2022-11-02 | End: 2023-05-08 | Stop reason: SDUPTHER

## 2022-11-02 RX ORDER — HYDROMORPHONE HYDROCHLORIDE 2 MG/1
2 TABLET ORAL EVERY 4 HOURS PRN
Qty: 60 TABLET | Refills: 0 | Status: SHIPPED | OUTPATIENT
Start: 2022-11-02 | End: 2023-02-27 | Stop reason: ALTCHOICE

## 2022-11-02 RX ORDER — FLUCONAZOLE 200 MG/1
200 TABLET ORAL WEEKLY
Qty: 7 TABLET | Refills: 0 | Status: SHIPPED | OUTPATIENT
Start: 2022-11-02 | End: 2023-12-05 | Stop reason: SDUPTHER

## 2022-11-02 RX ORDER — BUDESONIDE, GLYCOPYRROLATE, AND FORMOTEROL FUMARATE 160; 9; 4.8 UG/1; UG/1; UG/1
2 AEROSOL, METERED RESPIRATORY (INHALATION) 2 TIMES DAILY
Qty: 10.7 G | Refills: 11 | Status: SHIPPED | OUTPATIENT
Start: 2022-11-02 | End: 2023-05-22

## 2022-11-02 RX ORDER — ALBUTEROL SULFATE 90 UG/1
2 AEROSOL, METERED RESPIRATORY (INHALATION) EVERY 6 HOURS PRN
Qty: 18 G | Refills: 0 | Status: SHIPPED | OUTPATIENT
Start: 2022-11-02 | End: 2023-11-02

## 2022-11-02 RX ORDER — USTEKINUMAB 90 MG/ML
INJECTION, SOLUTION SUBCUTANEOUS
COMMUNITY
Start: 2022-10-31 | End: 2023-05-08

## 2022-11-02 NOTE — PROGRESS NOTES
Post Op Note    Surgery: gastric sleeve surgery  Date: 4/16/18  Initial weight: 215  Last weight: 167  Current weight: 168  Weight has fluctuating    Lupus, RA, Behcets, UC- recent diagnoses     No reflux nausea or vomiting    Diet:    Sticking to low residue high protein diet    Exercise:  Walking 3 miles per day, got sick and stopped     MVI: daily mvi     Vitals:    11/02/22 1541   BP: (!) 144/70   Pulse: 75   Resp: 16   Temp: 98.2 °F (36.8 °C)       Body comp:  Fat Percent:  35.9 %  Fat Mass:  60.6 lb  FFM:  108.2 lb  TBW: 75.8 lb  TBW %:  44.9 %  BMR: 1463 kcal    PE:  NAD  RRR  Soft/nt/nd    Labs: reviewed    A/P: s/p sleeve     About to start   If weight goes above 175 come back     Counseling for patient:    Diet: doing well overall; wants to get back into dieting, we went over plan  Exercise: as much as possible  Vitamins: continue daily mvi  Co morbidities:     1. Morbid obesity        2. Fatty liver        3. S/P gastric sleeve procedure            -All above: Evaluated, monitored, and treated with diet and exercise program.

## 2022-11-02 NOTE — PROGRESS NOTES
2022    Yodit Canseco  Office Note    Chief Complaint   Patient presents with    Follow-up     RSV Friday started treatment.       HPI:       2022 dx lupus, bechets, Ulcerative colitis, RA-- on hizentra.  Pt exposed to grandson with rsv.  Having abd pain -- dilaudid helped and suppressed cough-- not able to use codeine/hydrocodone.     Ct abd and cxr 10/2022 clear lungs.       Pt currently felt to have rsv-- improved over last 5 da   pt was sl febrile initially.   Pt now having green mucous. Cough is violent .  similar sick. Had min leg edema.  On hizentra. Asthma doing wellwith RSV.  Eating ok but weak and coughs a lot.  Breathing good.     2022 mom  sept complications from hip fx/covid.  Had few sinus infection-- rx Levaquin/prednisone.  Pt on plaquenil, dx ra and lupus.  Also on colchicine.      Uses breo but skipped.  Uses albuterol once a wk.    Patient Instructions   May use breztri 2 twice daily -- could use more or less as needed.   If used regular -- breztri would act as controller.    May use levaquin and prednisone if sinus/lung exacerbates.    Lungs loook good wrt lung tissue-- no lung tissue disease seen from Rheumatoid disease.   Ct abd 2021 viewed.   2021 - had covid in July, had rapid home test +, managed outpt.  Got infusion rx.  Has wheezes but feels over covid.  Had nausea, vomiting, weakness, headache, -- sick 8 days.  Mom got hip fx complicating recovery-- at rehab. Pt and mom were vaccinated.  Pt on abx for sinus infection  Dx lupus/rheumatoid - 5 mg prednisone daily  Patient Instructions   Recovering covid doing well.    Use levaquin as needed.    Ordered cxr if needed.     2021 had pneumonia 2020 - no problems. Needs disability paper updated.  We review today.    Patient Instructions   We reviewed your disability- need to complete paper work.  You had pneumonia in 2020.  Breo, prednisone, levaquin, albuterol renewed.    10/14/2020- all  well.  No abx/prednisone, cxr 10/14/2020 nad- rul pneumonia [place] cleared.  Avoids irritants.  Patient Instructions   You likely would be best to get high dose flu vaccine.   Your immune system may have problem responding to flu vaccine.  4/14/2020- on hirzentra since sept 2016.  Saw NP [place] in [place] 4/13- virtual visit- azithro and prednisone 10/d x 3 for sinusitis.     Uses   500 bid, breo 200 daily, , xopenex nebs prn, singulair. Needs more prednisone, had pneumonia 3/4 to 8th.  Had ct chest feb.  Felt had covid symptoms.  No ox needed for pneumonia- no steroids nor bd. Took doxy - vanc in hosp.   Patient Instructions   Pneumonia - follow up chest xray good- not urgent.    Prednisone action plan for asthma/wheeze/cough    May use levaquin for sinus/lung infections  2/10/2020- had shingles vaccine, states felt an immune response.   Cough- improved, daily, not severe, non productive occasionally productive thick dark yellow color. Using nebulizer only as needed. Has noticed fewer asthma exacerbations since starting Hyzentra, went from 1 months to 2x yearly.   Not able to use albuterol rescue inhaler or nebulized due to tachycardia, palpitations, and hand tremors. No complaint with xopenex medications.   Patient Instructions   Continue current Asthma medication regiment  Review of CT shows no areas of concern. The small nodule is not significant  Pulmonary function test shows lungs to still be strong.   Continue to use Xopenex Nebulizer with aerobika attachement once a day three days a week every week.  When your sick you can use nebulizer or Xopenex rescue inhaler four times a day.     1/9/2020- two exacerbations in 2 months, states cough is unchanged, daily, states bark like cough, severe and affects daily life, Productive occasionally clear/cream color thick in consistency, uses nebulizer 2-3 x daily.   Patient Instructions   [employer] sells an Aerobika device, this device shakes the air inside of  you to break mucous off the bronchial wall. Use with nebulizer and with out daily.    Continue current Asthma medication regiment     Use nebulizer at least once a day three days a week.     CT to evaluate lungs for any abnormalities       11/8/2019- Had recent Rotator cuff surgery with nerve block Oct 9, Has SOB worse after surgery for 3 days, Took prednisone 20 mg for 3 days and Levaquin for 9 days. Seen by PCP dx URI.   Coughing, chest tightness, congestion, wheezing, Hoarse voice, fatigue, Shortness of breath worse with exertion, onset following surgery to shoulder. Cough- not able to clear secretions from chest.  Complaint of hand shaking, heart rate increases, and becomes rapid with Albuterol nebulizer onset years. Tries not to use due to side effects.     04/24/2019- breathing is good except for high pollen days, had 3 exacerbation in 6 months. Albuterol rescue inhaler 3x weekly, 1-2 nocturnal arousals monthly, could not do PFT at [facility],     11/6/18- ER visit for dehydration gastroenteritis, no prednisone use in 4 months. Not currently on fasenra injection, insurance would not cover. Currently on Hirzentra IGG therapy.   Sinus drip present, cough occasional, non productive, no nocturnal arousals, Currently on Dulera daily, uses Albuterol rescue inhaler daily before exercise no SOB.  Sepsis [facility] August 2017.  Flu vaccine   July 9, 2018 - had gastric sleeve with leak complication- lost 45 lbs already.  Has appetite but fills quickly.  Still on hirzentra.  Asthma ok avoiding fragrances/ordors.  No prednisone.   No nocturnal arousals, uses rescue weekly avoiding any irritants.  Use prednisone 4-5 last year and twice this year.        March 19, 2018-since recovered from pleuritic pain - doing well.  No prednisone use recent.  Pt has had no wheezes.  feb 22,2108   Had to do prednisone again.  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 I have advised not to work given severe asthma,  hypersensitivity to fragrances, and immune deficiency.   Pt seems better since Hirzentra, but still unstable severe asthma  Jan 29. 2018- 6 days ago had been exposed to sick , had nasal congestion , cough, ear stuffy, muscle aches /joint aches / fever.  Temp to 100.1.  Seen by np and flu screen negative.  Had asthma 3-4 flares last year. eos up past,   oct 5, 2017 - had marce in April, skipped couple doses hirzentra, had mild bronchitis once, otherwise doing very well.  No prednisone.  No side effects and covered. Ppt flu sense for 2 days  Jan 24,2017 on  hirzentra q o week since sept and asthma more stable, no hosp, no prednisone, no noct asthma/ no rescue therapy- control not this good for years.  Sept 27, 2016  HPI:has had lung problems since birth, no nocturnal arousals, uses rescue 2/d, breathing controlled satisfactory except with irritants/infection - strong abx needed to clear.  Prednisone 2/yr, last hosp 18 months.  No ventilator.    Had exacerbation recent due uri from .  Has had shingles vaccine past.       The chief compliant  problem is varies with instablilty at time  PFSH:  Past Medical History:   Diagnosis Date    Angio-edema     Arthralgia     Asthma without status asthmaticus     Bronchitis     COPD (chronic obstructive pulmonary disease)     COVID-19 virus infection 07/23/2021    Diverticulosis of large intestine without hemorrhage 10/08/2015    Environmental allergies     Eosinophilic asthma 03/08/2018    Exacerbation of ulcerative colitis with rectal bleeding     Gastroparesis     Hand arthritis     Herpes simplex without mention of complication     oral    Hidradenitis     History of morbid obesity     Hyperlipidemia     Hypothyroidism     Immune deficiency disorder     Intraperitoneal abscess 05/07/2018    LBP radiating to left leg     Leukocytosis, unspecified      Migraine headache     Normocytic anemia 10/05/2022    Obesity, Class III, BMI 40-49.9 (morbid obesity)     Periorbital cellulitis 12/31/2016    Prediabetes     RA (rheumatoid arthritis)     Recurrent upper respiratory infection (URI)     S/P colonoscopy 11/2010    Sepsis 05/07/2018    Systemic lupus erythematosus, organ or system involvement unspecified     Tubular adenoma of colon 08/17/2022    Ulcerative pancolitis with rectal bleeding 10/17/2022    Urticaria          Past Surgical History:   Procedure Laterality Date    ADENOIDECTOMY      CHOLECYSTECTOMY      CHOLECYSTECTOMY      COLONOSCOPY  2015    repeat in 10    COLONOSCOPY N/A 10/8/2015    Procedure: COLONOSCOPY;  Surgeon: Panda Bose MD;  Location: [facility];  Service: Endoscopy;  Laterality: N/A;    COLONOSCOPY N/A 8/17/2022    Procedure: COLONOSCOPY;  Surgeon: Olaf Del Valle MD;  Location: [facility];  Service: General;  Laterality: N/A;    COLONOSCOPY N/A 10/6/2022    Procedure: COLONOSCOPY;  Surgeon: Quinton Celeste MD;  Location: [facility];  Service: Gastroenterology;  Laterality: N/A;    Hand fracture surgery      HARDWARE REMOVAL      left hand [facility]th [facility]    hymenectomy      HYSTERECTOMY      menorrhagia-Ovaries intact    ROTATOR CUFF REPAIR Right     SLEEVE GASTROPLASTY  04/16/2018    TONSILLECTOMY      Urethral dilatation       Social History     Tobacco Use    Smoking status: Never    Smokeless tobacco: Never   Substance Use Topics    Alcohol use: Yes     Alcohol/week: 0.0 standard drinks     Comment: very rarely    Drug use: No     Family History   Problem Relation Age of Onset    Melanoma Mother     Basal cell carcinoma Mother     Lung disease Mother         pulm htn    Cataracts Mother     Hypertension Mother     Lung cancer Father     Diabetes Father     Hypertension Father     Cancer Father     Diabetes Paternal Aunt     Colon cancer Paternal Aunt 40    Diabetes Paternal Aunt     Ovarian cancer Paternal Grandmother 40    Cancer Maternal  "Grandfather     Melanoma Maternal Aunt     Melanoma Maternal Uncle     Breast cancer Paternal Aunt 40    Lymphoma Paternal Aunt     Ulcerative colitis Son     Psoriasis Son     Psoriasis Son     Breast cancer Cousin 25    Allergic rhinitis Neg Hx     Allergies Neg Hx     Angioedema Neg Hx     Asthma Neg Hx     Atopy Neg Hx     Eczema Neg Hx     Immunodeficiency Neg Hx     Rhinitis Neg Hx     Urticaria Neg Hx      Review of patient's allergies indicates:   Allergen Reactions    Bromelains Hives     " causes mouth to bleed"    Sudafed [pseudoephedrine hcl] Other (See Comments)     Does not want to wake up .    Amoxicillin-pot clavulanate Itching     Other reaction(s): Itching    Hydrocodone Itching    Iodinated contrast- oral and iv dye      childhood    Iodine and iodide containing products Other (See Comments)    Melon Hives    Pantoprazole Nausea Only     Other reaction(s): upset stomach/halitosis    Percocet [oxycodone-acetaminophen] Itching    Barium iodide Rash    Ephedrine Other (See Comments)     Other reaction(s): comatose    Penicillins      Other reaction(s): does not work on pt symptoms     I have reviewed past medical, family, and social history. I have reviewed previous nurse notes.    Performance Status:The patient's activity level is functions out of house.      Review of Systems   Constitutional: Negative for activity change, appetite change, chills, diaphoresis, fatigue, fever and unexpected weight change.   HENT: Negative for dental problem, sneezing, sore throat, trouble swallowing, postnasal drip, rhinorrhea, sinus pressure, sinus pain, and voice change.     Respiratory: Negative for chest tightness, shortness of breath, wheezing.  Positive for cough,   Cardiovascular: Negative for chest pain, palpitations and leg swelling.   Musculoskeletal: Negative for gait problem, myalgias and neck pain.   Skin: Negative for color change and pallor.   Allergic/Immunologic: Negative for environmental allergies " "and food allergies.   Neurological: Negative for dizziness, speech difficulty, weakness, light-headedness, numbness and headaches.   Hematological: Negative for adenopathy. Does not bruise/bleed easily.   Psychiatric/Behavioral: Negative for dysphoric mood and sleep disturbance. The patient is not nervous/anxious.             Exam:Comprehensive exam done. BP (!) 170/79 (BP Location: Right arm, Patient Position: Sitting)   Pulse (!) 56   Ht 5' 10" (1.778 m)   Wt 119.2 kg (262 lb 10.9 oz)   SpO2 95% Comment: on room air  BMI 37.69 kg/m²   Exam included Vitals as listed, and patient's appearance and affect and alertness and mood, oral exam for yeast and hygiene and pharynx lesions and Mallapatti (M) score, neck with inspection for jvd and masses and thyroid abnormalities and lymph nodes (supraclavicular and infraclavicular nodes and axillary also examined and noted if abn), chest exam included symmetry and effort and fremitus and percussion and auscultation, cardiac exam included rhythm and gallops and murmur and rubs and jvd and edema, abdominal exam for mass and hepatosplenomegaly and tenderness and hernias and bowel sounds, Musculoskeletal exam with muscle tone and posture and mobility/gait and  strength, and skin for rashes and cyanosis and pallor and turgor, extremity for clubbing.  Findings were normal except for pertinent findings listed below:  M3, chest is symmetric, no distress, normal percussion, normal fremitus and good normal breath sounds        Radiographs (ct chest and cxr) reviewed: results reviewed by direct vision  CT Chest Without Contrast 2/10/2020  Normal exam, 3.3 mm nodule seen in right upper lobe.    Lungs are clear.  Minimal retained mucus secretions in the trachea.  No pleural effusion or pneumothorax.  Normal sized heart.  Thyroid is small and heterogeneous with a subcentimeter nodule or cyst in the right lobe.     XR CHEST 1 VIEW 05/10/2018   There is left lower lobe atelectasis " and possible small left pleural effusion on this study.  No pneumothorax is seen.      Labs reviewed       Lab Results   Component Value Date    WBC 10.66 11/01/2022    RBC 3.76 (L) 11/01/2022    HGB 10.9 (L) 11/01/2022    HCT 37.2 11/01/2022    MCV 99 (H) 11/01/2022    MCH 29.0 11/01/2022    MCHC 29.3 (L) 11/01/2022    RDW 15.2 (H) 11/01/2022     11/01/2022    MPV 10.8 11/01/2022    GRAN 8.3 (H) 11/01/2022    GRAN 77.5 (H) 11/01/2022    LYMPH 1.5 11/01/2022    LYMPH 14.2 (L) 11/01/2022    MONO 0.6 11/01/2022    MONO 5.9 11/01/2022    EOS 0.1 11/01/2022    BASO 0.04 11/01/2022    EOSINOPHIL 0.6 11/01/2022    BASOPHIL 0.4 11/01/2022       PFT reviewed  2/10/2020 no obstruction seen, 7% bronchodilator response with 12% needed for clinical improvement; TLC 86% with DLC0 at 88%, normal exam with mild asthma  Spirometry bronchodilator, lung volume by gas dilution, diffusion capacity measured February 10, 2020.  The FEV1 to FVC ratio was 79%, there is no airflow obstruction measured by spirometry technique.  The FEV1 measured 85% predicted at 1.97 L. The 8%   improvement in FEV1 failed to reach statistical significance ( 12% is needed for statistical significance ).  Lung volumes by gas dilution were normal.  Diffusion was normal.      Spirometry, lung volumes, diffusion are all normal.  The bronchodilator response was not statistically significant.  Clinical correlation recommended.     Plan:  Clinical impression is apparently straight forward and impression with management as below.    Yodit was seen today for follow-up.    Diagnoses and all orders for this visit:    Severe persistent asthma with acute exacerbation  -     albuterol (PROVENTIL/VENTOLIN HFA) 90 mcg/actuation inhaler; Inhale 2 puffs into the lungs every 6 (six) hours as needed for Wheezing. Rescue  -     albuterol (PROAIR HFA) 90 mcg/actuation inhaler; Inhale 2 puffs into the lungs every 6 (six) hours as needed for Wheezing. Rescue  -      montelukast (SINGULAIR) 10 mg tablet; Take 1 tablet (10 mg total) by mouth every evening.  -     budesonide-glycopyr-formoterol (BREZTRI AEROSPHERE) 160-9-4.8 mcg/actuation HFAA; Inhale 2 puffs into the lungs 2 (two) times a day.    Ulcerative pancolitis with rectal bleeding    Abdominal pain, unspecified abdominal location  -     HYDROmorphone (DILAUDID) 2 MG tablet; Take 1 tablet (2 mg total) by mouth every 4 (four) hours as needed for Pain.    RSV (respiratory syncytial virus infection)  -     benzonatate (TESSALON) 200 MG capsule; Take 1 capsule (200 mg total) by mouth 3 (three) times daily as needed for Cough.    Localized edema  -     triamterene-hydrochlorothiazide 37.5-25 mg (MAXZIDE-25) 37.5-25 mg per tablet; Take 1 tablet by mouth daily as needed (edema).    Yeast infection  -     fluconazole (DIFLUCAN) 200 MG Tab; Take 1 tablet (200 mg total) by mouth once a week.        Follow up in about 6 months (around 5/2/2023), or if symptoms worsen or fail to improve.    Discussed with patient above for education the following:      Patient Instructions   May use levaquin for green mucous    Rsv should run course-- hirzentra should help?  Breztri may be needed.      Use dilaudid minimally for pain -- should suppress cough    Diflucan for yeast as needed    Maxzide as needed for edema    Tessalon for cough    Could do phone follow up    Ct abd 10/2022 was clear lower lungs and cxr 10/31/2022 was clear -- lungs not a concern.  Asthma doing well even with rsv.

## 2022-11-02 NOTE — PATIENT INSTRUCTIONS
May use levaquin for green mucous    Rsv should run course-- hirzentra should help?  Breztri may be needed.      Use dilaudid minimally for pain -- should suppress cough    Diflucan for yeast as needed    Maxzide as needed for edema    Tessalon for cough    Could do phone follow up    Ct abd 10/2022 was clear lower lungs and cxr 10/31/2022 was clear -- lungs not a concern.  Asthma doing well even with rsv.

## 2022-11-15 ENCOUNTER — PATIENT MESSAGE (OUTPATIENT)
Dept: ALLERGY | Facility: CLINIC | Age: 62
End: 2022-11-15
Payer: MEDICARE

## 2022-11-17 ENCOUNTER — OUTPATIENT CASE MANAGEMENT (OUTPATIENT)
Dept: ADMINISTRATIVE | Facility: OTHER | Age: 62
End: 2022-11-17
Payer: MEDICARE

## 2022-11-17 NOTE — LETTER
November 17, 2022             Dear Yodit    Welcome to [facility]s Complex Care Management Program.  It was a pleasure talking with you today. My name is Ivanna Sanders, and we look forward to providing you with case management services. Our goal is to help you function at the healthiest and highest level possible.  You can contact our department directly at [phone].    As an [facility] patient, some of the services we may be able to provide include:      Development of an individualized care plan with a Registered Nurse    Connection with a    Connection with available resources and services     Coordinate communication among your care team members    Provide coaching and education    Help you understand your doctors treatment plan   Help you obtain information about your insurance coverage.     All services provided by [facility]s Complex Care Managers and other care team members are coordinated with and communicated to your primary care team.      As part of your enrollment, you will be receiving education materials and more information about these services in your [facility] account, by phone or through the mail.  If you do not wish to participate or receive information, please contact our office at [phone].      Sincerely,        Ivanna Sanders RN  [facility]   Outpatient RN Complex Care Manager

## 2022-11-17 NOTE — PROGRESS NOTES
Outpatient Care Management  Initial Patient Assessment    Patient: Yodit Canseco  MRN: [id number]  Date of Service: 11/17/2022  Completed by: Ivanna Sanders RN  Referral Date: 10/06/2022  Program: OPCM High Risk  Status: Ongoing  Effective Dates: 11/17/2022 - present  Responsible Staff: Ivanna Sanders RN    Reason for Visit   Patient presents with    OPCM Chart Review     11/17/22    OPCM Enrollment Call     11/17/22    Initial Assessment     11/17/22    Nursing Assessment     11/17/22    Plan Of Care     11/17/22    Other Misc     11/17/22 Med Rec    OPCM Welcome Letter     11/17/22     Brief Summary:  Yodit Canseco was referred by  at [facility] for hepatomegaly after a recent admission to [facility] from 10/4/22 - 10/19/22 with a diagnosis of Exacerbation of Ulcerative Colitis with rectal bleeding. Patient qualifies for program based on high risk score of 76.7%.    Mrs. Canseco states that she is feeling a little weak since being discharged from [facility] on 10/19/22. Mrs. Canseco inquired about a physical therapy referral for strengthening and conditioning. She states that her preference for PT services is [facility] in [place]. Mrs. Canseco states that she is having persistent swelling in her lower legs and feet. Mrs. Canseco has a prescription for triamterene-hydrochlorathiazide 37.5-25mg 1 daily as needed for edema that was prescribed  ([facility]). Mrs. Canseco states that she is reluctant to take the triamterene-hydrochlorathiazide 37.5-25mg without input from  because she is afraid that it may cause her to have diarrhea (she states that diuretics have caused her to have diarrhea in the past).      Active problem list, medical, surgical and social history reviewed. Active comorbidities include angioedema, arthralgia, asthma with reactive airway disease, brinchitis, COPD, COVID, diverticulosis, gastroparesis, hidradenitis, HLD, RA, lupus, ulcerative colitis and gastric sleeve bariatric surgery. Areas  of need identified by patient include ulcerative colitis.     Complex care plan created with patient/caregiver input. By next encounter, patient agrees to review educational materials after she receives them in the mail.     CM ACTION PLAN:  Send in basket message to  (PCP) informing him that Mrs. Canseco has been enrolled in the OPCM program and that she is requesting a physical therapy referral order for [facility] in [place].   Send in basket message to  (gastro) requesting that he contact Mrs. Canseco with input in regards to her taking the triamterene-hydrochlorathiazide 37.5-25mg prescribed by Dr. Gary (pulm)  Send printed educational materials ie: Diet for Ulcerative Colitis, Inflammatory Bowel Disease, Ulcerative Colitis in Adults via [employer] for Mrs. Canseco to review.    Assessment Documentation     OPCM Initial Assessment    Involvement of Care  Do I have permission to speak with other family members about your care?: Yes (Comment: Jonas Canseco spouse [phone])  Assessment completed by: Patient  Identified Areas of Need  Advanced Care Planning: No  Housing: no  Medication Adherence: No  *Active medication list was reviewed and reconciled with patient and/or caregiver:   Nutrition: no  Lab Adherence: no  Depression: No  Cognitive/Behavioral Health: no  Communication: no  Health Literacy: Yes  Fall risk?: No  Equipment/Supplies/Services: yes (Comment: pt request referral to physical therapy)          Problem List and History     Problems Addressed This Visit    COPD: Not identified by patient as current problem  Anemia: Not identified by patient as current problem  Hyperlipidemia: Not identified by patient as current problem  Asthma: Not identified by patient as current problem         Reviewed medical and social history with patient. A complex care plan was discussed and completed today, with input from patient.    Patient Instructions     Instructions were provided via  electronic patient resources and are available for the patient to view on the patient portal, if active.    Next steps:   Confirm receipt of educational materials ie: Diet for Ulcerative Colitis, Inflammatory Bowel Disease, Ulcerative Colitis in Adults  Confirm whether or not Dr. Lewis ordered Physical Therapy referral  Confirm whether or not Dr. Celeste contacted Mrs. Canseco in regards to whether or not he would like for her to take the triamterene-hydrochlorathiazide 37.5-25mg prescribed by Dr. Gary (pulm) to treat the BLE/Pedal edema      Follow up in about 2 weeks (around 12/1/2022).    Todays OPCM Self-Management Care Plan was developed with the patients input and was based on identified barriers from todays assessment.  Goals were written today with the patient and the patient has agreed to work towards these goals to improve her overall well-being. Patient verbalized understanding of the care plan, goals, and all of today's instructions. Encouraged patient to communicate with her physician and health care team about health conditions and the treatment plan.  Provided my contact information today and encouraged patient to call me with any questions as needed.

## 2022-11-18 ENCOUNTER — TELEPHONE (OUTPATIENT)
Dept: FAMILY MEDICINE | Facility: CLINIC | Age: 62
End: 2022-11-18
Payer: MEDICARE

## 2022-11-18 ENCOUNTER — OFFICE VISIT (OUTPATIENT)
Dept: FAMILY MEDICINE | Facility: CLINIC | Age: 62
End: 2022-11-18
Payer: MEDICARE

## 2022-11-18 ENCOUNTER — DOCUMENTATION ONLY (OUTPATIENT)
Dept: ALLERGY | Facility: CLINIC | Age: 62
End: 2022-11-18
Payer: MEDICARE

## 2022-11-18 ENCOUNTER — PATIENT MESSAGE (OUTPATIENT)
Dept: FAMILY MEDICINE | Facility: CLINIC | Age: 62
End: 2022-11-18

## 2022-11-18 VITALS
SYSTOLIC BLOOD PRESSURE: 128 MMHG | BODY MASS INDEX: 32.05 KG/M2 | OXYGEN SATURATION: 96 % | HEIGHT: 62 IN | HEART RATE: 100 BPM | WEIGHT: 174.19 LBS | TEMPERATURE: 97 F | DIASTOLIC BLOOD PRESSURE: 84 MMHG

## 2022-11-18 DIAGNOSIS — M35.2 BEHCET'S SYNDROME INVOLVING ORAL MUCOSA: ICD-10-CM

## 2022-11-18 DIAGNOSIS — M35.9 UNDIFFERENTIATED CONNECTIVE TISSUE DISEASE: ICD-10-CM

## 2022-11-18 DIAGNOSIS — K51.011 ULCERATIVE PANCOLITIS WITH RECTAL BLEEDING: Primary | ICD-10-CM

## 2022-11-18 PROCEDURE — 99999 PR PBB SHADOW E&M-EST. PATIENT-LVL V: ICD-10-PCS | Mod: PBBFAC,,, | Performed by: FAMILY MEDICINE

## 2022-11-18 PROCEDURE — 99999 PR PBB SHADOW E&M-EST. PATIENT-LVL V: CPT | Mod: PBBFAC,,, | Performed by: FAMILY MEDICINE

## 2022-11-18 PROCEDURE — 99213 PR OFFICE/OUTPT VISIT, EST, LEVL III, 20-29 MIN: ICD-10-PCS | Mod: S$PBB,,, | Performed by: FAMILY MEDICINE

## 2022-11-18 PROCEDURE — 99213 OFFICE O/P EST LOW 20 MIN: CPT | Mod: S$PBB,,, | Performed by: FAMILY MEDICINE

## 2022-11-18 PROCEDURE — 99215 OFFICE O/P EST HI 40 MIN: CPT | Mod: PBBFAC,PO,25 | Performed by: FAMILY MEDICINE

## 2022-11-18 PROCEDURE — G0008 ADMIN INFLUENZA VIRUS VAC: HCPCS | Mod: PBBFAC,PO

## 2022-11-18 RX ORDER — FERROUS SULFATE TAB 325 MG (65 MG ELEMENTAL FE) 325 (65 FE) MG
325 TAB ORAL DAILY
COMMUNITY
Start: 2022-11-15

## 2022-11-18 NOTE — PROGRESS NOTES
Patient presents for follow-up.  She has had   follow-up specially visits since I saw her last to include Dermatology, Pulmonary, Rheumatology and bariatric surgery.  She is not had any further hospitalization regarding ulcerative colitis.  She still having some symptoms and is still on steroids. Other autoimmune disease to include undifferentiated connective tissue disease and Bechets.  Has also had issues with immuno deficiency.  She did have addition of low-dose oral minoxidil for hair loss the dermatology    Yodit was seen today for behcet's syndrome involving oral mucona.    Diagnoses and all orders for this visit:    Ulcerative pancolitis with rectal bleeding    Behcet's syndrome involving oral mucosa    Undifferentiated connective tissue disease    Other orders  -     Influenza - Quadrivalent (PF)  Appears stable since last visit.  Keep scheduled follow-up specialty appointments.  We did discuss potential side effects of minoxidil.  May want to consider topical rather than oral.  Otherwise continue current medications.              Past Medical History:  Past Medical History:   Diagnosis Date    Angio-edema     Arthralgia     Asthma without status asthmaticus     Bronchitis     COPD (chronic obstructive pulmonary disease)     COVID-19 virus infection 07/23/2021    Diverticulosis of large intestine without hemorrhage 10/08/2015    Environmental allergies     Eosinophilic asthma 03/08/2018    Exacerbation of ulcerative colitis with rectal bleeding     Gastroparesis     Hand arthritis     Herpes simplex without mention of complication     oral    Hidradenitis     History of morbid obesity     Hyperlipidemia     Hypothyroidism     Immune deficiency disorder     Intraperitoneal abscess 05/07/2018    LBP radiating to left leg     Leukocytosis, unspecified     Migraine headache     Normocytic anemia 10/05/2022    Obesity, Class III, BMI 40-49.9 (morbid obesity)     Periorbital cellulitis 12/31/2016    Prediabetes   "   RA (rheumatoid arthritis)     Recurrent upper respiratory infection (URI)     S/P colonoscopy 11/2010    Sepsis 05/07/2018    Systemic lupus erythematosus, organ or system involvement unspecified     Tubular adenoma of colon 08/17/2022    Ulcerative pancolitis with rectal bleeding 10/17/2022    Urticaria      Past Surgical History:   Procedure Laterality Date    ADENOIDECTOMY      CHOLECYSTECTOMY      CHOLECYSTECTOMY      COLONOSCOPY  2015    repeat in 10    COLONOSCOPY N/A 10/8/2015    Procedure: COLONOSCOPY;  Surgeon: Panda Bose MD;  Location: [facility];  Service: Endoscopy;  Laterality: N/A;    COLONOSCOPY N/A 8/17/2022    Procedure: COLONOSCOPY;  Surgeon: Olaf Del Valle MD;  Location: [facility];  Service: General;  Laterality: N/A;    COLONOSCOPY N/A 10/6/2022    Procedure: COLONOSCOPY;  Surgeon: Quinton Celeste MD;  Location[facility];  Service: Gastroenterology;  Laterality: N/A;    Hand fracture surgery      HARDWARE REMOVAL      left hand 5th MC    hymenectomy      HYSTERECTOMY      menorrhagia-Ovaries intact    ROTATOR CUFF REPAIR Right     SLEEVE GASTROPLASTY  04/16/2018    TONSILLECTOMY      Urethral dilatation       Review of patient's allergies indicates:   Allergen Reactions    Bromelains Hives     " causes mouth to bleed"    Pimecrolimus Swelling    Sudafed [pseudoephedrine hcl] Other (See Comments)     Does not want to wake up .    Amoxicillin-pot clavulanate Itching     Other reaction(s): Itching; tolerated ceftriaxone 10/2022    Hydrocodone Itching    Iodinated contrast media      childhood    Iodine and iodide containing products Other (See Comments)    Melon Hives    Percocet [oxycodone-acetaminophen] Itching    Corn containing products     Wheat containing prod     Barium iodide Rash    Ephedrine Other (See Comments)     Other reaction(s): comatose    Penicillins      Other reaction(s): does not work on pt symptoms     Current Outpatient Medications on File Prior to Visit "   Medication Sig Dispense Refill    (Magic mouthwash) 1:1:1 diphenhydramine(Benadryl) 12.5mg/5ml liq, aluminum & magnesium hydroxide-simethicone (Maalox), LIDOcaine viscous 2% Swish and spit 5 mLs every 8 (eight) hours as needed (oral ulcers). Gargle and spit. DO NOT SWALLOW 360 mL 1    acetaminophen (TYLENOL) 325 MG tablet Take 1 tablet (325 mg total) by mouth every 6 (six) hours as needed for Pain (Do not take with any other products containing  tylenol or acetaminophen).  0    albuterol (PROAIR HFA) 90 mcg/actuation inhaler Inhale 2 puffs into the lungs every 6 (six) hours as needed for Wheezing. Rescue 18 g 0    albuterol (PROVENTIL/VENTOLIN HFA) 90 mcg/actuation inhaler Inhale 2 puffs into the lungs every 6 (six) hours as needed for Wheezing. Rescue 18 g 0    budesonide-glycopyr-formoterol (BREZTRI AEROSPHERE) 160-9-4.8 mcg/actuation HFAA Inhale 2 puffs into the lungs 2 (two) times a day. 10.7 g 11    cholecalciferol, vitamin D3, (VITAMIN D3) 50 mcg (2,000 unit) Cap Take 1 capsule by mouth once daily.      estradioL (ESTRACE) 1 MG tablet TAKE 1 TABLET(1 MG) BY MOUTH EVERY DAY (Patient taking differently: Take 1 mg by mouth once daily.) 90 tablet 3    FEROSUL 325 mg (65 mg iron) Tab tablet Take by mouth once daily.      fluconazole (DIFLUCAN) 200 MG Tab Take 1 tablet (200 mg total) by mouth once a week. 7 tablet 0    HYDROmorphone (DILAUDID) 2 MG tablet Take 1 tablet (2 mg total) by mouth every 4 (four) hours as needed for Pain. 60 tablet 0    hydrOXYchloroQUINE (PLAQUENIL) 200 mg tablet Take 1 tablet (200 mg total) by mouth 2 (two) times daily. 60 tablet 6    immun glob G,IgG,-pro-IgA 0-50 (HIZENTRA) 2 gram/10 mL (20 %) Soln Inject 18 g into the skin every 14 (fourteen) days. 90 mL 12    levothyroxine (SYNTHROID) 75 MCG tablet Take 1 tablet (75 mcg total) by mouth once daily. 90 tablet 3    loperamide (IMODIUM) 2 mg capsule Take 2 mg by mouth 3 (three) times daily as needed.      minoxidiL (LONITEN) 2.5 MG  tablet Take 1/4 tablet (0.625mg) by mouth daily 30 tablet 0    montelukast (SINGULAIR) 10 mg tablet Take 1 tablet (10 mg total) by mouth every evening. 90 tablet 3    montelukast (SINGULAIR) 10 mg tablet Take 1 tablet (10 mg total) by mouth every evening. 30 tablet 0    MULTIVITAMIN ORAL Take 1 tablet by mouth once daily.      ondansetron (ZOFRAN-ODT) 4 MG TbDL Take 1 tablet (4 mg total) by mouth every 8 (eight) hours as needed (nausea/vomiting). 90 tablet 0    predniSONE (DELTASONE) 20 MG tablet Take 20 mg by mouth once daily.      STELARA 90 mg/mL Syrg syringe SMARTSI SUB-Q      levalbuterol (XOPENEX) 1.25 mg/3 mL nebulizer solution Take 3 mLs (1.25 mg total) by nebulization every 4 (four) hours as needed for Wheezing or Shortness of Breath. Rescue 1 Box 11    triamterene-hydrochlorothiazide 37.5-25 mg (MAXZIDE-25) 37.5-25 mg per tablet Take 1 tablet by mouth daily as needed (edema). (Patient not taking: Reported on 2022) 30 tablet 1    [DISCONTINUED] apremilast (OTEZLA) 30 mg Tab Take 1 tablet (30 mg total) by mouth 2 (two) times daily. (Patient not taking: Reported on 2022) 60 tablet 11     No current facility-administered medications on file prior to visit.     Social History     Socioeconomic History    Marital status:    Tobacco Use    Smoking status: Never    Smokeless tobacco: Never   Substance and Sexual Activity    Alcohol use: Yes     Alcohol/week: 0.0 standard drinks     Comment: very rarely    Drug use: No    Sexual activity: Yes     Partners: Male   Social History Narrative    . Disabled teacher.     Family History   Problem Relation Age of Onset    Melanoma Mother     Basal cell carcinoma Mother     Lung disease Mother         pulm htn    Cataracts Mother     Hypertension Mother     Lung cancer Father     Diabetes Father     Hypertension Father     Cancer Father     Diabetes Paternal Aunt     Colon cancer Paternal Aunt 40    Diabetes Paternal Aunt     Ovarian cancer  "Paternal Grandmother 40    Cancer Maternal Grandfather     Melanoma Maternal Aunt     Melanoma Maternal Uncle     Breast cancer Paternal Aunt 40    Lymphoma Paternal Aunt     Ulcerative colitis Son     Psoriasis Son     Psoriasis Son     Breast cancer Cousin 25    Allergic rhinitis Neg Hx     Allergies Neg Hx     Angioedema Neg Hx     Asthma Neg Hx     Atopy Neg Hx     Eczema Neg Hx     Immunodeficiency Neg Hx     Rhinitis Neg Hx     Urticaria Neg Hx            ROS:  GENERAL: No fever, chills,  or significant weight changes.   CARDIOVASCULAR: Denies chest pain, PND,  ABDOMEN: As above  URINARY: No flank pain, dysuria or hematuria.    Vitals:    11/18/22 0915   BP: 128/84   Pulse: 100   Temp: 97.2 °F (36.2 °C)   TempSrc: Other (see comments)   SpO2: 96%   Weight: 79 kg (174 lb 2.6 oz)   Height: 5' 2" (1.575 m)     Wt Readings from Last 3 Encounters:   11/18/22 79 kg (174 lb 2.6 oz)   11/02/22 76.6 kg (168 lb 12.8 oz)   11/02/22 78.5 kg (173 lb 1 oz)       OBJECTIVE:   APPEARANCE: Well nourished, well developed, in no acute distress.    HEAD: Normocephalic.  Atraumatic.  No sinus tenderness.  EYES:   Right eye: Pupil reactive.  Conjunctiva clear.    Left eye: Pupil reactive.  Conjunctiva clear.  EOMI.    NECK: Supple. No bruits.  No JVD.  No cervical lymphadenopathy.  No thyromegaly.    CHEST: Breath sounds clear bilaterally.  Normal respiratory effort  CARDIOVASCULAR: Normal rate.  Regular rhythm.  No murmurs.  No rub.  No gallops.  PERIPHERAL VASCULAR: No cyanosis.  No clubbing.  No edema.  NEUROLOGIC: No focal findings.  MENTAL STATUS: Alert.  Oriented x 3.          "

## 2022-11-18 NOTE — PROGRESS NOTES
Signed orders for Hizentra 20% 20 mg faxed to [facility] @ [phone]. Original sent to scanning

## 2022-11-18 NOTE — TELEPHONE ENCOUNTER
----- Message from Miky Lewis MD sent at 11/18/2022 10:35 AM CST -----  Regarding: FW: [facility] High Risk Case Management Program - Enrollment  Patient did not mention this at appointment.  Please check with her on this.  ----- Message -----  From: Kalina Cunningham LPN  Sent: 11/18/2022   7:37 AM CST  To: Miky Lewis MD  Subject: FW: [facility] High Risk Case Manage#    Pt has appt today  ----- Message -----  From: Vaishnavi Sanders RN  Sent: 11/17/2022   8:37 PM CST  To: [facility] Staff  Subject: [facility] Case Management#    FYI-    My name is Ivanna Sanders and I am a RN case manager with [facility] Department. I received a referral from  at [facility] to the Outpatient High Risk Case Management Program for . Mrs. Canseco was enrolled in the OPCM program today. She inquired about a referral to outpatient physical therapy at the [facility] clinic in [place]. If you think that a referral to outpatient physical therapy is appropriate, please enter the referral in [facility].    Thank you for allowing me to participate in the care of this patient-  Ivanna Sanders RN  [facility]  [phone]

## 2022-11-22 ENCOUNTER — OUTPATIENT CASE MANAGEMENT (OUTPATIENT)
Dept: ADMINISTRATIVE | Facility: OTHER | Age: 62
End: 2022-11-22
Payer: MEDICARE

## 2022-11-23 ENCOUNTER — PATIENT MESSAGE (OUTPATIENT)
Dept: PULMONOLOGY | Facility: CLINIC | Age: 62
End: 2022-11-23
Payer: MEDICARE

## 2022-11-28 DIAGNOSIS — J45.50 SEVERE PERSISTENT ASTHMA WITHOUT COMPLICATION: Primary | ICD-10-CM

## 2022-11-28 RX ORDER — ALBUTEROL SULFATE 0.83 MG/ML
2.5 SOLUTION RESPIRATORY (INHALATION) EVERY 6 HOURS PRN
Qty: 120 EACH | Refills: 11 | Status: SHIPPED | OUTPATIENT
Start: 2022-11-28 | End: 2022-12-29 | Stop reason: SDUPTHER

## 2022-11-30 ENCOUNTER — OFFICE VISIT (OUTPATIENT)
Dept: FAMILY MEDICINE | Facility: CLINIC | Age: 62
End: 2022-11-30
Payer: MEDICARE

## 2022-11-30 VITALS
HEART RATE: 73 BPM | HEIGHT: 63 IN | BODY MASS INDEX: 30.71 KG/M2 | DIASTOLIC BLOOD PRESSURE: 58 MMHG | OXYGEN SATURATION: 98 % | SYSTOLIC BLOOD PRESSURE: 100 MMHG | TEMPERATURE: 98 F | WEIGHT: 173.31 LBS

## 2022-11-30 DIAGNOSIS — J45.50 SEVERE PERSISTENT ASTHMA WITHOUT COMPLICATION: ICD-10-CM

## 2022-11-30 DIAGNOSIS — R50.9 FEVER, UNSPECIFIED FEVER CAUSE: Primary | ICD-10-CM

## 2022-11-30 DIAGNOSIS — K51.011 ULCERATIVE PANCOLITIS WITH RECTAL BLEEDING: ICD-10-CM

## 2022-11-30 PROCEDURE — 99999 PR PBB SHADOW E&M-EST. PATIENT-LVL V: ICD-10-PCS | Mod: PBBFAC,,, | Performed by: FAMILY MEDICINE

## 2022-11-30 PROCEDURE — 99214 OFFICE O/P EST MOD 30 MIN: CPT | Mod: S$PBB,,, | Performed by: FAMILY MEDICINE

## 2022-11-30 PROCEDURE — 99214 PR OFFICE/OUTPT VISIT, EST, LEVL IV, 30-39 MIN: ICD-10-PCS | Mod: S$PBB,,, | Performed by: FAMILY MEDICINE

## 2022-11-30 PROCEDURE — 99999 PR PBB SHADOW E&M-EST. PATIENT-LVL V: CPT | Mod: PBBFAC,,, | Performed by: FAMILY MEDICINE

## 2022-11-30 PROCEDURE — 99215 OFFICE O/P EST HI 40 MIN: CPT | Mod: PBBFAC,PO | Performed by: FAMILY MEDICINE

## 2022-11-30 PROCEDURE — 87502 INFLUENZA DNA AMP PROBE: CPT | Mod: PBBFAC,PO | Performed by: FAMILY MEDICINE

## 2022-11-30 PROCEDURE — 87635 SARS-COV-2 COVID-19 AMP PRB: CPT | Mod: PBBFAC,PO | Performed by: FAMILY MEDICINE

## 2022-11-30 RX ORDER — PREDNISONE 20 MG/1
40 TABLET ORAL DAILY
Qty: 10 TABLET | Refills: 0 | Status: SHIPPED | OUTPATIENT
Start: 2022-11-30 | End: 2022-12-05

## 2022-11-30 RX ORDER — SULFAMETHOXAZOLE AND TRIMETHOPRIM 800; 160 MG/1; MG/1
1 TABLET ORAL 2 TIMES DAILY
Qty: 20 TABLET | Refills: 0 | Status: SHIPPED | OUTPATIENT
Start: 2022-11-30 | End: 2022-12-10

## 2022-11-30 RX ORDER — METRONIDAZOLE 500 MG/1
500 TABLET ORAL 3 TIMES DAILY
Qty: 30 TABLET | Refills: 0 | Status: SHIPPED | OUTPATIENT
Start: 2022-11-30 | End: 2022-12-10

## 2022-11-30 NOTE — PROGRESS NOTES
Patient complains of fever for the past day up to 101.4.  She has noted some mild cough but this has been going on for awhile.  She does have previous lung disease and asthma.  She has recent diagnosis ulcerative colitis in his had issues with this.  She still having some abdominal cramping occasional diarrhea occasional small amount of blood.  She is on Stelara.  Sees GI.  She is on prednisone but has been trying to decrease dosage.  No wheezing.    Yodit was seen today for abdominal pain, fever and cough.    Diagnoses and all orders for this visit:    Fever, unspecified fever cause  -     POCT Influenza A/B Molecular; Future  -     POCT COVID-19 Rapid Screening; Future  -     POCT COVID-19 Rapid Screening  -     POCT Influenza A/B Molecular    Ulcerative pancolitis with rectal bleeding    Severe persistent asthma without complication    Other orders  -     metroNIDAZOLE (FLAGYL) 500 MG tablet; Take 1 tablet (500 mg total) by mouth 3 (three) times daily. for 10 days  -     sulfamethoxazole-trimethoprim 800-160mg (BACTRIM DS) 800-160 mg Tab; Take 1 tablet by mouth 2 (two) times daily. for 10 days  -     predniSONE (DELTASONE) 20 MG tablet; Take 2 tablets (40 mg total) by mouth once daily. for 5 days  COVID and flu testing is negative.  Would be concerned symptoms more likely related to her ulcerative colitis.  Will have her increase her prednisone for 5 days then resume previous dosing.  Antibiotics as above.  She will contact her GI doctor.  ER precautions if increased symptoms.              Past Medical History:  Past Medical History:   Diagnosis Date    Angio-edema     Arthralgia     Asthma without status asthmaticus     Bronchitis     COPD (chronic obstructive pulmonary disease)     COVID-19 virus infection 07/23/2021    Diverticulosis of large intestine without hemorrhage 10/08/2015    Environmental allergies     Eosinophilic asthma 03/08/2018    Exacerbation of ulcerative colitis with rectal bleeding      "Gastroparesis     Hand arthritis     Herpes simplex without mention of complication     oral    Hidradenitis     History of morbid obesity     Hyperlipidemia     Hypothyroidism     Immune deficiency disorder     Intraperitoneal abscess 05/07/2018    LBP radiating to left leg     Leukocytosis, unspecified     Migraine headache     Normocytic anemia 10/05/2022    Obesity, Class III, BMI 40-49.9 (morbid obesity)     Periorbital cellulitis 12/31/2016    Prediabetes     RA (rheumatoid arthritis)     Recurrent upper respiratory infection (URI)     S/P colonoscopy 11/2010    Sepsis 05/07/2018    Systemic lupus erythematosus, organ or system involvement unspecified     Tubular adenoma of colon 08/17/2022    Ulcerative pancolitis with rectal bleeding 10/17/2022    Urticaria      Past Surgical History:   Procedure Laterality Date    ADENOIDECTOMY      CHOLECYSTECTOMY      CHOLECYSTECTOMY      COLONOSCOPY  2015    repeat in 10    COLONOSCOPY N/A 10/8/2015    Procedure: COLONOSCOPY;  Surgeon: Panda Bose MD;  Location: [facility];  Service: Endoscopy;  Laterality: N/A;    COLONOSCOPY N/A 8/17/2022    Procedure: COLONOSCOPY;  Surgeon: Olaf Del Valle MD;  Location: [facility];  Service: General;  Laterality: N/A;    COLONOSCOPY N/A 10/6/2022    Procedure: COLONOSCOPY;  Surgeon: Quinton Celeste MD;  Location: [facility];  Service: Gastroenterology;  Laterality: N/A;    Hand fracture surgery      HARDWARE REMOVAL      left hand [facility]    hymenectomy      HYSTERECTOMY      menorrhagia-Ovaries intact    ROTATOR CUFF REPAIR Right     SLEEVE GASTROPLASTY  04/16/2018    TONSILLECTOMY      Urethral dilatation       Review of patient's allergies indicates:   Allergen Reactions    Bromelains Hives     " causes mouth to bleed"    Pimecrolimus Swelling    Sudafed [pseudoephedrine hcl] Other (See Comments)     Does not want to wake up .    Amoxicillin-pot clavulanate Itching     Other reaction(s): Itching; tolerated ceftriaxone 10/2022 "    Hydrocodone Itching    Iodinated contrast media      childhood    Iodine and iodide containing products Other (See Comments)    Melon Hives    Percocet [oxycodone-acetaminophen] Itching    Corn containing products     Wheat containing prod     Barium iodide Rash    Ephedrine Other (See Comments)     Other reaction(s): comatose    Penicillins      Other reaction(s): does not work on pt symptoms     Current Outpatient Medications on File Prior to Visit   Medication Sig Dispense Refill    (Magic mouthwash) 1:1:1 diphenhydramine(Benadryl) 12.5mg/5ml liq, aluminum & magnesium hydroxide-simethicone (Maalox), LIDOcaine viscous 2% Swish and spit 5 mLs every 8 (eight) hours as needed (oral ulcers). Gargle and spit. DO NOT SWALLOW 360 mL 1    acetaminophen (TYLENOL) 325 MG tablet Take 1 tablet (325 mg total) by mouth every 6 (six) hours as needed for Pain (Do not take with any other products containing  tylenol or acetaminophen).  0    albuterol (PROAIR HFA) 90 mcg/actuation inhaler Inhale 2 puffs into the lungs every 6 (six) hours as needed for Wheezing. Rescue 18 g 0    albuterol (PROVENTIL/VENTOLIN HFA) 90 mcg/actuation inhaler Inhale 2 puffs into the lungs every 6 (six) hours as needed for Wheezing. Rescue 18 g 0    budesonide-glycopyr-formoterol (BREZTRI AEROSPHERE) 160-9-4.8 mcg/actuation HFAA Inhale 2 puffs into the lungs 2 (two) times a day. 10.7 g 11    cholecalciferol, vitamin D3, (VITAMIN D3) 50 mcg (2,000 unit) Cap Take 1 capsule by mouth once daily.      estradioL (ESTRACE) 1 MG tablet TAKE 1 TABLET(1 MG) BY MOUTH EVERY DAY (Patient taking differently: Take 1 mg by mouth once daily.) 90 tablet 3    FEROSUL 325 mg (65 mg iron) Tab tablet Take by mouth once daily.      fluconazole (DIFLUCAN) 200 MG Tab Take 1 tablet (200 mg total) by mouth once a week. 7 tablet 0    HYDROmorphone (DILAUDID) 2 MG tablet Take 1 tablet (2 mg total) by mouth every 4 (four) hours as needed for Pain. 60 tablet 0    hydrOXYchloroQUINE  (PLAQUENIL) 200 mg tablet Take 1 tablet (200 mg total) by mouth 2 (two) times daily. 60 tablet 6    immun glob G,IgG,-pro-IgA 0-50 (HIZENTRA) 2 gram/10 mL (20 %) Soln Inject 18 g into the skin every 14 (fourteen) days. 90 mL 12    levothyroxine (SYNTHROID) 75 MCG tablet Take 1 tablet (75 mcg total) by mouth once daily. 90 tablet 3    loperamide (IMODIUM) 2 mg capsule Take 2 mg by mouth 3 (three) times daily as needed.      minoxidiL (LONITEN) 2.5 MG tablet Take 1/4 tablet (0.625mg) by mouth daily 30 tablet 0    montelukast (SINGULAIR) 10 mg tablet Take 1 tablet (10 mg total) by mouth every evening. 90 tablet 3    montelukast (SINGULAIR) 10 mg tablet Take 1 tablet (10 mg total) by mouth every evening. 30 tablet 0    MULTIVITAMIN ORAL Take 1 tablet by mouth once daily.      ondansetron (ZOFRAN-ODT) 4 MG TbDL Take 1 tablet (4 mg total) by mouth every 8 (eight) hours as needed (nausea/vomiting). 90 tablet 0    predniSONE (DELTASONE) 20 MG tablet Take 20 mg by mouth once daily.      STELARA 90 mg/mL Syrg syringe SMARTSI SUB-Q      triamterene-hydrochlorothiazide 37.5-25 mg (MAXZIDE-25) 37.5-25 mg per tablet Take 1 tablet by mouth daily as needed (edema). 30 tablet 1    albuterol (PROVENTIL) 2.5 mg /3 mL (0.083 %) nebulizer solution Take 3 mLs (2.5 mg total) by nebulization every 6 (six) hours as needed. (Patient not taking: Reported on 2022) 120 each 11    levalbuterol (XOPENEX) 1.25 mg/3 mL nebulizer solution Take 3 mLs (1.25 mg total) by nebulization every 4 (four) hours as needed for Wheezing or Shortness of Breath. Rescue (Patient not taking: Reported on 2022) 1 Box 11     No current facility-administered medications on file prior to visit.     Social History     Socioeconomic History    Marital status:    Tobacco Use    Smoking status: Never    Smokeless tobacco: Never   Substance and Sexual Activity    Alcohol use: Yes     Alcohol/week: 0.0 standard drinks     Comment: very rarely    Drug use:  "No    Sexual activity: Yes     Partners: Male   Social History Narrative    . Disabled teacher.     Family History   Problem Relation Age of Onset    Melanoma Mother     Basal cell carcinoma Mother     Lung disease Mother         pulm htn    Cataracts Mother     Hypertension Mother     Lung cancer Father     Diabetes Father     Hypertension Father     Cancer Father     Diabetes Paternal Aunt     Colon cancer Paternal Aunt 40    Diabetes Paternal Aunt     Ovarian cancer Paternal Grandmother 40    Cancer Maternal Grandfather     Melanoma Maternal Aunt     Melanoma Maternal Uncle     Breast cancer Paternal Aunt 40    Lymphoma Paternal Aunt     Ulcerative colitis Son     Psoriasis Son     Psoriasis Son     Breast cancer Cousin 25    Allergic rhinitis Neg Hx     Allergies Neg Hx     Angioedema Neg Hx     Asthma Neg Hx     Atopy Neg Hx     Eczema Neg Hx     Immunodeficiency Neg Hx     Rhinitis Neg Hx     Urticaria Neg Hx            ROS:  GENERAL:  As above   CARDIOVASCULAR: Denies chest pain, PND, orthopnea   ABDOMEN:  As above  URINARY: No flank pain, dysuria or hematuria.    Vitals:    11/30/22 1404   BP: (!) 100/58   Pulse: 73   Temp: 98 °F (36.7 °C)   TempSrc: Temporal   SpO2: 98%   Weight: 78.6 kg (173 lb 4.5 oz)   Height: 5' 2.5" (1.588 m)     Wt Readings from Last 3 Encounters:   11/30/22 78.6 kg (173 lb 4.5 oz)   11/18/22 79 kg (174 lb 2.6 oz)   11/02/22 76.6 kg (168 lb 12.8 oz)       OBJECTIVE:   APPEARANCE: Well nourished, well developed, in no acute distress.    HEAD: Normocephalic.  Atraumatic.  No sinus tenderness.  EYES:   Right eye: Pupil reactive.  Conjunctiva clear.    Left eye: Pupil reactive.  Conjunctiva clear.  EOMI.    EARS: TM's intact. Light reflex normal. No retraction or perforation.    NOSE:  clear.  MOUTH & THROAT:  No pharyngeal erythema or exudate. No lesions.  NECK: Supple. No bruits.  No JVD.  No cervical lymphadenopathy.  No thyromegaly.    CHEST: Breath sounds clear bilaterally.  " Normal respiratory effort  CARDIOVASCULAR: Normal rate.  Regular rhythm.  No murmurs.  No rub.  No gallops.  ABDOMEN: Bowel sounds normal.  Soft.  Mild diffuse tenderness.  No rebound.  No guarding.  No organomegaly.  PERIPHERAL VASCULAR: No cyanosis.  No clubbing.  No edema.  NEUROLOGIC: No focal findings.  MENTAL STATUS: Alert.  Oriented x 3.

## 2022-12-01 ENCOUNTER — OUTPATIENT CASE MANAGEMENT (OUTPATIENT)
Dept: ADMINISTRATIVE | Facility: OTHER | Age: 62
End: 2022-12-01
Payer: MEDICARE

## 2022-12-05 NOTE — PROGRESS NOTES
Outpatient Care Management  Plan of Care Follow Up Visit    Patient: Yodit Canseco  MRN: [id number]  Date of Service: 12/01/2022  Completed by: Deena Xiong RN  Referral Date: 10/06/2022    Reason for Visit   Patient presents with    OPCM Chart Review     12/05/22       Brief Summary: Phone contact made with Ms. Canseco today. We discussed her ulcerative colitis care plan. No new needs identified at this time. Will follow up.     Patient Summary     Involvement of Care:  Do I have permission to speak with other family members about your care?       Patient Reported Labs & Vitals:  1.  Any Patient Reported Labs & Vitals?     2.  Patient Reported Blood Pressure:     3.  Patient Reported Pulse:     4.  Patient Reported Weight (Kg):     5.  Patient Reported Blood Glucose (mg/dl):       Medical and social history was reviewed with patient and/or caregiver.     Clinical Assessment     Reviewed and provided basic information on available community resources for mental health, transportation, wellness resources, and palliative care programs with patient and/or caregiver.     Complex Care Plan     Care plan was discussed and completed today with input from patient and/or caregiver.    Patient Instructions     Instructions were provided via the [employer] patient resources and are available for the patient to view on the patient portal.    Follow up in about 25 days (around 12/26/2022) for RN follow up.    [facility]PC Self-Management Care Plan was developed with the patients/caregivers input and was based on identified barriers from [facility] assessment.  Goals were written today with the patient/caregiver and the patient has agreed to work towards these goals to improve his/her overall well-being. Patient verbalized understanding of the care plan, goals, and all of today's instructions. Encouraged patient/caregiver to communicate with his/her physician and health care team about health conditions and the treatment plan.   Provided my contact information today and encouraged patient/caregiver to call me with any questions as needed.

## 2022-12-20 ENCOUNTER — CLINICAL SUPPORT (OUTPATIENT)
Dept: BARIATRICS | Facility: CLINIC | Age: 62
End: 2022-12-20
Payer: MEDICARE

## 2022-12-20 ENCOUNTER — PATIENT MESSAGE (OUTPATIENT)
Dept: BARIATRICS | Facility: CLINIC | Age: 62
End: 2022-12-20

## 2022-12-20 VITALS — HEIGHT: 63 IN | BODY MASS INDEX: 31.19 KG/M2

## 2022-12-20 DIAGNOSIS — Z71.3 ENCOUNTER FOR DIETARY COUNSELING AND SURVEILLANCE: ICD-10-CM

## 2022-12-20 DIAGNOSIS — E66.9 OBESITY (BMI 30-39.9): Primary | ICD-10-CM

## 2022-12-20 PROCEDURE — 97803 MED NUTRITION INDIV SUBSEQ: CPT | Mod: PBBFAC,PN

## 2022-12-20 PROCEDURE — 99999 PR PBB SHADOW E&M-EST. PATIENT-LVL III: ICD-10-PCS | Mod: PBBFAC,,,

## 2022-12-20 PROCEDURE — 99213 OFFICE O/P EST LOW 20 MIN: CPT | Mod: PBBFAC,PN

## 2022-12-20 PROCEDURE — 99999 PR PBB SHADOW E&M-EST. PATIENT-LVL III: CPT | Mod: PBBFAC,,,

## 2022-12-20 NOTE — PROGRESS NOTES
NUTRITION NOTE    Referring Physician: NUTRITION NOTE    Referring Physician: Dr. Reese  Reason for MNT Referral: Follow-up s/p Gastric Sleeve in 2018    PAST MEDICAL HISTORY:  Denies nausea, vomiting, and constipation.  Reports problems, including diarrhea with blood .    Past Medical History:   Diagnosis Date    Angio-edema     Arthralgia     Asthma without status asthmaticus     Bronchitis     COPD (chronic obstructive pulmonary disease)     COVID-19 virus infection 07/23/2021    Diverticulosis of large intestine without hemorrhage 10/08/2015    Environmental allergies     Eosinophilic asthma 03/08/2018    Exacerbation of ulcerative colitis with rectal bleeding     Gastroparesis     Hand arthritis     Herpes simplex without mention of complication     oral    Hidradenitis     History of morbid obesity     Hyperlipidemia     Hypothyroidism     Immune deficiency disorder     Intraperitoneal abscess 05/07/2018    LBP radiating to left leg     Leukocytosis, unspecified     Migraine headache     Normocytic anemia 10/05/2022    Obesity, Class III, BMI 40-49.9 (morbid obesity)     Periorbital cellulitis 12/31/2016    Prediabetes     RA (rheumatoid arthritis)     Recurrent upper respiratory infection (URI)     S/P colonoscopy 11/2010    Sepsis 05/07/2018    Systemic lupus erythematosus, organ or system involvement unspecified     Tubular adenoma of colon 08/17/2022    Ulcerative pancolitis with rectal bleeding 10/17/2022    Urticaria        CLINICAL DATA:  62 y.o. female.    There were no vitals filed for this visit.    Current Weight: 168-167 lbs  BMI: 31.19  Total Weight Loss: -47 lbs    LABS:  Reviewed.    CURRENT DIET:  Diet Includes: breakfast- sutton +coffee/tea with sugar and almond milk or bagel w/ smoked gouda and ham, lunch- leftovers (lamb [place]) or sandwich, dinner - meat + rice/potato/noodle + green beans. Consuming ~ 1 gallon of decaffeinated tea throughout the day, 32 oz. Water, and 1 glass of sparkling  red grape juice in the evening. Bedtime snack consisting of dark chocolate or salt water taffy.   Protein Supplements: None    EXERCISE:  None. Previously walking ~ 3 miles per day.     Restrictions to Exercise:  None    VITAMINS/MINERALS:  Multivitamins: [employer] brand MVI gummy  B-Complex: Included with multivitamin.  Calcium Citrate + Vitamin D: None  Vitamin B12:  included with MVI    ASSESSMENT:  Doing fair overall.  Inadequate protein intake.  Inadequate fluid intake.    Patient post- op gastric sleeve in 2018. Recent diagnosis in July with ulcerative colitis, lupus, rheumatoid arthritis, Behcet's syndrome, and immunologic deficiency syndrome. Patient reporting ~ 3 hospital stays since September due to UC. Bowel movements are ~ 5-9 times per day.  Bowel movements mainly occurring at 1-4am and during breakfast hours. Started Stelara last month leading to decreased frequency and blood volume. Recently began a low residue, high protein diet. Patient reporting food allergies to gluten, lactose, corn, cranberries, pineapples, peas, wheat, grapefruit, carolina, limes, and artifical sugars.    Patient wanting to lose weight, RD shifted focus to overall health with newly dx of UC. Discussion of starting a food diary to navigate the food/beverage intake, timing/amount of food ingested, and type/severity/timing of symptoms to aid in identifying possible trigger foods. Discussion on importance of hydration status with bowel movements occurring frequently. Increasing water consumption while decrease tea consumption. Discussion of utilizing a protein powder to meet protein and fluid needs, RD assisting to find protein powder. Working to incorporate eggs, tuna packets, and protein puffs into the diet to provide additional protein sources. Discussion of each meal containing a protein source and eating 4-6 meals/day.     BARIATRIC DIET DISCUSSION:  Instructed and provided written materials on low residue diet plan.  Reinforced  post-op nutrition guidelines.    PLAN/RECOMMONDATIONS:  Continue low residue diet.  Increase protein intake.  Increase fluid intake.  Begin light exercise, if medically appropriate.   Continue appropriate vitamins & minerals.  Adjust vitamins & minerals by: choosing chewable form  Keep a food diary to evaluate possible triggers.   Find a chewable MVI and plant-based protein powder.     Return to clinic in 3 weeks.    SESSION TIME: 30 minutes

## 2022-12-28 ENCOUNTER — PATIENT MESSAGE (OUTPATIENT)
Dept: PULMONOLOGY | Facility: CLINIC | Age: 62
End: 2022-12-28
Payer: MEDICARE

## 2022-12-29 ENCOUNTER — OFFICE VISIT (OUTPATIENT)
Dept: PULMONOLOGY | Facility: CLINIC | Age: 62
End: 2022-12-29
Payer: MEDICARE

## 2022-12-29 DIAGNOSIS — J45.50 SEVERE PERSISTENT ASTHMA WITHOUT COMPLICATION: ICD-10-CM

## 2022-12-29 DIAGNOSIS — D84.9 IMMUNOLOGIC DEFICIENCY SYNDROME: ICD-10-CM

## 2022-12-29 DIAGNOSIS — B33.8 RSV (RESPIRATORY SYNCYTIAL VIRUS INFECTION): ICD-10-CM

## 2022-12-29 DIAGNOSIS — J01.01 ACUTE RECURRENT MAXILLARY SINUSITIS: ICD-10-CM

## 2022-12-29 DIAGNOSIS — J34.89 SINUS PAIN: Primary | ICD-10-CM

## 2022-12-29 DIAGNOSIS — J45.50 SEVERE PERSISTENT ASTHMA IN ADULT WITHOUT COMPLICATION: ICD-10-CM

## 2022-12-29 DIAGNOSIS — J45.51 SEVERE PERSISTENT ASTHMA WITH ACUTE EXACERBATION: ICD-10-CM

## 2022-12-29 PROCEDURE — 99442 PR PHYSICIAN TELEPHONE EVALUATION 11-20 MIN: ICD-10-PCS | Mod: 95,,, | Performed by: INTERNAL MEDICINE

## 2022-12-29 PROCEDURE — 99442 PR PHYSICIAN TELEPHONE EVALUATION 11-20 MIN: CPT | Mod: 95,,, | Performed by: INTERNAL MEDICINE

## 2022-12-29 RX ORDER — TRAMADOL HYDROCHLORIDE 50 MG/1
50 TABLET ORAL EVERY 4 HOURS PRN
Qty: 48 EACH | Refills: 0 | Status: SHIPPED | OUTPATIENT
Start: 2022-12-29 | End: 2023-02-27 | Stop reason: SDUPTHER

## 2022-12-29 RX ORDER — ALBUTEROL SULFATE 0.83 MG/ML
2.5 SOLUTION RESPIRATORY (INHALATION) EVERY 6 HOURS PRN
Qty: 120 EACH | Refills: 11 | Status: SHIPPED | OUTPATIENT
Start: 2022-12-29 | End: 2023-12-29

## 2022-12-29 RX ORDER — LEVOFLOXACIN 500 MG/1
500 TABLET, FILM COATED ORAL DAILY
Qty: 14 TABLET | Refills: 2 | Status: SHIPPED | OUTPATIENT
Start: 2022-12-29 | End: 2023-01-30

## 2022-12-29 RX ORDER — BENZONATATE 200 MG/1
200 CAPSULE ORAL 3 TIMES DAILY PRN
Qty: 30 CAPSULE | Refills: 3 | Status: SHIPPED | OUTPATIENT
Start: 2022-12-29 | End: 2023-01-08

## 2022-12-29 NOTE — PATIENT INSTRUCTIONS
Will ask staff to send in albuterol printed script for shipping to house     Will do routine follow up. Call if needed    Will send in tramadol for acute face pain from sinus infection.    Keep levaquin on hand     Will renew tessalon for cough prn.

## 2022-12-29 NOTE — PROGRESS NOTES
Established Patient - Audio Only Telehealth Visit     The patient location is: home   The chief complaint leading to consultation is:  acute sinusitis  Visit type: Virtual visit with audio only (telephone)  Total time spent with patient:  12 min       The reason for the audio only service rather than synchronous audio and video virtual visit was related to technical difficulties or patient preference/necessity.     Each patient to whom I provide medical services by telemedicine is:  (1) informed of the relationship between the physician and patient and the respective role of any other health care provider with respect to management of the patient; and (2) notified that they may decline to receive medical services by telemedicine and may withdraw from such care at any time. Patient verbally consented to receive this service via voice-only telephone call.    1/30/2023    Yodit Canseco  Office Note    No chief complaint on file.      HPI:   12/29/2022 having sinus problems with green mucous nad coughing, right face to chin hurts last 3 days. Had in prior.  May exacerbate lungs as in past.  Started levaquin 2 days ago. Prednisone boosted from 10 to 20/d.  Need pain control and cough suppresion .  Has prednisoen. Not getting [facility]ol [facility]-- not covered on plan??     11/2/2022 dx lupus, bechets, Ulcerative colitis, RA-- on hizentra.  Pt exposed to grandson with rsv.  Having abd pain -- dilaudid helped and suppressed cough-- not able to use codeine/hydrocodone.     Ct abd and cxr 10/2022 clear lungs.       Pt currently felt to have rsv-- improved over last 5 da   pt was sl febrile initially.   Pt now having green mucous. Cough is violent .  similar sick. Had min leg edema.  On hizentra. Asthma doing wellwith RSV.  Eating ok but weak and coughs a lot.  Breathing good.     Patient Instructions   May use levaquin for green mucous    Rsv should run course-- hirzentra should help?  Breztri may be needed.       Use dilaudid minimally for pain -- should suppress cough    Diflucan for yeast as needed    Maxzide as needed for edema    Tessalon for cough    Could do phone follow up    Ct abd 10/2022 was clear lower lungs and cxr 10/31/2022 was clear -- lungs not a concern.  Asthma doing well even with rsv.  2022 mom  sept complications from hip fx/covid.  Had few sinus infection-- rx Levaquin/prednisone.  Pt on plaquenil, dx ra and lupus.  Also on colchicine.      Uses breo but skipped.  Uses albuterol once a wk.    Patient Instructions   May use breztri 2 twice daily -- could use more or less as needed.   If used regular -- breztri would act as controller.    May use levaquin and prednisone if sinus/lung exacerbates.    Lungs loook good wrt lung tissue-- no lung tissue disease seen from Rheumatoid disease.   Ct abd 2021 viewed.   2021 - had covid in July, had rapid home test +, managed outpt.  Got infusion rx.  Has wheezes but feels over covid.  Had nausea, vomiting, weakness, headache, -- sick 8 days.  Mom got hip fx complicating recovery-- at rehab. Pt and mom were vaccinated.  Pt on abx for sinus infection  Dx lupus/rheumatoid - 5 mg prednisone daily  Patient Instructions   Recovering covid doing well.    Use levaquin as needed.    Ordered cxr if needed.     2021 had pneumonia 2020 - no problems. Needs disability paper updated.  We review today.    Patient Instructions   We reviewed your disability- need to complete paper work.  You had pneumonia in 2020.  Breo, prednisone, levaquin, albuterol renewed.    10/14/2020- all well.  No abx/prednisone, cxr 10/14/2020 nad- rul pneumonia [facility] cleared.  Avoids irritants.  Patient Instructions   You likely would be best to get high dose flu vaccine.   Your immune system may have problem responding to flu vaccine.  2020- on hirzentra since 2016.  Saw THAO Wood in [place] - virtual visit- azithro and prednisone 10/d x 3  for sinusitis.     Uses   500 bid, breo 200 daily, , xopenex nebs prn, singulair. Needs more prednisone, had pneumonia 3/4 to 8th.  Had ct chest feb.  Felt had covid symptoms.  No ox needed for pneumonia- no steroids nor bd. Took doxy - vanc in hosp.   Patient Instructions   Pneumonia - follow up chest xray good- not urgent.    Prednisone action plan for asthma/wheeze/cough    May use levaquin for sinus/lung infections  2/10/2020- had shingles vaccine, states felt an immune response.   Cough- improved, daily, not severe, non productive occasionally productive thick dark yellow color. Using nebulizer only as needed. Has noticed fewer asthma exacerbations since starting Hyzentra, went from 1 months to 2x yearly.   Not able to use albuterol rescue inhaler or nebulized due to tachycardia, palpitations, and hand tremors. No complaint with xopenex medications.   Patient Instructions   Continue current Asthma medication regiment  Review of CT shows no areas of concern. The small nodule is not significant  Pulmonary function test shows lungs to still be strong.   Continue to use Xopenex Nebulizer with aerobika attachement once a day three days a week every week.  When your sick you can use nebulizer or Xopenex rescue inhaler four times a day.     1/9/2020- two exacerbations in 2 months, states cough is unchanged, daily, states bark like cough, severe and affects daily life, Productive occasionally clear/cream color thick in consistency, uses nebulizer 2-3 x daily.   Patient Instructions   [employer] sells an Aerobika device, this device shakes the air inside of you to break mucous off the bronchial wall. Use with nebulizer and with out daily.    Continue current Asthma medication regiment     Use nebulizer at least once a day three days a week.     CT to evaluate lungs for any abnormalities       11/8/2019- Had recent Rotator cuff surgery with nerve block Oct 9, Has SOB worse after surgery for 3 days, Took prednisone 20 mg for  3 days and Levaquin for 9 days. Seen by PCP dx URI.   Coughing, chest tightness, congestion, wheezing, Hoarse voice, fatigue, Shortness of breath worse with exertion, onset following surgery to shoulder. Cough- not able to clear secretions from chest.  Complaint of hand shaking, heart rate increases, and becomes rapid with Albuterol nebulizer onset years. Tries not to use due to side effects.     04/24/2019- breathing is good except for high pollen days, had 3 exacerbation in 6 months. Albuterol rescue inhaler 3x weekly, 1-2 nocturnal arousals monthly, could not do PFT at [employer],     11/6/18- ER visit for dehydration gastroenteritis, no prednisone use in 4 months. Not currently on fasenra injection, insurance would not cover. Currently on Hirzentra IGG therapy.   Sinus drip present, cough occasional, non productive, no nocturnal arousals, Currently on Dulera daily, uses Albuterol rescue inhaler daily before exercise no SOB.  Sepsis [facility] August 2017.  Flu vaccine   July 9, 2018 - had gastric sleeve with leak complication- lost 45 lbs already.  Has appetite but fills quickly.  Still on hirzentra.  Asthma ok avoiding fragrances/ordors.  No prednisone.   No nocturnal arousals, uses rescue weekly avoiding any irritants.  Use prednisone 4-5 last year and twice this year.        March 19, 2018-since recovered from pleuritic pain - doing well.  No prednisone use recent.  Pt has had no wheezes.  feb 22,2108   Had to do prednisone again.  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 I have advised not to work given severe asthma,  hypersensitivity to fragrances, and immune deficiency.   Pt seems better since Hirzentra, but still unstable severe asthma  Jan 29. 2018- 6 days ago had been exposed to sick , had nasal congestion , cough, ear stuffy, muscle aches /joint aches /  fever.  Temp to 100.1.  Seen by np and flu screen negative.  Had asthma 3-4 flares last year. eos up past,   oct 5, 2017 - had marce in April, skipped couple doses hirzentra, had mild bronchitis once, otherwise doing very well.  No prednisone.  No side effects and covered. Ppt flu sense for 2 days  Jan 24,2017 on  hirzentra q o week since sept and asthma more stable, no hosp, no prednisone, no noct asthma/ no rescue therapy- control not this good for years.  Sept 27, 2016  HPI:has had lung problems since birth, no nocturnal arousals, uses rescue 2/d, breathing controlled satisfactory except with irritants/infection - strong abx needed to clear.  Prednisone 2/yr, last hosp 18 months.  No ventilator.    Had exacerbation recent due uri from .  Has had shingles vaccine past.       The chief compliant  problem is varies with instablilty at time  PFSH:  Past Medical History:   Diagnosis Date    Angio-edema     Arthralgia     Asthma without status asthmaticus     Bronchitis     COPD (chronic obstructive pulmonary disease)     COVID-19 virus infection 07/23/2021    Diverticulosis of large intestine without hemorrhage 10/08/2015    Environmental allergies     Eosinophilic asthma 03/08/2018    Exacerbation of ulcerative colitis with rectal bleeding     Gastroparesis     Hand arthritis     Herpes simplex without mention of complication     oral    Hidradenitis     History of morbid obesity     Hyperlipidemia     Hypothyroidism     Immune deficiency disorder     Intraperitoneal abscess 05/07/2018    LBP radiating to left leg     Leukocytosis, unspecified     Migraine headache     Normocytic anemia 10/05/2022    Obesity, Class III, BMI 40-49.9 (morbid obesity)     Periorbital cellulitis 12/31/2016    Prediabetes     RA (rheumatoid arthritis)     Recurrent upper respiratory infection (URI)     S/P colonoscopy 11/2010    Sepsis 05/07/2018    Systemic lupus erythematosus, organ or system involvement unspecified     Tubular  adenoma of colon 08/17/2022    Ulcerative pancolitis with rectal bleeding 10/17/2022    Urticaria          Past Surgical History:   Procedure Laterality Date    ADENOIDECTOMY      CHOLECYSTECTOMY      CHOLECYSTECTOMY      COLONOSCOPY  2015    repeat in 10    COLONOSCOPY N/A 10/8/2015    Procedure: COLONOSCOPY;  Surgeon: Panda Bose MD;  Location: [facility] ENDO;  Service: Endoscopy;  Laterality: N/A;    COLONOSCOPY N/A 8/17/2022    Procedure: COLONOSCOPY;  Surgeon: Olaf Del Valle MD;  Location: [facility];  Service: General;  Laterality: N/A;    COLONOSCOPY N/A 10/6/2022    Procedure: COLONOSCOPY;  Surgeon: Quinton Celeste MD;  Location: [facility];  Service: Gastroenterology;  Laterality: N/A;    Hand fracture surgery      HARDWARE REMOVAL      left hand 5th [facility]    hymenectomy      HYSTERECTOMY      menorrhagia-Ovaries intact    ROTATOR CUFF REPAIR Right     SLEEVE GASTROPLASTY  04/16/2018    TONSILLECTOMY      Urethral dilatation       Social History     Tobacco Use    Smoking status: Never    Smokeless tobacco: Never   Substance Use Topics    Alcohol use: Yes     Alcohol/week: 0.0 standard drinks     Comment: very rarely    Drug use: No     Family History   Problem Relation Age of Onset    Melanoma Mother     Basal cell carcinoma Mother     Lung disease Mother         pulm htn    Cataracts Mother     Hypertension Mother     Lung cancer Father     Diabetes Father     Hypertension Father     Cancer Father     Diabetes Paternal Aunt     Colon cancer Paternal Aunt 40    Diabetes Paternal Aunt     Ovarian cancer Paternal Grandmother 40    Cancer Maternal Grandfather     Melanoma Maternal Aunt     Melanoma Maternal Uncle     Breast cancer Paternal Aunt 40    Lymphoma Paternal Aunt     Ulcerative colitis Son     Psoriasis Son     Psoriasis Son     Breast cancer Cousin 25    Allergic rhinitis Neg Hx     Allergies Neg Hx     Angioedema Neg Hx     Asthma Neg Hx     Atopy Neg Hx     Eczema Neg Hx     Immunodeficiency Neg Hx  "    Rhinitis Neg Hx     Urticaria Neg Hx      Review of patient's allergies indicates:   Allergen Reactions    Bromelains Hives     " causes mouth to bleed"    Sudafed [pseudoephedrine hcl] Other (See Comments)     Does not want to wake up .    Amoxicillin-pot clavulanate Itching     Other reaction(s): Itching    Hydrocodone Itching    Iodinated contrast- oral and iv dye      childhood    Iodine and iodide containing products Other (See Comments)    Melon Hives    Pantoprazole Nausea Only     Other reaction(s): upset stomach/halitosis    Percocet [oxycodone-acetaminophen] Itching    Barium iodide Rash    Ephedrine Other (See Comments)     Other reaction(s): comatose    Penicillins      Other reaction(s): does not work on pt symptoms     I have reviewed past medical, family, and social history. I have reviewed previous nurse notes.    Performance Status:The patient's activity level is functions out of house.      Review of Systems   Constitutional: Negative for activity change, appetite change, chills, diaphoresis, fatigue, fever and unexpected weight change.   HENT: Negative for dental problem, sneezing, sore throat, trouble swallowing, postnasal drip, rhinorrhea, sinus pressure, sinus pain, and voice change.     Respiratory: Negative for chest tightness, shortness of breath, wheezing.  Positive for cough,   Cardiovascular: Negative for chest pain, palpitations and leg swelling.   Musculoskeletal: Negative for gait problem, myalgias and neck pain.   Skin: Negative for color change and pallor.   Allergic/Immunologic: Negative for environmental allergies and food allergies.   Neurological: Negative for dizziness, speech difficulty, weakness, light-headedness, numbness and headaches.   Hematological: Negative for adenopathy. Does not bruise/bleed easily.   Psychiatric/Behavioral: Negative for dysphoric mood and sleep disturbance. The patient is not nervous/anxious.             Exam:Comprehensive exam done. BP (!) " "170/79 (BP Location: Right arm, Patient Position: Sitting)   Pulse (!) 56   Ht 5' 10" (1.778 m)   Wt 119.2 kg (262 lb 10.9 oz)   SpO2 95% Comment: on room air  BMI 37.69 kg/m²   Exam included Vitals as listed, and patient's appearance and affect and alertness and mood, oral exam for yeast and hygiene and pharynx lesions and Mallapatti (M) score, neck with inspection for jvd and masses and thyroid abnormalities and lymph nodes (supraclavicular and infraclavicular nodes and axillary also examined and noted if abn), chest exam included symmetry and effort and fremitus and percussion and auscultation, cardiac exam included rhythm and gallops and murmur and rubs and jvd and edema, abdominal exam for mass and hepatosplenomegaly and tenderness and hernias and bowel sounds, Musculoskeletal exam with muscle tone and posture and mobility/gait and  strength, and skin for rashes and cyanosis and pallor and turgor, extremity for clubbing.  Findings were normal except for pertinent findings listed below:  M3, chest is symmetric, no distress, normal percussion, normal fremitus and good normal breath sounds        Radiographs (ct chest and cxr) reviewed: results reviewed by direct vision  CT Chest Without Contrast 2/10/2020  Normal exam, 3.3 mm nodule seen in right upper lobe.    Lungs are clear.  Minimal retained mucus secretions in the trachea.  No pleural effusion or pneumothorax.  Normal sized heart.  Thyroid is small and heterogeneous with a subcentimeter nodule or cyst in the right lobe.     XR CHEST 1 VIEW 05/10/2018   There is left lower lobe atelectasis and possible small left pleural effusion on this study.  No pneumothorax is seen.      Labs reviewed       Lab Results   Component Value Date    WBC 14.67 (H) 01/26/2023    RBC 4.34 01/26/2023    HGB 11.1 (L) 01/26/2023    HCT 36.2 (L) 01/26/2023    MCV 83 01/26/2023    MCH 25.6 (L) 01/26/2023    MCHC 30.7 (L) 01/26/2023    RDW 14.6 (H) 01/26/2023     " 01/26/2023    MPV 9.6 01/26/2023    GRAN 9.4 (H) 01/26/2023    GRAN 64.3 01/26/2023    LYMPH 3.6 01/26/2023    LYMPH 24.3 01/26/2023    MONO 1.3 (H) 01/26/2023    MONO 8.9 01/26/2023    EOS 0.3 01/26/2023    BASO 0.07 01/26/2023    EOSINOPHIL 2.0 01/26/2023    BASOPHIL 0.5 01/26/2023       PFT reviewed  2/10/2020 no obstruction seen, 7% bronchodilator response with 12% needed for clinical improvement; TLC 86% with DLC0 at 88%, normal exam with mild asthma  Spirometry bronchodilator, lung volume by gas dilution, diffusion capacity measured February 10, 2020.  The FEV1 to FVC ratio was 79%, there is no airflow obstruction measured by spirometry technique.  The FEV1 measured 85% predicted at 1.97 L. The 8%   improvement in FEV1 failed to reach statistical significance ( 12% is needed for statistical significance ).  Lung volumes by gas dilution were normal.  Diffusion was normal.      Spirometry, lung volumes, diffusion are all normal.  The bronchodilator response was not statistically significant.  Clinical correlation recommended.     Plan:  Clinical impression is apparently straight forward and impression with management as below.    Diagnoses and all orders for this visit:    Sinus pain  -     traMADoL (ULTRAM) 50 mg tablet; Take 1 tablet (50 mg total) by mouth every 4 (four) hours as needed for Pain.    Severe persistent asthma with acute exacerbation  -     Discontinue: levoFLOXacin (LEVAQUIN) 500 MG tablet; Take 1 tablet (500 mg total) by mouth once daily. Do not take diflucan while on levaquin (Patient not taking: Reported on 1/25/2023)    Severe persistent asthma in adult without complication  -     Discontinue: levoFLOXacin (LEVAQUIN) 500 MG tablet; Take 1 tablet (500 mg total) by mouth once daily. Do not take diflucan while on levaquin (Patient not taking: Reported on 1/25/2023)    Immunologic deficiency syndrome  -     Discontinue: levoFLOXacin (LEVAQUIN) 500 MG tablet; Take 1 tablet (500 mg total) by mouth  once daily. Do not take diflucan while on levaquin (Patient not taking: Reported on 1/25/2023)  -     Culture, Respiratory with Gram Stain; Standing    RSV (respiratory syncytial virus infection)  -     benzonatate (TESSALON) 200 MG capsule; Take 1 capsule (200 mg total) by mouth 3 (three) times daily as needed for Cough.    Severe persistent asthma without complication  -     albuterol (PROVENTIL) 2.5 mg /3 mL (0.083 %) nebulizer solution; Take 3 mLs (2.5 mg total) by nebulization every 6 (six) hours as needed for Wheezing or Shortness of Breath.    Acute recurrent maxillary sinusitis  -     Discontinue: levoFLOXacin (LEVAQUIN) 500 MG tablet; Take 1 tablet (500 mg total) by mouth once daily. Do not take diflucan while on levaquin (Patient not taking: Reported on 1/25/2023)  -     doxycycline (VIBRAMYCIN) 100 MG Cap; Take 1 capsule (100 mg total) by mouth 2 (two) times daily.            Follow up if symptoms worsen or fail to improve.    Discussed with patient above for education the following:      Patient Instructions   Will ask staff to send in albuterol printed script for shipping to house     Will do routine follow up. Call if needed    Will send in tramadol for acute face pain from sinus infection.    Keep levaquin on hand     Will renew tessalon for cough prn.                               This service was not originating from a related E/M service provided within the previous 7 days nor will  to an E/M service or procedure within the next 24 hours or my soonest available appointment.  Prevailing standard of care was able to be met in this audio-only visit.

## 2023-01-03 ENCOUNTER — OUTPATIENT CASE MANAGEMENT (OUTPATIENT)
Dept: ADMINISTRATIVE | Facility: OTHER | Age: 63
End: 2023-01-03
Payer: MEDICARE

## 2023-01-04 ENCOUNTER — TELEPHONE (OUTPATIENT)
Dept: PULMONOLOGY | Facility: CLINIC | Age: 63
End: 2023-01-04
Payer: MEDICARE

## 2023-01-04 NOTE — TELEPHONE ENCOUNTER
Faxed medication to [facility]  mail order.     ----- Message from Erick Gary MD sent at 12/29/2022  5:13 PM CST -----  Will ask staff to send in albuterol printed script for shipping to [facility]     Will do routine follow up. Call if needed

## 2023-01-05 NOTE — PROGRESS NOTES
01/05/23 Phone contact made with Mrs. Canseco today and we finished discussing IBS care plan. She denies needing any more resources or education on IBS. No other issues identified at this time, contact information provided to Mrs. Canseco if she has any needs in the future. I will discharge Mrs. Canseco  from [facility] at this time due to program graduation. Mrs. Canseco  agrees with this plan.

## 2023-01-09 PROBLEM — K51.911 EXACERBATION OF ULCERATIVE COLITIS WITH RECTAL BLEEDING: Status: RESOLVED | Noted: 2022-08-29 | Resolved: 2023-01-09

## 2023-01-10 ENCOUNTER — OFFICE VISIT (OUTPATIENT)
Dept: OTOLARYNGOLOGY | Facility: CLINIC | Age: 63
End: 2023-01-10
Payer: MEDICARE

## 2023-01-10 VITALS — TEMPERATURE: 97 F | WEIGHT: 173.06 LBS | BODY MASS INDEX: 31.15 KG/M2

## 2023-01-10 DIAGNOSIS — H69.93 ETD (EUSTACHIAN TUBE DYSFUNCTION), BILATERAL: Primary | ICD-10-CM

## 2023-01-10 DIAGNOSIS — J30.89 NON-SEASONAL ALLERGIC RHINITIS, UNSPECIFIED TRIGGER: ICD-10-CM

## 2023-01-10 PROCEDURE — 99214 PR OFFICE/OUTPT VISIT, EST, LEVL IV, 30-39 MIN: ICD-10-PCS | Mod: S$PBB,,, | Performed by: OTOLARYNGOLOGY

## 2023-01-10 PROCEDURE — 99214 OFFICE O/P EST MOD 30 MIN: CPT | Mod: S$PBB,,, | Performed by: OTOLARYNGOLOGY

## 2023-01-10 PROCEDURE — 99999 PR PBB SHADOW E&M-EST. PATIENT-LVL III: ICD-10-PCS | Mod: PBBFAC,,, | Performed by: OTOLARYNGOLOGY

## 2023-01-10 PROCEDURE — 99213 OFFICE O/P EST LOW 20 MIN: CPT | Mod: PBBFAC | Performed by: OTOLARYNGOLOGY

## 2023-01-10 PROCEDURE — 99999 PR PBB SHADOW E&M-EST. PATIENT-LVL III: CPT | Mod: PBBFAC,,, | Performed by: OTOLARYNGOLOGY

## 2023-01-10 RX ORDER — AZELASTINE 1 MG/ML
1 SPRAY, METERED NASAL 2 TIMES DAILY
Qty: 30 ML | Refills: 5 | Status: SHIPPED | OUTPATIENT
Start: 2023-01-10 | End: 2023-05-22

## 2023-01-10 NOTE — PROGRESS NOTES
Referring Provider:    No referring provider defined for this encounter.  Subjective:   Patient: Yodit Canseco [id number], :1960   Visit date:1/10/2023 1:37 PM    Chief Complaint:  fluid in ears (Pt states can feel the fluid in both ears left worse than right. 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 Pt states she is congested with sinus pressure. Finished course of ABT 3d ago)    HPI:    Prior notes reviewed by myself.  Clinical documentation obtained by nursing staff reviewed.     62-year-old female presents for evaluation of aural fullness that she believes is fluid in her ears.  She states that she has had issues with fluid behind her eardrums throughout her life.  This has been complicated by the fact that she does not tolerate antihistamines or decongestants due to untoward side effects.  She also has multiple autoimmune diagnoses and is immunosuppressed.  She does feel like she may have some hearing loss and has nonpulsatile tinnitus.  She has a history of loud noise exposure from her early life [place].      Objective:     Physical Exam:  Vitals:  Temp 97.3 °F (36.3 °C) (Temporal)   Wt 78.5 kg (173 lb 1 oz)   BMI 31.15 kg/m²   General appearance:  Well developed, well nourished    Ears:  Otoscopy of external auditory canals and tympanic membranes was normal, clinical speech reception thresholds grossly intact, no mass/lesion of auricle.    Nose:  No masses/lesions of external nose, mildly congested nasal mucosa, septum, and turbinates were within normal limits.    Mouth:  No mass/lesion of lips, teeth, gums, hard/soft palate, tongue, tonsils, or oropharynx.    Neck & Lymphatics:  No cervical lymphadenopathy, no neck mass/crepitus/ asymmetry, trachea is midline, no thyroid enlargement/tenderness/mass.        [x]  Data Reviewed:    Lab Results   Component Value Date    WBC 10.66 2022    HGB 10.9 (L) 2022    HCT 37.2 2022    MCV 99 (H)  11/01/2022    EOSINOPHIL 0.6 11/01/2022               Assessment & Plan:   ETD (Eustachian tube dysfunction), bilateral  -     azelastine (ASTELIN) 137 mcg (0.1 %) nasal spray; 1 spray (137 mcg total) by Nasal route 2 (two) times daily.  Dispense: 30 mL; Refill: 5    Non-seasonal allergic rhinitis, unspecified trigger        No evidence of middle ear effusion on exam today.  I believe that her symptoms may be due to eustachian tube dysfunction.  She has not been able to tolerate topical nasal steroid spray in the past due to epistaxis.  Upon further discussion it sounds as if she was using the wrong technique for application.  We agreed to try a course of azelastine nasal spray and autoinsufflation.  We also had a long discussion about appropriate application technique.  She will follow-up in 6 weeks with an audiogram and tympanogram

## 2023-01-16 PROBLEM — K51.011 ULCERATIVE PANCOLITIS WITH RECTAL BLEEDING: Status: RESOLVED | Noted: 2022-10-17 | Resolved: 2023-01-16

## 2023-01-20 ENCOUNTER — E-VISIT (OUTPATIENT)
Dept: FAMILY MEDICINE | Facility: CLINIC | Age: 63
End: 2023-01-20
Payer: MEDICARE

## 2023-01-20 ENCOUNTER — PATIENT MESSAGE (OUTPATIENT)
Dept: FAMILY MEDICINE | Facility: CLINIC | Age: 63
End: 2023-01-20

## 2023-01-20 ENCOUNTER — PATIENT MESSAGE (OUTPATIENT)
Dept: PULMONOLOGY | Facility: CLINIC | Age: 63
End: 2023-01-20
Payer: MEDICARE

## 2023-01-20 DIAGNOSIS — U07.1 COVID-19 VIRUS INFECTION: Primary | ICD-10-CM

## 2023-01-20 PROCEDURE — 99499 NO LOS: ICD-10-PCS | Mod: ,,, | Performed by: FAMILY MEDICINE

## 2023-01-20 PROCEDURE — 99499 UNLISTED E&M SERVICE: CPT | Mod: ,,, | Performed by: FAMILY MEDICINE

## 2023-01-20 NOTE — PROGRESS NOTES
Patient ID: Yodit Canseco is a 62 y.o. female.    Chief Complaint: Cough, Fever, Fatigue, Nasal Congestion, and Sore Throat    The patient initiated a request through [facility] on 1/20/2023 for evaluation and management with a chief complaint of Cough, Fever, Fatigue, Nasal Congestion, and Sore Throat     I evaluated the questionnaire submission on 1/20/23.    [facility] Peq Evisit Upper Respitatory/Cough Questionnaire    1/20/2023 12:52 PM CST - Filed by Patient   Do you agree to participate in an E-Visit? Yes   If you have any of the following symptoms, please present to your local ER or call 911:  I acknowledge   What is the main issue that you would like for your doctor to address today? Covid   Are you able to take your vital signs? Yes   Systolic Blood Pressure:    Diastolic Blood Pressure:    Weight: 167   Height: 62   Pulse:    Temp: 100.4   Resp:    Pulse Ox: 96   What symptoms do you currently have?  Chills;  Cough;  Headache;  Nasal Congestion;  Runny nose;  Sore throat   Have you had a fever? Yes   What has been the range of your fever? 100.4 or greater   When did your symptoms first appear? 1/19/2023   In the last two weeks, have you been in close contact with someone who has COVID-19 or the Flu? Yes , Covid-19   In the last two weeks, have you worked or volunteered in a healthcare facility or as a ? Healthcare facilities include a hospital, medical or dental clinic, long-term care facility, or nursing home No   Do you live in a long-term care facility, nursing home, or homeless shelter? No   List what you have done or taken to help your symptoms. Tylenol, tramodal, Benadryl   How severe are your symptoms? Moderate   Have you taken an at home Covid test? Yes   What were the results? Positive   Have you taken a Flu test? No   Have you been fully vaccinated for COVID? (2 Pfizer, 2 Moderna or 1 Martin & Martin vaccine injections) Yes   Have you received a booster? Yes   Have you recieved a Flu  shot? Yes   When did you recieve your Flu shot? 11/28/2022   Do you have transportation to get tested for COVID if it is indicated and ordered for you at an [facility] location? Yes   Are you currently pregnant, could you be pregnant, or are you breast feeding? None of the above   Provide any information you feel is important to your history not asked above I have an impaired immune system and 4 autoimmune diseases   Please attach any relevant images or files           Active Problem List with Overview Notes    Diagnosis Date Noted    Long-term use of Plaquenil 10/24/2022    Normocytic anemia 10/05/2022    Hypomagnesemia 09/03/2022    Hypoalbuminemia 09/02/2022    History of gastric bypass 09/02/2022    Fatty liver 09/02/2022    Hepatomegaly 09/02/2022    Drug-induced nausea and vomiting 08/15/2022    Weakness of right lower extremity 08/02/2022    Impaired flexibility of lower extremity 08/02/2022    Decreased activity tolerance 08/02/2022    Other forms of systemic lupus erythematosus 05/23/2022    Behcet's syndrome involving oral mucosa 05/23/2022    Recurrent aphthous stomatitis 02/15/2022    Recurrent oral ulcers 08/19/2021    Undifferentiated connective tissue disease 06/11/2021    Severe persistent asthma with acute exacerbation 04/06/2021    Immunologic deficiency syndrome 10/14/2020    S/P gastric sleeve procedure 04/16/2018    Arthralgia 04/03/2018    Asthma 03/08/2018    Family history of ASCVD (arteriosclerotic cardiovascular disease) 07/06/2017    Migraine headache     Prediabetes     Gastroparesis     Severe persistent asthma without complication 09/27/2016    Immune deficiency disorder 09/27/2016    Hand arthritis 06/30/2016    Refractive error 05/03/2016    Hyperlipidemia     History of morbid obesity     Hypothyroidism     Degenerative tear of triangular fibrocartilage complex (TFCC) of left wrist 11/06/2015    Left wrist pain 11/06/2015    Extensor tenosynovitis of wrist 11/06/2015    H/O hand surgery  10/21/2015    Left hand pain 10/21/2015    History of colon polyps 10/08/2015    Diverticulosis of large intestine without hemorrhage 10/08/2015      Recent Labs Obtained:  No visits with results within 7 Day(s) from this visit.   Latest known visit with results is:   Office Visit on 11/30/2022   Component Date Value Ref Range Status    POC Rapid COVID 11/30/2022 Negative  Negative Final     Acceptable 11/30/2022 Yes   Final    POC Molecular Influenza A Ag 11/30/2022 Negative  Negative, Not Reported Final    POC Molecular Influenza B Ag 11/30/2022 Negative  Negative, Not Reported Final     Acceptable 11/30/2022 Yes   Final       Encounter Diagnosis   Name Primary?    COVID-19 virus infection Yes        No orders of the defined types were placed in this encounter.     Medications Ordered This Encounter   Medications    molnupiravir 200 mg capsule (EUA)     Sig: Take 4 capsules (800 mg total) by mouth every 12 (twelve) hours. for 5 days     Dispense:  40 capsule     Refill:  0     Order Specific Question:   I have informed patients with partners of childbearing potential to use a reliable method of contraception during therapy and for 3 months after the last dose of molnupiravir. I have informed them molnupiravir may cause fetal harm.     Answer:   Acknowledge      I reviewed the questionnaire that you sent regarding your symptoms and positive test for COVID-19.  Due to your medical history you would be a candidate for antiviral therapy with Molnupiravir.  I sent prescription to your pharmacy.  Let us know if you have any trouble getting medication.  You should isolate  at home for 5 days after onset of symptoms.  After that if symptoms have improved with no fever for least 24 hours then you could go out, but should wear mask around other persons for another 5 days.  Let us know if any issues.  Thanks,  Dr. Lewis  E-Visit Time Tracking:    Day 1 Time (in minutes): 8     Total Time (in  minutes): 8

## 2023-01-24 ENCOUNTER — PATIENT MESSAGE (OUTPATIENT)
Dept: PULMONOLOGY | Facility: CLINIC | Age: 63
End: 2023-01-24
Payer: MEDICARE

## 2023-01-25 ENCOUNTER — OFFICE VISIT (OUTPATIENT)
Dept: FAMILY MEDICINE | Facility: CLINIC | Age: 63
End: 2023-01-25
Payer: MEDICARE

## 2023-01-25 VITALS
BODY MASS INDEX: 32.44 KG/M2 | HEART RATE: 99 BPM | WEIGHT: 176.25 LBS | OXYGEN SATURATION: 98 % | TEMPERATURE: 98 F | HEIGHT: 62 IN | DIASTOLIC BLOOD PRESSURE: 66 MMHG | SYSTOLIC BLOOD PRESSURE: 104 MMHG

## 2023-01-25 DIAGNOSIS — J45.50 SEVERE PERSISTENT ASTHMA WITHOUT COMPLICATION: ICD-10-CM

## 2023-01-25 DIAGNOSIS — M35.9 UNDIFFERENTIATED CONNECTIVE TISSUE DISEASE: ICD-10-CM

## 2023-01-25 DIAGNOSIS — D84.9 IMMUNOLOGIC DEFICIENCY SYNDROME: ICD-10-CM

## 2023-01-25 DIAGNOSIS — U07.1 COVID-19 VIRUS INFECTION: Primary | ICD-10-CM

## 2023-01-25 PROCEDURE — 99215 OFFICE O/P EST HI 40 MIN: CPT | Mod: PBBFAC,PO | Performed by: FAMILY MEDICINE

## 2023-01-25 PROCEDURE — 99999 PR PBB SHADOW E&M-EST. PATIENT-LVL V: ICD-10-PCS | Mod: PBBFAC,CR,, | Performed by: FAMILY MEDICINE

## 2023-01-25 PROCEDURE — 99213 PR OFFICE/OUTPT VISIT, EST, LEVL III, 20-29 MIN: ICD-10-PCS | Mod: S$PBB,CR,, | Performed by: FAMILY MEDICINE

## 2023-01-25 PROCEDURE — 99999 PR PBB SHADOW E&M-EST. PATIENT-LVL V: CPT | Mod: PBBFAC,CR,, | Performed by: FAMILY MEDICINE

## 2023-01-25 PROCEDURE — 99213 OFFICE O/P EST LOW 20 MIN: CPT | Mod: S$PBB,CR,, | Performed by: FAMILY MEDICINE

## 2023-01-25 RX ORDER — SENNOSIDES 25 MG/1
TABLET, FILM COATED ORAL
COMMUNITY
Start: 2023-01-06 | End: 2023-07-25

## 2023-01-25 RX ORDER — CALCIUM CARBONATE 500(1250)
TABLET ORAL
COMMUNITY
Start: 2023-01-06 | End: 2023-12-27 | Stop reason: CLARIF

## 2023-01-25 RX ORDER — BENZONATATE 200 MG/1
200 CAPSULE ORAL 3 TIMES DAILY PRN
Qty: 30 CAPSULE | Refills: 0 | Status: SHIPPED | OUTPATIENT
Start: 2023-01-25 | End: 2023-02-04

## 2023-01-25 NOTE — PROGRESS NOTES
Follow-up COVID-19.  Recent ER visit.  Symptoms develop January 19.  She had fever but this resolved after the 1st day.  She is had congestion and cough.  She is completing Molnupiravir today.  She is compliant with her inhalers.  Denies wheezing.  She does have chronic asthma.  Overall symptoms are slightly improving.  She does have immune deficiency.  She denies chest pain or shortness of breath.    Yodit was seen today for follow-up.    Diagnoses and all orders for this visit:    COVID-19 virus infection    Severe persistent asthma without complication    Undifferentiated connective tissue disease    Immunologic deficiency syndrome    Other orders  -     benzonatate (TESSALON) 200 MG capsule; Take 1 capsule (200 mg total) by mouth 3 (three) times daily as needed for Cough.    COVID-19 with mild disease.  Chest is clear today.  Appears to be slowly improving.  Chest appears clear today.  She can continue with current medication.  I would recommend completing 10 days isolation.  She is considering starting Levaquin as per a protocol with her pulmonologist for respiratory infections.  Would defer to her pulmonologist recommendations on this.  Follow-up as needed if new symptoms develop or if symptoms worsen..              Past Medical History:  Past Medical History:   Diagnosis Date    Angio-edema     Arthralgia     Asthma without status asthmaticus     Bronchitis     COPD (chronic obstructive pulmonary disease)     COVID-19 virus infection 07/23/2021    Diverticulosis of large intestine without hemorrhage 10/08/2015    Environmental allergies     Eosinophilic asthma 03/08/2018    Exacerbation of ulcerative colitis with rectal bleeding     Gastroparesis     Hand arthritis     Herpes simplex without mention of complication     oral    Hidradenitis     History of morbid obesity     Hyperlipidemia     Hypothyroidism     Immune deficiency disorder     Intraperitoneal abscess 05/07/2018    LBP radiating to left leg      "Leukocytosis, unspecified     Migraine headache     Normocytic anemia 10/05/2022    Obesity, Class III, BMI 40-49.9 (morbid obesity)     Periorbital cellulitis 12/31/2016    Prediabetes     RA (rheumatoid arthritis)     Recurrent upper respiratory infection (URI)     S/P colonoscopy 11/2010    Sepsis 05/07/2018    Systemic lupus erythematosus, organ or system involvement unspecified     Tubular adenoma of colon 08/17/2022    Ulcerative pancolitis with rectal bleeding 10/17/2022    Urticaria      Past Surgical History:   Procedure Laterality Date    ADENOIDECTOMY      CHOLECYSTECTOMY      CHOLECYSTECTOMY      COLONOSCOPY  2015    repeat in 10    COLONOSCOPY N/A 10/8/2015    Procedure: COLONOSCOPY;  Surgeon: Panda Bose MD;  Location: [facility] ENDO;  Service: Endoscopy;  Laterality: N/A;    COLONOSCOPY N/A 8/17/2022    Procedure: COLONOSCOPY;  Surgeon: Olaf Del Valle MD;  Location: [facility];  Service: General;  Laterality: N/A;    COLONOSCOPY N/A 10/6/2022    Procedure: COLONOSCOPY;  Surgeon: Quinton Celeste MD;  Location: [facility];  Service: Gastroenterology;  Laterality: N/A;    Hand fracture surgery      HARDWARE REMOVAL      left hand 5th [facility]    hymenectomy      HYSTERECTOMY      menorrhagia-Ovaries intact    ROTATOR CUFF REPAIR Right     SLEEVE GASTROPLASTY  04/16/2018    TONSILLECTOMY      Urethral dilatation       Review of patient's allergies indicates:   Allergen Reactions    Bromelains Hives     " causes mouth to bleed"    Pimecrolimus Swelling    Sudafed [pseudoephedrine hcl] Other (See Comments)     Does not want to wake up .    Amoxicillin-pot clavulanate Itching     Other reaction(s): Itching; tolerated ceftriaxone 10/2022    Hydrocodone Itching    Iodinated contrast media      childhood    Iodine and iodide containing products Other (See Comments)    Melon Hives    Percocet [oxycodone-acetaminophen] Itching    Corn containing products     Wheat containing prod     Barium iodide Rash    Ephedrine " Other (See Comments)     Other reaction(s): comatose    Penicillins      Other reaction(s): does not work on pt symptoms     Current Outpatient Medications on File Prior to Visit   Medication Sig Dispense Refill    (Magic mouthwash) 1:1:1 diphenhydramine(Benadryl) 12.5mg/5ml liq, aluminum & magnesium hydroxide-simethicone (Maalox), LIDOcaine viscous 2% Swish and spit 5 mLs every 8 (eight) hours as needed (oral ulcers). Gargle and spit. DO NOT SWALLOW 360 mL 1    acetaminophen (TYLENOL) 325 MG tablet Take 1 tablet (325 mg total) by mouth every 6 (six) hours as needed for Pain (Do not take with any other products containing  tylenol or acetaminophen).  0    albuterol (PROAIR HFA) 90 mcg/actuation inhaler Inhale 2 puffs into the lungs every 6 (six) hours as needed for Wheezing. Rescue 18 g 0    albuterol (PROVENTIL) 2.5 mg /3 mL (0.083 %) nebulizer solution Take 3 mLs (2.5 mg total) by nebulization every 6 (six) hours as needed for Wheezing or Shortness of Breath. 120 each 11    albuterol (PROVENTIL/VENTOLIN HFA) 90 mcg/actuation inhaler Inhale 2 puffs into the lungs every 6 (six) hours as needed for Wheezing. Rescue 18 g 0    azelastine (ASTELIN) 137 mcg (0.1 %) nasal spray 1 spray (137 mcg total) by Nasal route 2 (two) times daily. 30 mL 5    budesonide-glycopyr-formoterol (BREZTRI AEROSPHERE) 160-9-4.8 mcg/actuation HFAA Inhale 2 puffs into the lungs 2 (two) times a day. 10.7 g 11    cholecalciferol, vitamin D3, (VITAMIN D3) 50 mcg (2,000 unit) Cap Take 1 capsule by mouth once daily.      estradioL (ESTRACE) 1 MG tablet Take 1 tablet (1 mg total) by mouth once daily. 90 tablet 1    FEROSUL 325 mg (65 mg iron) Tab tablet Take by mouth once daily.      HYDROmorphone (DILAUDID) 2 MG tablet Take 1 tablet (2 mg total) by mouth every 4 (four) hours as needed for Pain. 60 tablet 0    hydrOXYchloroQUINE (PLAQUENIL) 200 mg tablet Take 1 tablet (200 mg total) by mouth 2 (two) times daily. 60 tablet 6    immun glob  G,IgG,-pro-IgA 0-50 (HIZENTRA) 2 gram/10 mL (20 %) Soln Inject 18 g into the skin every 14 (fourteen) days. 90 mL 12    levothyroxine (SYNTHROID) 75 MCG tablet Take 1 tablet (75 mcg total) by mouth once daily. 90 tablet 3    loperamide (IMODIUM) 2 mg capsule Take 2 mg by mouth 3 (three) times daily as needed.      minoxidiL (LONITEN) 2.5 MG tablet TAKE 1/4 TABLET (0.625MG) BY MOUTH DAILY 30 tablet 0    montelukast (SINGULAIR) 10 mg tablet Take 1 tablet (10 mg total) by mouth every evening. 90 tablet 3    MULTIVITAMIN ORAL Take 1 tablet by mouth once daily.      ondansetron (ZOFRAN-ODT) 4 MG TbDL Take 1 tablet (4 mg total) by mouth every 8 (eight) hours as needed (nausea/vomiting). 90 tablet 0    predniSONE (DELTASONE) 20 MG tablet Take 20 mg by mouth once daily.      STELARA 90 mg/mL Syrg syringe SMARTSI SUB-Q      traMADoL (ULTRAM) 50 mg tablet Take 1 tablet (50 mg total) by mouth every 4 (four) hours as needed for Pain. 48 each 0    triamterene-hydrochlorothiazide 37.5-25 mg (MAXZIDE-25) 37.5-25 mg per tablet Take 1 tablet by mouth daily as needed (edema). 30 tablet 1    ANTI-DIARRHEAL, LOPERAMIDE, 2 mg Tab SMARTSIG:Can By Mouth      fluconazole (DIFLUCAN) 200 MG Tab Take 1 tablet (200 mg total) by mouth once a week. (Patient not taking: Reported on 2023) 7 tablet 0    levalbuterol (XOPENEX) 1.25 mg/3 mL nebulizer solution Take 3 mLs (1.25 mg total) by nebulization every 4 (four) hours as needed for Wheezing or Shortness of Breath. Rescue (Patient not taking: Reported on 2022) 1 Box 11    levoFLOXacin (LEVAQUIN) 500 MG tablet Take 1 tablet (500 mg total) by mouth once daily. Do not take diflucan while on levaquin (Patient not taking: Reported on 2023) 14 tablet 2    LIDOcaine 5 % Crea SMARTSIG:Sparingly Rectally Every 4 Hours PRN      molnupiravir 200 mg capsule (EUA) Take 4 capsules (800 mg total) by mouth every 12 (twelve) hours. for 5 days 40 capsule 0     No current facility-administered  "medications on file prior to visit.     Social History     Socioeconomic History    Marital status:    Tobacco Use    Smoking status: Never    Smokeless tobacco: Never   Substance and Sexual Activity    Alcohol use: Yes     Alcohol/week: 0.0 standard drinks     Comment: very rarely    Drug use: No    Sexual activity: Yes     Partners: Male   Social History Narrative    . Disabled teacher.     Family History   Problem Relation Age of Onset    Melanoma Mother     Basal cell carcinoma Mother     Lung disease Mother         pulm htn    Cataracts Mother     Hypertension Mother     Lung cancer Father     Diabetes Father     Hypertension Father     Cancer Father     Diabetes Paternal Aunt     Colon cancer Paternal Aunt 40    Diabetes Paternal Aunt     Ovarian cancer Paternal Grandmother 40    Cancer Maternal Grandfather     Melanoma Maternal Aunt     Melanoma Maternal Uncle     Breast cancer Paternal Aunt 40    Lymphoma Paternal Aunt     Ulcerative colitis Son     Psoriasis Son     Psoriasis Son     Breast cancer Cousin 25    Allergic rhinitis Neg Hx     Allergies Neg Hx     Angioedema Neg Hx     Asthma Neg Hx     Atopy Neg Hx     Eczema Neg Hx     Immunodeficiency Neg Hx     Rhinitis Neg Hx     Urticaria Neg Hx            ROS:  GENERAL: No fever, chills,  or significant weight changes.   CARDIOVASCULAR: Denies chest pain.  ABDOMEN: Denies abdominal pain URINARY: No flank pain, dysuria or hematuria.    Vitals:    01/25/23 1353   BP: 104/66   Pulse: 99   Temp: 97.5 °F (36.4 °C)   TempSrc: Temporal   SpO2: 98%   Weight: 79.9 kg (176 lb 4.1 oz)   Height: 5' 2" (1.575 m)       Wt Readings from Last 3 Encounters:   01/25/23 79.9 kg (176 lb 4.1 oz)   01/10/23 78.5 kg (173 lb 1 oz)   11/30/22 78.6 kg (173 lb 4.5 oz)       APPEARANCE: Well nourished, well developed, in no acute distress.    HEAD: Normocephalic.  Atraumatic.  Sinuses nontender  Ears:  Tympanic membranes intact.  No effusion or erythema.  Nose mild " rhinorrhea  Throat no erythema or exudate  EYES:   Right eye: Pupil reactive.  Conjunctiva clear.    Left eye: Pupil reactive.  Conjunctiva clear.    NECK: Supple. No bruits.  No JVD.  No cervical lymphadenopathy.  No thyromegaly.    CHEST: Breath sounds clear bilaterally.  Normal respiratory effort  CARDIOVASCULAR: Normal rate.  Regular rhythm.  No murmurs.  No rub.  No gallops.   No edema.  MENTAL STATUS: Alert.  Oriented x 3.

## 2023-01-26 ENCOUNTER — LAB VISIT (OUTPATIENT)
Dept: LAB | Facility: HOSPITAL | Age: 63
End: 2023-01-26
Attending: NURSE PRACTITIONER
Payer: MEDICARE

## 2023-01-26 DIAGNOSIS — K51.00 ULCERATIVE (CHRONIC) ENTEROCOLITIS: ICD-10-CM

## 2023-01-26 DIAGNOSIS — D50.9 IRON DEFICIENCY ANEMIA, UNSPECIFIED: Primary | ICD-10-CM

## 2023-01-26 DIAGNOSIS — M35.2 BEHCET'S SYNDROME INVOLVING ORAL MUCOSA: ICD-10-CM

## 2023-01-26 DIAGNOSIS — M35.9 UNDIFFERENTIATED CONNECTIVE TISSUE DISEASE: ICD-10-CM

## 2023-01-26 DIAGNOSIS — K92.1 BLOOD IN STOOL: ICD-10-CM

## 2023-01-26 DIAGNOSIS — M32.19 OTHER SYSTEMIC LUPUS ERYTHEMATOSUS WITH OTHER ORGAN INVOLVEMENT: ICD-10-CM

## 2023-01-26 LAB
BASOPHILS # BLD AUTO: 0.07 K/UL (ref 0–0.2)
BASOPHILS NFR BLD: 0.5 % (ref 0–1.9)
DIFFERENTIAL METHOD: ABNORMAL
EOSINOPHIL # BLD AUTO: 0.3 K/UL (ref 0–0.5)
EOSINOPHIL NFR BLD: 2 % (ref 0–8)
ERYTHROCYTE [DISTWIDTH] IN BLOOD BY AUTOMATED COUNT: 14.6 % (ref 11.5–14.5)
ERYTHROCYTE [SEDIMENTATION RATE] IN BLOOD BY WESTERGREN METHOD: 23 MM/HR (ref 0–20)
HCT VFR BLD AUTO: 36.2 % (ref 37–48.5)
HGB BLD-MCNC: 11.1 G/DL (ref 12–16)
IMM GRANULOCYTES # BLD AUTO: 0.28 K/UL (ref 0–0.04)
IMM GRANULOCYTES NFR BLD AUTO: 1.9 % (ref 0–0.5)
LYMPHOCYTES # BLD AUTO: 3.6 K/UL (ref 1–4.8)
LYMPHOCYTES NFR BLD: 24.3 % (ref 18–48)
MCH RBC QN AUTO: 25.6 PG (ref 27–31)
MCHC RBC AUTO-ENTMCNC: 30.7 G/DL (ref 32–36)
MCV RBC AUTO: 83 FL (ref 82–98)
MONOCYTES # BLD AUTO: 1.3 K/UL (ref 0.3–1)
MONOCYTES NFR BLD: 8.9 % (ref 4–15)
NEUTROPHILS # BLD AUTO: 9.4 K/UL (ref 1.8–7.7)
NEUTROPHILS NFR BLD: 64.3 % (ref 38–73)
NRBC BLD-RTO: 0 /100 WBC
PLATELET # BLD AUTO: 304 K/UL (ref 150–450)
PMV BLD AUTO: 9.6 FL (ref 9.2–12.9)
RBC # BLD AUTO: 4.34 M/UL (ref 4–5.4)
WBC # BLD AUTO: 14.67 K/UL (ref 3.9–12.7)

## 2023-01-26 PROCEDURE — 80053 COMPREHEN METABOLIC PANEL: CPT | Performed by: INTERNAL MEDICINE

## 2023-01-26 PROCEDURE — 85651 RBC SED RATE NONAUTOMATED: CPT | Mod: PO | Performed by: INTERNAL MEDICINE

## 2023-01-26 PROCEDURE — 36415 COLL VENOUS BLD VENIPUNCTURE: CPT | Mod: PO | Performed by: INTERNAL MEDICINE

## 2023-01-26 PROCEDURE — 82728 ASSAY OF FERRITIN: CPT | Performed by: STUDENT IN AN ORGANIZED HEALTH CARE EDUCATION/TRAINING PROGRAM

## 2023-01-26 PROCEDURE — 86140 C-REACTIVE PROTEIN: CPT | Performed by: INTERNAL MEDICINE

## 2023-01-26 PROCEDURE — 85025 COMPLETE CBC W/AUTO DIFF WBC: CPT | Mod: PO | Performed by: INTERNAL MEDICINE

## 2023-01-26 PROCEDURE — 84466 ASSAY OF TRANSFERRIN: CPT | Performed by: STUDENT IN AN ORGANIZED HEALTH CARE EDUCATION/TRAINING PROGRAM

## 2023-01-27 ENCOUNTER — LAB VISIT (OUTPATIENT)
Dept: LAB | Facility: HOSPITAL | Age: 63
End: 2023-01-27
Attending: STUDENT IN AN ORGANIZED HEALTH CARE EDUCATION/TRAINING PROGRAM
Payer: MEDICARE

## 2023-01-27 DIAGNOSIS — K51.00 ULCERATIVE CHRONIC PANCOLITIS WITHOUT COMPLICATIONS: ICD-10-CM

## 2023-01-27 DIAGNOSIS — D50.9 IRON DEFICIENCY ANEMIA, UNSPECIFIED: Primary | ICD-10-CM

## 2023-01-27 DIAGNOSIS — K92.1 HEMATOCHEZIA: ICD-10-CM

## 2023-01-27 LAB
ALBUMIN SERPL BCP-MCNC: 3.3 G/DL (ref 3.5–5.2)
ALP SERPL-CCNC: 74 U/L (ref 55–135)
ALT SERPL W/O P-5'-P-CCNC: 12 U/L (ref 10–44)
ANION GAP SERPL CALC-SCNC: 13 MMOL/L (ref 8–16)
AST SERPL-CCNC: 18 U/L (ref 10–40)
BILIRUB SERPL-MCNC: 0.3 MG/DL (ref 0.1–1)
BUN SERPL-MCNC: 9 MG/DL (ref 8–23)
CALCIUM SERPL-MCNC: 9.3 MG/DL (ref 8.7–10.5)
CHLORIDE SERPL-SCNC: 102 MMOL/L (ref 95–110)
CO2 SERPL-SCNC: 23 MMOL/L (ref 23–29)
CREAT SERPL-MCNC: 0.8 MG/DL (ref 0.5–1.4)
CRP SERPL-MCNC: 35 MG/L (ref 0–8.2)
EST. GFR  (NO RACE VARIABLE): >60 ML/MIN/1.73 M^2
FERRITIN SERPL-MCNC: 31 NG/ML (ref 20–300)
GLUCOSE SERPL-MCNC: 89 MG/DL (ref 70–110)
IRON SERPL-MCNC: 26 UG/DL (ref 30–160)
POTASSIUM SERPL-SCNC: 3.9 MMOL/L (ref 3.5–5.1)
PROT SERPL-MCNC: 6.9 G/DL (ref 6–8.4)
SATURATED IRON: 8 % (ref 20–50)
SODIUM SERPL-SCNC: 138 MMOL/L (ref 136–145)
TOTAL IRON BINDING CAPACITY: 332 UG/DL (ref 250–450)
TRANSFERRIN SERPL-MCNC: 224 MG/DL (ref 200–375)

## 2023-01-27 PROCEDURE — 87493 C DIFF AMPLIFIED PROBE: CPT | Performed by: STUDENT IN AN ORGANIZED HEALTH CARE EDUCATION/TRAINING PROGRAM

## 2023-01-27 PROCEDURE — 87449 NOS EACH ORGANISM AG IA: CPT | Performed by: STUDENT IN AN ORGANIZED HEALTH CARE EDUCATION/TRAINING PROGRAM

## 2023-01-28 LAB
C DIFF GDH STL QL: POSITIVE
C DIFF TOX A+B STL QL IA: NEGATIVE
C DIFF TOX GENS STL QL NAA+PROBE: POSITIVE

## 2023-01-30 ENCOUNTER — PATIENT MESSAGE (OUTPATIENT)
Dept: PULMONOLOGY | Facility: CLINIC | Age: 63
End: 2023-01-30
Payer: MEDICARE

## 2023-01-30 ENCOUNTER — DOCUMENTATION ONLY (OUTPATIENT)
Dept: PULMONOLOGY | Facility: CLINIC | Age: 63
End: 2023-01-30
Payer: MEDICARE

## 2023-01-30 ENCOUNTER — PATIENT MESSAGE (OUTPATIENT)
Dept: FAMILY MEDICINE | Facility: CLINIC | Age: 63
End: 2023-01-30
Payer: MEDICARE

## 2023-01-30 PROBLEM — J34.89 SINUS PAIN: Status: ACTIVE | Noted: 2023-01-30

## 2023-01-30 PROBLEM — B33.8 RSV (RESPIRATORY SYNCYTIAL VIRUS INFECTION): Status: ACTIVE | Noted: 2023-01-30

## 2023-01-30 RX ORDER — DOXYCYCLINE 100 MG/1
100 CAPSULE ORAL 2 TIMES DAILY
Qty: 28 CAPSULE | Refills: 1 | Status: SHIPPED | OUTPATIENT
Start: 2023-01-30 | End: 2023-05-08 | Stop reason: SDUPTHER

## 2023-01-30 NOTE — PROGRESS NOTES
1/30/2023    Pt has had colitis-- dx c diff recently.   Epic reveals c diff ag but no toxin.    Had green sinus dc-- cleared slight with levaquin but now dx c diff with ag no toxin    Will dc levaquin and instructed not to use.    Doxycycline may be ok -- would avoid abx as much as able.    If sinuses relapse -- doxycycline sent in.    If green lung mucous -- pt told to submit sputum for culture.

## 2023-02-10 ENCOUNTER — LAB VISIT (OUTPATIENT)
Dept: LAB | Facility: HOSPITAL | Age: 63
End: 2023-02-10
Attending: STUDENT IN AN ORGANIZED HEALTH CARE EDUCATION/TRAINING PROGRAM
Payer: MEDICARE

## 2023-02-10 DIAGNOSIS — K51.019: Primary | ICD-10-CM

## 2023-02-10 DIAGNOSIS — K92.1 HEMATOCHEZIA: ICD-10-CM

## 2023-02-10 PROCEDURE — 83993 ASSAY FOR CALPROTECTIN FECAL: CPT | Performed by: STUDENT IN AN ORGANIZED HEALTH CARE EDUCATION/TRAINING PROGRAM

## 2023-02-15 ENCOUNTER — LAB VISIT (OUTPATIENT)
Dept: LAB | Facility: HOSPITAL | Age: 63
End: 2023-02-15
Attending: INTERNAL MEDICINE
Payer: MEDICARE

## 2023-02-15 DIAGNOSIS — A04.72 C. DIFFICILE DIARRHEA: Primary | ICD-10-CM

## 2023-02-15 LAB — CALPROTECTIN STL-MCNT: ABNORMAL MCG/G

## 2023-02-15 PROCEDURE — 87507 IADNA-DNA/RNA PROBE TQ 12-25: CPT | Performed by: INTERNAL MEDICINE

## 2023-02-20 LAB
CAMPY SP DNA.DIARRHEA STL QL NAA+PROBE: NOT DETECTED
CRYPTOSP DNA SPEC QL NAA+PROBE: NOT DETECTED
E COLI O157H7 DNA SPEC QL NAA+PROBE: NOT DETECTED
E HISTOLYT DNA SPEC QL NAA+PROBE: NOT DETECTED
G LAMBLIA DNA SPEC QL NAA+PROBE: NOT DETECTED
GPP - ADENOVIRUS 40/41: NOT DETECTED
GPP - ENTEROTOXIGENIC E COLI (ETEC): NOT DETECTED
GPP - SHIGELLA: NOT DETECTED
LACTATE PLASV-SCNC: NOT DETECTED MMOL/L
NOROVIRUS RNA STL QL NAA+PROBE: NOT DETECTED
RV DSRNA STL QL NAA+PROBE: NOT DETECTED
SALMONELLA DNA SPEC QL NAA+PROBE: NOT DETECTED
V CHOLERAE DNA SPEC QL NAA+PROBE: NOT DETECTED
Y ENTERO RECN STL QL NAA+PROBE: NOT DETECTED

## 2023-02-23 ENCOUNTER — CLINICAL SUPPORT (OUTPATIENT)
Dept: BARIATRICS | Facility: CLINIC | Age: 63
End: 2023-02-23
Payer: MEDICARE

## 2023-02-23 VITALS — HEIGHT: 63 IN | BODY MASS INDEX: 29.61 KG/M2 | WEIGHT: 167.13 LBS

## 2023-02-23 DIAGNOSIS — Z71.3 ENCOUNTER FOR DIETARY COUNSELING AND SURVEILLANCE: ICD-10-CM

## 2023-02-23 DIAGNOSIS — E66.9 OBESITY (BMI 30-39.9): ICD-10-CM

## 2023-02-23 PROCEDURE — 99999 PR PBB SHADOW E&M-EST. PATIENT-LVL II: ICD-10-PCS | Mod: PBBFAC,,,

## 2023-02-23 PROCEDURE — 97803 MED NUTRITION INDIV SUBSEQ: CPT | Mod: PBBFAC,PN

## 2023-02-23 PROCEDURE — 99212 OFFICE O/P EST SF 10 MIN: CPT | Mod: PBBFAC,PN

## 2023-02-23 PROCEDURE — 99999 PR PBB SHADOW E&M-EST. PATIENT-LVL II: CPT | Mod: PBBFAC,,,

## 2023-02-23 NOTE — PROGRESS NOTES
NUTRITION NOTE     Referring Physician: NUTRITION NOTE     Referring Physician: Dr. Reese  Reason for MNT Referral: Follow-up s/p Gastric Sleeve in 2018     PAST MEDICAL HISTORY:  Denies nausea, vomiting, and constipation.  Reports problems, including diarrhea with blood .          Past Medical History:   Diagnosis Date    Angio-edema      Arthralgia      Asthma without status asthmaticus      Bronchitis      COPD (chronic obstructive pulmonary disease)      COVID-19 virus infection 07/23/2021    Diverticulosis of large intestine without hemorrhage 10/08/2015    Environmental allergies      Eosinophilic asthma 03/08/2018    Exacerbation of ulcerative colitis with rectal bleeding      Gastroparesis      Hand arthritis      Herpes simplex without mention of complication       oral    Hidradenitis      History of morbid obesity      Hyperlipidemia      Hypothyroidism      Immune deficiency disorder      Intraperitoneal abscess 05/07/2018    LBP radiating to left leg      Leukocytosis, unspecified      Migraine headache      Normocytic anemia 10/05/2022    Obesity, Class III, BMI 40-49.9 (morbid obesity)      Periorbital cellulitis 12/31/2016    Prediabetes      RA (rheumatoid arthritis)      Recurrent upper respiratory infection (URI)      S/P colonoscopy 11/2010    Sepsis 05/07/2018    Systemic lupus erythematosus, organ or system involvement unspecified      Tubular adenoma of colon 08/17/2022    Ulcerative pancolitis with rectal bleeding 10/17/2022    Urticaria           CLINICAL DATA:  62 y.o. female.     There were no vitals filed for this visit.     Current Weight: 167 lbs  BMI: 30.08  Total Weight Loss: -48 lbs     LABS:  Reviewed.     CURRENT DIET:  Food logs obtained for 3 days. Patient provided a notebook with approximately 1 month of dietary recall.     Diet Includes: breakfast- sutton +coffee/tea with sugar and almond milk or bagel w/ cream cheese, lunch- deli ham and roast beef with 1 slice of cheese and  Shrewd protein puffs; dinner - chicken + rice + mushroom and onions. Bedtime snack - snickerdoodles, dark chocolate m&m's. Consuming ~ 1 gallon of decaffeinated tea throughout the day with table sugar, orange soda or sparkling red grape juice ~1-2d/wk in the evening  Protein Supplements: None. Provided patient with a page of soy protein isolate and rice based protein powders.      EXERCISE:  None.       Restrictions to Exercise:  None     VITAMINS/MINERALS:  Multivitamins: [employer] brand MVI gummy  B-Complex: Included with multivitamin.  Calcium Citrate + Vitamin D: None. Patient reports nephrologist recommended not consuming calcium supplement but D3 2,000IU.   Vitamin B12:  included with MVI     ASSESSMENT:  Doing fair overall.  Adequate protein intake.  Adequate fluid intake.     Patient post- op gastric sleeve in 2018. Recent diagnosis in July with ulcerative colitis, lupus, rheumatoid arthritis, Behcet's syndrome, and immunologic deficiency syndrome. Patient recently diagnosed with covid then C.diff. Bowel movements were up to ~ 12 times/day. Bowel movements have returned to 4-5 times/day. Bowel movements mainly occurring at 1-4am and during breakfast hours. Continues with a low residue, high protein diet. Patient reporting all artifical sugars cause migraines due to chemical sensitivity.      RD promoting focus of overall health with newly dx of UC. Discussion on importance of hydration status with bowel movements occurring frequently. Increasing water consumption while decrease tea consumption. Discussion of implementing protein powder with almond milk into dietary regimen. All foods previously suggested were able to be added within the diet. Discussion of MVI usage and provided patient with various options to begin supplementation. Provided patient with low FODMAP diet overview to consider shifting focus. RD to follow up with.     BARIATRIC DIET DISCUSSION:  Instructed and provided written materials on low FODMAP  diet plan.  Reinforced post-op nutrition guidelines.     PLAN/RECOMMONDATIONS:  Continue low residue diet.  Increase protein intake.  Increase fluid intake.  Begin light exercise, if medically appropriate.   Continue appropriate vitamins & minerals.  Adjust vitamins & minerals by: begin bariatricpal or fusion capsules.   Review materials on low FODMAP to consider redirecting dietary approach.  Begin utilizing a protein powder with almond milk into daily regimen.   Return to clinic in 3 weeks.     SESSION TIME: 60 minutes

## 2023-02-27 ENCOUNTER — OFFICE VISIT (OUTPATIENT)
Dept: RHEUMATOLOGY | Facility: CLINIC | Age: 63
End: 2023-02-27
Payer: MEDICARE

## 2023-02-27 VITALS
HEART RATE: 85 BPM | HEIGHT: 63 IN | SYSTOLIC BLOOD PRESSURE: 137 MMHG | BODY MASS INDEX: 29.72 KG/M2 | DIASTOLIC BLOOD PRESSURE: 67 MMHG | WEIGHT: 167.75 LBS

## 2023-02-27 DIAGNOSIS — R11.2 DRUG-INDUCED NAUSEA AND VOMITING: ICD-10-CM

## 2023-02-27 DIAGNOSIS — J34.89 SINUS PAIN: ICD-10-CM

## 2023-02-27 DIAGNOSIS — T50.905A DRUG-INDUCED NAUSEA AND VOMITING: ICD-10-CM

## 2023-02-27 DIAGNOSIS — K51.011 ULCERATIVE PANCOLITIS WITH RECTAL BLEEDING: Primary | ICD-10-CM

## 2023-02-27 DIAGNOSIS — M35.2 BEHCET'S SYNDROME INVOLVING ORAL MUCOSA: ICD-10-CM

## 2023-02-27 DIAGNOSIS — Z79.899 LONG-TERM USE OF PLAQUENIL: ICD-10-CM

## 2023-02-27 PROCEDURE — 99214 OFFICE O/P EST MOD 30 MIN: CPT | Mod: PBBFAC,PO | Performed by: INTERNAL MEDICINE

## 2023-02-27 PROCEDURE — 99214 OFFICE O/P EST MOD 30 MIN: CPT | Mod: S$PBB,,, | Performed by: INTERNAL MEDICINE

## 2023-02-27 PROCEDURE — 99999 PR PBB SHADOW E&M-EST. PATIENT-LVL IV: CPT | Mod: PBBFAC,,, | Performed by: INTERNAL MEDICINE

## 2023-02-27 PROCEDURE — 99214 PR OFFICE/OUTPT VISIT, EST, LEVL IV, 30-39 MIN: ICD-10-PCS | Mod: S$PBB,,, | Performed by: INTERNAL MEDICINE

## 2023-02-27 PROCEDURE — 99999 PR PBB SHADOW E&M-EST. PATIENT-LVL IV: ICD-10-PCS | Mod: PBBFAC,,, | Performed by: INTERNAL MEDICINE

## 2023-02-27 RX ORDER — ONDANSETRON 4 MG/1
4 TABLET, ORALLY DISINTEGRATING ORAL EVERY 8 HOURS PRN
Qty: 90 TABLET | Refills: 6 | Status: SHIPPED | OUTPATIENT
Start: 2023-02-27 | End: 2024-02-19 | Stop reason: SDUPTHER

## 2023-02-27 RX ORDER — TRAMADOL HYDROCHLORIDE 50 MG/1
50 TABLET ORAL EVERY 8 HOURS PRN
Qty: 21 TABLET | Refills: 0 | Status: SHIPPED | OUTPATIENT
Start: 2023-02-27 | End: 2023-03-06

## 2023-02-27 NOTE — PROGRESS NOTES
RHEUMATOLOGY CLINIC FOLLOW UP VISIT  Chief complaints, HPI, ROS, EXAM, Assessment & Plans:-  Yodit Parks a 62 y.o. pleasant female comes in for follow up visit.  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  Her disease was well controlled on Otezla and Plaquenil.    Since the diagnosis of ulcerative colitis with calprotectin level more than a 1000 she was started on   Stelara.  Reports significant improvement of colitis on Stelara but has repeated flares.  Upper respiratory tract infections most frequently causes her flares.  Recent flare improving with steroid taper at this time.  On subcutaneous IVIG for chronic immunodeficiency disorder.  Otezla discontinued when she started Stelara.  Denies any flare of Behcet's.Rheumatological review of system negative otherwise.  Examination shows no evidence of synovitis, dactylitis, enthesitis or effusion tenderness of bilateral ankle with mild edema.     1. Ulcerative pancolitis with rectal bleeding    2. Behcet's syndrome involving oral mucosa    3. Long-term use of Plaquenil    4. Drug-induced nausea and vomiting    5. Sinus pain      Problem List Items Addressed This Visit       Behcet's syndrome involving oral mucosa    Drug-induced nausea and vomiting    Relevant Medications    ondansetron (ZOFRAN-ODT) 4 MG TbDL    Ulcerative pancolitis with rectal bleeding - Primary    Long-term use of Plaquenil    Sinus pain    Relevant Medications    traMADoL (ULTRAM) 50 mg tablet      Latest Reference Range & Units 01/26/23 14:36   WBC 3.90 - 12.70 K/uL 14.67 (H)   RBC 4.00 - 5.40 M/uL 4.34   Hemoglobin 12.0 - 16.0 g/dL 11.1 (L)   Hematocrit 37.0 - 48.5 % 36.2 (L)   MCV 82 - 98 fL 83   MCH 27.0 - 31.0 pg 25.6 (L)   MCHC 32.0 - 36.0 g/dL 30.7 (L)   RDW 11.5 - 14.5 % 14.6 (H)   Platelets 150 - 450 K/uL 304   MPV 9.2 - 12.9 fL 9.6   Gran % 38.0 -  73.0 % 64.3   Lymph % 18.0 - 48.0 % 24.3   Mono % 4.0 - 15.0 % 8.9   Eosinophil % 0.0 - 8.0 % 2.0   Basophil % 0.0 - 1.9 % 0.5   Immature Granulocytes 0.0 - 0.5 % 1.9 (H)   Gran # (ANC) 1.8 - 7.7 K/uL 9.4 (H)   Lymph # 1.0 - 4.8 K/uL 3.6   Mono # 0.3 - 1.0 K/uL 1.3 (H)   Eos # 0.0 - 0.5 K/uL 0.3   Baso # 0.00 - 0.20 K/uL 0.07   Immature Grans (Abs) 0.00 - 0.04 K/uL 0.28 (H)   nRBC 0 /100 WBC 0   Differential Method  Automated   Iron 30 - 160 ug/dL 26 (L)   TIBC 250 - 450 ug/dL 332   Saturated Iron 20 - 50 % 8 (L)   Transferrin 200 - 375 mg/dL 224   Ferritin 20.0 - 300.0 ng/mL 31   Sed Rate 0 - 20 mm/Hr 23 (H)   Sodium 136 - 145 mmol/L 138   Potassium 3.5 - 5.1 mmol/L 3.9   Chloride 95 - 110 mmol/L 102   CO2 23 - 29 mmol/L 23   Anion Gap 8 - 16 mmol/L 13   BUN 8 - 23 mg/dL 9   Creatinine 0.5 - 1.4 mg/dL 0.8   eGFR >60 mL/min/1.73 m^2 >60.0   Glucose 70 - 110 mg/dL 89   Calcium 8.7 - 10.5 mg/dL 9.3   Alkaline Phosphatase 55 - 135 U/L 74   PROTEIN TOTAL 6.0 - 8.4 g/dL 6.9   Albumin 3.5 - 5.2 g/dL 3.3 (L)   BILIRUBIN TOTAL 0.1 - 1.0 mg/dL 0.3   AST 10 - 40 U/L 18   ALT 10 - 44 U/L 12   CRP 0.0 - 8.2 mg/L 35.0 (H)   (H): Data is abnormally high  (L): Data is abnormally low    Well controlled Behcet's disease on prednisone and Plaquenil.    Continue the same.  Okay to stay off Otezla   She may not need Otezla if Stelara controls her mucositis as well.  Continue Plaquenil for inflammatory arthritis.  Compromised immune system secondary to autoimmune disease and use of immunosuppressive drugs. Monitor carefully for infections. Advised to get immediate medical care if any infection. Also advised strict adherence to age appropriate vaccinations and cancer screenings with PCP.   # Follow up in about 6 months (around 8/27/2023).      Disclaimer: This note was prepared using voice recognition system and is likely to have sound alike errors and is not proof read.  Please call me with any questions.

## 2023-03-01 ENCOUNTER — LAB VISIT (OUTPATIENT)
Dept: LAB | Facility: HOSPITAL | Age: 63
End: 2023-03-01
Attending: INTERNAL MEDICINE
Payer: MEDICARE

## 2023-03-01 DIAGNOSIS — K51.019 ULCERATIVE COLITIS, UNIVERSAL, UNSPECIFIED COMPLICATION: Primary | ICD-10-CM

## 2023-03-01 PROCEDURE — 36415 COLL VENOUS BLD VENIPUNCTURE: CPT | Mod: PO | Performed by: INTERNAL MEDICINE

## 2023-03-06 LAB
FUAUA DOSE (IN MG): ABNORMAL
FUAUA INTERVAL (IN WEEKS): ABNORMAL
USTEKINUMAB AB SERPL IA-ACNC: <10 AU/ML
USTEKINUMAB SERPL IA-MCNC: 0.3 MCG/ML

## 2023-03-20 ENCOUNTER — PES CALL (OUTPATIENT)
Dept: ADMINISTRATIVE | Facility: CLINIC | Age: 63
End: 2023-03-20
Payer: MEDICARE

## 2023-03-24 ENCOUNTER — OFFICE VISIT (OUTPATIENT)
Dept: OBSTETRICS AND GYNECOLOGY | Facility: CLINIC | Age: 63
End: 2023-03-24
Payer: MEDICARE

## 2023-03-24 ENCOUNTER — HOSPITAL ENCOUNTER (OUTPATIENT)
Dept: RADIOLOGY | Facility: HOSPITAL | Age: 63
Discharge: HOME | End: 2023-03-24
Attending: OBSTETRICS & GYNECOLOGY
Payer: MEDICARE

## 2023-03-24 VITALS
BODY MASS INDEX: 30.35 KG/M2 | SYSTOLIC BLOOD PRESSURE: 114 MMHG | DIASTOLIC BLOOD PRESSURE: 64 MMHG | WEIGHT: 171.31 LBS | HEIGHT: 63 IN

## 2023-03-24 DIAGNOSIS — Z79.890 POSTMENOPAUSAL HRT (HORMONE REPLACEMENT THERAPY): ICD-10-CM

## 2023-03-24 DIAGNOSIS — Z12.31 VISIT FOR SCREENING MAMMOGRAM: ICD-10-CM

## 2023-03-24 DIAGNOSIS — Z01.419 ROUTINE GYNECOLOGICAL EXAMINATION: Primary | ICD-10-CM

## 2023-03-24 DIAGNOSIS — Z13.820 OSTEOPOROSIS SCREENING: ICD-10-CM

## 2023-03-24 PROCEDURE — 77063 BREAST TOMOSYNTHESIS BI: CPT | Mod: 26,,, | Performed by: RADIOLOGY

## 2023-03-24 PROCEDURE — 77080 DXA BONE DENSITY AXIAL: CPT | Mod: 26,,, | Performed by: RADIOLOGY

## 2023-03-24 PROCEDURE — 77067 SCR MAMMO BI INCL CAD: CPT | Mod: 26,,, | Performed by: RADIOLOGY

## 2023-03-24 PROCEDURE — 99215 OFFICE O/P EST HI 40 MIN: CPT | Mod: PBBFAC,PN,25 | Performed by: OBSTETRICS & GYNECOLOGY

## 2023-03-24 PROCEDURE — G0101 CA SCREEN;PELVIC/BREAST EXAM: HCPCS | Mod: S$PBB,,, | Performed by: OBSTETRICS & GYNECOLOGY

## 2023-03-24 PROCEDURE — 99999 PR PBB SHADOW E&M-EST. PATIENT-LVL V: CPT | Mod: PBBFAC,,, | Performed by: OBSTETRICS & GYNECOLOGY

## 2023-03-24 PROCEDURE — 77063 MAMMO DIGITAL SCREENING BILAT WITH TOMO: ICD-10-PCS | Mod: 26,,, | Performed by: RADIOLOGY

## 2023-03-24 PROCEDURE — 77080 DXA BONE DENSITY AXIAL: CPT | Mod: TC,PO

## 2023-03-24 PROCEDURE — 99999 PR PBB SHADOW E&M-EST. PATIENT-LVL V: ICD-10-PCS | Mod: PBBFAC,,, | Performed by: OBSTETRICS & GYNECOLOGY

## 2023-03-24 PROCEDURE — G0101 PR CA SCREEN;PELVIC/BREAST EXAM: ICD-10-PCS | Mod: S$PBB,,, | Performed by: OBSTETRICS & GYNECOLOGY

## 2023-03-24 PROCEDURE — 77067 MAMMO DIGITAL SCREENING BILAT WITH TOMO: ICD-10-PCS | Mod: 26,,, | Performed by: RADIOLOGY

## 2023-03-24 PROCEDURE — 77067 SCR MAMMO BI INCL CAD: CPT | Mod: TC,PN

## 2023-03-24 PROCEDURE — 77080 DXA BONE DENSITY AXIAL SKELETON 1 OR MORE SITES: ICD-10-PCS | Mod: 26,,, | Performed by: RADIOLOGY

## 2023-03-24 RX ORDER — ESTRADIOL 1 MG/1
1 TABLET ORAL DAILY
Qty: 90 TABLET | Refills: 3 | Status: SHIPPED | OUTPATIENT
Start: 2023-03-24 | End: 2024-01-02

## 2023-03-24 NOTE — PROGRESS NOTES
Chief Complaint   Patient presents with    Well Woman    Mammogram     History of Present Illness: Yodit Canseco is a 62 y.o. female that presents today 3/24/2023 for well gyn visit.    Past Medical History:   Diagnosis Date    Angio-edema     Arthralgia     Asthma without status asthmaticus     Bronchitis     COPD (chronic obstructive pulmonary disease)     COVID-19 virus infection 07/23/2021    Diverticulosis of large intestine without hemorrhage 10/08/2015    Environmental allergies     Eosinophilic asthma 03/08/2018    Exacerbation of ulcerative colitis with rectal bleeding     Gastroparesis     Hand arthritis     Herpes simplex without mention of complication     oral    Hidradenitis     History of morbid obesity     Hyperlipidemia     Hypothyroidism     Immune deficiency disorder     Intraperitoneal abscess 05/07/2018    LBP radiating to left leg     Leukocytosis, unspecified     Migraine headache     Normocytic anemia 10/05/2022    Obesity, Class III, BMI 40-49.9 (morbid obesity)     Periorbital cellulitis 12/31/2016    Prediabetes     RA (rheumatoid arthritis)     Recurrent upper respiratory infection (URI)     S/P colonoscopy 11/2010    Sepsis 05/07/2018    Systemic lupus erythematosus, organ or system involvement unspecified     Tubular adenoma of colon 08/17/2022    Ulcerative pancolitis with rectal bleeding 10/17/2022    Urticaria        Past Surgical History:   Procedure Laterality Date    ADENOIDECTOMY      CHOLECYSTECTOMY      CHOLECYSTECTOMY      COLONOSCOPY  2015    repeat in 10    COLONOSCOPY N/A 10/8/2015    Procedure: COLONOSCOPY;  Surgeon: Panda Bose MD;  Location: [facility];  Service: Endoscopy;  Laterality: N/A;    COLONOSCOPY N/A 8/17/2022    Procedure: COLONOSCOPY;  Surgeon: Olaf Del Valle MD;  Location: [facility];  Service: General;  Laterality: N/A;    COLONOSCOPY N/A 10/6/2022    Procedure: COLONOSCOPY;  Surgeon: Quinton Celeste MD;  Location: [facility];  Service:  Gastroenterology;  Laterality: N/A;    Hand fracture surgery      HARDWARE REMOVAL      left hand 5th [facility]    hymenectomy      HYSTERECTOMY      menorrhagia-Ovaries intact    ROTATOR CUFF REPAIR Right     SLEEVE GASTROPLASTY  04/16/2018    TONSILLECTOMY      Urethral dilatation         Current Outpatient Medications   Medication Sig Dispense Refill    (Magic mouthwash) 1:1:1 diphenhydramine(Benadryl) 12.5mg/5ml liq, aluminum & magnesium hydroxide-simethicone (Maalox), LIDOcaine viscous 2% Swish and spit 5 mLs every 8 (eight) hours as needed (oral ulcers). Gargle and spit. DO NOT SWALLOW 360 mL 1    acetaminophen (TYLENOL) 325 MG tablet Take 1 tablet (325 mg total) by mouth every 6 (six) hours as needed for Pain (Do not take with any other products containing  tylenol or acetaminophen).  0    albuterol (PROAIR HFA) 90 mcg/actuation inhaler Inhale 2 puffs into the lungs every 6 (six) hours as needed for Wheezing. Rescue 18 g 0    albuterol (PROVENTIL) 2.5 mg /3 mL (0.083 %) nebulizer solution Take 3 mLs (2.5 mg total) by nebulization every 6 (six) hours as needed for Wheezing or Shortness of Breath. 120 each 11    ANTI-DIARRHEAL, LOPERAMIDE, 2 mg Tab SMARTSIG:Can By Mouth      azelastine (ASTELIN) 137 mcg (0.1 %) nasal spray 1 spray (137 mcg total) by Nasal route 2 (two) times daily. 30 mL 5    cholecalciferol, vitamin D3, (VITAMIN D3) 50 mcg (2,000 unit) Cap Take 1 capsule by mouth once daily.      doxycycline (VIBRAMYCIN) 100 MG Cap Take 1 capsule (100 mg total) by mouth 2 (two) times daily. 28 capsule 1    FEROSUL 325 mg (65 mg iron) Tab tablet Take by mouth once daily.      fluconazole (DIFLUCAN) 200 MG Tab Take 1 tablet (200 mg total) by mouth once a week. 7 tablet 0    hydrOXYchloroQUINE (PLAQUENIL) 200 mg tablet Take 1 tablet (200 mg total) by mouth 2 (two) times daily. 60 tablet 6    immun glob G,IgG,-pro-IgA 0-50 (HIZENTRA) 2 gram/10 mL (20 %) Soln Inject 18 g into the skin every 14 (fourteen) days. 90 mL 12     "levothyroxine (SYNTHROID) 75 MCG tablet Take 1 tablet (75 mcg total) by mouth once daily. 90 tablet 3    LIDOcaine 5 % Crea SMARTSIG:Sparingly Rectally Every 4 Hours PRN      loperamide (IMODIUM) 2 mg capsule Take 2 mg by mouth 3 (three) times daily as needed.      minoxidiL (LONITEN) 2.5 MG tablet TAKE 1/4 TABLET (0.625MG) BY MOUTH DAILY 30 tablet 0    montelukast (SINGULAIR) 10 mg tablet Take 1 tablet (10 mg total) by mouth every evening. 90 tablet 3    MULTIVITAMIN ORAL Take 1 tablet by mouth once daily.      ondansetron (ZOFRAN-ODT) 4 MG TbDL Take 1 tablet (4 mg total) by mouth every 8 (eight) hours as needed (nausea/vomiting). 90 tablet 6    predniSONE (DELTASONE) 20 MG tablet Take 20 mg by mouth once daily.      triamterene-hydrochlorothiazide 37.5-25 mg (MAXZIDE-25) 37.5-25 mg per tablet Take 1 tablet by mouth daily as needed (edema). 30 tablet 1    albuterol (PROVENTIL/VENTOLIN HFA) 90 mcg/actuation inhaler Inhale 2 puffs into the lungs every 6 (six) hours as needed for Wheezing. Rescue (Patient not taking: Reported on 3/24/2023) 18 g 0    budesonide-glycopyr-formoterol (BREZTRI AEROSPHERE) 160-9-4.8 mcg/actuation HFAA Inhale 2 puffs into the lungs 2 (two) times a day. (Patient not taking: Reported on 3/24/2023) 10.7 g 11    estradioL (ESTRACE) 1 MG tablet Take 1 tablet (1 mg total) by mouth once daily. 90 tablet 3    levalbuterol (XOPENEX) 1.25 mg/3 mL nebulizer solution Take 3 mLs (1.25 mg total) by nebulization every 4 (four) hours as needed for Wheezing or Shortness of Breath. Rescue 1 Box 11    STELARA 90 mg/mL Syrg syringe SMARTSI SUB-Q       No current facility-administered medications for this visit.       Review of patient's allergies indicates:   Allergen Reactions    Bromelains Hives     " causes mouth to bleed"    Pimecrolimus Swelling    Sudafed [pseudoephedrine hcl] Other (See Comments)     Does not want to wake up .    Amoxicillin-pot clavulanate Itching     Other reaction(s): Itching; " tolerated ceftriaxone 10/2022    Hydrocodone Itching    Iodinated contrast media      childhood    Iodine and iodide containing products Other (See Comments)    Melon Hives    Percocet [oxycodone-acetaminophen] Itching    Corn containing products     Wheat containing prod     Barium iodide Rash    Ephedrine Other (See Comments)     Other reaction(s): comatose    Penicillins      Other reaction(s): does not work on pt symptoms       Family History   Problem Relation Age of Onset    Melanoma Mother     Basal cell carcinoma Mother     Lung disease Mother         pulm htn    Cataracts Mother     Hypertension Mother     Lung cancer Father     Diabetes Father     Hypertension Father     Cancer Father     Diabetes Paternal Aunt     Colon cancer Paternal Aunt 40    Diabetes Paternal Aunt     Ovarian cancer Paternal Grandmother 40    Cancer Maternal Grandfather     Melanoma Maternal Aunt     Melanoma Maternal Uncle     Breast cancer Paternal Aunt 40    Lymphoma Paternal Aunt     Ulcerative colitis Son     Psoriasis Son     Psoriasis Son     Breast cancer Cousin 25    Allergic rhinitis Neg Hx     Allergies Neg Hx     Angioedema Neg Hx     Asthma Neg Hx     Atopy Neg Hx     Eczema Neg Hx     Immunodeficiency Neg Hx     Rhinitis Neg Hx     Urticaria Neg Hx        Social History     Socioeconomic History    Marital status:    Tobacco Use    Smoking status: Never    Smokeless tobacco: Never   Substance and Sexual Activity    Alcohol use: Yes     Alcohol/week: 0.0 standard drinks     Comment: very rarely    Drug use: No    Sexual activity: Yes     Partners: Male   Social History Narrative    . Disabled teacher.       OB History    Para Term  AB Living   5 2 2   3     SAB IAB Ectopic Multiple Live Births   3              # Outcome Date GA Lbr Pradip/2nd Weight Sex Delivery Anes PTL Lv   5 SAB            4 SAB            3 SAB            2 Term            1 Term                Review of Symptoms:  GENERAL:  "Denies weight gain or weight loss. Feeling well overall.   SKIN: Denies rash or lesions.   HEAD: Denies head injury or headache.   NODES: Denies enlarged lymph nodes.   CHEST: Denies chest pain or shortness of breath.   CARDIOVASCULAR: Denies palpitations or left sided chest pain.   ABDOMEN: No abdominal pain, constipation, diarrhea, nausea, vomiting or rectal bleeding.   URINARY: No frequency, dysuria, hematuria, or burning on urination.  HEMATOLOGIC: No easy bruisability or excessive bleeding.   MUSCULOSKELETAL: Denies joint pain or swelling.     /64   Ht 5' 2.5" (1.588 m)   Wt 77.7 kg (171 lb 4.8 oz)   Physical Exam:  APPEARANCE: Well nourished, well developed, in no acute distress.  SKIN: Normal skin turgor, no lesions.  NECK: Neck symmetric without masses   RESPIRATORY: Normal respiratory effort with no retractions or use of accessory muscles  CARDIOVASCULAR: Peripheral vascular system with no swelling no varicosities and palpation of pulses normal  LYMPHATIC: No enlargements of the lymph nodes noted in the neck, axillae, or groin  ABDOMEN: Soft. No tenderness or masses. No hepatosplenomegaly. No hernias.  BREASTS: Symmetrical, no skin changes or visible lesions. No palpable masses, nipple discharge or adenopathy bilaterally.EXTREMITIES: No clubbing cyanosis or edema.    ASSESSMENT/PLAN:  Routine gynecological examination    Visit for screening mammogram  -     Mammo Digital Screening Bilat w/ Eric; Future; Expected date: 03/24/2023    Osteoporosis screening  -     DXA Bone Density Axial Skeleton 1 or more sites; Future; Expected date: 03/24/2023    Postmenopausal HRT (hormone replacement therapy)  -     estradioL (ESTRACE) 1 MG tablet; Take 1 tablet (1 mg total) by mouth once daily.  Dispense: 90 tablet; Refill: 3          Patient was counseled today on Pap guidelines. We discussed the discontinuing the pap smear after hysterectomy except in certain high risk cases.  We discussed the need for pelvic " exams.   We discussed STD screening if at high risk for an STD.  We discussed breast cancer screening with mammograms every other year after the age of 40 and annually after the age of 50.    We discussed colon cancer screening.   Osteoporosis screening with the Dexa Bone Scan discussed when indicated.   She will see her PCP for other health maintenance.       FOLLOW-UP:prn         Barthel Index: Feeding Score _10__, Bathing Score _5__, Grooming Score _5__, Dressing Score _10__, Bowels Score _10__, Bladder Score _10__, Toilet Score _10__, Transfers Score _15__, Mobility Score _0__, Stairs Score _5__,     Total Score _80__

## 2023-03-28 ENCOUNTER — TELEPHONE (OUTPATIENT)
Dept: ADMINISTRATIVE | Facility: HOSPITAL | Age: 63
End: 2023-03-28
Payer: MEDICARE

## 2023-03-29 NOTE — PROGRESS NOTES
No Show. Writer called Pt, no answer and Pt phone is not accepting messages. Thank You. Post Op Note    Surgery: gastric sleeve surgery  Date: 4/16/18  Initial weight: 215  Last weight: 207  Current weight: 190    Constipation: loose stools  Reflux: with prilosec  Vomiting: none    Diet:    Doing mainly beef broth and chicken broth   Naked egg gives loose stools    Exercise:  none    MVI: 2 mvi, 2000 mg calcium with magnesium and zinc, b12    Vitals:    05/01/18 0956   BP: 117/77   Pulse: (!) 143   Resp: 16   Temp: 97.9 °F (36.6 °C)       Body comp:  Fat Percent:  47.5 %  Fat Mass:  90.6 lb  FFM:  100.2 lb  TBW: 71.2 lb  TBW %:  37.3 %  BMR: 1423 kcal    PE:  NAD  RRR  Soft/nt/nd  Incisions: well healed    Labs: none    A/P: s/p sleeve     Counseling for patient:    Diet: She cannot tolerate any protein shakes for loose stools or migraines.  I want her to start the soft diet this week.  She must chew well  Exercise: ok for cardio, no heavy lifting for another month  Vitamins: continue regimen    Co morbidities:     No diagnosis found.    -All above: Evaluated, monitored, and treated with diet and exercise program.        : I met with the patient for 15 minutes and counseled her for over 50% of that time

## 2023-04-12 ENCOUNTER — LAB VISIT (OUTPATIENT)
Dept: LAB | Facility: HOSPITAL | Age: 63
End: 2023-04-12
Attending: STUDENT IN AN ORGANIZED HEALTH CARE EDUCATION/TRAINING PROGRAM
Payer: MEDICARE

## 2023-04-12 DIAGNOSIS — K51.00 ULCERATIVE (CHRONIC) ENTEROCOLITIS: ICD-10-CM

## 2023-04-12 DIAGNOSIS — D50.9 IRON DEFICIENCY ANEMIA, UNSPECIFIED: ICD-10-CM

## 2023-04-12 DIAGNOSIS — K92.1 BLOOD IN STOOL: Primary | ICD-10-CM

## 2023-04-12 LAB
ALBUMIN SERPL BCP-MCNC: 3.7 G/DL (ref 3.5–5.2)
ALP SERPL-CCNC: 54 U/L (ref 55–135)
ALT SERPL W/O P-5'-P-CCNC: 16 U/L (ref 10–44)
ANION GAP SERPL CALC-SCNC: 13 MMOL/L (ref 8–16)
AST SERPL-CCNC: 19 U/L (ref 10–40)
BASOPHILS # BLD AUTO: 0.04 K/UL (ref 0–0.2)
BASOPHILS NFR BLD: 0.5 % (ref 0–1.9)
BILIRUB SERPL-MCNC: 0.2 MG/DL (ref 0.1–1)
BUN SERPL-MCNC: 12 MG/DL (ref 8–23)
CALCIUM SERPL-MCNC: 9.2 MG/DL (ref 8.7–10.5)
CHLORIDE SERPL-SCNC: 103 MMOL/L (ref 95–110)
CO2 SERPL-SCNC: 23 MMOL/L (ref 23–29)
CREAT SERPL-MCNC: 0.9 MG/DL (ref 0.5–1.4)
CRP SERPL-MCNC: 6.4 MG/L (ref 0–8.2)
DIFFERENTIAL METHOD: ABNORMAL
EOSINOPHIL # BLD AUTO: 0 K/UL (ref 0–0.5)
EOSINOPHIL NFR BLD: 0.2 % (ref 0–8)
ERYTHROCYTE [DISTWIDTH] IN BLOOD BY AUTOMATED COUNT: 14.7 % (ref 11.5–14.5)
EST. GFR  (NO RACE VARIABLE): >60 ML/MIN/1.73 M^2
GLUCOSE SERPL-MCNC: 115 MG/DL (ref 70–110)
HCT VFR BLD AUTO: 37.1 % (ref 37–48.5)
HGB BLD-MCNC: 11 G/DL (ref 12–16)
IMM GRANULOCYTES # BLD AUTO: 0.08 K/UL (ref 0–0.04)
IMM GRANULOCYTES NFR BLD AUTO: 0.9 % (ref 0–0.5)
LYMPHOCYTES # BLD AUTO: 1.1 K/UL (ref 1–4.8)
LYMPHOCYTES NFR BLD: 13 % (ref 18–48)
MCH RBC QN AUTO: 25.3 PG (ref 27–31)
MCHC RBC AUTO-ENTMCNC: 29.6 G/DL (ref 32–36)
MCV RBC AUTO: 86 FL (ref 82–98)
MONOCYTES # BLD AUTO: 0.3 K/UL (ref 0.3–1)
MONOCYTES NFR BLD: 3.6 % (ref 4–15)
NEUTROPHILS # BLD AUTO: 7 K/UL (ref 1.8–7.7)
NEUTROPHILS NFR BLD: 81.8 % (ref 38–73)
NRBC BLD-RTO: 0 /100 WBC
PLATELET # BLD AUTO: 325 K/UL (ref 150–450)
PMV BLD AUTO: 11.3 FL (ref 9.2–12.9)
POTASSIUM SERPL-SCNC: 4.3 MMOL/L (ref 3.5–5.1)
PROT SERPL-MCNC: 7 G/DL (ref 6–8.4)
RBC # BLD AUTO: 4.34 M/UL (ref 4–5.4)
SODIUM SERPL-SCNC: 139 MMOL/L (ref 136–145)
WBC # BLD AUTO: 8.56 K/UL (ref 3.9–12.7)

## 2023-04-12 PROCEDURE — 85025 COMPLETE CBC W/AUTO DIFF WBC: CPT | Performed by: STUDENT IN AN ORGANIZED HEALTH CARE EDUCATION/TRAINING PROGRAM

## 2023-04-12 PROCEDURE — 86140 C-REACTIVE PROTEIN: CPT | Performed by: STUDENT IN AN ORGANIZED HEALTH CARE EDUCATION/TRAINING PROGRAM

## 2023-04-12 PROCEDURE — 80053 COMPREHEN METABOLIC PANEL: CPT | Performed by: STUDENT IN AN ORGANIZED HEALTH CARE EDUCATION/TRAINING PROGRAM

## 2023-04-12 PROCEDURE — 36415 COLL VENOUS BLD VENIPUNCTURE: CPT | Mod: PO | Performed by: STUDENT IN AN ORGANIZED HEALTH CARE EDUCATION/TRAINING PROGRAM

## 2023-04-13 ENCOUNTER — LAB VISIT (OUTPATIENT)
Dept: LAB | Facility: HOSPITAL | Age: 63
End: 2023-04-13
Attending: STUDENT IN AN ORGANIZED HEALTH CARE EDUCATION/TRAINING PROGRAM
Payer: MEDICARE

## 2023-04-13 DIAGNOSIS — K92.1 HEMATOCHEZIA: Primary | ICD-10-CM

## 2023-04-13 DIAGNOSIS — K51.00 ULCERATIVE (CHRONIC) ENTEROCOLITIS: ICD-10-CM

## 2023-04-13 DIAGNOSIS — K51.019 ULCERATIVE COLITIS, UNIVERSAL, UNSPECIFIED COMPLICATION: ICD-10-CM

## 2023-04-13 PROCEDURE — 83993 ASSAY FOR CALPROTECTIN FECAL: CPT | Performed by: STUDENT IN AN ORGANIZED HEALTH CARE EDUCATION/TRAINING PROGRAM

## 2023-04-18 LAB — CALPROTECTIN STL-MCNT: ABNORMAL MCG/G

## 2023-04-26 ENCOUNTER — PATIENT MESSAGE (OUTPATIENT)
Dept: RHEUMATOLOGY | Facility: CLINIC | Age: 63
End: 2023-04-26
Payer: MEDICARE

## 2023-04-26 DIAGNOSIS — M35.9 UNDIFFERENTIATED CONNECTIVE TISSUE DISEASE: ICD-10-CM

## 2023-04-26 RX ORDER — HYDROXYCHLOROQUINE SULFATE 200 MG/1
200 TABLET, FILM COATED ORAL 2 TIMES DAILY
Qty: 60 TABLET | Refills: 6 | Status: SHIPPED | OUTPATIENT
Start: 2023-04-26 | End: 2023-11-13

## 2023-05-02 DIAGNOSIS — L65.9 HAIR LOSS: ICD-10-CM

## 2023-05-03 RX ORDER — MINOXIDIL 2.5 MG/1
TABLET ORAL
Qty: 24 TABLET | Refills: 1 | Status: SHIPPED | OUTPATIENT
Start: 2023-05-03 | End: 2023-08-10 | Stop reason: SDUPTHER

## 2023-05-04 ENCOUNTER — OFFICE VISIT (OUTPATIENT)
Dept: FAMILY MEDICINE | Facility: CLINIC | Age: 63
End: 2023-05-04
Payer: MEDICARE

## 2023-05-04 VITALS
TEMPERATURE: 98 F | HEIGHT: 63 IN | BODY MASS INDEX: 30.48 KG/M2 | SYSTOLIC BLOOD PRESSURE: 104 MMHG | DIASTOLIC BLOOD PRESSURE: 63 MMHG | WEIGHT: 172 LBS | HEART RATE: 87 BPM

## 2023-05-04 DIAGNOSIS — Z00.00 ANNUAL PHYSICAL EXAM: Primary | ICD-10-CM

## 2023-05-04 DIAGNOSIS — M35.9 UNDIFFERENTIATED CONNECTIVE TISSUE DISEASE: ICD-10-CM

## 2023-05-04 DIAGNOSIS — E78.5 HYPERLIPIDEMIA, UNSPECIFIED HYPERLIPIDEMIA TYPE: ICD-10-CM

## 2023-05-04 DIAGNOSIS — D84.9 IMMUNE DEFICIENCY DISORDER: ICD-10-CM

## 2023-05-04 DIAGNOSIS — R16.0 HEPATOMEGALY: ICD-10-CM

## 2023-05-04 DIAGNOSIS — D64.9 NORMOCYTIC ANEMIA: ICD-10-CM

## 2023-05-04 DIAGNOSIS — E03.9 HYPOTHYROIDISM, UNSPECIFIED TYPE: ICD-10-CM

## 2023-05-04 DIAGNOSIS — M35.2 BEHCET'S SYNDROME INVOLVING ORAL MUCOSA: ICD-10-CM

## 2023-05-04 DIAGNOSIS — Z98.84 HISTORY OF GASTRIC BYPASS: ICD-10-CM

## 2023-05-04 DIAGNOSIS — K51.011 ULCERATIVE PANCOLITIS WITH RECTAL BLEEDING: ICD-10-CM

## 2023-05-04 DIAGNOSIS — Z86.010 HISTORY OF COLON POLYPS: ICD-10-CM

## 2023-05-04 DIAGNOSIS — K31.84 GASTROPARESIS: ICD-10-CM

## 2023-05-04 DIAGNOSIS — J45.50 SEVERE PERSISTENT ASTHMA WITHOUT COMPLICATION: ICD-10-CM

## 2023-05-04 DIAGNOSIS — K76.0 FATTY LIVER: ICD-10-CM

## 2023-05-04 DIAGNOSIS — K12.0 RECURRENT APHTHOUS STOMATITIS: ICD-10-CM

## 2023-05-04 DIAGNOSIS — R73.03 PREDIABETES: ICD-10-CM

## 2023-05-04 DIAGNOSIS — E66.9 OBESITY WITH BODY MASS INDEX (BMI) OF 30.0 TO 39.9: ICD-10-CM

## 2023-05-04 PROCEDURE — 99213 OFFICE O/P EST LOW 20 MIN: CPT | Mod: PBBFAC,PO | Performed by: NURSE PRACTITIONER

## 2023-05-04 PROCEDURE — G0439 PR MEDICARE ANNUAL WELLNESS SUBSEQUENT VISIT: ICD-10-PCS | Mod: ,,, | Performed by: NURSE PRACTITIONER

## 2023-05-04 PROCEDURE — 99999 PR PBB SHADOW E&M-EST. PATIENT-LVL III: ICD-10-PCS | Mod: PBBFAC,,, | Performed by: NURSE PRACTITIONER

## 2023-05-04 PROCEDURE — G0439 PPPS, SUBSEQ VISIT: HCPCS | Mod: ,,, | Performed by: NURSE PRACTITIONER

## 2023-05-04 PROCEDURE — 99999 PR PBB SHADOW E&M-EST. PATIENT-LVL III: CPT | Mod: PBBFAC,,, | Performed by: NURSE PRACTITIONER

## 2023-05-04 RX ORDER — TRAMADOL HYDROCHLORIDE 50 MG/1
50 TABLET ORAL 3 TIMES DAILY
COMMUNITY
Start: 2023-04-13

## 2023-05-04 RX ORDER — BUDESONIDE 3 MG/1
9 CAPSULE, COATED PELLETS ORAL DAILY
COMMUNITY
Start: 2023-04-20

## 2023-05-04 RX ORDER — VEDOLIZUMAB 300 MG/5ML
300 INJECTION, POWDER, LYOPHILIZED, FOR SOLUTION INTRAVENOUS
COMMUNITY

## 2023-05-04 NOTE — PROGRESS NOTES
"  Yodit Canseco presented for a  Medicare AWV and comprehensive Health Risk Assessment today. The following components were reviewed and updated:    Medical history  Family History  Social history  Allergies and Current Medications  Health Risk Assessment  Health Maintenance  Care Team         ** See Completed Assessments for Annual Wellness Visit within the encounter summary.**         The following assessments were completed:  Living Situation  CAGE  Depression Screening  Timed Get Up and Go  Whisper Test  Cognitive Function Screening  Nutrition Screening  ADL Screening  PAQ Screening        Vitals:    05/04/23 1303   BP: 104/63   Pulse: 87   Temp: 98.4 °F (36.9 °C)   TempSrc: Temporal   Weight: 78 kg (172 lb)   Height: 5' 2.5" (1.588 m)     Body mass index is 30.96 kg/m².  Physical Exam  Vitals and nursing note reviewed.   Constitutional:       General: She is not in acute distress.     Appearance: She is well-developed.   HENT:      Head: Normocephalic and atraumatic.   Eyes:      Pupils: Pupils are equal, round, and reactive to light.   Cardiovascular:      Rate and Rhythm: Normal rate and regular rhythm.   Pulmonary:      Effort: Pulmonary effort is normal.      Breath sounds: Normal breath sounds.   Musculoskeletal:         General: Normal range of motion.      Cervical back: Normal range of motion and neck supple.   Skin:     General: Skin is warm and dry.      Findings: No rash.   Neurological:      Mental Status: She is alert and oriented to person, place, and time.   Psychiatric:         Judgment: Judgment normal.           Diagnoses and health risks identified today and associated recommendations/orders:    1. Annual physical exam  Stable and controlled   Continue medication as prescribed  Continue current treatment plan as previously prescribed with your PCP    2. Ulcerative pancolitis with rectal bleeding  Stable and controlled on prednisone, Entyvio  Continue medication as prescribed  Continue " current treatment plan as previously prescribed with your PCP    3. Gastroparesis  Stable and controlled on loperamide  Continue medication as prescribed  Continue current treatment plan as previously prescribed with your PCP    4. Hepatomegaly  Stable and controlled   Continue medication as prescribed  Continue current treatment plan as previously prescribed with your PCP  Low-fat diet, continue with weight loss    5. Behcet's syndrome involving oral mucosa  Stable and controlled on Plaquenil  Continue medication as prescribed  Continue current treatment plan as previously prescribed with your PCP    6. Immune deficiency disorder  Stable and controlled on Plaquenil  Continue medication as prescribed  Continue current treatment plan as previously prescribed with your PCP    7. Prediabetes  Stable and controlled on diet with weight loss  Continue medication as prescribed  Continue current treatment plan as previously prescribed with your PCP    8. Hypothyroidism, unspecified type  Stable and controlled on levothyroxine  Continue medication as prescribed  Continue current treatment plan as previously prescribed with your PCP    9. History of gastric bypass  Stable and controlled   Continue medication as prescribed  Continue current treatment plan as previously prescribed with your PCP    10. Recurrent aphthous stomatitis  Stable and controlled on magic mouthwash  Continue medication as prescribed  Continue current treatment plan as previously prescribed with your PCP    11. Severe persistent asthma without complication  Stable and controlled on albuterol, prednisone, Xopenex nebulizers, Singulair  Continue medication as prescribed  Continue current treatment plan as previously prescribed with your PCP    12. Hyperlipidemia, unspecified hyperlipidemia type  Stable and controlled on diet  Continue medication as prescribed  Continue current treatment plan as previously prescribed with your PCP    13. Undifferentiated  connective tissue disease  Stable and controlled on Plaquenil  Continue medication as prescribed  Continue current treatment plan as previously prescribed with your PCP    14. Normocytic anemia  Stable and controlled on ferosul  Continue medication as prescribed  Continue current treatment plan as previously prescribed with your PCP          Review for Opioid Screening: Ultram    Review for Substance Use Disorders: Ultram          I offered to discuss end of life issues, including information on how to make advance directives that the patient could use to name someone who would make medical decisions on their behalf if they became too ill to make themselves.    _x__Patient declined     ___Patient is interested, I provided paperwork and offered to discuss.      Provided Yodit with a 5-10 year written screening schedule and personal prevention plan. Recommendations were developed using the USPSTF age appropriate recommendations. Education, counseling, and referrals were provided as needed. After Visit Summary printed and given to patient which includes a list of additional screenings\tests needed.    No follow-ups on file.    Jori Jarrett NP

## 2023-05-08 ENCOUNTER — OFFICE VISIT (OUTPATIENT)
Dept: PULMONOLOGY | Facility: CLINIC | Age: 63
End: 2023-05-08
Payer: MEDICARE

## 2023-05-08 VITALS
DIASTOLIC BLOOD PRESSURE: 72 MMHG | HEIGHT: 63 IN | HEART RATE: 75 BPM | BODY MASS INDEX: 30.82 KG/M2 | OXYGEN SATURATION: 98 % | SYSTOLIC BLOOD PRESSURE: 124 MMHG | WEIGHT: 173.94 LBS

## 2023-05-08 DIAGNOSIS — J45.50 SEVERE PERSISTENT ASTHMA WITHOUT COMPLICATION: ICD-10-CM

## 2023-05-08 DIAGNOSIS — Z86.19 HISTORY OF CLOSTRIDIUM DIFFICILE COLITIS: ICD-10-CM

## 2023-05-08 DIAGNOSIS — Z79.52 CURRENT CHRONIC USE OF SYSTEMIC STEROIDS: Primary | ICD-10-CM

## 2023-05-08 DIAGNOSIS — J01.01 ACUTE RECURRENT MAXILLARY SINUSITIS: ICD-10-CM

## 2023-05-08 DIAGNOSIS — D84.9 IMMUNOLOGIC DEFICIENCY SYNDROME: ICD-10-CM

## 2023-05-08 DIAGNOSIS — J45.51 SEVERE PERSISTENT ASTHMA WITH ACUTE EXACERBATION: ICD-10-CM

## 2023-05-08 DIAGNOSIS — R60.0 LOCALIZED EDEMA: ICD-10-CM

## 2023-05-08 PROCEDURE — 99214 OFFICE O/P EST MOD 30 MIN: CPT | Mod: PBBFAC,PO | Performed by: INTERNAL MEDICINE

## 2023-05-08 PROCEDURE — 99999 PR PBB SHADOW E&M-EST. PATIENT-LVL IV: ICD-10-PCS | Mod: PBBFAC,,, | Performed by: INTERNAL MEDICINE

## 2023-05-08 PROCEDURE — 99214 OFFICE O/P EST MOD 30 MIN: CPT | Mod: S$PBB,,, | Performed by: INTERNAL MEDICINE

## 2023-05-08 PROCEDURE — 99999 PR PBB SHADOW E&M-EST. PATIENT-LVL IV: CPT | Mod: PBBFAC,,, | Performed by: INTERNAL MEDICINE

## 2023-05-08 PROCEDURE — 99214 PR OFFICE/OUTPT VISIT, EST, LEVL IV, 30-39 MIN: ICD-10-PCS | Mod: S$PBB,,, | Performed by: INTERNAL MEDICINE

## 2023-05-08 RX ORDER — SULFAMETHOXAZOLE AND TRIMETHOPRIM 400; 80 MG/1; MG/1
2 TABLET ORAL
Qty: 28 TABLET | Refills: 3 | Status: SHIPPED | OUTPATIENT
Start: 2023-05-08 | End: 2023-07-25

## 2023-05-08 RX ORDER — TRIAMTERENE/HYDROCHLOROTHIAZID 37.5-25 MG
1 TABLET ORAL DAILY PRN
Qty: 30 TABLET | Refills: 1 | Status: SHIPPED | OUTPATIENT
Start: 2023-05-08 | End: 2023-07-24 | Stop reason: SDUPTHER

## 2023-05-08 RX ORDER — VANCOMYCIN HYDROCHLORIDE 125 MG/1
125 CAPSULE ORAL DAILY
Qty: 30 CAPSULE | Refills: 3 | Status: SHIPPED | OUTPATIENT
Start: 2023-05-08 | End: 2023-07-25

## 2023-05-08 RX ORDER — DOXYCYCLINE 100 MG/1
100 CAPSULE ORAL 2 TIMES DAILY
Qty: 28 CAPSULE | Refills: 1 | Status: SHIPPED | OUTPATIENT
Start: 2023-05-08 | End: 2023-12-27 | Stop reason: CLARIF

## 2023-05-08 RX ORDER — ALBUTEROL SULFATE 90 UG/1
2 AEROSOL, METERED RESPIRATORY (INHALATION) EVERY 4 HOURS PRN
Qty: 18 G | Refills: 11 | Status: SHIPPED | OUTPATIENT
Start: 2023-05-08 | End: 2023-07-25 | Stop reason: SDUPTHER

## 2023-05-08 RX ORDER — MONTELUKAST SODIUM 10 MG/1
10 TABLET ORAL NIGHTLY
Qty: 90 TABLET | Refills: 3 | Status: SHIPPED | OUTPATIENT
Start: 2023-05-08

## 2023-05-08 NOTE — PROGRESS NOTES
5/8/2023    Yodit Canseco  Office Note    Chief Complaint   Patient presents with    Follow-up     6 month f/u       HPI:   5/8/2023 took doxycycline no issues.  Bowels may be better on Entyvio. Lungs doing well.  On Hizentra.  Pt on prednisone 20/d -- on prednisone since November with plans to decrease in near future?   Still having blood in stool so will not taper prednisone til bleed resolves-- should improve in near future (on 3 rd entyvio now and should be good after 4th)      12/29/2022 having sinus problems with green mucous nad coughing, right face to chin hurts last 3 days. Had in prior.  May exacerbate lungs as in past.  Started levaquin 2 days ago. Prednisone boosted from 10 to 20/d.  Need pain control and cough suppresion .  Has prednisoen. Not getting [facility]erol [facility]-- not covered on plan??   1/30/2023 chart note after above visit.  Pt has had colitis-- dx c diff recently.   Epic reveals c diff ag but no toxin.    Had green sinus dc-- cleared slight with levaquin but now dx c diff with ag no toxin    Will dc levaquin and instructed not to use.    Doxycycline may be ok -- would avoid abx as much as able.    If sinuses relapse -- doxycycline sent in.    If green lung mucous -- pt told to submit sputum for culture.    11/2/2022 dx lupus, bechets, Ulcerative colitis, RA-- on hizentra.  Pt exposed to grandson with rsv.  Having abd pain -- dilaudid helped and suppressed cough-- not able to use codeine/hydrocodone.     Ct abd and cxr 10/2022 clear lungs.       Pt currently felt to have rsv-- improved over last 5 da   pt was sl febrile initially.   Pt now having green mucous. Cough is violent .  similar sick. Had min leg edema.  On hizentra. Asthma doing wellwith RSV.  Eating ok but weak and coughs a lot.  Breathing good.     Patient Instructions   May use levaquin for green mucous    Rsv should run course-- hirzentra should help?  Breztri may be needed.      Use dilaudid minimally for  pain -- should suppress cough    Diflucan for yeast as needed    Maxzide as needed for edema    Tessalon for cough    Could do phone follow up    Ct abd 10/2022 was clear lower lungs and cxr 10/31/2022 was clear -- lungs not a concern.  Asthma doing well even with rsv.  2022 mom  sept complications from hip fx/covid.  Had few sinus infection-- rx Levaquin/prednisone.  Pt on plaquenil, dx ra and lupus.  Also on colchicine.      Uses breo but skipped.  Uses albuterol once a wk.    Patient Instructions   May use breztri 2 twice daily -- could use more or less as needed.   If used regular -- breztri would act as controller.    May use levaquin and prednisone if sinus/lung exacerbates.    Lungs loook good wrt lung tissue-- no lung tissue disease seen from Rheumatoid disease.   Ct abd 2021 viewed.   2021 - had covid in July, had rapid home test +, managed outpt.  Got infusion rx.  Has wheezes but feels over covid.  Had nausea, vomiting, weakness, headache, -- sick 8 days.  Mom got hip fx complicating recovery-- at rehab. Pt and mom were vaccinated.  Pt on abx for sinus infection  Dx lupus/rheumatoid - 5 mg prednisone daily  Patient Instructions   Recovering covid doing well.    Use levaquin as needed.    Ordered cxr if needed.     2021 had pneumonia 2020 - no problems. Needs disability paper updated.  We review today.    Patient Instructions   We reviewed your disability- need to complete paper work.  You had pneumonia in 2020.  Breo, prednisone, levaquin, albuterol renewed.    10/14/2020- all well.  No abx/prednisone, cxr 10/14/2020 nad- rul pneumonia [facility] cleared.  Avoids irritants.  Patient Instructions   You likely would be best to get high dose flu vaccine.   Your immune system may have problem responding to flu vaccine.  2020- on hirzentra since 2016.  Saw NP Israel in [place] - virtual visit- azithro and prednisone 10/d x 3 for sinusitis.     Uses   500  bid, breo 200 daily, , xopenex nebs prn, singulair. Needs more prednisone, had pneumonia 3/4 to 8th.  Had ct chest feb.  Felt had covid symptoms.  No ox needed for pneumonia- no steroids nor bd. Took doxy - vanc in hosp.   Patient Instructions   Pneumonia - follow up chest xray good- not urgent.    Prednisone action plan for asthma/wheeze/cough    May use levaquin for sinus/lung infections  2/10/2020- had shingles vaccine, states felt an immune response.   Cough- improved, daily, not severe, non productive occasionally productive thick dark yellow color. Using nebulizer only as needed. Has noticed fewer asthma exacerbations since starting Hyzentra, went from 1 months to 2x yearly.   Not able to use albuterol rescue inhaler or nebulized due to tachycardia, palpitations, and hand tremors. No complaint with xopenex medications.   Patient Instructions   Continue current Asthma medication regiment  Review of CT shows no areas of concern. The small nodule is not significant  Pulmonary function test shows lungs to still be strong.   Continue to use Xopenex Nebulizer with aerobika attachement once a day three days a week every week.  When your sick you can use nebulizer or Xopenex rescue inhaler four times a day.     1/9/2020- two exacerbations in 2 months, states cough is unchanged, daily, states bark like cough, severe and affects daily life, Productive occasionally clear/cream color thick in consistency, uses nebulizer 2-3 x daily.   Patient Instructions   [employer] sells an Aerobika device, this device shakes the air inside of you to break mucous off the bronchial wall. Use with nebulizer and with out daily.    Continue current Asthma medication regiment     Use nebulizer at least once a day three days a week.     CT to evaluate lungs for any abnormalities       11/8/2019- Had recent Rotator cuff surgery with nerve block Oct 9, Has SOB worse after surgery for 3 days, Took prednisone 20 mg for 3 days and Levaquin for 9  days. Seen by PCP dx URI.   Coughing, chest tightness, congestion, wheezing, Hoarse voice, fatigue, Shortness of breath worse with exertion, onset following surgery to shoulder. Cough- not able to clear secretions from chest.  Complaint of hand shaking, heart rate increases, and becomes rapid with Albuterol nebulizer onset years. Tries not to use due to side effects.     04/24/2019- breathing is good except for high pollen days, had 3 exacerbation in 6 months. Albuterol rescue inhaler 3x weekly, 1-2 nocturnal arousals monthly, could not do PFT at [facility] [facility],     11/6/18- ER visit for dehydration gastroenteritis, no prednisone use in 4 months. Not currently on fasenra injection, insurance would not cover. Currently on Hirzentra IGG therapy.   Sinus drip present, cough occasional, non productive, no nocturnal arousals, Currently on Dulera daily, uses Albuterol rescue inhaler daily before exercise no SOB.  Sepsis [facility] August 2017.  Flu vaccine   July 9, 2018 - had gastric sleeve with leak complication- lost 45 lbs already.  Has appetite but fills quickly.  Still on hirzentra.  Asthma ok avoiding fragrances/ordors.  No prednisone.   No nocturnal arousals, uses rescue weekly avoiding any irritants.  Use prednisone 4-5 last year and twice this year.        March 19, 2018-since recovered from pleuritic pain - doing well.  No prednisone use recent.  Pt has had no wheezes.  feb 22,2108   Had to do prednisone again.  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 I have advised not to work given severe asthma,  hypersensitivity to fragrances, and immune deficiency.   Pt seems better since [employer] [facility], but still unstable severe asthma  Jan 29. 2018- 6 days ago had been exposed to sick , had nasal congestion , cough, ear stuffy, muscle aches /joint aches / fever.  Temp to 100.1.  Seen by  np and flu screen negative.  Had asthma 3-4 flares last year. eos up past,   oct 5, 2017 - had marce in April, skipped couple doses hirzentra, had mild bronchitis once, otherwise doing very well.  No prednisone.  No side effects and covered. Ppt flu sense for 2 days  Jan 24,2017 on  hirzentra q o week since sept and asthma more stable, no hosp, no prednisone, no noct asthma/ no rescue therapy- control not this good for years.  Sept 27, 2016  HPI:has had lung problems since birth, no nocturnal arousals, uses rescue 2/d, breathing controlled satisfactory except with irritants/infection - strong abx needed to clear.  Prednisone 2/yr, last hosp 18 months.  No ventilator.    Had exacerbation recent due uri from .  Has had shingles vaccine past.       The chief compliant  problem is varies with instablilty at time  PFSH:  Past Medical History:   Diagnosis Date    Angio-edema     Arthralgia     Asthma without status asthmaticus     Bronchitis     COPD (chronic obstructive pulmonary disease)     COVID-19 virus infection 07/23/2021    Diverticulosis of large intestine without hemorrhage 10/08/2015    Environmental allergies     Eosinophilic asthma 03/08/2018    Exacerbation of ulcerative colitis with rectal bleeding     Gastroparesis     Hand arthritis     Herpes simplex without mention of complication     oral    Hidradenitis     History of morbid obesity     Hyperlipidemia     Hypothyroidism     Immune deficiency disorder     Intraperitoneal abscess 05/07/2018    LBP radiating to left leg     Leukocytosis, unspecified     Migraine headache     Normocytic anemia 10/05/2022    Obesity, Class III, BMI 40-49.9 (morbid obesity)     Periorbital cellulitis 12/31/2016    Prediabetes     RA (rheumatoid arthritis)     Recurrent upper respiratory infection (URI)     S/P colonoscopy 11/2010    Sepsis 05/07/2018    Systemic lupus erythematosus, organ or system involvement unspecified     Tubular adenoma of colon 08/17/2022     Ulcerative pancolitis with rectal bleeding 10/17/2022    Urticaria          Past Surgical History:   Procedure Laterality Date    ADENOIDECTOMY      CHOLECYSTECTOMY      CHOLECYSTECTOMY      colitis      COLONOSCOPY  2015    repeat in 10    COLONOSCOPY N/A 10/08/2015    Procedure: COLONOSCOPY;  Surgeon: Panda Bose MD;  Location: [facility];  Service: Endoscopy;  Laterality: N/A;    COLONOSCOPY N/A 08/17/2022    Procedure: COLONOSCOPY;  Surgeon: Olaf Del Valle MD;  Location: [facility];  Service: General;  Laterality: N/A;    COLONOSCOPY N/A 10/06/2022    Procedure: COLONOSCOPY;  Surgeon: Quinton Celeste MD;  Location: [facility];  Service: Gastroenterology;  Laterality: N/A;    Hand fracture surgery      HARDWARE REMOVAL      left hand 5th [facility]    hymenectomy      HYSTERECTOMY      menorrhagia-Ovaries intact    ROTATOR CUFF REPAIR Right     SLEEVE GASTROPLASTY  04/16/2018    TONSILLECTOMY      Urethral dilatation       Social History     Tobacco Use    Smoking status: Never    Smokeless tobacco: Never   Substance Use Topics    Alcohol use: Yes     Alcohol/week: 0.0 standard drinks     Comment: very rarely    Drug use: No     Family History   Problem Relation Age of Onset    Melanoma Mother     Basal cell carcinoma Mother     Lung disease Mother         pulm htn    Cataracts Mother     Hypertension Mother     Lung cancer Father     Diabetes Father     Hypertension Father     Cancer Father     Melanoma Maternal Aunt     Melanoma Maternal Uncle     Diabetes Paternal Aunt     Colon cancer Paternal Aunt 40    Diabetes Paternal Aunt     Breast cancer Paternal Aunt 40    Lymphoma Paternal Aunt     Cancer Maternal Grandfather     Ovarian cancer Paternal Grandmother 40    Ulcerative colitis Son     Psoriasis Son     Psoriasis Son     Breast cancer Cousin 25    Allergic rhinitis Neg Hx     Allergies Neg Hx     Angioedema Neg Hx     Asthma Neg Hx     Atopy Neg Hx     Eczema Neg Hx     Immunodeficiency Neg Hx     Rhinitis  "Neg Hx     Urticaria Neg Hx      Review of patient's allergies indicates:   Allergen Reactions    Bromelains Hives     " causes mouth to bleed"    Sudafed [pseudoephedrine hcl] Other (See Comments)     Does not want to wake up .    Amoxicillin-pot clavulanate Itching     Other reaction(s): Itching    Hydrocodone Itching    Iodinated contrast- oral and iv dye      childhood    Iodine and iodide containing products Other (See Comments)    Melon Hives    Pantoprazole Nausea Only     Other reaction(s): upset stomach/halitosis    Percocet [oxycodone-acetaminophen] Itching    Barium iodide Rash    Ephedrine Other (See Comments)     Other reaction(s): comatose    Penicillins      Other reaction(s): does not work on pt symptoms     I have reviewed past medical, family, and social history. I have reviewed previous nurse notes.    Performance Status:The patient's activity level is functions out of house.      Review of Systems   Constitutional: Negative for activity change, appetite change, chills, diaphoresis, fatigue, fever and unexpected weight change.   HENT: Negative for dental problem, sneezing, sore throat, trouble swallowing, postnasal drip, rhinorrhea, sinus pressure, sinus pain, and voice change.     Respiratory: Negative for chest tightness, shortness of breath, wheezing.  Positive for cough,   Cardiovascular: Negative for chest pain, palpitations and leg swelling.   Musculoskeletal: Negative for gait problem, myalgias and neck pain.   Skin: Negative for color change and pallor.   Allergic/Immunologic: Negative for environmental allergies and food allergies.   Neurological: Negative for dizziness, speech difficulty, weakness, light-headedness, numbness and headaches.   Hematological: Negative for adenopathy. Does not bruise/bleed easily.   Psychiatric/Behavioral: Negative for dysphoric mood and sleep disturbance. The patient is not nervous/anxious.             Exam:Comprehensive exam done. BP (!) 170/79 (BP " "Location: Right arm, Patient Position: Sitting)   Pulse (!) 56   Ht 5' 10" (1.778 m)   Wt 119.2 kg (262 lb 10.9 oz)   SpO2 95% Comment: on room air  BMI 37.69 kg/m²   Exam included Vitals as listed, and patient's appearance and affect and alertness and mood, oral exam for yeast and hygiene and pharynx lesions and Mallapatti (M) score, neck with inspection for jvd and masses and thyroid abnormalities and lymph nodes (supraclavicular and infraclavicular nodes and axillary also examined and noted if abn), chest exam included symmetry and effort and fremitus and percussion and auscultation, cardiac exam included rhythm and gallops and murmur and rubs and jvd and edema, abdominal exam for mass and hepatosplenomegaly and tenderness and hernias and bowel sounds, Musculoskeletal exam with muscle tone and posture and mobility/gait and  strength, and skin for rashes and cyanosis and pallor and turgor, extremity for clubbing.  Findings were normal except for pertinent findings listed below:  M3, chest is symmetric, no distress, normal percussion, normal fremitus and good normal breath sounds        Radiographs (ct chest and cxr) reviewed: results reviewed by direct vision  CT Chest Without Contrast 2/10/2020  Normal exam, 3.3 mm nodule seen in right upper lobe.    Lungs are clear.  Minimal retained mucus secretions in the trachea.  No pleural effusion or pneumothorax.  Normal sized heart.  Thyroid is small and heterogeneous with a subcentimeter nodule or cyst in the right lobe.     XR CHEST 1 VIEW 05/10/2018   There is left lower lobe atelectasis and possible small left pleural effusion on this study.  No pneumothorax is seen.      Labs reviewed       Lab Results   Component Value Date    WBC 8.56 04/12/2023    RBC 4.34 04/12/2023    HGB 11.0 (L) 04/12/2023    HCT 37.1 04/12/2023    MCV 86 04/12/2023    MCH 25.3 (L) 04/12/2023    MCHC 29.6 (L) 04/12/2023    RDW 14.7 (H) 04/12/2023     04/12/2023    MPV 11.3 " 04/12/2023    GRAN 7.0 04/12/2023    GRAN 81.8 (H) 04/12/2023    LYMPH 1.1 04/12/2023    LYMPH 13.0 (L) 04/12/2023    MONO 0.3 04/12/2023    MONO 3.6 (L) 04/12/2023    EOS 0.0 04/12/2023    BASO 0.04 04/12/2023    EOSINOPHIL 0.2 04/12/2023    BASOPHIL 0.5 04/12/2023       PFT reviewed  2/10/2020 no obstruction seen, 7% bronchodilator response with 12% needed for clinical improvement; TLC 86% with DLC0 at 88%, normal exam with mild asthma  Spirometry bronchodilator, lung volume by gas dilution, diffusion capacity measured February 10, 2020.  The FEV1 to FVC ratio was 79%, there is no airflow obstruction measured by spirometry technique.  The FEV1 measured 85% predicted at 1.97 L. The 8%   improvement in FEV1 failed to reach statistical significance ( 12% is needed for statistical significance ).  Lung volumes by gas dilution were normal.  Diffusion was normal.      Spirometry, lung volumes, diffusion are all normal.  The bronchodilator response was not statistically significant.  Clinical correlation recommended.     Plan:  Clinical impression is apparently straight forward and impression with management as below.    Yodit was seen today for follow-up.    Diagnoses and all orders for this visit:    Current chronic use of systemic steroids  -     sulfamethoxazole-trimethoprim 400-80mg (BACTRIM,SEPTRA) 400-80 mg per tablet; Take 2 tablets by mouth every Mon, Wed, Fri. While on prednisone 20 mg daily    Acute recurrent maxillary sinusitis  -     doxycycline (VIBRAMYCIN) 100 MG Cap; Take 1 capsule (100 mg total) by mouth 2 (two) times daily.    Immunologic deficiency syndrome    Severe persistent asthma without complication  -     sulfamethoxazole-trimethoprim 400-80mg (BACTRIM,SEPTRA) 400-80 mg per tablet; Take 2 tablets by mouth every Mon, Wed, Fri. While on prednisone 20 mg daily    History of Clostridium difficile colitis  -     vancomycin (VANCOCIN) 125 MG capsule; Take 1 capsule (125 mg total) by mouth Daily.  Take while on oral antibiotics    Severe persistent asthma with acute exacerbation  -     albuterol (PROVENTIL/VENTOLIN HFA) 90 mcg/actuation inhaler; Inhale 2 puffs into the lungs every 4 (four) hours as needed for Wheezing or Shortness of Breath. Rescue  -     montelukast (SINGULAIR) 10 mg tablet; Take 1 tablet (10 mg total) by mouth every evening.    Localized edema  -     triamterene-hydrochlorothiazide 37.5-25 mg (MAXZIDE-25) 37.5-25 mg per tablet; Take 1 tablet by mouth daily as needed (edema).              Follow up in about 6 months (around 11/8/2023), or if symptoms worsen or fail to improve.    Discussed with patient above for education the following:      Patient Instructions   Might consider pneumocystis prophylaxis but antibiotics will increase c diff-- bactrim 2 single strength 3 days a week.  Use vancomycin daily while on bactrim.  Stop bactrim (followed by vancomycin) on prednisone less than 20mg/d.    Bactrim generally has high risk allergies and kidney problems.         Eval took 37 min

## 2023-05-08 NOTE — PATIENT INSTRUCTIONS
Might consider pneumocystis prophylaxis but antibiotics will increase c diff-- bactrim 2 single strength 3 days a week.  Use vancomycin daily while on bactrim.  Stop bactrim (followed by vancomycin) on prednisone less than 20mg/d.    Bactrim generally has high risk allergies and kidney problems.

## 2023-05-24 ENCOUNTER — TELEPHONE (OUTPATIENT)
Dept: FAMILY MEDICINE | Facility: CLINIC | Age: 63
End: 2023-05-24
Payer: MEDICARE

## 2023-05-24 DIAGNOSIS — E03.9 HYPOTHYROIDISM, UNSPECIFIED TYPE: Primary | ICD-10-CM

## 2023-05-24 DIAGNOSIS — E03.9 HYPOTHYROIDISM, UNSPECIFIED TYPE: ICD-10-CM

## 2023-05-24 RX ORDER — LEVOTHYROXINE SODIUM 75 UG/1
TABLET ORAL
Qty: 30 TABLET | Refills: 0 | Status: SHIPPED | OUTPATIENT
Start: 2023-05-24 | End: 2023-08-01

## 2023-05-24 NOTE — TELEPHONE ENCOUNTER
Refill Routing Note   Medication(s) are not appropriate for processing by [facility] for the following reason(s):      Required labs outdated    [facility] action(s):  Defer None identified          Appointments  past 12m or future 3m with PCP    Date Provider   Last Visit   8/12/2022 Zoila Wood NP   Next Visit   Visit date not found Zoila Wood NP   ED visits in past 90 days: 0        Note composed:9:05 AM 05/24/2023

## 2023-05-29 PROBLEM — K51.011 ULCERATIVE PANCOLITIS WITH RECTAL BLEEDING: Status: RESOLVED | Noted: 2022-10-17 | Resolved: 2023-05-29

## 2023-06-12 ENCOUNTER — E-VISIT (OUTPATIENT)
Dept: FAMILY MEDICINE | Facility: CLINIC | Age: 63
End: 2023-06-12
Payer: MEDICARE

## 2023-06-12 ENCOUNTER — PATIENT MESSAGE (OUTPATIENT)
Dept: FAMILY MEDICINE | Facility: CLINIC | Age: 63
End: 2023-06-12

## 2023-06-12 ENCOUNTER — TELEPHONE (OUTPATIENT)
Dept: FAMILY MEDICINE | Facility: CLINIC | Age: 63
End: 2023-06-12

## 2023-06-12 DIAGNOSIS — R30.0 DYSURIA: Primary | ICD-10-CM

## 2023-06-12 PROCEDURE — 99421 PR E&M, ONLINE DIGIT, EST, < 7 DAYS, 5-10 MINS: ICD-10-PCS | Mod: ,,, | Performed by: FAMILY MEDICINE

## 2023-06-12 PROCEDURE — 99421 OL DIG E/M SVC 5-10 MIN: CPT | Mod: ,,, | Performed by: FAMILY MEDICINE

## 2023-06-12 NOTE — PROGRESS NOTES
Patient ID: Yodit Canseco is a 62 y.o. female.    Chief Complaint: Urinary Tract Infection    The patient initiated a request through [facility] on 6/12/2023 for evaluation and management with a chief complaint of Urinary Tract Infection     I evaluated the questionnaire submission on 6/12/23.    [facility] Peq Evisit Uti Questionnaire    6/12/2023 11:54 AM CDT - Filed by Patient   Do you agree to participate in an E-Visit? Yes   If you have any of the following problems, please present to your local ER or call 911:  I acknowledge   What is the main issue that you would like for your doctor to address today? UTI, slight burning when urinating.   Are you able to take your vital signs? No   What symptoms do you currently have? Pain while passing urine   When did your symptoms first appear? 6/9/2023   List what you have done or taken to help your symptoms. I have taken Azo for the burning and frequency of need to urinate.  I also bought an AZO test kit which confirmed the UTI .  It indicated that I was positive for leukocytes and negative for nitrites.   Please indicate whether you have had the following symptoms during the past 24 hours     Urgent urination (a sudden and uncontrollable urge to urinate) Moderate   Frequent urination of small amounts of urine (going to the toilet very often) Moderate   Burning pain when urinating Moderate   Incomplete bladder emptying (still feel like you need to urinate again after urination) Mild   Pain not associated with urination in the lower abdomen below the belly button) None   What does your urine look like? Clear   Blood seen in the urine None   Flank pain (pain in one or both sides of the lower back) None   Abnormal Vaginal Discharge (abnormal amount, color and/or odor) None   Discharge from the urethra (urinary opening) without urination Mild   High body temperature/fever? None-<99.5   Please rate how much discomfort you have experience because of the symptoms in the past 24  hours: Mild   Please indicate how the symptoms have interfered with your every day activities/work in the past 24 hours: Mild   Please indicate how these symptoms have interfered with your social activities (visiting people, meeting with friends, etc.) in the past 24 hours? None   Are you a diabetic? No   Please indicate whether you have the following at the time of completion of this questionnaire: None of the above   Provide any information you feel is important to your history not asked above    Please attach any relevant images or files (if you have performed a home test for UTI, please submit a photo of results)           Active Problem List with Overview Notes    Diagnosis Date Noted    RSV (respiratory syncytial virus infection) 01/30/2023    Sinus pain 01/30/2023    Long-term use of Plaquenil 10/24/2022    Normocytic anemia 10/05/2022    Hypomagnesemia 09/03/2022    Hypoalbuminemia 09/02/2022    History of gastric bypass 09/02/2022    Fatty liver 09/02/2022    Hepatomegaly 09/02/2022    Drug-induced nausea and vomiting 08/15/2022    Weakness of right lower extremity 08/02/2022    Impaired flexibility of lower extremity 08/02/2022    Decreased activity tolerance 08/02/2022    Other forms of systemic lupus erythematosus 05/23/2022    Behcet's syndrome involving oral mucosa 05/23/2022    Recurrent aphthous stomatitis 02/15/2022    Recurrent oral ulcers 08/19/2021    Undifferentiated connective tissue disease 06/11/2021    Severe persistent asthma with acute exacerbation 04/06/2021    Immunologic deficiency syndrome 10/14/2020    S/P gastric sleeve procedure 04/16/2018    Arthralgia 04/03/2018    Asthma in adult without complication 03/08/2018    Family history of ASCVD (arteriosclerotic cardiovascular disease) 07/06/2017    Migraine headache     Prediabetes     Gastroparesis     Severe persistent asthma without complication 09/27/2016    Immune deficiency disorder 09/27/2016    Hand arthritis 06/30/2016     Refractive error 05/03/2016    Hyperlipidemia     Obesity with body mass index (BMI) of 30.0 to 39.9     Hypothyroidism     Degenerative tear of triangular fibrocartilage complex (TFCC) of left wrist 11/06/2015    Left wrist pain 11/06/2015    Extensor tenosynovitis of wrist 11/06/2015    H/O hand surgery 10/21/2015    Left hand pain 10/21/2015    History of colon polyps 10/08/2015    Diverticulosis of large intestine without hemorrhage 10/08/2015    Acute recurrent maxillary sinusitis 02/12/2014      Recent Labs Obtained:  No visits with results within 7 Day(s) from this visit.   Latest known visit with results is:   Lab Visit on 04/13/2023   Component Date Value Ref Range Status    Calprotectin 04/13/2023 1,879.6 (H)  <50 mcg/g Final    Comment: 27.1 - <50 mcg/g Normal   50 - 120 mcg/g   Borderline  >120 mcg/g       Abnormal    Borderline results suggest repeat testing in 4 to 6 weeks.    Test performed at [facility],  [address], [place]  [address]     [phone]  Dee Owens MD, PhD - Medical Director         Encounter Diagnosis   Name Primary?    Dysuria Yes        Orders Placed This Encounter   Procedures    Urinalysis, Reflex to Urine Culture Urine, Clean Catch     Standing Status:   Future     Number of Occurrences:   1     Standing Expiration Date:   8/10/2024     Order Specific Question:   Preferred Collection Type     Answer:   Urine, Clean Catch     Order Specific Question:   Specimen Source     Answer:   Urine        It sounds like you probably have urine infection however I would like to get a urine sample in office as well as collect a urine culture before we start antibiotic.  I would like to make sure that  we can get an ID on the bacteria as I would like to minimize antibiotic use so that we can avoid issues with recurrent C. difficile.  I know that the nursing staff contacted you regarding getting the urine sample done.  Please get this in as soon as you are able so  that we can proceed with treatment.     6/13/23   The urine sample is consistent with a UTI.  A culture will be performed, but will take a couple of days to get results.  In the meantime I want you to start on nitrofurantoin 100 mg twice a day for 3 days for urinary tract infection.  I sent in a prescription to [facility]  You can start this today.  If symptoms have not resolved by the time you are finished taking the medication then let us know.  Thanks,   Dr. Lewis    E-Visit Time Tracking:    Day 1 Time (in minutes): 8     Total Time (in minutes): 8

## 2023-06-12 NOTE — Clinical Note
Please have her come in and give a stat urine sample.  Order placed.  She is had C difficile in the past I want to make sure she has an infection before we start using antibiotics.

## 2023-06-13 ENCOUNTER — PATIENT MESSAGE (OUTPATIENT)
Dept: FAMILY MEDICINE | Facility: CLINIC | Age: 63
End: 2023-06-13
Payer: MEDICARE

## 2023-06-13 ENCOUNTER — LAB VISIT (OUTPATIENT)
Dept: LAB | Facility: HOSPITAL | Age: 63
End: 2023-06-13
Attending: FAMILY MEDICINE
Payer: MEDICARE

## 2023-06-13 DIAGNOSIS — R30.0 DYSURIA: ICD-10-CM

## 2023-06-13 LAB
BACTERIA #/AREA URNS HPF: ABNORMAL /HPF
BILIRUB UR QL STRIP: NEGATIVE
CLARITY UR: CLEAR
COLOR UR: YELLOW
GLUCOSE UR QL STRIP: NEGATIVE
HGB UR QL STRIP: ABNORMAL
HYALINE CASTS #/AREA URNS LPF: 0 /LPF
KETONES UR QL STRIP: NEGATIVE
LEUKOCYTE ESTERASE UR QL STRIP: ABNORMAL
MICROSCOPIC COMMENT: ABNORMAL
NITRITE UR QL STRIP: NEGATIVE
PH UR STRIP: 6.5 [PH] (ref 5–8)
PROT UR QL STRIP: ABNORMAL
RBC #/AREA URNS HPF: 6 /HPF (ref 0–4)
SP GR UR STRIP: 1.01 (ref 1–1.03)
SQUAMOUS #/AREA URNS HPF: 5 /HPF
URN SPEC COLLECT METH UR: ABNORMAL
WBC #/AREA URNS HPF: >100 /HPF (ref 0–5)

## 2023-06-13 PROCEDURE — 87086 URINE CULTURE/COLONY COUNT: CPT | Performed by: FAMILY MEDICINE

## 2023-06-13 PROCEDURE — 87088 URINE BACTERIA CULTURE: CPT | Performed by: FAMILY MEDICINE

## 2023-06-13 PROCEDURE — 87077 CULTURE AEROBIC IDENTIFY: CPT | Performed by: FAMILY MEDICINE

## 2023-06-13 PROCEDURE — 87186 SC STD MICRODIL/AGAR DIL: CPT | Performed by: FAMILY MEDICINE

## 2023-06-13 PROCEDURE — 81000 URINALYSIS NONAUTO W/SCOPE: CPT | Mod: PO | Performed by: FAMILY MEDICINE

## 2023-06-13 RX ORDER — NITROFURANTOIN 25; 75 MG/1; MG/1
100 CAPSULE ORAL 2 TIMES DAILY
Qty: 6 CAPSULE | Refills: 0 | Status: SHIPPED | OUTPATIENT
Start: 2023-06-13 | End: 2023-06-16 | Stop reason: SDUPTHER

## 2023-06-15 LAB — BACTERIA UR CULT: ABNORMAL

## 2023-06-16 ENCOUNTER — PATIENT MESSAGE (OUTPATIENT)
Dept: FAMILY MEDICINE | Facility: CLINIC | Age: 63
End: 2023-06-16
Payer: MEDICARE

## 2023-06-16 RX ORDER — NITROFURANTOIN 25; 75 MG/1; MG/1
100 CAPSULE ORAL 2 TIMES DAILY
Qty: 6 CAPSULE | Refills: 0 | Status: SHIPPED | OUTPATIENT
Start: 2023-06-16 | End: 2023-06-19

## 2023-06-23 ENCOUNTER — LAB VISIT (OUTPATIENT)
Dept: LAB | Facility: HOSPITAL | Age: 63
End: 2023-06-23
Attending: FAMILY MEDICINE
Payer: MEDICARE

## 2023-06-23 ENCOUNTER — PATIENT MESSAGE (OUTPATIENT)
Dept: DERMATOLOGY | Facility: CLINIC | Age: 63
End: 2023-06-23
Payer: MEDICARE

## 2023-06-23 ENCOUNTER — OFFICE VISIT (OUTPATIENT)
Dept: OPHTHALMOLOGY | Facility: CLINIC | Age: 63
End: 2023-06-23
Payer: MEDICARE

## 2023-06-23 DIAGNOSIS — M35.9 UNDIFFERENTIATED CONNECTIVE TISSUE DISEASE: Primary | ICD-10-CM

## 2023-06-23 DIAGNOSIS — Z79.899 LONG-TERM USE OF PLAQUENIL: ICD-10-CM

## 2023-06-23 DIAGNOSIS — H52.203 HYPEROPIA WITH ASTIGMATISM AND PRESBYOPIA, BILATERAL: ICD-10-CM

## 2023-06-23 DIAGNOSIS — H25.13 NUCLEAR SCLEROSIS, BILATERAL: ICD-10-CM

## 2023-06-23 DIAGNOSIS — H52.03 HYPEROPIA WITH ASTIGMATISM AND PRESBYOPIA, BILATERAL: ICD-10-CM

## 2023-06-23 DIAGNOSIS — E03.9 HYPOTHYROIDISM, UNSPECIFIED TYPE: ICD-10-CM

## 2023-06-23 DIAGNOSIS — H04.129 DRY EYE: ICD-10-CM

## 2023-06-23 DIAGNOSIS — H52.4 HYPEROPIA WITH ASTIGMATISM AND PRESBYOPIA, BILATERAL: ICD-10-CM

## 2023-06-23 DIAGNOSIS — H25.013 CORTICAL AGE-RELATED CATARACT OF BOTH EYES: ICD-10-CM

## 2023-06-23 PROCEDURE — 92014 COMPRE OPH EXAM EST PT 1/>: CPT | Mod: S$PBB,,, | Performed by: OPTOMETRIST

## 2023-06-23 PROCEDURE — 99999 PR PBB SHADOW E&M-EST. PATIENT-LVL I: ICD-10-PCS | Mod: PBBFAC,,, | Performed by: OPTOMETRIST

## 2023-06-23 PROCEDURE — 92015 DETERMINE REFRACTIVE STATE: CPT | Mod: ,,, | Performed by: OPTOMETRIST

## 2023-06-23 PROCEDURE — 92134 CPTRZ OPH DX IMG PST SGM RTA: CPT | Mod: PBBFAC | Performed by: OPTOMETRIST

## 2023-06-23 PROCEDURE — 99211 OFF/OP EST MAY X REQ PHY/QHP: CPT | Mod: PBBFAC | Performed by: OPTOMETRIST

## 2023-06-23 PROCEDURE — 92014 PR EYE EXAM, EST PATIENT,COMPREHESV: ICD-10-PCS | Mod: S$PBB,,, | Performed by: OPTOMETRIST

## 2023-06-23 PROCEDURE — 92134 POSTERIOR SEGMENT OCT RETINA (OCULAR COHERENCE TOMOGRAPHY)-BOTH EYES: ICD-10-PCS | Mod: 26,S$PBB,, | Performed by: OPTOMETRIST

## 2023-06-23 PROCEDURE — 84443 ASSAY THYROID STIM HORMONE: CPT | Performed by: FAMILY MEDICINE

## 2023-06-23 PROCEDURE — 99999 PR PBB SHADOW E&M-EST. PATIENT-LVL I: CPT | Mod: PBBFAC,,, | Performed by: OPTOMETRIST

## 2023-06-23 PROCEDURE — 36415 COLL VENOUS BLD VENIPUNCTURE: CPT | Mod: PO | Performed by: FAMILY MEDICINE

## 2023-06-23 PROCEDURE — 92015 PR REFRACTION: ICD-10-PCS | Mod: ,,, | Performed by: OPTOMETRIST

## 2023-06-23 NOTE — PROGRESS NOTES
HPI     Annual Exam            Comments: Plaquenil use: 200mg bid approx 2022  Last 10-2 VF: 6/20/22  Last mOCT: 6/23/233    Vision changes since last eye exam?: yes  Any eye pain today: no  Other ocular symptoms: no  Interested in contact lens fitting today? no           Last edited by Clementine Eduardo MA on 6/23/2023  1:53 PM.            Assessment /Plan     For exam results, see Encounter Report.    Undifferentiated connective tissue disease  -     Posterior Segment OCT Retina-Both eyes  Long-term use of Plaquenil  -     Posterior Segment OCT Retina-Both eyes    Nuclear sclerosis, bilateral  Cortical age-related cataract of both eyes  Visually significant cataract.  Patient is at the option stage.   Cataract surgery will improve vision, but necessity is dependent on patient's symptoms.   Pt declines surgical consult at this time.  Will continue to monitor over the next 12 months. Pt to call or RTC with any significant change in vision prior to next visit.    Dry eye  Recommended iVizia bid OU    Hyperopia with astigmatism and presbyopia, bilateral  Eyeglass Final Rx       Eyeglass Final Rx         Sphere Cylinder Axis Add    Right +0.50 +1.00 180 +1.50    Left +0.50 +1.00 180 +1.50      Type: driving    Expiration Date: 6/23/2024              Eyeglass Final Rx #2         Sphere Cylinder Axis Add    Right +3.50 +1.00 180     Left +3.50 +1.00 180       Type: reading    Expiration Date: 6/23/2024              Eyeglass Final Rx #3         Sphere Cylinder Axis Add    Right +1.50 +1.00 180 +1.50    Left +1.50 +1.00 180 +1.50      Type: computer    Expiration Date: 6/23/2024                    No maculopathy present today.   Stressed importance of follow up visits with pt.  RTC 12 months for dilation,10-2 VF, and mOCT.   PRN sooner if any va changes.

## 2023-06-24 LAB — TSH SERPL DL<=0.005 MIU/L-ACNC: 2.41 UIU/ML (ref 0.4–4)

## 2023-06-26 ENCOUNTER — LAB VISIT (OUTPATIENT)
Dept: LAB | Facility: HOSPITAL | Age: 63
End: 2023-06-26
Attending: STUDENT IN AN ORGANIZED HEALTH CARE EDUCATION/TRAINING PROGRAM
Payer: MEDICARE

## 2023-06-26 DIAGNOSIS — D84.821 IMMUNOSUPPRESSION DUE TO CHRONIC STEROID USE: ICD-10-CM

## 2023-06-26 DIAGNOSIS — K51.019: Primary | ICD-10-CM

## 2023-06-26 DIAGNOSIS — T38.0X5A IMMUNOSUPPRESSION DUE TO CHRONIC STEROID USE: ICD-10-CM

## 2023-06-26 DIAGNOSIS — Z79.52 IMMUNOSUPPRESSION DUE TO CHRONIC STEROID USE: ICD-10-CM

## 2023-06-26 PROCEDURE — 83993 ASSAY FOR CALPROTECTIN FECAL: CPT | Performed by: STUDENT IN AN ORGANIZED HEALTH CARE EDUCATION/TRAINING PROGRAM

## 2023-06-30 ENCOUNTER — LAB VISIT (OUTPATIENT)
Dept: LAB | Facility: HOSPITAL | Age: 63
End: 2023-06-30
Attending: STUDENT IN AN ORGANIZED HEALTH CARE EDUCATION/TRAINING PROGRAM
Payer: MEDICARE

## 2023-06-30 DIAGNOSIS — K51.019: Primary | ICD-10-CM

## 2023-06-30 DIAGNOSIS — T38.0X5A IMMUNOSUPPRESSION DUE TO CHRONIC STEROID USE: ICD-10-CM

## 2023-06-30 DIAGNOSIS — D84.821 IMMUNOSUPPRESSION DUE TO CHRONIC STEROID USE: ICD-10-CM

## 2023-06-30 DIAGNOSIS — Z79.52 IMMUNOSUPPRESSION DUE TO CHRONIC STEROID USE: ICD-10-CM

## 2023-06-30 LAB — CALPROTECTIN STL-MCNT: ABNORMAL MCG/G

## 2023-06-30 PROCEDURE — 36415 COLL VENOUS BLD VENIPUNCTURE: CPT | Mod: PO | Performed by: STUDENT IN AN ORGANIZED HEALTH CARE EDUCATION/TRAINING PROGRAM

## 2023-06-30 PROCEDURE — 80280 DRUG ASSAY VEDOLIZUMAB: CPT | Performed by: STUDENT IN AN ORGANIZED HEALTH CARE EDUCATION/TRAINING PROGRAM

## 2023-06-30 PROCEDURE — 82397 CHEMILUMINESCENT ASSAY: CPT | Performed by: STUDENT IN AN ORGANIZED HEALTH CARE EDUCATION/TRAINING PROGRAM

## 2023-07-13 ENCOUNTER — PATIENT MESSAGE (OUTPATIENT)
Dept: ADMINISTRATIVE | Facility: OTHER | Age: 63
End: 2023-07-13
Payer: MEDICARE

## 2023-07-14 LAB
VEDOLIZUMAB AB SERPL IA-MCNC: <25 NG/ML
VEDOLIZUMAB SERPL IA-MCNC: 5.8 UG/ML

## 2023-07-24 ENCOUNTER — PATIENT MESSAGE (OUTPATIENT)
Dept: PULMONOLOGY | Facility: CLINIC | Age: 63
End: 2023-07-24
Payer: MEDICARE

## 2023-07-24 DIAGNOSIS — R60.0 LOCALIZED EDEMA: ICD-10-CM

## 2023-07-24 RX ORDER — TRIAMTERENE/HYDROCHLOROTHIAZID 37.5-25 MG
1 TABLET ORAL DAILY PRN
Qty: 30 TABLET | Refills: 1 | Status: SHIPPED | OUTPATIENT
Start: 2023-07-24 | End: 2023-08-28

## 2023-07-25 ENCOUNTER — PATIENT MESSAGE (OUTPATIENT)
Dept: PULMONOLOGY | Facility: CLINIC | Age: 63
End: 2023-07-25
Payer: MEDICARE

## 2023-07-25 ENCOUNTER — OFFICE VISIT (OUTPATIENT)
Dept: FAMILY MEDICINE | Facility: CLINIC | Age: 63
End: 2023-07-25
Payer: MEDICARE

## 2023-07-25 VITALS
HEIGHT: 63 IN | DIASTOLIC BLOOD PRESSURE: 68 MMHG | HEART RATE: 78 BPM | WEIGHT: 173.19 LBS | TEMPERATURE: 98 F | SYSTOLIC BLOOD PRESSURE: 122 MMHG | BODY MASS INDEX: 30.69 KG/M2

## 2023-07-25 DIAGNOSIS — J06.9 UPPER RESPIRATORY TRACT INFECTION, UNSPECIFIED TYPE: Primary | ICD-10-CM

## 2023-07-25 PROBLEM — B33.8 RSV (RESPIRATORY SYNCYTIAL VIRUS INFECTION): Status: RESOLVED | Noted: 2023-01-30 | Resolved: 2023-07-25

## 2023-07-25 LAB
CTP QC/QA: YES
CTP QC/QA: YES
MOLECULAR STREP A: NEGATIVE
SARS-COV-2 RDRP RESP QL NAA+PROBE: NEGATIVE

## 2023-07-25 PROCEDURE — 99215 OFFICE O/P EST HI 40 MIN: CPT | Mod: PBBFAC,PO | Performed by: FAMILY MEDICINE

## 2023-07-25 PROCEDURE — 87651 STREP A DNA AMP PROBE: CPT | Mod: PBBFAC,PO | Performed by: FAMILY MEDICINE

## 2023-07-25 PROCEDURE — 87635 SARS-COV-2 COVID-19 AMP PRB: CPT | Mod: PBBFAC,PO | Performed by: FAMILY MEDICINE

## 2023-07-25 PROCEDURE — 99999PBSHW POCT STREP A MOLECULAR: ICD-10-PCS | Mod: PBBFAC,,, | Performed by: FAMILY MEDICINE

## 2023-07-25 PROCEDURE — 99999PBSHW: Mod: PBBFAC,,, | Performed by: FAMILY MEDICINE

## 2023-07-25 PROCEDURE — 99999PBSHW POCT STREP A MOLECULAR: Mod: PBBFAC,,, | Performed by: FAMILY MEDICINE

## 2023-07-25 PROCEDURE — 99213 PR OFFICE/OUTPT VISIT, EST, LEVL III, 20-29 MIN: ICD-10-PCS | Mod: S$PBB,,, | Performed by: FAMILY MEDICINE

## 2023-07-25 PROCEDURE — 99213 OFFICE O/P EST LOW 20 MIN: CPT | Mod: S$PBB,,, | Performed by: FAMILY MEDICINE

## 2023-07-25 PROCEDURE — 99999 PR PBB SHADOW E&M-EST. PATIENT-LVL V: CPT | Mod: PBBFAC,,, | Performed by: FAMILY MEDICINE

## 2023-07-25 PROCEDURE — 99999 PR PBB SHADOW E&M-EST. PATIENT-LVL V: ICD-10-PCS | Mod: PBBFAC,,, | Performed by: FAMILY MEDICINE

## 2023-07-25 RX ORDER — TACROLIMUS 1 MG/1
1 CAPSULE ORAL
COMMUNITY
Start: 2023-07-19 | End: 2023-12-05

## 2023-07-25 NOTE — PROGRESS NOTES
Patient complains of mild sore throat, right-sided otalgia, congestion, postnasal drainage, slight cough, no fever, no wheezing, no shortness of breath.  Symptoms started over the past few days.  Positive sick contacts with her .  Negative COVID test couple days ago at home.  Compliant with her inhalers regarding asthma.  This has not flared her asthma as yet.      Yodit was seen today for sore throat, otalgia, headache and cough.    Diagnoses and all orders for this visit:    Upper respiratory tract infection, unspecified type  -     POCT COVID-19 Rapid Screening; Future  -     POCT Strep A, Molecular; Future  -     POCT Strep A, Molecular  -     POCT COVID-19 Rapid Screening    Testing above negative.  Continue with her current asthma inhalers, can use Tylenol.  Follow-up if symptoms worsen or not improving.          Past Medical History:  Past Medical History:   Diagnosis Date    Angio-edema     Arthralgia     Asthma without status asthmaticus     Bronchitis     COPD (chronic obstructive pulmonary disease)     COVID-19 virus infection 07/23/2021    Diverticulosis of large intestine without hemorrhage 10/08/2015    Environmental allergies     Eosinophilic asthma 03/08/2018    Exacerbation of ulcerative colitis with rectal bleeding     Gastroparesis     Hand arthritis     Herpes simplex without mention of complication     oral    Hidradenitis     History of morbid obesity     Hyperlipidemia     Hypothyroidism     Immune deficiency disorder     Intraperitoneal abscess 05/07/2018    LBP radiating to left leg     Leukocytosis, unspecified     Migraine headache     Normocytic anemia 10/05/2022    Obesity, Class III, BMI 40-49.9 (morbid obesity)     Periorbital cellulitis 12/31/2016    Prediabetes     RA (rheumatoid arthritis)     Recurrent upper respiratory infection (URI)     RSV (respiratory syncytial virus infection) 1/30/2023    S/P colonoscopy 11/2010    Sepsis 05/07/2018    Systemic lupus erythematosus,  organ or system involvement unspecified     Tubular adenoma of colon 08/17/2022    Ulcerative pancolitis with rectal bleeding 10/17/2022    Urticaria      Past Surgical History:   Procedure Laterality Date    ADENOIDECTOMY      CHOLECYSTECTOMY      CHOLECYSTECTOMY      colitis      COLONOSCOPY  2015    repeat in 10    COLONOSCOPY N/A 10/08/2015    Procedure: COLONOSCOPY;  Surgeon: Panda Bose MD;  Location: [facility];  Service: Endoscopy;  Laterality: N/A;    COLONOSCOPY N/A 08/17/2022    Procedure: COLONOSCOPY;  Surgeon: Olaf Del Valle MD;  Location: [facility] END[facility];  Service: General;  Laterality: N/A;    COLONOSCOPY N/A 10/06/2022    Procedure: COLONOSCOPY;  Surgeon: Quinton Celeste MD;  Location: [facility] ENDO;  Service: Gastroenterology;  Laterality: N/A;    Hand fracture surgery      HARDWARE REMOVAL      left hand 5th MC    hymenectomy      HYSTERECTOMY      menorrhagia-Ovaries intact    ROTATOR CUFF REPAIR Right     SLEEVE GASTROPLASTY  04/16/2018    TONSILLECTOMY      Urethral dilatation       Social History     Socioeconomic History    Marital status:    Tobacco Use    Smoking status: Never    Smokeless tobacco: Never   Substance and Sexual Activity    Alcohol use: Yes     Alcohol/week: 0.0 standard drinks     Comment: very rarely    Drug use: No    Sexual activity: Yes     Partners: Male   Social History Narrative    . Disabled teacher.     Family History   Problem Relation Age of Onset    Melanoma Mother     Basal cell carcinoma Mother     Lung disease Mother         pulm htn    Cataracts Mother     Hypertension Mother     Lung cancer Father     Diabetes Father     Hypertension Father     Cancer Father     Melanoma Maternal Aunt     Melanoma Maternal Uncle     Diabetes Paternal Aunt     Colon cancer Paternal Aunt 40    Diabetes Paternal Aunt     Breast cancer Paternal Aunt 40    Lymphoma Paternal Aunt     Cancer Maternal Grandfather     Ovarian cancer Paternal Grandmother 40    Ulcerative  "colitis Son     Psoriasis Son     Psoriasis Son     Breast cancer Cousin 25    Allergic rhinitis Neg Hx     Allergies Neg Hx     Angioedema Neg Hx     Asthma Neg Hx     Atopy Neg Hx     Eczema Neg Hx     Immunodeficiency Neg Hx     Rhinitis Neg Hx     Urticaria Neg Hx      Review of patient's allergies indicates:   Allergen Reactions    Bromelains Hives     " causes mouth to bleed"    Pimecrolimus Swelling    Sudafed [pseudoephedrine hcl] Other (See Comments)     Does not want to wake up .    Amoxicillin-pot clavulanate Itching     Other reaction(s): Itching; tolerated ceftriaxone 10/2022    Hydrocodone Itching    Iodinated contrast media      childhood    Iodine and iodide containing products Other (See Comments)    Melon Hives    Percocet [oxycodone-acetaminophen] Itching    Corn containing products     Wheat containing prod     Barium iodide Rash    Ephedrine Other (See Comments)     Other reaction(s): comatose    Penicillins      Other reaction(s): does not work on pt symptoms     Current Outpatient Medications on File Prior to Visit   Medication Sig Dispense Refill    (Magic mouthwash) 1:1:1 diphenhydramine(Benadryl) 12.5mg/5ml liq, aluminum & magnesium hydroxide-simethicone (Maalox), LIDOcaine viscous 2% Swish and spit 5 mLs every 8 (eight) hours as needed (oral ulcers). Gargle and spit. DO NOT SWALLOW 360 mL 1    acetaminophen (TYLENOL) 325 MG tablet Take 1 tablet (325 mg total) by mouth every 6 (six) hours as needed for Pain (Do not take with any other products containing  tylenol or acetaminophen).  0    albuterol (PROAIR HFA) 90 mcg/actuation inhaler Inhale 2 puffs into the lungs every 6 (six) hours as needed for Wheezing. Rescue 18 g 0    albuterol (PROVENTIL) 2.5 mg /3 mL (0.083 %) nebulizer solution Take 3 mLs (2.5 mg total) by nebulization every 6 (six) hours as needed for Wheezing or Shortness of Breath. 120 each 11    ANTI-DIARRHEAL, LOPERAMIDE, 2 mg Tab SMARTSIG:Can By Mouth      budesonide " (ENTOCORT EC) 3 mg capsule Take 9 mg by mouth.      cholecalciferol, vitamin D3, (VITAMIN D3) 50 mcg (2,000 unit) Cap Take 1 capsule by mouth once daily.      doxycycline (VIBRAMYCIN) 100 MG Cap Take 1 capsule (100 mg total) by mouth 2 (two) times daily. 28 capsule 1    estradioL (ESTRACE) 1 MG tablet Take 1 tablet (1 mg total) by mouth once daily. 90 tablet 3    FEROSUL 325 mg (65 mg iron) Tab tablet Take by mouth once daily.      fluconazole (DIFLUCAN) 200 MG Tab Take 1 tablet (200 mg total) by mouth once a week. 7 tablet 0    hydrOXYchloroQUINE (PLAQUENIL) 200 mg tablet Take 1 tablet (200 mg total) by mouth 2 (two) times daily. 60 tablet 6    immun glob G,IgG,-pro-IgA 0-50 (HIZENTRA) 2 gram/10 mL (20 %) Soln Inject 18 g into the skin every 14 (fourteen) days. 90 mL 12    levothyroxine (SYNTHROID) 75 MCG tablet TAKE 1 TABLET BY MOUTH EVERY DAY 30 tablet 0    loperamide (IMODIUM) 2 mg capsule Take 2 mg by mouth 3 (three) times daily as needed.      minoxidiL (LONITEN) 2.5 MG tablet TAKE 1/4 TABLET (0.625MG) BY MOUTH DAILY 24 tablet 1    montelukast (SINGULAIR) 10 mg tablet Take 1 tablet (10 mg total) by mouth every evening. 90 tablet 3    MULTIVITAMIN ORAL Take 1 tablet by mouth once daily.      ondansetron (ZOFRAN-ODT) 4 MG TbDL Take 1 tablet (4 mg total) by mouth every 8 (eight) hours as needed (nausea/vomiting). 90 tablet 6    predniSONE (DELTASONE) 20 MG tablet Take 20 mg by mouth once daily. Taking 15 mg daily      tacrolimus (PROGRAF) 1 MG Cap Take 1 mg by mouth.      traMADoL (ULTRAM) 50 mg tablet Take 50 mg by mouth 3 (three) times daily.      triamterene-hydrochlorothiazide 37.5-25 mg (MAXZIDE-25) 37.5-25 mg per tablet Take 1 tablet by mouth daily as needed (edema). 30 tablet 1    vedolizumab (ENTYVIO) 300 mg SolR injection Inject 300 mg into the vein.      [DISCONTINUED] albuterol (PROVENTIL/VENTOLIN HFA) 90 mcg/actuation inhaler Inhale 2 puffs into the lungs every 4 (four) hours as needed for Wheezing or  "Shortness of Breath. Rescue 18 g 11    [DISCONTINUED] levalbuterol (XOPENEX) 1.25 mg/3 mL nebulizer solution Take 3 mLs (1.25 mg total) by nebulization every 4 (four) hours as needed for Wheezing or Shortness of Breath. Rescue 1 Box 11    [DISCONTINUED] LIDOcaine 5 % Crea SMARTSIG:Sparingly Rectally Every 4 Hours PRN      [DISCONTINUED] sulfamethoxazole-trimethoprim 400-80mg (BACTRIM,SEPTRA) 400-80 mg per tablet Take 2 tablets by mouth every Mon, Wed, Fri. While on prednisone 20 mg daily 28 tablet 3    [DISCONTINUED] vancomycin (VANCOCIN) 125 MG capsule Take 1 capsule (125 mg total) by mouth Daily. Take while on oral antibiotics 30 capsule 3     No current facility-administered medications on file prior to visit.         Physical Exam:  Vitals:    07/25/23 1314   BP: 122/68   Pulse: 78   Temp: 98 °F (36.7 °C)   TempSrc: Temporal   Weight: 78.6 kg (173 lb 3.2 oz)   Height: 5' 2.5" (1.588 m)       Gen.: No acute distress  HEENT: sinuses nontender. Ears: Tympanic membranes intact.  No erythema or effusion.  Nose mild congestion.  Throat mild erythema no exudate.  Mild postnasal drainage.  Neck supple no adenopathy  Chest: Clear to auscultation.  Normal respiratory effort.          "

## 2023-07-28 ENCOUNTER — PATIENT MESSAGE (OUTPATIENT)
Dept: PULMONOLOGY | Facility: CLINIC | Age: 63
End: 2023-07-28
Payer: MEDICARE

## 2023-07-31 ENCOUNTER — LAB VISIT (OUTPATIENT)
Dept: LAB | Facility: HOSPITAL | Age: 63
End: 2023-07-31
Attending: INTERNAL MEDICINE
Payer: MEDICARE

## 2023-07-31 DIAGNOSIS — K51.00 ULCERATIVE (CHRONIC) ENTEROCOLITIS: Primary | ICD-10-CM

## 2023-07-31 DIAGNOSIS — F84.9 PDD (PERVASIVE DEVELOPMENTAL DISORDER): ICD-10-CM

## 2023-07-31 DIAGNOSIS — D80.1 COMMON VARIABLE AGAMMAGLOBULINEMIA: ICD-10-CM

## 2023-07-31 DIAGNOSIS — Z98.84 BARIATRIC SURGERY STATUS: ICD-10-CM

## 2023-07-31 LAB
ALBUMIN SERPL BCP-MCNC: 3.6 G/DL (ref 3.5–5.2)
ALP SERPL-CCNC: 52 U/L (ref 55–135)
ALT SERPL W/O P-5'-P-CCNC: 17 U/L (ref 10–44)
ANION GAP SERPL CALC-SCNC: 10 MMOL/L (ref 8–16)
AST SERPL-CCNC: 19 U/L (ref 10–40)
BILIRUB SERPL-MCNC: 0.3 MG/DL (ref 0.1–1)
BUN SERPL-MCNC: 15 MG/DL (ref 8–23)
CALCIUM SERPL-MCNC: 9.6 MG/DL (ref 8.7–10.5)
CHLORIDE SERPL-SCNC: 104 MMOL/L (ref 95–110)
CO2 SERPL-SCNC: 28 MMOL/L (ref 23–29)
CREAT SERPL-MCNC: 0.8 MG/DL (ref 0.5–1.4)
CRP SERPL-MCNC: 4.9 MG/L (ref 0–8.2)
ERYTHROCYTE [SEDIMENTATION RATE] IN BLOOD BY WESTERGREN METHOD: 12 MM/HR (ref 0–20)
EST. GFR  (NO RACE VARIABLE): >60 ML/MIN/1.73 M^2
GLUCOSE SERPL-MCNC: 78 MG/DL (ref 70–110)
IGA SERPL-MCNC: 181 MG/DL (ref 40–350)
IGG SERPL-MCNC: 945 MG/DL (ref 650–1600)
POTASSIUM SERPL-SCNC: 3.9 MMOL/L (ref 3.5–5.1)
PROT SERPL-MCNC: 6.8 G/DL (ref 6–8.4)
SODIUM SERPL-SCNC: 142 MMOL/L (ref 136–145)

## 2023-07-31 PROCEDURE — 86140 C-REACTIVE PROTEIN: CPT | Performed by: INTERNAL MEDICINE

## 2023-07-31 PROCEDURE — 80197 ASSAY OF TACROLIMUS: CPT | Performed by: INTERNAL MEDICINE

## 2023-07-31 PROCEDURE — 82784 ASSAY IGA/IGD/IGG/IGM EACH: CPT | Performed by: INTERNAL MEDICINE

## 2023-07-31 PROCEDURE — 36415 COLL VENOUS BLD VENIPUNCTURE: CPT | Mod: PO | Performed by: INTERNAL MEDICINE

## 2023-07-31 PROCEDURE — 80053 COMPREHEN METABOLIC PANEL: CPT | Performed by: INTERNAL MEDICINE

## 2023-07-31 PROCEDURE — 82784 ASSAY IGA/IGD/IGG/IGM EACH: CPT | Mod: 59 | Performed by: INTERNAL MEDICINE

## 2023-07-31 PROCEDURE — 85025 COMPLETE CBC W/AUTO DIFF WBC: CPT | Performed by: INTERNAL MEDICINE

## 2023-07-31 PROCEDURE — 85651 RBC SED RATE NONAUTOMATED: CPT | Mod: PO | Performed by: INTERNAL MEDICINE

## 2023-08-01 DIAGNOSIS — E03.9 HYPOTHYROIDISM, UNSPECIFIED TYPE: ICD-10-CM

## 2023-08-01 LAB
ANISOCYTOSIS BLD QL SMEAR: SLIGHT
BASO STIPL BLD QL SMEAR: ABNORMAL
BASOPHILS # BLD AUTO: 0.05 K/UL (ref 0–0.2)
BASOPHILS NFR BLD: 0.4 % (ref 0–1.9)
BURR CELLS BLD QL SMEAR: ABNORMAL
DIFFERENTIAL METHOD: ABNORMAL
EOSINOPHIL # BLD AUTO: 0.2 K/UL (ref 0–0.5)
EOSINOPHIL NFR BLD: 1.7 % (ref 0–8)
ERYTHROCYTE [DISTWIDTH] IN BLOOD BY AUTOMATED COUNT: 19.6 % (ref 11.5–14.5)
HCT VFR BLD AUTO: 36.2 % (ref 37–48.5)
HGB BLD-MCNC: 9.5 G/DL (ref 12–16)
HYPOCHROMIA BLD QL SMEAR: ABNORMAL
IMM GRANULOCYTES # BLD AUTO: 0.08 K/UL (ref 0–0.04)
IMM GRANULOCYTES NFR BLD AUTO: 0.6 % (ref 0–0.5)
LYMPHOCYTES # BLD AUTO: 4.4 K/UL (ref 1–4.8)
LYMPHOCYTES NFR BLD: 33.8 % (ref 18–48)
MCH RBC QN AUTO: 21.8 PG (ref 27–31)
MCHC RBC AUTO-ENTMCNC: 26.2 G/DL (ref 32–36)
MCV RBC AUTO: 83 FL (ref 82–98)
MONOCYTES # BLD AUTO: 0.9 K/UL (ref 0.3–1)
MONOCYTES NFR BLD: 6.7 % (ref 4–15)
NEUTROPHILS # BLD AUTO: 7.3 K/UL (ref 1.8–7.7)
NEUTROPHILS NFR BLD: 56.8 % (ref 38–73)
NRBC BLD-RTO: 0 /100 WBC
PLATELET # BLD AUTO: 300 K/UL (ref 150–450)
PLATELET BLD QL SMEAR: ABNORMAL
PMV BLD AUTO: 10.7 FL (ref 9.2–12.9)
POIKILOCYTOSIS BLD QL SMEAR: SLIGHT
POLYCHROMASIA BLD QL SMEAR: ABNORMAL
RBC # BLD AUTO: 4.35 M/UL (ref 4–5.4)
TACROLIMUS BLD-MCNC: <2 NG/ML (ref 5–15)
WBC # BLD AUTO: 12.92 K/UL (ref 3.9–12.7)

## 2023-08-01 RX ORDER — LEVOTHYROXINE SODIUM 75 UG/1
75 TABLET ORAL DAILY
Qty: 90 TABLET | Refills: 3 | Status: SHIPPED | OUTPATIENT
Start: 2023-08-01

## 2023-08-01 NOTE — TELEPHONE ENCOUNTER
No care due was identified.  [facility] Embedded Care Due Messages. Reference number: [id number].   8/01/2023 1:41:21 PM CDT

## 2023-08-01 NOTE — TELEPHONE ENCOUNTER
Refill Decision Note   Yodit Canseco  is requesting a refill authorization.  Brief Assessment and Rationale for Refill:  Approve     Medication Therapy Plan:       Medication Reconciliation Completed: No    Comments:     No Care Gaps recommended.     Note composed:1:46 PM 08/01/2023

## 2023-08-03 LAB — TRYPTASE LEVEL: 2.6 NG/ML

## 2023-08-10 ENCOUNTER — PATIENT MESSAGE (OUTPATIENT)
Dept: OBSTETRICS AND GYNECOLOGY | Facility: CLINIC | Age: 63
End: 2023-08-10
Payer: MEDICARE

## 2023-08-10 DIAGNOSIS — H01.119 EYELID DERMATITIS, ALLERGIC/CONTACT: Primary | ICD-10-CM

## 2023-08-10 DIAGNOSIS — L65.9 HAIR LOSS: ICD-10-CM

## 2023-08-10 RX ORDER — MINOXIDIL 2.5 MG/1
TABLET ORAL
Qty: 90 TABLET | Refills: 0 | Status: SHIPPED | OUTPATIENT
Start: 2023-08-10 | End: 2023-11-09 | Stop reason: SDUPTHER

## 2023-08-10 RX ORDER — DESONIDE 0.5 MG/G
CREAM TOPICAL 2 TIMES DAILY
Qty: 60 G | Refills: 6 | Status: SHIPPED | OUTPATIENT
Start: 2023-08-10

## 2023-08-12 ENCOUNTER — NURSE TRIAGE (OUTPATIENT)
Dept: ADMINISTRATIVE | Facility: CLINIC | Age: 63
End: 2023-08-12
Payer: MEDICARE

## 2023-08-12 NOTE — TELEPHONE ENCOUNTER
Reason for Disposition   MODERATE-SEVERE rectal pain (i.e., interferes with school, work, or sleep)    Additional Information   Negative: Foreign body in rectum   Negative: Diarrhea is main symptom   Negative: Constipation is main symptom (e.g., pain or discomfort caused by passage of hard BMs)   Negative: Blood in or on bowel movement is main symptom   Negative: Pregnant   Negative: Pinworms are suspected (rectal itching; (white thread-like worm about size of a staple, moves)   Negative: [1] Mpox suspected (e.g., direct skin contact such as sex, recent travel to [place] or [place]) AND [2] symptoms of Mpox (e.g., rectal rash or sores, fever, muscle aches, or swollen lymph nodes)   Negative: [1] At risk for Mpox (men-who-have-sex-with-men) AND [2] possible exposure (e.g., multiple sex partners in past 21 days) AND [3] symptoms of Mpox (e.g., rectal rash or sores, fever, muscle aches, or swollen lymph nodes)   Negative: Sexual assault or rape (sexual intercourse or activity occurs without freely given consent), known or suspected   Negative: Injury to rectum   Negative: Large mass protruding out of rectum   Negative: Patient sounds very sick or weak to the triager   Negative: SEVERE rectal pain (e.g., excruciating, unable to have a bowel movement)   Negative: [1] Rectal pain or redness AND [2] fever   Negative: [1] Sudden onset rectal pain AND [2] constipated (straining, rectal pressure or fullness) AND [3] NOT better after SITZ bath, suppository or enema    Protocols used: Rectal Symptoms-A-AH  Pt called stating she has broken out with  bechets in groin- multiple ulcers on inner aspect of L buttock cheek- one is on a hemorrhoid - one at rectum almost parallel with vagina, hx UC- in a flare. Pt states she has had a similar break out of bechets in the past but only in mouth and nose not in groin before. Pt states sx started [id number]-asy-[id number]. thought it was a cyst at first. Pt states in pain =6-7. Afeb. cedric in pain  when wiping after using restroom- pain then goes 9-10. Pt has steroid cream at home- can she use this? Hemorrhoids are outside of rectum. Having bloody loose stools today as usual with UC flare. Asking for pain meds. rec to see dr within 24 hours. pt asking to speak with MD on call. spoke with JOSEPHINE buckley above. PA states no pain meds over the weekend. can send request to dr to fill for Monday. can use steroid cream- does pt have steroids at home. Pt states she has hydrocortisone acetate cream 2.5 and has protoctosol hc 2.5. can she use those? Pt on 15 mg steroids now. - on steroids for year. Dose varies. now on 15 mg- started that last week. PA notified. JOSEPHINE grigsby transferred to speak with pt.   Pharm: [facility] [place]. Allergy to codeine and synthetic codeine.-oyy/hydrocodone- usually gets hydromorphone.

## 2023-08-14 ENCOUNTER — OFFICE VISIT (OUTPATIENT)
Dept: OBSTETRICS AND GYNECOLOGY | Facility: CLINIC | Age: 63
End: 2023-08-14
Payer: MEDICARE

## 2023-08-14 VITALS
BODY MASS INDEX: 30.31 KG/M2 | DIASTOLIC BLOOD PRESSURE: 78 MMHG | WEIGHT: 171.06 LBS | SYSTOLIC BLOOD PRESSURE: 122 MMHG | HEIGHT: 63 IN

## 2023-08-14 DIAGNOSIS — D84.9 IMMUNOCOMPROMISED: ICD-10-CM

## 2023-08-14 DIAGNOSIS — K64.0 GRADE I HEMORRHOIDS: ICD-10-CM

## 2023-08-14 DIAGNOSIS — M35.2 BEHCET'S VULVO-VAGINAL ULCER: Primary | ICD-10-CM

## 2023-08-14 DIAGNOSIS — N77.0 BEHCET'S VULVO-VAGINAL ULCER: Primary | ICD-10-CM

## 2023-08-14 DIAGNOSIS — Z79.890 POSTMENOPAUSAL HRT (HORMONE REPLACEMENT THERAPY): ICD-10-CM

## 2023-08-14 DIAGNOSIS — K51.90 ULCERATIVE COLITIS WITHOUT COMPLICATIONS, UNSPECIFIED LOCATION: ICD-10-CM

## 2023-08-14 PROCEDURE — 99214 OFFICE O/P EST MOD 30 MIN: CPT | Mod: S$PBB,,, | Performed by: OBSTETRICS & GYNECOLOGY

## 2023-08-14 PROCEDURE — 99214 PR OFFICE/OUTPT VISIT, EST, LEVL IV, 30-39 MIN: ICD-10-PCS | Mod: S$PBB,,, | Performed by: OBSTETRICS & GYNECOLOGY

## 2023-08-14 PROCEDURE — 99214 OFFICE O/P EST MOD 30 MIN: CPT | Mod: PBBFAC,PN | Performed by: OBSTETRICS & GYNECOLOGY

## 2023-08-14 PROCEDURE — 99999 PR PBB SHADOW E&M-EST. PATIENT-LVL IV: CPT | Mod: PBBFAC,,, | Performed by: OBSTETRICS & GYNECOLOGY

## 2023-08-14 PROCEDURE — 99999 PR PBB SHADOW E&M-EST. PATIENT-LVL IV: ICD-10-PCS | Mod: PBBFAC,,, | Performed by: OBSTETRICS & GYNECOLOGY

## 2023-08-14 RX ORDER — CLOBETASOL PROPIONATE 0.5 MG/G
CREAM TOPICAL 2 TIMES DAILY
Qty: 60 G | Refills: 1 | Status: SHIPPED | OUTPATIENT
Start: 2023-08-14 | End: 2024-03-26 | Stop reason: SDUPTHER

## 2023-08-14 RX ORDER — HYDROCORTISONE 25 MG/G
CREAM TOPICAL 2 TIMES DAILY
Qty: 28 G | Refills: 0 | Status: SHIPPED | OUTPATIENT
Start: 2023-08-14 | End: 2023-12-21

## 2023-08-14 RX ORDER — LIDOCAINE AND PRILOCAINE 25; 25 MG/G; MG/G
CREAM TOPICAL ONCE
Qty: 1 KIT | Refills: 0 | Status: SHIPPED | OUTPATIENT
Start: 2023-08-14 | End: 2023-08-14

## 2023-08-14 RX ORDER — MUPIROCIN 20 MG/G
OINTMENT TOPICAL 3 TIMES DAILY
Qty: 30 G | Refills: 1 | Status: SHIPPED | OUTPATIENT
Start: 2023-08-14

## 2023-08-14 RX ORDER — HYDROMORPHONE HYDROCHLORIDE 2 MG/1
2 TABLET ORAL EVERY 4 HOURS PRN
Qty: 10 TABLET | Refills: 0 | Status: SHIPPED | OUTPATIENT
Start: 2023-08-14 | End: 2023-12-05 | Stop reason: SDUPTHER

## 2023-08-14 NOTE — PROGRESS NOTES
Chief Complaint   Patient presents with    Rash     Groin Area       History of Present Illness: Yodit Canseco is a 62 y.o. female that presents today 8/14/2023 for   Chief Complaint   Patient presents with    Rash     Groin Area   She reports reducing her prednisone dose and ulceration flared.  She reports severe pain at the sights.   She reports mouth ulceration biopsy in the past confirmed Behcet's but has not had a genital outbreak    Past Medical History:   Diagnosis Date    Angio-edema     Arthralgia     Asthma without status asthmaticus     Bronchitis     COPD (chronic obstructive pulmonary disease)     COVID-19 virus infection 07/23/2021    Diverticulosis of large intestine without hemorrhage 10/08/2015    Environmental allergies     Eosinophilic asthma 03/08/2018    Exacerbation of ulcerative colitis with rectal bleeding     Gastroparesis     Hand arthritis     Herpes simplex without mention of complication     oral    Hidradenitis     History of morbid obesity     Hyperlipidemia     Hypothyroidism     Immune deficiency disorder     Intraperitoneal abscess 05/07/2018    LBP radiating to left leg     Leukocytosis, unspecified     Migraine headache     Normocytic anemia 10/05/2022    Obesity, Class III, BMI 40-49.9 (morbid obesity)     Periorbital cellulitis 12/31/2016    Prediabetes     RA (rheumatoid arthritis)     Recurrent upper respiratory infection (URI)     RSV (respiratory syncytial virus infection) 1/30/2023    S/P colonoscopy 11/2010    Sepsis 05/07/2018    Systemic lupus erythematosus, organ or system involvement unspecified     Tubular adenoma of colon 08/17/2022    Ulcerative pancolitis with rectal bleeding 10/17/2022    Urticaria        Past Surgical History:   Procedure Laterality Date    ADENOIDECTOMY      CHOLECYSTECTOMY      CHOLECYSTECTOMY      colitis      COLONOSCOPY  2015    repeat in 10    COLONOSCOPY N/A 10/08/2015    Procedure: COLONOSCOPY;  Surgeon: Panda Bose MD;  Location:  [facility] ENDO;  Service: Endoscopy;  Laterality: N/A;    COLONOSCOPY N/A 08/17/2022    Procedure: COLONOSCOPY;  Surgeon: Olaf Del Valle MD;  Location: [facility] ENDO;  Service: General;  Laterality: N/A;    COLONOSCOPY N/A 10/06/2022    Procedure: COLONOSCOPY;  Surgeon: Quinton Celeste MD;  Location: [facility];  Service: Gastroenterology;  Laterality: N/A;    Hand fracture surgery      HARDWARE REMOVAL      left hand 5th [facility]    hymenectomy      HYSTERECTOMY      menorrhagia-Ovaries intact    ROTATOR CUFF REPAIR Right     SLEEVE GASTROPLASTY  04/16/2018    TONSILLECTOMY      Urethral dilatation         Outpatient Medications Prior to Visit   Medication Sig Dispense Refill    acetaminophen (TYLENOL) 325 MG tablet Take 1 tablet (325 mg total) by mouth every 6 (six) hours as needed for Pain (Do not take with any other products containing  tylenol or acetaminophen).  0    albuterol (PROAIR HFA) 90 mcg/actuation inhaler Inhale 2 puffs into the lungs every 6 (six) hours as needed for Wheezing. Rescue 18 g 0    albuterol (PROVENTIL) 2.5 mg /3 mL (0.083 %) nebulizer solution Take 3 mLs (2.5 mg total) by nebulization every 6 (six) hours as needed for Wheezing or Shortness of Breath. 120 each 11    budesonide (ENTOCORT EC) 3 mg capsule Take 9 mg by mouth.      cholecalciferol, vitamin D3, (VITAMIN D3) 50 mcg (2,000 unit) Cap Take 1 capsule by mouth once daily.      desonide (DESOWEN) 0.05 % cream Apply topically 2 (two) times daily. 60 g 6    estradioL (ESTRACE) 1 MG tablet Take 1 tablet (1 mg total) by mouth once daily. 90 tablet 3    FEROSUL 325 mg (65 mg iron) Tab tablet Take by mouth once daily.      hydrOXYchloroQUINE (PLAQUENIL) 200 mg tablet Take 1 tablet (200 mg total) by mouth 2 (two) times daily. 60 tablet 6    immun glob G,IgG,-pro-IgA 0-50 (HIZENTRA) 2 gram/10 mL (20 %) Soln Inject 18 g into the skin every 14 (fourteen) days. 90 mL 12    levothyroxine (SYNTHROID) 75 MCG tablet Take 1 tablet (75 mcg total) by mouth once  "daily. 90 tablet 3    minoxidiL (LONITEN) 2.5 MG tablet Take 1/4th (0.625mg) by mouth daily 90 tablet 0    montelukast (SINGULAIR) 10 mg tablet Take 1 tablet (10 mg total) by mouth every evening. 90 tablet 3    MULTIVITAMIN ORAL Take 1 tablet by mouth once daily.      predniSONE (DELTASONE) 20 MG tablet Take 20 mg by mouth once daily. Taking 15 mg daily      tacrolimus (PROGRAF) 1 MG Cap Take 1 mg by mouth.      traMADoL (ULTRAM) 50 mg tablet Take 50 mg by mouth 3 (three) times daily.      triamterene-hydrochlorothiazide 37.5-25 mg (MAXZIDE-25) 37.5-25 mg per tablet Take 1 tablet by mouth daily as needed (edema). 30 tablet 1    vedolizumab (ENTYVIO) 300 mg SolR injection Inject 300 mg into the vein.      (Magic mouthwash) 1:1:1 diphenhydramine(Benadryl) 12.5mg/5ml liq, aluminum & magnesium hydroxide-simethicone (Maalox), LIDOcaine viscous 2% Swish and spit 5 mLs every 8 (eight) hours as needed (oral ulcers). Gargle and spit. DO NOT SWALLOW (Patient not taking: Reported on 8/14/2023) 360 mL 1    ANTI-DIARRHEAL, LOPERAMIDE, 2 mg Tab SMARTSIG:Can By Mouth      doxycycline (VIBRAMYCIN) 100 MG Cap Take 1 capsule (100 mg total) by mouth 2 (two) times daily. (Patient not taking: Reported on 8/14/2023) 28 capsule 1    fluconazole (DIFLUCAN) 200 MG Tab Take 1 tablet (200 mg total) by mouth once a week. (Patient not taking: Reported on 8/14/2023) 7 tablet 0    loperamide (IMODIUM) 2 mg capsule Take 2 mg by mouth 3 (three) times daily as needed.      ondansetron (ZOFRAN-ODT) 4 MG TbDL Take 1 tablet (4 mg total) by mouth every 8 (eight) hours as needed (nausea/vomiting). (Patient not taking: Reported on 8/14/2023) 90 tablet 6     No facility-administered medications prior to visit.       Review of patient's allergies indicates:   Allergen Reactions    Bromelains Hives     " causes mouth to bleed"    Pimecrolimus Swelling    Sudafed [pseudoephedrine hcl] Other (See Comments)     Does not want to wake up .    Amoxicillin-pot " "clavulanate Itching     Other reaction(s): Itching; tolerated ceftriaxone 10/2022    Hydrocodone Itching    Iodinated contrast media      childhood    Iodine and iodide containing products Other (See Comments)    Melon Hives    Percocet [oxycodone-acetaminophen] Itching    Corn containing products     Wheat containing prod     Barium iodide Rash    Ephedrine Other (See Comments)     Other reaction(s): comatose    Penicillins      Other reaction(s): does not work on pt symptoms       Family History   Problem Relation Age of Onset    Melanoma Mother     Basal cell carcinoma Mother     Lung disease Mother         pulm htn    Cataracts Mother     Hypertension Mother     Lung cancer Father     Diabetes Father     Hypertension Father     Cancer Father     Melanoma Maternal Aunt     Melanoma Maternal Uncle     Diabetes Paternal Aunt     Colon cancer Paternal Aunt 40    Diabetes Paternal Aunt     Breast cancer Paternal Aunt 40    Lymphoma Paternal Aunt     Cancer Maternal Grandfather     Ovarian cancer Paternal Grandmother 40    Ulcerative colitis Son     Psoriasis Son     Psoriasis Son     Breast cancer Cousin 25    Allergic rhinitis Neg Hx     Allergies Neg Hx     Angioedema Neg Hx     Asthma Neg Hx     Atopy Neg Hx     Eczema Neg Hx     Immunodeficiency Neg Hx     Rhinitis Neg Hx     Urticaria Neg Hx        Social History     Tobacco Use    Smoking status: Never    Smokeless tobacco: Never   Substance Use Topics    Alcohol use: Yes     Alcohol/week: 0.0 standard drinks of alcohol     Comment: very rarely    Drug use: No       OB History    Para Term  AB Living   5 2 2   3     SAB IAB Ectopic Multiple Live Births   3              # Outcome Date GA Lbr Pradip/2nd Weight Sex Delivery Anes PTL Lv   5 SAB            4 SAB            3 SAB            2 Term            1 Term                    /78   Ht 5' 2.5" (1.588 m)   Wt 77.6 kg (171 lb 1.2 oz)   Physical Exam:  APPEARANCE: Well nourished, well " developed, in no acute distress.  SKIN: Normal skin turgor, no lesions.  NECK: Neck symmetric without masses   RESPIRATORY: Normal respiratory effort with no retractions or use of accessory muscles  CARDIOVASCULAR: Peripheral vascular system with no swelling no varicosities and palpation of pulses normal  LYMPHATIC: No enlargements of the lymph nodes noted in the neck, axillae, or groin  ABDOMEN: Soft. No tenderness or masses. No hepatosplenomegaly. No hernias.  PELVIC: Normal external female genitalia with ++ ulcers on the left from mid-vulva to anua 12 + large ulcerations closed  +large anal ulceration  EXTREMITIES: No clubbing cyanosis or edema.    ASSESSMENT/PLAN:  Behcet's vulvo-vaginal ulcer  Comments:  increase prednisone to 20 mg daily  we discussed secondary infection risks and she will notify us with any signs or symptoms  Orders:  -     clobetasoL (TEMOVATE) 0.05 % cream; Apply topically 2 (two) times daily. To the vulva  Dispense: 60 g; Refill: 1  -     LIDOcaine-prilocaine (EMLA) cream; Apply topically once. for 1 dose  Dispense: 1 kit; Refill: 0  -     HYDROmorphone (DILAUDID) 2 MG tablet; Take 1 tablet (2 mg total) by mouth every 4 (four) hours as needed for Pain.  Dispense: 10 tablet; Refill: 0  -     mupirocin (BACTROBAN) 2 % ointment; Apply topically 3 (three) times daily. Place on ulcers  Dispense: 30 g; Refill: 1    Ulcerative colitis without complications, unspecified location    Grade I hemorrhoids  -     hydrocortisone 2.5 % cream; Apply topically 2 (two) times daily. To the anus  Dispense: 28 g; Refill: 0    Immunocompromised    Postmenopausal HRT (hormone replacement therapy)      Consults to rheumatologist Dr. Denis and GI Dr. Celeste     30 minutes spent today spent preparing reviewing previous external notes, reviewing previous results, performing medical examination, ordering tests or medications, counseling and documenting.

## 2023-08-18 ENCOUNTER — DOCUMENTATION ONLY (OUTPATIENT)
Dept: OBSTETRICS AND GYNECOLOGY | Facility: CLINIC | Age: 63
End: 2023-08-18
Payer: MEDICARE

## 2023-08-23 ENCOUNTER — LAB VISIT (OUTPATIENT)
Dept: LAB | Facility: HOSPITAL | Age: 63
End: 2023-08-23
Attending: INTERNAL MEDICINE
Payer: MEDICARE

## 2023-08-23 DIAGNOSIS — M35.2 BEHCET'S SYNDROME INVOLVING ORAL MUCOSA: ICD-10-CM

## 2023-08-23 DIAGNOSIS — M32.19 OTHER SYSTEMIC LUPUS ERYTHEMATOSUS WITH OTHER ORGAN INVOLVEMENT: ICD-10-CM

## 2023-08-23 DIAGNOSIS — M35.9 UNDIFFERENTIATED CONNECTIVE TISSUE DISEASE: ICD-10-CM

## 2023-08-23 DIAGNOSIS — K92.1 BLOOD IN STOOL: ICD-10-CM

## 2023-08-23 DIAGNOSIS — D50.9 IRON DEFICIENCY ANEMIA, UNSPECIFIED: ICD-10-CM

## 2023-08-23 DIAGNOSIS — K51.00 ULCERATIVE (CHRONIC) ENTEROCOLITIS: ICD-10-CM

## 2023-08-23 LAB
ALBUMIN SERPL BCP-MCNC: 3.7 G/DL (ref 3.5–5.2)
ALP SERPL-CCNC: 52 U/L (ref 55–135)
ALT SERPL W/O P-5'-P-CCNC: 23 U/L (ref 10–44)
ANION GAP SERPL CALC-SCNC: 7 MMOL/L (ref 8–16)
AST SERPL-CCNC: 26 U/L (ref 10–40)
BASOPHILS # BLD AUTO: 0.07 K/UL (ref 0–0.2)
BASOPHILS NFR BLD: 0.4 % (ref 0–1.9)
BILIRUB SERPL-MCNC: 0.2 MG/DL (ref 0.1–1)
BUN SERPL-MCNC: 19 MG/DL (ref 8–23)
CALCIUM SERPL-MCNC: 8.9 MG/DL (ref 8.7–10.5)
CHLORIDE SERPL-SCNC: 104 MMOL/L (ref 95–110)
CO2 SERPL-SCNC: 28 MMOL/L (ref 23–29)
CREAT SERPL-MCNC: 1.2 MG/DL (ref 0.5–1.4)
CRP SERPL-MCNC: 1.2 MG/L (ref 0–8.2)
DIFFERENTIAL METHOD: ABNORMAL
EOSINOPHIL # BLD AUTO: 0.1 K/UL (ref 0–0.5)
EOSINOPHIL NFR BLD: 0.3 % (ref 0–8)
ERYTHROCYTE [DISTWIDTH] IN BLOOD BY AUTOMATED COUNT: 19.9 % (ref 11.5–14.5)
ERYTHROCYTE [SEDIMENTATION RATE] IN BLOOD BY WESTERGREN METHOD: 2 MM/HR (ref 0–20)
EST. GFR  (NO RACE VARIABLE): 50.9 ML/MIN/1.73 M^2
GLUCOSE SERPL-MCNC: 116 MG/DL (ref 70–110)
HCT VFR BLD AUTO: 37.9 % (ref 37–48.5)
HGB BLD-MCNC: 10.9 G/DL (ref 12–16)
IMM GRANULOCYTES # BLD AUTO: 0.21 K/UL (ref 0–0.04)
IMM GRANULOCYTES NFR BLD AUTO: 1.1 % (ref 0–0.5)
LYMPHOCYTES # BLD AUTO: 1.4 K/UL (ref 1–4.8)
LYMPHOCYTES NFR BLD: 7.5 % (ref 18–48)
MCH RBC QN AUTO: 23.5 PG (ref 27–31)
MCHC RBC AUTO-ENTMCNC: 28.8 G/DL (ref 32–36)
MCV RBC AUTO: 82 FL (ref 82–98)
MONOCYTES # BLD AUTO: 0.4 K/UL (ref 0.3–1)
MONOCYTES NFR BLD: 2.3 % (ref 4–15)
NEUTROPHILS # BLD AUTO: 16.6 K/UL (ref 1.8–7.7)
NEUTROPHILS NFR BLD: 89.5 % (ref 38–73)
NRBC BLD-RTO: 0 /100 WBC
PLATELET # BLD AUTO: 344 K/UL (ref 150–450)
PMV BLD AUTO: 10.6 FL (ref 9.2–12.9)
POTASSIUM SERPL-SCNC: 3.4 MMOL/L (ref 3.5–5.1)
PROT SERPL-MCNC: 7 G/DL (ref 6–8.4)
RBC # BLD AUTO: 4.63 M/UL (ref 4–5.4)
SODIUM SERPL-SCNC: 139 MMOL/L (ref 136–145)
WBC # BLD AUTO: 18.49 K/UL (ref 3.9–12.7)

## 2023-08-23 PROCEDURE — 36415 COLL VENOUS BLD VENIPUNCTURE: CPT | Mod: PO | Performed by: INTERNAL MEDICINE

## 2023-08-23 PROCEDURE — 85025 COMPLETE CBC W/AUTO DIFF WBC: CPT | Mod: PO | Performed by: INTERNAL MEDICINE

## 2023-08-23 PROCEDURE — 85651 RBC SED RATE NONAUTOMATED: CPT | Mod: PO | Performed by: INTERNAL MEDICINE

## 2023-08-23 PROCEDURE — 86140 C-REACTIVE PROTEIN: CPT | Performed by: INTERNAL MEDICINE

## 2023-08-23 PROCEDURE — 80053 COMPREHEN METABOLIC PANEL: CPT | Performed by: INTERNAL MEDICINE

## 2023-08-25 ENCOUNTER — PATIENT MESSAGE (OUTPATIENT)
Dept: OBSTETRICS AND GYNECOLOGY | Facility: CLINIC | Age: 63
End: 2023-08-25
Payer: MEDICARE

## 2023-08-28 ENCOUNTER — OFFICE VISIT (OUTPATIENT)
Dept: RHEUMATOLOGY | Facility: CLINIC | Age: 63
End: 2023-08-28
Payer: MEDICARE

## 2023-08-28 VITALS
BODY MASS INDEX: 32.05 KG/M2 | WEIGHT: 174.19 LBS | HEART RATE: 79 BPM | RESPIRATION RATE: 18 BRPM | DIASTOLIC BLOOD PRESSURE: 62 MMHG | HEIGHT: 62 IN | SYSTOLIC BLOOD PRESSURE: 133 MMHG

## 2023-08-28 DIAGNOSIS — R60.0 LOCALIZED EDEMA: ICD-10-CM

## 2023-08-28 DIAGNOSIS — K12.0 RECURRENT APHTHOUS STOMATITIS: ICD-10-CM

## 2023-08-28 DIAGNOSIS — M35.2 BEHCET'S SYNDROME INVOLVING ORAL MUCOSA: Primary | ICD-10-CM

## 2023-08-28 PROCEDURE — 99999 PR PBB SHADOW E&M-EST. PATIENT-LVL V: ICD-10-PCS | Mod: PBBFAC,,, | Performed by: INTERNAL MEDICINE

## 2023-08-28 PROCEDURE — 99215 PR OFFICE/OUTPT VISIT, EST, LEVL V, 40-54 MIN: ICD-10-PCS | Mod: S$PBB,,, | Performed by: INTERNAL MEDICINE

## 2023-08-28 PROCEDURE — 99215 OFFICE O/P EST HI 40 MIN: CPT | Mod: S$PBB,,, | Performed by: INTERNAL MEDICINE

## 2023-08-28 PROCEDURE — 99999 PR PBB SHADOW E&M-EST. PATIENT-LVL V: CPT | Mod: PBBFAC,,, | Performed by: INTERNAL MEDICINE

## 2023-08-28 PROCEDURE — 99215 OFFICE O/P EST HI 40 MIN: CPT | Mod: PBBFAC,PO | Performed by: INTERNAL MEDICINE

## 2023-08-28 RX ORDER — COLCHICINE 0.6 MG/1
0.6 TABLET ORAL 2 TIMES DAILY
Qty: 60 TABLET | Refills: 3 | Status: SHIPPED | OUTPATIENT
Start: 2023-08-28 | End: 2023-08-28

## 2023-08-28 RX ORDER — COLCHICINE 0.6 MG/1
0.6 TABLET ORAL DAILY
Qty: 90 TABLET | Refills: 3 | Status: SHIPPED | OUTPATIENT
Start: 2023-08-28 | End: 2023-09-05 | Stop reason: SINTOL

## 2023-08-28 RX ORDER — TRIAMTERENE/HYDROCHLOROTHIAZID 37.5-25 MG
1 TABLET ORAL DAILY PRN
Qty: 30 TABLET | Refills: 1 | Status: SHIPPED | OUTPATIENT
Start: 2023-08-28 | End: 2023-11-07

## 2023-08-28 NOTE — PROGRESS NOTES
RHEUMATOLOGY CLINIC FOLLOW UP VISIT  Chief complaints, HPI, ROS, EXAM, Assessment & Plans:-  Yodit Parks a 62 y.o. pleasant female comes in for flare of Behcet's.  Biopsy-proven Behcet's with ulcerative colitis.  She was on Stelara for ulcerative colitis.  For active disease she was switched to Entyvio.  Since her disease was active on Entyvio every 8 weeks recently she was increased to every 4 weeks Entyvio along with addition of tacrolimus to control her severe ulcerative colitis.  When she was started on Stelara, her Otezla was discontinued.  Her Behcet's disease was fairly controlled until severe recent flare where she had significant vaginal ulcerations.  Was given high-dose prednisone by gynecologist which improved her ulcerations.  Her ulcerative colitis has improved significantly since addition of tacrolimus to Entyvio.  On budesonide and prednisone taper by gastroenterologist.  Complains of worsening leg swelling since taking prednisone and would like to get a refill on her diuretic medication given by her primary care physician.  Denies any active ulcers today other than to healing ulcers in her vagina.  Rheumatological review of system negative otherwise.  Exam shows no active oral ulcers..  No synovitis of small joints.  1. Behcet's syndrome involving oral mucosa    2. Recurrent aphthous stomatitis    3. Localized edema      Problem List Items Addressed This Visit       Recurrent aphthous stomatitis    Relevant Medications    colchicine, gout, (COLCRYS) 0.6 mg tablet    Behcet's syndrome involving oral mucosa - Primary    Localized edema    Relevant Medications    triamterene-hydrochlorothiazide 37.5-25 mg (MAXZIDE-25) 37.5-25 mg per tablet       Labs reviewed today:-   Latest Reference Range & Units 08/23/23 15:35   WBC 3.90 - 12.70 K/uL 18.49 (H)   RBC 4.00 - 5.40 M/uL 4.63   Hemoglobin 12.0 - 16.0 g/dL 10.9 (L)   Hematocrit 37.0 - 48.5 %  37.9   MCV 82 - 98 fL 82   MCH 27.0 - 31.0 pg 23.5 (L)   MCHC 32.0 - 36.0 g/dL 28.8 (L)   RDW 11.5 - 14.5 % 19.9 (H)   Platelets 150 - 450 K/uL 344   MPV 9.2 - 12.9 fL 10.6   Gran % 38.0 - 73.0 % 89.5 (H)   Lymph % 18.0 - 48.0 % 7.5 (L)   Mono % 4.0 - 15.0 % 2.3 (L)   Eosinophil % 0.0 - 8.0 % 0.3   Basophil % 0.0 - 1.9 % 0.4   Immature Granulocytes 0.0 - 0.5 % 1.1 (H)   Gran # (ANC) 1.8 - 7.7 K/uL 16.6 (H)   Lymph # 1.0 - 4.8 K/uL 1.4   Mono # 0.3 - 1.0 K/uL 0.4   Eos # 0.0 - 0.5 K/uL 0.1   Baso # 0.00 - 0.20 K/uL 0.07   Immature Grans (Abs) 0.00 - 0.04 K/uL 0.21 (H)   nRBC 0 /100 WBC 0   Differential Method  Automated   Sed Rate 0 - 20 mm/Hr 2   Sodium 136 - 145 mmol/L 139   Potassium 3.5 - 5.1 mmol/L 3.4 (L)   Chloride 95 - 110 mmol/L 104   CO2 23 - 29 mmol/L 28   Anion Gap 8 - 16 mmol/L 7 (L)   BUN 8 - 23 mg/dL 19   Creatinine 0.5 - 1.4 mg/dL 1.2   eGFR >60 mL/min/1.73 m^2 50.9 !   Glucose 70 - 110 mg/dL 116 (H)   Calcium 8.7 - 10.5 mg/dL 8.9   Alkaline Phosphatase 55 - 135 U/L 52 (L)   PROTEIN TOTAL 6.0 - 8.4 g/dL 7.0   Albumin 3.5 - 5.2 g/dL 3.7   BILIRUBIN TOTAL 0.1 - 1.0 mg/dL 0.2   AST 10 - 40 U/L 26   ALT 10 - 44 U/L 23   CRP 0.0 - 8.2 mg/L 1.2   (H): Data is abnormally high  (L): Data is abnormally low  !: Data is abnormal      Severe Behcet's flare.  Restart colchicine.  If no significant improvement and if she experiences another flare of Behcet's, restart Otezla.  Continue Entyvio and tacrolimus for ulcerative colitis per gastroenterologist.  I have explained all of the above in detail and the patient understands all of the current recommendation(s). I have answered all questions to the best of my ability and to their complete satisfaction.     # Follow up in about 6 months (around 2/28/2024).      Disclaimer: This note was prepared using voice recognition system and is likely to have sound alike errors and is not proof read.  Please call me with any questions.

## 2023-09-04 ENCOUNTER — PATIENT MESSAGE (OUTPATIENT)
Dept: RHEUMATOLOGY | Facility: CLINIC | Age: 63
End: 2023-09-04
Payer: MEDICARE

## 2023-09-04 DIAGNOSIS — M35.2 BEHCET'S SYNDROME INVOLVING ORAL MUCOSA: Primary | ICD-10-CM

## 2023-09-07 ENCOUNTER — LAB VISIT (OUTPATIENT)
Dept: LAB | Facility: HOSPITAL | Age: 63
End: 2023-09-07
Attending: FAMILY MEDICINE
Payer: MEDICARE

## 2023-09-07 DIAGNOSIS — M35.2 BEHCET'S DISEASE: Primary | ICD-10-CM

## 2023-09-07 DIAGNOSIS — E78.5 HYPERLIPIDEMIA, UNSPECIFIED HYPERLIPIDEMIA TYPE: ICD-10-CM

## 2023-09-07 LAB
CHOLEST SERPL-MCNC: 219 MG/DL (ref 120–199)
CHOLEST/HDLC SERPL: 2.8 {RATIO} (ref 2–5)
HDLC SERPL-MCNC: 78 MG/DL (ref 40–75)
HDLC SERPL: 35.6 % (ref 20–50)
LDLC SERPL CALC-MCNC: 110.8 MG/DL (ref 63–159)
NONHDLC SERPL-MCNC: 141 MG/DL
TRIGL SERPL-MCNC: 151 MG/DL (ref 30–150)

## 2023-09-07 PROCEDURE — 80061 LIPID PANEL: CPT | Performed by: FAMILY MEDICINE

## 2023-09-07 PROCEDURE — 36415 COLL VENOUS BLD VENIPUNCTURE: CPT | Mod: PO | Performed by: FAMILY MEDICINE

## 2023-09-08 ENCOUNTER — TELEPHONE (OUTPATIENT)
Dept: PAIN MEDICINE | Facility: CLINIC | Age: 63
End: 2023-09-08
Payer: MEDICARE

## 2023-09-08 NOTE — TELEPHONE ENCOUNTER
----- Message from Cassy Shetty MA sent at 9/8/2023 11:01 AM CDT -----    ----- Message -----  From: Selena Negrete  Sent: 9/8/2023  10:56 AM CDT  To: [facility] Staff    Name of Who is Calling:Patient           What is the request in detail:Patient returning missed call            Can the clinic reply by [facility]:no           What Number to Call Back if not in [facility]: [phone]

## 2023-09-26 ENCOUNTER — PATIENT MESSAGE (OUTPATIENT)
Dept: BARIATRICS | Facility: CLINIC | Age: 63
End: 2023-09-26
Payer: MEDICARE

## 2023-09-27 ENCOUNTER — PATIENT MESSAGE (OUTPATIENT)
Dept: RHEUMATOLOGY | Facility: CLINIC | Age: 63
End: 2023-09-27
Payer: MEDICARE

## 2023-10-02 ENCOUNTER — TELEPHONE (OUTPATIENT)
Dept: BARIATRICS | Facility: CLINIC | Age: 63
End: 2023-10-02
Payer: MEDICARE

## 2023-10-02 NOTE — TELEPHONE ENCOUNTER
----- Message from Siena Alves sent at 10/2/2023  3:59 PM CDT -----  Type: Appointment Request    Caller is requesting appointment.      Name of Caller:pt    When is the first available appointment?na    Would the patient rather a call back or a response via [facility]? Call back    Best Call Back Number:[phone]      Additional Information: PT is trying to get in with a dietician before her appt on 11/19 with Dr Reese. She usually sees Isabelle Singleton but its not pulling up a dietician for me to be able to schedule her with.     Please call Back to advise. Thanks!

## 2023-10-03 ENCOUNTER — TELEPHONE (OUTPATIENT)
Dept: ALLERGY | Facility: CLINIC | Age: 63
End: 2023-10-03

## 2023-10-03 ENCOUNTER — OFFICE VISIT (OUTPATIENT)
Dept: ALLERGY | Facility: CLINIC | Age: 63
End: 2023-10-03
Payer: MEDICARE

## 2023-10-03 ENCOUNTER — TELEPHONE (OUTPATIENT)
Dept: OBSTETRICS AND GYNECOLOGY | Facility: CLINIC | Age: 63
End: 2023-10-03
Payer: MEDICARE

## 2023-10-03 VITALS — HEIGHT: 62 IN | BODY MASS INDEX: 31.64 KG/M2 | WEIGHT: 171.94 LBS

## 2023-10-03 DIAGNOSIS — J31.0 CHRONIC RHINITIS: ICD-10-CM

## 2023-10-03 DIAGNOSIS — D80.6 ANTI-POLYSACCHARIDE ANTIBODY DEFICIENCY: ICD-10-CM

## 2023-10-03 DIAGNOSIS — J45.50 SEVERE PERSISTENT ASTHMA WITHOUT COMPLICATION: Primary | ICD-10-CM

## 2023-10-03 DIAGNOSIS — D83.9 CVID (COMMON VARIABLE IMMUNODEFICIENCY): ICD-10-CM

## 2023-10-03 DIAGNOSIS — J82.83 EOSINOPHILIC ASTHMA: ICD-10-CM

## 2023-10-03 DIAGNOSIS — J32.9 RECURRENT SINUS INFECTIONS: ICD-10-CM

## 2023-10-03 DIAGNOSIS — D83.9 CVID (COMMON VARIABLE IMMUNODEFICIENCY): Primary | ICD-10-CM

## 2023-10-03 PROCEDURE — 99214 OFFICE O/P EST MOD 30 MIN: CPT | Mod: PBBFAC,PO | Performed by: ALLERGY & IMMUNOLOGY

## 2023-10-03 PROCEDURE — 99999 PR PBB SHADOW E&M-EST. PATIENT-LVL IV: ICD-10-PCS | Mod: PBBFAC,,, | Performed by: ALLERGY & IMMUNOLOGY

## 2023-10-03 PROCEDURE — 99215 OFFICE O/P EST HI 40 MIN: CPT | Mod: S$PBB,,, | Performed by: ALLERGY & IMMUNOLOGY

## 2023-10-03 PROCEDURE — 99999 PR PBB SHADOW E&M-EST. PATIENT-LVL IV: CPT | Mod: PBBFAC,,, | Performed by: ALLERGY & IMMUNOLOGY

## 2023-10-03 PROCEDURE — 99215 PR OFFICE/OUTPT VISIT, EST, LEVL V, 40-54 MIN: ICD-10-PCS | Mod: S$PBB,,, | Performed by: ALLERGY & IMMUNOLOGY

## 2023-10-03 RX ORDER — PREDNISONE 10 MG/1
TABLET ORAL
COMMUNITY
Start: 2023-09-14 | End: 2023-12-27 | Stop reason: CLARIF

## 2023-10-03 RX ORDER — POTASSIUM CHLORIDE 20 MEQ/1
20 TABLET, EXTENDED RELEASE ORAL DAILY
COMMUNITY
Start: 2023-09-26

## 2023-10-03 RX ORDER — PREDNISONE 5 MG/1
TABLET ORAL
COMMUNITY
Start: 2023-09-21 | End: 2023-12-27 | Stop reason: CLARIF

## 2023-10-03 RX ORDER — PROMETHAZINE HYDROCHLORIDE 25 MG/1
25 TABLET ORAL EVERY 6 HOURS PRN
COMMUNITY
Start: 2023-08-15

## 2023-10-03 NOTE — TELEPHONE ENCOUNTER
Returned call, informed pt she already has a DEXA order that expires 9/2025 and already had a DEXA 3/2023. Pt was unaware she had the test performed in March, pt states we can disregard message.

## 2023-10-03 NOTE — PROGRESS NOTES
Subjective:       Patient ID: Yodit Canseco is a 63 y.o. female.    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  She was last seen 9/6/2022.  She had labs drawn in past- CBC was normal, lymphocyte profile showed low NK cells. IgA normal at 170, IgM normal at 124, IgE ,35, IgG normal at 700 but had steadily been decreasing, IgG subclasses normal and humoral panel protected to H flu, diphtheria, tetanus and 8/14 strep serotypes but this is after pneumovax and Prevnar so inadequate protection.  She still had constant PND and congestion and got frequent infections with sinus, ear, bronchitis and even pneumonia. She was on antibiotics every 1-2 months. She cannot use nose sprays all cause nose bleed even Astelin. All antihistamines cause sedation per pt. She is on Singulair daily. She started Hizentra 10 g weekly and then in October 2016 changed to 20 g every 2 weeks. Then last year to 18 g every 2 weeks she occ delays infusion. Her UC has been flaring and has diarrhea with BM many times a day so when this is occurring she holds off on Hizentra. She just started new med and seems to be helping. Infusions take 2.5-3 hours. She has done well with infusions. She has itching after and takes benadryl after with resolution.   No frequent sinus infections, no pneumonia, no hospital for infections. .           Labs 7/2023 IgG 943, 8/2023 normal CBC and CMP    She oes have some food allergies. Pineapple, cranberry, corn cause itching in mouth then bleeding. Melons causes hives and face swelling.     Prior History taken 1/13/14: new patient evaluation of rash and chronic PND.  She has been seen about 4 years ago and had labs drawn with normal immunoglobulins, humoral panel only protected to 4/14 strep titers despite prior pneumovax and immunocaps negative to dust mites, [place],  bermuda, cocoa, garlic, milk, wheat, rye, pecan, and peanut.  She was advised to get pneumovax and follow up but she did not.  She continues with chronic PND and congestion, no runny nose or sneeze and she gets inus infections 3-4 times per year requiring antibiotics.  She does clear with anbx but never gets rid of PND.  Her main question now is she gets a random rash on hands, feet and nose.  It seems to appear out of no where, is very itchy and red spots then goes away.  Lasts weeks to months.  She has seen derm and not sure of cause and she thinks may be food allergy although cannot identify a food.    She also has asthma which is well controlled on Dulera and Singulair.  Only uses albuterol with colds.      Rash  Associated symptoms include congestion and coughing. Pertinent negatives include no diarrhea, fatigue, fever, rhinorrhea, shortness of breath, sore throat or vomiting.       Environmental History: Pets in the home: none.  see history section for home environment  Review of Systems   Constitutional: Negative for fever, chills, appetite change and fatigue.   HENT: Positive for congestion and postnasal drip. Negative for ear pain, nosebleeds, sore throat, facial swelling, rhinorrhea, sneezing, trouble swallowing, voice change, sinus pressure and ear discharge.    Eyes: Negative for discharge, redness, itching and visual disturbance.   Respiratory: Positive for cough and wheezing. Negative for choking, chest tightness and shortness of breath.    Cardiovascular: Negative for chest pain, palpitations and leg swelling.   Gastrointestinal: Negative for nausea, vomiting, abdominal pain, diarrhea, constipation and abdominal distention.   Genitourinary: Negative for difficulty urinating.   Musculoskeletal: Negative for myalgias, joint swelling, arthralgias and gait problem.   Skin: Positive for rash. Negative for color change.   Neurological: Negative for dizziness, syncope, weakness, light-headedness and  headaches.   Hematological: Negative for adenopathy. Does not bruise/bleed easily.   Psychiatric/Behavioral: Negative for behavioral problems, confusion, sleep disturbance and agitation. The patient is not nervous/anxious.         Objective:    Physical Exam   Nursing note and vitals reviewed.  Constitutional: She is oriented to person, place, and time. She appears well-developed and well-nourished. No distress.   HENT:   Head: Normocephalic and atraumatic.   Right Ear: Hearing, tympanic membrane, external ear and ear canal normal.   Left Ear: Hearing, tympanic membrane, external ear and ear canal normal.   Nose: No mucosal edema, rhinorrhea, sinus tenderness or septal deviation. No epistaxis. Right sinus exhibits no maxillary sinus tenderness and no frontal sinus tenderness. Left sinus exhibits no maxillary sinus tenderness and no frontal sinus tenderness.   Mouth/Throat: Uvula is midline, oropharynx is clear and moist and mucous membranes are normal. No uvula swelling.   Eyes: Conjunctivae normal are normal. Right eye exhibits no discharge. Left eye exhibits no discharge.   Neck: Normal range of motion. No thyromegaly present.   Cardiovascular: Normal rate, regular rhythm and normal heart sounds.    No murmur heard.  Pulmonary/Chest: Effort normal and breath sounds normal. No respiratory distress. She has no wheezes.   Abdominal: Soft. She exhibits no distension. There is no tenderness.   Musculoskeletal: Normal range of motion. She exhibits no edema and no tenderness.   Lymphadenopathy:     She has no cervical adenopathy.   Neurological: She is alert and oriented to person, place, and time.   Skin: Skin is warm and dry. No rash noted. No erythema.   Psychiatric: She has a normal mood and affect. Her behavior is normal. Judgment and thought content normal.       Laboratory:   none performed   Assessment:       1. Severe persistent asthma without complication    2. Eosinophilic asthma    3. Anti-polysaccharide  antibody deficiency    4. Recurrent sinus infections    5. Chronic rhinitis         Plan:       1. continue Hizentra 18 g every 2 weeks.    2. continue Singulair daily   3. albuterol and Pulmicort nebs as needed  4. RTC annually or sooner if needed    I spent a total of 40 minutes on the day of the visit.  This includes face to face time and non-face to face time preparing to see the patient (eg, review of tests), obtaining and/or reviewing separately obtained history, documenting clinical information in the electronic or other health record, independently interpreting results and communicating results to the patient/family/caregiver, or care coordinator.

## 2023-10-03 NOTE — TELEPHONE ENCOUNTER
----- Message from Elizabeth A Bosworth, LPN sent at 10/3/2023  2:17 PM CDT -----  Last note says she was getting it from [facility]  Can you print out that rx and give it to Christelle?  She can fax it to [facility] @ [phone].   ----- Message -----  From: Marti Ogden MD  Sent: 10/3/2023   1:49 PM CDT  To: Elizabeth A Bosworth, LPN    Pt nguyen n Hizentra, she thinks gets from [facility] but not sure, do you have record of where she gets it form? Wasn't sure if needed new prescription

## 2023-10-03 NOTE — TELEPHONE ENCOUNTER
----- Message from Basilia Barroso sent at 10/3/2023  4:11 PM CDT -----  Contact: Patient  Type:  Needs Medical Advice    Who Called:   Patient    Would the patient rather a call back or a response via [facility]?   Call back  Best Call Back Number:   [phone]    Additional Information:   States she would like to speak with someone in the office about an order for bone density put into the system for scheduling - please call to advise - thank you

## 2023-10-04 ENCOUNTER — CLINICAL SUPPORT (OUTPATIENT)
Dept: BARIATRICS | Facility: CLINIC | Age: 63
End: 2023-10-04
Payer: MEDICARE

## 2023-10-04 VITALS — WEIGHT: 169.31 LBS | BODY MASS INDEX: 30 KG/M2 | HEIGHT: 63 IN

## 2023-10-04 DIAGNOSIS — Z71.3 ENCOUNTER FOR DIETARY COUNSELING AND SURVEILLANCE: Primary | ICD-10-CM

## 2023-10-04 DIAGNOSIS — E66.9 OBESITY (BMI 30-39.9): ICD-10-CM

## 2023-10-04 PROCEDURE — 97803 MED NUTRITION INDIV SUBSEQ: CPT | Mod: PBBFAC,PN

## 2023-10-04 PROCEDURE — 99213 OFFICE O/P EST LOW 20 MIN: CPT | Mod: PBBFAC,PN

## 2023-10-04 PROCEDURE — 99999PBSHW PR PBB SHADOW TECHNICAL ONLY FILED TO HB: Mod: PBBFAC,,,

## 2023-10-04 PROCEDURE — 99999PBSHW PR PBB SHADOW TECHNICAL ONLY FILED TO HB: ICD-10-PCS | Mod: PBBFAC,,,

## 2023-10-04 PROCEDURE — 99999 PR PBB SHADOW E&M-EST. PATIENT-LVL III: CPT | Mod: PBBFAC,,,

## 2023-10-04 PROCEDURE — 99999 PR PBB SHADOW E&M-EST. PATIENT-LVL III: ICD-10-PCS | Mod: PBBFAC,,,

## 2023-10-04 NOTE — PROGRESS NOTES
NUTRITION NOTE     Referring Physician: NUTRITION NOTE     Referring Physician: Dr. Reese  Reason for MNT Referral: Follow-up s/p Gastric Sleeve in 2018. Diagnosis of ulcerative colitis in October 2022.     PAST MEDICAL HISTORY:  Denies nausea, vomiting, and constipation.  Reports problems, including diarrhea with blood .          Past Medical History:   Diagnosis Date    Angio-edema      Arthralgia      Asthma without status asthmaticus      Bronchitis      COPD (chronic obstructive pulmonary disease)      COVID-19 virus infection 07/23/2021    Diverticulosis of large intestine without hemorrhage 10/08/2015    Environmental allergies      Eosinophilic asthma 03/08/2018    Exacerbation of ulcerative colitis with rectal bleeding      Gastroparesis      Hand arthritis      Herpes simplex without mention of complication       oral    Hidradenitis      History of morbid obesity      Hyperlipidemia      Hypothyroidism      Immune deficiency disorder      Intraperitoneal abscess 05/07/2018    LBP radiating to left leg      Leukocytosis, unspecified      Migraine headache      Normocytic anemia 10/05/2022    Obesity, Class III, BMI 40-49.9 (morbid obesity)      Periorbital cellulitis 12/31/2016    Prediabetes      RA (rheumatoid arthritis)      Recurrent upper respiratory infection (URI)      S/P colonoscopy 11/2010    Sepsis 05/07/2018    Systemic lupus erythematosus, organ or system involvement unspecified      Tubular adenoma of colon 08/17/2022    Ulcerative pancolitis with rectal bleeding 10/17/2022    Urticaria           CLINICAL DATA:  62 y.o. female.     There were no vitals filed for this visit.     Current Weight: 169 lbs  BMI: 30.47  Total Weight Loss: -46 lbs     LABS:  Component Ref Range & Units 1 mo ago  (8/23/23) 2 mo ago  (7/31/23) 5 mo ago  (4/12/23) 8 mo ago  (1/26/23) 11 mo ago  (11/1/22) 12 mo ago  (10/9/22) 12 mo ago  (10/8/22)   WBC 3.90 - 12.70 K/uL 18.49 High   12.92 High   8.56  14.67 High    10.66  20.12 High   16.37 High     RBC 4.00 - 5.40 M/uL 4.63  4.35  4.34  4.34  3.76 Low   3.80 Low   3.83 Low     Hemoglobin 12.0 - 16.0 g/dL 10.9 Low   9.5 Low   11.0 Low   11.1 Low   10.9 Low   11.0 Low   11.2 Low     Hematocrit 37.0 - 48.5 % 37.9  36.2 Low   37.1  36.2 Low   37.2  34.3 Low   34.7 Low     MCV 82 - 98 fL 82  83  86  83  99 High   90  91    MCH 27.0 - 31.0 pg 23.5 Low   21.8 Low   25.3 Low   25.6 Low   29.0  28.9  29.2    MCHC 32.0 - 36.0 g/dL 28.8 Low             Potassium 3.5 - 5.1 mmol/L 3.4 Low   3.9      Alkaline Phosphatase 55 - 135 U/L 52 Low   52 Low       CURRENT DIET:  Food logs obtained from December 2022-October 2023. Patient provided a notebooks with caloric, protein, and fiber breakdown with annotations regarding bowel movements.    Diet Includes:   Breakfast- sutton or bagel w/ strawberry cream cheese or peanut butter, honey rice crispies +coffee with sugar and dark chocolate almond milk  Lunch- Chicken thighs with GF cheesy bread; deli ham and roast beef with 1 slice of cheese and Shrewd protein puffs  Dinner - BBQ pork or Ahi tuna with potatoes or rice  Bedtime snack - snickerdoodles, dark chocolate m&m's, reeses, ezequiel mints  Beverages: 4-5 30 oz. Water, sparkling red grape juice 8oz. X1-2d/wk in the evening, orange soda, peppermint tea  Protein Supplements: None. Patient reports GI doctor provided nutritional supplement - plans to send portal message with nutritional information. Discussed Unjury unflavored whey protein isolate.      FOOD ALLERGIES: Bromelains (pineapple), iodine, melon, corn containing products, wheat containing products, all artifical sweeteners- migraines, cranberries, lactose intolerant, peas, grapefruit, carolina, and limes    Able to tolerate whey protein isolate, but not concentrate; Romanian yogurts ~ 2 days/week.    EXERCISE:  Pool or walking 2-3d/wk at [facility] or son's gym.      Restrictions to Exercise:  None     VITAMINS/MINERALS:  Multivitamins:  Bariatric pal without iron  B-Complex: Included with multivitamin.  Calcium Citrate + Vitamin D: None. Patient reports nephrologist recommended not consuming calcium supplement but D3 2,000IU.   Vitamin B12:  included with MVI     Prescription 65mg iron x3d/wk, 2000 units D3, per GI MD plans to start additional supplementation for zinc, K+, and vitamin C    ASSESSMENT:  Doing fair overall.  Adequate protein intake.  Adequate fluid intake.     Patient post- op gastric sleeve in 2018. Recent diagnosis in October with ulcerative colitis, lupus, rheumatoid arthritis, Behcet's syndrome, and immunologic deficiency syndrome. Patient recently diagnosed with covid then C.diff. Bowel movements were up to ~ 12 times/day. Bowel movements have returned to 4-5 times/day. Bowel movements mainly occurring at 1-4am and during breakfast hours. Continues with a low residue, high protein diet. Patient reporting all artifical sugars cause migraines due to chemical sensitivity.      RD promoting focus of overall health with newly dx of UC. Improvement within hydration status with frequency of bowel movements. Discussion of implementing protein powder with almond milk into dietary regimen. Gastroenterology provided nutritional supplement for patient to start. She was unable to recall the supplement. RD encouraged her to send a portal message with nutritional supplement to review label. She expressed wanting to add variety into the diet. Inflammatory markers have improved from 3000+ to 1820. Provided patient with low FODMAP diet to assist with implementing diet. RD encouraged continued tracking with introduction of new foods.     BARIATRIC DIET DISCUSSION:  Instructed and provided written materials on low FODMAP diet plan.  Reinforced post-op nutrition guidelines.     PLAN/RECOMMONDATIONS:  Begin low FODMAP diet.   Maintain protein intake to meet 60 g/day.   Maintain fluid intake to meet a minimum of 64 oz/ day.   Begin light exercise, if  medically appropriate.   Continue appropriate vitamins & minerals.  Take medication as prescribed regarding vitamin/mineral supplementation.   Begin introducing low FODMAP foods with continued dietary log.   Begin reducing sweets to dark chocolate only.  RD to reach out to GI to obtain medical records for coordination of care.   RD provided list of artifical sweeteners to assist with reading nutrition facts labels.   Return to clinic in 3 weeks.     SESSION TIME: 60 minutes

## 2023-10-09 ENCOUNTER — DOCUMENTATION ONLY (OUTPATIENT)
Dept: ALLERGY | Facility: CLINIC | Age: 63
End: 2023-10-09
Payer: MEDICARE

## 2023-10-09 ENCOUNTER — TELEPHONE (OUTPATIENT)
Dept: ALLERGY | Facility: CLINIC | Age: 63
End: 2023-10-09
Payer: MEDICARE

## 2023-10-09 DIAGNOSIS — D83.9 CVID (COMMON VARIABLE IMMUNODEFICIENCY): ICD-10-CM

## 2023-10-09 NOTE — TELEPHONE ENCOUNTER
Faxed new Rx for Hizentra & clinical note to Hemal Curry ( [facility] ) at [phone].  Original uploaded to media manager.

## 2023-10-11 ENCOUNTER — PATIENT MESSAGE (OUTPATIENT)
Dept: ALLERGY | Facility: CLINIC | Age: 63
End: 2023-10-11
Payer: MEDICARE

## 2023-10-17 ENCOUNTER — LAB VISIT (OUTPATIENT)
Dept: LAB | Facility: HOSPITAL | Age: 63
End: 2023-10-17
Attending: STUDENT IN AN ORGANIZED HEALTH CARE EDUCATION/TRAINING PROGRAM
Payer: MEDICARE

## 2023-10-17 DIAGNOSIS — T38.0X5A IMMUNODEFICIENCY SECONDARY TO STEROIDS: ICD-10-CM

## 2023-10-17 DIAGNOSIS — D84.821 IMMUNODEFICIENCY SECONDARY TO STEROIDS: ICD-10-CM

## 2023-10-17 DIAGNOSIS — D50.9 IRON (FE) DEFICIENCY ANEMIA: Primary | ICD-10-CM

## 2023-10-17 DIAGNOSIS — Z51.81 ENCOUNTER FOR THERAPEUTIC DRUG MONITORING: ICD-10-CM

## 2023-10-17 DIAGNOSIS — Z79.52 IMMUNODEFICIENCY SECONDARY TO STEROIDS: ICD-10-CM

## 2023-10-17 DIAGNOSIS — K51.00 ULCERATIVE CHRONIC PANCOLITIS WITHOUT COMPLICATIONS: ICD-10-CM

## 2023-10-17 LAB
ALBUMIN SERPL BCP-MCNC: 3.7 G/DL (ref 3.5–5.2)
ALP SERPL-CCNC: 47 U/L (ref 55–135)
ALT SERPL W/O P-5'-P-CCNC: 19 U/L (ref 10–44)
ANION GAP SERPL CALC-SCNC: 12 MMOL/L (ref 8–16)
AST SERPL-CCNC: 24 U/L (ref 10–40)
BASOPHILS # BLD AUTO: 0.05 K/UL (ref 0–0.2)
BASOPHILS NFR BLD: 0.5 % (ref 0–1.9)
BILIRUB SERPL-MCNC: 0.2 MG/DL (ref 0.1–1)
BUN SERPL-MCNC: 17 MG/DL (ref 8–23)
CALCIUM SERPL-MCNC: 9.4 MG/DL (ref 8.7–10.5)
CHLORIDE SERPL-SCNC: 104 MMOL/L (ref 95–110)
CHOLEST SERPL-MCNC: 232 MG/DL (ref 120–199)
CHOLEST/HDLC SERPL: 2.5 {RATIO} (ref 2–5)
CO2 SERPL-SCNC: 26 MMOL/L (ref 23–29)
CREAT SERPL-MCNC: 1 MG/DL (ref 0.5–1.4)
DIFFERENTIAL METHOD: ABNORMAL
EOSINOPHIL # BLD AUTO: 0.2 K/UL (ref 0–0.5)
EOSINOPHIL NFR BLD: 1.7 % (ref 0–8)
ERYTHROCYTE [DISTWIDTH] IN BLOOD BY AUTOMATED COUNT: 14.6 % (ref 11.5–14.5)
EST. GFR  (NO RACE VARIABLE): >60 ML/MIN/1.73 M^2
GLUCOSE SERPL-MCNC: 76 MG/DL (ref 70–110)
HCT VFR BLD AUTO: 37.7 % (ref 37–48.5)
HDLC SERPL-MCNC: 93 MG/DL (ref 40–75)
HDLC SERPL: 40.1 % (ref 20–50)
HGB BLD-MCNC: 11.1 G/DL (ref 12–16)
IMM GRANULOCYTES # BLD AUTO: 0.06 K/UL (ref 0–0.04)
IMM GRANULOCYTES NFR BLD AUTO: 0.6 % (ref 0–0.5)
LDLC SERPL CALC-MCNC: 124 MG/DL (ref 63–159)
LYMPHOCYTES # BLD AUTO: 4.4 K/UL (ref 1–4.8)
LYMPHOCYTES NFR BLD: 43.2 % (ref 18–48)
MCH RBC QN AUTO: 25.2 PG (ref 27–31)
MCHC RBC AUTO-ENTMCNC: 29.4 G/DL (ref 32–36)
MCV RBC AUTO: 86 FL (ref 82–98)
MONOCYTES # BLD AUTO: 0.9 K/UL (ref 0.3–1)
MONOCYTES NFR BLD: 9.2 % (ref 4–15)
NEUTROPHILS # BLD AUTO: 4.5 K/UL (ref 1.8–7.7)
NEUTROPHILS NFR BLD: 44.8 % (ref 38–73)
NONHDLC SERPL-MCNC: 139 MG/DL
NRBC BLD-RTO: 0 /100 WBC
PLATELET # BLD AUTO: 341 K/UL (ref 150–450)
PMV BLD AUTO: 10.4 FL (ref 9.2–12.9)
POTASSIUM SERPL-SCNC: 3.6 MMOL/L (ref 3.5–5.1)
PROT SERPL-MCNC: 6.9 G/DL (ref 6–8.4)
RBC # BLD AUTO: 4.4 M/UL (ref 4–5.4)
SODIUM SERPL-SCNC: 142 MMOL/L (ref 136–145)
TRIGL SERPL-MCNC: 75 MG/DL (ref 30–150)
WBC # BLD AUTO: 10.07 K/UL (ref 3.9–12.7)

## 2023-10-17 PROCEDURE — 36415 COLL VENOUS BLD VENIPUNCTURE: CPT | Mod: PO | Performed by: STUDENT IN AN ORGANIZED HEALTH CARE EDUCATION/TRAINING PROGRAM

## 2023-10-17 PROCEDURE — 80061 LIPID PANEL: CPT | Performed by: STUDENT IN AN ORGANIZED HEALTH CARE EDUCATION/TRAINING PROGRAM

## 2023-10-17 PROCEDURE — 80053 COMPREHEN METABOLIC PANEL: CPT | Performed by: STUDENT IN AN ORGANIZED HEALTH CARE EDUCATION/TRAINING PROGRAM

## 2023-10-17 PROCEDURE — 85025 COMPLETE CBC W/AUTO DIFF WBC: CPT | Performed by: STUDENT IN AN ORGANIZED HEALTH CARE EDUCATION/TRAINING PROGRAM

## 2023-10-20 NOTE — LETTER
November 29, 2019      [place]  [address]  [place] [address]           [facility]  [address] [place] [address]  Phone: [phone]  Fax: [phone]          Patient: Yodit Canseco   MR Number: [id number]   YOB: 1960   Date of Visit: 11/29/2019       Dear Zoila Wood:    Thank you for referring Yodit Canseco to me for evaluation. Attached you will find relevant portions of my assessment and plan of care.    If you have questions, please do not hesitate to call me. I look forward to following Yodit Canseco along with you.    Sincerely,    DELFINO Caliosure  CC:  No Recipients    If you would like to receive this communication electronically, please contact [email or web address] or [phone] to request more information on [employer] access.    For providers and/or their staff who would like to refer a patient to [facility], please contact us through our one-stop-shop provider referral line, [facility], at [phone].    If you feel you have received this communication in error or would no longer like to receive these types of communications, please e-mail [email or web address]          Negative

## 2023-10-29 ENCOUNTER — PATIENT MESSAGE (OUTPATIENT)
Dept: PULMONOLOGY | Facility: CLINIC | Age: 63
End: 2023-10-29
Payer: MEDICARE

## 2023-11-06 ENCOUNTER — TELEPHONE (OUTPATIENT)
Dept: PAIN MEDICINE | Facility: CLINIC | Age: 63
End: 2023-11-06
Payer: MEDICARE

## 2023-11-07 DIAGNOSIS — R60.0 LOCALIZED EDEMA: ICD-10-CM

## 2023-11-07 RX ORDER — TRIAMTERENE/HYDROCHLOROTHIAZID 37.5-25 MG
1 TABLET ORAL DAILY PRN
Qty: 90 TABLET | Refills: 1 | Status: SHIPPED | OUTPATIENT
Start: 2023-11-07 | End: 2024-03-26

## 2023-11-09 ENCOUNTER — OFFICE VISIT (OUTPATIENT)
Dept: DERMATOLOGY | Facility: CLINIC | Age: 63
End: 2023-11-09
Payer: MEDICARE

## 2023-11-09 DIAGNOSIS — M35.9 CONNECTIVE TISSUE DISEASE: ICD-10-CM

## 2023-11-09 DIAGNOSIS — L81.4 LENTIGINES: ICD-10-CM

## 2023-11-09 DIAGNOSIS — L65.9 HAIR LOSS: Primary | ICD-10-CM

## 2023-11-09 PROCEDURE — 99213 PR OFFICE/OUTPT VISIT, EST, LEVL III, 20-29 MIN: ICD-10-PCS | Mod: S$PBB,,, | Performed by: STUDENT IN AN ORGANIZED HEALTH CARE EDUCATION/TRAINING PROGRAM

## 2023-11-09 PROCEDURE — 99213 OFFICE O/P EST LOW 20 MIN: CPT | Mod: S$PBB,,, | Performed by: STUDENT IN AN ORGANIZED HEALTH CARE EDUCATION/TRAINING PROGRAM

## 2023-11-09 PROCEDURE — 99999 PR PBB SHADOW E&M-EST. PATIENT-LVL II: CPT | Mod: PBBFAC,,, | Performed by: STUDENT IN AN ORGANIZED HEALTH CARE EDUCATION/TRAINING PROGRAM

## 2023-11-09 PROCEDURE — 99212 OFFICE O/P EST SF 10 MIN: CPT | Mod: PBBFAC | Performed by: STUDENT IN AN ORGANIZED HEALTH CARE EDUCATION/TRAINING PROGRAM

## 2023-11-09 PROCEDURE — 99999 PR PBB SHADOW E&M-EST. PATIENT-LVL II: ICD-10-PCS | Mod: PBBFAC,,, | Performed by: STUDENT IN AN ORGANIZED HEALTH CARE EDUCATION/TRAINING PROGRAM

## 2023-11-09 RX ORDER — MINOXIDIL 2.5 MG/1
TABLET ORAL
Qty: 90 TABLET | Refills: 2 | Status: SHIPPED | OUTPATIENT
Start: 2023-11-09

## 2023-11-09 RX ORDER — TRETINOIN 0.5 MG/G
CREAM TOPICAL NIGHTLY
Qty: 45 G | Refills: 3 | Status: SHIPPED | OUTPATIENT
Start: 2023-11-09

## 2023-11-09 NOTE — PROGRESS NOTES
Subjective:       Patient ID:  Yodit Canseco is a 63 y.o. female who presents for   Chief Complaint   Patient presents with    Follow-up     History of Present Illness: The patient presents for follow up. Last seen on 10/19/22. Has a history of hair loss, currently on low dose oral minoxidil with good results. Has noticed less hair falling out. Tolerating medication well. Previously had a rash around the eyes, on doxycycline with improvement and no further flares. Patient also has a history of connective tissue disease and IBD, being managed by Rheumatology and GI. Doing well with no major flares from that standpoint either. Few brown patches and easy bruising noted on the arms.     Follow-up        Review of Systems   Constitutional:  Negative for fever and chills.   Skin:  Negative for itching, rash and dry skin.        Objective:    Physical Exam   Constitutional: She appears well-developed and well-nourished. No distress.   Neurological: She is alert and oriented to person, place, and time. She is not disoriented.   Psychiatric: She has a normal mood and affect.   Skin:   Areas Examined (abnormalities noted in diagram):   Scalp / Hair Palpated and Inspected  Head / Face Inspection Performed  Neck Inspection Performed  RUE Inspected  LUE Inspection Performed              Diagram Legend     Erythematous scaling macule/papule c/w actinic keratosis       Vascular papule c/w angioma      Pigmented verrucoid papule/plaque c/w seborrheic keratosis      Yellow umbilicated papule c/w sebaceous hyperplasia      Irregularly shaped tan macule c/w lentigo     1-2 mm smooth white papules consistent with Milia      Movable subcutaneous cyst with punctum c/w epidermal inclusion cyst      Subcutaneous movable cyst c/w pilar cyst      Firm pink to brown papule c/w dermatofibroma      Pedunculated fleshy papule(s) c/w skin tag(s)      Evenly pigmented macule c/w junctional nevus     Mildly variegated pigmented, slightly  irregular-bordered macule c/w mildly atypical nevus      Flesh colored to evenly pigmented papule c/w intradermal nevus       Pink pearly papule/plaque c/w basal cell carcinoma      Erythematous hyperkeratotic cursted plaque c/w SCC      Surgical scar with no sign of skin cancer recurrence      Open and closed comedones      Inflammatory papules and pustules      Verrucoid papule consistent consistent with wart     Erythematous eczematous patches and plaques     Dystrophic onycholytic nail with subungual debris c/w onychomycosis     Umbilicated papule    Erythematous-base heme-crusted tan verrucoid plaque consistent with inflamed seborrheic keratosis     Erythematous Silvery Scaling Plaque c/w Psoriasis     See annotation      Assessment / Plan:        Hair loss - doing well, stable with some regrowth. Continue oral minoxidil.   -     minoxidiL (LONITEN) 2.5 MG tablet; Take 1/4th (0.625mg) by mouth daily  Dispense: 90 tablet; Refill: 2    Lentigines  -     tretinoin (RETIN-A) 0.05 % cream; Apply topically every evening.  Dispense: 45 g; Refill: 3    Connective tissue disease - currently being managed by Rheumatology and GI. Continue per their recommendations.              Follow up in about 6 months (around 5/9/2024).

## 2023-11-11 DIAGNOSIS — M35.9 UNDIFFERENTIATED CONNECTIVE TISSUE DISEASE: ICD-10-CM

## 2023-11-13 RX ORDER — HYDROXYCHLOROQUINE SULFATE 200 MG/1
200 TABLET, FILM COATED ORAL 2 TIMES DAILY
Qty: 60 TABLET | Refills: 6 | Status: SHIPPED | OUTPATIENT
Start: 2023-11-13 | End: 2024-01-05 | Stop reason: ALTCHOICE

## 2023-11-17 ENCOUNTER — PATIENT MESSAGE (OUTPATIENT)
Dept: PULMONOLOGY | Facility: CLINIC | Age: 63
End: 2023-11-17
Payer: MEDICARE

## 2023-11-19 ENCOUNTER — PATIENT MESSAGE (OUTPATIENT)
Dept: PULMONOLOGY | Facility: CLINIC | Age: 63
End: 2023-11-19
Payer: MEDICARE

## 2023-12-05 ENCOUNTER — OFFICE VISIT (OUTPATIENT)
Dept: PULMONOLOGY | Facility: CLINIC | Age: 63
End: 2023-12-05
Payer: MEDICARE

## 2023-12-05 VITALS
BODY MASS INDEX: 31.25 KG/M2 | WEIGHT: 176.38 LBS | HEART RATE: 75 BPM | DIASTOLIC BLOOD PRESSURE: 81 MMHG | HEIGHT: 63 IN | SYSTOLIC BLOOD PRESSURE: 154 MMHG | OXYGEN SATURATION: 95 %

## 2023-12-05 DIAGNOSIS — N77.0 BEHCET'S VULVO-VAGINAL ULCER: ICD-10-CM

## 2023-12-05 DIAGNOSIS — D84.9 IMMUNOLOGIC DEFICIENCY SYNDROME: ICD-10-CM

## 2023-12-05 DIAGNOSIS — J45.51 SEVERE PERSISTENT ASTHMA WITH ACUTE EXACERBATION: ICD-10-CM

## 2023-12-05 DIAGNOSIS — B37.9 YEAST INFECTION: ICD-10-CM

## 2023-12-05 DIAGNOSIS — N30.00 ACUTE CYSTITIS WITHOUT HEMATURIA: Primary | ICD-10-CM

## 2023-12-05 DIAGNOSIS — M35.2 BEHCET'S VULVO-VAGINAL ULCER: ICD-10-CM

## 2023-12-05 PROCEDURE — 99999 PR PBB SHADOW E&M-EST. PATIENT-LVL V: ICD-10-PCS | Mod: PBBFAC,,, | Performed by: INTERNAL MEDICINE

## 2023-12-05 PROCEDURE — 99214 PR OFFICE/OUTPT VISIT, EST, LEVL IV, 30-39 MIN: ICD-10-PCS | Mod: S$PBB,,, | Performed by: INTERNAL MEDICINE

## 2023-12-05 PROCEDURE — 99999 PR PBB SHADOW E&M-EST. PATIENT-LVL V: CPT | Mod: PBBFAC,,, | Performed by: INTERNAL MEDICINE

## 2023-12-05 PROCEDURE — 99214 OFFICE O/P EST MOD 30 MIN: CPT | Mod: S$PBB,,, | Performed by: INTERNAL MEDICINE

## 2023-12-05 PROCEDURE — 99215 OFFICE O/P EST HI 40 MIN: CPT | Mod: PBBFAC,PO | Performed by: INTERNAL MEDICINE

## 2023-12-05 RX ORDER — HYDROMORPHONE HYDROCHLORIDE 2 MG/1
2 TABLET ORAL EVERY 4 HOURS PRN
Qty: 30 TABLET | Refills: 0 | Status: SHIPPED | OUTPATIENT
Start: 2023-12-05

## 2023-12-05 RX ORDER — SENNOSIDES 25 MG/1
1 TABLET, FILM COATED ORAL DAILY
COMMUNITY
Start: 2023-01-06

## 2023-12-05 RX ORDER — TOFACITINIB 10 MG/1
1 TABLET, FILM COATED ORAL 2 TIMES DAILY
COMMUNITY

## 2023-12-05 RX ORDER — BENZONATATE 200 MG/1
200 CAPSULE ORAL 3 TIMES DAILY PRN
COMMUNITY
End: 2024-01-02

## 2023-12-05 RX ORDER — FLUTICASONE FUROATE, UMECLIDINIUM BROMIDE AND VILANTEROL TRIFENATATE 200; 62.5; 25 UG/1; UG/1; UG/1
1 POWDER RESPIRATORY (INHALATION) DAILY
Qty: 60 EACH | Refills: 11 | Status: SHIPPED | OUTPATIENT
Start: 2023-12-05

## 2023-12-05 RX ORDER — FLUCONAZOLE 200 MG/1
200 TABLET ORAL WEEKLY
Qty: 7 TABLET | Refills: 0 | Status: SHIPPED | OUTPATIENT
Start: 2023-12-05

## 2023-12-05 NOTE — PROGRESS NOTES
12/5/2023    Yodit Canseco  Office Note    Chief Complaint   Patient presents with    Follow-up       HPI:   Pt had exacerbatoin cough and increased UC prednisone from 5/d to 20 mg for last 10 days along with doxy -- off abx and had green mucous this am.  Albuterol helps and dosed tid.    Pt had u/a showing wbc at home - says recurrent uti.  H/o c diff.  Not on controller.      Appetite good nad not weak, no fever.     5/8/2023 took doxycycline no issues.  Bowels may be better on Entyvio. Lungs doing well.  On Hizentra.  Pt on prednisone 20/d -- on prednisone since November with plans to decrease in near future?   Still having blood in stool so will not taper prednisone til bleed resolves-- should improve in near future (on 3 rd entyvio now and should be good after 4th)  Patient Instructions   Might consider pneumocystis prophylaxis but antibiotics will increase c diff-- bactrim 2 single strength 3 days a week.  Use vancomycin daily while on bactrim.  Stop bactrim (followed by vancomycin) on prednisone less than 20mg/d.    Bactrim generally has high risk allergies and kidney problems.     12/29/2022 having sinus problems with green mucous nad coughing, right face to chin hurts last 3 days. Had in prior.  May exacerbate lungs as in past.  Started levaquin 2 days ago. Prednisone boosted from 10 to 20/d.  Need pain control and cough suppresion .  Has prednisoen. Not getting [facility]erol [facility]-- not covered on plan??   1/30/2023 chart note after above visit.  Pt has had colitis-- dx c diff recently.   Epic reveals c diff ag but no toxin.    Had green sinus dc-- cleared slight with levaquin but now dx c diff with ag no toxin    Will dc levaquin and instructed not to use.    Doxycycline may be ok -- would avoid abx as much as able.    If sinuses relapse -- doxycycline sent in.    If green lung mucous -- pt told to submit sputum for culture.    11/2/2022 dx lupus, bechets, Ulcerative colitis, RA-- on  hizentra.  Pt exposed to grandson with rsv.  Having abd pain -- dilaudid helped and suppressed cough-- not able to use codeine/hydrocodone.     Ct abd and cxr 10/2022 clear lungs.       Pt currently felt to have rsv-- improved over last 5 da   pt was sl febrile initially.   Pt now having green mucous. Cough is violent .  similar sick. Had min leg edema.  On hizentra. Asthma doing wellwith RSV.  Eating ok but weak and coughs a lot.  Breathing good.     Patient Instructions   May use levaquin for green mucous    Rsv should run course-- hirzentra should help?  Breztri may be needed.      Use dilaudid minimally for pain -- should suppress cough    Diflucan for yeast as needed    Maxzide as needed for edema    Tessalon for cough    Could do phone follow up    Ct abd 10/2022 was clear lower lungs and cxr 10/31/2022 was clear -- lungs not a concern.  Asthma doing well even with rsv.  2022 mom  sept complications from hip fx/covid.  Had few sinus infection-- rx Levaquin/prednisone.  Pt on plaquenil, dx ra and lupus.  Also on colchicine.      Uses breo but skipped.  Uses albuterol once a wk.    Patient Instructions   May use breztri 2 twice daily -- could use more or less as needed.   If used regular -- breztri would act as controller.    May use levaquin and prednisone if sinus/lung exacerbates.    Lungs loook good wrt lung tissue-- no lung tissue disease seen from Rheumatoid disease.   Ct abd 2021 viewed.   2021 - had covid in July, had rapid home test +, managed outpt.  Got infusion rx.  Has wheezes but feels over covid.  Had nausea, vomiting, weakness, headache, -- sick 8 days.  Mom got hip fx complicating recovery-- at rehab. Pt and mom were vaccinated.  Pt on abx for sinus infection  Dx lupus/rheumatoid - 5 mg prednisone daily  Patient Instructions   Recovering covid doing well.    Use levaquin as needed.    Ordered cxr if needed.     2021 had pneumonia 2020 - no problems. Needs  disability paper updated.  We review today.    Patient Instructions   We reviewed your disability- need to complete paper work.  You had pneumonia in March 2020.  Breo, prednisone, levaquin, albuterol renewed.    10/14/2020- all well.  No abx/prednisone, cxr 10/14/2020 nad- rul pneumonia [place] cleared.  Avoids irritants.  Patient Instructions   You likely would be best to get high dose flu vaccine.   Your immune system may have problem responding to flu vaccine.  4/14/2020- on hirzentra since sept 2016.  Saw NP [place] in [place] 4/13- virtual visit- azithro and prednisone 10/d x 3 for sinusitis.     Uses   500 bid, breo 200 daily, , xopenex nebs prn, singulair. Needs more prednisone, had pneumonia 3/4 to 8th.  Had ct chest feb.  Felt had covid symptoms.  No ox needed for pneumonia- no steroids nor bd. Took doxy - vanc in hosp.   Patient Instructions   Pneumonia - follow up chest xray good- not urgent.    Prednisone action plan for asthma/wheeze/cough    May use levaquin for sinus/lung infections  2/10/2020- had shingles vaccine, states felt an immune response.   Cough- improved, daily, not severe, non productive occasionally productive thick dark yellow color. Using nebulizer only as needed. Has noticed fewer asthma exacerbations since starting Hyzentra, went from 1 months to 2x yearly.   Not able to use albuterol rescue inhaler or nebulized due to tachycardia, palpitations, and hand tremors. No complaint with xopenex medications.   Patient Instructions   Continue current Asthma medication regiment  Review of CT shows no areas of concern. The small nodule is not significant  Pulmonary function test shows lungs to still be strong.   Continue to use Xopenex Nebulizer with aerobika attachement once a day three days a week every week.  When your sick you can use nebulizer or Xopenex rescue inhaler four times a day.     1/9/2020- two exacerbations in 2 months, states cough is unchanged, daily, states bark  like cough, severe and affects daily life, Productive occasionally clear/cream color thick in consistency, uses nebulizer 2-3 x daily.   Patient Instructions   [employer] sells an Aerobika device, this device shakes the air inside of you to break mucous off the bronchial wall. Use with nebulizer and with out daily.    Continue current Asthma medication regiment     Use nebulizer at least once a day three days a week.     CT to evaluate lungs for any abnormalities       11/8/2019- Had recent Rotator cuff surgery with nerve block Oct 9, Has SOB worse after surgery for 3 days, Took prednisone 20 mg for 3 days and Levaquin for 9 days. Seen by PCP dx URI.   Coughing, chest tightness, congestion, wheezing, Hoarse voice, fatigue, Shortness of breath worse with exertion, onset following surgery to shoulder. Cough- not able to clear secretions from chest.  Complaint of hand shaking, heart rate increases, and becomes rapid with Albuterol nebulizer onset years. Tries not to use due to side effects.     04/24/2019- breathing is good except for high pollen days, had 3 exacerbation in 6 months. Albuterol rescue inhaler 3x weekly, 1-2 nocturnal arousals monthly, could not do PFT at [place] Always Prepped [facility],     11/6/18- ER visit for dehydration gastroenteritis, no prednisone use in 4 months. Not currently on fasenra injection, insurance would not cover. Currently on Hirzentra IGG therapy.   Sinus drip present, cough occasional, non productive, no nocturnal arousals, Currently on Dulera daily, uses Albuterol rescue inhaler daily before exercise no SOB.  Sepsis [facility] August 2017.  Flu vaccine   July 9, 2018 - had gastric sleeve with leak complication- lost 45 lbs already.  Has appetite but fills quickly.  Still on hirzentra.  Asthma ok avoiding fragrances/ordors.  No prednisone.   No nocturnal arousals, uses rescue weekly avoiding any irritants.  Use prednisone 4-5 last year and twice this year.        March 19,  2018-since recovered from pleuritic pain - doing well.  No prednisone use recent.  Pt has had no wheezes.  feb 22,2108   Had to do prednisone again.  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 I have advised not to work given severe asthma,  hypersensitivity to fragrances, and immune deficiency.   Pt seems better since Hirzentra, but still unstable severe asthma  Jan 29. 2018- 6 days ago had been exposed to sick , had nasal congestion , cough, ear stuffy, muscle aches /joint aches / fever.  Temp to 100.1.  Seen by np and flu screen negative.  Had asthma 3-4 flares last year. eos up past,   oct 5, 2017 - had marce in April, skipped couple doses hirzentra, had mild bronchitis once, otherwise doing very well.  No prednisone.  No side effects and covered. Ppt flu sense for 2 days  Jan 24,2017 on  hirzentra q o week since sept and asthma more stable, no hosp, no prednisone, no noct asthma/ no rescue therapy- control not this good for years.  Sept 27, 2016  HPI:has had lung problems since birth, no nocturnal arousals, uses rescue 2/d, breathing controlled satisfactory except with irritants/infection - strong abx needed to clear.  Prednisone 2/yr, last hosp 18 months.  No ventilator.    Had exacerbation recent due uri from .  Has had shingles vaccine past.       The chief compliant  problem is varies with instablilty at time  PFSH:  Past Medical History:   Diagnosis Date    Allergy     Angio-edema     Arthralgia     Asthma without status asthmaticus     Bronchitis     COPD (chronic obstructive pulmonary disease)     COVID-19 virus infection 07/23/2021    Diverticulosis of large intestine without hemorrhage 10/08/2015    Eczema     Environmental allergies     Eosinophilic asthma 03/08/2018    Exacerbation of ulcerative colitis with rectal bleeding     Gastroparesis     Hand arthritis     Herpes simplex  without mention of complication     oral    Hidradenitis     History of morbid obesity     Hyperlipidemia     Hypothyroidism     Immune deficiency disorder     Intraperitoneal abscess 05/07/2018    LBP radiating to left leg     Leukocytosis, unspecified     Migraine headache     Normocytic anemia 10/05/2022    Obesity, Class III, BMI 40-49.9 (morbid obesity)     Periorbital cellulitis 12/31/2016    Prediabetes     RA (rheumatoid arthritis)     Recurrent upper respiratory infection (URI)     RSV (respiratory syncytial virus infection) 01/30/2023    S/P colonoscopy 11/2010    Sepsis 05/07/2018    Systemic lupus erythematosus, organ or system involvement unspecified     Tubular adenoma of colon 08/17/2022    Ulcerative pancolitis with rectal bleeding 10/17/2022    Urticaria          Past Surgical History:   Procedure Laterality Date    ADENOIDECTOMY      CHOLECYSTECTOMY      CHOLECYSTECTOMY      colitis      COLONOSCOPY  2015    repeat in 10    COLONOSCOPY N/A 10/08/2015    Procedure: COLONOSCOPY;  Surgeon: Panda Bose MD;  Location: [facility];  Service: Endoscopy;  Laterality: N/A;    COLONOSCOPY N/A 08/17/2022    Procedure: COLONOSCOPY;  Surgeon: Olaf Del Valle MD;  Location: [facility];  Service: General;  Laterality: N/A;    COLONOSCOPY N/A 10/06/2022    Procedure: COLONOSCOPY;  Surgeon: Quinton Celeste MD;  Location: [facility];  Service: Gastroenterology;  Laterality: N/A;    Hand fracture surgery      HARDWARE REMOVAL      left hand 5th [facility]    hymenectomy      HYSTERECTOMY      menorrhagia-Ovaries intact    ROTATOR CUFF REPAIR Right     SLEEVE GASTROPLASTY  04/16/2018    TONSILLECTOMY      Urethral dilatation       Social History     Tobacco Use    Smoking status: Never     Passive exposure: Past    Smokeless tobacco: Never   Substance Use Topics    Alcohol use: Not Currently     Comment: very rarely    Drug use: No     Family History   Problem Relation Age of Onset    Melanoma Mother     Basal cell  "carcinoma Mother     Lung disease Mother         pulm htn    Cataracts Mother     Hypertension Mother     Lung cancer Father     Diabetes Father     Hypertension Father     Cancer Father     Melanoma Maternal Aunt     Melanoma Maternal Uncle     Diabetes Paternal Aunt     Colon cancer Paternal Aunt 40    Diabetes Paternal Aunt     Breast cancer Paternal Aunt 40    Lymphoma Paternal Aunt     Cancer Maternal Grandfather     Ovarian cancer Paternal Grandmother 40    Ulcerative colitis Son     Psoriasis Son     Psoriasis Son     Breast cancer Cousin 25    Allergic rhinitis Neg Hx     Allergies Neg Hx     Angioedema Neg Hx     Asthma Neg Hx     Atopy Neg Hx     Eczema Neg Hx     Immunodeficiency Neg Hx     Rhinitis Neg Hx     Urticaria Neg Hx      Review of patient's allergies indicates:   Allergen Reactions    Bromelains Hives     " causes mouth to bleed"    Sudafed [pseudoephedrine hcl] Other (See Comments)     Does not want to wake up .    Amoxicillin-pot clavulanate Itching     Other reaction(s): Itching    Hydrocodone Itching    Iodinated contrast- oral and iv dye      childhood    Iodine and iodide containing products Other (See Comments)    Melon Hives    Pantoprazole Nausea Only     Other reaction(s): upset stomach/halitosis    Percocet [oxycodone-acetaminophen] Itching    Barium iodide Rash    Ephedrine Other (See Comments)     Other reaction(s): comatose    Penicillins      Other reaction(s): does not work on pt symptoms     I have reviewed past medical, family, and social history. I have reviewed previous nurse notes.    Performance Status:The patient's activity level is functions out of house.      Review of Systems   Constitutional: Negative for activity change, appetite change, chills, diaphoresis, fatigue, fever and unexpected weight change.   HENT: Negative for dental problem, sneezing, sore throat, trouble swallowing, postnasal drip, rhinorrhea, sinus pressure, sinus pain, and voice change.   " "  Respiratory: Negative for chest tightness, shortness of breath, wheezing.  Positive for cough,   Cardiovascular: Negative for chest pain, palpitations and leg swelling.   Musculoskeletal: Negative for gait problem, myalgias and neck pain.   Skin: Negative for color change and pallor.   Allergic/Immunologic: Negative for environmental allergies and food allergies.   Neurological: Negative for dizziness, speech difficulty, weakness, light-headedness, numbness and headaches.   Hematological: Negative for adenopathy. Does not bruise/bleed easily.   Psychiatric/Behavioral: Negative for dysphoric mood and sleep disturbance. The patient is not nervous/anxious.             Exam:Comprehensive exam done. BP (!) 170/79 (BP Location: Right arm, Patient Position: Sitting)   Pulse (!) 56   Ht 5' 10" (1.778 m)   Wt 119.2 kg (262 lb 10.9 oz)   SpO2 95% Comment: on room air  BMI 37.69 kg/m²   Exam included Vitals as listed, and patient's appearance and affect and alertness and mood, oral exam for yeast and hygiene and pharynx lesions and Mallapatti (M) score, neck with inspection for jvd and masses and thyroid abnormalities and lymph nodes (supraclavicular and infraclavicular nodes and axillary also examined and noted if abn), chest exam included symmetry and effort and fremitus and percussion and auscultation, cardiac exam included rhythm and gallops and murmur and rubs and jvd and edema, abdominal exam for mass and hepatosplenomegaly and tenderness and hernias and bowel sounds, Musculoskeletal exam with muscle tone and posture and mobility/gait and  strength, and skin for rashes and cyanosis and pallor and turgor, extremity for clubbing.  Findings were normal except for pertinent findings listed below:  M3, chest is symmetric, no distress, normal percussion, normal fremitus and good normal breath sounds        Radiographs (ct chest and cxr) reviewed: results reviewed by direct vision  CT Chest Without Contrast " 2/10/2020  Normal exam, 3.3 mm nodule seen in right upper lobe.    Lungs are clear.  Minimal retained mucus secretions in the trachea.  No pleural effusion or pneumothorax.  Normal sized heart.  Thyroid is small and heterogeneous with a subcentimeter nodule or cyst in the right lobe.     XR CHEST 1 VIEW 05/10/2018   There is left lower lobe atelectasis and possible small left pleural effusion on this study.  No pneumothorax is seen.      Labs reviewed       Lab Results   Component Value Date    WBC 10.07 10/17/2023    RBC 4.40 10/17/2023    HGB 11.1 (L) 10/17/2023    HCT 37.7 10/17/2023    MCV 86 10/17/2023    MCH 25.2 (L) 10/17/2023    MCHC 29.4 (L) 10/17/2023    RDW 14.6 (H) 10/17/2023     10/17/2023    MPV 10.4 10/17/2023    GRAN 4.5 10/17/2023    GRAN 44.8 10/17/2023    LYMPH 4.4 10/17/2023    LYMPH 43.2 10/17/2023    MONO 0.9 10/17/2023    MONO 9.2 10/17/2023    EOS 0.2 10/17/2023    BASO 0.05 10/17/2023    EOSINOPHIL 1.7 10/17/2023    BASOPHIL 0.5 10/17/2023       PFT reviewed  2/10/2020 no obstruction seen, 7% bronchodilator response with 12% needed for clinical improvement; TLC 86% with DLC0 at 88%, normal exam with mild asthma  Spirometry bronchodilator, lung volume by gas dilution, diffusion capacity measured February 10, 2020.  The FEV1 to FVC ratio was 79%, there is no airflow obstruction measured by spirometry technique.  The FEV1 measured 85% predicted at 1.97 L. The 8%   improvement in FEV1 failed to reach statistical significance ( 12% is needed for statistical significance ).  Lung volumes by gas dilution were normal.  Diffusion was normal.      Spirometry, lung volumes, diffusion are all normal.  The bronchodilator response was not statistically significant.  Clinical correlation recommended.     Plan:  Clinical impression is apparently straight forward and impression with management as below.    Yodit was seen today for follow-up.    Diagnoses and all orders for this visit:    Acute  cystitis without hematuria  -     CULTURE, URINE    Behcet's vulvo-vaginal ulcer  Comments:  increase prednisone to 20 mg daily  we discussed secondary infection risks and she will notify us with any signs or symptoms  Orders:  -     HYDROmorphone (DILAUDID) 2 MG tablet; Take 1 tablet (2 mg total) by mouth every 4 (four) hours as needed for Pain (cough).    Yeast infection  -     fluconazole (DIFLUCAN) 200 MG Tab; Take 1 tablet (200 mg total) by mouth once a week.    Immunologic deficiency syndrome    Severe persistent asthma with acute exacerbation  -     fluticasone-umeclidin-vilanter (TRELEGY ELLIPTA) 200-62.5-25 mcg inhaler; Inhale 1 puff into the lungs once daily.  -     Culture, Respiratory with Gram Stain  -     X-Ray Chest PA And Lateral; Future              Follow up in about 1 week (around 12/12/2023), or if symptoms worsen or fail to improve.    Discussed with patient above for education the following:      Patient Instructions   Green mucous after increased medication for exacerbation -- failed doxycycline.  Need culture     Trelegy may stabilize-- once daily    Boost prednisone to 20 /d if cough controlled-- dilaudid sent in for 30  tabs-- call next wk if need more.            Cxr ordered if worsens    Need to re evaluate next wk if not clearing.

## 2023-12-05 NOTE — PATIENT INSTRUCTIONS
Green mucous after increased medication for exacerbation -- failed doxycycline.  Need culture     Trelegy may stabilize-- once daily    Boost prednisone to 20 /d if cough controlled-- dilaudid sent in for 30  tabs-- call next wk if need more.            Cxr ordered if worsens    Need to re evaluate next wk if not clearing.

## 2023-12-06 ENCOUNTER — LAB VISIT (OUTPATIENT)
Dept: LAB | Facility: HOSPITAL | Age: 63
End: 2023-12-06
Attending: INTERNAL MEDICINE
Payer: MEDICARE

## 2023-12-06 DIAGNOSIS — D84.9 IMMUNOLOGIC DEFICIENCY SYNDROME: ICD-10-CM

## 2023-12-06 PROCEDURE — 87070 CULTURE OTHR SPECIMN AEROBIC: CPT | Performed by: INTERNAL MEDICINE

## 2023-12-06 PROCEDURE — 87205 SMEAR GRAM STAIN: CPT | Performed by: INTERNAL MEDICINE

## 2023-12-09 LAB
BACTERIA SPEC AEROBE CULT: NORMAL
BACTERIA SPEC AEROBE CULT: NORMAL
GRAM STN SPEC: NORMAL

## 2023-12-11 ENCOUNTER — PATIENT MESSAGE (OUTPATIENT)
Dept: PULMONOLOGY | Facility: CLINIC | Age: 63
End: 2023-12-11
Payer: MEDICARE

## 2023-12-11 ENCOUNTER — PATIENT MESSAGE (OUTPATIENT)
Dept: BARIATRICS | Facility: CLINIC | Age: 63
End: 2023-12-11
Payer: MEDICARE

## 2023-12-11 DIAGNOSIS — Z86.19 HISTORY OF CLOSTRIDIOIDES DIFFICILE COLITIS: Primary | ICD-10-CM

## 2023-12-11 DIAGNOSIS — N30.00 ACUTE CYSTITIS WITHOUT HEMATURIA: ICD-10-CM

## 2023-12-11 RX ORDER — VANCOMYCIN HYDROCHLORIDE 125 MG/1
125 CAPSULE ORAL EVERY 6 HOURS
Qty: 14 CAPSULE | Refills: 0 | Status: SHIPPED | OUTPATIENT
Start: 2023-12-11 | End: 2023-12-27 | Stop reason: CLARIF

## 2023-12-11 RX ORDER — SULFAMETHOXAZOLE AND TRIMETHOPRIM 800; 160 MG/1; MG/1
1 TABLET ORAL 2 TIMES DAILY
Qty: 14 TABLET | Refills: 0 | Status: SHIPPED | OUTPATIENT
Start: 2023-12-11 | End: 2023-12-27 | Stop reason: CLARIF

## 2023-12-11 NOTE — PROGRESS NOTES
The patient feels she  had 2 episodes of C diff in the past.  We discussed vancomycin preventive medicine.  She is to take Bactrim DS twice a day for 7 days-she may stop it 5 days if she is totally asymptomatic quickly).  She needs to take the vancomycin for 7 days after she stopped the Bactrim.  Medicines sent in.

## 2023-12-20 DIAGNOSIS — K64.0 GRADE I HEMORRHOIDS: ICD-10-CM

## 2023-12-21 RX ORDER — HYDROCORTISONE 25 MG/G
CREAM TOPICAL 2 TIMES DAILY
Qty: 28.35 G | Refills: 2 | Status: SHIPPED | OUTPATIENT
Start: 2023-12-21

## 2023-12-21 NOTE — TELEPHONE ENCOUNTER
Refill Decision Note   Yodit Ajith  is requesting a refill authorization.  Brief Assessment and Rationale for Refill:  Approve     Medication Therapy Plan:         Comments:     Note composed:3:34 PM 12/21/2023

## 2023-12-26 ENCOUNTER — PATIENT MESSAGE (OUTPATIENT)
Dept: PULMONOLOGY | Facility: CLINIC | Age: 63
End: 2023-12-26
Payer: MEDICARE

## 2023-12-27 PROBLEM — A08.0 ROTAVIRUS ENTERITIS: Status: ACTIVE | Noted: 2023-12-27

## 2023-12-27 PROBLEM — R50.9 FEVER: Status: ACTIVE | Noted: 2023-12-27

## 2023-12-29 PROBLEM — R78.81 GRAM-POSITIVE COCCI BACTEREMIA: Status: ACTIVE | Noted: 2023-12-29

## 2023-12-30 DIAGNOSIS — Z79.890 POSTMENOPAUSAL HRT (HORMONE REPLACEMENT THERAPY): ICD-10-CM

## 2023-12-31 PROBLEM — E87.6 HYPOKALEMIA: Status: ACTIVE | Noted: 2023-12-31

## 2024-01-02 ENCOUNTER — PATIENT MESSAGE (OUTPATIENT)
Dept: PULMONOLOGY | Facility: CLINIC | Age: 64
End: 2024-01-02
Payer: MEDICARE

## 2024-01-02 ENCOUNTER — PATIENT MESSAGE (OUTPATIENT)
Dept: BARIATRICS | Facility: CLINIC | Age: 64
End: 2024-01-02
Payer: MEDICARE

## 2024-01-02 RX ORDER — ESTRADIOL 1 MG/1
1 TABLET ORAL
Qty: 90 TABLET | Refills: 1 | Status: SHIPPED | OUTPATIENT
Start: 2024-01-02

## 2024-01-02 RX ORDER — BENZONATATE 200 MG/1
200 CAPSULE ORAL
Qty: 30 CAPSULE | Refills: 0 | Status: SHIPPED | OUTPATIENT
Start: 2024-01-02

## 2024-01-04 ENCOUNTER — OFFICE VISIT (OUTPATIENT)
Dept: PULMONOLOGY | Facility: CLINIC | Age: 64
End: 2024-01-04
Payer: MEDICARE

## 2024-01-04 VITALS
SYSTOLIC BLOOD PRESSURE: 153 MMHG | DIASTOLIC BLOOD PRESSURE: 87 MMHG | BODY MASS INDEX: 34.61 KG/M2 | OXYGEN SATURATION: 91 % | HEART RATE: 80 BPM | HEIGHT: 62 IN | WEIGHT: 188.06 LBS

## 2024-01-04 DIAGNOSIS — J45.50 SEVERE PERSISTENT ASTHMA WITHOUT COMPLICATION: Primary | ICD-10-CM

## 2024-01-04 DIAGNOSIS — J32.9 CHRONIC SINUSITIS, UNSPECIFIED LOCATION: ICD-10-CM

## 2024-01-04 DIAGNOSIS — A08.0 ROTAVIRUS INFECTION: ICD-10-CM

## 2024-01-04 DIAGNOSIS — J41.1 BRONCHITIS, CHRONIC, MUCOPURULENT: ICD-10-CM

## 2024-01-04 DIAGNOSIS — E87.70 HYPERVOLEMIA, UNSPECIFIED HYPERVOLEMIA TYPE: ICD-10-CM

## 2024-01-04 DIAGNOSIS — A41.9 SEPSIS, DUE TO UNSPECIFIED ORGANISM, UNSPECIFIED WHETHER ACUTE ORGAN DYSFUNCTION PRESENT: ICD-10-CM

## 2024-01-04 DIAGNOSIS — J34.89 SINUS PAIN: ICD-10-CM

## 2024-01-04 DIAGNOSIS — Z79.52 CURRENT CHRONIC USE OF SYSTEMIC STEROIDS: ICD-10-CM

## 2024-01-04 PROCEDURE — 99999 PR PBB SHADOW E&M-EST. PATIENT-LVL V: CPT | Mod: PBBFAC,,, | Performed by: INTERNAL MEDICINE

## 2024-01-04 PROCEDURE — 99215 OFFICE O/P EST HI 40 MIN: CPT | Mod: PBBFAC,PO | Performed by: INTERNAL MEDICINE

## 2024-01-04 PROCEDURE — 99215 OFFICE O/P EST HI 40 MIN: CPT | Mod: S$PBB,,, | Performed by: INTERNAL MEDICINE

## 2024-01-04 RX ORDER — ALBUTEROL SULFATE 90 UG/1
2 AEROSOL, METERED RESPIRATORY (INHALATION) EVERY 4 HOURS PRN
Qty: 54 G | Refills: 3 | Status: SHIPPED | OUTPATIENT
Start: 2024-01-04

## 2024-01-04 NOTE — PATIENT INSTRUCTIONS
Would re culture mucous -- check for afb and regular germs.  Coritosol check is reasonable -- do cortisol level in am.  May consider replacement.    Ct chest likely low yield -- pattern of hemoptysis sounded more like fluid and sepsis.  Could do ct  later if still bringing up  yellow mucous.  Chest xray viewed..      Edema should mobiliize    C diff may recur -- if diarrhea increase may test..    You are having severe persistent sinus fullness-- antibiotics very risky and just took levaquin.   Would recommend sinus xray - ct would be best.  May need ent

## 2024-01-04 NOTE — PROGRESS NOTES
1/4/2024    Yodit Canseco  Office Note    Chief Complaint   Patient presents with    Follow-up       HPI:   1/4/2024 pt was in icu for rotovirus gi infection-- was in icu 4 days, got lots fluid, .. Record reviewed.  Pt was dosed high dose steroids.  Pt was given 7 d course levaquin with no c diff prevention.    Pt had some hemoptysis froathy blood in mucous    Pt has had green mucous with neg cuilture earlier December,     Pt very weak and had fall in ER checking in.       Pt now off prednisone -- stopped abruptly after leaving hosp.   Pt was weaned off stopping 5 days or so prior to admit.    Pt now very weak .   Pt was on steroids for about a yr.     On trelegy and lungs stable    12/5/2023  Pt had exacerbatoin cough and increased UC prednisone from 5/d to 20 mg for last 10 days along with doxy -- off abx and had green mucous this am.  Albuterol helps and dosed tid.    Pt had u/a showing wbc at home - says recurrent uti.  H/o c diff.  Not on controller.      Appetite good nad not weak, no fever.   Patient Instructions   Green mucous after increased medication for exacerbation -- failed doxycycline.  Need culture     Trelegy may stabilize-- once daily    Boost prednisone to 20 /d if cough controlled-- dilaudid sent in for 30  tabs-- call next wk if need more.            Cxr ordered if worsens    Need to re evaluate next wk if not clearing.  5/8/2023 took doxycycline no issues.  Bowels may be better on Entyvio. Lungs doing well.  On Hizentra.  Pt on prednisone 20/d -- on prednisone since November with plans to decrease in near future?   Still having blood in stool so will not taper prednisone til bleed resolves-- should improve in near future (on 3 rd entyvio now and should be good after 4th)  Patient Instructions   Might consider pneumocystis prophylaxis but antibiotics will increase c diff-- bactrim 2 single strength 3 days a week.  Use vancomycin daily while on bactrim.  Stop bactrim (followed by  vancomycin) on prednisone less than 20mg/d.    Bactrim generally has high risk allergies and kidney problems.     2022 having sinus problems with green mucous nad coughing, right face to chin hurts last 3 days. Had in prior.  May exacerbate lungs as in past.  Started levaquin 2 days ago. Prednisone boosted from 10 to 20/d.  Need pain control and cough suppresion .  Has prednisoen. Not getting [facility]erol [facility]-- not covered on plan??   2023 chart note after above visit.  Pt has had colitis-- dx c diff recently.   Epic reveals c diff ag but no toxin.    Had green sinus dc-- cleared slight with levaquin but now dx c diff with ag no toxin    Will dc levaquin and instructed not to use.    Doxycycline may be ok -- would avoid abx as much as able.    If sinuses relapse -- doxycycline sent in.    If green lung mucous -- pt told to submit sputum for culture.    2022 dx lupus, bechets, Ulcerative colitis, RA-- on hizentra.  Pt exposed to grandson with rsv.  Having abd pain -- dilaudid helped and suppressed cough-- not able to use codeine/hydrocodone.     Ct abd and cxr 10/2022 clear lungs.       Pt currently felt to have rsv-- improved over last 5 da   pt was sl febrile initially.   Pt now having green mucous. Cough is violent .  similar sick. Had min leg edema.  On hizentra. Asthma doing wellwith RSV.  Eating ok but weak and coughs a lot.  Breathing good.     Patient Instructions   May use levaquin for green mucous    Rsv should run course-- hirzentra should help?  Breztri may be needed.      Use dilaudid minimally for pain -- should suppress cough    Diflucan for yeast as needed    Maxzide as needed for edema    Tessalon for cough    Could do phone follow up    Ct abd 10/2022 was clear lower lungs and cxr 10/31/2022 was clear -- lungs not a concern.  Asthma doing well even with rsv.  2022 mom  sept complications from hip fx/covid.  Had few sinus infection-- rx Levaquin/prednisone.  Pt on  plaquenil, dx ra and lupus.  Also on colchicine.      Uses breo but skipped.  Uses albuterol once a wk.    Patient Instructions   May use breztri 2 twice daily -- could use more or less as needed.   If used regular -- breztri would act as controller.    May use levaquin and prednisone if sinus/lung exacerbates.    Lungs loook good wrt lung tissue-- no lung tissue disease seen from Rheumatoid disease.   Ct abd 11/2021 viewed.   8/24/2021 - had covid in July, had rapid home test +, managed outpt.  Got infusion rx.  Has wheezes but feels over covid.  Had nausea, vomiting, weakness, headache, -- sick 8 days.  Mom got hip fx complicating recovery-- at rehab. Pt and mom were vaccinated.  Pt on abx for sinus infection  Dx lupus/rheumatoid - 5 mg prednisone daily  Patient Instructions   Recovering covid doing well.    Use levaquin as needed.    Ordered cxr if needed.     4/6/2021 had pneumonia March 2020 - no problems. Needs disability paper updated.  We review today.    Patient Instructions   We reviewed your disability- need to complete paper work.  You had pneumonia in March 2020.  Breo, prednisone, levaquin, albuterol renewed.    10/14/2020- all well.  No abx/prednisone, cxr 10/14/2020 nad- rul pneumonia [facility] cleared.  Avoids irritants.  Patient Instructions   You likely would be best to get high dose flu vaccine.   Your immune system may have problem responding to flu vaccine.  4/14/2020- on hirzentra since sept 2016.  Saw [facility] Wood in [place] 4/13- virtual visit- azithro and prednisone 10/d x 3 for sinusitis.     Uses   500 bid, breo 200 daily, , xopenex nebs prn, singulair. Needs more prednisone, had pneumonia 3/4 to 8th.  Had ct chest feb.  Felt had covid symptoms.  No ox needed for pneumonia- no steroids nor bd. Took doxy - vanc in hosp.   Patient Instructions   Pneumonia - follow up chest xray good- not urgent.    Prednisone action plan for asthma/wheeze/cough    May use levaquin for sinus/lung  infections  2/10/2020- had shingles vaccine, states felt an immune response.   Cough- improved, daily, not severe, non productive occasionally productive thick dark yellow color. Using nebulizer only as needed. Has noticed fewer asthma exacerbations since starting Hyzentra, went from 1 months to 2x yearly.   Not able to use albuterol rescue inhaler or nebulized due to tachycardia, palpitations, and hand tremors. No complaint with xopenex medications.   Patient Instructions   Continue current Asthma medication regiment  Review of CT shows no areas of concern. The small nodule is not significant  Pulmonary function test shows lungs to still be strong.   Continue to use Xopenex Nebulizer with aerobika attachement once a day three days a week every week.  When your sick you can use nebulizer or Xopenex rescue inhaler four times a day.     1/9/2020- two exacerbations in 2 months, states cough is unchanged, daily, states bark like cough, severe and affects daily life, Productive occasionally clear/cream color thick in consistency, uses nebulizer 2-3 x daily.   Patient Instructions   [employer] sells an Aerobika device, this device shakes the air inside of you to break mucous off the bronchial wall. Use with nebulizer and with out daily.    Continue current Asthma medication regiment     Use nebulizer at least once a day three days a week.     CT to evaluate lungs for any abnormalities       11/8/2019- Had recent Rotator cuff surgery with nerve block Oct 9, Has SOB worse after surgery for 3 days, Took prednisone 20 mg for 3 days and Levaquin for 9 days. Seen by PCP dx URI.   Coughing, chest tightness, congestion, wheezing, Hoarse voice, fatigue, Shortness of breath worse with exertion, onset following surgery to shoulder. Cough- not able to clear secretions from chest.  Complaint of hand shaking, heart rate increases, and becomes rapid with Albuterol nebulizer onset years. Tries not to use due to side effects.     04/24/2019-  breathing is good except for high pollen days, had 3 exacerbation in 6 months. Albuterol rescue inhaler 3x weekly, 1-2 nocturnal arousals monthly, could not do PFT at [employer] [employer],     11/6/18- ER visit for dehydration gastroenteritis, no prednisone use in 4 months. Not currently on fasenra injection, insurance would not cover. Currently on Hirzentra IGG therapy.   Sinus drip present, cough occasional, non productive, no nocturnal arousals, Currently on Dulera daily, uses Albuterol rescue inhaler daily before exercise no SOB.  Sepsis [facility] August 2017.  Flu vaccine   July 9, 2018 - had gastric sleeve with leak complication- lost 45 lbs already.  Has appetite but fills quickly.  Still on hirzentra.  Asthma ok avoiding fragrances/ordors.  No prednisone.   No nocturnal arousals, uses rescue weekly avoiding any irritants.  Use prednisone 4-5 last year and twice this year.        March 19, 2018-since recovered from pleuritic pain - doing well.  No prednisone use recent.  Pt has had no wheezes.  feb 22,2108   Had to do prednisone again.  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 I have advised not to work given severe asthma,  hypersensitivity to fragrances, and immune deficiency.   Pt seems better since Hirzentra, but still unstable severe asthma  Jan 29. 2018- 6 days ago had been exposed to sick , had nasal congestion , cough, ear stuffy, muscle aches /joint aches / fever.  Temp to 100.1.  Seen by np and flu screen negative.  Had asthma 3-4 flares last year. eos up past,   oct 5, 2017 - had marce in April, skipped couple doses hirzentra, had mild bronchitis once, otherwise doing very well.  No prednisone.  No side effects and covered. Ppt flu sense for 2 days  Jan 24,2017 on  hirzentra q o week since sept and asthma more stable, no hosp, no prednisone, no noct asthma/ no  rescue therapy- control not this good for years.  Sept 27, 2016  HPI:has had lung problems since birth, no nocturnal arousals, uses rescue 2/d, breathing controlled satisfactory except with irritants/infection - strong abx needed to clear.  Prednisone 2/yr, last hosp 18 months.  No ventilator.    Had exacerbation recent due uri from .  Has had shingles vaccine past.       The chief compliant  problem is varies with instablilty at time  PFSH:  Past Medical History:   Diagnosis Date    Allergy     Angio-edema     Arthralgia     Asthma without status asthmaticus     Bronchitis     COPD (chronic obstructive pulmonary disease)     COVID-19 virus infection 07/23/2021    Diverticulosis of large intestine without hemorrhage 10/08/2015    Eczema     Environmental allergies     Eosinophilic asthma 03/08/2018    Exacerbation of ulcerative colitis with rectal bleeding     Gastroparesis     Hand arthritis     Herpes simplex without mention of complication     oral    Hidradenitis     History of morbid obesity     Hyperlipidemia     Hypothyroidism     Immune deficiency disorder     Intraperitoneal abscess 05/07/2018    LBP radiating to left leg     Leukocytosis, unspecified     Migraine headache     Normocytic anemia 10/05/2022    Obesity, Class III, BMI 40-49.9 (morbid obesity)     Periorbital cellulitis 12/31/2016    Prediabetes     RA (rheumatoid arthritis)     Recurrent upper respiratory infection (URI)     RSV (respiratory syncytial virus infection) 01/30/2023    S/P colonoscopy 11/2010    Sepsis 05/07/2018    Systemic lupus erythematosus, organ or system involvement unspecified     Tubular adenoma of colon 08/17/2022    Ulcerative pancolitis with rectal bleeding 10/17/2022    Urticaria          Past Surgical History:   Procedure Laterality Date    ADENOIDECTOMY      CHOLECYSTECTOMY      CHOLECYSTECTOMY      colitis      COLONOSCOPY  2015    repeat in 10    COLONOSCOPY N/A 10/08/2015    Procedure: COLONOSCOPY;   "Surgeon: Panda Bose MD;  Location: [facility] ENDO;  Service: Endoscopy;  Laterality: N/A;    COLONOSCOPY N/A 08/17/2022    Procedure: COLONOSCOPY;  Surgeon: Olaf Del Valle MD;  Location: [facility] ENDO;  Service: General;  Laterality: N/A;    COLONOSCOPY N/A 10/06/2022    Procedure: COLONOSCOPY;  Surgeon: Quinton Celeste MD;  Location: [facility] ENDO;  Service: Gastroenterology;  Laterality: N/A;    Hand fracture surgery      HARDWARE REMOVAL      left hand 5th [facility]    hymenectomy      HYSTERECTOMY      menorrhagia-Ovaries intact    ROTATOR CUFF REPAIR Right     SLEEVE GASTROPLASTY  04/16/2018    TONSILLECTOMY      Urethral dilatation       Social History     Tobacco Use    Smoking status: Never     Passive exposure: Past    Smokeless tobacco: Never   Substance Use Topics    Alcohol use: Not Currently     Comment: very rarely    Drug use: No     Family History   Problem Relation Age of Onset    Melanoma Mother     Basal cell carcinoma Mother     Lung disease Mother         pulm htn    Cataracts Mother     Hypertension Mother     Lung cancer Father     Diabetes Father     Hypertension Father     Cancer Father     Melanoma Maternal Aunt     Melanoma Maternal Uncle     Diabetes Paternal Aunt     Colon cancer Paternal Aunt 40    Diabetes Paternal Aunt     Breast cancer Paternal Aunt 40    Lymphoma Paternal Aunt     Cancer Maternal Grandfather     Ovarian cancer Paternal Grandmother 40    Ulcerative colitis Son     Psoriasis Son     Psoriasis Son     Breast cancer Cousin 25    Allergic rhinitis Neg Hx     Allergies Neg Hx     Angioedema Neg Hx     Asthma Neg Hx     Atopy Neg Hx     Eczema Neg Hx     Immunodeficiency Neg Hx     Rhinitis Neg Hx     Urticaria Neg Hx      Review of patient's allergies indicates:   Allergen Reactions    Bromelains Hives     " causes mouth to bleed"    Sudafed [pseudoephedrine hcl] Other (See Comments)     Does not want to wake up .    Amoxicillin-pot clavulanate Itching     Other reaction(s): Itching " "   Hydrocodone Itching    Iodinated contrast- oral and iv dye      childhood    Iodine and iodide containing products Other (See Comments)    Melon Hives    Pantoprazole Nausea Only     Other reaction(s): upset stomach/halitosis    Percocet [oxycodone-acetaminophen] Itching    Barium iodide Rash    Ephedrine Other (See Comments)     Other reaction(s): comatose    Penicillins      Other reaction(s): does not work on pt symptoms     I have reviewed past medical, family, and social history. I have reviewed previous nurse notes.    Performance Status:The patient's activity level is functions out of house.      Review of Systems   Constitutional: Negative for activity change, appetite change, chills, diaphoresis, fatigue, fever and unexpected weight change.   HENT: Negative for dental problem, sneezing, sore throat, trouble swallowing, postnasal drip, rhinorrhea, sinus pressure, sinus pain, and voice change.     Respiratory: Negative for chest tightness, shortness of breath, wheezing.  Positive for cough,   Cardiovascular: Negative for chest pain, palpitations and leg swelling.   Musculoskeletal: Negative for gait problem, myalgias and neck pain.   Skin: Negative for color change and pallor.   Allergic/Immunologic: Negative for environmental allergies and food allergies.   Neurological: Negative for dizziness, speech difficulty, weakness, light-headedness, numbness and headaches.   Hematological: Negative for adenopathy. Does not bruise/bleed easily.   Psychiatric/Behavioral: Negative for dysphoric mood and sleep disturbance. The patient is not nervous/anxious.             Exam:Comprehensive exam done. BP (!) 170/79 (BP Location: Right arm, Patient Position: Sitting)   Pulse (!) 56   Ht 5' 10" (1.778 m)   Wt 119.2 kg (262 lb 10.9 oz)   SpO2 95% Comment: on room air  BMI 37.69 kg/m²   Exam included Vitals as listed, and patient's appearance and affect and alertness and mood, oral exam for yeast and hygiene and " pharynx lesions and Mallapatti (M) score, neck with inspection for jvd and masses and thyroid abnormalities and lymph nodes (supraclavicular and infraclavicular nodes and axillary also examined and noted if abn), chest exam included symmetry and effort and fremitus and percussion and auscultation, cardiac exam included rhythm and gallops and murmur and rubs and jvd and edema, abdominal exam for mass and hepatosplenomegaly and tenderness and hernias and bowel sounds, Musculoskeletal exam with muscle tone and posture and mobility/gait and  strength, and skin for rashes and cyanosis and pallor and turgor, extremity for clubbing.  Findings were normal except for pertinent findings listed below:  M3, chest is symmetric, no distress, normal percussion, normal fremitus and good normal breath sounds        Radiographs (ct chest and cxr) reviewed: results reviewed by direct vision  CT Chest Without Contrast 2/10/2020  Normal exam, 3.3 mm nodule seen in right upper lobe.    Lungs are clear.  Minimal retained mucus secretions in the trachea.  No pleural effusion or pneumothorax.  Normal sized heart.  Thyroid is small and heterogeneous with a subcentimeter nodule or cyst in the right lobe.     XR CHEST 1 VIEW 05/10/2018   There is left lower lobe atelectasis and possible small left pleural effusion on this study.  No pneumothorax is seen.      Labs reviewed       Lab Results   Component Value Date    WBC 8.42 01/01/2024    RBC 3.89 (L) 01/01/2024    HGB 9.8 (L) 01/01/2024    HCT 32.5 (L) 01/01/2024    MCV 84 01/01/2024    MCH 25.2 (L) 01/01/2024    MCHC 30.2 (L) 01/01/2024    RDW 17.1 (H) 01/01/2024     01/01/2024    MPV 11.3 01/01/2024    GRAN 5.4 01/01/2024    GRAN 64.1 01/01/2024    LYMPH 1.9 01/01/2024    LYMPH 22.3 01/01/2024    MONO 0.7 01/01/2024    MONO 7.8 01/01/2024    EOS 0.3 01/01/2024    BASO 0.04 01/01/2024    EOSINOPHIL 3.0 01/01/2024    BASOPHIL 0.5 01/01/2024       PFT reviewed  2/10/2020 no  obstruction seen, 7% bronchodilator response with 12% needed for clinical improvement; TLC 86% with DLC0 at 88%, normal exam with mild asthma  Spirometry bronchodilator, lung volume by gas dilution, diffusion capacity measured February 10, 2020.  The FEV1 to FVC ratio was 79%, there is no airflow obstruction measured by spirometry technique.  The FEV1 measured 85% predicted at 1.97 L. The 8%   improvement in FEV1 failed to reach statistical significance ( 12% is needed for statistical significance ).  Lung volumes by gas dilution were normal.  Diffusion was normal.      Spirometry, lung volumes, diffusion are all normal.  The bronchodilator response was not statistically significant.  Clinical correlation recommended.     Plan:  Clinical impression is apparently straight forward and impression with management as below.    Yodit was seen today for follow-up.    Diagnoses and all orders for this visit:    Severe persistent asthma without complication  -     albuterol (PROVENTIL/VENTOLIN HFA) 90 mcg/actuation inhaler; Inhale 2 puffs into the lungs every 4 (four) hours as needed for Wheezing or Shortness of Breath. 2 puffs every 4 hours as needed for cough, wheeze, or shortness of breath    Rotavirus infection  -     Ambulatory referral/consult to Home Health; Future    Sepsis, due to unspecified organism, unspecified whether acute organ dysfunction present  -     Ambulatory referral/consult to Home Health; Future    Hypervolemia, unspecified hypervolemia type    Bronchitis, chronic, mucopurulent  -     AFB Culture & Smear; Standing    Current chronic use of systemic steroids  -     Cancel: CORTISOL, RANDOM; Future  -     CORTISOL, RANDOM; Future    Sinus pain  -     X-Ray Sinuses 3 or more views; Future    Chronic sinusitis, unspecified location  -     CT Sinuses without Contrast; Future  -     Ambulatory referral/consult to ENT; Future                No follow-ups on file.    Discussed with patient above for  education the following:      Patient Instructions   Would re culture mucous -- check for afb and regular germs.  Coritosol check is reasonable -- do cortisol level in am.  May consider replacement.    Ct chest likely low yield -- pattern of hemoptysis sounded more like fluid and sepsis.  Could do ct  later if still bringing up  yellow mucous.  Chest xray viewed..      Edema should mobiliize    C diff may recur -- if diarrhea increase may test..    You are having severe persistent sinus fullness-- antibiotics very risky and just took levaquin.   Would recommend sinus xray - ct would be best.  May need ent          Eval took 41 min

## 2024-01-05 ENCOUNTER — HOSPITAL ENCOUNTER (OUTPATIENT)
Dept: RADIOLOGY | Facility: HOSPITAL | Age: 64
Discharge: HOME | End: 2024-01-05
Attending: INTERNAL MEDICINE
Payer: MEDICARE

## 2024-01-05 ENCOUNTER — OFFICE VISIT (OUTPATIENT)
Dept: FAMILY MEDICINE | Facility: CLINIC | Age: 64
End: 2024-01-05
Payer: MEDICARE

## 2024-01-05 VITALS
HEIGHT: 62 IN | WEIGHT: 174.19 LBS | BODY MASS INDEX: 32.05 KG/M2 | HEART RATE: 76 BPM | DIASTOLIC BLOOD PRESSURE: 75 MMHG | SYSTOLIC BLOOD PRESSURE: 129 MMHG | TEMPERATURE: 98 F

## 2024-01-05 DIAGNOSIS — D84.9 IMMUNE DEFICIENCY DISORDER: ICD-10-CM

## 2024-01-05 DIAGNOSIS — Z79.899 ENCOUNTER FOR LONG-TERM (CURRENT) USE OF MEDICATIONS: ICD-10-CM

## 2024-01-05 DIAGNOSIS — J34.89 SINUS PAIN: ICD-10-CM

## 2024-01-05 DIAGNOSIS — A08.0 ROTAVIRUS ENTERITIS: Primary | ICD-10-CM

## 2024-01-05 DIAGNOSIS — K51.019 ULCERATIVE PANCOLITIS WITH COMPLICATION: ICD-10-CM

## 2024-01-05 DIAGNOSIS — R78.81 GRAM-NEGATIVE BACTEREMIA: ICD-10-CM

## 2024-01-05 DIAGNOSIS — M35.9 UNDIFFERENTIATED CONNECTIVE TISSUE DISEASE: ICD-10-CM

## 2024-01-05 PROBLEM — R50.9 FEVER: Status: RESOLVED | Noted: 2023-12-27 | Resolved: 2024-01-05

## 2024-01-05 PROBLEM — E83.42 HYPOMAGNESEMIA: Status: RESOLVED | Noted: 2022-09-03 | Resolved: 2024-01-05

## 2024-01-05 PROBLEM — E86.0 DEHYDRATION: Status: RESOLVED | Noted: 2022-09-03 | Resolved: 2024-01-05

## 2024-01-05 PROBLEM — E87.6 HYPOKALEMIA: Status: RESOLVED | Noted: 2023-12-31 | Resolved: 2024-01-05

## 2024-01-05 PROBLEM — E87.70 HYPERVOLEMIA: Status: RESOLVED | Noted: 2024-01-04 | Resolved: 2024-01-05

## 2024-01-05 PROCEDURE — 70220 X-RAY EXAM OF SINUSES: CPT | Mod: TC,PO

## 2024-01-05 PROCEDURE — 99214 OFFICE O/P EST MOD 30 MIN: CPT | Mod: S$PBB,,, | Performed by: FAMILY MEDICINE

## 2024-01-05 PROCEDURE — 70220 X-RAY EXAM OF SINUSES: CPT | Mod: 26,,, | Performed by: RADIOLOGY

## 2024-01-05 PROCEDURE — 99213 OFFICE O/P EST LOW 20 MIN: CPT | Mod: PBBFAC,PO | Performed by: FAMILY MEDICINE

## 2024-01-05 PROCEDURE — 99999 PR PBB SHADOW E&M-EST. PATIENT-LVL III: CPT | Mod: PBBFAC,,, | Performed by: FAMILY MEDICINE

## 2024-01-05 RX ORDER — FLUCONAZOLE 150 MG/1
150 TABLET ORAL ONCE AS NEEDED
Qty: 2 TABLET | Refills: 0 | Status: SHIPPED | OUTPATIENT
Start: 2024-01-05 | End: 2024-01-05

## 2024-01-05 NOTE — PROGRESS NOTES
Patient presents follow-up hospitalization due to dehydration with rotavirus infection.  She also had Gram-negative bacteremia with MORAXELLA OSLOENSIS.  She was rehydrated.  The diarrhea has improved significantly though she still has some loose stool.  She denies any recurrent fevers.  She still feels weak.  She completed antibiotics.  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  She is pending follow-up with her rheumatologist.  She is back on Xeljanz.  She did see her pulmonologist in follow-up with further laboratory pending she had been on chronic steroids which were tapered off.  She had a burst of steroid again in the hospital but this was discontinued.  She does feel like she has develop a vaginal yeast infection with all this.  Pulmonologist did refer her for home health.  She is planning on seeing a cardiologist.  Apparently nursing staff when she was hospitalized felt that she had 2 episodes lasting a approximately less than 2 seconds of atrial fibrillation.  There was nothing else mentioned in the chart regarding this.  She denies any recurrent palpitations.  Discharge Summary        Patient Name: Yodit Canseco  MRN: [id number]  NANDO: [id number]  Patient Class: IP- Inpatient  Admission Date: 12/26/2023  Hospital Length of Stay: 5 days  Discharge Date and Time:  01/01/2024 10:36 AM  Attending Physician: Olman Lemus MD   Discharging Provider: Olman Lemus MD  Primary Care Provider: Miky Lewis MD     Primary Care Team: Networked reference to record PCT      HPI:   63-year-old female accompanied by  presents to [facility] ED complaining of acute nausea, vomiting, abdominal pain and diarrhea.      at bedside assists in history as patient in bed tired and unmotivated to answer questions.   explains that she started with symptoms earlier today and they progressively  worsened she presents to the emergency room for further evaluation.  Patient explains that she is feeling much better following treatment in the emergency department.  They both explain they are very tired as it is early in the morning.  Other complaints at this time.     In the ED fever of 102.7 improving following antipyretic medication.  Rest of vital signs stable and saturating appropriately on room air.  CBC; CMP; UA; all unremarkable; lactic acid WNL; flu/COVID negative; GI panel positive for rotavirus.  Patient was given 2 L bolus normal saline, Tylenol 2 g total, Compazine, Zofran, Flagyl.  Emergency room physician attempted to admit to CDU.  CDU NP felt patient was inappropriate for CDU as patient will likely not leave tomorrow.  Admission deferred to [facility].     * No surgery found *       Hospital Course:   The patient was admitted for acute viral enteritis with Rotavirus with associated hypovolemia due to insensible losses from viral GI illness. Hx of connective tissues d/o and on immunosuppressive tx including recently weaned off of systemic steroids. Patient provided supportive care with aggressive IVF resuscitation and admitted to ICU with consult to critical care team. BP remained marginal and stress dose steroids added with improvement. Patient overall tolerated CLD and diarrhea lessened. Patient clinically improving. Patient reports underlying hx of UC and follows with GI Dr. Celeste. She reports hx of recent intermittent blood in stool and GI consulted as well.  Blood culture 12/27 MORAXELLA OSLOENSIS, empiric antibiotic, ID consulted.  12/31 has fever, cough, CXR no acute, procalcitonin WNL.  Viral respiratory panel was negative.       At 1/1/24 doing well, discussed with ID, and patient will complete a 7 day course of Levaquin at home for  MORAXELLA OSLOENSIS  noted in blood culture in some bottles from 12/27, and repeat blood culture 12.30 is no growth to date, and patient notes she has tolerated  in past.  Rotavirus diarrhea resolving.  She is doing well and feels ready to go home. GI noted Will likely need to restart Xeljanz soon given severity of her UC. She should still be able to make her outpatient infusion 1/4/24.   Discussed expected course is continued improvement and return to hospital if feeling bad.  All questions answered to patient's satisfaction and she is in agreement with plan.          Yodit was seen today for hospital follow up.    Diagnoses and all orders for this visit:    Rotavirus enteritis    Gram-negative bacteremia    Ulcerative pancolitis with complication    Immune deficiency disorder    Encounter for long-term (current) use of medications  -     Hemoglobin A1C; Future    Undifferentiated connective tissue disease    Other orders  -     fluconazole (DIFLUCAN) 150 MG Tab; Take 1 tablet (150 mg total) by mouth once as needed (Yeast infection). May repeat dose in 1 week if needed      Keep scheduled follow-up appointments.  Continue current medications.  Seems to be improving.      Transitional Care Note    Family and/or Caretaker present at visit?  Yes.  Diagnostic tests reviewed/disposition: I have reviewed all completed as well as pending diagnostic tests at the time of discharge.  Disease/illness education: yes  Home health/community services discussion/referrals: Patient has home health established at [facility] .   Establishment or re-establishment of referral orders for community resources: No other necessary community resources.   Discussion with other health care providers: No discussion with other health care providers necessary.               Past Medical History:  Past Medical History:   Diagnosis Date    Allergy     Angio-edema     Arthralgia     Asthma without status asthmaticus     Bronchitis     COPD (chronic obstructive pulmonary disease)     COVID-19 virus infection 07/23/2021    Diverticulosis of large intestine without hemorrhage 10/08/2015    Eczema      Environmental allergies     Eosinophilic asthma 03/08/2018    Exacerbation of ulcerative colitis with rectal bleeding     Gastroparesis     Gram-negative bacteremia 12/29/2023    Hand arthritis     Herpes simplex without mention of complication     oral    Hidradenitis     History of morbid obesity     Hyperlipidemia     Hypothyroidism     Immune deficiency disorder     Intraperitoneal abscess 05/07/2018    LBP radiating to left leg     Leukocytosis, unspecified     Migraine headache     Normocytic anemia 10/05/2022    Obesity, Class III, BMI 40-49.9 (morbid obesity)     Periorbital cellulitis 12/31/2016    Prediabetes     RA (rheumatoid arthritis)     Recurrent upper respiratory infection (URI)     Rotavirus enteritis 12/27/2023    RSV (respiratory syncytial virus infection) 01/30/2023    S/P colonoscopy 11/2010    Sepsis 05/07/2018    Sepsis secondary to colitis 05/07/2018    Systemic lupus erythematosus, organ or system involvement unspecified     Tubular adenoma of colon 08/17/2022    Ulcerative pancolitis with rectal bleeding 10/17/2022    Urticaria      Past Surgical History:   Procedure Laterality Date    ADENOIDECTOMY      CHOLECYSTECTOMY      CHOLECYSTECTOMY      colitis      COLONOSCOPY  2015    repeat in 10    COLONOSCOPY N/A 10/08/2015    Procedure: COLONOSCOPY;  Surgeon: Panda Bose MD;  Location: [facility];  Service: Endoscopy;  Laterality: N/A;    COLONOSCOPY N/A 08/17/2022    Procedure: COLONOSCOPY;  Surgeon: Olaf Del Valle MD;  Location: [facility];  Service: General;  Laterality: N/A;    COLONOSCOPY N/A 10/06/2022    Procedure: COLONOSCOPY;  Surgeon: Quinton Celeste MD;  Location: [facility];  Service: Gastroenterology;  Laterality: N/A;    Hand fracture surgery      HARDWARE REMOVAL      left hand 5th [facility]    hymenectomy      HYSTERECTOMY      menorrhagia-Ovaries intact    ROTATOR CUFF REPAIR Right     SLEEVE GASTROPLASTY  04/16/2018    TONSILLECTOMY      Urethral dilatation       Review of  "patient's allergies indicates:   Allergen Reactions    Bromelains Hives     " causes mouth to bleed"    Pimecrolimus Swelling    Sudafed [pseudoephedrine hcl] Other (See Comments)     Does not want to wake up .    Amoxicillin-pot clavulanate Itching     Other reaction(s): Itching; tolerated ceftriaxone 10/2022    Hydrocodone Itching    Iodinated contrast media      childhood    Iodine and iodide containing products Other (See Comments)    Melon Hives    Percocet [oxycodone-acetaminophen] Itching    Corn containing products     Tree nut     Wheat containing prod     Barium iodide Rash    Ephedrine Other (See Comments)     Other reaction(s): comatose    Penicillins      Other reaction(s): does not work on pt symptoms     Current Outpatient Medications on File Prior to Visit   Medication Sig Dispense Refill    acetaminophen (TYLENOL) 325 MG tablet Take 1 tablet (325 mg total) by mouth every 6 (six) hours as needed for Pain (Do not take with any other products containing  tylenol or acetaminophen).  0    albuterol (PROVENTIL/VENTOLIN HFA) 90 mcg/actuation inhaler Inhale 2 puffs into the lungs every 4 (four) hours as needed for Wheezing or Shortness of Breath. 2 puffs every 4 hours as needed for cough, wheeze, or shortness of breath 54 g 3    benzonatate (TESSALON) 200 MG capsule TAKE 1 CAPSULE (200 MG TOTAL) BY MOUTH 3 (THREE) TIMES DAILY AS NEEDED FOR COUGH. 30 capsule 0    budesonide (ENTOCORT EC) 3 mg capsule Take 9 mg by mouth once daily.      cholecalciferol, vitamin D3, (VITAMIN D3) 50 mcg (2,000 unit) Cap Take 1 capsule by mouth once daily.      clobetasoL (TEMOVATE) 0.05 % cream Apply topically 2 (two) times daily. To the vulva 60 g 1    desonide (DESOWEN) 0.05 % cream Apply topically 2 (two) times daily. 60 g 6    estradioL (ESTRACE) 1 MG tablet TAKE 1 TABLET BY MOUTH EVERY DAY 90 tablet 1    FEROSUL 325 mg (65 mg iron) Tab tablet Take 325 mg by mouth once daily.      fluconazole (DIFLUCAN) 200 MG Tab Take 1 " tablet (200 mg total) by mouth once a week. 7 tablet 0    fluticasone-umeclidin-vilanter (TRELEGY ELLIPTA) 200-62.5-25 mcg inhaler Inhale 1 puff into the lungs once daily. 60 each 11    hydrocortisone 2.5 % cream APPLY TOPICALLY 2 (TWO) TIMES DAILY. TO THE ANUS (Patient taking differently: Apply 1 Application topically 2 (two) times daily as needed (TO THE ANUS). To the anus) 28.35 g 2    HYDROmorphone (DILAUDID) 2 MG tablet Take 1 tablet (2 mg total) by mouth every 4 (four) hours as needed for Pain (cough). 30 tablet 0    immun glob G,IgG,-pro-IgA 0-50 (HIZENTRA) 2 gram/10 mL (20 %) Soln Inject 90 mLs (18 g total) into the skin every 14 (fourteen) days. 180 mL 12    levothyroxine (SYNTHROID) 75 MCG tablet Take 1 tablet (75 mcg total) by mouth once daily. 90 tablet 3    LIDOcaine 5 % Crea Apply 1 Application topically once daily.      loperamide (IMODIUM) 2 mg capsule Take 1 capsule (2 mg total) by mouth 3 (three) times daily as needed for Diarrhea. 30 capsule 0    minoxidiL (LONITEN) 2.5 MG tablet Take 1/4th (0.625mg) by mouth daily 90 tablet 2    montelukast (SINGULAIR) 10 mg tablet Take 1 tablet (10 mg total) by mouth every evening. 90 tablet 3    MULTIVITAMIN ORAL Take 1 tablet by mouth once daily.      mupirocin (BACTROBAN) 2 % ointment Apply topically 3 (three) times daily. Place on ulcers 30 g 1    ondansetron (ZOFRAN-ODT) 4 MG TbDL Take 1 tablet (4 mg total) by mouth every 8 (eight) hours as needed (nausea/vomiting). 90 tablet 6    potassium chloride SA (K-DUR,KLOR-CON) 20 MEQ tablet Take 20 mEq by mouth once daily.      promethazine (PHENERGAN) 25 MG tablet Take 25 mg by mouth every 6 (six) hours as needed for Nausea.      traMADoL (ULTRAM) 50 mg tablet Take 50 mg by mouth 3 (three) times daily.      tretinoin (RETIN-A) 0.05 % cream Apply topically every evening. (Patient taking differently: Apply 1 application  topically every evening.) 45 g 3    triamterene-hydrochlorothiazide 37.5-25 mg (MAXZIDE-25)  37.5-25 mg per tablet TAKE 1 TABLET BY MOUTH DAILY AS NEEDED (EDEMA). 90 tablet 1    vedolizumab (ENTYVIO) 300 mg SolR injection Inject 300 mg into the vein every 14 (fourteen) days.      XELJANZ 10 mg Tab Take 1 tablet by mouth 2 (two) times daily.      [DISCONTINUED] hydroxychloroquine (PLAQUENIL) 200 mg tablet TAKE 1 TABLET BY MOUTH TWICE A DAY 60 tablet 6    [DISCONTINUED] levoFLOXacin (LEVAQUIN) 750 MG tablet Take 1 tablet (750 mg total) by mouth once daily. for 3 days 3 tablet 0     No current facility-administered medications on file prior to visit.     Social History     Socioeconomic History    Marital status:    Tobacco Use    Smoking status: Never     Passive exposure: Past    Smokeless tobacco: Never   Substance and Sexual Activity    Alcohol use: Not Currently     Comment: very rarely    Drug use: No    Sexual activity: Yes     Partners: Male   Social History Narrative    . Disabled teacher.     Social Determinants of Health     Food Insecurity: No Food Insecurity (12/27/2023)    Hunger Vital Sign     Worried About Running Out of Food in the Last Year: Never true     Ran Out of Food in the Last Year: Never true   Transportation Needs: No Transportation Needs (12/27/2023)    PRAPARE - Transportation     Lack of Transportation (Medical): No     Lack of Transportation (Non-Medical): No   Housing Stability: Low Risk  (12/27/2023)    Housing Stability Vital Sign     Unable to Pay for Housing in the Last Year: No     Number of Places Lived in the Last Year: 1     Unstable Housing in the Last Year: No     Family History   Problem Relation Age of Onset    Melanoma Mother     Basal cell carcinoma Mother     Lung disease Mother         pulm htn    Cataracts Mother     Hypertension Mother     Lung cancer Father     Diabetes Father     Hypertension Father     Cancer Father     Melanoma Maternal Aunt     Melanoma Maternal Uncle     Diabetes Paternal Aunt     Colon cancer Paternal Aunt 40    Diabetes  "Paternal Aunt     Breast cancer Paternal Aunt 40    Lymphoma Paternal Aunt     Cancer Maternal Grandfather     Ovarian cancer Paternal Grandmother 40    Ulcerative colitis Son     Psoriasis Son     Psoriasis Son     Breast cancer Cousin 25    Allergic rhinitis Neg Hx     Allergies Neg Hx     Angioedema Neg Hx     Asthma Neg Hx     Atopy Neg Hx     Eczema Neg Hx     Immunodeficiency Neg Hx     Rhinitis Neg Hx     Urticaria Neg Hx            ROS:  GENERAL: No fever, chills.   CARDIOVASCULAR: Denies chest pain, PND, orthopnea  ABDOMEN: Denies  abdominal pain, hematemesis or blood in stool.  URINARY: No flank pain, dysuria or hematuria.    Vitals:    01/05/24 1407   BP: 129/75   Pulse: 76   Temp: 97.5 °F (36.4 °C)   TempSrc: Temporal   Weight: 79 kg (174 lb 3.2 oz)   Height: 5' 2" (1.575 m)     Wt Readings from Last 3 Encounters:   01/05/24 79 kg (174 lb 3.2 oz)   01/04/24 85.3 kg (188 lb 0.8 oz)   12/30/23 85.3 kg (188 lb 0.8 oz)       OBJECTIVE:   APPEARANCE: Well nourished, well developed, in no acute distress.    HEAD: Normocephalic.  Atraumatic.  No sinus tenderness.  EYES:   Right eye: Pupil reactive.  Conjunctiva clear.    Left eye: Pupil reactive.  Conjunctiva clear.  EOMI.    EARS: TM's intact. Light reflex normal. No retraction or perforation.    NOSE:  clear.  MOUTH & THROAT:  No pharyngeal erythema or exudate. No lesions.  NECK: Supple. No bruits.  No JVD.  No cervical lymphadenopathy.  No thyromegaly.    CHEST: Breath sounds clear bilaterally.  Normal respiratory effort  CARDIOVASCULAR: Normal rate.  Regular rhythm.  No murmurs.  No rub.  No gallops.  ABDOMEN: Bowel sounds normal.  Soft.  No tenderness.  No organomegaly.  PERIPHERAL VASCULAR: No cyanosis.  No clubbing.  No edema.  NEUROLOGIC: No focal findings.  MENTAL STATUS: Alert.  Oriented x 3.           "

## 2024-01-06 PROCEDURE — G0180 MD CERTIFICATION HHA PATIENT: HCPCS | Mod: ,,, | Performed by: INTERNAL MEDICINE

## 2024-01-12 ENCOUNTER — PATIENT MESSAGE (OUTPATIENT)
Dept: FAMILY MEDICINE | Facility: CLINIC | Age: 64
End: 2024-01-12
Payer: MEDICARE

## 2024-01-12 RX ORDER — SUMATRIPTAN SUCCINATE 100 MG/1
100 TABLET ORAL ONCE AS NEEDED
Qty: 9 TABLET | Refills: 5 | Status: SHIPPED | OUTPATIENT
Start: 2024-01-12 | End: 2024-01-12

## 2024-01-18 DIAGNOSIS — Z12.31 VISIT FOR SCREENING MAMMOGRAM: Primary | ICD-10-CM

## 2024-02-07 ENCOUNTER — EXTERNAL HOME HEALTH (OUTPATIENT)
Dept: HOME HEALTH SERVICES | Facility: HOSPITAL | Age: 64
End: 2024-02-07
Payer: MEDICARE

## 2024-02-12 ENCOUNTER — DOCUMENT SCAN (OUTPATIENT)
Dept: HOME HEALTH SERVICES | Facility: HOSPITAL | Age: 64
End: 2024-02-12
Payer: MEDICARE

## 2024-02-12 ENCOUNTER — LAB VISIT (OUTPATIENT)
Dept: LAB | Facility: HOSPITAL | Age: 64
End: 2024-02-12
Attending: INTERNAL MEDICINE
Payer: MEDICARE

## 2024-02-12 DIAGNOSIS — K92.1 BLOOD IN STOOL: ICD-10-CM

## 2024-02-12 DIAGNOSIS — M32.19 OTHER SYSTEMIC LUPUS ERYTHEMATOSUS WITH OTHER ORGAN INVOLVEMENT: ICD-10-CM

## 2024-02-12 DIAGNOSIS — M35.2 BEHCET'S SYNDROME INVOLVING ORAL MUCOSA: ICD-10-CM

## 2024-02-12 DIAGNOSIS — D50.9 IRON DEFICIENCY ANEMIA, UNSPECIFIED: ICD-10-CM

## 2024-02-12 DIAGNOSIS — D84.821 IMMUNODEFICIENCY SECONDARY TO STEROIDS: ICD-10-CM

## 2024-02-12 DIAGNOSIS — Z79.52 CURRENT CHRONIC USE OF SYSTEMIC STEROIDS: ICD-10-CM

## 2024-02-12 DIAGNOSIS — Z79.899 ENCOUNTER FOR LONG-TERM (CURRENT) USE OF MEDICATIONS: ICD-10-CM

## 2024-02-12 DIAGNOSIS — K51.019 ULCERATIVE COLITIS, UNIVERSAL, UNSPECIFIED COMPLICATION: ICD-10-CM

## 2024-02-12 DIAGNOSIS — M35.9 UNDIFFERENTIATED CONNECTIVE TISSUE DISEASE: ICD-10-CM

## 2024-02-12 DIAGNOSIS — D84.9 IMMUNOLOGIC DEFICIENCY SYNDROME: Primary | ICD-10-CM

## 2024-02-12 DIAGNOSIS — K51.00 ULCERATIVE (CHRONIC) ENTEROCOLITIS: ICD-10-CM

## 2024-02-12 DIAGNOSIS — T38.0X5A IMMUNODEFICIENCY SECONDARY TO STEROIDS: ICD-10-CM

## 2024-02-12 DIAGNOSIS — Z79.52 IMMUNODEFICIENCY SECONDARY TO STEROIDS: ICD-10-CM

## 2024-02-12 DIAGNOSIS — D84.9 IMMUNOLOGIC DEFICIENCY SYNDROME: ICD-10-CM

## 2024-02-12 LAB
ALBUMIN SERPL BCP-MCNC: 4.2 G/DL (ref 3.5–5.2)
ALP SERPL-CCNC: 55 U/L (ref 55–135)
ALT SERPL W/O P-5'-P-CCNC: 16 U/L (ref 10–44)
ANION GAP SERPL CALC-SCNC: 11 MMOL/L (ref 8–16)
AST SERPL-CCNC: 22 U/L (ref 10–40)
BASOPHILS # BLD AUTO: 0.05 K/UL (ref 0–0.2)
BASOPHILS NFR BLD: 0.8 % (ref 0–1.9)
BILIRUB SERPL-MCNC: 0.5 MG/DL (ref 0.1–1)
BUN SERPL-MCNC: 13 MG/DL (ref 8–23)
CALCIUM SERPL-MCNC: 9.1 MG/DL (ref 8.7–10.5)
CHLORIDE SERPL-SCNC: 102 MMOL/L (ref 95–110)
CO2 SERPL-SCNC: 27 MMOL/L (ref 23–29)
CREAT SERPL-MCNC: 0.9 MG/DL (ref 0.5–1.4)
CRP SERPL-MCNC: 0.6 MG/L (ref 0–8.2)
DIFFERENTIAL METHOD BLD: ABNORMAL
EOSINOPHIL # BLD AUTO: 0.2 K/UL (ref 0–0.5)
EOSINOPHIL NFR BLD: 3.4 % (ref 0–8)
ERYTHROCYTE [DISTWIDTH] IN BLOOD BY AUTOMATED COUNT: 17.4 % (ref 11.5–14.5)
ERYTHROCYTE [SEDIMENTATION RATE] IN BLOOD BY WESTERGREN METHOD: 5 MM/HR (ref 0–20)
EST. GFR  (NO RACE VARIABLE): >60 ML/MIN/1.73 M^2
ESTIMATED AVG GLUCOSE: 105 MG/DL (ref 68–131)
GLUCOSE SERPL-MCNC: 81 MG/DL (ref 70–110)
HBA1C MFR BLD: 5.3 % (ref 4–5.6)
HCT VFR BLD AUTO: 40.5 % (ref 37–48.5)
HGB BLD-MCNC: 12.8 G/DL (ref 12–16)
IMM GRANULOCYTES # BLD AUTO: 0.03 K/UL (ref 0–0.04)
IMM GRANULOCYTES NFR BLD AUTO: 0.5 % (ref 0–0.5)
LYMPHOCYTES # BLD AUTO: 3.5 K/UL (ref 1–4.8)
LYMPHOCYTES NFR BLD: 52.7 % (ref 18–48)
MCH RBC QN AUTO: 25.1 PG (ref 27–31)
MCHC RBC AUTO-ENTMCNC: 31.6 G/DL (ref 32–36)
MCV RBC AUTO: 80 FL (ref 82–98)
MONOCYTES # BLD AUTO: 0.5 K/UL (ref 0.3–1)
MONOCYTES NFR BLD: 7.5 % (ref 4–15)
NEUTROPHILS # BLD AUTO: 2.3 K/UL (ref 1.8–7.7)
NEUTROPHILS NFR BLD: 35.6 % (ref 38–73)
NRBC BLD-RTO: 0 /100 WBC
PLATELET # BLD AUTO: 222 K/UL (ref 150–450)
PMV BLD AUTO: 10.2 FL (ref 9.2–12.9)
POTASSIUM SERPL-SCNC: 4 MMOL/L (ref 3.5–5.1)
PROT SERPL-MCNC: 6.8 G/DL (ref 6–8.4)
RBC # BLD AUTO: 5.09 M/UL (ref 4–5.4)
SODIUM SERPL-SCNC: 140 MMOL/L (ref 136–145)
WBC # BLD AUTO: 6.56 K/UL (ref 3.9–12.7)

## 2024-02-12 PROCEDURE — 85025 COMPLETE CBC W/AUTO DIFF WBC: CPT | Mod: PO | Performed by: INTERNAL MEDICINE

## 2024-02-12 PROCEDURE — 80053 COMPREHEN METABOLIC PANEL: CPT | Performed by: INTERNAL MEDICINE

## 2024-02-12 PROCEDURE — 36415 COLL VENOUS BLD VENIPUNCTURE: CPT | Mod: PO | Performed by: INTERNAL MEDICINE

## 2024-02-12 PROCEDURE — 85651 RBC SED RATE NONAUTOMATED: CPT | Mod: PO | Performed by: INTERNAL MEDICINE

## 2024-02-12 PROCEDURE — 86140 C-REACTIVE PROTEIN: CPT | Performed by: INTERNAL MEDICINE

## 2024-02-12 PROCEDURE — 83036 HEMOGLOBIN GLYCOSYLATED A1C: CPT | Performed by: FAMILY MEDICINE

## 2024-02-14 ENCOUNTER — LAB VISIT (OUTPATIENT)
Dept: LAB | Facility: HOSPITAL | Age: 64
End: 2024-02-14
Attending: STUDENT IN AN ORGANIZED HEALTH CARE EDUCATION/TRAINING PROGRAM
Payer: MEDICARE

## 2024-02-14 DIAGNOSIS — Z79.52 CURRENT CHRONIC USE OF SYSTEMIC STEROIDS: ICD-10-CM

## 2024-02-14 DIAGNOSIS — K51.019 ULCERATIVE COLITIS, UNIVERSAL, UNSPECIFIED COMPLICATION: ICD-10-CM

## 2024-02-14 DIAGNOSIS — Z79.52 IMMUNODEFICIENCY SECONDARY TO STEROIDS: ICD-10-CM

## 2024-02-14 DIAGNOSIS — M35.2 BEHCET'S SYNDROME INVOLVING ORAL MUCOSA: ICD-10-CM

## 2024-02-14 DIAGNOSIS — D84.821 IMMUNODEFICIENCY SECONDARY TO STEROIDS: ICD-10-CM

## 2024-02-14 DIAGNOSIS — D50.9 IRON DEFICIENCY ANEMIA, UNSPECIFIED: ICD-10-CM

## 2024-02-14 DIAGNOSIS — T38.0X5A IMMUNODEFICIENCY SECONDARY TO STEROIDS: ICD-10-CM

## 2024-02-14 DIAGNOSIS — D84.9 IMMUNOLOGIC DEFICIENCY SYNDROME: ICD-10-CM

## 2024-02-14 PROCEDURE — 86480 TB TEST CELL IMMUN MEASURE: CPT | Performed by: INTERNAL MEDICINE

## 2024-02-14 PROCEDURE — 83993 ASSAY FOR CALPROTECTIN FECAL: CPT | Performed by: INTERNAL MEDICINE

## 2024-02-16 LAB
GAMMA INTERFERON BACKGROUND BLD IA-ACNC: 0.1 IU/ML
M TB IFN-G CD4+ BCKGRND COR BLD-ACNC: -0.01 IU/ML
M TB IFN-G CD4+ BCKGRND COR BLD-ACNC: 0 IU/ML
MITOGEN IGNF BCKGRD COR BLD-ACNC: 9.9 IU/ML
TB GOLD PLUS: NEGATIVE

## 2024-02-19 ENCOUNTER — OFFICE VISIT (OUTPATIENT)
Dept: RHEUMATOLOGY | Facility: CLINIC | Age: 64
End: 2024-02-19
Payer: MEDICARE

## 2024-02-19 VITALS
BODY MASS INDEX: 32.78 KG/M2 | WEIGHT: 178.13 LBS | HEIGHT: 62 IN | DIASTOLIC BLOOD PRESSURE: 90 MMHG | HEART RATE: 66 BPM | SYSTOLIC BLOOD PRESSURE: 169 MMHG

## 2024-02-19 DIAGNOSIS — D84.9 IMMUNOLOGIC DEFICIENCY SYNDROME: ICD-10-CM

## 2024-02-19 DIAGNOSIS — M35.2 BEHCET'S SYNDROME INVOLVING ORAL MUCOSA: Primary | ICD-10-CM

## 2024-02-19 DIAGNOSIS — M79.7 FIBROMYALGIA: ICD-10-CM

## 2024-02-19 DIAGNOSIS — R11.2 DRUG-INDUCED NAUSEA AND VOMITING: ICD-10-CM

## 2024-02-19 DIAGNOSIS — K51.011 ULCERATIVE PANCOLITIS WITH RECTAL BLEEDING: ICD-10-CM

## 2024-02-19 DIAGNOSIS — T50.905A DRUG-INDUCED NAUSEA AND VOMITING: ICD-10-CM

## 2024-02-19 LAB — CALPROTECTIN STL-MCNT: ABNORMAL MCG/G

## 2024-02-19 PROCEDURE — 99999 PR PBB SHADOW E&M-EST. PATIENT-LVL III: CPT | Mod: PBBFAC,,, | Performed by: INTERNAL MEDICINE

## 2024-02-19 PROCEDURE — 99213 OFFICE O/P EST LOW 20 MIN: CPT | Mod: PBBFAC,PO | Performed by: INTERNAL MEDICINE

## 2024-02-19 PROCEDURE — 99215 OFFICE O/P EST HI 40 MIN: CPT | Mod: S$PBB,,, | Performed by: INTERNAL MEDICINE

## 2024-02-19 RX ORDER — PREGABALIN 75 MG/1
75 CAPSULE ORAL 2 TIMES DAILY
Qty: 60 CAPSULE | Refills: 3 | Status: ON HOLD | OUTPATIENT
Start: 2024-02-19 | End: 2024-05-02 | Stop reason: HOSPADM

## 2024-02-19 RX ORDER — ONDANSETRON 4 MG/1
4 TABLET, ORALLY DISINTEGRATING ORAL EVERY 8 HOURS PRN
Qty: 90 TABLET | Refills: 6 | Status: SHIPPED | OUTPATIENT
Start: 2024-02-19

## 2024-02-19 NOTE — PROGRESS NOTES
RHEUMATOLOGY CLINIC FOLLOW UP VISIT  Chief complaints, HPI, ROS, EXAM, Assessment & Plans:-  Yodit Parks a 63 y.o. pleasant female comes in for follow-up visit.  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  Gastroenterologist recently added Xeljanz 10 mg b.i.d. to Entyvio for severe worsening ulcerative colitis.  She was doing well until her rotavirus infection which flare diarrhea UC.  Reports significant improvement since starting the combination.  No infections.  Complains of severe worsening pain all over the body.  No significant flare of Behcet's today.  Rheumatological review of system negative otherwise.  Exam shows 18/18 tender points..    1. Behcet's syndrome involving oral mucosa    2. Ulcerative pancolitis with rectal bleeding    3. Immunologic deficiency syndrome    4. Fibromyalgia    5. Drug-induced nausea and vomiting      Problem List Items Addressed This Visit       Immunologic deficiency syndrome    Behcet's syndrome involving oral mucosa - Primary    Drug-induced nausea and vomiting    Relevant Medications    ondansetron (ZOFRAN-ODT) 4 MG TbDL    Ulcerative colitis    Overview     Left sided from descending to rectum per colonoscopy report and path         Fibromyalgia    Relevant Medications    pregabalin (LYRICA) 75 MG capsule       Labs reviewed today:-   Latest Reference Range & Units 05/23/22 13:36   FABY Screen Negative <1:80  Positive !   FABY Titer 1  5120   FABY PATTERN 1  Speckled   ds DNA Ab Negative 1:10  Negative 1:10   Anti-SSA Antibody 0.00 - 0.99 Ratio 0.06   Anti-SSA Interpretation Negative  Negative   Anti-SSB Antibody 0.00 - 0.99 Ratio 0.08   Anti-SSB Interpretation Negative  Negative   Anti Sm Antibody 0.00 - 0.99 Ratio 0.08   Anti-Sm Interpretation Negative  Negative   Anti Sm/RNP Antibody 0.00 - 0.99 Ratio 0.14   Anti-Sm/RNP Interpretation Negative   Negative   Complement (C-3) 50 - 180 mg/dL 125   Complement (C-4) 11 - 44 mg/dL 25   !: Data is abnormal      Severe case of Behcet's disease complicated by ulcerative colitis and immunoglobulin deficiency disorder on Entyvio, Xeljanz and Hizentra.  No significant flares today.  On high-dose immunosuppressive therapy with Entyvio and Xeljanz by gastroenterologist and Hizentra for immunoglobulin deficiency disorder.  Continue follow-up with gastroenterologist and allergy immunology specialist.  Severe worsening fibromyalgia.  Try Lyrica.  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 Monitor carefully for infections. Advised to get immediate medical care if any infection. Also advised strict adherence to age appropriate vaccinations and cancer screenings with PCP.  I have explained all of the above in detail and the patient understands all of the current recommendation(s). I have answered all questions to the best of my ability and to their complete satisfaction.     Total time spent 40 minutes including time needed to review the records, the   patient evaluation, documentation, face-to-face discussion with the patient,   coordination of care and counseling.    Level V visit.  # Follow up in about 6 months (around 8/19/2024).      Disclaimer: This note was prepared using voice recognition system and is likely to have sound alike errors and is not proof read.  Please call me with any questions.

## 2024-03-26 ENCOUNTER — OFFICE VISIT (OUTPATIENT)
Dept: OBSTETRICS AND GYNECOLOGY | Facility: CLINIC | Age: 64
End: 2024-03-26
Payer: MEDICARE

## 2024-03-26 ENCOUNTER — HOSPITAL ENCOUNTER (OUTPATIENT)
Dept: RADIOLOGY | Facility: HOSPITAL | Age: 64
Discharge: HOME | End: 2024-03-26
Attending: OBSTETRICS & GYNECOLOGY
Payer: MEDICARE

## 2024-03-26 VITALS
SYSTOLIC BLOOD PRESSURE: 138 MMHG | HEIGHT: 62 IN | WEIGHT: 185.19 LBS | BODY MASS INDEX: 34.08 KG/M2 | RESPIRATION RATE: 18 BRPM | DIASTOLIC BLOOD PRESSURE: 86 MMHG

## 2024-03-26 DIAGNOSIS — Z79.890 POSTMENOPAUSAL HRT (HORMONE REPLACEMENT THERAPY): Primary | ICD-10-CM

## 2024-03-26 DIAGNOSIS — Z90.710 S/P HYSTERECTOMY: ICD-10-CM

## 2024-03-26 DIAGNOSIS — R60.0 LOCALIZED EDEMA: ICD-10-CM

## 2024-03-26 DIAGNOSIS — Z12.31 VISIT FOR SCREENING MAMMOGRAM: ICD-10-CM

## 2024-03-26 DIAGNOSIS — Z12.31 SCREENING MAMMOGRAM, ENCOUNTER FOR: ICD-10-CM

## 2024-03-26 DIAGNOSIS — N76.3 CHRONIC VULVITIS: ICD-10-CM

## 2024-03-26 PROCEDURE — 99214 OFFICE O/P EST MOD 30 MIN: CPT | Mod: S$PBB,,, | Performed by: OBSTETRICS & GYNECOLOGY

## 2024-03-26 PROCEDURE — 77067 SCR MAMMO BI INCL CAD: CPT | Mod: 26,,, | Performed by: RADIOLOGY

## 2024-03-26 PROCEDURE — 77063 BREAST TOMOSYNTHESIS BI: CPT | Mod: 26,,, | Performed by: RADIOLOGY

## 2024-03-26 PROCEDURE — 99215 OFFICE O/P EST HI 40 MIN: CPT | Mod: PBBFAC,PN | Performed by: OBSTETRICS & GYNECOLOGY

## 2024-03-26 PROCEDURE — 77067 SCR MAMMO BI INCL CAD: CPT | Mod: TC,PN

## 2024-03-26 PROCEDURE — 99999 PR PBB SHADOW E&M-EST. PATIENT-LVL V: CPT | Mod: PBBFAC,,, | Performed by: OBSTETRICS & GYNECOLOGY

## 2024-03-26 RX ORDER — ESTRADIOL 1 MG/1
1 TABLET ORAL DAILY
Qty: 90 TABLET | Refills: 3 | Status: SHIPPED | OUTPATIENT
Start: 2024-03-26

## 2024-03-26 RX ORDER — CLOBETASOL PROPIONATE 0.5 MG/G
CREAM TOPICAL NIGHTLY PRN
Qty: 60 G | Refills: 1 | Status: SHIPPED | OUTPATIENT
Start: 2024-03-26

## 2024-03-26 RX ORDER — TRIAMTERENE/HYDROCHLOROTHIAZID 37.5-25 MG
1 TABLET ORAL DAILY PRN
Qty: 90 TABLET | Refills: 1 | Status: SHIPPED | OUTPATIENT
Start: 2024-03-26 | End: 2024-05-13

## 2024-03-26 NOTE — PROGRESS NOTES
Chief Complaint   Patient presents with    Medication Refill     hrt       History of Present Illness: Yodit Canseco is a 63 y.o. female that presents today 3/26/2024 for   Chief Complaint   Patient presents with    Medication Refill     hrt         Past Medical History:   Diagnosis Date    Allergy     Angio-edema     Arthralgia     Asthma without status asthmaticus     Bronchitis     COPD (chronic obstructive pulmonary disease)     COVID-19 virus infection 07/23/2021    Diverticulosis of large intestine without hemorrhage 10/08/2015    Eczema     Environmental allergies     Eosinophilic asthma 03/08/2018    Exacerbation of ulcerative colitis with rectal bleeding     Fibromyalgia 2/19/2024    Gastroparesis     Gram-negative bacteremia 12/29/2023    Hand arthritis     Herpes simplex without mention of complication     oral    Hidradenitis     History of morbid obesity     Hyperlipidemia     Hypothyroidism     Immune deficiency disorder     Intraperitoneal abscess 05/07/2018    LBP radiating to left leg     Leukocytosis, unspecified     Migraine headache     Normocytic anemia 10/05/2022    Obesity, Class III, BMI 40-49.9 (morbid obesity)     Periorbital cellulitis 12/31/2016    Prediabetes     RA (rheumatoid arthritis)     Recurrent upper respiratory infection (URI)     Rotavirus enteritis 12/27/2023    RSV (respiratory syncytial virus infection) 01/30/2023    S/P colonoscopy 11/2010    Sepsis 05/07/2018    Sepsis secondary to colitis 05/07/2018    Systemic lupus erythematosus, organ or system involvement unspecified     Tubular adenoma of colon 08/17/2022    Ulcerative pancolitis with rectal bleeding 10/17/2022    Urticaria        Past Surgical History:   Procedure Laterality Date    ADENOIDECTOMY      CHOLECYSTECTOMY      CHOLECYSTECTOMY      colitis      COLONOSCOPY  2015    repeat in 10    COLONOSCOPY N/A 10/08/2015    Procedure: COLONOSCOPY;  Surgeon: Panda Bose MD;  Location: [facility];  Service:  Endoscopy;  Laterality: N/A;    COLONOSCOPY N/A 08/17/2022    Procedure: COLONOSCOPY;  Surgeon: Olaf Del Valle MD;  Location: [facility] ENDO;  Service: General;  Laterality: N/A;    COLONOSCOPY N/A 10/06/2022    Procedure: COLONOSCOPY;  Surgeon: Quinton Celeste MD;  Location: [facility] ENDO;  Service: Gastroenterology;  Laterality: N/A;    Hand fracture surgery      HARDWARE REMOVAL      left hand 5th [facility]    hymenectomy      HYSTERECTOMY      menorrhagia-Ovaries intact    ROTATOR CUFF REPAIR Right     SLEEVE GASTROPLASTY  04/16/2018    TONSILLECTOMY      Urethral dilatation         Outpatient Medications Prior to Visit   Medication Sig Dispense Refill    acetaminophen (TYLENOL) 325 MG tablet Take 1 tablet (325 mg total) by mouth every 6 (six) hours as needed for Pain (Do not take with any other products containing  tylenol or acetaminophen).  0    albuterol (PROVENTIL/VENTOLIN HFA) 90 mcg/actuation inhaler Inhale 2 puffs into the lungs every 4 (four) hours as needed for Wheezing or Shortness of Breath. 2 puffs every 4 hours as needed for cough, wheeze, or shortness of breath 54 g 3    benzonatate (TESSALON) 200 MG capsule TAKE 1 CAPSULE (200 MG TOTAL) BY MOUTH 3 (THREE) TIMES DAILY AS NEEDED FOR COUGH. 30 capsule 0    budesonide (ENTOCORT EC) 3 mg capsule Take 9 mg by mouth once daily.      cholecalciferol, vitamin D3, (VITAMIN D3) 50 mcg (2,000 unit) Cap Take 1 capsule by mouth once daily.      desonide (DESOWEN) 0.05 % cream Apply topically 2 (two) times daily. 60 g 6    FEROSUL 325 mg (65 mg iron) Tab tablet Take 325 mg by mouth once daily.      fluconazole (DIFLUCAN) 200 MG Tab Take 1 tablet (200 mg total) by mouth once a week. 7 tablet 0    fluticasone-umeclidin-vilanter (TRELEGY ELLIPTA) 200-62.5-25 mcg inhaler Inhale 1 puff into the lungs once daily. 60 each 11    hydrocortisone 2.5 % cream APPLY TOPICALLY 2 (TWO) TIMES DAILY. TO THE ANUS (Patient taking differently: Apply 1 Application topically 2 (two) times  daily as needed (TO THE ANUS). To the anus) 28.35 g 2    HYDROmorphone (DILAUDID) 2 MG tablet Take 1 tablet (2 mg total) by mouth every 4 (four) hours as needed for Pain (cough). 30 tablet 0    immun glob G,IgG,-pro-IgA 0-50 (HIZENTRA) 2 gram/10 mL (20 %) Soln Inject 90 mLs (18 g total) into the skin every 14 (fourteen) days. 180 mL 12    levothyroxine (SYNTHROID) 75 MCG tablet Take 1 tablet (75 mcg total) by mouth once daily. 90 tablet 3    LIDOcaine 5 % Crea Apply 1 Application topically once daily.      loperamide (IMODIUM) 2 mg capsule Take 1 capsule (2 mg total) by mouth 3 (three) times daily as needed for Diarrhea. 30 capsule 0    minoxidiL (LONITEN) 2.5 MG tablet Take 1/4th (0.625mg) by mouth daily 90 tablet 2    montelukast (SINGULAIR) 10 mg tablet Take 1 tablet (10 mg total) by mouth every evening. 90 tablet 3    MULTIVITAMIN ORAL Take 1 tablet by mouth once daily.      mupirocin (BACTROBAN) 2 % ointment Apply topically 3 (three) times daily. Place on ulcers 30 g 1    ondansetron (ZOFRAN-ODT) 4 MG TbDL Take 1 tablet (4 mg total) by mouth every 8 (eight) hours as needed (nausea/vomiting). 90 tablet 6    potassium chloride SA (K-DUR,KLOR-CON) 20 MEQ tablet Take 20 mEq by mouth once daily.      pregabalin (LYRICA) 75 MG capsule Take 1 capsule (75 mg total) by mouth 2 (two) times daily. 60 capsule 3    promethazine (PHENERGAN) 25 MG tablet Take 25 mg by mouth every 6 (six) hours as needed for Nausea.      traMADoL (ULTRAM) 50 mg tablet Take 50 mg by mouth 3 (three) times daily.      tretinoin (RETIN-A) 0.05 % cream Apply topically every evening. (Patient taking differently: Apply 1 application  topically every evening.) 45 g 3    triamterene-hydrochlorothiazide 37.5-25 mg (MAXZIDE-25) 37.5-25 mg per tablet TAKE 1 TABLET BY MOUTH DAILY AS NEEDED (EDEMA). 90 tablet 1    vedolizumab (ENTYVIO) 300 mg SolR injection Inject 300 mg into the vein every 14 (fourteen) days.      XELJANZ 10 mg Tab Take 1 tablet by mouth 2  "(two) times daily.      clobetasoL (TEMOVATE) 0.05 % cream Apply topically 2 (two) times daily. To the vulva 60 g 1    estradioL (ESTRACE) 1 MG tablet TAKE 1 TABLET BY MOUTH EVERY DAY 90 tablet 1    sumatriptan (IMITREX) 100 MG tablet Take 1 tablet (100 mg total) by mouth once as needed for Migraine. May repeat dose in 2 hours if no relief.  Do not exceed 2 doses in 24 hours. 9 tablet 5     No facility-administered medications prior to visit.       Review of patient's allergies indicates:   Allergen Reactions    Bromelains Hives     " causes mouth to bleed"    Pimecrolimus Swelling    Sudafed [pseudoephedrine hcl] Other (See Comments)     Does not want to wake up .    Amoxicillin-pot clavulanate Itching     Other reaction(s): Itching; tolerated ceftriaxone 10/2022    Hydrocodone Itching    Iodinated contrast media      childhood    Iodine and iodide containing products Other (See Comments)    Melon Hives    Percocet [oxycodone-acetaminophen] Itching    Corn containing products     Tree nut     Wheat containing prod     Barium iodide Rash    Ephedrine Other (See Comments)     Other reaction(s): comatose    Penicillins      Other reaction(s): does not work on pt symptoms       Family History   Problem Relation Age of Onset    Melanoma Mother     Basal cell carcinoma Mother     Lung disease Mother         pulm htn    Cataracts Mother     Hypertension Mother     Lung cancer Father     Diabetes Father     Hypertension Father     Cancer Father     Melanoma Maternal Aunt     Melanoma Maternal Uncle     Diabetes Paternal Aunt     Colon cancer Paternal Aunt 40    Diabetes Paternal Aunt     Breast cancer Paternal Aunt 40    Lymphoma Paternal Aunt     Cancer Maternal Grandfather     Ovarian cancer Paternal Grandmother 40    Ulcerative colitis Son     Psoriasis Son     Psoriasis Son     Breast cancer Cousin 25    Allergic rhinitis Neg Hx     Allergies Neg Hx     Angioedema Neg Hx     Asthma Neg Hx     Atopy Neg Hx     Eczema " "Neg Hx     Immunodeficiency Neg Hx     Rhinitis Neg Hx     Urticaria Neg Hx        Social History     Tobacco Use    Smoking status: Never     Passive exposure: Past    Smokeless tobacco: Never   Substance Use Topics    Alcohol use: Not Currently     Comment: very rarely    Drug use: No       OB History    Para Term  AB Living   5 2 2   3     SAB IAB Ectopic Multiple Live Births   3              # Outcome Date GA Lbr Pradip/2nd Weight Sex Delivery Anes PTL Lv   5 SAB            4 SAB            3 SAB            2 Term            1 Term                    /86   Resp 18   Ht 5' 2" (1.575 m)   Wt 84 kg (185 lb 3 oz)   Physical Exam:  APPEARANCE: Well nourished, well developed, in no acute distress.  SKIN: Normal skin turgor, no lesions.  +swelling  NECK: Neck symmetric without masses   RESPIRATORY: Normal respiratory effort with no retractions or use of accessory muscles  CARDIOVASCULAR: Peripheral vascular system with no swelling no varicosities and palpation of pulses normal  LYMPHATIC: No enlargements of the lymph nodes noted in the neck, axillae, or groin  ABDOMEN: Soft. No tenderness or masses. No hepatosplenomegaly. No hernias.  PELVIC: Normal external female genitalia without lesions. Normal hair distribution. Adequate perineal body, normal urethral meatus. Urethra with no masses.  Bladder nontender. Vagina moist and well rugated without lesions or discharge.  No significant cystocele or rectocele. Adnexa without masses or tenderness. Urethra and bladder normal.  EXTREMITIES: No clubbing cyanosis or edema.    ASSESSMENT/PLAN:  Postmenopausal HRT (hormone replacement therapy)  -     estradioL (ESTRACE) 1 MG tablet; Take 1 tablet (1 mg total) by mouth once daily.  Dispense: 90 tablet; Refill: 3    Screening mammogram, encounter for    S/P hysterectomy    Chronic vulvitis  -     clobetasoL (TEMOVATE) 0.05 % cream; Apply topically nightly as needed. To the vulva  Dispense: 60 g; Refill: " 1        30 minutes spent today spent preparing reviewing previous external notes, reviewing previous results, performing medical examination, ordering tests or medications, counseling and documenting.

## 2024-04-04 ENCOUNTER — OFFICE VISIT (OUTPATIENT)
Dept: PULMONOLOGY | Facility: CLINIC | Age: 64
End: 2024-04-04
Payer: MEDICARE

## 2024-04-04 VITALS
WEIGHT: 188.69 LBS | SYSTOLIC BLOOD PRESSURE: 148 MMHG | HEART RATE: 71 BPM | HEIGHT: 62 IN | BODY MASS INDEX: 34.72 KG/M2 | OXYGEN SATURATION: 98 % | DIASTOLIC BLOOD PRESSURE: 67 MMHG

## 2024-04-04 DIAGNOSIS — J45.50 SEVERE PERSISTENT ASTHMA WITHOUT COMPLICATION: ICD-10-CM

## 2024-04-04 DIAGNOSIS — R60.0 LOCALIZED EDEMA: Primary | ICD-10-CM

## 2024-04-04 DIAGNOSIS — D84.9 IMMUNE DEFICIENCY DISORDER: ICD-10-CM

## 2024-04-04 PROBLEM — J45.909 ASTHMA IN ADULT WITHOUT COMPLICATION: Status: RESOLVED | Noted: 2018-03-08 | Resolved: 2024-04-04

## 2024-04-04 PROBLEM — J45.51 SEVERE PERSISTENT ASTHMA WITH ACUTE EXACERBATION: Status: RESOLVED | Noted: 2021-04-06 | Resolved: 2024-04-04

## 2024-04-04 PROCEDURE — 99213 OFFICE O/P EST LOW 20 MIN: CPT | Mod: S$PBB,,, | Performed by: INTERNAL MEDICINE

## 2024-04-04 PROCEDURE — 99215 OFFICE O/P EST HI 40 MIN: CPT | Mod: PBBFAC,PO | Performed by: INTERNAL MEDICINE

## 2024-04-04 PROCEDURE — 99999 PR PBB SHADOW E&M-EST. PATIENT-LVL V: CPT | Mod: PBBFAC,,, | Performed by: INTERNAL MEDICINE

## 2024-04-04 RX ORDER — HYDROCORTISONE ACETATE 25 MG/1
25 SUPPOSITORY RECTAL
COMMUNITY
Start: 2024-02-15 | End: 2024-04-04

## 2024-04-04 RX ORDER — DOXYCYCLINE 100 MG/1
100 CAPSULE ORAL 2 TIMES DAILY
Qty: 20 CAPSULE | Refills: 2 | Status: SHIPPED | OUTPATIENT
Start: 2024-04-04 | End: 2024-05-13 | Stop reason: SDUPTHER

## 2024-04-04 RX ORDER — HYDROCORTISONE 10 MG/1
10 TABLET ORAL
COMMUNITY

## 2024-04-04 RX ORDER — PREDNISONE 10 MG/1
2 TABLET ORAL DAILY
Status: ON HOLD | COMMUNITY
Start: 2023-07-09 | End: 2024-05-02

## 2024-04-04 RX ORDER — FUROSEMIDE 20 MG/1
20 TABLET ORAL
COMMUNITY
Start: 2024-03-26

## 2024-04-04 NOTE — PATIENT INSTRUCTIONS
Double lasix to 40/d  and check blood next wk     May use doxycycline if sinus infection-- low c diff risk.      Will ask staff to arrange virtual visit with  if needed..

## 2024-04-04 NOTE — PROGRESS NOTES
4/4/2024    Yodit Canseco  Office Note    Chief Complaint   Patient presents with    Follow-up       HPI:       April 4, 2024-  lungs getting by , had UC exacerbated with lyrica and now back on tramadol.     No more hemoptysis.  Pt having am congestion  Pt had  increase steroids to 30/ hydrocortine.        1/4/2024 pt was in icu for rotovirus gi infection-- was in icu 4 days, got lots fluid, .. Record reviewed.  Pt was dosed high dose steroids.  Pt was given 7 d course levaquin with no c diff prevention.    Pt had some hemoptysis froathy blood in mucous    Pt has had green mucous with neg cuilture earlier December,     Pt very weak and had fall in ER checking in.       Pt now off prednisone -- stopped abruptly after leaving hosp.   Pt was weaned off stopping 5 days or so prior to admit.    Pt now very weak .   Pt was on steroids for about a yr.     On trelegy and lungs stable    Patient Instructions   Would re culture mucous -- check for afb and regular germs.  Coritosol check is reasonable -- do cortisol level in am.  May consider replacement.    Ct chest likely low yield -- pattern of hemoptysis sounded more like fluid and sepsis.  Could do ct  later if still bringing up  yellow mucous.  Chest xray viewed..      Edema should mobiliize    C diff may recur -- if diarrhea increase may test..    You are having severe persistent sinus fullness-- antibiotics very risky and just took levaquin.   Would recommend sinus xray - ct would be best.  May need ent   12/5/2023  Pt had exacerbatoin cough and increased UC prednisone from 5/d to 20 mg for last 10 days along with doxy -- off abx and had green mucous this am.  Albuterol helps and dosed tid.    Pt had u/a showing wbc at home - says recurrent uti.  H/o c diff.  Not on controller.      Appetite good nad not weak, no fever.   Patient Instructions   Green mucous after increased medication for exacerbation -- failed doxycycline.  Need culture     Trelegy may  stabilize-- once daily    Boost prednisone to 20 /d if cough controlled-- dilaudid sent in for 30  tabs-- call next wk if need more.            Cxr ordered if worsens    Need to re evaluate next wk if not clearing.  5/8/2023 took doxycycline no issues.  Bowels may be better on Entyvio. Lungs doing well.  On Hizentra.  Pt on prednisone 20/d -- on prednisone since November with plans to decrease in near future?   Still having blood in stool so will not taper prednisone til bleed resolves-- should improve in near future (on 3 rd entyvio now and should be good after 4th)  Patient Instructions   Might consider pneumocystis prophylaxis but antibiotics will increase c diff-- bactrim 2 single strength 3 days a week.  Use vancomycin daily while on bactrim.  Stop bactrim (followed by vancomycin) on prednisone less than 20mg/d.    Bactrim generally has high risk allergies and kidney problems.     12/29/2022 having sinus problems with green mucous nad coughing, right face to chin hurts last 3 days. Had in prior.  May exacerbate lungs as in past.  Started levaquin 2 days ago. Prednisone boosted from 10 to 20/d.  Need pain control and cough suppresion .  Has prednisoen. Not getting [facility]erol [facility]-- not covered on plan??   1/30/2023 chart note after above visit.  Pt has had colitis-- dx c diff recently.   Epic reveals c diff ag but no toxin.    Had green sinus dc-- cleared slight with levaquin but now dx c diff with ag no toxin    Will dc levaquin and instructed not to use.    Doxycycline may be ok -- would avoid abx as much as able.    If sinuses relapse -- doxycycline sent in.    If green lung mucous -- pt told to submit sputum for culture.    11/2/2022 dx lupus, bechets, Ulcerative colitis, RA-- on hizentra.  Pt exposed to grandson with rsv.  Having abd pain -- dilaudid helped and suppressed cough-- not able to use codeine/hydrocodone.     Ct abd and cxr 10/2022 clear lungs.       Pt currently felt to have rsv--  improved over last 5 da   pt was sl febrile initially.   Pt now having green mucous. Cough is violent .  similar sick. Had min leg edema.  On hizentra. Asthma doing wellwith RSV.  Eating ok but weak and coughs a lot.  Breathing good.     Patient Instructions   May use levaquin for green mucous    Rsv should run course-- hirzentra should help?  Breztri may be needed.      Use dilaudid minimally for pain -- should suppress cough    Diflucan for yeast as needed    Maxzide as needed for edema    Tessalon for cough    Could do phone follow up    Ct abd 10/2022 was clear lower lungs and cxr 10/31/2022 was clear -- lungs not a concern.  Asthma doing well even with rsv.  2022 mom  sept complications from hip fx/covid.  Had few sinus infection-- rx Levaquin/prednisone.  Pt on plaquenil, dx ra and lupus.  Also on colchicine.      Uses breo but skipped.  Uses albuterol once a wk.    Patient Instructions   May use breztri 2 twice daily -- could use more or less as needed.   If used regular -- breztri would act as controller.    May use levaquin and prednisone if sinus/lung exacerbates.    Lungs loook good wrt lung tissue-- no lung tissue disease seen from Rheumatoid disease.   Ct abd 2021 viewed.   2021 - had covid in July, had rapid home test +, managed outpt.  Got infusion rx.  Has wheezes but feels over covid.  Had nausea, vomiting, weakness, headache, -- sick 8 days.  Mom got hip fx complicating recovery-- at rehab. Pt and mom were vaccinated.  Pt on abx for sinus infection  Dx lupus/rheumatoid - 5 mg prednisone daily  Patient Instructions   Recovering covid doing well.    Use levaquin as needed.    Ordered cxr if needed.     2021 had pneumonia 2020 - no problems. Needs disability paper updated.  We review today.    Patient Instructions   We reviewed your disability- need to complete paper work.  You had pneumonia in 2020.  Breo, prednisone, levaquin, albuterol renewed.    10/14/2020-  all well.  No abx/prednisone, cxr 10/14/2020 nad- rul pneumonia [place] cleared.  Avoids irritants.  Patient Instructions   You likely would be best to get high dose flu vaccine.   Your immune system may have problem responding to flu vaccine.  4/14/2020- on hirzentra since sept 2016.  Saw NP [place] in [place] 4/13- virtual visit- azithro and prednisone 10/d x 3 for sinusitis.     Uses   500 bid, breo 200 daily, , xopenex nebs prn, singulair. Needs more prednisone, had pneumonia 3/4 to 8th.  Had ct chest feb.  Felt had covid symptoms.  No ox needed for pneumonia- no steroids nor bd. Took doxy - vanc in hosp.   Patient Instructions   Pneumonia - follow up chest xray good- not urgent.    Prednisone action plan for asthma/wheeze/cough    May use levaquin for sinus/lung infections  2/10/2020- had shingles vaccine, states felt an immune response.   Cough- improved, daily, not severe, non productive occasionally productive thick dark yellow color. Using nebulizer only as needed. Has noticed fewer asthma exacerbations since starting Hyzentra, went from 1 months to 2x yearly.   Not able to use albuterol rescue inhaler or nebulized due to tachycardia, palpitations, and hand tremors. No complaint with xopenex medications.   Patient Instructions   Continue current Asthma medication regiment  Review of CT shows no areas of concern. The small nodule is not significant  Pulmonary function test shows lungs to still be strong.   Continue to use Xopenex Nebulizer with aerobika attachement once a day three days a week every week.  When your sick you can use nebulizer or Xopenex rescue inhaler four times a day.     1/9/2020- two exacerbations in 2 months, states cough is unchanged, daily, states bark like cough, severe and affects daily life, Productive occasionally clear/cream color thick in consistency, uses nebulizer 2-3 x daily.   Patient Instructions   [employer] sells an Aerobika device, this device shakes the air  inside of you to break mucous off the bronchial wall. Use with nebulizer and with out daily.    Continue current Asthma medication regiment     Use nebulizer at least once a day three days a week.     CT to evaluate lungs for any abnormalities       11/8/2019- Had recent Rotator cuff surgery with nerve block Oct 9, Has SOB worse after surgery for 3 days, Took prednisone 20 mg for 3 days and Levaquin for 9 days. Seen by PCP dx URI.   Coughing, chest tightness, congestion, wheezing, Hoarse voice, fatigue, Shortness of breath worse with exertion, onset following surgery to shoulder. Cough- not able to clear secretions from chest.  Complaint of hand shaking, heart rate increases, and becomes rapid with Albuterol nebulizer onset years. Tries not to use due to side effects.     04/24/2019- breathing is good except for high pollen days, had 3 exacerbation in 6 months. Albuterol rescue inhaler 3x weekly, 1-2 nocturnal arousals monthly, could not do PFT at [facility],     11/6/18- ER visit for dehydration gastroenteritis, no prednisone use in 4 months. Not currently on fasenra injection, insurance would not cover. Currently on Hirzentra IGG therapy.   Sinus drip present, cough occasional, non productive, no nocturnal arousals, Currently on Dulera daily, uses Albuterol rescue inhaler daily before exercise no SOB.  Sepsis [facility] August 2017.  Flu vaccine   July 9, 2018 - had gastric sleeve with leak complication- lost 45 lbs already.  Has appetite but fills quickly.  Still on hirzentra.  Asthma ok avoiding fragrances/ordors.  No prednisone.   No nocturnal arousals, uses rescue weekly avoiding any irritants.  Use prednisone 4-5 last year and twice this year.        March 19, 2018-since recovered from pleuritic pain - doing well.  No prednisone use recent.  Pt has had no wheezes.  feb 22,2108   Had to do prednisone again.  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 I have advised not to work given severe asthma,  hypersensitivity to fragrances, and immune deficiency.   Pt seems better since Hirzentra, but still unstable severe asthma  Jan 29. 2018- 6 days ago had been exposed to sick , had nasal congestion , cough, ear stuffy, muscle aches /joint aches / fever.  Temp to 100.1.  Seen by np and flu screen negative.  Had asthma 3-4 flares last year. eos up past,   oct 5, 2017 - had marce in April, skipped couple doses hirzentra, had mild bronchitis once, otherwise doing very well.  No prednisone.  No side effects and covered. Ppt flu sense for 2 days  Jan 24,2017 on  hirzentra q o week since sept and asthma more stable, no hosp, no prednisone, no noct asthma/ no rescue therapy- control not this good for years.  Sept 27, 2016  HPI:has had lung problems since birth, no nocturnal arousals, uses rescue 2/d, breathing controlled satisfactory except with irritants/infection - strong abx needed to clear.  Prednisone 2/yr, last hosp 18 months.  No ventilator.    Had exacerbation recent due uri from .  Has had shingles vaccine past.       The chief compliant  problem is varies with instablilty at time  PFSH:  Past Medical History:   Diagnosis Date    Allergy     Angio-edema     Arthralgia     Asthma without status asthmaticus     Bronchitis     COPD (chronic obstructive pulmonary disease)     COVID-19 virus infection 07/23/2021    Diverticulosis of large intestine without hemorrhage 10/08/2015    Eczema     Environmental allergies     Eosinophilic asthma 03/08/2018    Exacerbation of ulcerative colitis with rectal bleeding     Fibromyalgia 2/19/2024    Gastroparesis     Gram-negative bacteremia 12/29/2023    Hand arthritis     Herpes simplex without mention of complication     oral    Hidradenitis     History of morbid obesity     Hyperlipidemia     Hypothyroidism     Immune deficiency disorder      Intraperitoneal abscess 05/07/2018    LBP radiating to left leg     Leukocytosis, unspecified     Migraine headache     Normocytic anemia 10/05/2022    Obesity, Class III, BMI 40-49.9 (morbid obesity)     Periorbital cellulitis 12/31/2016    Prediabetes     RA (rheumatoid arthritis)     Recurrent upper respiratory infection (URI)     Rotavirus enteritis 12/27/2023    RSV (respiratory syncytial virus infection) 01/30/2023    S/P colonoscopy 11/2010    Sepsis 05/07/2018    Sepsis secondary to colitis 05/07/2018    Systemic lupus erythematosus, organ or system involvement unspecified     Tubular adenoma of colon 08/17/2022    Ulcerative pancolitis with rectal bleeding 10/17/2022    Urticaria          Past Surgical History:   Procedure Laterality Date    ADENOIDECTOMY      CHOLECYSTECTOMY      CHOLECYSTECTOMY      colitis      COLONOSCOPY  2015    repeat in 10    COLONOSCOPY N/A 10/08/2015    Procedure: COLONOSCOPY;  Surgeon: Panda Bose MD;  Location: [facility];  Service: Endoscopy;  Laterality: N/A;    COLONOSCOPY N/A 08/17/2022    Procedure: COLONOSCOPY;  Surgeon: Olaf Del Valle MD;  Location: [facility];  Service: General;  Laterality: N/A;    COLONOSCOPY N/A 10/06/2022    Procedure: COLONOSCOPY;  Surgeon: Quinton Celeste MD;  Location: [facility];  Service: Gastroenterology;  Laterality: N/A;    Hand fracture surgery      HARDWARE REMOVAL      left hand 5th [facility]    hymenectomy      HYSTERECTOMY      menorrhagia-Ovaries intact    ROTATOR CUFF REPAIR Right     SLEEVE GASTROPLASTY  04/16/2018    TONSILLECTOMY      Urethral dilatation       Social History     Tobacco Use    Smoking status: Never     Passive exposure: Past    Smokeless tobacco: Never   Substance Use Topics    Alcohol use: Not Currently     Comment: very rarely    Drug use: No     Family History   Problem Relation Age of Onset    Melanoma Mother     Basal cell carcinoma Mother     Lung disease Mother         pulm htn    Cataracts Mother      "Hypertension Mother     Lung cancer Father     Diabetes Father     Hypertension Father     Cancer Father     Melanoma Maternal Aunt     Melanoma Maternal Uncle     Diabetes Paternal Aunt     Colon cancer Paternal Aunt 40    Diabetes Paternal Aunt     Breast cancer Paternal Aunt 40    Lymphoma Paternal Aunt     Cancer Maternal Grandfather     Ovarian cancer Paternal Grandmother 40    Ulcerative colitis Son     Psoriasis Son     Psoriasis Son     Breast cancer Cousin 25    Allergic rhinitis Neg Hx     Allergies Neg Hx     Angioedema Neg Hx     Asthma Neg Hx     Atopy Neg Hx     Eczema Neg Hx     Immunodeficiency Neg Hx     Rhinitis Neg Hx     Urticaria Neg Hx      Review of patient's allergies indicates:   Allergen Reactions    Bromelains Hives     " causes mouth to bleed"    Sudafed [pseudoephedrine hcl] Other (See Comments)     Does not want to wake up .    Amoxicillin-pot clavulanate Itching     Other reaction(s): Itching    Hydrocodone Itching    Iodinated contrast- oral and iv dye      childhood    Iodine and iodide containing products Other (See Comments)    Melon Hives    Pantoprazole Nausea Only     Other reaction(s): upset stomach/halitosis    Percocet [oxycodone-acetaminophen] Itching    Barium iodide Rash    Ephedrine Other (See Comments)     Other reaction(s): comatose    Penicillins      Other reaction(s): does not work on pt symptoms     I have reviewed past medical, family, and social history. I have reviewed previous nurse notes.    Performance Status:The patient's activity level is functions out of house.      Review of Systems   Constitutional: Negative for activity change, appetite change, chills, diaphoresis, fatigue, fever and unexpected weight change.   HENT: Negative for dental problem, sneezing, sore throat, trouble swallowing, postnasal drip, rhinorrhea, sinus pressure, sinus pain, and voice change.     Respiratory: Negative for chest tightness, shortness of breath, wheezing.  Positive for " "cough,   Cardiovascular: Negative for chest pain, palpitations and leg swelling.   Musculoskeletal: Negative for gait problem, myalgias and neck pain.   Skin: Negative for color change and pallor.   Allergic/Immunologic: Negative for environmental allergies and food allergies.   Neurological: Negative for dizziness, speech difficulty, weakness, light-headedness, numbness and headaches.   Hematological: Negative for adenopathy. Does not bruise/bleed easily.   Psychiatric/Behavioral: Negative for dysphoric mood and sleep disturbance. The patient is not nervous/anxious.             Exam:Comprehensive exam done. BP (!) 170/79 (BP Location: Right arm, Patient Position: Sitting)   Pulse (!) 56   Ht 5' 10" (1.778 m)   Wt 119.2 kg (262 lb 10.9 oz)   SpO2 95% Comment: on room air  BMI 37.69 kg/m²   Exam included Vitals as listed, and patient's appearance and affect and alertness and mood, oral exam for yeast and hygiene and pharynx lesions and Mallapatti (M) score, neck with inspection for jvd and masses and thyroid abnormalities and lymph nodes (supraclavicular and infraclavicular nodes and axillary also examined and noted if abn), chest exam included symmetry and effort and fremitus and percussion and auscultation, cardiac exam included rhythm and gallops and murmur and rubs and jvd and edema, abdominal exam for mass and hepatosplenomegaly and tenderness and hernias and bowel sounds, Musculoskeletal exam with muscle tone and posture and mobility/gait and  strength, and skin for rashes and cyanosis and pallor and turgor, extremity for clubbing.  Findings were normal except for pertinent findings listed below:  M3, chest is symmetric, no distress, normal percussion, normal fremitus and good normal breath sounds        Radiographs (ct chest and cxr) reviewed: results reviewed by direct vision  CT Chest Without Contrast 2/10/2020  Normal exam, 3.3 mm nodule seen in right upper lobe.    Lungs are clear.  Minimal " retained mucus secretions in the trachea.  No pleural effusion or pneumothorax.  Normal sized heart.  Thyroid is small and heterogeneous with a subcentimeter nodule or cyst in the right lobe.     XR CHEST 1 VIEW 05/10/2018   There is left lower lobe atelectasis and possible small left pleural effusion on this study.  No pneumothorax is seen.      Labs reviewed       Lab Results   Component Value Date    WBC 6.56 02/12/2024    RBC 5.09 02/12/2024    HGB 12.8 02/12/2024    HCT 40.5 02/12/2024    MCV 80 (L) 02/12/2024    MCH 25.1 (L) 02/12/2024    MCHC 31.6 (L) 02/12/2024    RDW 17.4 (H) 02/12/2024     02/12/2024    MPV 10.2 02/12/2024    GRAN 2.3 02/12/2024    GRAN 35.6 (L) 02/12/2024    LYMPH 3.5 02/12/2024    LYMPH 52.7 (H) 02/12/2024    MONO 0.5 02/12/2024    MONO 7.5 02/12/2024    EOS 0.2 02/12/2024    BASO 0.05 02/12/2024    EOSINOPHIL 3.4 02/12/2024    BASOPHIL 0.8 02/12/2024       PFT reviewed  2/10/2020 no obstruction seen, 7% bronchodilator response with 12% needed for clinical improvement; TLC 86% with DLC0 at 88%, normal exam with mild asthma  Spirometry bronchodilator, lung volume by gas dilution, diffusion capacity measured February 10, 2020.  The FEV1 to FVC ratio was 79%, there is no airflow obstruction measured by spirometry technique.  The FEV1 measured 85% predicted at 1.97 L. The 8%   improvement in FEV1 failed to reach statistical significance ( 12% is needed for statistical significance ).  Lung volumes by gas dilution were normal.  Diffusion was normal.      Spirometry, lung volumes, diffusion are all normal.  The bronchodilator response was not statistically significant.  Clinical correlation recommended.     Plan:  Clinical impression is apparently straight forward and impression with management as below.    Yodit was seen today for follow-up.    Diagnoses and all orders for this visit:    Localized edema  -     Basic Metabolic Panel; Future    Immune deficiency disorder  -      doxycycline (VIBRAMYCIN) 100 MG Cap; Take 1 capsule (100 mg total) by mouth 2 (two) times daily.    Severe persistent asthma without complication                  Follow up in about 6 months (around 10/4/2024), or if symptoms worsen or fail to improve.    Discussed with patient above for education the following:      Patient Instructions   Double lasix to 40/d  and check blood next wk     May use doxycycline if sinus infection-- low c diff risk.      Will ask staff to arrange virtual visit with  if needed..

## 2024-04-05 ENCOUNTER — OFFICE VISIT (OUTPATIENT)
Dept: OTOLARYNGOLOGY | Facility: CLINIC | Age: 64
End: 2024-04-05
Payer: MEDICARE

## 2024-04-05 VITALS — BODY MASS INDEX: 34.72 KG/M2 | WEIGHT: 188.69 LBS | HEIGHT: 62 IN

## 2024-04-05 DIAGNOSIS — J30.9 ALLERGIC RHINITIS, UNSPECIFIED SEASONALITY, UNSPECIFIED TRIGGER: ICD-10-CM

## 2024-04-05 DIAGNOSIS — J32.9 CHRONIC SINUSITIS, UNSPECIFIED LOCATION: ICD-10-CM

## 2024-04-05 DIAGNOSIS — H69.93 ETD (EUSTACHIAN TUBE DYSFUNCTION), BILATERAL: Primary | ICD-10-CM

## 2024-04-05 PROCEDURE — 31231 NASAL ENDOSCOPY DX: CPT | Mod: PBBFAC | Performed by: STUDENT IN AN ORGANIZED HEALTH CARE EDUCATION/TRAINING PROGRAM

## 2024-04-05 PROCEDURE — 99214 OFFICE O/P EST MOD 30 MIN: CPT | Mod: 25,S$PBB,, | Performed by: STUDENT IN AN ORGANIZED HEALTH CARE EDUCATION/TRAINING PROGRAM

## 2024-04-05 PROCEDURE — 99999 PR PBB SHADOW E&M-EST. PATIENT-LVL IV: CPT | Mod: PBBFAC,,, | Performed by: STUDENT IN AN ORGANIZED HEALTH CARE EDUCATION/TRAINING PROGRAM

## 2024-04-05 PROCEDURE — 31231 NASAL ENDOSCOPY DX: CPT | Mod: S$PBB,,, | Performed by: STUDENT IN AN ORGANIZED HEALTH CARE EDUCATION/TRAINING PROGRAM

## 2024-04-05 PROCEDURE — 99214 OFFICE O/P EST MOD 30 MIN: CPT | Mod: PBBFAC,25 | Performed by: STUDENT IN AN ORGANIZED HEALTH CARE EDUCATION/TRAINING PROGRAM

## 2024-04-05 NOTE — PROGRESS NOTES
Chief complaint:    Chief Complaint   Patient presents with    Sinusitis     Pt has come in due to stuffy sinus for the past few years            Referring Provider:  Erick Gary Md  [address] [facility]  [address]  [place] [address]      History of present illness:     Ms. Canseco is a 63 y.o. presenting for evaluation of sinus issues.   She also has multiple autoimmune diagnoses and is immunosuppressed.    The patient reports the following allergy/sinus symptoms:     Major sinusitis symptoms:  No - Purulent anterior nasal discharge  No - Purulent or discolored posterior nasal discharge  Yes - Nasal congestion or obstruction (in the mornings)  Yes - Facial Congestion or fullness  No - Hyposmia or anosmia  No - Fever    Recurrent issues with ear pressure, pain, fluid daily which comes and goes.      Symptoms have been present since birth. States she has symptoms every day.    Severity of symptoms: moderate.   Treatment has included:     She is on chronic daily steroid, doxycycline a few times in the previous months, most recently started yesterday    Not using any nasal sprays. States they will always cause nose bleeds even if saline     Prior sinus surgery: no.    Allergy history: yes. Allergy testing for this patient has been been done. Did do IT more than once Treatment has included: oral antihistamine  Cannot tolerate nasal steroid     Asthma history: yes, severe persistent           History      Past Medical History:   Past Medical History:   Diagnosis Date    Allergy     Angio-edema     Arthralgia     Asthma without status asthmaticus     Bronchitis     COPD (chronic obstructive pulmonary disease)     COVID-19 virus infection 07/23/2021    Diverticulosis of large intestine without hemorrhage 10/08/2015    Eczema     Environmental allergies     Eosinophilic asthma 03/08/2018    Exacerbation of ulcerative colitis with rectal bleeding     Fibromyalgia 2/19/2024    Gastroparesis     Gram-negative bacteremia  12/29/2023    Hand arthritis     Herpes simplex without mention of complication     oral    Hidradenitis     History of morbid obesity     Hyperlipidemia     Hypothyroidism     Immune deficiency disorder     Intraperitoneal abscess 05/07/2018    LBP radiating to left leg     Leukocytosis, unspecified     Migraine headache     Normocytic anemia 10/05/2022    Obesity, Class III, BMI 40-49.9 (morbid obesity)     Periorbital cellulitis 12/31/2016    Prediabetes     RA (rheumatoid arthritis)     Recurrent upper respiratory infection (URI)     Rotavirus enteritis 12/27/2023    RSV (respiratory syncytial virus infection) 01/30/2023    S/P colonoscopy 11/2010    Sepsis 05/07/2018    Sepsis secondary to colitis 05/07/2018    Systemic lupus erythematosus, organ or system involvement unspecified     Tubular adenoma of colon 08/17/2022    Ulcerative pancolitis with rectal bleeding 10/17/2022    Urticaria          Past Surgical History:  Past Surgical History:   Procedure Laterality Date    ADENOIDECTOMY      CHOLECYSTECTOMY      CHOLECYSTECTOMY      colitis      COLONOSCOPY  2015    repeat in 10    COLONOSCOPY N/A 10/08/2015    Procedure: COLONOSCOPY;  Surgeon: Panda Bose MD;  Location: [facility];  Service: Endoscopy;  Laterality: N/A;    COLONOSCOPY N/A 08/17/2022    Procedure: COLONOSCOPY;  Surgeon: Olaf Del Valle MD;  Location: [facility];  Service: General;  Laterality: N/A;    COLONOSCOPY N/A 10/06/2022    Procedure: COLONOSCOPY;  Surgeon: Quinton Celeste MD;  Location: [facility];  Service: Gastroenterology;  Laterality: N/A;    Hand fracture surgery      HARDWARE REMOVAL      left hand 5th [facility]    hymenectomy      HYSTERECTOMY      menorrhagia-Ovaries intact    ROTATOR CUFF REPAIR Right     SLEEVE GASTROPLASTY  04/16/2018    TONSILLECTOMY      Urethral dilatation           Medications: Medication list reviewed. She  has a current medication list which includes the following prescription(s): acetaminophen, albuterol,  "benzonatate, budesonide, cholecalciferol (vitamin d3), clobetasol, desonide, doxycycline, estradiol, ferosul, fluconazole, trelegy ellipta, hydrocortisone, hydrocortisone, hydromorphone, immun glob g(igg)-pro-iga 0-50, lasix, levothyroxine, lidocaine, loperamide, minoxidil, montelukast, multivitamin, mupirocin, ondansetron, potassium chloride sa, prednisone, pregabalin, promethazine, sumatriptan, tramadol, tretinoin, triamterene-hydrochlorothiazide 37.5-25 mg, entyvio, and xeljanz.     Allergies:   Review of patient's allergies indicates:   Allergen Reactions    Bromelains Hives     " causes mouth to bleed"    Pimecrolimus Swelling    Sudafed [pseudoephedrine hcl] Other (See Comments)     Does not want to wake up .    Amoxicillin-pot clavulanate Itching     Other reaction(s): Itching; tolerated ceftriaxone 10/2022    Hydrocodone Itching    Iodinated contrast media      childhood    Iodine and iodide containing products Other (See Comments)    Melon Hives    Percocet [oxycodone-acetaminophen] Itching    Corn containing products     Tree nut     Tree nuts Other (See Comments)    Wheat containing prod     Barium iodide Rash    Ephedrine Other (See Comments)     Other reaction(s): comatose    Penicillins      Other reaction(s): does not work on pt symptoms         Family history: family history includes Basal cell carcinoma in her mother; Breast cancer (age of onset: 25) in her cousin; Breast cancer (age of onset: 40) in her paternal aunt; Cancer in her father and maternal grandfather; Cataracts in her mother; Colon cancer (age of onset: 40) in her paternal aunt; Diabetes in her father, paternal aunt, and paternal aunt; Hypertension in her father and mother; Lung cancer in her father; Lung disease in her mother; Lymphoma in her paternal aunt; Melanoma in her maternal aunt, maternal uncle, and mother; Ovarian cancer (age of onset: 40) in her paternal grandmother; Psoriasis in her son and son; Ulcerative colitis in her " "son.         Social History          Alcohol use:  reports that she does not currently use alcohol.            Tobacco:  reports that she has never smoked. She has been exposed to tobacco smoke. She has never used smokeless tobacco.         Physical Examination      Vitals: Height 5' 2" (1.575 m), weight 85.6 kg (188 lb 11.4 oz).      General: Well developed, well nourished, well hydrated.     Voice: no dysphonia, no dysarthria      Head/Face: Normocephalic, atraumatic. No scars or lesions. Facial musculature equal.     Eyes: No scleral icterus or conjunctival hemorrhage. EOMI. PERRLA.     Ears:     Right ear: No gross deformity. EAC is clear of debris and erythema. TM are intact with a pneumatized middle ear. No signs of retraction, fluid or infection.      Left ear: No gross deformity. EAC is clear of debris and erythema. TM are intact with a pneumatized middle ear. No signs of retraction, fluid or infection.      Nose: No gross deformity or lesions. No purulent discharge. No significant NSD.     Mouth/Oropharynx: Lips without any lesions. No mucosal lesions within the oropharynx. No tonsillar exudate or lesions. Pharyngeal walls symmetrical. Uvula midline. Tongue midline without lesions.     Neurologic: Moving all extremities without gross abnormality.CN II-XII grossly intact. House-Brackmann 1/6. No signs of nystagmus.          Data reviewed      Review of records:      I reviewed records from the referring provider's office visits describing the history, workup, and/or treatment of this problem thus far.     Imaging:      I have independently reviewed the following imaging with the findings noted below:     XR sinus 1/5/24  No paranasal sinus opacification  Mild s shaped septal deviation    NASAL ENDOSCOPY       Indication: Yodit Canseco is a 63 y.o. female  with sinonasal symptoms that were not able to be explained by anterior rhinoscopy alone, thus necessitating nasal endoscopy.     Procedure: Risks, " benefits, and alternatives of the procedure were discussed with the patient, and the patient consented to the nasal endoscopy.  The nasal cavity was sprayed with a topical decongestant and anesthetic (if needed). The endoscope was passed into each nostril and each nasal cavity was visualized.  On each side the nasal cavity, sinuses (if open), turbinates, middle and superior meatus, sphenoethmoidal recess and septum were examined with the findings described below. At the end of the examination, the scope was removed. The patient tolerated the procedure well with no complications.       Endoscopic Sinonasal Exam Findings:  -     The right side has normal mucosa  -     The left side has normal mucosa  -     Nasal secretions: No discolored secretions noted bilaterally  -     Nasal septum: no significant deviation or perforation appreciated   -     Inferior turbinate: Normal mucosa without significant hypertrophy bilaterally  -     Middle turbinate: Normal mucosa without significant hypertrophy bilaterally  -     Other findings: none      Assessment/Plan:    1. ETD (Eustachian tube dysfunction), bilateral    2. Allergic rhinitis, unspecified seasonality, unspecified trigger      No evidence of Chronic Rhinosinusitis   Symptoms are largely Eustachian Tube Dysfunction related and reversible  Add saline irrigations, astelin as tolerated  Audio at her convenience   She cannot tolaterate flonase, may consider PET if significant evidence of B/C tymps or CHL on audio          Matthew Kaiser MD  [facility]   [facility]  [id number] [place] [address].  NICKY Kumar [id number]  P: [phone]  F: [phone]

## 2024-04-08 PROBLEM — A41.9 SEPSIS: Status: RESOLVED | Noted: 2018-05-07 | Resolved: 2024-04-08

## 2024-04-15 ENCOUNTER — LAB VISIT (OUTPATIENT)
Dept: LAB | Facility: HOSPITAL | Age: 64
End: 2024-04-15
Attending: INTERNAL MEDICINE
Payer: MEDICARE

## 2024-04-15 DIAGNOSIS — R60.0 LOCALIZED EDEMA: ICD-10-CM

## 2024-04-15 PROCEDURE — 36415 COLL VENOUS BLD VENIPUNCTURE: CPT | Mod: PO | Performed by: INTERNAL MEDICINE

## 2024-04-15 PROCEDURE — 80048 BASIC METABOLIC PNL TOTAL CA: CPT | Performed by: INTERNAL MEDICINE

## 2024-04-16 LAB
ANION GAP SERPL CALC-SCNC: 9 MMOL/L (ref 8–16)
BUN SERPL-MCNC: 13 MG/DL (ref 8–23)
CALCIUM SERPL-MCNC: 9.8 MG/DL (ref 8.7–10.5)
CHLORIDE SERPL-SCNC: 99 MMOL/L (ref 95–110)
CO2 SERPL-SCNC: 30 MMOL/L (ref 23–29)
CREAT SERPL-MCNC: 0.9 MG/DL (ref 0.5–1.4)
EST. GFR  (NO RACE VARIABLE): >60 ML/MIN/1.73 M^2
GLUCOSE SERPL-MCNC: 62 MG/DL (ref 70–110)
POTASSIUM SERPL-SCNC: 3.6 MMOL/L (ref 3.5–5.1)
SODIUM SERPL-SCNC: 138 MMOL/L (ref 136–145)

## 2024-04-17 ENCOUNTER — CLINICAL SUPPORT (OUTPATIENT)
Dept: AUDIOLOGY | Facility: CLINIC | Age: 64
End: 2024-04-17
Payer: MEDICARE

## 2024-04-17 DIAGNOSIS — H90.3 SENSORINEURAL HEARING LOSS, BILATERAL: Primary | ICD-10-CM

## 2024-04-17 PROCEDURE — 92567 TYMPANOMETRY: CPT | Mod: PBBFAC

## 2024-04-17 PROCEDURE — 92557 COMPREHENSIVE HEARING TEST: CPT | Mod: PBBFAC

## 2024-04-17 NOTE — PROGRESS NOTES
Yodit Canseco was seen 04/17/2024 for an audiological evaluation. Patient complains of aural fullness and muffled hearing in both ears. Patient reported she hears better out of the left ear but it feels claros. Patient also noted constant tinnitus in both ears. She has a history of noise exposure [place]. Patient denied dizziness and family history of hearing loss.     Otoscopy revealed clear canals with visualization of the tympanic membrane in both ears. Tympanograms were Type A for the right ear and Type A for the left ear. Audiometry revealed normal hearing sensitivity with the exception of a mild hearing loss at 6000 Hz for the right ear, and normal hearing sensitivity through 4000 Hz to a mild sensorineural hearing loss for the left ear. Speech Reception Thresholds were  15 dBHL for the right ear and 15 dBHL for the left ear. Word recognition scores were excellent for the right ear and excellent for the left ear.    Patient was counseled on the above findings.    Recommendations:  ENT Review.  Repeat audiological evaluation in one to two years to monitor hearing, or sooner if needed.

## 2024-04-28 ENCOUNTER — NURSE TRIAGE (OUTPATIENT)
Dept: ADMINISTRATIVE | Facility: CLINIC | Age: 64
End: 2024-04-28
Payer: MEDICARE

## 2024-04-28 PROBLEM — N17.9 AKI (ACUTE KIDNEY INJURY): Status: ACTIVE | Noted: 2024-04-28

## 2024-04-28 NOTE — TELEPHONE ENCOUNTER
Reason for Disposition   Patient sounds very sick or weak to the triager    Additional Information   Negative: SEVERE difficulty breathing (e.g., struggling for each breath, speaks in single words, pulse > 120)   Negative: Bluish (or gray) lips or face now   Negative: Difficult to awaken or acting confused (e.g., disoriented, slurred speech)   Negative: Sounds like a life-threatening emergency to the triager   Negative: [1] MODERATE difficulty breathing (e.g., speaks in phrases, SOB even at rest) AND [2] worse than normal    Protocols used: COPD Oxygen Monitoring and Vprovxv-C-SD  Pt called re cold sx since wed. Pt states she woke this am o2 sat88%. Now 94%. T99.9. sx started wed with stuffy nose. blowing out green and coughing up green. Pt states she is also dealing with a flare of UC.. hx asthma and COPD. Pt states she is feeling very sick/weak. Rec ED or option to get in touch with dr on call. Reiterate due to low O2 sat, asthma, copd, fever.  ED Pt states she will go to ED.

## 2024-05-02 ENCOUNTER — PATIENT MESSAGE (OUTPATIENT)
Dept: PULMONOLOGY | Facility: CLINIC | Age: 64
End: 2024-05-02
Payer: MEDICARE

## 2024-05-02 DIAGNOSIS — J41.1 BRONCHITIS, CHRONIC, MUCOPURULENT: Primary | ICD-10-CM

## 2024-05-02 RX ORDER — BENZONATATE 200 MG/1
200 CAPSULE ORAL 3 TIMES DAILY PRN
Qty: 30 CAPSULE | Refills: 0 | Status: SHIPPED | OUTPATIENT
Start: 2024-05-02

## 2024-05-10 ENCOUNTER — TELEPHONE (OUTPATIENT)
Dept: FAMILY MEDICINE | Facility: CLINIC | Age: 64
End: 2024-05-10

## 2024-05-10 NOTE — TELEPHONE ENCOUNTER
----- Message from Jorge Santamaria sent at 5/10/2024 10:22 AM CDT -----  Contact: Yodit Monsivais requested for a message to be sent over to apologize for cancelling her appointment she wanted to notify the office she is not able to stay out of the bathroom because of her ulcerative colitis and has rescheduled to Monday

## 2024-05-13 ENCOUNTER — OFFICE VISIT (OUTPATIENT)
Dept: PULMONOLOGY | Facility: CLINIC | Age: 64
End: 2024-05-13
Payer: MEDICARE

## 2024-05-13 ENCOUNTER — OFFICE VISIT (OUTPATIENT)
Dept: FAMILY MEDICINE | Facility: CLINIC | Age: 64
End: 2024-05-13
Payer: MEDICARE

## 2024-05-13 VITALS
TEMPERATURE: 98 F | BODY MASS INDEX: 32.32 KG/M2 | SYSTOLIC BLOOD PRESSURE: 128 MMHG | HEIGHT: 63 IN | WEIGHT: 182.38 LBS | DIASTOLIC BLOOD PRESSURE: 70 MMHG

## 2024-05-13 VITALS
OXYGEN SATURATION: 97 % | HEART RATE: 72 BPM | BODY MASS INDEX: 31.97 KG/M2 | WEIGHT: 180.44 LBS | SYSTOLIC BLOOD PRESSURE: 167 MMHG | HEIGHT: 63 IN | DIASTOLIC BLOOD PRESSURE: 68 MMHG

## 2024-05-13 DIAGNOSIS — Z86.19 HISTORY OF INFECTION OF INTESTINE DUE TO CLOSTRIDIUM DIFFICILE: ICD-10-CM

## 2024-05-13 DIAGNOSIS — J45.50 SEVERE PERSISTENT ASTHMA WITHOUT COMPLICATION: ICD-10-CM

## 2024-05-13 DIAGNOSIS — D84.9 IMMUNE DEFICIENCY DISORDER: ICD-10-CM

## 2024-05-13 DIAGNOSIS — K51.911 ULCERATIVE COLITIS WITH RECTAL BLEEDING, UNSPECIFIED LOCATION: Primary | ICD-10-CM

## 2024-05-13 DIAGNOSIS — M35.2 BEHCET'S SYNDROME INVOLVING ORAL MUCOSA: ICD-10-CM

## 2024-05-13 DIAGNOSIS — E03.9 HYPOTHYROIDISM, UNSPECIFIED TYPE: ICD-10-CM

## 2024-05-13 DIAGNOSIS — J32.9 CHRONIC RECURRENT SINUSITIS: Primary | ICD-10-CM

## 2024-05-13 PROCEDURE — 99999 PR PBB SHADOW E&M-EST. PATIENT-LVL IV: CPT | Mod: PBBFAC,,, | Performed by: INTERNAL MEDICINE

## 2024-05-13 PROCEDURE — 99214 OFFICE O/P EST MOD 30 MIN: CPT | Mod: PBBFAC,PO | Performed by: INTERNAL MEDICINE

## 2024-05-13 PROCEDURE — 99215 OFFICE O/P EST HI 40 MIN: CPT | Mod: PBBFAC,27,PO | Performed by: FAMILY MEDICINE

## 2024-05-13 PROCEDURE — 99495 TRANSJ CARE MGMT MOD F2F 14D: CPT | Mod: S$PBB,,, | Performed by: FAMILY MEDICINE

## 2024-05-13 PROCEDURE — 99213 OFFICE O/P EST LOW 20 MIN: CPT | Mod: S$PBB,,, | Performed by: INTERNAL MEDICINE

## 2024-05-13 PROCEDURE — 99999 PR PBB SHADOW E&M-EST. PATIENT-LVL V: CPT | Mod: PBBFAC,,, | Performed by: FAMILY MEDICINE

## 2024-05-13 RX ORDER — UPADACITINIB 45 MG/1
45 TABLET, EXTENDED RELEASE ORAL DAILY
COMMUNITY

## 2024-05-13 RX ORDER — DOXYCYCLINE 100 MG/1
100 CAPSULE ORAL 2 TIMES DAILY
Qty: 20 CAPSULE | Refills: 2 | Status: SHIPPED | OUTPATIENT
Start: 2024-05-13

## 2024-05-13 RX ORDER — VANCOMYCIN HYDROCHLORIDE 125 MG/1
125 CAPSULE ORAL DAILY
Qty: 15 CAPSULE | Refills: 0 | Status: SHIPPED | OUTPATIENT
Start: 2024-05-13

## 2024-05-13 RX ORDER — FLUCONAZOLE 150 MG/1
150 TABLET ORAL ONCE AS NEEDED
Qty: 2 TABLET | Refills: 0 | Status: SHIPPED | OUTPATIENT
Start: 2024-05-13 | End: 2024-05-13

## 2024-05-13 NOTE — PROGRESS NOTES
5/13/2024    Yodit Canseco  Office Note    Chief Complaint   Patient presents with    Asthma       HPI:   5/13/2024 pt developed bronchitis during admit for ulcerative colitis [facility]-  3 days, took doxy for sinus and bronchitis and took course prophylactic  vanc  Pt was off hizentra for a month--  Renvoq oral rx for uc ongoing  Uses trelegy daily    April 4, 2024-  lungs getting by , had UC exacerbated with lyrica and now back on tramadol.     No more hemoptysis.  Pt having am congestion  Pt had  increase steroids to 30/ hydrocortine.    Patient Instructions   Double lasix to 40/d  and check blood next wk     May use doxycycline if sinus infection-- low c diff risk.      Will ask staff to arrange virtual visit with  if needed..      1/4/2024 pt was in icu for rotovirus gi infection-- was in icu 4 days, got lots fluid, .. Record reviewed.  Pt was dosed high dose steroids.  Pt was given 7 d course levaquin with no c diff prevention.    Pt had some hemoptysis froathy blood in mucous    Pt has had green mucous with neg cuilture earlier December,     Pt very weak and had fall in ER checking in.       Pt now off prednisone -- stopped abruptly after leaving hosp.   Pt was weaned off stopping 5 days or so prior to admit.    Pt now very weak .   Pt was on steroids for about a yr.     On trelegy and lungs stable    Patient Instructions   Would re culture mucous -- check for afb and regular germs.  Coritosol check is reasonable -- do cortisol level in am.  May consider replacement.    Ct chest likely low yield -- pattern of hemoptysis sounded more like fluid and sepsis.  Could do ct  later if still bringing up  yellow mucous.  Chest xray viewed..      Edema should mobiliize    C diff may recur -- if diarrhea increase may test..    You are having severe persistent sinus fullness-- antibiotics very risky and just took levaquin.   Would recommend sinus xray - ct would be best.  May need ent   12/5/2023  Pt had  exacerbatoin cough and increased UC prednisone from 5/d to 20 mg for last 10 days along with doxy -- off abx and had green mucous this am.  Albuterol helps and dosed tid.    Pt had u/a showing wbc at home - says recurrent uti.  H/o c diff.  Not on controller.      Appetite good nad not weak, no fever.   Patient Instructions   Green mucous after increased medication for exacerbation -- failed doxycycline.  Need culture     Trelegy may stabilize-- once daily    Boost prednisone to 20 /d if cough controlled-- dilaudid sent in for 30  tabs-- call next wk if need more.            Cxr ordered if worsens    Need to re evaluate next wk if not clearing.  5/8/2023 took doxycycline no issues.  Bowels may be better on Entyvio. Lungs doing well.  On Hizentra.  Pt on prednisone 20/d -- on prednisone since November with plans to decrease in near future?   Still having blood in stool so will not taper prednisone til bleed resolves-- should improve in near future (on 3 rd entyvio now and should be good after 4th)  Patient Instructions   Might consider pneumocystis prophylaxis but antibiotics will increase c diff-- bactrim 2 single strength 3 days a week.  Use vancomycin daily while on bactrim.  Stop bactrim (followed by vancomycin) on prednisone less than 20mg/d.    Bactrim generally has high risk allergies and kidney problems.     12/29/2022 having sinus problems with green mucous nad coughing, right face to chin hurts last 3 days. Had in prior.  May exacerbate lungs as in past.  Started levaquin 2 days ago. Prednisone boosted from 10 to 20/d.  Need pain control and cough suppresion .  Has prednisoen. Not getting [facility]erol [facility]-- not covered on plan??   1/30/2023 chart note after above visit.  Pt has had colitis-- dx c diff recently.   Epic reveals c diff ag but no toxin.    Had green sinus dc-- cleared slight with levaquin but now dx c diff with ag no toxin    Will dc levaquin and instructed not to use.    Doxycycline may  be ok -- would avoid abx as much as able.    If sinuses relapse -- doxycycline sent in.    If green lung mucous -- pt told to submit sputum for culture.    2022 dx lupus, bechets, Ulcerative colitis, RA-- on hizentra.  Pt exposed to grandson with rsv.  Having abd pain -- dilaudid helped and suppressed cough-- not able to use codeine/hydrocodone.     Ct abd and cxr 10/2022 clear lungs.       Pt currently felt to have rsv-- improved over last 5 da   pt was sl febrile initially.   Pt now having green mucous. Cough is violent .  similar sick. Had min leg edema.  On hizentra. Asthma doing wellwith RSV.  Eating ok but weak and coughs a lot.  Breathing good.     Patient Instructions   May use levaquin for green mucous    Rsv should run course-- hirzentra should help?  Breztri may be needed.      Use dilaudid minimally for pain -- should suppress cough    Diflucan for yeast as needed    Maxzide as needed for edema    Tessalon for cough    Could do phone follow up    Ct abd 10/2022 was clear lower lungs and cxr 10/31/2022 was clear -- lungs not a concern.  Asthma doing well even with rsv.  2022 mom  sept complications from hip fx/covid.  Had few sinus infection-- rx Levaquin/prednisone.  Pt on plaquenil, dx ra and lupus.  Also on colchicine.      Uses breo but skipped.  Uses albuterol once a wk.    Patient Instructions   May use breztri 2 twice daily -- could use more or less as needed.   If used regular -- breztri would act as controller.    May use levaquin and prednisone if sinus/lung exacerbates.    Lungs loook good wrt lung tissue-- no lung tissue disease seen from Rheumatoid disease.   Ct abd 2021 viewed.   2021 - had covid in July, had rapid home test +, managed outpt.  Got infusion rx.  Has wheezes but feels over covid.  Had nausea, vomiting, weakness, headache, -- sick 8 days.  Mom got hip fx complicating recovery-- at rehab. Pt and mom were vaccinated.  Pt on abx for sinus infection  Dx  lupus/rheumatoid - 5 mg prednisone daily  Patient Instructions   Recovering covid doing well.    Use levaquin as needed.    Ordered cxr if needed.     4/6/2021 had pneumonia March 2020 - no problems. Needs disability paper updated.  We review today.    Patient Instructions   We reviewed your disability- need to complete paper work.  You had pneumonia in March 2020.  Breo, prednisone, levaquin, albuterol renewed.    10/14/2020- all well.  No abx/prednisone, cxr 10/14/2020 nad- rul pneumonia [place] cleared.  Avoids irritants.  Patient Instructions   You likely would be best to get high dose flu vaccine.   Your immune system may have problem responding to flu vaccine.  4/14/2020- on hirzentra since sept 2016.  Saw NP [place] in [place] 4/13- virtual visit- azithro and prednisone 10/d x 3 for sinusitis.     Uses   500 bid, breo 200 daily, , xopenex nebs prn, singulair. Needs more prednisone, had pneumonia 3/4 to 8th.  Had ct chest feb.  Felt had covid symptoms.  No ox needed for pneumonia- no steroids nor bd. Took doxy - vanc in hosp.   Patient Instructions   Pneumonia - follow up chest xray good- not urgent.    Prednisone action plan for asthma/wheeze/cough    May use levaquin for sinus/lung infections  2/10/2020- had shingles vaccine, states felt an immune response.   Cough- improved, daily, not severe, non productive occasionally productive thick dark yellow color. Using nebulizer only as needed. Has noticed fewer asthma exacerbations since starting Hyzentra, went from 1 months to 2x yearly.   Not able to use albuterol rescue inhaler or nebulized due to tachycardia, palpitations, and hand tremors. No complaint with xopenex medications.   Patient Instructions   Continue current Asthma medication regiment  Review of CT shows no areas of concern. The small nodule is not significant  Pulmonary function test shows lungs to still be strong.   Continue to use Xopenex Nebulizer with aerobika attachement once a  day three days a week every week.  When your sick you can use nebulizer or Xopenex rescue inhaler four times a day.     1/9/2020- two exacerbations in 2 months, states cough is unchanged, daily, states bark like cough, severe and affects daily life, Productive occasionally clear/cream color thick in consistency, uses nebulizer 2-3 x daily.   Patient Instructions   [employer] sells an Aerobika device, this device shakes the air inside of you to break mucous off the bronchial wall. Use with nebulizer and with out daily.    Continue current Asthma medication regiment     Use nebulizer at least once a day three days a week.     CT to evaluate lungs for any abnormalities       11/8/2019- Had recent Rotator cuff surgery with nerve block Oct 9, Has SOB worse after surgery for 3 days, Took prednisone 20 mg for 3 days and Levaquin for 9 days. Seen by PCP dx URI.   Coughing, chest tightness, congestion, wheezing, Hoarse voice, fatigue, Shortness of breath worse with exertion, onset following surgery to shoulder. Cough- not able to clear secretions from chest.  Complaint of hand shaking, heart rate increases, and becomes rapid with Albuterol nebulizer onset years. Tries not to use due to side effects.     04/24/2019- breathing is good except for high pollen days, had 3 exacerbation in 6 months. Albuterol rescue inhaler 3x weekly, 1-2 nocturnal arousals monthly, could not do PFT at [place],     11/6/18- ER visit for dehydration gastroenteritis, no prednisone use in 4 months. Not currently on fasenra injection, insurance would not cover. Currently on Hirzentra IGG therapy.   Sinus drip present, cough occasional, non productive, no nocturnal arousals, Currently on Dulera daily, uses Albuterol rescue inhaler daily before exercise no SOB.  Sepsis [facility] August 2017.  Flu vaccine   July 9, 2018 - had gastric sleeve with leak complication- lost 45 lbs already.  Has appetite but fills quickly.  Still on  hirzentra.  Asthma ok avoiding fragrances/ordors.  No prednisone.   No nocturnal arousals, uses rescue weekly avoiding any irritants.  Use prednisone 4-5 last year and twice this year.        March 19, 2018-since recovered from pleuritic pain - doing well.  No prednisone use recent.  Pt has had no wheezes.  feb 22,2108   Had to do prednisone again.  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 I have advised not to work given severe asthma,  hypersensitivity to fragrances, and immune deficiency.   Pt seems better since Hirzentra, but still unstable severe asthma  Jan 29. 2018- 6 days ago had been exposed to sick , had nasal congestion , cough, ear stuffy, muscle aches /joint aches / fever.  Temp to 100.1.  Seen by np and flu screen negative.  Had asthma 3-4 flares last year. eos up past,   oct 5, 2017 - had marce in April, skipped couple doses hirzentra, had mild bronchitis once, otherwise doing very well.  No prednisone.  No side effects and covered. Ppt flu sense for 2 days  Jan 24,2017 on  hirzentra q o week since sept and asthma more stable, no hosp, no prednisone, no noct asthma/ no rescue therapy- control not this good for years.  Sept 27, 2016  HPI:has had lung problems since birth, no nocturnal arousals, uses rescue 2/d, breathing controlled satisfactory except with irritants/infection - strong abx needed to clear.  Prednisone 2/yr, last hosp 18 months.  No ventilator.    Had exacerbation recent due uri from .  Has had shingles vaccine past.       The chief compliant  problem is varies with instablilty at time  PFSH:  Past Medical History:   Diagnosis Date    Allergy     Angio-edema     Arthralgia     Asthma without status asthmaticus     Bronchitis     COPD (chronic obstructive pulmonary disease)     COVID-19 virus infection 07/23/2021    Diverticulosis of large intestine without  hemorrhage 10/08/2015    Eczema     Environmental allergies     Eosinophilic asthma 03/08/2018    Exacerbation of ulcerative colitis with rectal bleeding     Fibromyalgia 02/19/2024    Gastroparesis     Gram-negative bacteremia 12/29/2023    Hand arthritis     Herpes simplex without mention of complication     oral    Hidradenitis     History of morbid obesity     Hyperlipidemia     Hypothyroidism     Immune deficiency disorder     Intraperitoneal abscess 05/07/2018    LBP radiating to left leg     Leukocytosis, unspecified     Migraine headache     Normocytic anemia 10/05/2022    Obesity, Class III, BMI 40-49.9 (morbid obesity)     Periorbital cellulitis 12/31/2016    Prediabetes     RA (rheumatoid arthritis)     Recurrent upper respiratory infection (URI)     Rotavirus enteritis 12/27/2023    RSV (respiratory syncytial virus infection) 01/30/2023    S/P colonoscopy 11/2010    Sepsis 05/07/2018    Sepsis secondary to colitis 05/07/2018    Systemic lupus erythematosus, organ or system involvement unspecified     Tubular adenoma of colon 08/17/2022    Tubular adenoma of colon 05/01/2024    Ulcerative pancolitis with rectal bleeding 10/17/2022    Urticaria          Past Surgical History:   Procedure Laterality Date    ADENOIDECTOMY      CHOLECYSTECTOMY      CHOLECYSTECTOMY      colitis      COLONOSCOPY  2015    repeat in 10    COLONOSCOPY N/A 10/08/2015    Procedure: COLONOSCOPY;  Surgeon: Panda Bose MD;  Location: [facility];  Service: Endoscopy;  Laterality: N/A;    COLONOSCOPY N/A 08/17/2022    Procedure: COLONOSCOPY;  Surgeon: Olaf Del Valle MD;  Location: [facility];  Service: General;  Laterality: N/A;    COLONOSCOPY N/A 10/06/2022    Procedure: COLONOSCOPY;  Surgeon: Quinton Celeste MD;  Location: [facility];  Service: Gastroenterology;  Laterality: N/A;    COLONOSCOPY N/A 5/1/2024    Procedure: COLONOSCOPY;  Surgeon: Quinton Celeste MD;  Location: [facility];  Service: Endoscopy;  Laterality: N/A;     "ESOPHAGOGASTRODUODENOSCOPY N/A 5/1/2024    Procedure: EGD (ESOPHAGOGASTRODUODENOSCOPY);  Surgeon: Quinton Celeste MD;  Location: [facility];  Service: Endoscopy;  Laterality: N/A;    Hand fracture surgery      HARDWARE REMOVAL      left hand 5th [facility]    hymenectomy      HYSTERECTOMY      menorrhagia-Ovaries intact    ROTATOR CUFF REPAIR Right     SLEEVE GASTROPLASTY  04/16/2018    TONSILLECTOMY      Urethral dilatation       Social History     Tobacco Use    Smoking status: Never     Passive exposure: Past    Smokeless tobacco: Never   Substance Use Topics    Alcohol use: Not Currently     Comment: very rarely    Drug use: No     Family History   Problem Relation Name Age of Onset    Melanoma Mother Loyd     Basal cell carcinoma Mother Loyd     Lung disease Mother Loyd         pulolga htn    Cataracts Mother Loyd     Hypertension Mother Loyd     Lung cancer Father Josh     Diabetes Father Josh     Hypertension Father Josh     Cancer Father Josh     Melanoma Maternal Aunt      Melanoma Maternal Uncle      Diabetes Paternal Aunt Estee     Colon cancer Paternal Aunt Lilliam 40    Diabetes Paternal Aunt Lilliam     Breast cancer Paternal Aunt  40    Lymphoma Paternal Aunt      Cancer Maternal Grandfather Gilmar     Ovarian cancer Paternal Grandmother  40    Ulcerative colitis Son      Psoriasis Son      Psoriasis Son      Breast cancer Cousin Paternal 1st 25    Allergic rhinitis Neg Hx      Allergies Neg Hx      Angioedema Neg Hx      Asthma Neg Hx      Atopy Neg Hx      Eczema Neg Hx      Immunodeficiency Neg Hx      Rhinitis Neg Hx      Urticaria Neg Hx       Review of patient's allergies indicates:   Allergen Reactions    Bromelains Hives     " causes mouth to bleed"    Sudafed [pseudoephedrine hcl] Other (See Comments)     Does not want to wake up .    Amoxicillin-pot clavulanate Itching     Other reaction(s): Itching    Hydrocodone Itching    Iodinated contrast- oral and iv dye      childhood    Iodine " "and iodide containing products Other (See Comments)    Melon Hives    Pantoprazole Nausea Only     Other reaction(s): upset stomach/halitosis    Percocet [oxycodone-acetaminophen] Itching    Barium iodide Rash    Ephedrine Other (See Comments)     Other reaction(s): comatose    Penicillins      Other reaction(s): does not work on pt symptoms     I have reviewed past medical, family, and social history. I have reviewed previous nurse notes.    Performance Status:The patient's activity level is functions out of house.      Review of Systems   Constitutional: Negative for activity change, appetite change, chills, diaphoresis, fatigue, fever and unexpected weight change.   HENT: Negative for dental problem, sneezing, sore throat, trouble swallowing, postnasal drip, rhinorrhea, sinus pressure, sinus pain, and voice change.     Respiratory: Negative for chest tightness, shortness of breath, wheezing.  Positive for cough,   Cardiovascular: Negative for chest pain, palpitations and leg swelling.   Musculoskeletal: Negative for gait problem, myalgias and neck pain.   Skin: Negative for color change and pallor.   Allergic/Immunologic: Negative for environmental allergies and food allergies.   Neurological: Negative for dizziness, speech difficulty, weakness, light-headedness, numbness and headaches.   Hematological: Negative for adenopathy. Does not bruise/bleed easily.   Psychiatric/Behavioral: Negative for dysphoric mood and sleep disturbance. The patient is not nervous/anxious.             Exam:Comprehensive exam done. BP (!) 170/79 (BP Location: Right arm, Patient Position: Sitting)   Pulse (!) 56   Ht 5' 10" (1.778 m)   Wt 119.2 kg (262 lb 10.9 oz)   SpO2 95% Comment: on room air  BMI 37.69 kg/m²   Exam included Vitals as listed, and patient's appearance and affect and alertness and mood, oral exam for yeast and hygiene and pharynx lesions and Mallapatti (M) score, neck with inspection for jvd and masses and thyroid " abnormalities and lymph nodes (supraclavicular and infraclavicular nodes and axillary also examined and noted if abn), chest exam included symmetry and effort and fremitus and percussion and auscultation, cardiac exam included rhythm and gallops and murmur and rubs and jvd and edema, abdominal exam for mass and hepatosplenomegaly and tenderness and hernias and bowel sounds, Musculoskeletal exam with muscle tone and posture and mobility/gait and  strength, and skin for rashes and cyanosis and pallor and turgor, extremity for clubbing.  Findings were normal except for pertinent findings listed below:  M3, chest is symmetric, no distress, normal percussion, normal fremitus and good normal breath sounds        Radiographs (ct chest and cxr) reviewed: results reviewed by direct vision  CT Chest Without Contrast 2/10/2020  Normal exam, 3.3 mm nodule seen in right upper lobe.    Lungs are clear.  Minimal retained mucus secretions in the trachea.  No pleural effusion or pneumothorax.  Normal sized heart.  Thyroid is small and heterogeneous with a subcentimeter nodule or cyst in the right lobe.     XR CHEST 1 VIEW 05/10/2018   There is left lower lobe atelectasis and possible small left pleural effusion on this study.  No pneumothorax is seen.      Labs reviewed       Lab Results   Component Value Date    WBC 16.22 (H) 05/02/2024    RBC 3.18 (L) 05/02/2024    HGB 9.4 (L) 05/02/2024    HCT 28.2 (L) 05/02/2024    MCV 89 05/02/2024    MCH 29.6 05/02/2024    MCHC 33.3 05/02/2024    RDW 17.1 (H) 05/02/2024     05/02/2024    MPV 11.3 05/02/2024    GRAN 13.6 (H) 05/02/2024    GRAN 83.6 (H) 05/02/2024    LYMPH 1.2 05/02/2024    LYMPH 7.5 (L) 05/02/2024    MONO 1.3 (H) 05/02/2024    MONO 8.2 05/02/2024    EOS 0.0 05/02/2024    BASO 0.02 05/02/2024    EOSINOPHIL 0.0 05/02/2024    BASOPHIL 0.1 05/02/2024       PFT reviewed  2/10/2020 no obstruction seen, 7% bronchodilator response with 12% needed for clinical improvement;  TLC 86% with DLC0 at 88%, normal exam with mild asthma  Spirometry bronchodilator, lung volume by gas dilution, diffusion capacity measured February 10, 2020.  The FEV1 to FVC ratio was 79%, there is no airflow obstruction measured by spirometry technique.  The FEV1 measured 85% predicted at 1.97 L. The 8%   improvement in FEV1 failed to reach statistical significance ( 12% is needed for statistical significance ).  Lung volumes by gas dilution were normal.  Diffusion was normal.      Spirometry, lung volumes, diffusion are all normal.  The bronchodilator response was not statistically significant.  Clinical correlation recommended.     Plan:  Clinical impression is apparently straight forward and impression with management as below.    Yodit was seen today for asthma.    Diagnoses and all orders for this visit:    Chronic recurrent sinusitis  -     vancomycin (VANCOCIN) 125 MG capsule; Take 1 capsule (125 mg total) by mouth once daily.  -     doxycycline (VIBRAMYCIN) 100 MG Cap; Take 1 capsule (100 mg total) by mouth 2 (two) times daily.    Severe persistent asthma without complication    Immune deficiency disorder  -     vancomycin (VANCOCIN) 125 MG capsule; Take 1 capsule (125 mg total) by mouth once daily.  -     doxycycline (VIBRAMYCIN) 100 MG Cap; Take 1 capsule (100 mg total) by mouth 2 (two) times daily.    History of infection of intestine due to Clostridium difficile                    Follow up if symptoms worsen or fail to improve.    Discussed with patient above for education the following:      Patient Instructions   May use doxycycline for 7-10 days if sinus/lung infection-- use vancomycin for duration of systemic antibiotic plus additional (5-) 7 days    To minimize need antibiotic needs -- start Hizentra..    Chest xray and ct abd lower lungs were clear from 4/28/2024

## 2024-05-13 NOTE — PATIENT INSTRUCTIONS
May use doxycycline for 7-10 days if sinus/lung infection-- use vancomycin for duration of systemic antibiotic plus additional (5-) 7 days    To minimize need antibiotic needs -- start Hizentra..    Chest xray and ct abd lower lungs were clear from 4/28/2024

## 2024-05-13 NOTE — PROGRESS NOTES
Patient presents for follow-up after hospitalization as per below discharge summary.  She had ulcerative colitis as well as upper respiratory symptoms.  Medications adjusted and she does appear to be improving though still having some occasional rectal bleeding.  She is following up with her gastroenterologist.  Breathing is okay.  She does see Pulmonary regularly.  She does feel like she has vaginal yeast infection from the recent steroids         [facility] Medicine  Discharge Summary        Patient Name: Yodit Canseco  MRN: [id number]  NANDO: [id number]  Patient Class: IP- Inpatient  Admission Date: 4/28/2024  Hospital Length of Stay: 3 days  Discharge Date and Time:  05/02/2024 11:39 AM  Attending Physician: Josh Wong MD   Discharging Provider: Josh Wong MD  Primary Care Provider: Miky Lewis MD     Primary Care Team: Networked reference to record PCT      HPI:   Patient is a 63-year-old female with history ulcerative colitis diagnosed in 2022, maintained on Xeljanz, Entyvio, prednisone, presented to ER for evaluation of abdominal pain mostly located in the upper quadrants associated with diarrhea and streaks of blood despite adhering to her medications.  Patient is being followed by Dr. Celeste as outpatient and had an upcoming colonoscopy scheduled for May 7th.  Patient reports also associated nausea but denies any vomiting or difficulties maintaining p.o..  Preliminary workup in ER including a CT of the abdomen demonstrated findings suggestive of proctitis without evidence of perforation or pneumatosis.  Gastroenterology was consulted with recommendations for admission an increased dose of IV steroid treatment.  [facility] was called for admission.   Of note, patient is currently experiencing upper respiratory tract symptoms related to a viral infection.  She has had low-grade fever with T-max of a 100.8°.  Chest x-ray in ER was unremarkable.     Procedure(s) (LRB):  EGD  (ESOPHAGOGASTRODUODENOSCOPY) (N/A)  COLONOSCOPY (N/A)       Hospital Course:    Patient placed in observation to medical floor with a consultation for Gastroenterology.  She was continued on higher dose treatment of steroids and supportive care. Renal function improved with IV fluids.   EGD and colonoscopy scheduled. Patient had evidence of active ulcerative colitis therefore hydrocortisone enema was added to her regimen. Diet was resumed and was well tolerated. Patient was transitioned to PO Prednisone and requested to be discharge home. Close outpatient follow up was provided.       Goals of Care Treatment Preferences:  Code Status: Full Code     Living Will: Yes  What is most important right now is to focus on symptom/pain control.  Accordingly, we have decided that the best plan to meet the patient's goals includes continuing with treatment.        Consults:   Consults (From admission, onward)            Status Ordering Provider       [facility]  Once        Provider:  (Not yet assigned)    Completed HARSHA CISNEROS       Inpatient consult to Gastroenterology  Once        Provider:  Quinton Celeste MD    Completed HARSHA CISNEROS                No new Assessment & Plan notes have been filed under this hospital service since the last note was generated.  Service: Hospital Medicine           Final Active Diagnoses:     Diagnosis Date Noted POA    PRINCIPAL PROBLEM:  Ulcerative colitis [K51.90] 08/29/2022 Yes    TYLER (acute kidney injury) [N17.9] 04/28/2024 Yes    Fibromyalgia [M79.7] 02/19/2024 Yes    Hypothyroidism [E03.9]   Yes    Obesity with body mass index (BMI) of 30.0 to 39.9 [E66.9]   Yes       Problems Resolved During this Admission:         Discharged Condition: good     Disposition: Home or Self Care            Yodit was seen today for transitional care.    Diagnoses and all orders for this visit:    Ulcerative colitis with rectal bleeding, unspecified location    Severe  persistent asthma without complication    Immune deficiency disorder    Behcet's syndrome involving oral mucosa    Hypothyroidism, unspecified type  -     TSH; Future    Other orders  -     fluconazole (DIFLUCAN) 150 MG Tab; Take 1 tablet (150 mg total) by mouth once as needed (Yeast infection). May repeat dose in 1 week if needed      Continue current medication.  Continue follow-up GI.  Due for TSH for hypothyroidism check as above.    Transitional Care Note    Family and/or Caretaker present at visit?  No.  Diagnostic tests reviewed/disposition: No diagnosic tests pending after this hospitalization.  Disease/illness education: yes  Home health/community services discussion/referrals: Patient does not have home health established from hospital visit.  They do not need home health.  If needed, we will set up home health for the patient.   Establishment or re-establishment of referral orders for community resources: No other necessary community resources.   Discussion with other health care providers: No discussion with other health care providers necessary.               Past Medical History:  Past Medical History:   Diagnosis Date    Allergy     Angio-edema     Arthralgia     Asthma without status asthmaticus     Bronchitis     COPD (chronic obstructive pulmonary disease)     COVID-19 virus infection 07/23/2021    Diverticulosis of large intestine without hemorrhage 10/08/2015    Eczema     Environmental allergies     Eosinophilic asthma 03/08/2018    Exacerbation of ulcerative colitis with rectal bleeding     Fibromyalgia 02/19/2024    Gastroparesis     Gram-negative bacteremia 12/29/2023    Hand arthritis     Herpes simplex without mention of complication     oral    Hidradenitis     History of morbid obesity     Hyperlipidemia     Hypothyroidism     Immune deficiency disorder     Intraperitoneal abscess 05/07/2018    LBP radiating to left leg     Leukocytosis, unspecified     Migraine headache     Normocytic  "anemia 10/05/2022    Obesity, Class III, BMI 40-49.9 (morbid obesity)     Periorbital cellulitis 12/31/2016    Prediabetes     RA (rheumatoid arthritis)     Recurrent upper respiratory infection (URI)     Rotavirus enteritis 12/27/2023    RSV (respiratory syncytial virus infection) 01/30/2023    S/P colonoscopy 11/2010    Sepsis 05/07/2018    Sepsis secondary to colitis 05/07/2018    Systemic lupus erythematosus, organ or system involvement unspecified     Tubular adenoma of colon 08/17/2022    Tubular adenoma of colon 05/01/2024    Ulcerative pancolitis with rectal bleeding 10/17/2022    Urticaria      Past Surgical History:   Procedure Laterality Date    ADENOIDECTOMY      CHOLECYSTECTOMY      CHOLECYSTECTOMY      colitis      COLONOSCOPY  2015    repeat in 10    COLONOSCOPY N/A 10/08/2015    Procedure: COLONOSCOPY;  Surgeon: Panda Bose MD;  Location: [facility];  Service: Endoscopy;  Laterality: N/A;    COLONOSCOPY N/A 08/17/2022    Procedure: COLONOSCOPY;  Surgeon: Olaf Del Valle MD;  Location: [facility];  Service: General;  Laterality: N/A;    COLONOSCOPY N/A 10/06/2022    Procedure: COLONOSCOPY;  Surgeon: Quinton Celeste MD;  Location: [facility];  Service: Gastroenterology;  Laterality: N/A;    COLONOSCOPY N/A 5/1/2024    Procedure: COLONOSCOPY;  Surgeon: Quinton Celeste MD;  Location: [facility];  Service: Endoscopy;  Laterality: N/A;    ESOPHAGOGASTRODUODENOSCOPY N/A 5/1/2024    Procedure: EGD (ESOPHAGOGASTRODUODENOSCOPY);  Surgeon: Quinton Celeste MD;  Location: [facility];  Service: Endoscopy;  Laterality: N/A;    Hand fracture surgery      HARDWARE REMOVAL      left hand [facility]th [facility]    hymenectomy      HYSTERECTOMY      menorrhagia-Ovaries intact    ROTATOR CUFF REPAIR Right     SLEEVE GASTROPLASTY  04/16/2018    TONSILLECTOMY      Urethral dilatation       Review of patient's allergies indicates:   Allergen Reactions    Bromelains Hives     " causes mouth to bleed"    Pimecrolimus Swelling    " Sudafed [pseudoephedrine hcl] Other (See Comments)     Does not want to wake up .    Amoxicillin-pot clavulanate Itching     Other reaction(s): Itching; tolerated ceftriaxone 10/2022    Hydrocodone Itching    Iodinated contrast media      childhood    Iodine and iodide containing products Other (See Comments)    Melon Hives    Percocet [oxycodone-acetaminophen] Itching    Codeine     Corn containing products     Tree nut     Tree nuts Other (See Comments)    Wheat containing prod     Barium iodide Rash    Ephedrine Other (See Comments)     Other reaction(s): comatose    Penicillins      Other reaction(s): does not work on pt symptoms     Current Outpatient Medications on File Prior to Visit   Medication Sig Dispense Refill    acetaminophen (TYLENOL) 325 MG tablet Take 1.5 tablets (487.5 mg total) by mouth every 6 (six) hours as needed for Pain (Do not take with any other products containing  tylenol or acetaminophen).      albuterol (PROVENTIL/VENTOLIN HFA) 90 mcg/actuation inhaler Inhale 2 puffs into the lungs every 4 (four) hours as needed for Wheezing or Shortness of Breath. 2 puffs every 4 hours as needed for cough, wheeze, or shortness of breath 54 g 3    benzonatate (TESSALON) 200 MG capsule Take 1 capsule (200 mg total) by mouth 3 (three) times daily as needed for Cough. 30 capsule 0    cholecalciferol, vitamin D3, (VITAMIN D3) 50 mcg (2,000 unit) Cap Take 1 capsule by mouth once daily.      clobetasoL (TEMOVATE) 0.05 % cream Apply topically nightly as needed. To the vulva 60 g 1    desonide (DESOWEN) 0.05 % cream Apply topically 2 (two) times daily. 60 g 6    estradioL (ESTRACE) 1 MG tablet Take 1 tablet (1 mg total) by mouth once daily. 90 tablet 3    FEROSUL 325 mg (65 mg iron) Tab tablet Take 325 mg by mouth once daily.      fluticasone-umeclidin-vilanter (TRELEGY ELLIPTA) 200-62.5-25 mcg inhaler Inhale 1 puff into the lungs once daily. 60 each 11    hydrocortisone 2.5 % cream Apply 1 Application  topically 2 (two) times daily as needed (TO THE ANUS). To the anus      HYDROmorphone (DILAUDID) 2 MG tablet Take 1 tablet (2 mg total) by mouth every 4 (four) hours as needed for Pain (cough). 10 tablet 0    immun glob G,IgG,-pro-IgA 0-50 (HIZENTRA) 2 gram/10 mL (20 %) Soln Inject 90 mLs (18 g total) into the skin every 14 (fourteen) days. 180 mL 12    LASIX 20 mg tablet Take 20 mg by mouth as needed.      levothyroxine (SYNTHROID) 75 MCG tablet Take 1 tablet (75 mcg total) by mouth once daily. 90 tablet 3    LIDOcaine 5 % Crea Apply 1 Application topically once daily.      loperamide (IMODIUM) 2 mg capsule Take 1 capsule (2 mg total) by mouth 3 (three) times daily as needed for Diarrhea. 30 capsule 0    minoxidiL (LONITEN) 2.5 MG tablet Take 1/4th (0.625mg) by mouth daily 90 tablet 2    montelukast (SINGULAIR) 10 mg tablet Take 1 tablet (10 mg total) by mouth every evening. 90 tablet 3    MULTIVITAMIN ORAL Take 1 tablet by mouth once daily.      ondansetron (ZOFRAN-ODT) 4 MG TbDL Take 1 tablet (4 mg total) by mouth every 8 (eight) hours as needed (nausea/vomiting). 90 tablet 6    potassium chloride SA (K-DUR,KLOR-CON) 20 MEQ tablet Take 20 mEq by mouth once daily.      predniSONE (DELTASONE) 10 MG tablet Take 2 tablets (20 mg total) by mouth once daily. for 10 days 10 tablet 1    sumatriptan (IMITREX) 100 MG tablet Take 1 tablet (100 mg total) by mouth once as needed for Migraine. May repeat dose in 2 hours if no relief.  Do not exceed 2 doses in 24 hours. 9 tablet 5    traMADoL (ULTRAM) 50 mg tablet Take 50 mg by mouth 3 (three) times daily.      tretinoin (RETIN-A) 0.05 % cream Apply 1 application  topically every evening.      upadacitinib (RINVOQ) 45 mg Tb24 Take 45 mg by mouth Daily.      [DISCONTINUED] fluconazole (DIFLUCAN) 200 MG Tab Take 1 tablet (200 mg total) by mouth once a week. 7 tablet 0    [DISCONTINUED] hydrocortisone (CORTENEMA) 100 mg/60 mL enema Place 1 enema (100 mg total) rectally every  evening. for 10 days 10 enema 0    [DISCONTINUED] mupirocin (BACTROBAN) 2 % ointment Apply topically 3 (three) times daily. Place on ulcers 30 g 1    [DISCONTINUED] promethazine (PHENERGAN) 25 MG tablet Take 25 mg by mouth every 6 (six) hours as needed for Nausea.      [DISCONTINUED] vancomycin (VANCOCIN) 125 MG capsule Take 1 capsule (125 mg total) by mouth once daily. for 10 days 10 capsule 0    [DISCONTINUED] vedolizumab (ENTYVIO) 300 mg SolR injection Inject 300 mg into the vein every 14 (fourteen) days.      [DISCONTINUED] XELJANZ 10 mg Tab Take 1 tablet by mouth 2 (two) times daily.      doxycycline (VIBRAMYCIN) 100 MG Cap Take 1 capsule (100 mg total) by mouth 2 (two) times daily. 20 capsule 2    hydrocortisone (CORTEF) 10 MG Tab Take 10 mg by mouth. 20 mg AM and 10 mg PM      vancomycin (VANCOCIN) 125 MG capsule Take 1 capsule (125 mg total) by mouth once daily. 15 capsule 0    [DISCONTINUED] doxycycline (VIBRAMYCIN) 100 MG Cap Take 1 capsule (100 mg total) by mouth 2 (two) times daily. 20 capsule 2    [DISCONTINUED] triamterene-hydrochlorothiazide 37.5-25 mg (MAXZIDE-25) 37.5-25 mg per tablet TAKE 1 TABLET BY MOUTH DAILY AS NEEDED (EDEMA). (Patient not taking: Reported on 5/13/2024) 90 tablet 1     No current facility-administered medications on file prior to visit.     Social History     Socioeconomic History    Marital status:    Tobacco Use    Smoking status: Never     Passive exposure: Past    Smokeless tobacco: Never   Substance and Sexual Activity    Alcohol use: Not Currently     Comment: very rarely    Drug use: No    Sexual activity: Yes     Partners: Male   Social History Narrative    . Disabled teacher.     Social Determinants of Health     Food Insecurity: No Food Insecurity (4/29/2024)    Hunger Vital Sign     Worried About Running Out of Food in the Last Year: Never true     Ran Out of Food in the Last Year: Never true   Transportation Needs: No Transportation Needs (4/29/2024)  "   PRAPARE - Transportation     Lack of Transportation (Medical): No     Lack of Transportation (Non-Medical): No   Housing Stability: Low Risk  (4/29/2024)    Housing Stability Vital Sign     Unable to Pay for Housing in the Last Year: No     Homeless in the Last Year: No     Family History   Problem Relation Name Age of Onset    Melanoma Mother Loyd     Basal cell carcinoma Mother Loyd     Lung disease Mother Loyd         pulolga htn    Cataracts Mother Loyd     Hypertension Mother Loyd     Lung cancer Father Josh     Diabetes Father Josh     Hypertension Father Josh     Cancer Father Josh     Melanoma Maternal Aunt      Melanoma Maternal Uncle      Diabetes Paternal Aunt Estee     Colon cancer Paternal Aunt Lilliam 40    Diabetes Paternal Aunt Lilliam     Breast cancer Paternal Aunt  40    Lymphoma Paternal Aunt      Cancer Maternal Grandfather Gilmar     Ovarian cancer Paternal Grandmother  40    Ulcerative colitis Son      Psoriasis Son      Psoriasis Son      Breast cancer Cousin Paternal 1st 25    Allergic rhinitis Neg Hx      Allergies Neg Hx      Angioedema Neg Hx      Asthma Neg Hx      Atopy Neg Hx      Eczema Neg Hx      Immunodeficiency Neg Hx      Rhinitis Neg Hx      Urticaria Neg Hx             ROS:  GENERAL: No fever, chills,  or significant weight changes.   CARDIOVASCULAR: Denies chest pain, PND, orthopnea or reduced exercise tolerance.  ABDOMEN: Appetite fine.  URINARY: No flank pain, dysuria or hematuria.    Vitals:    05/13/24 1021   BP: 128/70   Pulse: (P) 79   Temp: 98 °F (36.7 °C)   TempSrc: Temporal   Weight: 82.7 kg (182 lb 6.4 oz)   Height: 5' 2.5" (1.588 m)     Wt Readings from Last 3 Encounters:   05/13/24 81.8 kg (180 lb 7.1 oz)   05/13/24 82.7 kg (182 lb 6.4 oz)   04/28/24 83.4 kg (183 lb 13.8 oz)       OBJECTIVE:   APPEARANCE: Well nourished, well developed, in no acute distress.    HEAD: Normocephalic.  Atraumatic.  No sinus tenderness.  EYES:   Right eye: Pupil " reactive.  Conjunctiva clear.    Left eye: Pupil reactive.  Conjunctiva clear.  EOMI.    EARS: TM's intact. Light reflex normal. No retraction or perforation.    NOSE:  clear.  MOUTH & THROAT:  No pharyngeal erythema or exudate. No lesions.  NECK: Supple. No bruits.  No JVD.  No cervical lymphadenopathy.  No thyromegaly.    CHEST: Breath sounds clear bilaterally.  Normal respiratory effort  CARDIOVASCULAR: Normal rate.  Regular rhythm.  No murmurs.  No rub.  No gallops.  ABDOMEN: Bowel sounds normal.  Soft.  No tenderness.  No organomegaly.  PERIPHERAL VASCULAR: No cyanosis.  No clubbing.  No edema.  NEUROLOGIC: No focal findings.  MENTAL STATUS: Alert.  Oriented x 3.

## 2024-05-13 NOTE — PATIENT INSTRUCTIONS
Health Maintenance Due   Topic Date Due    RSV Vaccine (Age 60+ and Pregnant patients) (1 - 1-dose 60+ series) Never done    COVID-19 Vaccine (5 - 2023-24 season) 09/01/2023

## 2024-05-28 DIAGNOSIS — Z00.00 ENCOUNTER FOR MEDICARE ANNUAL WELLNESS EXAM: ICD-10-CM

## 2024-06-04 ENCOUNTER — TELEPHONE (OUTPATIENT)
Dept: FAMILY MEDICINE | Facility: CLINIC | Age: 64
End: 2024-06-04
Payer: MEDICARE

## 2024-06-04 ENCOUNTER — LAB VISIT (OUTPATIENT)
Dept: LAB | Facility: HOSPITAL | Age: 64
End: 2024-06-04
Attending: FAMILY MEDICINE
Payer: MEDICARE

## 2024-06-04 DIAGNOSIS — K51.019 ULCERATIVE COLITIS, UNIVERSAL, UNSPECIFIED COMPLICATION: ICD-10-CM

## 2024-06-04 DIAGNOSIS — E78.5 HYPERLIPIDEMIA, UNSPECIFIED HYPERLIPIDEMIA TYPE: ICD-10-CM

## 2024-06-04 DIAGNOSIS — E03.9 HYPOTHYROIDISM, UNSPECIFIED TYPE: ICD-10-CM

## 2024-06-04 LAB
ALBUMIN SERPL BCP-MCNC: 3.9 G/DL (ref 3.5–5.2)
ALP SERPL-CCNC: 72 U/L (ref 55–135)
ALT SERPL W/O P-5'-P-CCNC: 33 U/L (ref 10–44)
ANION GAP SERPL CALC-SCNC: 10 MMOL/L (ref 8–16)
AST SERPL-CCNC: 35 U/L (ref 10–40)
BASOPHILS # BLD AUTO: 0.06 K/UL (ref 0–0.2)
BASOPHILS NFR BLD: 0.6 % (ref 0–1.9)
BILIRUB SERPL-MCNC: 0.2 MG/DL (ref 0.1–1)
BUN SERPL-MCNC: 15 MG/DL (ref 8–23)
CALCIUM SERPL-MCNC: 9.5 MG/DL (ref 8.7–10.5)
CHLORIDE SERPL-SCNC: 103 MMOL/L (ref 95–110)
CHOLEST SERPL-MCNC: 324 MG/DL (ref 120–199)
CHOLEST/HDLC SERPL: 3.4 {RATIO} (ref 2–5)
CO2 SERPL-SCNC: 26 MMOL/L (ref 23–29)
CREAT SERPL-MCNC: 0.9 MG/DL (ref 0.5–1.4)
DIFFERENTIAL METHOD BLD: ABNORMAL
EOSINOPHIL # BLD AUTO: 0.3 K/UL (ref 0–0.5)
EOSINOPHIL NFR BLD: 3 % (ref 0–8)
ERYTHROCYTE [DISTWIDTH] IN BLOOD BY AUTOMATED COUNT: 14.8 % (ref 11.5–14.5)
EST. GFR  (NO RACE VARIABLE): >60 ML/MIN/1.73 M^2
GLUCOSE SERPL-MCNC: 53 MG/DL (ref 70–110)
HCT VFR BLD AUTO: 41.1 % (ref 37–48.5)
HDLC SERPL-MCNC: 94 MG/DL (ref 40–75)
HDLC SERPL: 29 % (ref 20–50)
HGB BLD-MCNC: 13.1 G/DL (ref 12–16)
IMM GRANULOCYTES # BLD AUTO: 0.08 K/UL (ref 0–0.04)
IMM GRANULOCYTES NFR BLD AUTO: 0.8 % (ref 0–0.5)
LDLC SERPL CALC-MCNC: 164.2 MG/DL (ref 63–159)
LYMPHOCYTES # BLD AUTO: 4.4 K/UL (ref 1–4.8)
LYMPHOCYTES NFR BLD: 43.6 % (ref 18–48)
MCH RBC QN AUTO: 30 PG (ref 27–31)
MCHC RBC AUTO-ENTMCNC: 31.9 G/DL (ref 32–36)
MCV RBC AUTO: 94 FL (ref 82–98)
MONOCYTES # BLD AUTO: 0.7 K/UL (ref 0.3–1)
MONOCYTES NFR BLD: 7.3 % (ref 4–15)
NEUTROPHILS # BLD AUTO: 4.5 K/UL (ref 1.8–7.7)
NEUTROPHILS NFR BLD: 44.7 % (ref 38–73)
NONHDLC SERPL-MCNC: 230 MG/DL
NRBC BLD-RTO: 0 /100 WBC
PLATELET # BLD AUTO: 280 K/UL (ref 150–450)
PMV BLD AUTO: 11.1 FL (ref 9.2–12.9)
POTASSIUM SERPL-SCNC: 3.7 MMOL/L (ref 3.5–5.1)
PROT SERPL-MCNC: 7.1 G/DL (ref 6–8.4)
RBC # BLD AUTO: 4.37 M/UL (ref 4–5.4)
SODIUM SERPL-SCNC: 139 MMOL/L (ref 136–145)
TRIGL SERPL-MCNC: 329 MG/DL (ref 30–150)
TSH SERPL DL<=0.005 MIU/L-ACNC: 1.78 UIU/ML (ref 0.4–4)
WBC # BLD AUTO: 10.05 K/UL (ref 3.9–12.7)

## 2024-06-04 PROCEDURE — 80053 COMPREHEN METABOLIC PANEL: CPT | Performed by: STUDENT IN AN ORGANIZED HEALTH CARE EDUCATION/TRAINING PROGRAM

## 2024-06-04 PROCEDURE — 85025 COMPLETE CBC W/AUTO DIFF WBC: CPT | Performed by: STUDENT IN AN ORGANIZED HEALTH CARE EDUCATION/TRAINING PROGRAM

## 2024-06-04 PROCEDURE — 36415 COLL VENOUS BLD VENIPUNCTURE: CPT | Mod: PO | Performed by: FAMILY MEDICINE

## 2024-06-04 PROCEDURE — 80061 LIPID PANEL: CPT | Performed by: STUDENT IN AN ORGANIZED HEALTH CARE EDUCATION/TRAINING PROGRAM

## 2024-06-04 PROCEDURE — 84443 ASSAY THYROID STIM HORMONE: CPT | Performed by: FAMILY MEDICINE

## 2024-06-04 NOTE — TELEPHONE ENCOUNTER
Pt came in to the office thinking that she schedule her ewv visit , I informed her that she scheduled a regular annual visit. Appt was reschedule for July 8 with denis for ewv

## 2024-06-25 ENCOUNTER — OFFICE VISIT (OUTPATIENT)
Dept: OPHTHALMOLOGY | Facility: CLINIC | Age: 64
End: 2024-06-25
Payer: MEDICARE

## 2024-06-25 ENCOUNTER — PATIENT MESSAGE (OUTPATIENT)
Dept: FAMILY MEDICINE | Facility: CLINIC | Age: 64
End: 2024-06-25
Payer: MEDICARE

## 2024-06-25 DIAGNOSIS — H25.043 POSTERIOR SUBCAPSULAR AGE-RELATED CATARACT OF BOTH EYES: ICD-10-CM

## 2024-06-25 DIAGNOSIS — H52.4 ASTIGMATISM WITH PRESBYOPIA, BILATERAL: ICD-10-CM

## 2024-06-25 DIAGNOSIS — H25.13 NUCLEAR SCLEROSIS, BILATERAL: Primary | ICD-10-CM

## 2024-06-25 DIAGNOSIS — H25.013 CORTICAL AGE-RELATED CATARACT OF BOTH EYES: ICD-10-CM

## 2024-06-25 DIAGNOSIS — H52.203 ASTIGMATISM WITH PRESBYOPIA, BILATERAL: ICD-10-CM

## 2024-06-25 PROCEDURE — 92015 DETERMINE REFRACTIVE STATE: CPT | Mod: ,,, | Performed by: OPTOMETRIST

## 2024-06-25 PROCEDURE — 92014 COMPRE OPH EXAM EST PT 1/>: CPT | Mod: S$PBB,,, | Performed by: OPTOMETRIST

## 2024-06-25 PROCEDURE — 99211 OFF/OP EST MAY X REQ PHY/QHP: CPT | Mod: PBBFAC | Performed by: OPTOMETRIST

## 2024-06-25 PROCEDURE — 99999 PR PBB SHADOW E&M-EST. PATIENT-LVL I: CPT | Mod: PBBFAC,,, | Performed by: OPTOMETRIST

## 2024-06-25 RX ORDER — POLYETHYLENE GLYCOL-3350 AND ELECTROLYTES 236; 6.74; 5.86; 2.97; 22.74 G/274.31G; G/274.31G; G/274.31G; G/274.31G; G/274.31G
POWDER, FOR SOLUTION ORAL
COMMUNITY
Start: 2024-04-11 | End: 2024-06-26 | Stop reason: ALTCHOICE

## 2024-06-25 RX ORDER — FLUCONAZOLE 150 MG/1
150 TABLET ORAL
COMMUNITY
Start: 2024-05-13 | End: 2024-06-26 | Stop reason: ALTCHOICE

## 2024-06-25 NOTE — PROGRESS NOTES
HPI     Annual Exam            Comments: RTC 12 months for dilation,10-2 VF, and mOCT.   Plaquenil 200mg bid started approx 2022  Vision changes since last eye exam?: more in distance. Blurry with and   without glasses    Any eye pain today: no    Other ocular symptoms: no    Interested in contact lens fitting today? no                     Last edited by Lida Castro on 6/25/2024  1:29 PM.            Assessment /Plan     For exam results, see Encounter Report.    Nuclear sclerosis, bilateral  Cortical age-related cataract of both eyes  Posterior subcapsular age-related cataract of both eyes  Cataract accounts for vision change. New Rx for glasses/contacts will not improve vision.   Refer to ophthalmologist for cataract evaluation.     Astigmatism with presbyopia, bilateral  Hold spec Rx      RTC next available with [facility] for cataract evaluation or PRN if any problems.   Discussed above and answered questions.

## 2024-06-26 ENCOUNTER — OFFICE VISIT (OUTPATIENT)
Dept: FAMILY MEDICINE | Facility: CLINIC | Age: 64
End: 2024-06-26
Payer: MEDICARE

## 2024-06-26 VITALS
SYSTOLIC BLOOD PRESSURE: 115 MMHG | HEIGHT: 63 IN | BODY MASS INDEX: 33.16 KG/M2 | TEMPERATURE: 98 F | WEIGHT: 187.13 LBS | DIASTOLIC BLOOD PRESSURE: 76 MMHG | HEART RATE: 83 BPM

## 2024-06-26 DIAGNOSIS — N30.00 ACUTE CYSTITIS WITHOUT HEMATURIA: Primary | ICD-10-CM

## 2024-06-26 LAB
BACTERIA #/AREA URNS HPF: ABNORMAL /HPF
BILIRUB UR QL STRIP: NEGATIVE
CLARITY UR: ABNORMAL
COLOR UR: YELLOW
GLUCOSE UR QL STRIP: NEGATIVE
HGB UR QL STRIP: NEGATIVE
KETONES UR QL STRIP: NEGATIVE
LEUKOCYTE ESTERASE UR QL STRIP: ABNORMAL
MICROSCOPIC COMMENT: ABNORMAL
NITRITE UR QL STRIP: NEGATIVE
PH UR STRIP: 6 [PH] (ref 5–8)
PROT UR QL STRIP: NEGATIVE
RBC #/AREA URNS HPF: 0 /HPF (ref 0–4)
SP GR UR STRIP: 1.01 (ref 1–1.03)
SQUAMOUS #/AREA URNS HPF: 7 /HPF
URN SPEC COLLECT METH UR: ABNORMAL
WBC #/AREA URNS HPF: 15 /HPF (ref 0–5)

## 2024-06-26 PROCEDURE — 99215 OFFICE O/P EST HI 40 MIN: CPT | Mod: PBBFAC,PO | Performed by: FAMILY MEDICINE

## 2024-06-26 PROCEDURE — 99999 PR PBB SHADOW E&M-EST. PATIENT-LVL V: CPT | Mod: PBBFAC,,, | Performed by: FAMILY MEDICINE

## 2024-06-26 PROCEDURE — 87086 URINE CULTURE/COLONY COUNT: CPT | Performed by: FAMILY MEDICINE

## 2024-06-26 PROCEDURE — 99213 OFFICE O/P EST LOW 20 MIN: CPT | Mod: S$PBB,,, | Performed by: FAMILY MEDICINE

## 2024-06-26 PROCEDURE — G2211 COMPLEX E/M VISIT ADD ON: HCPCS | Mod: S$PBB,,, | Performed by: FAMILY MEDICINE

## 2024-06-26 PROCEDURE — 81000 URINALYSIS NONAUTO W/SCOPE: CPT | Mod: PO | Performed by: FAMILY MEDICINE

## 2024-06-26 RX ORDER — NITROFURANTOIN 25; 75 MG/1; MG/1
100 CAPSULE ORAL 2 TIMES DAILY
Qty: 6 CAPSULE | Refills: 0 | Status: SHIPPED | OUTPATIENT
Start: 2024-06-26 | End: 2024-06-29

## 2024-06-26 RX ORDER — PREDNISONE 2.5 MG/1
2.5 TABLET ORAL DAILY
COMMUNITY

## 2024-06-26 NOTE — PROGRESS NOTES
complains of urinary frequency, urgency. Symptoms over the past 1-2 days. No fever or chills. No significant abdominal or back pain.     Yodit was seen today for dysuria.    Diagnoses and all orders for this visit:    Acute cystitis without hematuria  -     Urinalysis, Reflex to Urine Culture Urine, Clean Catch    Other orders  -     Urinalysis Microscopic  -     Urine culture  -     nitrofurantoin, macrocrystal-monohydrate, (MACROBID) 100 MG capsule; Take 1 capsule (100 mg total) by mouth 2 (two) times daily. for 3 days        Urinalysis consistent with UTI     Increase fluids. . Follow up as needed if not resolving in the next couple days           Past Medical History:  Past Medical History:   Diagnosis Date    Allergy     Angio-edema     Arthralgia     Asthma without status asthmaticus     Bronchitis     COPD (chronic obstructive pulmonary disease)     COVID-19 virus infection 07/23/2021    Diverticulosis of large intestine without hemorrhage 10/08/2015    Eczema     Environmental allergies     Eosinophilic asthma 03/08/2018    Exacerbation of ulcerative colitis with rectal bleeding     Fibromyalgia 02/19/2024    Gastroparesis     Gram-negative bacteremia 12/29/2023    Hand arthritis     Herpes simplex without mention of complication     oral    Hidradenitis     History of morbid obesity     Hyperlipidemia     Hypothyroidism     Immune deficiency disorder     Intraperitoneal abscess 05/07/2018    LBP radiating to left leg     Leukocytosis, unspecified     Migraine headache     Normocytic anemia 10/05/2022    Obesity, Class III, BMI 40-49.9 (morbid obesity)     Periorbital cellulitis 12/31/2016    Prediabetes     RA (rheumatoid arthritis)     Recurrent upper respiratory infection (URI)     Rotavirus enteritis 12/27/2023    RSV (respiratory syncytial virus infection) 01/30/2023    S/P colonoscopy 11/2010    Sepsis 05/07/2018    Sepsis secondary to colitis 05/07/2018    Systemic lupus erythematosus, organ or  "system involvement unspecified     Tubular adenoma of colon 08/17/2022    Tubular adenoma of colon 05/01/2024    Ulcerative pancolitis with rectal bleeding 10/17/2022    Urticaria      Past Surgical History:   Procedure Laterality Date    ADENOIDECTOMY      CHOLECYSTECTOMY      CHOLECYSTECTOMY      colitis      COLONOSCOPY  2015    repeat in 10    COLONOSCOPY N/A 10/08/2015    Procedure: COLONOSCOPY;  Surgeon: Panda Bose MD;  Location: [facility];  Service: Endoscopy;  Laterality: N/A;    COLONOSCOPY N/A 08/17/2022    Procedure: COLONOSCOPY;  Surgeon: Olaf DelV alle MD;  Location: [facility] ENDO;  Service: General;  Laterality: N/A;    COLONOSCOPY N/A 10/06/2022    Procedure: COLONOSCOPY;  Surgeon: Quinton Celeste MD;  Location: [facility];  Service: Gastroenterology;  Laterality: N/A;    COLONOSCOPY N/A 5/1/2024    Procedure: COLONOSCOPY;  Surgeon: Quinton Celeste MD;  Location: [facility];  Service: Endoscopy;  Laterality: N/A;    ESOPHAGOGASTRODUODENOSCOPY N/A 5/1/2024    Procedure: EGD (ESOPHAGOGASTRODUODENOSCOPY);  Surgeon: Quinton Celeste MD;  Location: [facility];  Service: Endoscopy;  Laterality: N/A;    Hand fracture surgery      HARDWARE REMOVAL      left hand 5th [facility]    hymenectomy      HYSTERECTOMY      menorrhagia-Ovaries intact    ROTATOR CUFF REPAIR Right     SLEEVE GASTROPLASTY  04/16/2018    TONSILLECTOMY      Urethral dilatation       Review of patient's allergies indicates:   Allergen Reactions    Bromelains Hives     " causes mouth to bleed"    Pimecrolimus Swelling    Sudafed [pseudoephedrine hcl] Other (See Comments)     Does not want to wake up .    Amoxicillin-pot clavulanate Itching     Other reaction(s): Itching; tolerated ceftriaxone 10/2022    Hydrocodone Itching    Iodinated contrast media      childhood    Iodine and iodide containing products Other (See Comments)    Melon Hives    Percocet [oxycodone-acetaminophen] Itching    Codeine     Corn containing products     Tree nut     " Tree nuts Other (See Comments)    Wheat containing prod     Barium iodide Rash    Ephedrine Other (See Comments)     Other reaction(s): comatose    Penicillins      Other reaction(s): does not work on pt symptoms     Current Outpatient Medications on File Prior to Visit   Medication Sig Dispense Refill    acetaminophen (TYLENOL) 325 MG tablet Take 1.5 tablets (487.5 mg total) by mouth every 6 (six) hours as needed for Pain (Do not take with any other products containing  tylenol or acetaminophen).      albuterol (PROVENTIL/VENTOLIN HFA) 90 mcg/actuation inhaler Inhale 2 puffs into the lungs every 4 (four) hours as needed for Wheezing or Shortness of Breath. 2 puffs every 4 hours as needed for cough, wheeze, or shortness of breath 54 g 3    cholecalciferol, vitamin D3, (VITAMIN D3) 50 mcg (2,000 unit) Cap Take 1 capsule by mouth once daily.      clobetasoL (TEMOVATE) 0.05 % cream Apply topically nightly as needed. To the vulva 60 g 1    desonide (DESOWEN) 0.05 % cream Apply topically 2 (two) times daily. 60 g 6    doxycycline (VIBRAMYCIN) 100 MG Cap Take 1 capsule (100 mg total) by mouth 2 (two) times daily. 20 capsule 2    estradioL (ESTRACE) 1 MG tablet Take 1 tablet (1 mg total) by mouth once daily. 90 tablet 3    FEROSUL 325 mg (65 mg iron) Tab tablet Take 325 mg by mouth once daily.      fluticasone-umeclidin-vilanter (TRELEGY ELLIPTA) 200-62.5-25 mcg inhaler Inhale 1 puff into the lungs once daily. 60 each 11    hydrocortisone 2.5 % cream Apply 1 Application topically 2 (two) times daily as needed (TO THE ANUS). To the anus      immun glob G,IgG,-pro-IgA 0-50 (HIZENTRA) 2 gram/10 mL (20 %) Soln Inject 90 mLs (18 g total) into the skin every 14 (fourteen) days. 180 mL 12    LASIX 20 mg tablet Take 20 mg by mouth as needed.      levothyroxine (SYNTHROID) 75 MCG tablet Take 1 tablet (75 mcg total) by mouth once daily. 90 tablet 3    LIDOcaine 5 % Crea Apply 1 Application topically once daily.      loperamide  (IMODIUM) 2 mg capsule Take 1 capsule (2 mg total) by mouth 3 (three) times daily as needed for Diarrhea. 30 capsule 0    minoxidiL (LONITEN) 2.5 MG tablet Take 1/4th (0.625mg) by mouth daily 90 tablet 2    montelukast (SINGULAIR) 10 mg tablet Take 1 tablet (10 mg total) by mouth every evening. 90 tablet 3    MULTIVITAMIN ORAL Take 1 tablet by mouth once daily.      ondansetron (ZOFRAN-ODT) 4 MG TbDL Take 1 tablet (4 mg total) by mouth every 8 (eight) hours as needed (nausea/vomiting). 90 tablet 6    potassium chloride SA (K-DUR,KLOR-CON) 20 MEQ tablet Take 20 mEq by mouth once daily.      sumatriptan (IMITREX) 100 MG tablet Take 1 tablet (100 mg total) by mouth once as needed for Migraine. May repeat dose in 2 hours if no relief.  Do not exceed 2 doses in 24 hours. 9 tablet 5    traMADoL (ULTRAM) 50 mg tablet Take 50 mg by mouth 3 (three) times daily.      tretinoin (RETIN-A) 0.05 % cream Apply 1 application  topically every evening.      upadacitinib (RINVOQ) 45 mg Tb24 Take 45 mg by mouth Daily.      [DISCONTINUED] benzonatate (TESSALON) 200 MG capsule Take 1 capsule (200 mg total) by mouth 3 (three) times daily as needed for Cough. 30 capsule 0    [DISCONTINUED] fluconazole (DIFLUCAN) 150 MG Tab Take 150 mg by mouth as needed.      [DISCONTINUED] GAVILYTE-G 236-22.74-6.74 -5.86 gram suspension DRINK 1 KIT BY MOUTH SPLIT DOSE AS DIRECTED FOR COLONOSCOPY PREP      [DISCONTINUED] hydrocortisone (CORTEF) 10 MG Tab Take 10 mg by mouth. 20 mg AM and 10 mg PM      [DISCONTINUED] HYDROmorphone (DILAUDID) 2 MG tablet Take 1 tablet (2 mg total) by mouth every 4 (four) hours as needed for Pain (cough). 10 tablet 0    [DISCONTINUED] vancomycin (VANCOCIN) 125 MG capsule Take 1 capsule (125 mg total) by mouth once daily. 15 capsule 0    predniSONE (DELTASONE) 2.5 MG tablet Take 2.5 mg by mouth once daily.       No current facility-administered medications on file prior to visit.     Social History     Socioeconomic History     Marital status:    Tobacco Use    Smoking status: Never     Passive exposure: Past    Smokeless tobacco: Never   Substance and Sexual Activity    Alcohol use: Not Currently     Comment: very rarely    Drug use: No    Sexual activity: Yes     Partners: Male   Social History Narrative    . Disabled teacher.     Social Determinants of Health     Food Insecurity: No Food Insecurity (4/29/2024)    Hunger Vital Sign     Worried About Running Out of Food in the Last Year: Never true     Ran Out of Food in the Last Year: Never true   Transportation Needs: No Transportation Needs (4/29/2024)    PRAPARE - Transportation     Lack of Transportation (Medical): No     Lack of Transportation (Non-Medical): No   Housing Stability: Low Risk  (4/29/2024)    Housing Stability Vital Sign     Unable to Pay for Housing in the Last Year: No     Homeless in the Last Year: No     Family History   Problem Relation Name Age of Onset    Melanoma Mother Loyd     Basal cell carcinoma Mother Loyd     Lung disease Mother Loyd         pulm htn    Cataracts Mother Loyd     Hypertension Mother Loyd     Lung cancer Father Josh     Diabetes Father Josh     Hypertension Father Josh     Cancer Father Josh     Melanoma Maternal Aunt      Melanoma Maternal Uncle      Diabetes Paternal Aunt Estee     Colon cancer Paternal Aunt Lilliam 40    Diabetes Paternal Aunt Lilliam     Breast cancer Paternal Aunt  40    Lymphoma Paternal Aunt      Cancer Maternal Grandfather Gilmar     Ovarian cancer Paternal Grandmother  40    Ulcerative colitis Son      Psoriasis Son      Psoriasis Son      Breast cancer Cousin Paternal 1st 25    Allergic rhinitis Neg Hx      Allergies Neg Hx      Angioedema Neg Hx      Asthma Neg Hx      Atopy Neg Hx      Eczema Neg Hx      Immunodeficiency Neg Hx      Rhinitis Neg Hx      Urticaria Neg Hx           Vitals:    06/26/24 1103   BP: 115/76   Pulse: 83   Temp: 98 °F (36.7 °C)   TempSrc: Temporal   Weight:  "84.9 kg (187 lb 1.6 oz)   Height: 5' 2.5" (1.588 m)       general: No acute distress   Chest clear to auscultation. Normal respiratory effort   Heart: Regular rate and rhythm   Abdomen: Positive bowel sounds soft nontender no hepato- splenomegaly.   Back: No CVAT     "

## 2024-06-27 LAB — BACTERIA UR CULT: NORMAL

## 2024-06-28 ENCOUNTER — PATIENT MESSAGE (OUTPATIENT)
Dept: FAMILY MEDICINE | Facility: CLINIC | Age: 64
End: 2024-06-28
Payer: MEDICARE

## 2024-07-02 ENCOUNTER — PATIENT MESSAGE (OUTPATIENT)
Dept: OPHTHALMOLOGY | Facility: CLINIC | Age: 64
End: 2024-07-02
Payer: MEDICARE

## 2024-07-03 ENCOUNTER — OFFICE VISIT (OUTPATIENT)
Dept: OPHTHALMOLOGY | Facility: CLINIC | Age: 64
End: 2024-07-03
Payer: MEDICARE

## 2024-07-03 ENCOUNTER — TELEPHONE (OUTPATIENT)
Dept: OPHTHALMOLOGY | Facility: CLINIC | Age: 64
End: 2024-07-03
Payer: MEDICARE

## 2024-07-03 DIAGNOSIS — H10.31 ACUTE BACTERIAL CONJUNCTIVITIS OF RIGHT EYE: ICD-10-CM

## 2024-07-03 DIAGNOSIS — H00.011 HORDEOLUM EXTERNUM OF RIGHT UPPER EYELID: ICD-10-CM

## 2024-07-03 DIAGNOSIS — L03.213 PRESEPTAL CELLULITIS OF RIGHT UPPER EYELID: Primary | ICD-10-CM

## 2024-07-03 PROCEDURE — 99999 PR PBB SHADOW E&M-EST. PATIENT-LVL III: CPT | Mod: PBBFAC,,, | Performed by: OPTOMETRIST

## 2024-07-03 PROCEDURE — 99213 OFFICE O/P EST LOW 20 MIN: CPT | Mod: PBBFAC | Performed by: OPTOMETRIST

## 2024-07-03 PROCEDURE — 99213 OFFICE O/P EST LOW 20 MIN: CPT | Mod: S$PBB,,, | Performed by: OPTOMETRIST

## 2024-07-03 RX ORDER — MOXIFLOXACIN 5 MG/ML
1 SOLUTION/ DROPS OPHTHALMIC 3 TIMES DAILY
Qty: 3 ML | Refills: 0 | Status: SHIPPED | OUTPATIENT
Start: 2024-07-03 | End: 2024-07-10

## 2024-07-03 NOTE — PROGRESS NOTES
HPI     Eye Problem            Comments: Pt states after being dilated OD started swelling and itching.   Burned a little in the beginning after drops. ER prescribed cephalexin. OD   was so swollen, the OS started to swell but not as bad  Pain Scale:  4-5  Onset:   tuesday  OD, OS, OU:   OU  Discharge:   yes  A.M. Matting:  yes  Itch:   yes  Redness:   yes  Photophobia:   yes  Foreign body sensation:   yes  Deep pain:   yes  Previous occurrence:   no  Drops:   pills and ointment from ER, but the ointment made it worse             Last edited by Lida Castro on 7/3/2024 10:49 AM.            Assessment /Plan     For exam results, see Encounter Report.    Preseptal cellulitis of right upper eyelid    Hordeolum externum of right upper eyelid    Acute bacterial conjunctivitis of right eye  -     moxifloxacin (VIGAMOX) 0.5 % ophthalmic solution; Place 1 drop into the right eye 3 (three) times daily. for 7 days  Dispense: 3 mL; Refill: 0      Begin warm compresses tid-qid OD for 10-15 minutes  Recommended Sheryl Pads  Instructed pt to go to ER if fever begins  Continue Keflex po as directed   Start Vigamox tid OD for 7 days then d/c     RTC 1 yr for dilated eye exam or PRN with any worsening  Discussed above and answered questions.

## 2024-07-07 ENCOUNTER — PATIENT MESSAGE (OUTPATIENT)
Dept: FAMILY MEDICINE | Facility: CLINIC | Age: 64
End: 2024-07-07
Payer: MEDICARE

## 2024-07-08 ENCOUNTER — OFFICE VISIT (OUTPATIENT)
Dept: FAMILY MEDICINE | Facility: CLINIC | Age: 64
End: 2024-07-08
Payer: MEDICARE

## 2024-07-08 VITALS
WEIGHT: 188 LBS | OXYGEN SATURATION: 99 % | DIASTOLIC BLOOD PRESSURE: 74 MMHG | HEIGHT: 62 IN | BODY MASS INDEX: 34.6 KG/M2 | SYSTOLIC BLOOD PRESSURE: 116 MMHG | HEART RATE: 90 BPM | RESPIRATION RATE: 16 BRPM

## 2024-07-08 VITALS
BODY MASS INDEX: 33.31 KG/M2 | DIASTOLIC BLOOD PRESSURE: 74 MMHG | HEIGHT: 63 IN | WEIGHT: 188 LBS | TEMPERATURE: 97 F | HEART RATE: 90 BPM | SYSTOLIC BLOOD PRESSURE: 116 MMHG

## 2024-07-08 DIAGNOSIS — E03.9 HYPOTHYROIDISM, UNSPECIFIED TYPE: ICD-10-CM

## 2024-07-08 DIAGNOSIS — N76.0 ACUTE VAGINITIS: ICD-10-CM

## 2024-07-08 DIAGNOSIS — R16.0 HEPATOMEGALY: ICD-10-CM

## 2024-07-08 DIAGNOSIS — D64.9 NORMOCYTIC ANEMIA: ICD-10-CM

## 2024-07-08 DIAGNOSIS — J45.50 SEVERE PERSISTENT ASTHMA WITHOUT COMPLICATION: ICD-10-CM

## 2024-07-08 DIAGNOSIS — Z86.010 HISTORY OF COLON POLYPS: ICD-10-CM

## 2024-07-08 DIAGNOSIS — Z79.52 CURRENT CHRONIC USE OF SYSTEMIC STEROIDS: ICD-10-CM

## 2024-07-08 DIAGNOSIS — Z90.3 S/P GASTRIC SLEEVE PROCEDURE: ICD-10-CM

## 2024-07-08 DIAGNOSIS — N76.3 CHRONIC VULVITIS: ICD-10-CM

## 2024-07-08 DIAGNOSIS — G43.909 MIGRAINE WITHOUT STATUS MIGRAINOSUS, NOT INTRACTABLE, UNSPECIFIED MIGRAINE TYPE: Primary | ICD-10-CM

## 2024-07-08 DIAGNOSIS — M79.7 FIBROMYALGIA: ICD-10-CM

## 2024-07-08 DIAGNOSIS — R30.0 DYSURIA: ICD-10-CM

## 2024-07-08 DIAGNOSIS — K31.84 GASTROPARESIS: ICD-10-CM

## 2024-07-08 DIAGNOSIS — R21 RASH: Primary | ICD-10-CM

## 2024-07-08 DIAGNOSIS — E66.9 OBESITY WITH BODY MASS INDEX (BMI) OF 30.0 TO 39.9: ICD-10-CM

## 2024-07-08 DIAGNOSIS — Z00.00 ENCOUNTER FOR MEDICARE ANNUAL WELLNESS EXAM: ICD-10-CM

## 2024-07-08 DIAGNOSIS — D84.9 IMMUNE DEFICIENCY DISORDER: ICD-10-CM

## 2024-07-08 DIAGNOSIS — E78.5 HYPERLIPIDEMIA, UNSPECIFIED HYPERLIPIDEMIA TYPE: ICD-10-CM

## 2024-07-08 DIAGNOSIS — K51.911 ULCERATIVE COLITIS WITH RECTAL BLEEDING, UNSPECIFIED LOCATION: ICD-10-CM

## 2024-07-08 DIAGNOSIS — M32.19 OTHER SYSTEMIC LUPUS ERYTHEMATOSUS WITH OTHER ORGAN INVOLVEMENT: ICD-10-CM

## 2024-07-08 DIAGNOSIS — Z00.00 ENCOUNTER FOR PREVENTIVE HEALTH EXAMINATION: ICD-10-CM

## 2024-07-08 DIAGNOSIS — R73.03 PREDIABETES: ICD-10-CM

## 2024-07-08 DIAGNOSIS — M35.9 UNDIFFERENTIATED CONNECTIVE TISSUE DISEASE: ICD-10-CM

## 2024-07-08 DIAGNOSIS — M35.2 BEHCET'S SYNDROME INVOLVING ORAL MUCOSA: ICD-10-CM

## 2024-07-08 DIAGNOSIS — K76.0 FATTY LIVER: ICD-10-CM

## 2024-07-08 LAB
BACTERIA #/AREA URNS HPF: ABNORMAL /HPF
BILIRUB UR QL STRIP: NEGATIVE
CLARITY UR: ABNORMAL
COLOR UR: YELLOW
GLUCOSE UR QL STRIP: NEGATIVE
HGB UR QL STRIP: ABNORMAL
KETONES UR QL STRIP: NEGATIVE
LEUKOCYTE ESTERASE UR QL STRIP: ABNORMAL
MICROSCOPIC COMMENT: ABNORMAL
NITRITE UR QL STRIP: NEGATIVE
PH UR STRIP: 6 [PH] (ref 5–8)
PROT UR QL STRIP: NEGATIVE
RBC #/AREA URNS HPF: 15 /HPF (ref 0–4)
SP GR UR STRIP: 1.01 (ref 1–1.03)
SQUAMOUS #/AREA URNS HPF: 18 /HPF
URN SPEC COLLECT METH UR: ABNORMAL
WBC #/AREA URNS HPF: 40 /HPF (ref 0–5)

## 2024-07-08 PROCEDURE — 87086 URINE CULTURE/COLONY COUNT: CPT | Performed by: FAMILY MEDICINE

## 2024-07-08 PROCEDURE — 99215 OFFICE O/P EST HI 40 MIN: CPT | Mod: PBBFAC,PO | Performed by: FAMILY MEDICINE

## 2024-07-08 PROCEDURE — 99999 PR PBB SHADOW E&M-EST. PATIENT-LVL III: CPT | Mod: PBBFAC,,, | Performed by: NURSE PRACTITIONER

## 2024-07-08 PROCEDURE — G0439 PPPS, SUBSEQ VISIT: HCPCS | Mod: ,,, | Performed by: NURSE PRACTITIONER

## 2024-07-08 PROCEDURE — 99999 PR PBB SHADOW E&M-EST. PATIENT-LVL V: CPT | Mod: PBBFAC,,, | Performed by: FAMILY MEDICINE

## 2024-07-08 PROCEDURE — 81000 URINALYSIS NONAUTO W/SCOPE: CPT | Mod: PO | Performed by: FAMILY MEDICINE

## 2024-07-08 PROCEDURE — G2211 COMPLEX E/M VISIT ADD ON: HCPCS | Mod: S$PBB,,, | Performed by: FAMILY MEDICINE

## 2024-07-08 PROCEDURE — 99213 OFFICE O/P EST LOW 20 MIN: CPT | Mod: PBBFAC,27,PO | Performed by: NURSE PRACTITIONER

## 2024-07-08 PROCEDURE — 99214 OFFICE O/P EST MOD 30 MIN: CPT | Mod: S$PBB,,, | Performed by: FAMILY MEDICINE

## 2024-07-08 RX ORDER — FLUCONAZOLE 150 MG/1
150 TABLET ORAL ONCE AS NEEDED
Qty: 2 TABLET | Refills: 0 | Status: SHIPPED | OUTPATIENT
Start: 2024-07-08 | End: 2024-07-08

## 2024-07-08 RX ORDER — FUROSEMIDE 20 MG/1
90 TABLET ORAL
COMMUNITY

## 2024-07-08 NOTE — PROGRESS NOTES
"Yodit Canseco presented for an initial Medicare AWV today. The following components were reviewed and updated:    Medical history  Family History  Social history  Allergies and Current Medications  Health Risk Assessment  Health Maintenance  Care Team    **See Completed Assessments for Annual Wellness visit with in the encounter summary    The following assessments were completed:  Depression Screening  Cognitive function Screening  Timed Get Up Test  Whisper Test      Opioid documentation:      Patient does not have a current opioid prescription.          Vitals:    07/08/24 1101   BP: 116/74   BP Location: Left arm   Patient Position: Sitting   BP Method: Medium (Automatic)   Pulse: 90   Resp: 16   SpO2: 99%   Weight: 85.3 kg (188 lb)   Height: 5' 2" (1.575 m)     Body mass index is 34.39 kg/m².       Physical Exam  Constitutional:       General: She is not in acute distress.     Appearance: She is obese.   HENT:      Head: Normocephalic and atraumatic.      Mouth/Throat:      Mouth: Mucous membranes are moist.   Eyes:      Extraocular Movements: Extraocular movements intact.      Pupils: Pupils are equal, round, and reactive to light.   Cardiovascular:      Rate and Rhythm: Normal rate.   Pulmonary:      Effort: Pulmonary effort is normal.   Musculoskeletal:      Cervical back: Normal range of motion and neck supple.   Skin:     General: Skin is warm and dry.   Neurological:      Mental Status: She is alert and oriented to person, place, and time.   Psychiatric:         Mood and Affect: Mood normal.         Behavior: Behavior normal.           Diagnoses and health risks identified today and associated recommendations/orders:  1. Encounter for Medicare annual wellness exam    - Ambulatory Referral/Consult to Enhanced Annual Wellness Visit (eAWV)    2. Migraine without status migrainosus, not intractable, unspecified migraine type  Stable and controlled on imitrex  Continue medication as prescribed  Continue " current treatment plan as previously prescribed with your PCP    3. Hyperlipidemia, unspecified hyperlipidemia type  Stable and controlled   Continue medication as prescribed  Continue current treatment plan as previously prescribed with your PCP    4. Behcet's syndrome involving oral mucosa  Stable and controlled on prednisone  Continue medication as prescribed  Continue current treatment plan as previously prescribed with your PCP    5. Immune deficiency disorder  Stable and controlled on Hizentra  Continue medication as prescribed  Continue current treatment plan as previously prescribed with your PCP    6. Undifferentiated connective tissue disease  Stable and controlled on prednisone  Continue medication as prescribed  Continue current treatment plan as previously prescribed with your PCP    7. Other systemic lupus erythematosus with other organ involvement  Stable and controlled on Hizentra  Continue medication as prescribed  Continue current treatment plan as previously prescribed with your PCP    8. Prediabetes  Stable and controlled on diet  Continue medication as prescribed  Continue current treatment plan as previously prescribed with your PCP    9. Hypothyroidism, unspecified type  Stable and controlled on levothyroxine  Continue medication as prescribed  Continue current treatment plan as previously prescribed with your PCP    10. Current chronic use of systemic steroids  Stable and controlled   Continue medication as prescribed  Continue current treatment plan as previously prescribed with your PCP    11. S/P gastric sleeve procedure  Stable and controlled   Continue medication as prescribed  Continue current treatment plan as previously prescribed with your PCP    12. Normocytic anemia  Stable and controlled   Continue medication as prescribed  Continue current treatment plan as previously prescribed with your PCP    13. Fatty liver  Stable and controlled   Continue medication as prescribed  Continue  current treatment plan as previously prescribed with your PCP    14. Gastroparesis  Stable and controlled   Continue medication as prescribed  Continue current treatment plan as previously prescribed with your PCP    15. Ulcerative colitis with rectal bleeding, unspecified location  Stable and controlled on rinvoq  Continue medication as prescribed  Continue current treatment plan as previously prescribed with your PCP    16. Fibromyalgia  Stable and controlled on tylenol  Continue medication as prescribed  Continue current treatment plan as previously prescribed with your PCP    17. Obesity with body mass index (BMI) of 30.0 to 39.9  Stable and controlled   Continue medication as prescribed  Continue current treatment plan as previously prescribed with your PCP      Provided Yodit with a 5-10 year written screening schedule and personal prevention plan. Recommendations were developed using the USPSTF age appropriate recommendations. Education, counseling, and referrals were provided as needed.  After Visit Summary printed and given to patient which includes a list of additional screenings\tests needed.        Follow up if symptoms worsen or fail to improve.      Jori Jarrett, THAO    I offered to discuss advanced care planning, including how to pick a person who would make decisions for you if you were unable to make them for yourself, called a health care power of , and what kind of decisions you might make such as use of life sustaining treatments such as ventilators and tube feeding when faced with a life limiting illness recorded on a living will that they will need to know. (How you want to be cared for as you near the end of your natural life)     X  Patient has advanced directives written and agrees to provide copies to the institution.

## 2024-07-08 NOTE — PROGRESS NOTES
Patient was seen recently regarding some dysuria and frequency with urinalysis consistent with possible UTI and was prescribed nitrofurantoin for 3 days.  Culture however showed no growth and felt specimen was contaminated.  She subsequently developed some pain and swelling at the right eye associated with hordeolum and preseptal cellulitis and went to the urgent care.  She was initially given erythromycin ointment and then a dose of clindamycin and then changed to cephalexin.  She did have follow-up with the optometrist who recommend that she complete the cephalexin which has been done.  With all of this she developed vaginal yeast infection and feels like she has itching and burning.  She is also noticed some recurrence of urinary frequency though denies significant dysuria.  She does have prior history of chronic vulvitis and has clobetasol at home but has not been using it.  She does state that she developed some rash on the face as well as on the feet mostly at the toes on the right foot treated with Benadryl.  Rash seems to be improving.  She does have doxycycline on her medication list, but is not actually taking this-is on reserve from pulmonologist due to history of recurrent pulmonary infections.    Yodit was seen today for urinary tract infection and rash.    Diagnoses and all orders for this visit:    Rash    Dysuria  -     Urinalysis, Reflex to Urine Culture Urine, Clean Catch    Acute vaginitis    Chronic vulvitis    Other orders  -     Urinalysis Microscopic  -     Urine culture  -     fluconazole (DIFLUCAN) 150 MG Tab; Take 1 tablet (150 mg total) by mouth once as needed (Yeast infection). May repeat dose in 1 week if needed    She can use clobetasol that she has at home to the feet.  She can use desonide that she has at home to the face.  Urinalysis today appears to be contaminated sample again.  Will treat empirically for vaginal yeast infection.  Follow-up if symptoms worsen or not improving  with above.              Past Medical History:  Past Medical History:   Diagnosis Date    Allergy     Angio-edema     Arthralgia     Asthma without status asthmaticus     Bronchitis     COPD (chronic obstructive pulmonary disease)     COVID-19 virus infection 07/23/2021    Diverticulosis of large intestine without hemorrhage 10/08/2015    Eczema     Environmental allergies     Eosinophilic asthma 03/08/2018    Exacerbation of ulcerative colitis with rectal bleeding     Fibromyalgia 02/19/2024    Gastroparesis     Gram-negative bacteremia 12/29/2023    Hand arthritis     Herpes simplex without mention of complication     oral    Hidradenitis     History of morbid obesity     Hyperlipidemia     Hypothyroidism     Immune deficiency disorder     Intraperitoneal abscess 05/07/2018    LBP radiating to left leg     Leukocytosis, unspecified     Migraine headache     Normocytic anemia 10/05/2022    Obesity, Class III, BMI 40-49.9 (morbid obesity)     Periorbital cellulitis 12/31/2016    Prediabetes     RA (rheumatoid arthritis)     Recurrent upper respiratory infection (URI)     Rotavirus enteritis 12/27/2023    RSV (respiratory syncytial virus infection) 01/30/2023    S/P colonoscopy 11/2010    Sepsis 05/07/2018    Sepsis secondary to colitis 05/07/2018    Systemic lupus erythematosus, organ or system involvement unspecified     Tubular adenoma of colon 08/17/2022    Tubular adenoma of colon 05/01/2024    Ulcerative pancolitis with rectal bleeding 10/17/2022    Urticaria      Past Surgical History:   Procedure Laterality Date    ADENOIDECTOMY      CHOLECYSTECTOMY      CHOLECYSTECTOMY      colitis      COLONOSCOPY  2015    repeat in 10    COLONOSCOPY N/A 10/08/2015    Procedure: COLONOSCOPY;  Surgeon: Panda Bose MD;  Location: [facility];  Service: Endoscopy;  Laterality: N/A;    COLONOSCOPY N/A 08/17/2022    Procedure: COLONOSCOPY;  Surgeon: Olaf Del Valle MD;  Location: [facility];  Service: General;  Laterality:  "N/A;    COLONOSCOPY N/A 10/06/2022    Procedure: COLONOSCOPY;  Surgeon: Quinton Celeste MD;  Location: [facility] ENDO;  Service: Gastroenterology;  Laterality: N/A;    COLONOSCOPY N/A 5/1/2024    Procedure: COLONOSCOPY;  Surgeon: Quinton Celeste MD;  Location: [facility] ENDO;  Service: Endoscopy;  Laterality: N/A;    ESOPHAGOGASTRODUODENOSCOPY N/A 5/1/2024    Procedure: EGD (ESOPHAGOGASTRODUODENOSCOPY);  Surgeon: Quinton Celeste MD;  Location: [facility] END[facility];  Service: Endoscopy;  Laterality: N/A;    Hand fracture surgery      HARDWARE REMOVAL      left hand [facility]th [facility]    hymenectomy      HYSTERECTOMY      menorrhagia-Ovaries intact    ROTATOR CUFF REPAIR Right     SLEEVE GASTROPLASTY  04/16/2018    TONSILLECTOMY      Urethral dilatation       Review of patient's allergies indicates:   Allergen Reactions    Bromelains Hives     " causes mouth to bleed"    Pimecrolimus Swelling    Sudafed [pseudoephedrine hcl] Other (See Comments)     Does not want to wake up .    Amoxicillin-pot clavulanate Itching     Other reaction(s): Itching; tolerated ceftriaxone 10/2022    Hydrocodone Itching    Iodinated contrast media      childhood    Iodine and iodide containing products Other (See Comments)    Melon Hives    Percocet [oxycodone-acetaminophen] Itching    Codeine     Corn containing products     Tree nut     Tree nuts Other (See Comments)    Wheat containing prod     Barium iodide Rash    Ephedrine Other (See Comments)     Other reaction(s): comatose    Penicillins      Other reaction(s): does not work on pt symptoms     Current Outpatient Medications on File Prior to Visit   Medication Sig Dispense Refill    acetaminophen (TYLENOL) 325 MG tablet Take 1.5 tablets (487.5 mg total) by mouth every 6 (six) hours as needed for Pain (Do not take with any other products containing  tylenol or acetaminophen).      albuterol (PROVENTIL/VENTOLIN HFA) 90 mcg/actuation inhaler Inhale 2 puffs into the lungs every 4 (four) hours as needed for Wheezing " or Shortness of Breath. 2 puffs every 4 hours as needed for cough, wheeze, or shortness of breath 54 g 3    cholecalciferol, vitamin D3, (VITAMIN D3) 50 mcg (2,000 unit) Cap Take 1 capsule by mouth once daily.      clobetasoL (TEMOVATE) 0.05 % cream Apply topically nightly as needed. To the vulva 60 g 1    desonide (DESOWEN) 0.05 % cream Apply topically 2 (two) times daily. 60 g 6    doxycycline (VIBRAMYCIN) 100 MG Cap Take 1 capsule (100 mg total) by mouth 2 (two) times daily. 20 capsule 2    estradioL (ESTRACE) 1 MG tablet Take 1 tablet (1 mg total) by mouth once daily. 90 tablet 3    FEROSUL 325 mg (65 mg iron) Tab tablet Take 325 mg by mouth once daily.      fluticasone-umeclidin-vilanter (TRELEGY ELLIPTA) 200-62.5-25 mcg inhaler Inhale 1 puff into the lungs once daily. 60 each 11    hydrocortisone 2.5 % cream Apply 1 Application topically 2 (two) times daily as needed (TO THE ANUS). To the anus      immun glob G,IgG,-pro-IgA 0-50 (HIZENTRA) 2 gram/10 mL (20 %) Soln Inject 90 mLs (18 g total) into the skin every 14 (fourteen) days. 180 mL 12    LASIX 20 mg tablet Take 20 mg by mouth as needed.      levothyroxine (SYNTHROID) 75 MCG tablet Take 1 tablet (75 mcg total) by mouth once daily. 90 tablet 3    LIDOcaine 5 % Crea Apply 1 Application topically once daily.      loperamide (IMODIUM) 2 mg capsule Take 1 capsule (2 mg total) by mouth 3 (three) times daily as needed for Diarrhea. 30 capsule 0    minoxidiL (LONITEN) 2.5 MG tablet Take 1/4th (0.625mg) by mouth daily 90 tablet 2    montelukast (SINGULAIR) 10 mg tablet Take 1 tablet (10 mg total) by mouth every evening. 90 tablet 3    moxifloxacin (VIGAMOX) 0.5 % ophthalmic solution Place 1 drop into the right eye 3 (three) times daily. for 7 days 3 mL 0    MULTIVITAMIN ORAL Take 1 tablet by mouth once daily.      ondansetron (ZOFRAN-ODT) 4 MG TbDL Take 1 tablet (4 mg total) by mouth every 8 (eight) hours as needed (nausea/vomiting). 90 tablet 6    potassium  chloride SA (K-DUR,KLOR-CON) 20 MEQ tablet Take 20 mEq by mouth once daily.      predniSONE (DELTASONE) 2.5 MG tablet Take 2.5 mg by mouth once daily.      traMADoL (ULTRAM) 50 mg tablet Take 50 mg by mouth 3 (three) times daily.      tretinoin (RETIN-A) 0.05 % cream Apply 1 application  topically every evening.      upadacitinib (RINVOQ) 45 mg Tb24 Take 45 mg by mouth Daily.      furosemide (LASIX) 20 MG tablet 90 tablets.      sumatriptan (IMITREX) 100 MG tablet Take 1 tablet (100 mg total) by mouth once as needed for Migraine. May repeat dose in 2 hours if no relief.  Do not exceed 2 doses in 24 hours. 9 tablet 5     No current facility-administered medications on file prior to visit.     Social History     Socioeconomic History    Marital status:    Tobacco Use    Smoking status: Never     Passive exposure: Past    Smokeless tobacco: Never   Substance and Sexual Activity    Alcohol use: Not Currently     Comment: very rarely    Drug use: No    Sexual activity: Yes     Partners: Male   Social History Narrative    . Disabled teacher.     Social Determinants of Health     Food Insecurity: No Food Insecurity (4/29/2024)    Hunger Vital Sign     Worried About Running Out of Food in the Last Year: Never true     Ran Out of Food in the Last Year: Never true   Transportation Needs: No Transportation Needs (4/29/2024)    PRAPARE - Transportation     Lack of Transportation (Medical): No     Lack of Transportation (Non-Medical): No   Housing Stability: Low Risk  (4/29/2024)    Housing Stability Vital Sign     Unable to Pay for Housing in the Last Year: No     Homeless in the Last Year: No     Family History   Problem Relation Name Age of Onset    Melanoma Mother Loyd     Basal cell carcinoma Mother Loyd     Lung disease Mother Loyd         pulm htn    Cataracts Mother Loyd     Hypertension Mother Loyd     Lung cancer Father Josh     Diabetes Father Josh     Hypertension Father Josh     Cancer Father  "Josh     Melanoma Maternal Aunt      Melanoma Maternal Uncle      Diabetes Paternal Aunt Estee     Colon cancer Paternal Aunt Lilliam 40    Diabetes Paternal Aunt Lilliam     Breast cancer Paternal Aunt  40    Lymphoma Paternal Aunt      Cancer Maternal Grandfather Gilmar     Ovarian cancer Paternal Grandmother  40    Ulcerative colitis Son      Psoriasis Son      Psoriasis Son      Breast cancer Cousin Paternal 1st 25    Allergic rhinitis Neg Hx      Allergies Neg Hx      Angioedema Neg Hx      Asthma Neg Hx      Atopy Neg Hx      Eczema Neg Hx      Immunodeficiency Neg Hx      Rhinitis Neg Hx      Urticaria Neg Hx             ROS:  GENERAL: No fever, chills,  or significant weight changes.   CARDIOVASCULAR: Denies chest pain, PND, orthopnea or reduced exercise tolerance.  ABDOMEN: Appetite fine. Denies diarrhea, abdominal pain, hematemesis or blood in stool.  URINARY: No flank pain, dysuria or hematuria.    Vitals:    07/08/24 0956   BP: 116/74   Pulse: 90   Temp: 97.2 °F (36.2 °C)   TempSrc: Temporal   Weight: 85.3 kg (188 lb)   Height: 5' 2.5" (1.588 m)       Wt Readings from Last 3 Encounters:   07/08/24 85.3 kg (188 lb)   07/08/24 85.3 kg (188 lb)   06/26/24 84.9 kg (187 lb 1.6 oz)       APPEARANCE: Well nourished, well developed, in no acute distress.    HEAD: Normocephalic.  Atraumatic.  EYES:   Right eye: Pupil reactive.  Conjunctiva clear.    Left eye: Pupil reactive.  Conjunctiva clear.    NECK: Supple. No bruits.  No JVD.  No cervical lymphadenopathy.  No thyromegaly.    CHEST: Breath sounds clear bilaterally.  Normal respiratory effort  CARDIOVASCULAR: Normal rate.  Regular rhythm.  No murmurs.  No rub.  No gallops.   No edema.  Abdomen positive bowel sounds soft nontender no hepatosplenomegaly  MENTAL STATUS: Alert.  Oriented x 3.  She has erythematous rash on several toes with some possible drying vesicles and scale.  Photograph below.    "

## 2024-07-08 NOTE — PATIENT INSTRUCTIONS
Hi Yodit,     If you are due for any health screening(s) below please notify me so we can arrange them to be ordered and scheduled. Most healthy patients at your age complete them, but you are free to accept or refuse.     If you can't do it, I'll definitely understand. If you can, I'd certainly appreciate it!    All of your core healthy metrics are met.                     Counseling and Referral of Other Preventative  (Italic type indicates deductible and co-insurance are waived)    Patient Name: Yodit Canseco  Today's Date: 7/8/2024    Health Maintenance       Date Due Completion Date    RSV Vaccine (Age 60+ and Pregnant patients) (1 - 1-dose 60+ series) Never done ---    COVID-19 Vaccine (5 - 2023-24 season) 09/01/2023 1/17/2023    Influenza Vaccine (1) 09/01/2024 10/5/2023    Colorectal Cancer Screening 11/01/2024 5/1/2024    Override on 11/9/2010: Done    Hemoglobin A1c (Prediabetes) 02/12/2025 2/12/2024    Mammogram 03/26/2025 3/26/2024    Lipid Panel 06/04/2025 6/4/2024    TETANUS VACCINE 09/02/2025 9/2/2015        No orders of the defined types were placed in this encounter.    The following information is provided to all patients.  This information is to help you find resources for any of the problems found today that may be affecting your health:                  Living healthy guide: www.[facility].[place].gov      Understanding Diabetes: www.diabetes.org      Eating healthy: www.cdc.gov/healthyweight      CDC home safety checklist: www.cdc.gov/steadi/patient.html      Agency on Aging: www.goea.[place].[facility]      Alcoholics anonymous (AA): www.aa.org      Physical Activity: www.beatriz.nih.gov/dh3wgbv      Tobacco use: www.quitwithusla.org

## 2024-07-08 NOTE — TELEPHONE ENCOUNTER
No care due was identified.  Health [facility] Embedded Care Due Messages. Reference number: [id number].   7/08/2024 1:06:46 PM CDT

## 2024-07-09 LAB — BACTERIA UR CULT: NO GROWTH

## 2024-07-09 RX ORDER — LEVOTHYROXINE SODIUM 75 UG/1
75 TABLET ORAL
Qty: 90 TABLET | Refills: 3 | Status: SHIPPED | OUTPATIENT
Start: 2024-07-09

## 2024-07-09 NOTE — TELEPHONE ENCOUNTER
Refill Decision Note   Yodit Canseco  is requesting a refill authorization.  Brief Assessment and Rationale for Refill:  Approve     Medication Therapy Plan:         Comments:     Note composed:11:31 AM 07/09/2024             Appointments     Last Visit   7/8/2024 Miky Lewis MD   Next Visit   Visit date not found Miky Lewis MD

## 2024-07-15 DIAGNOSIS — J45.51 SEVERE PERSISTENT ASTHMA WITH ACUTE EXACERBATION: ICD-10-CM

## 2024-07-15 RX ORDER — MONTELUKAST SODIUM 10 MG/1
10 TABLET ORAL NIGHTLY
Qty: 90 TABLET | Refills: 3 | Status: SHIPPED | OUTPATIENT
Start: 2024-07-15

## 2024-07-16 ENCOUNTER — LAB VISIT (OUTPATIENT)
Dept: LAB | Facility: HOSPITAL | Age: 64
End: 2024-07-16
Attending: STUDENT IN AN ORGANIZED HEALTH CARE EDUCATION/TRAINING PROGRAM
Payer: MEDICARE

## 2024-07-16 ENCOUNTER — TELEPHONE (OUTPATIENT)
Dept: DERMATOLOGY | Facility: CLINIC | Age: 64
End: 2024-07-16
Payer: MEDICARE

## 2024-07-16 DIAGNOSIS — Z51.81 ENCOUNTER FOR THERAPEUTIC DRUG MONITORING: ICD-10-CM

## 2024-07-16 DIAGNOSIS — K51.00 ULCERATIVE (CHRONIC) ENTEROCOLITIS: Primary | ICD-10-CM

## 2024-07-16 LAB
ALBUMIN SERPL BCP-MCNC: 3.6 G/DL (ref 3.5–5.2)
ALP SERPL-CCNC: 49 U/L (ref 55–135)
ALT SERPL W/O P-5'-P-CCNC: 25 U/L (ref 10–44)
ANION GAP SERPL CALC-SCNC: 8 MMOL/L (ref 8–16)
AST SERPL-CCNC: 32 U/L (ref 10–40)
BASOPHILS # BLD AUTO: 0.03 K/UL (ref 0–0.2)
BASOPHILS NFR BLD: 0.5 % (ref 0–1.9)
BILIRUB SERPL-MCNC: 0.4 MG/DL (ref 0.1–1)
BUN SERPL-MCNC: 13 MG/DL (ref 8–23)
CALCIUM SERPL-MCNC: 9.1 MG/DL (ref 8.7–10.5)
CHLORIDE SERPL-SCNC: 104 MMOL/L (ref 95–110)
CHOLEST SERPL-MCNC: 287 MG/DL (ref 120–199)
CHOLEST/HDLC SERPL: 4.3 {RATIO} (ref 2–5)
CO2 SERPL-SCNC: 28 MMOL/L (ref 23–29)
CREAT SERPL-MCNC: 0.8 MG/DL (ref 0.5–1.4)
CRP SERPL-MCNC: 0.4 MG/L (ref 0–8.2)
DIFFERENTIAL METHOD BLD: ABNORMAL
EOSINOPHIL # BLD AUTO: 0.2 K/UL (ref 0–0.5)
EOSINOPHIL NFR BLD: 3.9 % (ref 0–8)
ERYTHROCYTE [DISTWIDTH] IN BLOOD BY AUTOMATED COUNT: 14.7 % (ref 11.5–14.5)
EST. GFR  (NO RACE VARIABLE): >60 ML/MIN/1.73 M^2
GLUCOSE SERPL-MCNC: 89 MG/DL (ref 70–110)
HCT VFR BLD AUTO: 36.7 % (ref 37–48.5)
HDLC SERPL-MCNC: 66 MG/DL (ref 40–75)
HDLC SERPL: 23 % (ref 20–50)
HGB BLD-MCNC: 12.6 G/DL (ref 12–16)
IMM GRANULOCYTES # BLD AUTO: 0.05 K/UL (ref 0–0.04)
IMM GRANULOCYTES NFR BLD AUTO: 0.8 % (ref 0–0.5)
LDLC SERPL CALC-MCNC: 148 MG/DL (ref 63–159)
LYMPHOCYTES # BLD AUTO: 2.2 K/UL (ref 1–4.8)
LYMPHOCYTES NFR BLD: 37.5 % (ref 18–48)
MCH RBC QN AUTO: 30.7 PG (ref 27–31)
MCHC RBC AUTO-ENTMCNC: 34.3 G/DL (ref 32–36)
MCV RBC AUTO: 89 FL (ref 82–98)
MONOCYTES # BLD AUTO: 0.5 K/UL (ref 0.3–1)
MONOCYTES NFR BLD: 8.5 % (ref 4–15)
NEUTROPHILS # BLD AUTO: 2.9 K/UL (ref 1.8–7.7)
NEUTROPHILS NFR BLD: 48.8 % (ref 38–73)
NONHDLC SERPL-MCNC: 221 MG/DL
NRBC BLD-RTO: 0 /100 WBC
PLATELET # BLD AUTO: 224 K/UL (ref 150–450)
PMV BLD AUTO: 12.1 FL (ref 9.2–12.9)
POTASSIUM SERPL-SCNC: 3.8 MMOL/L (ref 3.5–5.1)
PROT SERPL-MCNC: 6.5 G/DL (ref 6–8.4)
RBC # BLD AUTO: 4.11 M/UL (ref 4–5.4)
SODIUM SERPL-SCNC: 140 MMOL/L (ref 136–145)
TRIGL SERPL-MCNC: 365 MG/DL (ref 30–150)
WBC # BLD AUTO: 5.89 K/UL (ref 3.9–12.7)

## 2024-07-16 PROCEDURE — 80061 LIPID PANEL: CPT | Mod: GA | Performed by: STUDENT IN AN ORGANIZED HEALTH CARE EDUCATION/TRAINING PROGRAM

## 2024-07-16 PROCEDURE — 80053 COMPREHEN METABOLIC PANEL: CPT | Performed by: STUDENT IN AN ORGANIZED HEALTH CARE EDUCATION/TRAINING PROGRAM

## 2024-07-16 PROCEDURE — 86140 C-REACTIVE PROTEIN: CPT | Performed by: STUDENT IN AN ORGANIZED HEALTH CARE EDUCATION/TRAINING PROGRAM

## 2024-07-16 PROCEDURE — 36415 COLL VENOUS BLD VENIPUNCTURE: CPT | Mod: PO | Performed by: STUDENT IN AN ORGANIZED HEALTH CARE EDUCATION/TRAINING PROGRAM

## 2024-07-16 PROCEDURE — 85025 COMPLETE CBC W/AUTO DIFF WBC: CPT | Performed by: STUDENT IN AN ORGANIZED HEALTH CARE EDUCATION/TRAINING PROGRAM

## 2024-07-16 NOTE — TELEPHONE ENCOUNTER
Returned pt phone call to schedule appt with Dr. Sierra. Pt declined both offered appt dates given. Stated she will not worry about an appt.

## 2024-07-16 NOTE — TELEPHONE ENCOUNTER
----- Message from Saeed Solorio sent at 7/16/2024  1:18 PM CDT -----  Contact: andrew  Type:  Sooner Apoointment Request    Caller is requesting a sooner appointment.  Caller declined first available appointment listed below.  Caller will not accept being placed on the waitlist and is requesting a message be sent to doctor.  Name of Caller:Andrew  When is the first available appointment?0717/2024 patient  can not do Wednesday  Symptoms:rash  Would the patient rather a call back or a response via [employer]? Call back   Best Call Back Number:.[phone]  Additional Information: mrn: [id number]

## 2024-07-29 PROBLEM — N17.9 AKI (ACUTE KIDNEY INJURY): Status: RESOLVED | Noted: 2024-04-28 | Resolved: 2024-07-29

## 2024-08-06 ENCOUNTER — DOCUMENTATION ONLY (OUTPATIENT)
Dept: OPHTHALMOLOGY | Facility: CLINIC | Age: 64
End: 2024-08-06

## 2024-08-06 ENCOUNTER — OFFICE VISIT (OUTPATIENT)
Dept: OPHTHALMOLOGY | Facility: CLINIC | Age: 64
End: 2024-08-06
Payer: MEDICARE

## 2024-08-06 DIAGNOSIS — H04.129 DRY EYE: ICD-10-CM

## 2024-08-06 DIAGNOSIS — H25.11 NUCLEAR SCLEROSIS OF RIGHT EYE: Primary | ICD-10-CM

## 2024-08-06 DIAGNOSIS — H02.831 DERMATOCHALASIS OF BOTH UPPER EYELIDS: ICD-10-CM

## 2024-08-06 DIAGNOSIS — H02.834 DERMATOCHALASIS OF BOTH UPPER EYELIDS: ICD-10-CM

## 2024-08-06 DIAGNOSIS — H43.813 PVD (POSTERIOR VITREOUS DETACHMENT), BOTH EYES: ICD-10-CM

## 2024-08-06 DIAGNOSIS — H25.12 NUCLEAR SCLEROSIS OF LEFT EYE: ICD-10-CM

## 2024-08-06 PROCEDURE — 99213 OFFICE O/P EST LOW 20 MIN: CPT | Mod: PBBFAC | Performed by: STUDENT IN AN ORGANIZED HEALTH CARE EDUCATION/TRAINING PROGRAM

## 2024-08-06 PROCEDURE — 92136 OPHTHALMIC BIOMETRY: CPT | Mod: PBBFAC,RT | Performed by: STUDENT IN AN ORGANIZED HEALTH CARE EDUCATION/TRAINING PROGRAM

## 2024-08-06 PROCEDURE — 99999 PR PBB SHADOW E&M-EST. PATIENT-LVL III: CPT | Mod: PBBFAC,,, | Performed by: STUDENT IN AN ORGANIZED HEALTH CARE EDUCATION/TRAINING PROGRAM

## 2024-08-06 RX ORDER — PREDNISOLONE ACETATE 10 MG/ML
1 SUSPENSION/ DROPS OPHTHALMIC EVERY 4 HOURS
Qty: 5 ML | Refills: 1 | Status: SHIPPED | OUTPATIENT
Start: 2024-08-06 | End: 2025-08-06

## 2024-08-13 DIAGNOSIS — H01.119 EYELID DERMATITIS, ALLERGIC/CONTACT: ICD-10-CM

## 2024-08-14 RX ORDER — DESONIDE 0.5 MG/G
1 CREAM TOPICAL 2 TIMES DAILY
Qty: 60 G | Refills: 6 | Status: SHIPPED | OUTPATIENT
Start: 2024-08-14

## 2024-08-15 ENCOUNTER — OFFICE VISIT (OUTPATIENT)
Dept: DERMATOLOGY | Facility: CLINIC | Age: 64
End: 2024-08-15
Payer: MEDICARE

## 2024-08-15 VITALS — BODY MASS INDEX: 34.61 KG/M2 | HEIGHT: 62 IN | WEIGHT: 188.06 LBS

## 2024-08-15 DIAGNOSIS — L30.9 DERMATITIS: Primary | ICD-10-CM

## 2024-08-15 PROCEDURE — 99214 OFFICE O/P EST MOD 30 MIN: CPT | Mod: S$PBB,,, | Performed by: STUDENT IN AN ORGANIZED HEALTH CARE EDUCATION/TRAINING PROGRAM

## 2024-08-15 PROCEDURE — 99999 PR PBB SHADOW E&M-EST. PATIENT-LVL IV: CPT | Mod: PBBFAC,,, | Performed by: STUDENT IN AN ORGANIZED HEALTH CARE EDUCATION/TRAINING PROGRAM

## 2024-08-15 PROCEDURE — 99214 OFFICE O/P EST MOD 30 MIN: CPT | Mod: PBBFAC | Performed by: STUDENT IN AN ORGANIZED HEALTH CARE EDUCATION/TRAINING PROGRAM

## 2024-08-15 PROCEDURE — G2211 COMPLEX E/M VISIT ADD ON: HCPCS | Mod: S$PBB,,, | Performed by: STUDENT IN AN ORGANIZED HEALTH CARE EDUCATION/TRAINING PROGRAM

## 2024-08-15 RX ORDER — TACROLIMUS 1 MG/G
OINTMENT TOPICAL 2 TIMES DAILY
Qty: 60 G | Refills: 1 | Status: SHIPPED | OUTPATIENT
Start: 2024-08-15

## 2024-08-15 NOTE — PATIENT INSTRUCTIONS
XEROSIS (DRY SKIN)        Definition    Xerosis is the term for dry skin.  We all have a natural oil coating over our skin produced by the skin oil glands.  If this oil is removed, the skin becomes dry which can lead to cracking, which can lead to inflammation.  Xerosis is usually a long-term problem that recurs often, especially in the winter.    Cause    Long hot baths or showers can remove our natural oil and lead to xerosis.  One should never take more than one bath or shower a day and for no longer than ten minutes.  Use of harsh soaps such as Zest, Dial, and Ivory can worsen and cause xerosis.  Cold winter weather worsens xerosis because the amount of moisture contained in cold air is much less than the amount of moisture in warm air.    Treatment    Treatment is intended to restore the natural oil to your skin.  Keep the skin lubricated.    Do not take more than one bath or shower a day.  Use lukewarm water, not hot.  Hot water dries out the skin.    Use a gentle moisturizing soap such as Cetaphil soap, Oil of Olay, Dove, Basis, Ivory moisture care, Restoraderm cleanser.    When toweling dry, dont rub.  Blot the skin so there is still some water left on the skin.  You should apply a moisturizing cream to all of the skin such as Cerave cream, Cetaphil cream, Restoraderm or Eucerin Original Formula cream.   Alpha hydroxyacid lotions, i.e., AmLactin, also work very well for preventing dry skin, but may burn when used on inflamed or reddened skin.    If you like to swim during the winter months, you should not use soap when getting out of the pool.  When you have finished swimming, rinse off the chlorine with cool to warm water.  If this will be the only shower of the day, then you may use Cetaphil or another mild soap to cleanse your skin.  After the shower, apply a moisturizing cream to all of the skin as above.        [address], [place] [address]/ [phone]; fax: [phone]/  [email or web address]

## 2024-08-15 NOTE — PROGRESS NOTES
Subjective:       Patient ID:  Yodit Canseco is a 63 y.o. female who presents for   Chief Complaint   Patient presents with    Rash     History of Present Illness: The patient presents with chief complaint of a persistent rash.  Location: on the face (mostly along the left side) and on the top of the feet  Duration: ongoing for several weeks  Signs/Symptoms: reports having red, itchy and irritated bumps on the skin as well as very itchy plaque with peeling of the skin over the top of the feet  Prior treatments: has tried clobetasol and mometasone which hasn't helped much with symptoms (has not gotten worse either).   Has a history of Bechet's    Rash        Review of Systems   Constitutional:  Negative for fever and chills.   Skin:  Positive for itching, rash and dry skin.        Objective:    Physical Exam   Constitutional: She appears well-developed and well-nourished. No distress.   Neurological: She is alert and oriented to person, place, and time. She is not disoriented.   Psychiatric: She has a normal mood and affect.   Skin:   Areas Examined (abnormalities noted in diagram):   Head / Face Inspection Performed  Neck Inspection Performed  RLE Inspected  LLE Inspection Performed  Nails and Digits Inspection Performed                  Diagram Legend     Erythematous scaling macule/papule c/w actinic keratosis       Vascular papule c/w angioma      Pigmented verrucoid papule/plaque c/w seborrheic keratosis      Yellow umbilicated papule c/w sebaceous hyperplasia      Irregularly shaped tan macule c/w lentigo     1-2 mm smooth white papules consistent with Milia      Movable subcutaneous cyst with punctum c/w epidermal inclusion cyst      Subcutaneous movable cyst c/w pilar cyst      Firm pink to brown papule c/w dermatofibroma      Pedunculated fleshy papule(s) c/w skin tag(s)      Evenly pigmented macule c/w junctional nevus     Mildly variegated pigmented, slightly irregular-bordered macule c/w mildly  atypical nevus      Flesh colored to evenly pigmented papule c/w intradermal nevus       Pink pearly papule/plaque c/w basal cell carcinoma      Erythematous hyperkeratotic cursted plaque c/w SCC      Surgical scar with no sign of skin cancer recurrence      Open and closed comedones      Inflammatory papules and pustules      Verrucoid papule consistent consistent with wart     Erythematous eczematous patches and plaques     Dystrophic onycholytic nail with subungual debris c/w onychomycosis     Umbilicated papule    Erythematous-base heme-crusted tan verrucoid plaque consistent with inflamed seborrheic keratosis     Erythematous Silvery Scaling Plaque c/w Psoriasis     See annotation      Assessment / Plan:        Dermatitis - face and feet, unresponsive to topical steroids. Chen switch to calcineurin inhibitors.   -     tacrolimus (PROTOPIC) 0.1 % ointment; Apply topically 2 (two) times daily.  Dispense: 60 g; Refill: 1           Follow up in about 3 months (around 11/15/2024).

## 2024-08-19 ENCOUNTER — HOSPITAL ENCOUNTER (OUTPATIENT)
Dept: RADIOLOGY | Facility: HOSPITAL | Age: 64
Discharge: HOME | End: 2024-08-19
Attending: INTERNAL MEDICINE
Payer: MEDICARE

## 2024-08-19 ENCOUNTER — OFFICE VISIT (OUTPATIENT)
Dept: RHEUMATOLOGY | Facility: CLINIC | Age: 64
End: 2024-08-19
Payer: MEDICARE

## 2024-08-19 VITALS
SYSTOLIC BLOOD PRESSURE: 130 MMHG | HEART RATE: 63 BPM | BODY MASS INDEX: 35.33 KG/M2 | DIASTOLIC BLOOD PRESSURE: 75 MMHG | WEIGHT: 192 LBS | HEIGHT: 62 IN

## 2024-08-19 DIAGNOSIS — Z79.899 DRUG-INDUCED IMMUNODEFICIENCY: ICD-10-CM

## 2024-08-19 DIAGNOSIS — M35.2 BEHCET'S SYNDROME INVOLVING ORAL MUCOSA: Primary | ICD-10-CM

## 2024-08-19 DIAGNOSIS — G89.29 CHRONIC LEFT SHOULDER PAIN: ICD-10-CM

## 2024-08-19 DIAGNOSIS — M79.7 FIBROMYALGIA: ICD-10-CM

## 2024-08-19 DIAGNOSIS — Z51.81 ENCOUNTER FOR MEDICATION MONITORING: ICD-10-CM

## 2024-08-19 DIAGNOSIS — T50.905A DRUG-INDUCED NAUSEA AND VOMITING: ICD-10-CM

## 2024-08-19 DIAGNOSIS — R11.2 DRUG-INDUCED NAUSEA AND VOMITING: ICD-10-CM

## 2024-08-19 DIAGNOSIS — M25.512 CHRONIC LEFT SHOULDER PAIN: ICD-10-CM

## 2024-08-19 DIAGNOSIS — K51.011 ULCERATIVE PANCOLITIS WITH RECTAL BLEEDING: ICD-10-CM

## 2024-08-19 DIAGNOSIS — D84.821 DRUG-INDUCED IMMUNODEFICIENCY: ICD-10-CM

## 2024-08-19 PROCEDURE — 99999 PR PBB SHADOW E&M-EST. PATIENT-LVL V: CPT | Mod: PBBFAC,,, | Performed by: INTERNAL MEDICINE

## 2024-08-19 PROCEDURE — 99215 OFFICE O/P EST HI 40 MIN: CPT | Mod: PBBFAC,25,PO | Performed by: INTERNAL MEDICINE

## 2024-08-19 PROCEDURE — 73030 X-RAY EXAM OF SHOULDER: CPT | Mod: TC,50,FY,PO

## 2024-08-19 PROCEDURE — 99215 OFFICE O/P EST HI 40 MIN: CPT | Mod: S$PBB,,, | Performed by: INTERNAL MEDICINE

## 2024-08-19 PROCEDURE — G2211 COMPLEX E/M VISIT ADD ON: HCPCS | Mod: S$PBB,,, | Performed by: INTERNAL MEDICINE

## 2024-08-19 PROCEDURE — 73030 X-RAY EXAM OF SHOULDER: CPT | Mod: 26,50,, | Performed by: RADIOLOGY

## 2024-08-19 RX ORDER — ONDANSETRON 4 MG/1
4 TABLET, ORALLY DISINTEGRATING ORAL EVERY 8 HOURS PRN
Qty: 90 TABLET | Refills: 6 | Status: SHIPPED | OUTPATIENT
Start: 2024-08-19

## 2024-08-19 RX ORDER — PREGABALIN 75 MG/1
75 CAPSULE ORAL 2 TIMES DAILY
Qty: 60 CAPSULE | Refills: 3 | Status: SHIPPED | OUTPATIENT
Start: 2024-08-19

## 2024-08-19 RX ORDER — SULFASALAZINE 500 MG/1
500 TABLET, DELAYED RELEASE ORAL 2 TIMES DAILY
Qty: 60 TABLET | Refills: 0 | Status: SHIPPED | OUTPATIENT
Start: 2024-08-19

## 2024-08-19 NOTE — PROGRESS NOTES
X-ray shows severe osteoarthritis at AC-acromioclavicular joint of left shoulder.  I have sent a referral to establish care with orthopedic doctor since it may respond only 2 injections into the joint.  Dr. SANCHEZ

## 2024-08-19 NOTE — PROGRESS NOTES
RHEUMATOLOGY CLINIC FOLLOW UP VISIT  Chief complaints, HPI, ROS, EXAM, Assessment & Plans:-  Yodit HOLLIE Nickersons a 63 y.o. pleasant female comes in for follow-up visit.  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  Gastroenterologist recently started on Rinvoq after she had another flare while taking Xeljanz Entyvio combination.  She usually has severe flare of UC after recovering from upper respiratory tract infection.  Since starting Rinvoq she has noticed significant improvement of her ulcerative colitis but denies any improvement in her joint pain, stiffness or swelling.  Worsening morning stiffness.  Worsening pain all over the body.  No oral mucositis.  Rheumatological review of system negative otherwise.  Exam shows 18/18 tender points..  Mild synovitis of bilateral MCP and PIP joints.  Significant tenderness of bilateral PIP and MCP joints including D IP joints.  1. Behcet's syndrome involving oral mucosa    2. Fibromyalgia    3. Drug-induced immunodeficiency    4. Encounter for medication monitoring    5. Ulcerative pancolitis with rectal bleeding    6. Chronic left shoulder pain    7. Drug-induced nausea and vomiting      Problem List Items Addressed This Visit       Behcet's syndrome involving oral mucosa - Primary    Relevant Medications    sulfaSALAzine (AZULFIDINE) 500 MG EC tablet    Chronic left shoulder pain    Relevant Orders    X-Ray Shoulder 2 or more views Bilat    Drug-induced immunodeficiency    Drug-induced nausea and vomiting    Relevant Medications    ondansetron (ZOFRAN-ODT) 4 MG TbDL    Encounter for medication monitoring    Fibromyalgia    Relevant Medications    pregabalin (LYRICA) 75 MG capsule    Ulcerative colitis    Overview     Left sided from descending to rectum per colonoscopy report and path            Labs reviewed today:-   Latest Reference Range & Units 05/23/22 13:36   FABY Screen Negative <1:80   Positive !   FABY Titer 1  5120   FABY PATTERN 1  Speckled   ds DNA Ab Negative 1:10  Negative 1:10   Anti-SSA Antibody 0.00 - 0.99 Ratio 0.06   Anti-SSA Interpretation Negative  Negative   Anti-SSB Antibody 0.00 - 0.99 Ratio 0.08   Anti-SSB Interpretation Negative  Negative   Anti Sm Antibody 0.00 - 0.99 Ratio 0.08   Anti-Sm Interpretation Negative  Negative   Anti Sm/RNP Antibody 0.00 - 0.99 Ratio 0.14   Anti-Sm/RNP Interpretation Negative  Negative   Complement (C-3) 50 - 180 mg/dL 125   Complement (C-4) 11 - 44 mg/dL 25   !: Data is abnormal      Active worsening inflammatory arthritis complicated by underlying inflammatory bowel disease.  IBD is responding well to Rinvoq but not the arthritis.  Failed multiple medications in the past including Entyvio, Xeljanz and Hizentra.  Try sulfasalazine.    Severe  fibromyalgia.  Continue Lyrica.  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 Monitor carefully for infections. Advised to get immediate medical care if any infection. Also advised strict adherence to age appropriate vaccinations and cancer screenings with PCP.  Hold sulfasalazine if any infection  Safety labs every 12 weeks  I have explained all of the above in detail and the patient understands all of the current recommendation(s). I have answered all questions to the best of my ability and to their complete satisfaction.      Follow up in about 6 months (around 2/19/2025).      Disclaimer: This note was prepared using voice recognition system and is likely to have sound alike errors and is not proof read.  Please call me with any questions.    Total time spent 40 minutes. This includes face to face time and non-face to face time preparing to see the patient (eg, review of tests), obtaining and/or reviewing separately obtained history, documenting clinical information in the electronic or other health record, independently interpreting results and communicating results to the  patient/family/caregiver, or care coordinator.

## 2024-08-21 ENCOUNTER — OUTSIDE PLACE OF SERVICE (OUTPATIENT)
Dept: OPHTHALMOLOGY | Facility: CLINIC | Age: 64
End: 2024-08-21
Payer: MEDICARE

## 2024-08-21 PROCEDURE — 66984 XCAPSL CTRC RMVL W/O ECP: CPT | Mod: RT,,, | Performed by: STUDENT IN AN ORGANIZED HEALTH CARE EDUCATION/TRAINING PROGRAM

## 2024-08-22 ENCOUNTER — OFFICE VISIT (OUTPATIENT)
Dept: OPHTHALMOLOGY | Facility: CLINIC | Age: 64
End: 2024-08-22
Payer: MEDICARE

## 2024-08-22 DIAGNOSIS — Z98.890 POST-OPERATIVE STATE: Primary | ICD-10-CM

## 2024-08-22 DIAGNOSIS — Z98.41 CATARACT EXTRACTION STATUS OF EYE, RIGHT: ICD-10-CM

## 2024-08-22 PROCEDURE — 99213 OFFICE O/P EST LOW 20 MIN: CPT | Mod: PBBFAC | Performed by: STUDENT IN AN ORGANIZED HEALTH CARE EDUCATION/TRAINING PROGRAM

## 2024-08-22 PROCEDURE — 99999 PR PBB SHADOW E&M-EST. PATIENT-LVL III: CPT | Mod: PBBFAC,,, | Performed by: STUDENT IN AN ORGANIZED HEALTH CARE EDUCATION/TRAINING PROGRAM

## 2024-08-22 NOTE — PROGRESS NOTES
HPI    POD#1 s/p CEIOL OD. Has received appropriate post-op drops. Denies any   discomfort. No new ocular complaints.      DNL PT  PCP: EDUARDO Lewis  PCIOL OD 08/21/2024  NS OS    Plaquenil 200mg bid started approx 2022- no longer taking (6/25/24)    Pred QID OD     Last edited by Genna Arriola on 8/22/2024  1:57 PM.            Assessment /Plan     For exam results, see Encounter Report.    Post-operative state  Cataract extraction status of eye, right- POD#1 S/P CEIOL OD Doing well. Mild edema    Continue gtts to operative eye:  PF QID      Reinstructed in importance of compliance with post-op instructions including medications, shield at bedtime, protective glasses during the day, and limitation of activities. Patient instructed to call immediately for increased pain, redness or vision loss.     RTC 1 week. MOCT if PH worse than 20/25

## 2024-08-29 ENCOUNTER — TELEPHONE (OUTPATIENT)
Dept: ORTHOPEDICS | Facility: CLINIC | Age: 64
End: 2024-08-29
Payer: MEDICARE

## 2024-08-29 ENCOUNTER — OFFICE VISIT (OUTPATIENT)
Dept: OPHTHALMOLOGY | Facility: CLINIC | Age: 64
End: 2024-08-29
Payer: MEDICARE

## 2024-08-29 ENCOUNTER — DOCUMENTATION ONLY (OUTPATIENT)
Dept: OPHTHALMOLOGY | Facility: CLINIC | Age: 64
End: 2024-08-29

## 2024-08-29 DIAGNOSIS — H25.12 NUCLEAR SCLEROSIS OF LEFT EYE: ICD-10-CM

## 2024-08-29 DIAGNOSIS — Z98.41 CATARACT EXTRACTION STATUS OF EYE, RIGHT: ICD-10-CM

## 2024-08-29 DIAGNOSIS — Z98.890 POST-OPERATIVE STATE: Primary | ICD-10-CM

## 2024-08-29 PROCEDURE — 99999 PR PBB SHADOW E&M-EST. PATIENT-LVL IV: CPT | Mod: PBBFAC,,, | Performed by: STUDENT IN AN ORGANIZED HEALTH CARE EDUCATION/TRAINING PROGRAM

## 2024-08-29 PROCEDURE — 99214 OFFICE O/P EST MOD 30 MIN: CPT | Mod: PBBFAC | Performed by: STUDENT IN AN ORGANIZED HEALTH CARE EDUCATION/TRAINING PROGRAM

## 2024-08-29 PROCEDURE — 92136 OPHTHALMIC BIOMETRY: CPT | Mod: PBBFAC,LT | Performed by: STUDENT IN AN ORGANIZED HEALTH CARE EDUCATION/TRAINING PROGRAM

## 2024-08-29 RX ORDER — PREDNISOLONE ACETATE 10 MG/ML
1 SUSPENSION/ DROPS OPHTHALMIC EVERY 4 HOURS
Qty: 5 ML | Refills: 1 | Status: SHIPPED | OUTPATIENT
Start: 2024-08-29 | End: 2025-08-29

## 2024-08-29 NOTE — PROGRESS NOTES
Short Stay Record    Diagnosis: Nuclear Sclerotic Cataract left    CC: Blurry Vision     HPI:  Yodit Canseco is a 63 y.o. female who presents for evaluation prior to ophthalmic surgery. No current complaints.     Past Medical History:   Diagnosis Date    Allergy     Angio-edema     Arthralgia     Asthma without status asthmaticus     Bronchitis     Cataract     COPD (chronic obstructive pulmonary disease)     COVID-19 virus infection 07/23/2021    Diverticulosis of large intestine without hemorrhage 10/08/2015    Eczema     Environmental allergies     Eosinophilic asthma 03/08/2018    Exacerbation of ulcerative colitis with rectal bleeding     Fibromyalgia 02/19/2024    Gastroparesis     Gram-negative bacteremia 12/29/2023    Hand arthritis     Herpes simplex without mention of complication     oral    Hidradenitis     History of morbid obesity     Hyperlipidemia     Hypothyroidism     Immune deficiency disorder     Intraperitoneal abscess 05/07/2018    LBP radiating to left leg     Leukocytosis, unspecified     Migraine headache     Normocytic anemia 10/05/2022    Obesity, Class III, BMI 40-49.9 (morbid obesity)     Periorbital cellulitis 12/31/2016    Prediabetes     RA (rheumatoid arthritis)     Recurrent upper respiratory infection (URI)     Rotavirus enteritis 12/27/2023    RSV (respiratory syncytial virus infection) 01/30/2023    S/P colonoscopy 11/2010    Sepsis 05/07/2018    Sepsis secondary to colitis 05/07/2018    Systemic lupus erythematosus, organ or system involvement unspecified     Tubular adenoma of colon 08/17/2022    Tubular adenoma of colon 05/01/2024    Ulcerative pancolitis with rectal bleeding 10/17/2022    Urticaria      Past Surgical History:   Procedure Laterality Date    ADENOIDECTOMY      CHOLECYSTECTOMY      CHOLECYSTECTOMY      colitis      COLONOSCOPY  2015    repeat in 10    COLONOSCOPY N/A 10/08/2015    Procedure: COLONOSCOPY;  Surgeon: Panda Bose MD;  Location: [facility];   Service: Endoscopy;  Laterality: N/A;    COLONOSCOPY N/A 08/17/2022    Procedure: COLONOSCOPY;  Surgeon: Olaf Del Valle MD;  Location: [facility];  Service: General;  Laterality: N/A;    COLONOSCOPY N/A 10/06/2022    Procedure: COLONOSCOPY;  Surgeon: Quinton Celeste MD;  Location: [facility];  Service: Gastroenterology;  Laterality: N/A;    COLONOSCOPY N/A 5/1/2024    Procedure: COLONOSCOPY;  Surgeon: Quinton Celeste MD;  Location: [facility];  Service: Endoscopy;  Laterality: N/A;    ESOPHAGOGASTRODUODENOSCOPY N/A 5/1/2024    Procedure: EGD (ESOPHAGOGASTRODUODENOSCOPY);  Surgeon: Quinton Celeste MD;  Location: [facility];  Service: Endoscopy;  Laterality: N/A;    Hand fracture surgery      HARDWARE REMOVAL      left hand 5th MC    hymenectomy      HYSTERECTOMY      menorrhagia-Ovaries intact    ROTATOR CUFF REPAIR Right     SLEEVE GASTROPLASTY  04/16/2018    TONSILLECTOMY      Urethral dilatation       Social History     Tobacco Use    Smoking status: Never     Passive exposure: Past    Smokeless tobacco: Never   Substance Use Topics    Alcohol use: Not Currently     Comment: very rarely     Family History   Problem Relation Name Age of Onset    Melanoma Mother Loyd     Basal cell carcinoma Mother Loyd     Lung disease Mother Loyd         pulm htn    Cataracts Mother Loyd     Hypertension Mother Loyd     Lung cancer Father Josh     Diabetes Father Josh     Hypertension Father Josh     Cancer Father Josh     Melanoma Maternal Aunt      Melanoma Maternal Uncle      Diabetes Paternal Aunt Estee     Colon cancer Paternal Aunt Lilliam 40    Diabetes Paternal Aunt Lilliam     Breast cancer Paternal Aunt  40    Lymphoma Paternal Aunt      Cancer Maternal Grandfather Gilmar     Ovarian cancer Paternal Grandmother  40    Ulcerative colitis Son      Psoriasis Son      Psoriasis Son      Breast cancer Cousin Paternal 1st 25    Allergic rhinitis Neg Hx      Allergies Neg Hx      Angioedema Neg Hx       "Asthma Neg Hx      Atopy Neg Hx      Eczema Neg Hx      Immunodeficiency Neg Hx      Rhinitis Neg Hx      Urticaria Neg Hx       Review of patient's allergies indicates:   Allergen Reactions    Bromelains Hives     " causes mouth to bleed"    Pimecrolimus Swelling    Sudafed [pseudoephedrine hcl] Other (See Comments)     Does not want to wake up .    Amoxicillin-pot clavulanate Itching     Other reaction(s): Itching; tolerated ceftriaxone 10/2022    Hydrocodone Itching    Iodinated contrast media      childhood    Iodine and iodide containing products Other (See Comments)    Melon Hives    Percocet [oxycodone-acetaminophen] Itching    Codeine     Corn containing products     Tree nut     Tree nuts Other (See Comments)    Wheat containing prod     Barium iodide Rash    Ephedrine Other (See Comments)     Other reaction(s): comatose    Penicillins      Other reaction(s): does not work on pt symptoms         Current Outpatient Medications:     acetaminophen (TYLENOL) 325 MG tablet, Take 1.5 tablets (487.5 mg total) by mouth every 6 (six) hours as needed for Pain (Do not take with any other products containing  tylenol or acetaminophen)., Disp: , Rfl:     albuterol (PROVENTIL/VENTOLIN HFA) 90 mcg/actuation inhaler, Inhale 2 puffs into the lungs every 4 (four) hours as needed for Wheezing or Shortness of Breath. 2 puffs every 4 hours as needed for cough, wheeze, or shortness of breath, Disp: 54 g, Rfl: 3    cholecalciferol, vitamin D3, (VITAMIN D3) 50 mcg (2,000 unit) Cap, Take 1 capsule by mouth once daily., Disp: , Rfl:     clobetasoL (TEMOVATE) 0.05 % cream, Apply topically nightly as needed. To the vulva, Disp: 60 g, Rfl: 1    desonide (DESOWEN) 0.05 % cream, APPLY TOPICALLY TWICE A DAY, Disp: 60 g, Rfl: 6    doxycycline (VIBRAMYCIN) 100 MG Cap, Take 1 capsule (100 mg total) by mouth 2 (two) times daily., Disp: 20 capsule, Rfl: 2    estradioL (ESTRACE) 1 MG tablet, Take 1 tablet (1 mg total) by mouth once daily., " Disp: 90 tablet, Rfl: 3    FEROSUL 325 mg (65 mg iron) Tab tablet, Take 325 mg by mouth once daily., Disp: , Rfl:     fluticasone-umeclidin-vilanter (TRELEGY ELLIPTA) 200-62.5-25 mcg inhaler, Inhale 1 puff into the lungs once daily., Disp: 60 each, Rfl: 11    furosemide (LASIX) 20 MG tablet, 90 tablets., Disp: , Rfl:     hydrocortisone 2.5 % cream, Apply 1 Application topically 2 (two) times daily as needed (TO THE ANUS). To the anus, Disp: , Rfl:     immun glob G,IgG,-pro-IgA 0-50 (HIZENTRA) 2 gram/10 mL (20 %) Soln, Inject 90 mLs (18 g total) into the skin every 14 (fourteen) days., Disp: 180 mL, Rfl: 12    LASIX 20 mg tablet, Take 20 mg by mouth as needed., Disp: , Rfl:     levothyroxine (SYNTHROID) 75 MCG tablet, TAKE 1 TABLET BY MOUTH EVERY DAY, Disp: 90 tablet, Rfl: 3    LIDOcaine 5 % Crea, Apply 1 Application topically once daily., Disp: , Rfl:     loperamide (IMODIUM) 2 mg capsule, Take 1 capsule (2 mg total) by mouth 3 (three) times daily as needed for Diarrhea., Disp: 30 capsule, Rfl: 0    minoxidiL (LONITEN) 2.5 MG tablet, Take 1/4th (0.625mg) by mouth daily, Disp: 90 tablet, Rfl: 2    montelukast (SINGULAIR) 10 mg tablet, Take 1 tablet (10 mg total) by mouth every evening., Disp: 90 tablet, Rfl: 3    MULTIVITAMIN ORAL, Take 1 tablet by mouth once daily., Disp: , Rfl:     ondansetron (ZOFRAN-ODT) 4 MG TbDL, Take 1 tablet (4 mg total) by mouth every 8 (eight) hours as needed (nausea/vomiting)., Disp: 90 tablet, Rfl: 6    potassium chloride SA (K-DUR,KLOR-CON) 20 MEQ tablet, Take 20 mEq by mouth once daily., Disp: , Rfl:     prednisoLONE acetate (PRED FORTE) 1 % DrpS, Place 1 drop into the right eye every 4 (four) hours., Disp: 5 mL, Rfl: 1    prednisoLONE acetate (PRED FORTE) 1 % DrpS, Place 1 drop into the left eye every 4 (four) hours., Disp: 5 mL, Rfl: 1    predniSONE (DELTASONE) 2.5 MG tablet, Take 2.5 mg by mouth once daily., Disp: , Rfl:     pregabalin (LYRICA) 75 MG capsule, Take 1 capsule (75 mg  total) by mouth 2 (two) times daily., Disp: 60 capsule, Rfl: 3    sulfaSALAzine (AZULFIDINE) 500 MG EC tablet, Take 1 tablet (500 mg total) by mouth 2 (two) times a day., Disp: 60 tablet, Rfl: 0    sumatriptan (IMITREX) 100 MG tablet, Take 1 tablet (100 mg total) by mouth once as needed for Migraine. May repeat dose in 2 hours if no relief.  Do not exceed 2 doses in 24 hours., Disp: 9 tablet, Rfl: 5    tacrolimus (PROTOPIC) 0.1 % ointment, Apply topically 2 (two) times daily., Disp: 60 g, Rfl: 1    traMADoL (ULTRAM) 50 mg tablet, Take 50 mg by mouth 3 (three) times daily., Disp: , Rfl:     tretinoin (RETIN-A) 0.05 % cream, Apply 1 application  topically every evening., Disp: , Rfl:     upadacitinib (RINVOQ) 45 mg Tb24, Take 15 mg by mouth Daily., Disp: , Rfl:     Review of Systems:  10 Pt ROS negative except as stated in HPI    Physical Exam:  General Appearance:    A&Ox3, no distress, appears stated age   Head:    Normocephalic, without obvious abnormality, atraumatic   Eyes:    PERRL, EOM's intact   Back:     Symmetric, no curvature   Lungs:     respirations unlabored   Chest Wall:    No tenderness or deformity    Heart:  Abdomen:  Extremities:  Skin:    S1 and S2 present    Soft, non-tender    Extremities normal, atraumatic    Skin color, texture, turgor normal     Patient is stable for ophthalmic surgery under local and MAC.       _

## 2024-08-29 NOTE — PROGRESS NOTES
HPI     Post-op Evaluation     Additional comments: PCIOL OD [facility] 08/21/2024    Patient states VA has improved with surgery, at times OD has a FB   sensation and admits she has not used any artifical tears.      Patient states VA has appears dull and cloudy in OS compared to OD along   with glare sensitivity.             Comments    DNL PT  PCP: EDUARDO Lewis  PCIOL OD [facility] 08/21/2024  NS OS    Plaquenil 200mg bid started approx 2022- no longer taking (6/25/24)    Pred QID OD             Last edited by Anita Sagastume, Patient Care Assistant on 8/29/2024  4:05   PM.            Assessment /Plan     For exam results, see Encounter Report.    Post-operative state  Cataract extraction status of eye, right- Impression/Plan  POW#1 S/P CEIOL OD : Doing well with no evidence of infection.     Continue gtts to operative eye:   Persistent inflammation. PF QID until next visit    Pt given and instructed in one week postop instructions. Can resume normal activitites and d/c eye shield. OTC reading glasses can be used until evaluated for final MR.     RTC after CEIOL in fellow eye      Nuclear sclerosis of left eye- Patient reports decreased vision in the fellow eye consistent with the clinical amount of lenticular opacity, which reaches the level of visual significance and affects activities of daily living including reading and glare. Risks, benefits, and alternatives to cataract surgery were discussed and pt desired to schedule cataract surgery. Pt was consented and the biometry and lens options were reviewed.     Discussed IOL options and refractive outcomes for this patient.    Phaco left eye, Topical    Additional considerations:   None    Will aim for Multifocal - Panoptix IOL    Post op gtts:   PF    Discussed that vision may be limited by:  none    Explained that patient may need glasses after surgery.    RTC for POD#1

## 2024-09-23 ENCOUNTER — TELEPHONE (OUTPATIENT)
Dept: ALLERGY | Facility: CLINIC | Age: 64
End: 2024-09-23
Payer: MEDICARE

## 2024-09-25 ENCOUNTER — TELEPHONE (OUTPATIENT)
Dept: ALLERGY | Facility: CLINIC | Age: 64
End: 2024-09-25
Payer: MEDICARE

## 2024-09-25 ENCOUNTER — OUTSIDE PLACE OF SERVICE (OUTPATIENT)
Dept: OPHTHALMOLOGY | Facility: CLINIC | Age: 64
End: 2024-09-25
Payer: COMMERCIAL

## 2024-09-25 PROCEDURE — 66984 XCAPSL CTRC RMVL W/O ECP: CPT | Mod: 79,LT,, | Performed by: STUDENT IN AN ORGANIZED HEALTH CARE EDUCATION/TRAINING PROGRAM

## 2024-09-25 NOTE — TELEPHONE ENCOUNTER
New Hizentra Rx faxed to Hemal Curry ( [employer] ) at [phone] on 09/23/2024.  Originalm uploaded to

## 2024-09-26 ENCOUNTER — OFFICE VISIT (OUTPATIENT)
Dept: OPHTHALMOLOGY | Facility: CLINIC | Age: 64
End: 2024-09-26
Payer: MEDICARE

## 2024-09-26 DIAGNOSIS — Z98.42 CATARACT EXTRACTION STATUS OF EYE, LEFT: ICD-10-CM

## 2024-09-26 DIAGNOSIS — Z98.890 POST-OPERATIVE STATE: Primary | ICD-10-CM

## 2024-09-26 PROCEDURE — 99999 PR PBB SHADOW E&M-EST. PATIENT-LVL III: CPT | Mod: PBBFAC,,, | Performed by: STUDENT IN AN ORGANIZED HEALTH CARE EDUCATION/TRAINING PROGRAM

## 2024-09-26 PROCEDURE — 99213 OFFICE O/P EST LOW 20 MIN: CPT | Mod: PBBFAC | Performed by: STUDENT IN AN ORGANIZED HEALTH CARE EDUCATION/TRAINING PROGRAM

## 2024-09-26 RX ORDER — PREDNISOLONE ACETATE 10 MG/ML
1 SUSPENSION/ DROPS OPHTHALMIC EVERY 4 HOURS
Qty: 5 ML | Refills: 1 | Status: SHIPPED | OUTPATIENT
Start: 2024-09-26 | End: 2025-09-26

## 2024-09-26 NOTE — PROGRESS NOTES
HPI     Post-op Evaluation     Additional comments: S/p 1 day PCIOL OS  0/10 pain scale            Comments    DNL PT  PCP: EDUARDO Lewis  PCIOL OD [facility] 08/21/2024  PCIOL OS [facility] 9/25/24    Plaquenil 200mg bid started approx 2022- no longer taking (6/25/24)    Pred QID OS            Last edited by Girma Coates on 9/26/2024  1:54 PM.            Assessment /Plan     For exam results, see Encounter Report.    Post-operative state  Cataract extraction status of eye, left- POD#1 S/P CEIOL OS Doing well. Mild edema    Continue gtts to operative eye:  PF QID OU      Reinstructed in importance of compliance with post-op instructions including medications, shield at bedtime, protective glasses during the day, and limitation of activities. Patient instructed to call immediately for increased pain, redness or vision loss.     RTC 1 week. MOCT if PH worse than 20/25                    SSKI Counseling:  I discussed with the patient the risks of SSKI including but not limited to thyroid abnormalities, metallic taste, GI upset, fever, headache, acne, arthralgias, paraesthesias, lymphadenopathy, easy bleeding, arrhythmias, and allergic reaction.

## 2024-10-01 ENCOUNTER — OFFICE VISIT (OUTPATIENT)
Dept: OPHTHALMOLOGY | Facility: CLINIC | Age: 64
End: 2024-10-01
Payer: MEDICARE

## 2024-10-01 DIAGNOSIS — Z98.890 POST-OPERATIVE STATE: Primary | ICD-10-CM

## 2024-10-01 DIAGNOSIS — Z98.42 CATARACT EXTRACTION STATUS OF EYE, LEFT: ICD-10-CM

## 2024-10-01 PROCEDURE — 99999 PR PBB SHADOW E&M-EST. PATIENT-LVL III: CPT | Mod: PBBFAC,,, | Performed by: STUDENT IN AN ORGANIZED HEALTH CARE EDUCATION/TRAINING PROGRAM

## 2024-10-01 PROCEDURE — 99024 POSTOP FOLLOW-UP VISIT: CPT | Mod: POP,,, | Performed by: STUDENT IN AN ORGANIZED HEALTH CARE EDUCATION/TRAINING PROGRAM

## 2024-10-01 PROCEDURE — 99213 OFFICE O/P EST LOW 20 MIN: CPT | Mod: PBBFAC | Performed by: STUDENT IN AN ORGANIZED HEALTH CARE EDUCATION/TRAINING PROGRAM

## 2024-10-01 NOTE — PROGRESS NOTES
HPI     Post-op Evaluation     Additional comments: Pt is here for 1 week PCIOL OS post op. Denies pain   or irritation. Va stable. 100% with gtts            Comments    DNL PT  PCP: EDUARDO Lewis  PCIOL OD [facility] 08/21/2024  PCIOL OS [facility] 9/25/24      Plaquenil 200mg bid started approx 2022- no longer taking (6/25/24)      Pred TID OD  Pred QID OS             Last edited by Donn Quiñonez on 10/1/2024  3:14 PM.            Assessment /Plan     For exam results, see Encounter Report.    Post-operative state  Cataract extraction status of eye, left- Impression/Plan  POW#1 S/P CEIOL OS : Doing well with no evidence of infection.     Continue gtts to operative eye:   Trace Cell. PF Taper 4-3-2-1 then stop    Pt given and instructed in one week postop instructions. Can resume normal activitites and d/c eye shield. OTC reading glasses can be used until evaluated for final MR.     RTC 1mo for DFE with primary eye doctor               Spoke to Olive with Home Health and states that Ms. Pedraza is c/o UTI symptoms.  Patient  is having frequency and painful urination.Olive states that patient had a UA done by MD Peralta and was never told the results.  Patient is still having these symptoms.

## 2024-10-08 ENCOUNTER — OFFICE VISIT (OUTPATIENT)
Dept: PULMONOLOGY | Facility: CLINIC | Age: 64
End: 2024-10-08
Payer: MEDICARE

## 2024-10-08 VITALS
DIASTOLIC BLOOD PRESSURE: 77 MMHG | WEIGHT: 193.69 LBS | SYSTOLIC BLOOD PRESSURE: 151 MMHG | HEIGHT: 62 IN | HEART RATE: 59 BPM | OXYGEN SATURATION: 95 % | BODY MASS INDEX: 35.64 KG/M2

## 2024-10-08 DIAGNOSIS — J45.51 SEVERE PERSISTENT ASTHMA WITH ACUTE EXACERBATION: ICD-10-CM

## 2024-10-08 DIAGNOSIS — J45.50 SEVERE PERSISTENT ASTHMA WITHOUT COMPLICATION: ICD-10-CM

## 2024-10-08 DIAGNOSIS — E66.01 SEVERE OBESITY (BMI 35.0-39.9) WITH COMORBIDITY: Primary | ICD-10-CM

## 2024-10-08 PROCEDURE — 99214 OFFICE O/P EST MOD 30 MIN: CPT | Mod: PBBFAC,PO | Performed by: INTERNAL MEDICINE

## 2024-10-08 PROCEDURE — 99999 PR PBB SHADOW E&M-EST. PATIENT-LVL IV: CPT | Mod: PBBFAC,,, | Performed by: INTERNAL MEDICINE

## 2024-10-08 PROCEDURE — 99212 OFFICE O/P EST SF 10 MIN: CPT | Mod: S$PBB,,, | Performed by: INTERNAL MEDICINE

## 2024-10-08 RX ORDER — ALBUTEROL SULFATE 90 UG/1
2 INHALANT RESPIRATORY (INHALATION) EVERY 4 HOURS PRN
Qty: 54 G | Refills: 3 | Status: SHIPPED | OUTPATIENT
Start: 2024-10-08

## 2024-10-08 RX ORDER — SIMVASTATIN 10 MG/1
10 TABLET, FILM COATED ORAL NIGHTLY
COMMUNITY
Start: 2024-10-07

## 2024-10-08 RX ORDER — FLUTICASONE FUROATE, UMECLIDINIUM BROMIDE AND VILANTEROL TRIFENATATE 200; 62.5; 25 UG/1; UG/1; UG/1
1 POWDER RESPIRATORY (INHALATION) DAILY
Qty: 60 EACH | Refills: 11 | Status: SHIPPED | OUTPATIENT
Start: 2024-10-08

## 2024-10-08 NOTE — PROGRESS NOTES
10/8/2024    Yodit Canseco  Office Note    Chief Complaint   Patient presents with    Follow-up    Asthma       HPI:     10/8/2024-- ulcerative colitis better on [place], on hizentra and no infection, ashtma controlled.    Has sinus pressure   has severe RA.        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    Chest xray and ct abd lower lungs were clear from 4/28/2024 April 4, 2024-  lungs getting by , had UC exacerbated with lyrica and now back on tramadol.     No more hemoptysis.  Pt having am congestion  Pt had  increase steroids to 30/ hydrocortine.    Patient Instructions   Double lasix to 40/d  and check blood next wk     May use doxycycline if sinus infection-- low c diff risk.      Will ask staff to arrange virtual visit with  if needed..      1/4/2024 pt was in icu for rotovirus gi infection-- was in icu 4 days, got lots fluid, .. Record reviewed.  Pt was dosed high dose steroids.  Pt was given 7 d course levaquin with no c diff prevention.    Pt had some hemoptysis froathy blood in mucous    Pt has had green mucous with neg cuilture earlier December,     Pt very weak and had fall in ER checking in.       Pt now off prednisone -- stopped abruptly after leaving hosp.   Pt was weaned off stopping 5 days or so prior to admit.    Pt now very weak .   Pt was on steroids for about a yr.     On trelegy and lungs stable    Patient Instructions   Would re culture mucous -- check for afb and  regular germs.  Coritosol check is reasonable -- do cortisol level in am.  May consider replacement.    Ct chest likely low yield -- pattern of hemoptysis sounded more like fluid and sepsis.  Could do ct  later if still bringing up  yellow mucous.  Chest xray viewed..      Edema should mobiliize    C diff may recur -- if diarrhea increase may test..    You are having severe persistent sinus fullness-- antibiotics very risky and just took levaquin.   Would recommend sinus xray - ct would be best.  May need ent   12/5/2023  Pt had exacerbatoin cough and increased UC prednisone from 5/d to 20 mg for last 10 days along with doxy -- off abx and had green mucous this am.  Albuterol helps and dosed tid.    Pt had u/a showing wbc at home - says recurrent uti.  H/o c diff.  Not on controller.      Appetite good nad not weak, no fever.   Patient Instructions   Green mucous after increased medication for exacerbation -- failed doxycycline.  Need culture     Trelegy may stabilize-- once daily    Boost prednisone to 20 /d if cough controlled-- dilaudid sent in for 30  tabs-- call next wk if need more.            Cxr ordered if worsens    Need to re evaluate next wk if not clearing.  5/8/2023 took doxycycline no issues.  Bowels may be better on Entyvio. Lungs doing well.  On Hizentra.  Pt on prednisone 20/d -- on prednisone since November with plans to decrease in near future?   Still having blood in stool so will not taper prednisone til bleed resolves-- should improve in near future (on 3 rd entyvio now and should be good after 4th)  Patient Instructions   Might consider pneumocystis prophylaxis but antibiotics will increase c diff-- bactrim 2 single strength 3 days a week.  Use vancomycin daily while on bactrim.  Stop bactrim (followed by vancomycin) on prednisone less than 20mg/d.    Bactrim generally has high risk allergies and kidney problems.     12/29/2022 having sinus problems with green mucous nad coughing, right face  to chin hurts last 3 days. Had in prior.  May exacerbate lungs as in past.  Started levaquin 2 days ago. Prednisone boosted from 10 to 20/d.  Need pain control and cough suppresion .  Has prednisoen. Not getting [facility]erol [facility]-- not covered on plan??   2023 chart note after above visit.  Pt has had colitis-- dx c diff recently.   Epic reveals c diff ag but no toxin.    Had green sinus dc-- cleared slight with levaquin but now dx c diff with ag no toxin    Will dc levaquin and instructed not to use.    Doxycycline may be ok -- would avoid abx as much as able.    If sinuses relapse -- doxycycline sent in.    If green lung mucous -- pt told to submit sputum for culture.    2022 dx lupus, bechets, Ulcerative colitis, RA-- on hizentra.  Pt exposed to grandson with rsv.  Having abd pain -- dilaudid helped and suppressed cough-- not able to use codeine/hydrocodone.     Ct abd and cxr 10/2022 clear lungs.       Pt currently felt to have rsv-- improved over last 5 da   pt was sl febrile initially.   Pt now having green mucous. Cough is violent .  similar sick. Had min leg edema.  On hizentra. Asthma doing wellwith RSV.  Eating ok but weak and coughs a lot.  Breathing good.     Patient Instructions   May use levaquin for green mucous    Rsv should run course-- hirzentra should help?  Breztri may be needed.      Use dilaudid minimally for pain -- should suppress cough    Diflucan for yeast as needed    Maxzide as needed for edema    Tessalon for cough    Could do phone follow up    Ct abd 10/2022 was clear lower lungs and cxr 10/31/2022 was clear -- lungs not a concern.  Asthma doing well even with rsv.  2022 mom  sept complications from hip fx/covid.  Had few sinus infection-- rx Levaquin/prednisone.  Pt on plaquenil, dx ra and lupus.  Also on colchicine.      Uses breo but skipped.  Uses albuterol once a wk.    Patient Instructions   May use breztri 2 twice daily -- could use more or less as  needed.   If used regular -- breztri would act as controller.    May use levaquin and prednisone if sinus/lung exacerbates.    Lungs loook good wrt lung tissue-- no lung tissue disease seen from Rheumatoid disease.   Ct abd 11/2021 viewed.   8/24/2021 - had covid in July, had rapid home test +, managed outpt.  Got infusion rx.  Has wheezes but feels over covid.  Had nausea, vomiting, weakness, headache, -- sick 8 days.  Mom got hip fx complicating recovery-- at rehab. Pt and mom were vaccinated.  Pt on abx for sinus infection  Dx lupus/rheumatoid - 5 mg prednisone daily  Patient Instructions   Recovering covid doing well.    Use levaquin as needed.    Ordered cxr if needed.     4/6/2021 had pneumonia March 2020 - no problems. Needs disability paper updated.  We review today.    Patient Instructions   We reviewed your disability- need to complete paper work.  You had pneumonia in March 2020.  Breo, prednisone, levaquin, albuterol renewed.    10/14/2020- all well.  No abx/prednisone, cxr 10/14/2020 nad- rul pneumonia [facility] cleared.  Avoids irritants.  Patient Instructions   You likely would be best to get high dose flu vaccine.   Your immune system may have problem responding to flu vaccine.  4/14/2020- on hirzentra since sept 2016.  Saw [facility] Wood in [place] 4/13- virtual visit- azithro and prednisone 10/d x 3 for sinusitis.     Uses   500 bid, breo 200 daily, , xopenex nebs prn, singulair. Needs more prednisone, had pneumonia 3/4 to 8th.  Had ct chest feb.  Felt had covid symptoms.  No ox needed for pneumonia- no steroids nor bd. Took doxy - vanc in hosp.   Patient Instructions   Pneumonia - follow up chest xray good- not urgent.    Prednisone action plan for asthma/wheeze/cough    May use levaquin for sinus/lung infections  2/10/2020- had shingles vaccine, states felt an immune response.   Cough- improved, daily, not severe, non productive occasionally productive thick dark yellow color. Using  nebulizer only as needed. Has noticed fewer asthma exacerbations since starting Hyzentra, went from 1 months to 2x yearly.   Not able to use albuterol rescue inhaler or nebulized due to tachycardia, palpitations, and hand tremors. No complaint with xopenex medications.   Patient Instructions   Continue current Asthma medication regiment  Review of CT shows no areas of concern. The small nodule is not significant  Pulmonary function test shows lungs to still be strong.   Continue to use Xopenex Nebulizer with aerobika attachement once a day three days a week every week.  When your sick you can use nebulizer or Xopenex rescue inhaler four times a day.     1/9/2020- two exacerbations in 2 months, states cough is unchanged, daily, states bark like cough, severe and affects daily life, Productive occasionally clear/cream color thick in consistency, uses nebulizer 2-3 x daily.   Patient Instructions   [employer] sells an Aerobika device, this device shakes the air inside of you to break mucous off the bronchial wall. Use with nebulizer and with out daily.    Continue current Asthma medication regiment     Use nebulizer at least once a day three days a week.     CT to evaluate lungs for any abnormalities       11/8/2019- Had recent Rotator cuff surgery with nerve block Oct 9, Has SOB worse after surgery for 3 days, Took prednisone 20 mg for 3 days and Levaquin for 9 days. Seen by PCP dx URI.   Coughing, chest tightness, congestion, wheezing, Hoarse voice, fatigue, Shortness of breath worse with exertion, onset following surgery to shoulder. Cough- not able to clear secretions from chest.  Complaint of hand shaking, heart rate increases, and becomes rapid with Albuterol nebulizer onset years. Tries not to use due to side effects.     04/24/2019- breathing is good except for high pollen days, had 3 exacerbation in 6 months. Albuterol rescue inhaler 3x weekly, 1-2 nocturnal arousals monthly, could not do PFT at [facility]  [employer] system,     11/6/18- ER visit for dehydration gastroenteritis, no prednisone use in 4 months. Not currently on fasenra injection, insurance would not cover. Currently on Hirzentra IGG therapy.   Sinus drip present, cough occasional, non productive, no nocturnal arousals, Currently on Dulera daily, uses Albuterol rescue inhaler daily before exercise no SOB.  Sepsis [facility] August 2017.  Flu vaccine   July 9, 2018 - had gastric sleeve with leak complication- lost 45 lbs already.  Has appetite but fills quickly.  Still on hirzentra.  Asthma ok avoiding fragrances/ordors.  No prednisone.   No nocturnal arousals, uses rescue weekly avoiding any irritants.  Use prednisone 4-5 last year and twice this year.        March 19, 2018-since recovered from pleuritic pain - doing well.  No prednisone use recent.  Pt has had no wheezes.  feb 22,2108   Had to do prednisone again.  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 I have advised not to work given severe asthma,  hypersensitivity to fragrances, and immune deficiency.   Pt seems better since Hirzentra, but still unstable severe asthma  Jan 29. 2018- 6 days ago had been exposed to sick , had nasal congestion , cough, ear stuffy, muscle aches /joint aches / fever.  Temp to 100.1.  Seen by np and flu screen negative.  Had asthma 3-4 flares last year. eos up past,   oct 5, 2017 - had marce in April, skipped couple doses hirzentra, had mild bronchitis once, otherwise doing very well.  No prednisone.  No side effects and covered. Ppt flu sense for 2 days  Jan 24,2017 on  hirzentra q o week since sept and asthma more stable, no hosp, no prednisone, no noct asthma/ no rescue therapy- control not this good for years.  Sept 27, 2016  HPI:has had lung problems since birth, no nocturnal arousals, uses rescue 2/d, breathing controlled satisfactory  except with irritants/infection - strong abx needed to clear.  Prednisone 2/yr, last hosp 18 months.  No ventilator.    Had exacerbation recent due uri from .  Has had shingles vaccine past.       The chief compliant  problem is varies with instablilty at time  PFSH:  Past Medical History:   Diagnosis Date    Allergy     Angio-edema     Arthralgia     Asthma without status asthmaticus     Bronchitis     Cataract     COPD (chronic obstructive pulmonary disease)     COVID-19 virus infection 07/23/2021    Diverticulosis of large intestine without hemorrhage 10/08/2015    Eczema     Environmental allergies     Eosinophilic asthma 03/08/2018    Exacerbation of ulcerative colitis with rectal bleeding     Fibromyalgia 02/19/2024    Gastroparesis     Gram-negative bacteremia 12/29/2023    Hand arthritis     Herpes simplex without mention of complication     oral    Hidradenitis     History of morbid obesity     Hyperlipidemia     Hypothyroidism     Immune deficiency disorder     Intraperitoneal abscess 05/07/2018    LBP radiating to left leg     Leukocytosis, unspecified     Migraine headache     Normocytic anemia 10/05/2022    Obesity, Class III, BMI 40-49.9 (morbid obesity)     Periorbital cellulitis 12/31/2016    Prediabetes     RA (rheumatoid arthritis)     Recurrent upper respiratory infection (URI)     Rotavirus enteritis 12/27/2023    RSV (respiratory syncytial virus infection) 01/30/2023    S/P colonoscopy 11/2010    Sepsis 05/07/2018    Sepsis secondary to colitis 05/07/2018    Systemic lupus erythematosus, organ or system involvement unspecified     Tubular adenoma of colon 08/17/2022    Tubular adenoma of colon 05/01/2024    Ulcerative pancolitis with rectal bleeding 10/17/2022    Urticaria          Past Surgical History:   Procedure Laterality Date    ADENOIDECTOMY      CHOLECYSTECTOMY      CHOLECYSTECTOMY      colitis      COLONOSCOPY  2015    repeat in 10    COLONOSCOPY N/A 10/08/2015    Procedure:  COLONOSCOPY;  Surgeon: Panda Bose MD;  Location: [facility];  Service: Endoscopy;  Laterality: N/A;    COLONOSCOPY N/A 08/17/2022    Procedure: COLONOSCOPY;  Surgeon: Olaf Del Valle MD;  Location: [facility];  Service: General;  Laterality: N/A;    COLONOSCOPY N/A 10/06/2022    Procedure: COLONOSCOPY;  Surgeon: Quinton Celeste MD;  Location: [facility];  Service: Gastroenterology;  Laterality: N/A;    COLONOSCOPY N/A 5/1/2024    Procedure: COLONOSCOPY;  Surgeon: Quinton Celeste MD;  Location: [facility];  Service: Endoscopy;  Laterality: N/A;    ESOPHAGOGASTRODUODENOSCOPY N/A 5/1/2024    Procedure: EGD (ESOPHAGOGASTRODUODENOSCOPY);  Surgeon: Quinton Celeste MD;  Location: [facility];  Service: Endoscopy;  Laterality: N/A;    Hand fracture surgery      HARDWARE REMOVAL      left hand 5th [facility]    hymenectomy      HYSTERECTOMY      menorrhagia-Ovaries intact    ROTATOR CUFF REPAIR Right     SLEEVE GASTROPLASTY  04/16/2018    TONSILLECTOMY      Urethral dilatation       Social History     Tobacco Use    Smoking status: Never     Passive exposure: Past    Smokeless tobacco: Never   Substance Use Topics    Alcohol use: Not Currently     Comment: very rarely    Drug use: No     Family History   Problem Relation Name Age of Onset    Melanoma Mother Loyd     Basal cell carcinoma Mother Loyd     Lung disease Mother Loyd         pulm htn    Cataracts Mother Loyd     Hypertension Mother Loyd     Lung cancer Father Josh     Diabetes Father Josh     Hypertension Father Josh     Cancer Father Josh     Melanoma Maternal Aunt      Melanoma Maternal Uncle      Diabetes Paternal Aunt Estee     Colon cancer Paternal Aunt Lilliam 40    Diabetes Paternal Aunt Lilliam     Breast cancer Paternal Aunt  40    Lymphoma Paternal Aunt      Cancer Maternal Grandfather Gilmar     Ovarian cancer Paternal Grandmother  40    Ulcerative colitis Son      Psoriasis Son      Psoriasis Son      Breast cancer Cousin Paternal 1st 25     "Allergic rhinitis Neg Hx      Allergies Neg Hx      Angioedema Neg Hx      Asthma Neg Hx      Atopy Neg Hx      Eczema Neg Hx      Immunodeficiency Neg Hx      Rhinitis Neg Hx      Urticaria Neg Hx       Review of patient's allergies indicates:   Allergen Reactions    Bromelains Hives     " causes mouth to bleed"    Sudafed [pseudoephedrine hcl] Other (See Comments)     Does not want to wake up .    Amoxicillin-pot clavulanate Itching     Other reaction(s): Itching    Hydrocodone Itching    Iodinated contrast- oral and iv dye      childhood    Iodine and iodide containing products Other (See Comments)    Melon Hives    Pantoprazole Nausea Only     Other reaction(s): upset stomach/halitosis    Percocet [oxycodone-acetaminophen] Itching    Barium iodide Rash    Ephedrine Other (See Comments)     Other reaction(s): comatose    Penicillins      Other reaction(s): does not work on pt symptoms     I have reviewed past medical, family, and social history. I have reviewed previous nurse notes.    Performance Status:The patient's activity level is functions out of house.      Review of Systems   Constitutional: Negative for activity change, appetite change, chills, diaphoresis, fatigue, fever and unexpected weight change.   HENT: Negative for dental problem, sneezing, sore throat, trouble swallowing, postnasal drip, rhinorrhea, sinus pressure, sinus pain, and voice change.     Respiratory: Negative for chest tightness, shortness of breath, wheezing.  Positive for cough,   Cardiovascular: Negative for chest pain, palpitations and leg swelling.   Musculoskeletal: Negative for gait problem, myalgias and neck pain.   Skin: Negative for color change and pallor.   Allergic/Immunologic: Negative for environmental allergies and food allergies.   Neurological: Negative for dizziness, speech difficulty, weakness, light-headedness, numbness and headaches.   Hematological: Negative for adenopathy. Does not bruise/bleed easily. " "  Psychiatric/Behavioral: Negative for dysphoric mood and sleep disturbance. The patient is not nervous/anxious.             Exam:Comprehensive exam done. BP (!) 170/79 (BP Location: Right arm, Patient Position: Sitting)   Pulse (!) 56   Ht 5' 10" (1.778 m)   Wt 119.2 kg (262 lb 10.9 oz)   SpO2 95% Comment: on room air  BMI 37.69 kg/m²   Exam included Vitals as listed, and patient's appearance and affect and alertness and mood, oral exam for yeast and hygiene and pharynx lesions and Mallapatti (M) score, neck with inspection for jvd and masses and thyroid abnormalities and lymph nodes (supraclavicular and infraclavicular nodes and axillary also examined and noted if abn), chest exam included symmetry and effort and fremitus and percussion and auscultation, cardiac exam included rhythm and gallops and murmur and rubs and jvd and edema, abdominal exam for mass and hepatosplenomegaly and tenderness and hernias and bowel sounds, Musculoskeletal exam with muscle tone and posture and mobility/gait and  strength, and skin for rashes and cyanosis and pallor and turgor, extremity for clubbing.  Findings were normal except for pertinent findings listed below:  M3, chest is symmetric, no distress, normal percussion, normal fremitus and good normal breath sounds        Radiographs (ct chest and cxr) reviewed: results reviewed by direct vision  CT Chest Without Contrast 2/10/2020  Normal exam, 3.3 mm nodule seen in right upper lobe.    Lungs are clear.  Minimal retained mucus secretions in the trachea.  No pleural effusion or pneumothorax.  Normal sized heart.  Thyroid is small and heterogeneous with a subcentimeter nodule or cyst in the right lobe.     XR CHEST 1 VIEW 05/10/2018   There is left lower lobe atelectasis and possible small left pleural effusion on this study.  No pneumothorax is seen.      Labs reviewed       Lab Results   Component Value Date    WBC 5.89 07/16/2024    RBC 4.11 07/16/2024    HGB 12.6 " 07/16/2024    HCT 36.7 (L) 07/16/2024    MCV 89 07/16/2024    MCH 30.7 07/16/2024    MCHC 34.3 07/16/2024    RDW 14.7 (H) 07/16/2024     07/16/2024    MPV 12.1 07/16/2024    GRAN 2.9 07/16/2024    GRAN 48.8 07/16/2024    LYMPH 2.2 07/16/2024    LYMPH 37.5 07/16/2024    MONO 0.5 07/16/2024    MONO 8.5 07/16/2024    EOS 0.2 07/16/2024    BASO 0.03 07/16/2024    EOSINOPHIL 3.9 07/16/2024    BASOPHIL 0.5 07/16/2024       PFT reviewed  2/10/2020 no obstruction seen, 7% bronchodilator response with 12% needed for clinical improvement; TLC 86% with DLC0 at 88%, normal exam with mild asthma  Spirometry bronchodilator, lung volume by gas dilution, diffusion capacity measured February 10, 2020.  The FEV1 to FVC ratio was 79%, there is no airflow obstruction measured by spirometry technique.  The FEV1 measured 85% predicted at 1.97 L. The 8%   improvement in FEV1 failed to reach statistical significance ( 12% is needed for statistical significance ).  Lung volumes by gas dilution were normal.  Diffusion was normal.      Spirometry, lung volumes, diffusion are all normal.  The bronchodilator response was not statistically significant.  Clinical correlation recommended.     Plan:  Clinical impression is apparently straight forward and impression with management as below.    Yodit was seen today for follow-up and asthma.    Diagnoses and all orders for this visit:    Severe obesity (BMI 35.0-39.9) with comorbidity    Severe persistent asthma without complication  -     albuterol (PROVENTIL/VENTOLIN HFA) 90 mcg/actuation inhaler; Inhale 2 puffs into the lungs every 4 (four) hours as needed for Wheezing or Shortness of Breath. 2 puffs every 4 hours as needed for cough, wheeze, or shortness of breath    Severe persistent asthma with acute exacerbation  -     fluticasone-umeclidin-vilanter (TRELEGY ELLIPTA) 200-62.5-25 mcg inhaler; Inhale 1 puff into the lungs once daily.                      Follow up in about 6 months  (around 4/8/2025), or if symptoms worsen or fail to improve.    Discussed with patient above for education the following:      Patient Instructions   Asthma doing well    Rsv vaccine would be good....    Trelegy use is reasonable as you are doing well.....

## 2024-10-08 NOTE — PATIENT INSTRUCTIONS
Asthma doing well    Rsv vaccine would be good....    Trelegy use is reasonable as you are doing well.....

## 2024-10-15 ENCOUNTER — DOCUMENTATION ONLY (OUTPATIENT)
Dept: ALLERGY | Facility: CLINIC | Age: 64
End: 2024-10-15

## 2024-10-15 ENCOUNTER — OFFICE VISIT (OUTPATIENT)
Dept: ALLERGY | Facility: CLINIC | Age: 64
End: 2024-10-15
Payer: MEDICARE

## 2024-10-15 VITALS — WEIGHT: 192.44 LBS | BODY MASS INDEX: 35.41 KG/M2 | HEIGHT: 62 IN

## 2024-10-15 DIAGNOSIS — J32.9 RECURRENT SINUS INFECTIONS: ICD-10-CM

## 2024-10-15 DIAGNOSIS — J31.0 CHRONIC RHINITIS: ICD-10-CM

## 2024-10-15 DIAGNOSIS — J45.50 SEVERE PERSISTENT ASTHMA WITHOUT COMPLICATION: Primary | ICD-10-CM

## 2024-10-15 DIAGNOSIS — D83.9 CVID (COMMON VARIABLE IMMUNODEFICIENCY): ICD-10-CM

## 2024-10-15 DIAGNOSIS — D80.6 ANTI-POLYSACCHARIDE ANTIBODY DEFICIENCY: ICD-10-CM

## 2024-10-15 DIAGNOSIS — J82.83 EOSINOPHILIC ASTHMA: ICD-10-CM

## 2024-10-15 PROCEDURE — 99212 OFFICE O/P EST SF 10 MIN: CPT | Mod: PBBFAC,PO | Performed by: ALLERGY & IMMUNOLOGY

## 2024-10-15 PROCEDURE — 99215 OFFICE O/P EST HI 40 MIN: CPT | Mod: S$PBB,,, | Performed by: ALLERGY & IMMUNOLOGY

## 2024-10-15 PROCEDURE — 99999 PR PBB SHADOW E&M-EST. PATIENT-LVL II: CPT | Mod: PBBFAC,,, | Performed by: ALLERGY & IMMUNOLOGY

## 2024-10-15 NOTE — PROGRESS NOTES
Subjective:       Patient ID: Yodit Canseco is a 64 y.o. female.    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  She was last seen 10/3/2023.  She had labs drawn in past- CBC was normal, lymphocyte profile showed low NK cells. IgA normal at 170, IgM normal at 124, IgE ,35, IgG normal at 700 but had steadily been decreasing, IgG subclasses normal and humoral panel protected to H flu, diphtheria, tetanus and 8/14 strep serotypes but this is after pneumovax and Prevnar so inadequate protection.  She still had constant PND and congestion and got frequent infections with sinus, ear, bronchitis and even pneumonia. She was on antibiotics every 1-2 months. She cannot use nose sprays all cause nose bleed even Astelin. All antihistamines cause sedation per pt. She is on Singulair daily. She started Hizentra 10 g weekly and then in October 2016 changed to 20 g every 2 weeks. Then 2022 changed to 18 g every 2 weeks she occ delays infusion. When her UC flares and has diarrhea with BM many times a day so when this is occurring she holds off on Hizentra. Infusions take 2.5-3 hours. She has done well with infusions. She has itching after and takes benadryl after with resolution.   No frequent sinus infections, no pneumonia, no hospital for infections. She was in hospital in Jan and May for UC and now on Rinvoq.           Labs 7/2023 IgG 943, 8/2023 normal CBC and CMP    She oes have some food allergies. Pineapple, cranberry, corn cause itching in mouth then bleeding. Melons causes hives and face swelling.     Prior History taken 1/13/14: new patient evaluation of rash and chronic PND.  She has been seen about 4 years ago and had labs drawn with normal immunoglobulins, humoral panel only protected to 4/14 strep titers despite prior pneumovax and immunocaps negative to dust mites,  [place], bermuda, cocoa, garlic, milk, wheat, rye, pecan, and peanut.  She was advised to get pneumovax and follow up but she did not.  She continues with chronic PND and congestion, no runny nose or sneeze and she gets inus infections 3-4 times per year requiring antibiotics.  She does clear with anbx but never gets rid of PND.  Her main question now is she gets a random rash on hands, feet and nose.  It seems to appear out of no where, is very itchy and red spots then goes away.  Lasts weeks to months.  She has seen derm and not sure of cause and she thinks may be food allergy although cannot identify a food.    She also has asthma which is well controlled on Dulera and Singulair.  Only uses albuterol with colds.      Rash  Associated symptoms include congestion and coughing. Pertinent negatives include no diarrhea, fatigue, fever, rhinorrhea, shortness of breath, sore throat or vomiting.       Environmental History: Pets in the home: none.  see history section for home environment  Review of Systems   Constitutional: Negative for fever, chills, appetite change and fatigue.   HENT: Positive for congestion and postnasal drip. Negative for ear pain, nosebleeds, sore throat, facial swelling, rhinorrhea, sneezing, trouble swallowing, voice change, sinus pressure and ear discharge.    Eyes: Negative for discharge, redness, itching and visual disturbance.   Respiratory: Positive for cough and wheezing. Negative for choking, chest tightness and shortness of breath.    Cardiovascular: Negative for chest pain, palpitations and leg swelling.   Gastrointestinal: Negative for nausea, vomiting, abdominal pain, diarrhea, constipation and abdominal distention.   Genitourinary: Negative for difficulty urinating.   Musculoskeletal: Negative for myalgias, joint swelling, arthralgias and gait problem.   Skin: Positive for rash. Negative for color change.   Neurological: Negative for dizziness, syncope, weakness, light-headedness and  headaches.   Hematological: Negative for adenopathy. Does not bruise/bleed easily.   Psychiatric/Behavioral: Negative for behavioral problems, confusion, sleep disturbance and agitation. The patient is not nervous/anxious.         Objective:    Physical Exam   Nursing note and vitals reviewed.  Constitutional: She is oriented to person, place, and time. She appears well-developed and well-nourished. No distress.   HENT:   Head: Normocephalic and atraumatic.   Right Ear: Hearing, tympanic membrane, external ear and ear canal normal.   Left Ear: Hearing, tympanic membrane, external ear and ear canal normal.   Nose: No mucosal edema, rhinorrhea, sinus tenderness or septal deviation. No epistaxis. Right sinus exhibits no maxillary sinus tenderness and no frontal sinus tenderness. Left sinus exhibits no maxillary sinus tenderness and no frontal sinus tenderness.   Mouth/Throat: Uvula is midline, oropharynx is clear and moist and mucous membranes are normal. No uvula swelling.   Eyes: Conjunctivae normal are normal. Right eye exhibits no discharge. Left eye exhibits no discharge.   Neck: Normal range of motion. No thyromegaly present.   Cardiovascular: Normal rate, regular rhythm and normal heart sounds.    No murmur heard.  Pulmonary/Chest: Effort normal and breath sounds normal. No respiratory distress. She has no wheezes.   Abdominal: Soft. She exhibits no distension. There is no tenderness.   Musculoskeletal: Normal range of motion. She exhibits no edema and no tenderness.   Lymphadenopathy:     She has no cervical adenopathy.   Neurological: She is alert and oriented to person, place, and time.   Skin: Skin is warm and dry. No rash noted. No erythema.   Psychiatric: She has a normal mood and affect. Her behavior is normal. Judgment and thought content normal.       Laboratory:   none performed   Assessment:       1. Severe persistent asthma without complication    2. Eosinophilic asthma    3. Anti-polysaccharide  antibody deficiency    4. Recurrent sinus infections    5. Chronic rhinitis         Plan:       1. continue Hizentra 18 g every 2 weeks.    2. continue Singulair daily   3. albuterol and Pulmicort nebs as needed  4. RTC annually or sooner if needed    I spent a total of 40 minutes on the day of the visit.  This includes face to face time and non-face to face time preparing to see the patient (eg, review of tests), obtaining and/or reviewing separately obtained history, documenting clinical information in the electronic or other health record, independently interpreting results and communicating results to the patient/family/caregiver, or care coordinator.

## 2024-10-16 ENCOUNTER — LAB VISIT (OUTPATIENT)
Dept: LAB | Facility: HOSPITAL | Age: 64
End: 2024-10-16
Attending: STUDENT IN AN ORGANIZED HEALTH CARE EDUCATION/TRAINING PROGRAM
Payer: MEDICARE

## 2024-10-16 DIAGNOSIS — K51.019 ULCERATIVE COLITIS, UNIVERSAL, UNSPECIFIED COMPLICATION: ICD-10-CM

## 2024-10-16 PROCEDURE — 83993 ASSAY FOR CALPROTECTIN FECAL: CPT | Performed by: STUDENT IN AN ORGANIZED HEALTH CARE EDUCATION/TRAINING PROGRAM

## 2024-10-21 LAB — CALPROTECTIN STL-MCNT: 266 MCG/G

## 2024-10-22 RX ORDER — TRIAMTERENE/HYDROCHLOROTHIAZID 37.5-25 MG
1 TABLET ORAL DAILY PRN
Qty: 30 TABLET | Refills: 2 | Status: SHIPPED | OUTPATIENT
Start: 2024-10-22

## 2024-10-22 RX ORDER — TRIAMTERENE/HYDROCHLOROTHIAZID 37.5-25 MG
1 TABLET ORAL DAILY PRN
COMMUNITY
Start: 2024-07-28 | End: 2024-10-22 | Stop reason: SDUPTHER

## 2024-10-24 ENCOUNTER — PATIENT MESSAGE (OUTPATIENT)
Dept: GASTROENTEROLOGY | Facility: CLINIC | Age: 64
End: 2024-10-24
Payer: MEDICARE

## 2024-10-28 ENCOUNTER — NURSE TRIAGE (OUTPATIENT)
Dept: ADMINISTRATIVE | Facility: CLINIC | Age: 64
End: 2024-10-28
Payer: MEDICARE

## 2024-10-28 ENCOUNTER — TELEPHONE (OUTPATIENT)
Dept: ALLERGY | Facility: CLINIC | Age: 64
End: 2024-10-28
Payer: MEDICARE

## 2024-10-31 ENCOUNTER — TELEPHONE (OUTPATIENT)
Dept: ALLERGY | Facility: CLINIC | Age: 64
End: 2024-10-31
Payer: MEDICARE

## 2024-11-01 ENCOUNTER — OFFICE VISIT (OUTPATIENT)
Dept: OPHTHALMOLOGY | Facility: CLINIC | Age: 64
End: 2024-11-01
Payer: MEDICARE

## 2024-11-01 DIAGNOSIS — Z98.890 POST-OPERATIVE STATE: Primary | ICD-10-CM

## 2024-11-01 DIAGNOSIS — Z96.1 PSEUDOPHAKIA OF BOTH EYES: ICD-10-CM

## 2024-11-01 PROCEDURE — 99213 OFFICE O/P EST LOW 20 MIN: CPT | Mod: PBBFAC | Performed by: OPTOMETRIST

## 2024-11-01 PROCEDURE — 99999 PR PBB SHADOW E&M-EST. PATIENT-LVL III: CPT | Mod: PBBFAC,,, | Performed by: OPTOMETRIST

## 2024-11-11 ENCOUNTER — OFFICE VISIT (OUTPATIENT)
Dept: ORTHOPEDICS | Facility: CLINIC | Age: 64
End: 2024-11-11
Payer: MEDICARE

## 2024-11-11 VITALS — WEIGHT: 185 LBS | HEIGHT: 62 IN | BODY MASS INDEX: 34.04 KG/M2

## 2024-11-11 DIAGNOSIS — M25.819 SHOULDER IMPINGEMENT: ICD-10-CM

## 2024-11-11 DIAGNOSIS — M67.912 TENDINOPATHY OF LEFT ROTATOR CUFF: ICD-10-CM

## 2024-11-11 DIAGNOSIS — M19.019 OSTEOARTHRITIS OF ACROMIOCLAVICULAR JOINT: Primary | ICD-10-CM

## 2024-11-11 PROCEDURE — 99999 PR PBB SHADOW E&M-EST. PATIENT-LVL V: CPT | Mod: PBBFAC,,, | Performed by: STUDENT IN AN ORGANIZED HEALTH CARE EDUCATION/TRAINING PROGRAM

## 2024-11-11 PROCEDURE — 99204 OFFICE O/P NEW MOD 45 MIN: CPT | Mod: S$PBB,,, | Performed by: STUDENT IN AN ORGANIZED HEALTH CARE EDUCATION/TRAINING PROGRAM

## 2024-11-11 PROCEDURE — G2211 COMPLEX E/M VISIT ADD ON: HCPCS | Mod: S$PBB,,, | Performed by: STUDENT IN AN ORGANIZED HEALTH CARE EDUCATION/TRAINING PROGRAM

## 2024-11-11 PROCEDURE — 99215 OFFICE O/P EST HI 40 MIN: CPT | Mod: PBBFAC,PO | Performed by: STUDENT IN AN ORGANIZED HEALTH CARE EDUCATION/TRAINING PROGRAM

## 2024-11-11 NOTE — PROGRESS NOTES
Patient ID: Yodit Canseco  YOB: 1960  MRN: [id number]    Chief Complaint: Pain of the Left Shoulder      Referred By: Self for Left Shoulder Pain    History of Present Illness: Yodit Canseco is a right-hand dominant 64 y.o. female who presents today with left shoulder pain.  Reports that pain has been present for several years and states that it is worse when carrying anything with weight.  Reports that the pain is all over and rates it currently at a 2-3/10.  When she is carrying objects the pain will increase to an 8/10.  Has noticed that pain has improved since resting from cataract surgery.  Reports that she has tried ice, heat, Tylenol Arthritis and Bengay.  She was on an oral steroid previously, for another issue, but did not notice an improvement in her shoulder.      The patient is active in none.  Occupation: Disabled      Past Medical History:   Past Medical History:   Diagnosis Date    Allergy     Angio-edema     Arthralgia     Asthma without status asthmaticus     Bronchitis     Cataract     COPD (chronic obstructive pulmonary disease)     COVID-19 virus infection 07/23/2021    Diverticulosis of large intestine without hemorrhage 10/08/2015    Eczema     Environmental allergies     Eosinophilic asthma 03/08/2018    Exacerbation of ulcerative colitis with rectal bleeding     Fibromyalgia 02/19/2024    Gastroparesis     Gram-negative bacteremia 12/29/2023    Hand arthritis     Herpes simplex without mention of complication     oral    Hidradenitis     History of morbid obesity     Hyperlipidemia     Hypothyroidism     Immune deficiency disorder     Intraperitoneal abscess 05/07/2018    LBP radiating to left leg     Leukocytosis, unspecified     Migraine headache     Normocytic anemia 10/05/2022    Obesity, Class III, BMI 40-49.9 (morbid obesity)     Periorbital cellulitis 12/31/2016    Prediabetes     RA (rheumatoid arthritis)     Recurrent upper respiratory infection (URI)      Rotavirus enteritis 12/27/2023    RSV (respiratory syncytial virus infection) 01/30/2023    S/P colonoscopy 11/2010    Sepsis 05/07/2018    Sepsis secondary to colitis 05/07/2018    Systemic lupus erythematosus, organ or system involvement unspecified     Tubular adenoma of colon 08/17/2022    Tubular adenoma of colon 05/01/2024    Ulcerative pancolitis with rectal bleeding 10/17/2022    Urticaria      Past Surgical History:   Procedure Laterality Date    ADENOIDECTOMY      CATARACT EXTRACTION W/  INTRAOCULAR LENS IMPLANT Right 08/21/2024    Panoptix    CATARACT EXTRACTION W/  INTRAOCULAR LENS IMPLANT Left 09/25/2024    Panoptix    CHOLECYSTECTOMY      CHOLECYSTECTOMY      colitis      COLONOSCOPY  2015    repeat in 10    COLONOSCOPY N/A 10/08/2015    Procedure: COLONOSCOPY;  Surgeon: Panda Bose MD;  Location: [facility];  Service: Endoscopy;  Laterality: N/A;    COLONOSCOPY N/A 08/17/2022    Procedure: COLONOSCOPY;  Surgeon: Olaf Del Valle MD;  Location: [facility];  Service: General;  Laterality: N/A;    COLONOSCOPY N/A 10/06/2022    Procedure: COLONOSCOPY;  Surgeon: Quinton Celeste MD;  Location: [facility];  Service: Gastroenterology;  Laterality: N/A;    COLONOSCOPY N/A 05/01/2024    Procedure: COLONOSCOPY;  Surgeon: Quinton Celeste MD;  Location: [facility];  Service: Endoscopy;  Laterality: N/A;    ESOPHAGOGASTRODUODENOSCOPY N/A 05/01/2024    Procedure: EGD (ESOPHAGOGASTRODUODENOSCOPY);  Surgeon: Quinton Celeste MD;  Location: [facility];  Service: Endoscopy;  Laterality: N/A;    Hand fracture surgery      HARDWARE REMOVAL      left hand 5th [facility]    hymenectomy      HYSTERECTOMY      menorrhagia-Ovaries intact    ROTATOR CUFF REPAIR Right     SLEEVE GASTROPLASTY  04/16/2018    TONSILLECTOMY      Urethral dilatation       Family History   Problem Relation Name Age of Onset    Melanoma Mother Loyd     Basal cell carcinoma Mother Loyd     Lung disease Mother Loyd         pulm htn    Cataracts Mother  Loyd     Hypertension Mother Loyd     Lung cancer Father Josh     Diabetes Father Josh     Hypertension Father Josh     Cancer Father Josh     Melanoma Maternal Aunt      Melanoma Maternal Uncle      Diabetes Paternal Aunt Estee     Colon cancer Paternal Aunt Lilliam 40    Diabetes Paternal Aunt Lilliam     Breast cancer Paternal Aunt  40    Lymphoma Paternal Aunt      Cancer Maternal Grandfather Gilmar     Ovarian cancer Paternal Grandmother  40    Ulcerative colitis Son      Psoriasis Son      Psoriasis Son      Breast cancer Cousin Paternal 1st 25    Allergic rhinitis Neg Hx      Allergies Neg Hx      Angioedema Neg Hx      Asthma Neg Hx      Atopy Neg Hx      Eczema Neg Hx      Immunodeficiency Neg Hx      Rhinitis Neg Hx      Urticaria Neg Hx       Social History     Socioeconomic History    Marital status:    Tobacco Use    Smoking status: Never     Passive exposure: Past    Smokeless tobacco: Never   Substance and Sexual Activity    Alcohol use: Not Currently     Comment: very rarely    Drug use: No    Sexual activity: Yes     Partners: Male   Social History Narrative    . Disabled teacher.     Social Drivers of Health     Food Insecurity: No Food Insecurity (4/29/2024)    Hunger Vital Sign     Worried About Running Out of Food in the Last Year: Never true     Ran Out of Food in the Last Year: Never true   Transportation Needs: No Transportation Needs (4/29/2024)    PRAPARE - Transportation     Lack of Transportation (Medical): No     Lack of Transportation (Non-Medical): No   Housing Stability: Low Risk  (4/29/2024)    Housing Stability Vital Sign     Unable to Pay for Housing in the Last Year: No     Homeless in the Last Year: No     Medication List with Changes/Refills   Current Medications    ACETAMINOPHEN (TYLENOL) 325 MG TABLET    Take 1.5 tablets (487.5 mg total) by mouth every 6 (six) hours as needed for Pain (Do not take with any other products containing  tylenol or  acetaminophen).    ALBUTEROL (PROVENTIL/VENTOLIN HFA) 90 MCG/ACTUATION INHALER    Inhale 2 puffs into the lungs every 4 (four) hours as needed for Wheezing or Shortness of Breath. 2 puffs every 4 hours as needed for cough, wheeze, or shortness of breath    CHOLECALCIFEROL, VITAMIN D3, (VITAMIN D3) 50 MCG (2,000 UNIT) CAP    Take 1 capsule by mouth once daily.    CLOBETASOL (TEMOVATE) 0.05 % CREAM    Apply topically nightly as needed. To the vulva    DESONIDE (DESOWEN) 0.05 % CREAM    APPLY TOPICALLY TWICE A DAY    DOXYCYCLINE (VIBRAMYCIN) 100 MG CAP    Take 1 capsule (100 mg total) by mouth 2 (two) times daily.    ESTRADIOL (ESTRACE) 1 MG TABLET    Take 1 tablet (1 mg total) by mouth once daily.    FLUTICASONE-UMECLIDIN-VILANTER (TRELEGY ELLIPTA) 200-62.5-25 MCG INHALER    Inhale 1 puff into the lungs once daily.    FUROSEMIDE (LASIX) 20 MG TABLET    90 tablets.    HYDROCORTISONE 2.5 % CREAM    Apply 1 Application topically 2 (two) times daily as needed (TO THE ANUS). To the anus    IMMUN GLOB G,IGG,-PRO-IGA 0-50 (HIZENTRA) 2 GRAM/10 ML (20 %) SOLN    Inject 90 mLs (18 g total) into the skin every 14 (fourteen) days.    LEVOTHYROXINE (SYNTHROID) 75 MCG TABLET    TAKE 1 TABLET BY MOUTH EVERY DAY    LIDOCAINE 5 % CREA    Apply 1 Application topically once daily.    LOPERAMIDE (IMODIUM) 2 MG CAPSULE    Take 1 capsule (2 mg total) by mouth 3 (three) times daily as needed for Diarrhea.    MINOXIDIL (LONITEN) 2.5 MG TABLET    Take 1/4th (0.625mg) by mouth daily    MONTELUKAST (SINGULAIR) 10 MG TABLET    Take 1 tablet (10 mg total) by mouth every evening.    MULTIVITAMIN ORAL    Take 1 tablet by mouth once daily.    ONDANSETRON (ZOFRAN-ODT) 4 MG TBDL    Take 1 tablet (4 mg total) by mouth every 8 (eight) hours as needed (nausea/vomiting).    POTASSIUM CHLORIDE SA (K-DUR,KLOR-CON) 20 MEQ TABLET    Take 20 mEq by mouth once daily.    PREDNISOLONE ACETATE (PRED FORTE) 1 % DRPS    Place 1 drop into the left eye every 4 (four)  "hours.    PREDNISONE (DELTASONE) 2.5 MG TABLET    Take 2.5 mg by mouth once daily.    PREGABALIN (LYRICA) 75 MG CAPSULE    Take 1 capsule (75 mg total) by mouth 2 (two) times daily.    PROCHLORPERAZINE (COMPAZINE) 10 MG TABLET    Take 1 tablet (10 mg total) by mouth every 6 (six) hours as needed (nausea).    SIMVASTATIN (ZOCOR) 10 MG TABLET    Take 10 mg by mouth every evening.    SULFASALAZINE (AZULFIDINE) 500 MG EC TABLET    Take 1 tablet (500 mg total) by mouth 2 (two) times a day.    SUMATRIPTAN (IMITREX) 100 MG TABLET    Take 1 tablet (100 mg total) by mouth once as needed for Migraine. May repeat dose in 2 hours if no relief.  Do not exceed 2 doses in 24 hours.    TACROLIMUS (PROTOPIC) 0.1 % OINTMENT    Apply topically 2 (two) times daily.    TRAMADOL (ULTRAM) 50 MG TABLET    Take 50 mg by mouth 3 (three) times daily.    TRAMADOL (ULTRAM) 50 MG TABLET    Take 1 tablet (50 mg total) by mouth every 6 (six) hours as needed for Pain.    TRETINOIN (RETIN-A) 0.05 % CREAM    Apply 1 application  topically every evening.    TRIAMTERENE-HYDROCHLOROTHIAZIDE 37.5-25 MG (MAXZIDE-25) 37.5-25 MG PER TABLET    Take 1 tablet by mouth daily as needed (edema).    UPADACITINIB (RINVOQ) 45 MG TB24    Take 15 mg by mouth Daily.     Review of patient's allergies indicates:   Allergen Reactions    Bromelains Hives     " causes mouth to bleed"    Pimecrolimus Swelling    Sudafed [pseudoephedrine hcl] Other (See Comments)     Does not want to wake up .    Amoxicillin-pot clavulanate Itching     Other reaction(s): Itching; tolerated ceftriaxone 10/2022    Hydrocodone Itching    Iodinated contrast media      childhood    Iodine and iodide containing products Other (See Comments)    Melon Hives    Percocet [oxycodone-acetaminophen] Itching    Codeine     Corn containing products     Tree nut     Tree nuts Other (See Comments)    Wheat containing prod     Barium iodide Rash    Ephedrine Other (See Comments)     Other reaction(s): comatose "    Penicillins      Other reaction(s): does not work on pt symptoms       Physical Exam:   Body mass index is 33.84 kg/m².    GENERAL: Well appearing, in no acute distress.  HEAD: Normocephalic and atraumatic.  ENT: External ears and nose grossly normal.  EYES: EOMI bilaterally  PULMONARY: Respirations are grossly even and non-labored.  NEURO: Awake, alert, and oriented x 3.  SKIN: No obvious rashes appreciated.  PSYCH: Mood & affect are appropriate.    Detailed MSK exam:     Left shoulder exam:   -ROM: abduction 140, forward flexion 140, external rotation 90, internal rotation 70  -empty can test pain but no weakness, resisted ER pain but no weakness, belly press negative  -yanez test positive, neers test positive, whipple test positive  -biceps load test negative, yerguson test negative, Steele's test negative  -sensation intact, pulses 2+  -TTP: AC joint        Imaging:  CT Abdomen Pelvis  Without Contrast  Narrative: EXAMINATION:  CT ABDOMEN AND PELVIS    CLINICAL HISTORY:  Left lower quadrant pain    TECHNIQUE:  Axial CT images were obtained through the abdomen and pelvis without contrast .  Coronal and sagittal reconstructions submitted and interpreted.  Total .  Automated exposure control utilized.    COMPARISON:  04/28/2024    FINDINGS:  Visualized lung bases are clear.    Gallbladder is absent.  Kidneys, adrenals, spleen, pancreas and liver are normal.    Operative changes are in the stomach.  No dilated bowel loops.  Appendix is normal in caliber.  There is diverticulosis of the sigmoid colon with mild wall thickening.  No pericolonic inflammation.  No abscess or free air.    No enlarged lymph nodes.  Abdominal aorta is normal in caliber.    Urinary bladder is normal.  Uterus is surgically absent.    No acute osseous findings.  Impression: 1. Sigmoid diverticulosis with mild wall thickening could represent early diverticulitis.    Electronically signed by: Maximiliano Sheehan  MD  Date:    10/28/2024  Time:    20:19        Relevant imaging results were reviewed and interpreted by me and per my read shows moderate to severe AC arthritic changes bilaterally.  This was discussed with the patient and / or family today.     Assessment:  Yodit Canseco is a 64 y.o. female presenting with left shoulder pain.   History, physical and radiographs are consistent with a likely diagnosis of OA, RC tendinopathy, impingement.   Plan: PT referral. Ice/heat, voltaren gel, lidocaine patches as needed. Consider steroid injection if not improving. Continue conservative management for pain.   Follow up as needed. All questions answered.      Osteoarthritis of acromioclavicular joint  -     Ambulatory referral/consult to Physical/Occupational Therapy; Future; Expected date: 11/18/2024    Tendinopathy of left rotator cuff  -     Ambulatory referral/consult to Physical/Occupational Therapy; Future; Expected date: 11/18/2024    Shoulder impingement  -     Ambulatory referral/consult to Physical/Occupational Therapy; Future; Expected date: 11/18/2024         Today's visit is associated with current or anticipated ongoing medical care related to this patient's diagnosis of osteoarthritis.  Currently there is no cure of osteoarthritis and the patient will require regular follow up to manage symptoms and progression.  The patient is to return to the office within a minimum of 3-6 months to review symptoms and function at that time.   CPT code      A copy of today's visit note has been sent to the referring provider.     Electronically signed:  Jose Washington MD, MPH  11/11/2024  12:50 PM

## 2024-11-11 NOTE — PATIENT INSTRUCTIONS
Assessment:  Yodit Canseco is a 64 y.o. female   Chief Complaint   Patient presents with    Left Shoulder - Pain       Encounter Diagnoses   Name Primary?    Osteoarthritis of acromioclavicular joint Yes    Tendinopathy of left rotator cuff     Shoulder impingement         Plan:  Referral to physical therapy at [facility] in [place]  An order for Physical Therapy within the [facility] system was placed today.  Please expect a call from our Centralized Scheduling number, [phone].  If you do not hear from them in the next few days, please call our local PT department directly at [phone].  Apply topical diclofenac (Voltaren) up to 4 times a day to the affected area.  It can be bought over the counter at any local pharmacy.    Patient may use over the counter lidocaine patches as needed for pain.  Patient may use ice and heat as needed for pain every 2 hours for 15 minutes  Follow up as needed          Follow-up: as needed.    Thank you for choosing [facility] and Dr. Jose Washington for your orthopedic & sports medicine care. It is our goal to provide you with exceptional care that will help keep you healthy, active, and get you back in the game.    Please do not hesitate to reach out to us via email, phone, or MyChart with any questions, concerns, or feedback.    If you felt that you received exemplary care today, please consider leaving us feedback on [employer] at:  [email or web address]    If you are experiencing pain/discomfort ,or have questions after 5pm and would like to be connected to the [facility] on-call team, please call this number and specify which Sports Medicine provider is treating you: [phone]

## 2024-12-04 ENCOUNTER — CLINICAL SUPPORT (OUTPATIENT)
Dept: REHABILITATION | Facility: HOSPITAL | Age: 64
End: 2024-12-04
Attending: STUDENT IN AN ORGANIZED HEALTH CARE EDUCATION/TRAINING PROGRAM
Payer: COMMERCIAL

## 2024-12-04 DIAGNOSIS — M25.819 SHOULDER IMPINGEMENT: ICD-10-CM

## 2024-12-04 DIAGNOSIS — M19.019 OSTEOARTHRITIS OF ACROMIOCLAVICULAR JOINT: ICD-10-CM

## 2024-12-04 DIAGNOSIS — M67.912 TENDINOPATHY OF LEFT ROTATOR CUFF: ICD-10-CM

## 2024-12-04 PROBLEM — R68.89 DECREASED ACTIVITY TOLERANCE: Status: RESOLVED | Noted: 2022-08-02 | Resolved: 2024-12-04

## 2024-12-04 PROBLEM — R29.898 IMPAIRED FLEXIBILITY OF LOWER EXTREMITY: Status: RESOLVED | Noted: 2022-08-02 | Resolved: 2024-12-04

## 2024-12-04 PROBLEM — R29.898 WEAKNESS OF RIGHT LOWER EXTREMITY: Status: RESOLVED | Noted: 2022-08-02 | Resolved: 2024-12-04

## 2024-12-04 PROCEDURE — 97161 PT EVAL LOW COMPLEX 20 MIN: CPT | Mod: PN | Performed by: PHYSICAL THERAPIST

## 2024-12-04 PROCEDURE — 97110 THERAPEUTIC EXERCISES: CPT | Mod: PN | Performed by: PHYSICAL THERAPIST

## 2024-12-11 ENCOUNTER — OFFICE VISIT (OUTPATIENT)
Dept: DERMATOLOGY | Facility: CLINIC | Age: 64
End: 2024-12-11
Payer: MEDICARE

## 2024-12-11 ENCOUNTER — LAB VISIT (OUTPATIENT)
Dept: LAB | Facility: HOSPITAL | Age: 64
End: 2024-12-11
Attending: INTERNAL MEDICINE
Payer: MEDICARE

## 2024-12-11 DIAGNOSIS — M32.19 OTHER SYSTEMIC LUPUS ERYTHEMATOSUS WITH OTHER ORGAN INVOLVEMENT: ICD-10-CM

## 2024-12-11 DIAGNOSIS — K92.1 BLOOD IN STOOL: ICD-10-CM

## 2024-12-11 DIAGNOSIS — D50.9 IRON DEFICIENCY ANEMIA, UNSPECIFIED: ICD-10-CM

## 2024-12-11 DIAGNOSIS — K51.00 ULCERATIVE (CHRONIC) ENTEROCOLITIS: ICD-10-CM

## 2024-12-11 DIAGNOSIS — L71.0 PERIORIFICIAL DERMATITIS: Primary | ICD-10-CM

## 2024-12-11 DIAGNOSIS — M35.9 UNDIFFERENTIATED CONNECTIVE TISSUE DISEASE: ICD-10-CM

## 2024-12-11 DIAGNOSIS — M35.2 BEHCET'S SYNDROME INVOLVING ORAL MUCOSA: ICD-10-CM

## 2024-12-11 DIAGNOSIS — L30.9 DERMATITIS: ICD-10-CM

## 2024-12-11 LAB
ALBUMIN SERPL BCP-MCNC: 4 G/DL (ref 3.5–5.2)
ALP SERPL-CCNC: 66 U/L (ref 40–150)
ALT SERPL W/O P-5'-P-CCNC: 18 U/L (ref 10–44)
ANION GAP SERPL CALC-SCNC: 7 MMOL/L (ref 8–16)
AST SERPL-CCNC: 33 U/L (ref 10–40)
BASOPHILS # BLD AUTO: 0.08 K/UL (ref 0–0.2)
BASOPHILS NFR BLD: 0.8 % (ref 0–1.9)
BILIRUB SERPL-MCNC: 0.4 MG/DL (ref 0.1–1)
BUN SERPL-MCNC: 11 MG/DL (ref 8–23)
CALCIUM SERPL-MCNC: 9.3 MG/DL (ref 8.7–10.5)
CHLORIDE SERPL-SCNC: 103 MMOL/L (ref 95–110)
CO2 SERPL-SCNC: 28 MMOL/L (ref 23–29)
CREAT SERPL-MCNC: 0.9 MG/DL (ref 0.5–1.4)
CRP SERPL-MCNC: 1.1 MG/L (ref 0–8.2)
DIFFERENTIAL METHOD BLD: ABNORMAL
EOSINOPHIL # BLD AUTO: 0.2 K/UL (ref 0–0.5)
EOSINOPHIL NFR BLD: 2.3 % (ref 0–8)
ERYTHROCYTE [DISTWIDTH] IN BLOOD BY AUTOMATED COUNT: 14.7 % (ref 11.5–14.5)
ERYTHROCYTE [SEDIMENTATION RATE] IN BLOOD BY WESTERGREN METHOD: 7 MM/HR (ref 0–20)
EST. GFR  (NO RACE VARIABLE): >60 ML/MIN/1.73 M^2
GLUCOSE SERPL-MCNC: 66 MG/DL (ref 70–110)
HCT VFR BLD AUTO: 38.5 % (ref 37–48.5)
HGB BLD-MCNC: 13.1 G/DL (ref 12–16)
IMM GRANULOCYTES # BLD AUTO: 0.16 K/UL (ref 0–0.04)
IMM GRANULOCYTES NFR BLD AUTO: 1.6 % (ref 0–0.5)
LYMPHOCYTES # BLD AUTO: 3.1 K/UL (ref 1–4.8)
LYMPHOCYTES NFR BLD: 32.1 % (ref 18–48)
MCH RBC QN AUTO: 31.6 PG (ref 27–31)
MCHC RBC AUTO-ENTMCNC: 34 G/DL (ref 32–36)
MCV RBC AUTO: 93 FL (ref 82–98)
MONOCYTES # BLD AUTO: 0.8 K/UL (ref 0.3–1)
MONOCYTES NFR BLD: 8.1 % (ref 4–15)
NEUTROPHILS # BLD AUTO: 5.3 K/UL (ref 1.8–7.7)
NEUTROPHILS NFR BLD: 55.1 % (ref 38–73)
NRBC BLD-RTO: 0 /100 WBC
PLATELET # BLD AUTO: 253 K/UL (ref 150–450)
PMV BLD AUTO: 10.8 FL (ref 9.2–12.9)
POTASSIUM SERPL-SCNC: 3.7 MMOL/L (ref 3.5–5.1)
PROT SERPL-MCNC: 7.3 G/DL (ref 6–8.4)
RBC # BLD AUTO: 4.15 M/UL (ref 4–5.4)
SODIUM SERPL-SCNC: 138 MMOL/L (ref 136–145)
WBC # BLD AUTO: 9.71 K/UL (ref 3.9–12.7)

## 2024-12-11 PROCEDURE — 36415 COLL VENOUS BLD VENIPUNCTURE: CPT | Mod: PO | Performed by: INTERNAL MEDICINE

## 2024-12-11 PROCEDURE — 85651 RBC SED RATE NONAUTOMATED: CPT | Mod: PO | Performed by: INTERNAL MEDICINE

## 2024-12-11 PROCEDURE — 99999 PR PBB SHADOW E&M-EST. PATIENT-LVL IV: CPT | Mod: PBBFAC,,, | Performed by: STUDENT IN AN ORGANIZED HEALTH CARE EDUCATION/TRAINING PROGRAM

## 2024-12-11 PROCEDURE — 99214 OFFICE O/P EST MOD 30 MIN: CPT | Mod: S$PBB,,, | Performed by: STUDENT IN AN ORGANIZED HEALTH CARE EDUCATION/TRAINING PROGRAM

## 2024-12-11 PROCEDURE — G2211 COMPLEX E/M VISIT ADD ON: HCPCS | Mod: S$PBB,,, | Performed by: STUDENT IN AN ORGANIZED HEALTH CARE EDUCATION/TRAINING PROGRAM

## 2024-12-11 PROCEDURE — 86140 C-REACTIVE PROTEIN: CPT | Performed by: INTERNAL MEDICINE

## 2024-12-11 PROCEDURE — 99214 OFFICE O/P EST MOD 30 MIN: CPT | Mod: PBBFAC | Performed by: STUDENT IN AN ORGANIZED HEALTH CARE EDUCATION/TRAINING PROGRAM

## 2024-12-11 PROCEDURE — 85025 COMPLETE CBC W/AUTO DIFF WBC: CPT | Mod: PO | Performed by: INTERNAL MEDICINE

## 2024-12-11 PROCEDURE — 80053 COMPREHEN METABOLIC PANEL: CPT | Performed by: INTERNAL MEDICINE

## 2024-12-11 RX ORDER — METRONIDAZOLE 7.5 MG/G
CREAM TOPICAL 2 TIMES DAILY
Qty: 45 G | Refills: 1 | Status: SHIPPED | OUTPATIENT
Start: 2024-12-11 | End: 2025-12-11

## 2024-12-11 RX ORDER — CLOBETASOL PROPIONATE 0.5 MG/G
OINTMENT TOPICAL 2 TIMES DAILY
Qty: 60 G | Refills: 1 | Status: SHIPPED | OUTPATIENT
Start: 2024-12-11

## 2024-12-11 RX ORDER — DESONIDE 0.5 MG/G
CREAM TOPICAL 2 TIMES DAILY
Qty: 60 G | Refills: 1 | Status: SHIPPED | OUTPATIENT
Start: 2024-12-11

## 2024-12-11 NOTE — PROGRESS NOTES
Subjective:       Patient ID:  Yodit Canseco is a 64 y.o. female who presents for   Chief Complaint   Patient presents with    Medication Refill     History of Present Illness: The patient presents for follow up of a rash on the face and on the feet, last seen on 8/15/24. Patient has been using desonide on the face, which helps keep symptoms controlled as long as she uses the cream; if she misses any time, symptoms of redness, bumpiness and irritation return on the face around the nose and mouth. In addition, she has been using clobetasol on the feet which has helped wit his rash. Patient currently on Rinvoq. Being managed by GI.       Medication Refill        Review of Systems   Constitutional:  Negative for fever, chills and fatigue.   Skin:  Positive for itching and rash.        Objective:    Physical Exam       Diagram Legend     Erythematous scaling macule/papule c/w actinic keratosis       Vascular papule c/w angioma      Pigmented verrucoid papule/plaque c/w seborrheic keratosis      Yellow umbilicated papule c/w sebaceous hyperplasia      Irregularly shaped tan macule c/w lentigo     1-2 mm smooth white papules consistent with Milia      Movable subcutaneous cyst with punctum c/w epidermal inclusion cyst      Subcutaneous movable cyst c/w pilar cyst      Firm pink to brown papule c/w dermatofibroma      Pedunculated fleshy papule(s) c/w skin tag(s)      Evenly pigmented macule c/w junctional nevus     Mildly variegated pigmented, slightly irregular-bordered macule c/w mildly atypical nevus      Flesh colored to evenly pigmented papule c/w intradermal nevus       Pink pearly papule/plaque c/w basal cell carcinoma      Erythematous hyperkeratotic cursted plaque c/w SCC      Surgical scar with no sign of skin cancer recurrence      Open and closed comedones      Inflammatory papules and pustules      Verrucoid papule consistent consistent with wart     Erythematous eczematous patches and plaques      Dystrophic onycholytic nail with subungual debris c/w onychomycosis     Umbilicated papule    Erythematous-base heme-crusted tan verrucoid plaque consistent with inflamed seborrheic keratosis     Erythematous Silvery Scaling Plaque c/w Psoriasis     See annotation      Assessment / Plan:        Periorificial dermatitis - counseled patient to decrease amount of desonide using on the face. Will switch to topical metronidazole.   -     desonide (DESOWEN) 0.05 % cream; Apply topically 2 (two) times daily.  Dispense: 60 g; Refill: 1  -     metronidazole 0.75% (METROCREAM) 0.75 % Crea; Apply topically 2 (two) times daily.  Dispense: 45 g; Refill: 1    Dermatitis - feet/lower legs. Doing well on topical clobetasol. Will continue at this time. Anticipate improvement with longer duration of Rinvoq along with topicals.   -     clobetasol 0.05% (TEMOVATE) 0.05 % Oint; Apply topically 2 (two) times daily.  Dispense: 60 g; Refill: 1             Follow up in about 6 months (around 6/11/2025).

## 2024-12-16 ENCOUNTER — OFFICE VISIT (OUTPATIENT)
Dept: RHEUMATOLOGY | Facility: CLINIC | Age: 64
End: 2024-12-16
Payer: MEDICARE

## 2024-12-16 VITALS
HEART RATE: 75 BPM | BODY MASS INDEX: 35.54 KG/M2 | WEIGHT: 193.13 LBS | HEIGHT: 62 IN | DIASTOLIC BLOOD PRESSURE: 85 MMHG | SYSTOLIC BLOOD PRESSURE: 133 MMHG

## 2024-12-16 DIAGNOSIS — M35.2 BEHCET'S SYNDROME INVOLVING ORAL MUCOSA: Primary | ICD-10-CM

## 2024-12-16 DIAGNOSIS — Z51.81 ENCOUNTER FOR MEDICATION MONITORING: ICD-10-CM

## 2024-12-16 DIAGNOSIS — Z79.899 DRUG-INDUCED IMMUNODEFICIENCY: ICD-10-CM

## 2024-12-16 DIAGNOSIS — M79.7 FIBROMYALGIA: ICD-10-CM

## 2024-12-16 DIAGNOSIS — M35.00 SICCA SYNDROME: ICD-10-CM

## 2024-12-16 DIAGNOSIS — D84.821 DRUG-INDUCED IMMUNODEFICIENCY: ICD-10-CM

## 2024-12-16 PROCEDURE — 99215 OFFICE O/P EST HI 40 MIN: CPT | Mod: PBBFAC,PO | Performed by: INTERNAL MEDICINE

## 2024-12-16 PROCEDURE — 99999 PR PBB SHADOW E&M-EST. PATIENT-LVL V: CPT | Mod: PBBFAC,,, | Performed by: INTERNAL MEDICINE

## 2024-12-16 PROCEDURE — G2211 COMPLEX E/M VISIT ADD ON: HCPCS | Mod: S$PBB,,, | Performed by: INTERNAL MEDICINE

## 2024-12-16 PROCEDURE — 99214 OFFICE O/P EST MOD 30 MIN: CPT | Mod: S$PBB,,, | Performed by: INTERNAL MEDICINE

## 2024-12-16 RX ORDER — VANCOMYCIN HYDROCHLORIDE 125 MG/1
10 CAPSULE ORAL
COMMUNITY
Start: 2024-12-10

## 2024-12-16 RX ORDER — AMITRIPTYLINE HYDROCHLORIDE 25 MG/1
1 TABLET, FILM COATED ORAL NIGHTLY
COMMUNITY
Start: 2024-11-06

## 2024-12-16 RX ORDER — PILOCARPINE HYDROCHLORIDE 5 MG/1
5 TABLET, FILM COATED ORAL 2 TIMES DAILY
Qty: 60 TABLET | Refills: 11 | Status: SHIPPED | OUTPATIENT
Start: 2024-12-16

## 2024-12-16 NOTE — PROGRESS NOTES
RHEUMATOLOGY CLINIC FOLLOW UP VISIT  Chief complaints, HPI, ROS, EXAM, Assessment & Plans:-  Yodit Nickersons a 64 y.o. pleasant female comes in for follow-up visit.  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  Gastroenterologist recently started on Rinvoq after she had another flare while taking Xeljanz Entyvio combination.  For inflammatory arthritis she was started on Azulfidine.  Reports significant improvement of arthritis on this combination.  No significant flares of mucosal ulcer other than 1 episode of gum ulcer few weeks ago.  Her dentist found that she has severe oral dryness which may also be contributing to her gum recession.    Rheumatological review of system negative otherwise.  Exam shows few soft tissue tender points..  No synovitis, dactylitis, enthesitis or effusion.  Mild tenderness of bilateral PIP and MCP joints including D IP joints.  1. Behcet's syndrome involving oral mucosa    2. Fibromyalgia    3. Drug-induced immunodeficiency    4. Encounter for medication monitoring    5. Sicca syndrome      Problem List Items Addressed This Visit       Behcet's syndrome involving oral mucosa - Primary    Drug-induced immunodeficiency    Encounter for medication monitoring    Fibromyalgia    Sicca syndrome    Relevant Medications    pilocarpine (SALAGEN) 5 MG Tab       Labs reviewed today:-     Latest Reference Range & Units 12/11/24 11:42   WBC 3.90 - 12.70 K/uL 9.71   RBC 4.00 - 5.40 M/uL 4.15   Hemoglobin 12.0 - 16.0 g/dL 13.1   Hematocrit 37.0 - 48.5 % 38.5   MCV 82 - 98 fL 93   MCH 27.0 - 31.0 pg 31.6 (H)   MCHC 32.0 - 36.0 g/dL 34.0   RDW 11.5 - 14.5 % 14.7 (H)   Platelet Count 150 - 450 K/uL 253   MPV 9.2 - 12.9 fL 10.8   Gran % 38.0 - 73.0 % 55.1   Lymph % 18.0 - 48.0 % 32.1   Mono % 4.0 - 15.0 % 8.1   Eos % 0.0 - 8.0 % 2.3   Basophil % 0.0 - 1.9 % 0.8   Immature Granulocytes 0.0 - 0.5 % 1.6 (H)   Gran # (ANC) 1.8 - 7.7 K/uL 5.3    Lymph # 1.0 - 4.8 K/uL 3.1   Mono # 0.3 - 1.0 K/uL 0.8   Eos # 0.0 - 0.5 K/uL 0.2   Baso # 0.00 - 0.20 K/uL 0.08   Immature Grans (Abs) 0.00 - 0.04 K/uL 0.16 (H)   nRBC 0 /100 WBC 0   Differential Method  Automated   Sed Rate 0 - 20 mm/Hr 7   Sodium 136 - 145 mmol/L 138   Potassium 3.5 - 5.1 mmol/L 3.7   Chloride 95 - 110 mmol/L 103   CO2 23 - 29 mmol/L 28   Anion Gap 8 - 16 mmol/L 7 (L)   BUN 8 - 23 mg/dL 11   Creatinine 0.5 - 1.4 mg/dL 0.9   eGFR >60 mL/min/1.73 m^2 >60.0   Glucose 70 - 110 mg/dL 66 (L)   Calcium 8.7 - 10.5 mg/dL 9.3   ALP 40 - 150 U/L 66   PROTEIN TOTAL 6.0 - 8.4 g/dL 7.3   Albumin 3.5 - 5.2 g/dL 4.0   BILIRUBIN TOTAL 0.1 - 1.0 mg/dL 0.4   AST 10 - 40 U/L 33   ALT 10 - 44 U/L 18   CRP 0.0 - 8.2 mg/L 1.1   (H): Data is abnormally high  (L): Data is abnormally low     Latest Reference Range & Units 05/23/22 13:36   FABY Screen Negative <1:80  Positive !   FABY Titer 1  5120   FABY PATTERN 1  Speckled   ds DNA Ab Negative 1:10  Negative 1:10   Anti-SSA Antibody 0.00 - 0.99 Ratio 0.06   Anti-SSA Interpretation Negative  Negative   Anti-SSB Antibody 0.00 - 0.99 Ratio 0.08   Anti-SSB Interpretation Negative  Negative   Anti Sm Antibody 0.00 - 0.99 Ratio 0.08   Anti-Sm Interpretation Negative  Negative   Anti Sm/RNP Antibody 0.00 - 0.99 Ratio 0.14   Anti-Sm/RNP Interpretation Negative  Negative   Complement (C-3) 50 - 180 mg/dL 125   Complement (C-4) 11 - 44 mg/dL 25   !: Data is abnormal      Significant improvement of inflammatory arthritis on Rinvoq in addition to Azulfidine started last visit.   Failed multiple medications in the past including Entyvio, Xeljanz and Hizentra.  Continue.  Severe  fibromyalgia.  Continue Lyrica.  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 Monitor carefully for infections. Advised to get immediate medical care if any infection. Also advised strict adherence to age appropriate vaccinations and cancer screenings with PCP.  Hold sulfasalazine if  any infection  Safety labs every 12 weeks  I have explained all of the above in detail and the patient understands all of the current recommendation(s). I have answered all questions to the best of my ability and to their complete satisfaction.      Follow up in about 6 months (around 6/16/2025).      Disclaimer: This note was prepared using voice recognition system and is likely to have sound alike errors and is not proof read.  Please call me with any questions.    Visit today included increased complexity associated with the care of the episodic problem Behcet's disease, fibromyalgia addressed and managing the longitudinal care of the patient due to the serious and/or complex managed problem(s)- Drug induced immunodeficiency, Medication monitoring encounter.

## 2024-12-21 DIAGNOSIS — L65.9 HAIR LOSS: ICD-10-CM

## 2024-12-31 RX ORDER — MINOXIDIL 2.5 MG/1
TABLET ORAL
Qty: 23 TABLET | Refills: 3 | Status: SHIPPED | OUTPATIENT
Start: 2024-12-31

## 2025-01-07 ENCOUNTER — LAB VISIT (OUTPATIENT)
Dept: LAB | Facility: HOSPITAL | Age: 65
End: 2025-01-07
Attending: STUDENT IN AN ORGANIZED HEALTH CARE EDUCATION/TRAINING PROGRAM
Payer: MEDICARE

## 2025-01-07 DIAGNOSIS — K51.00 ULCERATIVE (CHRONIC) ENTEROCOLITIS: Primary | ICD-10-CM

## 2025-01-07 PROCEDURE — 83993 ASSAY FOR CALPROTECTIN FECAL: CPT | Performed by: STUDENT IN AN ORGANIZED HEALTH CARE EDUCATION/TRAINING PROGRAM

## 2025-01-10 LAB — CALPROTECTIN STL-MCNT: 38.7 MCG/G

## 2025-01-11 DIAGNOSIS — Z79.890 POSTMENOPAUSAL HRT (HORMONE REPLACEMENT THERAPY): ICD-10-CM

## 2025-01-13 RX ORDER — ESTRADIOL 1 MG/1
1 TABLET ORAL
Qty: 90 TABLET | Refills: 0 | Status: SHIPPED | OUTPATIENT
Start: 2025-01-13

## 2025-01-13 NOTE — TELEPHONE ENCOUNTER
Refill Decision Note   Yodit Canseco  is requesting a refill authorization.  Brief Assessment and Rationale for Refill:  Approve     Medication Therapy Plan:         Comments:     Note composed:7:03 AM 01/13/2025

## 2025-01-14 ENCOUNTER — CLINICAL SUPPORT (OUTPATIENT)
Dept: REHABILITATION | Facility: HOSPITAL | Age: 65
End: 2025-01-14
Payer: MEDICARE

## 2025-01-14 DIAGNOSIS — M25.819 SHOULDER IMPINGEMENT: ICD-10-CM

## 2025-01-14 DIAGNOSIS — M67.912 TENDINOPATHY OF LEFT ROTATOR CUFF: ICD-10-CM

## 2025-01-14 DIAGNOSIS — M19.019 OSTEOARTHRITIS OF ACROMIOCLAVICULAR JOINT: Primary | ICD-10-CM

## 2025-01-14 PROCEDURE — 97110 THERAPEUTIC EXERCISES: CPT | Mod: PN | Performed by: PHYSICAL THERAPIST

## 2025-01-14 NOTE — PROGRESS NOTES
[facility]   Physical Therapy Treatment Note      Name: Yodit Canseco  Clinic Number: [id number]    Therapy Diagnosis:   Encounter Diagnoses   Name Primary?    Osteoarthritis of acromioclavicular joint Yes    Tendinopathy of left rotator cuff     Shoulder impingement      Physician: Jose Washington MD    Visit Date: 1/14/2025    Physician Orders: PT Eval and Treat  Medical Diagnosis from Referral: Osteoarthritis of acromioclavicular joint [M19.019]   Tendinopathy of left rotator cuff [M67.912]   Shoulder impingement [M25.819]   Evaluation Date: 12/4/2024  Authorization Period Expiration: 11/11/2025  Plan of Care Expiration: 1/29/2025  Progress Note Due: 1/4/2025  Visit # / Visits authorized: 2/12   FOTO: 1/ 3      Precautions: Standard     Time In: 0105  Time Out: 0200   Total Appointment Time (timed & untimed codes): 55 minutes    PTA Visit #: 0/5     Subjective     Patient reports: no changes since her initial evaluation visit. Took time off with the holidays.     She was compliant with home exercise program.  Response to previous treatment: initial evaluation  Functional change: TBD    Pain: 3/10  Location: left shoulder    Objective      Objective Measures updated at progress report unless specified.     Treatment     Yodit received the treatments listed below:      therapeutic exercises to develop strength, endurance, ROM, flexibility, posture, and core stabilization for (55) minutes including:  Ball flexion stretch 3 min  Pulleys 3 min flexion  UBE 3/3 lvl 1  Resisted rows 13# x20 reps  Prone row 3# x20 reps B  Prone ext 3# x20 reps B  Bilateral ER YTB x20 reps  Horizontal abd YTB x20 reps  Resisted ER x20 reps RTB B  Resisted IR x20 reps RTB B  Wall wipes flexion x30 reps B  Wall wipes lateral x30 reps B  Door push ups x20 reps     Patient Education and Home Exercises       Education provided:   - HEP provided and reviewed  - Anatomy and Physiology pertaining to current  condition  - Possible response to exercise  - Importance of PT compliance    Written Home Exercises Provided: Pt instructed to continue prior HEP. Exercises were reviewed and Yodit was able to demonstrate them prior to the end of the session.  Yodit demonstrated good  understanding of the education provided. See Electronic Medical Record under Patient Instructions for exercises provided during therapy sessions    Assessment     Pt with positive response following PT session and good tolerance to strengthening activities.     Yodit Is progressing well towards her goals.   Patient prognosis is Good.     Patient will continue to benefit from skilled outpatient physical therapy to address the deficits listed in the problem list box on initial evaluation, provide pt/family education and to maximize pt's level of independence in the home and community environment.     Patient's spiritual, cultural and educational needs considered and pt agreeable to plan of care and goals.     Anticipated barriers to physical therapy: none    Goals:  Short Term Goals: In 4 weeks   1.Pt to be educated on HEP. (progressing, not met)  2.Patient to increase GH ROM by 5-10 deg to improve functional mobility. (progressing, not met)  3.Patient to increase strength by 1/2 grade to improve functional tasks. (progressing, not met)  4.Patient to have pain less than 5/10 at all times to improve quality of movement. (progressing, not met)  5.Patient to improve score on the FOTO by 5%. (progressing, not met)  6. Pt to be educated on postural and body mechanics awareness. (progressing, not met)     Long Term Goals: In 8 weeks  1. Patient to improve score on the FOTO to 64. (progressing, not met)  2. Patient to demo increase in UE strength to 5/5 to improve functional tasks. (progressing, not met)  3. Patient to have decreased pain to less than 2/10 at all times to improve quality of movement. (progressing, not met)  4. Patient to demo increase GH  ROM to WNL without pain to improve functional mobility. (progressing, not met)  5. Patient to perform daily activities including recreational activities without limitation. (progressing, not met)    Plan     Continue with PT POC to improve functional mobility.     Alcon Shah, PT, DPT

## 2025-01-16 ENCOUNTER — OFFICE VISIT (OUTPATIENT)
Dept: PULMONOLOGY | Facility: CLINIC | Age: 65
End: 2025-01-16
Payer: MEDICARE

## 2025-01-16 VITALS
HEART RATE: 89 BPM | OXYGEN SATURATION: 98 % | HEIGHT: 62 IN | SYSTOLIC BLOOD PRESSURE: 148 MMHG | BODY MASS INDEX: 35.84 KG/M2 | DIASTOLIC BLOOD PRESSURE: 87 MMHG | WEIGHT: 194.75 LBS

## 2025-01-16 DIAGNOSIS — J41.1 BRONCHITIS, CHRONIC, MUCOPURULENT: ICD-10-CM

## 2025-01-16 DIAGNOSIS — J45.50 SEVERE PERSISTENT ASTHMA WITHOUT COMPLICATION: ICD-10-CM

## 2025-01-16 DIAGNOSIS — J32.9 CHRONIC RECURRENT SINUSITIS: ICD-10-CM

## 2025-01-16 DIAGNOSIS — J45.51 SEVERE PERSISTENT ASTHMA WITH (ACUTE) EXACERBATION: Primary | Chronic | ICD-10-CM

## 2025-01-16 DIAGNOSIS — Z86.19 HISTORY OF INFECTION OF INTESTINE DUE TO CLOSTRIDIUM DIFFICILE: ICD-10-CM

## 2025-01-16 DIAGNOSIS — D84.9 IMMUNE DEFICIENCY DISORDER: ICD-10-CM

## 2025-01-16 DIAGNOSIS — R10.9 ABDOMINAL PAIN, UNSPECIFIED ABDOMINAL LOCATION: ICD-10-CM

## 2025-01-16 PROCEDURE — 99214 OFFICE O/P EST MOD 30 MIN: CPT | Mod: S$PBB,,, | Performed by: INTERNAL MEDICINE

## 2025-01-16 PROCEDURE — 99999 PR PBB SHADOW E&M-EST. PATIENT-LVL III: CPT | Mod: PBBFAC,,, | Performed by: INTERNAL MEDICINE

## 2025-01-16 PROCEDURE — 99213 OFFICE O/P EST LOW 20 MIN: CPT | Mod: PBBFAC,PO | Performed by: INTERNAL MEDICINE

## 2025-01-16 RX ORDER — SODIUM, POTASSIUM,MAG SULFATES 17.5-3.13G
SOLUTION, RECONSTITUTED, ORAL ORAL
COMMUNITY
Start: 2025-01-15

## 2025-01-16 RX ORDER — OSELTAMIVIR PHOSPHATE 75 MG/1
75 CAPSULE ORAL 2 TIMES DAILY
Qty: 10 CAPSULE | Refills: 0 | Status: SHIPPED | OUTPATIENT
Start: 2025-01-16 | End: 2025-01-21

## 2025-01-16 RX ORDER — HYDROMORPHONE HYDROCHLORIDE 2 MG/1
2 TABLET ORAL EVERY 4 HOURS PRN
Qty: 10 TABLET | Refills: 0 | Status: SHIPPED | OUTPATIENT
Start: 2025-01-16 | End: 2025-01-27 | Stop reason: SDUPTHER

## 2025-01-16 RX ORDER — BENZONATATE 200 MG/1
200 CAPSULE ORAL 3 TIMES DAILY PRN
Qty: 90 CAPSULE | Refills: 3 | Status: SHIPPED | OUTPATIENT
Start: 2025-01-16

## 2025-01-16 RX ORDER — VANCOMYCIN HYDROCHLORIDE 125 MG/1
CAPSULE ORAL
Qty: 21 CAPSULE | Refills: 1 | Status: SHIPPED | OUTPATIENT
Start: 2025-01-16 | End: 2025-01-24

## 2025-01-16 RX ORDER — DOXYCYCLINE 100 MG/1
100 CAPSULE ORAL 2 TIMES DAILY
Qty: 28 CAPSULE | Refills: 2 | Status: SHIPPED | OUTPATIENT
Start: 2025-01-16

## 2025-01-16 RX ORDER — TRAMADOL HYDROCHLORIDE 50 MG/1
100 TABLET ORAL EVERY 6 HOURS PRN
Qty: 30 TABLET | Refills: 0 | Status: SHIPPED | OUTPATIENT
Start: 2025-01-16 | End: 2025-01-27 | Stop reason: SDUPTHER

## 2025-01-16 NOTE — PATIENT INSTRUCTIONS
Asthma exacerbating - -with recurrent sinus infection.     Need to treat sinuses to have no symptoms for 5+ days -- use doxy with vancomycin to prevent c diff    Use tramadol for abd pain -- call if more needed..  may give 45 monthly for up to 3 months.  Would limit      Use dialaudid for cough with exacerbation    Use prednisone now , may need up to 3 days of 20 mg -- stop as soon as controlled.  Use trelegy for a month to assure  no  asthma relapse.    Get flu vaccine , use tamiflu  if flu

## 2025-01-16 NOTE — PROGRESS NOTES
1/16/2025    Yodit Canseco  Office Note    Chief Complaint   Patient presents with    Follow-up    Nasal Congestion    Cough       HPI:     1/16/2025 pt had dental wk 12/23 with sinus infection and cleared with doxycycline.  Pt had stomach virus last wk ( 2 days off doxy)with nausea and diarrhea- remitted.   Pt developed wheezes and had left ear discomfort/ringing---red eardrum on left side --   used abuterol but no prednisoen    Yesterday began haivng green mucous from lungs..      10/8/2024-- ulcerative colitis better on [place], on hizentra and no infection, ashtma controlled.    Has sinus pressure   has severe RA.        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    Trelegy use is reasonable as you are doing well.....  5/13/2024 pt developed bronchitis during admit for ulcerative colitis [facility]-  3 days, took doxy for sinus and bronchitis and took course prophylactic  vanc  Pt was off hizentra for a month--  Renvoq oral rx for uc ongoing  Uses trelegy daily  Patient Instructions   May use doxycycline for 7-10 days if sinus/lung infection-- use vancomycin for duration of systemic antibiotic plus additional (5-) 7 days    To minimize need antibiotic needs -- start Hizentra..    Chest xray and ct abd lower lungs were clear from 4/28/2024 April 4, 2024-  lungs getting by , had UC exacerbated with lyrica and now back on tramadol.     No more hemoptysis.  Pt having am congestion  Pt had  increase steroids to 30/ hydrocortine.    Patient Instructions   Double lasix to 40/d  and check blood next wk     May use doxycycline if sinus infection-- low c diff risk.      Will ask staff to arrange virtual visit with  if needed..      1/4/2024 pt was in icu for rotovirus gi infection-- was in icu 4 days, got lots fluid, .. Record reviewed.  Pt was dosed high dose steroids.  Pt was given 7 d  course levaquin with no c diff prevention.    Pt had some hemoptysis froathy blood in mucous    Pt has had green mucous with neg cuilture earlier December,     Pt very weak and had fall in ER checking in.       Pt now off prednisone -- stopped abruptly after leaving hosp.   Pt was weaned off stopping 5 days or so prior to admit.    Pt now very weak .   Pt was on steroids for about a yr.     On trelegy and lungs stable    Patient Instructions   Would re culture mucous -- check for afb and regular germs.  Coritosol check is reasonable -- do cortisol level in am.  May consider replacement.    Ct chest likely low yield -- pattern of hemoptysis sounded more like fluid and sepsis.  Could do ct  later if still bringing up  yellow mucous.  Chest xray viewed..      Edema should mobiliize    C diff may recur -- if diarrhea increase may test..    You are having severe persistent sinus fullness-- antibiotics very risky and just took levaquin.   Would recommend sinus xray - ct would be best.  May need ent   12/5/2023  Pt had exacerbatoin cough and increased UC prednisone from 5/d to 20 mg for last 10 days along with doxy -- off abx and had green mucous this am.  Albuterol helps and dosed tid.    Pt had u/a showing wbc at home - says recurrent uti.  H/o c diff.  Not on controller.      Appetite good nad not weak, no fever.   Patient Instructions   Green mucous after increased medication for exacerbation -- failed doxycycline.  Need culture     Trelegy may stabilize-- once daily    Boost prednisone to 20 /d if cough controlled-- dilaudid sent in for 30  tabs-- call next wk if need more.            Cxr ordered if worsens    Need to re evaluate next wk if not clearing.  5/8/2023 took doxycycline no issues.  Bowels may be better on Entyvio. Lungs doing well.  On Hizentra.  Pt on prednisone 20/d -- on prednisone since November with plans to decrease in near future?   Still having blood in stool so will not taper prednisone til bleed  resolves-- should improve in near future (on 3 rd entyvio now and should be good after 4th)  Patient Instructions   Might consider pneumocystis prophylaxis but antibiotics will increase c diff-- bactrim 2 single strength 3 days a week.  Use vancomycin daily while on bactrim.  Stop bactrim (followed by vancomycin) on prednisone less than 20mg/d.    Bactrim generally has high risk allergies and kidney problems.     12/29/2022 having sinus problems with green mucous nad coughing, right face to chin hurts last 3 days. Had in prior.  May exacerbate lungs as in past.  Started levaquin 2 days ago. Prednisone boosted from 10 to 20/d.  Need pain control and cough suppresion .  Has prednisoen. Not getting [facility]ol [facility]-- not covered on plan??   1/30/2023 chart note after above visit.  Pt has had colitis-- dx c diff recently.   Epic reveals c diff ag but no toxin.    Had green sinus dc-- cleared slight with levaquin but now dx c diff with ag no toxin    Will dc levaquin and instructed not to use.    Doxycycline may be ok -- would avoid abx as much as able.    If sinuses relapse -- doxycycline sent in.    If green lung mucous -- pt told to submit sputum for culture.    11/2/2022 dx lupus, bechets, Ulcerative colitis, RA-- on hizentra.  Pt exposed to grandson with rsv.  Having abd pain -- dilaudid helped and suppressed cough-- not able to use codeine/hydrocodone.     Ct abd and cxr 10/2022 clear lungs.       Pt currently felt to have rsv-- improved over last 5 da   pt was sl febrile initially.   Pt now having green mucous. Cough is violent .  similar sick. Had min leg edema.  On hizentra. Asthma doing wellwith RSV.  Eating ok but weak and coughs a lot.  Breathing good.     Patient Instructions   May use levaquin for green mucous    Rsv should run course-- hirzentra should help?  Breztri may be needed.      Use dilaudid minimally for pain -- should suppress cough    Diflucan for yeast as needed    Maxzide as  needed for edema    Tessalon for cough    Could do phone follow up    Ct abd 10/2022 was clear lower lungs and cxr 10/31/2022 was clear -- lungs not a concern.  Asthma doing well even with rsv.  2022 mom  sept complications from hip fx/covid.  Had few sinus infection-- rx Levaquin/prednisone.  Pt on plaquenil, dx ra and lupus.  Also on colchicine.      Uses breo but skipped.  Uses albuterol once a wk.    Patient Instructions   May use breztri 2 twice daily -- could use more or less as needed.   If used regular -- breztri would act as controller.    May use levaquin and prednisone if sinus/lung exacerbates.    Lungs loook good wrt lung tissue-- no lung tissue disease seen from Rheumatoid disease.   Ct abd 2021 viewed.   2021 - had covid in July, had rapid home test +, managed outpt.  Got infusion rx.  Has wheezes but feels over covid.  Had nausea, vomiting, weakness, headache, -- sick 8 days.  Mom got hip fx complicating recovery-- at rehab. Pt and mom were vaccinated.  Pt on abx for sinus infection  Dx lupus/rheumatoid - 5 mg prednisone daily  Patient Instructions   Recovering covid doing well.    Use levaquin as needed.    Ordered cxr if needed.     2021 had pneumonia 2020 - no problems. Needs disability paper updated.  We review today.    Patient Instructions   We reviewed your disability- need to complete paper work.  You had pneumonia in 2020.  Breo, prednisone, levaquin, albuterol renewed.    10/14/2020- all well.  No abx/prednisone, cxr 10/14/2020 nad- rul pneumonia [facility] cleared.  Avoids irritants.  Patient Instructions   You likely would be best to get high dose flu vaccine.   Your immune system may have problem responding to flu vaccine.  2020- on hirzentra since 2016.  Saw THAO Wood in [place] - virtual visit- azithro and prednisone 10/d x 3 for sinusitis.     Uses   500 bid, breo 200 daily, , xopenex nebs prn, singulair. Needs more prednisone,  had pneumonia 3/4 to 8th.  Had ct chest feb.  Felt had covid symptoms.  No ox needed for pneumonia- no steroids nor bd. Took doxy - vanc in hosp.   Patient Instructions   Pneumonia - follow up chest xray good- not urgent.    Prednisone action plan for asthma/wheeze/cough    May use levaquin for sinus/lung infections  2/10/2020- had shingles vaccine, states felt an immune response.   Cough- improved, daily, not severe, non productive occasionally productive thick dark yellow color. Using nebulizer only as needed. Has noticed fewer asthma exacerbations since starting Hyzentra, went from 1 months to 2x yearly.   Not able to use albuterol rescue inhaler or nebulized due to tachycardia, palpitations, and hand tremors. No complaint with xopenex medications.   Patient Instructions   Continue current Asthma medication regiment  Review of CT shows no areas of concern. The small nodule is not significant  Pulmonary function test shows lungs to still be strong.   Continue to use Xopenex Nebulizer with aerobika attachement once a day three days a week every week.  When your sick you can use nebulizer or Xopenex rescue inhaler four times a day.     1/9/2020- two exacerbations in 2 months, states cough is unchanged, daily, states bark like cough, severe and affects daily life, Productive occasionally clear/cream color thick in consistency, uses nebulizer 2-3 x daily.   Patient Instructions   [employer] sells an Aerobika device, this device shakes the air inside of you to break mucous off the bronchial wall. Use with nebulizer and with out daily.    Continue current Asthma medication regiment     Use nebulizer at least once a day three days a week.     CT to evaluate lungs for any abnormalities       11/8/2019- Had recent Rotator cuff surgery with nerve block Oct 9, Has SOB worse after surgery for 3 days, Took prednisone 20 mg for 3 days and Levaquin for 9 days. Seen by PCP dx URI.   Coughing, chest tightness, congestion, wheezing,  Hoarse voice, fatigue, Shortness of breath worse with exertion, onset following surgery to shoulder. Cough- not able to clear secretions from chest.  Complaint of hand shaking, heart rate increases, and becomes rapid with Albuterol nebulizer onset years. Tries not to use due to side effects.     04/24/2019- breathing is good except for high pollen days, had 3 exacerbation in 6 months. Albuterol rescue inhaler 3x weekly, 1-2 nocturnal arousals monthly, could not do PFT at [facility],     11/6/18- ER visit for dehydration gastroenteritis, no prednisone use in 4 months. Not currently on fasenra injection, insurance would not cover. Currently on Hirzentra IGG therapy.   Sinus drip present, cough occasional, non productive, no nocturnal arousals, Currently on Dulera daily, uses Albuterol rescue inhaler daily before exercise no SOB.  Sepsis [facility] August 2017.  Flu vaccine   July 9, 2018 - had gastric sleeve with leak complication- lost 45 lbs already.  Has appetite but fills quickly.  Still on hirzentra.  Asthma ok avoiding fragrances/ordors.  No prednisone.   No nocturnal arousals, uses rescue weekly avoiding any irritants.  Use prednisone 4-5 last year and twice this year.        March 19, 2018-since recovered from pleuritic pain - doing well.  No prednisone use recent.  Pt has had no wheezes.  feb 22,2108   Had to do prednisone again.  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 I have advised not to work given severe asthma,  hypersensitivity to fragrances, and immune deficiency.   Pt seems better since Settlezentra, but still unstable severe asthma  Jan 29. 2018- 6 days ago had been exposed to sick , had nasal congestion , cough, ear stuffy, muscle aches /joint aches / fever.  Temp to 100.1.  Seen by np and flu screen negative.  Had asthma 3-4 flares last year. eos up past,    oct 5, 2017 - had marce in April, skipped couple doses hirzentra, had mild bronchitis once, otherwise doing very well.  No prednisone.  No side effects and covered. Ppt flu sense for 2 days  Jan 24,2017 on  hirzentra q o week since sept and asthma more stable, no hosp, no prednisone, no noct asthma/ no rescue therapy- control not this good for years.  Sept 27, 2016  HPI:has had lung problems since birth, no nocturnal arousals, uses rescue 2/d, breathing controlled satisfactory except with irritants/infection - strong abx needed to clear.  Prednisone 2/yr, last hosp 18 months.  No ventilator.    Had exacerbation recent due uri from .  Has had shingles vaccine past.       The chief compliant  problem is varies with instablilty at time  PFSH:  Past Medical History:   Diagnosis Date    Allergy     Angio-edema     Arthralgia     Asthma without status asthmaticus     Bronchitis     Cataract     COPD (chronic obstructive pulmonary disease)     COVID-19 virus infection 07/23/2021    Diverticulosis of large intestine without hemorrhage 10/08/2015    Eczema     Environmental allergies     Eosinophilic asthma 03/08/2018    Exacerbation of ulcerative colitis with rectal bleeding     Fibromyalgia 02/19/2024    Gastroparesis     Gram-negative bacteremia 12/29/2023    Hand arthritis     Herpes simplex without mention of complication     oral    Hidradenitis     History of morbid obesity     Hyperlipidemia     Hypothyroidism     Immune deficiency disorder     Intraperitoneal abscess 05/07/2018    LBP radiating to left leg     Leukocytosis, unspecified     Migraine headache     Normocytic anemia 10/05/2022    Obesity, Class III, BMI 40-49.9 (morbid obesity)     Periorbital cellulitis 12/31/2016    Prediabetes     RA (rheumatoid arthritis)     Recurrent upper respiratory infection (URI)     Rotavirus enteritis 12/27/2023    RSV (respiratory syncytial virus infection) 01/30/2023    S/P colonoscopy 11/2010    Sepsis 05/07/2018     Sepsis secondary to colitis 05/07/2018    Systemic lupus erythematosus, organ or system involvement unspecified     Tubular adenoma of colon 08/17/2022    Tubular adenoma of colon 05/01/2024    Ulcerative pancolitis with rectal bleeding 10/17/2022    Urticaria          Past Surgical History:   Procedure Laterality Date    ADENOIDECTOMY      CATARACT EXTRACTION W/  INTRAOCULAR LENS IMPLANT Right 08/21/2024    Panoptix    CATARACT EXTRACTION W/  INTRAOCULAR LENS IMPLANT Left 09/25/2024    Panoptix    CHOLECYSTECTOMY      CHOLECYSTECTOMY      colitis      COLONOSCOPY  2015    repeat in 10    COLONOSCOPY N/A 10/08/2015    Procedure: COLONOSCOPY;  Surgeon: Panda Bose MD;  Location: [facility];  Service: Endoscopy;  Laterality: N/A;    COLONOSCOPY N/A 08/17/2022    Procedure: COLONOSCOPY;  Surgeon: Olaf Del Valle MD;  Location: [facility];  Service: General;  Laterality: N/A;    COLONOSCOPY N/A 10/06/2022    Procedure: COLONOSCOPY;  Surgeon: Quinton Ceelste MD;  Location: [facility];  Service: Gastroenterology;  Laterality: N/A;    COLONOSCOPY N/A 05/01/2024    Procedure: COLONOSCOPY;  Surgeon: Quinton Celeste MD;  Location: [facility];  Service: Endoscopy;  Laterality: N/A;    ESOPHAGOGASTRODUODENOSCOPY N/A 05/01/2024    Procedure: EGD (ESOPHAGOGASTRODUODENOSCOPY);  Surgeon: Quinton Celeste MD;  Location: [facility];  Service: Endoscopy;  Laterality: N/A;    Hand fracture surgery      HARDWARE REMOVAL      left hand 5th [facility]    hymenectomy      HYSTERECTOMY      menorrhagia-Ovaries intact    ROTATOR CUFF REPAIR Right     SLEEVE GASTROPLASTY  04/16/2018    TONSILLECTOMY      Urethral dilatation       Social History     Tobacco Use    Smoking status: Never     Passive exposure: Past    Smokeless tobacco: Never   Substance Use Topics    Alcohol use: Not Currently     Comment: very rarely    Drug use: No     Family History   Problem Relation Name Age of Onset    Melanoma Mother Loyd     Basal cell carcinoma Mother  "Olyd     Lung disease Mother Loyd         pulm htn    Cataracts Mother Loyd     Hypertension Mother Loyd     Lung cancer Father Josh     Diabetes Father Josh     Hypertension Father Josh     Cancer Father Josh     Melanoma Maternal Aunt      Melanoma Maternal Uncle      Diabetes Paternal Aunt Estee     Colon cancer Paternal Aunt Lilliam 40    Diabetes Paternal Aunt Lilliam     Breast cancer Paternal Aunt  40    Lymphoma Paternal Aunt      Cancer Maternal Grandfather Gilmar     Ovarian cancer Paternal Grandmother  40    Ulcerative colitis Son      Psoriasis Son      Psoriasis Son      Breast cancer Cousin Paternal 1st 25    Allergic rhinitis Neg Hx      Allergies Neg Hx      Angioedema Neg Hx      Asthma Neg Hx      Atopy Neg Hx      Eczema Neg Hx      Immunodeficiency Neg Hx      Rhinitis Neg Hx      Urticaria Neg Hx       Review of patient's allergies indicates:   Allergen Reactions    Bromelains Hives     " causes mouth to bleed"    Sudafed [pseudoephedrine hcl] Other (See Comments)     Does not want to wake up .    Amoxicillin-pot clavulanate Itching     Other reaction(s): Itching    Hydrocodone Itching    Iodinated contrast- oral and iv dye      childhood    Iodine and iodide containing products Other (See Comments)    Melon Hives    Pantoprazole Nausea Only     Other reaction(s): upset stomach/halitosis    Percocet [oxycodone-acetaminophen] Itching    Barium iodide Rash    Ephedrine Other (See Comments)     Other reaction(s): comatose    Penicillins      Other reaction(s): does not work on pt symptoms     I have reviewed past medical, family, and social history. I have reviewed previous nurse notes.    Performance Status:The patient's activity level is functions out of house.      Review of Systems   Constitutional: Negative for activity change, appetite change, chills, diaphoresis, fatigue, fever and unexpected weight change.   HENT: Negative for dental problem, sneezing, sore throat, trouble " "swallowing, postnasal drip, rhinorrhea, sinus pressure, sinus pain, and voice change.     Respiratory: Negative for chest tightness, shortness of breath, wheezing.  Positive for cough,   Cardiovascular: Negative for chest pain, palpitations and leg swelling.   Musculoskeletal: Negative for gait problem, myalgias and neck pain.   Skin: Negative for color change and pallor.   Allergic/Immunologic: Negative for environmental allergies and food allergies.   Neurological: Negative for dizziness, speech difficulty, weakness, light-headedness, numbness and headaches.   Hematological: Negative for adenopathy. Does not bruise/bleed easily.   Psychiatric/Behavioral: Negative for dysphoric mood and sleep disturbance. The patient is not nervous/anxious.             Exam:Comprehensive exam done. BP (!) 170/79 (BP Location: Right arm, Patient Position: Sitting)   Pulse (!) 56   Ht 5' 10" (1.778 m)   Wt 119.2 kg (262 lb 10.9 oz)   SpO2 95% Comment: on room air  BMI 37.69 kg/m²   Exam included Vitals as listed, and patient's appearance and affect and alertness and mood, oral exam for yeast and hygiene and pharynx lesions and Mallapatti (M) score, neck with inspection for jvd and masses and thyroid abnormalities and lymph nodes (supraclavicular and infraclavicular nodes and axillary also examined and noted if abn), chest exam included symmetry and effort and fremitus and percussion and auscultation, cardiac exam included rhythm and gallops and murmur and rubs and jvd and edema, abdominal exam for mass and hepatosplenomegaly and tenderness and hernias and bowel sounds, Musculoskeletal exam with muscle tone and posture and mobility/gait and  strength, and skin for rashes and cyanosis and pallor and turgor, extremity for clubbing.  Findings were normal except for pertinent findings listed below:  M3, chest is symmetric, no distress, normal percussion, normal fremitus and good normal breath sounds        Radiographs (ct chest " and cxr) reviewed: results reviewed by direct vision  CT Chest Without Contrast 2/10/2020  Normal exam, 3.3 mm nodule seen in right upper lobe.    Lungs are clear.  Minimal retained mucus secretions in the trachea.  No pleural effusion or pneumothorax.  Normal sized heart.  Thyroid is small and heterogeneous with a subcentimeter nodule or cyst in the right lobe.     XR CHEST 1 VIEW 05/10/2018   There is left lower lobe atelectasis and possible small left pleural effusion on this study.  No pneumothorax is seen.      Labs reviewed       Lab Results   Component Value Date    WBC 9.71 12/11/2024    RBC 4.15 12/11/2024    HGB 13.1 12/11/2024    HCT 38.5 12/11/2024    MCV 93 12/11/2024    MCH 31.6 (H) 12/11/2024    MCHC 34.0 12/11/2024    RDW 14.7 (H) 12/11/2024     12/11/2024    MPV 10.8 12/11/2024    GRAN 5.3 12/11/2024    GRAN 55.1 12/11/2024    LYMPH 3.1 12/11/2024    LYMPH 32.1 12/11/2024    MONO 0.8 12/11/2024    MONO 8.1 12/11/2024    EOS 0.2 12/11/2024    BASO 0.08 12/11/2024    EOSINOPHIL 2.3 12/11/2024    BASOPHIL 0.8 12/11/2024       PFT reviewed  2/10/2020 no obstruction seen, 7% bronchodilator response with 12% needed for clinical improvement; TLC 86% with DLC0 at 88%, normal exam with mild asthma  Spirometry bronchodilator, lung volume by gas dilution, diffusion capacity measured February 10, 2020.  The FEV1 to FVC ratio was 79%, there is no airflow obstruction measured by spirometry technique.  The FEV1 measured 85% predicted at 1.97 L. The 8%   improvement in FEV1 failed to reach statistical significance ( 12% is needed for statistical significance ).  Lung volumes by gas dilution were normal.  Diffusion was normal.      Spirometry, lung volumes, diffusion are all normal.  The bronchodilator response was not statistically significant.  Clinical correlation recommended.     Plan:  Clinical impression is apparently straight forward and impression with management as below.    Yodit was seen today  for follow-up, nasal congestion and cough.    Diagnoses and all orders for this visit:    Severe persistent asthma with (acute) exacerbation  -     traMADoL (ULTRAM) 50 mg tablet; Take 2 tablets (100 mg total) by mouth every 6 (six) hours as needed for Pain.  -     HYDROmorphone (DILAUDID) 2 MG tablet; Take 1 tablet (2 mg total) by mouth every 4 (four) hours as needed (cough).  -     benzonatate (TESSALON) 200 MG capsule; Take 1 capsule (200 mg total) by mouth 3 (three) times daily as needed for Cough.    Chronic recurrent sinusitis  -     doxycycline (VIBRAMYCIN) 100 MG Cap; Take 1 capsule (100 mg total) by mouth 2 (two) times daily.    Abdominal pain, unspecified abdominal location  -     traMADoL (ULTRAM) 50 mg tablet; Take 2 tablets (100 mg total) by mouth every 6 (six) hours as needed for Pain.    Bronchitis, chronic, mucopurulent    Immune deficiency disorder  -     doxycycline (VIBRAMYCIN) 100 MG Cap; Take 1 capsule (100 mg total) by mouth 2 (two) times daily.    Severe persistent asthma without complication  -     oseltamivir (TAMIFLU) 75 MG capsule; Take 1 capsule (75 mg total) by mouth 2 (two) times daily. for 5 days    History of infection of intestine due to Clostridium difficile  -     vancomycin (VANCOCIN) 125 MG capsule; Take while on antibiotic and  for additional 3 days                        Follow up in about 6 months (around 7/16/2025).    Discussed with patient above for education the following:      Patient Instructions   Asthma exacerbating - -with recurrent sinus infection.     Need to treat sinuses to have no symptoms for 5+ days -- use doxy with vancomycin to prevent c diff    Use tramadol for abd pain -- call if more needed..  may give 45 monthly for up to 3 months.  Would limit      Use dialaudid for cough with exacerbation    Use prednisone now , may need up to 3 days of 20 mg -- stop as soon as controlled.  Use trelegy for a month to assure  no  asthma relapse.    Get flu vaccine , use  tamiflu  if flu        Evaluation took 34 minutes

## 2025-01-19 ENCOUNTER — E-VISIT (OUTPATIENT)
Facility: CLINIC | Age: 65
End: 2025-01-19
Payer: MEDICARE

## 2025-01-19 ENCOUNTER — NURSE TRIAGE (OUTPATIENT)
Dept: ADMINISTRATIVE | Facility: CLINIC | Age: 65
End: 2025-01-19
Payer: MEDICARE

## 2025-01-19 DIAGNOSIS — R19.7 DIARRHEA, UNSPECIFIED TYPE: Primary | ICD-10-CM

## 2025-01-19 PROCEDURE — 99421 OL DIG E/M SVC 5-10 MIN: CPT | Mod: ,,, | Performed by: EMERGENCY MEDICINE

## 2025-01-19 RX ORDER — DIPHENOXYLATE HYDROCHLORIDE AND ATROPINE SULFATE 2.5; .025 MG/1; MG/1
1 TABLET ORAL 4 TIMES DAILY PRN
Qty: 12 TABLET | Refills: 0 | Status: SHIPPED | OUTPATIENT
Start: 2025-01-19 | End: 2025-01-29

## 2025-01-19 NOTE — TELEPHONE ENCOUNTER
Pt's  reports pt was dx with bronchitis recently and has been taking antibiotics. Pt's  reports diarrhea that began last night and has persisted today/ pt reports 6 episodes of diarrhea today. Pt has hx of c. Diff. Pt reports constant 2/10 abdominal pain. Denies fever, decreased urination, blood in stool, weakness, N/V. Care advice to see physician within 4 hours or PCP triage. Secure chat sent to [facility]. MD Leland verbalized OTC imodium as directed for diarrhea. Diarrhea may be related to antibiotic use or viral illness. However with c diff in the past, history of ulcerative colitis and an admission one year ago for dehydration due to diarrheal illness- recommend ER if diarrhea continues and there is concern for dehydration, blood in diarrhea, increasing fever, or worsening abdominal pain. Pt made aware and verbalized she has been taking imodium with no relief. MD made aware and verbalized Wait and se what happens in the next 2-3 days because one day of diarrhea is not concerning in itself. Try the lomotil instead of the imodium with an evisit. Pt made aware and verbalizes understanding. E visit sent to pt.    Reason for Disposition   [1] Constant abdominal pain AND [2] present > 2 hours    Additional Information   Negative: Shock suspected (e.g., cold/pale/clammy skin, too weak to stand, low BP, rapid pulse)   Negative: Difficult to awaken or acting confused (e.g., disoriented, slurred speech)   Negative: Sounds like a life-threatening emergency to the triager   Negative: SEVERE abdominal pain (e.g., excruciating)   Negative: [1] Blood in the stool AND [2] moderate or large amount of blood (e.g., any blood clots, passing blood without stool, toilet water turns red)   Negative: Black or tarry bowel movements  (Exception: Chronic-unchanged black-grey BMs AND is taking iron pills or Pepto-Bismol.)   Negative: [1] Drinking very little AND [2] dehydration suspected (e.g., no urine > 12 hours, very dry  mouth, very lightheaded)   Negative: Patient sounds very sick or weak to the triager   Negative: SEVERE diarrhea (e.g., 7 or more times / day more than normal)    Protocols used: Diarrhea on Antibiotics-A-AH

## 2025-01-19 NOTE — PROGRESS NOTES
[facility] Emergency Department Plan of Care Note    Referral source: Nurse On-Call      Reason for consult: Diarrhea      Additional History: Pt's  reports pt was dx with bronchitis recently and has been taking antibiotics. Pt's  reports diarrhea that began last night and has persisted today/ pt reports 6 episodes of diarrhea today. Pt has hx of c. Diff. Pt reports constant 2/10 abdominal pain. Denies fever, decreased urination, blood in stool, weakness, N/V. Care advice to see physician within 4 hours or PCP triage. what is your care advice?       Disposition recommended: Treat in place      Additional Recommendations: Lomotil    Diarrhea may be related to antibiotic use or viral illness. However with c diff in the past, history of ulcerative colitis and an admission one year ago for dehydration due to diarrheal illness- recommend ER if diarrhea continues and there is concern for dehydration, blood in diarrhea, increasing fever, or worsening abdominal pain

## 2025-01-21 ENCOUNTER — NURSE TRIAGE (OUTPATIENT)
Dept: ADMINISTRATIVE | Facility: CLINIC | Age: 65
End: 2025-01-21
Payer: MEDICARE

## 2025-01-21 NOTE — TELEPHONE ENCOUNTER
Doxy and van, UC and bronchitis. Diarrhea x 3 days. Pt has had 10 episodes of diarrhea within 24 hour. Pt was previously advised to go to ED if symptoms worsen or continue. Caller denies being seen in ED as recommended.  Pt advised to see provider within 4 hours per protocol and verbalized understanding.   Reason for Disposition   SEVERE diarrhea (e.g., 7 or more times / day more than normal)    Additional Information   Negative: Shock suspected (e.g., cold/pale/clammy skin, too weak to stand, low BP, rapid pulse)   Negative: Difficult to awaken or acting confused (e.g., disoriented, slurred speech)   Negative: Sounds like a life-threatening emergency to the triager   Negative: [1] Blood in the stool AND [2] moderate or large amount of blood (e.g., any blood clots, passing blood without stool, toilet water turns red)   Negative: Black or tarry bowel movements  (Exception: Chronic-unchanged black-grey BMs AND is taking iron pills or Pepto-Bismol.)   Negative: SEVERE abdominal pain (e.g., excruciating)   Negative: [1] Drinking very little AND [2] dehydration suspected (e.g., no urine > 12 hours, very dry mouth, very lightheaded)   Negative: Patient sounds very sick or weak to the triager    Protocols used: Diarrhea on Antibiotics-A-

## 2025-01-22 NOTE — TELEPHONE ENCOUNTER
Pt  calling, states pt was seen in ER today, states pt continues to go to restroom. Pt reports dry cracked lips, states putting out more than taking in for fluids, no food. Pt reports very tired, wants to sleep. Pt also reports taking more than prescribed medication to stop the diarrhea.  upset that pt was not kept at hospital for observation vs being discharged home. Per protocol, go to ED now. Also discussed using [employer]. Pt verbalizes understanding and advised to call back for any further questions or concerns.   Reason for Disposition   [1] Drinking very little AND [2] dehydration suspected (e.g., no urine > 12 hours, very dry mouth, very lightheaded)    Additional Information   Negative: Shock suspected (e.g., cold/pale/clammy skin, too weak to stand, low BP, rapid pulse)   Negative: Difficult to awaken or acting confused (e.g., disoriented, slurred speech)   Negative: Sounds like a life-threatening emergency to the triager   Negative: [1] SEVERE abdominal pain (e.g., excruciating) AND [2] present > 1 hour   Negative: [1] SEVERE abdominal pain AND [2] age > 60 years   Negative: [1] Blood in the stool AND [2] moderate or large amount of blood   Negative: Black or tarry bowel movements  (Exception: Chronic-unchanged black-grey BMs AND is taking iron pills or Pepto-Bismol.)    Protocols used: Diarrhea-A-

## 2025-01-23 ENCOUNTER — PATIENT MESSAGE (OUTPATIENT)
Dept: PULMONOLOGY | Facility: CLINIC | Age: 65
End: 2025-01-23
Payer: MEDICARE

## 2025-01-24 RX ORDER — TRIAMTERENE/HYDROCHLOROTHIAZID 37.5-25 MG
1 TABLET ORAL DAILY PRN
Qty: 90 TABLET | Refills: 0 | Status: SHIPPED | OUTPATIENT
Start: 2025-01-24

## 2025-01-24 RX ORDER — VANCOMYCIN HYDROCHLORIDE 125 MG/1
125 CAPSULE ORAL DAILY
Qty: 21 CAPSULE | Refills: 1 | Status: SHIPPED | OUTPATIENT
Start: 2025-01-24

## 2025-01-27 ENCOUNTER — OFFICE VISIT (OUTPATIENT)
Dept: PULMONOLOGY | Facility: CLINIC | Age: 65
End: 2025-01-27
Payer: MEDICARE

## 2025-01-27 ENCOUNTER — HOSPITAL ENCOUNTER (OUTPATIENT)
Dept: RADIOLOGY | Facility: HOSPITAL | Age: 65
Discharge: HOME | End: 2025-01-27
Attending: INTERNAL MEDICINE
Payer: MEDICARE

## 2025-01-27 VITALS
OXYGEN SATURATION: 95 % | DIASTOLIC BLOOD PRESSURE: 72 MMHG | WEIGHT: 188.69 LBS | SYSTOLIC BLOOD PRESSURE: 142 MMHG | HEIGHT: 62 IN | HEART RATE: 65 BPM | BODY MASS INDEX: 34.72 KG/M2

## 2025-01-27 DIAGNOSIS — J45.50 SEVERE PERSISTENT ASTHMA WITHOUT COMPLICATION: Primary | ICD-10-CM

## 2025-01-27 DIAGNOSIS — M06.9 RHEUMATOID ARTHRITIS, INVOLVING UNSPECIFIED SITE, UNSPECIFIED WHETHER RHEUMATOID FACTOR PRESENT: ICD-10-CM

## 2025-01-27 DIAGNOSIS — J18.9 COMMUNITY ACQUIRED PNEUMONIA OF LEFT LOWER LOBE OF LUNG: ICD-10-CM

## 2025-01-27 DIAGNOSIS — Z86.19 HISTORY OF INFECTION OF INTESTINE DUE TO CLOSTRIDIUM DIFFICILE: ICD-10-CM

## 2025-01-27 DIAGNOSIS — J45.51 SEVERE PERSISTENT ASTHMA WITH (ACUTE) EXACERBATION: Chronic | ICD-10-CM

## 2025-01-27 DIAGNOSIS — D84.9 IMMUNE DEFICIENCY DISORDER: ICD-10-CM

## 2025-01-27 DIAGNOSIS — R10.9 ABDOMINAL PAIN, UNSPECIFIED ABDOMINAL LOCATION: ICD-10-CM

## 2025-01-27 PROCEDURE — 71046 X-RAY EXAM CHEST 2 VIEWS: CPT | Mod: TC

## 2025-01-27 PROCEDURE — 71046 X-RAY EXAM CHEST 2 VIEWS: CPT | Mod: 26,,, | Performed by: RADIOLOGY

## 2025-01-27 PROCEDURE — 99215 OFFICE O/P EST HI 40 MIN: CPT | Mod: PBBFAC,PO | Performed by: INTERNAL MEDICINE

## 2025-01-27 PROCEDURE — 99999 PR PBB SHADOW E&M-EST. PATIENT-LVL V: CPT | Mod: PBBFAC,,, | Performed by: INTERNAL MEDICINE

## 2025-01-27 PROCEDURE — 99215 OFFICE O/P EST HI 40 MIN: CPT | Mod: S$PBB,,, | Performed by: INTERNAL MEDICINE

## 2025-01-27 RX ORDER — TRAMADOL HYDROCHLORIDE 50 MG/1
50 TABLET ORAL EVERY 6 HOURS PRN
Qty: 60 EACH | Refills: 0 | Status: SHIPPED | OUTPATIENT
Start: 2025-03-24

## 2025-01-27 RX ORDER — HYDROMORPHONE HYDROCHLORIDE 2 MG/1
2 TABLET ORAL EVERY 4 HOURS PRN
Qty: 20 TABLET | Refills: 0 | Status: SHIPPED | OUTPATIENT
Start: 2025-01-27

## 2025-01-27 RX ORDER — TRAMADOL HYDROCHLORIDE 50 MG/1
100 TABLET ORAL EVERY 6 HOURS PRN
Qty: 60 TABLET | Refills: 0 | Status: SHIPPED | OUTPATIENT
Start: 2025-01-27

## 2025-01-27 RX ORDER — TRAMADOL HYDROCHLORIDE 50 MG/1
50 TABLET ORAL 3 TIMES DAILY
Qty: 60 EACH | Refills: 0 | Status: SHIPPED | OUTPATIENT
Start: 2025-02-24

## 2025-01-27 RX ORDER — NICOTINE POLACRILEX 2 MG
1 GUM BUCCAL
COMMUNITY
End: 2025-02-03

## 2025-01-27 NOTE — PATIENT INSTRUCTIONS
Left  lower lung still abnormal on exam,  chest xray reasonable to assure not much worse      Need sputum culture    May need more antibiotic ?      Will renew dilaudid for cough suppression.  Cough still very severe and pneumonia seem lingering      You should dose hirzentra asap as may help clear pneumonia      We discuss tramadol -- you use to stay mobile with chr ra and gut pains-- will give 60/m , will need pain contract next visit.        Follow up next wk if problem  otherwise April.

## 2025-01-27 NOTE — PROGRESS NOTES
1/27/2025    Yodit Canseco  Office Note    Chief Complaint   Patient presents with    Hospital Follow Up    Pneumonia       HPI:     1/27/2025   Pt had presentations to [facility] 1/21(astrovirus detected) and [facility] 1/22-- pt had diarrhea onset 1/18 -had slowed,, had low grade temp, pt dx astrovirus.  Pt was rx azithro- vancomycin     Ct chest 1/21 abd had min left lower lung infiltrate, very tiny on  film.  Pt had green mucous at admit [facility], now yellow green...      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 Astrovirus infection occurs in individuals of all ages; it appears to be less pathogenic among adults than norovirus infection [157,158]. In temperate regions, there is a peak in infection during winter months; in tropical regions, infection occurs most frequently during rainy seasons [159,160]. Sporadic astrovirus gastroenteritis occurs primarily in children younger than four years. The incubation period is three to four days. Clinical manifestations include low-grade fever, diarrhea, headache, malaise, and nausea; vomiting occurs relatively infrequently [161-163]. Symptoms generally last two to three days.     1/16/2025 pt had dental wk 12/23 with sinus infection and cleared with doxycycline.  Pt had stomach virus last wk ( 2 days off doxy)with nausea and diarrhea- remitted.   Pt developed wheezes and had left ear discomfort/ringing---red eardrum on left side --   used abuterol but no prednisoen    Yesterday began haivng green mucous from lungs..  Patient Instructions   Asthma exacerbating - -with recurrent sinus infection.     Need to treat sinuses to have no symptoms for 5+ days -- use doxy with vancomycin to prevent c diff    Use tramadol for abd pain -- call if more needed..  may give 45 monthly for up to 3 months.  Would limit      Use dialaudid for cough with  exacerbation    Use prednisone now , may need up to 3 days of 20 mg -- stop as soon as controlled.  Use trelegy for a month to assure  no  asthma relapse.    Get flu vaccine , use tamiflu  if flu      10/8/2024-- ulcerative colitis better on [place], on hizentra and no infection, ashtma controlled.    Has sinus pressure   has severe RA.        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    Trelegy use is reasonable as you are doing well.....  5/13/2024 pt developed bronchitis during admit for ulcerative colitis [facility]-  3 days, took doxy for sinus and bronchitis and took course prophylactic  vanc  Pt was off hizentra for a month--  Renvoq oral rx for uc ongoing  Uses trelegy daily  Patient Instructions   May use doxycycline for 7-10 days if sinus/lung infection-- use vancomycin for duration of systemic antibiotic plus additional (5-) 7 days    To minimize need antibiotic needs -- start Hizentra..    Chest xray and ct abd lower lungs were clear from 4/28/2024 April 4, 2024-  lungs getting by , had UC exacerbated with lyrica and now back on tramadol.     No more hemoptysis.  Pt having am congestion  Pt had  increase steroids to 30/ hydrocortine.    Patient Instructions   Double lasix to 40/d  and check blood next wk     May use doxycycline if sinus infection-- low c diff risk.      Will ask staff to arrange virtual visit with  if needed..      1/4/2024 pt was in icu for rotovirus gi infection-- was in icu 4 days, got lots fluid, .. Record reviewed.  Pt was dosed high dose steroids.  Pt was given 7 d course levaquin with no c diff prevention.    Pt had some hemoptysis froathy blood in mucous    Pt has had green mucous with neg cuilture earlier December,     Pt very weak and had fall in ER checking in.       Pt now off prednisone -- stopped abruptly after leaving hosp.   Pt was weaned off  stopping 5 days or so prior to admit.    Pt now very weak .   Pt was on steroids for about a yr.     On trelegy and lungs stable    Patient Instructions   Would re culture mucous -- check for afb and regular germs.  Coritosol check is reasonable -- do cortisol level in am.  May consider replacement.    Ct chest likely low yield -- pattern of hemoptysis sounded more like fluid and sepsis.  Could do ct  later if still bringing up  yellow mucous.  Chest xray viewed..      Edema should mobiliize    C diff may recur -- if diarrhea increase may test..    You are having severe persistent sinus fullness-- antibiotics very risky and just took levaquin.   Would recommend sinus xray - ct would be best.  May need ent   12/5/2023  Pt had exacerbatoin cough and increased UC prednisone from 5/d to 20 mg for last 10 days along with doxy -- off abx and had green mucous this am.  Albuterol helps and dosed tid.    Pt had u/a showing wbc at home - says recurrent uti.  H/o c diff.  Not on controller.      Appetite good nad not weak, no fever.   Patient Instructions   Green mucous after increased medication for exacerbation -- failed doxycycline.  Need culture     Trelegy may stabilize-- once daily    Boost prednisone to 20 /d if cough controlled-- dilaudid sent in for 30  tabs-- call next wk if need more.            Cxr ordered if worsens    Need to re evaluate next wk if not clearing.  5/8/2023 took doxycycline no issues.  Bowels may be better on Entyvio. Lungs doing well.  On Hizentra.  Pt on prednisone 20/d -- on prednisone since November with plans to decrease in near future?   Still having blood in stool so will not taper prednisone til bleed resolves-- should improve in near future (on 3 rd entyvio now and should be good after 4th)  Patient Instructions   Might consider pneumocystis prophylaxis but antibiotics will increase c diff-- bactrim 2 single strength 3 days a week.  Use vancomycin daily while on bactrim.  Stop bactrim  (followed by vancomycin) on prednisone less than 20mg/d.    Bactrim generally has high risk allergies and kidney problems.     2022 having sinus problems with green mucous nad coughing, right face to chin hurts last 3 days. Had in prior.  May exacerbate lungs as in past.  Started levaquin 2 days ago. Prednisone boosted from 10 to 20/d.  Need pain control and cough suppresion .  Has prednisoen. Not getting [facility]ol [facility]-- not covered on plan??   2023 chart note after above visit.  Pt has had colitis-- dx c diff recently.   Epic reveals c diff ag but no toxin.    Had green sinus dc-- cleared slight with levaquin but now dx c diff with ag no toxin    Will dc levaquin and instructed not to use.    Doxycycline may be ok -- would avoid abx as much as able.    If sinuses relapse -- doxycycline sent in.    If green lung mucous -- pt told to submit sputum for culture.    2022 dx lupus, bechets, Ulcerative colitis, RA-- on hizentra.  Pt exposed to grandson with rsv.  Having abd pain -- dilaudid helped and suppressed cough-- not able to use codeine/hydrocodone.     Ct abd and cxr 10/2022 clear lungs.       Pt currently felt to have rsv-- improved over last 5 da   pt was sl febrile initially.   Pt now having green mucous. Cough is violent .  similar sick. Had min leg edema.  On hizentra. Asthma doing wellwith RSV.  Eating ok but weak and coughs a lot.  Breathing good.     Patient Instructions   May use levaquin for green mucous    Rsv should run course-- hirzentra should help?  Breztri may be needed.      Use dilaudid minimally for pain -- should suppress cough    Diflucan for yeast as needed    Maxzide as needed for edema    Tessalon for cough    Could do phone follow up    Ct abd 10/2022 was clear lower lungs and cxr 10/31/2022 was clear -- lungs not a concern.  Asthma doing well even with rsv.  2022 mom  sept complications from hip fx/covid.  Had few sinus infection-- rx  Levaquin/prednisone.  Pt on plaquenil, dx ra and lupus.  Also on colchicine.      Uses breo but skipped.  Uses albuterol once a wk.    Patient Instructions   May use breztri 2 twice daily -- could use more or less as needed.   If used regular -- breztri would act as controller.    May use levaquin and prednisone if sinus/lung exacerbates.    Lungs loook good wrt lung tissue-- no lung tissue disease seen from Rheumatoid disease.   Ct abd 11/2021 viewed.   8/24/2021 - had covid in July, had rapid home test +, managed outpt.  Got infusion rx.  Has wheezes but feels over covid.  Had nausea, vomiting, weakness, headache, -- sick 8 days.  Mom got hip fx complicating recovery-- at rehab. Pt and mom were vaccinated.  Pt on abx for sinus infection  Dx lupus/rheumatoid - 5 mg prednisone daily  Patient Instructions   Recovering covid doing well.    Use levaquin as needed.    Ordered cxr if needed.     4/6/2021 had pneumonia March 2020 - no problems. Needs disability paper updated.  We review today.    Patient Instructions   We reviewed your disability- need to complete paper work.  You had pneumonia in March 2020.  Breo, prednisone, levaquin, albuterol renewed.    10/14/2020- all well.  No abx/prednisone, cxr 10/14/2020 nad- rul pneumonia [place] cleared.  Avoids irritants.  Patient Instructions   You likely would be best to get high dose flu vaccine.   Your immune system may have problem responding to flu vaccine.  4/14/2020- on hirzentra since sept 2016.  Saw THAO Wood in [place] 4/13- virtual visit- azithcorine and prednisone 10/d x 3 for sinusitis.     Uses   500 bid, breo 200 daily, , xopenex nebs prn, singulair. Needs more prednisone, had pneumonia 3/4 to 8th.  Had ct chest feb.  Felt had covid symptoms.  No ox needed for pneumonia- no steroids nor bd. Took doxy - vanc in hosp.   Patient Instructions   Pneumonia - follow up chest xray good- not urgent.    Prednisone action plan for asthma/wheeze/cough    May use  levaquin for sinus/lung infections  2/10/2020- had shingles vaccine, states felt an immune response.   Cough- improved, daily, not severe, non productive occasionally productive thick dark yellow color. Using nebulizer only as needed. Has noticed fewer asthma exacerbations since starting Hyzentra, went from 1 months to 2x yearly.   Not able to use albuterol rescue inhaler or nebulized due to tachycardia, palpitations, and hand tremors. No complaint with xopenex medications.   Patient Instructions   Continue current Asthma medication regiment  Review of CT shows no areas of concern. The small nodule is not significant  Pulmonary function test shows lungs to still be strong.   Continue to use Xopenex Nebulizer with aerobika attachement once a day three days a week every week.  When your sick you can use nebulizer or Xopenex rescue inhaler four times a day.     1/9/2020- two exacerbations in 2 months, states cough is unchanged, daily, states bark like cough, severe and affects daily life, Productive occasionally clear/cream color thick in consistency, uses nebulizer 2-3 x daily.   Patient Instructions   [employer] sells an Aerobika device, this device shakes the air inside of you to break mucous off the bronchial wall. Use with nebulizer and with out daily.    Continue current Asthma medication regiment     Use nebulizer at least once a day three days a week.     CT to evaluate lungs for any abnormalities       11/8/2019- Had recent Rotator cuff surgery with nerve block Oct 9, Has SOB worse after surgery for 3 days, Took prednisone 20 mg for 3 days and Levaquin for 9 days. Seen by PCP dx URI.   Coughing, chest tightness, congestion, wheezing, Hoarse voice, fatigue, Shortness of breath worse with exertion, onset following surgery to shoulder. Cough- not able to clear secretions from chest.  Complaint of hand shaking, heart rate increases, and becomes rapid with Albuterol nebulizer onset years. Tries not to use due to side  effects.     04/24/2019- breathing is good except for high pollen days, had 3 exacerbation in 6 months. Albuterol rescue inhaler 3x weekly, 1-2 nocturnal arousals monthly, could not do PFT at [employer],     11/6/18- ER visit for dehydration gastroenteritis, no prednisone use in 4 months. Not currently on fasenra injection, insurance would not cover. Currently on Hirzentra IGG therapy.   Sinus drip present, cough occasional, non productive, no nocturnal arousals, Currently on Dulera daily, uses Albuterol rescue inhaler daily before exercise no SOB.  Sepsis [facility] August 2017.  Flu vaccine   July 9, 2018 - had gastric sleeve with leak complication- lost 45 lbs already.  Has appetite but fills quickly.  Still on hirzentra.  Asthma ok avoiding fragrances/ordors.  No prednisone.   No nocturnal arousals, uses rescue weekly avoiding any irritants.  Use prednisone 4-5 last year and twice this year.        March 19, 2018-since recovered from pleuritic pain - doing well.  No prednisone use recent.  Pt has had no wheezes.  feb 22,2108   Had to do prednisone again.  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 I have advised not to work given severe asthma,  hypersensitivity to fragrances, and immune deficiency.   Pt seems better since Hirzentra, but still unstable severe asthma  Jan 29. 2018- 6 days ago had been exposed to sick , had nasal congestion , cough, ear stuffy, muscle aches /joint aches / fever.  Temp to 100.1.  Seen by np and flu screen negative.  Had asthma 3-4 flares last year. eos up past,   oct 5, 2017 - had marce in April, skipped couple doses hirzentra, had mild bronchitis once, otherwise doing very well.  No prednisone.  No side effects and covered. Ppt flu sense for 2 days  Jan 24,2017 on  hirzentra q o week since sept and asthma more stable, no hosp, no  prednisone, no noct asthma/ no rescue therapy- control not this good for years.  Sept 27, 2016  HPI:has had lung problems since birth, no nocturnal arousals, uses rescue 2/d, breathing controlled satisfactory except with irritants/infection - strong abx needed to clear.  Prednisone 2/yr, last hosp 18 months.  No ventilator.    Had exacerbation recent due uri from .  Has had shingles vaccine past.       The chief compliant  problem is varies with instablilty at time  PFSH:  Past Medical History:   Diagnosis Date    Allergy     Angio-edema     Arthralgia     Asthma without status asthmaticus     Bronchitis     Cataract     COPD (chronic obstructive pulmonary disease)     COVID-19 virus infection 07/23/2021    Diverticulosis of large intestine without hemorrhage 10/08/2015    Eczema     Environmental allergies     Eosinophilic asthma 03/08/2018    Exacerbation of ulcerative colitis with rectal bleeding     Fibromyalgia 02/19/2024    Gastroparesis     Gram-negative bacteremia 12/29/2023    Hand arthritis     Herpes simplex without mention of complication     oral    Hidradenitis     History of morbid obesity     Hyperlipidemia     Hypothyroidism     Immune deficiency disorder     Intraperitoneal abscess 05/07/2018    LBP radiating to left leg     Leukocytosis, unspecified     Migraine headache     Normocytic anemia 10/05/2022    Obesity, Class III, BMI 40-49.9 (morbid obesity)     Periorbital cellulitis 12/31/2016    Prediabetes     RA (rheumatoid arthritis)     Recurrent upper respiratory infection (URI)     Rotavirus enteritis 12/27/2023    RSV (respiratory syncytial virus infection) 01/30/2023    S/P colonoscopy 11/2010    Sepsis 05/07/2018    Sepsis secondary to colitis 05/07/2018    Systemic lupus erythematosus, organ or system involvement unspecified     Tubular adenoma of colon 08/17/2022    Tubular adenoma of colon 05/01/2024    Ulcerative pancolitis with rectal bleeding 10/17/2022    Urticaria           Past Surgical History:   Procedure Laterality Date    ADENOIDECTOMY      CATARACT EXTRACTION W/  INTRAOCULAR LENS IMPLANT Right 08/21/2024    Panoptix    CATARACT EXTRACTION W/  INTRAOCULAR LENS IMPLANT Left 09/25/2024    Panoptix    CHOLECYSTECTOMY      CHOLECYSTECTOMY      colitis      COLONOSCOPY  2015    repeat in 10    COLONOSCOPY N/A 10/08/2015    Procedure: COLONOSCOPY;  Surgeon: Panda Bose MD;  Location: [facility] ENDO;  Service: Endoscopy;  Laterality: N/A;    COLONOSCOPY N/A 08/17/2022    Procedure: COLONOSCOPY;  Surgeon: Olaf Del Valle MD;  Location: [facility] ENDO;  Service: General;  Laterality: N/A;    COLONOSCOPY N/A 10/06/2022    Procedure: COLONOSCOPY;  Surgeon: Quinton Celeste MD;  Location: [facility];  Service: Gastroenterology;  Laterality: N/A;    COLONOSCOPY N/A 05/01/2024    Procedure: COLONOSCOPY;  Surgeon: Quinton Celeste MD;  Location: [facility];  Service: Endoscopy;  Laterality: N/A;    ESOPHAGOGASTRODUODENOSCOPY N/A 05/01/2024    Procedure: EGD (ESOPHAGOGASTRODUODENOSCOPY);  Surgeon: Quinton Celeste MD;  Location: [facility];  Service: Endoscopy;  Laterality: N/A;    Hand fracture surgery      HARDWARE REMOVAL      left hand 5th [facility]    hymenectomy      HYSTERECTOMY      menorrhagia-Ovaries intact    ROTATOR CUFF REPAIR Right     SLEEVE GASTROPLASTY  04/16/2018    TONSILLECTOMY      Urethral dilatation       Social History     Tobacco Use    Smoking status: Never     Passive exposure: Past    Smokeless tobacco: Never   Substance Use Topics    Alcohol use: Not Currently     Comment: very rarely    Drug use: No     Family History   Problem Relation Name Age of Onset    Melanoma Mother Loyd     Basal cell carcinoma Mother Loyd     Lung disease Mother Loyd         pulm htn    Cataracts Mother Loyd     Hypertension Mother Loyd     Lung cancer Father Josh     Diabetes Father Josh     Hypertension Father Josh     Cancer Father Josh     Melanoma Maternal Aunt      Melanoma  "Maternal Uncle      Diabetes Paternal Aunt Estee     Colon cancer Paternal Aunt Lilliam 40    Diabetes Paternal Aunt Lilliam     Breast cancer Paternal Aunt  40    Lymphoma Paternal Aunt      Cancer Maternal Grandfather Gilmar     Ovarian cancer Paternal Grandmother  40    Ulcerative colitis Son      Psoriasis Son      Psoriasis Son      Breast cancer Cousin Paternal 1st 25    Allergic rhinitis Neg Hx      Allergies Neg Hx      Angioedema Neg Hx      Asthma Neg Hx      Atopy Neg Hx      Eczema Neg Hx      Immunodeficiency Neg Hx      Rhinitis Neg Hx      Urticaria Neg Hx       Review of patient's allergies indicates:   Allergen Reactions    Bromelains Hives     " causes mouth to bleed"    Sudafed [pseudoephedrine hcl] Other (See Comments)     Does not want to wake up .    Amoxicillin-pot clavulanate Itching     Other reaction(s): Itching    Hydrocodone Itching    Iodinated contrast- oral and iv dye      childhood    Iodine and iodide containing products Other (See Comments)    Melon Hives    Pantoprazole Nausea Only     Other reaction(s): upset stomach/halitosis    Percocet [oxycodone-acetaminophen] Itching    Barium iodide Rash    Ephedrine Other (See Comments)     Other reaction(s): comatose    Penicillins      Other reaction(s): does not work on pt symptoms     I have reviewed past medical, family, and social history. I have reviewed previous nurse notes.    Performance Status:The patient's activity level is functions out of house.      Review of Systems   Constitutional: Negative for activity change, appetite change, chills, diaphoresis, fatigue, fever and unexpected weight change.   HENT: Negative for dental problem, sneezing, sore throat, trouble swallowing, postnasal drip, rhinorrhea, sinus pressure, sinus pain, and voice change.     Respiratory: Negative for chest tightness, shortness of breath, wheezing.  Positive for cough,   Cardiovascular: Negative for chest pain, palpitations and leg swelling. " "  Musculoskeletal: Negative for gait problem, myalgias and neck pain.   Skin: Negative for color change and pallor.   Allergic/Immunologic: Negative for environmental allergies and food allergies.   Neurological: Negative for dizziness, speech difficulty, weakness, light-headedness, numbness and headaches.   Hematological: Negative for adenopathy. Does not bruise/bleed easily.   Psychiatric/Behavioral: Negative for dysphoric mood and sleep disturbance. The patient is not nervous/anxious.             Exam:Comprehensive exam done. BP (!) 170/79 (BP Location: Right arm, Patient Position: Sitting)   Pulse (!) 56   Ht 5' 10" (1.778 m)   Wt 119.2 kg (262 lb 10.9 oz)   SpO2 95% Comment: on room air  BMI 37.69 kg/m²   Exam included Vitals as listed, and patient's appearance and affect and alertness and mood, oral exam for yeast and hygiene and pharynx lesions and Mallapatti (M) score, neck with inspection for jvd and masses and thyroid abnormalities and lymph nodes (supraclavicular and infraclavicular nodes and axillary also examined and noted if abn), chest exam included symmetry and effort and fremitus and percussion and auscultation, cardiac exam included rhythm and gallops and murmur and rubs and jvd and edema, abdominal exam for mass and hepatosplenomegaly and tenderness and hernias and bowel sounds, Musculoskeletal exam with muscle tone and posture and mobility/gait and  strength, and skin for rashes and cyanosis and pallor and turgor, extremity for clubbing.  Findings were normal except for pertinent findings listed below:  M3, chest is symmetric, no distress, normal percussion, normal fremitus and good normal breath sounds        Radiographs (ct chest and cxr) reviewed: results reviewed by direct vision  CT Chest Without Contrast 2/10/2020  Normal exam, 3.3 mm nodule seen in right upper lobe.    Lungs are clear.  Minimal retained mucus secretions in the trachea.  No pleural effusion or pneumothorax.  Normal " sized heart.  Thyroid is small and heterogeneous with a subcentimeter nodule or cyst in the right lobe.     XR CHEST 1 VIEW 05/10/2018   There is left lower lobe atelectasis and possible small left pleural effusion on this study.  No pneumothorax is seen.      Labs reviewed       Lab Results   Component Value Date    WBC 14.24 (H) 01/21/2025    RBC 4.38 01/21/2025    HGB 13.6 01/21/2025    HCT 39.3 01/21/2025    MCV 90 01/21/2025    MCH 31.1 (H) 01/21/2025    MCHC 34.6 01/21/2025    RDW 14.5 01/21/2025     01/21/2025    MPV 10.4 01/21/2025    GRAN 9.8 (H) 01/21/2025    GRAN 68.8 01/21/2025    LYMPH 2.2 01/21/2025    LYMPH 15.1 (L) 01/21/2025    MONO 1.6 (H) 01/21/2025    MONO 11.1 01/21/2025    EOS 0.1 01/21/2025    BASO 0.12 01/21/2025    EOSINOPHIL 0.9 01/21/2025    BASOPHIL 0.8 01/21/2025       PFT reviewed  2/10/2020 no obstruction seen, 7% bronchodilator response with 12% needed for clinical improvement; TLC 86% with DLC0 at 88%, normal exam with mild asthma  Spirometry bronchodilator, lung volume by gas dilution, diffusion capacity measured February 10, 2020.  The FEV1 to FVC ratio was 79%, there is no airflow obstruction measured by spirometry technique.  The FEV1 measured 85% predicted at 1.97 L. The 8%   improvement in FEV1 failed to reach statistical significance ( 12% is needed for statistical significance ).  Lung volumes by gas dilution were normal.  Diffusion was normal.      Spirometry, lung volumes, diffusion are all normal.  The bronchodilator response was not statistically significant.  Clinical correlation recommended.     Plan:  Clinical impression is apparently straight forward and impression with management as below.    Yodit was seen today for hospital follow up and pneumonia.    Diagnoses and all orders for this visit:    Severe persistent asthma without complication    Community acquired pneumonia of left lower lobe of lung  -     Culture, Respiratory with Gram Stain; Future  -      X-Ray Chest PA And Lateral; Standing    Immune deficiency disorder    History of infection of intestine due to Clostridium difficile    Severe persistent asthma with (acute) exacerbation  -     HYDROmorphone (DILAUDID) 2 MG tablet; Take 1 tablet (2 mg total) by mouth every 4 (four) hours as needed (cough).    Abdominal pain, unspecified abdominal location  -     traMADoL (ULTRAM) 50 mg tablet; Take 2 tablets (100 mg total) by mouth every 6 (six) hours as needed for Pain.  -     traMADoL (ULTRAM) 50 mg tablet; Take 1 tablet (50 mg total) by mouth 3 (three) times daily.  -     traMADoL (ULTRAM) 50 mg tablet; Take 1 tablet (50 mg total) by mouth every 6 (six) hours as needed for Pain.    Rheumatoid arthritis, involving unspecified site, unspecified whether rheumatoid factor present  -     traMADoL (ULTRAM) 50 mg tablet; Take 2 tablets (100 mg total) by mouth every 6 (six) hours as needed for Pain.  -     traMADoL (ULTRAM) 50 mg tablet; Take 1 tablet (50 mg total) by mouth 3 (three) times daily.  -     traMADoL (ULTRAM) 50 mg tablet; Take 1 tablet (50 mg total) by mouth every 6 (six) hours as needed for Pain.                          Follow up in about 1 week (around 2/3/2025), or if symptoms worsen or fail to improve.    Discussed with patient above for education the following:      Patient Instructions   Left  lower lung still abnormal on exam,  chest xray reasonable to assure not much worse      Need sputum culture    May need more antibiotic ?      Will renew dilaudid for cough suppression.  Cough still very severe and pneumonia seem lingering      You should dose hirzentra asap as may help clear pneumonia      We discuss tramadol -- you use to stay mobile with chr ra and gut pains-- will give 60/m , will need pain contract next visit.        Follow up next wk if problem  otherwise April.       Monica took 45 min

## 2025-01-30 ENCOUNTER — HOSPITAL ENCOUNTER (OUTPATIENT)
Dept: RADIOLOGY | Facility: HOSPITAL | Age: 65
Discharge: HOME | End: 2025-01-30
Attending: INTERNAL MEDICINE
Payer: MEDICARE

## 2025-01-30 DIAGNOSIS — J18.9 COMMUNITY ACQUIRED PNEUMONIA OF LEFT LOWER LOBE OF LUNG: ICD-10-CM

## 2025-01-30 PROCEDURE — 71046 X-RAY EXAM CHEST 2 VIEWS: CPT | Mod: 26,,, | Performed by: RADIOLOGY

## 2025-01-30 PROCEDURE — 71046 X-RAY EXAM CHEST 2 VIEWS: CPT | Mod: TC,PO

## 2025-01-31 ENCOUNTER — LAB VISIT (OUTPATIENT)
Dept: LAB | Facility: HOSPITAL | Age: 65
End: 2025-01-31
Attending: INTERNAL MEDICINE
Payer: MEDICARE

## 2025-01-31 DIAGNOSIS — J41.1 BRONCHITIS, CHRONIC, MUCOPURULENT: ICD-10-CM

## 2025-01-31 PROCEDURE — 87206 SMEAR FLUORESCENT/ACID STAI: CPT | Performed by: INTERNAL MEDICINE

## 2025-01-31 PROCEDURE — 87116 MYCOBACTERIA CULTURE: CPT | Performed by: INTERNAL MEDICINE

## 2025-01-31 PROCEDURE — 87015 SPECIMEN INFECT AGNT CONCNTJ: CPT | Performed by: INTERNAL MEDICINE

## 2025-02-03 ENCOUNTER — OFFICE VISIT (OUTPATIENT)
Dept: PULMONOLOGY | Facility: CLINIC | Age: 65
End: 2025-02-03
Payer: MEDICARE

## 2025-02-03 VITALS
DIASTOLIC BLOOD PRESSURE: 84 MMHG | HEIGHT: 62 IN | BODY MASS INDEX: 34.22 KG/M2 | OXYGEN SATURATION: 96 % | SYSTOLIC BLOOD PRESSURE: 134 MMHG | WEIGHT: 185.94 LBS

## 2025-02-03 DIAGNOSIS — J18.9 COMMUNITY ACQUIRED PNEUMONIA OF LEFT LUNG, UNSPECIFIED PART OF LUNG: ICD-10-CM

## 2025-02-03 DIAGNOSIS — J45.50 SEVERE PERSISTENT ASTHMA WITHOUT COMPLICATION: ICD-10-CM

## 2025-02-03 DIAGNOSIS — J41.1 BRONCHITIS, CHRONIC, MUCOPURULENT: Primary | ICD-10-CM

## 2025-02-03 LAB
ACID FAST MOD KINY STN SPEC: NORMAL
MYCOBACTERIUM SPEC QL CULT: NORMAL

## 2025-02-03 PROCEDURE — 99215 OFFICE O/P EST HI 40 MIN: CPT | Mod: PBBFAC,PO | Performed by: INTERNAL MEDICINE

## 2025-02-03 PROCEDURE — 99999 PR PBB SHADOW E&M-EST. PATIENT-LVL V: CPT | Mod: PBBFAC,,, | Performed by: INTERNAL MEDICINE

## 2025-02-03 PROCEDURE — 99213 OFFICE O/P EST LOW 20 MIN: CPT | Mod: S$PBB,,, | Performed by: INTERNAL MEDICINE

## 2025-02-03 NOTE — PROGRESS NOTES
2/3/2025    Yodit Canseco  Office Note    Chief Complaint   Patient presents with    Follow-up    Asthma       HPI:     2/3/2025 bowels ok, no diarrhea.   Had one large dark green mucous this am, still congested-- varies somewhat..  pt boosted prednisone to 20 daily for c2 days with some improvement      1/27/2025   Pt had presentations to [facility] 1/21(astrovirus detected) and [facility] 1/22-- pt had diarrhea onset 1/18 -had slowed,, had low grade temp, pt dx astrovirus.  Pt was rx azithro- vancomycin     Ct chest 1/21 abd had min left lower lung infiltrate, very tiny on  film.  Pt had green mucous at admit [facility], now yellow green...      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 Astrovirus infection occurs in individuals of all ages; it appears to be less pathogenic among adults than norovirus infection [157,158]. In temperate regions, there is a peak in infection during winter months; in tropical regions, infection occurs most frequently during rainy seasons [159,160]. Sporadic astrovirus gastroenteritis occurs primarily in children younger than four years. The incubation period is three to four days. Clinical manifestations include low-grade fever, diarrhea, headache, malaise, and nausea; vomiting occurs relatively infrequently [161-163]. Symptoms generally last two to three days.     Patient Instructions   Left  lower lung still abnormal on exam,  chest xray reasonable to assure not much worse  Need sputum culture  May need more antibiotic ?    Will renew dilaudid for cough suppression.  Cough still very severe and pneumonia seem lingering  You should dose hirzentra asap as may help clear pneumonia  We discuss tramadol -- you use to stay mobile with chr ra and gut pains-- will give 60/m , will need pain contract next visit.  Follow up next wk if problem  otherwise  April.        1/16/2025 pt had dental wk 12/23 with sinus infection and cleared with doxycycline.  Pt had stomach virus last wk ( 2 days off doxy)with nausea and diarrhea- remitted.   Pt developed wheezes and had left ear discomfort/ringing---red eardrum on left side --   used abuterol but no prednisoen    Yesterday began haivng green mucous from lungs..  Patient Instructions   Asthma exacerbating - -with recurrent sinus infection.     Need to treat sinuses to have no symptoms for 5+ days -- use doxy with vancomycin to prevent c diff    Use tramadol for abd pain -- call if more needed..  may give 45 monthly for up to 3 months.  Would limit      Use dialaudid for cough with exacerbation    Use prednisone now , may need up to 3 days of 20 mg -- stop as soon as controlled.  Use trelegy for a month to assure  no  asthma relapse.    Get flu vaccine , use tamiflu  if flu      10/8/2024-- ulcerative colitis better on [place], on hizentra and no infection, ashtma controlled.    Has sinus pressure   has severe RA.        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    Trelegy use is reasonable as you are doing well.....  5/13/2024 pt developed bronchitis during admit for ulcerative colitis [facility]-  3 days, took doxy for sinus and bronchitis and took course prophylactic  vanc  Pt was off hizentra for a month--  Renvoq oral rx for uc ongoing  Uses trelegy daily  Patient Instructions   May use doxycycline for 7-10 days if sinus/lung infection-- use vancomycin for duration of systemic antibiotic plus additional (5-) 7 days    To minimize need antibiotic needs -- start Hizentra..    Chest xray and ct abd lower lungs were clear from 4/28/2024 April 4, 2024-  lungs getting by , had UC exacerbated with lyrica and now back on tramadol.     No more hemoptysis.  Pt having am congestion  Pt had  increase steroids to  30/ hydrocortine.    Patient Instructions   Double lasix to 40/d  and check blood next wk     May use doxycycline if sinus infection-- low c diff risk.      Will ask staff to arrange virtual visit with  if needed..      1/4/2024 pt was in icu for rotovirus gi infection-- was in icu 4 days, got lots fluid, .. Record reviewed.  Pt was dosed high dose steroids.  Pt was given 7 d course levaquin with no c diff prevention.    Pt had some hemoptysis froathy blood in mucous    Pt has had green mucous with neg cuilture earlier December,     Pt very weak and had fall in ER checking in.       Pt now off prednisone -- stopped abruptly after leaving hosp.   Pt was weaned off stopping 5 days or so prior to admit.    Pt now very weak .   Pt was on steroids for about a yr.     On trelegy and lungs stable    Patient Instructions   Would re culture mucous -- check for afb and regular germs.  Coritosol check is reasonable -- do cortisol level in am.  May consider replacement.    Ct chest likely low yield -- pattern of hemoptysis sounded more like fluid and sepsis.  Could do ct  later if still bringing up  yellow mucous.  Chest xray viewed..      Edema should mobiliize    C diff may recur -- if diarrhea increase may test..    You are having severe persistent sinus fullness-- antibiotics very risky and just took levaquin.   Would recommend sinus xray - ct would be best.  May need ent   12/5/2023  Pt had exacerbatoin cough and increased UC prednisone from 5/d to 20 mg for last 10 days along with doxy -- off abx and had green mucous this am.  Albuterol helps and dosed tid.    Pt had u/a showing wbc at home - says recurrent uti.  H/o c diff.  Not on controller.      Appetite good nad not weak, no fever.   Patient Instructions   Green mucous after increased medication for exacerbation -- failed doxycycline.  Need culture     Trelegy may stabilize-- once daily    Boost prednisone to 20 /d if cough controlled-- dilaudid sent in for 30   tabs-- call next wk if need more.            Cxr ordered if worsens    Need to re evaluate next wk if not clearing.  5/8/2023 took doxycycline no issues.  Bowels may be better on Entyvio. Lungs doing well.  On Hizentra.  Pt on prednisone 20/d -- on prednisone since November with plans to decrease in near future?   Still having blood in stool so will not taper prednisone til bleed resolves-- should improve in near future (on 3 rd entyvio now and should be good after 4th)  Patient Instructions   Might consider pneumocystis prophylaxis but antibiotics will increase c diff-- bactrim 2 single strength 3 days a week.  Use vancomycin daily while on bactrim.  Stop bactrim (followed by vancomycin) on prednisone less than 20mg/d.    Bactrim generally has high risk allergies and kidney problems.     12/29/2022 having sinus problems with green mucous nad coughing, right face to chin hurts last 3 days. Had in prior.  May exacerbate lungs as in past.  Started levaquin 2 days ago. Prednisone boosted from 10 to 20/d.  Need pain control and cough suppresion .  Has prednisoen. Not getting [facility]erol [facility]-- not covered on plan??   1/30/2023 chart note after above visit.  Pt has had colitis-- dx c diff recently.   Epic reveals c diff ag but no toxin.    Had green sinus dc-- cleared slight with levaquin but now dx c diff with ag no toxin    Will dc levaquin and instructed not to use.    Doxycycline may be ok -- would avoid abx as much as able.    If sinuses relapse -- doxycycline sent in.    If green lung mucous -- pt told to submit sputum for culture.    11/2/2022 dx lupus, bechets, Ulcerative colitis, RA-- on hizentra.  Pt exposed to grandson with rsv.  Having abd pain -- dilaudid helped and suppressed cough-- not able to use codeine/hydrocodone.     Ct abd and cxr 10/2022 clear lungs.       Pt currently felt to have rsv-- improved over last 5 da   pt was sl febrile initially.   Pt now having green mucous. Cough is violent .   similar sick. Had min leg edema.  On hizentra. Asthma doing wellwith RSV.  Eating ok but weak and coughs a lot.  Breathing good.     Patient Instructions   May use levaquin for green mucous    Rsv should run course-- hirzentra should help?  Breztri may be needed.      Use dilaudid minimally for pain -- should suppress cough    Diflucan for yeast as needed    Maxzide as needed for edema    Tessalon for cough    Could do phone follow up    Ct abd 10/2022 was clear lower lungs and cxr 10/31/2022 was clear -- lungs not a concern.  Asthma doing well even with rsv.  2022 mom  sept complications from hip fx/covid.  Had few sinus infection-- rx Levaquin/prednisone.  Pt on plaquenil, dx ra and lupus.  Also on colchicine.      Uses breo but skipped.  Uses albuterol once a wk.    Patient Instructions   May use breztri 2 twice daily -- could use more or less as needed.   If used regular -- breztri would act as controller.    May use levaquin and prednisone if sinus/lung exacerbates.    Lungs loook good wrt lung tissue-- no lung tissue disease seen from Rheumatoid disease.   Ct abd 2021 viewed.   2021 - had covid in July, had rapid home test +, managed outpt.  Got infusion rx.  Has wheezes but feels over covid.  Had nausea, vomiting, weakness, headache, -- sick 8 days.  Mom got hip fx complicating recovery-- at rehab. Pt and mom were vaccinated.  Pt on abx for sinus infection  Dx lupus/rheumatoid - 5 mg prednisone daily  Patient Instructions   Recovering covid doing well.    Use levaquin as needed.    Ordered cxr if needed.     2021 had pneumonia 2020 - no problems. Needs disability paper updated.  We review today.    Patient Instructions   We reviewed your disability- need to complete paper work.  You had pneumonia in 2020.  Breo, prednisone, levaquin, albuterol renewed.    10/14/2020- all well.  No abx/prednisone, cxr 10/14/2020 nad- rul pneumonia [facility] cleared.  Avoids  irritants.  Patient Instructions   You likely would be best to get high dose flu vaccine.   Your immune system may have problem responding to flu vaccine.  4/14/2020- on hirzentra since sept 2016.  Saw NP [place] in [place] 4/13- virtual visit- azithro and prednisone 10/d x 3 for sinusitis.     Uses   500 bid, breo 200 daily, , xopenex nebs prn, singulair. Needs more prednisone, had pneumonia 3/4 to 8th.  Had ct chest feb.  Felt had covid symptoms.  No ox needed for pneumonia- no steroids nor bd. Took doxy - vanc in hosp.   Patient Instructions   Pneumonia - follow up chest xray good- not urgent.    Prednisone action plan for asthma/wheeze/cough    May use levaquin for sinus/lung infections  2/10/2020- had shingles vaccine, states felt an immune response.   Cough- improved, daily, not severe, non productive occasionally productive thick dark yellow color. Using nebulizer only as needed. Has noticed fewer asthma exacerbations since starting Hyzentra, went from 1 months to 2x yearly.   Not able to use albuterol rescue inhaler or nebulized due to tachycardia, palpitations, and hand tremors. No complaint with xopenex medications.   Patient Instructions   Continue current Asthma medication regiment  Review of CT shows no areas of concern. The small nodule is not significant  Pulmonary function test shows lungs to still be strong.   Continue to use Xopenex Nebulizer with aerobika attachement once a day three days a week every week.  When your sick you can use nebulizer or Xopenex rescue inhaler four times a day.     1/9/2020- two exacerbations in 2 months, states cough is unchanged, daily, states bark like cough, severe and affects daily life, Productive occasionally clear/cream color thick in consistency, uses nebulizer 2-3 x daily.   Patient Instructions   [employer] sells an Aerobika device, this device shakes the air inside of you to break mucous off the bronchial wall. Use with nebulizer and with out daily.     Continue current Asthma medication regiment     Use nebulizer at least once a day three days a week.     CT to evaluate lungs for any abnormalities       11/8/2019- Had recent Rotator cuff surgery with nerve block Oct 9, Has SOB worse after surgery for 3 days, Took prednisone 20 mg for 3 days and Levaquin for 9 days. Seen by PCP dx URI.   Coughing, chest tightness, congestion, wheezing, Hoarse voice, fatigue, Shortness of breath worse with exertion, onset following surgery to shoulder. Cough- not able to clear secretions from chest.  Complaint of hand shaking, heart rate increases, and becomes rapid with Albuterol nebulizer onset years. Tries not to use due to side effects.     04/24/2019- breathing is good except for high pollen days, had 3 exacerbation in 6 months. Albuterol rescue inhaler 3x weekly, 1-2 nocturnal arousals monthly, could not do PFT at [facility],     11/6/18- ER visit for dehydration gastroenteritis, no prednisone use in 4 months. Not currently on fasenra injection, insurance would not cover. Currently on Hirzentra IGG therapy.   Sinus drip present, cough occasional, non productive, no nocturnal arousals, Currently on Dulera daily, uses Albuterol rescue inhaler daily before exercise no SOB.  Sepsis [facility] August 2017.  Flu vaccine   July 9, 2018 - had gastric sleeve with leak complication- lost 45 lbs already.  Has appetite but fills quickly.  Still on hirzentra.  Asthma ok avoiding fragrances/ordors.  No prednisone.   No nocturnal arousals, uses rescue weekly avoiding any irritants.  Use prednisone 4-5 last year and twice this year.        March 19, 2018-since recovered from pleuritic pain - doing well.  No prednisone use recent.  Pt has had no wheezes.  feb 22,2108   Had to do prednisone again.  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 I have advised not  to work given severe asthma,  hypersensitivity to fragrances, and immune deficiency.   Pt seems better since Hirzentra, but still unstable severe asthma  Jan 29. 2018- 6 days ago had been exposed to sick , had nasal congestion , cough, ear stuffy, muscle aches /joint aches / fever.  Temp to 100.1.  Seen by np and flu screen negative.  Had asthma 3-4 flares last year. eos up past,   oct 5, 2017 - had marce in April, skipped couple doses hirzentra, had mild bronchitis once, otherwise doing very well.  No prednisone.  No side effects and covered. Ppt flu sense for 2 days  Jan 24,2017 on  hirzentra q o week since sept and asthma more stable, no hosp, no prednisone, no noct asthma/ no rescue therapy- control not this good for years.  Sept 27, 2016  HPI:has had lung problems since birth, no nocturnal arousals, uses rescue 2/d, breathing controlled satisfactory except with irritants/infection - strong abx needed to clear.  Prednisone 2/yr, last hosp 18 months.  No ventilator.    Had exacerbation recent due uri from .  Has had shingles vaccine past.       The chief compliant  problem is varies with instablilty at time  PFSH:  Past Medical History:   Diagnosis Date    Allergy     Angio-edema     Arthralgia     Asthma without status asthmaticus     Bronchitis     Cataract     Community acquired pneumonia of left lung 2/3/2025    COPD (chronic obstructive pulmonary disease)     COVID-19 virus infection 07/23/2021    Diverticulosis of large intestine without hemorrhage 10/08/2015    Eczema     Environmental allergies     Eosinophilic asthma 03/08/2018    Exacerbation of ulcerative colitis with rectal bleeding     Fibromyalgia 02/19/2024    Gastroparesis     Gram-negative bacteremia 12/29/2023    Hand arthritis     Herpes simplex without mention of complication     oral    Hidradenitis     History of morbid obesity     Hyperlipidemia     Hypothyroidism     Immune deficiency disorder     Intraperitoneal abscess  05/07/2018    LBP radiating to left leg     Leukocytosis, unspecified     Migraine headache     Normocytic anemia 10/05/2022    Obesity, Class III, BMI 40-49.9 (morbid obesity)     Periorbital cellulitis 12/31/2016    Prediabetes     RA (rheumatoid arthritis)     Recurrent upper respiratory infection (URI)     Rotavirus enteritis 12/27/2023    RSV (respiratory syncytial virus infection) 01/30/2023    S/P colonoscopy 11/2010    Sepsis 05/07/2018    Sepsis secondary to colitis 05/07/2018    Systemic lupus erythematosus, organ or system involvement unspecified     Tubular adenoma of colon 08/17/2022    Tubular adenoma of colon 05/01/2024    Ulcerative pancolitis with rectal bleeding 10/17/2022    Urticaria          Past Surgical History:   Procedure Laterality Date    ADENOIDECTOMY      CATARACT EXTRACTION W/  INTRAOCULAR LENS IMPLANT Right 08/21/2024    Panoptix    CATARACT EXTRACTION W/  INTRAOCULAR LENS IMPLANT Left 09/25/2024    Panoptix    CHOLECYSTECTOMY      CHOLECYSTECTOMY      colitis      COLONOSCOPY  2015    repeat in 10    COLONOSCOPY N/A 10/08/2015    Procedure: COLONOSCOPY;  Surgeon: Panda Bose MD;  Location: [facility];  Service: Endoscopy;  Laterality: N/A;    COLONOSCOPY N/A 08/17/2022    Procedure: COLONOSCOPY;  Surgeon: Olaf Del Valle MD;  Location: [facility];  Service: General;  Laterality: N/A;    COLONOSCOPY N/A 10/06/2022    Procedure: COLONOSCOPY;  Surgeon: Quinton Celeste MD;  Location: [facility];  Service: Gastroenterology;  Laterality: N/A;    COLONOSCOPY N/A 05/01/2024    Procedure: COLONOSCOPY;  Surgeon: Quinton Celeste MD;  Location: [facility];  Service: Endoscopy;  Laterality: N/A;    ESOPHAGOGASTRODUODENOSCOPY N/A 05/01/2024    Procedure: EGD (ESOPHAGOGASTRODUODENOSCOPY);  Surgeon: Quinton Celeste MD;  Location: [facility];  Service: Endoscopy;  Laterality: N/A;    Hand fracture surgery      HARDWARE REMOVAL      left hand 5th [facility]    hymenectomy      HYSTERECTOMY       "menorrhagia-Ovaries intact    ROTATOR CUFF REPAIR Right     SLEEVE GASTROPLASTY  04/16/2018    TONSILLECTOMY      Urethral dilatation       Social History     Tobacco Use    Smoking status: Never     Passive exposure: Past    Smokeless tobacco: Never   Substance Use Topics    Alcohol use: Not Currently     Comment: very rarely    Drug use: No     Family History   Problem Relation Name Age of Onset    Melanoma Mother Loyd     Basal cell carcinoma Mother Loyd     Lung disease Mother Loyd         pulolga htn    Cataracts Mother Loyd     Hypertension Mother Loyd     Lung cancer Father Josh     Diabetes Father Josh     Hypertension Father Josh     Cancer Father Josh     Melanoma Maternal Aunt      Melanoma Maternal Uncle      Diabetes Paternal Aunt Estee     Colon cancer Paternal Aunt Lililam 40    Diabetes Paternal Aunt Lilliam     Breast cancer Paternal Aunt  40    Lymphoma Paternal Aunt      Cancer Maternal Grandfather Gilmar     Ovarian cancer Paternal Grandmother  40    Ulcerative colitis Son      Psoriasis Son      Psoriasis Son      Breast cancer Cousin Paternal 1st 25    Allergic rhinitis Neg Hx      Allergies Neg Hx      Angioedema Neg Hx      Asthma Neg Hx      Atopy Neg Hx      Eczema Neg Hx      Immunodeficiency Neg Hx      Rhinitis Neg Hx      Urticaria Neg Hx       Review of patient's allergies indicates:   Allergen Reactions    Bromelains Hives     " causes mouth to bleed"    Sudafed [pseudoephedrine hcl] Other (See Comments)     Does not want to wake up .    Amoxicillin-pot clavulanate Itching     Other reaction(s): Itching    Hydrocodone Itching    Iodinated contrast- oral and iv dye      childhood    Iodine and iodide containing products Other (See Comments)    Melon Hives    Pantoprazole Nausea Only     Other reaction(s): upset stomach/halitosis    Percocet [oxycodone-acetaminophen] Itching    Barium iodide Rash    Ephedrine Other (See Comments)     Other reaction(s): comatose    " "Penicillins      Other reaction(s): does not work on pt symptoms     I have reviewed past medical, family, and social history. I have reviewed previous nurse notes.    Performance Status:The patient's activity level is functions out of house.      Review of Systems   Constitutional: Negative for activity change, appetite change, chills, diaphoresis, fatigue, fever and unexpected weight change.   HENT: Negative for dental problem, sneezing, sore throat, trouble swallowing, postnasal drip, rhinorrhea, sinus pressure, sinus pain, and voice change.     Respiratory: Negative for chest tightness, shortness of breath, wheezing.  Positive for cough,   Cardiovascular: Negative for chest pain, palpitations and leg swelling.   Musculoskeletal: Negative for gait problem, myalgias and neck pain.   Skin: Negative for color change and pallor.   Allergic/Immunologic: Negative for environmental allergies and food allergies.   Neurological: Negative for dizziness, speech difficulty, weakness, light-headedness, numbness and headaches.   Hematological: Negative for adenopathy. Does not bruise/bleed easily.   Psychiatric/Behavioral: Negative for dysphoric mood and sleep disturbance. The patient is not nervous/anxious.             Exam:Comprehensive exam done. BP (!) 170/79 (BP Location: Right arm, Patient Position: Sitting)   Pulse (!) 56   Ht 5' 10" (1.778 m)   Wt 119.2 kg (262 lb 10.9 oz)   SpO2 95% Comment: on room air  BMI 37.69 kg/m²   Exam included Vitals as listed, and patient's appearance and affect and alertness and mood, oral exam for yeast and hygiene and pharynx lesions and Mallapatti (M) score, neck with inspection for jvd and masses and thyroid abnormalities and lymph nodes (supraclavicular and infraclavicular nodes and axillary also examined and noted if abn), chest exam included symmetry and effort and fremitus and percussion and auscultation, cardiac exam included rhythm and gallops and murmur and rubs and jvd and " edema, abdominal exam for mass and hepatosplenomegaly and tenderness and hernias and bowel sounds, Musculoskeletal exam with muscle tone and posture and mobility/gait and  strength, and skin for rashes and cyanosis and pallor and turgor, extremity for clubbing.  Findings were normal except for pertinent findings listed below:  M3, chest is symmetric, no distress, normal percussion, normal fremitus and good normal breath sounds        Radiographs (ct chest and cxr) reviewed: results reviewed by direct vision  CT Chest Without Contrast 2/10/2020  Normal exam, 3.3 mm nodule seen in right upper lobe.    Lungs are clear.  Minimal retained mucus secretions in the trachea.  No pleural effusion or pneumothorax.  Normal sized heart.  Thyroid is small and heterogeneous with a subcentimeter nodule or cyst in the right lobe.     XR CHEST 1 VIEW 05/10/2018   There is left lower lobe atelectasis and possible small left pleural effusion on this study.  No pneumothorax is seen.      Labs reviewed       Lab Results   Component Value Date    WBC 14.24 (H) 01/21/2025    RBC 4.38 01/21/2025    HGB 13.6 01/21/2025    HCT 39.3 01/21/2025    MCV 90 01/21/2025    MCH 31.1 (H) 01/21/2025    MCHC 34.6 01/21/2025    RDW 14.5 01/21/2025     01/21/2025    MPV 10.4 01/21/2025    GRAN 9.8 (H) 01/21/2025    GRAN 68.8 01/21/2025    LYMPH 2.2 01/21/2025    LYMPH 15.1 (L) 01/21/2025    MONO 1.6 (H) 01/21/2025    MONO 11.1 01/21/2025    EOS 0.1 01/21/2025    BASO 0.12 01/21/2025    EOSINOPHIL 0.9 01/21/2025    BASOPHIL 0.8 01/21/2025       PFT reviewed  2/10/2020 no obstruction seen, 7% bronchodilator response with 12% needed for clinical improvement; TLC 86% with DLC0 at 88%, normal exam with mild asthma  Spirometry bronchodilator, lung volume by gas dilution, diffusion capacity measured February 10, 2020.  The FEV1 to FVC ratio was 79%, there is no airflow obstruction measured by spirometry technique.  The FEV1 measured 85% predicted at  1.97 L. The 8%   improvement in FEV1 failed to reach statistical significance ( 12% is needed for statistical significance ).  Lung volumes by gas dilution were normal.  Diffusion was normal.      Spirometry, lung volumes, diffusion are all normal.  The bronchodilator response was not statistically significant.  Clinical correlation recommended.     Plan:  Clinical impression is apparently straight forward and impression with management as below.    Yodit was seen today for follow-up and asthma.    Diagnoses and all orders for this visit:    Bronchitis, chronic, mucopurulent    Severe persistent asthma without complication    Community acquired pneumonia of left lung, unspecified part of lung                            Follow up in about 3 months (around 5/3/2025), or if symptoms worsen or fail to improve.    Discussed with patient above for education the following:      Patient Instructions   Sputum no bad germs  Chest xray was good.    Use albuterol more    Would take more prednisone -- use minimally but you need to be cleared for trelegy to keep clear  Afb cultures pending

## 2025-02-03 NOTE — PATIENT INSTRUCTIONS
Sputum no bad germs  Chest xray was good.    Use albuterol more    Would take more prednisone -- use minimally but you need to be cleared for trelegy to keep clear  Afb cultures pending

## 2025-02-11 ENCOUNTER — CLINICAL SUPPORT (OUTPATIENT)
Dept: REHABILITATION | Facility: HOSPITAL | Age: 65
End: 2025-02-11
Payer: MEDICARE

## 2025-02-11 DIAGNOSIS — M25.819 SHOULDER IMPINGEMENT: Primary | ICD-10-CM

## 2025-02-11 DIAGNOSIS — M19.019 OSTEOARTHRITIS OF ACROMIOCLAVICULAR JOINT: ICD-10-CM

## 2025-02-11 DIAGNOSIS — M67.912 TENDINOPATHY OF LEFT ROTATOR CUFF: ICD-10-CM

## 2025-02-11 PROCEDURE — 97112 NEUROMUSCULAR REEDUCATION: CPT | Mod: PN | Performed by: PHYSICAL THERAPIST

## 2025-02-11 PROCEDURE — 97110 THERAPEUTIC EXERCISES: CPT | Mod: PN | Performed by: PHYSICAL THERAPIST

## 2025-02-12 NOTE — PROGRESS NOTES
Outpatient Rehab    Physical Therapy Visit    Patient Name: Yodit Canseco  MRN: [id number]  YOB: 1960  Today's Date: 2/11/2025    Therapy Diagnosis:   Encounter Diagnoses   Name Primary?    Shoulder impingement Yes    Tendinopathy of left rotator cuff     Osteoarthritis of acromioclavicular joint      Physician: Jose Washington MD    Physician Orders: PT Eval and Treat  Medical Diagnosis from Referral: Osteoarthritis of acromioclavicular joint [M19.019]   Tendinopathy of left rotator cuff [M67.912]   Shoulder impingement [M25.819]   Evaluation Date: 12/4/2024  Authorization Period Expiration: 12/31/2025  Plan of Care Expiration: 1/29/2025 (Reassessment at next visit)  Progress Note Due: 1/4/2025  Visit # / Visits authorized: 3/12   FOTO: 1/ 3     Precautions: Standard     Time In: 1500   Time Out: 1545  Total Time: 45   Total Billable Time: 25    FOTO:  Intake Score:  %  Survey Score 1:  %  Survey Score 2:  %         Subjective   has missed extended time from PT secondary to bronchitis that developed into pneumonia and required multi day hosptial stay. She is feeling better now with slight remaining cough..  Pain reported as 3/10. L shoulder    Objective            Treatment:  Therapeutic Exercise  Therapeutic Exercise Activity 1: UBE 3/3  Therapeutic Exercise Activity 2: Ball flexion stretch 3 min  Therapeutic Exercise Activity 3: Pulleys 3 min  Therapeutic Exercise Activity 4: Wall wipes flexion x30 reps  Therapeutic Exercise Activity 5: Wall wipes lateral x30 reps         Balance/Neuromuscular Re-Education  Balance/Neuromuscular Re-Education Activity 1: Resisted rows x20 reps 13# cable  Balance/Neuromuscular Re-Education Activity 2: Prone row x20 reps 3# DB  Balance/Neuromuscular Re-Education Activity 3: Prone ext x20 reps 3# DB  Balance/Neuromuscular Re-Education Activity 4: Bilateral ER YTB x20 reps  Balance/Neuromuscular Re-Education Activity 5: Horizontal abd YTB x20  reps  Balance/Neuromuscular Re-Education Activity 6: Resisted ER RTB x20 reps B  Balance/Neuromuscular Re-Education Activity 7: Resisted IR RTB x20 reps B  Balance/Neuromuscular Re-Education Activity 8: Door push ups x20 reps                   Assessment & Plan   Assessment: Will monitor response to treatment session and reassess objective measures at next session next week. No adverse symptoms reported after session today despite recent illnesses and time missed.  Evaluation/Treatment Tolerance: Patient tolerated treatment well    Patient will continue to benefit from skilled outpatient physical therapy to address the deficits listed in the problem list box on initial evaluation, provide pt/family education and to maximize pt's level of independence in the home and community environment.     Patient's spiritual, cultural, and educational needs considered and patient agreeable to plan of care and goals.           Plan: Continue PT POC. Reassessment/POC Update at next session.    Goals:  Short Term Goals: In 4 weeks   1.Pt to be educated on HEP. (progressing, not met)  2.Patient to increase GH ROM by 5-10 deg to improve functional mobility. (progressing, not met)  3.Patient to increase strength by 1/2 grade to improve functional tasks. (progressing, not met)  4.Patient to have pain less than 5/10 at all times to improve quality of movement. (progressing, not met)  5.Patient to improve score on the FOTO by 5%. (progressing, not met)  6. Pt to be educated on postural and body mechanics awareness. (progressing, not met)     Long Term Goals: In 8 weeks  1. Patient to improve score on the FOTO to 64. (progressing, not met)  2. Patient to demo increase in UE strength to 5/5 to improve functional tasks. (progressing, not met)  3. Patient to have decreased pain to less than 2/10 at all times to improve quality of movement. (progressing, not met)  4. Patient to demo increase GH ROM to WNL without pain to improve functional  mobility. (progressing, not met)  5. Patient to perform daily activities including recreational activities without limitation. (progressing, not met)    (Reassessment at next visit)    Alcon Shah, PT, DPT

## 2025-02-18 ENCOUNTER — CLINICAL SUPPORT (OUTPATIENT)
Dept: REHABILITATION | Facility: HOSPITAL | Age: 65
End: 2025-02-18
Payer: MEDICARE

## 2025-02-18 DIAGNOSIS — M67.912 TENDINOPATHY OF LEFT ROTATOR CUFF: ICD-10-CM

## 2025-02-18 DIAGNOSIS — M25.819 SHOULDER IMPINGEMENT: Primary | ICD-10-CM

## 2025-02-18 PROCEDURE — 97110 THERAPEUTIC EXERCISES: CPT | Mod: PN | Performed by: PHYSICAL THERAPIST

## 2025-02-18 PROCEDURE — 97112 NEUROMUSCULAR REEDUCATION: CPT | Mod: PN | Performed by: PHYSICAL THERAPIST

## 2025-02-18 NOTE — PROGRESS NOTES
Outpatient Rehab    Physical Therapy Discharge    Patient Name: Yodit Canseco  MRN: [id number]  YOB: 1960  Today's Date: 2/18/2025    Therapy Diagnosis:   Encounter Diagnoses   Name Primary?    Shoulder impingement Yes    Tendinopathy of left rotator cuff      Physician: Jose Washington MD    Physician Orders: PT Eval and Treat  Medical Diagnosis from Referral: Osteoarthritis of acromioclavicular joint [M19.019]   Tendinopathy of left rotator cuff [M67.912]   Shoulder impingement [M25.819]   Evaluation Date: 12/4/2024  Authorization Period Expiration: 12/31/2025  Plan of Care Expiration: 2/18/2025 (Update)  Progress Note Due: 1/4/2025  Visit # / Visits authorized: 4/12   FOTO: 2/ 3      Time In: 1100   Time Out: 1150  Total Time: 50   Total Billable Time: 25    FOTO:  Intake Score:  %  Survey Score 1:  %  Survey Score 2:  %         Subjective   she had little to no symptoms in shoulder..  Pain reported as 0/10.      Objective      Shoulder Range of Motion  Left Shoulder   Active (deg) Passive (deg) Pain   Flexion 160 180     Extension         Scaption         ABduction 150 160     ADduction         Horizontal ABduction         Horizontal ADduction         External Rotation (Shoulder ABducted 0 degrees) 60       External Rotation (Shoulder ABducted 45 degrees)   90     External Rotation (Shoulder ABducted 90 degrees)   90     Internal Rotation (Shoulder ABducted 0 degrees)   80     Internal Rotation (Shoulder ABducted 45 degrees)   70     Internal Rotation (Shoulder ABducted 90 degrees)                       Shoulder Strength - Planes of Motion   Right Strength Right Pain Left Strength Left  Pain   Flexion           Extension           ABduction           ADduction           Horizontal ABduction           Horizontal ADduction           Internal Rotation 0°           Internal Rotation 90°           External Rotation 0°           External Rotation 90°               Shoulder Strength Details  BUE  5/5                 Treatment:  Therapeutic Exercise  Therapeutic Exercise Activity 1: UBE 3/3  Therapeutic Exercise Activity 2: Ball flexion stretch 3 min  Therapeutic Exercise Activity 3: Pulleys 3 min  Therapeutic Exercise Activity 4: Wall wipes flexion x30 reps  Therapeutic Exercise Activity 5: Wall wipes lateral x30 reps  Therapeutic Exercise Activity 6: reassessment         Balance/Neuromuscular Re-Education  Balance/Neuromuscular Re-Education Activity 1: Resisted rows x20 reps 13# cable  Balance/Neuromuscular Re-Education Activity 2: Prone row x20 reps 3# DB  Balance/Neuromuscular Re-Education Activity 3: Prone ext x20 reps 3# DB  Balance/Neuromuscular Re-Education Activity 4: Bilateral ER RTB x20 reps  Balance/Neuromuscular Re-Education Activity 5: Horizontal abd RTB x20 reps  Balance/Neuromuscular Re-Education Activity 6: Resisted ER RTB x20 reps B  Balance/Neuromuscular Re-Education Activity 7: Resisted IR RTB x20 reps B  Balance/Neuromuscular Re-Education Activity 8: Door push ups x20 reps                        Assessment & Plan   Assessment: Pt met all ghoals and will DC To home program at this time  Evaluation/Treatment Tolerance: Patient tolerated treatment well    Patient's spiritual, cultural, and educational needs considered and patient agreeable to plan of care and goals.       Plan of Care Expiration: 2/18/2025 (Update)    Plan: DC to home program    Goals:  Short Term Goals: In 4 weeks   1.Pt to be educated on HEP. (met)  2.Patient to increase GH ROM by 5-10 deg to improve functional mobility. (met)  3.Patient to increase strength by 1/2 grade to improve functional tasks. (met)  4.Patient to have pain less than 5/10 at all times to improve quality of movement. (met)  5.Patient to improve score on the FOTO by 5%. (met)  6. Pt to be educated on postural and body mechanics awareness. (met)     Long Term Goals: In 8 weeks  1. Patient to improve score on the FOTO to 64. (met)  2. Patient to demo  increase in UE strength to 5/5 to improve functional tasks. (met)  3. Patient to have decreased pain to less than 2/10 at all times to improve quality of movement. (met)  4. Patient to demo increase GH ROM to WNL without pain to improve functional mobility. (met)  5. Patient to perform daily activities including recreational activities without limitation. (met)    Alcon Shah, PT, DPT

## 2025-03-19 ENCOUNTER — OFFICE VISIT (OUTPATIENT)
Dept: DERMATOLOGY | Facility: CLINIC | Age: 65
End: 2025-03-19
Payer: MEDICARE

## 2025-03-19 DIAGNOSIS — L30.4 INTERTRIGO: Primary | ICD-10-CM

## 2025-03-19 PROCEDURE — 99999 PR PBB SHADOW E&M-EST. PATIENT-LVL II: CPT | Mod: PBBFAC,,, | Performed by: STUDENT IN AN ORGANIZED HEALTH CARE EDUCATION/TRAINING PROGRAM

## 2025-03-19 PROCEDURE — 99212 OFFICE O/P EST SF 10 MIN: CPT | Mod: PBBFAC | Performed by: STUDENT IN AN ORGANIZED HEALTH CARE EDUCATION/TRAINING PROGRAM

## 2025-03-19 RX ORDER — KETOCONAZOLE 20 MG/G
CREAM TOPICAL 2 TIMES DAILY
Qty: 60 G | Refills: 1 | Status: SHIPPED | OUTPATIENT
Start: 2025-03-19

## 2025-03-19 NOTE — PROGRESS NOTES
Subjective:       Patient ID:  Yodit Canseco is a 64 y.o. female who presents for No chief complaint on file.    History of Present Illness: The patient presents with chief complaint of irritated rash.  Location: mostly under the arms, under the chest and in the bikini area  Duration: ongoing for several weeks to months  Signs/Symptoms: reports having very bright red, irritated and itchy rash. Sometimes painful  Prior treatments: has been using clobetasol, which helps for a couple days, but then symptoms return and are getting worse          Review of Systems   Constitutional:  Negative for fever and chills.   Skin:  Positive for rash.        Objective:    Physical Exam   Constitutional: She appears well-developed and well-nourished. No distress.   Neurological: She is alert and oriented to person, place, and time. She is not disoriented.   Psychiatric: She has a normal mood and affect.   Skin:   Areas Examined (abnormalities noted in diagram):   Head / Face Inspection Performed  Neck Inspection Performed  Chest / Axilla Inspection Performed  Abdomen Inspection Performed  RUE Inspected  LUE Inspection Performed             Diagram Legend     Erythematous scaling macule/papule c/w actinic keratosis       Vascular papule c/w angioma      Pigmented verrucoid papule/plaque c/w seborrheic keratosis      Yellow umbilicated papule c/w sebaceous hyperplasia      Irregularly shaped tan macule c/w lentigo     1-2 mm smooth white papules consistent with Milia      Movable subcutaneous cyst with punctum c/w epidermal inclusion cyst      Subcutaneous movable cyst c/w pilar cyst      Firm pink to brown papule c/w dermatofibroma      Pedunculated fleshy papule(s) c/w skin tag(s)      Evenly pigmented macule c/w junctional nevus     Mildly variegated pigmented, slightly irregular-bordered macule c/w mildly atypical nevus      Flesh colored to evenly pigmented papule c/w intradermal nevus       Pink pearly papule/plaque c/w  basal cell carcinoma      Erythematous hyperkeratotic cursted plaque c/w SCC      Surgical scar with no sign of skin cancer recurrence      Open and closed comedones      Inflammatory papules and pustules      Verrucoid papule consistent consistent with wart     Erythematous eczematous patches and plaques     Dystrophic onycholytic nail with subungual debris c/w onychomycosis     Umbilicated papule    Erythematous-base heme-crusted tan verrucoid plaque consistent with inflamed seborrheic keratosis     Erythematous Silvery Scaling Plaque c/w Psoriasis     See annotation      Assessment / Plan:        Intertrigo  -     ketoconazole (NIZORAL) 2 % cream; Apply topically 2 (two) times daily. For fungal rash  Dispense: 60 g; Refill: 1  -     Discontinue clobetasol to the area.              Follow up in about 6 months (around 9/19/2025).

## 2025-04-09 ENCOUNTER — OFFICE VISIT (OUTPATIENT)
Dept: OBSTETRICS AND GYNECOLOGY | Facility: CLINIC | Age: 65
End: 2025-04-09
Payer: MEDICARE

## 2025-04-09 ENCOUNTER — HOSPITAL ENCOUNTER (OUTPATIENT)
Dept: RADIOLOGY | Facility: HOSPITAL | Age: 65
Discharge: HOME | End: 2025-04-09
Attending: OBSTETRICS & GYNECOLOGY
Payer: MEDICARE

## 2025-04-09 VITALS — DIASTOLIC BLOOD PRESSURE: 80 MMHG | BODY MASS INDEX: 35 KG/M2 | WEIGHT: 191.38 LBS | SYSTOLIC BLOOD PRESSURE: 124 MMHG

## 2025-04-09 DIAGNOSIS — Z01.419 GYNECOLOGIC EXAM NORMAL: Primary | ICD-10-CM

## 2025-04-09 DIAGNOSIS — N89.8 VAGINAL DRYNESS: ICD-10-CM

## 2025-04-09 DIAGNOSIS — Z12.31 SCREENING MAMMOGRAM, ENCOUNTER FOR: ICD-10-CM

## 2025-04-09 DIAGNOSIS — D84.9 IMMUNOCOMPROMISED: ICD-10-CM

## 2025-04-09 DIAGNOSIS — N77.0 BEHCET'S VULVO-VAGINAL ULCER: ICD-10-CM

## 2025-04-09 DIAGNOSIS — Z79.890 POSTMENOPAUSAL HRT (HORMONE REPLACEMENT THERAPY): ICD-10-CM

## 2025-04-09 DIAGNOSIS — Z90.710 S/P HYSTERECTOMY: ICD-10-CM

## 2025-04-09 DIAGNOSIS — M35.2 BEHCET'S VULVO-VAGINAL ULCER: ICD-10-CM

## 2025-04-09 PROCEDURE — 99999 PR PBB SHADOW E&M-EST. PATIENT-LVL IV: CPT | Mod: PBBFAC,,, | Performed by: OBSTETRICS & GYNECOLOGY

## 2025-04-09 PROCEDURE — 77067 SCR MAMMO BI INCL CAD: CPT | Mod: TC,PN

## 2025-04-09 PROCEDURE — 77063 BREAST TOMOSYNTHESIS BI: CPT | Mod: 26,,, | Performed by: RADIOLOGY

## 2025-04-09 PROCEDURE — 99214 OFFICE O/P EST MOD 30 MIN: CPT | Mod: PBBFAC,PN | Performed by: OBSTETRICS & GYNECOLOGY

## 2025-04-09 PROCEDURE — 77067 SCR MAMMO BI INCL CAD: CPT | Mod: 26,,, | Performed by: RADIOLOGY

## 2025-04-09 RX ORDER — ESTRADIOL 0.1 MG/G
1 CREAM VAGINAL
Qty: 42.5 G | Refills: 2 | Status: SHIPPED | OUTPATIENT
Start: 2025-04-09 | End: 2026-04-09

## 2025-04-09 RX ORDER — ESTRADIOL 1 MG/1
1 TABLET ORAL DAILY
Qty: 90 TABLET | Refills: 3 | Status: SHIPPED | OUTPATIENT
Start: 2025-04-09

## 2025-04-09 NOTE — PROGRESS NOTES
Chief Complaint   Patient presents with    Annual Exam    Well Woman     History of Present Illness: Yodit Canseco is a 64 y.o. female that presents today 4/9/2025 for well gyn visit.    Past Medical History:   Diagnosis Date    Allergy     Angio-edema     Arthralgia     Asthma without status asthmaticus     Bronchitis     Cataract     Community acquired pneumonia of left lung 2/3/2025    COPD (chronic obstructive pulmonary disease)     COVID-19 virus infection 07/23/2021    Diverticulosis of large intestine without hemorrhage 10/08/2015    Eczema     Environmental allergies     Eosinophilic asthma 03/08/2018    Exacerbation of ulcerative colitis with rectal bleeding     Fibromyalgia 02/19/2024    Gastroparesis     Gram-negative bacteremia 12/29/2023    Hand arthritis     Herpes simplex without mention of complication     oral    Hidradenitis     History of morbid obesity     Hyperlipidemia     Hypothyroidism     Immune deficiency disorder     Intraperitoneal abscess 05/07/2018    LBP radiating to left leg     Leukocytosis, unspecified     Migraine headache     Normocytic anemia 10/05/2022    Obesity, Class III, BMI 40-49.9 (morbid obesity)     Periorbital cellulitis 12/31/2016    Prediabetes     RA (rheumatoid arthritis)     Recurrent upper respiratory infection (URI)     Rotavirus enteritis 12/27/2023    RSV (respiratory syncytial virus infection) 01/30/2023    S/P colonoscopy 11/2010    Sepsis 05/07/2018    Sepsis secondary to colitis 05/07/2018    Systemic lupus erythematosus, organ or system involvement unspecified     Tubular adenoma of colon 08/17/2022    Tubular adenoma of colon 05/01/2024    Ulcerative pancolitis with rectal bleeding 10/17/2022    Urticaria        Past Surgical History:   Procedure Laterality Date    ADENOIDECTOMY      CATARACT EXTRACTION W/  INTRAOCULAR LENS IMPLANT Right 08/21/2024    Panoptix    CATARACT EXTRACTION W/  INTRAOCULAR LENS IMPLANT Left 09/25/2024    Panoptix     "CHOLECYSTECTOMY      CHOLECYSTECTOMY      colitis      COLONOSCOPY  2015    repeat in 10    COLONOSCOPY N/A 10/08/2015    Procedure: COLONOSCOPY;  Surgeon: Panda Bose MD;  Location: [facility] ENDO;  Service: Endoscopy;  Laterality: N/A;    COLONOSCOPY N/A 08/17/2022    Procedure: COLONOSCOPY;  Surgeon: Olaf Del Valle MD;  Location: [facility] ENDO;  Service: General;  Laterality: N/A;    COLONOSCOPY N/A 10/06/2022    Procedure: COLONOSCOPY;  Surgeon: Quinton Celeste MD;  Location: [facility];  Service: Gastroenterology;  Laterality: N/A;    COLONOSCOPY N/A 05/01/2024    Procedure: COLONOSCOPY;  Surgeon: Quinton Celeste MD;  Location: [facility];  Service: Endoscopy;  Laterality: N/A;    ESOPHAGOGASTRODUODENOSCOPY N/A 05/01/2024    Procedure: EGD (ESOPHAGOGASTRODUODENOSCOPY);  Surgeon: Quinton Celeste MD;  Location: [facility];  Service: Endoscopy;  Laterality: N/A;    Hand fracture surgery      HARDWARE REMOVAL      left hand 5th [facility]    hymenectomy      HYSTERECTOMY      menorrhagia-Ovaries intact    ROTATOR CUFF REPAIR Right     SLEEVE GASTROPLASTY  04/16/2018    TONSILLECTOMY      Urethral dilatation         Medications Prior to Visit[1]    Review of patient's allergies indicates:   Allergen Reactions    Bromelains Hives     " causes mouth to bleed"    Pimecrolimus Swelling    Sudafed [pseudoephedrine hcl] Other (See Comments)     Does not want to wake up .    Amoxicillin-pot clavulanate Itching     Other reaction(s): Itching; tolerated ceftriaxone 10/2022    Hydrocodone Itching    Iodinated contrast media      childhood    Iodine and iodide containing products Other (See Comments)    Melon Hives    Percocet [oxycodone-acetaminophen] Itching    Codeine     Corn containing products     Tree nut     Tree nuts Other (See Comments)    Wheat containing prod     Barium iodide Rash    Ephedrine Other (See Comments)     Other reaction(s): comatose    Penicillins      Other reaction(s): does not work on pt symptoms "       Family History   Problem Relation Name Age of Onset    Melanoma Mother Loyd     Basal cell carcinoma Mother Loyd     Lung disease Mother Loyd         pulolga htn    Cataracts Mother Loyd     Hypertension Mother Loyd     Lung cancer Father Josh     Diabetes Father Josh     Hypertension Father Josh     Cancer Father Josh     Melanoma Maternal Aunt      Melanoma Maternal Uncle      Diabetes Paternal Aunt Estee     Colon cancer Paternal Aunt Lilliam 40    Diabetes Paternal Aunt Lilliam     Breast cancer Paternal Aunt  40    Lymphoma Paternal Aunt      Cancer Maternal Grandfather Gilmar     Ovarian cancer Paternal Grandmother  40    Ulcerative colitis Son      Psoriasis Son      Psoriasis Son      Breast cancer Cousin Paternal 1st 25    Allergic rhinitis Neg Hx      Allergies Neg Hx      Angioedema Neg Hx      Asthma Neg Hx      Atopy Neg Hx      Eczema Neg Hx      Immunodeficiency Neg Hx      Rhinitis Neg Hx      Urticaria Neg Hx         Social History     Tobacco Use    Smoking status: Never     Passive exposure: Past    Smokeless tobacco: Never   Substance Use Topics    Alcohol use: Not Currently     Comment: very rarely       Social History     Substance and Sexual Activity   Sexual Activity Yes    Partners: Male       OB History    Para Term  AB Living   5 2 2  3    SAB IAB Ectopic Multiple Live Births   3    2      # Outcome Date GA Lbr Pradip/2nd Weight Sex Type Anes PTL Lv   5 SAB            4 SAB            3 SAB            2 Term            1 Term                Review of Symptoms:  GENERAL: Denies weight gain or weight loss. Feeling well overall.   SKIN: Denies rash or lesions.   HEAD: Denies head injury or headache.   NODES: Denies enlarged lymph nodes.   CHEST: Denies chest pain or shortness of breath.   CARDIOVASCULAR: Denies palpitations or left sided chest pain.   ABDOMEN: No abdominal pain, constipation, diarrhea, nausea, vomiting or rectal bleeding.   URINARY: No frequency,  dysuria, hematuria, or burning on urination.  HEMATOLOGIC: No easy bruisability or excessive bleeding.   MUSCULOSKELETAL: Denies joint pain or swelling.     /80 (Patient Position: Sitting)   Wt 86.8 kg (191 lb 5.8 oz)     Body mass index is 35 kg/m².    Physical Exam:  APPEARANCE: Well nourished, well developed, in no acute distress.  SKIN: Normal skin turgor, no lesions.  NECK: Neck symmetric without masses   RESPIRATORY: Normal respiratory effort with no retractions or use of accessory muscles  CARDIOVASCULAR: Peripheral vascular system with no swelling no varicosities and palpation of pulses normal  LYMPHATIC: No enlargements of the lymph nodes noted in the neck, axillae, or groin  ABDOMEN: Soft. No tenderness or masses. No hepatosplenomegaly. No hernias.  BREASTS: Symmetrical, no skin changes or visible lesions. No palpable masses, nipple discharge or adenopathy bilaterally.  PELVIC: Normal external female genitalia without lesions. Normal hair distribution. Adequate perineal body, normal urethral meatus. Urethra with no masses.  Bladder nontender. Vagina moist and well rugated without lesions or discharge. No significant cystocele or rectocele.  Adnexa without masses or tenderness. Urethra and bladder normal.   EXTREMITIES: No clubbing cyanosis or edema.    ASSESSMENT/PLAN:  Gynecologic exam normal    S/P hysterectomy    Screening mammogram, encounter for  -     Mammo Digital Screening Bilat w/ Eric (XPD); Future; Expected date: 04/09/2025    Postmenopausal HRT (hormone replacement therapy)  -     estradioL (ESTRACE) 1 MG tablet; Take 1 tablet (1 mg total) by mouth once daily.  Dispense: 90 tablet; Refill: 3    Behcet's vulvo-vaginal ulcer    Immunocompromised    Vaginal dryness  -     estradioL (ESTRACE) 0.01 % (0.1 mg/gram) vaginal cream; Place 1 g vaginally 3 (three) times a week.  Dispense: 42.5 g; Refill: 2          Patient was counseled today on Pap guidelines. We discussed the discontinuing the  pap smear after hysterectomy except in certain high risk cases.  We discussed the need for pelvic exams.   We discussed STD screening if at high risk for an STD.  We discussed breast cancer screening with mammograms every other year after the age of 40 and annually after the age of 50.    We discussed colon cancer screening.   Osteoporosis screening with the Dexa Bone Scan discussed when indicated.   She will see her PCP for other health maintenance.       FOLLOW-UP:prn             [1]   Outpatient Medications Prior to Visit   Medication Sig Dispense Refill    acetaminophen (TYLENOL) 325 MG tablet Take 1.5 tablets (487.5 mg total) by mouth every 6 (six) hours as needed for Pain (Do not take with any other products containing  tylenol or acetaminophen).      albuterol (PROVENTIL/VENTOLIN HFA) 90 mcg/actuation inhaler Inhale 2 puffs into the lungs every 4 (four) hours as needed for Wheezing or Shortness of Breath. 2 puffs every 4 hours as needed for cough, wheeze, or shortness of breath 54 g 3    benzonatate (TESSALON) 200 MG capsule Take 1 capsule (200 mg total) by mouth 3 (three) times daily as needed for Cough. 90 capsule 3    cholecalciferol, vitamin D3, (VITAMIN D3) 50 mcg (2,000 unit) Cap Take 1 capsule by mouth once daily.      clobetasoL (TEMOVATE) 0.05 % cream Apply topically nightly as needed. To the vulva 60 g 1    clobetasol 0.05% (TEMOVATE) 0.05 % Oint Apply topically 2 (two) times daily. 60 g 1    desonide (DESOWEN) 0.05 % cream Apply topically 2 (two) times daily. 60 g 1    doxycycline (VIBRAMYCIN) 100 MG Cap Take 1 capsule (100 mg total) by mouth 2 (two) times daily. 28 capsule 2    fluticasone-umeclidin-vilanter (TRELEGY ELLIPTA) 200-62.5-25 mcg inhaler Inhale 1 puff into the lungs once daily. 60 each 11    furosemide (LASIX) 20 MG tablet 90 tablets.      hydrocortisone 2.5 % cream Apply 1 Application topically 2 (two) times daily as needed (TO THE ANUS). To the anus      HYDROmorphone (DILAUDID) 2 MG  tablet Take 1 tablet (2 mg total) by mouth every 4 (four) hours as needed (cough). 20 tablet 0    immun glob G,IgG,-pro-IgA 0-50 (HIZENTRA) 2 gram/10 mL (20 %) Soln Inject 90 mLs (18 g total) into the skin every 14 (fourteen) days. 180 mL 12    ketoconazole (NIZORAL) 2 % cream Apply topically 2 (two) times daily. For fungal rash 60 g 1    Lactobacillus rhamnosus GG (CULTURELLE) 10 billion cell capsule Take 1 capsule by mouth 2 (two) times daily.      levothyroxine (SYNTHROID) 75 MCG tablet TAKE 1 TABLET BY MOUTH EVERY DAY 90 tablet 3    LIDOcaine 5 % Crea Apply 1 Application topically once daily.      metronidazole 0.75% (METROCREAM) 0.75 % Crea Apply topically 2 (two) times daily. 45 g 1    minoxidiL (LONITEN) 2.5 MG tablet TAKE 1/4TH (0.625MG) BY MOUTH DAILY (Patient taking differently: Take 1/4th (0.625mg) by mouth daily  * Patient states that she is now taking 1/2 tablet) 23 tablet 3    montelukast (SINGULAIR) 10 mg tablet Take 1 tablet (10 mg total) by mouth every evening. 90 tablet 3    MULTIVITAMIN ORAL Take 1 tablet by mouth once daily.      ondansetron (ZOFRAN-ODT) 4 MG TbDL Take 1 tablet (4 mg total) by mouth every 8 (eight) hours as needed (nausea/vomiting). 90 tablet 6    pilocarpine (SALAGEN) 5 MG Tab Take 1 tablet (5 mg total) by mouth 2 (two) times daily. 60 tablet 11    potassium chloride SA (K-DUR,KLOR-CON) 20 MEQ tablet Take 20 mEq by mouth once daily.      predniSONE (DELTASONE) 2.5 MG tablet Take 2.5 mg by mouth once daily.      simvastatin (ZOCOR) 10 MG tablet Take 10 mg by mouth every evening.      sulfaSALAzine (AZULFIDINE) 500 MG EC tablet Take 1 tablet (500 mg total) by mouth 2 (two) times a day. 60 tablet 0    sumatriptan (IMITREX) 100 MG tablet Take 1 tablet (100 mg total) by mouth once as needed for Migraine. May repeat dose in 2 hours if no relief.  Do not exceed 2 doses in 24 hours. 9 tablet 5    traMADoL (ULTRAM) 50 mg tablet Take 2 tablets (100 mg total) by mouth every 6 (six) hours  as needed for Pain. 60 tablet 0    triamterene-hydrochlorothiazide 37.5-25 mg (MAXZIDE-25) 37.5-25 mg per tablet TAKE 1 TABLET BY MOUTH DAILY AS NEEDED (EDEMA). 90 tablet 0    upadacitinib (RINVOQ) 45 mg Tb24 Take 15 mg by mouth Daily.      vancomycin (VANCOCIN) 125 MG capsule Take 1 capsule (125 mg total) by mouth Daily. Take while on antibiotic and  for additional 3 days 21 capsule 1    estradioL (ESTRACE) 1 MG tablet TAKE 1 TABLET BY MOUTH EVERY DAY 90 tablet 0    desonide (DESOWEN) 0.05 % cream APPLY TOPICALLY TWICE A DAY (Patient not taking: Reported on 4/9/2025) 60 g 6    sodium,potassium,mag sulfates (SUPREP BOWEL PREP KIT) 17.5-3.13-1.6 gram SolR  (Patient not taking: Reported on 4/9/2025)      traMADoL (ULTRAM) 50 mg tablet Take 1 tablet (50 mg total) by mouth 3 (three) times daily. (Patient not taking: Reported on 4/9/2025) 60 each 0    traMADoL (ULTRAM) 50 mg tablet Take 1 tablet (50 mg total) by mouth every 6 (six) hours as needed for Pain. (Patient not taking: Reported on 4/9/2025) 60 each 0    tretinoin (RETIN-A) 0.05 % cream Apply 1 application  topically every evening. (Patient not taking: Reported on 4/9/2025)       No facility-administered medications prior to visit.

## 2025-04-13 NOTE — TELEPHONE ENCOUNTER
Care Due:                  Date            Visit Type   Department     Provider  --------------------------------------------------------------------------------                                MYCHART                              FOLLOWUP/OF  [facility] FAMILY  Last Visit: 07-      FICE VISIT   MEDICINE       Miky Lewis  Next Visit: None Scheduled  None         None Found                                                            Last  Test          Frequency    Reason                     Performed    Due Date  --------------------------------------------------------------------------------    TSH.........  12 months..  levothyroxine............  06- 05-    [facility] Embedded Care Due Messages. Reference number: [id number].   4/13/2025 9:17:52 AM CDT

## 2025-04-14 RX ORDER — SUMATRIPTAN SUCCINATE 100 MG/1
100 TABLET ORAL ONCE AS NEEDED
Qty: 9 TABLET | Refills: 5 | Status: SHIPPED | OUTPATIENT
Start: 2025-04-14 | End: 2025-04-14

## 2025-04-21 RX ORDER — TRIAMTERENE/HYDROCHLOROTHIAZID 37.5-25 MG
1 TABLET ORAL DAILY PRN
Qty: 90 TABLET | Refills: 1 | Status: SHIPPED | OUTPATIENT
Start: 2025-04-21

## 2025-05-02 ENCOUNTER — OFFICE VISIT (OUTPATIENT)
Dept: OPHTHALMOLOGY | Facility: CLINIC | Age: 65
End: 2025-05-02
Payer: MEDICARE

## 2025-05-02 DIAGNOSIS — Z96.1 PSEUDOPHAKIA OF BOTH EYES: Primary | ICD-10-CM

## 2025-05-02 DIAGNOSIS — H04.129 DRY EYE: ICD-10-CM

## 2025-05-02 DIAGNOSIS — H02.831 DERMATOCHALASIS OF BOTH UPPER EYELIDS: ICD-10-CM

## 2025-05-02 DIAGNOSIS — H10.10 SEASONAL ALLERGIC CONJUNCTIVITIS: ICD-10-CM

## 2025-05-02 DIAGNOSIS — H02.834 DERMATOCHALASIS OF BOTH UPPER EYELIDS: ICD-10-CM

## 2025-05-02 PROCEDURE — 99213 OFFICE O/P EST LOW 20 MIN: CPT | Mod: PBBFAC | Performed by: OPTOMETRIST

## 2025-05-02 PROCEDURE — 99999 PR PBB SHADOW E&M-EST. PATIENT-LVL III: CPT | Mod: PBBFAC,,, | Performed by: OPTOMETRIST

## 2025-05-02 NOTE — PROGRESS NOTES
HPI     Annual Exam            Comments: Pt here for an annual exam.    VA stable; still seeing well w/o correction.   Longstanding/ stable floaters.   Denies flashes/ h eadaches/ pains.           Comments    DNL PT  PCP: EDUARDO Lewis  PCIOL OD Panoptix 08/21/2024  PCIOL OS [facility]optix 9/25/24      Plaquenil 200mg bid started approx 2022- no longer taking (6/25/24)             Last edited by Maxine Martin on 5/2/2025  2:25 PM.            Assessment /Plan     For exam results, see Encounter Report.    Pseudophakia of both eyes  Doing well OU  Monitor 12 months    Seasonal allergic conjunctivitis  Recommended Pataday qd OU    Dermatochalasis of both upper eyelids  Discussed oculoplastics consult in the future if needed    Dry eye  Increase systane to bid OU    Normal gOCT today with nice, symmetric GCL OU      RTC 1 yr for dilated eye exam or PRN if any problems.   Discussed above and answered questions.

## 2025-05-06 ENCOUNTER — TELEPHONE (OUTPATIENT)
Dept: PULMONOLOGY | Facility: CLINIC | Age: 65
End: 2025-05-06

## 2025-05-06 ENCOUNTER — OFFICE VISIT (OUTPATIENT)
Dept: PULMONOLOGY | Facility: CLINIC | Age: 65
End: 2025-05-06
Payer: MEDICARE

## 2025-05-06 VITALS
SYSTOLIC BLOOD PRESSURE: 131 MMHG | BODY MASS INDEX: 34.38 KG/M2 | OXYGEN SATURATION: 98 % | HEIGHT: 62 IN | HEART RATE: 66 BPM | WEIGHT: 186.81 LBS | DIASTOLIC BLOOD PRESSURE: 84 MMHG

## 2025-05-06 DIAGNOSIS — J45.50 SEVERE PERSISTENT ASTHMA WITHOUT COMPLICATION: Primary | ICD-10-CM

## 2025-05-06 DIAGNOSIS — M35.00 SICCA SYNDROME: ICD-10-CM

## 2025-05-06 DIAGNOSIS — M06.9 RHEUMATOID ARTHRITIS, INVOLVING UNSPECIFIED SITE, UNSPECIFIED WHETHER RHEUMATOID FACTOR PRESENT: ICD-10-CM

## 2025-05-06 DIAGNOSIS — R10.9 ABDOMINAL PAIN, UNSPECIFIED ABDOMINAL LOCATION: ICD-10-CM

## 2025-05-06 PROCEDURE — 99214 OFFICE O/P EST MOD 30 MIN: CPT | Mod: PBBFAC,PO | Performed by: INTERNAL MEDICINE

## 2025-05-06 PROCEDURE — 99213 OFFICE O/P EST LOW 20 MIN: CPT | Mod: S$PBB,,, | Performed by: INTERNAL MEDICINE

## 2025-05-06 PROCEDURE — 99999 PR PBB SHADOW E&M-EST. PATIENT-LVL IV: CPT | Mod: PBBFAC,,, | Performed by: INTERNAL MEDICINE

## 2025-05-06 RX ORDER — TRAMADOL HYDROCHLORIDE 50 MG/1
50 TABLET ORAL EVERY 8 HOURS PRN
Qty: 75 EACH | Refills: 0 | Status: SHIPPED | OUTPATIENT
Start: 2025-06-04

## 2025-05-06 RX ORDER — TRAMADOL HYDROCHLORIDE 50 MG/1
50 TABLET ORAL EVERY 8 HOURS PRN
Qty: 60 TABLET | Refills: 0 | Status: SHIPPED | OUTPATIENT
Start: 2025-07-01 | End: 2025-05-06 | Stop reason: SDUPTHER

## 2025-05-06 RX ORDER — MECOBALAMIN 1000 MCG
TABLET,CHEWABLE ORAL
COMMUNITY

## 2025-05-06 RX ORDER — UPADACITINIB 15 MG/1
15 TABLET, EXTENDED RELEASE ORAL
COMMUNITY
Start: 2025-04-19

## 2025-05-06 RX ORDER — TRAMADOL HYDROCHLORIDE 50 MG/1
50 TABLET ORAL EVERY 8 HOURS PRN
Qty: 60 EACH | Refills: 0 | Status: SHIPPED | OUTPATIENT
Start: 2025-06-04 | End: 2025-05-06 | Stop reason: SDUPTHER

## 2025-05-06 RX ORDER — TRAMADOL HYDROCHLORIDE 50 MG/1
50 TABLET ORAL EVERY 8 HOURS PRN
Qty: 60 EACH | Refills: 0 | Status: SHIPPED | OUTPATIENT
Start: 2025-05-06 | End: 2025-05-06 | Stop reason: SDUPTHER

## 2025-05-06 RX ORDER — PILOCARPINE HYDROCHLORIDE 5 MG/1
10 TABLET, FILM COATED ORAL 3 TIMES DAILY
Qty: 180 TABLET | Refills: 11 | Status: SHIPPED | OUTPATIENT
Start: 2025-05-06

## 2025-05-06 RX ORDER — TRAMADOL HYDROCHLORIDE 50 MG/1
50 TABLET ORAL EVERY 8 HOURS PRN
Qty: 75 EACH | Refills: 0 | Status: SHIPPED | OUTPATIENT
Start: 2025-05-06

## 2025-05-06 RX ORDER — TRAMADOL HYDROCHLORIDE 50 MG/1
50 TABLET ORAL EVERY 8 HOURS PRN
Qty: 75 TABLET | Refills: 0 | Status: SHIPPED | OUTPATIENT
Start: 2025-07-01

## 2025-05-06 NOTE — TELEPHONE ENCOUNTER
Cover my meds  #[id number]         Approved today by [employer] NCPDP 2017  Your request has been approved  Effective Date: 1/1/2025  Authorization Expiration Date: 5/6/2026

## 2025-05-06 NOTE — PATIENT INSTRUCTIONS
Tramadol increased -- you are having more U.C. problems    Salagen dose could be increased up to 10 mg 3 times daily reasonable -- mouth rinse may help???  Asthma stable

## 2025-05-06 NOTE — PROGRESS NOTES
5/6/2025    Yoidt Canseco  Office Note    Chief Complaint   Patient presents with    Follow-up    Asthma       HPI:     5/6/2025 asthma stable, needs tramadol, having more UC problems,  dry mouth bothersome on salagen low dose.    2/3/2025 bowels ok, no diarrhea.   Had one large dark green mucous this am, still congested-- varies somewhat..  pt boosted prednisone to 20 daily for c2 days with some improvement  Patient Instructions   Sputum no bad germs  Chest xray was good.    Use albuterol more    Would take more prednisone -- use minimally but you need to be cleared for trelegy to keep clear  Afb cultures pending         1/27/2025   Pt had presentations to [facility] 1/21(astrovirus detected) and [facility] 1/22-- pt had diarrhea onset 1/18 -had slowed,, had low grade temp, pt dx astrovirus.  Pt was rx azithro- vancomycin     Ct chest 1/21 abd had min left lower lung infiltrate, very tiny on  film.  Pt had green mucous at admit [facility], now yellow green...      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 Astrovirus infection occurs in individuals of all ages; it appears to be less pathogenic among adults than norovirus infection [157,158]. In temperate regions, there is a peak in infection during winter months; in tropical regions, infection occurs most frequently during rainy seasons [159,160]. Sporadic astrovirus gastroenteritis occurs primarily in children younger than four years. The incubation period is three to four days. Clinical manifestations include low-grade fever, diarrhea, headache, malaise, and nausea; vomiting occurs relatively infrequently [161-163]. Symptoms generally last two to three days.     Patient Instructions   Left  lower lung still abnormal on exam,  chest xray reasonable to assure not much worse  Need sputum culture  May need more antibiotic ?    Will renew  dilaudid for cough suppression.  Cough still very severe and pneumonia seem lingering  You should dose hirzentra asap as may help clear pneumonia  We discuss tramadol -- you use to stay mobile with chr ra and gut pains-- will give 60/m , will need pain contract next visit.  Follow up next wk if problem  otherwise April.        1/16/2025 pt had dental wk 12/23 with sinus infection and cleared with doxycycline.  Pt had stomach virus last wk ( 2 days off doxy)with nausea and diarrhea- remitted.   Pt developed wheezes and had left ear discomfort/ringing---red eardrum on left side --   used abuterol but no prednisoen    Yesterday began haivng green mucous from lungs..  Patient Instructions   Asthma exacerbating - -with recurrent sinus infection.     Need to treat sinuses to have no symptoms for 5+ days -- use doxy with vancomycin to prevent c diff    Use tramadol for abd pain -- call if more needed..  may give 45 monthly for up to 3 months.  Would limit      Use dialaudid for cough with exacerbation    Use prednisone now , may need up to 3 days of 20 mg -- stop as soon as controlled.  Use trelegy for a month to assure  no  asthma relapse.    Get flu vaccine , use tamiflu  if flu      10/8/2024-- ulcerative colitis better on [place], on hizentra and no infection, ashtma controlled.    Has sinus pressure   has severe RA.        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    Trelegy use is reasonable as you are doing well.....  5/13/2024 pt developed bronchitis during admit for ulcerative colitis [facility]-  3 days, took doxy for sinus and bronchitis and took course prophylactic  vanc  Pt was off hizentra for a month--  Renvoq oral rx for uc ongoing  Uses trelegy daily  Patient Instructions   May use doxycycline for 7-10 days if sinus/lung infection-- use vancomycin for duration of systemic antibiotic plus  additional (5-) 7 days    To minimize need antibiotic needs -- start Hizentra..    Chest xray and ct abd lower lungs were clear from 4/28/2024 April 4, 2024-  lungs getting by , had UC exacerbated with lyrica and now back on tramadol.     No more hemoptysis.  Pt having am congestion  Pt had  increase steroids to 30/ hydrocortine.    Patient Instructions   Double lasix to 40/d  and check blood next wk     May use doxycycline if sinus infection-- low c diff risk.      Will ask staff to arrange virtual visit with  if needed..      1/4/2024 pt was in icu for rotovirus gi infection-- was in icu 4 days, got lots fluid, .. Record reviewed.  Pt was dosed high dose steroids.  Pt was given 7 d course levaquin with no c diff prevention.    Pt had some hemoptysis froathy blood in mucous    Pt has had green mucous with neg cuilture earlier December,     Pt very weak and had fall in ER checking in.       Pt now off prednisone -- stopped abruptly after leaving hosp.   Pt was weaned off stopping 5 days or so prior to admit.    Pt now very weak .   Pt was on steroids for about a yr.     On trelegy and lungs stable    Patient Instructions   Would re culture mucous -- check for afb and regular germs.  Coritosol check is reasonable -- do cortisol level in am.  May consider replacement.    Ct chest likely low yield -- pattern of hemoptysis sounded more like fluid and sepsis.  Could do ct  later if still bringing up  yellow mucous.  Chest xray viewed..      Edema should mobiliize    C diff may recur -- if diarrhea increase may test..    You are having severe persistent sinus fullness-- antibiotics very risky and just took levaquin.   Would recommend sinus xray - ct would be best.  May need ent   12/5/2023  Pt had exacerbatoin cough and increased UC prednisone from 5/d to 20 mg for last 10 days along with doxy -- off abx and had green mucous this am.  Albuterol helps and dosed tid.    Pt had u/a showing wbc at home - says  recurrent uti.  H/o c diff.  Not on controller.      Appetite good nad not weak, no fever.   Patient Instructions   Green mucous after increased medication for exacerbation -- failed doxycycline.  Need culture     Trelegy may stabilize-- once daily    Boost prednisone to 20 /d if cough controlled-- dilaudid sent in for 30  tabs-- call next wk if need more.            Cxr ordered if worsens    Need to re evaluate next wk if not clearing.  5/8/2023 took doxycycline no issues.  Bowels may be better on Entyvio. Lungs doing well.  On Hizentra.  Pt on prednisone 20/d -- on prednisone since November with plans to decrease in near future?   Still having blood in stool so will not taper prednisone til bleed resolves-- should improve in near future (on 3 rd entyvio now and should be good after 4th)  Patient Instructions   Might consider pneumocystis prophylaxis but antibiotics will increase c diff-- bactrim 2 single strength 3 days a week.  Use vancomycin daily while on bactrim.  Stop bactrim (followed by vancomycin) on prednisone less than 20mg/d.    Bactrim generally has high risk allergies and kidney problems.     12/29/2022 having sinus problems with green mucous nad coughing, right face to chin hurts last 3 days. Had in prior.  May exacerbate lungs as in past.  Started levaquin 2 days ago. Prednisone boosted from 10 to 20/d.  Need pain control and cough suppresion .  Has prednisoen. Not getting [facility]erol [facility]-- not covered on plan??   1/30/2023 chart note after above visit.  Pt has had colitis-- dx c diff recently.   Epic reveals c diff ag but no toxin.    Had green sinus dc-- cleared slight with levaquin but now dx c diff with ag no toxin    Will dc levaquin and instructed not to use.    Doxycycline may be ok -- would avoid abx as much as able.    If sinuses relapse -- doxycycline sent in.    If green lung mucous -- pt told to submit sputum for culture.    11/2/2022 dx lupus, bechets, Ulcerative colitis, RA-- on  hizentra.  Pt exposed to grandson with rsv.  Having abd pain -- dilaudid helped and suppressed cough-- not able to use codeine/hydrocodone.     Ct abd and cxr 10/2022 clear lungs.       Pt currently felt to have rsv-- improved over last 5 da   pt was sl febrile initially.   Pt now having green mucous. Cough is violent .  similar sick. Had min leg edema.  On hizentra. Asthma doing wellwith RSV.  Eating ok but weak and coughs a lot.  Breathing good.     Patient Instructions   May use levaquin for green mucous    Rsv should run course-- hirzentra should help?  Breztri may be needed.      Use dilaudid minimally for pain -- should suppress cough    Diflucan for yeast as needed    Maxzide as needed for edema    Tessalon for cough    Could do phone follow up    Ct abd 10/2022 was clear lower lungs and cxr 10/31/2022 was clear -- lungs not a concern.  Asthma doing well even with rsv.  2022 mom  sept complications from hip fx/covid.  Had few sinus infection-- rx Levaquin/prednisone.  Pt on plaquenil, dx ra and lupus.  Also on colchicine.      Uses breo but skipped.  Uses albuterol once a wk.    Patient Instructions   May use breztri 2 twice daily -- could use more or less as needed.   If used regular -- breztri would act as controller.    May use levaquin and prednisone if sinus/lung exacerbates.    Lungs loook good wrt lung tissue-- no lung tissue disease seen from Rheumatoid disease.   Ct abd 2021 viewed.   2021 - had covid in July, had rapid home test +, managed outpt.  Got infusion rx.  Has wheezes but feels over covid.  Had nausea, vomiting, weakness, headache, -- sick 8 days.  Mom got hip fx complicating recovery-- at rehab. Pt and mom were vaccinated.  Pt on abx for sinus infection  Dx lupus/rheumatoid - 5 mg prednisone daily  Patient Instructions   Recovering covid doing well.    Use levaquin as needed.    Ordered cxr if needed.     2021 had pneumonia 2020 - no problems. Needs  disability paper updated.  We review today.    Patient Instructions   We reviewed your disability- need to complete paper work.  You had pneumonia in March 2020.  Breo, prednisone, levaquin, albuterol renewed.    10/14/2020- all well.  No abx/prednisone, cxr 10/14/2020 nad- rul pneumonia [place] cleared.  Avoids irritants.  Patient Instructions   You likely would be best to get high dose flu vaccine.   Your immune system may have problem responding to flu vaccine.  4/14/2020- on hirzentra since sept 2016.  Saw NP Israel in [place] 4/13- virtual visit- azithro and prednisone 10/d x 3 for sinusitis.     Uses   500 bid, breo 200 daily, , xopenex nebs prn, singulair. Needs more prednisone, had pneumonia 3/4 to 8th.  Had ct chest feb.  Felt had covid symptoms.  No ox needed for pneumonia- no steroids nor bd. Took doxy - vanc in hosp.   Patient Instructions   Pneumonia - follow up chest xray good- not urgent.    Prednisone action plan for asthma/wheeze/cough    May use levaquin for sinus/lung infections  2/10/2020- had shingles vaccine, states felt an immune response.   Cough- improved, daily, not severe, non productive occasionally productive thick dark yellow color. Using nebulizer only as needed. Has noticed fewer asthma exacerbations since starting Hyzentra, went from 1 months to 2x yearly.   Not able to use albuterol rescue inhaler or nebulized due to tachycardia, palpitations, and hand tremors. No complaint with xopenex medications.   Patient Instructions   Continue current Asthma medication regiment  Review of CT shows no areas of concern. The small nodule is not significant  Pulmonary function test shows lungs to still be strong.   Continue to use Xopenex Nebulizer with aerobika attachement once a day three days a week every week.  When your sick you can use nebulizer or Xopenex rescue inhaler four times a day.     1/9/2020- two exacerbations in 2 months, states cough is unchanged, daily, states bark  like cough, severe and affects daily life, Productive occasionally clear/cream color thick in consistency, uses nebulizer 2-3 x daily.   Patient Instructions   [employer] sells an Aerobika device, this device shakes the air inside of you to break mucous off the bronchial wall. Use with nebulizer and with out daily.    Continue current Asthma medication regiment     Use nebulizer at least once a day three days a week.     CT to evaluate lungs for any abnormalities       11/8/2019- Had recent Rotator cuff surgery with nerve block Oct 9, Has SOB worse after surgery for 3 days, Took prednisone 20 mg for 3 days and Levaquin for 9 days. Seen by PCP dx URI.   Coughing, chest tightness, congestion, wheezing, Hoarse voice, fatigue, Shortness of breath worse with exertion, onset following surgery to shoulder. Cough- not able to clear secretions from chest.  Complaint of hand shaking, heart rate increases, and becomes rapid with Albuterol nebulizer onset years. Tries not to use due to side effects.     04/24/2019- breathing is good except for high pollen days, had 3 exacerbation in 6 months. Albuterol rescue inhaler 3x weekly, 1-2 nocturnal arousals monthly, could not do PFT at [place] [facility],     11/6/18- ER visit for dehydration gastroenteritis, no prednisone use in 4 months. Not currently on fasenra injection, insurance would not cover. Currently on Hirzentra IGG therapy.   Sinus drip present, cough occasional, non productive, no nocturnal arousals, Currently on Dulera daily, uses Albuterol rescue inhaler daily before exercise no SOB.  Sepsis [facility] August 2017.  Flu vaccine   July 9, 2018 - had gastric sleeve with leak complication- lost 45 lbs already.  Has appetite but fills quickly.  Still on hirzentra.  Asthma ok avoiding fragrances/ordors.  No prednisone.   No nocturnal arousals, uses rescue weekly avoiding any irritants.  Use prednisone 4-5 last year and twice this year.        March 19,  2018-since recovered from pleuritic pain - doing well.  No prednisone use recent.  Pt has had no wheezes.  feb 22,2108   Had to do prednisone again.  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 I have advised not to work given severe asthma,  hypersensitivity to fragrances, and immune deficiency.   Pt seems better since Hirzentra, but still unstable severe asthma  Jan 29. 2018- 6 days ago had been exposed to sick , had nasal congestion , cough, ear stuffy, muscle aches /joint aches / fever.  Temp to 100.1.  Seen by np and flu screen negative.  Had asthma 3-4 flares last year. eos up past,   oct 5, 2017 - had marce in April, skipped couple doses hirzentra, had mild bronchitis once, otherwise doing very well.  No prednisone.  No side effects and covered. Ppt flu sense for 2 days  Jan 24,2017 on  hirzentra q o week since sept and asthma more stable, no hosp, no prednisone, no noct asthma/ no rescue therapy- control not this good for years.  Sept 27, 2016  HPI:has had lung problems since birth, no nocturnal arousals, uses rescue 2/d, breathing controlled satisfactory except with irritants/infection - strong abx needed to clear.  Prednisone 2/yr, last hosp 18 months.  No ventilator.    Had exacerbation recent due uri from .  Has had shingles vaccine past.       The chief compliant  problem is varies with instablilty at time  PFSH:  Past Medical History:   Diagnosis Date    Allergy     Angio-edema     Arthralgia     Asthma without status asthmaticus     Bronchitis     Cataract     Community acquired pneumonia of left lung 2/3/2025    COPD (chronic obstructive pulmonary disease)     COVID-19 virus infection 07/23/2021    Diverticulosis of large intestine without hemorrhage 10/08/2015    Eczema     Environmental allergies     Eosinophilic asthma 03/08/2018    Exacerbation of ulcerative colitis with  rectal bleeding     Fibromyalgia 02/19/2024    Gastroparesis     Gram-negative bacteremia 12/29/2023    Hand arthritis     Herpes simplex without mention of complication     oral    Hidradenitis     History of morbid obesity     Hyperlipidemia     Hypothyroidism     Immune deficiency disorder     Intraperitoneal abscess 05/07/2018    LBP radiating to left leg     Leukocytosis, unspecified     Migraine headache     Normocytic anemia 10/05/2022    Obesity, Class III, BMI 40-49.9 (morbid obesity)     Periorbital cellulitis 12/31/2016    Prediabetes     RA (rheumatoid arthritis)     Recurrent upper respiratory infection (URI)     Rheumatoid arthritis 5/6/2025    Rotavirus enteritis 12/27/2023    RSV (respiratory syncytial virus infection) 01/30/2023    S/P colonoscopy 11/2010    Sepsis 05/07/2018    Sepsis secondary to colitis 05/07/2018    Systemic lupus erythematosus, organ or system involvement unspecified     Tubular adenoma of colon 08/17/2022    Tubular adenoma of colon 05/01/2024    Ulcerative pancolitis with rectal bleeding 10/17/2022    Urticaria          Past Surgical History:   Procedure Laterality Date    ADENOIDECTOMY      CATARACT EXTRACTION W/  INTRAOCULAR LENS IMPLANT Right 08/21/2024    Panoptix    CATARACT EXTRACTION W/  INTRAOCULAR LENS IMPLANT Left 09/25/2024    Panoptix    CHOLECYSTECTOMY      CHOLECYSTECTOMY      colitis      COLONOSCOPY  2015    repeat in 10    COLONOSCOPY N/A 10/08/2015    Procedure: COLONOSCOPY;  Surgeon: Panda Bose MD;  Location: [facility];  Service: Endoscopy;  Laterality: N/A;    COLONOSCOPY N/A 08/17/2022    Procedure: COLONOSCOPY;  Surgeon: Olaf Del Valle MD;  Location: [facility];  Service: General;  Laterality: N/A;    COLONOSCOPY N/A 10/06/2022    Procedure: COLONOSCOPY;  Surgeon: Quinton Celeste MD;  Location: [facility];  Service: Gastroenterology;  Laterality: N/A;    COLONOSCOPY N/A 05/01/2024    Procedure: COLONOSCOPY;  Surgeon: Quinton Celeste MD;   "Location: [facility] ENDO;  Service: Endoscopy;  Laterality: N/A;    ESOPHAGOGASTRODUODENOSCOPY N/A 05/01/2024    Procedure: EGD (ESOPHAGOGASTRODUODENOSCOPY);  Surgeon: Quinton Celeste MD;  Location: [facility] ENDO;  Service: Endoscopy;  Laterality: N/A;    Hand fracture surgery      HARDWARE REMOVAL      left hand 5th [facility]    hymenectomy      HYSTERECTOMY      menorrhagia-Ovaries intact    ROTATOR CUFF REPAIR Right     SLEEVE GASTROPLASTY  04/16/2018    TONSILLECTOMY      Urethral dilatation       Social History     Tobacco Use    Smoking status: Never     Passive exposure: Past    Smokeless tobacco: Never   Substance Use Topics    Alcohol use: Not Currently     Comment: very rarely    Drug use: No     Family History   Problem Relation Name Age of Onset    Melanoma Mother Loyd     Basal cell carcinoma Mother Loyd     Lung disease Mother Loyd         pulm htn    Cataracts Mother Loyd     Hypertension Mother Loyd     Lung cancer Father Josh     Diabetes Father Josh     Hypertension Father Josh     Cancer Father Josh     Melanoma Maternal Aunt      Melanoma Maternal Uncle      Diabetes Paternal Aunt Estee     Colon cancer Paternal Aunt Lilliam 40    Diabetes Paternal Aunt Lilliam     Breast cancer Paternal Aunt  40    Lymphoma Paternal Aunt      Cancer Maternal Grandfather Gilmar     Ovarian cancer Paternal Grandmother  40    Ulcerative colitis Son      Psoriasis Son      Psoriasis Son      Breast cancer Cousin Paternal 1st 25    Allergic rhinitis Neg Hx      Allergies Neg Hx      Angioedema Neg Hx      Asthma Neg Hx      Atopy Neg Hx      Eczema Neg Hx      Immunodeficiency Neg Hx      Rhinitis Neg Hx      Urticaria Neg Hx       Review of patient's allergies indicates:   Allergen Reactions    Bromelains Hives     " causes mouth to bleed"    Sudafed [pseudoephedrine hcl] Other (See Comments)     Does not want to wake up .    Amoxicillin-pot clavulanate Itching     Other reaction(s): Itching    Hydrocodone Itching " "   Iodinated contrast- oral and iv dye      childhood    Iodine and iodide containing products Other (See Comments)    Melon Hives    Pantoprazole Nausea Only     Other reaction(s): upset stomach/halitosis    Percocet [oxycodone-acetaminophen] Itching    Barium iodide Rash    Ephedrine Other (See Comments)     Other reaction(s): comatose    Penicillins      Other reaction(s): does not work on pt symptoms     I have reviewed past medical, family, and social history. I have reviewed previous nurse notes.    Performance Status:The patient's activity level is functions out of house.      Review of Systems   Constitutional: Negative for activity change, appetite change, chills, diaphoresis, fatigue, fever and unexpected weight change.   HENT: Negative for dental problem, sneezing, sore throat, trouble swallowing, postnasal drip, rhinorrhea, sinus pressure, sinus pain, and voice change.     Respiratory: Negative for chest tightness, shortness of breath, wheezing.  Positive for cough,   Cardiovascular: Negative for chest pain, palpitations and leg swelling.   Musculoskeletal: Negative for gait problem, myalgias and neck pain.   Skin: Negative for color change and pallor.   Allergic/Immunologic: Negative for environmental allergies and food allergies.   Neurological: Negative for dizziness, speech difficulty, weakness, light-headedness, numbness and headaches.   Hematological: Negative for adenopathy. Does not bruise/bleed easily.   Psychiatric/Behavioral: Negative for dysphoric mood and sleep disturbance. The patient is not nervous/anxious.             Exam:Comprehensive exam done. BP (!) 170/79 (BP Location: Right arm, Patient Position: Sitting)   Pulse (!) 56   Ht 5' 10" (1.778 m)   Wt 119.2 kg (262 lb 10.9 oz)   SpO2 95% Comment: on room air  BMI 37.69 kg/m²   Exam included Vitals as listed, and patient's appearance and affect and alertness and mood, oral exam for yeast and hygiene and pharynx lesions and " Mallapatti (M) score, neck with inspection for jvd and masses and thyroid abnormalities and lymph nodes (supraclavicular and infraclavicular nodes and axillary also examined and noted if abn), chest exam included symmetry and effort and fremitus and percussion and auscultation, cardiac exam included rhythm and gallops and murmur and rubs and jvd and edema, abdominal exam for mass and hepatosplenomegaly and tenderness and hernias and bowel sounds, Musculoskeletal exam with muscle tone and posture and mobility/gait and  strength, and skin for rashes and cyanosis and pallor and turgor, extremity for clubbing.  Findings were normal except for pertinent findings listed below:  M3, chest is symmetric, no distress, normal percussion, normal fremitus and good normal breath sounds        Radiographs (ct chest and cxr) reviewed: results reviewed by direct vision  CT Chest Without Contrast 2/10/2020  Normal exam, 3.3 mm nodule seen in right upper lobe.    Lungs are clear.  Minimal retained mucus secretions in the trachea.  No pleural effusion or pneumothorax.  Normal sized heart.  Thyroid is small and heterogeneous with a subcentimeter nodule or cyst in the right lobe.     XR CHEST 1 VIEW 05/10/2018   There is left lower lobe atelectasis and possible small left pleural effusion on this study.  No pneumothorax is seen.      Labs reviewed       Lab Results   Component Value Date    WBC 14.24 (H) 01/21/2025    RBC 4.38 01/21/2025    HGB 13.6 01/21/2025    HCT 39.3 01/21/2025    MCV 90 01/21/2025    MCH 31.1 (H) 01/21/2025    MCHC 34.6 01/21/2025    RDW 14.5 01/21/2025     01/21/2025    MPV 10.4 01/21/2025    GRAN 9.8 (H) 01/21/2025    GRAN 68.8 01/21/2025    LYMPH 2.2 01/21/2025    LYMPH 15.1 (L) 01/21/2025    MONO 1.6 (H) 01/21/2025    MONO 11.1 01/21/2025    EOS 0.1 01/21/2025    BASO 0.12 01/21/2025    EOSINOPHIL 0.9 01/21/2025    BASOPHIL 0.8 01/21/2025       PFT reviewed  2/10/2020 no obstruction seen, 7%  bronchodilator response with 12% needed for clinical improvement; TLC 86% with DLC0 at 88%, normal exam with mild asthma  Spirometry bronchodilator, lung volume by gas dilution, diffusion capacity measured February 10, 2020.  The FEV1 to FVC ratio was 79%, there is no airflow obstruction measured by spirometry technique.  The FEV1 measured 85% predicted at 1.97 L. The 8%   improvement in FEV1 failed to reach statistical significance ( 12% is needed for statistical significance ).  Lung volumes by gas dilution were normal.  Diffusion was normal.      Spirometry, lung volumes, diffusion are all normal.  The bronchodilator response was not statistically significant.  Clinical correlation recommended.     Plan:  Clinical impression is apparently straight forward and impression with management as below.    Yodit was seen today for follow-up and asthma.    Diagnoses and all orders for this visit:    Severe persistent asthma without complication    Abdominal pain, unspecified abdominal location  -     Discontinue: traMADoL (ULTRAM) 50 mg tablet; Take 1 tablet (50 mg total) by mouth every 8 (eight) hours as needed for Pain.  -     Discontinue: traMADoL (ULTRAM) 50 mg tablet; Take 1 tablet (50 mg total) by mouth every 8 (eight) hours as needed for Pain.  -     Discontinue: traMADoL (ULTRAM) 50 mg tablet; Take 1 tablet (50 mg total) by mouth every 8 (eight) hours as needed for Pain.  -     traMADoL (ULTRAM) 50 mg tablet; Take 1 tablet (50 mg total) by mouth every 8 (eight) hours as needed for Pain.  -     traMADoL (ULTRAM) 50 mg tablet; Take 1 tablet (50 mg total) by mouth every 8 (eight) hours as needed for Pain.  -     traMADoL (ULTRAM) 50 mg tablet; Take 1 tablet (50 mg total) by mouth every 8 (eight) hours as needed for Pain.    Rheumatoid arthritis, involving unspecified site, unspecified whether rheumatoid factor present  -     Discontinue: traMADoL (ULTRAM) 50 mg tablet; Take 1 tablet (50 mg total) by mouth every 8  (eight) hours as needed for Pain.  -     Discontinue: traMADoL (ULTRAM) 50 mg tablet; Take 1 tablet (50 mg total) by mouth every 8 (eight) hours as needed for Pain.  -     Discontinue: traMADoL (ULTRAM) 50 mg tablet; Take 1 tablet (50 mg total) by mouth every 8 (eight) hours as needed for Pain.  -     traMADoL (ULTRAM) 50 mg tablet; Take 1 tablet (50 mg total) by mouth every 8 (eight) hours as needed for Pain.  -     traMADoL (ULTRAM) 50 mg tablet; Take 1 tablet (50 mg total) by mouth every 8 (eight) hours as needed for Pain.  -     traMADoL (ULTRAM) 50 mg tablet; Take 1 tablet (50 mg total) by mouth every 8 (eight) hours as needed for Pain.    Sicca syndrome  -     pilocarpine (SALAGEN) 5 MG Tab; Take 2 tablets (10 mg total) by mouth 3 (three) times daily.                              Follow up in about 3 months (around 8/6/2025), or if symptoms worsen or fail to improve.    Discussed with patient above for education the following:      Patient Instructions   Tramadol increased -- you are having more U.C. problems    Salagen dose could be increased up to 10 mg 3 times daily reasonable -- mouth rinse may help???  Asthma stable

## 2025-05-07 ENCOUNTER — TELEPHONE (OUTPATIENT)
Dept: PULMONOLOGY | Facility: CLINIC | Age: 65
End: 2025-05-07
Payer: MEDICARE

## 2025-05-07 NOTE — TELEPHONE ENCOUNTER
Reurned call to [facility] spoke to Melissa about Tramadol RX being sent in 6 times. Explained to Melissa theres only 3 orders and they have different start dates. The other 3 orders were d/c'd. Melissa v/u.

## 2025-05-07 NOTE — TELEPHONE ENCOUNTER
----- Message from Lizzy sent at 5/6/2025  1:43 PM CDT -----  Contact: melissa  Type:  Needs Medical AdviceWho Called: [facility] pharSymptoms (please be specific): needs to speak to nurse regarding medicationsPharmacy name and phone #:  [id number] - [place], [place] - [address] [address]: [phone] Fax: [phone] the patient rather a call back or a response via [facility]? callBe Call Back Number: [phone] (home) Additional Information: please advise and thank you

## 2025-05-08 LAB — CRC RECOMMENDATION EXT: NORMAL

## 2025-05-15 ENCOUNTER — NURSE TRIAGE (OUTPATIENT)
Dept: ADMINISTRATIVE | Facility: CLINIC | Age: 65
End: 2025-05-15
Payer: MEDICARE

## 2025-05-16 PROBLEM — R74.8 ELEVATED CK: Status: ACTIVE | Noted: 2025-05-16

## 2025-05-16 PROBLEM — R65.10 SIRS (SYSTEMIC INFLAMMATORY RESPONSE SYNDROME): Status: ACTIVE | Noted: 2025-05-16

## 2025-05-16 PROBLEM — J44.89 ASTHMA WITH COPD: Status: ACTIVE | Noted: 2018-03-08

## 2025-05-16 PROBLEM — E83.39 HYPOPHOSPHATEMIA: Status: ACTIVE | Noted: 2025-05-16

## 2025-05-16 PROBLEM — R29.818: Status: ACTIVE | Noted: 2025-05-16

## 2025-05-16 PROBLEM — Z71.89 ACP (ADVANCE CARE PLANNING): Status: ACTIVE | Noted: 2024-08-19

## 2025-05-16 NOTE — TELEPHONE ENCOUNTER
" states pt headache, fever 102, shoulder pain. States headache started this AM. Also c/o nausea. Per protocol, go to ED now. Pt verbalizes understanding and advised to call back for any further questions or concerns.    Reason for Disposition   [1] SEVERE headache AND [2] fever    Additional Information   Negative: Difficult to awaken or acting confused (e.g., disoriented, slurred speech)   Negative: [1] Weakness of the face, arm or leg on one side of the body AND [2] new-onset   Negative: [1] Numbness of the face, arm or leg on one side of the body AND [2] new-onset   Negative: [1] Loss of speech or garbled speech AND [2] new-onset   Negative: Passed out (e.g., fainted, lost consciousness, blacked out and was not responding)   Negative: Sounds like a life-threatening emergency to the triager   Negative: Unable to walk, or can only walk with assistance (e.g., requires support)   Negative: Stiff neck (can't touch chin to chest)   Negative: Severe pain in one eye   Negative: [1] Other family members (or people in same household) with headaches AND [2] possibility of carbon monoxide exposure   Negative: [1] SEVERE headache (e.g., excruciating) AND [2] "worst headache" of life   Negative: [1] SEVERE headache AND [2] sudden-onset (i.e., reaching maximum intensity within seconds to 1 hour)    Protocols used: Headache-A-AH    "

## 2025-05-17 PROBLEM — G03.0 ASEPTIC MENINGITIS: Status: ACTIVE | Noted: 2025-05-16

## 2025-05-19 ENCOUNTER — PATIENT MESSAGE (OUTPATIENT)
Dept: ALLERGY | Facility: CLINIC | Age: 65
End: 2025-05-19
Payer: MEDICARE

## 2025-05-20 DIAGNOSIS — Z76.89 ENCOUNTER TO ESTABLISH CARE: Primary | ICD-10-CM

## 2025-05-22 ENCOUNTER — OFFICE VISIT (OUTPATIENT)
Dept: FAMILY MEDICINE | Facility: CLINIC | Age: 65
End: 2025-05-22
Payer: MEDICARE

## 2025-05-22 ENCOUNTER — PATIENT OUTREACH (OUTPATIENT)
Dept: ADMINISTRATIVE | Facility: HOSPITAL | Age: 65
End: 2025-05-22
Payer: MEDICARE

## 2025-05-22 VITALS
HEIGHT: 63 IN | WEIGHT: 183.5 LBS | BODY MASS INDEX: 32.51 KG/M2 | HEART RATE: 107 BPM | DIASTOLIC BLOOD PRESSURE: 80 MMHG | SYSTOLIC BLOOD PRESSURE: 124 MMHG | TEMPERATURE: 98 F

## 2025-05-22 DIAGNOSIS — Z79.899 DRUG-INDUCED IMMUNODEFICIENCY: ICD-10-CM

## 2025-05-22 DIAGNOSIS — D84.821 DRUG-INDUCED IMMUNODEFICIENCY: ICD-10-CM

## 2025-05-22 DIAGNOSIS — K51.919 ULCERATIVE COLITIS WITH COMPLICATION, UNSPECIFIED LOCATION: ICD-10-CM

## 2025-05-22 DIAGNOSIS — G03.0 ASEPTIC MENINGITIS: Primary | ICD-10-CM

## 2025-05-22 PROBLEM — E66.01 SEVERE OBESITY (BMI 35.0-39.9) WITH COMORBIDITY: Status: RESOLVED | Noted: 2024-10-08 | Resolved: 2025-05-22

## 2025-05-22 PROBLEM — Z79.52 CURRENT CHRONIC USE OF SYSTEMIC STEROIDS: Status: RESOLVED | Noted: 2024-01-04 | Resolved: 2025-05-22

## 2025-05-22 PROBLEM — R65.10 SIRS (SYSTEMIC INFLAMMATORY RESPONSE SYNDROME): Status: RESOLVED | Noted: 2025-05-16 | Resolved: 2025-05-22

## 2025-05-22 PROCEDURE — 99999 PR PBB SHADOW E&M-EST. PATIENT-LVL V: CPT | Mod: PBBFAC,,, | Performed by: FAMILY MEDICINE

## 2025-05-22 PROCEDURE — 99215 OFFICE O/P EST HI 40 MIN: CPT | Mod: PBBFAC,PO | Performed by: FAMILY MEDICINE

## 2025-05-22 RX ORDER — DOXYCYCLINE 100 MG/1
100 CAPSULE ORAL 2 TIMES DAILY PRN
COMMUNITY

## 2025-05-22 RX ORDER — FLUCONAZOLE 150 MG/1
150 TABLET ORAL ONCE
Qty: 1 TABLET | Refills: 0 | Status: SHIPPED | OUTPATIENT
Start: 2025-05-22 | End: 2025-05-22

## 2025-05-22 NOTE — Clinical Note
Please update [place]25 [place] [facility] [place]

## 2025-05-22 NOTE — PROGRESS NOTES
History of Present Illness    Ms. Canseco was recently hospitalized with a diagnosis of aseptic meningitis, attributed to her connective tissue disease per patient. She reports fatigue, neck discomfort, and occasional mild headaches, but overall significantly improved..     She has an upcoming appointment with a neurologist, Dr. Baez, on the 23rd, and a scheduled appointment with her rheumatologist on June 23rd.    She reports vaginal yeast issues, which she attributes to the antibiotics  administered during her hospital stay. She has tingling in her toes, which she relates to a past episode of sciatica following a back injury years ago.    She recently underwent a colonoscopy on May 8th, performed by Dr. Bailey at [facility]. No polyps were found during this procedure.          Discharge Summary        Patient Name: Yodit Canseco  MRN: [id number]  NANDO: [id number]  Patient Class: IP- Inpatient  Admission Date: 5/15/2025  Hospital Length of Stay: 1 days  Discharge Date and Time: 5/17/2025 11:36 AM  Attending Physician: No att. providers found   Discharging Provider: Joceline Kevin MD  Primary Care Provider: Miky Leiws MD     Primary Care Team: Networked reference to record PCT      HPI:   This is a 64-year-old female with past medical history particularly significant for obesity, dyslipidemia, prediabetes, hypothyroidism, asthma/COPD, migraine, rheumatoid arthritis, ulcerative colitis, Behcet disease, hypogammaglobulinemia on IVIG, who presented to our ED complaining of headache and neck pain that started yesterday 05/15/2025, severe in intensity, up to 9/10, associated with fever, chills, nausea, photophobia and phonophobia.     She denies chest pain, shortness of breath, palpitation, cough, sputum, vomiting, abdominal pain, diarrhea, dysuria.  She denies any recent travel or sick contacts.       In our ED, patient was tachycardic in the 110s with soft/low blood pressure in the 90s to 100s.  Workup showed  .    Prelim report of head CT with no acute intracranial process.  LP was attempted multiple times by ED physician however this was unsuccessful.    Patient received Tylenol, IV Decadron, IV morphine, IV Zofran, 1 L of IV fluid bolus, IV vancomycin, IV Rocephin and is ordered IV acyclovir.  Will give her IV meropenem.    Will give her additional 500 mL of IV fluid bolus to meet 30 mL/kg of ideal body weight given obesity.  Will start her on IV fluid infusion.  Will admit her to the hospital for further management.     * No surgery found *       Hospital Course:   The patient was admitted with a potential diagnosis of infective meningitis.  She was started on IV antibiotics including acyclovir.  Lumbar puncture was done which revealed:       Latest Reference Range & Units 05/16/25 12:27   COLOR CSF Colorless  Colorless   Volume, Cell Count CSF mL 5.0   Appearance, CSF Clear  Clear   RBC, Cell Count CSF 0 /cu mm 1 !   Wbc, Cell Count CSF 0 - 5 /cu mm 502 (H)   Diff Segs % CSF 0 - 6 % 23 (H)   Lymphs, CSF 40 - 80 % 29 (L)   Monocytes/Mononuclear,CSF % 15 - 45 % 48 (H)   Glucose CSF 40 - 70 mg/dL 67   Protein, CSF 15 - 40 mg/dL 58 (H)         CSF Tube Number   1  1        Latest Reference Range & Units 05/16/25 12:28   Cryptococcus neoformans/gattii Not Detected  Not Detected   Haemophilus influenzae Not Detected  Not Detected   Listeria monocytogenes Not Detected  Not Detected   Neisseria meningtidis Not Detected  Not Detected   Streptococcus agalactiae Not Detected  Not Detected   Streptococcus pneumoniae Not Detected  Not Detected      Respiratory viral panel:    Latest Reference Range & Units 05/16/25 12:28   Neisseria meningtidis Not Detected  Not Detected   Eschericia coli K1 Not Detected  Not Detected   Haemophilus influenzae Not Detected  Not Detected   Listeria monocytogenes Not Detected  Not Detected   Streptococcus agalactiae Not Detected  Not Detected   Streptococcus pneumoniae Not Detected  Not  Detected   Cytomegalovirus Not Detected  Not Detected   Enterovirus Not Detected  Not Detected   Herpes Simplex Virus 1 Not Detected  Not Detected   Herpes Simplex Virus 2 Not Detected  Not Detected   Human Herpes Virus 6 Not Detected  Not Detected   Human Parechovirus Not Detected  Not Detected   Varicella Zoster Virus Not Detected  Not Detected   Cryptococcus neoformans/gattii Not Detected  Not Detected         The patient remained stable and neurology evaluated.  Presentation consistent with aseptic meningitis.  She was discharged home with close outpatient follow-up.      Goals of Care Treatment Preferences:  Code Status: Full Code     Living Will: Yes             SDOH Screening:  The patient was screened for utility difficulties, food insecurity, transport difficulties, housing insecurity, and interpersonal safety and there were no concerns identified this admission.     Consults:   Consults (From admission, onward)            Status Ordering Provider       Inpatient consult to Midline team  Once        Provider:  (Not yet assigned)    Completed NEGAR VALERIO       Inpatient Consult to Neurology Services (General Neurology)  Once        Provider:  Boy Gerard MD    Completed CORNELIO DENNISON       Inpatient consult to Infectious Diseases  Once        Provider:  Barbara Garcia MD    Completed CORNELIO DENNISON                Assessment & Plan  Aseptic meningitis  Patient with presentation suspicious for meningitis.  LP attempted multiple times by ED physician however was not successful.    Patient received a dose of IV Decadron in ED.    Will place her on IV vancomycin and meropenem (patient immunocompromised, older than 50 but unable to order ampicillin given history of penicillin allergy) and IV acyclovir.  Will do neuro checks.  Will keep her under droplet isolation for now.  Will consult Neurology and Infectious Disease.  Patient will need LP, to be performed by Neurology or IR. NPO for now. IV Fluid  hydration.     Hypothyroidism  Awaiting home medications reconciliation.  Will check TSH and free T4.  Prediabetes  Monitor glucose on blood work and check A1c.  Asthma with COPD  Awaiting home medications reconciliation.    Will order albuterol as needed.     Immunocompromised  Patient with history of multiple disorders including rheumatoid arthritis, ulcerative colitis, Behcet disease, hypogammaglobulinemia on IVIG.  Awaiting home medications reconciliation.  Will hold/avoid any immunosuppressant at this time.  Outpatient follow-up.     Patient taking unknown medications  Awaiting home medications reconciliation.    Will resume home medications, as indicated, once known.     ACP (advance care planning)  Per discussion with patient and , patient is full code.     SIRS (systemic inflammatory response syndrome)  With fever and tachycardia, patient meets SIRS criteria.    Patient with suspected meningitis.    Lactic acid normal.    Procalcitonin normal.  Monitor procalcitonin.  Will treat her empirically with IV vancomycin, and meropenem and acyclovir for now.    Will check blood cultures.    Patient will need LP.  Will hydrate her with IV fluid.  Tylenol as needed.        Elevated CK  CK slightly elevated.    IV fluid hydration.    Monitor CK.     Hypophosphatemia  Patient's most recent phosphorus results are listed below.        Recent Labs     05/16/25  0721 05/17/25  0549   PHOS 1.9* 2.9               Final Active Diagnoses:     Diagnosis Date Noted POA    PRINCIPAL PROBLEM:  Aseptic meningitis [G03.0] 05/16/2025 Yes    SIRS (systemic inflammatory response syndrome) [R65.10] 05/16/2025 Yes    Elevated CK [R74.8] 05/16/2025 Yes    Hypophosphatemia [E83.39] 05/16/2025 Yes    ACP (advance care planning) [Z71.89] 08/19/2024 Not Applicable    Patient taking unknown medications [Z79.899] 10/24/2022 Not Applicable    Immunocompromised [D84.9] 10/14/2020 Yes    Asthma with COPD [J44.89] 03/08/2018 Yes     Prediabetes [R73.03]   Yes    Hypothyroidism [E03.9]   Yes       Problems Resolved During this Admission:         Discharged Condition: good     Disposition: Home or Self Care         Diagnoses and all orders for this visit:    Aseptic meningitis    Drug-induced immunodeficiency    Ulcerative colitis with complication, unspecified location    Other orders  -     fluconazole (DIFLUCAN) 150 MG Tab; Take 1 tablet (150 mg total) by mouth once. for 1 dose    Patient improving.  Keep scheduled appointment with Neurology and Rheumatology.  We gave her a dose of Diflucan for vaginal yeast.  Reviewed records from hospitalization cultures all negative with AFB culture still pending.  Advised due for PCV 20 vaccine.  Would recommend waiting until the aseptic meningitis has resolved before administering.      Transitional Care Note    Family and/or Caretaker present at visit?  No.  Diagnostic tests reviewed/disposition: I have reviewed all completed as well as pending diagnostic tests at the time of discharge.  Disease/illness education:  Yes  Home health/community services discussion/referrals: Patient does not have home health established from hospital visit.  They do not need home health.  If needed, we will set up home health for the patient.   Establishment or re-establishment of referral orders for community resources: No other necessary community resources.   Discussion with other health care providers: No discussion with other health care providers necessary.               Past Medical History:  Past Medical History:   Diagnosis Date    Allergy     Angio-edema     Arthralgia     Asthma without status asthmaticus     Behcet's disease     Bronchitis     Cataract     Community acquired pneumonia of left lung 02/03/2025    COPD (chronic obstructive pulmonary disease)     COVID-19 virus infection 07/23/2021    Current chronic use of systemic steroids 01/04/2024    Diverticulosis of large intestine without hemorrhage  10/08/2015    Eczema     Environmental allergies     Eosinophilic asthma 03/08/2018    Exacerbation of ulcerative colitis with rectal bleeding     Fibromyalgia 02/19/2024    Gastroparesis     Gram-negative bacteremia 12/29/2023    Hand arthritis     Herpes simplex without mention of complication     oral    Hidradenitis     History of morbid obesity     Hyperlipidemia     Hypogammaglobulinemia     Hypothyroidism     Immune deficiency disorder     Intraperitoneal abscess 05/07/2018    LBP radiating to left leg     Leukocytosis, unspecified     Migraine headache     Normocytic anemia 10/05/2022    Obesity, Class III, BMI 40-49.9 (morbid obesity)     Periorbital cellulitis 12/31/2016    Prediabetes     RA (rheumatoid arthritis)     Recurrent upper respiratory infection (URI)     Rheumatoid arthritis 05/06/2025    Rotavirus enteritis 12/27/2023    RSV (respiratory syncytial virus infection) 01/30/2023    S/P colonoscopy 11/2010    Sepsis 05/07/2018    Sepsis secondary to colitis 05/07/2018    Systemic lupus erythematosus, organ or system involvement unspecified     Tubular adenoma of colon 08/17/2022    Tubular adenoma of colon 05/01/2024    Ulcerative pancolitis with rectal bleeding 10/17/2022    Urticaria      Past Surgical History:   Procedure Laterality Date    ADENOIDECTOMY      CATARACT EXTRACTION W/  INTRAOCULAR LENS IMPLANT Right 08/21/2024    Panoptix    CATARACT EXTRACTION W/  INTRAOCULAR LENS IMPLANT Left 09/25/2024    [employer]ix    CHOLECYSTECTOMY      CHOLECYSTECTOMY      colitis      COLONOSCOPY  2015    repeat in 10    COLONOSCOPY N/A 10/08/2015    Procedure: COLONOSCOPY;  Surgeon: Panda Bose MD;  Location[facility];  Service: Endoscopy;  Laterality: N/A;    COLONOSCOPY N/A 08/17/2022    Procedure: COLONOSCOPY;  Surgeon: Olaf Del Valle MD;  Location: [facility];  Service: General;  Laterality: N/A;    COLONOSCOPY N/A 10/06/2022    Procedure: COLONOSCOPY;  Surgeon: Quinton Celeste MD;  Location:  "[facility] END[facility];  Service: Gastroenterology;  Laterality: N/A;    COLONOSCOPY N/A 05/01/2024    Procedure: COLONOSCOPY;  Surgeon: Quinton Celeste MD;  Location: [facility];  Service: Endoscopy;  Laterality: N/A;    COLONOSCOPY  05/08/2025    Repeat in 1 year    ESOPHAGOGASTRODUODENOSCOPY N/A 05/01/2024    Procedure: EGD (ESOPHAGOGASTRODUODENOSCOPY);  Surgeon: Quinton Celeste MD;  Location: [facility] END[facility];  Service: Endoscopy;  Laterality: N/A;    Hand fracture surgery      HARDWARE REMOVAL      left hand 5th [facility]    hymenectomy      HYSTERECTOMY      menorrhagia-Ovaries intact    ROTATOR CUFF REPAIR Right     SLEEVE GASTROPLASTY  04/16/2018    TONSILLECTOMY      Urethral dilatation       Review of patient's allergies indicates:   Allergen Reactions    Bromelains Hives     " causes mouth to bleed"    Pimecrolimus Swelling    Sudafed [pseudoephedrine hcl] Other (See Comments)     Does not want to wake up .    Amoxicillin-pot clavulanate Itching     Other reaction(s): Itching; tolerated ceftriaxone 10/2022    Hydrocodone Itching    Iodinated contrast media      childhood    Iodine and iodide containing products Other (See Comments)    Melon Hives    Percocet [oxycodone-acetaminophen] Itching    Codeine     Corn containing products     Tree nut     Tree nuts Other (See Comments)    Wheat containing prod     Barium iodide Rash    Ephedrine Other (See Comments)     Other reaction(s): comatose    Penicillins      Other reaction(s): does not work on pt symptoms     Medications Ordered Prior to Encounter[1]  Social History[2]  Family History   Problem Relation Name Age of Onset    Melanoma Mother Loyd     Basal cell carcinoma Mother Loyd     Lung disease Mother Loyd         pulm htn    Cataracts Mother Loyd     Hypertension Mother Loyd     Lung cancer Father Josh     Diabetes Father Josh     Hypertension Father Josh     Cancer Father Josh     Melanoma Maternal Aunt      Melanoma Maternal Uncle      Diabetes Paternal Aunt " "Estee     Colon cancer Paternal Aunt Lilliam 40    Diabetes Paternal Aunt Lilliam     Breast cancer Paternal Aunt  40    Lymphoma Paternal Aunt      Cancer Maternal Grandfather Gilmar     Ovarian cancer Paternal Grandmother  40    Ulcerative colitis Son      Psoriasis Son      Psoriasis Son      Breast cancer Cousin Paternal 1st 25    Allergic rhinitis Neg Hx      Allergies Neg Hx      Angioedema Neg Hx      Asthma Neg Hx      Atopy Neg Hx      Eczema Neg Hx      Immunodeficiency Neg Hx      Rhinitis Neg Hx      Urticaria Neg Hx               Vitals:    05/22/25 1014   BP: 124/80   Pulse: 107   Temp: 97.9 °F (36.6 °C)   TempSrc: Temporal   SpO2: (P) 97%   Weight: 83.2 kg (183 lb 8 oz)   Height: 5' 2.5" (1.588 m)     Wt Readings from Last 3 Encounters:   05/22/25 83.2 kg (183 lb 8 oz)   05/15/25 83.9 kg (185 lb)   05/06/25 84.8 kg (186 lb 13.4 oz)       OBJECTIVE:   APPEARANCE: Well nourished, well developed, in no acute distress.    HEAD: Normocephalic.  Atraumatic.  No sinus tenderness.  EYES:   Right eye: Pupil reactive.  Conjunctiva clear.    Left eye: Pupil reactive.  Conjunctiva clear.  EOMI.    EARS: TM's intact. Light reflex normal. No retraction or perforation.    NOSE:  clear.  MOUTH & THROAT:  No pharyngeal erythema or exudate. No lesions.  NECK: Supple. No bruits.  No JVD.  No cervical lymphadenopathy.  No thyromegaly.    CHEST: Breath sounds clear bilaterally.  Normal respiratory effort  CARDIOVASCULAR: Normal rate.  Regular rhythm.  No murmurs.  No rub.  No gallops.  ABDOMEN: Bowel sounds normal.  Soft.  No tenderness.  No organomegaly.  PERIPHERAL VASCULAR: No cyanosis.  No clubbing.  No edema.  NEUROLOGIC: No focal findings.  MENTAL STATUS: Alert.  Oriented x 3.               [1]   Current Outpatient Medications on File Prior to Visit   Medication Sig Dispense Refill    acetaminophen (TYLENOL) 650 MG TbSR Take 1,300 mg by mouth 2 (two) times a day.      albuterol (PROVENTIL/VENTOLIN HFA) 90 " mcg/actuation inhaler Inhale 2 puffs into the lungs every 4 (four) hours as needed for Wheezing or Shortness of Breath. 2 puffs every 4 hours as needed for cough, wheeze, or shortness of breath 54 g 3    benzonatate (TESSALON) 200 MG capsule Take 1 capsule (200 mg total) by mouth 3 (three) times daily as needed for Cough. 90 capsule 3    cholecalciferol, vitamin D3, (VITAMIN D3) 50 mcg (2,000 unit) Cap Take 1 capsule by mouth once daily.      clobetasoL (TEMOVATE) 0.05 % cream Apply topically nightly as needed. To the vulva 60 g 1    desonide (DESOWEN) 0.05 % cream Apply topically 2 (two) times daily. 60 g 1    doxycycline (MONODOX) 100 MG capsule Take 100 mg by mouth 2 (two) times daily as needed.      estradioL (ESTRACE) 1 MG tablet Take 1 tablet (1 mg total) by mouth once daily. 90 tablet 3    fluticasone-umeclidin-vilanter (TRELEGY ELLIPTA) 200-62.5-25 mcg inhaler Inhale 1 puff into the lungs once daily. (Patient taking differently: Inhale 1 puff into the lungs once daily. Uses prn) 60 each 11    furosemide (LASIX) 20 MG tablet Take 20 mg by mouth as needed (edema).      hydrocortisone 2.5 % cream Apply 1 Application topically 2 (two) times daily as needed (TO THE ANUS). To the anus      HYDROmorphone (DILAUDID) 2 MG tablet Take 1 tablet (2 mg total) by mouth every 4 (four) hours as needed for Pain (cough). 20 tablet 0    immun glob G,IgG,-pro-IgA 0-50 (HIZENTRA) 2 gram/10 mL (20 %) Soln Inject 90 mLs (18 g total) into the skin every 14 (fourteen) days. 180 mL 12    Lactobacillus rhamnosus GG (CULTURELLE) 10 billion cell capsule Take 1 capsule by mouth 2 (two) times daily. 60 capsule 0    levothyroxine (SYNTHROID) 75 MCG tablet TAKE 1 TABLET BY MOUTH EVERY DAY 90 tablet 3    LIDOcaine 5 % Crea Apply 1 Application topically once daily.      mecobalamin, vitamin B12, 1,000 mcg Chew Take 1 tablet by mouth once daily.      minoxidiL (LONITEN) 2.5 MG tablet TAKE 1/4TH (0.625MG) BY MOUTH DAILY (Patient taking  differently: Take 1/2th (0.625mg) by mouth daily) 23 tablet 3    montelukast (SINGULAIR) 10 mg tablet Take 1 tablet (10 mg total) by mouth every evening. 90 tablet 3    MULTIVITAMIN ORAL Take 1 tablet by mouth once daily.      ondansetron (ZOFRAN-ODT) 8 MG TbDL Take 1 tablet (8 mg total) by mouth every 6 (six) hours as needed (nausea/vomiting). 60 tablet 0    pilocarpine (SALAGEN) 5 MG Tab Take 2 tablets (10 mg total) by mouth 3 (three) times daily. 180 tablet 11    potassium chloride SA (K-DUR,KLOR-CON) 20 MEQ tablet Take 20 mEq by mouth once daily.      simvastatin (ZOCOR) 10 MG tablet Take 10 mg by mouth every evening.      sulfaSALAzine (AZULFIDINE) 500 MG EC tablet Take 1 tablet (500 mg total) by mouth 2 (two) times a day. 60 tablet 0    sumatriptan (IMITREX) 100 MG tablet TAKE 1 TABLET (100 MG TOTAL) BY MOUTH ONCE AS NEEDED FOR MIGRAINE. MAY REPEAT DOSE IN 2 HOURS IF NO RELIEF. DO NOT EXCEED 2 DOSES IN 24 HOURS. 9 tablet 5    traMADoL (ULTRAM) 50 mg tablet Take 1 tablet (50 mg total) by mouth every 8 (eight) hours as needed for Pain. 75 each 0    [START ON 7/1/2025] traMADoL (ULTRAM) 50 mg tablet Take 1 tablet (50 mg total) by mouth every 8 (eight) hours as needed for Pain. 75 tablet 0    [START ON 6/4/2025] traMADoL (ULTRAM) 50 mg tablet Take 1 tablet (50 mg total) by mouth every 8 (eight) hours as needed for Pain. 75 each 0    triamterene-hydrochlorothiazide 37.5-25 mg (MAXZIDE-25) 37.5-25 mg per tablet TAKE 1 TABLET BY MOUTH DAILY AS NEEDED (EDEMA). 90 tablet 1    upadacitinib (RINVOQ) 45 mg Tb24 Take 1 tablet by mouth every other day.      vancomycin (VANCOCIN) 125 MG capsule Take 1 capsule (125 mg total) by mouth Daily. Take while on antibiotic and  for additional 3 days 21 capsule 1    [DISCONTINUED] ketoconazole (NIZORAL) 2 % cream Apply topically 2 (two) times daily. For fungal rash 60 g 1    [DISCONTINUED] RINVOQ 15 mg 24 hr tablet Take 15 mg by mouth.       No current facility-administered  medications on file prior to visit.   [2]   Social History  Socioeconomic History    Marital status:    Tobacco Use    Smoking status: Never     Passive exposure: Past    Smokeless tobacco: Never   Substance and Sexual Activity    Alcohol use: Not Currently     Comment: very rarely    Drug use: No    Sexual activity: Yes     Partners: Male   Social History Narrative    . Disabled teacher.     Social Drivers of Health     Financial Resource Strain: Low Risk  (5/16/2025)    Overall Financial Resource Strain (CARDIA)     Difficulty of Paying Living Expenses: Not hard at all   Food Insecurity: No Food Insecurity (5/16/2025)    Hunger Vital Sign     Worried About Running Out of Food in the Last Year: Never true     Ran Out of Food in the Last Year: Never true   Transportation Needs: No Transportation Needs (5/16/2025)    PRAPARE - Transportation     Lack of Transportation (Medical): No     Lack of Transportation (Non-Medical): No   Stress: No Stress Concern Present (5/16/2025)    [employer] - Occupational Stress Questionnaire     Feeling of Stress : Not at all   Housing Stability: Low Risk  (5/16/2025)    Housing Stability Vital Sign     Unable to Pay for Housing in the Last Year: No     Number of Times Moved in the Last Year: 0     Homeless in the Last Year: No

## 2025-06-17 ENCOUNTER — LAB VISIT (OUTPATIENT)
Dept: LAB | Facility: HOSPITAL | Age: 65
End: 2025-06-17
Attending: INTERNAL MEDICINE
Payer: MEDICARE

## 2025-06-17 DIAGNOSIS — M35.2 BEHCET'S SYNDROME INVOLVING ORAL MUCOSA: ICD-10-CM

## 2025-06-17 DIAGNOSIS — K51.00 ULCERATIVE (CHRONIC) ENTEROCOLITIS: ICD-10-CM

## 2025-06-17 DIAGNOSIS — K92.1 BLOOD IN STOOL: ICD-10-CM

## 2025-06-17 DIAGNOSIS — D50.9 IRON DEFICIENCY ANEMIA, UNSPECIFIED: ICD-10-CM

## 2025-06-17 DIAGNOSIS — M32.19 OTHER SYSTEMIC LUPUS ERYTHEMATOSUS WITH OTHER ORGAN INVOLVEMENT: ICD-10-CM

## 2025-06-17 DIAGNOSIS — M35.9 UNDIFFERENTIATED CONNECTIVE TISSUE DISEASE: ICD-10-CM

## 2025-06-17 LAB
ABSOLUTE EOSINOPHIL (OHS): 0.16 K/UL
ABSOLUTE MONOCYTE (OHS): 0.63 K/UL (ref 0.3–1)
ABSOLUTE NEUTROPHIL COUNT (OHS): 2.24 K/UL (ref 1.8–7.7)
BASOPHILS # BLD AUTO: 0.03 K/UL
BASOPHILS NFR BLD AUTO: 0.6 %
CRP SERPL-MCNC: 3.1 MG/L
ERYTHROCYTE [DISTWIDTH] IN BLOOD BY AUTOMATED COUNT: 14.4 % (ref 11.5–14.5)
ERYTHROCYTE [SEDIMENTATION RATE] IN BLOOD: 12 MM/HR
HCT VFR BLD AUTO: 37.8 % (ref 37–48.5)
HGB BLD-MCNC: 13 GM/DL (ref 12–16)
IMM GRANULOCYTES # BLD AUTO: 0.05 K/UL (ref 0–0.04)
IMM GRANULOCYTES NFR BLD AUTO: 0.9 % (ref 0–0.5)
LYMPHOCYTES # BLD AUTO: 2.24 K/UL (ref 1–4.8)
MCH RBC QN AUTO: 31.4 PG (ref 27–31)
MCHC RBC AUTO-ENTMCNC: 34.4 G/DL (ref 32–36)
MCV RBC AUTO: 91 FL (ref 82–98)
NUCLEATED RBC (/100WBC) (OHS): 0 /100 WBC
PLATELET # BLD AUTO: 259 K/UL (ref 150–450)
PMV BLD AUTO: 11.2 FL (ref 9.2–12.9)
RBC # BLD AUTO: 4.14 M/UL (ref 4–5.4)
RELATIVE EOSINOPHIL (OHS): 3 %
RELATIVE LYMPHOCYTE (OHS): 41.9 % (ref 18–48)
RELATIVE MONOCYTE (OHS): 11.8 % (ref 4–15)
RELATIVE NEUTROPHIL (OHS): 41.8 % (ref 38–73)
WBC # BLD AUTO: 5.35 K/UL (ref 3.9–12.7)

## 2025-06-17 PROCEDURE — 85651 RBC SED RATE NONAUTOMATED: CPT | Mod: PO

## 2025-06-17 PROCEDURE — 36415 COLL VENOUS BLD VENIPUNCTURE: CPT | Mod: PO

## 2025-06-17 PROCEDURE — 86160 COMPLEMENT ANTIGEN: CPT

## 2025-06-17 PROCEDURE — 86160 COMPLEMENT ANTIGEN: CPT | Mod: 59

## 2025-06-17 PROCEDURE — 85025 COMPLETE CBC W/AUTO DIFF WBC: CPT | Mod: PO

## 2025-06-17 PROCEDURE — 86140 C-REACTIVE PROTEIN: CPT

## 2025-06-17 PROCEDURE — 80053 COMPREHEN METABOLIC PANEL: CPT

## 2025-06-18 LAB
ALBUMIN SERPL BCP-MCNC: 4.3 G/DL (ref 3.5–5.2)
ALP SERPL-CCNC: 53 UNIT/L (ref 40–150)
ALT SERPL W/O P-5'-P-CCNC: 17 UNIT/L (ref 10–44)
ANION GAP (OHS): 10 MMOL/L (ref 8–16)
AST SERPL-CCNC: 31 UNIT/L (ref 11–45)
BILIRUB SERPL-MCNC: 0.3 MG/DL (ref 0.1–1)
BUN SERPL-MCNC: 8 MG/DL (ref 8–23)
C3 SERPL-MCNC: 125 MG/DL (ref 50–180)
C4 COMPLEMENT (OHS): 21 MG/DL (ref 11–44)
CALCIUM SERPL-MCNC: 9 MG/DL (ref 8.7–10.5)
CHLORIDE SERPL-SCNC: 105 MMOL/L (ref 95–110)
CO2 SERPL-SCNC: 24 MMOL/L (ref 23–29)
CREAT SERPL-MCNC: 0.9 MG/DL (ref 0.5–1.4)
GFR SERPLBLD CREATININE-BSD FMLA CKD-EPI: >60 ML/MIN/1.73/M2
GLUCOSE SERPL-MCNC: 95 MG/DL (ref 70–110)
POTASSIUM SERPL-SCNC: 3.8 MMOL/L (ref 3.5–5.1)
PROT SERPL-MCNC: 7.3 GM/DL (ref 6–8.4)
SODIUM SERPL-SCNC: 139 MMOL/L (ref 136–145)

## 2025-06-23 ENCOUNTER — OFFICE VISIT (OUTPATIENT)
Dept: RHEUMATOLOGY | Facility: CLINIC | Age: 65
End: 2025-06-23
Payer: MEDICARE

## 2025-06-23 ENCOUNTER — LAB VISIT (OUTPATIENT)
Dept: LAB | Facility: HOSPITAL | Age: 65
End: 2025-06-23
Attending: INTERNAL MEDICINE
Payer: MEDICARE

## 2025-06-23 VITALS
SYSTOLIC BLOOD PRESSURE: 131 MMHG | WEIGHT: 182.31 LBS | DIASTOLIC BLOOD PRESSURE: 87 MMHG | HEART RATE: 82 BPM | BODY MASS INDEX: 32.82 KG/M2

## 2025-06-23 DIAGNOSIS — Z51.81 ENCOUNTER FOR MEDICATION MONITORING: ICD-10-CM

## 2025-06-23 DIAGNOSIS — Z79.899 DRUG-INDUCED IMMUNODEFICIENCY: ICD-10-CM

## 2025-06-23 DIAGNOSIS — Z00.00 ENCOUNTER FOR MEDICARE ANNUAL WELLNESS EXAM: ICD-10-CM

## 2025-06-23 DIAGNOSIS — K51.011 ULCERATIVE PANCOLITIS WITH RECTAL BLEEDING: ICD-10-CM

## 2025-06-23 DIAGNOSIS — M35.2 BEHCET'S SYNDROME INVOLVING ORAL MUCOSA: ICD-10-CM

## 2025-06-23 DIAGNOSIS — D84.821 DRUG-INDUCED IMMUNODEFICIENCY: ICD-10-CM

## 2025-06-23 DIAGNOSIS — M79.7 FIBROMYALGIA: ICD-10-CM

## 2025-06-23 DIAGNOSIS — M35.2 BEHCET'S SYNDROME INVOLVING ORAL MUCOSA: Primary | ICD-10-CM

## 2025-06-23 DIAGNOSIS — M35.00 SICCA SYNDROME: ICD-10-CM

## 2025-06-23 DIAGNOSIS — R76.8 POSITIVE ANA (ANTINUCLEAR ANTIBODY): ICD-10-CM

## 2025-06-23 PROCEDURE — 99999 PR PBB SHADOW E&M-EST. PATIENT-LVL V: CPT | Mod: PBBFAC,,, | Performed by: INTERNAL MEDICINE

## 2025-06-23 PROCEDURE — 86235 NUCLEAR ANTIGEN ANTIBODY: CPT | Mod: 59

## 2025-06-23 PROCEDURE — 86225 DNA ANTIBODY NATIVE: CPT

## 2025-06-23 PROCEDURE — 99215 OFFICE O/P EST HI 40 MIN: CPT | Mod: PBBFAC,PO | Performed by: INTERNAL MEDICINE

## 2025-06-23 PROCEDURE — 36415 COLL VENOUS BLD VENIPUNCTURE: CPT | Mod: PO

## 2025-06-23 PROCEDURE — 99215 OFFICE O/P EST HI 40 MIN: CPT | Mod: S$PBB,,, | Performed by: INTERNAL MEDICINE

## 2025-06-23 PROCEDURE — 86235 NUCLEAR ANTIGEN ANTIBODY: CPT

## 2025-06-23 PROCEDURE — 86038 ANTINUCLEAR ANTIBODIES: CPT

## 2025-06-23 PROCEDURE — G2211 COMPLEX E/M VISIT ADD ON: HCPCS | Mod: ,,, | Performed by: INTERNAL MEDICINE

## 2025-06-23 PROCEDURE — 86671 FUNGUS NES ANTIBODY: CPT

## 2025-06-23 RX ORDER — SULFASALAZINE 500 MG/1
500 TABLET, DELAYED RELEASE ORAL 2 TIMES DAILY
Qty: 60 TABLET | Refills: 0 | Status: SHIPPED | OUTPATIENT
Start: 2025-06-23

## 2025-06-23 NOTE — PROGRESS NOTES
RHEUMATOLOGY CLINIC FOLLOW UP VISIT  Chief complaints, HPI, ROS, EXAM, Assessment & Plans:-  Yodit BROWNE Stephenreeses a 64 y.o. pleasant female comes in for follow-up visit.  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  Gastroenterologist recently started on Rinvoq after she had another flare while taking Xeljanz Entyvio combination.  For IBD related inflammatory arthritis she is on Azulfidine.  Was recently admitted to hospital for severe headache 3 weeks after receiving DTaP vaccine.  Lumbar punctures showed elevated pressure and protein in cerebrospinal fluid.  Has headache and neck pain.  Scheduled to consult with Neurology.  Complains of severe worsening diffuse pain .  Rheumatological review of system negative otherwise.  Exam shows multiple soft tissue tender points.  No synovitis, dactylitis, enthesitis or effusion.    1. Behcet's syndrome involving oral mucosa    2. Fibromyalgia    3. Drug-induced immunodeficiency    4. Encounter for medication monitoring    5. Sicca syndrome    6. Ulcerative pancolitis with rectal bleeding    7. Positive FABY (antinuclear antibody)      Problem List Items Addressed This Visit       Behcet's syndrome involving oral mucosa - Primary    Relevant Medications    sulfaSALAzine (AZULFIDINE ENTABS) 500 MG EC tablet    Other Relevant Orders    HLA B*51 Disease Association    FABY Screen w/Reflex    Ribosomal P Antibody, OSCAR    Drug-induced immunodeficiency    Encounter for medication monitoring    Fibromyalgia    Positive FABY (antinuclear antibody)    Relevant Medications    sulfaSALAzine (AZULFIDINE ENTABS) 500 MG EC tablet    Other Relevant Orders    Ribosomal P Antibody, OSCAR    Sicca syndrome    Ulcerative colitis    Overview   Left sided from descending to rectum per colonoscopy report and path         Relevant Orders    Inflammatory Bowel Disease Panel       Labs reviewed today:-     Latest Reference Range & Units  06/17/25 15:32   WBC 3.90 - 12.70 K/uL 5.35   RBC 4.00 - 5.40 M/uL 4.14   Hemoglobin 12.0 - 16.0 gm/dL 13.0   Hematocrit 37.0 - 48.5 % 37.8   MCV 82 - 98 fL 91   MCH 27.0 - 31.0 pg 31.4 (H)   MCHC 32.0 - 36.0 g/dL 34.4   RDW 11.5 - 14.5 % 14.4   Platelet Count 150 - 450 K/uL 259   MPV 9.2 - 12.9 fL 11.2   Neut % 38 - 73 % 41.8   Lymph % 18 - 48 % 41.9   Mono % 4 - 15 % 11.8   Eos % <=8 % 3.0   Basophil % <=1.9 % 0.6   Immature Granulocytes 0.0 - 0.5 % 0.9 (H)   Gran # (ANC) 1.8 - 7.7 K/uL 2.24   Lymph # 1 - 4.8 K/uL 2.24   Mono # 0.3 - 1 K/uL 0.63   Eos # <=0.5 K/uL 0.16   Baso # <=0.2 K/uL 0.03   Immature Grans (Abs) 0.00 - 0.04 K/uL 0.05 (H)   nRBC <=0 /100 WBC 0   Sed Rate <=20 mm/hr 12   Sodium 136 - 145 mmol/L 139   Potassium 3.5 - 5.1 mmol/L 3.8   Chloride 95 - 110 mmol/L 105   CO2 23 - 29 mmol/L 24   Anion Gap 8 - 16 mmol/L 10   BUN 8 - 23 mg/dL 8   Creatinine 0.5 - 1.4 mg/dL 0.9   eGFR >60 mL/min/1.73/m2 >60   Glucose 70 - 110 mg/dL 95   Calcium 8.7 - 10.5 mg/dL 9.0   ALP 40 - 150 unit/L 53   PROTEIN TOTAL 6.0 - 8.4 gm/dL 7.3   Albumin 3.5 - 5.2 g/dL 4.3   BILIRUBIN TOTAL 0.1 - 1.0 mg/dL 0.3   AST 11 - 45 unit/L 31   ALT 10 - 44 unit/L 17   CRP <=8.2 mg/L 3.1   C3 Complement 50 - 180 mg/dL 125   C4 Complement 11 - 44 mg/dL 21   (H): Data is abnormally high     Latest Reference Range & Units 05/23/22 13:36   FABY Screen Negative <1:80  Positive !   FABY Titer 1  5120   FABY PATTERN 1  Speckled   ds DNA Ab Negative 1:10  Negative 1:10   Anti-SSA Antibody 0.00 - 0.99 Ratio 0.06   Anti-SSA Interpretation Negative  Negative   Anti-SSB Antibody 0.00 - 0.99 Ratio 0.08   Anti-SSB Interpretation Negative  Negative   Anti Sm Antibody 0.00 - 0.99 Ratio 0.08   Anti-Sm Interpretation Negative  Negative   Anti Sm/RNP Antibody 0.00 - 0.99 Ratio 0.14   Anti-Sm/RNP Interpretation Negative  Negative   Complement (C-3) 50 - 180 mg/dL 125   Complement (C-4) 11 - 44 mg/dL 25   !: Data is abnormal      Significant improvement of IBD  related inflammatory arthritis on Rinvoq in addition to Azulfidine .   Failed multiple medications in the past including Entyvio, Xeljanz and Hizentra.  Continue sulfasalazine..  Severe active fibromyalgia.  Intolerance to Lyrica-had IBD flare on Lyrica.  Continue tramadol.  Stable Behcet's with history oral and vaginal ulcers without any active ulcer at this time.  Otezla contraindicated due to chronic diarrhea from ulcerative colitis.  No significant active ulcers to consider adding further immunosuppressive therapy at this time.  Headache with diagnosis of aseptic meningitis 3 weeks after receiving DTaP vaccine.  She does not have systemic lupus erythematosus.  Less likely for Behcet's to cause aseptic meningitis without active mucosal ulcers.  Consult with Neurology as scheduled.  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 Monitor carefully for infections. Advised to get immediate medical care if any infection. Also advised strict adherence to age appropriate vaccinations and cancer screenings with PCP.  Hold sulfasalazine if any infection  Positive FABY with negative MARYLIN without any evidence of systemic lupus.  Repeat FABY panel today.  Safety labs every 12 weeks  I have explained all of the above in detail and the patient understands all of the current recommendation(s). I have answered all questions to the best of my ability and to their complete satisfaction.      Follow up in about 6 months (around 12/23/2025).      Disclaimer: This note was prepared using voice recognition system and is likely to have sound alike errors and is not proof read.  Please call me with any questions.    Visit today included increased complexity associated with the care of the episodic problem Behcet's disease, fibromyalgia addressed and managing the longitudinal care of the patient due to the serious and/or complex managed problem(s)- Drug induced immunodeficiency, Medication monitoring  encounter.

## 2025-06-24 ENCOUNTER — PATIENT MESSAGE (OUTPATIENT)
Dept: PULMONOLOGY | Facility: CLINIC | Age: 65
End: 2025-06-24
Payer: MEDICARE

## 2025-06-24 LAB
ANA (OHS): POSITIVE
ANA PATTERN 1 (OHS): ABNORMAL
ANA TITER 1 (OHS): ABNORMAL

## 2025-06-24 NOTE — TELEPHONE ENCOUNTER
Spoke to patient, a rx was sent in on 6/4 & for 7/1 pt states hasn't heard from pharmacy. I stated had you tried calling them they might have it on hold. Pt stated she was going to call pharmacy

## 2025-06-25 LAB
RIBOSOMAL P IGG SER-ACNC: <0.2 U
SM  ANTIBODY (OHS): 0.09 RATIO
SM INTERPRETATION (OHS): NEGATIVE
SM/RNP ANTIBODY (OHS): 0.1 RATIO
SM/RNP INTERPRETATION (OHS): NEGATIVE
SSA  ANTIBODY (OHS): 0.09 RATIO (ref 0–0.99)
SSA INTERPRETATION (OHS): NEGATIVE
SSB  ANTIBODY (OHS): 0.07 RATIO
SSB INTERPRETATION (OHS): NEGATIVE

## 2025-06-26 LAB
DSDNA ANTIBODY (OHS): NORMAL
DSDNA ANTIBODY TITER (OHS): NORMAL

## 2025-06-27 LAB
ANCA AB PATTERN SER IF-IMP: ABNORMAL
ANCA IGG SER QL: ABNORMAL
BAKER'S YEAST IGA QN IA: <2 RU/ML
BAKER'S YEAST IGG QN IA: 5.5 RU/ML

## 2025-06-30 DIAGNOSIS — J45.51 SEVERE PERSISTENT ASTHMA WITH ACUTE EXACERBATION: ICD-10-CM

## 2025-06-30 DIAGNOSIS — E03.9 HYPOTHYROIDISM, UNSPECIFIED TYPE: ICD-10-CM

## 2025-06-30 RX ORDER — LEVOTHYROXINE SODIUM 75 UG/1
75 TABLET ORAL
Qty: 90 TABLET | Refills: 3 | Status: SHIPPED | OUTPATIENT
Start: 2025-06-30

## 2025-06-30 RX ORDER — MONTELUKAST SODIUM 10 MG/1
10 TABLET ORAL NIGHTLY
Qty: 90 TABLET | Refills: 3 | Status: SHIPPED | OUTPATIENT
Start: 2025-06-30

## 2025-06-30 NOTE — TELEPHONE ENCOUNTER
No care due was identified.  [facility] Embedded Care Due Messages. Reference number: [id number].   6/30/2025 8:24:49 AM CDT

## 2025-07-03 ENCOUNTER — PATIENT MESSAGE (OUTPATIENT)
Dept: RHEUMATOLOGY | Facility: CLINIC | Age: 65
End: 2025-07-03
Payer: MEDICARE

## 2025-07-03 ENCOUNTER — TELEPHONE (OUTPATIENT)
Dept: RHEUMATOLOGY | Facility: CLINIC | Age: 65
End: 2025-07-03
Payer: MEDICARE

## 2025-07-03 DIAGNOSIS — M35.2 BEHCET'S SYNDROME INVOLVING ORAL MUCOSA: Primary | ICD-10-CM

## 2025-07-03 NOTE — TELEPHONE ENCOUNTER
Can you please replace HLA B51 order and I will contact patient to get her scheduled for a re-draw?

## 2025-07-03 NOTE — TELEPHONE ENCOUNTER
----- Message from Tech Anita sent at 7/3/2025  1:23 PM CDT -----  There was an issue with the HLA B51 test ordered on this patient from 6/23/25.  Specimens was received past stability at the reference laboratory.  The order has been cancelled. You will need to reorder and contact the patient for recollection if clinically indicated.  If there are any questions, please call the [facility] lab at [phone] ext. [phone].  Anyone in the Send[facility] lab will be able to assist you.

## 2025-07-11 ENCOUNTER — OFFICE VISIT (OUTPATIENT)
Dept: FAMILY MEDICINE | Facility: CLINIC | Age: 65
End: 2025-07-11
Payer: MEDICARE

## 2025-07-11 VITALS
HEART RATE: 86 BPM | DIASTOLIC BLOOD PRESSURE: 77 MMHG | HEIGHT: 63 IN | TEMPERATURE: 97 F | BODY MASS INDEX: 32.28 KG/M2 | WEIGHT: 182.19 LBS | SYSTOLIC BLOOD PRESSURE: 137 MMHG

## 2025-07-11 DIAGNOSIS — E78.5 HYPERLIPIDEMIA, UNSPECIFIED HYPERLIPIDEMIA TYPE: ICD-10-CM

## 2025-07-11 DIAGNOSIS — S86.911A KNEE STRAIN, RIGHT, INITIAL ENCOUNTER: Primary | ICD-10-CM

## 2025-07-11 DIAGNOSIS — E66.811 CLASS 1 OBESITY WITH SERIOUS COMORBIDITY IN ADULT, UNSPECIFIED BMI, UNSPECIFIED OBESITY TYPE: ICD-10-CM

## 2025-07-11 DIAGNOSIS — Z98.84 HISTORY OF GASTRIC BYPASS: ICD-10-CM

## 2025-07-11 PROCEDURE — 99215 OFFICE O/P EST HI 40 MIN: CPT | Mod: PBBFAC,PO | Performed by: FAMILY MEDICINE

## 2025-07-11 PROCEDURE — 99999 PR PBB SHADOW E&M-EST. PATIENT-LVL V: CPT | Mod: PBBFAC,,, | Performed by: FAMILY MEDICINE

## 2025-07-11 RX ORDER — DICLOFENAC SODIUM 10 MG/G
2 GEL TOPICAL 3 TIMES DAILY
Qty: 100 G | Refills: 5 | Status: SHIPPED | OUTPATIENT
Start: 2025-07-11

## 2025-07-11 RX ORDER — ONDANSETRON 4 MG/1
4 TABLET, ORALLY DISINTEGRATING ORAL EVERY 8 HOURS PRN
COMMUNITY
Start: 2025-06-13

## 2025-07-11 RX ORDER — UPADACITINIB 30 MG/1
1 TABLET, EXTENDED RELEASE ORAL NIGHTLY
COMMUNITY

## 2025-07-11 RX ORDER — SULFASALAZINE 500 MG/1
500 TABLET ORAL
COMMUNITY
Start: 2025-06-17

## 2025-07-11 NOTE — PROGRESS NOTES
History of Present Illness    Ms. Canseco reports tripping over a toy  while babysitting, causing her foot to slide under the lid of the toy a little over a week ago. She twisted her knee during the incident without falling. Initially, she felt pain in her foot, which is still bruised but in. Her main complaint now is knee pain. The knee is somewhat swollen, with pain primarily on the inside part, though the entire knee is painful. The knee is also stiff and sore. She notes pain extending up into her thigh and down into her lower leg, sometimes interfering with her sleep at night.    For pain management, she has been taking Tylenol and Tramadol 50mg, prescribed by Dr. Gary for general achiness (she has fibromyalgia diagnosis along with Behcet's and ulcerative colitis). She also takes Rinvoq through rheumatologist Walking is tolerable and the knee is improving.  Avoiding NSAIDs due to previous bariatric surgery    She had questions about obesity and GLP 1 therapy.  She has prior gastroparesis and has had prior bariatric surgery.    Hyperlipidemia due laboratory .  States compliance simvastatin    Yoidt was seen today for knee pain.    Diagnoses and all orders for this visit:    Knee strain, right, initial encounter    History of gastric bypass    Hyperlipidemia, unspecified hyperlipidemia type  -     Lipid Panel; Future    Class 1 obesity with serious comorbidity in adult, unspecified BMI, unspecified obesity type    Other orders  -     diclofenac sodium (VOLTAREN) 1 % Gel; Apply 2 g topically 3 (three) times daily.        Possible SPRAIN OF MEDIAL COLLATERAL LIGAMENT OF RIGHT KNEE:  She can use ice to the area.  Neoprene sleeve knee brace.  She can continue with Tylenol.  Add Voltaren gel.  Follow-up Orthopedics if symptoms worsen or if not improving in the next few weeks.    We did discuss GLP 1 drugs.  Biggest barrier would be cost.  Would also have to be monitored closely due to prior bariatric  surgery and gastroparesis.  This is something she is looking with her gastroenterologist.-     Schedule lipid panel.      This note was generated with the assistance of ambient listening technology. Verbal consent was obtained by the patient and accompanying visitor(s) for the recording of patient appointment to facilitate this note. I attest to having reviewed and edited the generated note for accuracy, though some syntax or spelling errors may persist. Please contact the author of this note for any clarification.        Past Medical History:  Past Medical History:   Diagnosis Date    Allergy     Angio-edema     Arthralgia     Asthma without status asthmaticus     Behcet's disease     Bronchitis     Cataract     Community acquired pneumonia of left lung 02/03/2025    COPD (chronic obstructive pulmonary disease)     COVID-19 virus infection 07/23/2021    Current chronic use of systemic steroids 01/04/2024    Diverticulosis of large intestine without hemorrhage 10/08/2015    Eczema     Environmental allergies     Eosinophilic asthma 03/08/2018    Exacerbation of ulcerative colitis with rectal bleeding     Fibromyalgia 02/19/2024    Gastroparesis     Gram-negative bacteremia 12/29/2023    Hand arthritis     Herpes simplex without mention of complication     oral    Hidradenitis     History of morbid obesity     Hyperlipidemia     Hypogammaglobulinemia     Hypothyroidism     Immune deficiency disorder     Intraperitoneal abscess 05/07/2018    LBP radiating to left leg     Leukocytosis, unspecified     Migraine headache     Normocytic anemia 10/05/2022    Obesity, Class III, BMI 40-49.9 (morbid obesity)     Periorbital cellulitis 12/31/2016    Prediabetes     RA (rheumatoid arthritis)     Recurrent upper respiratory infection (URI)     Rheumatoid arthritis 05/06/2025    Rotavirus enteritis 12/27/2023    RSV (respiratory syncytial virus infection) 01/30/2023    S/P colonoscopy 11/2010    Sepsis 05/07/2018    Sepsis  "secondary to colitis 05/07/2018    Systemic lupus erythematosus, organ or system involvement unspecified     Tubular adenoma of colon 08/17/2022    Tubular adenoma of colon 05/01/2024    Ulcerative pancolitis with rectal bleeding 10/17/2022    Urticaria      Past Surgical History:   Procedure Laterality Date    ADENOIDECTOMY      CATARACT EXTRACTION W/  INTRAOCULAR LENS IMPLANT Right 08/21/2024    Panoptix    CATARACT EXTRACTION W/  INTRAOCULAR LENS IMPLANT Left 09/25/2024    Panoptix    CHOLECYSTECTOMY      CHOLECYSTECTOMY      colitis      COLONOSCOPY  2015    repeat in 10    COLONOSCOPY N/A 10/08/2015    Procedure: COLONOSCOPY;  Surgeon: Panda Bose MD;  Location: [facility] ENDO;  Service: Endoscopy;  Laterality: N/A;    COLONOSCOPY N/A 08/17/2022    Procedure: COLONOSCOPY;  Surgeon: Olaf Del Valle MD;  Location: [facility];  Service: General;  Laterality: N/A;    COLONOSCOPY N/A 10/06/2022    Procedure: COLONOSCOPY;  Surgeon: Quinton Celeste MD;  Location: [facility];  Service: Gastroenterology;  Laterality: N/A;    COLONOSCOPY N/A 05/01/2024    Procedure: COLONOSCOPY;  Surgeon: Quinton Celeste MD;  Location: [facility];  Service: Endoscopy;  Laterality: N/A;    COLONOSCOPY  05/08/2025    Repeat in 1 year    ESOPHAGOGASTRODUODENOSCOPY N/A 05/01/2024    Procedure: EGD (ESOPHAGOGASTRODUODENOSCOPY);  Surgeon: Quinton Celeste MD;  Location: [facility];  Service: Endoscopy;  Laterality: N/A;    Hand fracture surgery      HARDWARE REMOVAL      left hand 5th [facility]    hymenectomy      HYSTERECTOMY      menorrhagia-Ovaries intact    ROTATOR CUFF REPAIR Right     SLEEVE GASTROPLASTY  04/16/2018    TONSILLECTOMY      Urethral dilatation       Review of patient's allergies indicates:   Allergen Reactions    Bromelains Hives     " causes mouth to bleed"    Pimecrolimus Swelling    Sudafed [pseudoephedrine hcl] Other (See Comments)     Does not want to wake up .    Amoxicillin-pot clavulanate Itching     Other reaction(s): " "Itching; tolerated ceftriaxone 10/2022    Hydrocodone Itching    Iodinated contrast media      childhood    Iodine and iodide containing products Other (See Comments)    Melon Hives    Percocet [oxycodone-acetaminophen] Itching    Codeine     Corn containing products     Tree nut     Tree nuts Other (See Comments)    Wheat containing prod     Barium iodide Rash    Ephedrine Other (See Comments)     Other reaction(s): comatose    Penicillins      Other reaction(s): does not work on pt symptoms     Medications Ordered Prior to Encounter[1]  Social History[2]  Family History   Problem Relation Name Age of Onset    Melanoma Mother Loyd     Basal cell carcinoma Mother Loyd     Lung disease Mother Loyd         pulm htn    Cataracts Mother Loyd     Hypertension Mother Loyd     Lung cancer Father Josh     Diabetes Father Josh     Hypertension Father Josh     Cancer Father Josh     Melanoma Maternal Aunt      Melanoma Maternal Uncle      Diabetes Paternal Aunt Estee     Colon cancer Paternal Aunt Lilliam 40    Diabetes Paternal Aunt Lilliam     Breast cancer Paternal Aunt  40    Lymphoma Paternal Aunt      Cancer Maternal Grandfather Gilmar     Ovarian cancer Paternal Grandmother  40    Ulcerative colitis Son      Psoriasis Son      Psoriasis Son      Breast cancer Cousin Paternal 1st 25    Allergic rhinitis Neg Hx      Allergies Neg Hx      Angioedema Neg Hx      Asthma Neg Hx      Atopy Neg Hx      Eczema Neg Hx      Immunodeficiency Neg Hx      Rhinitis Neg Hx      Urticaria Neg Hx             Vitals:    07/11/25 1121   BP: 137/77   Pulse: 86   Temp: 97 °F (36.1 °C)   TempSrc: Temporal   Weight: 82.6 kg (182 lb 3.2 oz)   Height: 5' 2.5" (1.588 m)       Wt Readings from Last 3 Encounters:   07/11/25 82.6 kg (182 lb 3.2 oz)   06/23/25 82.7 kg (182 lb 5.1 oz)   06/08/25 83.9 kg (185 lb)       APPEARANCE: Well nourished, well developed, in no acute distress.    HEAD: Normocephalic.  Atraumatic.  EYES:   Right " eye: Pupil reactive.  Conjunctiva clear.    Left eye: Pupil reactive.  Conjunctiva clear.    NECK: Supple. No bruits.  No JVD.  No cervical lymphadenopathy.  No thyromegaly.    CHEST: Breath sounds clear bilaterally.  Normal respiratory effort  CARDIOVASCULAR: Normal rate.  Regular rhythm.  No murmurs.  No rub.  No gallops.   No edema.  MENTAL STATUS: Alert.  Oriented x 3.  The right knee has full range motion.  She does have some mild swelling.  She has some mild tenderness palpation over the medial joint line.  Negative Lachman's and Tiesha's.  Normal gait.       [1]   Current Outpatient Medications on File Prior to Visit   Medication Sig Dispense Refill    acetaminophen (TYLENOL) 650 MG TbSR Take 1,300 mg by mouth 2 (two) times a day.      albuterol (PROVENTIL/VENTOLIN HFA) 90 mcg/actuation inhaler Inhale 2 puffs into the lungs every 4 (four) hours as needed for Wheezing or Shortness of Breath. 2 puffs every 4 hours as needed for cough, wheeze, or shortness of breath 54 g 3    benzonatate (TESSALON) 200 MG capsule Take 1 capsule (200 mg total) by mouth 3 (three) times daily as needed for Cough. 90 capsule 3    cholecalciferol, vitamin D3, (VITAMIN D3) 50 mcg (2,000 unit) Cap Take 1 capsule by mouth once daily.      clobetasoL (TEMOVATE) 0.05 % cream Apply topically nightly as needed. To the vulva 60 g 1    desonide (DESOWEN) 0.05 % cream Apply topically 2 (two) times daily. 60 g 1    doxycycline (MONODOX) 100 MG capsule Take 100 mg by mouth 2 (two) times daily as needed.      estradioL (ESTRACE) 1 MG tablet Take 1 tablet (1 mg total) by mouth once daily. 90 tablet 3    fluticasone-umeclidin-vilanter (TRELEGY ELLIPTA) 200-62.5-25 mcg inhaler Inhale 1 puff into the lungs once daily. 60 each 11    furosemide (LASIX) 20 MG tablet Take 20 mg by mouth as needed (edema).      hydrocortisone 2.5 % cream Apply 1 Application topically 2 (two) times daily as needed (TO THE ANUS). To the anus      HYDROmorphone (DILAUDID)  2 MG tablet Take 1 tablet (2 mg total) by mouth every 4 (four) hours as needed for Pain (cough). 20 tablet 0    immun glob G,IgG,-pro-IgA 0-50 (HIZENTRA) 2 gram/10 mL (20 %) Soln Inject 90 mLs (18 g total) into the skin every 14 (fourteen) days. 180 mL 12    Lactobacillus rhamnosus GG (CULTURELLE) 10 billion cell capsule Take 1 capsule by mouth 2 (two) times daily. 60 capsule 0    levothyroxine (SYNTHROID) 75 MCG tablet Take 1 tablet (75 mcg total) by mouth before breakfast. 90 tablet 3    LIDOcaine 5 % Crea Apply 1 Application topically once daily.      mecobalamin, vitamin B12, 1,000 mcg Chew Take 1 tablet by mouth once daily.      minoxidiL (LONITEN) 2.5 MG tablet TAKE 1/4TH (0.625MG) BY MOUTH DAILY 23 tablet 3    montelukast (SINGULAIR) 10 mg tablet Take 1 tablet (10 mg total) by mouth every evening. 90 tablet 3    MULTIVITAMIN ORAL Take 1 tablet by mouth once daily.      ondansetron (ZOFRAN-ODT) 4 MG TbDL Take 4 mg by mouth every 8 (eight) hours as needed.      ondansetron (ZOFRAN-ODT) 8 MG TbDL Take 1 tablet (8 mg total) by mouth every 6 (six) hours as needed (nausea/vomiting). 60 tablet 0    pilocarpine (SALAGEN) 5 MG Tab Take 2 tablets (10 mg total) by mouth 3 (three) times daily. 180 tablet 11    potassium chloride SA (K-DUR,KLOR-CON) 20 MEQ tablet Take 20 mEq by mouth once daily.      RINVOQ 30 mg Tb24 Take 1 tablet by mouth nightly.      simvastatin (ZOCOR) 10 MG tablet Take 10 mg by mouth every evening.      sulfaSALAzine (AZULFIDINE ENTABS) 500 MG EC tablet Take 1 tablet (500 mg total) by mouth 2 (two) times a day. 60 tablet 0    sulfaSALAzine (AZULFIDINE) 500 mg Tab Take 500 mg by mouth.      traMADoL (ULTRAM) 50 mg tablet Take 1 tablet (50 mg total) by mouth every 8 (eight) hours as needed for Pain. 75 each 0    triamterene-hydrochlorothiazide 37.5-25 mg (MAXZIDE-25) 37.5-25 mg per tablet TAKE 1 TABLET BY MOUTH DAILY AS NEEDED (EDEMA). 90 tablet 1    vancomycin (VANCOCIN) 125 MG capsule Take 1 capsule  (125 mg total) by mouth Daily. Take while on antibiotic and  for additional 3 days 21 capsule 1    sumatriptan (IMITREX) 100 MG tablet TAKE 1 TABLET (100 MG TOTAL) BY MOUTH ONCE AS NEEDED FOR MIGRAINE. MAY REPEAT DOSE IN 2 HOURS IF NO RELIEF. DO NOT EXCEED 2 DOSES IN 24 HOURS. 9 tablet 5    traMADoL (ULTRAM) 50 mg tablet Take 1 tablet (50 mg total) by mouth every 8 (eight) hours as needed for Pain. 75 tablet 0    [DISCONTINUED] upadacitinib (RINVOQ) 45 mg Tb24 Take 1 tablet by mouth every other day. Changing to Rinvoq 30 every day in July       No current facility-administered medications on file prior to visit.   [2]   Social History  Socioeconomic History    Marital status:    Tobacco Use    Smoking status: Never     Passive exposure: Past    Smokeless tobacco: Never   Substance and Sexual Activity    Alcohol use: Not Currently     Comment: very rarely    Drug use: No    Sexual activity: Yes     Partners: Male   Social History Narrative    . Disabled teacher.     Social Drivers of Health     Financial Resource Strain: Low Risk  (5/16/2025)    Overall Financial Resource Strain (CARDIA)     Difficulty of Paying Living Expenses: Not hard at all   Food Insecurity: No Food Insecurity (5/16/2025)    Hunger Vital Sign     Worried About Running Out of Food in the Last Year: Never true     Ran Out of Food in the Last Year: Never true   Transportation Needs: No Transportation Needs (5/16/2025)    PRAPARE - Transportation     Lack of Transportation (Medical): No     Lack of Transportation (Non-Medical): No   Stress: No Stress Concern Present (5/16/2025)    [employer] - Occupational Stress Questionnaire     Feeling of Stress : Not at all   Housing Stability: Low Risk  (5/16/2025)    Housing Stability Vital Sign     Unable to Pay for Housing in the Last Year: No     Number of Times Moved in the Last Year: 0     Homeless in the Last Year: No

## 2025-07-13 NOTE — PROGRESS NOTES
"Neurology Headache Clinic - [facility]  New Patient Visit     Subjective      REFERRAL SOURCE  Hospital, [facility]*          CHIEF COMPLAINT/REASON FOR REFERRAL  Headache     HISTORY OF PRESENT ILLNESS  Yodit Canseco is a 64 y.o. woman with SLE, Behcet's disease, RA, ulcerative colitis, hypogammaglobulinemia on IVIG, asthma, hypertension, hyperlipidemia, hx of gastric bypass, prediabetes, who is presenting to the [facility] for an office visit with a chief complaint of headache.     The patient states that prior to her hospital, the patient states that she experienced headache since her teenage years. She says that they they were not associated with menstrual period. She had light, sound sensitivity, nausea/vomiting, and exacerbation with routine physical activity. She says that headache would be treated with submersion under water and Imitrex PO. She says that she was able to identify certain triggers for headache and generally headache improved over the course of her life. The patient states that she is here now because she was admitted at [facility] with headache / neck pain / fever. She had an attempted LP in the emergency room and then they did it under fluoro. Results 2025 N502 R1  (93 but  by 24 hrs). Meningocephalitis panel ab neg. AFB neg, culture neg. Flow cytometry negative.     She was told not to leave town until after seeing a neurologist because of her meningitis. She says that her headache has improved. She will get head pain or 5-10 minutes at a time. Most of the time these will not lead into a migrainous headache. She calls these short lasting headache "cluster headaches" but she doesn't have to stand up / pace around with the pain because it's not very severe. She does not get any autonomic features either. She says that she will have this occur in random placed in her head. Pain is stabbing.      NPV headache characteristics:  Headache " Frequency:        Total: 30        Disablin     Duration of attacks: hours; minutes  Timing to max pain: minutes   Time of day when headache is worse?: yes - night time  Headache location:    bilateral frontal, temporal, parietal, and occipital  Quality: throbbing / pulsating, aching/tight, and sharp/shooting/stabbing  Intensity: moderate and moderate to severe  Associated symptoms: photophobia, phonophobia, osmophobia, aggravation with routine physical activity, nausea, and delayed vomiting  Unilateral cranial autonomic features or restlessness: none  Premonitory symptoms: none  Aura symptoms:   Type: visual shimmering and halos;   Duration: migraine aura duration: 1 day (atypical for aura)  Headache Red Flags:        New (or very out-of-proportion to previous) daily, continuous, and progressive headache in a patient over 50 (especially if subacute): no        Fevers/Drenching Night Sweats/Unintended Weight Loss/Hx of Cancer: yes - through menopause and since then occasional        Focal weakness: no        Severe 10/10 headache in <10 seconds (Thunderclap onset): yes - 1-2 days per year        Start a headache with coughing/sneezing/bending over: no        Headache absolutely worse with certain positions (lying down vs standing up): no        Start a headache with exercise or exertion: no        Double vision: no        Transient Visual Obscurations: no        Loss of color vision: no        Pulsatile or whooshing tinnitus: yes, bilateral  Triggers: yes - certain foods (artifical sweeteners, gluten)  Neck pain: yes - bilateral Radiation up and down  Jaw pain/cramping with chewing, e.g., starts/stops when chewing due to pain/fatigue, lockjaw/decreased opening or cramping (including tongue) when eating: no  Symptoms of dysautonomia: gastroparesis     Prior non-pharm treatments (CBT-Pain, PT, chiropractor, acupuncture, massage): no  History of asthma, constipation, kidney stones: yes - asthma, constipation /  "gastroparesis  Psychiatric comorbidities: no  History of Head Trauma: no  Hypertension, Hyperlipidemia: yes - HLD  Prior stroke: no  Coronary artery disease: no  Vascular malformation or aneurysm: no  Peripheral Vascular disease / Raynaud's: no    Caffeine use: yes - 1 cup of coffee per morning, tea when she goes out    Sleep comorbidities: Snoring present, witnessed apneas absent, sleep study no     Family history of headaches:  yes - aunt and grandmother     Last dilated eye exam: within the last 3 months   Any abnormalities? no     Menstrual status:  Did attacks start around menarche? no  Does the patient currently get their period? no  Worsening of migraine during menses? no  Does the patient use contraception? Type? no  Is the patient currently pregnant or planning pregnancy in the near future? no  Is the patient menopausal? yes  Using HRT? yes - estrogen 1 mg     Social History:  The patient lives in [place], [place].   Employment: no  Tobacco use: no  Alcohol use: yes - occasional  Substances: no  Mood: "Most people tell me I have a good mood"    The patient is unable to do the following activities easily because of their pain:  none - can do all activities easily without any difficulty      ----------------     Current Acute Headache Medications:  -- tramadol 50 mg   -- imitrex 00 mg     Number of Days with acute medication use per week: 2      Current Prophylactic Headache Medications:  -- APAP 1300 mg PO ID     Previous headache treatment that was ineffective or not tolerated:  Acute: Dilaudid  Preventive:   Devices:     Procedures:     ----------------     Past Medical History:   Diagnosis Date    Allergy     Angio-edema     Arthralgia     Asthma without status asthmaticus     Behcet's disease     Bronchitis     Cataract     Community acquired pneumonia of left lung 02/03/2025    COPD (chronic obstructive pulmonary disease)     COVID-19 virus infection 07/23/2021    Current chronic use of systemic steroids " 01/04/2024    Diverticulosis of large intestine without hemorrhage 10/08/2015    Eczema     Environmental allergies     Eosinophilic asthma 03/08/2018    Exacerbation of ulcerative colitis with rectal bleeding     Fibromyalgia 02/19/2024    Gastroparesis     Gram-negative bacteremia 12/29/2023    Hand arthritis     Herpes simplex without mention of complication     oral    Hidradenitis     History of morbid obesity     Hyperlipidemia     Hypogammaglobulinemia     Hypothyroidism     Immune deficiency disorder     Intraperitoneal abscess 05/07/2018    LBP radiating to left leg     Leukocytosis, unspecified     Migraine headache     Normocytic anemia 10/05/2022    Obesity, Class III, BMI 40-49.9 (morbid obesity)     Periorbital cellulitis 12/31/2016    Prediabetes     RA (rheumatoid arthritis)     Recurrent upper respiratory infection (URI)     Rheumatoid arthritis 05/06/2025    Rotavirus enteritis 12/27/2023    RSV (respiratory syncytial virus infection) 01/30/2023    S/P colonoscopy 11/2010    Sepsis 05/07/2018    Sepsis secondary to colitis 05/07/2018    Systemic lupus erythematosus, organ or system involvement unspecified     Tubular adenoma of colon 08/17/2022    Tubular adenoma of colon 05/01/2024    Ulcerative pancolitis with rectal bleeding 10/17/2022    Urticaria         Medications Ordered Prior to Encounter[1]     REVIEW OF SYSTEMS  As above     Objective      PHYSICAL EXAMINATION    There were no vitals taken for this visit.     General Exam: The patient is in no acute distress.  Psychiatric: The patient is alert, interactive, and has appropriate mood and affect.    Head and Neck:  Supratrochlear tenderness: No bilateral  Supraorbital tenderness: No bilateral  Auriculotemporal tenderness: No bilateral  Lesser occipital tenderness: No bilateral  Greater occipital tenderness: Yes bilateral  Cervical paraspinal muscle tenderness: Yes bilateral  Cervical ROM (normal flexion 50'; extension 80'; lateral flexion  45'; rotation 85'): Normal   Cervical facet loading: Negative bilateral  Spurling's sign: Negative bilateral     Neurologic Examination:  Mental Status: Mental status is normal to conversation. The patient is able to recall the details of their medical history without difficulty. Speech is clear. Language is grossly intact.    Cranial Nerves: Facial symmetry and strength is intact. Tongue protrudes in the midline.   Sensory Examination:  Equal and intact to touch at the arms and legs.  Coordination: No dysmetria on finger-nose-finger testing  Gait: Normal gait.     Motor/Reflexes:      Right Left   C5 Shoulder Abduction  5  5   C5/6 Elbow Flexion    5  5   C6 Wrist Extension  5  5   C7 Elbow Extension   5  5   C8/T1 (ulnar) First dorsal interosseous  5  5   C8/T1 (median) Abductor pollicis brevis 5 5        Right Left   L2/3 Iliopsoas  Hip flexion  5  5   L3/4 Quadriceps Knee Extension  5  5   L4/5 Tib Anterior Ankle Dorsiflexion   5  5   L5 Extensor Hallicus Longus Great toe extension  5  5   S1/S2 Gastroc/Soleus Plantar Flexion  5  5      Right Left   Biceps DTR 2+ 2+   Triceps DTR 2+ 2+   Patellar DTR 2+ 2+   Achilles DTR 2+ 2+   Black Absent  Absent   Clonus Absent Absent   Babinski Down-going Down-going       Visual Exam:    Pupils: Tested - Normal  Visual fields: Not Tested  Funduscopic exam: Not Tested  Color Vision: Not Tested  Extraocular movements: Tested - Normal  Nystagmus: Tested - Normal  Saccades: Not Tested  Pursuits: Tested - Normal    ----------------     DIAGNOSTIC DATA     Laboratory Data:     Lab Results   Component Value Date    WBC 5.35 06/17/2025    HGB 13.0 06/17/2025    HCT 37.8 06/17/2025    MCV 91 06/17/2025     06/17/2025           CMP  Sodium   Date Value Ref Range Status   06/17/2025 139 136 - 145 mmol/L Final   06/08/2025 136 136 - 145 mmol/L Final     Potassium   Date Value Ref Range Status   06/17/2025 3.8 3.5 - 5.1 mmol/L Final   06/08/2025 3.7 3.5 - 5.1 mmol/L Final      Comment:     Anion Gap reference range revised on 4/28/2023  Specimen slightly hemolyzed.       Chloride   Date Value Ref Range Status   06/17/2025 105 95 - 110 mmol/L Final   06/08/2025 98 95 - 110 mmol/L Final     CO2   Date Value Ref Range Status   06/17/2025 24 23 - 29 mmol/L Final   06/08/2025 27 23 - 29 mmol/L Final     Glucose   Date Value Ref Range Status   06/17/2025 95 70 - 110 mg/dL Final   06/08/2025 105 70 - 110 mg/dL Final     Comment:     The ADA recommends the following guidelines for fasting glucose:    Normal:       less than 100 mg/dL    Prediabetes:  100 mg/dL to 125 mg/dL    Diabetes:     126 mg/dL or higher       BUN   Date Value Ref Range Status   06/17/2025 8 8 - 23 mg/dL Final     Creatinine   Date Value Ref Range Status   06/17/2025 0.9 0.5 - 1.4 mg/dL Final     Calcium   Date Value Ref Range Status   06/17/2025 9.0 8.7 - 10.5 mg/dL Final   06/08/2025 9.0 8.7 - 10.5 mg/dL Final     Protein Total   Date Value Ref Range Status   06/17/2025 7.3 6.0 - 8.4 gm/dL Final     Total Protein   Date Value Ref Range Status   06/08/2025 7.9 6.0 - 8.4 g/dL Final     Albumin   Date Value Ref Range Status   06/17/2025 4.3 3.5 - 5.2 g/dL Final   06/08/2025 4.3 3.5 - 5.2 g/dL Final     Total Bilirubin   Date Value Ref Range Status   06/08/2025 0.6 0.2 - 1.0 mg/dL Final     Bilirubin Total   Date Value Ref Range Status   06/17/2025 0.3 0.1 - 1.0 mg/dL Final     Comment:     For infants and newborns, interpretation of results should be based   on gestational age, weight and in agreement with clinical   observations.    Premature Infant recommended reference ranges:   0-24 hours:  <8.0 mg/dL   24-48 hours: <12.0 mg/dL   3-5 days:    <15.0 mg/dL   6-29 days:   <15.0 mg/dL     Alkaline Phosphatase   Date Value Ref Range Status   06/08/2025 49 40 - 150 U/L Final     ALP   Date Value Ref Range Status   06/17/2025 53 40 - 150 unit/L Final     AST   Date Value Ref Range Status   06/17/2025 31 11 - 45 unit/L Final    06/08/2025 54 (H) 10 - 40 U/L Final     ALT   Date Value Ref Range Status   06/17/2025 17 10 - 44 unit/L Final   06/08/2025 21 10 - 44 U/L Final     Anion Gap   Date Value Ref Range Status   06/17/2025 10 8 - 16 mmol/L Final     eGFR   Date Value Ref Range Status   06/17/2025 >60 >60 mL/min/1.73/m2 Final     Comment:     Estimated GFR calculated using the CKD-EPI creatinine (2021) equation.   06/08/2025 >60 >60 mL/min/1.73 m^2 Final        Imaging Data:    Xray:      CT:   5/15/2025    FINDINGS:  Age-appropriate generalized involutional changes are seen.  No evidence of acute intracranial hemorrhage, mass effect, midline deviation, hydrocephalus, or abnormal extra-axial fluid collection is visualized.  There appears to be  periventricular and deep white matter hypoattenuation which is nonspecific, but is most commonly associated with chronic microvascular ischemic changes.  No evidence of acute large vessel territory ischemia/infarction is appreciated.  MRI with diffusion-weighted imaging is more sensitive in the assessment of acute ischemia/infarction.  The basilar cisterns are preserved. The visualized paranasal sinuses and mastoid air cells appear to be grossly clear.  No acute displaced calvarial fracture is visualized.     Impression:     1. No acute intracranial abnormality is visualized.  2. The findings are concordant with nighthawk.    MRI:        ----------------     Assessment & Plan      Yodit Canseco is a 64 y.o. woman with SLE, Behcet's disease, RA, ulcerative colitis, hypogammaglobulinemia on IVIG, asthma, hypertension, hyperlipidemia, hx of gastric bypass, prediabetes, who is presenting to the [facility] for an office visit with a chief complaint of headache.     The patient states that prior to her hospital, the patient states that she experienced headache since her teenage years. She says that they they were not associated with menstrual period. She had light, sound  "sensitivity, nausea/vomiting, and exacerbation with routine physical activity. She says that headache would be treated with submersion under water and Imitrex PO. She says that she was able to identify certain triggers for headache and generally headache improved over the course of her life. The patient states that she is here now because she was admitted at [facility] with headache / neck pain / fever. She had an attempted LP in the emergency room and then they did it under fluoro. Results 2025 N502 R1  (93 but  by 24 hrs). Meningocephalitis panel ab neg. AFB neg, culture neg. Flow cytometry negative.     She was told not to leave town until after seeing a neurologist because of her meningitis. She says that her headache has improved. She will get head pain or 5-10 minutes at a time. Most of the time these will not lead into a migrainous headache. She calls these short lasting headache "cluster headaches" but she doesn't have to stand up / pace around with the pain because it's not very severe. She does not get any autonomic features either. She says that she will have this occur in random placed in her head. Pain is stabbing.      Exam was non-focal. Given improvement in her condition, I do not see any apparent restrictions for her travel. She should discuss whether the risks of continuing her ALVINA inhibitor outweigh the benefits. I do not think this is a manifestation of Neuro-behcet's given that she reports her autoimmune disease complex has actually improved on her treatment. Suspect aseptic meningitis but I do not think that metagenomic testing is warranted to obtain a specific diagnosis. I do no think that her condition is chemical meningitis from her IVIG given that that condition is closely affiliated with IVIG treatment only. Will obtain MR Brain w/wo contrast and MRA to evaluate for evidence of a secondary cause of headache that would warrant further testing.     Otherwise, her headache " phenotype is most consistent with chronic migraine. She has bouts of primary stabbing headache (icepick or 'stabs and jabs') that do not meet criteria for cluster headache, so that terminology should be retired when referring to her pain.    Regarding prescription management, no medications were prescribed, refilled, or maintained at today's visit.    Aseptic meningitis, improving  Chronic migraine  Primary stabbing headache    -- Diagnostic studies and referrals recommended: MRI brain w/wo contrast, MRA  -- Preventive: None today  -- Supplements: Try Magnesium (oxide, glycinate, citrate, or combination) 400 mg by mouth twice per day (Side effect: stomach upset). Find at local [employer] or [employer]. Try Riboflavin (VitB2) 200 mg by mouth twice per day (Side effect: bright yellow urine). Find at [employer] ([employer], etc.). Alternatively, there is a combination pill that you can try that has 600 mg of magnesium citrate (can cause loose stools), 400 mg Riboflavin, 300 mg CoQ10, 3 mg Melatonin, and 4000 IU VitD3 called MigraineMD that you can try at night.  -- Abortive: Sumatriptan 100 mg by other provider. Side effects discussed.  -- Nausea/Vomiting:   -- Medication overuse discussed. Limit the use of as needed medications to less than 2 to 3 days per week or 10 days per month to reduce the risk of medication overuse complications.  -- Future options: IF we decide to initiate treatment for her current headache after her imaging, would generally avoid vasoactive meds like triptans and anti-CGRP agents with her vasculitis diagnosis.      -- Recommend lifestyle modifications including the SEEDS for success in headache management, including Sleep hygiene, Exercising regularly, Eating healthy and regular meals, Drinking water and maintaining a headache Diary, and Stress reduction.  -- Therapeutic education and advocacy:        -Access the Migraine Toolkit, [facility] Migraine Patient Toolkit        -Visit  "the American Migraine Foundation (americanmigrainefoundation.org) website and the Move Against Migraine Facebook group for additional patient education    Pain Psychology Resources:  -- "Harnessing the Power of your Thoughts for Pain Control" - [facility] Back Pain Education Day 2016.   -- "Pain Catastrophizing" - [employer] [facility]royo education Youtube channel  -- Free 8-week Mindfulness Course - [place] Mindfulness-Based Stress Reduction  -- [id number] Free Vanesa for stress reduction developed by the [employer]       -- Follow-up recommended: 6 weeks     The total time spent for evaluation and management on including reviewing separately obtained history, performing a medically appropriate exam and evaluation, documenting clinical information in the health record, independently interpreting results and communicating them to the patient/family/caregiver, and ordering medications/tests/procedures was 49 minutes.    Level 4 by Time.    ---    Cameron Baez MD  Headache and Pain Management  [facility] [place], [place]         [1]   Current Outpatient Medications on File Prior to Visit   Medication Sig Dispense Refill    acetaminophen (TYLENOL) 650 MG TbSR Take 1,300 mg by mouth 2 (two) times a day.      albuterol (PROVENTIL/VENTOLIN HFA) 90 mcg/actuation inhaler Inhale 2 puffs into the lungs every 4 (four) hours as needed for Wheezing or Shortness of Breath. 2 puffs every 4 hours as needed for cough, wheeze, or shortness of breath 54 g 3    benzonatate (TESSALON) 200 MG capsule Take 1 capsule (200 mg total) by mouth 3 (three) times daily as needed for Cough. 90 capsule 3    cholecalciferol, vitamin D3, (VITAMIN D3) 50 mcg (2,000 unit) Cap Take 1 capsule by mouth once daily.      clobetasoL (TEMOVATE) 0.05 % cream Apply topically nightly as needed. To the vulva 60 g 1    desonide (DESOWEN) 0.05 % cream Apply topically 2 (two) times daily. 60 g 1    diclofenac " sodium (VOLTAREN) 1 % Gel Apply 2 g topically 3 (three) times daily. 100 g 5    doxycycline (MONODOX) 100 MG capsule Take 100 mg by mouth 2 (two) times daily as needed.      estradioL (ESTRACE) 1 MG tablet Take 1 tablet (1 mg total) by mouth once daily. 90 tablet 3    fluticasone-umeclidin-vilanter (TRELEGY ELLIPTA) 200-62.5-25 mcg inhaler Inhale 1 puff into the lungs once daily. 60 each 11    furosemide (LASIX) 20 MG tablet Take 20 mg by mouth as needed (edema).      hydrocortisone 2.5 % cream Apply 1 Application topically 2 (two) times daily as needed (TO THE ANUS). To the anus      HYDROmorphone (DILAUDID) 2 MG tablet Take 1 tablet (2 mg total) by mouth every 4 (four) hours as needed for Pain (cough). 20 tablet 0    immun glob G,IgG,-pro-IgA 0-50 (HIZENTRA) 2 gram/10 mL (20 %) Soln Inject 90 mLs (18 g total) into the skin every 14 (fourteen) days. 180 mL 12    Lactobacillus rhamnosus GG (CULTURELLE) 10 billion cell capsule Take 1 capsule by mouth 2 (two) times daily. 60 capsule 0    levothyroxine (SYNTHROID) 75 MCG tablet Take 1 tablet (75 mcg total) by mouth before breakfast. 90 tablet 3    LIDOcaine 5 % Crea Apply 1 Application topically once daily.      mecobalamin, vitamin B12, 1,000 mcg Chew Take 1 tablet by mouth once daily.      minoxidiL (LONITEN) 2.5 MG tablet TAKE 1/4TH (0.625MG) BY MOUTH DAILY 23 tablet 3    montelukast (SINGULAIR) 10 mg tablet Take 1 tablet (10 mg total) by mouth every evening. 90 tablet 3    MULTIVITAMIN ORAL Take 1 tablet by mouth once daily.      ondansetron (ZOFRAN-ODT) 4 MG TbDL Take 4 mg by mouth every 8 (eight) hours as needed.      ondansetron (ZOFRAN-ODT) 8 MG TbDL Take 1 tablet (8 mg total) by mouth every 6 (six) hours as needed (nausea/vomiting). 60 tablet 0    pilocarpine (SALAGEN) 5 MG Tab Take 2 tablets (10 mg total) by mouth 3 (three) times daily. 180 tablet 11    potassium chloride SA (K-DUR,KLOR-CON) 20 MEQ tablet Take 20 mEq by mouth once daily.      RINVOQ 30 mg  Tb24 Take 1 tablet by mouth nightly.      simvastatin (ZOCOR) 10 MG tablet Take 10 mg by mouth every evening.      sulfaSALAzine (AZULFIDINE ENTABS) 500 MG EC tablet Take 1 tablet (500 mg total) by mouth 2 (two) times a day. 60 tablet 0    sulfaSALAzine (AZULFIDINE) 500 mg Tab Take 500 mg by mouth.      sumatriptan (IMITREX) 100 MG tablet TAKE 1 TABLET (100 MG TOTAL) BY MOUTH ONCE AS NEEDED FOR MIGRAINE. MAY REPEAT DOSE IN 2 HOURS IF NO RELIEF. DO NOT EXCEED 2 DOSES IN 24 HOURS. 9 tablet 5    traMADoL (ULTRAM) 50 mg tablet Take 1 tablet (50 mg total) by mouth every 8 (eight) hours as needed for Pain. 75 tablet 0    traMADoL (ULTRAM) 50 mg tablet Take 1 tablet (50 mg total) by mouth every 8 (eight) hours as needed for Pain. 75 each 0    triamterene-hydrochlorothiazide 37.5-25 mg (MAXZIDE-25) 37.5-25 mg per tablet TAKE 1 TABLET BY MOUTH DAILY AS NEEDED (EDEMA). 90 tablet 1    vancomycin (VANCOCIN) 125 MG capsule Take 1 capsule (125 mg total) by mouth Daily. Take while on antibiotic and  for additional 3 days 21 capsule 1     No current facility-administered medications on file prior to visit.

## 2025-07-14 ENCOUNTER — LAB VISIT (OUTPATIENT)
Dept: LAB | Facility: HOSPITAL | Age: 65
End: 2025-07-14
Payer: MEDICARE

## 2025-07-14 ENCOUNTER — OFFICE VISIT (OUTPATIENT)
Dept: NEUROLOGY | Facility: CLINIC | Age: 65
End: 2025-07-14
Payer: MEDICARE

## 2025-07-14 VITALS
HEART RATE: 71 BPM | HEIGHT: 63 IN | BODY MASS INDEX: 32.46 KG/M2 | DIASTOLIC BLOOD PRESSURE: 81 MMHG | SYSTOLIC BLOOD PRESSURE: 132 MMHG | WEIGHT: 183.19 LBS

## 2025-07-14 DIAGNOSIS — G43.709 CHRONIC MIGRAINE WITHOUT AURA WITHOUT STATUS MIGRAINOSUS, NOT INTRACTABLE: ICD-10-CM

## 2025-07-14 DIAGNOSIS — H93.A9 PULSATILE TINNITUS, UNSPECIFIED EAR: Primary | ICD-10-CM

## 2025-07-14 DIAGNOSIS — M35.2 BEHCET'S DISEASE: ICD-10-CM

## 2025-07-14 DIAGNOSIS — M35.2 BEHCET'S SYNDROME INVOLVING ORAL MUCOSA: ICD-10-CM

## 2025-07-14 DIAGNOSIS — I77.89 OTHER SPECIFIED DISORDERS OF ARTERIES AND ARTERIOLES: ICD-10-CM

## 2025-07-14 PROCEDURE — 99204 OFFICE O/P NEW MOD 45 MIN: CPT | Mod: S$PBB,,, | Performed by: INTERNAL MEDICINE

## 2025-07-14 PROCEDURE — 99999 PR PBB SHADOW E&M-EST. PATIENT-LVL V: CPT | Mod: PBBFAC,,, | Performed by: INTERNAL MEDICINE

## 2025-07-14 PROCEDURE — 99215 OFFICE O/P EST HI 40 MIN: CPT | Mod: PBBFAC,PO | Performed by: INTERNAL MEDICINE

## 2025-07-14 PROCEDURE — 36415 COLL VENOUS BLD VENIPUNCTURE: CPT | Mod: PO

## 2025-07-14 NOTE — PATIENT INSTRUCTIONS
-- MRI brain w/wo contrast; MRA head wo contrast  -- Talk to rheumatology about whether they think that switching up your medication is warranted given that you had a complication to being on treatment (ALVINA inhibitor - Rinvoq)  -- Follow-up in 6 weeks

## 2025-07-28 ENCOUNTER — HOSPITAL ENCOUNTER (OUTPATIENT)
Dept: CARDIOLOGY | Facility: HOSPITAL | Age: 65
Discharge: HOME | End: 2025-07-28
Attending: INTERNAL MEDICINE
Payer: MEDICARE

## 2025-07-28 ENCOUNTER — OFFICE VISIT (OUTPATIENT)
Dept: CARDIOLOGY | Facility: CLINIC | Age: 65
End: 2025-07-28
Payer: MEDICARE

## 2025-07-28 VITALS
BODY MASS INDEX: 30.56 KG/M2 | WEIGHT: 172.5 LBS | OXYGEN SATURATION: 95 % | DIASTOLIC BLOOD PRESSURE: 72 MMHG | HEIGHT: 63 IN | SYSTOLIC BLOOD PRESSURE: 128 MMHG | HEART RATE: 89 BPM

## 2025-07-28 DIAGNOSIS — Z13.6 ENCOUNTER FOR SCREENING FOR CARDIOVASCULAR DISORDERS: Primary | ICD-10-CM

## 2025-07-28 DIAGNOSIS — R76.8 POSITIVE ANA (ANTINUCLEAR ANTIBODY): ICD-10-CM

## 2025-07-28 DIAGNOSIS — Z76.89 ENCOUNTER TO ESTABLISH CARE: ICD-10-CM

## 2025-07-28 DIAGNOSIS — E66.811 CLASS 1 OBESITY DUE TO EXCESS CALORIES WITH SERIOUS COMORBIDITY AND BODY MASS INDEX (BMI) OF 31.0 TO 31.9 IN ADULT: ICD-10-CM

## 2025-07-28 DIAGNOSIS — G43.909 MIGRAINE HEADACHE: Primary | ICD-10-CM

## 2025-07-28 DIAGNOSIS — E66.09 CLASS 1 OBESITY DUE TO EXCESS CALORIES WITH SERIOUS COMORBIDITY AND BODY MASS INDEX (BMI) OF 31.0 TO 31.9 IN ADULT: ICD-10-CM

## 2025-07-28 DIAGNOSIS — E78.2 MIXED HYPERLIPIDEMIA: ICD-10-CM

## 2025-07-28 DIAGNOSIS — M35.2 BEHCET'S SYNDROME INVOLVING ORAL MUCOSA: ICD-10-CM

## 2025-07-28 DIAGNOSIS — Z91.89 AT HIGH RISK FOR CARDIOVASCULAR DISEASE: ICD-10-CM

## 2025-07-28 LAB
OHS QRS DURATION: 70 MS
OHS QTC CALCULATION: 443 MS

## 2025-07-28 PROCEDURE — 93005 ELECTROCARDIOGRAM TRACING: CPT | Mod: PO

## 2025-07-28 PROCEDURE — 99999 PR PBB SHADOW E&M-EST. PATIENT-LVL V: CPT | Mod: PBBFAC,,, | Performed by: INTERNAL MEDICINE

## 2025-07-28 PROCEDURE — 99204 OFFICE O/P NEW MOD 45 MIN: CPT | Mod: S$PBB,25,, | Performed by: INTERNAL MEDICINE

## 2025-07-28 PROCEDURE — 99215 OFFICE O/P EST HI 40 MIN: CPT | Mod: PBBFAC,PO | Performed by: INTERNAL MEDICINE

## 2025-07-28 PROCEDURE — 93010 ELECTROCARDIOGRAM REPORT: CPT | Mod: ,,, | Performed by: INTERNAL MEDICINE

## 2025-07-28 NOTE — PROGRESS NOTES
Subjective   Patient ID:  Yodit Canseco is a 64 y.o. female who presents for evaluation of No chief complaint on file.      HPI64 yo WF with HLD, obesity and family hx of cardiac disease here for risk counseling. Denies chest pain, SOB, but gets mild ankle edema. Denies palpitations, weak spells, and syncope  edema Resting EKG normal. Past lipids on low dose simvastatin not at goal.     Review of Systems   Constitutional: Negative for decreased appetite, fever, malaise/fatigue, weight gain and weight loss.   HENT:  Negative for hearing loss and nosebleeds.    Eyes:  Negative for visual disturbance.   Cardiovascular:  Negative for chest pain, claudication, cyanosis, dyspnea on exertion, irregular heartbeat, leg swelling, near-syncope, orthopnea, palpitations, paroxysmal nocturnal dyspnea and syncope.   Respiratory:  Negative for cough, hemoptysis, shortness of breath, sleep disturbances due to breathing, snoring and wheezing.    Endocrine: Negative for cold intolerance, heat intolerance, polydipsia and polyuria.   Hematologic/Lymphatic: Negative for adenopathy and bleeding problem. Does not bruise/bleed easily.   Skin:  Negative for color change, itching, poor wound healing, rash and suspicious lesions.   Musculoskeletal:  Positive for arthritis, joint pain and myalgias. Negative for back pain, falls, joint swelling, muscle cramps and muscle weakness.   Gastrointestinal:  Negative for bloating, abdominal pain, change in bowel habit, constipation, flatus, heartburn, hematemesis, hematochezia, hemorrhoids, jaundice, melena, nausea and vomiting.   Genitourinary:  Negative for bladder incontinence, decreased libido, frequency, hematuria, hesitancy and urgency.   Neurological:  Negative for brief paralysis, difficulty with concentration, excessive daytime sleepiness, dizziness, focal weakness, headaches, light-headedness, loss of balance, numbness, vertigo and weakness.   Psychiatric/Behavioral:  Negative for altered  "mental status, depression and memory loss. The patient does not have insomnia and is not nervous/anxious.    Allergic/Immunologic: Negative for environmental allergies, hives and persistent infections.          Objective     Physical Exam  Vitals and nursing note reviewed.   Constitutional:       Appearance: She is well-developed. She is obese.      Comments: /72   Pulse 89   Ht 5' 2.5" (1.588 m)   Wt 78.2 kg (172 lb 8 oz)   SpO2 95%   BMI 31.05 kg/m²      HENT:      Head: Normocephalic and atraumatic.      Right Ear: External ear normal.      Left Ear: External ear normal.      Nose: Nose normal.   Eyes:      General: Lids are normal. No scleral icterus.        Right eye: No discharge.         Left eye: No discharge.      Conjunctiva/sclera: Conjunctivae normal.      Right eye: No hemorrhage.     Pupils: Pupils are equal, round, and reactive to light.   Neck:      Thyroid: No thyromegaly.      Vascular: No JVD.      Trachea: No tracheal deviation.   Cardiovascular:      Rate and Rhythm: Normal rate and regular rhythm.      Pulses: Intact distal pulses.      Heart sounds: Normal heart sounds. No murmur heard.     No friction rub. No gallop.   Pulmonary:      Effort: Pulmonary effort is normal. No respiratory distress.      Breath sounds: Normal breath sounds. No wheezing or rales.   Chest:      Chest wall: No tenderness.   Breasts:     Breasts are symmetrical.   Abdominal:      General: Bowel sounds are normal. There is no distension.      Palpations: Abdomen is soft. There is no hepatomegaly or mass.      Tenderness: There is no abdominal tenderness. There is no guarding or rebound.   Musculoskeletal:         General: No tenderness. Normal range of motion.      Cervical back: Normal range of motion and neck supple.   Lymphadenopathy:      Cervical: No cervical adenopathy.   Skin:     General: Skin is warm and dry.      Coloration: Skin is not pale.      Findings: No erythema or rash.   Neurological:      " Mental Status: She is alert and oriented to person, place, and time.      Cranial Nerves: No cranial nerve deficit.      Coordination: Coordination normal.      Deep Tendon Reflexes: Reflexes are normal and symmetric. Reflexes normal.   Psychiatric:         Behavior: Behavior normal.         Thought Content: Thought content normal.         Judgment: Judgment normal.            Assessment and Plan     1. Encounter for screening for cardiovascular disorders schedule CACS   2. Mixed hyperlipidemia repeat lipid profile   3. Class 1 obesity due to excess calories with serious comorbidity and body mass index (BMI) of 31.0 to 31.9 in adult discussed weight   4. At high risk for cardiovascular disease schedule stress test in CACS high       Plan:  Patient advised to modify risk factors such as weight, exercise, diet,  tobacco and alcohol exposure   Orders Placed This Encounter   Procedures    CT Cardiac Scoring    Comprehensive Metabolic Panel    Lipid Panel     Follow up in about 6 months (around 1/28/2026).        Advance Care Planning     Date: 07/28/2025

## 2025-07-29 RX ORDER — SULFASALAZINE 500 MG/1
500 TABLET, DELAYED RELEASE ORAL 2 TIMES DAILY
Qty: 60 TABLET | Refills: 2 | OUTPATIENT
Start: 2025-07-29

## 2025-07-31 ENCOUNTER — LAB VISIT (OUTPATIENT)
Dept: LAB | Facility: HOSPITAL | Age: 65
End: 2025-07-31
Attending: INTERNAL MEDICINE
Payer: MEDICARE

## 2025-07-31 DIAGNOSIS — E78.2 MIXED HYPERLIPIDEMIA: ICD-10-CM

## 2025-07-31 DIAGNOSIS — G43.909 MIGRAINE HEADACHE: ICD-10-CM

## 2025-07-31 LAB
ALBUMIN SERPL BCP-MCNC: 4.2 G/DL (ref 3.5–5.2)
ALP SERPL-CCNC: 56 UNIT/L (ref 40–150)
ALT SERPL W/O P-5'-P-CCNC: 18 UNIT/L (ref 10–44)
ANION GAP (OHS): 12 MMOL/L (ref 8–16)
AST SERPL-CCNC: 37 UNIT/L (ref 11–45)
BILIRUB SERPL-MCNC: 0.3 MG/DL (ref 0.1–1)
BUN SERPL-MCNC: 15 MG/DL (ref 8–23)
CALCIUM SERPL-MCNC: 8.9 MG/DL (ref 8.7–10.5)
CHLORIDE SERPL-SCNC: 102 MMOL/L (ref 95–110)
CHOLEST SERPL-MCNC: 203 MG/DL (ref 120–199)
CHOLEST/HDLC SERPL: 3.2 {RATIO} (ref 2–5)
CO2 SERPL-SCNC: 26 MMOL/L (ref 23–29)
CREAT SERPL-MCNC: 0.9 MG/DL (ref 0.5–1.4)
GFR SERPLBLD CREATININE-BSD FMLA CKD-EPI: >60 ML/MIN/1.73/M2
GLUCOSE SERPL-MCNC: 93 MG/DL (ref 70–110)
HDLC SERPL-MCNC: 64 MG/DL (ref 40–75)
HDLC SERPL: 31.5 % (ref 20–50)
LDLC SERPL CALC-MCNC: 98 MG/DL (ref 63–159)
NONHDLC SERPL-MCNC: 139 MG/DL
POTASSIUM SERPL-SCNC: 3.6 MMOL/L (ref 3.5–5.1)
PROT SERPL-MCNC: 7.6 GM/DL (ref 6–8.4)
SODIUM SERPL-SCNC: 140 MMOL/L (ref 136–145)
TRIGL SERPL-MCNC: 205 MG/DL (ref 30–150)

## 2025-07-31 PROCEDURE — 82247 BILIRUBIN TOTAL: CPT

## 2025-07-31 PROCEDURE — 36415 COLL VENOUS BLD VENIPUNCTURE: CPT | Mod: PO

## 2025-07-31 PROCEDURE — 80061 LIPID PANEL: CPT

## 2025-08-01 ENCOUNTER — HOSPITAL ENCOUNTER (OUTPATIENT)
Dept: RADIOLOGY | Facility: HOSPITAL | Age: 65
Discharge: HOME | End: 2025-08-01
Attending: INTERNAL MEDICINE
Payer: MEDICARE

## 2025-08-01 ENCOUNTER — OFFICE VISIT (OUTPATIENT)
Dept: ALLERGY | Facility: CLINIC | Age: 65
End: 2025-08-01
Payer: MEDICARE

## 2025-08-01 VITALS — BODY MASS INDEX: 31.17 KG/M2 | WEIGHT: 175.94 LBS | HEIGHT: 63 IN

## 2025-08-01 DIAGNOSIS — G43.709 CHRONIC MIGRAINE WITHOUT AURA WITHOUT STATUS MIGRAINOSUS, NOT INTRACTABLE: ICD-10-CM

## 2025-08-01 DIAGNOSIS — D80.6 ANTI-POLYSACCHARIDE ANTIBODY DEFICIENCY: ICD-10-CM

## 2025-08-01 DIAGNOSIS — J45.50 SEVERE PERSISTENT ASTHMA WITHOUT COMPLICATION: Primary | ICD-10-CM

## 2025-08-01 DIAGNOSIS — J82.83 EOSINOPHILIC ASTHMA: ICD-10-CM

## 2025-08-01 DIAGNOSIS — M35.2 BEHCET'S SYNDROME INVOLVING ORAL MUCOSA: ICD-10-CM

## 2025-08-01 DIAGNOSIS — R76.8 POSITIVE ANA (ANTINUCLEAR ANTIBODY): ICD-10-CM

## 2025-08-01 DIAGNOSIS — I77.89 OTHER SPECIFIED DISORDERS OF ARTERIES AND ARTERIOLES: ICD-10-CM

## 2025-08-01 DIAGNOSIS — J31.0 CHRONIC RHINITIS: ICD-10-CM

## 2025-08-01 DIAGNOSIS — M35.2 BEHCET'S DISEASE: ICD-10-CM

## 2025-08-01 DIAGNOSIS — H93.A9 PULSATILE TINNITUS, UNSPECIFIED EAR: ICD-10-CM

## 2025-08-01 DIAGNOSIS — J32.9 RECURRENT SINUS INFECTIONS: ICD-10-CM

## 2025-08-01 PROCEDURE — 70544 MR ANGIOGRAPHY HEAD W/O DYE: CPT | Mod: TC,PO

## 2025-08-01 PROCEDURE — 25500020 PHARM REV CODE 255: Mod: PO | Performed by: INTERNAL MEDICINE

## 2025-08-01 PROCEDURE — 70553 MRI BRAIN STEM W/O & W/DYE: CPT | Mod: TC,PO

## 2025-08-01 PROCEDURE — 99214 OFFICE O/P EST MOD 30 MIN: CPT | Mod: PBBFAC,PO | Performed by: ALLERGY & IMMUNOLOGY

## 2025-08-01 PROCEDURE — 70553 MRI BRAIN STEM W/O & W/DYE: CPT | Mod: 26,,, | Performed by: RADIOLOGY

## 2025-08-01 PROCEDURE — 99999 PR PBB SHADOW E&M-EST. PATIENT-LVL IV: CPT | Mod: PBBFAC,,, | Performed by: ALLERGY & IMMUNOLOGY

## 2025-08-01 PROCEDURE — A9585 GADOBUTROL INJECTION: HCPCS | Mod: PO | Performed by: INTERNAL MEDICINE

## 2025-08-01 RX ORDER — GADOBUTROL 604.72 MG/ML
7 INJECTION INTRAVENOUS
Status: COMPLETED | OUTPATIENT
Start: 2025-08-01 | End: 2025-08-01

## 2025-08-01 RX ADMIN — GADOBUTROL 7 ML: 604.72 INJECTION INTRAVENOUS at 04:08

## 2025-08-01 NOTE — PROGRESS NOTES
Subjective:       Patient ID: Yodit Canseco is a 64 y.o. female.    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  She was last seen 10/15/2024.  She had labs drawn in past- CBC was normal, lymphocyte profile showed low NK cells. IgA normal at 170, IgM normal at 124, IgE 35, IgG normal at 700 but had steadily been decreasing, IgG subclasses normal and humoral panel protected to H flu, diphtheria, tetanus and 8/14 strep serotypes but this is after pneumovax and Prevnar so inadequate protection.  She still had constant PND and congestion and got frequent infections with sinus, ear, bronchitis and even pneumonia. She was on antibiotics every 1-2 months. She cannot use nose sprays all cause nose bleed even Astelin. All antihistamines cause sedation per pt. She is on Singulair daily. She started Hizentra 10 g weekly and then in October 2016 changed to 20 g every 2 weeks. Then 2022 changed to 18 g every 2 weeks she occ delays infusion. When her UC flares and has diarrhea with BM many times a day so when this is occurring she holds off on Hizentra. Infusions take 2.5-3 hours. She has done well with infusions. She has itching after and takes benadryl after with resolution. She has had few sinus infections this year. Was in hospital in Jan for bad bronchitis. Also in May for aseptic meningitis. Has had UC flares as well but is better on Rinvoq.  no pneumonia. She was in hospital in Jan and May 2024 for UC and now on Rinvoq.      Labs 7/2023 IgG 943, 8/2023 normal CBC and CMP    She does have some food allergies. Pineapple, cranberry, corn cause itching in mouth then bleeding. Melons causes hives and face swelling.     Prior History taken 1/13/14: new patient evaluation of rash and chronic PND.  She has been seen about 4 years ago and had labs drawn with normal immunoglobulins,  humoral panel only protected to 4/14 strep titers despite prior pneumovax and immunocaps negative to dust mites, [place], [place], cocoa, garlic, milk, wheat, rye, pecan, and peanut.  She was advised to get pneumovax and follow up but she did not.  She continues with chronic PND and congestion, no runny nose or sneeze and she gets inus infections 3-4 times per year requiring antibiotics.  She does clear with anbx but never gets rid of PND.  Her main question now is she gets a random rash on hands, feet and nose.  It seems to appear out of no where, is very itchy and red spots then goes away.  Lasts weeks to months.  She has seen derm and not sure of cause and she thinks may be food allergy although cannot identify a food.    She also has asthma which is well controlled on Dulera and Singulair.  Only uses albuterol with colds.      Rash  Associated symptoms include congestion and coughing. Pertinent negatives include no diarrhea, fatigue, fever, rhinorrhea, shortness of breath, sore throat or vomiting.       Environmental History: Pets in the home: none.  see history section for home environment  Review of Systems   Constitutional: Negative for fever, chills, appetite change and fatigue.   HENT: Positive for congestion and postnasal drip. Negative for ear pain, nosebleeds, sore throat, facial swelling, rhinorrhea, sneezing, trouble swallowing, voice change, sinus pressure and ear discharge.    Eyes: Negative for discharge, redness, itching and visual disturbance.   Respiratory: Positive for cough and wheezing. Negative for choking, chest tightness and shortness of breath.    Cardiovascular: Negative for chest pain, palpitations and leg swelling.   Gastrointestinal: Negative for nausea, vomiting, abdominal pain, diarrhea, constipation and abdominal distention.   Genitourinary: Negative for difficulty urinating.   Musculoskeletal: Negative for myalgias, joint swelling, arthralgias and gait problem.   Skin: Positive for  rash. Negative for color change.   Neurological: Negative for dizziness, syncope, weakness, light-headedness and headaches.   Hematological: Negative for adenopathy. Does not bruise/bleed easily.   Psychiatric/Behavioral: Negative for behavioral problems, confusion, sleep disturbance and agitation. The patient is not nervous/anxious.         Objective:    Physical Exam   Nursing note and vitals reviewed.  Constitutional: She is oriented to person, place, and time. She appears well-developed and well-nourished. No distress.   HENT:   Head: Normocephalic and atraumatic.   Right Ear: Hearing, tympanic membrane, external ear and ear canal normal.   Left Ear: Hearing, tympanic membrane, external ear and ear canal normal.   Nose: No mucosal edema, rhinorrhea, sinus tenderness or septal deviation. No epistaxis. Right sinus exhibits no maxillary sinus tenderness and no frontal sinus tenderness. Left sinus exhibits no maxillary sinus tenderness and no frontal sinus tenderness.   Mouth/Throat: Uvula is midline, oropharynx is clear and moist and mucous membranes are normal. No uvula swelling.   Eyes: Conjunctivae normal are normal. Right eye exhibits no discharge. Left eye exhibits no discharge.   Neck: Normal range of motion. No thyromegaly present.   Cardiovascular: Normal rate, regular rhythm and normal heart sounds.    No murmur heard.  Pulmonary/Chest: Effort normal and breath sounds normal. No respiratory distress. She has no wheezes.   Abdominal: Soft. She exhibits no distension. There is no tenderness.   Musculoskeletal: Normal range of motion. She exhibits no edema and no tenderness.   Lymphadenopathy:     She has no cervical adenopathy.   Neurological: She is alert and oriented to person, place, and time.   Skin: Skin is warm and dry. No rash noted. No erythema.   Psychiatric: She has a normal mood and affect. Her behavior is normal. Judgment and thought content normal.       Laboratory:   none performed    Assessment:       1. Severe persistent asthma without complication    2. Eosinophilic asthma    3. Anti-polysaccharide antibody deficiency    4. Recurrent sinus infections    5. Chronic rhinitis         Plan:       1. continue Hizentra 18 g every 2 weeks.  Labs today to see if needed to adjust dose  2. continue Singulair daily   3. albuterol and Pulmicort nebs as needed  4. RTC annually or sooner if needed    I spent a total of 40 minutes on the day of the visit.  This includes face to face time and non-face to face time preparing to see the patient (eg, review of tests), obtaining and/or reviewing separately obtained history, documenting clinical information in the electronic or other health record, independently interpreting results and communicating results to the patient/family/caregiver, or care coordinator.

## 2025-08-04 ENCOUNTER — PATIENT MESSAGE (OUTPATIENT)
Dept: PULMONOLOGY | Facility: CLINIC | Age: 65
End: 2025-08-04
Payer: MEDICARE

## 2025-08-04 PROBLEM — Z91.89 AT HIGH RISK FOR CARDIOVASCULAR DISEASE: Status: RESOLVED | Noted: 2025-07-28 | Resolved: 2025-08-04

## 2025-08-04 RX ORDER — SULFASALAZINE 500 MG/1
500 TABLET, DELAYED RELEASE ORAL 2 TIMES DAILY
Qty: 60 TABLET | Refills: 3 | Status: SHIPPED | OUTPATIENT
Start: 2025-08-04

## 2025-08-05 ENCOUNTER — RESULTS FOLLOW-UP (OUTPATIENT)
Dept: NEUROLOGY | Facility: CLINIC | Age: 65
End: 2025-08-05
Payer: MEDICARE

## 2025-08-05 ENCOUNTER — OFFICE VISIT (OUTPATIENT)
Dept: PULMONOLOGY | Facility: CLINIC | Age: 65
End: 2025-08-05
Payer: MEDICARE

## 2025-08-05 ENCOUNTER — PATIENT MESSAGE (OUTPATIENT)
Dept: ALLERGY | Facility: CLINIC | Age: 65
End: 2025-08-05
Payer: MEDICARE

## 2025-08-05 VITALS
HEART RATE: 83 BPM | WEIGHT: 175.94 LBS | BODY MASS INDEX: 31.17 KG/M2 | OXYGEN SATURATION: 96 % | DIASTOLIC BLOOD PRESSURE: 66 MMHG | HEIGHT: 63 IN | SYSTOLIC BLOOD PRESSURE: 141 MMHG

## 2025-08-05 DIAGNOSIS — J45.50 SEVERE PERSISTENT ASTHMA WITHOUT COMPLICATION: Primary | ICD-10-CM

## 2025-08-05 DIAGNOSIS — D83.9 CVID (COMMON VARIABLE IMMUNODEFICIENCY): ICD-10-CM

## 2025-08-05 DIAGNOSIS — M06.9 RHEUMATOID ARTHRITIS, INVOLVING UNSPECIFIED SITE, UNSPECIFIED WHETHER RHEUMATOID FACTOR PRESENT: ICD-10-CM

## 2025-08-05 DIAGNOSIS — R10.9 ABDOMINAL PAIN, UNSPECIFIED ABDOMINAL LOCATION: ICD-10-CM

## 2025-08-05 DIAGNOSIS — E34.8 PINEAL GLAND CYST: Primary | ICD-10-CM

## 2025-08-05 PROCEDURE — 99212 OFFICE O/P EST SF 10 MIN: CPT | Mod: S$PBB,,, | Performed by: INTERNAL MEDICINE

## 2025-08-05 PROCEDURE — 99999 PR PBB SHADOW E&M-EST. PATIENT-LVL V: CPT | Mod: PBBFAC,,, | Performed by: INTERNAL MEDICINE

## 2025-08-05 PROCEDURE — 99215 OFFICE O/P EST HI 40 MIN: CPT | Mod: PBBFAC,PO | Performed by: INTERNAL MEDICINE

## 2025-08-05 RX ORDER — TRAMADOL HYDROCHLORIDE 50 MG/1
50 TABLET, FILM COATED ORAL EVERY 4 HOURS PRN
Qty: 90 EACH | Refills: 0 | Status: SHIPPED | OUTPATIENT
Start: 2025-10-01

## 2025-08-05 RX ORDER — TRAMADOL HYDROCHLORIDE 50 MG/1
50 TABLET, FILM COATED ORAL EVERY 4 HOURS PRN
Qty: 90 EACH | Refills: 0 | Status: SHIPPED | OUTPATIENT
Start: 2025-08-05

## 2025-08-05 RX ORDER — ALBUTEROL SULFATE 90 UG/1
2 INHALANT RESPIRATORY (INHALATION) EVERY 4 HOURS PRN
Qty: 54 G | Refills: 3 | Status: SHIPPED | OUTPATIENT
Start: 2025-08-05

## 2025-08-05 NOTE — PATIENT INSTRUCTIONS
Trelegy due October    Ultram to be increased opt 90/m renewed til early October-- dilaudid dose in may --- noted...     Need office visit once a yr -- virtual visit every 3 months for tramadol      Salagen working with no appreciated issues  Asthma doing well

## 2025-08-07 ENCOUNTER — TELEPHONE (OUTPATIENT)
Dept: NEUROSURGERY | Facility: CLINIC | Age: 65
End: 2025-08-07
Payer: MEDICARE

## 2025-08-07 NOTE — TELEPHONE ENCOUNTER
Called and s/w patient who was successfully scheduled with Dr. Segal per referral.  Patient v/u of date/time/location of appointment.

## 2025-08-08 ENCOUNTER — TELEPHONE (OUTPATIENT)
Dept: ALLERGY | Facility: CLINIC | Age: 65
End: 2025-08-08
Payer: MEDICARE

## 2025-08-08 NOTE — TELEPHONE ENCOUNTER
Spoke to Rommel ( [facility] Pharmacist ) to give verbal order for new Hizentra dose increase to 20 gm every 14 days. Faxed current clinic note and lab values to [phone].  Notified pt verbally of above.

## 2025-08-12 ENCOUNTER — TELEPHONE (OUTPATIENT)
Dept: ALLERGY | Facility: CLINIC | Age: 65
End: 2025-08-12
Payer: MEDICARE

## 2025-08-13 ENCOUNTER — TELEPHONE (OUTPATIENT)
Dept: NEUROSURGERY | Facility: CLINIC | Age: 65
End: 2025-08-13
Payer: MEDICARE

## 2025-08-25 ENCOUNTER — PATIENT MESSAGE (OUTPATIENT)
Dept: PULMONOLOGY | Facility: CLINIC | Age: 65
End: 2025-08-25
Payer: MEDICARE

## 2025-08-25 ENCOUNTER — PATIENT MESSAGE (OUTPATIENT)
Dept: NEUROLOGY | Facility: CLINIC | Age: 65
End: 2025-08-25

## 2025-08-25 ENCOUNTER — OFFICE VISIT (OUTPATIENT)
Dept: NEUROLOGY | Facility: CLINIC | Age: 65
End: 2025-08-25
Payer: MEDICARE

## 2025-08-25 VITALS
BODY MASS INDEX: 31.02 KG/M2 | HEART RATE: 85 BPM | WEIGHT: 175.06 LBS | DIASTOLIC BLOOD PRESSURE: 85 MMHG | SYSTOLIC BLOOD PRESSURE: 153 MMHG | RESPIRATION RATE: 18 BRPM | HEIGHT: 63 IN

## 2025-08-25 DIAGNOSIS — M35.2 BEHCET'S DISEASE: ICD-10-CM

## 2025-08-25 DIAGNOSIS — G43.709 CHRONIC MIGRAINE WITHOUT AURA WITHOUT STATUS MIGRAINOSUS, NOT INTRACTABLE: Primary | ICD-10-CM

## 2025-08-25 DIAGNOSIS — E34.8 PINEAL GLAND CYST: ICD-10-CM

## 2025-08-25 DIAGNOSIS — H93.A9 PULSATILE TINNITUS, UNSPECIFIED EAR: ICD-10-CM

## 2025-08-25 PROCEDURE — 99215 OFFICE O/P EST HI 40 MIN: CPT | Mod: PBBFAC,PO | Performed by: INTERNAL MEDICINE

## 2025-08-25 PROCEDURE — 99999 PR PBB SHADOW E&M-EST. PATIENT-LVL V: CPT | Mod: PBBFAC,,, | Performed by: INTERNAL MEDICINE

## 2025-08-25 RX ORDER — TOPIRAMATE 25 MG/1
25 TABLET, FILM COATED ORAL NIGHTLY
Qty: 30 TABLET | Refills: 11 | Status: SHIPPED | OUTPATIENT
Start: 2025-08-25 | End: 2026-08-25

## 2025-08-26 DIAGNOSIS — J45.50 SEVERE PERSISTENT ASTHMA WITHOUT COMPLICATION: ICD-10-CM

## 2025-08-26 RX ORDER — SUMATRIPTAN SUCCINATE 100 MG/1
100 TABLET ORAL 2 TIMES DAILY PRN
Qty: 9 TABLET | Refills: 11 | Status: SHIPPED | OUTPATIENT
Start: 2025-08-26 | End: 2026-08-26

## 2025-08-26 RX ORDER — ALBUTEROL SULFATE 0.83 MG/ML
2.5 SOLUTION RESPIRATORY (INHALATION) EVERY 6 HOURS PRN
Qty: 120 EACH | Refills: 11 | Status: SHIPPED | OUTPATIENT
Start: 2025-08-26 | End: 2026-08-26

## 2025-08-28 ENCOUNTER — PATIENT MESSAGE (OUTPATIENT)
Dept: PULMONOLOGY | Facility: CLINIC | Age: 65
End: 2025-08-28
Payer: MEDICARE

## 2025-08-28 ENCOUNTER — OFFICE VISIT (OUTPATIENT)
Dept: FAMILY MEDICINE | Facility: CLINIC | Age: 65
End: 2025-08-28
Payer: MEDICARE

## 2025-08-28 VITALS
BODY MASS INDEX: 30.97 KG/M2 | TEMPERATURE: 98 F | HEIGHT: 63 IN | DIASTOLIC BLOOD PRESSURE: 78 MMHG | HEART RATE: 85 BPM | WEIGHT: 174.81 LBS | SYSTOLIC BLOOD PRESSURE: 135 MMHG

## 2025-08-28 DIAGNOSIS — H93.8X2 EAR FULLNESS, LEFT: Primary | ICD-10-CM

## 2025-08-28 DIAGNOSIS — H65.92 MIDDLE EAR EFFUSION, LEFT: ICD-10-CM

## 2025-08-28 PROCEDURE — 99213 OFFICE O/P EST LOW 20 MIN: CPT | Mod: S$PBB,,, | Performed by: NURSE PRACTITIONER

## 2025-08-28 PROCEDURE — 99999 PR PBB SHADOW E&M-EST. PATIENT-LVL V: CPT | Mod: PBBFAC,,, | Performed by: NURSE PRACTITIONER

## 2025-08-28 PROCEDURE — 99215 OFFICE O/P EST HI 40 MIN: CPT | Mod: PBBFAC,PO | Performed by: NURSE PRACTITIONER

## 2025-08-28 RX ORDER — AZELASTINE 1 MG/ML
1 SPRAY, METERED NASAL 2 TIMES DAILY
Qty: 30 ML | Refills: 0 | Status: SHIPPED | OUTPATIENT
Start: 2025-08-28 | End: 2025-09-04

## 2025-08-28 RX ORDER — CETIRIZINE HYDROCHLORIDE 10 MG/1
10 TABLET ORAL DAILY
Qty: 30 TABLET | Refills: 0 | Status: SHIPPED | OUTPATIENT
Start: 2025-08-28 | End: 2025-09-07

## 2025-08-28 RX ORDER — AZITHROMYCIN 250 MG/1
TABLET, FILM COATED ORAL
Qty: 6 TABLET | Refills: 0 | Status: SHIPPED | OUTPATIENT
Start: 2025-08-28 | End: 2025-08-28

## 2025-09-02 ENCOUNTER — OFFICE VISIT (OUTPATIENT)
Dept: NEUROSURGERY | Facility: CLINIC | Age: 65
End: 2025-09-02
Payer: MEDICARE

## 2025-09-02 VITALS
BODY MASS INDEX: 32.17 KG/M2 | DIASTOLIC BLOOD PRESSURE: 73 MMHG | HEIGHT: 62 IN | WEIGHT: 174.81 LBS | RESPIRATION RATE: 18 BRPM | SYSTOLIC BLOOD PRESSURE: 123 MMHG | HEART RATE: 71 BPM

## 2025-09-02 DIAGNOSIS — E34.8 PINEAL GLAND CYST: Primary | ICD-10-CM

## 2025-09-02 DIAGNOSIS — E34.8 PINEAL GLAND CYST: ICD-10-CM

## 2025-09-02 DIAGNOSIS — M54.2 CERVICAL MUSCLE PAIN: Primary | ICD-10-CM

## 2025-09-02 RX ORDER — METHOCARBAMOL 500 MG/1
500 TABLET, FILM COATED ORAL EVERY 8 HOURS PRN
Qty: 30 TABLET | Refills: 1 | Status: SHIPPED | OUTPATIENT
Start: 2025-09-02

## 2025-09-04 ENCOUNTER — HOSPITAL ENCOUNTER (OUTPATIENT)
Dept: RADIOLOGY | Facility: HOSPITAL | Age: 65
Discharge: HOME | End: 2025-09-04
Payer: MEDICARE

## 2025-09-04 DIAGNOSIS — M54.2 CERVICAL MUSCLE PAIN: ICD-10-CM

## 2025-09-04 PROCEDURE — 72050 X-RAY EXAM NECK SPINE 4/5VWS: CPT | Mod: 26,,, | Performed by: RADIOLOGY

## 2025-09-04 PROCEDURE — 72050 X-RAY EXAM NECK SPINE 4/5VWS: CPT | Mod: TC,PO

## (undated) DEVICE — SEE MEDLINE ITEM 146313

## (undated) DEVICE — SEE MEDLINE ITEM 152487

## (undated) DEVICE — Device

## (undated) DEVICE — TAPE SILK 3IN

## (undated) DEVICE — TUBING PNEUMO

## (undated) DEVICE — SEE MEDLINE ITEM 152680

## (undated) DEVICE — BANDAGE ADHESIVE

## (undated) DEVICE — PACK CUSTOM UNIV BASIN SLI

## (undated) DEVICE — GLOVE SURG ULTRA TOUCH 7.5

## (undated) DEVICE — CLIP ENDO LIGATION LARGE CLIPS

## (undated) DEVICE — SEE MEDLINE ITEM 146292

## (undated) DEVICE — SOL NACL 0.9% INJ PF/100 150

## (undated) DEVICE — UNDERGLOVES BIOGEL PI SIZE 8

## (undated) DEVICE — NDL SPINAL 18GX3.5 SPINOCAN

## (undated) DEVICE — TUBE KANGAROO FEED 12FR 109CM

## (undated) DEVICE — SCISSOR CURVED ENDOPATH 5MM

## (undated) DEVICE — SEE MEDLINE ITEM 157117

## (undated) DEVICE — DRAPE ABDOMINAL TIBURON 14X11

## (undated) DEVICE — DRESSING LEUKOPLAST FLEX 1X3IN

## (undated) DEVICE — SET DECANTER MEDICHOICE

## (undated) DEVICE — SEE MEDLINE ITEM 152622

## (undated) DEVICE — TRANSFER MATTRES LATERAL 34

## (undated) DEVICE — TROCAR KII FIOS 5MM X 100MM

## (undated) DEVICE — SUT 0 VICRYL / UR6 (J603)

## (undated) DEVICE — SYR 50CC LL

## (undated) DEVICE — SUT MONOCRYL 4-0 SH UND MON

## (undated) DEVICE — ELECTRODE REM PLYHSV RETURN 9

## (undated) DEVICE — SYR 50ML CATH TIP

## (undated) DEVICE — IRRIGATOR SUCTION W/TIP

## (undated) DEVICE — LINER SUCTION 3000CC

## (undated) DEVICE — SOL 9P NACL IRR PIC IL

## (undated) DEVICE — SOL CLEARIFY VISUALIZATION LAP

## (undated) DEVICE — SHEARS HARMONIC 36CM HD 1000I

## (undated) DEVICE — KIT PROCEDURE STER INLET CLOSU

## (undated) DEVICE — STRAP OR TABLE 5IN X 72IN

## (undated) DEVICE — TROCAR KII FIOS 12MM X 100MM

## (undated) DEVICE — SUT 2/0 30IN SILK BLK BRAI

## (undated) DEVICE — APPLICATOR CHLORAPREP ORN 26ML

## (undated) DEVICE — SKINMARKER W/RULER DEVON

## (undated) DEVICE — SYR SLIP TIP 10ML SHIELD

## (undated) DEVICE — GLOVE SURG ULTRA TOUCH 8

## (undated) DEVICE — ITEM INACTIVATED - ERP

## (undated) DEVICE — SLEEVE SCD EXPRESS CALF MEDIUM

## (undated) DEVICE — CLOSURE SKIN STERI STRIP 1/2X4

## (undated) DEVICE — GUIDEWIRE JAGWIRE .035X450CM

## (undated) DEVICE — BLADE SURG CARBON STEEL SZ11

## (undated) DEVICE — SOL IRR NACL .9% 3000ML